# Patient Record
Sex: FEMALE | Race: WHITE | NOT HISPANIC OR LATINO | Employment: OTHER | URBAN - METROPOLITAN AREA
[De-identification: names, ages, dates, MRNs, and addresses within clinical notes are randomized per-mention and may not be internally consistent; named-entity substitution may affect disease eponyms.]

---

## 2017-01-02 ENCOUNTER — OFFICE VISIT (OUTPATIENT)
Dept: FAMILY MEDICINE | Facility: CLINIC | Age: 52
End: 2017-01-02

## 2017-01-02 VITALS
DIASTOLIC BLOOD PRESSURE: 74 MMHG | SYSTOLIC BLOOD PRESSURE: 110 MMHG | TEMPERATURE: 97.7 F | RESPIRATION RATE: 18 BRPM | BODY MASS INDEX: 37.67 KG/M2 | HEART RATE: 68 BPM | WEIGHT: 206 LBS | OXYGEN SATURATION: 96 %

## 2017-01-02 DIAGNOSIS — G89.29 CHRONIC MIDLINE LOW BACK PAIN WITHOUT SCIATICA: Primary | ICD-10-CM

## 2017-01-02 DIAGNOSIS — R60.0 BILATERAL EDEMA OF LOWER EXTREMITY: ICD-10-CM

## 2017-01-02 DIAGNOSIS — M54.50 CHRONIC MIDLINE LOW BACK PAIN WITHOUT SCIATICA: Primary | ICD-10-CM

## 2017-01-02 NOTE — PROGRESS NOTES
Preceptor Attestation:   Patient seen and discussed with the resident. Assessment and plan reviewed with resident and agreed upon.   Supervising Physician:  Rashad Nowak MD  [facility]

## 2017-01-02 NOTE — PROGRESS NOTES
HPI:       Flor Valdez is a 51 year old who presents for the following  Patient presents with:  Liver: Follow Up    She has no special concerns today. She states that every month that she sees her PCP Dr. Sierra.  Last time she was given patch for her chronic back pain which helped. She would like to refill this medication today. She would also like prescription for back supporter/stabilizer today.     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      Problem, Medication and Allergy Lists were reviewed and are current.  Patient is an established patient of this clinic.         Review of Systems:   Review of Systems   Constitutional: Negative for fever, chills, appetite change and fatigue.   HENT: Negative.    Eyes: Negative.    Respiratory: Negative for cough, chest tightness and shortness of breath.    Cardiovascular: Negative for chest pain and palpitations.   Gastrointestinal: Negative for nausea, vomiting, abdominal pain, diarrhea, constipation and blood in stool.   Genitourinary: Negative.    Musculoskeletal: Positive for back pain (chronic).   Skin: Negative.    Neurological: Negative.    Hematological: Negative.    Psychiatric/Behavioral: Negative.              Physical Exam:     Patient Vitals for the past 24 hrs:   BP Temp Temp src Pulse Resp SpO2 Weight   01/02/17 1325 110/74 mmHg 97.7  F (36.5  C) Oral 68 18 96 % 206 lb (93.441 kg)     Body mass index is 37.67 kg/(m^2).  Vitals were reviewed and were normal     Physical Exam   Constitutional: She is oriented to person, place, and time. She appears well-developed and well-nourished. No distress.   HENT:   Head: Normocephalic and atraumatic.   Right Ear: External ear normal.   Left Ear: External ear normal.   Eyes: Conjunctivae are normal.   Neck: Normal range of motion.  Lungs: Clear to auscultation bilaterally  CVS:  RRR, +S1S2, No murmur noted, no JVD   Musculoskeletal:   Trace lower extremity swelling   Normal range of motion.        Lumbar back: She exhibits tenderness. She exhibits normal range of motion and no bony tenderness.   Neurological: She is alert and oriented to person, place, and time. No cranial nerve deficit. Gait normal.   Skin: Skin is warm. No rash noted. She is not diaphoretic.   Psychiatric: She has a normal mood and affect. Her behavior is normal. Thought content normal.       Assessment and Plan   Flor is a 51 year old female who has history of CKD stage 3 and is 6 years status post liver transplantation for chronic hepatitis C (negative for hepatitis C virus since transplant) who presents for routine follow up      Diagnoses and all orders for this visit:    Chronic back pain, unspecified back location, unspecified back pain laterality  Stable, no red flags or evidence of radiculopathy. No signs of cauda equina syndrome. Continue with topical lidocaine to pain relief. Prescription for back stabilizer was given for additional support. Offered PT for mobilization, in addition to core strengthening and stretching with patient declined at this time due to cold weather.   -     order for DME; Equipment being ordered: Back Stabilizer    Trace bilateral edema of lower extremity  No concerns for cardiac etiologies, given her normal EF on last echo which was performed in 2014.  LFT's within normal limits, no concerns for hepatic etiologies  History of CKD stage 3, but renal function sable  Symptoms likely secondary to venous insufficiency, patient provided with compression stockings for support. Encouraged healthy lifestyle modifications with diet and exercise.   -     order for DME; Equipment being ordered: Compression stockings below knee bilateral                  There are no discontinued medications.  Options for treatment and follow-up care were reviewed with the patient. Flor Valdez  engaged in the  decision making process and verbalized understanding of the options discussed and agreed with the final plan.    Aby Ley MD

## 2017-01-03 ENCOUNTER — TELEPHONE (OUTPATIENT)
Dept: FAMILY MEDICINE | Facility: CLINIC | Age: 52
End: 2017-01-03

## 2017-01-03 DIAGNOSIS — R60.0 BILATERAL EDEMA OF LOWER EXTREMITY: Primary | ICD-10-CM

## 2017-01-03 NOTE — TELEPHONE ENCOUNTER
Message routed to Dr. Ley, last to see patient, to place DME order as requested. Please have PCS fax to [facility].    Sally Ann RN

## 2017-01-03 NOTE — TELEPHONE ENCOUNTER
[facility] phone call message - order or referral request for patient:     Order or referral being requested:  requesting compression stockings from [employer]- 2 pairs of black stabilizer socks (beige okay if black not available). Size XL.    Additional Comments: none    OK to leave a message on voice mail? Yes    Primary language: French      needed? Yes    Call taken on January 3, 2017 at 3:32 PM by Nadia Hernandez

## 2017-01-03 NOTE — TELEPHONE ENCOUNTER
DME order placed and put in PCS folder to be faxed to [facility] lizzy Ley MD   [facility]

## 2017-01-04 ENCOUNTER — TELEPHONE (OUTPATIENT)
Dept: FAMILY MEDICINE | Facility: CLINIC | Age: 52
End: 2017-01-04

## 2017-01-06 ENCOUNTER — TELEPHONE (OUTPATIENT)
Dept: FAMILY MEDICINE | Facility: CLINIC | Age: 52
End: 2017-01-06

## 2017-01-06 NOTE — TELEPHONE ENCOUNTER
[facility] phone call message - order or referral request for patient:     Order or referral being requested: Skilled Nurse Visit      Additional Comments: 1 time a week for 9 weeks for as needed visit for med management and assessment.    OK to leave a message on voice mail? Yes    Primary language: Estonian      needed? Yes    Call taken on January 6, 2017 at 11:06 AM by Monik Wilkins

## 2017-01-06 NOTE — TELEPHONE ENCOUNTER
Returned call to home care nurse to give verbal orders per protocol as requested. Nurse verbalized understanding.    Sally Ann RN

## 2017-01-08 ENCOUNTER — MEDICAL CORRESPONDENCE (OUTPATIENT)
Dept: HEALTH INFORMATION MANAGEMENT | Facility: CLINIC | Age: 52
End: 2017-01-08

## 2017-01-10 ENCOUNTER — DOCUMENTATION ONLY (OUTPATIENT)
Dept: FAMILY MEDICINE | Facility: CLINIC | Age: 52
End: 2017-01-10

## 2017-01-10 NOTE — PROGRESS NOTES
"When opening a documentation only encounter, be sure to enter in \"Chief Complaint\" Forms and in \" Comments\" Title of form, description if needed.    Flor is a 51 year old  female  Form received via: Fax  Form now resides in: Provider Ready    Corrine Huitron CMA      Form has been completed by provider.     Form sent out via: Fax   Patient informed: No  Output date: January 26, 2017    Corrine Huitron CMA      **Please close the encounter**              "

## 2017-01-10 NOTE — TELEPHONE ENCOUNTER
"When opening a documentation only encounter, be sure to enter in \"Chief Complaint\" Forms and in \" Comments\" Title of form, description if needed.    Flor is a 51 year old  female  Form received via: Fax  Form now resides in: Provider Edward Huitron CMA                  "

## 2017-01-23 ENCOUNTER — OFFICE VISIT (OUTPATIENT)
Dept: TRANSPLANT | Facility: CLINIC | Age: 52
End: 2017-01-23
Attending: TRANSPLANT SURGERY
Payer: MEDICARE

## 2017-01-23 VITALS
HEART RATE: 68 BPM | DIASTOLIC BLOOD PRESSURE: 72 MMHG | RESPIRATION RATE: 16 BRPM | TEMPERATURE: 98.3 F | SYSTOLIC BLOOD PRESSURE: 113 MMHG | OXYGEN SATURATION: 100 %

## 2017-01-23 DIAGNOSIS — Z94.4 LIVER REPLACED BY TRANSPLANT (H): Primary | ICD-10-CM

## 2017-01-23 DIAGNOSIS — F51.01 PRIMARY INSOMNIA: Primary | ICD-10-CM

## 2017-01-23 DIAGNOSIS — Z94.4 LIVER REPLACED BY TRANSPLANT (H): ICD-10-CM

## 2017-01-23 LAB
ALBUMIN SERPL-MCNC: 3.8 G/DL (ref 3.4–5)
ALP SERPL-CCNC: 104 U/L (ref 40–150)
ALT SERPL W P-5'-P-CCNC: 52 U/L (ref 0–50)
ANION GAP SERPL CALCULATED.3IONS-SCNC: 9 MMOL/L (ref 3–14)
AST SERPL W P-5'-P-CCNC: 32 U/L (ref 0–45)
BILIRUB DIRECT SERPL-MCNC: 0.2 MG/DL (ref 0–0.2)
BILIRUB SERPL-MCNC: 0.9 MG/DL (ref 0.2–1.3)
BUN SERPL-MCNC: 36 MG/DL (ref 7–30)
CALCIUM SERPL-MCNC: 9.2 MG/DL (ref 8.5–10.1)
CHLORIDE SERPL-SCNC: 106 MMOL/L (ref 94–109)
CHOLEST SERPL-MCNC: 243 MG/DL
CO2 SERPL-SCNC: 23 MMOL/L (ref 20–32)
CREAT SERPL-MCNC: 1.25 MG/DL (ref 0.52–1.04)
CREAT UR-MCNC: 84 MG/DL
CYCLOSPORINE BLD LC/MS/MS-MCNC: 181 UG/L (ref 50–400)
ERYTHROCYTE [DISTWIDTH] IN BLOOD BY AUTOMATED COUNT: 12.8 % (ref 10–15)
GFR SERPL CREATININE-BSD FRML MDRD: 45 ML/MIN/1.7M2
GLUCOSE SERPL-MCNC: 90 MG/DL (ref 70–99)
HCT VFR BLD AUTO: 37.5 % (ref 35–47)
HDLC SERPL-MCNC: 41 MG/DL
HGB BLD-MCNC: 12.4 G/DL (ref 11.7–15.7)
LDLC SERPL CALC-MCNC: 162 MG/DL
MCH RBC QN AUTO: 29.4 PG (ref 26.5–33)
MCHC RBC AUTO-ENTMCNC: 33.1 G/DL (ref 31.5–36.5)
MCV RBC AUTO: 89 FL (ref 78–100)
NONHDLC SERPL-MCNC: 201 MG/DL
PLATELET # BLD AUTO: 131 10E9/L (ref 150–450)
POTASSIUM SERPL-SCNC: 4.8 MMOL/L (ref 3.4–5.3)
PROT SERPL-MCNC: 7.8 G/DL (ref 6.8–8.8)
PROT UR-MCNC: 0.14 G/L
PROT/CREAT 24H UR: 0.17 G/G CR (ref 0–0.2)
RBC # BLD AUTO: 4.22 10E12/L (ref 3.8–5.2)
SODIUM SERPL-SCNC: 138 MMOL/L (ref 133–144)
TME LAST DOSE: NORMAL H
TRIGL SERPL-MCNC: 198 MG/DL
WBC # BLD AUTO: 5.6 10E9/L (ref 4–11)

## 2017-01-23 PROCEDURE — 85027 COMPLETE CBC AUTOMATED: CPT | Performed by: INTERNAL MEDICINE

## 2017-01-23 PROCEDURE — T1013 SIGN LANG/ORAL INTERPRETER: HCPCS | Mod: U3

## 2017-01-23 PROCEDURE — 80076 HEPATIC FUNCTION PANEL: CPT | Performed by: INTERNAL MEDICINE

## 2017-01-23 PROCEDURE — 84156 ASSAY OF PROTEIN URINE: CPT | Performed by: INTERNAL MEDICINE

## 2017-01-23 PROCEDURE — 80048 BASIC METABOLIC PNL TOTAL CA: CPT | Performed by: INTERNAL MEDICINE

## 2017-01-23 PROCEDURE — 36415 COLL VENOUS BLD VENIPUNCTURE: CPT | Performed by: INTERNAL MEDICINE

## 2017-01-23 PROCEDURE — 99211 OFF/OP EST MAY X REQ PHY/QHP: CPT | Mod: ZF

## 2017-01-23 PROCEDURE — 80158 DRUG ASSAY CYCLOSPORINE: CPT | Performed by: INTERNAL MEDICINE

## 2017-01-23 PROCEDURE — 80061 LIPID PANEL: CPT | Performed by: INTERNAL MEDICINE

## 2017-01-23 RX ORDER — LIDOCAINE 50 MG/G
PATCH TOPICAL
Qty: 30 PATCH | Refills: 0 | Status: SHIPPED | OUTPATIENT
Start: 2017-01-23 | End: 2017-02-03

## 2017-01-23 RX ORDER — LANOLIN ALCOHOL/MO/W.PET/CERES
3 CREAM (GRAM) TOPICAL
Qty: 100 TABLET | Refills: 3 | Status: SHIPPED | OUTPATIENT
Start: 2017-01-23 | End: 2017-10-26

## 2017-01-23 RX ORDER — MYCOPHENOLATE MOFETIL 250 MG/1
500 CAPSULE ORAL EVERY 12 HOURS
Qty: 120 CAPSULE | Refills: 11 | Status: SHIPPED | OUTPATIENT
Start: 2017-01-23 | End: 2017-09-12

## 2017-01-23 NOTE — Clinical Note
1/23/2017       RE: Flor Navarro  [address] [address]     Dear Colleague,    Thank you for referring your patient, Flor Navarro, to the [facility] TR[facility]PL[facility] at [facility]. Please see a copy of my visit note below.    Doing well has some back pain. No fevers, has been following with Dr. Anne.    /72 mmHg  Pulse 68  Temp(Src) 98.3  F (36.8  C) (Oral)  Resp 16  SpO2 100%  Abdomen soft    Recommend   lidoderm patches and follow with     Again, thank you for allowing me to participate in the care of your patient.      Sincerely,    Ramses Fortune MD

## 2017-01-23 NOTE — PROGRESS NOTES
Doing well has some back pain. No fevers, has been following with Dr. Anne.    /72 mmHg  Pulse 68  Temp(Src) 98.3  F (36.8  C) (Oral)  Resp 16  SpO2 100%  Abdomen soft    Recommend   lidoderm patches and follow with

## 2017-01-23 NOTE — NURSING NOTE
"Chief Complaint   Patient presents with     Liver Transplant     6 year follow up       Initial /72 mmHg  Pulse 68  Temp(Src) 98.3  F (36.8  C) (Oral)  Resp 16  SpO2 100% Estimated body mass index is 37.67 kg/(m^2) as calculated from the following:    Height as of 12/6/16: 1.575 m (5' 2.01\").    Weight as of 1/2/17: 93.441 kg (206 lb).  BP completed using cuff size: regular    "

## 2017-01-26 ENCOUNTER — DOCUMENTATION ONLY (OUTPATIENT)
Dept: FAMILY MEDICINE | Facility: CLINIC | Age: 52
End: 2017-01-26

## 2017-02-03 ENCOUNTER — TELEPHONE (OUTPATIENT)
Dept: PHARMACY | Facility: CLINIC | Age: 52
End: 2017-02-03

## 2017-02-03 ENCOUNTER — OFFICE VISIT (OUTPATIENT)
Dept: FAMILY MEDICINE | Facility: CLINIC | Age: 52
End: 2017-02-03

## 2017-02-03 VITALS
BODY MASS INDEX: 38.11 KG/M2 | DIASTOLIC BLOOD PRESSURE: 70 MMHG | SYSTOLIC BLOOD PRESSURE: 104 MMHG | OXYGEN SATURATION: 96 % | RESPIRATION RATE: 14 BRPM | HEART RATE: 81 BPM | TEMPERATURE: 98 F | WEIGHT: 208.4 LBS

## 2017-02-03 DIAGNOSIS — M54.6 CHRONIC LEFT-SIDED THORACIC BACK PAIN: ICD-10-CM

## 2017-02-03 DIAGNOSIS — Z53.9 ERRONEOUS ENCOUNTER--DISREGARD: Primary | ICD-10-CM

## 2017-02-03 DIAGNOSIS — R07.0 THROAT PAIN: Primary | ICD-10-CM

## 2017-02-03 DIAGNOSIS — G89.29 CHRONIC LEFT-SIDED THORACIC BACK PAIN: ICD-10-CM

## 2017-02-03 LAB — S PYO AG THROAT QL IA.RAPID: NEGATIVE

## 2017-02-03 RX ORDER — LIDOCAINE 50 MG/G
OINTMENT TOPICAL 2 TIMES DAILY
Qty: 70 G | Refills: 1 | Status: SHIPPED | OUTPATIENT
Start: 2017-02-03 | End: 2017-09-12

## 2017-02-03 RX ORDER — LIDOCAINE 50 MG/G
PATCH TOPICAL
Qty: 30 PATCH | Refills: 0 | Status: SHIPPED | OUTPATIENT
Start: 2017-02-03 | End: 2017-02-03

## 2017-02-03 ASSESSMENT — ENCOUNTER SYMPTOMS
HEADACHES: 0
COLOR CHANGE: 0
VOICE CHANGE: 0
FATIGUE: 0
SHORTNESS OF BREATH: 0
CHEST TIGHTNESS: 0
CHILLS: 0
SINUS PRESSURE: 0
COUGH: 0
MYALGIAS: 0
EYE DISCHARGE: 0
ABDOMINAL PAIN: 0
WEAKNESS: 0
LIGHT-HEADEDNESS: 0
ADENOPATHY: 0
SORE THROAT: 1
NECK STIFFNESS: 0
WHEEZING: 0
PSYCHIATRIC NEGATIVE: 1
VOMITING: 0
BACK PAIN: 1
APNEA: 0
FEVER: 1
ACTIVITY CHANGE: 0
RHINORRHEA: 1
NAUSEA: 0
DIARRHEA: 0
PALPITATIONS: 0

## 2017-02-03 NOTE — PROGRESS NOTES
Preceptor Attestation:   Patient seen and discussed with the resident. Assessment and plan reviewed with resident and agreed upon.   Supervising Physician:  Rogelio Quintero MD  [facility]

## 2017-02-03 NOTE — TELEPHONE ENCOUNTER
[facility] Prior Authorization Team Request    Medication: Lidocaine 5% oint  Dosing: apply topically to affected area twice daily  NDC (required for Medicaid members):     Insurance   BIN: [id number]  PCN:   Grp: [id number]  ID: [id number]    CoverMyMeds Key (if applicable):     Additional documentation:     Filling Pharmacy: [facility]  Phone Number: [phone]  Contact: [employer] [employer] CADENA ([id number]) please send all responses to this pool.  Pharmacy NPI (required for Medicaid members):
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
## 2017-02-05 LAB
BACTERIA SPEC CULT: NORMAL
MICRO REPORT STATUS: NORMAL
SPECIMEN SOURCE: NORMAL

## 2017-02-06 ENCOUNTER — DOCUMENTATION ONLY (OUTPATIENT)
Dept: FAMILY MEDICINE | Facility: CLINIC | Age: 52
End: 2017-02-06

## 2017-02-10 ENCOUNTER — TELEPHONE (OUTPATIENT)
Dept: FAMILY MEDICINE | Facility: CLINIC | Age: 52
End: 2017-02-10

## 2017-02-10 NOTE — TELEPHONE ENCOUNTER
Attempted to reach patient via  line, unable to reach. Left VM advising patient should be seen in urgent care for persisting symptoms due to no open appointments in clinic. Left callback number for questions.    Sally Ann RN

## 2017-02-10 NOTE — TELEPHONE ENCOUNTER
[facility] phone call message-patient reporting a symptom:     Symptom: Chills, sweats, cough, congestion    Same Day Visit Offered: Yes, but no same day appts available    Additional comments: Patient is calling because she is still having chills, congestions, cough, white sputum etc. The antibiotics given are not working. She thinks she will be going to urgent care.     OK to leave message on voice mail? Yes    Primary language: Greenlandic      needed? Yes    Call taken on February 10, 2017 at 11:22 AM by Hellen Lobo

## 2017-02-23 ENCOUNTER — DOCUMENTATION ONLY (OUTPATIENT)
Dept: FAMILY MEDICINE | Facility: CLINIC | Age: 52
End: 2017-02-23

## 2017-02-23 NOTE — PROGRESS NOTES
"When opening a documentation only encounter, be sure to enter in \"Chief Complaint\" Forms and in \" Comments\" Title of form, description if needed.    Form received via: Fax  Form now resides in: Provider Ready    Form sent out via: Fax  Patient informed: No  Output date: February 23, 2017      Form received by recipient via fax confirmation  Please close the encounter.    "

## 2017-02-27 ENCOUNTER — OFFICE VISIT (OUTPATIENT)
Dept: GASTROENTEROLOGY | Facility: CLINIC | Age: 52
End: 2017-02-27
Attending: INTERNAL MEDICINE
Payer: MEDICARE

## 2017-02-27 VITALS
BODY MASS INDEX: 37.43 KG/M2 | TEMPERATURE: 97.7 F | HEIGHT: 62 IN | SYSTOLIC BLOOD PRESSURE: 116 MMHG | HEART RATE: 69 BPM | DIASTOLIC BLOOD PRESSURE: 77 MMHG | WEIGHT: 203.4 LBS | OXYGEN SATURATION: 94 %

## 2017-02-27 DIAGNOSIS — Z94.4 LIVER REPLACED BY TRANSPLANT (H): Primary | ICD-10-CM

## 2017-02-27 DIAGNOSIS — K21.9 GASTROESOPHAGEAL REFLUX DISEASE, ESOPHAGITIS PRESENCE NOT SPECIFIED: ICD-10-CM

## 2017-02-27 DIAGNOSIS — Z13.220 LIPID SCREENING: ICD-10-CM

## 2017-02-27 DIAGNOSIS — M81.0 OSTEOPOROSIS: ICD-10-CM

## 2017-02-27 DIAGNOSIS — Z94.4 LIVER REPLACED BY TRANSPLANT (H): ICD-10-CM

## 2017-02-27 LAB
ALBUMIN SERPL-MCNC: 3.8 G/DL (ref 3.4–5)
ALBUMIN UR-MCNC: NEGATIVE MG/DL
ALP SERPL-CCNC: 107 U/L (ref 40–150)
ALT SERPL W P-5'-P-CCNC: 30 U/L (ref 0–50)
ANION GAP SERPL CALCULATED.3IONS-SCNC: 8 MMOL/L (ref 3–14)
APPEARANCE UR: ABNORMAL
AST SERPL W P-5'-P-CCNC: 21 U/L (ref 0–45)
BACTERIA #/AREA URNS HPF: ABNORMAL /HPF
BILIRUB DIRECT SERPL-MCNC: 0.2 MG/DL (ref 0–0.2)
BILIRUB SERPL-MCNC: 1 MG/DL (ref 0.2–1.3)
BILIRUB UR QL STRIP: NEGATIVE
BUN SERPL-MCNC: 42 MG/DL (ref 7–30)
CALCIUM SERPL-MCNC: 9.2 MG/DL (ref 8.5–10.1)
CHLORIDE SERPL-SCNC: 106 MMOL/L (ref 94–109)
CHOLEST SERPL-MCNC: 230 MG/DL
CO2 SERPL-SCNC: 26 MMOL/L (ref 20–32)
COLOR UR AUTO: YELLOW
CREAT SERPL-MCNC: 1.21 MG/DL (ref 0.52–1.04)
CREAT UR-MCNC: 80 MG/DL
CYCLOSPORINE BLD LC/MS/MS-MCNC: 118 UG/L (ref 50–400)
ERYTHROCYTE [DISTWIDTH] IN BLOOD BY AUTOMATED COUNT: 13 % (ref 10–15)
GFR SERPL CREATININE-BSD FRML MDRD: 47 ML/MIN/1.7M2
GLUCOSE SERPL-MCNC: 86 MG/DL (ref 70–99)
GLUCOSE UR STRIP-MCNC: NEGATIVE MG/DL
HCT VFR BLD AUTO: 37.5 % (ref 35–47)
HDLC SERPL-MCNC: 37 MG/DL
HGB BLD-MCNC: 12.3 G/DL (ref 11.7–15.7)
HGB UR QL STRIP: NEGATIVE
HYALINE CASTS #/AREA URNS LPF: 3 /LPF (ref 0–2)
KETONES UR STRIP-MCNC: NEGATIVE MG/DL
LDLC SERPL CALC-MCNC: 141 MG/DL
LEUKOCYTE ESTERASE UR QL STRIP: ABNORMAL
MCH RBC QN AUTO: 29.1 PG (ref 26.5–33)
MCHC RBC AUTO-ENTMCNC: 32.8 G/DL (ref 31.5–36.5)
MCV RBC AUTO: 89 FL (ref 78–100)
MUCOUS THREADS #/AREA URNS LPF: PRESENT /LPF
NITRATE UR QL: POSITIVE
NONHDLC SERPL-MCNC: 193 MG/DL
PH UR STRIP: 5 PH (ref 5–7)
PLATELET # BLD AUTO: 156 10E9/L (ref 150–450)
POTASSIUM SERPL-SCNC: 4.8 MMOL/L (ref 3.4–5.3)
PROT SERPL-MCNC: 7.8 G/DL (ref 6.8–8.8)
PROT UR-MCNC: 0.14 G/L
PROT/CREAT 24H UR: 0.18 G/G CR (ref 0–0.2)
RBC # BLD AUTO: 4.22 10E12/L (ref 3.8–5.2)
RBC #/AREA URNS AUTO: 1 /HPF (ref 0–2)
SODIUM SERPL-SCNC: 140 MMOL/L (ref 133–144)
SP GR UR STRIP: 1.01 (ref 1–1.03)
SQUAMOUS #/AREA URNS AUTO: 1 /HPF (ref 0–1)
TME LAST DOSE: NORMAL H
TRIGL SERPL-MCNC: 260 MG/DL
URN SPEC COLLECT METH UR: ABNORMAL
UROBILINOGEN UR STRIP-MCNC: 0 MG/DL (ref 0–2)
WBC # BLD AUTO: 4.6 10E9/L (ref 4–11)
WBC #/AREA URNS AUTO: 22 /HPF (ref 0–2)

## 2017-02-27 PROCEDURE — 84156 ASSAY OF PROTEIN URINE: CPT | Performed by: INTERNAL MEDICINE

## 2017-02-27 PROCEDURE — 80076 HEPATIC FUNCTION PANEL: CPT | Performed by: INTERNAL MEDICINE

## 2017-02-27 PROCEDURE — 81001 URINALYSIS AUTO W/SCOPE: CPT | Performed by: INTERNAL MEDICINE

## 2017-02-27 PROCEDURE — 85027 COMPLETE CBC AUTOMATED: CPT | Performed by: INTERNAL MEDICINE

## 2017-02-27 PROCEDURE — 87186 SC STD MICRODIL/AGAR DIL: CPT | Performed by: INTERNAL MEDICINE

## 2017-02-27 PROCEDURE — 80061 LIPID PANEL: CPT | Performed by: INTERNAL MEDICINE

## 2017-02-27 PROCEDURE — 80158 DRUG ASSAY CYCLOSPORINE: CPT | Performed by: INTERNAL MEDICINE

## 2017-02-27 PROCEDURE — 80048 BASIC METABOLIC PNL TOTAL CA: CPT | Performed by: INTERNAL MEDICINE

## 2017-02-27 PROCEDURE — 87088 URINE BACTERIA CULTURE: CPT | Performed by: INTERNAL MEDICINE

## 2017-02-27 PROCEDURE — 99212 OFFICE O/P EST SF 10 MIN: CPT | Mod: ZF

## 2017-02-27 PROCEDURE — 87086 URINE CULTURE/COLONY COUNT: CPT | Performed by: INTERNAL MEDICINE

## 2017-02-27 PROCEDURE — T1013 SIGN LANG/ORAL INTERPRETER: HCPCS | Mod: U3

## 2017-02-27 PROCEDURE — 36415 COLL VENOUS BLD VENIPUNCTURE: CPT | Performed by: INTERNAL MEDICINE

## 2017-02-27 RX ORDER — ALENDRONATE SODIUM 70 MG/1
70 TABLET ORAL
Qty: 12 TABLET | Refills: 3 | Status: SHIPPED
Start: 2017-02-27 | End: 2018-03-12

## 2017-02-27 ASSESSMENT — PAIN SCALES - GENERAL: PAINLEVEL: NO PAIN (0)

## 2017-02-27 NOTE — MR AVS SNAPSHOT
After Visit Summary   2/27/2017    Flor Navarro    MRN: [id number]           Patient Information     Date Of Birth          1965        Visit Information        Provider Department      2/27/2017 1:00 PM Mackenzie Sloan [facility]; Laron Anne MD [facility] Hepatology        Today's Diagnoses     Gastroesophageal reflux disease, esophagitis presence not specified        Osteoporosis           Follow-ups after your visit        Follow-up notes from your care team     Return in about 6 months (around 8/27/2017).      Your next 10 appointments already scheduled     Mar 03, 2017  8:20 AM CST   PHYSICAL with Aby Ley MD   [facility] ([facility])    [address],  [address]  [facility] [address]   [phone]            May 31, 2017 12:00 PM CDT   Lab with  LAB   [facility] ([facility])    [address]  1st Floor  [facility] [address] [phone]            May 31, 2017  1:00 PM CDT   (Arrive by 12:45 PM)   Return Liver Transplant with Laron Anne MD   [facility] Hepatology ([facility])    [address] [place] [address]  3rd Floor  [facility] [address] [phone]              Who to contact     If you have questions or need follow up information about today's clinic visit or your schedule please contact [facility] HEPATOLOGY directly at [phone].  Normal or non-critical lab and imaging results will be communicated to you by MyChart, letter or phone within 4 business days after the clinic has received the results. If you do not hear from us within 7 days, please contact the clinic through MyChart or phone. If you have a critical or abnormal lab result, we will notify you by phone as soon as possible.  Submit refill requests through [employer] or call your pharmacy and they will forward the refill request to us. Please allow 3 business days for your refill to be completed.          Additional  "Information About Your Visit        MyChart Information     [employer] lets you send messages to your doctor, view your test results, renew your prescriptions, schedule appointments and more. To sign up, go to [email or web address] . Click on \"Log in\" on the left side of the screen, which will take you to the Welcome page. Then click on \"Sign up Now\" on the right side of the page.     You will be asked to enter the access code listed below, as well as some personal information. Please follow the directions to create your username and password.     Your access code is: [email or web address]  Expires: 2017  1:34 PM     Your access code will  in 90 days. If you need help or a new code, please call your [facility] clinic or [phone].        Care EveryWhere ID     This is your Care EveryWhere ID. This could be used by other organizations to access your [facility] medical records  [id number]        Your Vitals Were     Pulse Temperature Height Pulse Oximetry BMI (Body Mass Index)       69 97.7  F (36.5  C) (Oral) 1.575 m (5' 2.01\") 94% 37.19 kg/m2        Blood Pressure from Last 3 Encounters:   17 116/77   17 104/70   17 113/72    Weight from Last 3 Encounters:   17 92.3 kg (203 lb 6.4 oz)   17 94.5 kg (208 lb 6.4 oz)   17 93.4 kg (206 lb)              Today, you had the following     No orders found for display         Today's Medication Changes          These changes are accurate as of: 17  1:34 PM.  If you have any questions, ask your nurse or doctor.               These medicines have changed or have updated prescriptions.        Dose/Directions    senna-docusate 8.6-50 MG per tablet   Commonly known as:  SENOKOT-S;PERICOLACE   This may have changed:    - how much to take  - additional instructions   Used for:  Constipation, chronic        Dose:  2 tablet   Take 2 tablets by mouth 2 times daily For constipation   Quantity:  120 tablet   Refills:  6            Where to get " your medicines      These medications were sent to [place], [place] - [address]  [address], [facility] [address]    Hours:  TRANSPLANT PHONE NUMBER [phone] Phone:  [phone]     alendronate 70 MG tablet    omeprazole 20 MG CR capsule                Primary Care Provider Office Phone # Fax #    Karely MD Rigo [phone]       [facility] [address] [facility] [address]        Thank you!     Thank you for choosing [facility] HEPATOLOGY  for your care. Our goal is always to provide you with excellent care. Hearing back from our patients is one way we can continue to improve our services. Please take a few minutes to complete the written survey that you may receive in the mail after your visit with us. Thank you!             Your Updated Medication List - Protect others around you: Learn how to safely use, store and throw away your medicines at www.disposemymeds.org.          This list is accurate as of: 2/27/17  1:34 PM.  Always use your most recent med list.                   Brand Name Dispense Instructions for use    acetaminophen 325 MG tablet    TYLENOL    100 tablet    Take 1-2 tablets (325-650 mg) by mouth every 6 hours as needed Max 6 tablets per 24 hours       albuterol 108 (90 BASE) MCG/ACT Inhaler    PROAIR HFA/PROVENTIL HFA/VENTOLIN HFA    1 Inhaler    Inhale 2 puffs into the lungs 4 times daily       alendronate 70 MG tablet    FOSAMAX    12 tablet    Take 1 tablet (70 mg) by mouth every 7 days Take at least 60 min before breakfast with over 8 oz water and stay upright for at least 30 min. after dose.       benzocaine-menthol 6-10 MG lozenge    CHLORASEPTIC    36 lozenge    Place 1 lozenge inside cheek every 2 hours as needed for moderate pain       bisacodyl 5 MG EC tablet    DULCOLAX    30 tablet    Take 1 tablet (5 mg) by mouth daily as needed for constipation       calcium-vitamin D 600-400 MG-UNIT  per tablet    calcium 600 + D    60 tablet    Take 1 tablet by mouth 2 times daily       carboxymethylcellul-glycerin 0.5-0.9 % Soln ophthalmic solution    OPTIVE/REFRESH OPTIVE    1 each    Place 1 drop into both eyes 4 times daily       cholecalciferol 1000 UNITS capsule    vitamin  -D    180 capsule    Take 2 capsules (2,000 Units) by mouth daily       * cycloSPORINE modified capsule     240 capsule    Take 4 capsules (100 mg) by mouth every 12 hours       * cycloSPORINE modified 25 MG capsule    GENERIC EQUIVALENT    720 capsule    Take 4 capsules (100 mg) by mouth every 12 hours       lidocaine 5 % ointment    XYLOCAINE    70 g    Apply topically 2 times daily Apply to affected area for pain two times daily       melatonin 3 MG tablet     100 tablet    Take 1 tablet (3 mg) by mouth nightly as needed for sleep       multivitamin, therapeutic with minerals Tabs tablet     100 tablet    Take 1 tablet by mouth daily       mycophenolate 250 MG capsule    CELLCEPT - GENERIC EQUIVALENT    120 capsule    Take 2 capsules (500 mg) by mouth every 12 hours       omeprazole 20 MG CR capsule    priLOSEC    180 capsule    Take 1 capsule (20 mg) by mouth 2 times daily       * order for DME     1 Units    Equipment being ordered: Nebulizer with adult mask and tubing       * order for DME     1 Units    Equipment being ordered: cane with padded handle       * order for DME     1 Units    Equipment being ordered: cane       * order for DME     2 Units    Equipment being ordered: compression stockings bilateral Please measure and fit patient for stockings  Strength 15-30mmHg Disp 2 pairs 1 refill over the time of 1 year       * order for DME     1 Units    2-wheeled walker (dx: gait instability and Frequent falls).       * order for DME     1 Units    Equipment being ordered: 4 Wheeled Walker with Seat and Brakes       * order for DME     2 each    Equipment being ordered: Compression stockings below knee bilateral       * order  for DME     1 each    Equipment being ordered: Back Stabilizer       * order for DME     2 each    Equipment being ordered: 2 pairs of black stabilizer socks (beige okay if black not available). Size XL.       Psyllium 500 MG Caps     180 capsule    Take 3 capsules by mouth 2 times daily       senna-docusate 8.6-50 MG per tablet    SENOKOT-S;PERICOLACE    120 tablet    Take 2 tablets by mouth 2 times daily For constipation       simethicone 80 MG chewable tablet    MYLICON    120 tablet    Take 1 tablet (80 mg) by mouth every 6 hours as needed for flatulence or cramping       sodium chloride 0.65 % nasal spray    OCEAN    30 mL    Spray 1 spray into both nostrils daily as needed for congestion       sodium phosphate 7-19 GM/118ML rectal enema     3 Bottle    Place 1 Bottle (1 enema) rectally daily as needed for constipation       STATIN NOT PRESCRIBED (INTENTIONAL)     0 each    Not prescribed       * Notice:  This list has 11 medication(s) that are the same as other medications prescribed for you. Read the directions carefully, and ask your doctor or other care provider to review them with you.

## 2017-02-27 NOTE — LETTER
2/27/2017      RE: Flor Navarro  [address] [address]       HISTORY OF PRESENT ILLNESS:  I had the pleasure of seeing Flor Navarro for followup in the Liver Transplantation Clinic at the [facility] on 02/27/2017.  Ms. Navarro returns for followup now 6-1/2 years status post liver transplantation for cirrhosis caused by chronic hepatitis C.      She is doing well at this visit.  She denies any abdominal pain, itching or skin rash or fatigue.  She denies any increased abdominal girth or lower extremity edema.  She denies any fevers or chills, cough or shortness of breath.  She denies any nausea or vomiting, diarrhea or constipation.  Her appetite is good.  She has been losing weight intentionally.       Current Outpatient Prescriptions   Medication     omeprazole (PRILOSEC) 20 MG CR capsule     alendronate (FOSAMAX) 70 MG tablet     sodium chloride (OCEAN) 0.65 % nasal spray     benzocaine-menthol (CHLORASEPTIC) 6-10 MG lozenge     lidocaine (XYLOCAINE) 5 % ointment     mycophenolate (CELLCEPT - GENERIC EQUIVALENT) 250 MG capsule     melatonin 3 MG tablet     order for DME     order for DME     order for DME     cycloSPORINE modified (GENERIC EQUIVALENT) 25 MG capsule     order for DME     order for DME     acetaminophen (TYLENOL) 325 MG tablet     cholecalciferol (VITAMIN  -D) 1000 UNITS capsule     albuterol (PROAIR HFA, PROVENTIL HFA, VENTOLIN HFA) 108 (90 BASE) MCG/ACT inhaler     order for DME     order for DME     bisacodyl (DULCOLAX) 5 MG EC tablet     calcium-vitamin D (CALCIUM 600 + D) 600-400 MG-UNIT per tablet     carboxymethylcellul-glycerin (OPTIVE/REFRESH OPTIVE) 0.5-0.9 % SOLN ophthalmic solution     multivitamin, therapeutic with minerals (MULTI-VITAMIN) TABS     Psyllium 500 MG CAPS     sodium phosphate (FLEET ENEMA) 7-19 GM/118ML rectal enema     GENGRAF 25 MG PO CAPSULE     simethicone (MYLICON) 80 MG chewable tablet     senna-docusate  (SENOKOT-S;PERICOLACE) 8.6-50 MG per tablet     STATIN NOT PRESCRIBED, INTENTIONAL,     order for DME     order for DME     [DISCONTINUED] alendronate (FOSAMAX) 70 MG tablet     [DISCONTINUED] solifenacin (VESICARE) 5 MG tablet     No current facility-administered medications for this visit.      B/P: 116/77, T: 97.7, P: 69, R: Data Unavailable    HEENT exam shows no scleral icterus and no temporal muscle wasting.  Her chest is clear.  Her abdominal exam shows no increase in girth.  No masses or tenderness to palpation are present.  Her liver is 10 cm in span without left lobe enlargement.  No spleen tip is palpable.  Extremity exam shows no edema.  Skin exam shows no suspicious lesions.  Neurologic exam is nonfocal.       Recent Results (from the past 168 hour(s))   Urine Culture Aerobic Bacterial    Collection Time: 02/27/17 11:39 AM   Result Value Ref Range    Specimen Description Midstream Urine     Special Requests Specimen received in preservative     Culture Micro Pending     Micro Report Status Pending    CBC with platelets    Collection Time: 02/27/17 11:48 AM   Result Value Ref Range    WBC 4.6 4.0 - 11.0 10e9/L    RBC Count 4.22 3.8 - 5.2 10e12/L    Hemoglobin 12.3 11.7 - 15.7 g/dL    Hematocrit 37.5 35.0 - 47.0 %    MCV 89 78 - 100 fl    MCH 29.1 26.5 - 33.0 pg    MCHC 32.8 31.5 - 36.5 g/dL    RDW 13.0 10.0 - 15.0 %    Platelet Count 156 150 - 450 10e9/L   Basic metabolic panel    Collection Time: 02/27/17 11:48 AM   Result Value Ref Range    Sodium 140 133 - 144 mmol/L    Potassium 4.8 3.4 - 5.3 mmol/L    Chloride 106 94 - 109 mmol/L    Carbon Dioxide 26 20 - 32 mmol/L    Anion Gap 8 3 - 14 mmol/L    Glucose 86 70 - 99 mg/dL    Urea Nitrogen 42 (H) 7 - 30 mg/dL    Creatinine 1.21 (H) 0.52 - 1.04 mg/dL    GFR Estimate 47 (L) >60 mL/min/1.7m2    GFR Estimate If Black 56 (L) >60 mL/min/1.7m2    Calcium 9.2 8.5 - 10.1 mg/dL   Hepatic panel    Collection Time: 02/27/17 11:48 AM   Result Value Ref Range     Bilirubin Direct 0.2 0.0 - 0.2 mg/dL    Bilirubin Total 1.0 0.2 - 1.3 mg/dL    Albumin 3.8 3.4 - 5.0 g/dL    Protein Total 7.8 6.8 - 8.8 g/dL    Alkaline Phosphatase 107 40 - 150 U/L    ALT 30 0 - 50 U/L    AST 21 0 - 45 U/L   Lipid Profile    Collection Time: 02/27/17 11:48 AM   Result Value Ref Range    Cholesterol 230 (H) <200 mg/dL    Triglycerides 260 (H) <150 mg/dL    HDL Cholesterol 37 (L) >49 mg/dL    LDL Cholesterol Calculated 141 (H) <100 mg/dL    Non HDL Cholesterol 193 (H) <130 mg/dL   Cyclosporine    Collection Time: 02/27/17 11:49 AM   Result Value Ref Range    Cyclosporine Last Dose 02/26/17 2000     Cyclosporine Level 118 50 - 400 ug/L   UA with Microscopic reflex to Culture    Collection Time: 02/27/17  1:14 PM   Result Value Ref Range    Color Urine Yellow     Appearance Urine Slightly Cloudy     Glucose Urine Negative NEG mg/dL    Bilirubin Urine Negative NEG    Ketones Urine Negative NEG mg/dL    Specific Gravity Urine 1.013 1.003 - 1.035    Blood Urine Negative NEG    pH Urine 5.0 5.0 - 7.0 pH    Protein Albumin Urine Negative NEG mg/dL    Urobilinogen mg/dL 0.0 0.0 - 2.0 mg/dL    Nitrite Urine Positive (A) NEG    Leukocyte Esterase Urine Trace (A) NEG    Source Midstream Urine     WBC Urine 22 (H) 0 - 2 /HPF    RBC Urine 1 0 - 2 /HPF    Bacteria Urine Many (A) NEG /HPF    Squamous Epithelial /HPF Urine 1 0 - 1 /HPF    Mucous Urine Present (A) NEG /LPF    Hyaline Casts 3 (A) 0 - 2 /LPF   Protein  random urine    Collection Time: 02/27/17  1:14 PM   Result Value Ref Range    Protein Random Urine 0.14 g/L    Protein Total Urine g/gr Creatinine 0.18 0 - 0.2 g/g Cr   Creatinine urine calculation only    Collection Time: 02/27/17  1:14 PM   Result Value Ref Range    Creatinine Urine 80 mg/dL      My impression is that Ms. Navarro is 6-1/2 years status post liver transplantation for cirrhosis caused by chronic hepatitis C.  She is a sustained responder in terms of her hepatitis C being negative.      I  have congratulated her on her weight loss to date and have encouraged her to lose additional weight given her high triglycerides.  She seems quite committed.  She does appear to be a little bit dehydrated today and I am not worried about her kidney function given that her urinalysis and urine protein excretion are normal.  My plan will be to see the patient back in the clinic again in 6 months.  She is up-to-date with regard to cancer screening and vaccines.      Thank you very much for allowing me to participate in the care of this patient.  If you have any questions regarding my recommendations, please do not hesitate to contact me.       Laron Anne MD      Professor of Medicine  [employer]      Executive Medical Director, Solid Organ Transplant Program  [facility]

## 2017-02-27 NOTE — PROGRESS NOTES
HISTORY OF PRESENT ILLNESS:  I had the pleasure of seeing Flor Navarro for followup in the Liver Transplantation Clinic at the [facility] on 02/27/2017.  Ms. Navarro returns for followup now 6-1/2 years status post liver transplantation for cirrhosis caused by chronic hepatitis C.      She is doing well at this visit.  She denies any abdominal pain, itching or skin rash or fatigue.  She denies any increased abdominal girth or lower extremity edema.  She denies any fevers or chills, cough or shortness of breath.  She denies any nausea or vomiting, diarrhea or constipation.  Her appetite is good.  She has been losing weight intentionally.       Current Outpatient Prescriptions   Medication     omeprazole (PRILOSEC) 20 MG CR capsule     alendronate (FOSAMAX) 70 MG tablet     sodium chloride (OCEAN) 0.65 % nasal spray     benzocaine-menthol (CHLORASEPTIC) 6-10 MG lozenge     lidocaine (XYLOCAINE) 5 % ointment     mycophenolate (CELLCEPT - GENERIC EQUIVALENT) 250 MG capsule     melatonin 3 MG tablet     order for DME     order for DME     order for DME     cycloSPORINE modified (GENERIC EQUIVALENT) 25 MG capsule     order for DME     order for DME     acetaminophen (TYLENOL) 325 MG tablet     cholecalciferol (VITAMIN  -D) 1000 UNITS capsule     albuterol (PROAIR HFA, PROVENTIL HFA, VENTOLIN HFA) 108 (90 BASE) MCG/ACT inhaler     order for DME     order for DME     bisacodyl (DULCOLAX) 5 MG EC tablet     calcium-vitamin D (CALCIUM 600 + D) 600-400 MG-UNIT per tablet     carboxymethylcellul-glycerin (OPTIVE/REFRESH OPTIVE) 0.5-0.9 % SOLN ophthalmic solution     multivitamin, therapeutic with minerals (MULTI-VITAMIN) TABS     Psyllium 500 MG CAPS     sodium phosphate (FLEET ENEMA) 7-19 GM/118ML rectal enema     GENGRAF 25 MG PO CAPSULE     simethicone (MYLICON) 80 MG chewable tablet     senna-docusate (SENOKOT-S;PERICOLACE) 8.6-50 MG per tablet     STATIN NOT PRESCRIBED, INTENTIONAL,     order for  DME     order for DME     [DISCONTINUED] alendronate (FOSAMAX) 70 MG tablet     [DISCONTINUED] solifenacin (VESICARE) 5 MG tablet     No current facility-administered medications for this visit.      B/P: 116/77, T: 97.7, P: 69, R: Data Unavailable    HEENT exam shows no scleral icterus and no temporal muscle wasting.  Her chest is clear.  Her abdominal exam shows no increase in girth.  No masses or tenderness to palpation are present.  Her liver is 10 cm in span without left lobe enlargement.  No spleen tip is palpable.  Extremity exam shows no edema.  Skin exam shows no suspicious lesions.  Neurologic exam is nonfocal.       Recent Results (from the past 168 hour(s))   Urine Culture Aerobic Bacterial    Collection Time: 02/27/17 11:39 AM   Result Value Ref Range    Specimen Description Midstream Urine     Special Requests Specimen received in preservative     Culture Micro Pending     Micro Report Status Pending    CBC with platelets    Collection Time: 02/27/17 11:48 AM   Result Value Ref Range    WBC 4.6 4.0 - 11.0 10e9/L    RBC Count 4.22 3.8 - 5.2 10e12/L    Hemoglobin 12.3 11.7 - 15.7 g/dL    Hematocrit 37.5 35.0 - 47.0 %    MCV 89 78 - 100 fl    MCH 29.1 26.5 - 33.0 pg    MCHC 32.8 31.5 - 36.5 g/dL    RDW 13.0 10.0 - 15.0 %    Platelet Count 156 150 - 450 10e9/L   Basic metabolic panel    Collection Time: 02/27/17 11:48 AM   Result Value Ref Range    Sodium 140 133 - 144 mmol/L    Potassium 4.8 3.4 - 5.3 mmol/L    Chloride 106 94 - 109 mmol/L    Carbon Dioxide 26 20 - 32 mmol/L    Anion Gap 8 3 - 14 mmol/L    Glucose 86 70 - 99 mg/dL    Urea Nitrogen 42 (H) 7 - 30 mg/dL    Creatinine 1.21 (H) 0.52 - 1.04 mg/dL    GFR Estimate 47 (L) >60 mL/min/1.7m2    GFR Estimate If Black 56 (L) >60 mL/min/1.7m2    Calcium 9.2 8.5 - 10.1 mg/dL   Hepatic panel    Collection Time: 02/27/17 11:48 AM   Result Value Ref Range    Bilirubin Direct 0.2 0.0 - 0.2 mg/dL    Bilirubin Total 1.0 0.2 - 1.3 mg/dL    Albumin 3.8 3.4 - 5.0  g/dL    Protein Total 7.8 6.8 - 8.8 g/dL    Alkaline Phosphatase 107 40 - 150 U/L    ALT 30 0 - 50 U/L    AST 21 0 - 45 U/L   Lipid Profile    Collection Time: 02/27/17 11:48 AM   Result Value Ref Range    Cholesterol 230 (H) <200 mg/dL    Triglycerides 260 (H) <150 mg/dL    HDL Cholesterol 37 (L) >49 mg/dL    LDL Cholesterol Calculated 141 (H) <100 mg/dL    Non HDL Cholesterol 193 (H) <130 mg/dL   Cyclosporine    Collection Time: 02/27/17 11:49 AM   Result Value Ref Range    Cyclosporine Last Dose 02/26/17 2000     Cyclosporine Level 118 50 - 400 ug/L   UA with Microscopic reflex to Culture    Collection Time: 02/27/17  1:14 PM   Result Value Ref Range    Color Urine Yellow     Appearance Urine Slightly Cloudy     Glucose Urine Negative NEG mg/dL    Bilirubin Urine Negative NEG    Ketones Urine Negative NEG mg/dL    Specific Gravity Urine 1.013 1.003 - 1.035    Blood Urine Negative NEG    pH Urine 5.0 5.0 - 7.0 pH    Protein Albumin Urine Negative NEG mg/dL    Urobilinogen mg/dL 0.0 0.0 - 2.0 mg/dL    Nitrite Urine Positive (A) NEG    Leukocyte Esterase Urine Trace (A) NEG    Source Midstream Urine     WBC Urine 22 (H) 0 - 2 /HPF    RBC Urine 1 0 - 2 /HPF    Bacteria Urine Many (A) NEG /HPF    Squamous Epithelial /HPF Urine 1 0 - 1 /HPF    Mucous Urine Present (A) NEG /LPF    Hyaline Casts 3 (A) 0 - 2 /LPF   Protein  random urine    Collection Time: 02/27/17  1:14 PM   Result Value Ref Range    Protein Random Urine 0.14 g/L    Protein Total Urine g/gr Creatinine 0.18 0 - 0.2 g/g Cr   Creatinine urine calculation only    Collection Time: 02/27/17  1:14 PM   Result Value Ref Range    Creatinine Urine 80 mg/dL      My impression is that Ms. Navarro is 6-1/2 years status post liver transplantation for cirrhosis caused by chronic hepatitis C.  She is a sustained responder in terms of her hepatitis C being negative.      I have congratulated her on her weight loss to date and have encouraged her to lose additional weight  given her high triglycerides.  She seems quite committed.  She does appear to be a little bit dehydrated today and I am not worried about her kidney function given that her urinalysis and urine protein excretion are normal.  My plan will be to see the patient back in the clinic again in 6 months.  She is up-to-date with regard to cancer screening and vaccines.      Thank you very much for allowing me to participate in the care of this patient.  If you have any questions regarding my recommendations, please do not hesitate to contact me.       Laron Anne MD      Professor of Medicine  [employer]      Executive Medical Director, Solid Organ Transplant Program  [facility]

## 2017-02-27 NOTE — NURSING NOTE
Chief Complaint   Patient presents with     RECHECK     Post Liver TXP   Pt roomed, vitals, meds, and allergies reviewed with pt. Pt ready for provider.  Teddy George, CMA

## 2017-02-28 LAB
BACTERIA SPEC CULT: ABNORMAL
Lab: ABNORMAL
MICRO REPORT STATUS: ABNORMAL
MICROORGANISM SPEC CULT: ABNORMAL
SPECIMEN SOURCE: ABNORMAL

## 2017-03-02 RX ORDER — SULFAMETHOXAZOLE/TRIMETHOPRIM 800-160 MG
1 TABLET ORAL 2 TIMES DAILY
Qty: 20 TABLET | Refills: 0 | Status: CANCELLED | OUTPATIENT
Start: 2017-03-02 | End: 2017-03-12

## 2017-03-03 ENCOUNTER — OFFICE VISIT (OUTPATIENT)
Dept: FAMILY MEDICINE | Facility: CLINIC | Age: 52
End: 2017-03-03

## 2017-03-03 VITALS
BODY MASS INDEX: 38.89 KG/M2 | SYSTOLIC BLOOD PRESSURE: 124 MMHG | WEIGHT: 206 LBS | HEART RATE: 66 BPM | DIASTOLIC BLOOD PRESSURE: 86 MMHG | RESPIRATION RATE: 20 BRPM | HEIGHT: 61 IN | OXYGEN SATURATION: 99 % | TEMPERATURE: 97.6 F

## 2017-03-03 DIAGNOSIS — Z00.00 ROUTINE GENERAL MEDICAL EXAMINATION AT A HEALTH CARE FACILITY: Primary | ICD-10-CM

## 2017-03-03 DIAGNOSIS — N39.0 URINARY TRACT INFECTION: Primary | ICD-10-CM

## 2017-03-03 DIAGNOSIS — Z94.4 LIVER REPLACED BY TRANSPLANT (H): ICD-10-CM

## 2017-03-03 DIAGNOSIS — Z13.1 SCREENING FOR DIABETES MELLITUS: ICD-10-CM

## 2017-03-03 DIAGNOSIS — E78.2 MIXED HYPERLIPIDEMIA: ICD-10-CM

## 2017-03-03 LAB
HBA1C MFR BLD: 5.4 % (ref 4.1–5.7)
HIV 1+2 AB+HIV1 P24 AG SERPL QL IA: NORMAL

## 2017-03-03 RX ORDER — SULFAMETHOXAZOLE/TRIMETHOPRIM 800-160 MG
1 TABLET ORAL 2 TIMES DAILY
Qty: 20 TABLET | Refills: 0 | Status: SHIPPED | OUTPATIENT
Start: 2017-03-03 | End: 2017-03-03

## 2017-03-03 NOTE — MR AVS SNAPSHOT
After Visit Summary   3/3/2017    Flor Navarro    MRN: [id number]           Patient Information     Date Of Birth          1965        Visit Information        Provider Department      3/3/2017 8:20 AM Aby Ley MD [facility]        Today's Diagnoses     Visit for screening mammogram        Routine general medical examination at a health care facility        Screening for colon cancer        Screening for lipid disorders          Care Instructions      Preventive Health Recommendations  Female Ages 50 - 64  Your cholesterol is high, please work on diet and exercise to help decrease your levels  Follow up with Mammogram for breast cancer next year  Colon cancer screening due in 2019  Cervical cancer screening in 2019 as well    Yearly exam: See your health care provider every year in order to  o Review health changes.   o Discuss preventive care.    o Review your medicines if your doctor has prescribed any.      Get a Pap test every three years (unless you have an abnormal result and your provider advises testing more often).    If you get Pap tests with HPV test, you only need to test every 5 years, unless you have an abnormal result.     You do not need a Pap test if your uterus was removed (hysterectomy) and you have not had cancer.    You should be tested each year for STDs (sexually transmitted diseases) if you're at risk.     Have a mammogram every 1 to 2 years.    Have a colonoscopy at age 50, or have a yearly FIT test (stool test). These exams screen for colon cancer.      Have a cholesterol test every 5 years, or more often if advised.    Have a diabetes test (fasting glucose) every three years. If you are at risk for diabetes, you should have this test more often.     If you are at risk for osteoporosis (brittle bone disease), think about having a bone density scan (DEXA).    Shots: Get a flu shot each year. Get a tetanus shot every 10  years.    Nutrition:     Eat at least 5 servings of fruits and vegetables each day.    Eat whole-grain bread, whole-wheat pasta and brown rice instead of white grains and rice.    Talk to your provider about Calcium and Vitamin D.     Lifestyle    Exercise at least 150 minutes a week (30 minutes a day, 5 days a week). This will help you control your weight and prevent disease.    Limit alcohol to one drink per day.    No smoking.     Wear sunscreen to prevent skin cancer.     See your dentist every six months for an exam and cleaning.    See your eye doctor every 1 to 2 years.          Follow-ups after your visit        Your next 10 appointments already scheduled     May 31, 2017 12:00 PM CDT   Lab with  LAB   [facility] ([facility])    [address] [place] [id number] [phone]            May 31, 2017  1:00 PM CDT   (Arrive by 12:45 PM)   Return Liver Transplant with Laron Anne MD   [facility] ([facility])    [address] [place] [id number] [phone]              Who to contact     Please call your clinic at [phone] to:    Ask questions about your health    Make or cancel appointments    Discuss your medicines    Learn about your test results    Speak to your doctor   If you have compliments or concerns about an experience at your clinic, or if you wish to file a complaint, please contact [facility] Patient Relations at [phone] or email us at [email or web address]         Additional Information About Your Visit        Kashmihart Information     Footway is an electronic gateway that provides easy, online access to your medical records. With Footway, you can request a clinic appointment, read your test results, renew a prescription or communicate with your care team.     To sign up for Footway visit the website at [email or web address]   You  "will be asked to enter the access code listed below, as well as some personal information. Please follow the directions to create your username and password.     Your access code is: [email or web address]  Expires: 2017  1:34 PM     Your access code will  in 90 days. If you need help or a new code, please contact your [facility] or call [phone] for assistance.        Care EveryWhere ID     This is your Care EveryWhere ID. This could be used by other organizations to access your [facility] medical records  [id number]        Your Vitals Were     Pulse Temperature Respirations Height Pulse Oximetry BMI (Body Mass Index)    66 97.6  F (36.4  C) (Oral) 20 5' 1\" (154.9 cm) 99% 38.92 kg/m2       Blood Pressure from Last 3 Encounters:   17 124/86   17 116/77   17 104/70    Weight from Last 3 Encounters:   17 206 lb (93.4 kg)   17 203 lb 6.4 oz (92.3 kg)   17 208 lb 6.4 oz (94.5 kg)              Today, you had the following     No orders found for display         Today's Medication Changes          These changes are accurate as of: 3/3/17 10:10 AM.  If you have any questions, ask your nurse or doctor.               These medicines have changed or have updated prescriptions.        Dose/Directions    senna-docusate 8.6-50 MG per tablet   Commonly known as:  SENOKOT-S;PERICOLACE   This may have changed:    - how much to take  - additional instructions   Used for:  Constipation, chronic        Dose:  2 tablet   Take 2 tablets by mouth 2 times daily For constipation   Quantity:  120 tablet   Refills:  6                Primary Care Provider Office Phone # Fax #    Karely Sierra [phone]       [facility] [address] [place] [address]        Thank you!     Thank you for choosing [facility]  for your care. Our goal is always to provide you with excellent care. Hearing back from our patients is one way we can " continue to improve our services. Please take a few minutes to complete the written survey that you may receive in the mail after your visit with us. Thank you!             Your Updated Medication List - Protect others around you: Learn how to safely use, store and throw away your medicines at www.disposemymeds.org.          This list is accurate as of: 3/3/17 10:10 AM.  Always use your most recent med list.                   Brand Name Dispense Instructions for use    acetaminophen 325 MG tablet    TYLENOL    100 tablet    Take 1-2 tablets (325-650 mg) by mouth every 6 hours as needed Max 6 tablets per 24 hours       albuterol 108 (90 BASE) MCG/ACT Inhaler    PROAIR HFA/PROVENTIL HFA/VENTOLIN HFA    1 Inhaler    Inhale 2 puffs into the lungs 4 times daily       alendronate 70 MG tablet    FOSAMAX    12 tablet    Take 1 tablet (70 mg) by mouth every 7 days Take at least 60 min before breakfast with over 8 oz water and stay upright for at least 30 min. after dose.       benzocaine-menthol 6-10 MG lozenge    CHLORASEPTIC    36 lozenge    Place 1 lozenge inside cheek every 2 hours as needed for moderate pain       bisacodyl 5 MG EC tablet    DULCOLAX    30 tablet    Take 1 tablet (5 mg) by mouth daily as needed for constipation       calcium-vitamin D 600-400 MG-UNIT per tablet    calcium 600 + D    60 tablet    Take 1 tablet by mouth 2 times daily       carboxymethylcellul-glycerin 0.5-0.9 % Soln ophthalmic solution    OPTIVE/REFRESH OPTIVE    1 each    Place 1 drop into both eyes 4 times daily       cholecalciferol 1000 UNITS capsule    vitamin  -D    180 capsule    Take 2 capsules (2,000 Units) by mouth daily       * cycloSPORINE modified capsule     240 capsule    Take 4 capsules (100 mg) by mouth every 12 hours       * cycloSPORINE modified 25 MG capsule    GENERIC EQUIVALENT    720 capsule    Take 4 capsules (100 mg) by mouth every 12 hours       lidocaine 5 % ointment    XYLOCAINE    70 g    Apply topically 2  times daily Apply to affected area for pain two times daily       melatonin 3 MG tablet     100 tablet    Take 1 tablet (3 mg) by mouth nightly as needed for sleep       multivitamin, therapeutic with minerals Tabs tablet     100 tablet    Take 1 tablet by mouth daily       mycophenolate 250 MG capsule    CELLCEPT - GENERIC EQUIVALENT    120 capsule    Take 2 capsules (500 mg) by mouth every 12 hours       omeprazole 20 MG CR capsule    priLOSEC    180 capsule    Take 1 capsule (20 mg) by mouth 2 times daily       * order for DME     1 Units    Equipment being ordered: Nebulizer with adult mask and tubing       * order for DME     1 Units    Equipment being ordered: cane with padded handle       * order for DME     1 Units    Equipment being ordered: cane       * order for DME     2 Units    Equipment being ordered: compression stockings bilateral Please measure and fit patient for stockings  Strength 15-30mmHg Disp 2 pairs 1 refill over the time of 1 year       * order for DME     1 Units    2-wheeled walker (dx: gait instability and Frequent falls).       * order for DME     1 Units    Equipment being ordered: 4 Wheeled Walker with Seat and Brakes       * order for DME     2 each    Equipment being ordered: Compression stockings below knee bilateral       * order for DME     1 each    Equipment being ordered: Back Stabilizer       * order for DME     2 each    Equipment being ordered: 2 pairs of black stabilizer socks (beige okay if black not available). Size XL.       Psyllium 500 MG Caps     180 capsule    Take 3 capsules by mouth 2 times daily       senna-docusate 8.6-50 MG per tablet    SENOKOT-S;PERICOLACE    120 tablet    Take 2 tablets by mouth 2 times daily For constipation       simethicone 80 MG chewable tablet    MYLICON    120 tablet    Take 1 tablet (80 mg) by mouth every 6 hours as needed for flatulence or cramping       sodium chloride 0.65 % nasal spray    OCEAN    30 mL    Spray 1 spray into both  nostrils daily as needed for congestion       sodium phosphate 7-19 GM/118ML rectal enema     3 Bottle    Place 1 Bottle (1 enema) rectally daily as needed for constipation       STATIN NOT PRESCRIBED (INTENTIONAL)     0 each    Not prescribed       * Notice:  This list has 11 medication(s) that are the same as other medications prescribed for you. Read the directions carefully, and ask your doctor or other care provider to review them with you.

## 2017-03-03 NOTE — LETTER
March 20, 2017      Flor Navarro  [address] [address]        Dear Flor,    Thank you for getting your care at [facility]. Please see below for your normal test results.    Resulted Orders   Hemoglobin A1c ([facility])   Result Value Ref Range    Hemoglobin A1C 5.4 4.1 - 5.7 %   HIV Antigen Antibody Combo   Result Value Ref Range    HIV Antigen Antibody Combo  NR     Nonreactive   HIV-1 p24 Ag & HIV-1/HIV-2 Ab Not Detected         If you have any questions, please call the clinic to make an appointment with me for a clinic visit.    Sincerely,    Aby Ley MD

## 2017-03-03 NOTE — PATIENT INSTRUCTIONS
Preventive Health Recommendations  Female Ages 50 - 64  Your cholesterol is high, please work on diet and exercise to help decrease your levels  Follow up with Mammogram for breast cancer next year  Colon cancer screening due in 2019  Cervical cancer screening in 2019 as well    Yearly exam: See your health care provider every year in order to  o Review health changes.   o Discuss preventive care.    o Review your medicines if your doctor has prescribed any.      Get a Pap test every three years (unless you have an abnormal result and your provider advises testing more often).    If you get Pap tests with HPV test, you only need to test every 5 years, unless you have an abnormal result.     You do not need a Pap test if your uterus was removed (hysterectomy) and you have not had cancer.    You should be tested each year for STDs (sexually transmitted diseases) if you're at risk.     Have a mammogram every 1 to 2 years.    Have a colonoscopy at age 50, or have a yearly FIT test (stool test). These exams screen for colon cancer.      Have a cholesterol test every 5 years, or more often if advised.    Have a diabetes test (fasting glucose) every three years. If you are at risk for diabetes, you should have this test more often.     If you are at risk for osteoporosis (brittle bone disease), think about having a bone density scan (DEXA).    Shots: Get a flu shot each year. Get a tetanus shot every 10 years.    Nutrition:     Eat at least 5 servings of fruits and vegetables each day.    Eat whole-grain bread, whole-wheat pasta and brown rice instead of white grains and rice.    Talk to your provider about Calcium and Vitamin D.     Lifestyle    Exercise at least 150 minutes a week (30 minutes a day, 5 days a week). This will help you control your weight and prevent disease.    Limit alcohol to one drink per day.    No smoking.     Wear sunscreen to prevent skin cancer.     See your dentist every six months for an exam  and cleaning.    See your eye doctor every 1 to 2 years.

## 2017-03-03 NOTE — PROGRESS NOTES
Female Physical Note          HPI         Concerns today: No special concerns today.  A English  was used for  this visit.     Patient Active Problem List   Diagnosis     PVD (POSTERIOR VITREOUS DETACHMENT) OS     Hyperlipidemia LDL goal <160     CKD (chronic kidney disease) stage 3, GFR 30-59 ml/min     Hypertension, renal     Liver replaced by transplant     High risk medication use     Anemia in CKD (chronic kidney disease)     Overweight     Stroke, hemorrhagic (H)     Osteoporosis     Cystitis cystica     Immunosuppressed status (H)     Ganglion cyst     Advanced directives, counseling/discussion     Vitamin D deficiency     Shoulder joint pain     Pseudophakia - Left Eye     Urinary tract infection due to extended-spectrum beta lactamase (ESBL)-producing Klebsiella     Abdominal pain     Constipation, chronic     Secondary hyperparathyroidism (H)     Right elbow pain       Past Medical History   Diagnosis Date     Anemia in CKD (chronic kidney disease)      Cataract      CKD (chronic kidney disease) stage 3, GFR 30-59 ml/min      Hepatitis C      cleared virus spontaneously 2013     High risk medication use      Hypertension, renal      Immunosuppressed status (H)      Liver replaced by transplant (H) 2010     Osteoporosis      Recurrent pregnancy loss without current pregnancy      Stroke, hemorrhagic (H) 2008     Syncope      Unspecified viral hepatitis C without hepatic coma        Previous Medical Care   [facility]      Family History   Problem Relation Age of Onset     Hepatitis Other      Hep C, still in [place]     CEREBROVASCULAR DISEASE Other      CANCER No family hx of      DIABETES No family hx of      Hypertension No family hx of      Thyroid Disease No family hx of      Glaucoma No family hx of      Macular Degeneration No family hx of               Review of Systems:     Review of Systems:  CONSTITUTIONAL: NEGATIVE for fever, chills, change in weight  INTEGUMENTARY/SKIN: NEGATIVE for  worrisome rashes, moles or lesions  EYES: NEGATIVE for vision changes or irritation  ENT/MOUTH: NEGATIVE for ear, mouth and throat problems  RESP: NEGATIVE for significant cough or SOB  BREAST: NEGATIVE for masses, tenderness or discharge  CV: NEGATIVE for chest pain, palpitations or peripheral edema  GI: NEGATIVE for nausea, abdominal pain, heartburn, or change in bowel habits  : NEGATIVE for frequency, dysuria, or hematuria  MUSCULOSKELETAL: NEGATIVE for significant arthralgias or myalgia  NEURO: NEGATIVE for weakness, dizziness or paresthesias  ENDOCRINE: NEGATIVE for temperature intolerance, skin/hair changes  HEME/ALLERGY: NEGATIVE for bleeding problems  PSYCHIATRIC: NEGATIVE for changes in mood or affect         Social History     Social History     Social History     Marital status:      Spouse name: N/A     Number of children: N/A     Years of education: N/A     Occupational History     Not on file.     Social History Main Topics     Smoking status: Never Smoker     Smokeless tobacco: Never Used     Alcohol use No     Drug use: No     Sexual activity: No     Other Topics Concern      Service No     Blood Transfusions Yes     Caffeine Concern No     Occupational Exposure No     Hobby Hazards No     Sleep Concern No     Stress Concern Yes     needs help at home for cares     Weight Concern Yes     wants to lose weight not gain weight     Special Diet No     Back Care Yes     uses a patch to relieve pain     Seat Belt Yes     Social History Narrative    One son Carleen        GynHx:         LMP age 39    Pap 3/11, NIL, no abnormal    Mammo , birads 2 benign        Lives with spouse due to necessity but she reports they do not have a good relationship        How much exercise per week? Stairs at home    How much calcium per day? supplement       How much caffeine per day? 1 cup tea    How much vitamin D per day? supplement    Do you/your family wear seatbelts?  Yes    Do you/your family  "use safety helmets? n/a    Do you/your family use sunscreen? No    Do you/your family keep firearms in the home? No    Do you/your family have a smoke detector(s)? Yes        Do you feel safe in your home? Yes    Has anyone ever touched you in an unwanted manner? No     Explain         2015 [employer]                     Sexual Health     Sexual concerns: No   STI History: Neg  Pregnancy History:   LMP No LMP recorded. Patient is postmenopausal.  Last Pap Smear Date:   Lab Results   Component Value Date    PAP NIL 2014    PAP NIL 2013    PAP NIL 2011     Abnormal Pap History: None    Recommended Screening     Cholesterol Level (>44 yo or at risk):  Date done 2017  Result(s) Elevated total cholesterol, Pap/HPV cotest every 5 years for women 30-65   Date done   Result(s) NIL and HIV screening:  Recommended and patient accepted testing.  Colon CA Screening (>50-75 ):  Date done   Result(s) sigmoid polyp resected and Breast CA Screening (>41 yo or 10 y before 1st degree relative diagnosis): Date done   Result(s) Negative            Physical Exam:     Vitals: /86 (BP Location: Right arm, Patient Position: Chair, Cuff Size: Adult Large)  Pulse 66  Temp 97.6  F (36.4  C) (Oral)  Resp 20  Ht 5' 1\" (154.9 cm)  Wt 206 lb (93.4 kg)  SpO2 99%  BMI 38.92 kg/m2  BMI= Body mass index is 38.92 kg/(m^2).   GENERAL: healthy, alert and no distress  EYES: Eyes grossly normal to inspection, extraocular movements - intact, and PERRL  HENT: ear canals- normal; Nose- normal; Mouth- no ulcers, no lesions  NECK: no tenderness, no adenopathy, no asymmetry, no masses, no stiffness; thyroid- normal to palpation  RESP: lungs clear to auscultation - no rales, no rhonchi, no wheezes  BREAST: patient declined   CV: regular rates and rhythm, normal S1 S2, and no murmur, no click or rub -  ABDOMEN: soft, no tenderness, no  hepatosplenomegaly, no masses, normal bowel sounds  MS: " extremities- no gross deformities noted, no edema  SKIN: no suspicious lesions, no rashes  NEURO: strength and tone- normal, sensory exam- grossly normal, mentation- intact, speech- normal, reflexes- symmetric  BACK: no CVA tenderness  PSYCH: Alert and oriented times 3; speech- normal rate and volume; able to articulate logical thoughts, no tangential thoughts, no hallucinations or delusions, affect- normal  LYMPHATICS: ant. cervical- normal, post. cervical- normal, axillary- normal, supraclavicular- normal      Assessment and Plan      Flor was seen today for physical.    Diagnoses and all orders for this visit:    Routine general medical examination at a health care facility  -     HIV Antigen Antibody Combo    Screening for diabetes mellitus  -     Hemoglobin A1c ([facility])    Hyperlipidemia: lipid panel done on 2/27/2017 with elevated total cholesterol, LDL, and triglyceride level. Encouraged healthy lifestyle modifications with diet and exercise.     CKD stage 3: Cr at baseline. No electrolyte abnormalities or acid base disturbances. Will continue to monitor.     1. Health Care Maintenance: Normal Physical Exam   Mammogram done in 2016 results negative and Colonoscopy done in 2009. PAP and HPV cotesting up to date, done in 2014 results NIL, HPV negative. Routine follow up in one year.      Options for treatment and follow-up care were reviewed with the patient . Flor Navarro and/or guardian engaged in the decision making process and verbalized understanding of the options discussed and agreed with the final plan.    Aby Ley MD

## 2017-03-03 NOTE — TELEPHONE ENCOUNTER
Pt had UA. Pt has UTI. Per Dr Anne patient to have Bactrim DS 1 tablet BID for 10 days.   With the assistance of female Hungarian  left detailed voice message on home and cell phones.

## 2017-03-03 NOTE — PROGRESS NOTES
Preceptor Attestation:   Patient seen and discussed with the resident.   Assessment and plan reviewed with resident and agreed upon.   Supervising Physician:  Hugh Hartley MD  [facility]

## 2017-03-08 ENCOUNTER — TELEPHONE (OUTPATIENT)
Dept: FAMILY MEDICINE | Facility: CLINIC | Age: 52
End: 2017-03-08

## 2017-03-08 NOTE — TELEPHONE ENCOUNTER
[facility] phone call message - order or referral request for patient:     Order or referral being requested: Skilled nursing 1x/week for 9 weeks and 4 PRN    Additional Comments: none    OK to leave a message on voice mail? Yes    Primary language: German      needed? Yes    Call taken on March 8, 2017 at 4:19 PM by Nadia Hernandez

## 2017-03-09 ENCOUNTER — MEDICAL CORRESPONDENCE (OUTPATIENT)
Dept: HEALTH INFORMATION MANAGEMENT | Facility: CLINIC | Age: 52
End: 2017-03-09

## 2017-03-20 ENCOUNTER — DOCUMENTATION ONLY (OUTPATIENT)
Dept: FAMILY MEDICINE | Facility: CLINIC | Age: 52
End: 2017-03-20

## 2017-03-20 NOTE — PROGRESS NOTES
"When opening a documentation only encounter, be sure to enter in \"Chief Complaint\" Forms and in \" Comments\" Title of form, description if needed.    Form received via: Fax  Form now resides in: Provider Ready    Form sent out via: Fax  Patient informed: No  Output date: 3/16/17    Form received by recipient via fax confirmation  Please close the encounter.    "

## 2017-03-22 DIAGNOSIS — N25.81 SECONDARY HYPERPARATHYROIDISM (H): ICD-10-CM

## 2017-03-22 DIAGNOSIS — R00.2 PALPITATIONS: ICD-10-CM

## 2017-03-22 DIAGNOSIS — N18.30 CKD (CHRONIC KIDNEY DISEASE) STAGE 3, GFR 30-59 ML/MIN (H): ICD-10-CM

## 2017-03-22 DIAGNOSIS — M81.0 OSTEOPOROSIS: ICD-10-CM

## 2017-03-22 DIAGNOSIS — Z94.4 LIVER REPLACED BY TRANSPLANT (H): ICD-10-CM

## 2017-03-22 DIAGNOSIS — E55.9 VITAMIN D DEFICIENCY: ICD-10-CM

## 2017-03-24 ENCOUNTER — DOCUMENTATION ONLY (OUTPATIENT)
Dept: FAMILY MEDICINE | Facility: CLINIC | Age: 52
End: 2017-03-24

## 2017-03-24 NOTE — PROGRESS NOTES
"When opening a documentation only encounter, be sure to enter in \"Chief Complaint\" Forms and in \" Comments\" Title of form, description if needed.    Form received via: Fax  Form now resides in: Provider Ready    Form sent out via: Fax  Patient informed: No  Output date: March 24, 2017      Form received by recipient via fax confirmation  Please close the encounter.    "

## 2017-03-26 RX ORDER — CHOLECALCIFEROL (VITAMIN D3) 25 MCG
TABLET ORAL
Qty: 100 TABLET | Refills: 3 | OUTPATIENT
Start: 2017-03-26

## 2017-03-27 DIAGNOSIS — I61.9 STROKE, HEMORRHAGIC (H): ICD-10-CM

## 2017-03-27 NOTE — TELEPHONE ENCOUNTER
Request for medication refill:    Date of last visit at clinic: 03/03/2017    Please complete refill if appropriate and CLOSE ENCOUNTER.    Closing the encounter signifies the refill is complete.    If refill has been denied, please complete the smart phrase .smirefuse and route it to the [facility] RN TRIAGE pool to inform the patient and the pharmacy.    Jennifer Cali, CMA

## 2017-03-30 NOTE — TELEPHONE ENCOUNTER
Called patient via language line. LVM informing patient we will not refill the aspirin and the patient should never take aspirin. Advised to call with questions.     Emma Herrera RN

## 2017-03-30 NOTE — TELEPHONE ENCOUNTER
Medication Refill Denied  Reason: Patient needs to never take ASA!    Provider: I have not called the patient about the Rx denial, please call.  PCS: Please notify the pharmacy    RN: Please contact the patient to explain reasoning provided below which has been documented in chart multiple times    RN may not order temporary refill.    Karely Sierra MD             STOP aspirin. She has a hx of hemorrhagic stroke, and her 10 yr risk of MI is only 3% and wouldn't rec treating w/ ASA unless >7.5%.   THANKS, pls call home rn.

## 2017-03-31 ENCOUNTER — OFFICE VISIT (OUTPATIENT)
Dept: FAMILY MEDICINE | Facility: CLINIC | Age: 52
End: 2017-03-31

## 2017-03-31 VITALS
OXYGEN SATURATION: 95 % | SYSTOLIC BLOOD PRESSURE: 111 MMHG | HEART RATE: 67 BPM | WEIGHT: 209.2 LBS | BODY MASS INDEX: 39.53 KG/M2 | TEMPERATURE: 97.5 F | DIASTOLIC BLOOD PRESSURE: 73 MMHG | RESPIRATION RATE: 16 BRPM

## 2017-03-31 DIAGNOSIS — M54.6 CHRONIC LEFT-SIDED THORACIC BACK PAIN: ICD-10-CM

## 2017-03-31 DIAGNOSIS — S46.812A STRAIN OF TRAPEZIUS MUSCLE, LEFT, INITIAL ENCOUNTER: Primary | ICD-10-CM

## 2017-03-31 DIAGNOSIS — G89.29 CHRONIC LEFT-SIDED THORACIC BACK PAIN: ICD-10-CM

## 2017-03-31 RX ORDER — LIDOCAINE 50 MG/G
PATCH TOPICAL
Qty: 30 PATCH | Refills: 0 | Status: SHIPPED | OUTPATIENT
Start: 2017-03-31 | End: 2017-04-09

## 2017-03-31 ASSESSMENT — ENCOUNTER SYMPTOMS
CHILLS: 0
FEVER: 0
VOMITING: 0
MYALGIAS: 1
BACK PAIN: 1
DIZZINESS: 0
NAUSEA: 0
HEADACHES: 1
DYSURIA: 0
SHORTNESS OF BREATH: 0
ABDOMINAL PAIN: 0

## 2017-03-31 NOTE — MR AVS SNAPSHOT
After Visit Summary   3/31/2017    Flor Navarro    MRN: [id number]           Patient Information     Date Of Birth          1965        Visit Information        Provider Department      3/31/2017 8:40 AM Yvette Byrne MD [facility]        Today's Diagnoses     Strain of trapezius muscle, left, initial encounter    -  1    Chronic left-sided thoracic back pain          Care Instructions    Here is the plan from today's visit    1. Chronic left-sided thoracic back pain  - lidocaine (LIDODERM) 5 % Patch; Apply up to 3 patches to painful area at once for up to 12 h within a 24 h period.  Remove after 12 hours.  Dispense: 30 patch; Refill: 0    2. Strain of trapezius muscle, left, initial encounter  - Menthol, Topical Analgesic, 2 % GEL; Externally apply topically 3 times daily as needed  Dispense: 113 g; Refill: 0    Please call or return to clinic if your symptoms don't go away.    Follow up plan  Please make a clinic appointment for follow up with me (YVETTE BYRNE) or your primary physician Link, Karely in as needed or in 2 weeks if you are still having shoulder pain  Thank you for coming to [facility] today.  Lab Testing:  **If you had lab testing today and your results are reassuring or normal they will be mailed to you or sent through [employer] within 7 days.   **If the lab tests need quick action we will call you with the results.  The phone number we will call with results is # [phone] (home) . If this is not the best number please call our clinic and change the number.  Medication Refills:  If you need any refills please call your pharmacy and they will contact us.   If you need to  your refill at a new pharmacy, please contact the new pharmacy directly. The new pharmacy will help you get your medications transferred faster.   Scheduling:  If you have any concerns about today's visit or wish to schedule another appointment please call our  office during normal business hours [phone] (8-5:00 M-F)  If a referral was made to a [facility] and you don't get a call from central scheduling please call [phone].  If a Mammogram was ordered for you at [facility] call [phone] to schedule or change your appointment.  If you had an XRay/CT/Ultrasound/MRI ordered the number is [phone] to schedule or change your radiology appointment.   Medical Concerns:  If you have urgent medical concerns please call [phone] at any time of the day.          Follow-ups after your visit        Your next 10 appointments already scheduled     May 31, 2017 12:00 PM CDT   Lab with  LAB   [facility]    [address] [place] [id number] [phone]            May 31, 2017  1:00 PM CDT   (Arrive by 12:45 PM)   Return Liver Transplant with Laron Anne MD   [facility] ([facility]    [address] [place] [id number] [phone]              Who to contact     Please call your clinic at [phone] to:    Ask questions about your health    Make or cancel appointments    Discuss your medicines    Learn about your test results    Speak to your doctor   If you have compliments or concerns about an experience at your clinic, or if you wish to file a complaint, please contact [facility] Patient Relations at [phone] or email us at [email or web address]         Additional Information About Your Visit        "[employer] Information     "[employer] is an electronic gateway that provides easy, online access to your medical records. With "[employer] you can request a clinic appointment, read your test results, renew a prescription or communicate with your care team.     To sign up for "[employer] visit the website at [email or web address]   You will be asked to enter the access code  listed below, as well as some personal information. Please follow the directions to create your username and password.     Your access code is: [email or web address]  Expires: 2017  2:34 PM     Your access code will  in 90 days. If you need help or a new code, please contact your [facility] or call [phone] for assistance.        Care EveryWhere ID     This is your Care EveryWhere ID. This could be used by other organizations to access your [place] medical records  [id number]        Your Vitals Were     Pulse Temperature Respirations Pulse Oximetry BMI (Body Mass Index)       67 97.5  F (36.4  C) (Oral) 16 95% 39.53 kg/m2        Blood Pressure from Last 3 Encounters:   17 111/73   17 124/86   17 116/77    Weight from Last 3 Encounters:   17 209 lb 3.2 oz (94.9 kg)   17 206 lb (93.4 kg)   17 203 lb 6.4 oz (92.3 kg)              Today, you had the following     No orders found for display         Today's Medication Changes          These changes are accurate as of: 3/31/17  9:38 AM.  If you have any questions, ask your nurse or doctor.               Start taking these medicines.        Dose/Directions    Menthol (Topical Analgesic) 2 % Gel   Used for:  Strain of trapezius muscle, left, initial encounter   Started by:  Yvette Thakkar MD        Externally apply topically 3 times daily as needed   Quantity:  113 g   Refills:  0         These medicines have changed or have updated prescriptions.        Dose/Directions    * lidocaine 5 % ointment   Commonly known as:  XYLOCAINE   This may have changed:  Another medication with the same name was added. Make sure you understand how and when to take each.   Used for:  Chronic left-sided thoracic back pain   Changed by:  Holland Thakkar, DO        Apply topically 2 times daily Apply to affected area for pain two times daily   Quantity:  70 g   Refills:  1       * lidocaine 5 % Patch   Commonly  known as:  LIDODERM   This may have changed:  You were already taking a medication with the same name, and this prescription was added. Make sure you understand how and when to take each.   Used for:  Chronic left-sided thoracic back pain   Changed by:  Yvette Thakkar MD        Apply up to 3 patches to painful area at once for up to 12 h within a 24 h period.  Remove after 12 hours.   Quantity:  30 patch   Refills:  0       senna-docusate 8.6-50 MG per tablet   Commonly known as:  SENOKOT-S;PERICOLACE   This may have changed:    - how much to take  - additional instructions   Used for:  Constipation, chronic        Dose:  2 tablet   Take 2 tablets by mouth 2 times daily For constipation   Quantity:  120 tablet   Refills:  6       * Notice:  This list has 2 medication(s) that are the same as other medications prescribed for you. Read the directions carefully, and ask your doctor or other care provider to review them with you.         Where to get your medicines      These medications were sent to [address] [address]    Hours:  TRANSPLANT PHONE NUMBER [phone] Phone:  [phone]     lidocaine 5 % Patch    Menthol (Topical Analgesic) 2 % Gel                Primary Care Provider Office Phone # Fax #    Karely Sierra [phone]       [facility] [address] [place] [address]        Thank you!     Thank you for choosing [facility]  for your care. Our goal is always to provide you with excellent care. Hearing back from our patients is one way we can continue to improve our services. Please take a few minutes to complete the written survey that you may receive in the mail after your visit with us. Thank you!             Your Updated Medication List - Protect others around you: Learn how to safely use, store and throw away your medicines at  www.disposemymeds.org.          This list is accurate as of: 3/31/17  9:38 AM.  Always use your most recent med list.                   Brand Name Dispense Instructions for use    acetaminophen 325 MG tablet    TYLENOL    100 tablet    Take 1-2 tablets (325-650 mg) by mouth every 6 hours as needed Max 6 tablets per 24 hours       albuterol 108 (90 BASE) MCG/ACT Inhaler    PROAIR HFA/PROVENTIL HFA/VENTOLIN HFA    1 Inhaler    Inhale 2 puffs into the lungs 4 times daily       alendronate 70 MG tablet    FOSAMAX    12 tablet    Take 1 tablet (70 mg) by mouth every 7 days Take at least 60 min before breakfast with over 8 oz water and stay upright for at least 30 min. after dose.       benzocaine-menthol 6-10 MG lozenge    CHLORASEPTIC    36 lozenge    Place 1 lozenge inside cheek every 2 hours as needed for moderate pain       bisacodyl 5 MG EC tablet    DULCOLAX    30 tablet    Take 1 tablet (5 mg) by mouth daily as needed for constipation       calcium-vitamin D 600-400 MG-UNIT per tablet    calcium 600 + D    60 tablet    Take 1 tablet by mouth 2 times daily       carboxymethylcellul-glycerin 0.5-0.9 % Soln ophthalmic solution    OPTIVE/REFRESH OPTIVE    1 each    Place 1 drop into both eyes 4 times daily       cholecalciferol 1000 UNITS capsule    vitamin  -D    180 capsule    Take 2 capsules (2,000 Units) by mouth daily       * cycloSPORINE modified capsule     240 capsule    Take 4 capsules (100 mg) by mouth every 12 hours       * cycloSPORINE modified 25 MG capsule    GENERIC EQUIVALENT    720 capsule    Take 4 capsules (100 mg) by mouth every 12 hours       * lidocaine 5 % ointment    XYLOCAINE    70 g    Apply topically 2 times daily Apply to affected area for pain two times daily       * lidocaine 5 % Patch    LIDODERM    30 patch    Apply up to 3 patches to painful area at once for up to 12 h within a 24 h period.  Remove after 12 hours.       melatonin 3 MG tablet     100 tablet    Take 1 tablet (3 mg) by  mouth nightly as needed for sleep       Menthol (Topical Analgesic) 2 % Gel     113 g    Externally apply topically 3 times daily as needed       multivitamin, therapeutic with minerals Tabs tablet     100 tablet    Take 1 tablet by mouth daily       mycophenolate 250 MG capsule    CELLCEPT - GENERIC EQUIVALENT    120 capsule    Take 2 capsules (500 mg) by mouth every 12 hours       omeprazole 20 MG CR capsule    priLOSEC    180 capsule    Take 1 capsule (20 mg) by mouth 2 times daily       * order for DME     1 Units    Equipment being ordered: Nebulizer with adult mask and tubing       * order for DME     1 Units    Equipment being ordered: cane with padded handle       * order for DME     1 Units    Equipment being ordered: cane       * order for DME     2 Units    Equipment being ordered: compression stockings bilateral Please measure and fit patient for stockings  Strength 15-30mmHg Disp 2 pairs 1 refill over the time of 1 year       * order for DME     1 Units    2-wheeled walker (dx: gait instability and Frequent falls).       * order for DME     1 Units    Equipment being ordered: 4 Wheeled Walker with Seat and Brakes       * order for DME     2 each    Equipment being ordered: Compression stockings below knee bilateral       * order for DME     1 each    Equipment being ordered: Back Stabilizer       * order for DME     2 each    Equipment being ordered: 2 pairs of black stabilizer socks (beige okay if black not available). Size XL.       Psyllium 500 MG Caps     180 capsule    Take 3 capsules by mouth 2 times daily       senna-docusate 8.6-50 MG per tablet    SENOKOT-S;PERICOLACE    120 tablet    Take 2 tablets by mouth 2 times daily For constipation       simethicone 80 MG chewable tablet    MYLICON    120 tablet    Take 1 tablet (80 mg) by mouth every 6 hours as needed for flatulence or cramping       sodium chloride 0.65 % nasal spray    OCEAN    30 mL    Spray 1 spray into both nostrils daily as needed  for congestion       sodium phosphate 7-19 GM/118ML rectal enema     3 Bottle    Place 1 Bottle (1 enema) rectally daily as needed for constipation       STATIN NOT PRESCRIBED (INTENTIONAL)     0 each    Not prescribed       * Notice:  This list has 13 medication(s) that are the same as other medications prescribed for you. Read the directions carefully, and ask your doctor or other care provider to review them with you.

## 2017-03-31 NOTE — PROGRESS NOTES
HPI:       Flor Navarro is a 51 year old who presents for the following  Patient presents with:  Shoulder Pain: left  Flank Pain        Concern: Back pain, shoulder pain    Patient is a 52 yo F with a past medical history of liver transplant secondary to Hep C, hemorrhagic stroke with residual right sided weakness, CKD stage 3, Htn, and multiple other medical problems who presents with back pain and new onset shoulder pain.    Patient states back pain is bothering her the most.  Pain has been going on for 10 years.  She has to sit up for one hour before doing anything because of the pain.  She used to use a patch for the pain but now insurance will not cover it.  Patient is requesting another patch.    Patient is also complaining of left shoulder pain.  Pain started 3 days prior.  No injury.  Patient was just laying on sofa and when she got up she had left posterior shoulder pain.  Patient continues to have full ROM of left arm.  Patient feels a burning sensation in back of shoulder.  Patient has taken tylenol for pain.  No ibuprofen.  Patient has used an ice pad, but it didn't help.  (Patient unable to take aspirin or ibuprofen for stoke)        An Kyrgyz  was used for  this visit.      Problem, Medication and Allergy Lists were reviewed and are current.  Patient is an established patient of this clinic.         Review of Systems:   Review of Systems   Constitutional: Negative for chills and fever.   Respiratory: Negative for shortness of breath.    Cardiovascular: Negative for chest pain.   Gastrointestinal: Negative for abdominal pain, nausea and vomiting.   Genitourinary: Negative for dysuria.   Musculoskeletal: Positive for back pain and myalgias.   Neurological: Positive for headaches (intermittent for many years). Negative for dizziness.             Physical Exam:   Patient Vitals for the past 24 hrs:   BP Temp Temp src Pulse Resp SpO2 Weight   03/31/17 0859 111/73 97.5  F (36.4  C) Oral 67  16 95 % 209 lb 3.2 oz (94.9 kg)     Body mass index is 39.53 kg/(m^2).  Vitals were reviewed and were normal     Physical Exam   Constitutional: She is oriented to person, place, and time. She appears well-developed and well-nourished.   HENT:   Head: Normocephalic.   Neck: Normal range of motion. Neck supple.   Cardiovascular: Normal rate.    Pulmonary/Chest: Effort normal.   Musculoskeletal: Normal range of motion. She exhibits tenderness (mild tenderness of paraspinous muscles in thoracic and lumbar area). She exhibits no edema.   ROM of left shoulder normal.  No weakness.  MS 5/5.  Mild tenderness to palpation of muscle (trapezius) right below scapular spine (lateral side)   Neurological: She is alert and oriented to person, place, and time.   Skin: No rash noted. No erythema. No pallor.   Psychiatric: She has a normal mood and affect. Her behavior is normal. Judgment and thought content normal.         Results:     No labs ordered.  Assessment and Plan     Flor was seen today for shoulder pain and back pain.  Patient presented for chronic back pain and new left posterior shoulder pain.  I offered to send the patient to PT for her back pain, but she declined and requested a refill for the lidoderm patches that she used in the past.  I refilled the prescription per her request.      Regarding her shoulder pain, the pain is new in onset (only 3 days), and patient has full ROM and no weakness.  I think she most likely strained her trapezius.  She had no history of a fall or injury, so I do not think she needs imaging at this time.  I recommended tylenol for pain and a topical menthol gel to help with symptoms.  If pain continues for more than 2 weeks, she should RTC for further evaluation.      Diagnoses and all orders for this visit:    Strain of trapezius muscle, left, initial encounter  -     Discontinue: Menthol, Topical Analgesic, 2 % GEL; Externally apply topically 3 times daily as needed  -     Menthol,  Topical Analgesic, 4 % GEL; Externally apply topically 3 times daily as needed    Chronic left-sided thoracic back pain  -     lidocaine (LIDODERM) 5 % Patch; Apply up to 3 patches to painful area at once for up to 12 h within a 24 h period.  Remove after 12 hours.  -     Menthol, Topical Analgesic, 4 % GEL; Externally apply topically 3 times daily as needed    There are no discontinued medications.  Options for treatment and follow-up care were reviewed with the patient. Flor Navarro  engaged in the decision making process and verbalized understanding of the options discussed and agreed with the final plan.    Yvette Thakkar M.D.  PGY-2, Family Medicine

## 2017-03-31 NOTE — PATIENT INSTRUCTIONS
Here is the plan from today's visit    1. Chronic left-sided thoracic back pain  - lidocaine (LIDODERM) 5 % Patch; Apply up to 3 patches to painful area at once for up to 12 h within a 24 h period.  Remove after 12 hours.  Dispense: 30 patch; Refill: 0    2. Strain of trapezius muscle, left, initial encounter  - Menthol, Topical Analgesic, 2 % GEL; Externally apply topically 3 times daily as needed  Dispense: 113 g; Refill: 0    Please call or return to clinic if your symptoms don't go away.    Follow up plan  Please make a clinic appointment for follow up with me (GHISLAINE BYRNE) or your primary physician Link, Karely in as needed or in 2 weeks if you are still having shoulder pain  Thank you for coming to [facility] today.  Lab Testing:  **If you had lab testing today and your results are reassuring or normal they will be mailed to you or sent through [employer] within 7 days.   **If the lab tests need quick action we will call you with the results.  The phone number we will call with results is # [phone] (home) . If this is not the best number please call our clinic and change the number.  Medication Refills:  If you need any refills please call your pharmacy and they will contact us.   If you need to  your refill at a new pharmacy, please contact the new pharmacy directly. The new pharmacy will help you get your medications transferred faster.   Scheduling:  If you have any concerns about today's visit or wish to schedule another appointment please call our office during normal business hours [phone] (8-5:00 M-F)  If a referral was made to a [facility] and you don't get a call from central scheduling please call [phone].  If a Mammogram was ordered for you at [facility] call [phone] to schedule or change your appointment.  If you had an XRay/CT/Ultrasound/MRI ordered the number is [phone] to schedule or change your radiology appointment.    Medical Concerns:  If you have urgent medical concerns please call [phone] at any time of the day.

## 2017-03-31 NOTE — PROGRESS NOTES
Preceptor Attestation:   Patient seen and discussed with the resident.   Assessment and plan reviewed with resident and agreed upon.   Supervising Physician:  Hugh Hartley MD  [facility]

## 2017-04-03 ENCOUNTER — TELEPHONE (OUTPATIENT)
Dept: FAMILY MEDICINE | Facility: CLINIC | Age: 52
End: 2017-04-03

## 2017-04-03 DIAGNOSIS — G63 NEUROPATHY DUE TO MEDICAL CONDITION (H): Primary | ICD-10-CM

## 2017-04-06 NOTE — TELEPHONE ENCOUNTER
Prior Authorization: Denied    Denied Reason: diagnosis used for treatment is not covered under patients plan.  Diagnosis must be neuropathy.      Alternatives: none given       To Appeal  - must appeal within 60 calendar days after the date or request (4/30/2016)  Expedited Appeals   Phone [phone]   Fax: [phone]    Standard Appeals   [facility] Insurance Appeals Dept.   [address] [address],    [place] [address]      Phone: [phone]    The PA team has the denial letter with full appeal rights if needed.    Pharmacy notified.  Routing to MD Jennifer Cali April 6, 2017 at 2:42 PM

## 2017-04-09 PROBLEM — G63 NEUROPATHY DUE TO MEDICAL CONDITION (H): Status: ACTIVE | Noted: 2017-04-09

## 2017-04-09 RX ORDER — LIDOCAINE 50 MG/G
PATCH TOPICAL
Qty: 30 PATCH | Refills: 0 | Status: SHIPPED | OUTPATIENT
Start: 2017-04-09 | End: 2017-09-12

## 2017-04-10 ENCOUNTER — TELEPHONE (OUTPATIENT)
Dept: FAMILY MEDICINE | Facility: CLINIC | Age: 52
End: 2017-04-10

## 2017-04-10 NOTE — TELEPHONE ENCOUNTER
[facility] phone call message- patient requesting to speak with PCP or provider:    PCP: Karely Sierra    Additional Comments: Katelin from the [facility] is calling because this patient keeps getting prescriptions for lidocaine (LIDODERM) 5 % Patch but her insurance will not cover it ever. She asked that in the future we prescribe something different as the insurance will never cover it and the patient refuses to pay out of pocket. She has multiple insurance denials for this medication and this patient.     Is a call back needed? No    Patient informed that it may take up to 2 business days to hear back from PCP:No    OK to leave a message on voice mail? Yes    Primary language: Yi      needed? Yes    Call taken on April 10, 2017 at 11:30 AM by Hellen Lobo

## 2017-04-10 NOTE — TELEPHONE ENCOUNTER
[facility] Prior Authorization Team Request    Medication: LIDOCAINE 5% PATCH  Dosing: APPLY UP TO 3 PATCHES DAILY  NDC (required for Medicaid members): [address]-84    Insurance   BIN: [id number]  PCN: [id number]  Grp: [id number]  ID: [id number]    CoverMyMeds Key (if applicable):     Additional documentation: PA REQUIRED-MD CALL [phone]  DRUG REQUIRES PRIOR AUTHORIZATION  DIAGNOSIS REQUIRED, MD CONTACT FOR PA    Filling Pharmacy: [facility]  Phone Number: [phone]  Contact: P PHARM [place] (P [id number]) please send all responses to this pool.  Pharmacy NPI (required for Medicaid members): [id number]

## 2017-04-12 ENCOUNTER — OFFICE VISIT (OUTPATIENT)
Dept: FAMILY MEDICINE | Facility: CLINIC | Age: 52
End: 2017-04-12

## 2017-04-12 VITALS
DIASTOLIC BLOOD PRESSURE: 77 MMHG | OXYGEN SATURATION: 97 % | WEIGHT: 206.6 LBS | TEMPERATURE: 97.6 F | SYSTOLIC BLOOD PRESSURE: 117 MMHG | HEART RATE: 67 BPM | RESPIRATION RATE: 16 BRPM | BODY MASS INDEX: 39.01 KG/M2 | HEIGHT: 61 IN

## 2017-04-12 DIAGNOSIS — R05.9 COUGH: Primary | ICD-10-CM

## 2017-04-12 RX ORDER — PSYLLIUM HUSK 0.4 G
CAPSULE ORAL
COMMUNITY
Start: 2017-03-30 | End: 2017-07-20

## 2017-04-12 NOTE — PROGRESS NOTES
Preceptor Attestation:   Patient seen and discussed with the resident. Assessment and plan reviewed with resident and agreed upon.   Supervising Physician:  Nura Spicer MD  [facility]

## 2017-04-12 NOTE — PATIENT INSTRUCTIONS
Here is the plan from today's visit    1. Cough  I think your cough is from your asthma.  Please return to clinic if you are wiling to start a second medication for your asthma.  I will give you the cough drops as requested.  - Menthol 10 MG LOZG; Take 1 lozenge by mouth every 2 hours as needed  Dispense: 120 lozenge; Refill: 0    Please call or return to clinic if your symptoms don't go away.    Follow up plan  Please make a clinic appointment for follow up with me (GHISLAINE BYRNE) in 1-2 weeks   weeks if you do not feel better.    Thank you for coming to [facility] today.  Lab Testing:  **If you had lab testing today and your results are reassuring or normal they will be mailed to you or sent through [employer] within 7 days.   **If the lab tests need quick action we will call you with the results.  The phone number we will call with results is # [phone] (home) . If this is not the best number please call our clinic and change the number.  Medication Refills:  If you need any refills please call your pharmacy and they will contact us.   If you need to  your refill at a new pharmacy, please contact the new pharmacy directly. The new pharmacy will help you get your medications transferred faster.   Scheduling:  If you have any concerns about today's visit or wish to schedule another appointment please call our office during normal business hours [phone] (8-5:00 M-F)  If a referral was made to a [facility] and you don't get a call from central scheduling please call [phone].  If a Mammogram was ordered for you at [facility] call [phone] to schedule or change your appointment.  If you had an XRay/CT/Ultrasound/MRI ordered the number is [phone] to schedule or change your radiology appointment.   Medical Concerns:  If you have urgent medical concerns please call [phone] at any time of the day.

## 2017-04-12 NOTE — MR AVS SNAPSHOT
After Visit Summary   4/12/2017    Flor Navarro    MRN: [id number]           Patient Information     Date Of Birth          1965        Visit Information        Provider Department      4/12/2017 2:40 PM Yvette Byrne MD [facility]        Today's Diagnoses     Cough    -  1      Care Instructions    Here is the plan from today's visit    1. Cough  I think your cough is from your asthma.  Please return to clinic if you are wiling to start a second medication for your asthma.  I will give you the cough drops as requested.  - Menthol 10 MG LOZG; Take 1 lozenge by mouth every 2 hours as needed  Dispense: 120 lozenge; Refill: 0    Please call or return to clinic if your symptoms don't go away.    Follow up plan  Please make a clinic appointment for follow up with me (YVETTE BYRNE) in 1-2 weeks   weeks if you do not feel better.    Thank you for coming to [facility] today.  Lab Testing:  **If you had lab testing today and your results are reassuring or normal they will be mailed to you or sent through [employer] within 7 days.   **If the lab tests need quick action we will call you with the results.  The phone number we will call with results is # [phone] (home) . If this is not the best number please call our clinic and change the number.  Medication Refills:  If you need any refills please call your pharmacy and they will contact us.   If you need to  your refill at a new pharmacy, please contact the new pharmacy directly. The new pharmacy will help you get your medications transferred faster.   Scheduling:  If you have any concerns about today's visit or wish to schedule another appointment please call our office during normal business hours [phone] (8-5:00 M-F)  If a referral was made to a [facility] and you don't get a call from central scheduling please call [phone].  If a Mammogram was ordered for you at [facility] call [phone] to schedule or change your appointment.  If you had an XRay/CT/Ultrasound/MRI ordered the number is [phone] to schedule or change your radiology appointment.   Medical Concerns:  If you have urgent medical concerns please call [phone] at any time of the day.            Follow-ups after your visit        Your next 10 appointments already scheduled     May 31, 2017 12:00 PM CDT   Lab with  LAB   [facility]    [address]  1st Floor  [facility] [address] [phone]            May 31, 2017  1:00 PM CDT   (Arrive by 12:45 PM)   Return Liver Transplant with Laron Anne MD   [facility] ([facility])    [address] [place] [address]  3rd Floor  [facility] [address] [phone]              Who to contact     Please call your clinic at [phone] to:    Ask questions about your health    Make or cancel appointments    Discuss your medicines    Learn about your test results    Speak to your doctor   If you have compliments or concerns about an experience at your clinic, or if you wish to file a complaint, please contact [facility] Patient Relations at [phone] or email us at [email or web address]         Additional Information About Your Visit        [employer] Information     [employer] is an electronic gateway that provides easy, online access to your medical records. With [employer], you can request a clinic appointment, read your test results, renew a prescription or communicate with your care team.     To sign up for [employer] visit the website at [email or web address]   You will be asked to enter the access code listed below, as well as some personal information. Please follow the directions to create your username and password.     Your access code is: [email or web address]  Expires: 2017  2:34 PM     Your access code will  in 90 days. If you need  "help or a new code, please contact your [facility] or call [phone] for assistance.        Care EveryWhere ID     This is your Care EveryWhere ID. This could be used by other organizations to access your [facility] medical records  [id number]        Your Vitals Were     Pulse Temperature Respirations Height Pulse Oximetry Breastfeeding?    67 97.6  F (36.4  C) (Oral) 16 5' 1\" (154.9 cm) 97% No    BMI (Body Mass Index)                   39.04 kg/m2            Blood Pressure from Last 3 Encounters:   04/12/17 117/77   03/31/17 111/73   03/03/17 124/86    Weight from Last 3 Encounters:   04/12/17 206 lb 9.6 oz (93.7 kg)   03/31/17 209 lb 3.2 oz (94.9 kg)   03/03/17 206 lb (93.4 kg)              Today, you had the following     No orders found for display         Today's Medication Changes          These changes are accurate as of: 4/12/17  3:22 PM.  If you have any questions, ask your nurse or doctor.               Start taking these medicines.        Dose/Directions    Menthol 10 MG Lozg   Used for:  Cough        Dose:  1 lozenge   Take 1 lozenge by mouth every 2 hours as needed   Quantity:  120 lozenge   Refills:  0         These medicines have changed or have updated prescriptions.        Dose/Directions    senna-docusate 8.6-50 MG per tablet   Commonly known as:  SENOKOT-S;PERICOLACE   This may have changed:    - how much to take  - additional instructions   Used for:  Constipation, chronic        Dose:  2 tablet   Take 2 tablets by mouth 2 times daily For constipation   Quantity:  120 tablet   Refills:  6            Where to get your medicines      These medications were sent to [facility] [place], [place] - [address]  [address] [address]    Hours:  TRANSPLANT PHONE NUMBER [phone] Phone:  [phone]     Menthol 10 MG Lozg                Primary Care Provider Office Phone # Fax #    Karely Sierra MD " [phone]       [facility] [address] [place] [address]        Thank you!     Thank you for choosing [facility]  for your care. Our goal is always to provide you with excellent care. Hearing back from our patients is one way we can continue to improve our services. Please take a few minutes to complete the written survey that you may receive in the mail after your visit with us. Thank you!             Your Updated Medication List - Protect others around you: Learn how to safely use, store and throw away your medicines at www.disposemymeds.org.          This list is accurate as of: 4/12/17  3:22 PM.  Always use your most recent med list.                   Brand Name Dispense Instructions for use    acetaminophen 325 MG tablet    TYLENOL    100 tablet    Take 1-2 tablets (325-650 mg) by mouth every 6 hours as needed Max 6 tablets per 24 hours       albuterol 108 (90 BASE) MCG/ACT Inhaler    PROAIR HFA/PROVENTIL HFA/VENTOLIN HFA    1 Inhaler    Inhale 2 puffs into the lungs 4 times daily       alendronate 70 MG tablet    FOSAMAX    12 tablet    Take 1 tablet (70 mg) by mouth every 7 days Take at least 60 min before breakfast with over 8 oz water and stay upright for at least 30 min. after dose.       benzocaine-menthol 6-10 MG lozenge    CHLORASEPTIC    36 lozenge    Place 1 lozenge inside cheek every 2 hours as needed for moderate pain       bisacodyl 5 MG EC tablet    DULCOLAX    30 tablet    Take 1 tablet (5 mg) by mouth daily as needed for constipation       calcium-vitamin D 600-400 MG-UNIT per tablet    calcium 600 + D    60 tablet    Take 1 tablet by mouth 2 times daily       carboxymethylcellul-glycerin 0.5-0.9 % Soln ophthalmic solution    OPTIVE/REFRESH OPTIVE    1 each    Place 1 drop into both eyes 4 times daily       cholecalciferol 1000 UNITS capsule    vitamin  -D    180 capsule    Take 2 capsules (2,000 Units) by mouth daily       * cycloSPORINE  modified capsule     240 capsule    Take 4 capsules (100 mg) by mouth every 12 hours       * cycloSPORINE modified 25 MG capsule    GENERIC EQUIVALENT    720 capsule    Take 4 capsules (100 mg) by mouth every 12 hours       * lidocaine 5 % ointment    XYLOCAINE    70 g    Apply topically 2 times daily Apply to affected area for pain two times daily       * lidocaine 5 % Patch    LIDODERM    30 patch    Apply up to 3 patches to painful area at once for up to 12 h within a 24 h period.  Remove after 12 hours.       melatonin 3 MG tablet     100 tablet    Take 1 tablet (3 mg) by mouth nightly as needed for sleep       Menthol (Topical Analgesic) 4 % Gel     118 mL    Externally apply topically 3 times daily as needed       Menthol 10 MG Lozg     120 lozenge    Take 1 lozenge by mouth every 2 hours as needed       multivitamin, therapeutic with minerals Tabs tablet     100 tablet    Take 1 tablet by mouth daily       mycophenolate 250 MG capsule    CELLCEPT - GENERIC EQUIVALENT    120 capsule    Take 2 capsules (500 mg) by mouth every 12 hours       omeprazole 20 MG CR capsule    priLOSEC    180 capsule    Take 1 capsule (20 mg) by mouth 2 times daily       * order for DME     1 Units    Equipment being ordered: Nebulizer with adult mask and tubing       * order for DME     1 Units    Equipment being ordered: cane with padded handle       * order for DME     1 Units    Equipment being ordered: cane       * order for DME     2 Units    Equipment being ordered: compression stockings bilateral Please measure and fit patient for stockings  Strength 15-30mmHg Disp 2 pairs 1 refill over the time of 1 year       * order for DME     1 Units    2-wheeled walker (dx: gait instability and Frequent falls).       * order for DME     1 Units    Equipment being ordered: 4 Wheeled Walker with Seat and Brakes       * order for DME     2 each    Equipment being ordered: Compression stockings below knee bilateral       * order for DME      1 each    Equipment being ordered: Back Stabilizer       * order for DME     2 each    Equipment being ordered: 2 pairs of black stabilizer socks (beige okay if black not available). Size XL.       * Psyllium 500 MG Caps     180 capsule    Take 3 capsules by mouth 2 times daily       * REGULOID 0.52 G capsule   Generic drug:  psyllium          senna-docusate 8.6-50 MG per tablet    SENOKOT-S;PERICOLACE    120 tablet    Take 2 tablets by mouth 2 times daily For constipation       simethicone 80 MG chewable tablet    MYLICON    120 tablet    Take 1 tablet (80 mg) by mouth every 6 hours as needed for flatulence or cramping       sodium chloride 0.65 % nasal spray    OCEAN    30 mL    Spray 1 spray into both nostrils daily as needed for congestion       sodium phosphate 7-19 GM/118ML rectal enema     3 Bottle    Place 1 Bottle (1 enema) rectally daily as needed for constipation       STATIN NOT PRESCRIBED (INTENTIONAL)     0 each    Not prescribed       * Notice:  This list has 15 medication(s) that are the same as other medications prescribed for you. Read the directions carefully, and ask your doctor or other care provider to review them with you.

## 2017-04-12 NOTE — TELEPHONE ENCOUNTER
Last PA stated that they didn't cover because dx wasn't neuropathy. BUT PT DOES HAVE a neuropathy dx, so PA was resent.     Please let pharmacist know. IF lidoderm not approved, will prescribe cream or something else.

## 2017-04-12 NOTE — PROGRESS NOTES
"      HPI:       Flor Navarro is a 51 year old who presents for the following  Patient presents with: Cough    Acute Illness   Concerns:  Cough, requesting cough drops  When did it start?  1 month  Is it getting better, worse or staying the same? unchanged    Fever?:  No     Chills/Sweats?: No     Headache (location?)No     Sinus Pressure?:No     Conjunctivitis?: No     Ear Pain?: No     Runny nose?: No     Congestion?: No     Sore Throat?: No      Cough?:  YES-productive of clear sputum    Wheeze?: No but feels choking sensation when coughing    Decreased Appetite?: No     Nausea?:No     Vomiting?: No     Diarrhea?:  No     Dysuria/Frequency?.:No     Fatigue/Achiness?:No     Sick/Strep Exposure?: No      Therapies Tried and outcome: Nothing.  Patient is using inhaler 2-3 times weekly but it does not help with cough.    An Tajik  was used for  this visit.      Problem, Medication and Allergy Lists were reviewed and are current.  Patient is an established patient of this clinic.         Review of Systems:   Review of Systems   Constitutional: Negative for appetite change, chills and fever.   HENT: Negative for congestion and sore throat.    Respiratory: Positive for cough. Negative for chest tightness, shortness of breath and stridor.    Gastrointestinal: Negative for abdominal pain, nausea and vomiting.   Neurological: Negative for dizziness and headaches.   Psychiatric/Behavioral: Negative for dysphoric mood.             Physical Exam:   Patient Vitals for the past 24 hrs:   BP Temp Temp src Pulse Resp SpO2 Height Weight   04/12/17 1453 117/77 97.6  F (36.4  C) Oral 67 16 97 % 5' 1\" (154.9 cm) 206 lb 9.6 oz (93.7 kg)     Body mass index is 39.04 kg/(m^2).  Vitals were reviewed and were normal     Physical Exam   Constitutional: She is oriented to person, place, and time. She appears well-developed and well-nourished.   HENT:   Head: Normocephalic.   Neck: Normal range of motion. Neck supple. "   Cardiovascular: Normal rate.    Pulmonary/Chest: Effort normal. She has wheezes.   Musculoskeletal: Normal range of motion.   Neurological: She is alert and oriented to person, place, and time.   Skin: No rash noted. No erythema. No pallor.   Psychiatric: She has a normal mood and affect. Her behavior is normal. Judgment and thought content normal.         Results:     No labs ordered  Assessment and Plan     Flor was seen today for cough.  Patient's only complaint was a cough..  She had no other systemic symptoms.  Her exam was normal with the exception of moderate wheezing heard diffusely.  On review of her medical history, she is noted to have asthma.  I discussed with patient that I think her cough is most likely related to her asthma based on exam; however, she states the cough does not change with the use of her albuterol inhaler (she uses 2-3 times weekly).  I suggested she may need a controller medication for her asthma, but she does not want to start that at this time.  She felt strongly about getting a prescription for cough drops, so I prescribed them to her but expressed my concerns about her asthma and skepticism that the cough drops will help her cough.  Patient understood and agreed to RTC if her cough persists.        Diagnoses and all orders for this visit:    Cough  -     Menthol 10 MG LOZG; Take 1 lozenge by mouth every 2 hours as needed      There are no discontinued medications.  Options for treatment and follow-up care were reviewed with the patient. Flor Navarro  engaged in the decision making process and verbalized understanding of the options discussed and agreed with the final plan.    Yvette Thakkar M.D.  PGY-2, Family Medicine

## 2017-04-12 NOTE — TELEPHONE ENCOUNTER
Closing TE please refer to TE 04/03/2017 for more information.  PA has been resubmitted to insurance with new Dx via Jennifer Houston CMA

## 2017-04-14 ENCOUNTER — DOCUMENTATION ONLY (OUTPATIENT)
Dept: FAMILY MEDICINE | Facility: CLINIC | Age: 52
End: 2017-04-14

## 2017-04-14 NOTE — PROGRESS NOTES
"When opening a documentation only encounter, be sure to enter in \"Chief Complaint\" Forms and in \" Comments\" Title of form, description if needed.    Flor is a 51 year old  female  Form received via: Fax  Form now resides in: Provider Ready    Nicole Winkler MA      Form has been completed by provider.     Form sent out via: Fax to [facility] at Fax Number: [phone]  Patient informed: No, Reason:via fax  Output date: May 12, 2017    Nicole Winkler MA      **Please close the encounter**      "

## 2017-04-16 ASSESSMENT — ENCOUNTER SYMPTOMS
ABDOMINAL PAIN: 0
APPETITE CHANGE: 0
CHEST TIGHTNESS: 0
SORE THROAT: 0
VOMITING: 0
COUGH: 1
STRIDOR: 0
FEVER: 0
SHORTNESS OF BREATH: 0
DIZZINESS: 0
DYSPHORIC MOOD: 0
HEADACHES: 0
CHILLS: 0
NAUSEA: 0

## 2017-04-20 NOTE — TELEPHONE ENCOUNTER
Prior Authorization: Approved    Approved as of: 01/15/2017 through 04/15/2018    Pharmacy notified.  Routing to MD Jennifer Cali April 20, 2017 at 4:59 PM

## 2017-04-21 DIAGNOSIS — Z94.4 LIVER REPLACED BY TRANSPLANT (H): ICD-10-CM

## 2017-04-21 LAB
ALBUMIN SERPL-MCNC: 3.7 G/DL (ref 3.4–5)
ALP SERPL-CCNC: 87 U/L (ref 40–150)
ALT SERPL W P-5'-P-CCNC: 48 U/L (ref 0–50)
ANION GAP SERPL CALCULATED.3IONS-SCNC: 7 MMOL/L (ref 3–14)
AST SERPL W P-5'-P-CCNC: 46 U/L (ref 0–45)
BILIRUB DIRECT SERPL-MCNC: 0.2 MG/DL (ref 0–0.2)
BILIRUB SERPL-MCNC: 1 MG/DL (ref 0.2–1.3)
BUN SERPL-MCNC: 28 MG/DL (ref 7–30)
CALCIUM SERPL-MCNC: 8.8 MG/DL (ref 8.5–10.1)
CHLORIDE SERPL-SCNC: 107 MMOL/L (ref 94–109)
CO2 SERPL-SCNC: 26 MMOL/L (ref 20–32)
CREAT SERPL-MCNC: 1.1 MG/DL (ref 0.52–1.04)
CYCLOSPORINE BLD LC/MS/MS-MCNC: 140 UG/L (ref 50–400)
ERYTHROCYTE [DISTWIDTH] IN BLOOD BY AUTOMATED COUNT: 12.9 % (ref 10–15)
GFR SERPL CREATININE-BSD FRML MDRD: 52 ML/MIN/1.7M2
GLUCOSE SERPL-MCNC: 90 MG/DL (ref 70–99)
HCT VFR BLD AUTO: 37.7 % (ref 35–47)
HGB BLD-MCNC: 12.4 G/DL (ref 11.7–15.7)
MCH RBC QN AUTO: 30.1 PG (ref 26.5–33)
MCHC RBC AUTO-ENTMCNC: 32.9 G/DL (ref 31.5–36.5)
MCV RBC AUTO: 92 FL (ref 78–100)
PLATELET # BLD AUTO: 143 10E9/L (ref 150–450)
POTASSIUM SERPL-SCNC: 4.8 MMOL/L (ref 3.4–5.3)
PROT SERPL-MCNC: 7.6 G/DL (ref 6.8–8.8)
RBC # BLD AUTO: 4.12 10E12/L (ref 3.8–5.2)
SODIUM SERPL-SCNC: 139 MMOL/L (ref 133–144)
TME LAST DOSE: NORMAL H
WBC # BLD AUTO: 4.3 10E9/L (ref 4–11)

## 2017-04-21 PROCEDURE — 80158 DRUG ASSAY CYCLOSPORINE: CPT | Performed by: INTERNAL MEDICINE

## 2017-04-22 NOTE — TELEPHONE ENCOUNTER
I got a note from the [facility] pharmacy asking me to stop sending this prescription because it wasn't covered. Can you please call and let them know that the PA was approved? Thanks

## 2017-04-24 ENCOUNTER — DOCUMENTATION ONLY (OUTPATIENT)
Dept: FAMILY MEDICINE | Facility: CLINIC | Age: 52
End: 2017-04-24

## 2017-04-24 NOTE — PROGRESS NOTES
"When opening a documentation only encounter, be sure to enter in \"Chief Complaint\" Forms and in \" Comments\" Title of form, description if needed.    Flor is a 51 year old  female  Form received via: Fax  Form now resides in: Provider Ready    Nicole Winkler MA                  "

## 2017-04-24 NOTE — PROGRESS NOTES
"When opening a documentation only encounter, be sure to enter in \"Chief Complaint\" Forms and in \" Comments\" Title of form, description if needed.    Flor is a 51 year old  female  Form received via: Fax  Form now resides in: Provider Ready    Nicole Winkler MA      Form has been completed by provider.     Form sent out via: Fax to [employer] at Fax Number: [phone]  Patient informed: No, Reason:via fax  Output date: May 15, 2017    Nicole Winkler MA      **Please close the encounter**      "

## 2017-04-26 ENCOUNTER — DOCUMENTATION ONLY (OUTPATIENT)
Dept: FAMILY MEDICINE | Facility: CLINIC | Age: 52
End: 2017-04-26

## 2017-04-26 DIAGNOSIS — Z94.4 LIVER REPLACED BY TRANSPLANT (H): ICD-10-CM

## 2017-04-26 NOTE — PROGRESS NOTES
"When opening a documentation only encounter, be sure to enter in \"Chief Complaint\" Forms and in \" Comments\" Title of form, description if needed.    Form received via: Fax  Form now resides in: Provider Ready    Form sent out via: Fax  Patient informed: No  Output date: 4/21/17    Form received by recipient via fax confirmation  Please close the encounter.    "

## 2017-04-27 RX ORDER — CYCLOSPORINE 25 MG/1
CAPSULE, LIQUID FILLED ORAL
Qty: 240 CAPSULE | Refills: 3 | Status: SHIPPED | OUTPATIENT
Start: 2017-04-27 | End: 2017-08-28

## 2017-04-27 RX ORDER — MYCOPHENOLATE MOFETIL 250 MG/1
CAPSULE ORAL
Qty: 120 CAPSULE | Refills: 4 | Status: SHIPPED | OUTPATIENT
Start: 2017-04-27 | End: 2017-10-26

## 2017-05-01 DIAGNOSIS — E55.9 VITAMIN D DEFICIENCY: ICD-10-CM

## 2017-05-01 DIAGNOSIS — M81.0 OSTEOPOROSIS: ICD-10-CM

## 2017-05-01 DIAGNOSIS — N18.30 CKD (CHRONIC KIDNEY DISEASE) STAGE 3, GFR 30-59 ML/MIN (H): ICD-10-CM

## 2017-05-01 DIAGNOSIS — K59.09 CONSTIPATION, CHRONIC: ICD-10-CM

## 2017-05-01 DIAGNOSIS — R00.2 PALPITATIONS: ICD-10-CM

## 2017-05-01 DIAGNOSIS — Z94.4 LIVER REPLACED BY TRANSPLANT (H): ICD-10-CM

## 2017-05-01 DIAGNOSIS — N25.81 SECONDARY HYPERPARATHYROIDISM (H): ICD-10-CM

## 2017-05-01 RX ORDER — AMOXICILLIN 250 MG
2 CAPSULE ORAL 2 TIMES DAILY
Qty: 120 TABLET | Refills: 6 | Status: SHIPPED | OUTPATIENT
Start: 2017-05-01 | End: 2018-01-30

## 2017-05-01 NOTE — TELEPHONE ENCOUNTER
Date of last visit at clinic: 4-12-17    Please complete refill and CLOSE ENCOUNTER.  Closing the encounter signifies the refill is complete.

## 2017-05-02 DIAGNOSIS — Z94.4 LIVER REPLACED BY TRANSPLANT (H): ICD-10-CM

## 2017-05-02 NOTE — TELEPHONE ENCOUNTER
Request for medication refill:    Date of last visit at clinic: 4/12/17    Please complete refill if appropriate and CLOSE ENCOUNTER.    Closing the encounter signifies the refill is complete.    If refill has been denied, please complete the smart phrase .smirefuse and route it to the [facility] RN TRIAGE pool to inform the patient and the pharmacy.    Dayanna Batista

## 2017-05-03 ENCOUNTER — TELEPHONE (OUTPATIENT)
Dept: FAMILY MEDICINE | Facility: CLINIC | Age: 52
End: 2017-05-03

## 2017-05-03 NOTE — TELEPHONE ENCOUNTER
Returned call to home care nurse to give verbal orders per protocol as requested. Nurse verbalized understanding.    Emma Herrera RN

## 2017-05-03 NOTE — TELEPHONE ENCOUNTER
[facility] phone call message - order or referral request for patient:     Order or referral being requested: Skilled Nursing Visit      Additional Comments: Once a week for nine weeks and 3 PRN's for medication management and teaching.    OK to leave a message on voice mail? Yes    Primary language: Mohawk      needed? Yes    Call taken on May 3, 2017 at 11:31 AM by Monik Wilkins

## 2017-05-04 NOTE — TELEPHONE ENCOUNTER
Medication Refill Denied  Reason: no asa due to history of hemorrhagic stroke and low risk for heart disease  Multiple documentations of this in chart.   Multiple denials - please inform pt she should NOT TAKE aspirine  Provider: I have not called the patient about the Rx denial, please call.  PCS: Please notify the pharmacy  RN: Please contact the patient to explain reasoning     RN may not order temporary refill so that the patient will not run out of medication prior to the scheduled visit.    Karely Sierra MD

## 2017-05-05 DIAGNOSIS — M81.0 OSTEOPOROSIS: ICD-10-CM

## 2017-05-08 ENCOUNTER — MEDICAL CORRESPONDENCE (OUTPATIENT)
Dept: HEALTH INFORMATION MANAGEMENT | Facility: CLINIC | Age: 52
End: 2017-05-08

## 2017-05-19 ENCOUNTER — DOCUMENTATION ONLY (OUTPATIENT)
Dept: FAMILY MEDICINE | Facility: CLINIC | Age: 52
End: 2017-05-19

## 2017-05-19 NOTE — PROGRESS NOTES
"When opening a documentation only encounter, be sure to enter in \"Chief Complaint\" Forms and in \" Comments\" Title of form, description if needed.    Flor is a 51 year old  female  Form received via: Fax  Form now resides in: Provider Ready    Nicole Winkler MA      Form has been completed by provider.     Form sent out via: Fax to [employer] at Fax Number: [phone]  Patient informed: No, Reason:via fax  Output date: May 25, 2017    Nicole Winkler MA      **Please close the encounter**      "

## 2017-05-25 ENCOUNTER — MEDICAL CORRESPONDENCE (OUTPATIENT)
Dept: HEALTH INFORMATION MANAGEMENT | Facility: CLINIC | Age: 52
End: 2017-05-25

## 2017-05-25 ENCOUNTER — DOCUMENTATION ONLY (OUTPATIENT)
Dept: FAMILY MEDICINE | Facility: CLINIC | Age: 52
End: 2017-05-25

## 2017-05-25 NOTE — PROGRESS NOTES
"When opening a documentation only encounter, be sure to enter in \"Chief Complaint\" Forms and in \" Comments\" Title of form, description if needed.    Flor is a 51 year old  female  Form received via: Fax  Form now resides in: Provider Ready    Nicole Winkler MA      Form has been completed by provider.     Form sent out via: Fax to [facility] at Fax Number: [phone]  Patient informed: No, Reason:via fax  Output date: May 25, 2017    Nicole Winkler MA      **Please close the encounter**      "

## 2017-05-30 ENCOUNTER — DOCUMENTATION ONLY (OUTPATIENT)
Dept: FAMILY MEDICINE | Facility: CLINIC | Age: 52
End: 2017-05-30

## 2017-05-30 NOTE — PROGRESS NOTES
"When opening a documentation only encounter, be sure to enter in \"Chief Complaint\" Forms and in \" Comments\" Title of form, description if needed.    Flor is a 51 year old  female  Form received via: Fax  Form now resides in: Provider Ready    Nicole Winkler MA      Form has been completed by provider.     Form sent out via: Fax to [facility] at Fax Number: [phone]  Patient informed: No, Reason:via fax  Output date: June 5, 2017    Nicole Winkler MA      **Please close the encounter**        "

## 2017-06-05 ENCOUNTER — MEDICAL CORRESPONDENCE (OUTPATIENT)
Dept: HEALTH INFORMATION MANAGEMENT | Facility: CLINIC | Age: 52
End: 2017-06-05

## 2017-06-06 ENCOUNTER — OFFICE VISIT (OUTPATIENT)
Dept: OPHTHALMOLOGY | Facility: CLINIC | Age: 52
End: 2017-06-06
Attending: OPHTHALMOLOGY
Payer: MEDICARE

## 2017-06-06 DIAGNOSIS — Z96.1 PSEUDOPHAKIA OF BOTH EYES: Primary | ICD-10-CM

## 2017-06-06 DIAGNOSIS — H26.493 POSTERIOR CAPSULAR OPACIFICATION VISUALLY SIGNIFICANT OF BOTH EYES: ICD-10-CM

## 2017-06-06 DIAGNOSIS — H25.043 POSTERIOR SUBCAPSULAR POLAR SENILE CATARACT, BILATERAL: ICD-10-CM

## 2017-06-06 DIAGNOSIS — H04.123 DRY EYE SYNDROME OF BILATERAL LACRIMAL GLANDS: ICD-10-CM

## 2017-06-06 PROCEDURE — 66821 AFTER CATARACT LASER SURGERY: CPT | Mod: RT | Performed by: OPHTHALMOLOGY

## 2017-06-06 PROCEDURE — 92015 DETERMINE REFRACTIVE STATE: CPT | Mod: GY,ZF

## 2017-06-06 PROCEDURE — T1013 SIGN LANG/ORAL INTERPRETER: HCPCS | Mod: U3,ZF

## 2017-06-06 PROCEDURE — 99212 OFFICE O/P EST SF 10 MIN: CPT | Mod: ZF

## 2017-06-06 RX ORDER — PREDNISOLONE ACETATE 10 MG/ML
SUSPENSION/ DROPS OPHTHALMIC
Qty: 1 BOTTLE | Refills: 0 | Status: SHIPPED | OUTPATIENT
Start: 2017-06-06 | End: 2019-08-08

## 2017-06-06 ASSESSMENT — VISUAL ACUITY
OS_SC: 20/40
OD_SC: 20/40
METHOD: SNELLEN - LINEAR

## 2017-06-06 ASSESSMENT — REFRACTION_MANIFEST
OD_CYLINDER: +0.75
OD_AXIS: 015
OS_CYLINDER: +0.75
OS_AXIS: 172
OD_ADD: +2.25
OS_SPHERE: PLANO
OD_SPHERE: -0.25
OS_ADD: +2.25

## 2017-06-06 ASSESSMENT — CONF VISUAL FIELD
OS_NORMAL: 1
OD_NORMAL: 1

## 2017-06-06 ASSESSMENT — CUP TO DISC RATIO
OS_RATIO: 0.3
OD_RATIO: 0.3

## 2017-06-06 ASSESSMENT — TONOMETRY
OS_IOP_MMHG: 13
OD_IOP_MMHG: 14
IOP_METHOD: TONOPEN

## 2017-06-06 ASSESSMENT — SLIT LAMP EXAM - LIDS
COMMENTS: NORMAL
COMMENTS: NORMAL

## 2017-06-06 ASSESSMENT — EXTERNAL EXAM - LEFT EYE: OS_EXAM: NORMAL

## 2017-06-06 ASSESSMENT — EXTERNAL EXAM - RIGHT EYE: OD_EXAM: NORMAL

## 2017-06-06 NOTE — NURSING NOTE
Chief Complaints and History of Present Illnesses   Patient presents with     Follow Up For     Pseudophakia, OU     HPI    Affected eye(s):  Both   Symptoms:        Duration:  1 year   Frequency:  Constant       Do you have eye pain now?:  No      Comments:  Pt. States that there has been no change in VA BE.  Has had occasional headaches and eugenie sensation BE.  Mervat Rich COT 9:26 AM June 6, 2017

## 2017-06-06 NOTE — PROGRESS NOTES
Flor Valdez is a 51 year old female 2 year s/p cataract extraction with IOL in the right eye. Doing well. Occasional scratchy sensation. Prior to Ramadan, she was taking PFAT once - twice a day, now she forgets after Ramadan. No changes in vision.     A&P  Pseudophakia, OU  -distance vision 20/40 OU with refraction  -moderate PCO inferiorly in both eyes extending centrally  -will proceed with Yag Cap right eye today  -mild dry eye bilaterally- PF ATsQID  - RTC in 7-10 days, for Yag cap left eye.     Rodriguez Mooney MD  Ophthalmology resident, PGY-3          ~~~~~~~~~~~~~~~~~~~~~~~~~~~~~~~~~~~~~~~~~~~~~~~~~~~~~~~~~~~~~~~~    Complete documentation of historical and exam elements from today's encounter can be found in the full encounter summary report (not reduplicated in this progress note). I personally obtained the chief complaint(s) and history of present illness.  I confirmed and edited as necessary the review of systems, past medical/surgical history, family history, social history, and examination findings as documented by others.  I examined the patient myself, and I personally reviewed the relevant tests, images, and reports as documented above. I formulated and edited as necessary the assessment and plan and discussed the findings and management plan with the patient and family.     I was personally present for the entirety of the in-office procedure.     Nik Langley MD, MA  Director, Cornea & Anterior Segment  [facility] Department of Ophthalmology & Visual Neuroscience

## 2017-06-06 NOTE — MR AVS SNAPSHOT
After Visit Summary   6/6/2017    Flor Navarro    MRN: [id number]           Patient Information     Date Of Birth          1965        Visit Information        Provider Department      6/6/2017 9:00 AM Rashida Pandya; Nik Langley MD [facility]        Today's Diagnoses     Pseudophakia of both eyes    -  1    Dry eye syndrome of bilateral lacrimal glands        Posterior subcapsular polar senile cataract, bilateral        Posterior capsular opacification visually significant of both eyes           Follow-ups after your visit        Your next 10 appointments already scheduled     Jun 13, 2017  9:15 AM CDT   Post-Op with Nik Langley MD   Eye Clinic ([facility])    [address]  [address]  9th [address] Clin 9a  [facility] [id number] [phone]            Jun 16, 2017  9:15 AM CDT   Lab with  LAB    Health Lab ([facility])    [address]  1st Floor  [facility] [address] [phone]            Jun 16, 2017 10:15 AM CDT   (Arrive by 10:00 AM)   Return Liver Transplant with Laron Anne MD   [facility]atology ([facility])    [address]  3rd Floor  [facility] [address] [phone]              Who to contact     Please call your clinic at [phone] to:    Ask questions about your health    Make or cancel appointments    Discuss your medicines    Learn about your test results    Speak to your doctor   If you have compliments or concerns about an experience at your clinic, or if you wish to file a complaint, please contact [facility] Patient Relations at [phone] or email us at [email or web address]         Additional Information About Your Visit        MyChart Information     Sqrrl is an electronic gateway that provides easy, online access to your medical records. With Sqrrl, you can request a clinic appointment, read your test  results, renew a prescription or communicate with your care team.     To sign up for MyChart visit the website at [email or web address]   You will be asked to enter the access code listed below, as well as some personal information. Please follow the directions to create your username and password.     Your access code is: [email or web address]  Expires: 9/3/2017  6:31 AM     Your access code will  in 90 days. If you need help or a new code, please contact your [facility] or call [phone] for assistance.        Care EveryWhere ID     This is your Care EveryWhere ID. This could be used by other organizations to access your [place] medical records  [id number]         Blood Pressure from Last 3 Encounters:   17 117/77   17 111/73   17 124/86    Weight from Last 3 Encounters:   17 93.7 kg (206 lb 9.6 oz)   17 94.9 kg (209 lb 3.2 oz)   17 93.4 kg (206 lb)              We Performed the Following     YAG Capsulotomy OD (right eye)          Today's Medication Changes          These changes are accurate as of: 17 10:18 AM.  If you have any questions, ask your nurse or doctor.               Start taking these medicines.        Dose/Directions    prednisoLONE acetate 1 % ophthalmic susp   Commonly known as:  PRED FORTE   Used for:  Posterior capsular opacification visually significant of both eyes   Started by:  Nik Langley MD        Use in operative eye four times a day  For 4 days   Quantity:  1 Bottle   Refills:  0            Where to get your medicines      These medications were sent to [facility] [place], [place] - [address] [facility] [address]  [address] [place] [address] [address] [place] [address]    Hours:  TRANSPLANT PHONE NUMBER [phone] Phone:  [phone]     prednisoLONE acetate 1 % ophthalmic susp                Primary Care Provider Office Phone # Fax #    Karely Sierra -[phone]        [facility] [address] [facility] [address]        Thank you!     Thank you for choosing EYE CLINIC  for your care. Our goal is always to provide you with excellent care. Hearing back from our patients is one way we can continue to improve our services. Please take a few minutes to complete the written survey that you may receive in the mail after your visit with us. Thank you!             Your Updated Medication List - Protect others around you: Learn how to safely use, store and throw away your medicines at www.disposemymeds.org.          This list is accurate as of: 6/6/17 10:18 AM.  Always use your most recent med list.                   Brand Name Dispense Instructions for use    acetaminophen 325 MG tablet    TYLENOL    100 tablet    Take 1-2 tablets (325-650 mg) by mouth every 6 hours as needed Max 6 tablets per 24 hours       albuterol 108 (90 BASE) MCG/ACT Inhaler    PROAIR HFA/PROVENTIL HFA/VENTOLIN HFA    1 Inhaler    Inhale 2 puffs into the lungs 4 times daily       alendronate 70 MG tablet    FOSAMAX    12 tablet    Take 1 tablet (70 mg) by mouth every 7 days Take at least 60 min before breakfast with over 8 oz water and stay upright for at least 30 min. after dose.       benzocaine-menthol 6-10 MG lozenge    CHLORASEPTIC    36 lozenge    Place 1 lozenge inside cheek every 2 hours as needed for moderate pain       bisacodyl 5 MG EC tablet    DULCOLAX    30 tablet    Take 1 tablet (5 mg) by mouth daily as needed for constipation       calcium-vitamin D 600-400 MG-UNIT per tablet    calcium 600 + D    60 tablet    Take 1 tablet by mouth 2 times daily       carboxymethylcellul-glycerin 0.5-0.9 % Soln ophthalmic solution    OPTIVE/REFRESH OPTIVE    1 each    Place 1 drop into both eyes 4 times daily       cholecalciferol 1000 UNITS capsule    vitamin  -D    180 capsule    Take 2 capsules (2,000 Units) by mouth daily       * cycloSPORINE modified capsule     240 capsule    Take 4 capsules (100  mg) by mouth every 12 hours       * cycloSPORINE modified 25 MG capsule    GENERIC EQUIVALENT    240 capsule    TAKE 4 CAPSULES BY MOUTH EVERY 12 HOURS       * lidocaine 5 % ointment    XYLOCAINE    70 g    Apply topically 2 times daily Apply to affected area for pain two times daily       * lidocaine 5 % Patch    LIDODERM    30 patch    Apply up to 3 patches to painful area at once for up to 12 h within a 24 h period.  Remove after 12 hours.       melatonin 3 MG tablet     100 tablet    Take 1 tablet (3 mg) by mouth nightly as needed for sleep       Menthol (Topical Analgesic) 4 % Gel     118 mL    Externally apply topically 3 times daily as needed       Menthol 10 MG Lozg     120 lozenge    Take 1 lozenge by mouth every 2 hours as needed       multivitamin, therapeutic with minerals Tabs tablet     100 tablet    Take 1 tablet by mouth daily       * mycophenolate 250 MG capsule    CELLCEPT - GENERIC EQUIVALENT    120 capsule    Take 2 capsules (500 mg) by mouth every 12 hours       * mycophenolate 250 MG capsule    CELLCEPT - GENERIC EQUIVALENT    120 capsule    TAKE TWO CAPSULES BY MOUTH EVERY 12 HOURS       omeprazole 20 MG CR capsule    priLOSEC    180 capsule    Take 1 capsule (20 mg) by mouth 2 times daily       * order for DME     1 Units    Equipment being ordered: Nebulizer with adult mask and tubing       * order for DME     1 Units    Equipment being ordered: cane with padded handle       * order for DME     1 Units    Equipment being ordered: cane       * order for DME     2 Units    Equipment being ordered: compression stockings bilateral Please measure and fit patient for stockings  Strength 15-30mmHg Disp 2 pairs 1 refill over the time of 1 year       * order for DME     1 Units    2-wheeled walker (dx: gait instability and Frequent falls).       * order for DME     1 Units    Equipment being ordered: 4 Wheeled Walker with Seat and Brakes       * order for DME     2 each    Equipment being ordered:  Compression stockings below knee bilateral       * order for DME     1 each    Equipment being ordered: Back Stabilizer       * order for DME     2 each    Equipment being ordered: 2 pairs of black stabilizer socks (beige okay if black not available). Size XL.       prednisoLONE acetate 1 % ophthalmic susp    PRED FORTE    1 Bottle    Use in operative eye four times a day  For 4 days       * Psyllium 500 MG Caps     180 capsule    Take 3 capsules by mouth 2 times daily       * REGULOID 0.52 G capsule   Generic drug:  psyllium          senna-docusate 8.6-50 MG per tablet    SENOKOT-S;PERICOLACE    120 tablet    Take 2 tablets by mouth 2 times daily For constipation       simethicone 80 MG chewable tablet    MYLICON    120 tablet    Take 1 tablet (80 mg) by mouth every 6 hours as needed for flatulence or cramping       sodium chloride 0.65 % nasal spray    OCEAN    30 mL    Spray 1 spray into both nostrils daily as needed for congestion       sodium phosphate 7-19 GM/118ML rectal enema     3 Bottle    Place 1 Bottle (1 enema) rectally daily as needed for constipation       STATIN NOT PRESCRIBED (INTENTIONAL)     0 each    Not prescribed       * Notice:  This list has 17 medication(s) that are the same as other medications prescribed for you. Read the directions carefully, and ask your doctor or other care provider to review them with you.

## 2017-06-13 ENCOUNTER — OFFICE VISIT (OUTPATIENT)
Dept: OPHTHALMOLOGY | Facility: CLINIC | Age: 52
End: 2017-06-13
Attending: OPHTHALMOLOGY
Payer: MEDICARE

## 2017-06-13 DIAGNOSIS — H04.123 DRY EYES, BILATERAL: Primary | ICD-10-CM

## 2017-06-13 DIAGNOSIS — H26.493 POSTERIOR CAPSULAR OPACIFICATION, BILATERAL: ICD-10-CM

## 2017-06-13 PROCEDURE — 66821 AFTER CATARACT LASER SURGERY: CPT | Mod: LT | Performed by: OPHTHALMOLOGY

## 2017-06-13 PROCEDURE — T1013 SIGN LANG/ORAL INTERPRETER: HCPCS | Mod: U3,ZF

## 2017-06-13 PROCEDURE — 99213 OFFICE O/P EST LOW 20 MIN: CPT | Mod: 25

## 2017-06-13 ASSESSMENT — VISUAL ACUITY
METHOD: SNELLEN - LINEAR
OD_SC: 20/40
OS_PH_SC: 20/40-
OS_SC: 20/50

## 2017-06-13 ASSESSMENT — SLIT LAMP EXAM - LIDS
COMMENTS: NORMAL
COMMENTS: NORMAL

## 2017-06-13 ASSESSMENT — EXTERNAL EXAM - LEFT EYE: OS_EXAM: NORMAL

## 2017-06-13 ASSESSMENT — EXTERNAL EXAM - RIGHT EYE: OD_EXAM: NORMAL

## 2017-06-13 ASSESSMENT — TONOMETRY
OS_IOP_MMHG: 20
IOP_METHOD: ICARE
OD_IOP_MMHG: 17

## 2017-06-13 ASSESSMENT — CUP TO DISC RATIO
OD_RATIO: 0.3
OS_RATIO: 0.3

## 2017-06-13 NOTE — PROGRESS NOTES
Flor Valdez is a 51 year old female 2 year s/p cataract extraction with IOL in the right eye. Feeling that right eye vision was better after yag procedure but is slightly blurry after dilation right eye.     Still occasional dry eyes.Using artifical tears BID-TID.       A&P  Pseudophakia, OU  -distance vision 20/40 OU with refraction  -s/p yag cap OD   -she states that her vision is much better but still 20/40 objectively.   -inferior PEE both eyes, posterior exam looks healthy    -will proceed with Yag Cap left eye eye today  -mild dry eye bilaterally- PF ATs QID  - RTC in 7-10 days, x    Rodriguez Mooney MD  Ophthalmology resident, PGY-3          ~~~~~~~~~~~~~~~~~~~~~~~~~~~~~~~~~~~~~~~~~~~~~~~~~~~~~~~~~~~~~~~~    Complete documentation of historical and exam elements from today's encounter can be found in the full encounter summary report (not reduplicated in this progress note). I personally obtained the chief complaint(s) and history of present illness.  I confirmed and edited as necessary the review of systems, past medical/surgical history, family history, social history, and examination findings as documented by others.  I examined the patient myself, and I personally reviewed the relevant tests, images, and reports as documented above. I formulated and edited as necessary the assessment and plan and discussed the findings and management plan with the patient and family.     I was personally present for the entirety of the in-office procedure.     Nik Langley MD, MA  Director, Cornea & Anterior Segment  [facility] Department of Ophthalmology & Visual Neuroscience

## 2017-06-13 NOTE — MR AVS SNAPSHOT
After Visit Summary   6/13/2017    Flor Navarro    MRN: [id number]           Patient Information     Date Of Birth          1965        Visit Information        Provider Department      6/13/2017 9:15 AM Victoria Cline; Nik Langley MD [facility]        Today's Diagnoses     Dry eyes, bilateral    -  1    Posterior capsular opacification, bilateral           Follow-ups after your visit        Follow-up notes from your care team     Return for MRx .      Your next 10 appointments already scheduled     Jun 16, 2017  9:15 AM CDT   Lab with  LAB   [facility] ([facility])    [address]  1st Floor  [facility] [id number] [phone]            Jun 16, 2017 10:15 AM CDT   (Arrive by 10:00 AM)   Return Liver Transplant with Laron Anne MD   [facility] Hepatology ([facility])    [address]  3rd Floor  [facility] [id number] [phone]            Jun 20, 2017  2:15 PM CDT   Post-Op with Nik Langley MD   Eye Clinic ([facility])    [address]  [address]  [facility] [id number] [phone]              Who to contact     Please call your clinic at [phone] to:    Ask questions about your health    Make or cancel appointments    Discuss your medicines    Learn about your test results    Speak to your doctor   If you have compliments or concerns about an experience at your clinic, or if you wish to file a complaint, please contact [facility] Patient Relations at [phone] or email us at [email or web address]         Additional Information About Your Visit        MyChart Information     TimeBridge is an electronic gateway that provides easy, online access to your medical records. With TimeBridge, you can request a clinic appointment, read your test results, renew a prescription or communicate with your care team.     To sign up  for MyChart visit the website at [email or web address]   You will be asked to enter the access code listed below, as well as some personal information. Please follow the directions to create your username and password.     Your access code is: [email or web address]  Expires: 9/3/2017  6:31 AM     Your access code will  in 90 days. If you need help or a new code, please contact your [facility] or call [phone] for assistance.        Care EveryWhere ID     This is your Care EveryWhere ID. This could be used by other organizations to access your [place] medical records  [id number]         Blood Pressure from Last 3 Encounters:   17 117/77   17 111/73   17 124/86    Weight from Last 3 Encounters:   17 93.7 kg (206 lb 9.6 oz)   17 94.9 kg (209 lb 3.2 oz)   17 93.4 kg (206 lb)              We Performed the Following     YAG Capsulotomy OS (left eye)          Today's Medication Changes          These changes are accurate as of: 17 11:05 AM.  If you have any questions, ask your nurse or doctor.               Start taking these medicines.        Dose/Directions    carboxymethylcellulose 1 % ophthalmic solution   Commonly known as:  CELLUVISC/REFRESH LIQUIGEL   Used for:  Dry eyes, bilateral   Started by:  Nik Langley MD        Dose:  1 drop   Place 1 drop into both eyes 4 times daily   Quantity:  15 each   Refills:  11            Where to get your medicines      These medications were sent to [facility] [place], [place] - [address] [facility] [address]  [address] [facility] [address] [facility] [address] [place] [address]    Hours:  TRANSPLANT PHONE NUMBER [phone] Phone:  [phone]     carboxymethylcellulose 1 % ophthalmic solution                Primary Care Provider Office Phone # Fax #    Karely Sierra [phone]       [facility] [address] [facility] [address]        Thank you!     Thank you  for choosing EYE CLINIC  for your care. Our goal is always to provide you with excellent care. Hearing back from our patients is one way we can continue to improve our services. Please take a few minutes to complete the written survey that you may receive in the mail after your visit with us. Thank you!             Your Updated Medication List - Protect others around you: Learn how to safely use, store and throw away your medicines at www.disposemymeds.org.          This list is accurate as of: 6/13/17 11:05 AM.  Always use your most recent med list.                   Brand Name Dispense Instructions for use    acetaminophen 325 MG tablet    TYLENOL    100 tablet    Take 1-2 tablets (325-650 mg) by mouth every 6 hours as needed Max 6 tablets per 24 hours       albuterol 108 (90 BASE) MCG/ACT Inhaler    PROAIR HFA/PROVENTIL HFA/VENTOLIN HFA    1 Inhaler    Inhale 2 puffs into the lungs 4 times daily       alendronate 70 MG tablet    FOSAMAX    12 tablet    Take 1 tablet (70 mg) by mouth every 7 days Take at least 60 min before breakfast with over 8 oz water and stay upright for at least 30 min. after dose.       benzocaine-menthol 6-10 MG lozenge    CHLORASEPTIC    36 lozenge    Place 1 lozenge inside cheek every 2 hours as needed for moderate pain       bisacodyl 5 MG EC tablet    DULCOLAX    30 tablet    Take 1 tablet (5 mg) by mouth daily as needed for constipation       calcium-vitamin D 600-400 MG-UNIT per tablet    calcium 600 + D    60 tablet    Take 1 tablet by mouth 2 times daily       carboxymethylcellul-glycerin 0.5-0.9 % Soln ophthalmic solution    OPTIVE/REFRESH OPTIVE    1 each    Place 1 drop into both eyes 4 times daily       carboxymethylcellulose 1 % ophthalmic solution    CELLUVISC/REFRESH LIQUIGEL    15 each    Place 1 drop into both eyes 4 times daily       cholecalciferol 1000 UNITS capsule    vitamin  -D    180 capsule    Take 2 capsules (2,000 Units) by mouth daily       * cycloSPORINE  modified capsule     240 capsule    Take 4 capsules (100 mg) by mouth every 12 hours       * cycloSPORINE modified 25 MG capsule    GENERIC EQUIVALENT    240 capsule    TAKE 4 CAPSULES BY MOUTH EVERY 12 HOURS       * lidocaine 5 % ointment    XYLOCAINE    70 g    Apply topically 2 times daily Apply to affected area for pain two times daily       * lidocaine 5 % Patch    LIDODERM    30 patch    Apply up to 3 patches to painful area at once for up to 12 h within a 24 h period.  Remove after 12 hours.       melatonin 3 MG tablet     100 tablet    Take 1 tablet (3 mg) by mouth nightly as needed for sleep       Menthol (Topical Analgesic) 4 % Gel     118 mL    Externally apply topically 3 times daily as needed       Menthol 10 MG Lozg     120 lozenge    Take 1 lozenge by mouth every 2 hours as needed       multivitamin, therapeutic with minerals Tabs tablet     100 tablet    Take 1 tablet by mouth daily       * mycophenolate 250 MG capsule    CELLCEPT - GENERIC EQUIVALENT    120 capsule    Take 2 capsules (500 mg) by mouth every 12 hours       * mycophenolate 250 MG capsule    CELLCEPT - GENERIC EQUIVALENT    120 capsule    TAKE TWO CAPSULES BY MOUTH EVERY 12 HOURS       omeprazole 20 MG CR capsule    priLOSEC    180 capsule    Take 1 capsule (20 mg) by mouth 2 times daily       * order for DME     1 Units    Equipment being ordered: Nebulizer with adult mask and tubing       * order for DME     1 Units    Equipment being ordered: cane with padded handle       * order for DME     1 Units    Equipment being ordered: cane       * order for DME     2 Units    Equipment being ordered: compression stockings bilateral Please measure and fit patient for stockings  Strength 15-30mmHg Disp 2 pairs 1 refill over the time of 1 year       * order for DME     1 Units    2-wheeled walker (dx: gait instability and Frequent falls).       * order for DME     1 Units    Equipment being ordered: 4 Wheeled Walker with Seat and Brakes        * order for DME     2 each    Equipment being ordered: Compression stockings below knee bilateral       * order for DME     1 each    Equipment being ordered: Back Stabilizer       * order for DME     2 each    Equipment being ordered: 2 pairs of black stabilizer socks (beige okay if black not available). Size XL.       prednisoLONE acetate 1 % ophthalmic susp    PRED FORTE    1 Bottle    Use in operative eye four times a day  For 4 days       * Psyllium 500 MG Caps     180 capsule    Take 3 capsules by mouth 2 times daily       * REGULOID 0.52 G capsule   Generic drug:  psyllium          senna-docusate 8.6-50 MG per tablet    SENOKOT-S;PERICOLACE    120 tablet    Take 2 tablets by mouth 2 times daily For constipation       simethicone 80 MG chewable tablet    MYLICON    120 tablet    Take 1 tablet (80 mg) by mouth every 6 hours as needed for flatulence or cramping       sodium chloride 0.65 % nasal spray    OCEAN    30 mL    Spray 1 spray into both nostrils daily as needed for congestion       sodium phosphate 7-19 GM/118ML rectal enema     3 Bottle    Place 1 Bottle (1 enema) rectally daily as needed for constipation       STATIN NOT PRESCRIBED (INTENTIONAL)     0 each    Not prescribed       * Notice:  This list has 17 medication(s) that are the same as other medications prescribed for you. Read the directions carefully, and ask your doctor or other care provider to review them with you.

## 2017-06-13 NOTE — NURSING NOTE
Chief Complaints and History of Present Illnesses   Patient presents with     Follow Up For     Post YAG RE     HPI    Affected eye(s):  Right   Symptoms:     No blurred vision   No decreased vision         Do you have eye pain now?:  No      Comments:  Pt states laser did help vision. No pain or discomfort.  Radha Scott COA 9:45 AM June 13, 2017

## 2017-06-16 ENCOUNTER — OFFICE VISIT (OUTPATIENT)
Dept: GASTROENTEROLOGY | Facility: CLINIC | Age: 52
End: 2017-06-16
Attending: INTERNAL MEDICINE
Payer: MEDICARE

## 2017-06-16 VITALS
DIASTOLIC BLOOD PRESSURE: 82 MMHG | BODY MASS INDEX: 39.34 KG/M2 | HEART RATE: 65 BPM | SYSTOLIC BLOOD PRESSURE: 129 MMHG | WEIGHT: 208.2 LBS | TEMPERATURE: 97.6 F

## 2017-06-16 DIAGNOSIS — Z94.4 LIVER REPLACED BY TRANSPLANT (H): Primary | ICD-10-CM

## 2017-06-16 DIAGNOSIS — M72.2 PLANTAR FASCIITIS: ICD-10-CM

## 2017-06-16 DIAGNOSIS — Z94.4 LIVER REPLACED BY TRANSPLANT (H): ICD-10-CM

## 2017-06-16 LAB
ALBUMIN SERPL-MCNC: 3.6 G/DL (ref 3.4–5)
ALP SERPL-CCNC: 86 U/L (ref 40–150)
ALT SERPL W P-5'-P-CCNC: 30 U/L (ref 0–50)
ANION GAP SERPL CALCULATED.3IONS-SCNC: 7 MMOL/L (ref 3–14)
AST SERPL W P-5'-P-CCNC: 25 U/L (ref 0–45)
BILIRUB DIRECT SERPL-MCNC: 0.2 MG/DL (ref 0–0.2)
BILIRUB SERPL-MCNC: 0.6 MG/DL (ref 0.2–1.3)
BUN SERPL-MCNC: 34 MG/DL (ref 7–30)
CALCIUM SERPL-MCNC: 8.6 MG/DL (ref 8.5–10.1)
CHLORIDE SERPL-SCNC: 107 MMOL/L (ref 94–109)
CO2 SERPL-SCNC: 26 MMOL/L (ref 20–32)
CREAT SERPL-MCNC: 1.36 MG/DL (ref 0.52–1.04)
CYCLOSPORINE BLD LC/MS/MS-MCNC: 149 UG/L (ref 50–400)
ERYTHROCYTE [DISTWIDTH] IN BLOOD BY AUTOMATED COUNT: 12.7 % (ref 10–15)
GFR SERPL CREATININE-BSD FRML MDRD: 41 ML/MIN/1.7M2
GLUCOSE SERPL-MCNC: 115 MG/DL (ref 70–99)
HCT VFR BLD AUTO: 37.9 % (ref 35–47)
HGB BLD-MCNC: 12.6 G/DL (ref 11.7–15.7)
MCH RBC QN AUTO: 30.4 PG (ref 26.5–33)
MCHC RBC AUTO-ENTMCNC: 33.2 G/DL (ref 31.5–36.5)
MCV RBC AUTO: 92 FL (ref 78–100)
PLATELET # BLD AUTO: 134 10E9/L (ref 150–450)
POTASSIUM SERPL-SCNC: 4.5 MMOL/L (ref 3.4–5.3)
PROT SERPL-MCNC: 7 G/DL (ref 6.8–8.8)
RBC # BLD AUTO: 4.14 10E12/L (ref 3.8–5.2)
SODIUM SERPL-SCNC: 140 MMOL/L (ref 133–144)
TME LAST DOSE: NORMAL H
WBC # BLD AUTO: 4.7 10E9/L (ref 4–11)

## 2017-06-16 PROCEDURE — 99212 OFFICE O/P EST SF 10 MIN: CPT | Mod: ZF

## 2017-06-16 PROCEDURE — 80048 BASIC METABOLIC PNL TOTAL CA: CPT | Performed by: INTERNAL MEDICINE

## 2017-06-16 PROCEDURE — 80158 DRUG ASSAY CYCLOSPORINE: CPT | Performed by: INTERNAL MEDICINE

## 2017-06-16 PROCEDURE — 85027 COMPLETE CBC AUTOMATED: CPT | Performed by: INTERNAL MEDICINE

## 2017-06-16 PROCEDURE — 36415 COLL VENOUS BLD VENIPUNCTURE: CPT | Performed by: INTERNAL MEDICINE

## 2017-06-16 PROCEDURE — 80076 HEPATIC FUNCTION PANEL: CPT | Performed by: INTERNAL MEDICINE

## 2017-06-16 RX ORDER — LIDOCAINE 50 MG/G
PATCH TOPICAL
Qty: 30 PATCH | Refills: 1 | Status: SHIPPED | OUTPATIENT
Start: 2017-06-16 | End: 2017-08-16

## 2017-06-16 ASSESSMENT — PAIN SCALES - GENERAL: PAINLEVEL: SEVERE PAIN (6)

## 2017-06-16 NOTE — LETTER
6/16/2017       RE: Flor Navarro  [address] [facility] [address]     Dear Colleague,    Thank you for referring your patient, Flor Navarro, to the [facility]OLOG[facility] at [facility]. Please see a copy of my visit note below.    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  She returns for follow up 7 years status post liver transplant for hepatitis C related cirrhosis.  She is negative for the hepatitis C virus and has been since transplant.  he is currently maintained on cyclosporine and mycophenylate.      She is doing well at this visit.  She denies any abdominal pain, itching or skin rash or fatigue.  She denies any increased abdominal girth or lower extremity edema.  She denies any fevers or chills, cough or shortness of breath.  She denies any nausea or vomiting, diarrhea or constipation.  Her appetite is good. She had been successfully losing weight at her last visit with us, but tells me this has been more difficult since then.  She is trying to adhere to a healthy diet but this has become difficult during the month of Ramadan.      Her only complain is left foot pain which has been present since at least Jan after slipping on the ice.  She is seeing her primary re this issue.  She will also be visiting [place] for 5 months leaving November - would like to see us before her trip.    Medications  Current Outpatient Prescriptions   Medication     lidocaine (LIDODERM) 5 % Patch     carboxymethylcellulose (CELLUVISC/REFRESH LIQUIGEL) 1 % ophthalmic solution     prednisoLONE acetate (PRED FORTE) 1 % ophthalmic susp     calcium-vitamin D (CALCIUM 600 + D) 600-400 MG-UNIT per tablet     cholecalciferol (VITAMIN   -D) 1000 UNITS capsule     senna-docusate (SENOKOT-S;PERICOLACE) 8.6-50 MG per tablet     cycloSPORINE modified (GENERIC EQUIVALENT) 25 MG capsule     mycophenolate (CELLCEPT - GENERIC EQUIVALENT) 250 MG capsule     REGULOID 0.52 G capsule     Menthol 10 MG LOZG     lidocaine (LIDODERM) 5 % Patch     Menthol, Topical Analgesic, 4 % GEL     omeprazole (PRILOSEC) 20 MG CR capsule     alendronate (FOSAMAX) 70 MG tablet     sodium chloride (OCEAN) 0.65 % nasal spray     benzocaine-menthol (CHLORASEPTIC) 6-10 MG lozenge     lidocaine (XYLOCAINE) 5 % ointment     mycophenolate (CELLCEPT - GENERIC EQUIVALENT) 250 MG capsule     melatonin 3 MG tablet     order for DME     order for DME     order for DME     order for DME     order for DME     acetaminophen (TYLENOL) 325 MG tablet     albuterol (PROAIR HFA, PROVENTIL HFA, VENTOLIN HFA) 108 (90 BASE) MCG/ACT inhaler     order for DME     order for DME     bisacodyl (DULCOLAX) 5 MG EC tablet     carboxymethylcellul-glycerin (OPTIVE/REFRESH OPTIVE) 0.5-0.9 % SOLN ophthalmic solution     multivitamin, therapeutic with minerals (MULTI-VITAMIN) TABS     Psyllium 500 MG CAPS     sodium phosphate (FLEET ENEMA) 7-19 GM/118ML rectal enema     GENGRAF 25 MG PO CAPSULE     simethicone (MYLICON) 80 MG chewable tablet     STATIN NOT PRESCRIBED, INTENTIONAL,     order for DME     order for DME     [DISCONTINUED] solifenacin (VESICARE) 5 MG tablet     Current Facility-Administered Medications   Medication     apraclonidine (IOPIDINE) 1 % ophthalmic solution 1 drop       Review of systems  A 10-point review of systems was negative.    Examination  /82  Pulse 65  Temp 97.6  F (36.4  C) (Oral)  Wt 208 lb 3.2 oz (94.4 kg)  BMI 39.34 kg/m2  [unfilled]    Gen- well, NAD, A+Ox3, normal color  CVS- RRR  RS- CTA  Abd- soft, nt, nd, +obese, scar  Extr- warm and well perfused, minimal edema of the left ankle - mild pain with palpation  Neuro- grossly intact  Skin- no rash  Psych- normal  mood  Lym- no LAD    Laboratory  Lab Results   Component Value Date     06/16/2017    POTASSIUM 4.5 06/16/2017    CHLORIDE 107 06/16/2017    CO2 26 06/16/2017    BUN 34 06/16/2017    CR 1.36 06/16/2017       Lab Results   Component Value Date    BILITOTAL 0.6 06/16/2017    ALT 30 06/16/2017    AST 25 06/16/2017    ALKPHOS 86 06/16/2017       Lab Results   Component Value Date    WBC 4.7 06/16/2017    HGB 12.6 06/16/2017    MCV 92 06/16/2017     06/16/2017       Lab Results   Component Value Date    INR 1.03 04/04/2016       Radiology  No new imaging    Left foot XRAY:  IMPRESSION: Subtle erosion of the medial aspect of the first  metatarsal head. Otherwise no osseous abnormalities.    Left ankle:  The ankle mortise appears well aligned. There is no evidence  of acute osseous abnormalities. Prominent posterior process of the  Talus.    Assessment  My impression is that Ms. Navarro is doing well 7 years status post liver transplantation for cirrhosis caused by chronic hepatitis C.  She is a sustained responder in terms of her hepatitis C being negative. Her liver function tests are excellent.      Her kidney function is mildly elevated at today's visit.  I suspect this may be related to fasting/dehydration during the month of Ramadan.  I am reassured by her previously normal urine protein excretion.  We will monitor this for now and encourage increase in her fluid intake.      I have also encouraged her to continue to try for weight loss to prevent development of NAFLD.          Will refer to podiatry to discuss orthotics for left foot pain per patient request.    No change in meds. She is up-to-date with regard to cancer screening and vaccines.     RTC 3 months.      Thank you very much for allowing me to participate in the care of this patient.  If you have any questions regarding my recommendations, please do not hesitate to contact me.      Discussed with Dr. Anne who is in agreement with the plan of  care.       Janeth Valdes MD  Hepatology    The patient was seen and examined.  The above assessment and plan was developed jointly with the fellow.      Laron Anne MD      Professor of Medicine  [employer]      Executive Medical Director, Solid Organ Transplant Program  [facility]

## 2017-06-16 NOTE — PROGRESS NOTES
[facility]    Hepatology Follow-up    CC: 6 month follow-up    Dx: Cirrhosis 2/2 hepatitis C, now s/p liver transplant 7 years ago    HPI: I had the pleasure of seeing Flor Navarro for follow-up in the liver transplantation clinic at the [facility].  She returns for follow up 7 years status post liver transplant for hepatitis C related cirrhosis.  She is negative for the hepatitis C virus and has been since transplant.  he is currently maintained on cyclosporine and mycophenylate.      She is doing well at this visit.  She denies any abdominal pain, itching or skin rash or fatigue.  She denies any increased abdominal girth or lower extremity edema.  She denies any fevers or chills, cough or shortness of breath.  She denies any nausea or vomiting, diarrhea or constipation.  Her appetite is good. She had been successfully losing weight at her last visit with us, but tells me this has been more difficult since then.  She is trying to adhere to a healthy diet but this has become difficult during the month of Ramadan.      Her only complain is left foot pain which has been present since at least Jan after slipping on the ice.  She is seeing her primary re this issue.  She will also be visiting [place] for 5 months leaving November - would like to see us before her trip.    Medications  Current Outpatient Prescriptions   Medication     lidocaine (LIDODERM) 5 % Patch     carboxymethylcellulose (CELLUVISC/REFRESH LIQUIGEL) 1 % ophthalmic solution     prednisoLONE acetate (PRED FORTE) 1 % ophthalmic susp     calcium-vitamin D (CALCIUM 600 + D) 600-400 MG-UNIT per tablet     cholecalciferol (VITAMIN  -D) 1000 UNITS capsule     senna-docusate (SENOKOT-S;PERICOLACE) 8.6-50 MG per tablet     cycloSPORINE modified (GENERIC EQUIVALENT) 25 MG capsule     mycophenolate (CELLCEPT - GENERIC EQUIVALENT) 250 MG capsule     REGULOID 0.52 G capsule     Menthol 10 MG LOZG     lidocaine  (LIDODERM) 5 % Patch     Menthol, Topical Analgesic, 4 % GEL     omeprazole (PRILOSEC) 20 MG CR capsule     alendronate (FOSAMAX) 70 MG tablet     sodium chloride (OCEAN) 0.65 % nasal spray     benzocaine-menthol (CHLORASEPTIC) 6-10 MG lozenge     lidocaine (XYLOCAINE) 5 % ointment     mycophenolate (CELLCEPT - GENERIC EQUIVALENT) 250 MG capsule     melatonin 3 MG tablet     order for DME     order for DME     order for DME     order for DME     order for DME     acetaminophen (TYLENOL) 325 MG tablet     albuterol (PROAIR HFA, PROVENTIL HFA, VENTOLIN HFA) 108 (90 BASE) MCG/ACT inhaler     order for DME     order for DME     bisacodyl (DULCOLAX) 5 MG EC tablet     carboxymethylcellul-glycerin (OPTIVE/REFRESH OPTIVE) 0.5-0.9 % SOLN ophthalmic solution     multivitamin, therapeutic with minerals (MULTI-VITAMIN) TABS     Psyllium 500 MG CAPS     sodium phosphate (FLEET ENEMA) 7-19 GM/118ML rectal enema     GENGRAF 25 MG PO CAPSULE     simethicone (MYLICON) 80 MG chewable tablet     STATIN NOT PRESCRIBED, INTENTIONAL,     order for DME     order for DME     [DISCONTINUED] solifenacin (VESICARE) 5 MG tablet     Current Facility-Administered Medications   Medication     apraclonidine (IOPIDINE) 1 % ophthalmic solution 1 drop       Review of systems  A 10-point review of systems was negative.    Examination  /82  Pulse 65  Temp 97.6  F (36.4  C) (Oral)  Wt 208 lb 3.2 oz (94.4 kg)  BMI 39.34 kg/m2  [unfilled]    Gen- well, NAD, A+Ox3, normal color  CVS- RRR  RS- CTA  Abd- soft, nt, nd, +obese, scar  Extr- warm and well perfused, minimal edema of the left ankle - mild pain with palpation  Neuro- grossly intact  Skin- no rash  Psych- normal mood  Lym- no LAD    Laboratory  Lab Results   Component Value Date     06/16/2017    POTASSIUM 4.5 06/16/2017    CHLORIDE 107 06/16/2017    CO2 26 06/16/2017    BUN 34 06/16/2017    CR 1.36 06/16/2017       Lab Results   Component Value Date    BILITOTAL 0.6 06/16/2017     ALT 30 06/16/2017    AST 25 06/16/2017    ALKPHOS 86 06/16/2017       Lab Results   Component Value Date    WBC 4.7 06/16/2017    HGB 12.6 06/16/2017    MCV 92 06/16/2017     06/16/2017       Lab Results   Component Value Date    INR 1.03 04/04/2016       Radiology  No new imaging    Left foot XRAY:  IMPRESSION: Subtle erosion of the medial aspect of the first  metatarsal head. Otherwise no osseous abnormalities.    Left ankle:  The ankle mortise appears well aligned. There is no evidence  of acute osseous abnormalities. Prominent posterior process of the  Talus.    Assessment  My impression is that Ms. Navarro is doing well 7 years status post liver transplantation for cirrhosis caused by chronic hepatitis C.  She is a sustained responder in terms of her hepatitis C being negative. Her liver function tests are excellent.      Her kidney function is mildly elevated at today's visit.  I suspect this may be related to fasting/dehydration during the month of Ramadan.  I am reassured by her previously normal urine protein excretion.  We will monitor this for now and encourage increase in her fluid intake.      I have also encouraged her to continue to try for weight loss to prevent development of NAFLD.          Will refer to podiatry to discuss orthotics for left foot pain per patient request.    No change in meds. She is up-to-date with regard to cancer screening and vaccines.     RTC 3 months.      Thank you very much for allowing me to participate in the care of this patient.  If you have any questions regarding my recommendations, please do not hesitate to contact me.      Discussed with Dr. Anne who is in agreement with the plan of care.       Janeth Valdes MD  Hepatology    The patient was seen and examined.  The above assessment and plan was developed jointly with the fellow.      Laron Anne MD      Professor of Medicine  [employer]      Executive Medical  Director, Solid Organ Transplant Program  [facility]

## 2017-06-16 NOTE — NURSING NOTE
"Chief Complaint   Patient presents with     RECHECK     Follow up liver transplant       Initial /82  Pulse 65  Temp 97.6  F (36.4  C) (Oral)  Wt 94.4 kg (208 lb 3.2 oz)  BMI 39.34 kg/m2 Estimated body mass index is 39.34 kg/(m^2) as calculated from the following:    Height as of 4/12/17: 1.549 m (5' 1\").    Weight as of this encounter: 94.4 kg (208 lb 3.2 oz).  Medication Reconciliation: complete  "

## 2017-06-16 NOTE — MR AVS SNAPSHOT
After Visit Summary   6/16/2017    Flor Navarro    MRN: [id number]           Patient Information     Date Of Birth          1965        Visit Information        Provider Department      6/16/2017 10:00 AM Mackenzie Sloan [facility]; Laron Anne MD [facility] Hepatology        Today's Diagnoses     Plantar fasciitis    -  1       Follow-ups after your visit        Follow-up notes from your care team     Return in about 3 months (around 9/16/2017).      Your next 10 appointments already scheduled     Jun 20, 2017  2:15 PM CDT   Post-Op with Nik Langley MD   [facility] Clinic ([facility])    [address]  [address]  [place] [address]   [phone]            Jun 26, 2017  2:00 PM CDT   (Arrive by 1:45 PM)   New Patient Visit with Enrrique Olivares DPM   [facility] ([facility])    [address]  5th [address]  [place] [address] [phone]            Sep 13, 2017 11:00 AM CDT   Lab with  LAB   [facility] ([facility])    [address]  1st [address]  [place] [address] [phone]            Sep 13, 2017 12:00 PM CDT   (Arrive by 11:45 AM)   Return Liver Transplant with Laron Anne MD   [facility]at[facility] ([facility])    [address]  3rd [address]  [place] [id number] [phone]              Who to contact     If you have questions or need follow up information about today's clinic visit or your schedule please contact [facility] HEPATOLOGY directly at [phone].  Normal or non-critical lab and imaging results will be communicated to you by MyChart, letter or phone within 4 business days after the clinic has received the results. If you do not hear from us within 7 days, please contact the clinic through MyChart or phone. If you have a critical or abnormal lab result, we will notify you by phone as soon as  "possible.  Submit refill requests through [employer] or call your pharmacy and they will forward the refill request to us. Please allow 3 business days for your refill to be completed.          Additional Information About Your Visit        [employer] Information     [employer] lets you send messages to your doctor, view your test results, renew your prescriptions, schedule appointments and more. To sign up, go to [email or web address] . Click on \"Log in\" on the left side of the screen, which will take you to the Welcome page. Then click on \"Sign up Now\" on the right side of the page.     You will be asked to enter the access code listed below, as well as some personal information. Please follow the directions to create your username and password.     Your access code is: [email or web address]  Expires: 9/3/2017  6:31 AM     Your access code will  in 90 days. If you need help or a new code, please call your [place] clinic or [phone].        Care EveryWhere ID     This is your Care EveryWhere ID. This could be used by other organizations to access your [place] medical records  [id number]        Your Vitals Were     Pulse Temperature BMI (Body Mass Index)             65 97.6  F (36.4  C) (Oral) 39.34 kg/m2          Blood Pressure from Last 3 Encounters:   17 129/82   17 117/77   17 111/73    Weight from Last 3 Encounters:   17 94.4 kg (208 lb 3.2 oz)   17 93.7 kg (206 lb 9.6 oz)   17 94.9 kg (209 lb 3.2 oz)              We Performed the Following     OFFICE CONSULTATION,LEVEL IV          Today's Medication Changes          These changes are accurate as of: 17 10:48 AM.  If you have any questions, ask your nurse or doctor.               These medicines have changed or have updated prescriptions.        Dose/Directions    * lidocaine 5 % ointment   Commonly known as:  XYLOCAINE   This may have changed:  Another medication with the same name was added. Make sure you understand how " and when to take each.   Used for:  Chronic left-sided thoracic back pain   Changed by:  Holland Thakkar DO        Apply topically 2 times daily Apply to affected area for pain two times daily   Quantity:  70 g   Refills:  1       * lidocaine 5 % Patch   Commonly known as:  LIDODERM   This may have changed:  Another medication with the same name was added. Make sure you understand how and when to take each.   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  Remove after 12 hours.   Quantity:  30 patch   Refills:  0       * lidocaine 5 % Patch   Commonly known as:  LIDODERM   This may have changed:  You were already taking a medication with the same name, and this prescription was added. Make sure you understand how and when to take each.   Used for:  Plantar fasciitis   Changed by:  Laron Anne MD        Apply up to 3 patches to painful area at once for up to 12 h within a 24 h period.  Remove after 12 hours.   Quantity:  30 patch   Refills:  1       * Notice:  This list has 3 medication(s) that are the same as other medications prescribed for you. Read the directions carefully, and ask your doctor or other care provider to review them with you.         Where to get your medicines      These medications were sent to [address] [address]    Hours:  TRANSPLANT PHONE NUMBER [phone] Phone:  [phone]     lidocaine 5 % Patch                Primary Care Provider Office Phone # Fax #    Karely Sierra [phone]       [facility] [address] [facility] [address]        Thank you!     Thank you for choosing [facility] HEPATOLOGY  for your care. Our goal is always to provide you with excellent care. Hearing back from our patients is one way we can continue to  improve our services. Please take a few minutes to complete the written survey that you may receive in the mail after your visit with us. Thank you!             Your Updated Medication List - Protect others around you: Learn how to safely use, store and throw away your medicines at www.disposemymeds.org.          This list is accurate as of: 6/16/17 10:48 AM.  Always use your most recent med list.                   Brand Name Dispense Instructions for use    acetaminophen 325 MG tablet    TYLENOL    100 tablet    Take 1-2 tablets (325-650 mg) by mouth every 6 hours as needed Max 6 tablets per 24 hours       albuterol 108 (90 BASE) MCG/ACT Inhaler    PROAIR HFA/PROVENTIL HFA/VENTOLIN HFA    1 Inhaler    Inhale 2 puffs into the lungs 4 times daily       alendronate 70 MG tablet    FOSAMAX    12 tablet    Take 1 tablet (70 mg) by mouth every 7 days Take at least 60 min before breakfast with over 8 oz water and stay upright for at least 30 min. after dose.       benzocaine-menthol 6-10 MG lozenge    CHLORASEPTIC    36 lozenge    Place 1 lozenge inside cheek every 2 hours as needed for moderate pain       bisacodyl 5 MG EC tablet    DULCOLAX    30 tablet    Take 1 tablet (5 mg) by mouth daily as needed for constipation       calcium-vitamin D 600-400 MG-UNIT per tablet    calcium 600 + D    60 tablet    Take 1 tablet by mouth 2 times daily       carboxymethylcellul-glycerin 0.5-0.9 % Soln ophthalmic solution    OPTIVE/REFRESH OPTIVE    1 each    Place 1 drop into both eyes 4 times daily       carboxymethylcellulose 1 % ophthalmic solution    CELLUVISC/REFRESH LIQUIGEL    15 each    Place 1 drop into both eyes 4 times daily       cholecalciferol 1000 UNITS capsule    vitamin  -D    180 capsule    Take 2 capsules (2,000 Units) by mouth daily       * cycloSPORINE modified capsule     240 capsule    Take 4 capsules (100 mg) by mouth every 12 hours       * cycloSPORINE modified 25 MG capsule    GENERIC EQUIVALENT    240  capsule    TAKE 4 CAPSULES BY MOUTH EVERY 12 HOURS       * lidocaine 5 % ointment    XYLOCAINE    70 g    Apply topically 2 times daily Apply to affected area for pain two times daily       * lidocaine 5 % Patch    LIDODERM    30 patch    Apply up to 3 patches to painful area at once for up to 12 h within a 24 h period.  Remove after 12 hours.       * lidocaine 5 % Patch    LIDODERM    30 patch    Apply up to 3 patches to painful area at once for up to 12 h within a 24 h period.  Remove after 12 hours.       melatonin 3 MG tablet     100 tablet    Take 1 tablet (3 mg) by mouth nightly as needed for sleep       Menthol (Topical Analgesic) 4 % Gel     118 mL    Externally apply topically 3 times daily as needed       Menthol 10 MG Lozg     120 lozenge    Take 1 lozenge by mouth every 2 hours as needed       multivitamin, therapeutic with minerals Tabs tablet     100 tablet    Take 1 tablet by mouth daily       * mycophenolate 250 MG capsule    CELLCEPT - GENERIC EQUIVALENT    120 capsule    Take 2 capsules (500 mg) by mouth every 12 hours       * mycophenolate 250 MG capsule    CELLCEPT - GENERIC EQUIVALENT    120 capsule    TAKE TWO CAPSULES BY MOUTH EVERY 12 HOURS       omeprazole 20 MG CR capsule    priLOSEC    180 capsule    Take 1 capsule (20 mg) by mouth 2 times daily       * order for DME     1 Units    Equipment being ordered: Nebulizer with adult mask and tubing       * order for DME     1 Units    Equipment being ordered: cane with padded handle       * order for DME     1 Units    Equipment being ordered: cane       * order for DME     2 Units    Equipment being ordered: compression stockings bilateral Please measure and fit patient for stockings  Strength 15-30mmHg Disp 2 pairs 1 refill over the time of 1 year       * order for DME     1 Units    2-wheeled walker (dx: gait instability and Frequent falls).       * order for DME     1 Units    Equipment being ordered: 4 Wheeled Walker with Seat and Brakes        * order for DME     2 each    Equipment being ordered: Compression stockings below knee bilateral       * order for DME     1 each    Equipment being ordered: Back Stabilizer       * order for DME     2 each    Equipment being ordered: 2 pairs of black stabilizer socks (beige okay if black not available). Size XL.       prednisoLONE acetate 1 % ophthalmic susp    PRED FORTE    1 Bottle    Use in operative eye four times a day  For 4 days       * Psyllium 500 MG Caps     180 capsule    Take 3 capsules by mouth 2 times daily       * REGULOID 0.52 G capsule   Generic drug:  psyllium          senna-docusate 8.6-50 MG per tablet    SENOKOT-S;PERICOLACE    120 tablet    Take 2 tablets by mouth 2 times daily For constipation       simethicone 80 MG chewable tablet    MYLICON    120 tablet    Take 1 tablet (80 mg) by mouth every 6 hours as needed for flatulence or cramping       sodium chloride 0.65 % nasal spray    OCEAN    30 mL    Spray 1 spray into both nostrils daily as needed for congestion       sodium phosphate 7-19 GM/118ML rectal enema     3 Bottle    Place 1 Bottle (1 enema) rectally daily as needed for constipation       STATIN NOT PRESCRIBED (INTENTIONAL)     0 each    Not prescribed       * Notice:  This list has 18 medication(s) that are the same as other medications prescribed for you. Read the directions carefully, and ask your doctor or other care provider to review them with you.

## 2017-06-20 ENCOUNTER — OFFICE VISIT (OUTPATIENT)
Dept: OPHTHALMOLOGY | Facility: CLINIC | Age: 52
End: 2017-06-20
Attending: OPHTHALMOLOGY
Payer: MEDICARE

## 2017-06-20 DIAGNOSIS — Z96.1 PSEUDOPHAKIA OF BOTH EYES: ICD-10-CM

## 2017-06-20 DIAGNOSIS — H04.123 DRY EYES, BILATERAL: Primary | ICD-10-CM

## 2017-06-20 DIAGNOSIS — H26.493 POSTERIOR CAPSULAR OPACIFICATION, BILATERAL: ICD-10-CM

## 2017-06-20 PROCEDURE — 92015 DETERMINE REFRACTIVE STATE: CPT | Mod: GY,ZF

## 2017-06-20 PROCEDURE — 99212 OFFICE O/P EST SF 10 MIN: CPT | Mod: ZF

## 2017-06-20 PROCEDURE — T1013 SIGN LANG/ORAL INTERPRETER: HCPCS | Mod: U3,ZF

## 2017-06-20 ASSESSMENT — EXTERNAL EXAM - LEFT EYE: OS_EXAM: NORMAL

## 2017-06-20 ASSESSMENT — VISUAL ACUITY
OS_SC: 20/40
METHOD: SNELLEN - LINEAR
OD_SC: 20/40

## 2017-06-20 ASSESSMENT — TONOMETRY
OD_IOP_MMHG: 17
OS_IOP_MMHG: 14
IOP_METHOD: TONOPEN

## 2017-06-20 ASSESSMENT — CONF VISUAL FIELD
METHOD: COUNTING FINGERS
OS_NORMAL: 1
OD_NORMAL: 1

## 2017-06-20 ASSESSMENT — SLIT LAMP EXAM - LIDS
COMMENTS: NORMAL
COMMENTS: NORMAL

## 2017-06-20 ASSESSMENT — REFRACTION_MANIFEST
OS_SPHERE: -0.25
OS_AXIS: 170
OD_SPHERE: -1.00
OD_AXIS: 035
OS_ADD: +2.25
OD_CYLINDER: +0.50
OD_ADD: +2.25
OS_CYLINDER: +0.50

## 2017-06-20 ASSESSMENT — CUP TO DISC RATIO: OS_RATIO: 0.3

## 2017-06-20 ASSESSMENT — EXTERNAL EXAM - RIGHT EYE: OD_EXAM: NORMAL

## 2017-06-20 NOTE — MR AVS SNAPSHOT
After Visit Summary   6/20/2017    Flor Navarro    MRN: [id number]           Patient Information     Date Of Birth          1965        Visit Information        Provider Department      6/20/2017 2:15 PM Rashida Pandya; [facility]        Today's Diagnoses     Dry eyes, bilateral    -  1    Posterior capsular opacification, bilateral        Pseudophakia of both eyes           Follow-ups after your visit        Follow-up notes from your care team     Return in about 1 year (around 6/20/2018) for general followup.      Your next 10 appointments already scheduled     Jun 26, 2017  1:45 PM CDT   New Patient Visit with Enrrique Olivares DPM   [facility] ([facility])    [address]  5th [address]  [facility] [id number] [phone]            Sep 13, 2017 11:00 AM CDT   Lab with CHRISTIANO [facility]   [facility] ([facility])    [address]  1st [address]  [facility] [id number] [phone]            Sep 13, 2017 12:00 PM CDT   (Arrive by 11:45 AM)   Return Liver Transplant with Laron Anne MD   [facility] ([facility])    [address]  3rd Floor  [facility] [id number] [phone]              Who to contact     Please call your clinic at [phone] to:    Ask questions about your health    Make or cancel appointments    Discuss your medicines    Learn about your test results    Speak to your doctor   If you have compliments or concerns about an experience at your clinic, or if you wish to file a complaint, please contact [facility] Patient Relations at [phone] or email us at [email or web address]         Additional Information About Your Visit        MyChart Information     [employer] [facility] is an electronic gateway that provides easy, online access to your medical records. With [employer] [facility], you can request a clinic  appointment, read your test results, renew a prescription or communicate with your care team.     To sign up for [employer] visit the website at [email or web address]   You will be asked to enter the access code listed below, as well as some personal information. Please follow the directions to create your username and password.     Your access code is: [email or web address]  Expires: 9/3/2017  6:31 AM     Your access code will  in 90 days. If you need help or a new code, please contact your [facility] or call [phone] for assistance.        Care EveryWhere ID     This is your Care EveryWhere ID. This could be used by other organizations to access your [facility] medical records  [id number]         Blood Pressure from Last 3 Encounters:   17 129/82   17 117/77   17 111/73    Weight from Last 3 Encounters:   17 94.4 kg (208 lb 3.2 oz)   17 93.7 kg (206 lb 9.6 oz)   17 94.9 kg (209 lb 3.2 oz)              Today, you had the following     No orders found for display       Primary Care Provider Office Phone # Fax #    Karely Sierra [phone]       [facility]  [address] [address]        Thank you!     Thank you for choosing [facility]  for your care. Our goal is always to provide you with excellent care. Hearing back from our patients is one way we can continue to improve our services. Please take a few minutes to complete the written survey that you may receive in the mail after your visit with us. Thank you!             Your Updated Medication List - Protect others around you: Learn how to safely use, store and throw away your medicines at www.disposemymeds.org.          This list is accurate as of: 17  3:15 PM.  Always use your most recent med list.                   Brand Name Dispense Instructions for use    acetaminophen 325 MG tablet    TYLENOL    100 tablet    Take 1-2 tablets (325-650 mg) by mouth  every 6 hours as needed Max 6 tablets per 24 hours       albuterol 108 (90 BASE) MCG/ACT Inhaler    PROAIR HFA/PROVENTIL HFA/VENTOLIN HFA    1 Inhaler    Inhale 2 puffs into the lungs 4 times daily       alendronate 70 MG tablet    FOSAMAX    12 tablet    Take 1 tablet (70 mg) by mouth every 7 days Take at least 60 min before breakfast with over 8 oz water and stay upright for at least 30 min. after dose.       benzocaine-menthol 6-10 MG lozenge    CHLORASEPTIC    36 lozenge    Place 1 lozenge inside cheek every 2 hours as needed for moderate pain       bisacodyl 5 MG EC tablet    DULCOLAX    30 tablet    Take 1 tablet (5 mg) by mouth daily as needed for constipation       calcium-vitamin D 600-400 MG-UNIT per tablet    calcium 600 + D    60 tablet    Take 1 tablet by mouth 2 times daily       carboxymethylcellul-glycerin 0.5-0.9 % Soln ophthalmic solution    OPTIVE/REFRESH OPTIVE    1 each    Place 1 drop into both eyes 4 times daily       carboxymethylcellulose 1 % ophthalmic solution    CELLUVISC/REFRESH LIQUIGEL    15 each    Place 1 drop into both eyes 4 times daily       cholecalciferol 1000 UNITS capsule    vitamin  -D    180 capsule    Take 2 capsules (2,000 Units) by mouth daily       * cycloSPORINE modified capsule     240 capsule    Take 4 capsules (100 mg) by mouth every 12 hours       * cycloSPORINE modified 25 MG capsule    GENERIC EQUIVALENT    240 capsule    TAKE 4 CAPSULES BY MOUTH EVERY 12 HOURS       * lidocaine 5 % ointment    XYLOCAINE    70 g    Apply topically 2 times daily Apply to affected area for pain two times daily       * lidocaine 5 % Patch    LIDODERM    30 patch    Apply up to 3 patches to painful area at once for up to 12 h within a 24 h period.  Remove after 12 hours.       * lidocaine 5 % Patch    LIDODERM    30 patch    Apply up to 3 patches to painful area at once for up to 12 h within a 24 h period.  Remove after 12 hours.       melatonin 3 MG tablet     100 tablet    Take 1  tablet (3 mg) by mouth nightly as needed for sleep       Menthol (Topical Analgesic) 4 % Gel     118 mL    Externally apply topically 3 times daily as needed       Menthol 10 MG Lozg     120 lozenge    Take 1 lozenge by mouth every 2 hours as needed       multivitamin, therapeutic with minerals Tabs tablet     100 tablet    Take 1 tablet by mouth daily       * mycophenolate 250 MG capsule    CELLCEPT - GENERIC EQUIVALENT    120 capsule    Take 2 capsules (500 mg) by mouth every 12 hours       * mycophenolate 250 MG capsule    CELLCEPT - GENERIC EQUIVALENT    120 capsule    TAKE TWO CAPSULES BY MOUTH EVERY 12 HOURS       omeprazole 20 MG CR capsule    priLOSEC    180 capsule    Take 1 capsule (20 mg) by mouth 2 times daily       * order for DME     1 Units    Equipment being ordered: Nebulizer with adult mask and tubing       * order for DME     1 Units    Equipment being ordered: cane with padded handle       * order for DME     1 Units    Equipment being ordered: cane       * order for DME     2 Units    Equipment being ordered: compression stockings bilateral Please measure and fit patient for stockings  Strength 15-30mmHg Disp 2 pairs 1 refill over the time of 1 year       * order for DME     1 Units    2-wheeled walker (dx: gait instability and Frequent falls).       * order for DME     1 Units    Equipment being ordered: 4 Wheeled Walker with Seat and Brakes       * order for DME     2 each    Equipment being ordered: Compression stockings below knee bilateral       * order for DME     1 each    Equipment being ordered: Back Stabilizer       * order for DME     2 each    Equipment being ordered: 2 pairs of black stabilizer socks (beige okay if black not available). Size XL.       prednisoLONE acetate 1 % ophthalmic susp    PRED FORTE    1 Bottle    Use in operative eye four times a day  For 4 days       * Psyllium 500 MG Caps     180 capsule    Take 3 capsules by mouth 2 times daily       * REGULOID 0.52 G  capsule   Generic drug:  psyllium          senna-docusate 8.6-50 MG per tablet    SENOKOT-S;PERICOLACE    120 tablet    Take 2 tablets by mouth 2 times daily For constipation       simethicone 80 MG chewable tablet    MYLICON    120 tablet    Take 1 tablet (80 mg) by mouth every 6 hours as needed for flatulence or cramping       sodium chloride 0.65 % nasal spray    OCEAN    30 mL    Spray 1 spray into both nostrils daily as needed for congestion       sodium phosphate 7-19 GM/118ML rectal enema     3 Bottle    Place 1 Bottle (1 enema) rectally daily as needed for constipation       STATIN NOT PRESCRIBED (INTENTIONAL)     0 each    Not prescribed       * Notice:  This list has 18 medication(s) that are the same as other medications prescribed for you. Read the directions carefully, and ask your doctor or other care provider to review them with you.

## 2017-06-20 NOTE — PROGRESS NOTES
Flor Valdez is a 51 year old female 2 year s/p cataract extraction with IOL in the right eye.     Now s/p yag cap both eyes   Vision improved    Still occasional dry eyes.Using artifical tears BID-TID.     A&P  Pseudophakia, both eyes    -distance vision 20/40 OU with refraction but patient notes improvement in acuity / function    manifest refraction dispensed          ~~~~~~~~~~~~~~~~~~~~~~~~~~~~~~~~~~~~~~~~~~~~~~~~~~~~~~~~~~~~~~~~    Complete documentation of historical and exam elements from today's encounter can be found in the full encounter summary report (not reduplicated in this progress note). I personally obtained the chief complaint(s) and history of present illness.  I confirmed and edited as necessary the review of systems, past medical/surgical history, family history, social history, and examination findings as documented by others.  I examined the patient myself, and I personally reviewed the relevant tests, images, and reports as documented above. I formulated and edited as necessary the assessment and plan and discussed the findings and management plan with the patient and family.     Nik Langley MD, MA  Director, Cornea & Anterior Segment  [facility] Department of Ophthalmology & Visual Neuroscience

## 2017-06-20 NOTE — NURSING NOTE
Chief Complaints and History of Present Illnesses   Patient presents with     Post Op (Ophthalmology) Left Eye     7 day post op Yage LE.     HPI    Affected eye(s):  Left   Symptoms:     (Comment: Vision is good RE and improved LE.)   Floaters (Comment: Floaters was in RE now is gone. Has had Floaters LE last 7 days, getting les.)   No flashes   No redness   Tearing (Comment: sometimes.)   Itching (Comment: maybe a little, not much.)         Do you have eye pain now?:  No      Comments:  7 day post op Yage LE. Wants new Rx today. Lids stick sometimes BE.    Varghese Armas [facility]  2:30 PM06/20/2017

## 2017-06-23 ENCOUNTER — APPOINTMENT (OUTPATIENT)
Dept: OPTOMETRY | Facility: CLINIC | Age: 52
End: 2017-06-23
Payer: MEDICARE

## 2017-06-23 PROCEDURE — 92340 FIT SPECTACLES MONOFOCAL: CPT | Performed by: OPTOMETRIST

## 2017-06-26 ENCOUNTER — OFFICE VISIT (OUTPATIENT)
Dept: ORTHOPEDICS | Facility: CLINIC | Age: 52
End: 2017-06-26

## 2017-06-26 VITALS — WEIGHT: 208 LBS | BODY MASS INDEX: 39.27 KG/M2 | HEIGHT: 61 IN

## 2017-06-26 DIAGNOSIS — M76.72 PERONEAL TENDONITIS, LEFT: Primary | ICD-10-CM

## 2017-06-26 NOTE — PROGRESS NOTES
Subjective:   Flor Navarro is a 51 year old female who presents today for left ankle pain. She relates that she has seen Dr. Drew in the past. I reviewed his note. He has previously diagnosed her with peroneal tendonitis. She was rx'ed a CAM and PT. She relates that the pain did decrease somewhat in the CAM, however it was too heavy for her to wear. She did start PT and they were coming to her house, howver she relates that this did not help much with the pain. She has a sister who had similar pain and had a pair of custom shoes made for her,and she would like a pair of these today. She has had a pair of orthotics, but she does not wear these much because she doesn't think they help much.     Background:      Duration of symptoms: 1 years  Mechanism of Injury: Chronic  Intensity: 5/10 at rest, 10/10 with activity   Aggravating factors: walking  Relieving Factors: rest  Prior Evaluation: Prior Physician Evalutation: Dr. Drew and her PCP.     PAST MEDICAL, SOCIAL, SURGICAL AND FAMILY HISTORY: She  has a past medical history of Anemia in CKD (chronic kidney disease); Cataract; CKD (chronic kidney disease) stage 3, GFR 30-59 ml/min; Hepatitis C; High risk medication use; Hypertension, renal; Immunosuppressed status (H); Liver replaced by transplant (H) (); Osteoporosis; Recurrent pregnancy loss without current pregnancy; Stroke, hemorrhagic (H) (); Syncope; and Unspecified viral hepatitis C without hepatic coma.  She  has a past surgical history that includes Insert shunt portal transjugular intraheptic ();  section; Incisional Hernia Repair (2004); Upper GI Endoscopy with Band Ligation of Esoph/Gastric Varic. .; Transplant liver recipient  donor (10/26/10); Laparoscopic salpingo-oophorectomy; Neck surgery (); and cataract iol, rt/lt (Right, 2/18/15).  Her family history includes CEREBROVASCULAR DISEASE in an other family member; Hepatitis in an other family member. There  "is no history of CANCER, DIABETES, Hypertension, Thyroid Disease, Glaucoma, or Macular Degeneration.  She reports that she has never smoked. She has never used smokeless tobacco. She reports that she does not drink alcohol or use illicit drugs.    ALLERGIES: She is allergic to aspirin; clarithromycin; contrast dye; and penicillins.    CURRENT MEDICATIONS: She has a current medication list which includes the following prescription(s): lidocaine, carboxymethylcellulose, prednisolone acetate, calcium-vitamin d, cholecalciferol, senna-docusate, cyclosporine modified, mycophenolate, reguloid, menthol, lidocaine, menthol (topical analgesic), omeprazole, alendronate, sodium chloride, benzocaine-menthol, lidocaine, mycophenolate, melatonin, order for dme, order for dme, order for dme, order for dme, order for dme, acetaminophen, albuterol, order for dme, order for dme, bisacodyl, carboxymethylcellul-glycerin, multivitamin, therapeutic with minerals, psyllium, sodium phosphate, cyclosporine modified, simethicone, STATIN NOT PRESCRIBED, INTENTIONAL,, order for dme, and order for dme, and the following Facility-Administered Medications: apraclonidine.     REVIEW OF SYSTEMS: 9 point review of systems is negative except as noted above.     Exam:   Ht 5' 1\" (1.549 m)  Wt 208 lb (94.3 kg)  BMI 39.3 kg/m2           CONSTITUTIONAL: healthy, alert and no distress  HEAD: Normocephalic. No masses, lesions, tenderness or abnormalities  SKIN: no suspicious lesions or rashes  GAIT: normal  NEUROLOGIC: Non-focal  PSYCHIATRIC: affect normal/bright and mentation appears normal.    MUSCULOSKELETAL: There is pain noted with palpation of the courses of the PT and peroneal tendons. Pain also noted along the long extensor tendons on the anterior ankle. MMT 2/5 secondary to guarding. No pain along the courses of the Achilles tendon or with calcaneal squeeze. Pain noted at the origin of the plantar fascia and into the arch plantarly. No pain with " active or passive ROM of the lesser digits.        Assessment/Plan:   PT and peroneal tendonitis - chronic    - Pt seen and evaluated.  - She has tried multiple interventions. The CAM worked, however it was too big. I recommend an ankle brace today and she agrees. If this will help stabilize her foot for a few weeks and reduce her pain, I think other interventions, like PT and orthotics, will be more beneficial.   - Wear brace daily while ambulating.   - See again in 6-8 weeks to assess.

## 2017-06-26 NOTE — MR AVS SNAPSHOT
After Visit Summary   6/26/2017    Flor Navarro    MRN: [id number]           Patient Information     Date Of Birth          1965        Visit Information        Provider Department      6/26/2017 1:45 PM Janeen Johnson Aaron Daniel, DPM [facility]         Follow-ups after your visit        Your next 10 appointments already scheduled     Aug 28, 2017  1:00 PM CDT   (Arrive by 12:45 PM)   Return Visit with Enrrique Olivares DPM   [facility] ([facility])    [address] [place] [address]  5th Floor  [facility] [id number] [phone]            Sep 13, 2017 11:00 AM CDT   Lab with  LAB   [facility] ([facility])    [address] [place] [address]  1st Floor  [facility] [id number] [phone]            Sep 13, 2017 12:00 PM CDT   (Arrive by 11:45 AM)   Return Liver Transplant with Laron Anne MD   [facility] ([facility])    [address] [place] [address]  3rd Floor  [facility] [id number] [phone]              Who to contact     Please call your clinic at [phone] to:    Ask questions about your health    Make or cancel appointments    Discuss your medicines    Learn about your test results    Speak to your doctor   If you have compliments or concerns about an experience at your clinic, or if you wish to file a complaint, please contact [facility] Patient Relations at [phone] or email us at [email or web address]         Additional Information About Your Visit        MyChart Information     Hi-Midia is an electronic gateway that provides easy, online access to your medical records. With Hi-Midia, you can request a clinic appointment, read your test results, renew a prescription or communicate with your care team.     To sign up for Hi-Midia visit the website at [email or web address]   You will be asked to enter the access code listed  "below, as well as some personal information. Please follow the directions to create your username and password.     Your access code is: [email or web address]  Expires: 9/3/2017  6:31 AM     Your access code will  in 90 days. If you need help or a new code, please contact your [facility] or call [phone] for assistance.        Care EveryWhere ID     This is your Care EveryWhere ID. This could be used by other organizations to access your [facility] medical records  [id number]        Your Vitals Were     Height BMI (Body Mass Index)                5' 1\" (1.549 m) 39.3 kg/m2           Blood Pressure from Last 3 Encounters:   17 129/82   17 117/77   17 111/73    Weight from Last 3 Encounters:   17 208 lb (94.3 kg)   17 208 lb 3.2 oz (94.4 kg)   17 206 lb 9.6 oz (93.7 kg)              Today, you had the following     No orders found for display       Primary Care Provider Office Phone # Fax #    Karely Sierra [phone]       [facility]  [place] [address]        Equal Access to Services     MAURO ALBA AH: Hadii florecita ku hadasho Soudayali, waaxda luqadaha, qaybta kaalmada adeegyada, mart piña. So [facility] [phone].    ATENCIÓN: Si habla español, tiene a garcia disposición servicios gratuitos de asistencia lingüística. Llame al [phone].    We comply with applicable federal civil rights laws and [place] laws. We do not discriminate on the basis of race, color, national origin, age, disability sex, sexual orientation or gender identity.            Thank you!     Thank you for choosing [facility]  for your care. Our goal is always to provide you with excellent care. Hearing back from our patients is one way we can continue to improve our services. Please take a few minutes to complete the written survey that you may receive in the mail after your visit with us. Thank " you!             Your Updated Medication List - Protect others around you: Learn how to safely use, store and throw away your medicines at www.disposemymeds.org.          This list is accurate as of: 6/26/17  2:11 PM.  Always use your most recent med list.                   Brand Name Dispense Instructions for use Diagnosis    acetaminophen 325 MG tablet    TYLENOL    100 tablet    Take 1-2 tablets (325-650 mg) by mouth every 6 hours as needed Max 6 tablets per 24 hours    Sebaceous cyst       albuterol 108 (90 BASE) MCG/ACT Inhaler    PROAIR HFA/PROVENTIL HFA/VENTOLIN HFA    1 Inhaler    Inhale 2 puffs into the lungs 4 times daily    Acute bronchitis, unspecified organism       alendronate 70 MG tablet    FOSAMAX    12 tablet    Take 1 tablet (70 mg) by mouth every 7 days Take at least 60 min before breakfast with over 8 oz water and stay upright for at least 30 min. after dose.    Osteoporosis       benzocaine-menthol 6-10 MG lozenge    CHLORASEPTIC    36 lozenge    Place 1 lozenge inside cheek every 2 hours as needed for moderate pain    Throat pain       bisacodyl 5 MG EC tablet    DULCOLAX    30 tablet    Take 1 tablet (5 mg) by mouth daily as needed for constipation    Constipation, chronic       calcium-vitamin D 600-400 MG-UNIT per tablet    calcium 600 + D    60 tablet    Take 1 tablet by mouth 2 times daily    Osteoporosis       carboxymethylcellul-glycerin 0.5-0.9 % Soln ophthalmic solution    OPTIVE/REFRESH OPTIVE    1 each    Place 1 drop into both eyes 4 times daily    Dry eye syndrome of bilateral lacrimal glands       carboxymethylcellulose 1 % ophthalmic solution    CELLUVISC/REFRESH LIQUIGEL    15 each    Place 1 drop into both eyes 4 times daily    Dry eyes, bilateral       cholecalciferol 1000 UNITS capsule    vitamin  -D    180 capsule    Take 2 capsules (2,000 Units) by mouth daily    Osteoporosis, Liver replaced by transplant (H), Vitamin D deficiency, Secondary hyperparathyroidism (H), CKD  (chronic kidney disease) stage 3, GFR 30-59 ml/min, Palpitations       * cycloSPORINE modified capsule     240 capsule    Take 4 capsules (100 mg) by mouth every 12 hours    Liver replaced by transplant (H)       * cycloSPORINE modified 25 MG capsule    GENERIC EQUIVALENT    240 capsule    TAKE 4 CAPSULES BY MOUTH EVERY 12 HOURS    Liver replaced by transplant (H)       * lidocaine 5 % ointment    XYLOCAINE    70 g    Apply topically 2 times daily Apply to affected area for pain two times daily    Chronic left-sided thoracic back pain       * lidocaine 5 % Patch    LIDODERM    30 patch    Apply up to 3 patches to painful area at once for up to 12 h within a 24 h period.  Remove after 12 hours.    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  Remove after 12 hours.    Plantar fasciitis       melatonin 3 MG tablet     100 tablet    Take 1 tablet (3 mg) by mouth nightly as needed for sleep    Primary insomnia       Menthol (Topical Analgesic) 4 % Gel     118 mL    Externally apply topically 3 times daily as needed    Chronic left-sided thoracic back pain, Strain of trapezius muscle, left, initial encounter       Menthol 10 MG Lozg     120 lozenge    Take 1 lozenge by mouth every 2 hours as needed    Cough       multivitamin, therapeutic with minerals Tabs tablet     100 tablet    Take 1 tablet by mouth daily    Liver replaced by transplant (H)       * mycophenolate 250 MG capsule    CELLCEPT - GENERIC EQUIVALENT    120 capsule    Take 2 capsules (500 mg) by mouth every 12 hours    Liver replaced by transplant (H)       * mycophenolate 250 MG capsule    CELLCEPT - GENERIC EQUIVALENT    120 capsule    TAKE TWO CAPSULES BY MOUTH EVERY 12 HOURS    Liver replaced by transplant (H)       omeprazole 20 MG CR capsule    priLOSEC    180 capsule    Take 1 capsule (20 mg) by mouth 2 times daily     Gastroesophageal reflux disease, esophagitis presence not specified       * order for DME     1 Units    Equipment being ordered: Nebulizer with adult mask and tubing    Acute bronchitis, unspecified organism       * order for DME     1 Units    Equipment being ordered: cane with padded handle    Gait instability       * order for DME     1 Units    Equipment being ordered: cane    Osteoporosis, Falls frequently       * order for DME     2 Units    Equipment being ordered: compression stockings bilateral Please measure and fit patient for stockings  Strength 15-30mmHg Disp 2 pairs 1 refill over the time of 1 year    Localized edema       * order for DME     1 Units    2-wheeled walker (dx: gait instability and Frequent falls).    Gait instability, Falls frequently       * order for DME     1 Units    Equipment being ordered: 4 Wheeled Walker with Seat and Brakes    Falls frequently, Gait instability       * order for DME     2 each    Equipment being ordered: Compression stockings below knee bilateral    Bilateral edema of lower extremity       * order for DME     1 each    Equipment being ordered: Back Stabilizer    Chronic midline low back pain without sciatica       * order for DME     2 each    Equipment being ordered: 2 pairs of black stabilizer socks (beige okay if black not available). Size XL.    Bilateral edema of lower extremity       prednisoLONE acetate 1 % ophthalmic susp    PRED FORTE    1 Bottle    Use in operative eye four times a day  For 4 days    Posterior capsular opacification visually significant of both eyes       * Psyllium 500 MG Caps     180 capsule    Take 3 capsules by mouth 2 times daily    Constipation, chronic       * REGULOID 0.52 G capsule   Generic drug:  psyllium           senna-docusate 8.6-50 MG per tablet    SENOKOT-S;PERICOLACE    120 tablet    Take 2 tablets by mouth 2 times daily For constipation    Constipation, chronic       simethicone 80 MG chewable tablet    MYLICON     120 tablet    Take 1 tablet (80 mg) by mouth every 6 hours as needed for flatulence or cramping    Flatulence, eructation, and gas pain       sodium chloride 0.65 % nasal spray    OCEAN    30 mL    Spray 1 spray into both nostrils daily as needed for congestion    Throat pain       sodium phosphate 7-19 GM/118ML rectal enema     3 Bottle    Place 1 Bottle (1 enema) rectally daily as needed for constipation    Ileus (H)       STATIN NOT PRESCRIBED (INTENTIONAL)     0 each    Not prescribed    High risk medication use       * Notice:  This list has 18 medication(s) that are the same as other medications prescribed for you. Read the directions carefully, and ask your doctor or other care provider to review them with you.

## 2017-06-26 NOTE — LETTER
2017      RE: Flor Navarro  [address] [facility] [address]        Subjective:   Flor Navarro is a 51 year old female who presents today for left ankle pain. She relates that she has seen Dr. Drew in the past. I reviewed his note. He has previously diagnosed her with peroneal tendonitis. She was rx'ed a CAM and PT. She relates that the pain did decrease somewhat in the CAM, however it was too heavy for her to wear. She did start PT and they were coming to her house, howver she relates that this did not help much with the pain. She has a sister who had similar pain and had a pair of custom shoes made for her,and she would like a pair of these today. She has had a pair of orthotics, but she does not wear these much because she doesn't think they help much.     Background:      Duration of symptoms: 1 years  Mechanism of Injury: Chronic  Intensity: 5/10 at rest, 10/10 with activity   Aggravating factors: walking  Relieving Factors: rest  Prior Evaluation: Prior Physician Evalutation: Dr. Drew and her PCP.     PAST MEDICAL, SOCIAL, SURGICAL AND FAMILY HISTORY: She  has a past medical history of Anemia in CKD (chronic kidney disease); Cataract; CKD (chronic kidney disease) stage 3, GFR 30-59 ml/min; Hepatitis C; High risk medication use; Hypertension, renal; Immunosuppressed status (H); Liver replaced by transplant (H) (); Osteoporosis; Recurrent pregnancy loss without current pregnancy; Stroke, hemorrhagic (H) (); Syncope; and Unspecified viral hepatitis C without hepatic coma.  She  has a past surgical history that includes Insert shunt portal transjugular intraheptic ();  section; Incisional Hernia Repair (2004); Upper GI Endoscopy with Band Ligation of Esoph/Gastric Varic. .; Transplant liver recipient  donor (10/26/10); Laparoscopic salpingo-oophorectomy; Neck surgery (); and cataract iol, rt/lt (Right, 2/18/15).  Her family history includes  "CEREBROVASCULAR DISEASE in an other family member; Hepatitis in an other family member. There is no history of CANCER, DIABETES, Hypertension, Thyroid Disease, Glaucoma, or Macular Degeneration.  She reports that she has never smoked. She has never used smokeless tobacco. She reports that she does not drink alcohol or use illicit drugs.    ALLERGIES: She is allergic to aspirin; clarithromycin; contrast dye; and penicillins.    CURRENT MEDICATIONS: She has a current medication list which includes the following prescription(s): lidocaine, carboxymethylcellulose, prednisolone acetate, calcium-vitamin d, cholecalciferol, senna-docusate, cyclosporine modified, mycophenolate, reguloid, menthol, lidocaine, menthol (topical analgesic), omeprazole, alendronate, sodium chloride, benzocaine-menthol, lidocaine, mycophenolate, melatonin, order for dme, order for dme, order for dme, order for dme, order for dme, acetaminophen, albuterol, order for dme, order for dme, bisacodyl, carboxymethylcellul-glycerin, multivitamin, therapeutic with minerals, psyllium, sodium phosphate, cyclosporine modified, simethicone, STATIN NOT PRESCRIBED, INTENTIONAL,, order for dme, and order for dme, and the following Facility-Administered Medications: apraclonidine.     REVIEW OF SYSTEMS: 9 point review of systems is negative except as noted above.     Exam:   Ht 5' 1\" (1.549 m)  Wt 208 lb (94.3 kg)  BMI 39.3 kg/m2           CONSTITUTIONAL: healthy, alert and no distress  HEAD: Normocephalic. No masses, lesions, tenderness or abnormalities  SKIN: no suspicious lesions or rashes  GAIT: normal  NEUROLOGIC: Non-focal  PSYCHIATRIC: affect normal/bright and mentation appears normal.    MUSCULOSKELETAL: There is pain noted with palpation of the courses of the PT and peroneal tendons. Pain also noted along the long extensor tendons on the anterior ankle. MMT 2/5 secondary to guarding. No pain along the courses of the Achilles tendon or with calcaneal " squeeze. Pain noted at the origin of the plantar fascia and into the arch plantarly. No pain with active or passive ROM of the lesser digits.        Assessment/Plan:   PT and peroneal tendonitis - chronic    - Pt seen and evaluated.  - She has tried multiple interventions. The CAM worked, however it was too big. I recommend an ankle brace today and she agrees. If this will help stabilize her foot for a few weeks and reduce her pain, I think other interventions, like PT and orthotics, will be more beneficial.   - Wear brace daily while ambulating.   - See again in 6-8 weeks to assess.     Enrrique Olivares DPM

## 2017-06-28 ENCOUNTER — DOCUMENTATION ONLY (OUTPATIENT)
Dept: FAMILY MEDICINE | Facility: CLINIC | Age: 52
End: 2017-06-28

## 2017-06-28 NOTE — PROGRESS NOTES
"When opening a documentation only encounter, be sure to enter in \"Chief Complaint\" Forms and in \" Comments\" Title of form, description if needed.    Flor is a 51 year old  female  Form received via: Fax  Form now resides in: Provider Ready    Nicole Winkler MA      Form has been completed by provider.     Form sent out via: Fax to [employer] at Fax Number: [phone]  Patient informed: No, Reason:VIA FAX  Output date: August 1, 2017    Nicole Winkler MA      **Please close the encounter**      "

## 2017-07-05 ENCOUNTER — TELEPHONE (OUTPATIENT)
Dept: FAMILY MEDICINE | Facility: CLINIC | Age: 52
End: 2017-07-05

## 2017-07-05 NOTE — TELEPHONE ENCOUNTER
[facility] phone call message - order or referral request for patient:     Order or referral being requested: Skilled nursing 1x/week for 9 weeks and 4 PRN    Additional Comments: none    OK to leave a message on voice mail? Yes    Primary language: Syriac      needed? Yes    Call taken on July 5, 2017 at 2:49 PM by Nadia Hernandez

## 2017-07-07 ENCOUNTER — MEDICAL CORRESPONDENCE (OUTPATIENT)
Dept: HEALTH INFORMATION MANAGEMENT | Facility: CLINIC | Age: 52
End: 2017-07-07

## 2017-07-11 DIAGNOSIS — Z94.4 LIVER REPLACED BY TRANSPLANT (H): ICD-10-CM

## 2017-07-11 LAB
ALBUMIN SERPL-MCNC: 3.6 G/DL (ref 3.4–5)
ALP SERPL-CCNC: 89 U/L (ref 40–150)
ALT SERPL W P-5'-P-CCNC: 28 U/L (ref 0–50)
ANION GAP SERPL CALCULATED.3IONS-SCNC: 7 MMOL/L (ref 3–14)
AST SERPL W P-5'-P-CCNC: 22 U/L (ref 0–45)
BILIRUB DIRECT SERPL-MCNC: 0.2 MG/DL (ref 0–0.2)
BILIRUB SERPL-MCNC: 1 MG/DL (ref 0.2–1.3)
BUN SERPL-MCNC: 33 MG/DL (ref 7–30)
CALCIUM SERPL-MCNC: 8.3 MG/DL (ref 8.5–10.1)
CHLORIDE SERPL-SCNC: 106 MMOL/L (ref 94–109)
CO2 SERPL-SCNC: 25 MMOL/L (ref 20–32)
CREAT SERPL-MCNC: 1.28 MG/DL (ref 0.52–1.04)
CYCLOSPORINE BLD LC/MS/MS-MCNC: 113 UG/L (ref 50–400)
ERYTHROCYTE [DISTWIDTH] IN BLOOD BY AUTOMATED COUNT: 12.5 % (ref 10–15)
GFR SERPL CREATININE-BSD FRML MDRD: 44 ML/MIN/1.7M2
GLUCOSE SERPL-MCNC: 89 MG/DL (ref 70–99)
HCT VFR BLD AUTO: 37.8 % (ref 35–47)
HGB BLD-MCNC: 12.4 G/DL (ref 11.7–15.7)
MCH RBC QN AUTO: 30.3 PG (ref 26.5–33)
MCHC RBC AUTO-ENTMCNC: 32.8 G/DL (ref 31.5–36.5)
MCV RBC AUTO: 92 FL (ref 78–100)
PLATELET # BLD AUTO: 141 10E9/L (ref 150–450)
POTASSIUM SERPL-SCNC: 4.9 MMOL/L (ref 3.4–5.3)
PROT SERPL-MCNC: 7.4 G/DL (ref 6.8–8.8)
RBC # BLD AUTO: 4.09 10E12/L (ref 3.8–5.2)
SODIUM SERPL-SCNC: 138 MMOL/L (ref 133–144)
TME LAST DOSE: 2300 H
WBC # BLD AUTO: 4.1 10E9/L (ref 4–11)

## 2017-07-11 PROCEDURE — 80158 DRUG ASSAY CYCLOSPORINE: CPT | Performed by: INTERNAL MEDICINE

## 2017-07-14 ENCOUNTER — DOCUMENTATION ONLY (OUTPATIENT)
Dept: FAMILY MEDICINE | Facility: CLINIC | Age: 52
End: 2017-07-14

## 2017-07-14 NOTE — PROGRESS NOTES
"When opening a documentation only encounter, be sure to enter in \"Chief Complaint\" Forms and in \" Comments\" Title of form, description if needed.    Flor is a 51 year old  female  Form received via: Fax  Form now resides in: Provider Ready    Nicole Winkler MA      Form has been completed by provider.     Form sent out via: Fax to [employer] at Fax Number: [phone]  Patient informed: No, Reason:VIA FAX  Output date: August 1, 2017    Nicole Winkler MA      **Please close the encounter**      "

## 2017-07-20 DIAGNOSIS — K59.00 CONSTIPATION, UNSPECIFIED CONSTIPATION TYPE: Primary | ICD-10-CM

## 2017-07-20 DIAGNOSIS — L72.3 SEBACEOUS CYST: ICD-10-CM

## 2017-07-20 DIAGNOSIS — H26.493 POSTERIOR CAPSULAR OPACIFICATION VISUALLY SIGNIFICANT OF BOTH EYES: ICD-10-CM

## 2017-07-20 RX ORDER — ACETAMINOPHEN 325 MG/1
325-650 TABLET ORAL EVERY 6 HOURS PRN
Qty: 100 TABLET | Refills: 3 | Status: SHIPPED | OUTPATIENT
Start: 2017-07-20 | End: 2017-12-28

## 2017-07-20 RX ORDER — PSYLLIUM HUSK 0.4 G
1 CAPSULE ORAL DAILY
Qty: 540 CAPSULE | Refills: 0 | Status: SHIPPED | OUTPATIENT
Start: 2017-07-20 | End: 2017-07-25

## 2017-07-20 NOTE — TELEPHONE ENCOUNTER
Request for medication refill:    Date of last visit at clinic: 4/12/17    Please complete refill if appropriate and CLOSE ENCOUNTER.    Closing the encounter signifies the refill is complete.    If refill has been denied, please complete the smart phrase .smirefuse and route it to the [facility] RN TRIAGE pool to inform the patient and the pharmacy.    Sally Ann RN

## 2017-07-20 NOTE — TELEPHONE ENCOUNTER
Drug name: certavite/antioxidants  Last fill date:6/21/17  Qty:130    Drug name: reguloid  Last fill date:6/22/17  Qty:200    Drug name: acetaminophen  Last fill date:4/26/17  Qty:100    Drug name: aspirin 81mg  Last fill date:n/a  Qty:120

## 2017-07-21 RX ORDER — PREDNISOLONE ACETATE 10 MG/ML
SUSPENSION/ DROPS OPHTHALMIC
Qty: 1 BOTTLE | Refills: 0 | OUTPATIENT
Start: 2017-07-21

## 2017-07-24 ENCOUNTER — TELEPHONE (OUTPATIENT)
Dept: FAMILY MEDICINE | Facility: CLINIC | Age: 52
End: 2017-07-24

## 2017-07-24 DIAGNOSIS — Z94.4 LIVER REPLACED BY TRANSPLANT (H): ICD-10-CM

## 2017-07-24 DIAGNOSIS — K59.00 CONSTIPATION, UNSPECIFIED CONSTIPATION TYPE: ICD-10-CM

## 2017-07-24 NOTE — TELEPHONE ENCOUNTER
[facility] phone call message- medication clarification/question:    Full Medication Name:  REGULOID 0.52 G capsule    Question:  Per pharmacy, above medication has change since last time patient was on it. Patient was previously on 3 capsule twice daily which is now changed to 1 capsule once a day. Please the pharmacy for clarification.    Pharmacy confirmed as      [facility] - [place], [place] - [facility] [address]  OK to leave a message on voice mail? Yes    Primary language: Lithuanian      needed? Yes    Call taken on July 24, 2017 at 10:21 AM by Sandrita Roblero

## 2017-07-24 NOTE — TELEPHONE ENCOUNTER
Patient is looking for refillson aspirin  81mg and certavite vitamins I dont see on pt medication list   is pt still taking?    Thank you    Emma Martinez   [facility]  [phone]

## 2017-07-24 NOTE — TELEPHONE ENCOUNTER
Per chart review, multivitamin still on active med list. Aspirin was discontinued on 16    Message routed to PCP to review and advise if patient should still be taking aspirin. Please refill if appropriate. Please also refill multivitamin as script has .    Sally Ann RN

## 2017-07-25 RX ORDER — PSYLLIUM HUSK 0.4 G
3 CAPSULE ORAL 2 TIMES DAILY
Qty: 540 CAPSULE | Refills: 3 | Status: SHIPPED | OUTPATIENT
Start: 2017-07-25 | End: 2017-10-11

## 2017-07-25 RX ORDER — MULTIPLE VITAMINS W/ MINERALS TAB 9MG-400MCG
1 TAB ORAL DAILY
Qty: 100 TABLET | Refills: 11 | Status: SHIPPED | OUTPATIENT
Start: 2017-07-25 | End: 2018-05-18

## 2017-07-25 NOTE — TELEPHONE ENCOUNTER
NO ASA as has been documented in telephone notes, pharmacy encounters and clinic encounters at least 4 times this year!

## 2017-07-27 ENCOUNTER — APPOINTMENT (OUTPATIENT)
Dept: GENERAL RADIOLOGY | Facility: CLINIC | Age: 52
End: 2017-07-27
Payer: MEDICARE

## 2017-07-27 ENCOUNTER — HOSPITAL ENCOUNTER (EMERGENCY)
Facility: CLINIC | Age: 52
Discharge: HOME | End: 2017-07-27
Attending: EMERGENCY MEDICINE | Admitting: EMERGENCY MEDICINE
Payer: MEDICARE

## 2017-07-27 ENCOUNTER — APPOINTMENT (OUTPATIENT)
Dept: CT IMAGING | Facility: CLINIC | Age: 52
End: 2017-07-27
Attending: EMERGENCY MEDICINE
Payer: MEDICARE

## 2017-07-27 ENCOUNTER — APPOINTMENT (OUTPATIENT)
Dept: MRI IMAGING | Facility: CLINIC | Age: 52
End: 2017-07-27
Attending: EMERGENCY MEDICINE
Payer: MEDICARE

## 2017-07-27 VITALS
RESPIRATION RATE: 16 BRPM | OXYGEN SATURATION: 96 % | SYSTOLIC BLOOD PRESSURE: 147 MMHG | TEMPERATURE: 98.3 F | WEIGHT: 210.3 LBS | HEIGHT: 61 IN | DIASTOLIC BLOOD PRESSURE: 99 MMHG | HEART RATE: 78 BPM | BODY MASS INDEX: 39.7 KG/M2

## 2017-07-27 DIAGNOSIS — N30.01 ACUTE CYSTITIS WITH HEMATURIA: ICD-10-CM

## 2017-07-27 LAB
ALBUMIN SERPL-MCNC: 3.6 G/DL (ref 3.4–5)
ALBUMIN UR-MCNC: NEGATIVE MG/DL
ALP SERPL-CCNC: 76 U/L (ref 40–150)
ALT SERPL W P-5'-P-CCNC: 29 U/L (ref 0–50)
AMMONIA PLAS-SCNC: 21 UMOL/L (ref 10–50)
AMMONIA PLAS-SCNC: NORMAL UMOL/L (ref 10–50)
ANION GAP SERPL CALCULATED.3IONS-SCNC: 8 MMOL/L (ref 3–14)
APPEARANCE UR: CLEAR
APTT PPP: 23 SEC (ref 22–37)
AST SERPL W P-5'-P-CCNC: 31 U/L (ref 0–45)
BACTERIA #/AREA URNS HPF: ABNORMAL /HPF
BASOPHILS # BLD AUTO: 0 10E9/L (ref 0–0.2)
BASOPHILS NFR BLD AUTO: 0.5 %
BILIRUB SERPL-MCNC: 0.9 MG/DL (ref 0.2–1.3)
BILIRUB UR QL STRIP: NEGATIVE
BUN SERPL-MCNC: 16 MG/DL (ref 7–30)
CALCIUM SERPL-MCNC: 9.9 MG/DL (ref 8.5–10.1)
CHLORIDE SERPL-SCNC: 110 MMOL/L (ref 94–109)
CO2 SERPL-SCNC: 24 MMOL/L (ref 20–32)
COLOR UR AUTO: ABNORMAL
CREAT BLD-MCNC: 1.1 MG/DL (ref 0.52–1.04)
CREAT SERPL-MCNC: 1.03 MG/DL (ref 0.52–1.04)
DIFFERENTIAL METHOD BLD: ABNORMAL
EOSINOPHIL # BLD AUTO: 0.2 10E9/L (ref 0–0.7)
EOSINOPHIL NFR BLD AUTO: 5.5 %
ERYTHROCYTE [DISTWIDTH] IN BLOOD BY AUTOMATED COUNT: 13 % (ref 10–15)
GFR SERPL CREATININE-BSD FRML MDRD: 52 ML/MIN/1.7M2
GFR SERPL CREATININE-BSD FRML MDRD: 56 ML/MIN/1.7M2
GLUCOSE BLDC GLUCOMTR-MCNC: 88 MG/DL (ref 70–99)
GLUCOSE SERPL-MCNC: 91 MG/DL (ref 70–99)
GLUCOSE UR STRIP-MCNC: NEGATIVE MG/DL
HCT VFR BLD AUTO: 39.9 % (ref 35–47)
HGB BLD-MCNC: 13.2 G/DL (ref 11.7–15.7)
HGB UR QL STRIP: NEGATIVE
IMM GRANULOCYTES # BLD: 0 10E9/L (ref 0–0.4)
IMM GRANULOCYTES NFR BLD: 0.3 %
INR BLD: 0.9 (ref 0.86–1.14)
INR PPP: 0.98 (ref 0.86–1.14)
KETONES UR STRIP-MCNC: NEGATIVE MG/DL
LEUKOCYTE ESTERASE UR QL STRIP: ABNORMAL
LYMPHOCYTES # BLD AUTO: 1.6 10E9/L (ref 0.8–5.3)
LYMPHOCYTES NFR BLD AUTO: 39 %
MCH RBC QN AUTO: 29.9 PG (ref 26.5–33)
MCHC RBC AUTO-ENTMCNC: 33.1 G/DL (ref 31.5–36.5)
MCV RBC AUTO: 90 FL (ref 78–100)
MONOCYTES # BLD AUTO: 0.3 10E9/L (ref 0–1.3)
MONOCYTES NFR BLD AUTO: 8 %
NEUTROPHILS # BLD AUTO: 1.9 10E9/L (ref 1.6–8.3)
NEUTROPHILS NFR BLD AUTO: 46.7 %
NITRATE UR QL: NEGATIVE
NRBC # BLD AUTO: 0 10*3/UL
NRBC BLD AUTO-RTO: 0 /100
PH UR STRIP: 5.5 PH (ref 5–7)
PLATELET # BLD AUTO: 105 10E9/L (ref 150–450)
POTASSIUM SERPL-SCNC: 5.2 MMOL/L (ref 3.4–5.3)
PROT SERPL-MCNC: 7.6 G/DL (ref 6.8–8.8)
RBC # BLD AUTO: 4.42 10E12/L (ref 3.8–5.2)
RBC #/AREA URNS AUTO: 3 /HPF (ref 0–2)
SODIUM SERPL-SCNC: 142 MMOL/L (ref 133–144)
SP GR UR STRIP: 1 (ref 1–1.03)
SQUAMOUS #/AREA URNS AUTO: <1 /HPF (ref 0–1)
TROPONIN I SERPL-MCNC: NORMAL UG/L (ref 0–0.04)
URN SPEC COLLECT METH UR: ABNORMAL
UROBILINOGEN UR STRIP-MCNC: NORMAL MG/DL (ref 0–2)
WBC # BLD AUTO: 4 10E9/L (ref 4–11)
WBC #/AREA URNS AUTO: 52 /HPF (ref 0–2)

## 2017-07-27 PROCEDURE — 85610 PROTHROMBIN TIME: CPT | Mod: QW

## 2017-07-27 PROCEDURE — 00000146 ZZHCL STATISTIC GLUCOSE BY METER IP

## 2017-07-27 PROCEDURE — 85025 COMPLETE CBC W/AUTO DIFF WBC: CPT | Performed by: EMERGENCY MEDICINE

## 2017-07-27 PROCEDURE — 70450 CT HEAD/BRAIN W/O DYE: CPT

## 2017-07-27 PROCEDURE — 99285 EMERGENCY DEPT VISIT HI MDM: CPT | Mod: 25 | Performed by: EMERGENCY MEDICINE

## 2017-07-27 PROCEDURE — 82140 ASSAY OF AMMONIA: CPT | Performed by: EMERGENCY MEDICINE

## 2017-07-27 PROCEDURE — 87186 SC STD MICRODIL/AGAR DIL: CPT | Performed by: EMERGENCY MEDICINE

## 2017-07-27 PROCEDURE — 70551 MRI BRAIN STEM W/O DYE: CPT

## 2017-07-27 PROCEDURE — 82565 ASSAY OF CREATININE: CPT

## 2017-07-27 PROCEDURE — A9270 NON-COVERED ITEM OR SERVICE: HCPCS | Mod: GY | Performed by: EMERGENCY MEDICINE

## 2017-07-27 PROCEDURE — 25000132 ZZH RX MED GY IP 250 OP 250 PS 637: Mod: GY | Performed by: EMERGENCY MEDICINE

## 2017-07-27 PROCEDURE — 96360 HYDRATION IV INFUSION INIT: CPT | Performed by: EMERGENCY MEDICINE

## 2017-07-27 PROCEDURE — 84484 ASSAY OF TROPONIN QUANT: CPT | Performed by: EMERGENCY MEDICINE

## 2017-07-27 PROCEDURE — 85730 THROMBOPLASTIN TIME PARTIAL: CPT | Performed by: EMERGENCY MEDICINE

## 2017-07-27 PROCEDURE — 87088 URINE BACTERIA CULTURE: CPT | Performed by: EMERGENCY MEDICINE

## 2017-07-27 PROCEDURE — 25000128 H RX IP 250 OP 636: Performed by: EMERGENCY MEDICINE

## 2017-07-27 PROCEDURE — 71020 XR CHEST 2 VW: CPT

## 2017-07-27 PROCEDURE — 85610 PROTHROMBIN TIME: CPT | Mod: 91 | Performed by: EMERGENCY MEDICINE

## 2017-07-27 PROCEDURE — 93005 ELECTROCARDIOGRAM TRACING: CPT | Performed by: EMERGENCY MEDICINE

## 2017-07-27 PROCEDURE — 87086 URINE CULTURE/COLONY COUNT: CPT | Performed by: EMERGENCY MEDICINE

## 2017-07-27 PROCEDURE — 81001 URINALYSIS AUTO W/SCOPE: CPT | Performed by: EMERGENCY MEDICINE

## 2017-07-27 PROCEDURE — 93010 ELECTROCARDIOGRAM REPORT: CPT | Mod: Z6 | Performed by: EMERGENCY MEDICINE

## 2017-07-27 PROCEDURE — 80053 COMPREHEN METABOLIC PANEL: CPT | Performed by: EMERGENCY MEDICINE

## 2017-07-27 RX ORDER — NITROFURANTOIN 25; 75 MG/1; MG/1
100 CAPSULE ORAL ONCE
Status: COMPLETED | OUTPATIENT
Start: 2017-07-27 | End: 2017-07-27

## 2017-07-27 RX ORDER — NITROFURANTOIN 25; 75 MG/1; MG/1
100 CAPSULE ORAL 2 TIMES DAILY
Qty: 14 CAPSULE | Refills: 0 | Status: SHIPPED | OUTPATIENT
Start: 2017-07-27 | End: 2017-09-12

## 2017-07-27 RX ORDER — LORAZEPAM 0.5 MG/1
1 TABLET ORAL ONCE
Status: COMPLETED | OUTPATIENT
Start: 2017-07-27 | End: 2017-07-27

## 2017-07-27 RX ADMIN — SODIUM CHLORIDE 500 ML: 0.9 INJECTION, SOLUTION INTRAVENOUS at 17:37

## 2017-07-27 RX ADMIN — LORAZEPAM 1 MG: 0.5 TABLET ORAL at 18:39

## 2017-07-27 RX ADMIN — NITROFURANTOIN (MONOHYDRATE/MACROCRYSTALS) 100 MG: 75; 25 CAPSULE ORAL at 21:43

## 2017-07-27 ASSESSMENT — VISUAL ACUITY
OU: BASELINE

## 2017-07-27 ASSESSMENT — ENCOUNTER SYMPTOMS
SPEECH DIFFICULTY: 1
COLOR CHANGE: 0
HEADACHES: 0
FEVER: 0
DIFFICULTY URINATING: 0
ARTHRALGIAS: 0
EYE REDNESS: 0
SHORTNESS OF BREATH: 0
NECK STIFFNESS: 0
ABDOMINAL PAIN: 0
CONFUSION: 0

## 2017-07-27 NOTE — ED AVS SNAPSHOT
[facility], Emergency Department    [address] [address]    Phone:  [phone]                                       Flor Navarro   MRN: [id number]    Department:  [facility], Emergency Department   Date of Visit:  7/27/2017           Patient Information     Date Of Birth          1965        Your diagnoses for this visit were:     Acute cystitis with hematuria        You were seen by Mal Mena MD.        Discharge Instructions       Take complete course of Macrobid.    Follow-up with your doctor as planned.    Discharge References/Attachments     BLADDER INFECTION, FEMALE (ADULT) (Urdu)      Future Appointments        Provider Department Dept Phone Center    8/1/2017 10:40 AM [facility] [phone] [facility]    8/28/2017 1:00 PM Enrrique Olivares DPM [facility] Sports Medicine [phone] [facility]    9/1/2017 10:00 AM Lab [facility] Lab [phone] [facility]    9/1/2017 11:00 AM Laron Anne MD [facility] Hepatology [phone] [facility]      24 Hour Appointment Hotline       To make an appointment at any [facility], call [phone] ([phone]). If you don't have a family doctor or clinic, we will help you find one. [facility] are conveniently located to serve the needs of you and your family.             Review of your medicines      START taking        Dose / Directions Last dose taken    nitroFURantoin (macrocrystal-monohydrate) 100 MG capsule   Commonly known as:  MACROBID   Dose:  100 mg   Quantity:  14 capsule        Take 1 capsule (100 mg) by mouth 2 times daily   Refills:  0          Our records show that you are taking the medicines listed below. If these are incorrect, please call your family doctor or clinic.        Dose / Directions Last dose taken    acetaminophen 325 MG tablet   Commonly known as:  TYLENOL   Dose:  325-650 mg   Quantity:  100 tablet        Take 1-2 tablets (325-650 mg) by mouth every 6 hours as  needed Max 6 tablets per 24 hours   Refills:  3        albuterol 108 (90 BASE) MCG/ACT Inhaler   Commonly known as:  PROAIR HFA/PROVENTIL HFA/VENTOLIN HFA   Dose:  2 puff   Quantity:  1 Inhaler        Inhale 2 puffs into the lungs 4 times daily   Refills:  1        alendronate 70 MG tablet   Commonly known as:  FOSAMAX   Dose:  70 mg   Quantity:  12 tablet        Take 1 tablet (70 mg) by mouth every 7 days Take at least 60 min before breakfast with over 8 oz water and stay upright for at least 30 min. after dose.   Refills:  3        benzocaine-menthol 6-10 MG lozenge   Commonly known as:  CHLORASEPTIC   Dose:  1 lozenge   Quantity:  36 lozenge        Place 1 lozenge inside cheek every 2 hours as needed for moderate pain   Refills:  0        bisacodyl 5 MG EC tablet   Commonly known as:  DULCOLAX   Dose:  5 mg   Quantity:  30 tablet        Take 1 tablet (5 mg) by mouth daily as needed for constipation   Refills:  11        calcium-vitamin D 600-400 MG-UNIT per tablet   Commonly known as:  calcium 600 + D   Dose:  1 tablet   Quantity:  60 tablet        Take 1 tablet by mouth 2 times daily   Refills:  11        carboxymethylcellul-glycerin 0.5-0.9 % Soln ophthalmic solution   Commonly known as:  OPTIVE/REFRESH OPTIVE   Dose:  1 drop   Quantity:  1 each        Place 1 drop into both eyes 4 times daily   Refills:  11        carboxymethylcellulose 1 % ophthalmic solution   Commonly known as:  CELLUVISC/REFRESH LIQUIGEL   Dose:  1 drop   Quantity:  15 each        Place 1 drop into both eyes 4 times daily   Refills:  11        cholecalciferol 1000 UNITS capsule   Commonly known as:  vitamin  -D   Dose:  2 capsule   Quantity:  180 capsule        Take 2 capsules (2,000 Units) by mouth daily   Refills:  3        * cycloSPORINE modified capsule   Dose:  100 mg   Quantity:  240 capsule        Take 4 capsules (100 mg) by mouth every 12 hours   Refills:  0        * cycloSPORINE modified 25 MG capsule   Commonly known as:  GENERIC  EQUIVALENT   Quantity:  240 capsule        TAKE 4 CAPSULES BY MOUTH EVERY 12 HOURS   Refills:  3        * lidocaine 5 % ointment   Commonly known as:  XYLOCAINE   Quantity:  70 g        Apply topically 2 times daily Apply to affected area for pain two times daily   Refills:  1        * lidocaine 5 % Patch   Commonly known as:  LIDODERM   Quantity:  30 patch        Apply up to 3 patches to painful area at once for up to 12 h within a 24 h period.  Remove after 12 hours.   Refills:  0        * lidocaine 5 % Patch   Commonly known as:  LIDODERM   Quantity:  30 patch        Apply up to 3 patches to painful area at once for up to 12 h within a 24 h period.  Remove after 12 hours.   Refills:  1        melatonin 3 MG tablet   Dose:  3 mg   Quantity:  100 tablet        Take 1 tablet (3 mg) by mouth nightly as needed for sleep   Refills:  3        Menthol (Topical Analgesic) 4 % Gel   Quantity:  118 mL        Externally apply topically 3 times daily as needed   Refills:  0        Menthol 10 MG Lozg   Dose:  1 lozenge   Quantity:  120 lozenge        Take 1 lozenge by mouth every 2 hours as needed   Refills:  0        multivitamin, therapeutic with minerals Tabs tablet   Dose:  1 tablet   Quantity:  100 tablet        Take 1 tablet by mouth daily   Refills:  11        * mycophenolate 250 MG capsule   Commonly known as:  CELLCEPT - GENERIC EQUIVALENT   Dose:  500 mg   Quantity:  120 capsule        Take 2 capsules (500 mg) by mouth every 12 hours   Refills:  11        * mycophenolate 250 MG capsule   Commonly known as:  CELLCEPT - GENERIC EQUIVALENT   Quantity:  120 capsule        TAKE TWO CAPSULES BY MOUTH EVERY 12 HOURS   Refills:  4        omeprazole 20 MG CR capsule   Commonly known as:  priLOSEC   Dose:  20 mg   Quantity:  180 capsule        Take 1 capsule (20 mg) by mouth 2 times daily   Refills:  3        * order for DME   Quantity:  1 Units        Equipment being ordered: Nebulizer with adult mask and tubing   Refills:  0         * order for DME   Quantity:  1 Units        Equipment being ordered: cane with padded handle   Refills:  0        * order for DME   Quantity:  1 Units        Equipment being ordered: cane   Refills:  0        * order for DME   Quantity:  2 Units        Equipment being ordered: compression stockings bilateral Please measure and fit patient for stockings  Strength 15-30mmHg Disp 2 pairs 1 refill over the time of 1 year   Refills:  1        * order for DME   Quantity:  1 Units        2-wheeled walker (dx: gait instability and Frequent falls).   Refills:  0        * order for DME   Quantity:  1 Units        Equipment being ordered: 4 Wheeled Walker with Seat and Brakes   Refills:  0        * order for DME   Quantity:  2 each        Equipment being ordered: Compression stockings below knee bilateral   Refills:  0        * order for DME   Quantity:  1 each        Equipment being ordered: Back Stabilizer   Refills:  0        * order for DME   Quantity:  2 each        Equipment being ordered: 2 pairs of black stabilizer socks (beige okay if black not available). Size XL.   Refills:  0        prednisoLONE acetate 1 % ophthalmic susp   Commonly known as:  PRED FORTE   Quantity:  1 Bottle        Use in operative eye four times a day  For 4 days   Refills:  0        * Psyllium 500 MG Caps   Dose:  3 capsule   Quantity:  180 capsule        Take 3 capsules by mouth 2 times daily   Refills:  11        * REGULOID 0.52 G capsule   Dose:  3 capsule   Quantity:  540 capsule   Generic drug:  psyllium        Take 3 capsules (1.56 g) by mouth 2 times daily   Refills:  3        senna-docusate 8.6-50 MG per tablet   Commonly known as:  SENOKOT-S;PERICOLACE   Dose:  2 tablet   Quantity:  120 tablet        Take 2 tablets by mouth 2 times daily For constipation   Refills:  6        simethicone 80 MG chewable tablet   Commonly known as:  MYLICON   Dose:  80 mg   Quantity:  120 tablet        Take 1 tablet (80 mg) by mouth every 6 hours  as needed for flatulence or cramping   Refills:  0        sodium chloride 0.65 % nasal spray   Commonly known as:  OCEAN   Dose:  1 spray   Quantity:  30 mL        Spray 1 spray into both nostrils daily as needed for congestion   Refills:  1        sodium phosphate 7-19 GM/118ML rectal enema   Dose:  1 enema   Quantity:  3 Bottle        Place 1 Bottle (1 enema) rectally daily as needed for constipation   Refills:  11        STATIN NOT PRESCRIBED (INTENTIONAL)   Quantity:  0 each        Not prescribed   Refills:  0        * Notice:  This list has 18 medication(s) that are the same as other medications prescribed for you. Read the directions carefully, and ask your doctor or other care provider to review them with you.            Prescriptions were sent or printed at these locations (1 Prescription)                   Other Prescriptions                Printed at Department/Unit printer (1 of 1)         nitroFURantoin, macrocrystal-monohydrate, (MACROBID) 100 MG capsule                Procedures and tests performed during your visit     Procedure/Test Number of Times Performed    Ammonia (on ice) 2    CBC with platelets differential 1    CT Head w/o Contrast 1    Chest XR,  PA & LAT 1    Comprehensive metabolic panel 1    Creatinine POCT 1    EKG 12-lead, tracing only 1    Glucose by meter 1    Glucose monitor nursing POCT 1    INR 1    INR point of care 1    ISTAT INR nursing POCT 1    ISTAT creatinine nursing POCT 1    MR Brain for Stroke Limited 1    Notify CT that Stroke patient is in ED 1    Partial thromboplastin time 1    Pulse oximetry nursing 1    Troponin I 1    UA with Microscopic reflex to Culture 1    Urine Culture Aerobic Bacterial 1      Orders Needing Specimen Collection     None      Pending Results     Date and Time Order Name Status Description    7/27/2017 2028 Urine Culture Aerobic Bacterial In process             Pending Culture Results     Date and Time Order Name Status Description    7/27/2017  " Urine Culture Aerobic Bacterial In process             Pending Results Instructions     If you had any lab results that were not finalized at the time of your Discharge, you can call the ED Lab Result RN at [phone]. You will be contacted by this team for any positive Lab results or changes in treatment. The nurses are available 7 days a week from 10A to 6:30P.  You can leave a message 24 hours per day and they will return your call.        Thank you for choosing [facility]       Thank you for choosing [facility] for your care. Our goal is always to provide you with excellent care. Hearing back from our patients is one way we can continue to improve our services. Please take a few minutes to complete the written survey that you may receive in the mail after you visit with us. Thank you!        [facility] Information     [employer] lets you send messages to your doctor, view your test results, renew your prescriptions, schedule appointments and more. To sign up, go to [email or web address] . Click on \"Log in\" on the left side of the screen, which will take you to the Welcome page. Then click on \"Sign up Now\" on the right side of the page.     You will be asked to enter the access code listed below, as well as some personal information. Please follow the directions to create your username and password.     Your access code is: [email or web address]  Expires: 9/3/2017  6:31 AM     Your access code will  in 90 days. If you need help or a new code, please call your [facility] clinic or [phone].        Care EveryWhere ID     This is your Care EveryWhere ID. This could be used by other organizations to access your [facility] medical records  [id number]        Equal Access to Services     MAURO ALBA : roxy Lam, mart carver. So [facility] [phone].    ATENCIÓN: Si habla español, tiene a garcia disposición servicios gratuitos de " asistencia lingüística. Abdirahman al [phone].    We comply with applicable federal civil rights laws and [place] laws. We do not discriminate on the basis of race, color, national origin, age, disability sex, sexual orientation or gender identity.            After Visit Summary       This is your record. Keep this with you and show to your community pharmacist(s) and doctor(s) at your next visit.

## 2017-07-27 NOTE — PHARMACY-CONSULT NOTE
Pharmacy Stroke Code Response  Pharmacist responded as part of the Stroke Code Team activation to patient care area ED.  The Stroke Team determined that the patient was not a candidate for IV alteplase therapy and the pharmacy team was dismissed at 17:24.     Tarun Richard, ChatoD, BCPS

## 2017-07-27 NOTE — CONSULTS
[facility], [place]      Neurology Stroke Consult    Patient Name: Flor Navarro  : 1965 MRN#: [id number]    STROKE DATA    Stroke Code:  Time called:  2017 1709  Time patient seen:  2017 1712  Onset of symptoms:  2017 1300  Last known normal (pt's baseline):  2017 1300  Head CT read by Hal Muhammad at:  2017 1720  Stroke code deescalated: 17 1725      TPA treatment:  Not given due to outside the time window, minor / isolated / quickly resolving stroke symptoms.     National Institutes of Health Stroke Scale (at presentation)  NIHSS done at:  time patient seen      Score    Level of consciousness:  (0)   Alert, keenly responsive     LOC questions:  (1)   Answers one question correctly    LOC commands:  (0)   Performs both tasks correctly    Best gaze:  (0)   Normal    Visual:  (0)   No visual loss    Facial palsy:  (0)   Normal symmetrical movements    Motor arm (left):  (0)   No drift    Motor arm (right):  (0)   No drift    Motor leg (left):  (0)   No drift    Motor leg (right):  (1)   Drift    Limb ataxia:  (0)   Absent    Sensory:  (0)   Normal- no sensory loss    Best language:  (0)   Normal- no aphasia    Dysarthria:  (0)   Normal    Extinction and inattention:  (0)   No abnormality        NIHSS Total Score:  2        Dysphagia Screen  Time of screenin2017 1830  Screening results: Passed screening, no dysarthria - Regular Diet with thin liquids     ASSESSMENT & RECOMMENDATIONS     The patient was seen for slurred speech and right facial droop. The differential diagnosis includes ischemic stroke, TIA, seizure and recrudescence of old stroke symptoms secondary to a systemic process.     Impression: The patient presented several hours after onset of what is described as either dysarthric or aphasic speech, which has since resolved. 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 It is recommended that she be admitted to the hospital for close monitoring and evaluation, however she is adamant that she not be admitted. She has been counseled that she is at risk of further illness if she is not admitted, but she says she needs to go home, but is willing to be evaluated in the ED. She does not appear encephalopathic and is able to repeat and explain the counseling that was offered to her, so we recommend:    -that she undergo MRI/MRA brain to evaluate for stroke, and assess previously noted R MCA aneurysm  -CXR  -outpatient follow up with Neurology  -outpatient EEG    **In the case that MRI reveals stroke, again, we will offer and recommend admission, at which point she could be monitored and undergo the rest of evaluation for stroke. We will follow for MRI results, and/or please contact overnight resident on call if new stroke is noted.     The patient will be managed by the ED team and  followed by the Stroke Consult service.    LAKISHA Navarro is a 51 year old female with history of left frontal lobe IPH in 2008 and incidentally noted R MCA unruptured aneurysm with no reported residual symptoms (was R hemiparetic and aphasic, but recovered with five months of therapies) and ESLD secondary to cirrhosis and hep C s/p liver transplant in 2010, HTN, and CKD III, who presents for four hours of acute onset slurred speech. She says she was feeling normal, awoke, showered, prayed, and called her brother in [place] when she noted at 1300 that she was able to make sounds but unable to make the words she intended to say come out clearly. She states her language is clear now, but her son says it is near normal, but not normal. She denies weakness or numbness and says she recalls the event entirely. She says she has been feeling in her normal state of health and that her most recent transplant monitoring labs were normal. She does note some sore throat, but denies cough, fever,  chills, nausea, abdominal pain, or dysuria.    She adamantly states that she did not suffer a stroke. She also says that she is completely healthy and that it is not possible that she is sick in another way from stroke that might be causing her old stroke symptoms to come back. She says that she wants to leave the hospital and go home; she does not want to sleep in the hospital overnight.    Pertinent Past Medical/Surgical HistoryPast Medical History:   Diagnosis Date     Anemia in CKD (chronic kidney disease)      Cataract      CKD (chronic kidney disease) stage 3, GFR 30-59 ml/min      Hepatitis C     cleared virus spontaneously      High risk medication use      Hypertension, renal      Immunosuppressed status (H)      Liver replaced by transplant (H)      Osteoporosis      Recurrent pregnancy loss without current pregnancy      Stroke, hemorrhagic (H)      Syncope      Unspecified viral hepatitis C without hepatic coma      Past Surgical History:   Procedure Laterality Date     CATARACT IOL, RT/LT Right 2/18/15      SECTION       Incisional Hernia Repair  2004     INSERT SHUNT PORTAL TRANSJUGULAR INTRAHEPTIC      shunt placement for liver failure     LAPAROSCOPIC SALPINGO-OOPHORECTOMY      left     NECK SURGERY  2010    fracture, in halo x 7months     TRANSPLANT LIVER RECIPIENT  DONOR  10/26/10     Upper GI Endoscopy with Band Ligation of Esoph/Gastric Varic. .       Medications: I have reviewed this patient's current medications.    Allergies: All allergies reviewed and addressed.    Family History:   Family History   Problem Relation Age of Onset     Hepatitis Other      Hep C, still in [place]     CEREBROVASCULAR DISEASE Other      CANCER No family hx of      DIABETES No family hx of      Hypertension No family hx of      Thyroid Disease No family hx of      Glaucoma No family hx of      Macular Degeneration No family hx of      Social History: I have reviewed this patient's  social history.    Tobacco use: Never    ROS:  The 10 point Review of Systems is negative other than noted in the HPI or here.   +sore throat    PHYSICAL EXAMINATION  Vital Signs:  B/P: 145/88,  T: 98.3,  P: 106,  R: 20    General:  sitting upright in the bed in NAD    HEENT:  normocephalic/atraumatic, no oral lesions, no epistaxis   Cardio:  RRR  Pulmonary:  no respiratory distress  Abdomen:  soft, non-tender, non-distended, obese, large well healed surgical scar noted  Extremities:  no edema, peripheral pulses palpable  Skin:  intact, warm/dry     Neurologic  Mental Status:  awake, alert, attentive, follows commands briskly, language fluent per  and son, can name and repeat, oriented to self, month, place, but states she is 59  Cranial Nerves:  visual fields intact, PERRL, EOMI with normal smooth pursuit, facial sensation intact and symmetric, facial movements symmetric, hearing not formally tested but intact to conversation, palate elevation symmetric and uvula midline, no dysarthria, shoulder shrug strong bilaterally, tongue protrusion midline  Motor:  no abnormal movements, normal tone throughout, normal muscle bulk, no pronator drift, normal and symmetric rapid finger tapping, able to move all limbs spontaneously, strength 5/5 throughout upper and lower extremities except for RLE drift (pt says is baseline)  Reflexes:  2+ and symmetric throughout, no clonus, toes downgoing  Sensory:  intact/symmetric to light touch and pin prick throughout upper and lower extremities  Coordination:  FNF and HS intact without dysmetria  Station/Gait:  unable to test    Labs  Labs and Imaging reviewed and used in developing the plan; pertinent results included.     Lab Results   Component Value Date    GLC 91 07/27/2017       The patient was discussed with the Fellow, Dr. Muhammad.  The staff is Dr. Varela.    Shelby Amaro MD  Neurology PGY-2  Pager: [phone]

## 2017-07-27 NOTE — ED AVS SNAPSHOT
[facility], [place], Emergency Department    [address] [place] [address]    Phone:  [phone]                                       Flor Navarro   MRN: [id number]    Department:  [facility], Emergency Department   Date of Visit:  7/27/2017           After Visit Summary Signature Page     I have received my discharge instructions, and my questions have been answered. I have discussed any challenges I see with this plan with the nurse or doctor.    ..........................................................................................................................................  Patient/Patient Representative Signature      ..........................................................................................................................................  Patient Representative Print Name and Relationship to Patient    ..................................................               ................................................  Date                                            Time    ..........................................................................................................................................  Reviewed by Signature/Title    ...................................................              ..............................................  Date                                                            Time

## 2017-07-27 NOTE — PROGRESS NOTES
Arrival to Stroke Code: 1714    Stroke Team escalate/de-escalate interventions: Stroke code de-escalated by Shelby Amaro at 1724    ED/Bedside Nurse providing handoff: Elisabet Burrows    Time left for CT: 1717    Time Returned to ED/Unit: 1728    ED/Bedside Nurse given handoff to & Time: Elisabet Burrows at 1728

## 2017-07-27 NOTE — ED NOTES
Ok for patient to transport to XR/MRI without RN and to be absent for q1 hour neuro exam per Dr. Mena. Will resume neuro exam when patient returns to ED.

## 2017-07-27 NOTE — ED NOTES
Stroke code cancelled per neuro MD, Dr. Amaro. Neuro checks to be completed every hour. Every 15 minute neuro checks not necessary at this time per Dr. Amaro.

## 2017-07-27 NOTE — ED PROVIDER NOTES
"  History     Chief Complaint   Patient presents with     Dizziness     Pt reports since 1 or 2pm today has had slurred speach, dizziness and shakes. Pt reports similar symptoms in the past w/ elevated ammonia.     HPI  Flor Navarro is a 51 year old female who presents to the emergency department with a 3-4 hours history of slurred speech and incoordination.  The patient speaks Wolof and provides history through a family member who speaks English as well as some English herself.  The patient's family member states that the patient began slurring her speech approximately 3-4 hours ago.  She has also had some incoordination and difficulty walking.  The patient reportedly has a previous history of stroke with some right-sided deficits.  Timing of the previous stroke and resultant deficits is not clear from talking to the patient or reviewing her medical record.  The patient denies any chest pain or dyspnea.  She denies abdominal pain.  She denies nausea and vomiting.  She denies any dysuria, urgency, or frequency.  She has had a liver transplant approximately 7 years ago.  She denies any fever.      I have reviewed the Medications, Allergies, Past Medical and Surgical History, and Social History in the Epic system.    Review of Systems   Constitutional: Negative for fever.   HENT: Negative for congestion.    Eyes: Negative for redness.   Respiratory: Negative for shortness of breath.    Cardiovascular: Negative for chest pain.   Gastrointestinal: Negative for abdominal pain.   Genitourinary: Negative for difficulty urinating.   Musculoskeletal: Negative for arthralgias and neck stiffness.   Skin: Negative for color change.   Neurological: Positive for speech difficulty. Negative for headaches.   Psychiatric/Behavioral: Negative for confusion.   All other systems reviewed and are negative.      Physical Exam   BP: (!) 148/96  Pulse: 79  Temp: 98.3  F (36.8  C)  Resp: 16  Height: 154.9 cm (5' 1\")  Weight: 95.4 kg (210 " lb 4.8 oz)  SpO2: 94 %  Physical Exam   Constitutional: She is oriented to person, place, and time. She appears well-developed and well-nourished. No distress.   HENT:   Head: Normocephalic and atraumatic.   Mouth/Throat: Oropharynx is clear and moist. No oropharyngeal exudate.   Eyes: Pupils are equal, round, and reactive to light. No scleral icterus.   Neck: Normal range of motion.   Cardiovascular: Normal rate, normal heart sounds and intact distal pulses.    Pulmonary/Chest: Effort normal and breath sounds normal. No respiratory distress.   Abdominal: Soft. Bowel sounds are normal. There is no tenderness.   Musculoskeletal: Normal range of motion. She exhibits no edema or tenderness.   Neurological: She is alert and oriented to person, place, and time.   Left facial droop  Right pronator drift  Rapid alternating movements delayed and uncoordinated with the right upper extremity   Skin: Skin is warm. No rash noted. She is not diaphoretic.   Nursing note and vitals reviewed.      ED Course     ED Course     Procedures        Stroke code called immediately after triage.  Patient has contrast allergy of uncertain details- noncontrast head CT performed.         EKG Interpretation:      Interpreted by ZOYA ESPINOZA MD  Time reviewed: 1738  Symptoms at time of EKG: slurred speech   Rhythm: normal sinus   Rate: 77  Axis: normal  Ectopy: none  Conduction: normal  ST Segments/ T Waves: No ST-T wave changes  Q Waves: none  Comparison to prior: Unchanged    Clinical Impression: normal EKG    Results for orders placed or performed during the hospital encounter of 07/27/17   CT Head w/o Contrast    Narrative    CT HEAD W/O CONTRAST 7/27/2017 5:35 PM    History: Slurred speech, incoordination    Comparison: CT 2/4/2013.    Technique: Using multidetector thin collimation helical acquisition  technique, axial, coronal and sagittal CT images from the skull base  to the vertex were obtained without intravenous contrast.      Findings:    There is no evidence of intracranial hemorrhage, mass effect, midline  shift, or abnormal extraaxial fluid collection. The ventricles and  sulci are within normal limits for age. Gray-white differentiation is  intact throughout both cerebral hemispheres. Slight increase in size  of the calcified extra-axial mass over the right frontal lobe  measuring 1 x 0.7 x 1 cm.    The bony calvaria and the bones of the skull base appear normal.  The  visualized mastoid air cells and paranasal sinuses are clear.       Impression    Impression:   1. No acute intracranial pathology.  2. Slight increase in size of a meningioma over the right frontal lobe  measuring up to 1 cm.    I have personally reviewed the examination and initial interpretation  and I agree with the findings.    JASWINDER KIM MD   Chest XR,  PA & LAT    Narrative    History: Assess for infection.    COMPARISON: Two-view chest 7/7/2014.    FINDINGS: No focal infiltrates, pneumothorax or pleural effusion.  Streaky increased markings at the lung bases have progressed slightly  over the interim. No new airspace opacity, pneumothorax or pleural  effusion. Lungs are hyperinflated with flattening of the  hemidiaphragms and increased AP dimension to the thorax. Cardiac  silhouette and pulmonary vasculature are unchanged and within normal  limits. No acute bony abnormalities about the thorax. Bones are  osteopenic for age.      Impression    IMPRESSION: No acute cardiopulmonary abnormality.    JESSICA ERNST MD   MR Brain for Stroke Limited    Narrative    MRI brain without contrast 7/27/2017 7:44 PM    Provided History: Slurred speech    Comparison:  CT 7/27/2017 , MRI 3/3/2008    Technique: Axial diffusion weighted, susceptibility weighted T2 FLAIR  images were obtained without intravenous contrast. The patient was  unable to tolerate the full stroke protocol.    Findings: Susceptibility artifact partially obscures evaluation of the  right  cerebellar hemisphere, right occipital lobe and posterior right  temporal lobe on diffusion-weighted and susceptibility weighted  images. 1.0 cm meningioma overlying the right frontal lobe, not  significantly changed.     There is no mass effect, midline shift or abnormal extraaxial fluid  collection. The ventricles and sulci are normal for age.  Diffusion-weighted images demonstrate no restricted diffusion. Linear  susceptibility artifact in the left centrum semiovale corresponding to  site of previous intraparenchymal hemorrhage. Minimal scattered  nonspecific T2 hyperintensities in the white matter, probably related  to chronic small vessel ischemic disease.    Polypoid mucosal thickening in left maxillary sinus, probably a mucous  retention cyst.      Impression    Impression:   1. No acute intracranial pathology. No acute infarct.  2. Sequelae of old left cerebral intraparenchymal hemorrhage.   3. Unchanged small right frontal meningioma.    I have personally reviewed the examination and initial interpretation  and I agree with the findings.    JASWINDER KIM MD   CBC with platelets differential   Result Value Ref Range    WBC 4.0 4.0 - 11.0 10e9/L    RBC Count 4.42 3.8 - 5.2 10e12/L    Hemoglobin 13.2 11.7 - 15.7 g/dL    Hematocrit 39.9 35.0 - 47.0 %    MCV 90 78 - 100 fl    MCH 29.9 26.5 - 33.0 pg    MCHC 33.1 31.5 - 36.5 g/dL    RDW 13.0 10.0 - 15.0 %    Platelet Count 105 (L) 150 - 450 10e9/L    Diff Method Automated Method     % Neutrophils 46.7 %    % Lymphocytes 39.0 %    % Monocytes 8.0 %    % Eosinophils 5.5 %    % Basophils 0.5 %    % Immature Granulocytes 0.3 %    Nucleated RBCs 0 0 /100    Absolute Neutrophil 1.9 1.6 - 8.3 10e9/L    Absolute Lymphocytes 1.6 0.8 - 5.3 10e9/L    Absolute Monocytes 0.3 0.0 - 1.3 10e9/L    Absolute Eosinophils 0.2 0.0 - 0.7 10e9/L    Absolute Basophils 0.0 0.0 - 0.2 10e9/L    Abs Immature Granulocytes 0.0 0 - 0.4 10e9/L    Absolute Nucleated RBC 0.0    Partial  thromboplastin time   Result Value Ref Range    PTT 23 22 - 37 sec   Comprehensive metabolic panel   Result Value Ref Range    Sodium 142 133 - 144 mmol/L    Potassium 5.2 3.4 - 5.3 mmol/L    Chloride 110 (H) 94 - 109 mmol/L    Carbon Dioxide 24 20 - 32 mmol/L    Anion Gap 8 3 - 14 mmol/L    Glucose 91 70 - 99 mg/dL    Urea Nitrogen 16 7 - 30 mg/dL    Creatinine 1.03 0.52 - 1.04 mg/dL    GFR Estimate 56 (L) >60 mL/min/1.7m2    GFR Estimate If Black 68 >60 mL/min/1.7m2    Calcium 9.9 8.5 - 10.1 mg/dL    Bilirubin Total 0.9 0.2 - 1.3 mg/dL    Albumin 3.6 3.4 - 5.0 g/dL    Protein Total 7.6 6.8 - 8.8 g/dL    Alkaline Phosphatase 76 40 - 150 U/L    ALT 29 0 - 50 U/L    AST 31 0 - 45 U/L   INR   Result Value Ref Range    INR 0.98 0.86 - 1.14   Troponin I   Result Value Ref Range    Troponin I ES  0.000 - 0.045 ug/L     <0.015  The 99th percentile for upper reference range is 0.045 ug/L.  Troponin values in   the range of 0.045 - 0.120 ug/L may be associated with risks of adverse   clinical events.     Glucose by meter   Result Value Ref Range    Glucose 88 70 - 99 mg/dL   Ammonia (on ice)   Result Value Ref Range    Ammonia  10 - 50 umol/L     Unsatisfactory specimen - hemolyzed  NOTIFIED CLAIRE PULIDO [id number] AT 1757 ON ER BY EK     UA with Microscopic reflex to Culture   Result Value Ref Range    Color Urine Light Yellow     Appearance Urine Clear     Glucose Urine Negative NEG mg/dL    Bilirubin Urine Negative NEG    Ketones Urine Negative NEG mg/dL    Specific Gravity Urine 1.003 1.003 - 1.035    Blood Urine Negative NEG    pH Urine 5.5 5.0 - 7.0 pH    Protein Albumin Urine Negative NEG mg/dL    Urobilinogen mg/dL Normal 0.0 - 2.0 mg/dL    Nitrite Urine Negative NEG    Leukocyte Esterase Urine Large (A) NEG    Source Midstream Urine     WBC Urine 52 (H) 0 - 2 /HPF    RBC Urine 3 (H) 0 - 2 /HPF    Bacteria Urine Many (A) NEG /HPF    Squamous Epithelial /HPF Urine <1 0 - 1 /HPF   Creatinine POCT   Result Value Ref  Range    Creatinine 1.1 (H) 0.52 - 1.04 mg/dL    GFR Estimate 52 (L) >60 mL/min/1.7m2    GFR Estimate If Black 63 >60 mL/min/1.7m2   INR point of care   Result Value Ref Range    INR Point of Care 0.9 0.86 - 1.14   Ammonia (on ice)   Result Value Ref Range    Ammonia 21 10 - 50 umol/L     *Note: Due to a large number of results and/or encounters for the requested time period, some results have not been displayed. A complete set of results can be found in Results Review.            Critical Care time:    Medications   nitroFURantoin (macrocrystal-monohydrate) (MACROBID) capsule 100 mg (not administered)   0.9% sodium chloride BOLUS (0 mLs Intravenous Stopped 7/27/17 1829)   LORazepam (ATIVAN) tablet 1 mg (1 mg Oral Given 7/27/17 1839)             Assessments & Plan (with Medical Decision Making)   51 year old female to the emergency department with 3-4 hours of slurred speech.  The patient has a history of hemorrhagic stroke with residual right-sided weakness.  A stroke code was activated but subsequently be escalated as patient's head CT was negative and her neurologic exam was most consistent with her previous deficits.  The patient ultimately underwent a brain MRI which did not reveal any acute stroke.  Her labs are normal.  Her urinalysis appears infected.  Her last urine culture was positive for E. coli that was for quinolone resistant.  The patient was given a dose of Macrobid in the emergency department.  She will be discharged home on a 7 day course to complete treatment.  She has follow-up planned with her primary care provider next week.    I have reviewed the nursing notes.    I have reviewed the findings, diagnosis, plan and need for follow up with the patient.    New Prescriptions    NITROFURANTOIN, MACROCRYSTAL-MONOHYDRATE, (MACROBID) 100 MG CAPSULE    Take 1 capsule (100 mg) by mouth 2 times daily       Final diagnoses:   Acute cystitis with hematuria       7/27/2017   [facility], [place], EMERGENCY  DEPARTMENT     Mal Mena MD  07/27/17 1271

## 2017-07-27 NOTE — ED NOTES
Pt by w/c from triage to  2. MD to bedside to eval pt. Stroke code called. In person Albanian  requested per pt family request. RN at bedside for IV Start

## 2017-07-28 LAB — INTERPRETATION ECG - MUSE: NORMAL

## 2017-08-08 ENCOUNTER — TELEPHONE (OUTPATIENT)
Dept: FAMILY MEDICINE | Facility: CLINIC | Age: 52
End: 2017-08-08

## 2017-08-08 ENCOUNTER — OFFICE VISIT (OUTPATIENT)
Dept: FAMILY MEDICINE | Facility: CLINIC | Age: 52
End: 2017-08-08

## 2017-08-08 VITALS
DIASTOLIC BLOOD PRESSURE: 90 MMHG | TEMPERATURE: 98.7 F | HEART RATE: 70 BPM | RESPIRATION RATE: 18 BRPM | SYSTOLIC BLOOD PRESSURE: 126 MMHG

## 2017-08-08 DIAGNOSIS — M76.72 PERONEAL TENDONITIS, LEFT: ICD-10-CM

## 2017-08-08 DIAGNOSIS — Z94.4 LIVER REPLACED BY TRANSPLANT (H): Primary | ICD-10-CM

## 2017-08-08 DIAGNOSIS — E87.5 HYPERKALEMIA: ICD-10-CM

## 2017-08-08 DIAGNOSIS — R63.1 INCREASED THIRST: ICD-10-CM

## 2017-08-08 DIAGNOSIS — R35.0 INCREASED FREQUENCY OF URINATION: ICD-10-CM

## 2017-08-08 LAB
% GRANULOCYTES: 62.9 %G (ref 40–75)
BILIRUBIN UR: NEGATIVE
BLOOD UR: NEGATIVE
BUN SERPL-MCNC: 23.8 MG/DL (ref 7–19)
CALCIUM SERPL-MCNC: 9.7 MG/DL (ref 8.5–10.1)
CHLORIDE SERPLBLD-SCNC: 105.7 MMOL/L (ref 98–110)
CO2 SERPL-SCNC: 28.5 MMOL/L (ref 20–32)
CREAT SERPL-MCNC: 1 MG/DL (ref 0.5–1)
GFR SERPL CREATININE-BSD FRML MDRD: 61.9 ML/MIN/1.7 M2
GLUCOSE CASUAL: 87 MG/DL (ref 51–200)
GLUCOSE SERPL-MCNC: 100.5 MG'DL (ref 70–99)
GLUCOSE URINE: NEGATIVE
GRANULOCYTES #: 2.7 K/UL (ref 1.6–8.3)
HCT VFR BLD AUTO: 40.5 % (ref 35–47)
HEMOGLOBIN: 13 G/DL (ref 11.7–15.7)
KETONES UR QL: NEGATIVE
LEUKOCYTE ESTERASE UR: NEGATIVE
LYMPHOCYTES # BLD AUTO: 1.3 K/UL (ref 0.8–5.3)
LYMPHOCYTES NFR BLD AUTO: 29.7 %L (ref 20–48)
MCH RBC QN AUTO: 31.1 PG (ref 26.5–35)
MCHC RBC AUTO-ENTMCNC: 32.1 G/DL (ref 32–36)
MCV RBC AUTO: 97 FL (ref 78–100)
MID #: 0.3 K/UL (ref 0–2.2)
MID %: 7.4 %M (ref 0–20)
NITRITE UR QL STRIP: NEGATIVE
PH UR STRIP: 6 [PH] (ref 5–7)
PLATELET # BLD AUTO: 114 K/UL (ref 150–450)
POTASSIUM SERPL-SCNC: 5.1 MMOL/DL (ref 3.3–4.5)
PROTEIN UR: NEGATIVE
RBC # BLD AUTO: 4.18 M/UL (ref 3.8–5.2)
SODIUM SERPL-SCNC: 140.7 MMOL/L (ref 132.6–141.4)
SP GR UR STRIP: 1.01
UROBILINOGEN UR STRIP-ACNC: NORMAL
WBC # BLD AUTO: 4.3 K/UL (ref 4–11)

## 2017-08-08 ASSESSMENT — ENCOUNTER SYMPTOMS
DYSURIA: 0
CHILLS: 0
FEVER: 0
COLOR CHANGE: 0
HEADACHES: 0
VOMITING: 0
CONSTIPATION: 0
NAUSEA: 0
WHEEZING: 0
FREQUENCY: 1
FATIGUE: 0
DIARRHEA: 0
SINUS PRESSURE: 0
SORE THROAT: 0
ARTHRALGIAS: 1
MYALGIAS: 0
COUGH: 0
SHORTNESS OF BREATH: 0
JOINT SWELLING: 1
POLYDIPSIA: 1
BLOOD IN STOOL: 0
ABDOMINAL PAIN: 0
APPETITE CHANGE: 0
HEMATURIA: 0
ABDOMINAL DISTENTION: 0
WEAKNESS: 0

## 2017-08-08 NOTE — Clinical Note
Can you help her get an Ortho visit scheduled for her ankle pain? She wants a second opinion. I discussed with her that she may have the same diagnosis and care plan as the podiatrist. Thanks, Lila

## 2017-08-08 NOTE — Clinical Note
Can you please call the patient's Son, Carleen, [phone] and let him know that the patient wanted us to call him with any abnormal labs. Her potassium continues to be high. She will need to follow a low potassium diet. Please see references on AVS. I will also need her to have her K rechecked in 2 days. If she started to have ANY chest pain or SOB, she will need to go to the ED immediately. ThanksLila.

## 2017-08-08 NOTE — NURSING NOTE
used this visit:  Name: Abbi Tran   Language: Uzbek  Agency: [employer]  Phone:[phone]  Shelley Osei

## 2017-08-08 NOTE — TELEPHONE ENCOUNTER
Per Dr. Downs:  Can you please call the patient's Son, Carleen, [phone] and let him know that the patient wanted us to call him with any abnormal labs. Her potassium continues to be high. She will need to follow a low potassium diet. Please see references on AVS. I will also need her to have her K rechecked in 2 days. If she started to have ANY chest pain or SOB, she will need to go to the ED immediately.     Called and relayed information to son. Verbalized understanding and will call to schedule lab visit in two days. Order already placed.    Sally Ann RN

## 2017-08-08 NOTE — PROGRESS NOTES
HPI:       Flor Navarro is a 51 year old who presents for the following  Patient presents with:  LAB REQUEST: patient would like labs done due to her being thirsty and dry mouth 2 days in a row   RECHECK: f/u on liver. patient had a transplant possible lab work done per patient    Would like a second opinion on the left ankle. Has been seeing Podiatry, but is not happy with plan as her ankle is not getting better. Sharp, knife like pain when sitting, pain improves with walking. Left ankle consistently swells. Has been doing home PT, which she states does not help. Has been keeping ankle up, has not been using ice or heat as she has done this in the past and it hasn't worked. She reports that the podiatrist had recommended orthotic shoes, but insurance is not able to cover this. She is on MA and has limited income.     Has been thirsty with dry mouth for 2 days. Drinking 7-10 cups of water a day. Has incontinence, urgency, frequency. Last antibiotic will be tomorrow. Denies any dysuria. Vaginal iching 4 days ago, resolved. No fever, chills, nausea, vomiting, abdominal pain.     Went to ED on Saturday because she couldn't talk and was evaluated for stroke. She had a negative workup and was discharged to home, but it was recommended that she stay overnight for observation as her symptoms were concerning and improved.      An [place]  was used for  this visit.      Problem, Medication and Allergy Lists were   reviewed and are current.     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        Mixed hyperlipidemia 03/03/2017     Priority: Medium     Secondary hyperparathyroidism (H) 07/25/2016     Priority: Medium      Abdominal pain 04/05/2016     Priority: Medium     Constipation, chronic 04/05/2016     Priority: Medium     Urinary tract infection due to extended-spectrum beta lactamase (ESBL)-producing Klebsiella 12/23/2015     Priority: Medium     Pseudophakia - Left Eye 02/05/2015     Priority: Medium     Shoulder joint pain 11/06/2014     Priority: Medium     Vitamin D deficiency 09/17/2014     Priority: Medium     Problem list name updated by automated process. Provider to review       Advanced directives, counseling/discussion 10/30/2013     Priority: Medium     POLST on file October 30, 2013  Full code       Ganglion cyst 10/02/2013     Priority: Medium     Immunosuppressed status (H)      Priority: Medium     Cystitis cystica 07/03/2013     Priority: Medium     Stroke, hemorrhagic (H)      Priority: Medium     STOP aspirin. She has a hx of hemorrhagic stroke, and her 10 yr risk of MI is only 3% and wouldn't rec treating w/ ASA unless >7.5%.        Osteoporosis      Priority: Medium     Problem list name updated by automated process. Provider to review and confirm  [employer] Update utility       Overweight 04/09/2013     Priority: Medium     Problem list name updated by automated process. Provider to review       CKD (chronic kidney disease) stage 3, GFR 30-59 ml/min      Priority: Medium     Cr. 1.3-1.6. Sees Dr. Wells. Next visit Aug 2015.       Hypertension, renal      Priority: Medium     Liver replaced by transplant      Priority: Medium     2011, due to Hep C, follows w/ Dr. Anne q6mos  MM should be taken on an empty stomach, avoid antacids.   Cyclosporine goal levels ~100ng/dL per transplant surgeon Dr. Fortune Dec 2015       High risk medication use      Priority: Medium     Anemia in CKD (chronic kidney disease)      Priority: Medium     Hyperlipidemia LDL goal <160 04/28/2011     Priority: Medium     PVD (POSTERIOR VITREOUS DETACHMENT) OS 02/28/2011     Priority: Medium   ,     Current Outpatient Prescriptions    Medication Sig Dispense Refill     nitroFURantoin, macrocrystal-monohydrate, (MACROBID) 100 MG capsule Take 1 capsule (100 mg) by mouth 2 times daily 14 capsule 0     multivitamin, therapeutic with minerals (MULTI-VITAMIN) TABS tablet Take 1 tablet by mouth daily 100 tablet 11     REGULOID 0.52 G capsule Take 3 capsules (1.56 g) by mouth 2 times daily 540 capsule 3     acetaminophen (TYLENOL) 325 MG tablet Take 1-2 tablets (325-650 mg) by mouth every 6 hours as needed Max 6 tablets per 24 hours 100 tablet 3     lidocaine (LIDODERM) 5 % Patch Apply up to 3 patches to painful area at once for up to 12 h within a 24 h period.  Remove after 12 hours. 30 patch 1     carboxymethylcellulose (CELLUVISC/REFRESH LIQUIGEL) 1 % ophthalmic solution Place 1 drop into both eyes 4 times daily 15 each 11     prednisoLONE acetate (PRED FORTE) 1 % ophthalmic susp Use in operative eye four times a day  For 4 days 1 Bottle 0     calcium-vitamin D (CALCIUM 600 + D) 600-400 MG-UNIT per tablet Take 1 tablet by mouth 2 times daily 60 tablet 11     cholecalciferol (VITAMIN  -D) 1000 UNITS capsule Take 2 capsules (2,000 Units) by mouth daily 180 capsule 3     senna-docusate (SENOKOT-S;PERICOLACE) 8.6-50 MG per tablet Take 2 tablets by mouth 2 times daily For constipation 120 tablet 6     cycloSPORINE modified (GENERIC EQUIVALENT) 25 MG capsule TAKE 4 CAPSULES BY MOUTH EVERY 12 HOURS 240 capsule 3     mycophenolate (CELLCEPT - GENERIC EQUIVALENT) 250 MG capsule TAKE TWO CAPSULES BY MOUTH EVERY 12 HOURS 120 capsule 4     Menthol 10 MG LOZG Take 1 lozenge by mouth every 2 hours as needed 120 lozenge 0     lidocaine (LIDODERM) 5 % Patch Apply up to 3 patches to painful area at once for up to 12 h within a 24 h period.  Remove after 12 hours. 30 patch 0     Menthol, Topical Analgesic, 4 % GEL Externally apply topically 3 times daily as needed 118 mL 0     omeprazole (PRILOSEC) 20 MG CR capsule Take 1 capsule (20 mg) by mouth 2 times daily 180  capsule 3     alendronate (FOSAMAX) 70 MG tablet Take 1 tablet (70 mg) by mouth every 7 days Take at least 60 min before breakfast with over 8 oz water and stay upright for at least 30 min. after dose. 12 tablet 3     sodium chloride (OCEAN) 0.65 % nasal spray Spray 1 spray into both nostrils daily as needed for congestion 30 mL 1     benzocaine-menthol (CHLORASEPTIC) 6-10 MG lozenge Place 1 lozenge inside cheek every 2 hours as needed for moderate pain 36 lozenge 0     lidocaine (XYLOCAINE) 5 % ointment Apply topically 2 times daily Apply to affected area for pain two times daily 70 g 1     mycophenolate (CELLCEPT - GENERIC EQUIVALENT) 250 MG capsule Take 2 capsules (500 mg) by mouth every 12 hours 120 capsule 11     melatonin 3 MG tablet Take 1 tablet (3 mg) by mouth nightly as needed for sleep 100 tablet 3     order for DME Equipment being ordered: 2 pairs of black stabilizer socks (beige okay if black not available). Size XL. 2 each 0     order for DME Equipment being ordered: Compression stockings below knee bilateral 2 each 0     order for DME Equipment being ordered: Back Stabilizer 1 each 0     order for DME Equipment being ordered: 4 Wheeled Walker with Seat and Brakes 1 Units 0     order for DME 2-wheeled walker (dx: gait instability and Frequent falls). 1 Units 0     albuterol (PROAIR HFA, PROVENTIL HFA, VENTOLIN HFA) 108 (90 BASE) MCG/ACT inhaler Inhale 2 puffs into the lungs 4 times daily 1 Inhaler 1     order for DME Equipment being ordered: compression stockings bilateral  Please measure and fit patient for stockings   Strength 15-30mmHg  Disp 2 pairs  1 refill over the time of 1 year 2 Units 1     order for DME Equipment being ordered: cane 1 Units 0     bisacodyl (DULCOLAX) 5 MG EC tablet Take 1 tablet (5 mg) by mouth daily as needed for constipation 30 tablet 11     carboxymethylcellul-glycerin (OPTIVE/REFRESH OPTIVE) 0.5-0.9 % SOLN ophthalmic solution Place 1 drop into both eyes 4 times daily 1  each 11     Psyllium 500 MG CAPS Take 3 capsules by mouth 2 times daily 180 capsule 11     sodium phosphate (FLEET ENEMA) 7-19 GM/118ML rectal enema Place 1 Bottle (1 enema) rectally daily as needed for constipation 3 Bottle 11     GENGRAF 25 MG PO CAPSULE Take 4 capsules (100 mg) by mouth every 12 hours 240 capsule 0     simethicone (MYLICON) 80 MG chewable tablet Take 1 tablet (80 mg) by mouth every 6 hours as needed for flatulence or cramping 120 tablet 0     STATIN NOT PRESCRIBED, INTENTIONAL, Not prescribed 0 each 0     order for DME Equipment being ordered: Nebulizer with adult mask and tubing 1 Units 0     order for DME Equipment being ordered: cane with padded handle 1 Units 0     [DISCONTINUED] solifenacin (VESICARE) 5 MG tablet Take 1 tablet (5 mg) by mouth daily 30 tablet 12   ,     Allergies   Allergen Reactions     Aspirin      3/31/16 Per pt, tolerates 81 mg daily dose without ADR.     325 mg dose caused itchiness and hives.     Clarithromycin      Allergic reaction         Contrast Dye      Penicillins      Patient is an established patient of this clinic.         Review of Systems:   Review of Systems   Constitutional: Negative for appetite change, chills, fatigue and fever.   HENT: Negative for congestion, sinus pressure and sore throat.    Eyes: Negative for visual disturbance.   Respiratory: Negative for cough, shortness of breath and wheezing.    Cardiovascular: Negative for chest pain and leg swelling.   Gastrointestinal: Negative for abdominal distention, abdominal pain, blood in stool, constipation, diarrhea, nausea and vomiting.   Endocrine: Positive for polydipsia and polyuria.   Genitourinary: Positive for frequency and urgency. Negative for dysuria and hematuria.   Musculoskeletal: Positive for arthralgias (left ankle), gait problem (due to left ankle pain) and joint swelling (left ankle). Negative for myalgias.   Skin: Negative for color change and rash.   Neurological: Negative for  weakness and headaches.             Physical Exam:     Patient Vitals for the past 24 hrs:   BP Temp Pulse Resp   08/08/17 1500 126/90 98.7  F (37.1  C) 70 18     There is no height or weight on file to calculate BMI.  Vitals were reviewed and were normal.     Physical Exam   Constitutional: She is oriented to person, place, and time. She appears well-developed and well-nourished. No distress.   HENT:   Head: Normocephalic and atraumatic.   Mouth/Throat: Oropharynx is clear and moist. No oropharyngeal exudate.   Eyes: Conjunctivae are normal. Pupils are equal, round, and reactive to light. Right eye exhibits no discharge. Left eye exhibits no discharge. No scleral icterus.   Neck: Normal range of motion. Neck supple. No thyromegaly present.   Cardiovascular: Normal rate, regular rhythm and normal heart sounds.    No murmur heard.  Pulmonary/Chest: Effort normal and breath sounds normal. No respiratory distress.   Abdominal: Soft. Bowel sounds are normal. She exhibits no distension. There is no tenderness.   Musculoskeletal: She exhibits edema (trace).        Left ankle: She exhibits decreased range of motion and swelling. She exhibits no ecchymosis, no deformity, no laceration and normal pulse. Tenderness (all along the lateral and medial joint line. ). AITFL and CF ligament tenderness found. Achilles tendon normal.   Patient is not wearing any orthotic inserts, wearing slip on shoes. Has an antalgic gait. Has compression stockings on today.    Lymphadenopathy:     She has no cervical adenopathy.   Neurological: She is alert and oriented to person, place, and time.   Skin: No rash noted. She is not diaphoretic.         Results:     Results for orders placed or performed in visit on 08/08/17   Basic Metabolic Panel ([facility])   Result Value Ref Range    Urea Nitrogen 23.8 (H) 7.0 - 19.0 mg/dL    Calcium 9.7 8.5 - 10.1 mg/dL    Chloride 105.7 98.0 - 110.0 mmol/L    Carbon Dioxide 28.5 20.0 - 32.0 mmol/L    Creatinine  1.0 0.5 - 1.0 mg/dL    Glucose 100.5 (H) 70.0 - 99.0 mg'dL    Potassium 5.1 (H) 3.3 - 4.5 mmol/dL    Sodium 140.7 132.6 - 141.4 mmol/L    GFR Estimate 61.9 >60.0 mL/min/1.7 m2    GFR Estimate If Black 74.9 >60.0 mL/min/1.7 m2   CBC with Diff Plt ([facility])   Result Value Ref Range    WBC 4.3 4.0 - 11.0 K/uL    Lymphocytes # 1.3 0.8 - 5.3 K/uL    % Lymphocytes 29.7 20.0 - 48.0 %L    Mid # 0.3 0.0 - 2.2 K/uL    Mid % 7.4 0.0 - 20.0 %M    GRANULOCYTES # 2.7 1.6 - 8.3 K/uL    % Granulocytes 62.9 40.0 - 75.0 %G    RBC 4.18 3.80 - 5.20 M/uL    Hemoglobin 13.0 11.7 - 15.7 g/dL    Hematocrit 40.5 35.0 - 47.0 %    MCV 97.0 78.0 - 100.0 fL    MCH 31.1 26.5 - 35.0 pg    MCHC 32.1 32.0 - 36.0 g/dL    Platelets 114.0 (L) 150.0 - 450.0 K/uL   Urinalysis (UA) ([facility])   Result Value Ref Range    Specific Gravity Urine 1.015 1.005 - 1.030    pH Urine 6.0 4.5 - 8.0    Leukocyte Esterase UR Negative NEGATIVE    Nitrite Urine Negative NEGATIVE    Protein UR Negative NEGATIVE    Glucose Urine Negative NEGATIVE    Ketones Urine Negative NEGATIVE    Urobilinogen mg/dL 0.2 E.U./dL 0.2 E.U./dL    Bilirubin UR Negative NEGATIVE    Blood UR Negative NEGATIVE   Glucose Casual ([facility])   Result Value Ref Range    Glucose Casual 87.0 51.0 - 200.0 mg/dL     *Note: Due to a large number of results and/or encounters for the requested time period, some results have not been displayed. A complete set of results can be found in Results Review.       Assessment and Plan     Flor is a 52 yo woman with a h/o HLD, Vit D def, Osteoporosis, HTN, secondary hyperparathyroidism, and left peroneal tendonitis who presents to the clinic to request ortho referral for second opinion on left foot pain and increased thirst and urination. Patient does not have a h/o DM, but some of her family members have diabetes. Labs did not show any evidence of diabetes or UTI. Her left foot pain is consistent with peroneal tendonitis. Patient has not been able to get  orthotic shoes as it is not covered by insurance. Does not believe that the inserts helped. She is wearing slip on shoes today which makes her symptoms worse.    1. Liver replaced by transplant  - Will need to follow up with Dr. Anne.     2. Peroneal tendonitis, left  - ORTHOPEDICS ADULT REFERRAL - INTERNAL  - As discussed, informed patient that she may receive the same information as Podiatrist     3. Increased frequency of urination  4. Increased thirst  - Basic Metabolic Panel ([facility])  - CBC with Diff Plt ([facility])  - Urinalysis (UA) ([facility])  - Urine Culture Aerobic Bacterial  - Glucose Casual ([facility])    5. Hyperkalemia   - called patient that she should adhere to a low potassium diet and recheck her K in 1 week.   - BMP ordered.     There are no discontinued medications.  Options for treatment and follow-up care were reviewed with the patient. Flor Navarro  engaged in the decision making process and verbalized understanding of the options discussed and agreed with the final plan.    Karen Downs MD  [facility]  [phone]

## 2017-08-08 NOTE — LETTER
August 10, 2017      Flor Navarro  [address] [id number]        Dear Flor,    Thank you for getting your care at [facility]. Please see below for your test results.    Resulted Orders   Basic Metabolic Panel (hospitals)   Result Value Ref Range    Urea Nitrogen 23.8 (H) 7.0 - 19.0 mg/dL    Calcium 9.7 8.5 - 10.1 mg/dL    Chloride 105.7 98.0 - 110.0 mmol/L    Carbon Dioxide 28.5 20.0 - 32.0 mmol/L    Creatinine 1.0 0.5 - 1.0 mg/dL    Glucose 100.5 (H) 70.0 - 99.0 mg'dL    Potassium 5.1 (H) 3.3 - 4.5 mmol/dL    Sodium 140.7 132.6 - 141.4 mmol/L    GFR Estimate 61.9 >60.0 mL/min/1.7 m2    GFR Estimate If Black 74.9 >60.0 mL/min/1.7 m2   CBC with Diff Plt (hospitals)   Result Value Ref Range    WBC 4.3 4.0 - 11.0 K/uL    Lymphocytes # 1.3 0.8 - 5.3 K/uL    % Lymphocytes 29.7 20.0 - 48.0 %L    Mid # 0.3 0.0 - 2.2 K/uL    Mid % 7.4 0.0 - 20.0 %M    GRANULOCYTES # 2.7 1.6 - 8.3 K/uL    % Granulocytes 62.9 40.0 - 75.0 %G    RBC 4.18 3.80 - 5.20 M/uL    Hemoglobin 13.0 11.7 - 15.7 g/dL    Hematocrit 40.5 35.0 - 47.0 %    MCV 97.0 78.0 - 100.0 fL    MCH 31.1 26.5 - 35.0 pg    MCHC 32.1 32.0 - 36.0 g/dL    Platelets 114.0 (L) 150.0 - 450.0 K/uL   Urinalysis (UA) (hospitals)   Result Value Ref Range    Specific Gravity Urine 1.015 1.005 - 1.030    pH Urine 6.0 4.5 - 8.0    Leukocyte Esterase UR Negative NEGATIVE    Nitrite Urine Negative NEGATIVE    Protein UR Negative NEGATIVE    Glucose Urine Negative NEGATIVE    Ketones Urine Negative NEGATIVE    Urobilinogen mg/dL 0.2 E.U./dL 0.2 E.U./dL    Bilirubin UR Negative NEGATIVE    Blood UR Negative NEGATIVE   Urine Culture Aerobic Bacterial   Result Value Ref Range    Specimen Description Unspecified Urine     Special Requests Specimen received in preservative     Culture Micro       10,000 to 50,000 colonies/mL mixed urogenital fredi    Micro Report Status FINAL 08/09/2017    Glucose Casual ([facility])   Result Value Ref Range    Glucose Casual  87.0 51.0 - 200.0 mg/dL     Your labs show that your potassium is elevated. We will need to recheck this to see if there is any change as you stick to a low potassium diet.     If you have any concerns about these results please call and leave a message for me or send a [employer] message to the clinic.    Sincerely,    Karen Downs MD

## 2017-08-08 NOTE — PATIENT INSTRUCTIONS
Here is the plan from today's visit    1. Liver replaced by transplant  - you will need to follow up with Dr. Anne.     2. Peroneal tendonitis, left  - ORTHOPEDICS ADULT REFERRAL - INTERNAL  - I will see if we can get you in this week.   - As we discussed, they may tell you the same information as your Podiatrist     3. Increased frequency of urination  - Basic Metabolic Panel ([facility])  - CBC with Diff Plt ([facility])  - Urinalysis (UA) ([facility])  - Urine Culture Aerobic Bacterial  - Glucose Casual ([facility])    4. Increased thirst  - Basic Metabolic Panel ([facility])  - CBC with Diff Plt ([facility])  - Urinalysis (UA) ([facility])  - Urine Culture Aerobic Bacterial  - Glucose Casual ([facility])    I will call your son, Carleen, if your labs are abnormal at [phone]. Otherwise I will send you a letter with your lab results.     Please call or return to clinic if your symptoms don't go away.    Follow up plan  Please make a clinic appointment for follow up with me (BUCK LAMBERT) in 1  week for follow up.    Thank you for coming to [facility] today.  Lab Testing:  **If you had lab testing today and your results are reassuring or normal they will be mailed to you or sent through [facility] within 7 days.   **If the lab tests need quick action we will call you with the results.  The phone number we will call with results is # [phone] (home) . If this is not the best number please call our clinic and change the number.  Medication Refills:  If you need any refills please call your pharmacy and they will contact us.   If you need to  your refill at a new pharmacy, please contact the new pharmacy directly. The new pharmacy will help you get your medications transferred faster.   Scheduling:  If you have any concerns about today's visit or wish to schedule another appointment please call our office during normal business hours [phone] (8-5:00 M-F)    Medical Concerns:  If you have urgent  medical concerns please call [phone] at any time of the day.  If you have a medical emergency please call 911.    Discharge Instructions: Eating a Low-Potassium Diet  Your health care provider has prescribed a low-potassium diet for you. This kind of diet is advised for people who have certain kidney problems. Potassium is needed for muscle function. But too much potassium is a health risk. Potassium is found in many foods. Read below to find out how to change your diet.  Foods to limit  Some foods are high in potassium. Limit your daily intake of the foods in the list below.    Fruits: apricots (canned and fresh), bananas, cantaloupe, honeydew melon, kiwi, nectarines, pomegranates, oranges, orange juice, pears, dried fruits (apricots, dates, figs, prunes), and prune juice    Vegetables: asparagus, avocado, artichoke, bamboo shoots, beets, brussels sprouts, cabbage, celery, chard, okra, potatoes (white and sweet), pumpkin, rutabaga, spinach (cooked), squash, tomato, tomato sauce, tomato juice, and vegetable juice cocktail    Legumes: black-eyed peas, chickpeas, lentils, lima beans, navy beans, red kidney beans, soybeans, and split peas    Nuts and seeds: almonds, Brazil nuts, cashews, peanuts, peanut butter, pecans, pumpkin seeds, sunflower seeds, and walnuts    Breads and cereals: bran and whole-grain products    Dairy foods: milk, cheese, ice cream, yogurt    Animal protein: all forms of animal protein    Other: chocolate, cocoa, coconut milk, and molasses  Tips    Ask your health care provider how much potassium you are allowed each day. This will help you figure out serving sizes for your needs.    Check labels for potassium. It may be listed as potassium chloride.    Do not use salt substitutes. These often have potassium in them.    Cook frozen fruits and vegetables in water. Rinse and drain them well before eating.    Drain liquid from all canned fruits and vegetables. Rinse them before eating.    Reduce  the potassium in potatoes. Peel them, slice thinly, and soak in water for at least 4 hours.    Reduce the potassium in green leafy vegetables. Soak them in water for at least 4 hours.    Eat white rice and refined white flour products. These include white bread, pasta, and grits.  Follow-up  Make a follow-up appointment as advised by our staff.  When to call your health care provider  Call your health care provider right away if you have any of the following:    Fatigue    Shortness of breath    Chest pain    Slow, irregular heartbeat    Fainting    Dizziness    Lightheadedness    Confusion   Date Last Reviewed: 6/21/2015 2000-2017 [employer]. [address] [place] [address], [place] [address]. All rights reserved. This information is not intended as a substitute for professional medical care. Always follow your healthcare professional's instructions.        Hyperkalemia  Hyperkalemia is too much potassium in the blood. This most often occurs in people who take certain medicines or people with kidney disease.  This condition often has no symptoms until levels of potassium become high. If symptoms do occur, they include muscle weakness and changes in the heartbeat. A blood test is done to diagnose the problem. An ECG (electrocardiogram) may also be done to test the heartbeat.  If hyperkalemia is caused by a medicine, the healthcare provider may lower the dose or switch to a different medicine. A low-potassium diet may also be prescribed.   Home care    Be sure your healthcare provider knows about all of the medicines you take. Follow your healthcare provider's advice about making changes to your medicines.    If a low-potassium diet has been prescribed, follow this closely. If you need help, ask to be referred to a dietitian for advice on how to follow this diet.  Follow-up care  Follow up with your healthcare provider. You may need a repeat blood test within the next 7 days. Schedule this as advised.  When  to seek medical advice  Call your healthcare provider if any of the following occur:    Weakness, dizziness, or lightheadedness    Nausea, vomiting, or diarrhea    Urinating only in small amounts or not urinating    Symptoms don't go away or get worse  Call 911  Call 911 or emergency services right away if any of the following occur:    Fast or irregular heartbeat    Fainting    Severe shortness of breath    Chest, arm, shoulder, neck or upper back pain    Trouble controlling your muscles  Date Last Reviewed: 6/26/2015 2000-2017 [employer]. [address] [place], [place] [id number]. All rights reserved. This information is not intended as a substitute for professional medical care. Always follow your healthcare professional's instructions.

## 2017-08-08 NOTE — MR AVS SNAPSHOT
After Visit Summary   8/8/2017    Flor Navarro    MRN: [id number]           Patient Information     Date Of Birth          1965        Visit Information        Provider Department      8/8/2017 11:00 AM Buck Lambert MD [facility]        Today's Diagnoses     Liver replaced by transplant    -  1    Peroneal tendonitis, left        Increased frequency of urination        Increased thirst          Care Instructions    Here is the plan from today's visit    1. Liver replaced by transplant  - you will need to follow up with Dr. Anne.     2. Peroneal tendonitis, left  - ORTHOPEDICS ADULT REFERRAL - INTERNAL  - I will see if we can get you in this week.   - As we discussed, they may tell you the same information as your Podiatrist     3. Increased frequency of urination  - Basic Metabolic Panel ([facility])  - CBC with Diff Plt ([facility])  - Urinalysis (UA) ([facility])  - Urine Culture Aerobic Bacterial  - Glucose Casual ([facility])    4. Increased thirst  - Basic Metabolic Panel ([facility])  - CBC with Diff Plt ([facility])  - Urinalysis (UA) ([facility])  - Urine Culture Aerobic Bacterial  - Glucose Casual ([facility])    I will call your son, Carleen, if your labs are abnormal at [phone]. Otherwise I will send you a letter with your lab results.     Please call or return to clinic if your symptoms don't go away.    Follow up plan  Please make a clinic appointment for follow up with me (BUCK LAMBERT) in 1  week for follow up.    Thank you for coming to [facility] today.  Lab Testing:  **If you had lab testing today and your results are reassuring or normal they will be mailed to you or sent through [employer] within 7 days.   **If the lab tests need quick action we will call you with the results.  The phone number we will call with results is # [phone] (home) . If this is not the best number please call our clinic and change the number.  Medication  Refills:  If you need any refills please call your pharmacy and they will contact us.   If you need to  your refill at a new pharmacy, please contact the new pharmacy directly. The new pharmacy will help you get your medications transferred faster.   Scheduling:  If you have any concerns about today's visit or wish to schedule another appointment please call our office during normal business hours [phone] (8-5:00 M-F)    Medical Concerns:  If you have urgent medical concerns please call [phone] at any time of the day.  If you have a medical emergency please call [phone].            Follow-ups after your visit        Additional Services     ORTHOPEDICS ADULT REFERRAL - INTERNAL       Your provider has referred you to: [facility]: Orthopaedic Clinic - [place] [phone]   [email or web address]      Please be aware that coverage of these services is subject to the terms and limitations of your health insurance plan.  Call member services at your health plan with any benefit or coverage questions.      Please bring the following to your appointment:    >>   Any x-rays, CTs or MRIs which have been performed.  Contact the facility where they were done to arrange for  prior to your scheduled appointment.    >>   List of current medications   >>   This referral request   >>   Any documents/labs given to you for this referral      Patient would like a second opinion on left ankle pain. Seeing podiatry. Unable to get orthotics covered by insurance. Would like to explore other options if possible.                  Your next 10 appointments already scheduled     Aug 28, 2017  1:00 PM CDT   (Arrive by 12:45 PM)   Return Visit with Enrrique Olivares DPM   [facility] ([facility])    [address] [place] [id number]   [phone]            Sep 01, 2017 10:00 AM CDT   Lab with  LAB   [facility] [place]    [address]  1st Floor  [place] [facility] [address] [phone]            Sep 01, 2017 11:00 AM CDT   (Arrive by 10:45 AM)   Return Liver Transplant with Laron Anne MD   [facility] ([facility])    [address]  3rd Floor  [place] [id number] [phone]              Who to contact     Please call your clinic at [phone] to:    Ask questions about your health    Make or cancel appointments    Discuss your medicines    Learn about your test results    Speak to your doctor   If you have compliments or concerns about an experience at your clinic, or if you wish to file a complaint, please contact [facility] Patient Relations at [phone] or email us at [email or web address]         Additional Information About Your Visit        [email or web address] Information     jigl is an electronic gateway that provides easy, online access to your medical records. With jigl, you can request a clinic appointment, read your test results, renew a prescription or communicate with your care team.     To sign up for jigl visit the website at [email or web address]   You will be asked to enter the access code listed below, as well as some personal information. Please follow the directions to create your username and password.     Your access code is: [email or web address]  Expires: 9/3/2017  6:31 AM     Your access code will  in 90 days. If you need help or a new code, please contact your [facility] or call [phone] for assistance.        Care EveryWhere ID     This is your Care EveryWhere ID. This could be used by other organizations to access your [facility] medical records  [id number]         Blood Pressure from Last 3 Encounters:   17 (!) 147/99   17 129/82   17 117/77    Weight from Last 3 Encounters:   17 210 lb 4.8 oz (95.4 kg)   17  208 lb (94.3 kg)   06/16/17 208 lb 3.2 oz (94.4 kg)              We Performed the Following     Basic Metabolic Panel ([facility])     CBC with Diff Plt ([facility])     Glucose Casual ([facility])     ORTHOPEDICS ADULT REFERRAL - INTERNAL     Urinalysis (UA) ([facility])     Urine Culture Aerobic Bacterial        Primary Care Provider Office Phone # Fax #    Karely Sierra [phone]       [facility] [address] [address]        Equal Access to Services     MAURO ALBA : Hadii aad ku hadasho Soomaali, waaxda luqadaha, qaybta kaalmada adeegyada, waxay idiin hayaan adeeg rico oglesby . So [facility] [phone].    ATENCIÓN: Si habla español, tiene a garcia disposición servicios gratuitos de asistencia lingüística. [facility] [phone].    We comply with applicable federal civil rights laws and [place] laws. We do not discriminate on the basis of race, color, national origin, age, disability sex, sexual orientation or gender identity.            Thank you!     Thank you for choosing [facility]  for your care. Our goal is always to provide you with excellent care. Hearing back from our patients is one way we can continue to improve our services. Please take a few minutes to complete the written survey that you may receive in the mail after your visit with us. Thank you!             Your Updated Medication List - Protect others around you: Learn how to safely use, store and throw away your medicines at www.disposemymeds.org.          This list is accurate as of: 8/8/17 11:59 AM.  Always use your most recent med list.                   Brand Name Dispense Instructions for use Diagnosis    acetaminophen 325 MG tablet    TYLENOL    100 tablet    Take 1-2 tablets (325-650 mg) by mouth every 6 hours as needed Max 6 tablets per 24 hours    Sebaceous cyst       albuterol 108 (90 BASE) MCG/ACT Inhaler    PROAIR HFA/PROVENTIL HFA/VENTOLIN HFA    1 Inhaler    Inhale 2 puffs into  the lungs 4 times daily    Acute bronchitis, unspecified organism       alendronate 70 MG tablet    FOSAMAX    12 tablet    Take 1 tablet (70 mg) by mouth every 7 days Take at least 60 min before breakfast with over 8 oz water and stay upright for at least 30 min. after dose.    Osteoporosis       benzocaine-menthol 6-10 MG lozenge    CHLORASEPTIC    36 lozenge    Place 1 lozenge inside cheek every 2 hours as needed for moderate pain    Throat pain       bisacodyl 5 MG EC tablet    DULCOLAX    30 tablet    Take 1 tablet (5 mg) by mouth daily as needed for constipation    Constipation, chronic       calcium-vitamin D 600-400 MG-UNIT per tablet    calcium 600 + D    60 tablet    Take 1 tablet by mouth 2 times daily    Osteoporosis       carboxymethylcellul-glycerin 0.5-0.9 % Soln ophthalmic solution    OPTIVE/REFRESH OPTIVE    1 each    Place 1 drop into both eyes 4 times daily    Dry eye syndrome of bilateral lacrimal glands       carboxymethylcellulose 1 % ophthalmic solution    CELLUVISC/REFRESH LIQUIGEL    15 each    Place 1 drop into both eyes 4 times daily    Dry eyes, bilateral       cholecalciferol 1000 UNITS capsule    vitamin  -D    180 capsule    Take 2 capsules (2,000 Units) by mouth daily    Osteoporosis, Liver replaced by transplant (H), Vitamin D deficiency, Secondary hyperparathyroidism (H), CKD (chronic kidney disease) stage 3, GFR 30-59 ml/min, Palpitations       * cycloSPORINE modified capsule     240 capsule    Take 4 capsules (100 mg) by mouth every 12 hours    Liver replaced by transplant (H)       * cycloSPORINE modified 25 MG capsule    GENERIC EQUIVALENT    240 capsule    TAKE 4 CAPSULES BY MOUTH EVERY 12 HOURS    Liver replaced by transplant (H)       * lidocaine 5 % ointment    XYLOCAINE    70 g    Apply topically 2 times daily Apply to affected area for pain two times daily    Chronic left-sided thoracic back pain       * lidocaine 5 % Patch    LIDODERM    30 patch    Apply up to 3 patches  to painful area at once for up to 12 h within a 24 h period.  Remove after 12 hours.    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  Remove after 12 hours.    Plantar fasciitis       melatonin 3 MG tablet     100 tablet    Take 1 tablet (3 mg) by mouth nightly as needed for sleep    Primary insomnia       Menthol (Topical Analgesic) 4 % Gel     118 mL    Externally apply topically 3 times daily as needed    Chronic left-sided thoracic back pain, Strain of trapezius muscle, left, initial encounter       Menthol 10 MG Lozg     120 lozenge    Take 1 lozenge by mouth every 2 hours as needed    Cough       multivitamin, therapeutic with minerals Tabs tablet     100 tablet    Take 1 tablet by mouth daily    Liver replaced by transplant (H)       * mycophenolate 250 MG capsule    CELLCEPT - GENERIC EQUIVALENT    120 capsule    Take 2 capsules (500 mg) by mouth every 12 hours    Liver replaced by transplant (H)       * mycophenolate 250 MG capsule    CELLCEPT - GENERIC EQUIVALENT    120 capsule    TAKE TWO CAPSULES BY MOUTH EVERY 12 HOURS    Liver replaced by transplant (H)       nitroFURantoin (macrocrystal-monohydrate) 100 MG capsule    MACROBID    14 capsule    Take 1 capsule (100 mg) by mouth 2 times daily        omeprazole 20 MG CR capsule    priLOSEC    180 capsule    Take 1 capsule (20 mg) by mouth 2 times daily    Gastroesophageal reflux disease, esophagitis presence not specified       * order for DME     1 Units    Equipment being ordered: Nebulizer with adult mask and tubing    Acute bronchitis, unspecified organism       * order for DME     1 Units    Equipment being ordered: cane with padded handle    Gait instability       * order for DME     1 Units    Equipment being ordered: cane    Osteoporosis, Falls frequently       * order for DME     2 Units    Equipment being ordered:  compression stockings bilateral Please measure and fit patient for stockings  Strength 15-30mmHg Disp 2 pairs 1 refill over the time of 1 year    Localized edema       * order for DME     1 Units    2-wheeled walker (dx: gait instability and Frequent falls).    Gait instability, Falls frequently       * order for DME     1 Units    Equipment being ordered: 4 Wheeled Walker with Seat and Brakes    Falls frequently, Gait instability       * order for DME     2 each    Equipment being ordered: Compression stockings below knee bilateral    Bilateral edema of lower extremity       * order for DME     1 each    Equipment being ordered: Back Stabilizer    Chronic midline low back pain without sciatica       * order for DME     2 each    Equipment being ordered: 2 pairs of black stabilizer socks (beige okay if black not available). Size XL.    Bilateral edema of lower extremity       prednisoLONE acetate 1 % ophthalmic susp    PRED FORTE    1 Bottle    Use in operative eye four times a day  For 4 days    Posterior capsular opacification visually significant of both eyes       * Psyllium 500 MG Caps     180 capsule    Take 3 capsules by mouth 2 times daily    Constipation, chronic       * REGULOID 0.52 G capsule   Generic drug:  psyllium     540 capsule    Take 3 capsules (1.56 g) by mouth 2 times daily    Constipation, unspecified constipation type       senna-docusate 8.6-50 MG per tablet    SENOKOT-S;PERICOLACE    120 tablet    Take 2 tablets by mouth 2 times daily For constipation    Constipation, chronic       simethicone 80 MG chewable tablet    MYLICON    120 tablet    Take 1 tablet (80 mg) by mouth every 6 hours as needed for flatulence or cramping    Flatulence, eructation, and gas pain       sodium chloride 0.65 % nasal spray    OCEAN    30 mL    Spray 1 spray into both nostrils daily as needed for congestion    Throat pain       sodium phosphate 7-19 GM/118ML rectal enema     3 Bottle    Place 1 Bottle (1 enema)  rectally daily as needed for constipation    Ileus (H)       STATIN NOT PRESCRIBED (INTENTIONAL)     0 each    Not prescribed    High risk medication use       * Notice:  This list has 18 medication(s) that are the same as other medications prescribed for you. Read the directions carefully, and ask your doctor or other care provider to review them with you.

## 2017-08-09 LAB
BACTERIA SPEC CULT: NORMAL
Lab: NORMAL
MICRO REPORT STATUS: NORMAL
SPECIMEN SOURCE: NORMAL

## 2017-08-14 NOTE — PROGRESS NOTES
Preceptor Attestation:   Patient seen and discussed with the resident. Assessment and plan reviewed with resident and agreed upon.   Supervising Physician:  Mele Hernandez MD  [facility]

## 2017-08-16 ENCOUNTER — OFFICE VISIT (OUTPATIENT)
Dept: FAMILY MEDICINE | Facility: CLINIC | Age: 52
End: 2017-08-16

## 2017-08-16 VITALS
BODY MASS INDEX: 39.49 KG/M2 | DIASTOLIC BLOOD PRESSURE: 77 MMHG | OXYGEN SATURATION: 96 % | RESPIRATION RATE: 18 BRPM | WEIGHT: 209 LBS | SYSTOLIC BLOOD PRESSURE: 112 MMHG | TEMPERATURE: 97.6 F | HEART RATE: 68 BPM

## 2017-08-16 DIAGNOSIS — M25.562 CHRONIC PAIN OF BOTH KNEES: ICD-10-CM

## 2017-08-16 DIAGNOSIS — M72.2 PLANTAR FASCIITIS: ICD-10-CM

## 2017-08-16 DIAGNOSIS — R10.11 RUQ ABDOMINAL PAIN: ICD-10-CM

## 2017-08-16 DIAGNOSIS — G89.29 CHRONIC LOW BACK PAIN WITHOUT SCIATICA, UNSPECIFIED BACK PAIN LATERALITY: ICD-10-CM

## 2017-08-16 DIAGNOSIS — M79.89 LEG SWELLING: ICD-10-CM

## 2017-08-16 DIAGNOSIS — M54.50 CHRONIC LOW BACK PAIN WITHOUT SCIATICA, UNSPECIFIED BACK PAIN LATERALITY: ICD-10-CM

## 2017-08-16 DIAGNOSIS — E87.5 HYPERKALEMIA: Primary | ICD-10-CM

## 2017-08-16 DIAGNOSIS — G89.29 CHRONIC PAIN OF BOTH KNEES: ICD-10-CM

## 2017-08-16 DIAGNOSIS — M25.561 CHRONIC PAIN OF BOTH KNEES: ICD-10-CM

## 2017-08-16 LAB
ANION GAP SERPL CALC-SCNC: 9.4 MMOL/L (ref 6–17)
CHLORIDE SERPLBLD-SCNC: 104.5 MMOL/L (ref 98–110)
CO2 SERPL-SCNC: 26.1 MMOL/L (ref 20–32)
MAGNESIUM SERPL-MCNC: 2.1 MG/DL (ref 1.6–2.3)
POTASSIUM SERPL-SCNC: 4.7 MMOL/DL (ref 3.3–4.5)
SODIUM SERPL-SCNC: 140 MMOL/L (ref 132.6–141.4)

## 2017-08-16 RX ORDER — LIDOCAINE 50 MG/G
PATCH TOPICAL
Qty: 30 PATCH | Refills: 1 | Status: SHIPPED | OUTPATIENT
Start: 2017-08-16 | End: 2017-09-12

## 2017-08-16 ASSESSMENT — ENCOUNTER SYMPTOMS
NAUSEA: 0
SHORTNESS OF BREATH: 0
CHILLS: 0
DIARRHEA: 0
COUGH: 0
HEMATURIA: 0
FATIGUE: 0
APPETITE CHANGE: 0
BACK PAIN: 1
FEVER: 0
HEADACHES: 0
BLOOD IN STOOL: 0
ARTHRALGIAS: 1
DYSURIA: 0
SINUS PRESSURE: 0
NERVOUS/ANXIOUS: 1
COLOR CHANGE: 0
ABDOMINAL PAIN: 1
WEAKNESS: 0
WHEEZING: 0
JOINT SWELLING: 0
SORE THROAT: 0
ABDOMINAL DISTENTION: 0
MYALGIAS: 0
VOMITING: 0
CONSTIPATION: 0

## 2017-08-16 NOTE — MR AVS SNAPSHOT
After Visit Summary   8/16/2017    Flor Navarro    MRN: [id number]           Patient Information     Date Of Birth          1965        Visit Information        Provider Department      8/16/2017 8:40 AM Buck Downs MD [facility]        Today's Diagnoses     Hyperkalemia    -  1    RUQ abdominal pain        Plantar fasciitis        Leg swelling        Chronic low back pain without sciatica, unspecified back pain laterality        Chronic pain of both knees          Care Instructions    Here is the plan from today's visit    1. Hyperkalemia  - Electrolyte Panel ([facility] [place])  - Magnesium    2. RUQ abdominal pain  - If this gets worse, then come back to the clinic and we will need to get more blood work and possibly another ultrasound.     3. Plantar fasciitis  - lidocaine (LIDODERM) 5 % Patch; Apply up to 3 patches to painful area at once for up to 12 h within a 24 h period.  Remove after 12 hours.  Dispense: 30 patch; Refill: 1    4. Leg swelling  - Elastic Bandages & Supports (MEDICAL COMPRESSION SOCKS) MISC; 1 Units daily  Dispense: 2 each; Refill: 0    5. Chronic low back pain without sciatica, unspecified back pain laterality  - LUMBAR ORTHOSIS, FLEXIBLE, PROVIDES LUMBAR SUPPORT, POSTERIOR EXTENDS FROM L-1    6. Chronic pain of both knees  - Elastic Bandages & Supports (KNEE BRACE/FLEX STAYS LARGE) MISC; 1 Units daily  Dispense: 2 each; Refill: 0      Please call or return to clinic if your symptoms don't go away.    Follow up plan  Please make a clinic appointment for follow up with me (BUCK DOWNS) in 3-4  weeks for follow up.    Thank you for coming to [facility] today.  Lab Testing:  **If you had lab testing today and your results are reassuring or normal they will be mailed to you or sent through [employer] within 7 days.   **If the lab tests need quick action we will call you with the results.  The phone number we will call with results is #  [phone] (home) . If this is not the best number please call our clinic and change the number.  Medication Refills:  If you need any refills please call your pharmacy and they will contact us.   If you need to  your refill at a new pharmacy, please contact the new pharmacy directly. The new pharmacy will help you get your medications transferred faster.   Scheduling:  If you have any concerns about today's visit or wish to schedule another appointment please call our office during normal business hours [phone] (8-5:00 M-F)  If a referral was made to a [facility] and you don't get a call from central scheduling please call [phone].  If a Mammogram was ordered for you at [facility] call [phone] to schedule or change your appointment.  If you had an XRay/CT/Ultrasound/MRI ordered the number is [phone] to schedule or change your radiology appointment.   Medical Concerns:  If you have urgent medical concerns please call [phone] at any time of the day.  If you have a medical emergency please call 911.    Discharge Instructions: Eating a Low-Potassium Diet  Your health care provider has prescribed a low-potassium diet for you. This kind of diet is advised for people who have certain kidney problems. Potassium is needed for muscle function. But too much potassium is a health risk. Potassium is found in many foods. Read below to find out how to change your diet.  Foods to limit  Some foods are high in potassium. Limit your daily intake of the foods in the list below.    Fruits: apricots (canned and fresh), bananas, cantaloupe, honeydew melon, kiwi, nectarines, pomegranates, oranges, orange juice, pears, dried fruits (apricots, dates, figs, prunes), and prune juice    Vegetables: asparagus, avocado, artichoke, bamboo shoots, beets, brussels sprouts, cabbage, celery, chard, okra, potatoes (white and sweet), pumpkin, rutabaga, spinach (cooked), squash, tomato,  tomato sauce, tomato juice, and vegetable juice cocktail    Legumes: black-eyed peas, chickpeas, lentils, lima beans, navy beans, red kidney beans, soybeans, and split peas    Nuts and seeds: almonds, Brazil nuts, cashews, peanuts, peanut butter, pecans, pumpkin seeds, sunflower seeds, and walnuts    Breads and cereals: bran and whole-grain products    Dairy foods: milk, cheese, ice cream, yogurt    Animal protein: all forms of animal protein    Other: chocolate, cocoa, coconut milk, and molasses  Tips    Ask your health care provider how much potassium you are allowed each day. This will help you figure out serving sizes for your needs.    Check labels for potassium. It may be listed as potassium chloride.    Do not use salt substitutes. These often have potassium in them.    Cook frozen fruits and vegetables in water. Rinse and drain them well before eating.    Drain liquid from all canned fruits and vegetables. Rinse them before eating.    Reduce the potassium in potatoes. Peel them, slice thinly, and soak in water for at least 4 hours.    Reduce the potassium in green leafy vegetables. Soak them in water for at least 4 hours.    Eat white rice and refined white flour products. These include white bread, pasta, and grits.  Follow-up  Make a follow-up appointment as advised by our staff.  When to call your health care provider  Call your health care provider right away if you have any of the following:    Fatigue    Shortness of breath    Chest pain    Slow, irregular heartbeat    Fainting    Dizziness    Lightheadedness    Confusion   Date Last Reviewed: 6/21/2015 2000-2017 [employer]. [address] [place] [address]. All rights reserved. This information is not intended as a substitute for professional medical care. Always follow your healthcare professional's instructions.                Follow-ups after your visit        Your next 10 appointments already scheduled     Aug 28, 2017   1:00 PM CDT   (Arrive by 12:45 PM)   Return Visit with Enrrique Olivares DPM   [facility] ([facility])    [address]  5th Floor  [facility] [id number] [phone]            Sep 01, 2017 10:00 AM CDT   Lab with [facility]   [facility] ([facility])    [address]  1st Floor  [facility] [id number] [phone]            Sep 01, 2017 11:00 AM CDT   (Arrive by 10:45 AM)   Return Liver Transplant with Laron Anne MD   [facility] ([facility])    [address]  3rd Floor  [facility] [id number] [phone]              Who to contact     Please call your clinic at [phone] to:    Ask questions about your health    Make or cancel appointments    Discuss your medicines    Learn about your test results    Speak to your doctor   If you have compliments or concerns about an experience at your clinic, or if you wish to file a complaint, please contact [facility] Patient Relations at [phone] or email us at [email or web address]         Additional Information About Your Visit        [employer] Information     [employer] is an electronic gateway that provides easy, online access to your medical records. With [employer], you can request a clinic appointment, read your test results, renew a prescription or communicate with your care team.     To sign up for [employer] visit the website at [email or web address]   You will be asked to enter the access code listed below, as well as some personal information. Please follow the directions to create your username and password.     Your access code is: [email or web address]  Expires: 9/3/2017  6:31 AM     Your access code will  in 90 days. If you need help or a new code, please contact your [facility] or call [phone] for assistance.        Care EveryWhere ID     This is  your Care EveryWhere ID. This could be used by other organizations to access your [place] medical records  [id number]        Your Vitals Were     Pulse Temperature Respirations Pulse Oximetry Breastfeeding? BMI (Body Mass Index)    68 97.6  F (36.4  C) (Oral) 18 96% No 39.49 kg/m2       Blood Pressure from Last 3 Encounters:   08/16/17 112/77   08/08/17 126/90   07/27/17 (!) 147/99    Weight from Last 3 Encounters:   08/16/17 209 lb (94.8 kg)   07/27/17 210 lb 4.8 oz (95.4 kg)   06/26/17 208 lb (94.3 kg)              We Performed the Following     Electrolyte Panel ([facility])     LUMBAR ORTHOSIS, FLEXIBLE, PROVIDES LUMBAR SUPPORT, POSTERIOR EXTENDS FROM L-1     Magnesium          Today's Medication Changes          These changes are accurate as of: 8/16/17  9:34 AM.  If you have any questions, ask your nurse or doctor.               Start taking these medicines.        Dose/Directions    * Medical Compression Socks Misc   Used for:  Leg swelling   Started by:  Karen Downs MD        Dose:  1 Units   1 Units daily   Quantity:  2 each   Refills:  0       * Knee Brace/Flex Stays Large Misc   Used for:  Chronic pain of both knees   Started by:  Karen Downs MD        Dose:  1 Units   1 Units daily   Quantity:  2 each   Refills:  0       * Notice:  This list has 2 medication(s) that are the same as other medications prescribed for you. Read the directions carefully, and ask your doctor or other care provider to review them with you.         Where to get your medicines      These medications were sent to [facility] [place], [place] - [address] [place] [address] [address] [address]    Hours:  TRANSPLANT PHONE NUMBER [phone] Phone:  [phone]     lidocaine 5 % Patch         Some of these will need a paper prescription and others can be bought over the counter.  Ask your nurse if you have questions.     Bring a paper prescription for each  of these medications     Knee Brace/Flex Stays Large [facility]    Medical Compression Socks Misc                Primary Care Provider Office Phone # Fax #    Karely Sierra [phone]       [address] [address]        Equal Access to Services     MAURO ALBA : Hadii florecita chiang feliciao Soudayali, waaxda luqadaha, qaybta kaalmada aderoseanne, mart valeriein hayaajudit verapetrona gómez reny piña. So [facility] [phone].    ATENCIÓN: Si habla español, tiene a garcia disposición servicios gratuitos de asistencia lingüística. [facility] [phone].    We comply with applicable federal civil rights laws and [place] laws. We do not discriminate on the basis of race, color, national origin, age, disability sex, sexual orientation or gender identity.            Thank you!     Thank you for choosing [facility]  for your care. Our goal is always to provide you with excellent care. Hearing back from our patients is one way we can continue to improve our services. Please take a few minutes to complete the written survey that you may receive in the mail after your visit with us. Thank you!             Your Updated Medication List - Protect others around you: Learn how to safely use, store and throw away your medicines at www.disposemymeds.org.          This list is accurate as of: 8/16/17  9:34 AM.  Always use your most recent med list.                   Brand Name Dispense Instructions for use Diagnosis    acetaminophen 325 MG tablet    TYLENOL    100 tablet    Take 1-2 tablets (325-650 mg) by mouth every 6 hours as needed Max 6 tablets per 24 hours    Sebaceous cyst       albuterol 108 (90 BASE) MCG/ACT Inhaler    PROAIR HFA/PROVENTIL HFA/VENTOLIN HFA    1 Inhaler    Inhale 2 puffs into the lungs 4 times daily    Acute bronchitis, unspecified organism       alendronate 70 MG tablet    FOSAMAX    12 tablet    Take 1 tablet (70 mg) by mouth every 7 days Take at least 60 min before breakfast with over  8 oz water and stay upright for at least 30 min. after dose.    Osteoporosis       benzocaine-menthol 6-10 MG lozenge    CHLORASEPTIC    36 lozenge    Place 1 lozenge inside cheek every 2 hours as needed for moderate pain    Throat pain       bisacodyl 5 MG EC tablet    DULCOLAX    30 tablet    Take 1 tablet (5 mg) by mouth daily as needed for constipation    Constipation, chronic       calcium-vitamin D 600-400 MG-UNIT per tablet    calcium 600 + D    60 tablet    Take 1 tablet by mouth 2 times daily    Osteoporosis       carboxymethylcellul-glycerin 0.5-0.9 % Soln ophthalmic solution    OPTIVE/REFRESH OPTIVE    1 each    Place 1 drop into both eyes 4 times daily    Dry eye syndrome of bilateral lacrimal glands       carboxymethylcellulose 1 % ophthalmic solution    CELLUVISC/REFRESH LIQUIGEL    15 each    Place 1 drop into both eyes 4 times daily    Dry eyes, bilateral       cholecalciferol 1000 UNITS capsule    vitamin  -D    180 capsule    Take 2 capsules (2,000 Units) by mouth daily    Osteoporosis, Liver replaced by transplant (H), Vitamin D deficiency, Secondary hyperparathyroidism (H), CKD (chronic kidney disease) stage 3, GFR 30-59 ml/min, Palpitations       * cycloSPORINE modified capsule     240 capsule    Take 4 capsules (100 mg) by mouth every 12 hours    Liver replaced by transplant (H)       * cycloSPORINE modified 25 MG capsule    GENERIC EQUIVALENT    240 capsule    TAKE 4 CAPSULES BY MOUTH EVERY 12 HOURS    Liver replaced by transplant (H)       * lidocaine 5 % ointment    XYLOCAINE    70 g    Apply topically 2 times daily Apply to affected area for pain two times daily    Chronic left-sided thoracic back pain       * lidocaine 5 % Patch    LIDODERM    30 patch    Apply up to 3 patches to painful area at once for up to 12 h within a 24 h period.  Remove after 12 hours.    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  Remove after 12 hours.    Plantar fasciitis       * Medical Compression Socks Misc     2 each    1 Units daily    Leg swelling       * Knee Brace/Flex Stays Large Misc     2 each    1 Units daily    Chronic pain of both knees       melatonin 3 MG tablet     100 tablet    Take 1 tablet (3 mg) by mouth nightly as needed for sleep    Primary insomnia       Menthol (Topical Analgesic) 4 % Gel     118 mL    Externally apply topically 3 times daily as needed    Chronic left-sided thoracic back pain, Strain of trapezius muscle, left, initial encounter       Menthol 10 MG Lozg     120 lozenge    Take 1 lozenge by mouth every 2 hours as needed    Cough       multivitamin, therapeutic with minerals Tabs tablet     100 tablet    Take 1 tablet by mouth daily    Liver replaced by transplant (H)       * mycophenolate 250 MG capsule    GENERIC EQUIVALENT    120 capsule    Take 2 capsules (500 mg) by mouth every 12 hours    Liver replaced by transplant (H)       * mycophenolate 250 MG capsule    GENERIC EQUIVALENT    120 capsule    TAKE TWO CAPSULES BY MOUTH EVERY 12 HOURS    Liver replaced by transplant (H)       nitroFURantoin (macrocrystal-monohydrate) 100 MG capsule    MACROBID    14 capsule    Take 1 capsule (100 mg) by mouth 2 times daily        omeprazole 20 MG CR capsule    priLOSEC    180 capsule    Take 1 capsule (20 mg) by mouth 2 times daily    Gastroesophageal reflux disease, esophagitis presence not specified       * order for DME     1 Units    Equipment being ordered: Nebulizer with adult mask and tubing    Acute bronchitis, unspecified organism       * order for DME     1 Units    Equipment being ordered: cane with padded handle    Gait instability       * order for DME     1 Units    Equipment being ordered: cane    Osteoporosis, Falls frequently       * order for DME     2 Units    Equipment being ordered: compression stockings bilateral Please measure and fit  patient for stockings  Strength 15-30mmHg Disp 2 pairs 1 refill over the time of 1 year    Localized edema       * order for DME     1 Units    2-wheeled walker (dx: gait instability and Frequent falls).    Gait instability, Falls frequently       * order for DME     1 Units    Equipment being ordered: 4 Wheeled Walker with Seat and Brakes    Falls frequently, Gait instability       * order for DME     2 each    Equipment being ordered: Compression stockings below knee bilateral    Bilateral edema of lower extremity       * order for DME     1 each    Equipment being ordered: Back Stabilizer    Chronic midline low back pain without sciatica       * order for DME     2 each    Equipment being ordered: 2 pairs of black stabilizer socks (beige okay if black not available). Size XL.    Bilateral edema of lower extremity       prednisoLONE acetate 1 % ophthalmic susp    PRED FORTE    1 Bottle    Use in operative eye four times a day  For 4 days    Posterior capsular opacification visually significant of both eyes       * Psyllium 500 MG Caps     180 capsule    Take 3 capsules by mouth 2 times daily    Constipation, chronic       * REGULOID 0.52 G capsule   Generic drug:  psyllium     540 capsule    Take 3 capsules (1.56 g) by mouth 2 times daily    Constipation, unspecified constipation type       senna-docusate 8.6-50 MG per tablet    SENOKOT-S;PERICOLACE    120 tablet    Take 2 tablets by mouth 2 times daily For constipation    Constipation, chronic       simethicone 80 MG chewable tablet    MYLICON    120 tablet    Take 1 tablet (80 mg) by mouth every 6 hours as needed for flatulence or cramping    Flatulence, eructation, and gas pain       sodium chloride 0.65 % nasal spray    OCEAN    30 mL    Spray 1 spray into both nostrils daily as needed for congestion    Throat pain       sodium phosphate 7-19 GM/118ML rectal enema     3 Bottle    Place 1 Bottle (1 enema) rectally daily as needed for constipation    Ileus (H)        STATIN NOT PRESCRIBED (INTENTIONAL)     0 each    Not prescribed    High risk medication use       * Notice:  This list has 20 medication(s) that are the same as other medications prescribed for you. Read the directions carefully, and ask your doctor or other care provider to review them with you.

## 2017-08-16 NOTE — PATIENT INSTRUCTIONS
Here is the plan from today's visit    1. Hyperkalemia  - Electrolyte Panel ([facility])  - Magnesium    2. RUQ abdominal pain  - If this gets worse, then come back to the clinic and we will need to get more blood work and possibly another ultrasound.     3. Plantar fasciitis  - lidocaine (LIDODERM) 5 % Patch; Apply up to 3 patches to painful area at once for up to 12 h within a 24 h period.  Remove after 12 hours.  Dispense: 30 patch; Refill: 1    4. Leg swelling  - Elastic Bandages & Supports (MEDICAL COMPRESSION SOCKS) MISC; 1 Units daily  Dispense: 2 each; Refill: 0    5. Chronic low back pain without sciatica, unspecified back pain laterality  - LUMBAR ORTHOSIS, FLEXIBLE, PROVIDES LUMBAR SUPPORT, POSTERIOR EXTENDS FROM L-1    6. Chronic pain of both knees  - Elastic Bandages & Supports (KNEE BRACE/FLEX STAYS LARGE) MISC; 1 Units daily  Dispense: 2 each; Refill: 0      Please call or return to clinic if your symptoms don't go away.    Follow up plan  Please make a clinic appointment for follow up with me (BUCK LAMBERT) in 3-4  weeks for follow up.    Thank you for coming to [facility] today.  Lab Testing:  **If you had lab testing today and your results are reassuring or normal they will be mailed to you or sent through [employer] within 7 days.   **If the lab tests need quick action we will call you with the results.  The phone number we will call with results is # [phone] (home) . If this is not the best number please call our clinic and change the number.  Medication Refills:  If you need any refills please call your pharmacy and they will contact us.   If you need to  your refill at a new pharmacy, please contact the new pharmacy directly. The new pharmacy will help you get your medications transferred faster.   Scheduling:  If you have any concerns about today's visit or wish to schedule another appointment please call our office during normal business hours [phone] (8-5:00  M-F)  If a referral was made to a [facility] and you don't get a call from central scheduling please call [phone].  If a Mammogram was ordered for you at [facility] call [phone] to schedule or change your appointment.  If you had an XRay/CT/Ultrasound/MRI ordered the number is [phone] to schedule or change your radiology appointment.   Medical Concerns:  If you have urgent medical concerns please call [phone] at any time of the day.  If you have a medical emergency please call [phone].    Discharge Instructions: Eating a Low-Potassium Diet  Your health care provider has prescribed a low-potassium diet for you. This kind of diet is advised for people who have certain kidney problems. Potassium is needed for muscle function. But too much potassium is a health risk. Potassium is found in many foods. Read below to find out how to change your diet.  Foods to limit  Some foods are high in potassium. Limit your daily intake of the foods in the list below.    Fruits: apricots (canned and fresh), bananas, cantaloupe, honeydew melon, kiwi, nectarines, pomegranates, oranges, orange juice, pears, dried fruits (apricots, dates, figs, prunes), and prune juice    Vegetables: asparagus, avocado, artichoke, bamboo shoots, beets, brussels sprouts, cabbage, celery, chard, okra, potatoes (white and sweet), pumpkin, rutabaga, spinach (cooked), squash, tomato, tomato sauce, tomato juice, and vegetable juice cocktail    Legumes: black-eyed peas, chickpeas, lentils, lima beans, navy beans, red kidney beans, soybeans, and split peas    Nuts and seeds: almonds, Brazil nuts, cashews, peanuts, peanut butter, pecans, pumpkin seeds, sunflower seeds, and walnuts    Breads and cereals: bran and whole-grain products    Dairy foods: milk, cheese, ice cream, yogurt    Animal protein: all forms of animal protein    Other: chocolate, cocoa, coconut milk, and molasses  Tips    Ask your health care provider  how much potassium you are allowed each day. This will help you figure out serving sizes for your needs.    Check labels for potassium. It may be listed as potassium chloride.    Do not use salt substitutes. These often have potassium in them.    Cook frozen fruits and vegetables in water. Rinse and drain them well before eating.    Drain liquid from all canned fruits and vegetables. Rinse them before eating.    Reduce the potassium in potatoes. Peel them, slice thinly, and soak in water for at least 4 hours.    Reduce the potassium in green leafy vegetables. Soak them in water for at least 4 hours.    Eat white rice and refined white flour products. These include white bread, pasta, and grits.  Follow-up  Make a follow-up appointment as advised by our staff.  When to call your health care provider  Call your health care provider right away if you have any of the following:    Fatigue    Shortness of breath    Chest pain    Slow, irregular heartbeat    Fainting    Dizziness    Lightheadedness    Confusion   Date Last Reviewed: 6/21/2015 2000-2017 [employer]. [address] [address], [place] [address]. All rights reserved. This information is not intended as a substitute for professional medical care. Always follow your healthcare professional's instructions.

## 2017-08-16 NOTE — PROGRESS NOTES
HPI:       Flor Navarro is a 51 year old who presents for the following  Patient presents with:  Liver: F/u    States that she started to have intermittent sharp RUQ pain today. Does not radiate, denies any nausea, vomiting, diarrhea or changes in bowel or bladder habits. Her left ankle/LE pain continues to be chronic. Used the compression stockings last week which helped, but today having left LE pain. Is very tearful at today's visit as she is having general lower back, bilateral knee and left ankle pain.  does not support her financially and she lives with her son. This causes a lot of stress.     She denies any recent changes to her diet.     Back Pain Follow Up       Description:   Location of pain:  Lumbar,  center  Character of pain: sharp and dull ache  Pain radiation: Does not radiate  Since last visit, pain is:  worsened  New numbness or weakness in legs, not attributed to pain:  no    Intensity: moderate    History:   Pain interferes with job: Yes Details: stops from doing ADLs  Therapies tried without relief: acetaminophen (Tylenol) and rest  Therapies tried with relief: rest        Accompanying Signs & Symptoms:  Risk of Fracture:  None  Risk of Cauda Equina:  None  Risk of Infection:  None  Risk of Cancer:  None         Adherence and Exercise  Medication side effects: no  How often is a medication missed? About 1-3 times per week  Exercise:No exercise None      An [place]  was used for  this visit.       Problem, Medication and Allergy Lists were   reviewed and are current.     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        Mixed hyperlipidemia 03/03/2017     Priority: Medium     Secondary  hyperparathyroidism (H) 07/25/2016     Priority: Medium     Abdominal pain 04/05/2016     Priority: Medium     Constipation, chronic 04/05/2016     Priority: Medium     Urinary tract infection due to extended-spectrum beta lactamase (ESBL)-producing Klebsiella 12/23/2015     Priority: Medium     Pseudophakia - Left Eye 02/05/2015     Priority: Medium     Shoulder joint pain 11/06/2014     Priority: Medium     Vitamin D deficiency 09/17/2014     Priority: Medium     Problem list name updated by automated process. Provider to review       Advanced directives, counseling/discussion 10/30/2013     Priority: Medium     POLST on file October 30, 2013  Full code       Ganglion cyst 10/02/2013     Priority: Medium     Immunosuppressed status (H)      Priority: Medium     Cystitis cystica 07/03/2013     Priority: Medium     Stroke, hemorrhagic (H)      Priority: Medium     STOP aspirin. She has a hx of hemorrhagic stroke, and her 10 yr risk of MI is only 3% and wouldn't rec treating w/ ASA unless >7.5%.        Osteoporosis      Priority: Medium     Problem list name updated by automated process. Provider to review and confirm  [employer] Update utility       Overweight 04/09/2013     Priority: Medium     Problem list name updated by automated process. Provider to review       CKD (chronic kidney disease) stage 3, GFR 30-59 ml/min      Priority: Medium     Cr. 1.3-1.6. Sees Dr. Wells. Next visit Aug 2015.       Hypertension, renal      Priority: Medium     Liver replaced by transplant      Priority: Medium     2011, due to Hep C, follows w/ Dr. Anne q6mos  MM should be taken on an empty stomach, avoid antacids.   Cyclosporine goal levels ~100ng/dL per transplant surgeon Dr. Fortune Dec 2015       High risk medication use      Priority: Medium     Anemia in CKD (chronic kidney disease)      Priority: Medium     Hyperlipidemia LDL goal <160 04/28/2011     Priority: Medium     PVD (POSTERIOR VITREOUS DETACHMENT) OS 02/28/2011      Priority: Medium   ,     Current Outpatient Prescriptions   Medication Sig Dispense Refill     nitroFURantoin, macrocrystal-monohydrate, (MACROBID) 100 MG capsule Take 1 capsule (100 mg) by mouth 2 times daily 14 capsule 0     multivitamin, therapeutic with minerals (MULTI-VITAMIN) TABS tablet Take 1 tablet by mouth daily 100 tablet 11     REGULOID 0.52 G capsule Take 3 capsules (1.56 g) by mouth 2 times daily 540 capsule 3     acetaminophen (TYLENOL) 325 MG tablet Take 1-2 tablets (325-650 mg) by mouth every 6 hours as needed Max 6 tablets per 24 hours 100 tablet 3     lidocaine (LIDODERM) 5 % Patch Apply up to 3 patches to painful area at once for up to 12 h within a 24 h period.  Remove after 12 hours. 30 patch 1     carboxymethylcellulose (CELLUVISC/REFRESH LIQUIGEL) 1 % ophthalmic solution Place 1 drop into both eyes 4 times daily 15 each 11     prednisoLONE acetate (PRED FORTE) 1 % ophthalmic susp Use in operative eye four times a day  For 4 days 1 Bottle 0     calcium-vitamin D (CALCIUM 600 + D) 600-400 MG-UNIT per tablet Take 1 tablet by mouth 2 times daily 60 tablet 11     cholecalciferol (VITAMIN  -D) 1000 UNITS capsule Take 2 capsules (2,000 Units) by mouth daily 180 capsule 3     senna-docusate (SENOKOT-S;PERICOLACE) 8.6-50 MG per tablet Take 2 tablets by mouth 2 times daily For constipation 120 tablet 6     cycloSPORINE modified (GENERIC EQUIVALENT) 25 MG capsule TAKE 4 CAPSULES BY MOUTH EVERY 12 HOURS 240 capsule 3     mycophenolate (CELLCEPT - GENERIC EQUIVALENT) 250 MG capsule TAKE TWO CAPSULES BY MOUTH EVERY 12 HOURS 120 capsule 4     Menthol 10 MG LOZG Take 1 lozenge by mouth every 2 hours as needed 120 lozenge 0     lidocaine (LIDODERM) 5 % Patch Apply up to 3 patches to painful area at once for up to 12 h within a 24 h period.  Remove after 12 hours. 30 patch 0     Menthol, Topical Analgesic, 4 % GEL Externally apply topically 3 times daily as needed 118 mL 0     omeprazole (PRILOSEC) 20 MG CR  capsule Take 1 capsule (20 mg) by mouth 2 times daily 180 capsule 3     alendronate (FOSAMAX) 70 MG tablet Take 1 tablet (70 mg) by mouth every 7 days Take at least 60 min before breakfast with over 8 oz water and stay upright for at least 30 min. after dose. 12 tablet 3     sodium chloride (OCEAN) 0.65 % nasal spray Spray 1 spray into both nostrils daily as needed for congestion 30 mL 1     benzocaine-menthol (CHLORASEPTIC) 6-10 MG lozenge Place 1 lozenge inside cheek every 2 hours as needed for moderate pain 36 lozenge 0     lidocaine (XYLOCAINE) 5 % ointment Apply topically 2 times daily Apply to affected area for pain two times daily 70 g 1     mycophenolate (CELLCEPT - GENERIC EQUIVALENT) 250 MG capsule Take 2 capsules (500 mg) by mouth every 12 hours 120 capsule 11     melatonin 3 MG tablet Take 1 tablet (3 mg) by mouth nightly as needed for sleep 100 tablet 3     order for DME Equipment being ordered: 2 pairs of black stabilizer socks (beige okay if black not available). Size XL. 2 each 0     order for DME Equipment being ordered: Compression stockings below knee bilateral 2 each 0     order for DME Equipment being ordered: Back Stabilizer 1 each 0     order for DME Equipment being ordered: 4 Wheeled Walker with Seat and Brakes 1 Units 0     order for DME 2-wheeled walker (dx: gait instability and Frequent falls). 1 Units 0     albuterol (PROAIR HFA, PROVENTIL HFA, VENTOLIN HFA) 108 (90 BASE) MCG/ACT inhaler Inhale 2 puffs into the lungs 4 times daily 1 Inhaler 1     order for DME Equipment being ordered: compression stockings bilateral  Please measure and fit patient for stockings   Strength 15-30mmHg  Disp 2 pairs  1 refill over the time of 1 year 2 Units 1     order for DME Equipment being ordered: cane 1 Units 0     bisacodyl (DULCOLAX) 5 MG EC tablet Take 1 tablet (5 mg) by mouth daily as needed for constipation 30 tablet 11     carboxymethylcellul-glycerin (OPTIVE/REFRESH OPTIVE) 0.5-0.9 % SOLN  ophthalmic solution Place 1 drop into both eyes 4 times daily 1 each 11     Psyllium 500 MG CAPS Take 3 capsules by mouth 2 times daily 180 capsule 11     sodium phosphate (FLEET ENEMA) 7-19 GM/118ML rectal enema Place 1 Bottle (1 enema) rectally daily as needed for constipation 3 Bottle 11     GENGRAF 25 MG PO CAPSULE Take 4 capsules (100 mg) by mouth every 12 hours 240 capsule 0     simethicone (MYLICON) 80 MG chewable tablet Take 1 tablet (80 mg) by mouth every 6 hours as needed for flatulence or cramping 120 tablet 0     STATIN NOT PRESCRIBED, INTENTIONAL, Not prescribed 0 each 0     order for DME Equipment being ordered: Nebulizer with adult mask and tubing 1 Units 0     order for DME Equipment being ordered: cane with padded handle 1 Units 0     [DISCONTINUED] solifenacin (VESICARE) 5 MG tablet Take 1 tablet (5 mg) by mouth daily 30 tablet 12   ,     Allergies   Allergen Reactions     Aspirin      3/31/16 Per pt, tolerates 81 mg daily dose without ADR.     325 mg dose caused itchiness and hives.     Clarithromycin      Allergic reaction         Contrast Dye      Penicillins      Patient is an established patient of this clinic.         Review of Systems:   Review of Systems   Constitutional: Negative for appetite change, chills, fatigue and fever.   HENT: Negative for congestion, sinus pressure and sore throat.    Eyes: Negative for visual disturbance.   Respiratory: Negative for cough, shortness of breath and wheezing.    Cardiovascular: Negative for chest pain and leg swelling.   Gastrointestinal: Positive for abdominal pain (RUQ). Negative for abdominal distention, blood in stool, constipation, diarrhea, nausea and vomiting.   Genitourinary: Negative for dysuria and hematuria.   Musculoskeletal: Positive for arthralgias (bilateral knees and left ankle), back pain and gait problem. Negative for joint swelling and myalgias.   Skin: Negative for color change and rash.   Neurological: Negative for weakness and  headaches.   Psychiatric/Behavioral: The patient is nervous/anxious.              Physical Exam:     Patient Vitals for the past 24 hrs:   BP Temp Temp src Pulse Resp SpO2 Weight   08/16/17 0857 112/77 97.6  F (36.4  C) Oral 68 18 96 % 209 lb (94.8 kg)     Body mass index is 39.49 kg/(m^2).  Vitals were reviewed and were normal     Physical Exam   Constitutional: She is oriented to person, place, and time. She appears well-developed and well-nourished. No distress.   HENT:   Head: Normocephalic and atraumatic.   Mouth/Throat: Oropharynx is clear and moist. No oropharyngeal exudate.   Eyes: Conjunctivae are normal. Pupils are equal, round, and reactive to light. Right eye exhibits no discharge. Left eye exhibits no discharge. No scleral icterus.   Neck: Normal range of motion. Neck supple.   Cardiovascular: Normal rate, regular rhythm and normal heart sounds.    No murmur heard.  Pulmonary/Chest: Effort normal and breath sounds normal. No respiratory distress.   Abdominal: Soft. Bowel sounds are normal. She exhibits no distension. There is tenderness (point tenderness under the mid left anterior rib cage. ).   Negative Araya's sign.    Musculoskeletal: She exhibits edema (trace).        Right knee: She exhibits normal range of motion, no swelling and no effusion. Tenderness (posterior knee) found. Medial joint line and lateral joint line tenderness noted.        Left knee: She exhibits normal range of motion and no swelling. Tenderness (posterior knee) found. Medial joint line and lateral joint line tenderness noted.        Left ankle: She exhibits decreased range of motion and swelling. She exhibits no ecchymosis, no deformity, no laceration and normal pulse. Tenderness (all along the lateral and medial joint line. ). AITFL and CF ligament tenderness found. Achilles tendon normal.        Lumbar back: She exhibits decreased range of motion and tenderness. She exhibits no edema.   Patient is not wearing any orthotic  inserts, wearing slip on shoes. Has an antalgic gait. Has minimal compression stockings on today. No SI tenderness on exam today.    Neurological: She is alert and oriented to person, place, and time.   Skin: No rash noted. She is not diaphoretic.       Results:     Results for orders placed or performed in visit on 08/16/17   Electrolyte Panel ([facility])   Result Value Ref Range    Chloride 104.5 98.0 - 110.0 mmol/L    Carbon Dioxide 26.1 20.0 - 32.0 mmol/L    Potassium 4.7 (H) 3.3 - 4.5 mmol/dL    Sodium 140.0 132.6 - 141.4 mmol/L    Anion Gap 9.4 6.0 - 17.0     *Note: Due to a large number of results and/or encounters for the requested time period, some results have not been displayed. A complete set of results can be found in Results Review.       Assessment and Plan     Flor is a 52 yo woman with a h/o HLD, Vit D def, Osteoporosis, HTN, secondary hyperparathyroidism, and left peroneal tendonitis for follow up. She has multiple joint complaints today and is not able to afford orthotic shoes. She has used various braces in the past which she found to be helpful for her symptoms. Also, patient continues to have chronic b/l LE swelling. Discussed that I will prescribe compression stocking and braces for her today, however, these may not be fully covered under insurance. She will speak to her home RN and see what is available to her in her financial situation. She is scheduled to see Dr. Anne 9/1 with lab visit. Discussed that we could get Hepatic panel today as she is having some RUQ pain. Patient would like to wait as pain started today and if it gets worse, will come in sooner to see me to have further work up done. She is afebrile with stable vitals today. It is reasonable to wait until 9/1 for labs and f/u visit with Dr. Anne if her RUQ pain improves.     1. Hyperkalemia, chronic  - Electrolyte Panel ([facility])  - Magnesium  - advised to follow low K diet. Will recheck labs in 3-4 weeks.     2. RUQ abdominal  pain  - If this gets worse, then come back to the clinic and we will need to get more blood work and possibly another ultrasound.     3. Plantar fasciitis  - lidocaine (LIDODERM) 5 % Patch; Apply up to 3 patches to painful area at once for up to 12 h within a 24 h period.  Remove after 12 hours.  Dispense: 30 patch; Refill: 1    4. Leg swelling  - Elastic Bandages & Supports (MEDICAL COMPRESSION SOCKS) MISC; 1 Units daily  Dispense: 2 each; Refill: 0    5. Chronic low back pain without sciatica, unspecified back pain laterality  - LUMBAR ORTHOSIS, FLEXIBLE, PROVIDES LUMBAR SUPPORT, POSTERIOR EXTENDS FROM L-1    6. Chronic pain of both knees  - Elastic Bandages & Supports (KNEE BRACE/FLEX STAYS LARGE) MISC; 1 Units daily  Dispense: 2 each; Refill: 0    There are no discontinued medications.  Options for treatment and follow-up care were reviewed with the patient. Flor Navarro  engaged in the decision making process and verbalized understanding of the options discussed and agreed with the final plan.    Karen Downs MD  [facility]  [phone]

## 2017-08-16 NOTE — LETTER
August 17, 2017      Flor Navarro  [address] [id number]        Dear Flor,    Thank you for getting your care at [facility]. Please see below for your test results.    Resulted Orders   Electrolyte Panel ([facility])   Result Value Ref Range    Chloride 104.5 98.0 - 110.0 mmol/L    Carbon Dioxide 26.1 20.0 - 32.0 mmol/L    Potassium 4.7 (H) 3.3 - 4.5 mmol/dL    Sodium 140.0 132.6 - 141.4 mmol/L    Anion Gap 9.4 6.0 - 17.0   Magnesium   Result Value Ref Range    Magnesium 2.1 1.6 - 2.3 mg/dL     Your potassium is still elevated, but improved. We will just recheck it again at your next visit.     If you have any concerns about these results please call and leave a message for me or send a [employer] message to the clinic.    Sincerely,    Karen Downs MD

## 2017-08-16 NOTE — PROGRESS NOTES
Preceptor Attestation:   Patient seen and discussed with the resident. Assessment and plan reviewed with resident and agreed upon.   Supervising Physician:  [facility]

## 2017-08-28 ENCOUNTER — OFFICE VISIT (OUTPATIENT)
Dept: ORTHOPEDICS | Facility: CLINIC | Age: 52
End: 2017-08-28

## 2017-08-28 DIAGNOSIS — R14.1 FLATULENCE, ERUCTATION, AND GAS PAIN: ICD-10-CM

## 2017-08-28 DIAGNOSIS — Z94.4 LIVER REPLACED BY TRANSPLANT (H): ICD-10-CM

## 2017-08-28 DIAGNOSIS — M72.2 PLANTAR FASCIAL FIBROMATOSIS: ICD-10-CM

## 2017-08-28 DIAGNOSIS — M79.672 PAIN IN LEFT FOOT: ICD-10-CM

## 2017-08-28 DIAGNOSIS — R14.3 FLATULENCE, ERUCTATION, AND GAS PAIN: ICD-10-CM

## 2017-08-28 DIAGNOSIS — R14.2 FLATULENCE, ERUCTATION, AND GAS PAIN: ICD-10-CM

## 2017-08-28 DIAGNOSIS — M76.72 PERONEAL TENDONITIS OF LEFT LOWER EXTREMITY: Primary | ICD-10-CM

## 2017-08-28 DIAGNOSIS — M76.822 POSTERIOR TIBIAL TENDINITIS OF LEFT LEG: ICD-10-CM

## 2017-08-28 RX ORDER — CYCLOSPORINE 25 MG/1
100 CAPSULE, LIQUID FILLED ORAL EVERY 12 HOURS
Qty: 240 CAPSULE | Refills: 11 | Status: SHIPPED | OUTPATIENT
Start: 2017-08-28 | End: 2018-05-21

## 2017-08-28 NOTE — PROGRESS NOTES
Subjective:   Flor Navarro is a 51 year old female who presents today for left ankle pain. She presents today with a . She relates that she has continued to have pain in the left leg. She is having pain that is waking her up from sleep tonight. She relates that she did wear the ankle brace some, however after a few days, she stopped wearing it, for reasons unknown.     Background:      Duration of symptoms: 1 years  Mechanism of Injury: Chronic  Intensity: 5/10 at rest, 10/10 with activity   Aggravating factors: walking  Relieving Factors: rest  Prior Evaluation: Prior Physician Evalutation: Dr. Drew and her PCP.     PAST MEDICAL, SOCIAL, SURGICAL AND FAMILY HISTORY: She  has a past medical history of Anemia in CKD (chronic kidney disease); Cataract; CKD (chronic kidney disease) stage 3, GFR 30-59 ml/min; Hepatitis C; High risk medication use; Hypertension, renal; Immunosuppressed status (H); Liver replaced by transplant (H) (); Osteoporosis; Recurrent pregnancy loss without current pregnancy; Stroke, hemorrhagic (H) (); Syncope; and Unspecified viral hepatitis C without hepatic coma.  She  has a past surgical history that includes Insert shunt portal transjugular intraheptic ();  section; Incisional Hernia Repair (2004); Upper GI Endoscopy with Band Ligation of Esoph/Gastric Varic. .; Transplant liver recipient  donor (10/26/10); Laparoscopic salpingo-oophorectomy; Neck surgery (); and cataract iol, rt/lt (Right, 2/18/15).  Her family history includes CEREBROVASCULAR DISEASE in an other family member; Hepatitis in an other family member. There is no history of CANCER, DIABETES, Hypertension, Thyroid Disease, Glaucoma, or Macular Degeneration.  She reports that she has never smoked. She has never used smokeless tobacco. She reports that she does not drink alcohol or use illicit drugs.    ALLERGIES: She is allergic to aspirin; clarithromycin; contrast dye; and  penicillins.    CURRENT MEDICATIONS: She has a current medication list which includes the following prescription(s): lidocaine, medical compression socks, knee brace/flex stays large, nitrofurantoin (macrocrystal-monohydrate), multivitamin, therapeutic with minerals, reguloid, acetaminophen, carboxymethylcellulose, prednisolone acetate, calcium-vitamin d, cholecalciferol, senna-docusate, cyclosporine modified, mycophenolate, menthol, lidocaine, menthol (topical analgesic), omeprazole, alendronate, sodium chloride, benzocaine-menthol, lidocaine, mycophenolate, melatonin, order for dme, order for dme, order for dme, order for dme, order for dme, albuterol, order for dme, order for dme, bisacodyl, carboxymethylcellul-glycerin, psyllium, sodium phosphate, cyclosporine modified, simethicone, STATIN NOT PRESCRIBED, INTENTIONAL,, order for dme, and order for dme, and the following Facility-Administered Medications: apraclonidine.     REVIEW OF SYSTEMS: 9 point review of systems is negative except as noted above.     Exam:   There were no vitals taken for this visit.           CONSTITUTIONAL: healthy, alert and no distress  HEAD: Normocephalic. No masses, lesions, tenderness or abnormalities  SKIN: no suspicious lesions or rashes  GAIT: normal  NEUROLOGIC: Non-focal  PSYCHIATRIC: affect normal/bright and mentation appears normal.    MUSCULOSKELETAL: There is pain noted with palpation of the courses of the PT and peroneal tendons. MMT still  2/5 secondary to guarding. No pain along the courses of the Achilles tendon or with calcaneal squeeze. Pain noted at the origin of the plantar fascia and into the arch plantarly. No pain with active or passive ROM of the lesser digits.        Assessment/Plan:   PT and peroneal tendonitis - chronic    - Pt seen and evaluated.  - She was not wearing the brace today and was in flat shoes. I related that we can try a CAM and PT, however she needs to have a more supportive shoe. She will try  the PT & CAM (which she has at home).  - Wear CAM daily while ambulating.   - See again in 6-8 weeks to assess.

## 2017-08-28 NOTE — MR AVS SNAPSHOT
After Visit Summary   8/28/2017    Flor Navarro    MRN: [id number]           Patient Information     Date Of Birth          1965        Visit Information        Provider Department      8/28/2017 12:45 PM Rashida Pandya; Enrrique Olivares DPM [facility] Sports Medicine        Today's Diagnoses     Peroneal tendonitis of left lower extremity    -  1    Posterior tibial tendinitis of left leg        Plantar fascial fibromatosis        Pain in left foot           Follow-ups after your visit        Additional Services     PHYSICAL THERAPY REFERRAL (Internal)       Physical Therapy Referral                  Your next 10 appointments already scheduled     Aug 31, 2017  9:45 AM CDT   RETURN CORNEA with Nik Langley MD   Eye Clinic ([facility])    [address]  [address] [facility] [address]  [address]  [facility] [id number] [phone]            Sep 01, 2017  8:50 AM CDT   (Arrive by 8:35 AM)   [place] Extremity with Cat Okeefe PT   [facility] Physical Therapy [place] ([facility])    [address] [place] [address] [address]th [facility] [id number] [phone]            Sep 01, 2017 10:00 AM CDT   Lab with  LAB    Health Lab ([facility])    [address] [place] [address]  1st [facility] [id number] [phone]            Sep 01, 2017 11:00 AM CDT   (Arrive by 10:45 AM)   Return Liver Transplant with Laron Anne MD   [facility] Hepatology ([facility])    [address] [place] [address]  3rd [facility] [id number] [phone]            Sep 08, 2017 10:20 AM CDT   [place] Extremity with Lizette Frankel, PT   [facility] Physical Therapy [place] ([facility])    [address] [place] [address] 5th [facility] [id number] [phone]            Sep 11, 2017  1:00 PM CDT   Return Visit with MD [facility] ([facility])    [address],  [address]  [facility] [address]   [phone]            Sep 15, 2017 10:10 AM CDT   [place] Extremity with ARA Bill [facility] Physical Therapy [place] ([facility])    [address] [place] [id number] [phone]            Oct 09, 2017  1:00 PM CDT   (Arrive by 12:45 PM)   Return Visit with REE Summers [facility] Sports Medicine ([facility])    [address] [place] [id number] [phone]              Who to contact     Please call your clinic at [phone] to:    Ask questions about your health    Make or cancel appointments    Discuss your medicines    Learn about your test results    Speak to your doctor   If you have compliments or concerns about an experience at your clinic, or if you wish to file a complaint, please contact [facility] Patient Relations at [phone] or email us at [email or web address]         Additional Information About Your Visit        Pivotstream Information     Pivotstream is an electronic gateway that provides easy, online access to your medical records. With Pivotstream, you can request a clinic appointment, read your test results, renew a prescription or communicate with your care team.     To sign up for Pivotstream visit the website at [email or web address]   You will be asked to enter the access code listed below, as well as some personal information. Please follow the directions to create your username and password.     Your access code is: [email or web address]  Expires: 9/3/2017  6:31 AM     Your access code will  in 90 days. If you need help or a new code, please contact your [facility] or call [phone] for assistance.        Care EveryWhere ID     This is your Care EveryWhere ID. This could be used by other organizations to access your [facility]  records  [id number]         Blood Pressure from Last 3 Encounters:   08/16/17 112/77   08/08/17 126/90   07/27/17 (!) 147/99    Weight from Last 3 Encounters:   08/16/17 209 lb (94.8 kg)   07/27/17 210 lb 4.8 oz (95.4 kg)   06/26/17 208 lb (94.3 kg)              We Performed the Following     PHYSICAL THERAPY REFERRAL (Internal)          Today's Medication Changes          These changes are accurate as of: 8/28/17  3:38 PM.  If you have any questions, ask your nurse or doctor.               These medicines have changed or have updated prescriptions.        Dose/Directions    * cycloSPORINE modified capsule   This may have changed:  Another medication with the same name was changed. Make sure you understand how and when to take each.   Used for:  Liver replaced by transplant (H)   Changed by:  Karely Sierra MD        Dose:  100 mg   Take 4 capsules (100 mg) by mouth every 12 hours   Quantity:  240 capsule   Refills:  0       * cycloSPORINE modified 25 MG capsule   Commonly known as:  GENERIC EQUIVALENT   This may have changed:  See the new instructions.   Used for:  Liver replaced by transplant (H)   Changed by:  Shaneka Esquivel LPN        Dose:  100 mg   Take 4 capsules (100 mg) by mouth every 12 hours   Quantity:  240 capsule   Refills:  11       * Notice:  This list has 2 medication(s) that are the same as other medications prescribed for you. Read the directions carefully, and ask your doctor or other care provider to review them with you.         Where to get your medicines      These medications were sent to [address] [place] [address] [address] [place] [id number]    Hours:  TRANSPLANT PHONE NUMBER [phone] Phone:  [phone]     cycloSPORINE modified 25 MG capsule                Primary Care Provider Office Phone # Fax #    Karely Sierra [phone]       [address] [address] [address]  Access to Services     [facility]: Hadii florecita chiang luz [place], wadanielda luqadaha, qaybta kakeya mart jin. So [facility] [phone].    ATENCIÓN: Si habla sha, tiene a garcia disposición servicios gratuitos de asistencia lingüística. Llame al [phone].    We comply with applicable federal civil rights laws and [place] laws. We do not discriminate on the basis of race, color, national origin, age, disability sex, sexual orientation or gender identity.            Thank you!     Thank you for choosing [facility]  for your care. Our goal is always to provide you with excellent care. Hearing back from our patients is one way we can continue to improve our services. Please take a few minutes to complete the written survey that you may receive in the mail after your visit with us. Thank you!             Your Updated Medication List - Protect others around you: Learn how to safely use, store and throw away your medicines at www.disposemymeds.org.          This list is accurate as of: 8/28/17  3:38 PM.  Always use your most recent med list.                   Brand Name Dispense Instructions for use Diagnosis    acetaminophen 325 MG tablet    TYLENOL    100 tablet    Take 1-2 tablets (325-650 mg) by mouth every 6 hours as needed Max 6 tablets per 24 hours    Sebaceous cyst       albuterol 108 (90 BASE) MCG/ACT Inhaler    PROAIR HFA/PROVENTIL HFA/VENTOLIN HFA    1 Inhaler    Inhale 2 puffs into the lungs 4 times daily    Acute bronchitis, unspecified organism       alendronate 70 MG tablet    FOSAMAX    12 tablet    Take 1 tablet (70 mg) by mouth every 7 days Take at least 60 min before breakfast with over 8 oz water and stay upright for at least 30 min. after dose.    Osteoporosis       benzocaine-menthol 6-10 MG lozenge    CHLORASEPTIC    36 lozenge    Place 1 lozenge inside cheek every 2 hours as needed for moderate pain    Throat pain       bisacodyl 5 MG EC  tablet    DULCOLAX    30 tablet    Take 1 tablet (5 mg) by mouth daily as needed for constipation    Constipation, chronic       calcium-vitamin D 600-400 MG-UNIT per tablet    calcium 600 + D    60 tablet    Take 1 tablet by mouth 2 times daily    Osteoporosis       carboxymethylcellul-glycerin 0.5-0.9 % Soln ophthalmic solution    OPTIVE/REFRESH OPTIVE    1 each    Place 1 drop into both eyes 4 times daily    Dry eye syndrome of bilateral lacrimal glands       carboxymethylcellulose 1 % ophthalmic solution    CELLUVISC/REFRESH LIQUIGEL    15 each    Place 1 drop into both eyes 4 times daily    Dry eyes, bilateral       cholecalciferol 1000 UNITS capsule    vitamin  -D    180 capsule    Take 2 capsules (2,000 Units) by mouth daily    Osteoporosis, Liver replaced by transplant (H), Vitamin D deficiency, Secondary hyperparathyroidism (H), CKD (chronic kidney disease) stage 3, GFR 30-59 ml/min, Palpitations       * cycloSPORINE modified capsule     240 capsule    Take 4 capsules (100 mg) by mouth every 12 hours    Liver replaced by transplant (H)       * cycloSPORINE modified 25 MG capsule    GENERIC EQUIVALENT    240 capsule    Take 4 capsules (100 mg) by mouth every 12 hours    Liver replaced by transplant (H)       * lidocaine 5 % ointment    XYLOCAINE    70 g    Apply topically 2 times daily Apply to affected area for pain two times daily    Chronic left-sided thoracic back pain       * lidocaine 5 % Patch    LIDODERM    30 patch    Apply up to 3 patches to painful area at once for up to 12 h within a 24 h period.  Remove after 12 hours.    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  Remove after 12 hours.    Plantar fasciitis       * Medical Compression Socks Misc     2 each    1 Units daily    Leg swelling       * Knee Brace/Flex Stays Large Misc     2 each    1 Units daily     Chronic pain of both knees       melatonin 3 MG tablet     100 tablet    Take 1 tablet (3 mg) by mouth nightly as needed for sleep    Primary insomnia       Menthol (Topical Analgesic) 4 % Gel     118 mL    Externally apply topically 3 times daily as needed    Chronic left-sided thoracic back pain, Strain of trapezius muscle, left, initial encounter       Menthol 10 MG Lozg     120 lozenge    Take 1 lozenge by mouth every 2 hours as needed    Cough       multivitamin, therapeutic with minerals Tabs tablet     100 tablet    Take 1 tablet by mouth daily    Liver replaced by transplant (H)       * mycophenolate 250 MG capsule    GENERIC EQUIVALENT    120 capsule    Take 2 capsules (500 mg) by mouth every 12 hours    Liver replaced by transplant (H)       * mycophenolate 250 MG capsule    GENERIC EQUIVALENT    120 capsule    TAKE TWO CAPSULES BY MOUTH EVERY 12 HOURS    Liver replaced by transplant (H)       nitroFURantoin (macrocrystal-monohydrate) 100 MG capsule    MACROBID    14 capsule    Take 1 capsule (100 mg) by mouth 2 times daily        omeprazole 20 MG CR capsule    priLOSEC    180 capsule    Take 1 capsule (20 mg) by mouth 2 times daily    Gastroesophageal reflux disease, esophagitis presence not specified       * order for DME     1 Units    Equipment being ordered: Nebulizer with adult mask and tubing    Acute bronchitis, unspecified organism       * order for DME     1 Units    Equipment being ordered: cane with padded handle    Gait instability       * order for DME     1 Units    Equipment being ordered: cane    Osteoporosis, Falls frequently       * order for DME     2 Units    Equipment being ordered: compression stockings bilateral Please measure and fit patient for stockings  Strength 15-30mmHg Disp 2 pairs 1 refill over the time of 1 year    Localized edema       * order for DME     1 Units    2-wheeled walker (dx: gait instability and Frequent falls).    Gait instability, Falls frequently       *  order for DME     1 Units    Equipment being ordered: 4 Wheeled Walker with Seat and Brakes    Falls frequently, Gait instability       * order for DME     2 each    Equipment being ordered: Compression stockings below knee bilateral    Bilateral edema of lower extremity       * order for DME     1 each    Equipment being ordered: Back Stabilizer    Chronic midline low back pain without sciatica       * order for DME     2 each    Equipment being ordered: 2 pairs of black stabilizer socks (beige okay if black not available). Size XL.    Bilateral edema of lower extremity       prednisoLONE acetate 1 % ophthalmic susp    PRED FORTE    1 Bottle    Use in operative eye four times a day  For 4 days    Posterior capsular opacification visually significant of both eyes       * Psyllium 500 MG Caps     180 capsule    Take 3 capsules by mouth 2 times daily    Constipation, chronic       * REGULOID 0.52 G capsule   Generic drug:  psyllium     540 capsule    Take 3 capsules (1.56 g) by mouth 2 times daily    Constipation, unspecified constipation type       senna-docusate 8.6-50 MG per tablet    SENOKOT-S;PERICOLACE    120 tablet    Take 2 tablets by mouth 2 times daily For constipation    Constipation, chronic       simethicone 80 MG chewable tablet    MYLICON    120 tablet    Take 1 tablet (80 mg) by mouth every 6 hours as needed for flatulence or cramping    Flatulence, eructation, and gas pain       sodium chloride 0.65 % nasal spray    OCEAN    30 mL    Spray 1 spray into both nostrils daily as needed for congestion    Throat pain       sodium phosphate 7-19 GM/118ML rectal enema     3 Bottle    Place 1 Bottle (1 enema) rectally daily as needed for constipation    Ileus (H)       STATIN NOT PRESCRIBED (INTENTIONAL)     0 each    Not prescribed    High risk medication use       * Notice:  This list has 20 medication(s) that are the same as other medications prescribed for you. Read the directions carefully, and ask your  doctor or other care provider to review them with you.

## 2017-08-28 NOTE — LETTER
2017      RE: Flor Navarro  [address] [facility] [id number]        Subjective:   Flor Navarro is a 51 year old female who presents today for left ankle pain. She presents today with a . She relates that she has continued to have pain in the left leg. She is having pain that is waking her up from sleep tonight. She relates that she did wear the ankle brace some, however after a few days, she stopped wearing it, for reasons unknown.     Background:      Duration of symptoms: 1 years  Mechanism of Injury: Chronic  Intensity: 5/10 at rest, 10/10 with activity   Aggravating factors: walking  Relieving Factors: rest  Prior Evaluation: Prior Physician Evalutation: Dr. Drew and her PCP.     PAST MEDICAL, SOCIAL, SURGICAL AND FAMILY HISTORY: She  has a past medical history of Anemia in CKD (chronic kidney disease); Cataract; CKD (chronic kidney disease) stage 3, GFR 30-59 ml/min; Hepatitis C; High risk medication use; Hypertension, renal; Immunosuppressed status (H); Liver replaced by transplant (H) (); Osteoporosis; Recurrent pregnancy loss without current pregnancy; Stroke, hemorrhagic (H) (); Syncope; and Unspecified viral hepatitis C without hepatic coma.  She  has a past surgical history that includes Insert shunt portal transjugular intraheptic ();  section; Incisional Hernia Repair (2004); Upper GI Endoscopy with Band Ligation of Esoph/Gastric Varic. .; Transplant liver recipient  donor (10/26/10); Laparoscopic salpingo-oophorectomy; Neck surgery (); and cataract iol, rt/lt (Right, 2/18/15).  Her family history includes CEREBROVASCULAR DISEASE in an other family member; Hepatitis in an other family member. There is no history of CANCER, DIABETES, Hypertension, Thyroid Disease, Glaucoma, or Macular Degeneration.  She reports that she has never smoked. She has never used smokeless tobacco. She reports that she does not drink alcohol or use  illicit drugs.    ALLERGIES: She is allergic to aspirin; clarithromycin; contrast dye; and penicillins.    CURRENT MEDICATIONS: She has a current medication list which includes the following prescription(s): lidocaine, medical compression socks, knee brace/flex stays large, nitrofurantoin (macrocrystal-monohydrate), multivitamin, therapeutic with minerals, reguloid, acetaminophen, carboxymethylcellulose, prednisolone acetate, calcium-vitamin d, cholecalciferol, senna-docusate, cyclosporine modified, mycophenolate, menthol, lidocaine, menthol (topical analgesic), omeprazole, alendronate, sodium chloride, benzocaine-menthol, lidocaine, mycophenolate, melatonin, order for dme, order for dme, order for dme, order for dme, order for dme, albuterol, order for dme, order for dme, bisacodyl, carboxymethylcellul-glycerin, psyllium, sodium phosphate, cyclosporine modified, simethicone, STATIN NOT PRESCRIBED, INTENTIONAL,, order for dme, and order for dme, and the following Facility-Administered Medications: apraclonidine.     REVIEW OF SYSTEMS: 9 point review of systems is negative except as noted above.     Exam:   There were no vitals taken for this visit.           CONSTITUTIONAL: healthy, alert and no distress  HEAD: Normocephalic. No masses, lesions, tenderness or abnormalities  SKIN: no suspicious lesions or rashes  GAIT: normal  NEUROLOGIC: Non-focal  PSYCHIATRIC: affect normal/bright and mentation appears normal.    MUSCULOSKELETAL: There is pain noted with palpation of the courses of the PT and peroneal tendons. MMT still  2/5 secondary to guarding. No pain along the courses of the Achilles tendon or with calcaneal squeeze. Pain noted at the origin of the plantar fascia and into the arch plantarly. No pain with active or passive ROM of the lesser digits.        Assessment/Plan:   PT and peroneal tendonitis - chronic    - Pt seen and evaluated.  - She was not wearing the brace today and was in flat shoes. I related  that we can try a CAM and PT, however she needs to have a more supportive shoe. She will try the PT & CAM (which she has at home).  - Wear CAM daily while ambulating.   - See again in 6-8 weeks to assess.       Enrrique Olivares, REE

## 2017-08-28 NOTE — TELEPHONE ENCOUNTER
Request for medication refill:    Date of last visit at clinic: 8/16/17    Please complete refill if appropriate and CLOSE ENCOUNTER.    Closing the encounter signifies the refill is complete.    If refill has been denied, please complete the smart phrase .smirefuse and route it to the [facility] RN TRIAGE pool to inform the patient and the pharmacy.    Dayanna Batista

## 2017-08-29 ENCOUNTER — TELEPHONE (OUTPATIENT)
Dept: FAMILY MEDICINE | Facility: CLINIC | Age: 52
End: 2017-08-29

## 2017-08-29 NOTE — TELEPHONE ENCOUNTER
[facility] Family Medicine phone call message - order or referral request for patient:     Order or referral being requested: Re cert was completed and needs ongoing nursing care for 1 x a week for 9 weeks and 3 PRN for med set up and assessment.      Additional Comments: Please call the home care nurse    OK to leave a message on voice mail? Yes    Primary language: Chinese      needed? Yes    Call taken on August 29, 2017 at 4:32 PM by Carrol Saldivar

## 2017-08-31 ENCOUNTER — OFFICE VISIT (OUTPATIENT)
Dept: OPHTHALMOLOGY | Facility: CLINIC | Age: 52
End: 2017-08-31
Attending: OPHTHALMOLOGY
Payer: MEDICARE

## 2017-08-31 DIAGNOSIS — H43.813 POSTERIOR VITREOUS DETACHMENT OF BOTH EYES: ICD-10-CM

## 2017-08-31 DIAGNOSIS — H26.493 POSTERIOR CAPSULAR OPACIFICATION, BILATERAL: ICD-10-CM

## 2017-08-31 DIAGNOSIS — H04.123 DRY EYES, BILATERAL: Primary | ICD-10-CM

## 2017-08-31 DIAGNOSIS — Z96.1 PSEUDOPHAKIA OF BOTH EYES: ICD-10-CM

## 2017-08-31 PROCEDURE — 99213 OFFICE O/P EST LOW 20 MIN: CPT | Mod: ZF

## 2017-08-31 PROCEDURE — T1013 SIGN LANG/ORAL INTERPRETER: HCPCS | Mod: U3,ZF

## 2017-08-31 ASSESSMENT — TONOMETRY
OD_IOP_MMHG: 14
IOP_METHOD: TONOPEN
OS_IOP_MMHG: 12

## 2017-08-31 ASSESSMENT — EXTERNAL EXAM - LEFT EYE: OS_EXAM: NORMAL

## 2017-08-31 ASSESSMENT — VISUAL ACUITY
OD_SC: 20/40
OS_SC: 20/50
METHOD: SNELLEN - LINEAR

## 2017-08-31 ASSESSMENT — EXTERNAL EXAM - RIGHT EYE: OD_EXAM: NORMAL

## 2017-08-31 ASSESSMENT — SLIT LAMP EXAM - LIDS
COMMENTS: NORMAL
COMMENTS: NORMAL

## 2017-08-31 ASSESSMENT — CONF VISUAL FIELD
OS_NORMAL: 1
METHOD: COUNTING FINGERS
OD_NORMAL: 1

## 2017-08-31 ASSESSMENT — CUP TO DISC RATIO
OD_RATIO: 0.2
OS_RATIO: 0.2

## 2017-08-31 NOTE — MR AVS SNAPSHOT
After Visit Summary   8/31/2017    Flor Navarro    MRN: [id number]           Patient Information     Date Of Birth          1965        Visit Information        Provider Department      8/31/2017 9:45 AM Mackenzie Sloan; [facility]        Today's Diagnoses     Dry eyes, bilateral - Both Eyes    -  1    Posterior capsular opacification, bilateral - Both Eyes        Posterior vitreous detachment of both eyes - Both Eyes        Pseudophakia of both eyes - Both Eyes           Follow-ups after your visit        Follow-up notes from your care team     Return in about 6 months (around 2/28/2018) for Follow Up.      Your next 10 appointments already scheduled     Sep 08, 2017 10:20 AM CDT   (Arrive by 10:05 AM)   [place] Extremity with Lizette Frankel PT   [facility] Physical Therapy [place] ([facility])    [address] [place] [address] [id number] [phone]            Sep 11, 2017 11:30 AM CDT   (Arrive by 11:15 AM)   Liver Return Post Op with Ramses Fortune MD   [facility] Solid Organ Transplant ([facility])    [address] [place] [address] [id number] [phone]            Sep 11, 2017  1:00 PM CDT   Return Visit with Karen Downs MD   [facility] ([facility])    [address],  [address]  [facility] [address]   [phone]            Sep 15, 2017 10:10 AM CDT   [place] Extremity with Cat Okeefe PT   [facility] Physical Therapy [place] ([facility])    [address] [place] [address] [id number] [phone]            Oct 09, 2017  1:00 PM CDT   (Arrive by 12:45 PM)   Return Visit with Enrrique Olivares DPM   [facility] Sports Medicine ([facility])    [address] [place] [address] [id number] [phone]              Who to contact     Please call your  clinic at [phone] to:    Ask questions about your health    Make or cancel appointments    Discuss your medicines    Learn about your test results    Speak to your doctor   If you have compliments or concerns about an experience at your clinic, or if you wish to file a complaint, please contact [facility] Patient Relations at [phone] or email us at [email or web address]         Additional Information About Your Visit        [employer] - [employer] Information     [employer] is an electronic gateway that provides easy, online access to your medical records. With [employer], you can request a clinic appointment, read your test results, renew a prescription or communicate with your care team.     To sign up for [employer] visit the website at [email or web address]   You will be asked to enter the access code listed below, as well as some personal information. Please follow the directions to create your username and password.     Your access code is: [email or web address]  Expires: 12/3/2017  6:30 AM     Your access code will  in 90 days. If you need help or a new code, please contact your [facility] or call [phone] for assistance.        Care EveryWhere ID     This is your Care EveryWhere ID. This could be used by other organizations to access your [place] medical records  [id number]         Blood Pressure from Last 3 Encounters:   17 112/77   17 126/90   17 (!) 147/99    Weight from Last 3 Encounters:   17 209 lb (94.8 kg)   17 210 lb 4.8 oz (95.4 kg)   17 208 lb (94.3 kg)              Today, you had the following     No orders found for display       Primary Care Provider Office Phone # Fax #    Karely Sierra [phone]        [address] [address]        Equal Access to Services     MAURO ALBA : roxy Lam, mart carver  lizbeth oglesby ah. So [facility] [phone].    ATENCIÓN: Si patriala sha, tiene a garcia disposición servicios gratuitos de asistencia lingüística. Abdirahman busby [phone].    We comply with applicable federal civil rights laws and [place] laws. We do not discriminate on the basis of race, color, national origin, age, disability sex, sexual orientation or gender identity.            Thank you!     Thank you for choosing [facility]  for your care. Our goal is always to provide you with excellent care. Hearing back from our patients is one way we can continue to improve our services. Please take a few minutes to complete the written survey that you may receive in the mail after your visit with us. Thank you!             Your Updated Medication List - Protect others around you: Learn how to safely use, store and throw away your medicines at www.disposemymeds.org.          This list is accurate as of: 8/31/17 11:59 PM.  Always use your most recent med list.                   Brand Name Dispense Instructions for use Diagnosis    acetaminophen 325 MG tablet    TYLENOL    100 tablet    Take 1-2 tablets (325-650 mg) by mouth every 6 hours as needed Max 6 tablets per 24 hours    Sebaceous cyst       albuterol 108 (90 BASE) MCG/ACT Inhaler    PROAIR HFA/PROVENTIL HFA/VENTOLIN HFA    1 Inhaler    Inhale 2 puffs into the lungs 4 times daily    Acute bronchitis, unspecified organism       alendronate 70 MG tablet    FOSAMAX    12 tablet    Take 1 tablet (70 mg) by mouth every 7 days Take at least 60 min before breakfast with over 8 oz water and stay upright for at least 30 min. after dose.    Osteoporosis       benzocaine-menthol 6-10 MG lozenge    CHLORASEPTIC    36 lozenge    Place 1 lozenge inside cheek every 2 hours as needed for moderate pain    Throat pain       bisacodyl 5 MG EC tablet    DULCOLAX    30 tablet    Take 1 tablet (5 mg) by mouth daily as needed for constipation    Constipation, chronic        calcium-vitamin D 600-400 MG-UNIT per tablet    calcium 600 + D    60 tablet    Take 1 tablet by mouth 2 times daily    Osteoporosis       carboxymethylcellul-glycerin 0.5-0.9 % Soln ophthalmic solution    OPTIVE/REFRESH OPTIVE    1 each    Place 1 drop into both eyes 4 times daily    Dry eye syndrome of bilateral lacrimal glands       carboxymethylcellulose 1 % ophthalmic solution    CELLUVISC/REFRESH LIQUIGEL    15 each    Place 1 drop into both eyes 4 times daily    Dry eyes, bilateral       cholecalciferol 1000 UNITS capsule    vitamin  -D    180 capsule    Take 2 capsules (2,000 Units) by mouth daily    Osteoporosis, Liver replaced by transplant (H), Vitamin D deficiency, Secondary hyperparathyroidism (H), CKD (chronic kidney disease) stage 3, GFR 30-59 ml/min, Palpitations       * cycloSPORINE modified capsule     240 capsule    Take 4 capsules (100 mg) by mouth every 12 hours    Liver replaced by transplant (H)       * cycloSPORINE modified 25 MG capsule    GENERIC EQUIVALENT    240 capsule    Take 4 capsules (100 mg) by mouth every 12 hours    Liver replaced by transplant (H)       * lidocaine 5 % ointment    XYLOCAINE    70 g    Apply topically 2 times daily Apply to affected area for pain two times daily    Chronic left-sided thoracic back pain       * lidocaine 5 % Patch    LIDODERM    30 patch    Apply up to 3 patches to painful area at once for up to 12 h within a 24 h period.  Remove after 12 hours.    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  Remove after 12 hours.    Plantar fasciitis       * Medical Compression Socks Misc     2 each    1 Units daily    Leg swelling       * Knee Brace/Flex Stays Large Misc     2 each    1 Units daily    Chronic pain of both knees       melatonin 3 MG tablet     100 tablet    Take 1 tablet (3 mg) by mouth nightly as needed for sleep     Primary insomnia       Menthol (Topical Analgesic) 4 % Gel     118 mL    Externally apply topically 3 times daily as needed    Chronic left-sided thoracic back pain, Strain of trapezius muscle, left, initial encounter       Menthol 10 MG Lozg     120 lozenge    Take 1 lozenge by mouth every 2 hours as needed    Cough       multivitamin, therapeutic with minerals Tabs tablet     100 tablet    Take 1 tablet by mouth daily    Liver replaced by transplant (H)       * mycophenolate 250 MG capsule    GENERIC EQUIVALENT    120 capsule    Take 2 capsules (500 mg) by mouth every 12 hours    Liver replaced by transplant (H)       * mycophenolate 250 MG capsule    GENERIC EQUIVALENT    120 capsule    TAKE TWO CAPSULES BY MOUTH EVERY 12 HOURS    Liver replaced by transplant (H)       nitroFURantoin (macrocrystal-monohydrate) 100 MG capsule    MACROBID    14 capsule    Take 1 capsule (100 mg) by mouth 2 times daily        omeprazole 20 MG CR capsule    priLOSEC    180 capsule    Take 1 capsule (20 mg) by mouth 2 times daily    Gastroesophageal reflux disease, esophagitis presence not specified       * order for DME     1 Units    Equipment being ordered: Nebulizer with adult mask and tubing    Acute bronchitis, unspecified organism       * order for DME     1 Units    Equipment being ordered: cane with padded handle    Gait instability       * order for DME     1 Units    Equipment being ordered: cane    Osteoporosis, Falls frequently       * order for DME     2 Units    Equipment being ordered: compression stockings bilateral Please measure and fit patient for stockings  Strength 15-30mmHg Disp 2 pairs 1 refill over the time of 1 year    Localized edema       * order for DME     1 Units    2-wheeled walker (dx: gait instability and Frequent falls).    Gait instability, Falls frequently       * order for DME     1 Units    Equipment being ordered: 4 Wheeled Walker with Seat and Brakes    Falls frequently, Gait instability        * order for DME     2 each    Equipment being ordered: Compression stockings below knee bilateral    Bilateral edema of lower extremity       * order for DME     1 each    Equipment being ordered: Back Stabilizer    Chronic midline low back pain without sciatica       * order for DME     2 each    Equipment being ordered: 2 pairs of black stabilizer socks (beige okay if black not available). Size XL.    Bilateral edema of lower extremity       prednisoLONE acetate 1 % ophthalmic susp    PRED FORTE    1 Bottle    Use in operative eye four times a day  For 4 days    Posterior capsular opacification visually significant of both eyes       * Psyllium 500 MG Caps     180 capsule    Take 3 capsules by mouth 2 times daily    Constipation, chronic       * REGULOID 0.52 G capsule   Generic drug:  psyllium     540 capsule    Take 3 capsules (1.56 g) by mouth 2 times daily    Constipation, unspecified constipation type       senna-docusate 8.6-50 MG per tablet    SENOKOT-S;PERICOLACE    120 tablet    Take 2 tablets by mouth 2 times daily For constipation    Constipation, chronic       simethicone 80 MG chewable tablet    MYLICON    120 tablet    Take 1 tablet (80 mg) by mouth every 6 hours as needed for flatulence or cramping    Flatulence, eructation, and gas pain       sodium chloride 0.65 % nasal spray    OCEAN    30 mL    Spray 1 spray into both nostrils daily as needed for congestion    Throat pain       sodium phosphate 7-19 GM/118ML rectal enema     3 Bottle    Place 1 Bottle (1 enema) rectally daily as needed for constipation    Ileus (H)       STATIN NOT PRESCRIBED (INTENTIONAL)     0 each    Not prescribed    High risk medication use       * Notice:  This list has 20 medication(s) that are the same as other medications prescribed for you. Read the directions carefully, and ask your doctor or other care provider to review them with you.

## 2017-08-31 NOTE — NURSING NOTE
Chief Complaints and History of Present Illnesses   Patient presents with     Follow Up For     Floater in the RE. Pt also lost glasses and was wanting a copy of her last MRx from June 2016     HPI    Affected eye(s):  Right   Symptoms:     No blurred vision   No decreased vision   Floaters (Comment: Started when cataract was removed but bugs her.)         Do you have eye pain now?:  No      Comments:  Radha VELA 10:05 AM August 31, 2017

## 2017-08-31 NOTE — PROGRESS NOTES
Flor Valdez is a 51 year old female 2 year s/p cataract extraction with IOL in the right eye.      s/p yag cap both eyes     Interval:  Patient here for persistent floater OD since yag capsulotomy, not worsening. Refuses flashes    Meds:   PFAT BID-TID    A&P  Pseudophakia, both eyes  s/p yag cap both eyes  IOL well positioned, doing well    PVD OU  Retina attached, no holes/tears  S/s of retinal detachment discussed and to f/up asap    Dry eye OS>OD  Increase AT to 6-8x/day    MRx reprinted today    RTC in 1 year earlier as needed     GEORGETTE Sahni  Cornea fellow          ~~~~~~~~~~~~~~~~~~~~~~~~~~~~~~~~~~~~~~~~~~~~~~~~~~~~~~~~~~~~~~~~    Complete documentation of historical and exam elements from today's encounter can be found in the full encounter summary report (not reduplicated in this progress note). I personally obtained the chief complaint(s) and history of present illness.  I confirmed and edited as necessary the review of systems, past medical/surgical history, family history, social history, and examination findings as documented by others.  I examined the patient myself, and I personally reviewed the relevant tests, images, and reports as documented above. I formulated and edited as necessary the assessment and plan and discussed the findings and management plan with the patient and family.     Nik Langley MD, MA  Director, Cornea & Anterior Segment  [facility] Department of Ophthalmology & Visual Neuroscience

## 2017-09-01 RX ORDER — SIMETHICONE 80 MG
80 TABLET,CHEWABLE ORAL EVERY 6 HOURS PRN
Qty: 120 TABLET | Refills: 0 | Status: SHIPPED | OUTPATIENT
Start: 2017-09-01 | End: 2017-10-26

## 2017-09-02 ENCOUNTER — HEALTH MAINTENANCE LETTER (OUTPATIENT)
Age: 52
End: 2017-09-02

## 2017-09-05 ENCOUNTER — MEDICAL CORRESPONDENCE (OUTPATIENT)
Dept: HEALTH INFORMATION MANAGEMENT | Facility: CLINIC | Age: 52
End: 2017-09-05

## 2017-09-05 ENCOUNTER — APPOINTMENT (OUTPATIENT)
Dept: OPTOMETRY | Facility: CLINIC | Age: 52
End: 2017-09-05
Payer: MEDICARE

## 2017-09-05 PROCEDURE — 92340 FIT SPECTACLES MONOFOCAL: CPT | Performed by: OPTOMETRIST

## 2017-09-07 ENCOUNTER — DOCUMENTATION ONLY (OUTPATIENT)
Dept: FAMILY MEDICINE | Facility: CLINIC | Age: 52
End: 2017-09-07

## 2017-09-07 NOTE — PROGRESS NOTES
"When opening a documentation only encounter, be sure to enter in \"Chief Complaint\" Forms and in \" Comments\" Title of form, description if needed.    Flor is a 51 year old  female  Form received via: Fax  Form now resides in: PCS Pending    Nicole Winkler MA      Form has been completed by provider.     Form sent out via: Fax to [email or web address] at Fax Number: [phone]  Patient informed: No, Reason:VIA FAX  Output date: September 7, 2017    Nicloe Winkler MA      **Please close the encounter**                "

## 2017-09-11 ENCOUNTER — DOCUMENTATION ONLY (OUTPATIENT)
Dept: FAMILY MEDICINE | Facility: CLINIC | Age: 52
End: 2017-09-11

## 2017-09-11 ENCOUNTER — TELEPHONE (OUTPATIENT)
Dept: FAMILY MEDICINE | Facility: CLINIC | Age: 52
End: 2017-09-11

## 2017-09-11 ENCOUNTER — OFFICE VISIT (OUTPATIENT)
Dept: TRANSPLANT | Facility: CLINIC | Age: 52
End: 2017-09-11
Attending: TRANSPLANT SURGERY
Payer: MEDICARE

## 2017-09-11 VITALS
SYSTOLIC BLOOD PRESSURE: 129 MMHG | BODY MASS INDEX: 39.46 KG/M2 | WEIGHT: 209 LBS | OXYGEN SATURATION: 98 % | HEIGHT: 61 IN | DIASTOLIC BLOOD PRESSURE: 76 MMHG | HEART RATE: 72 BPM | TEMPERATURE: 97.4 F | RESPIRATION RATE: 16 BRPM

## 2017-09-11 DIAGNOSIS — Z94.4 LIVER REPLACED BY TRANSPLANT (H): Primary | ICD-10-CM

## 2017-09-11 PROCEDURE — T1013 SIGN LANG/ORAL INTERPRETER: HCPCS | Mod: U3,ZF

## 2017-09-11 PROCEDURE — 99211 OFF/OP EST MAY X REQ PHY/QHP: CPT

## 2017-09-11 RX ORDER — LIDOCAINE 50 MG/G
PATCH TOPICAL
Qty: 30 PATCH | Refills: 0 | Status: SHIPPED | OUTPATIENT
Start: 2017-09-11 | End: 2017-10-26

## 2017-09-11 ASSESSMENT — PAIN SCALES - GENERAL: PAINLEVEL: MODERATE PAIN (4)

## 2017-09-11 NOTE — LETTER
"9/11/2017       RE: Flor Navarro  [address] [facility] [id number]     Dear Colleague,    Thank you for referring your patient, Flor Navarro, to the [facility] SOLID ORGAN TRANSPLANT at [facility]. Please see a copy of my visit note below.    HPI      ROS      Physical Exam    C/o   Pain on the transplant incision at the left side    No fevers    /76  Pulse 72  Temp 97.4  F (36.3  C) (Oral)  Resp 16  Ht 1.549 m (5' 1\")  Wt 94.8 kg (209 lb)  SpO2 98%  BMI 39.49 kg/m2    Focussed exam:  Left side of the incision is healed well  No hernia seen    Recommend  lidoderm patch  Follow with Dr. Waterman for hepatology    Again, thank you for allowing me to participate in the care of your patient.      Sincerely,    Ramses Fortune MD      "

## 2017-09-11 NOTE — PROGRESS NOTES
"When opening a documentation only encounter, be sure to enter in \"Chief Complaint\" Forms and in \" Comments\" Title of form, description if needed.    Flor is a 51 year old  female  Form received via: Fax  Form now resides in: PCS Pending    Nicole Winkler MA      Form has been completed by provider.     Form sent out via: Fax to [employer] at Fax Number: [phone]  Patient informed: No, Reason:VIA FAX  Output date: September 11, 2017    Nicole Winkler MA      **Please close the encounter**                              "

## 2017-09-11 NOTE — MR AVS SNAPSHOT
"              After Visit Summary   9/11/2017    Flor Navarro    MRN: [id number]           Patient Information     Date Of Birth          1965        Visit Information        Provider Department      9/11/2017 11:15 AM Rashida Pandya; Ramses Fortune MD [facility]        Today's Diagnoses     Liver replaced by transplant (H)    -  1       Follow-ups after your visit        Your next 10 appointments already scheduled     Sep 12, 2017  8:40 AM CDT   Return Visit with Karen Downs MD   [facility] ([facility])    [address],  [address]  [facility] [address]   [phone]            Oct 09, 2017  1:00 PM CDT   (Arrive by 12:45 PM)   Return Visit with Enrrique Olivares DPM   [facility] ([facility])    [address]  5th [address]  [facility] [address] [phone]              Who to contact     If you have questions or need follow up information about today's clinic visit or your schedule please contact [facility] directly at [phone].  Normal or non-critical lab and imaging results will be communicated to you by MyChart, letter or phone within 4 business days after the clinic has received the results. If you do not hear from us within 7 days, please contact the clinic through [facility] or phone. If you have a critical or abnormal lab result, we will notify you by phone as soon as possible.  Submit refill requests through [employer] or call your pharmacy and they will forward the refill request to us. Please allow 3 business days for your refill to be completed.          Additional Information About Your Visit        MyChart Information     [employer] lets you send messages to your doctor, view your test results, renew your prescriptions, schedule appointments and more. To sign up, go to [email or web address] . Click on \"Log in\" on the left side of the screen, which " "will take you to the Welcome page. Then click on \"Sign up Now\" on the right side of the page.     You will be asked to enter the access code listed below, as well as some personal information. Please follow the directions to create your username and password.     Your access code is: [email or web address]  Expires: 12/3/2017  6:30 AM     Your access code will  in 90 days. If you need help or a new code, please call your [facility] or [phone].        Care EveryWhere ID     This is your Care EveryWhere ID. This could be used by other organizations to access your [place] medical records  [id number]        Your Vitals Were     Pulse Temperature Respirations Height Pulse Oximetry BMI (Body Mass Index)    72 97.4  F (36.3  C) (Oral) 16 1.549 m (5' 1\") 98% 39.49 kg/m2       Blood Pressure from Last 3 Encounters:   17 129/76   17 112/77   17 126/90    Weight from Last 3 Encounters:   17 94.8 kg (209 lb)   17 94.8 kg (209 lb)   17 95.4 kg (210 lb 4.8 oz)              Today, you had the following     No orders found for display         Today's Medication Changes          These changes are accurate as of: 17 11:28 AM.  If you have any questions, ask your nurse or doctor.               These medicines have changed or have updated prescriptions.        Dose/Directions    * lidocaine 5 % ointment   Commonly known as:  XYLOCAINE   This may have changed:  Another medication with the same name was added. Make sure you understand how and when to take each.   Used for:  Chronic left-sided thoracic back pain   Changed by:  Holland Thakkar, DO        Apply topically 2 times daily Apply to affected area for pain two times daily   Quantity:  70 g   Refills:  1       * lidocaine 5 % Patch   Commonly known as:  LIDODERM   This may have changed:  Another medication with the same name was added. Make sure you understand how and when to take each.   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  Remove after 12 hours.   Quantity:  30 patch   Refills:  0       * lidocaine 5 % Patch   Commonly known as:  LIDODERM   This may have changed:  Another medication with the same name was added. Make sure you understand how and when to take each.   Used for:  Plantar fasciitis   Changed by:  Karen Downs MD        Apply up to 3 patches to painful area at once for up to 12 h within a 24 h period.  Remove after 12 hours.   Quantity:  30 patch   Refills:  1       * lidocaine 5 % Patch   Commonly known as:  LIDODERM   This may have changed:  You were already taking a medication with the same name, and this prescription was added. Make sure you understand how and when to take each.   Used for:  Liver replaced by transplant (H)   Changed by:  Ramses Fortune MD        Apply up to 3 patches to painful area at once for up to 12 h within a 24 h period.  Remove after 12 hours.   Quantity:  30 patch   Refills:  0       * Notice:  This list has 4 medication(s) that are the same as other medications prescribed for you. Read the directions carefully, and ask your doctor or other care provider to review them with you.         Where to get your medicines      These medications were sent to [facility] [id number] [place] [id number]  [address] [place] [address]    Hours:  TRANSPLANT PHONE NUMBER [phone] Phone:  [phone]     lidocaine 5 % Patch                Primary Care Provider Office Phone # Fax #    Karely Sierra [phone]       [address] [address]        Equal Access to Services     [facility] AH: Hadluis Adair, roxy briceno, mart carver. So [facility] [phone].    ATENCIÓN: Si abbey dalton, vinny marques garcia  disposición servicios gratuitos de asistencia lingüística. Abdirahman busby [phone].    We comply with applicable federal civil rights laws and [place] laws. We do not discriminate on the basis of race, color, national origin, age, disability sex, sexual orientation or gender identity.            Thank you!     Thank you for choosing [facility]  for your care. Our goal is always to provide you with excellent care. Hearing back from our patients is one way we can continue to improve our services. Please take a few minutes to complete the written survey that you may receive in the mail after your visit with us. Thank you!             Your Updated Medication List - Protect others around you: Learn how to safely use, store and throw away your medicines at www.disposemymeds.org.          This list is accurate as of: 9/11/17 11:28 AM.  Always use your most recent med list.                   Brand Name Dispense Instructions for use Diagnosis    acetaminophen 325 MG tablet    TYLENOL    100 tablet    Take 1-2 tablets (325-650 mg) by mouth every 6 hours as needed Max 6 tablets per 24 hours    Sebaceous cyst       albuterol 108 (90 BASE) MCG/ACT Inhaler    PROAIR HFA/PROVENTIL HFA/VENTOLIN HFA    1 Inhaler    Inhale 2 puffs into the lungs 4 times daily    Acute bronchitis, unspecified organism       alendronate 70 MG tablet    FOSAMAX    12 tablet    Take 1 tablet (70 mg) by mouth every 7 days Take at least 60 min before breakfast with over 8 oz water and stay upright for at least 30 min. after dose.    Osteoporosis       benzocaine-menthol 6-10 MG lozenge    CHLORASEPTIC    36 lozenge    Place 1 lozenge inside cheek every 2 hours as needed for moderate pain    Throat pain       bisacodyl 5 MG EC tablet    DULCOLAX    30 tablet    Take 1 tablet (5 mg) by mouth daily as needed for constipation    Constipation, chronic       calcium-vitamin D 600-400 MG-UNIT per tablet    calcium 600 + D    60 tablet    Take  1 tablet by mouth 2 times daily    Osteoporosis       carboxymethylcellul-glycerin 0.5-0.9 % Soln ophthalmic solution    OPTIVE/REFRESH OPTIVE    1 each    Place 1 drop into both eyes 4 times daily    Dry eye syndrome of bilateral lacrimal glands       carboxymethylcellulose 1 % ophthalmic solution    CELLUVISC/REFRESH LIQUIGEL    15 each    Place 1 drop into both eyes 4 times daily    Dry eyes, bilateral       cholecalciferol 1000 UNITS capsule    vitamin  -D    180 capsule    Take 2 capsules (2,000 Units) by mouth daily    Osteoporosis, Liver replaced by transplant (H), Vitamin D deficiency, Secondary hyperparathyroidism (H), CKD (chronic kidney disease) stage 3, GFR 30-59 ml/min, Palpitations       * cycloSPORINE modified capsule     240 capsule    Take 4 capsules (100 mg) by mouth every 12 hours    Liver replaced by transplant (H)       * cycloSPORINE modified 25 MG capsule    GENERIC EQUIVALENT    240 capsule    Take 4 capsules (100 mg) by mouth every 12 hours    Liver replaced by transplant (H)       * lidocaine 5 % ointment    XYLOCAINE    70 g    Apply topically 2 times daily Apply to affected area for pain two times daily    Chronic left-sided thoracic back pain       * lidocaine 5 % Patch    LIDODERM    30 patch    Apply up to 3 patches to painful area at once for up to 12 h within a 24 h period.  Remove after 12 hours.    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  Remove after 12 hours.    Plantar fasciitis       * lidocaine 5 % Patch    LIDODERM    30 patch    Apply up to 3 patches to painful area at once for up to 12 h within a 24 h period.  Remove after 12 hours.    Liver replaced by transplant (H)       * Medical Compression Socks Misc     2 each    1 Units daily    Leg swelling       * Knee Brace/Flex Stays Large Misc     2 each    1 Units daily    Chronic pain of  both knees       melatonin 3 MG tablet     100 tablet    Take 1 tablet (3 mg) by mouth nightly as needed for sleep    Primary insomnia       Menthol (Topical Analgesic) 4 % Gel     118 mL    Externally apply topically 3 times daily as needed    Chronic left-sided thoracic back pain, Strain of trapezius muscle, left, initial encounter       Menthol 10 MG Lozg     120 lozenge    Take 1 lozenge by mouth every 2 hours as needed    Cough       multivitamin, therapeutic with minerals Tabs tablet     100 tablet    Take 1 tablet by mouth daily    Liver replaced by transplant (H)       * mycophenolate 250 MG capsule    GENERIC EQUIVALENT    120 capsule    Take 2 capsules (500 mg) by mouth every 12 hours    Liver replaced by transplant (H)       * mycophenolate 250 MG capsule    GENERIC EQUIVALENT    120 capsule    TAKE TWO CAPSULES BY MOUTH EVERY 12 HOURS    Liver replaced by transplant (H)       nitroFURantoin (macrocrystal-monohydrate) 100 MG capsule    MACROBID    14 capsule    Take 1 capsule (100 mg) by mouth 2 times daily        omeprazole 20 MG CR capsule    priLOSEC    180 capsule    Take 1 capsule (20 mg) by mouth 2 times daily    Gastroesophageal reflux disease, esophagitis presence not specified       * order for DME     1 Units    Equipment being ordered: Nebulizer with adult mask and tubing    Acute bronchitis, unspecified organism       * order for DME     1 Units    Equipment being ordered: cane with padded handle    Gait instability       * order for DME     1 Units    Equipment being ordered: cane    Osteoporosis, Falls frequently       * order for DME     2 Units    Equipment being ordered: compression stockings bilateral Please measure and fit patient for stockings  Strength 15-30mmHg Disp 2 pairs 1 refill over the time of 1 year    Localized edema       * order for DME     1 Units    2-wheeled walker (dx: gait instability and Frequent falls).    Gait instability, Falls frequently       * order for DME      1 Units    Equipment being ordered: 4 Wheeled Walker with Seat and Brakes    Falls frequently, Gait instability       * order for DME     2 each    Equipment being ordered: Compression stockings below knee bilateral    Bilateral edema of lower extremity       * order for DME     1 each    Equipment being ordered: Back Stabilizer    Chronic midline low back pain without sciatica       * order for DME     2 each    Equipment being ordered: 2 pairs of black stabilizer socks (beige okay if black not available). Size XL.    Bilateral edema of lower extremity       prednisoLONE acetate 1 % ophthalmic susp    PRED FORTE    1 Bottle    Use in operative eye four times a day  For 4 days    Posterior capsular opacification visually significant of both eyes       * Psyllium 500 MG Caps     180 capsule    Take 3 capsules by mouth 2 times daily    Constipation, chronic       * REGULOID 0.52 G capsule   Generic drug:  psyllium     540 capsule    Take 3 capsules (1.56 g) by mouth 2 times daily    Constipation, unspecified constipation type       senna-docusate 8.6-50 MG per tablet    SENOKOT-S;PERICOLACE    120 tablet    Take 2 tablets by mouth 2 times daily For constipation    Constipation, chronic       simethicone 80 MG chewable tablet    MYLICON    120 tablet    Take 1 tablet (80 mg) by mouth every 6 hours as needed for flatulence or cramping    Flatulence, eructation, and gas pain       sodium chloride 0.65 % nasal spray    OCEAN    30 mL    Spray 1 spray into both nostrils daily as needed for congestion    Throat pain       sodium phosphate 7-19 GM/118ML rectal enema     3 Bottle    Place 1 Bottle (1 enema) rectally daily as needed for constipation    Ileus (H)       STATIN NOT PRESCRIBED (INTENTIONAL)     0 each    Not prescribed    High risk medication use       * Notice:  This list has 21 medication(s) that are the same as other medications prescribed for you. Read the directions carefully, and ask your doctor or other  care provider to review them with you.

## 2017-09-11 NOTE — NURSING NOTE
"Chief Complaint   Patient presents with     Liver Transplant     POD 6 years     Consult For     abdomen pain       Initial /76  Pulse 72  Temp 97.4  F (36.3  C) (Oral)  Resp 16  Ht 1.549 m (5' 1\")  Wt 94.8 kg (209 lb)  SpO2 98%  BMI 39.49 kg/m2 Estimated body mass index is 39.49 kg/(m^2) as calculated from the following:    Height as of this encounter: 1.549 m (5' 1\").    Weight as of this encounter: 94.8 kg (209 lb).      "

## 2017-09-11 NOTE — PROGRESS NOTES
"HPI      ROS      Physical Exam    C/o   Pain on the transplant incision at the left side    No fevers    /76  Pulse 72  Temp 97.4  F (36.3  C) (Oral)  Resp 16  Ht 1.549 m (5' 1\")  Wt 94.8 kg (209 lb)  SpO2 98%  BMI 39.49 kg/m2    Focussed exam:  Left side of the incision is healed well  No hernia seen    Recommend  lidoderm patch  Follow with Dr. Waterman for hepatology  "

## 2017-09-11 NOTE — TELEPHONE ENCOUNTER
[facility] phone call message- patient requesting to speak with PCP or provider:    PCP: Karely Sierra    Additional Comments: Deborah, a nurse care coordinator, is calling to let us know that the patient is going to [place] for 3 months and will need to work out a plan for her medications. She would also like the provider she is seeing at her appointment to offer and talk about a Pap. Routing to PCP and provider seeing her on 9/12/17.    Is a call back needed? If needed    Patient informed that it may take up to 2 business days to hear back from PCP:No    OK to leave a message on voice mail? Yes    Primary language: Czech      needed? Yes    Call taken on September 11, 2017 at 1:07 PM by Hellen Lobo

## 2017-09-12 ENCOUNTER — OFFICE VISIT (OUTPATIENT)
Dept: FAMILY MEDICINE | Facility: CLINIC | Age: 52
End: 2017-09-12

## 2017-09-12 VITALS
HEIGHT: 61 IN | HEART RATE: 72 BPM | TEMPERATURE: 97.7 F | WEIGHT: 209.2 LBS | DIASTOLIC BLOOD PRESSURE: 82 MMHG | SYSTOLIC BLOOD PRESSURE: 123 MMHG | BODY MASS INDEX: 39.5 KG/M2 | OXYGEN SATURATION: 94 %

## 2017-09-12 DIAGNOSIS — Z94.4 LIVER REPLACED BY TRANSPLANT (H): ICD-10-CM

## 2017-09-12 DIAGNOSIS — I12.9 HYPERTENSION, RENAL, STAGE 1-4 OR UNSPECIFIED CHRONIC KIDNEY DISEASE: Primary | ICD-10-CM

## 2017-09-12 DIAGNOSIS — L85.3 DRY SKIN: ICD-10-CM

## 2017-09-12 NOTE — MR AVS SNAPSHOT
After Visit Summary   9/12/2017    Flor Navarro    MRN: [id number]           Patient Information     Date Of Birth          1965        Visit Information        Provider Department      9/12/2017 8:40 AM Buck Downs MD [facility]        Today's Diagnoses     Hypertension, renal, stage 1-4 or unspecified chronic kidney disease    -  1    Liver replaced by transplant (H)        Dry skin          Care Instructions    Here is the plan from today's visit    1. Liver replaced by transplant (H)  - no changes to your medications.     2. Hypertension, renal, stage 1-4 or unspecified chronic kidney disease  - no changes to your medications    3. Dry skin  - Colloidal Oatmeal 1 % CREA; Externally apply topically as needed  Dispense: 226 g; Refill: 1  - you can use the lotion several times a day as needed for dry skin.     I want to see you in 2 weeks for your pap smear.  Then in 1 month for your refills.     Please call or return to clinic if your symptoms don't go away.    Follow up plan  Please make a clinic appointment for follow up with me (BUCK DOWNS) in 2  weeks for Pap Smear.    Thank you for coming to [facility] today.  Lab Testing:  **If you had lab testing today and your results are reassuring or normal they will be mailed to you or sent through [employer] within 7 days.   **If the lab tests need quick action we will call you with the results.  The phone number we will call with results is # [phone] (home) . If this is not the best number please call our clinic and change the number.  Medication Refills:  If you need any refills please call your pharmacy and they will contact us.   If you need to  your refill at a new pharmacy, please contact the new pharmacy directly. The new pharmacy will help you get your medications transferred faster.   Scheduling:  If you have any concerns about today's visit or wish to schedule another appointment  please call our office during normal business hours [phone] (8-5:00 M-F)    Medical Concerns:  If you have urgent medical concerns please call [phone] at any time of the day.  If you have a medical emergency please call 911.            Follow-ups after your visit        Your next 10 appointments already scheduled     Oct 03, 2017  9:40 AM CDT   Return Visit with [facility] ([facility])    [address] [place] [address],  [address]  [facility] [address]   [phone]            Oct 09, 2017  1:00 PM CDT   (Arrive by 12:45 PM)   Return Visit with Enrrique Olivares DPM   [facility] ([facility])    [address]  5th Floor  [facility] [id number] [phone]            Oct 16, 2017  1:20 PM CDT   Return Visit with MD [facility] ([facility])    [address],  [address]  [facility] [address]   [phone]            Nov 02, 2017  8:30 AM CDT   Lab with  LAB    [facility] ([facility])    [address]  1st Floor  [facility] [id number] [phone]            Nov 02, 2017  9:20 AM CDT   (Arrive by 9:05 AM)   Return Liver Transplant with Aaron Harkins MD   [facility] Hepatology ([facility])    [address] [place] [address]  3rd Floor  [facility] [id number] [phone]              Who to contact     Please call your clinic at [phone] to:    Ask questions about your health    Make or cancel appointments    Discuss your medicines    Learn about your test results    Speak to your doctor   If you have compliments or concerns about an experience at your clinic, or if you wish to file a complaint, please contact [facility] Patient Relations at [phone] or email us at [email or web address]         Additional Information About Your Visit        Jose  "Information     [employer] is an electronic gateway that provides easy, online access to your medical records. With [employer], you can request a clinic appointment, read your test results, renew a prescription or communicate with your care team.     To sign up for [employer] visit the website at [email or web address]   You will be asked to enter the access code listed below, as well as some personal information. Please follow the directions to create your username and password.     Your access code is: [email or web address]  Expires: 12/3/2017  6:30 AM     Your access code will  in 90 days. If you need help or a new code, please contact your [facility] or call [phone] for assistance.        Care EveryWhere ID     This is your Care EveryWhere ID. This could be used by other organizations to access your [place] medical records  [id number]        Your Vitals Were     Pulse Temperature Height Pulse Oximetry BMI (Body Mass Index)       72 97.7  F (36.5  C) (Oral) 5' 1\" (154.9 cm) 94% 39.53 kg/m2        Blood Pressure from Last 3 Encounters:   17 123/82   17 129/76   17 112/77    Weight from Last 3 Encounters:   17 209 lb 3.2 oz (94.9 kg)   17 209 lb (94.8 kg)   17 209 lb (94.8 kg)              Today, you had the following     No orders found for display         Today's Medication Changes          These changes are accurate as of: 17 10:11 AM.  If you have any questions, ask your nurse or doctor.               Start taking these medicines.        Dose/Directions    Colloidal Oatmeal 1 % Crea   Used for:  Dry skin   Started by:  Karen Downs MD        Externally apply topically as needed   Quantity:  226 g   Refills:  1         These medicines have changed or have updated prescriptions.        Dose/Directions    lidocaine 5 % Patch   Commonly known as:  LIDODERM   This may have changed:  Another medication with the same name was removed. " Continue taking this medication, and follow the directions you see here.   Used for:  Liver replaced by transplant (H)   Changed by:  Ramses Fortune MD        Apply up to 3 patches to painful area at once for up to 12 h within a 24 h period.  Remove after 12 hours.   Quantity:  30 patch   Refills:  0       mycophenolate 250 MG capsule   Commonly known as:  GENERIC EQUIVALENT   This may have changed:  Another medication with the same name was removed. Continue taking this medication, and follow the directions you see here.   Used for:  Liver replaced by transplant (H)   Changed by:  Laron Anne MD        TAKE TWO CAPSULES BY MOUTH EVERY 12 HOURS   Quantity:  120 capsule   Refills:  4         Stop taking these medicines if you haven't already. Please contact your care team if you have questions.     benzocaine-menthol 6-10 MG lozenge   Commonly known as:  CHLORASEPTIC   Stopped by:  Karen Downs MD           Menthol (Topical Analgesic) 4 % Gel   Stopped by:  Karen Downs MD           Menthol 10 MG Lozg   Stopped by:  Karen Downs MD           nitroFURantoin (macrocrystal-monohydrate) 100 MG capsule   Commonly known as:  MACROBID   Stopped by:  Karen Downs MD                Where to get your medicines      These medications were sent to [address] [address]    Hours:  TRANSPLANT PHONE NUMBER [phone] Phone:  [phone]     Colloidal Oatmeal 1 % Crea                Primary Care Provider Office Phone # Fax #    Karely Sierra [phone]       [address] [address]        Equal Access to Services     [facility] : Hadluis Adair, roxy briceno, qamart wilks. So [facility] [phone].    ATENCIÓN: Si habla español, tiene a garcia disposición servicios  nany de asistencia lingüística. Abdirahman busby [phone].    We comply with applicable federal civil rights laws and [place] laws. We do not discriminate on the basis of race, color, national origin, age, disability sex, sexual orientation or gender identity.            Thank you!     Thank you for choosing [facility]  for your care. Our goal is always to provide you with excellent care. Hearing back from our patients is one way we can continue to improve our services. Please take a few minutes to complete the written survey that you may receive in the mail after your visit with us. Thank you!             Your Updated Medication List - Protect others around you: Learn how to safely use, store and throw away your medicines at www.disposemymeds.org.          This list is accurate as of: 9/12/17 10:11 AM.  Always use your most recent med list.                   Brand Name Dispense Instructions for use Diagnosis    acetaminophen 325 MG tablet    TYLENOL    100 tablet    Take 1-2 tablets (325-650 mg) by mouth every 6 hours as needed Max 6 tablets per 24 hours    Sebaceous cyst       albuterol 108 (90 BASE) MCG/ACT Inhaler    PROAIR HFA/PROVENTIL HFA/VENTOLIN HFA    1 Inhaler    Inhale 2 puffs into the lungs 4 times daily    Acute bronchitis, unspecified organism       alendronate 70 MG tablet    FOSAMAX    12 tablet    Take 1 tablet (70 mg) by mouth every 7 days Take at least 60 min before breakfast with over 8 oz water and stay upright for at least 30 min. after dose.    Osteoporosis       bisacodyl 5 MG EC tablet    DULCOLAX    30 tablet    Take 1 tablet (5 mg) by mouth daily as needed for constipation    Constipation, chronic       calcium-vitamin D 600-400 MG-UNIT per tablet    calcium 600 + D    60 tablet    Take 1 tablet by mouth 2 times daily    Osteoporosis       carboxymethylcellul-glycerin 0.5-0.9 % Soln ophthalmic solution    OPTIVE/REFRESH OPTIVE    1 each    Place 1 drop into both  eyes 4 times daily    Dry eye syndrome of bilateral lacrimal glands       carboxymethylcellulose 1 % ophthalmic solution    CELLUVISC/REFRESH LIQUIGEL    15 each    Place 1 drop into both eyes 4 times daily    Dry eyes, bilateral       cholecalciferol 1000 UNITS capsule    vitamin  -D    180 capsule    Take 2 capsules (2,000 Units) by mouth daily    Osteoporosis, Liver replaced by transplant (H), Vitamin D deficiency, Secondary hyperparathyroidism (H), CKD (chronic kidney disease) stage 3, GFR 30-59 ml/min, Palpitations       Colloidal Oatmeal 1 % Crea     226 g    Externally apply topically as needed    Dry skin       * cycloSPORINE modified capsule     240 capsule    Take 4 capsules (100 mg) by mouth every 12 hours    Liver replaced by transplant (H)       * cycloSPORINE modified 25 MG capsule    GENERIC EQUIVALENT    240 capsule    Take 4 capsules (100 mg) by mouth every 12 hours    Liver replaced by transplant (H)       lidocaine 5 % Patch    LIDODERM    30 patch    Apply up to 3 patches to painful area at once for up to 12 h within a 24 h period.  Remove after 12 hours.    Liver replaced by transplant (H)       * Medical Compression Socks Misc     2 each    1 Units daily    Leg swelling       * Knee Brace/Flex Stays Large Misc     2 each    1 Units daily    Chronic pain of both knees       melatonin 3 MG tablet     100 tablet    Take 1 tablet (3 mg) by mouth nightly as needed for sleep    Primary insomnia       multivitamin, therapeutic with minerals Tabs tablet     100 tablet    Take 1 tablet by mouth daily    Liver replaced by transplant (H)       mycophenolate 250 MG capsule    GENERIC EQUIVALENT    120 capsule    TAKE TWO CAPSULES BY MOUTH EVERY 12 HOURS    Liver replaced by transplant (H)       omeprazole 20 MG CR capsule    priLOSEC    180 capsule    Take 1 capsule (20 mg) by mouth 2 times daily    Gastroesophageal reflux disease, esophagitis presence not specified       * order for DME     1 Units     Equipment being ordered: Nebulizer with adult mask and tubing    Acute bronchitis, unspecified organism       * order for DME     1 Units    Equipment being ordered: cane with padded handle    Gait instability       * order for DME     1 Units    Equipment being ordered: cane    Osteoporosis, Falls frequently       * order for DME     2 Units    Equipment being ordered: compression stockings bilateral Please measure and fit patient for stockings  Strength 15-30mmHg Disp 2 pairs 1 refill over the time of 1 year    Localized edema       * order for DME     1 Units    2-wheeled walker (dx: gait instability and Frequent falls).    Gait instability, Falls frequently       * order for DME     1 Units    Equipment being ordered: 4 Wheeled Walker with Seat and Brakes    Falls frequently, Gait instability       * order for DME     2 each    Equipment being ordered: Compression stockings below knee bilateral    Bilateral edema of lower extremity       * order for DME     1 each    Equipment being ordered: Back Stabilizer    Chronic midline low back pain without sciatica       * order for DME     2 each    Equipment being ordered: 2 pairs of black stabilizer socks (beige okay if black not available). Size XL.    Bilateral edema of lower extremity       prednisoLONE acetate 1 % ophthalmic susp    PRED FORTE    1 Bottle    Use in operative eye four times a day  For 4 days    Posterior capsular opacification visually significant of both eyes       * Psyllium 500 MG Caps     180 capsule    Take 3 capsules by mouth 2 times daily    Constipation, chronic       * REGULOID 0.52 G capsule   Generic drug:  psyllium     540 capsule    Take 3 capsules (1.56 g) by mouth 2 times daily    Constipation, unspecified constipation type       senna-docusate 8.6-50 MG per tablet    SENOKOT-S;PERICOLACE    120 tablet    Take 2 tablets by mouth 2 times daily For constipation    Constipation, chronic       simethicone 80 MG chewable tablet    MYLICON     120 tablet    Take 1 tablet (80 mg) by mouth every 6 hours as needed for flatulence or cramping    Flatulence, eructation, and gas pain       sodium chloride 0.65 % nasal spray    OCEAN    30 mL    Spray 1 spray into both nostrils daily as needed for congestion    Throat pain       sodium phosphate 7-19 GM/118ML rectal enema     3 Bottle    Place 1 Bottle (1 enema) rectally daily as needed for constipation    Ileus (H)       STATIN NOT PRESCRIBED (INTENTIONAL)     0 each    Not prescribed    High risk medication use       * Notice:  This list has 15 medication(s) that are the same as other medications prescribed for you. Read the directions carefully, and ask your doctor or other care provider to review them with you.

## 2017-09-12 NOTE — PROGRESS NOTES
HPI:       Flor Navarro is a 51 year old who presents for the following  Patient presents with:  Results: follow-up     Flor comes to the clinic today for follow up. She has been following a low potassium diet. She denies any headache, fever, chills, SOB, chest pain, abdominal pain, changes in bowel or bladder habits, or skin changes. She has chronic bilateral knee pain. She is looking forward to going to [place] to stay with family for several months. She will be leaving 10/27/2017 and will need at least 3-4 months worth of all her medications to bring with her. She currently is picking up monthly refills and would like to hold off until 10/20/2017 to order the refills so that they are covered before she leaves the [facility].     Patient would like to schedule her Pap for next office visit.     An Malay  was used for  this visit.      Problem, Medication and Allergy Lists were   reviewed and are current.     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        Mixed hyperlipidemia 03/03/2017     Priority: Medium     Secondary hyperparathyroidism (H) 07/25/2016     Priority: Medium     Abdominal pain 04/05/2016     Priority: Medium     Constipation, chronic 04/05/2016     Priority: Medium     Urinary tract infection due to extended-spectrum beta lactamase (ESBL)-producing Klebsiella 12/23/2015     Priority: Medium     Pseudophakia - Left Eye 02/05/2015     Priority: Medium     Shoulder joint pain 11/06/2014     Priority: Medium     Vitamin D deficiency 09/17/2014     Priority: Medium     Problem list name updated by automated process. Provider to review       Advanced directives, counseling/discussion 10/30/2013      Priority: Medium     POLST on file October 30, 2013  Full code       Ganglion cyst 10/02/2013     Priority: Medium     Immunosuppressed status (H)      Priority: Medium     Cystitis cystica 07/03/2013     Priority: Medium     Stroke, hemorrhagic (H)      Priority: Medium     STOP aspirin. She has a hx of hemorrhagic stroke, and her 10 yr risk of MI is only 3% and wouldn't rec treating w/ ASA unless >7.5%.        Osteoporosis      Priority: Medium     Problem list name updated by automated process. Provider to review and confirm  [employer] Update utility       Overweight 04/09/2013     Priority: Medium     Problem list name updated by automated process. Provider to review       CKD (chronic kidney disease) stage 3, GFR 30-59 ml/min      Priority: Medium     Cr. 1.3-1.6. Sees Dr. Wells. Next visit Aug 2015.       Hypertension, renal      Priority: Medium     Liver replaced by transplant      Priority: Medium     2011, due to Hep C, follows w/ Dr. Anne q6mos  MM should be taken on an empty stomach, avoid antacids.   Cyclosporine goal levels ~100ng/dL per transplant surgeon Dr. Fortune Dec 2015       High risk medication use      Priority: Medium     Anemia in CKD (chronic kidney disease)      Priority: Medium     Hyperlipidemia LDL goal <160 04/28/2011     Priority: Medium     PVD (POSTERIOR VITREOUS DETACHMENT) OS 02/28/2011     Priority: Medium   ,     Current Outpatient Prescriptions   Medication Sig Dispense Refill     lidocaine (LIDODERM) 5 % Patch Apply up to 3 patches to painful area at once for up to 12 h within a 24 h period.  Remove after 12 hours. 30 patch 0     simethicone (MYLICON) 80 MG chewable tablet Take 1 tablet (80 mg) by mouth every 6 hours as needed for flatulence or cramping 120 tablet 0     cycloSPORINE modified (GENERIC EQUIVALENT) 25 MG capsule Take 4 capsules (100 mg) by mouth every 12 hours 240 capsule 11     lidocaine (LIDODERM) 5 % Patch Apply up to 3 patches to painful area at once for up to  12 h within a 24 h period.  Remove after 12 hours. 30 patch 1     Elastic Bandages & Supports (MEDICAL COMPRESSION SOCKS) MISC 1 Units daily 2 each 0     Elastic Bandages & Supports (KNEE BRACE/FLEX STAYS LARGE) MISC 1 Units daily 2 each 0     nitroFURantoin, macrocrystal-monohydrate, (MACROBID) 100 MG capsule Take 1 capsule (100 mg) by mouth 2 times daily 14 capsule 0     multivitamin, therapeutic with minerals (MULTI-VITAMIN) TABS tablet Take 1 tablet by mouth daily 100 tablet 11     REGULOID 0.52 G capsule Take 3 capsules (1.56 g) by mouth 2 times daily 540 capsule 3     acetaminophen (TYLENOL) 325 MG tablet Take 1-2 tablets (325-650 mg) by mouth every 6 hours as needed Max 6 tablets per 24 hours 100 tablet 3     carboxymethylcellulose (CELLUVISC/REFRESH LIQUIGEL) 1 % ophthalmic solution Place 1 drop into both eyes 4 times daily 15 each 11     prednisoLONE acetate (PRED FORTE) 1 % ophthalmic susp Use in operative eye four times a day  For 4 days 1 Bottle 0     calcium-vitamin D (CALCIUM 600 + D) 600-400 MG-UNIT per tablet Take 1 tablet by mouth 2 times daily 60 tablet 11     cholecalciferol (VITAMIN  -D) 1000 UNITS capsule Take 2 capsules (2,000 Units) by mouth daily 180 capsule 3     senna-docusate (SENOKOT-S;PERICOLACE) 8.6-50 MG per tablet Take 2 tablets by mouth 2 times daily For constipation 120 tablet 6     mycophenolate (CELLCEPT - GENERIC EQUIVALENT) 250 MG capsule TAKE TWO CAPSULES BY MOUTH EVERY 12 HOURS 120 capsule 4     Menthol 10 MG LOZG Take 1 lozenge by mouth every 2 hours as needed 120 lozenge 0     lidocaine (LIDODERM) 5 % Patch Apply up to 3 patches to painful area at once for up to 12 h within a 24 h period.  Remove after 12 hours. 30 patch 0     Menthol, Topical Analgesic, 4 % GEL Externally apply topically 3 times daily as needed 118 mL 0     omeprazole (PRILOSEC) 20 MG CR capsule Take 1 capsule (20 mg) by mouth 2 times daily 180 capsule 3     alendronate (FOSAMAX) 70 MG tablet Take 1 tablet  (70 mg) by mouth every 7 days Take at least 60 min before breakfast with over 8 oz water and stay upright for at least 30 min. after dose. 12 tablet 3     sodium chloride (OCEAN) 0.65 % nasal spray Spray 1 spray into both nostrils daily as needed for congestion 30 mL 1     benzocaine-menthol (CHLORASEPTIC) 6-10 MG lozenge Place 1 lozenge inside cheek every 2 hours as needed for moderate pain 36 lozenge 0     lidocaine (XYLOCAINE) 5 % ointment Apply topically 2 times daily Apply to affected area for pain two times daily 70 g 1     mycophenolate (CELLCEPT - GENERIC EQUIVALENT) 250 MG capsule Take 2 capsules (500 mg) by mouth every 12 hours 120 capsule 11     melatonin 3 MG tablet Take 1 tablet (3 mg) by mouth nightly as needed for sleep 100 tablet 3     order for DME Equipment being ordered: 2 pairs of black stabilizer socks (beige okay if black not available). Size XL. 2 each 0     order for DME Equipment being ordered: Compression stockings below knee bilateral 2 each 0     order for DME Equipment being ordered: Back Stabilizer 1 each 0     order for DME Equipment being ordered: 4 Wheeled Walker with Seat and Brakes 1 Units 0     order for DME 2-wheeled walker (dx: gait instability and Frequent falls). 1 Units 0     albuterol (PROAIR HFA, PROVENTIL HFA, VENTOLIN HFA) 108 (90 BASE) MCG/ACT inhaler Inhale 2 puffs into the lungs 4 times daily 1 Inhaler 1     order for DME Equipment being ordered: compression stockings bilateral  Please measure and fit patient for stockings   Strength 15-30mmHg  Disp 2 pairs  1 refill over the time of 1 year 2 Units 1     order for DME Equipment being ordered: cane 1 Units 0     bisacodyl (DULCOLAX) 5 MG EC tablet Take 1 tablet (5 mg) by mouth daily as needed for constipation 30 tablet 11     carboxymethylcellul-glycerin (OPTIVE/REFRESH OPTIVE) 0.5-0.9 % SOLN ophthalmic solution Place 1 drop into both eyes 4 times daily 1 each 11     Psyllium 500 MG CAPS Take 3 capsules by mouth 2 times  "daily 180 capsule 11     sodium phosphate (FLEET ENEMA) 7-19 GM/118ML rectal enema Place 1 Bottle (1 enema) rectally daily as needed for constipation 3 Bottle 11     GENGRAF 25 MG PO CAPSULE Take 4 capsules (100 mg) by mouth every 12 hours 240 capsule 0     STATIN NOT PRESCRIBED, INTENTIONAL, Not prescribed 0 each 0     order for DME Equipment being ordered: Nebulizer with adult mask and tubing 1 Units 0     order for DME Equipment being ordered: cane with padded handle 1 Units 0     [DISCONTINUED] solifenacin (VESICARE) 5 MG tablet Take 1 tablet (5 mg) by mouth daily 30 tablet 12   ,     Allergies   Allergen Reactions     Aspirin      3/31/16 Per pt, tolerates 81 mg daily dose without ADR.     325 mg dose caused itchiness and hives.     Clarithromycin      Allergic reaction         Contrast Dye      Penicillins      Patient is an established patient of this clinic.         Review of Systems:   Review of Systems   Constitutional: Negative for appetite change, chills, fatigue and fever.   HENT: Negative for congestion, sinus pressure and sore throat.    Eyes: Negative for visual disturbance.   Respiratory: Negative for cough, shortness of breath and wheezing.    Cardiovascular: Negative for chest pain and leg swelling.   Gastrointestinal: Negative for abdominal distention, abdominal pain, blood in stool, constipation, diarrhea, nausea and vomiting.   Genitourinary: Negative for dysuria and hematuria.   Musculoskeletal: Positive for arthralgias (b/l knee pain, chronic). Negative for myalgias.   Skin: Negative for color change and rash.   Neurological: Negative for weakness and headaches.             Physical Exam:   Patient Vitals for the past 24 hrs:   BP Temp Temp src Pulse SpO2 Height Weight   09/12/17 0920 123/82 97.7  F (36.5  C) Oral 72 94 % 5' 1\" (154.9 cm) 209 lb 3.2 oz (94.9 kg)     Body mass index is 39.53 kg/(m^2).  Vitals were reviewed and were normal     Physical Exam   Constitutional: She is oriented to " person, place, and time. She appears well-developed and well-nourished. No distress.   HENT:   Head: Normocephalic and atraumatic.   Mouth/Throat: Oropharynx is clear and moist. No oropharyngeal exudate.   Eyes: Conjunctivae are normal. Right eye exhibits no discharge. Left eye exhibits no discharge. No scleral icterus.   Neck: Normal range of motion. Neck supple.   Cardiovascular: Normal rate, regular rhythm and normal heart sounds.    No murmur heard.  Pulmonary/Chest: Effort normal and breath sounds normal. No respiratory distress.   Musculoskeletal: She exhibits edema (trace).        Right knee: She exhibits normal range of motion, no swelling and no effusion. Tenderness (posterior knee) found. Medial joint line and lateral joint line tenderness noted.        Left knee: She exhibits normal range of motion and no swelling. Tenderness (posterior knee) found. Medial joint line and lateral joint line tenderness noted.        Lumbar back: She exhibits decreased range of motion and tenderness. She exhibits no edema.   Patient is not wearing any orthotic inserts again today, wearing slip on shoes. Has an antalgic gait, improved from last visit.     Neurological: She is alert and oriented to person, place, and time.   Skin: No rash noted. She is not diaphoretic.         Results:     Results for orders placed or performed in visit on 08/16/17   Electrolyte Panel ([facility])   Result Value Ref Range    Chloride 104.5 98.0 - 110.0 mmol/L    Carbon Dioxide 26.1 20.0 - 32.0 mmol/L    Potassium 4.7 (H) 3.3 - 4.5 mmol/dL    Sodium 140.0 132.6 - 141.4 mmol/L    Anion Gap 9.4 6.0 - 17.0   Magnesium   Result Value Ref Range    Magnesium 2.1 1.6 - 2.3 mg/dL     *Note: Due to a large number of results and/or encounters for the requested time period, some results have not been displayed. A complete set of results can be found in Results Review.       Assessment and Plan     Flor is a 52 yo woman with a h/o HLD, Vit D def,  Osteoporosis, HTN, secondary hyperparathyroidism, left peroneal tendonitis and recent hyperkalemia who presents to the clinic for follow up. Her potassium was downtrending and was slightly elevated at last check, she has been following a low potassium diet and is currently asymptomatic. She will be traveling out of the country for several months and will need to have appropriate refills to bring with her. She will plan to complete Pap in 2 weeks and make an appointment for to review medications and order 90 day supply of refills. Will recheck BMP at next office visit.     1. Liver replaced by transplant (H)  - no changes medications, advised patient to inform GI team of upcoming travel.     2. Hypertension, renal, stage 1-4 or unspecified chronic kidney disease  - no changes to medications today, BP at goal.     3. Dry skin  - Colloidal Oatmeal 1 % CREA; Externally apply topically as needed  Dispense: 226 g; Refill: 1  - Can use the lotion several times a day as needed for dry skin.     There are no discontinued medications.  Options for treatment and follow-up care were reviewed with the patient. Flor Navarro  engaged in the decision making process and verbalized understanding of the options discussed and agreed with the final plan.    Karen Downs MD  [facility]  [phone]

## 2017-09-12 NOTE — PATIENT INSTRUCTIONS
Here is the plan from today's visit    1. Liver replaced by transplant (H)  - no changes to your medications.     2. Hypertension, renal, stage 1-4 or unspecified chronic kidney disease  - no changes to your medications    3. Dry skin  - Colloidal Oatmeal 1 % CREA; Externally apply topically as needed  Dispense: 226 g; Refill: 1  - you can use the lotion several times a day as needed for dry skin.     I want to see you in 2 weeks for your pap smear.  Then in 1 month for your refills.     Please call or return to clinic if your symptoms don't go away.    Follow up plan  Please make a clinic appointment for follow up with me (BUCK LAMBERT) in 2  weeks for Pap Smear.    Thank you for coming to [facility] today.  Lab Testing:  **If you had lab testing today and your results are reassuring or normal they will be mailed to you or sent through [employer] within 7 days.   **If the lab tests need quick action we will call you with the results.  The phone number we will call with results is # [phone] (home) . If this is not the best number please call our clinic and change the number.  Medication Refills:  If you need any refills please call your pharmacy and they will contact us.   If you need to  your refill at a new pharmacy, please contact the new pharmacy directly. The new pharmacy will help you get your medications transferred faster.   Scheduling:  If you have any concerns about today's visit or wish to schedule another appointment please call our office during normal business hours [phone] (8-5:00 M-F)    Medical Concerns:  If you have urgent medical concerns please call [phone] at any time of the day.  If you have a medical emergency please call [phone].

## 2017-09-12 NOTE — Clinical Note
I am not sure if you are aware that the patient will be leaving for [place] in October. I will see her before she leaves to get her refills situated. Are there are labs you need prior to her departure? Thanks, Lila Downs

## 2017-09-14 NOTE — TELEPHONE ENCOUNTER
Home  Care nurse called to let us know that she will be setting up the patient's medications for when she goes traveling.

## 2017-09-16 ASSESSMENT — ENCOUNTER SYMPTOMS
CONSTIPATION: 0
MYALGIAS: 0
HEADACHES: 0
APPETITE CHANGE: 0
WEAKNESS: 0
SHORTNESS OF BREATH: 0
ABDOMINAL DISTENTION: 0
COLOR CHANGE: 0
FEVER: 0
ABDOMINAL PAIN: 0
FATIGUE: 0
SORE THROAT: 0
DIARRHEA: 0
CHILLS: 0
HEMATURIA: 0
BLOOD IN STOOL: 0
WHEEZING: 0
NAUSEA: 0
SINUS PRESSURE: 0
DYSURIA: 0
VOMITING: 0
COUGH: 0
ARTHRALGIAS: 1

## 2017-09-17 NOTE — PROGRESS NOTES
Preceptor Attestation:   Patient seen and discussed with the resident. Assessment and plan reviewed with resident and agreed upon.   Supervising Physician:  Mele Hernandez MD  [facility]

## 2017-10-03 ENCOUNTER — OFFICE VISIT (OUTPATIENT)
Dept: FAMILY MEDICINE | Facility: CLINIC | Age: 52
End: 2017-10-03

## 2017-10-03 VITALS
OXYGEN SATURATION: 96 % | BODY MASS INDEX: 39.6 KG/M2 | SYSTOLIC BLOOD PRESSURE: 107 MMHG | WEIGHT: 209.6 LBS | RESPIRATION RATE: 18 BRPM | HEART RATE: 69 BPM | DIASTOLIC BLOOD PRESSURE: 73 MMHG | TEMPERATURE: 98 F

## 2017-10-03 DIAGNOSIS — Z00.00 HEALTHCARE MAINTENANCE: ICD-10-CM

## 2017-10-03 DIAGNOSIS — Z00.00 PREVENTATIVE HEALTH CARE: Primary | ICD-10-CM

## 2017-10-03 NOTE — PATIENT INSTRUCTIONS
Here is the plan from today's visit    1. Healthcare maintenance  We will wait for the pap smear results and let you know what the next step is based on the results.   - ADMIN VACCINE, INITIAL  - Flu vaccine, quad, preserve-free, 0.5 ml  - Pap imaged thin layer screen with HPV - recommended age 30 - 65 years (select HPV order below)  - HPV High Risk Types DNA Cervical    2. Preventative health care  - Pap imaged thin layer screen with HPV - recommended age 30 - 65 years (select HPV order below)  - HPV High Risk Types DNA Cervical    Please call or return to clinic if your symptoms don't go away.    Follow up plan pending Pap smear results    Thank you for coming to [facility] today.  Lab Testing:  **If you had lab testing today and your results are reassuring or normal they will be mailed to you or sent through [facility] within 7 days.   **If the lab tests need quick action we will call you with the results.  The phone number we will call with results is # [phone] (home) . If this is not the best number please call our clinic and change the number.  Medication Refills:  If you need any refills please call your pharmacy and they will contact us.   If you need to  your refill at a new pharmacy, please contact the new pharmacy directly. The new pharmacy will help you get your medications transferred faster.   Scheduling:  If you have any concerns about today's visit or wish to schedule another appointment please call our office during normal business hours [phone] (8-5:00 M-F)  If a referral was made to a [facility] and you don't get a call from central scheduling please call [phone].  If a Mammogram was ordered for you at [facility] call [phone] to schedule or change your appointment.  If you had an XRay/CT/Ultrasound/MRI ordered the number is [phone] to schedule or change your radiology appointment.   Medical Concerns:  If you have urgent medical  concerns please call [phone] at any time of the day.

## 2017-10-03 NOTE — MR AVS SNAPSHOT
After Visit Summary   10/3/2017    Flor Navarro    MRN: [id number]           Patient Information     Date Of Birth          1965        Visit Information        Provider Department      10/3/2017 9:40 AM Yvette Domingo MD [facility]        Today's Diagnoses     Healthcare maintenance    -  1    Preventative health care          Care Instructions    Here is the plan from today's visit    1. Healthcare maintenance  We will wait for the pap smear results and let you know what the next step is based on the results.   - ADMIN VACCINE, INITIAL  - Flu vaccine, quad, preserve-free, 0.5 ml  - Pap imaged thin layer screen with HPV - recommended age 30 - 65 years (select HPV order below)  - HPV High Risk Types DNA Cervical    2. Preventative health care  - Pap imaged thin layer screen with HPV - recommended age 30 - 65 years (select HPV order below)  - HPV High Risk Types DNA Cervical    Please call or return to clinic if your symptoms don't go away.    Follow up plan pending Pap smear results    Thank you for coming to [facility] today.  Lab Testing:  **If you had lab testing today and your results are reassuring or normal they will be mailed to you or sent through [facility] within 7 days.   **If the lab tests need quick action we will call you with the results.  The phone number we will call with results is # [phone] (home) . If this is not the best number please call our clinic and change the number.  Medication Refills:  If you need any refills please call your pharmacy and they will contact us.   If you need to  your refill at a new pharmacy, please contact the new pharmacy directly. The new pharmacy will help you get your medications transferred faster.   Scheduling:  If you have any concerns about today's visit or wish to schedule another appointment please call our office during normal business hours [phone] (8-5:00 M-F)  If a referral was made  to a [facility] and you don't get a call from central scheduling please call [phone].  If a Mammogram was ordered for you at [facility] call [phone] to schedule or change your appointment.  If you had an XRay/CT/Ultrasound/MRI ordered the number is [phone] to schedule or change your radiology appointment.   Medical Concerns:  If you have urgent medical concerns please call [phone] at any time of the day.            Follow-ups after your visit        Your next 10 appointments already scheduled     Oct 09, 2017  1:00 PM CDT   (Arrive by 12:45 PM)   Return Visit with Enrrique Olivares DPM   [facility] ([facility])    [address] [place] [address]  5th Floor  [facility] [address] [phone]            Oct 16, 2017  1:20 PM CDT   Return Visit with Karen Downs MD   [facility] ([facility])    [facility] [address] [place] [address],  [address]  [facility] [address]   [phone]            Nov 02, 2017  8:30 AM CDT   Lab with  LAB    [facility] ([facility])    [address] [place] [address]  1st Floor  [facility] [address] [phone]            Nov 02, 2017  9:20 AM CDT   (Arrive by 9:05 AM)   Return Liver Transplant with Aaron Harkins MD   [facility] Hepatology ([facility])    [address] [place] [address]  3rd [facility] [address] [phone]              Who to contact     Please call your clinic at [phone] to:    Ask questions about your health    Make or cancel appointments    Discuss your medicines    Learn about your test results    Speak to your doctor   If you have compliments or concerns about an experience at your clinic, or if you wish to file a complaint, please contact [facility] Patient Relations at [phone] or email us at [email or web address]         Additional Information About Your Visit         [email or web address] Information     [email or web address] is an electronic gateway that provides easy, online access to your medical records. With [email or web address], you can request a clinic appointment, read your test results, renew a prescription or communicate with your care team.     To sign up for [email or web address] visit the website at [email or web address]   You will be asked to enter the access code listed below, as well as some personal information. Please follow the directions to create your username and password.     Your access code is: [email or web address]  Expires: 12/3/2017  6:30 AM     Your access code will  in 90 days. If you need help or a new code, please contact your [facility] or call [phone] for assistance.        Care EveryWhere ID     This is your Care EveryWhere ID. This could be used by other organizations to access your [place] medical records  [id number]        Your Vitals Were     Pulse Temperature Respirations Pulse Oximetry BMI (Body Mass Index)       69 98  F (36.7  C) (Oral) 18 96% 39.6 kg/m2        Blood Pressure from Last 3 Encounters:   10/03/17 107/73   17 123/82   17 129/76    Weight from Last 3 Encounters:   10/03/17 209 lb 9.6 oz (95.1 kg)   17 209 lb 3.2 oz (94.9 kg)   17 209 lb (94.8 kg)              We Performed the Following     ADMIN VACCINE, INITIAL     Flu vaccine, quad, preserve-free, 0.5 ml     HPV High Risk Types DNA Cervical     Pap imaged thin layer screen with HPV - recommended age 30 - 65 years (select HPV order below)        Primary Care Provider Office Phone # Fax #    Karely Sierra [phone]        [address] [address]        Equal Access to Services     MAURO ALBA : Hadluis Adair, wadanielda luqadaha, qachristianta kaalmada adeegyada, mart piña. So [facility] [phone].    ATENCIÓN: Si habla español, tiene a garcia disposición servicios gratuitos de asistencia  lingüísticaEliazar Gary al [phone].    We comply with applicable federal civil rights laws and [place] laws. We do not discriminate on the basis of race, color, national origin, age, disability, sex, sexual orientation, or gender identity.            Thank you!     Thank you for choosing [facility]  for your care. Our goal is always to provide you with excellent care. Hearing back from our patients is one way we can continue to improve our services. Please take a few minutes to complete the written survey that you may receive in the mail after your visit with us. Thank you!             Your Updated Medication List - Protect others around you: Learn how to safely use, store and throw away your medicines at www.disposemymeds.org.          This list is accurate as of: 10/3/17 10:40 AM.  Always use your most recent med list.                   Brand Name Dispense Instructions for use Diagnosis    acetaminophen 325 MG tablet    TYLENOL    100 tablet    Take 1-2 tablets (325-650 mg) by mouth every 6 hours as needed Max 6 tablets per 24 hours    Sebaceous cyst       albuterol 108 (90 BASE) MCG/ACT Inhaler    PROAIR HFA/PROVENTIL HFA/VENTOLIN HFA    1 Inhaler    Inhale 2 puffs into the lungs 4 times daily    Acute bronchitis, unspecified organism       alendronate 70 MG tablet    FOSAMAX    12 tablet    Take 1 tablet (70 mg) by mouth every 7 days Take at least 60 min before breakfast with over 8 oz water and stay upright for at least 30 min. after dose.    Osteoporosis       bisacodyl 5 MG EC tablet    DULCOLAX    30 tablet    Take 1 tablet (5 mg) by mouth daily as needed for constipation    Constipation, chronic       calcium-vitamin D 600-400 MG-UNIT per tablet    calcium 600 + D    60 tablet    Take 1 tablet by mouth 2 times daily    Osteoporosis       carboxymethylcellul-glycerin 0.5-0.9 % Soln ophthalmic solution    OPTIVE/REFRESH OPTIVE    1 each    Place 1 drop into both eyes 4 times daily     Dry eye syndrome of bilateral lacrimal glands       carboxymethylcellulose 1 % ophthalmic solution    CELLUVISC/REFRESH LIQUIGEL    15 each    Place 1 drop into both eyes 4 times daily    Dry eyes, bilateral       cholecalciferol 1000 UNITS capsule    vitamin  -D    180 capsule    Take 2 capsules (2,000 Units) by mouth daily    Osteoporosis, Liver replaced by transplant (H), Vitamin D deficiency, Secondary hyperparathyroidism (H), CKD (chronic kidney disease) stage 3, GFR 30-59 ml/min, Palpitations       Colloidal Oatmeal 1 % Crea     226 g    Externally apply topically as needed    Dry skin       * cycloSPORINE modified capsule     240 capsule    Take 4 capsules (100 mg) by mouth every 12 hours    Liver replaced by transplant (H)       * cycloSPORINE modified 25 MG capsule    GENERIC EQUIVALENT    240 capsule    Take 4 capsules (100 mg) by mouth every 12 hours    Liver replaced by transplant (H)       lidocaine 5 % Patch    LIDODERM    30 patch    Apply up to 3 patches to painful area at once for up to 12 h within a 24 h period.  Remove after 12 hours.    Liver replaced by transplant (H)       * Medical Compression Socks Misc     2 each    1 Units daily    Leg swelling       * Knee Brace/Flex Stays Large Misc     2 each    1 Units daily    Chronic pain of both knees       melatonin 3 MG tablet     100 tablet    Take 1 tablet (3 mg) by mouth nightly as needed for sleep    Primary insomnia       multivitamin, therapeutic with minerals Tabs tablet     100 tablet    Take 1 tablet by mouth daily    Liver replaced by transplant (H)       mycophenolate 250 MG capsule    GENERIC EQUIVALENT    120 capsule    TAKE TWO CAPSULES BY MOUTH EVERY 12 HOURS    Liver replaced by transplant (H)       omeprazole 20 MG CR capsule    priLOSEC    180 capsule    Take 1 capsule (20 mg) by mouth 2 times daily    Gastroesophageal reflux disease, esophagitis presence not specified       * order for DME     1 Units    Equipment being ordered:  Nebulizer with adult mask and tubing    Acute bronchitis, unspecified organism       * order for DME     1 Units    Equipment being ordered: cane with padded handle    Gait instability       * order for DME     1 Units    Equipment being ordered: cane    Osteoporosis, Falls frequently       * order for DME     2 Units    Equipment being ordered: compression stockings bilateral Please measure and fit patient for stockings  Strength 15-30mmHg Disp 2 pairs 1 refill over the time of 1 year    Localized edema       * order for DME     1 Units    2-wheeled walker (dx: gait instability and Frequent falls).    Gait instability, Falls frequently       * order for DME     1 Units    Equipment being ordered: 4 Wheeled Walker with Seat and Brakes    Falls frequently, Gait instability       * order for DME     2 each    Equipment being ordered: Compression stockings below knee bilateral    Bilateral edema of lower extremity       * order for DME     1 each    Equipment being ordered: Back Stabilizer    Chronic midline low back pain without sciatica       * order for DME     2 each    Equipment being ordered: 2 pairs of black stabilizer socks (beige okay if black not available). Size XL.    Bilateral edema of lower extremity       prednisoLONE acetate 1 % ophthalmic susp    PRED FORTE    1 Bottle    Use in operative eye four times a day  For 4 days    Posterior capsular opacification visually significant of both eyes       * Psyllium 500 MG Caps     180 capsule    Take 3 capsules by mouth 2 times daily    Constipation, chronic       * REGULOID 0.52 G capsule   Generic drug:  psyllium     540 capsule    Take 3 capsules (1.56 g) by mouth 2 times daily    Constipation, unspecified constipation type       senna-docusate 8.6-50 MG per tablet    SENOKOT-S;PERICOLACE    120 tablet    Take 2 tablets by mouth 2 times daily For constipation    Constipation, chronic       simethicone 80 MG chewable tablet    MYLICON    120 tablet    Take 1  tablet (80 mg) by mouth every 6 hours as needed for flatulence or cramping    Flatulence, eructation, and gas pain       sodium chloride 0.65 % nasal spray    OCEAN    30 mL    Spray 1 spray into both nostrils daily as needed for congestion    Throat pain       sodium phosphate 7-19 GM/118ML rectal enema     3 Bottle    Place 1 Bottle (1 enema) rectally daily as needed for constipation    Ileus (H)       STATIN NOT PRESCRIBED (INTENTIONAL)     0 each    Not prescribed    High risk medication use       * Notice:  This list has 15 medication(s) that are the same as other medications prescribed for you. Read the directions carefully, and ask your doctor or other care provider to review them with you.

## 2017-10-03 NOTE — PROGRESS NOTES
Preceptor Attestation:   Patient seen and discussed with the resident. Assessment and plan reviewed with resident and agreed upon.   Supervising Physician:  Yvette Domingo MD  [facility]

## 2017-10-03 NOTE — NURSING NOTE
Flor Navarro      1.  Has the patient received the information for the influenza vaccine? YES    2.  Does the patient have any of the following contraindications?     Allergy to eggs? No     Allergic reaction to previous influenza vaccines? No     Any other problems to previous influenza vaccines? No     Paralyzed by Guillain-[place] syndrome? No     Currently pregnant? NO     Current moderate or severe illness? No    Vaccination given by Shelley Osei MA  Recorded by Shelley Osei

## 2017-10-03 NOTE — PROGRESS NOTES
"      HPI:       Flor Navarro is a 51 year old who presents for the following  Patient presents with:  Gyn Exam: pap smear check up    Patient presents for pap smear as part of routine health maintenance.   Denies vaginal bleeding, vaginal pain or discharge.   No dysuria  No history of abnormal pap smears  No longer having menstrual periods, reports \"has been long time since I've had periods,\" but does not remember exactly how long.    An [place] interpetor was used over the phone     Problem, Medication and Allergy Lists were reviewed and are current.  Patient is an established patient of this clinic.         Review of Systems:   Review of Systems   Constitutional: Negative for chills and fever.   Gastrointestinal: Negative for abdominal pain, constipation, diarrhea, nausea and vomiting.   Genitourinary: Negative for difficulty urinating, dysuria, frequency, hematuria, pelvic pain, vaginal bleeding, vaginal discharge and vaginal pain.             Physical Exam:   No data found.    There is no height or weight on file to calculate BMI.  Vitals were reviewed and were normal     Physical Exam   Constitutional: No distress.   Genitourinary: Vagina normal. There is no lesion on the right labia. There is no lesion on the left labia.       Skin: She is not diaphoretic.   Psychiatric: She has a normal mood and affect. Her behavior is normal. Judgment and thought content normal.       Results:     In process  Assessment and Plan     1. Preventative health care  On speculum exam, there was a visible mass protruding from what is presumed to be the external cervical os however unable to be certain due to presence of mass. Highly vascular and most likely a cervical polyp. Pap smear collected, will await both cytology and HPV testing results. If abnormal, will likely need colposcopy with possible biopsy.   - Pap imaged thin layer screen with HPV - recommended age 30 - 65 years (select HPV order below)  - HPV High Risk Types DNA " Cervical    2. Healthcare maintenance  Flu vaccine offered and accepted by patient.   - ADMIN VACCINE, INITIAL  - Flu vaccine, quad, preserve-free, 0.5 ml  - Pap imaged thin layer screen with HPV - recommended age 30 - 65 years (select HPV order below)  - HPV High Risk Types DNA Cervical    There are no discontinued medications.  Options for treatment and follow-up care were reviewed with the patient. Flor Navarro  engaged in the decision making process and verbalized understanding of the options discussed and agreed with the final plan.    Betsy Harrell MD  Family Medicine PGY2    Yvette Domingo MD  Attending Physician

## 2017-10-04 ASSESSMENT — ENCOUNTER SYMPTOMS
FREQUENCY: 0
ABDOMINAL PAIN: 0
CONSTIPATION: 0
HEMATURIA: 0
NAUSEA: 0
CHILLS: 0
DIARRHEA: 0
DYSURIA: 0
DIFFICULTY URINATING: 0
FEVER: 0
VOMITING: 0

## 2017-10-05 LAB
COPATH REPORT: NORMAL
PAP: NORMAL

## 2017-10-09 ENCOUNTER — OFFICE VISIT (OUTPATIENT)
Dept: ORTHOPEDICS | Facility: CLINIC | Age: 52
End: 2017-10-09

## 2017-10-09 DIAGNOSIS — M76.822 POSTERIOR TIBIAL TENDONITIS, LEFT: Primary | ICD-10-CM

## 2017-10-09 DIAGNOSIS — M76.72 PERONEAL TENDONITIS OF LEFT LOWER EXTREMITY: ICD-10-CM

## 2017-10-09 DIAGNOSIS — M79.605 PAIN IN LEFT LEG: ICD-10-CM

## 2017-10-09 DIAGNOSIS — M76.812 ANTERIOR TIBIALIS TENDINITIS OF LEFT LOWER EXTREMITY: ICD-10-CM

## 2017-10-09 NOTE — LETTER
10/9/2017    RE: Flor Navarro  [address] [facility] [address]       Chief Complaints and History of Present Illnesses   Patient presents with     RECHECK     left ankle pain f/u        Allergies   Allergen Reactions     Aspirin      3/31/16 Per pt, tolerates 81 mg daily dose without ADR.     325 mg dose caused itchiness and hives.     Clarithromycin      Allergic reaction         Contrast Dye      Penicillins          Subjective: Flor is a 52 year old female who presents to the clinic today for a follow up of left PT and peroneal tendinitis. She relates she has not started physical therapy, because she feels like she has too many appointments were to. She presents today with an  and her case coordinator. She relates that she has been using the cam boot. She relates that it helped some. She was she's walking strangely on the left foot now. She now has pain in the front of the ankle. She'll be going to [place] next month, and will be there for quite a few months.    Objective    Pain noted with palpation of the PT, peroneal, and anterior tibial tendons. MMT is not reliable 2/2 guarding. No pain along the courses of the Achilles tendon or with calcaneal squeeze. Pain noted at the origin of the plantar fascia and into the arch plantarly. No pain with active or passive ROM of the lesser digits.     Assessment: PT, peroneal, and AT tendonitis on the left foot.     Plan:   - Pt seen and evaluated  - I reiterated to her again to start PT. I again wrote this order for her. She relates that she understands this plan.   - Pt to return to clinic after return from [place].      Enrrique Olivares DPM

## 2017-10-09 NOTE — MR AVS SNAPSHOT
After Visit Summary   10/9/2017    Flor Navarro    MRN: [id number]           Patient Information     Date Of Birth          1965        Visit Information        Provider Department      10/9/2017 12:45 PM Enrrique Olivares DPM; [place] [employer] [facility]        Today's Diagnoses     Posterior tibial tendonitis, left    -  1    Peroneal tendonitis of left lower extremity        Anterior tibialis tendinitis of left lower extremity        Pain in left leg           Follow-ups after your visit        Additional Services     PHYSICAL THERAPY REFERRAL (External-Prints)       Physical Therapy Referral                  Your next 10 appointments already scheduled     Oct 16, 2017  1:20 PM CDT   Return Visit with Karen Downs MD   [facility]    [address] [place],  [address]  [facility] [address]   [phone]            Oct 17, 2017  3:00 PM CDT   (Arrive by 2:45 PM)   [place] Extremity with Raya Peña PT   [facility]    [address]  [facility] [address]   [phone]            Oct 24, 2017 11:10 AM CDT   NEWTON Extremity with Raya Peña PT   [facility]    [address] [facility] [address]   [phone]            Oct 31, 2017 11:10 AM CDT   [place] Extremity with Raya Peña PT   [facility] ([facility])    [address] [facility] [address]   [phone]            Nov 02, 2017  8:30 AM CDT   Lab with  LAB   [facility] ([facility])    [address] [address] [phone]            Nov 02, 2017  9:20 AM CDT   (Arrive by 9:05 AM)   Return Liver Transplant with Aaron Harkins MD   [facility]at[facility] ([facility]  [place])    [address] [address] [phone]              Who to contact     Please call your clinic at [phone] to:    Ask questions about your health    Make or cancel appointments    Discuss your medicines    Learn about your test results    Speak to your doctor   If you have compliments or concerns about an experience at your clinic, or if you wish to file a complaint, please contact [facility] Patient Relations at [phone] or email us at [email or web address]         Additional Information About Your Visit        [employer] Information     [employer] is an electronic gateway that provides easy, online access to your medical records. With [employer], you can request a clinic appointment, read your test results, renew a prescription or communicate with your care team.     To sign up for [employer] visit the website at [email or web address]   You will be asked to enter the access code listed below, as well as some personal information. Please follow the directions to create your username and password.     Your access code is: [email or web address]  Expires: 12/3/2017  6:30 AM     Your access code will  in 90 days. If you need help or a new code, please contact your [facility] or call [phone] for assistance.        Care EveryWhere ID     This is your Care EveryWhere ID. This could be used by other organizations to access your [place] medical records  [id number]         Blood Pressure from Last 3 Encounters:   10/03/17 107/73   17 123/82   17 129/76    Weight from Last 3 Encounters:   10/03/17 209 lb 9.6 oz (95.1 kg)   17 209 lb 3.2 oz (94.9 kg)   17 209 lb (94.8 kg)              We Performed the Following     PHYSICAL THERAPY REFERRAL (External-Prints)        Primary Care Provider Office Phone # Fax #    Karely Sierra [phone]       [address]  [facility] [address]        Equal Access to Services     RENETTA ALBA : Hadii florecita chiang luz Paezali, wadanielda luqadaha, qaybta kaalmakrista jin, mart piña. So [facility] [phone].    ATENCIÓN: Si habla español, tiene a garcia disposición servicios gratuitos de asistencia lingüística. Abdirahman al [phone].    We comply with applicable federal civil rights laws and [place] laws. We do not discriminate on the basis of race, color, national origin, age, disability, sex, sexual orientation, or gender identity.            Thank you!     Thank you for choosing [facility]  for your care. Our goal is always to provide you with excellent care. Hearing back from our patients is one way we can continue to improve our services. Please take a few minutes to complete the written survey that you may receive in the mail after your visit with us. Thank you!             Your Updated Medication List - Protect others around you: Learn how to safely use, store and throw away your medicines at www.disposemymeds.org.          This list is accurate as of: 10/9/17  3:56 PM.  Always use your most recent med list.                   Brand Name Dispense Instructions for use Diagnosis    acetaminophen 325 MG tablet    TYLENOL    100 tablet    Take 1-2 tablets (325-650 mg) by mouth every 6 hours as needed Max 6 tablets per 24 hours    Sebaceous cyst       albuterol 108 (90 BASE) MCG/ACT Inhaler    PROAIR HFA/PROVENTIL HFA/VENTOLIN HFA    1 Inhaler    Inhale 2 puffs into the lungs 4 times daily    Acute bronchitis, unspecified organism       alendronate 70 MG tablet    FOSAMAX    12 tablet    Take 1 tablet (70 mg) by mouth every 7 days Take at least 60 min before breakfast with over 8 oz water and stay upright for at least 30 min. after dose.    Osteoporosis       bisacodyl 5 MG EC tablet    DULCOLAX    30 tablet    Take 1 tablet (5 mg) by mouth daily as needed for constipation    Constipation,  chronic       calcium-vitamin D 600-400 MG-UNIT per tablet    calcium 600 + D    60 tablet    Take 1 tablet by mouth 2 times daily    Osteoporosis       carboxymethylcellul-glycerin 0.5-0.9 % Soln ophthalmic solution    OPTIVE/REFRESH OPTIVE    1 each    Place 1 drop into both eyes 4 times daily    Dry eye syndrome of bilateral lacrimal glands       carboxymethylcellulose 1 % ophthalmic solution    CELLUVISC/REFRESH LIQUIGEL    15 each    Place 1 drop into both eyes 4 times daily    Dry eyes, bilateral       cholecalciferol 1000 UNITS capsule    vitamin  -D    180 capsule    Take 2 capsules (2,000 Units) by mouth daily    Osteoporosis, Liver replaced by transplant (H), Vitamin D deficiency, Secondary hyperparathyroidism (H), CKD (chronic kidney disease) stage 3, GFR 30-59 ml/min, Palpitations       Colloidal Oatmeal 1 % Crea     226 g    Externally apply topically as needed    Dry skin       * cycloSPORINE modified 25 MG capsule     240 capsule    Take 4 capsules (100 mg) by mouth every 12 hours    Liver replaced by transplant (H)       * cycloSPORINE modified 25 MG capsule    GENERIC EQUIVALENT    240 capsule    Take 4 capsules (100 mg) by mouth every 12 hours    Liver replaced by transplant (H)       lidocaine 5 % Patch    LIDODERM    30 patch    Apply up to 3 patches to painful area at once for up to 12 h within a 24 h period.  Remove after 12 hours.    Liver replaced by transplant (H)       * Medical Compression Socks Misc     2 each    1 Units daily    Leg swelling       * Knee Brace/Flex Stays Large Misc     2 each    1 Units daily    Chronic pain of both knees       melatonin 3 MG tablet     100 tablet    Take 1 tablet (3 mg) by mouth nightly as needed for sleep    Primary insomnia       multivitamin, therapeutic with minerals Tabs tablet     100 tablet    Take 1 tablet by mouth daily    Liver replaced by transplant (H)       mycophenolate 250 MG capsule    GENERIC EQUIVALENT    120 capsule    TAKE TWO  CAPSULES BY MOUTH EVERY 12 HOURS    Liver replaced by transplant (H)       omeprazole 20 MG CR capsule    priLOSEC    180 capsule    Take 1 capsule (20 mg) by mouth 2 times daily    Gastroesophageal reflux disease, esophagitis presence not specified       * order for DME     1 Units    Equipment being ordered: Nebulizer with adult mask and tubing    Acute bronchitis, unspecified organism       * order for DME     1 Units    Equipment being ordered: cane with padded handle    Gait instability       * order for DME     1 Units    Equipment being ordered: cane    Osteoporosis, Falls frequently       * order for DME     2 Units    Equipment being ordered: compression stockings bilateral Please measure and fit patient for stockings  Strength 15-30mmHg Disp 2 pairs 1 refill over the time of 1 year    Localized edema       * order for DME     1 Units    2-wheeled walker (dx: gait instability and Frequent falls).    Gait instability, Falls frequently       * order for DME     1 Units    Equipment being ordered: 4 Wheeled Walker with Seat and Brakes    Falls frequently, Gait instability       * order for DME     2 each    Equipment being ordered: Compression stockings below knee bilateral    Bilateral edema of lower extremity       * order for DME     1 each    Equipment being ordered: Back Stabilizer    Chronic midline low back pain without sciatica       * order for DME     2 each    Equipment being ordered: 2 pairs of black stabilizer socks (beige okay if black not available). Size XL.    Bilateral edema of lower extremity       prednisoLONE acetate 1 % ophthalmic susp    PRED FORTE    1 Bottle    Use in operative eye four times a day  For 4 days    Posterior capsular opacification visually significant of both eyes       * Psyllium 500 MG Caps     180 capsule    Take 3 capsules by mouth 2 times daily    Constipation, chronic       * REGULOID 0.52 G capsule   Generic drug:  psyllium     540 capsule    Take 3 capsules (1.56  g) by mouth 2 times daily    Constipation, unspecified constipation type       senna-docusate 8.6-50 MG per tablet    SENOKOT-S;PERICOLACE    120 tablet    Take 2 tablets by mouth 2 times daily For constipation    Constipation, chronic       simethicone 80 MG chewable tablet    MYLICON    120 tablet    Take 1 tablet (80 mg) by mouth every 6 hours as needed for flatulence or cramping    Flatulence, eructation, and gas pain       sodium chloride 0.65 % nasal spray    OCEAN    30 mL    Spray 1 spray into both nostrils daily as needed for congestion    Throat pain       sodium phosphate 7-19 GM/118ML rectal enema     3 Bottle    Place 1 Bottle (1 enema) rectally daily as needed for constipation    Ileus (H)       STATIN NOT PRESCRIBED (INTENTIONAL)     0 each    Not prescribed    High risk medication use       * Notice:  This list has 15 medication(s) that are the same as other medications prescribed for you. Read the directions carefully, and ask your doctor or other care provider to review them with you.

## 2017-10-09 NOTE — PROGRESS NOTES
Chief Complaints and History of Present Illnesses   Patient presents with     RECHECK     left ankle pain f/u            Allergies   Allergen Reactions     Aspirin      3/31/16 Per pt, tolerates 81 mg daily dose without ADR.     325 mg dose caused itchiness and hives.     Clarithromycin      Allergic reaction         Contrast Dye      Penicillins          Subjective: Flor is a 52 year old female who presents to the clinic today for a follow up of left PT and peroneal tendinitis. She relates she has not started physical therapy, because she feels like she has too many appointments were to. She presents today with an  and her case coordinator. She relates that she has been using the cam boot. She relates that it helped some. She was she's walking strangely on the left foot now. She now has pain in the front of the ankle. She'll be going to [place] next month, and will be there for quite a few months.    Objective    Pain noted with palpation of the PT, peroneal, and anterior tibial tendons. MMT is not reliable 2/2 guarding. No pain along the courses of the Achilles tendon or with calcaneal squeeze. Pain noted at the origin of the plantar fascia and into the arch plantarly. No pain with active or passive ROM of the lesser digits.     Assessment: PT, peroneal, and AT tendonitis on the left foot.     Plan:   - Pt seen and evaluated  - I reiterated to her again to start PT. I again wrote this order for her. She relates that she understands this plan.   - Pt to return to clinic after return from [place].

## 2017-10-10 LAB
FINAL DIAGNOSIS: NORMAL
HPV HR 12 DNA CVX QL NAA+PROBE: NEGATIVE
HPV16 DNA SPEC QL NAA+PROBE: NEGATIVE
HPV18 DNA SPEC QL NAA+PROBE: NEGATIVE
SPECIMEN DESCRIPTION: NORMAL

## 2017-10-11 ENCOUNTER — TELEPHONE (OUTPATIENT)
Dept: FAMILY MEDICINE | Facility: CLINIC | Age: 52
End: 2017-10-11

## 2017-10-11 DIAGNOSIS — K59.00 CONSTIPATION, UNSPECIFIED CONSTIPATION TYPE: ICD-10-CM

## 2017-10-11 RX ORDER — PSYLLIUM HUSK 0.4 G
3 CAPSULE ORAL 2 TIMES DAILY
Qty: 540 CAPSULE | Refills: 3 | Status: SHIPPED | OUTPATIENT
Start: 2017-10-11 | End: 2017-11-02

## 2017-10-11 NOTE — TELEPHONE ENCOUNTER
[facility] phone call message- patient requesting a refill:    Full Medication Name: Reguloid    Dose: Take 3 capsules (1.56 g) by mouth 2 times daily    Pharmacy confirmed as   [facility] [place], [place] - [address] [facility] [phone]  [address] [facility] [address] [place] [address]  Phone: [phone] Fax: [phone] Alternate Fax: [phone], [phone]  : Yes    Additional Comments: Take 3 capsules (1.56 g) by mouth 2 times daily    OK to leave a message on voice mail? Yes    Primary language: Belarusian      needed? Yes    Call taken on October 11, 2017 at 10:15 AM by Carrol Saldivar

## 2017-10-11 NOTE — TELEPHONE ENCOUNTER
Request for medication refill:    Date of last visit at clinic: 10-3-17    Please complete refill if appropriate and CLOSE ENCOUNTER.    Closing the encounter signifies the refill is complete.    If refill has been denied, please complete the smart phrase .smirefuse and route it to the [facility] RN TRIAGE pool to inform the patient and the pharmacy.    Nicole Winkler MA

## 2017-10-16 ENCOUNTER — OFFICE VISIT (OUTPATIENT)
Dept: FAMILY MEDICINE | Facility: CLINIC | Age: 52
End: 2017-10-16

## 2017-10-16 VITALS
SYSTOLIC BLOOD PRESSURE: 115 MMHG | RESPIRATION RATE: 16 BRPM | HEART RATE: 106 BPM | HEIGHT: 61 IN | OXYGEN SATURATION: 98 % | DIASTOLIC BLOOD PRESSURE: 73 MMHG | TEMPERATURE: 102.9 F

## 2017-10-16 DIAGNOSIS — R06.02 SOB (SHORTNESS OF BREATH): ICD-10-CM

## 2017-10-16 DIAGNOSIS — Z51.81 ENCOUNTER FOR THERAPEUTIC DRUG MONITORING: Primary | ICD-10-CM

## 2017-10-16 DIAGNOSIS — R30.0 DYSURIA: ICD-10-CM

## 2017-10-16 DIAGNOSIS — R35.0 URINARY FREQUENCY: ICD-10-CM

## 2017-10-16 DIAGNOSIS — N17.9 ACUTE KIDNEY INJURY (H): ICD-10-CM

## 2017-10-16 DIAGNOSIS — R50.9 FEVER AND CHILLS: ICD-10-CM

## 2017-10-16 DIAGNOSIS — J18.9 COMMUNITY ACQUIRED PNEUMONIA OF RIGHT UPPER LOBE OF LUNG: ICD-10-CM

## 2017-10-16 LAB
% GRANULOCYTES: 82 %G (ref 40–75)
BILIRUBIN UR: ABNORMAL
BLOOD UR: ABNORMAL
BUN SERPL-MCNC: 20.6 MG/DL (ref 7–19)
CALCIUM SERPL-MCNC: 9.8 MG/DL (ref 8.5–10.1)
CHLORIDE SERPLBLD-SCNC: 98 MMOL/L (ref 98–110)
CO2 SERPL-SCNC: 24.6 MMOL/L (ref 20–32)
CREAT SERPL-MCNC: 1.4 MG/DL (ref 0.5–1)
GFR SERPL CREATININE-BSD FRML MDRD: 41.8 ML/MIN/1.7 M2
GLUCOSE SERPL-MCNC: 119.1 MG'DL (ref 70–99)
GLUCOSE URINE: NEGATIVE
GRANULOCYTES #: 6.9 K/UL (ref 1.6–8.3)
HCT VFR BLD AUTO: 43.6 % (ref 35–47)
HEMOGLOBIN: 13 G/DL (ref 11.7–15.7)
KETONES UR QL: NEGATIVE
LEUKOCYTE ESTERASE UR: ABNORMAL
LYMPHOCYTES # BLD AUTO: 1.2 K/UL (ref 0.8–5.3)
LYMPHOCYTES NFR BLD AUTO: 14.3 %L (ref 20–48)
MCH RBC QN AUTO: 28.8 PG (ref 26.5–35)
MCHC RBC AUTO-ENTMCNC: 29.8 G/DL (ref 32–36)
MCV RBC AUTO: 96.4 FL (ref 78–100)
MID #: 0.3 K/UL (ref 0–2.2)
MID %: 3.7 %M (ref 0–20)
NITRITE UR QL STRIP: NEGATIVE
PH UR STRIP: 6 [PH] (ref 5–7)
PLATELET # BLD AUTO: 116 K/UL (ref 150–450)
POTASSIUM SERPL-SCNC: 4.9 MMOL/DL (ref 3.3–4.5)
PROTEIN UR: ABNORMAL
RBC # BLD AUTO: 4.52 M/UL (ref 3.8–5.2)
SODIUM SERPL-SCNC: 131.9 MMOL/L (ref 132.6–141.4)
SP GR UR STRIP: 1.02
UROBILINOGEN UR STRIP-ACNC: ABNORMAL
WBC # BLD AUTO: 8.4 K/UL (ref 4–11)

## 2017-10-16 RX ORDER — ACETAMINOPHEN 325 MG/1
325-650 TABLET ORAL EVERY 4 HOURS PRN
Qty: 100 TABLET | Refills: 0 | Status: SHIPPED | OUTPATIENT
Start: 2017-10-16 | End: 2017-10-26

## 2017-10-16 RX ORDER — LEVOFLOXACIN 750 MG/1
750 TABLET, FILM COATED ORAL DAILY
Qty: 7 TABLET | Refills: 0 | Status: SHIPPED | OUTPATIENT
Start: 2017-10-16 | End: 2017-10-23

## 2017-10-16 NOTE — PATIENT INSTRUCTIONS
Here is the plan from today's visit    1. Encounter for therapeutic drug monitoring  - Basic Metabolic Panel ([place])  - CBC with Diff Plt ([place])    2. SOB (shortness of breath)  - XR CHEST 2 VW; Future  - levofloxacin (LEVAQUIN) 750 MG tablet; Take 1 tablet (750 mg) by mouth daily for 7 days  Dispense: 7 tablet; Refill: 0    3. Urinary frequency  - Urinalysis (UA) ([place])  - Urine Culture Aerobic Bacterial    4. Dysuria  - levofloxacin (LEVAQUIN) 750 MG tablet; Take 1 tablet (750 mg) by mouth daily for 7 days  Dispense: 7 tablet; Refill: 0    5. Fever 41 degrees C or over  - acetaminophen (TYLENOL) 325 MG tablet; Take 1-2 tablets (325-650 mg) by mouth every 4 hours as needed for mild pain  Dispense: 100 tablet; Refill: 0    - I want you to come back in 2 days to be seen in the clinic. If your symptoms get worse despite starting the antibiotics, come sooner. At that time we will need to repeat urine and blood work.     - you will need to make sure you are drinking at least 6-8 glasses of water a day to keep well hydrated.   Please call or return to clinic if your symptoms don't go away.    Follow up plan  follow up in 2 days.     Thank you for coming to [facility] today.  Lab Testing:  **If you had lab testing today and your results are reassuring or normal they will be mailed to you or sent through [facility] within 7 days.   **If the lab tests need quick action we will call you with the results.  The phone number we will call with results is # [phone] (home) . If this is not the best number please call our clinic and change the number.  Medication Refills:  If you need any refills please call your pharmacy and they will contact us.   If you need to  your refill at a new pharmacy, please contact the new pharmacy directly. The new pharmacy will help you get your medications transferred faster.   Scheduling:  If you have any concerns about today's visit or wish to schedule another appointment  please call our office during normal business hours [phone] (8-5:00 M-F)  If a referral was made to a [facility] and you don't get a call from central scheduling please call [phone].  If a Mammogram was ordered for you at [facility] call [phone] to schedule or change your appointment.  If you had an XRay/CT/Ultrasound/MRI ordered the number is [phone] to schedule or change your radiology appointment.   Medical Concerns:  If you have urgent medical concerns please call [phone] at any time of the day.

## 2017-10-16 NOTE — MR AVS SNAPSHOT
After Visit Summary   10/16/2017    Flor Navarro    MRN: [id number]           Patient Information     Date Of Birth          1965        Visit Information        Provider Department      10/16/2017 1:20 PM Karen Downs MD [facility]        Today's Diagnoses     Encounter for therapeutic drug monitoring    -  1    SOB (shortness of breath)        Urinary frequency        Dysuria        Fever 41 degrees C or over          Care Instructions    Here is the plan from today's visit    1. Encounter for therapeutic drug monitoring  - Basic Metabolic Panel ([facility])  - CBC with Diff Plt ([facility])    2. SOB (shortness of breath)  - XR CHEST 2 VW; Future  - levofloxacin (LEVAQUIN) 750 MG tablet; Take 1 tablet (750 mg) by mouth daily for 7 days  Dispense: 7 tablet; Refill: 0    3. Urinary frequency  - Urinalysis (UA) ([facility])  - Urine Culture Aerobic Bacterial    4. Dysuria  - levofloxacin (LEVAQUIN) 750 MG tablet; Take 1 tablet (750 mg) by mouth daily for 7 days  Dispense: 7 tablet; Refill: 0    5. Fever 41 degrees C or over  - acetaminophen (TYLENOL) 325 MG tablet; Take 1-2 tablets (325-650 mg) by mouth every 4 hours as needed for mild pain  Dispense: 100 tablet; Refill: 0    - I want you to come back in 2 days to be seen in the clinic. If your symptoms get worse despite starting the antibiotics, come sooner. At that time we will need to repeat urine and blood work.     - you will need to make sure you are drinking at least 6-8 glasses of water a day to keep well hydrated.   Please call or return to clinic if your symptoms don't go away.    Follow up plan  follow up in 2 days.     Thank you for coming to [facility] today.  Lab Testing:  **If you had lab testing today and your results are reassuring or normal they will be mailed to you or sent through [employer] within 7 days.   **If the lab tests need quick action we will call you with the results.  The phone number we  will call with results is # [phone] (home) . If this is not the best number please call our clinic and change the number.  Medication Refills:  If you need any refills please call your pharmacy and they will contact us.   If you need to  your refill at a new pharmacy, please contact the new pharmacy directly. The new pharmacy will help you get your medications transferred faster.   Scheduling:  If you have any concerns about today's visit or wish to schedule another appointment please call our office during normal business hours [phone] (8-5:00 M-F)  If a referral was made to a [facility] and you don't get a call from central scheduling please call [phone].  If a Mammogram was ordered for you at [facility] call [phone] to schedule or change your appointment.  If you had an XRay/CT/Ultrasound/MRI ordered the number is [phone] to schedule or change your radiology appointment.   Medical Concerns:  If you have urgent medical concerns please call [phone] at any time of the day.            Follow-ups after your visit        Your next 10 appointments already scheduled     Oct 17, 2017  3:00 PM CDT   (Arrive by 2:45 PM)   [place] Extremity with Raya Peña PT   [facility] ([facility])    [address] [facility] [address]   [phone]            Oct 24, 2017 11:10 AM CDT   [place] Extremity with Raya Peña PT   [facility] ([facility])    [address] [facility] [address]   [phone]            Oct 31, 2017 11:10 AM CDT   [place] Extremity with Raya Peña PT   [facility] ([facility])    [address] [facility] [address]   [phone]            Nov 02, 2017  8:30 AM CDT   Lab with  LAB    [facility] ([facility])    [address] [place] [address]  "Se  1st Floor  [facility] [address] [phone]            2017  9:20 AM CDT   (Arrive by 9:05 AM)   Return Liver Transplant with Aaron Harkins MD   [facility] ([facility])    [address]  3rd Floor  [facility] [id number] [phone]              Who to contact     Please call your clinic at [phone] to:    Ask questions about your health    Make or cancel appointments    Discuss your medicines    Learn about your test results    Speak to your doctor   If you have compliments or concerns about an experience at your clinic, or if you wish to file a complaint, please contact [facility] Patient Relations at [phone] or email us at [email or web address]         Additional Information About Your Visit        [email or web address] Information     [email or web address] is an electronic gateway that provides easy, online access to your medical records. With [email or web address], you can request a clinic appointment, read your test results, renew a prescription or communicate with your care team.     To sign up for [email or web address] visit the website at [email or web address]   You will be asked to enter the access code listed below, as well as some personal information. Please follow the directions to create your username and password.     Your access code is: [email or web address]  Expires: 12/3/2017  6:30 AM     Your access code will  in 90 days. If you need help or a new code, please contact your [facility] or call [phone] for assistance.        Care EveryWhere ID     This is your Care EveryWhere ID. This could be used by other organizations to access your [facility] medical records  [id number]        Your Vitals Were     Pulse Temperature Respirations Height Pulse Oximetry Breastfeeding?    106 102.9  F (39.4  C) (Oral) 16 5' 1\" (154.9 cm) 98% No       Blood Pressure from Last 3 Encounters:   10/16/17 115/73   10/03/17 107/73 "   09/12/17 123/82    Weight from Last 3 Encounters:   10/03/17 209 lb 9.6 oz (95.1 kg)   09/12/17 209 lb 3.2 oz (94.9 kg)   09/11/17 209 lb (94.8 kg)              We Performed the Following     Basic Metabolic Panel ([facility])     CBC with Diff Plt ([facility])     Urinalysis (UA) ([facility])     Urine Culture Aerobic Bacterial          Today's Medication Changes          These changes are accurate as of: 10/16/17  3:45 PM.  If you have any questions, ask your nurse or doctor.               Start taking these medicines.        Dose/Directions    levofloxacin 750 MG tablet   Commonly known as:  LEVAQUIN   Used for:  Dysuria, SOB (shortness of breath)   Started by:  Karen Downs MD        Dose:  750 mg   Take 1 tablet (750 mg) by mouth daily for 7 days   Quantity:  7 tablet   Refills:  0         These medicines have changed or have updated prescriptions.        Dose/Directions    * acetaminophen 325 MG tablet   Commonly known as:  TYLENOL   This may have changed:  Another medication with the same name was added. Make sure you understand how and when to take each.   Used for:  Sebaceous cyst   Changed by:  Karely Sierra MD        Dose:  325-650 mg   Take 1-2 tablets (325-650 mg) by mouth every 6 hours as needed Max 6 tablets per 24 hours   Quantity:  100 tablet   Refills:  3       * acetaminophen 325 MG tablet   Commonly known as:  TYLENOL   This may have changed:  You were already taking a medication with the same name, and this prescription was added. Make sure you understand how and when to take each.   Used for:  Fever 41 degrees C or over   Changed by:  Karen Downs MD        Dose:  325-650 mg   Take 1-2 tablets (325-650 mg) by mouth every 4 hours as needed for mild pain   Quantity:  100 tablet   Refills:  0       * Notice:  This list has 2 medication(s) that are the same as other medications prescribed for you. Read the directions carefully, and ask your doctor or other care provider to review  them with you.         Where to get your medicines      These medications were sent to [facility] [place], [place] - [address] [facility] [address]     Phone:  [phone]     acetaminophen 325 MG tablet    levofloxacin 750 MG tablet                Primary Care Provider Office Phone # Fax #    Karely Sierra [phone]       [address]  [facility] [address]        Equal Access to Services     MAURO ALBA : Hadii aad ku hadasho Soomaali, waaxda luqadaha, qaybta kaalmada adeegyada, waxay idiin hayaan adeeg kieranyaniraerica oglesby . So [facility] [phone].    ATENCIÓN: Si habla espkelsy, tiene a garcia disposición servicios gratuitos de asistencia lingüística. Elidaame al [phone].    We comply with applicable federal civil rights laws and [place] laws. We do not discriminate on the basis of race, color, national origin, age, disability, sex, sexual orientation, or gender identity.            Thank you!     Thank you for choosing [facility]  for your care. Our goal is always to provide you with excellent care. Hearing back from our patients is one way we can continue to improve our services. Please take a few minutes to complete the written survey that you may receive in the mail after your visit with us. Thank you!             Your Updated Medication List - Protect others around you: Learn how to safely use, store and throw away your medicines at www.disposemymeds.org.          This list is accurate as of: 10/16/17  3:45 PM.  Always use your most recent med list.                   Brand Name Dispense Instructions for use Diagnosis    * acetaminophen 325 MG tablet    TYLENOL    100 tablet    Take 1-2 tablets (325-650 mg) by mouth every 6 hours as needed Max 6 tablets per 24 hours    Sebaceous cyst       * acetaminophen 325 MG tablet    TYLENOL    100 tablet    Take 1-2 tablets (325-650 mg) by mouth every 4 hours as needed for mild pain    Fever 41  degrees C or over       albuterol 108 (90 BASE) MCG/ACT Inhaler    PROAIR HFA/PROVENTIL HFA/VENTOLIN HFA    1 Inhaler    Inhale 2 puffs into the lungs 4 times daily    Acute bronchitis, unspecified organism       alendronate 70 MG tablet    FOSAMAX    12 tablet    Take 1 tablet (70 mg) by mouth every 7 days Take at least 60 min before breakfast with over 8 oz water and stay upright for at least 30 min. after dose.    Osteoporosis       bisacodyl 5 MG EC tablet    DULCOLAX    30 tablet    Take 1 tablet (5 mg) by mouth daily as needed for constipation    Constipation, chronic       calcium-vitamin D 600-400 MG-UNIT per tablet    calcium 600 + D    60 tablet    Take 1 tablet by mouth 2 times daily    Osteoporosis       carboxymethylcellul-glycerin 0.5-0.9 % Soln ophthalmic solution    OPTIVE/REFRESH OPTIVE    1 each    Place 1 drop into both eyes 4 times daily    Dry eye syndrome of bilateral lacrimal glands       carboxymethylcellulose 1 % ophthalmic solution    CELLUVISC/REFRESH LIQUIGEL    15 each    Place 1 drop into both eyes 4 times daily    Dry eyes, bilateral       cholecalciferol 1000 UNITS capsule    vitamin  -D    180 capsule    Take 2 capsules (2,000 Units) by mouth daily    Osteoporosis, Liver replaced by transplant (H), Vitamin D deficiency, Secondary hyperparathyroidism (H), CKD (chronic kidney disease) stage 3, GFR 30-59 ml/min, Palpitations       Colloidal Oatmeal 1 % Crea     226 g    Externally apply topically as needed    Dry skin       * cycloSPORINE modified 25 MG capsule     240 capsule    Take 4 capsules (100 mg) by mouth every 12 hours    Liver replaced by transplant (H)       * cycloSPORINE modified 25 MG capsule    GENERIC EQUIVALENT    240 capsule    Take 4 capsules (100 mg) by mouth every 12 hours    Liver replaced by transplant (H)       levofloxacin 750 MG tablet    LEVAQUIN    7 tablet    Take 1 tablet (750 mg) by mouth daily for 7 days    Dysuria, SOB (shortness of breath)        lidocaine 5 % Patch    LIDODERM    30 patch    Apply up to 3 patches to painful area at once for up to 12 h within a 24 h period.  Remove after 12 hours.    Liver replaced by transplant (H)       * Medical Compression Socks Misc     2 each    1 Units daily    Leg swelling       * Knee Brace/Flex Stays Large Misc     2 each    1 Units daily    Chronic pain of both knees       melatonin 3 MG tablet     100 tablet    Take 1 tablet (3 mg) by mouth nightly as needed for sleep    Primary insomnia       multivitamin, therapeutic with minerals Tabs tablet     100 tablet    Take 1 tablet by mouth daily    Liver replaced by transplant (H)       mycophenolate 250 MG capsule    GENERIC EQUIVALENT    120 capsule    TAKE TWO CAPSULES BY MOUTH EVERY 12 HOURS    Liver replaced by transplant (H)       omeprazole 20 MG CR capsule    priLOSEC    180 capsule    Take 1 capsule (20 mg) by mouth 2 times daily    Gastroesophageal reflux disease, esophagitis presence not specified       * order for DME     1 Units    Equipment being ordered: Nebulizer with adult mask and tubing    Acute bronchitis, unspecified organism       * order for DME     1 Units    Equipment being ordered: cane with padded handle    Gait instability       * order for DME     1 Units    Equipment being ordered: cane    Osteoporosis, Falls frequently       * order for DME     2 Units    Equipment being ordered: compression stockings bilateral Please measure and fit patient for stockings  Strength 15-30mmHg Disp 2 pairs 1 refill over the time of 1 year    Localized edema       * order for DME     1 Units    2-wheeled walker (dx: gait instability and Frequent falls).    Gait instability, Falls frequently       * order for DME     1 Units    Equipment being ordered: 4 Wheeled Walker with Seat and Brakes    Falls frequently, Gait instability       * order for DME     2 each    Equipment being ordered: Compression stockings below knee bilateral    Bilateral edema of lower  extremity       * order for DME     1 each    Equipment being ordered: Back Stabilizer    Chronic midline low back pain without sciatica       * order for DME     2 each    Equipment being ordered: 2 pairs of black stabilizer socks (beige okay if black not available). Size XL.    Bilateral edema of lower extremity       prednisoLONE acetate 1 % ophthalmic susp    PRED FORTE    1 Bottle    Use in operative eye four times a day  For 4 days    Posterior capsular opacification visually significant of both eyes       * Psyllium 500 MG Caps     180 capsule    Take 3 capsules by mouth 2 times daily    Constipation, chronic       * REGULOID 0.52 G capsule   Generic drug:  psyllium     540 capsule    Take 3 capsules (1.56 g) by mouth 2 times daily    Constipation, unspecified constipation type       senna-docusate 8.6-50 MG per tablet    SENOKOT-S;PERICOLACE    120 tablet    Take 2 tablets by mouth 2 times daily For constipation    Constipation, chronic       simethicone 80 MG chewable tablet    MYLICON    120 tablet    Take 1 tablet (80 mg) by mouth every 6 hours as needed for flatulence or cramping    Flatulence, eructation, and gas pain       sodium chloride 0.65 % nasal spray    OCEAN    30 mL    Spray 1 spray into both nostrils daily as needed for congestion    Throat pain       sodium phosphate 7-19 GM/118ML rectal enema     3 Bottle    Place 1 Bottle (1 enema) rectally daily as needed for constipation    Ileus (H)       STATIN NOT PRESCRIBED (INTENTIONAL)     0 each    Not prescribed    High risk medication use       * Notice:  This list has 17 medication(s) that are the same as other medications prescribed for you. Read the directions carefully, and ask your doctor or other care provider to review them with you.

## 2017-10-16 NOTE — PROGRESS NOTES
Preceptor Attestation:   Patient seen and discussed with the resident. Assessment and plan reviewed with resident and agreed upon.   Supervising Physician:  Rashad Nowak MD  [facility]

## 2017-10-16 NOTE — PROGRESS NOTES
HPI:       Flor Navarro is a 52 year old who presents for the following  Patient presents with:  Refill Request: Wouldl like 3 months of medication   Fever: X1week    Flor has been having fever, chills, headaches, some SOB with fatigue and weakness for the last several days. She is feeling some dizziness and light-headness if she sits up or stands up too fast. She is here today with a friend for support. Patient denies any nausea, vomiting, diarrhea, sore throat, trouble swallowing, hematuria, or blood in the stool. Reports mild dysuria. No rash or recent travel. Denies any recent sick contacts. She reports that she is urinating frequently and is trying to drink more fluids. She is overall feeling very ill today and is unable to sit up during the visit.     She will be traveling to [place] next month.     A Romanian  was used for  this visit.      Problem, Medication and Allergy Lists were   reviewed and are current.     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        Mixed hyperlipidemia 03/03/2017     Priority: Medium     Secondary hyperparathyroidism (H) 07/25/2016     Priority: Medium     Abdominal pain 04/05/2016     Priority: Medium     Constipation, chronic 04/05/2016     Priority: Medium     Urinary tract infection due to extended-spectrum beta lactamase (ESBL)-producing Klebsiella 12/23/2015     Priority: Medium     Pseudophakia - Left Eye 02/05/2015     Priority: Medium     Shoulder joint pain 11/06/2014     Priority: Medium     Vitamin D deficiency 09/17/2014     Priority: Medium     Problem list name updated by automated process. Provider to review       Advanced directives, counseling/discussion  10/30/2013     Priority: Medium     POLST on file October 30, 2013  Full code       Ganglion cyst 10/02/2013     Priority: Medium     Immunosuppressed status (H)      Priority: Medium     Cystitis cystica 07/03/2013     Priority: Medium     Stroke, hemorrhagic (H)      Priority: Medium     STOP aspirin. She has a hx of hemorrhagic stroke, and her 10 yr risk of MI is only 3% and wouldn't rec treating w/ ASA unless >7.5%.        Osteoporosis      Priority: Medium     Problem list name updated by automated process. Provider to review and confirm  [employer] Update utility       Overweight 04/09/2013     Priority: Medium     Problem list name updated by automated process. Provider to review       CKD (chronic kidney disease) stage 3, GFR 30-59 ml/min      Priority: Medium     Cr. 1.3-1.6. Sees Dr. Wells. Next visit Aug 2015.       Hypertension, renal      Priority: Medium     Liver replaced by transplant      Priority: Medium     2011, due to Hep C, follows w/ Dr. Anne q6mos  MM should be taken on an empty stomach, avoid antacids.   Cyclosporine goal levels ~100ng/dL per transplant surgeon Dr. Fortune Dec 2015       High risk medication use      Priority: Medium     Anemia in CKD (chronic kidney disease)      Priority: Medium     Hyperlipidemia LDL goal <160 04/28/2011     Priority: Medium     PVD (POSTERIOR VITREOUS DETACHMENT) OS 02/28/2011     Priority: Medium   ,     Current Outpatient Prescriptions   Medication Sig Dispense Refill     levofloxacin (LEVAQUIN) 750 MG tablet Take 1 tablet (750 mg) by mouth daily for 7 days 7 tablet 0     acetaminophen (TYLENOL) 325 MG tablet Take 1-2 tablets (325-650 mg) by mouth every 4 hours as needed for mild pain 100 tablet 0     REGULOID 0.52 G capsule Take 3 capsules (1.56 g) by mouth 2 times daily 540 capsule 3     Colloidal Oatmeal 1 % CREA Externally apply topically as needed 226 g 1     lidocaine (LIDODERM) 5 % Patch Apply up to 3 patches to painful area at once for up to 12 h  within a 24 h period.  Remove after 12 hours. 30 patch 0     simethicone (MYLICON) 80 MG chewable tablet Take 1 tablet (80 mg) by mouth every 6 hours as needed for flatulence or cramping 120 tablet 0     cycloSPORINE modified (GENERIC EQUIVALENT) 25 MG capsule Take 4 capsules (100 mg) by mouth every 12 hours 240 capsule 11     Elastic Bandages & Supports (MEDICAL COMPRESSION SOCKS) MISC 1 Units daily 2 each 0     Elastic Bandages & Supports (KNEE BRACE/FLEX STAYS LARGE) MISC 1 Units daily 2 each 0     multivitamin, therapeutic with minerals (MULTI-VITAMIN) TABS tablet Take 1 tablet by mouth daily 100 tablet 11     acetaminophen (TYLENOL) 325 MG tablet Take 1-2 tablets (325-650 mg) by mouth every 6 hours as needed Max 6 tablets per 24 hours 100 tablet 3     carboxymethylcellulose (CELLUVISC/REFRESH LIQUIGEL) 1 % ophthalmic solution Place 1 drop into both eyes 4 times daily 15 each 11     prednisoLONE acetate (PRED FORTE) 1 % ophthalmic susp Use in operative eye four times a day  For 4 days 1 Bottle 0     calcium-vitamin D (CALCIUM 600 + D) 600-400 MG-UNIT per tablet Take 1 tablet by mouth 2 times daily 60 tablet 11     cholecalciferol (VITAMIN  -D) 1000 UNITS capsule Take 2 capsules (2,000 Units) by mouth daily 180 capsule 3     senna-docusate (SENOKOT-S;PERICOLACE) 8.6-50 MG per tablet Take 2 tablets by mouth 2 times daily For constipation 120 tablet 6     mycophenolate (CELLCEPT - GENERIC EQUIVALENT) 250 MG capsule TAKE TWO CAPSULES BY MOUTH EVERY 12 HOURS 120 capsule 4     omeprazole (PRILOSEC) 20 MG CR capsule Take 1 capsule (20 mg) by mouth 2 times daily 180 capsule 3     alendronate (FOSAMAX) 70 MG tablet Take 1 tablet (70 mg) by mouth every 7 days Take at least 60 min before breakfast with over 8 oz water and stay upright for at least 30 min. after dose. 12 tablet 3     sodium chloride (OCEAN) 0.65 % nasal spray Spray 1 spray into both nostrils daily as needed for congestion 30 mL 1     melatonin 3 MG tablet  Take 1 tablet (3 mg) by mouth nightly as needed for sleep 100 tablet 3     order for DME Equipment being ordered: 2 pairs of black stabilizer socks (beige okay if black not available). Size XL. 2 each 0     order for DME Equipment being ordered: Compression stockings below knee bilateral 2 each 0     order for DME Equipment being ordered: Back Stabilizer 1 each 0     order for DME Equipment being ordered: 4 Wheeled Walker with Seat and Brakes 1 Units 0     order for DME 2-wheeled walker (dx: gait instability and Frequent falls). 1 Units 0     albuterol (PROAIR HFA, PROVENTIL HFA, VENTOLIN HFA) 108 (90 BASE) MCG/ACT inhaler Inhale 2 puffs into the lungs 4 times daily 1 Inhaler 1     order for DME Equipment being ordered: compression stockings bilateral  Please measure and fit patient for stockings   Strength 15-30mmHg  Disp 2 pairs  1 refill over the time of 1 year 2 Units 1     order for DME Equipment being ordered: cane 1 Units 0     bisacodyl (DULCOLAX) 5 MG EC tablet Take 1 tablet (5 mg) by mouth daily as needed for constipation 30 tablet 11     carboxymethylcellul-glycerin (OPTIVE/REFRESH OPTIVE) 0.5-0.9 % SOLN ophthalmic solution Place 1 drop into both eyes 4 times daily 1 each 11     Psyllium 500 MG CAPS Take 3 capsules by mouth 2 times daily 180 capsule 11     sodium phosphate (FLEET ENEMA) 7-19 GM/118ML rectal enema Place 1 Bottle (1 enema) rectally daily as needed for constipation 3 Bottle 11     GENGRAF 25 MG PO CAPSULE Take 4 capsules (100 mg) by mouth every 12 hours 240 capsule 0     STATIN NOT PRESCRIBED, INTENTIONAL, Not prescribed 0 each 0     order for DME Equipment being ordered: Nebulizer with adult mask and tubing 1 Units 0     order for DME Equipment being ordered: cane with padded handle 1 Units 0     [DISCONTINUED] solifenacin (VESICARE) 5 MG tablet Take 1 tablet (5 mg) by mouth daily 30 tablet 12   ,     Allergies   Allergen Reactions     Aspirin      3/31/16 Per pt, tolerates 81 mg daily dose  "without ADR.     325 mg dose caused itchiness and hives.     Clarithromycin      Allergic reaction         Contrast Dye      Penicillins      Patient is an established patient of this clinic.         Review of Systems:   Review of Systems   Constitutional: Positive for activity change (decreased), appetite change (decreased), chills, fatigue and fever.   HENT: Positive for ear pain (right ear, feels hot) and rhinorrhea. Negative for congestion, sinus pressure, sore throat, trouble swallowing and voice change.    Eyes: Negative for pain.   Respiratory: Positive for shortness of breath. Negative for cough and wheezing.    Cardiovascular: Negative for chest pain and leg swelling.   Gastrointestinal: Negative for abdominal pain, blood in stool, constipation, diarrhea, nausea and vomiting.   Endocrine: Positive for polyuria.   Genitourinary: Positive for dysuria (mild) and frequency. Negative for flank pain and hematuria.   Musculoskeletal: Positive for myalgias. Negative for arthralgias.   Skin: Negative.    Neurological: Positive for dizziness, weakness, light-headedness and headaches. Negative for speech difficulty.             Physical Exam:     Patient Vitals for the past 24 hrs:   BP Temp Temp src Pulse Resp SpO2 Height Weight   10/16/17 1334 115/73 102.9  F (39.4  C) Oral 106 16 98 % 5' 1\" (154.9 cm) -     There is no height or weight on file to calculate BMI.  Vital signs normal except elevated temperature and tachycardia.      Physical Exam   Constitutional: She is oriented to person, place, and time.   Older than stated age, is lying on exam table with chills, requiring several sheets to stay warm, ill-appearing and fatigued.    HENT:   Head: Normocephalic and atraumatic.   Mouth/Throat: Mucous membranes are dry and not cyanotic. No oropharyngeal exudate, posterior oropharyngeal edema, posterior oropharyngeal erythema or tonsillar abscesses.   Right ear canal is erythematous, non-tender, with no purulent " discharge seen. Left ear canal normal, bilateral TMs normal.    Eyes: Conjunctivae are normal. Pupils are equal, round, and reactive to light. No scleral icterus.   Neck: Normal range of motion. Neck supple. No thyromegaly present.   Cardiovascular: Normal rate, regular rhythm and normal heart sounds.    No murmur heard.  Pulmonary/Chest: Effort normal and breath sounds normal. No respiratory distress. She has no wheezes.   Abdominal: Soft. Bowel sounds are normal. She exhibits no distension and no mass. There is tenderness (minimal suprapubic tenderness). There is no rebound and no guarding.   Musculoskeletal: She exhibits edema (trace).   Lymphadenopathy:     She has no cervical adenopathy.   Neurological: She is alert and oriented to person, place, and time. She exhibits normal muscle tone.   Skin: Skin is warm. She is diaphoretic (mildly).       Results:     Results for orders placed or performed in visit on 10/16/17   Basic Metabolic Panel ([facility])   Result Value Ref Range    Urea Nitrogen 20.6 (H) 7.0 - 19.0 mg/dL    Calcium 9.8 8.5 - 10.1 mg/dL    Chloride 98.0 98.0 - 110.0 mmol/L    Carbon Dioxide 24.6 20.0 - 32.0 mmol/L    Creatinine 1.4 (H) 0.5 - 1.0 mg/dL    Glucose 119.1 (H) 70.0 - 99.0 mg'dL    Potassium 4.9 (H) 3.3 - 4.5 mmol/dL    Sodium 131.9 (L) 132.6 - 141.4 mmol/L    GFR Estimate 41.8 (L) >60.0 mL/min/1.7 m2    GFR Estimate If Black 50.6 (L) >60.0 mL/min/1.7 m2   CBC with Diff Plt ([facility])   Result Value Ref Range    WBC 8.4 4.0 - 11.0 K/uL    Lymphocytes # 1.2 0.8 - 5.3 K/uL    % Lymphocytes 14.3 (L) 20.0 - 48.0 %L    Mid # 0.3 0.0 - 2.2 K/uL    Mid % 3.7 0.0 - 20.0 %M    GRANULOCYTES # 6.9 1.6 - 8.3 K/uL    % Granulocytes 82.0 (H) 40.0 - 75.0 %G    RBC 4.52 3.80 - 5.20 M/uL    Hemoglobin 13.0 11.7 - 15.7 g/dL    Hematocrit 43.6 35.0 - 47.0 %    MCV 96.4 78.0 - 100.0 fL    MCH 28.8 26.5 - 35.0 pg    MCHC 29.8 (L) 32.0 - 36.0 g/dL    Platelets 116.0 (L) 150.0 - 450.0 K/uL   Urinalysis (UA)  ([facility])   Result Value Ref Range    Specific Gravity Urine 1.020 1.005 - 1.030    pH Urine 6.0 4.5 - 8.0    Leukocyte Esterase UR Trace (A) NEGATIVE    Nitrite Urine Negative NEGATIVE    Protein UR 2+ (A) NEGATIVE    Glucose Urine Negative NEGATIVE    Ketones Urine Negative NEGATIVE    Urobilinogen mg/dL 1.0 E.U./dL 0.2 E.U./dL    Bilirubin UR 1+ (A) NEGATIVE    Blood UR Trace-intact (A) NEGATIVE   Urine Culture Aerobic Bacterial   Result Value Ref Range    Specimen Description Midstream Urine     Special Requests Specimen received in preservative     Culture Micro PENDING      *Note: Due to a large number of results and/or encounters for the requested time period, some results have not been displayed. A complete set of results can be found in Results Review.     Exam: XR CHEST 2 VW, 10/16/2017 2:30 PM     Indication: Shortness of breath     Comparison: Chest radiograph 7/27/2017     Findings:   PA and lateral views of the chest. The trachea is midline. The cardiac  silhouette is within normal limits. No focal airspace opacity. No  pleural effusion or pneumothorax. Mild pulmonary vascular congestion.  The visualized upper abdomen, osseous structures and soft tissues are  unremarkable.         Impression:  1. Hazy opacities in the right upper lobe, concerning for infection.  2. Mild pulmonary vascular congestion.    Assessment and Plan     Flor is a 52 yo woman with a h/o HLD, Vit D def, Osteoporosis, HTN, secondary hyperparathyroidism, left peroneal tendonitis, s/p liver transplant secondary Hepatitis C (negative since transplant about 7 years ago) who presents to the clinic with fever, chills and frequency. Completed UA/UC, but unremarkable for infection on UA, UC pending. Patient has leukocytosis since her WBC baseline is 4.0 with a significant left shift indicating bacterial infection. Completed CXR even though no lung findings on exam and was found to have possible pneumonia in the right upper lobe. Since  she has objective fever and lab findings, is ill appearing and has penicillin allergy, decided to treat with Levofloxacin for CAP and possible UTI coverage. Discussed with patient that it is imperative that she have very close follow up in 2 days to see how she is doing on the antibiotics. With her transplant history, will have low threshold for admission if she is not improving on follow up visit. Will also need to do repeat CBC and BMP (possible CMP).     Will refill her chronic medications at follow up visit(s) as this is not an acute issue today and she will not be traveling until November to [place].     Encounter for therapeutic drug monitoring  - Basic Metabolic Panel ([facility]) - mild hyponatremia, elevated Creatine (baseline 1.0, today 1.4)   - CBC with Diff Plt ([facility]) - elevated WBC (baseline is 4, today is 8.4 with a left shift).     SOB (shortness of breath)  Fever and chills   Community Acquired pneumonia of right upper lobe of lung  - XR CHEST 2 VW; see above.   - levofloxacin (LEVAQUIN) 750 MG tablet; Take 1 tablet (750 mg) by mouth daily for 7 days  Dispense: 7 tablet; Refill: 0  - acetaminophen (TYLENOL) 325 MG tablet; Take 1-2 tablets (325-650 mg) by mouth every 4 hours as needed for mild pain  Dispense: 100 tablet; Refill: 0     Urinary frequency  Dysuria  - Urinalysis (UA) ([facility]) - unlikely infection   - Urine Culture Aerobic Bacterial - pending   - levofloxacin (LEVAQUIN) 750 MG tablet; Take 1 tablet (750 mg) by mouth daily for 7 days  Dispense: 7 tablet; Refill: 0    Acute Kidney Injury   - Creatine today 1.4, baseline 1.0   - discussed that she will need to increase her fluid intake.   - will recheck BMP at follow up visit.     There are no discontinued medications.  Options for treatment and follow-up care were reviewed with the patient. Flor Navarro  engaged in the decision making process and verbalized understanding of the options discussed and agreed with the final  plan.    Karen Downs MD  [facility]  [phone]

## 2017-10-17 ENCOUNTER — DOCUMENTATION ONLY (OUTPATIENT)
Dept: FAMILY MEDICINE | Facility: CLINIC | Age: 52
End: 2017-10-17

## 2017-10-17 ENCOUNTER — THERAPY VISIT (OUTPATIENT)
Dept: PHYSICAL THERAPY | Facility: CLINIC | Age: 52
End: 2017-10-17
Payer: MEDICARE

## 2017-10-17 DIAGNOSIS — M25.572 PAIN IN JOINT, ANKLE AND FOOT, LEFT: Primary | ICD-10-CM

## 2017-10-17 LAB
BACTERIA SPEC CULT: ABNORMAL
Lab: ABNORMAL
SPECIMEN SOURCE: ABNORMAL

## 2017-10-17 PROCEDURE — 97161 PT EVAL LOW COMPLEX 20 MIN: CPT | Mod: GP | Performed by: PHYSICAL THERAPIST

## 2017-10-17 PROCEDURE — G8978 MOBILITY CURRENT STATUS: HCPCS | Mod: GP | Performed by: PHYSICAL THERAPIST

## 2017-10-17 PROCEDURE — G8979 MOBILITY GOAL STATUS: HCPCS | Mod: GP | Performed by: PHYSICAL THERAPIST

## 2017-10-17 PROCEDURE — 97110 THERAPEUTIC EXERCISES: CPT | Mod: GP | Performed by: PHYSICAL THERAPIST

## 2017-10-17 ASSESSMENT — ENCOUNTER SYMPTOMS
VOMITING: 0
SHORTNESS OF BREATH: 1
HEMATURIA: 0
APPETITE CHANGE: 1
RHINORRHEA: 1
WHEEZING: 0
DIARRHEA: 0
VOICE CHANGE: 0
FATIGUE: 1
SPEECH DIFFICULTY: 0
NAUSEA: 0
SINUS PRESSURE: 0
CHILLS: 1
BLOOD IN STOOL: 0
DIZZINESS: 1
FREQUENCY: 1
TROUBLE SWALLOWING: 0
ACTIVITY CHANGE: 1
EYE PAIN: 0
COUGH: 0
FLANK PAIN: 0
FEVER: 1
MYALGIAS: 1
DYSURIA: 1
HEADACHES: 1
WEAKNESS: 1
LIGHT-HEADEDNESS: 1
SORE THROAT: 0
CONSTIPATION: 0
ABDOMINAL PAIN: 0
ARTHRALGIAS: 0

## 2017-10-17 NOTE — LETTER
DEPARTMENT OF HEALTH AND HUMAN SERVICES  CENTERS FOR MEDICARE & MEDICAID SERVICES    PLAN/UPDATED PLAN OF PROGRESS FOR OUTPATIENT REHABILITATION    PATIENTS NAME:  Flor Navarro   : 1965    PROVIDER NUMBER:    [id number]  [id number]  PROVIDER NAME: [facility]  MEDICAL RECORD NUMBER: [id number]   START OF CARE DATE:  SOC Date: 10/17/17   TYPE:  PT  PRIMARY/TREATMENT DIAGNOSIS: (Pertinent Medical Diagnosis)  Pain in joint, ankle and foot, left  VISITS FROM START OF CARE:1  Rxs Used: 1   [facility] Initial Evaluation  Subjective:  Patient is a 52 year old female presenting with rehab left ankle/foot hpi. The history is provided by the patient.   Flor Navarro is a 52 year old female with a left ankle condition.  Condition occurred with:  A fall/slip.  Condition occurred: in the community (pt slipped on ice last winter and rolled her ankle. States she was in a brace for a long time. After she got out of the brace, she began to notice pain in a new spot).  This is a chronic condition  Pain began about a year ago. PT referral on 10/9/17  Patient reports pain:  Medial, anterior and lower leg.  Radiates to:  Foot.  Pain is described as aching and sharp and is intermittent and reported as 10/10 (at worst).  Associated symptoms:  Loss of strength and loss of motion/stiffness. Worse during: no pattern.  Symptoms are exacerbated by walking, activity, ascending stairs, weight bearing, descending stairs, standing and sitting and relieved by rest, ice and analgesics.  Since onset symptoms are gradually worsening.        General health as reported by patient is poor.  Pertinent medical history includes:  Osteoporosis, overweight, high blood pressure and kidney disease.  Medical allergies: yes (aspirin, penicillin).  Other surgeries include:  Other (s/p liver transplant).  Current medications:  Sleep medication and pain medication.  Current occupation is  none.      Barriers include:  None as reported by patient.  Red flags:  None as reported by patient.  Objective:  Objective portion of eval limited as pt not feeling well. Will continue to assess at next session.  Standing Alignment:    Ankle/Foot:  Pes planus R and pes planus L  Gait:    Gait Type:  Antalgic   Assistive Devices:  None  Ankle/Foot Evaluation  ROM:  AROM is normal.  PROM:    Pain: in all planes  Strength:    Dorsiflexion:  Left: 4+/5     Pain:   Right: 5/5   Pain:  Inversion:Left: 4/5  Pain:     Right: 5/5  Pain:  Eversion:Left: 4/5  Pain:  Right: 5/5  Pain:  LIGAMENT TESTING: normal      PATIENTS NAME:  Flor Navarro   : 1965    PALPATION:   Left ankle tenderness present at:  gastroc/soleus; anterior tibialis; posterior tibialis and peroneals  Left ankle tenderness not present at:   plantar fascia; medial calcaneal; anterior talofibular ligament; posterior talofibular ligament; calcaneofibular ligament; medial malleolus or lateral malleolus  Right ankle tenderness present at:   posterior tibialis  EDEMA: normal   MOBILITY TESTING:   Midtarsal Left: hypomobile    Midtarsal Right: normal  General   ROS  Assessment/Plan:    Patient is a 52 year old female with left side ankle complaints.    Patient has the following significant findings with corresponding treatment plan.                Diagnosis 1:  L ankle/LE pain  Pain -  manual therapy, self management, education and home program  Decreased ROM/flexibility - manual therapy, therapeutic exercise and home program  Decreased strength - therapeutic exercise, therapeutic activities and home program  Therapy Evaluation Codes:   1) History comprised of:   Personal factors that impact the plan of care:      None.    Comorbidity factors that impact the plan of care are:      None.     Medications impacting care: None.  2) Examination of Body Systems comprised of:   Body structures and functions that impact the plan of care:      Ankle, Hip and  "Knee.   Activity limitations that impact the plan of care are:      Sitting, Squatting/kneeling, Stairs, Standing, Walking and Sleeping.  3) Clinical presentation characteristics are:   Stable/Uncomplicated.  4) Decision-Making    Low complexity using standardized patient assessment instrument and/or measureable assessment of functional outcome.  Cumulative Therapy Evaluation is: Low complexity.  Previous and current functional limitations:  (See Goal Flow Sheet for this information)    Short term and Long term goals: (See Goal Flow Sheet for this information)   Communication ability:  Patient has an  for communication clarity.  Treatment Explanation - The following has been discussed with the patient:   RX ordered/plan of care  Anticipated outcomes  Possible risks and side effects  This patient would benefit from PT intervention to resume normal activities.   Rehab potential is good.  Frequency:  1 X week, once daily  Duration:  for 8 weeks  Discharge Plan:  Achieve all LTG.    PATIENTS NAME:  Flor Navarro   : 1965      Independent in home treatment program.  Reach maximal therapeutic benefit.      Caregiver Signature/Credentials _____________________________ Date ________       Treating Provider: Raya Peña, PT  DPT  I have reviewed and certified the need for these services and plan of treatment while under my care.    PHYSICIAN'S SIGNATURE:   _________________________________________  Date___________   Enrrique Olivares MD    Certification period:  Beginning of Cert date period: 10/17/17 to  End of Cert period date: 18     Functional Level Progress Report: Please see attached \"Goal Flow sheet for Functional level.\"    ____X____ Continue Services or       ________ DC Services                Service dates: From  SOC Date: 10/17/17 date to present                         "

## 2017-10-17 NOTE — PROGRESS NOTES
"When opening a documentation only encounter, be sure to enter in \"Chief Complaint\" Forms and in \" Comments\" Title of form, description if needed.    Flor is a 52 year old  female  Form received via: Fax  Form now resides in: Provider Ready    Dayanna Batista      Form has been completed by provider.     Form sent out via: Fax to Handi  at Fax Number: [phone]  Patient informed: No  Output date: October 17, 2017    Dayanna Batista      **Please close the encounter**            "

## 2017-10-17 NOTE — PROGRESS NOTES
[facility] Initial Evaluation    Subjective:    Patient is a 52 year old female presenting with rehab left ankle/foot hpi. The history is provided by the patient.   Flor Navarro is a 52 year old female with a left ankle condition.  Condition occurred with:  A fall/slip.  Condition occurred: in the community (pt slipped on ice last winter and rolled her ankle. States she was in a brace for a long time. After she got out of the brace, she began to notice pain in a new spot).  This is a chronic condition  Pain began about a year ago. PT referral on 10/9/17  .    Patient reports pain:  Medial, anterior and lower leg.  Radiates to:  Foot.  Pain is described as aching and sharp and is intermittent and reported as 10/10 (at worst).  Associated symptoms:  Loss of strength and loss of motion/stiffness. Worse during: no pattern.  Symptoms are exacerbated by walking, activity, ascending stairs, weight bearing, descending stairs, standing and sitting and relieved by rest, ice and analgesics.  Since onset symptoms are gradually worsening.        General health as reported by patient is poor.  Pertinent medical history includes:  Osteoporosis, overweight, high blood pressure and kidney disease.  Medical allergies: yes (aspirin, penicillin).  Other surgeries include:  Other (s/p liver transplant).  Current medications:  Sleep medication and pain medication.  Current occupation is none.        Barriers include:  None as reported by patient.    Red flags:  None as reported by patient.                        Objective:    Objective portion of eval limited as pt not feeling well. Will continue to assess at next session.    Standing Alignment:                Ankle/Foot:  Pes planus R and pes planus L    Gait:    Gait Type:  Antalgic   Assistive Devices:  None            Ankle/Foot Evaluation  ROM:  AROM is normal.    PROM:                Pain: in all planes    Strength:    Dorsiflexion:  Left: 4+/5     Pain:    Right: 5/5   Pain:    Inversion:Left: 4/5  Pain:     Right: 5/5  Pain:  Eversion:Left: 4/5  Pain:  Right: 5/5  Pain:                  LIGAMENT TESTING: normal                PALPATION:   Left ankle tenderness present at:  gastroc/soleus; anterior tibialis; posterior tibialis and peroneals  Left ankle tenderness not present at:   plantar fascia; medial calcaneal; anterior talofibular ligament; posterior talofibular ligament; calcaneofibular ligament; medial malleolus or lateral malleolus  Right ankle tenderness present at:   posterior tibialis  EDEMA: normal          MOBILITY TESTING:           Midtarsal Left: hypomobile    Midtarsal Right: normal                                                      General     ROS    Assessment/Plan:      Patient is a 52 year old female with left side ankle complaints.    Patient has the following significant findings with corresponding treatment plan.                Diagnosis 1:  L ankle/LE pain  Pain -  manual therapy, self management, education and home program  Decreased ROM/flexibility - manual therapy, therapeutic exercise and home program  Decreased strength - therapeutic exercise, therapeutic activities and home program    Therapy Evaluation Codes:   1) History comprised of:   Personal factors that impact the plan of care:      None.    Comorbidity factors that impact the plan of care are:      None.     Medications impacting care: None.  2) Examination of Body Systems comprised of:   Body structures and functions that impact the plan of care:      Ankle, Hip and Knee.   Activity limitations that impact the plan of care are:      Sitting, Squatting/kneeling, Stairs, Standing, Walking and Sleeping.  3) Clinical presentation characteristics are:   Stable/Uncomplicated.  4) Decision-Making    Low complexity using standardized patient assessment instrument and/or measureable assessment of functional outcome.  Cumulative Therapy Evaluation is: Low complexity.    Previous and  current functional limitations:  (See Goal Flow Sheet for this information)    Short term and Long term goals: (See Goal Flow Sheet for this information)     Communication ability:  Patient has an  for communication clarity.  Treatment Explanation - The following has been discussed with the patient:   RX ordered/plan of care  Anticipated outcomes  Possible risks and side effects  This patient would benefit from PT intervention to resume normal activities.   Rehab potential is good.    Frequency:  1 X week, once daily  Duration:  for 8 weeks  Discharge Plan:  Achieve all LTG.  Independent in home treatment program.  Reach maximal therapeutic benefit.    Please refer to the daily flowsheet for treatment today, total treatment time and time spent performing 1:1 timed codes.

## 2017-10-17 NOTE — MR AVS SNAPSHOT
After Visit Summary   10/17/2017    Flor Navarro    MRN: [id number]           Patient Information     Date Of Birth          1965        Visit Information        Provider Department      10/17/2017 3:00 PM Raya Peña, PT [facility] [place] [facility]        Today's Diagnoses     Pain in joint, ankle and foot, left    -  1       Follow-ups after your visit        Your next 10 appointments already scheduled     Oct 18, 2017  1:20 PM CDT   Return Visit with Sylvester Bustamante MD   [facility] ([facility])    [address],  [address]  [facility] [address]   [phone]            Oct 24, 2017 11:10 AM CDT   NEWT[place] Extremity with Raya Peña PT   [facility] ([facility] located [facility])    [address] [facility] [address]   [phone]            Oct 31, 2017 11:10 AM CDT   [place] Extremity with Raya Peña PT   [facility] ([facility] [place] [facility])    [address] [facility] [address]   [phone]            Nov 02, 2017  8:30 AM CDT   Lab with  LAB   [facility] ([facility])    [address]  [address] [facility] [id number] [phone]            Nov 02, 2017  9:20 AM CDT   (Arrive by 9:05 AM)   Return Liver Transplant with Aaron Harkins MD   [facility] ([facility])    [address]  3rd Floor  [facility] [id number] [phone]              Who to contact     If you have questions or need follow up information about today's clinic visit or your schedule please contact [facility] directly at [phone].  Normal or non-critical lab and imaging results will be communicated to you by MyChart, letter or phone within 4 business days after the clinic has received the results. If you do not hear from us within  "7 days, please contact the clinic through Spindle or phone. If you have a critical or abnormal lab result, we will notify you by phone as soon as possible.  Submit refill requests through Spindle or call your pharmacy and they will forward the refill request to us. Please allow 3 business days for your refill to be completed.          Additional Information About Your Visit        Spindle Information     Spindle lets you send messages to your doctor, view your test results, renew your prescriptions, schedule appointments and more. To sign up, go to [email or web address] . Click on \"Log in\" on the left side of the screen, which will take you to the Welcome page. Then click on \"Sign up Now\" on the right side of the page.     You will be asked to enter the access code listed below, as well as some personal information. Please follow the directions to create your username and password.     Your access code is: [email or web address]  Expires: 12/3/2017  6:30 AM     Your access code will  in 90 days. If you need help or a new code, please call your [facility] clinic or [phone].        Care EveryWhere ID     This is your Care EveryWhere ID. This could be used by other organizations to access your [facility] medical records  [id number]         Blood Pressure from Last 3 Encounters:   10/16/17 115/73   10/03/17 107/73   17 123/82    Weight from Last 3 Encounters:   10/03/17 95.1 kg (209 lb 9.6 oz)   17 94.9 kg (209 lb 3.2 oz)   17 94.8 kg (209 lb)              We Performed the Following     HC PT EVAL, LOW COMPLEXITY     NEWTON CERT REPORT     THERAPEUTIC EXERCISES        Primary Care Provider Office Phone # Fax #    Karely Sierra [phone]        [address] [address]        Equal Access to Services     MAURO ALBA : Rayne Adair, roxy briceno, mart carver. So [facility] [phone].    ATENCIÓN: Si " abbey dalton, tiene a garcia disposición servicios gratuitos de asistencia lingüística. Abdirahman busby [phone].    We comply with applicable federal civil rights laws and [place] laws. We do not discriminate on the basis of race, color, national origin, age, disability, sex, sexual orientation, or gender identity.            Thank you!     Thank you for choosing [facility]  for your care. Our goal is always to provide you with excellent care. Hearing back from our patients is one way we can continue to improve our services. Please take a few minutes to complete the written survey that you may receive in the mail after your visit with us. Thank you!             Your Updated Medication List - Protect others around you: Learn how to safely use, store and throw away your medicines at www.disposemymeds.org.          This list is accurate as of: 10/17/17  6:18 PM.  Always use your most recent med list.                   Brand Name Dispense Instructions for use Diagnosis    * acetaminophen 325 MG tablet    TYLENOL    100 tablet    Take 1-2 tablets (325-650 mg) by mouth every 6 hours as needed Max 6 tablets per 24 hours    Sebaceous cyst       * acetaminophen 325 MG tablet    TYLENOL    100 tablet    Take 1-2 tablets (325-650 mg) by mouth every 4 hours as needed for mild pain    Fever and chills       albuterol 108 (90 BASE) MCG/ACT Inhaler    PROAIR HFA/PROVENTIL HFA/VENTOLIN HFA    1 Inhaler    Inhale 2 puffs into the lungs 4 times daily    Acute bronchitis, unspecified organism       alendronate 70 MG tablet    FOSAMAX    12 tablet    Take 1 tablet (70 mg) by mouth every 7 days Take at least 60 min before breakfast with over 8 oz water and stay upright for at least 30 min. after dose.    Osteoporosis       bisacodyl 5 MG EC tablet    DULCOLAX    30 tablet    Take 1 tablet (5 mg) by mouth daily as needed for constipation    Constipation, chronic       calcium-vitamin D 600-400  MG-UNIT per tablet    calcium 600 + D    60 tablet    Take 1 tablet by mouth 2 times daily    Osteoporosis       carboxymethylcellul-glycerin 0.5-0.9 % Soln ophthalmic solution    OPTIVE/REFRESH OPTIVE    1 each    Place 1 drop into both eyes 4 times daily    Dry eye syndrome of bilateral lacrimal glands       carboxymethylcellulose 1 % ophthalmic solution    CELLUVISC/REFRESH LIQUIGEL    15 each    Place 1 drop into both eyes 4 times daily    Dry eyes, bilateral       cholecalciferol 1000 UNITS capsule    vitamin  -D    180 capsule    Take 2 capsules (2,000 Units) by mouth daily    Osteoporosis, Liver replaced by transplant (H), Vitamin D deficiency, Secondary hyperparathyroidism (H), CKD (chronic kidney disease) stage 3, GFR 30-59 ml/min, Palpitations       Colloidal Oatmeal 1 % Crea     226 g    Externally apply topically as needed    Dry skin       * cycloSPORINE modified 25 MG capsule     240 capsule    Take 4 capsules (100 mg) by mouth every 12 hours    Liver replaced by transplant (H)       * cycloSPORINE modified 25 MG capsule    GENERIC EQUIVALENT    240 capsule    Take 4 capsules (100 mg) by mouth every 12 hours    Liver replaced by transplant (H)       levofloxacin 750 MG tablet    LEVAQUIN    7 tablet    Take 1 tablet (750 mg) by mouth daily for 7 days    Dysuria, SOB (shortness of breath)       lidocaine 5 % Patch    LIDODERM    30 patch    Apply up to 3 patches to painful area at once for up to 12 h within a 24 h period.  Remove after 12 hours.    Liver replaced by transplant (H)       * Medical Compression Socks Misc     2 each    1 Units daily    Leg swelling       * Knee Brace/Flex Stays Large Misc     2 each    1 Units daily    Chronic pain of both knees       melatonin 3 MG tablet     100 tablet    Take 1 tablet (3 mg) by mouth nightly as needed for sleep    Primary insomnia       multivitamin, therapeutic with minerals Tabs tablet     100 tablet    Take 1 tablet by mouth daily    Liver replaced  by transplant (H)       mycophenolate 250 MG capsule    GENERIC EQUIVALENT    120 capsule    TAKE TWO CAPSULES BY MOUTH EVERY 12 HOURS    Liver replaced by transplant (H)       omeprazole 20 MG CR capsule    priLOSEC    180 capsule    Take 1 capsule (20 mg) by mouth 2 times daily    Gastroesophageal reflux disease, esophagitis presence not specified       * order for DME     1 Units    Equipment being ordered: Nebulizer with adult mask and tubing    Acute bronchitis, unspecified organism       * order for DME     1 Units    Equipment being ordered: cane with padded handle    Gait instability       * order for DME     1 Units    Equipment being ordered: cane    Osteoporosis, Falls frequently       * order for DME     2 Units    Equipment being ordered: compression stockings bilateral Please measure and fit patient for stockings  Strength 15-30mmHg Disp 2 pairs 1 refill over the time of 1 year    Localized edema       * order for DME     1 Units    2-wheeled walker (dx: gait instability and Frequent falls).    Gait instability, Falls frequently       * order for DME     1 Units    Equipment being ordered: 4 Wheeled Walker with Seat and Brakes    Falls frequently, Gait instability       * order for DME     2 each    Equipment being ordered: Compression stockings below knee bilateral    Bilateral edema of lower extremity       * order for DME     1 each    Equipment being ordered: Back Stabilizer    Chronic midline low back pain without sciatica       * order for DME     2 each    Equipment being ordered: 2 pairs of black stabilizer socks (beige okay if black not available). Size XL.    Bilateral edema of lower extremity       prednisoLONE acetate 1 % ophthalmic susp    PRED FORTE    1 Bottle    Use in operative eye four times a day  For 4 days    Posterior capsular opacification visually significant of both eyes       * Psyllium 500 MG Caps     180 capsule    Take 3 capsules by mouth 2 times daily    Constipation,  chronic       * REGULOID 0.52 G capsule   Generic drug:  psyllium     540 capsule    Take 3 capsules (1.56 g) by mouth 2 times daily    Constipation, unspecified constipation type       senna-docusate 8.6-50 MG per tablet    SENOKOT-S;PERICOLACE    120 tablet    Take 2 tablets by mouth 2 times daily For constipation    Constipation, chronic       simethicone 80 MG chewable tablet    MYLICON    120 tablet    Take 1 tablet (80 mg) by mouth every 6 hours as needed for flatulence or cramping    Flatulence, eructation, and gas pain       sodium chloride 0.65 % nasal spray    OCEAN    30 mL    Spray 1 spray into both nostrils daily as needed for congestion    Throat pain       sodium phosphate 7-19 GM/118ML rectal enema     3 Bottle    Place 1 Bottle (1 enema) rectally daily as needed for constipation    Ileus (H)       STATIN NOT PRESCRIBED (INTENTIONAL)     0 each    Not prescribed    High risk medication use       * Notice:  This list has 17 medication(s) that are the same as other medications prescribed for you. Read the directions carefully, and ask your doctor or other care provider to review them with you.

## 2017-10-18 ENCOUNTER — OFFICE VISIT (OUTPATIENT)
Dept: FAMILY MEDICINE | Facility: CLINIC | Age: 52
End: 2017-10-18

## 2017-10-18 VITALS
WEIGHT: 204.8 LBS | OXYGEN SATURATION: 94 % | HEART RATE: 88 BPM | BODY MASS INDEX: 38.7 KG/M2 | TEMPERATURE: 97.8 F | RESPIRATION RATE: 16 BRPM

## 2017-10-18 DIAGNOSIS — N39.0 COMPLICATED UTI (URINARY TRACT INFECTION): Primary | ICD-10-CM

## 2017-10-18 RX ORDER — CEPHALEXIN 500 MG/1
500 CAPSULE ORAL 3 TIMES DAILY
Qty: 21 CAPSULE | Refills: 0 | Status: SHIPPED | OUTPATIENT
Start: 2017-10-18 | End: 2018-05-18

## 2017-10-18 ASSESSMENT — ENCOUNTER SYMPTOMS
DYSURIA: 0
SHORTNESS OF BREATH: 0
VOMITING: 0
ABDOMINAL PAIN: 0
MYALGIAS: 0
FREQUENCY: 1
DIARRHEA: 0
SORE THROAT: 0
COUGH: 0
NAUSEA: 0
HEADACHES: 0
LIGHT-HEADEDNESS: 0
CONSTIPATION: 0
ARTHRALGIAS: 0
RHINORRHEA: 0
FEVER: 1
CHILLS: 1

## 2017-10-18 NOTE — PATIENT INSTRUCTIONS
Here is the plan from today's visit    1. Complicated UTI (urinary tract infection)  Gaby castillo 2 days ago with GBS UTI.  She is improving clinically but boarderline low BP with some mild dizziness.  Given her immunosuppressed state this is a complicated UTI.  Will change from levofloxacin to keflex for better coverage for group B strep.  She has penicillin allergy(rash).  If any allergy symtpooms, stop new antibiotic and resume levofloxacin and call clinic.  If worsening fever, chills, weakness, urinary symptoms, go to urgent care or ED or come to clinic.  Otherwise, follow up on Monday.  Patient says she is unable to follow up on Friday (2 days)    - cephALEXin (KEFLEX) 500 MG capsule; Take 1 capsule (500 mg) by mouth 3 times daily  Dispense: 21 capsule; Refill: 0      Please call or return to clinic if your symptoms don't go away.    Follow up plan  Please make a clinic appointment for follow up with your primary physician Link, Karely in 2-5 days  Thank you for coming to [facility] today.  Lab Testing:  **If you had lab testing today and your results are reassuring or normal they will be mailed to you or sent through [facility] within 7 days.   **If the lab tests need quick action we will call you with the results.  The phone number we will call with results is # [phone] (home) . If this is not the best number please call our clinic and change the number.  Medication Refills:  If you need any refills please call your pharmacy and they will contact us.   If you need to  your refill at a new pharmacy, please contact the new pharmacy directly. The new pharmacy will help you get your medications transferred faster.   Scheduling:  If you have any concerns about today's visit or wish to schedule another appointment please call our office during normal business hours [phone] (8-5:00 M-F)  If a referral was made to a [facility] and you don't get a call from central scheduling  please call [phone].  If a Mammogram was ordered for you at [facility] call [phone] to schedule or change your appointment.  If you had an XRay/CT/Ultrasound/MRI ordered the number is [phone] to schedule or change your radiology appointment.   Medical Concerns:  If you have urgent medical concerns please call [phone] at any time of the day.

## 2017-10-18 NOTE — MR AVS SNAPSHOT
After Visit Summary   10/18/2017    Flor Navarro    MRN: [id number]           Patient Information     Date Of Birth          1965        Visit Information        Provider Department      10/18/2017 1:20 PM Sylvester Bustamante MD [facility]        Today's Diagnoses     Complicated UTI (urinary tract infection)    -  1      Care Instructions    Here is the plan from today's visit    1. Complicated UTI (urinary tract infection)  Gaby castillo 2 days ago with GBS UTI.  She is improving clinically but boarderline low BP with some mild dizziness.  Given her immunosuppressed state this is a complicated UTI.  Will change from levofloxacin to keflex for better coverage for group B strep.  She has penicillin allergy(rash).  If any allergy symtpooms, stop new antibiotic and resume levofloxacin and call clinic.  If worsening fever, chills, weakness, urinary symptoms, go to urgent care or ED or come to clinic.  Otherwise, follow up on Monday.  Patient says she is unable to follow up on Friday (2 days)    - cephALEXin (KEFLEX) 500 MG capsule; Take 1 capsule (500 mg) by mouth 3 times daily  Dispense: 21 capsule; Refill: 0      Please call or return to clinic if your symptoms don't go away.    Follow up plan  Please make a clinic appointment for follow up with your primary physician Link, Karely in 2-5 days  Thank you for coming to [facility] today.  Lab Testing:  **If you had lab testing today and your results are reassuring or normal they will be mailed to you or sent through [employer] within 7 days.   **If the lab tests need quick action we will call you with the results.  The phone number we will call with results is # [phone] (home) . If this is not the best number please call our clinic and change the number.  Medication Refills:  If you need any refills please call your pharmacy and they will contact us.   If you need to  your refill at a new pharmacy, please  contact the new pharmacy directly. The new pharmacy will help you get your medications transferred faster.   Scheduling:  If you have any concerns about today's visit or wish to schedule another appointment please call our office during normal business hours [phone] (8-5:00 M-F)  If a referral was made to a [facility] and you don't get a call from central scheduling please call [phone].  If a Mammogram was ordered for you at [facility] call [phone] to schedule or change your appointment.  If you had an XRay/CT/Ultrasound/MRI ordered the number is [phone] to schedule or change your radiology appointment.   Medical Concerns:  If you have urgent medical concerns please call [phone] at any time of the day.            Follow-ups after your visit        Follow-up notes from your care team     Return in about 5 days (around 10/23/2017).      Your next 10 appointments already scheduled     Oct 24, 2017 11:10 AM CDT   [place] Extremity with Raya Peña PT   [facility] ([facility])    [address] [facility] [address]   [phone]            Oct 31, 2017 11:10 AM CDT   [place] Extremity with Raya Peña PT   [facility]    [address] [facility] [address]   [phone]            Nov 02, 2017  8:30 AM CDT   Lab with  LAB    [facility] ([facility])    [address] [place] [address] [phone]            Nov 02, 2017  9:20 AM CDT   (Arrive by 9:05 AM)   Return Liver Transplant with Aaron Harkins MD   [facility] ([facility])    [address] [place] [address] [phone]              Who to contact     Please call your clinic at [phone] to:    Ask questions about your health    Make or cancel  appointments    Discuss your medicines    Learn about your test results    Speak to your doctor   If you have compliments or concerns about an experience at your clinic, or if you wish to file a complaint, please contact [facility] Patient Relations at [phone] or email us at [email or web address]         Additional Information About Your Visit        [employer] Information     [employer] is an electronic gateway that provides easy, online access to your medical records. With Plisten, you can request a clinic appointment, read your test results, renew a prescription or communicate with your care team.     To sign up for [employer] visit the website at [email or web address]   You will be asked to enter the access code listed below, as well as some personal information. Please follow the directions to create your username and password.     Your access code is: [email or web address]  Expires: 12/3/2017  6:30 AM     Your access code will  in 90 days. If you need help or a new code, please contact your [facility] or call [phone] for assistance.        Care EveryWhere ID     This is your Care EveryWhere ID. This could be used by other organizations to access your [facility] medical records  [id number]        Your Vitals Were     Pulse Temperature Respirations Pulse Oximetry BMI (Body Mass Index)       88 97.8  F (36.6  C) (Oral) 16 94% 38.7 kg/m2        Blood Pressure from Last 3 Encounters:   10/16/17 115/73   10/03/17 107/73   17 123/82    Weight from Last 3 Encounters:   10/18/17 204 lb 12.8 oz (92.9 kg)   10/03/17 209 lb 9.6 oz (95.1 kg)   17 209 lb 3.2 oz (94.9 kg)              Today, you had the following     No orders found for display       Primary Care Provider Office Phone # Fax #    Karely Sierra [phone]        [address] [address]        Equal Access to Services     MAURO WOLF: Rayne bernabe  mariia Adair, wadanielda luqadaha, qaybta kaalivett jin, mart valeriein hayaan jodypetrona gómez laantoniojudit ayana. So [facility] [phone].    ATENCIÓN: Si patriala sha, tiene a garcia disposición servicios gratuitos de asistencia lingüística. Abdirahman al [phone].    We comply with applicable federal civil rights laws and [place] laws. We do not discriminate on the basis of race, color, national origin, age, disability, sex, sexual orientation, or gender identity.            Thank you!     Thank you for choosing [facility]  for your care. Our goal is always to provide you with excellent care. Hearing back from our patients is one way we can continue to improve our services. Please take a few minutes to complete the written survey that you may receive in the mail after your visit with us. Thank you!             Your Updated Medication List - Protect others around you: Learn how to safely use, store and throw away your medicines at www.disposemymeds.org.          This list is accurate as of: 10/18/17  2:36 PM.  Always use your most recent med list.                   Brand Name Dispense Instructions for use Diagnosis    * acetaminophen 325 MG tablet    TYLENOL    100 tablet    Take 1-2 tablets (325-650 mg) by mouth every 6 hours as needed Max 6 tablets per 24 hours    Sebaceous cyst       * acetaminophen 325 MG tablet    TYLENOL    100 tablet    Take 1-2 tablets (325-650 mg) by mouth every 4 hours as needed for mild pain    Fever and chills       albuterol 108 (90 BASE) MCG/ACT Inhaler    PROAIR HFA/PROVENTIL HFA/VENTOLIN HFA    1 Inhaler    Inhale 2 puffs into the lungs 4 times daily    Acute bronchitis, unspecified organism       alendronate 70 MG tablet    FOSAMAX    12 tablet    Take 1 tablet (70 mg) by mouth every 7 days Take at least 60 min before breakfast with over 8 oz water and stay upright for at least 30 min. after dose.    Osteoporosis       bisacodyl 5 MG EC tablet    DULCOLAX    30 tablet     Take 1 tablet (5 mg) by mouth daily as needed for constipation    Constipation, chronic       calcium-vitamin D 600-400 MG-UNIT per tablet    calcium 600 + D    60 tablet    Take 1 tablet by mouth 2 times daily    Osteoporosis       carboxymethylcellul-glycerin 0.5-0.9 % Soln ophthalmic solution    OPTIVE/REFRESH OPTIVE    1 each    Place 1 drop into both eyes 4 times daily    Dry eye syndrome of bilateral lacrimal glands       carboxymethylcellulose 1 % ophthalmic solution    CELLUVISC/REFRESH LIQUIGEL    15 each    Place 1 drop into both eyes 4 times daily    Dry eyes, bilateral       cholecalciferol 1000 UNITS capsule    vitamin  -D    180 capsule    Take 2 capsules (2,000 Units) by mouth daily    Osteoporosis, Liver replaced by transplant (H), Vitamin D deficiency, Secondary hyperparathyroidism (H), CKD (chronic kidney disease) stage 3, GFR 30-59 ml/min, Palpitations       Colloidal Oatmeal 1 % Crea     226 g    Externally apply topically as needed    Dry skin       * cycloSPORINE modified 25 MG capsule     240 capsule    Take 4 capsules (100 mg) by mouth every 12 hours    Liver replaced by transplant (H)       * cycloSPORINE modified 25 MG capsule    GENERIC EQUIVALENT    240 capsule    Take 4 capsules (100 mg) by mouth every 12 hours    Liver replaced by transplant (H)       levofloxacin 750 MG tablet    LEVAQUIN    7 tablet    Take 1 tablet (750 mg) by mouth daily for 7 days    Dysuria, SOB (shortness of breath)       lidocaine 5 % Patch    LIDODERM    30 patch    Apply up to 3 patches to painful area at once for up to 12 h within a 24 h period.  Remove after 12 hours.    Liver replaced by transplant (H)       * Medical Compression Socks Misc     2 each    1 Units daily    Leg swelling       * Knee Brace/Flex Stays Large Misc     2 each    1 Units daily    Chronic pain of both knees       melatonin 3 MG tablet     100 tablet    Take 1 tablet (3 mg) by mouth nightly as needed for sleep    Primary insomnia        multivitamin, therapeutic with minerals Tabs tablet     100 tablet    Take 1 tablet by mouth daily    Liver replaced by transplant (H)       mycophenolate 250 MG capsule    GENERIC EQUIVALENT    120 capsule    TAKE TWO CAPSULES BY MOUTH EVERY 12 HOURS    Liver replaced by transplant (H)       omeprazole 20 MG CR capsule    priLOSEC    180 capsule    Take 1 capsule (20 mg) by mouth 2 times daily    Gastroesophageal reflux disease, esophagitis presence not specified       * order for DME     1 Units    Equipment being ordered: Nebulizer with adult mask and tubing    Acute bronchitis, unspecified organism       * order for DME     1 Units    Equipment being ordered: cane with padded handle    Gait instability       * order for DME     1 Units    Equipment being ordered: cane    Osteoporosis, Falls frequently       * order for DME     2 Units    Equipment being ordered: compression stockings bilateral Please measure and fit patient for stockings  Strength 15-30mmHg Disp 2 pairs 1 refill over the time of 1 year    Localized edema       * order for DME     1 Units    2-wheeled walker (dx: gait instability and Frequent falls).    Gait instability, Falls frequently       * order for DME     1 Units    Equipment being ordered: 4 Wheeled Walker with Seat and Brakes    Falls frequently, Gait instability       * order for DME     2 each    Equipment being ordered: Compression stockings below knee bilateral    Bilateral edema of lower extremity       * order for DME     1 each    Equipment being ordered: Back Stabilizer    Chronic midline low back pain without sciatica       * order for DME     2 each    Equipment being ordered: 2 pairs of black stabilizer socks (beige okay if black not available). Size XL.    Bilateral edema of lower extremity       prednisoLONE acetate 1 % ophthalmic susp    PRED FORTE    1 Bottle    Use in operative eye four times a day  For 4 days    Posterior capsular opacification visually significant  of both eyes       * Psyllium 500 MG Caps     180 capsule    Take 3 capsules by mouth 2 times daily    Constipation, chronic       * REGULOID 0.52 G capsule   Generic drug:  psyllium     540 capsule    Take 3 capsules (1.56 g) by mouth 2 times daily    Constipation, unspecified constipation type       senna-docusate 8.6-50 MG per tablet    SENOKOT-S;PERICOLACE    120 tablet    Take 2 tablets by mouth 2 times daily For constipation    Constipation, chronic       simethicone 80 MG chewable tablet    MYLICON    120 tablet    Take 1 tablet (80 mg) by mouth every 6 hours as needed for flatulence or cramping    Flatulence, eructation, and gas pain       sodium chloride 0.65 % nasal spray    OCEAN    30 mL    Spray 1 spray into both nostrils daily as needed for congestion    Throat pain       sodium phosphate 7-19 GM/118ML rectal enema     3 Bottle    Place 1 Bottle (1 enema) rectally daily as needed for constipation    Ileus (H)       STATIN NOT PRESCRIBED (INTENTIONAL)     0 each    Not prescribed    High risk medication use       * Notice:  This list has 17 medication(s) that are the same as other medications prescribed for you. Read the directions carefully, and ask your doctor or other care provider to review them with you.

## 2017-10-18 NOTE — PROGRESS NOTES
Preceptor Attestation:   Patient seen and discussed with the resident. Assessment and plan reviewed with resident and agreed upon.   Supervising Physician:  Nura Spicer MD  [facility]

## 2017-10-18 NOTE — PROGRESS NOTES
HPI:       Flor Navarro is a 52 year old who presents for the following  Patient presents with:  Medication Refill: Traveling for 3 monthes needs med refill.   RECHECK: last DR visit       Patient is following up for urinary tract infection.  She was seen 2 days ago and at that time was having high fevers, shaking chills, urinary frequency, and some shortness of breath.  Since staring  The levofloxacin 750mg daily she is feeling much better.  She had some mild subjective fevers and mild chills the first night then she has been feeling better.  Urinary symptoms have resolved.  No shortness of breath.  No cough.  She intermittently has dizziness and feels like she might pass out, only occurs when she is having subjective fevers.      Adherence and Exercise  Medication side effects: no  How often is a medication missed? Never    An  was used for  this visit.      Problem, Medication and Allergy Lists were reviewed and are current.  Patient is an established patient of this clinic.         Review of Systems:   Review of Systems   Constitutional: Positive for chills (improving) and fever (improving, none last 24 hours).   HENT: Negative for rhinorrhea and sore throat.    Respiratory: Negative for cough and shortness of breath.    Cardiovascular: Negative for chest pain.   Gastrointestinal: Negative for abdominal pain, constipation, diarrhea, nausea and vomiting.   Genitourinary: Positive for frequency (resolved x 24 hours). Negative for dysuria.   Musculoskeletal: Negative for arthralgias and myalgias.   Neurological: Negative for light-headedness and headaches.             Physical Exam:   Patient Vitals for the past 24 hrs:   Temp Temp src Pulse Resp SpO2 Weight   10/18/17 1324 97.8  F (36.6  C) Oral 88 16 94 % 204 lb 12.8 oz (92.9 kg)     Body mass index is 38.7 kg/(m^2).  Vital signs normal except low SBP of 87     Physical Exam   Constitutional: She is oriented to person, place, and time. She  appears well-developed and well-nourished. No distress.   HENT:   Head: Normocephalic and atraumatic.   Neck: Neck supple.   Cardiovascular: Normal rate, regular rhythm and normal heart sounds.    No murmur heard.  Pulmonary/Chest: Effort normal and breath sounds normal. No respiratory distress. She has no wheezes. She has no rales.   Abdominal: Soft. She exhibits no distension. There is no tenderness. There is no rebound and no guarding.   Musculoskeletal: She exhibits no edema.   Mild diffuse musculoskeletal tenderness including over CVA R>L, not overt CVA tenderness   Neurological: She is alert and oriented to person, place, and time.   Skin: Skin is warm and dry.   Psychiatric: She has a normal mood and affect.         Results:     Results from last visit:  Office Visit on 10/16/2017   Component Date Value Ref Range Status     Urea Nitrogen 10/16/2017 20.6* 7.0 - 19.0 mg/dL Final     Calcium 10/16/2017 9.8  8.5 - 10.1 mg/dL Final     Chloride 10/16/2017 98.0  98.0 - 110.0 mmol/L Final     Carbon Dioxide 10/16/2017 24.6  20.0 - 32.0 mmol/L Final     Creatinine 10/16/2017 1.4* 0.5 - 1.0 mg/dL Final     Glucose 10/16/2017 119.1* 70.0 - 99.0 mg'dL Final     Potassium 10/16/2017 4.9* 3.3 - 4.5 mmol/dL Final     Sodium 10/16/2017 131.9* 132.6 - 141.4 mmol/L Final     GFR Estimate 10/16/2017 41.8* >60.0 mL/min/1.7 m2 Final     GFR Estimate If Black 10/16/2017 50.6* >60.0 mL/min/1.7 m2 Final     WBC 10/16/2017 8.4  4.0 - 11.0 K/uL Final     Lymphocytes # 10/16/2017 1.2  0.8 - 5.3 K/uL Final     % Lymphocytes 10/16/2017 14.3* 20.0 - 48.0 %L Final     Mid # 10/16/2017 0.3  0.0 - 2.2 K/uL Final     Mid % 10/16/2017 3.7  0.0 - 20.0 %M Final     GRANULOCYTES # 10/16/2017 6.9  1.6 - 8.3 K/uL Final     % Granulocytes 10/16/2017 82.0* 40.0 - 75.0 %G Final     RBC 10/16/2017 4.52  3.80 - 5.20 M/uL Final     Hemoglobin 10/16/2017 13.0  11.7 - 15.7 g/dL Final     Hematocrit 10/16/2017 43.6  35.0 - 47.0 % Final     MCV 10/16/2017  96.4  78.0 - 100.0 fL Final     MCH 10/16/2017 28.8  26.5 - 35.0 pg Final     MCHC 10/16/2017 29.8* 32.0 - 36.0 g/dL Final     Platelets 10/16/2017 116.0* 150.0 - 450.0 K/uL Final     Specific Gravity Urine 10/16/2017 1.020  1.005 - 1.030 Final     pH Urine 10/16/2017 6.0  4.5 - 8.0 Final     Leukocyte Esterase UR 10/16/2017 Trace* NEGATIVE Final     Nitrite Urine 10/16/2017 Negative  NEGATIVE Final     Protein UR 10/16/2017 2+* NEGATIVE Final     Glucose Urine 10/16/2017 Negative  NEGATIVE Final     Ketones Urine 10/16/2017 Negative  NEGATIVE Final     Urobilinogen mg/dL 10/16/2017 1.0 E.U./dL  0.2 E.U./dL Final     Bilirubin UR 10/16/2017 1+* NEGATIVE Final     Blood UR 10/16/2017 Trace-intact* NEGATIVE Final     Specimen Description 10/16/2017 Midstream Urine   Final     Special Requests 10/16/2017 Specimen received in preservative   Final     Culture Micro 10/16/2017 *  Final                    Value:>100,000 colonies/mL  Beta hemolytic Streptococcus group B  Beta Hemolytic Streptococcus groups A and B are susceptible to ampicillin, penicillin,   vancomycin, and the cephalosporins.  Susceptibility testing is not routinely done on these   organisms isolated from urine.         Assessment and Plan     1. Complicated UTI (urinary tract infection)  Gaby castillo 2 days ago with GBS UTI.  She is improving clinically but boarderline low BP with some mild dizziness.  Given her immunosuppressed state this is a complicated UTI.  Will change from levofloxacin to keflex for better coverage for group B strep.  She has penicillin allergy(rash).  If any allergy symtpoms, stop new antibiotic and resume levofloxacin and call clinic.  If worsening fever, chills, weakness, urinary symptoms, go to urgent care or ED or come to clinic.  Otherwise, follow up on Monday.  Patient says she is unable to follow up on Friday (2 days) which would be preferred.  - cephALEXin (KEFLEX) 500 MG capsule; Take 1 capsule (500 mg) by mouth 3 times  daily  Dispense: 21 capsule; Refill: 0      There are no discontinued medications.  Options for treatment and follow-up care were reviewed with the patient. Flor Navarro  engaged in the decision making process and verbalized understanding of the options discussed and agreed with the final plan.    Sylvester Bustamante MD

## 2017-10-26 DIAGNOSIS — R14.2 FLATULENCE, ERUCTATION, AND GAS PAIN: ICD-10-CM

## 2017-10-26 DIAGNOSIS — R14.3 FLATULENCE, ERUCTATION, AND GAS PAIN: ICD-10-CM

## 2017-10-26 DIAGNOSIS — Z94.4 LIVER REPLACED BY TRANSPLANT (H): ICD-10-CM

## 2017-10-26 DIAGNOSIS — R50.9 FEVER AND CHILLS: ICD-10-CM

## 2017-10-26 DIAGNOSIS — K21.9 GASTROESOPHAGEAL REFLUX DISEASE, ESOPHAGITIS PRESENCE NOT SPECIFIED: ICD-10-CM

## 2017-10-26 DIAGNOSIS — F51.01 PRIMARY INSOMNIA: ICD-10-CM

## 2017-10-26 DIAGNOSIS — R14.1 FLATULENCE, ERUCTATION, AND GAS PAIN: ICD-10-CM

## 2017-10-26 RX ORDER — MYCOPHENOLATE MOFETIL 250 MG/1
500 CAPSULE ORAL EVERY 12 HOURS
Qty: 120 CAPSULE | Refills: 11 | Status: SHIPPED | OUTPATIENT
Start: 2017-10-26 | End: 2018-05-18

## 2017-10-26 RX ORDER — MYCOPHENOLATE MOFETIL 250 MG/1
CAPSULE ORAL
Qty: 120 CAPSULE | Refills: 4 | Status: CANCELLED | OUTPATIENT
Start: 2017-10-26

## 2017-10-26 NOTE — TELEPHONE ENCOUNTER
Drug Name: mycophenolate 250mg  Last Fill Date: 9/21/17  Quantity: 120    Emma Martinez   [facility]  [phone]

## 2017-10-27 ENCOUNTER — OFFICE VISIT (OUTPATIENT)
Dept: FAMILY MEDICINE | Facility: CLINIC | Age: 52
End: 2017-10-27

## 2017-10-27 VITALS
SYSTOLIC BLOOD PRESSURE: 135 MMHG | WEIGHT: 211.4 LBS | TEMPERATURE: 97.8 F | HEART RATE: 65 BPM | OXYGEN SATURATION: 98 % | BODY MASS INDEX: 39.94 KG/M2 | RESPIRATION RATE: 16 BRPM | DIASTOLIC BLOOD PRESSURE: 85 MMHG

## 2017-10-27 DIAGNOSIS — M25.562 CHRONIC PAIN OF LEFT KNEE: ICD-10-CM

## 2017-10-27 DIAGNOSIS — R11.0 NAUSEA: ICD-10-CM

## 2017-10-27 DIAGNOSIS — I12.9 HYPERTENSION, RENAL: ICD-10-CM

## 2017-10-27 DIAGNOSIS — N39.0 COMPLICATED UTI (URINARY TRACT INFECTION): ICD-10-CM

## 2017-10-27 DIAGNOSIS — G89.29 CHRONIC PAIN OF RIGHT KNEE: Primary | ICD-10-CM

## 2017-10-27 DIAGNOSIS — G89.29 CHRONIC PAIN OF LEFT KNEE: ICD-10-CM

## 2017-10-27 DIAGNOSIS — M25.561 CHRONIC PAIN OF RIGHT KNEE: Primary | ICD-10-CM

## 2017-10-27 RX ORDER — ACETAMINOPHEN 325 MG/1
325-650 TABLET ORAL EVERY 4 HOURS PRN
Qty: 100 TABLET | Refills: 0 | Status: SHIPPED | OUTPATIENT
Start: 2017-10-27 | End: 2017-11-02

## 2017-10-27 RX ORDER — FLAVORING AGENT
OIL (ML) MISCELLANEOUS
Qty: 1 ML | Refills: 0 | Status: SHIPPED | OUTPATIENT
Start: 2017-10-27 | End: 2018-08-15

## 2017-10-27 RX ORDER — LIDOCAINE 50 MG/G
PATCH TOPICAL
Qty: 30 PATCH | Refills: 0 | Status: SHIPPED | OUTPATIENT
Start: 2017-10-27 | End: 2017-12-28

## 2017-10-27 RX ORDER — CEPHALEXIN 500 MG/1
500 CAPSULE ORAL 3 TIMES DAILY
Qty: 21 CAPSULE | OUTPATIENT
Start: 2017-10-27

## 2017-10-27 RX ORDER — SIMETHICONE 80 MG
80 TABLET,CHEWABLE ORAL EVERY 6 HOURS PRN
Qty: 120 TABLET | Refills: 0 | Status: SHIPPED | OUTPATIENT
Start: 2017-10-27 | End: 2017-12-28

## 2017-10-27 RX ORDER — LANOLIN ALCOHOL/MO/W.PET/CERES
3 CREAM (GRAM) TOPICAL
Qty: 100 TABLET | Refills: 3 | Status: SHIPPED | OUTPATIENT
Start: 2017-10-27 | End: 2018-05-18

## 2017-10-27 NOTE — PROGRESS NOTES
HPI:       Flor Navarro is a 52 year old who presents for the following  Patient presents with:  Refill Request: Would like Medication refill christopher [place]. BP meds,   Medication Request: Antibiotics, Iron,. Pt is requesting incase she gets sick over seas. BP machine and knee support     Flor will be leaving on 11/9/2017 to leave for [place] for about 6-8 months. She is here to discuss needed refills and supplies prior to leaving. She would like to have a script for Keflex, in case she gets another UTI overseas.     She has brought her hinged knee braces with her (x2) as she would like to exchange them for slip on neoprene knee braces.     She would also like to have a letter from MD to state she can have all her medications with her, that when she returns to the [place] that her brother would have to come visit her as this will likely be her last trip to [place] due to her health. She would like to have at least 2 copies of each letter as well.     A Vietnamese  was used for  this visit.        Problem, Medication and Allergy Lists were   reviewed and are current.     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        Mixed hyperlipidemia 03/03/2017     Priority: Medium     Secondary hyperparathyroidism (H) 07/25/2016     Priority: Medium     Abdominal pain 04/05/2016     Priority: Medium     Constipation, chronic 04/05/2016     Priority: Medium     Urinary tract infection due to extended-spectrum beta lactamase (ESBL)-producing Klebsiella 12/23/2015     Priority: Medium     Pseudophakia - Left Eye 02/05/2015     Priority: Medium      Shoulder joint pain 11/06/2014     Priority: Medium     Vitamin D deficiency 09/17/2014     Priority: Medium     Problem list name updated by automated process. Provider to review       Advanced directives, counseling/discussion 10/30/2013     Priority: Medium     POLST on file October 30, 2013  Full code       Ganglion cyst 10/02/2013     Priority: Medium     Immunosuppressed status (H)      Priority: Medium     Cystitis cystica 07/03/2013     Priority: Medium     Stroke, hemorrhagic (H)      Priority: Medium     STOP aspirin. She has a hx of hemorrhagic stroke, and her 10 yr risk of MI is only 3% and wouldn't rec treating w/ ASA unless >7.5%.        Osteoporosis      Priority: Medium     Problem list name updated by automated process. Provider to review and confirm  [employer] Update utility       Overweight 04/09/2013     Priority: Medium     Problem list name updated by automated process. Provider to review       CKD (chronic kidney disease) stage 3, GFR 30-59 ml/min      Priority: Medium     Cr. 1.3-1.6. Sees Dr. Wells. Next visit Aug 2015.       Hypertension, renal      Priority: Medium     Liver replaced by transplant      Priority: Medium     2011, due to Hep C, follows w/ Dr. Anne q6mos  MM should be taken on an empty stomach, avoid antacids.   Cyclosporine goal levels ~100ng/dL per transplant surgeon Dr. Fortune Dec 2015       High risk medication use      Priority: Medium     Anemia in CKD (chronic kidney disease)      Priority: Medium     Hyperlipidemia LDL goal <160 04/28/2011     Priority: Medium     PVD (POSTERIOR VITREOUS DETACHMENT) OS 02/28/2011     Priority: Medium   ,     Current Outpatient Prescriptions   Medication Sig Dispense Refill     melatonin 3 MG tablet Take 1 tablet (3 mg) by mouth nightly as needed for sleep 100 tablet 3     simethicone (MYLICON) 80 MG chewable tablet Take 1 tablet (80 mg) by mouth every 6 hours as needed for flatulence or cramping 120 tablet 0     omeprazole (PRILOSEC)  20 MG CR capsule Take 1 capsule (20 mg) by mouth 2 times daily 180 capsule 3     lidocaine (LIDODERM) 5 % Patch Apply up to 3 patches to painful area at once for up to 12 h within a 24 h period.  Remove after 12 hours. 30 patch 0     acetaminophen (TYLENOL) 325 MG tablet Take 1-2 tablets (325-650 mg) by mouth every 4 hours as needed for mild pain 100 tablet 0     mycophenolate (GENERIC EQUIVALENT) 250 MG capsule Take 2 capsules (500 mg) by mouth every 12 hours 120 capsule 11     cephALEXin (KEFLEX) 500 MG capsule Take 1 capsule (500 mg) by mouth 3 times daily 21 capsule 0     REGULOID 0.52 G capsule Take 3 capsules (1.56 g) by mouth 2 times daily 540 capsule 3     Colloidal Oatmeal 1 % CREA Externally apply topically as needed 226 g 1     cycloSPORINE modified (GENERIC EQUIVALENT) 25 MG capsule Take 4 capsules (100 mg) by mouth every 12 hours 240 capsule 11     Elastic Bandages & Supports (MEDICAL COMPRESSION SOCKS) MISC 1 Units daily 2 each 0     Elastic Bandages & Supports (KNEE BRACE/FLEX STAYS LARGE) MISC 1 Units daily 2 each 0     multivitamin, therapeutic with minerals (MULTI-VITAMIN) TABS tablet Take 1 tablet by mouth daily 100 tablet 11     acetaminophen (TYLENOL) 325 MG tablet Take 1-2 tablets (325-650 mg) by mouth every 6 hours as needed Max 6 tablets per 24 hours 100 tablet 3     carboxymethylcellulose (CELLUVISC/REFRESH LIQUIGEL) 1 % ophthalmic solution Place 1 drop into both eyes 4 times daily 15 each 11     prednisoLONE acetate (PRED FORTE) 1 % ophthalmic susp Use in operative eye four times a day  For 4 days 1 Bottle 0     calcium-vitamin D (CALCIUM 600 + D) 600-400 MG-UNIT per tablet Take 1 tablet by mouth 2 times daily 60 tablet 11     cholecalciferol (VITAMIN  -D) 1000 UNITS capsule Take 2 capsules (2,000 Units) by mouth daily 180 capsule 3     senna-docusate (SENOKOT-S;PERICOLACE) 8.6-50 MG per tablet Take 2 tablets by mouth 2 times daily For constipation 120 tablet 6     alendronate (FOSAMAX) 70  MG tablet Take 1 tablet (70 mg) by mouth every 7 days Take at least 60 min before breakfast with over 8 oz water and stay upright for at least 30 min. after dose. 12 tablet 3     sodium chloride (OCEAN) 0.65 % nasal spray Spray 1 spray into both nostrils daily as needed for congestion 30 mL 1     order for DME Equipment being ordered: 2 pairs of black stabilizer socks (beige okay if black not available). Size XL. 2 each 0     order for DME Equipment being ordered: Compression stockings below knee bilateral 2 each 0     order for DME Equipment being ordered: Back Stabilizer 1 each 0     order for DME Equipment being ordered: 4 Wheeled Walker with Seat and Brakes 1 Units 0     order for DME 2-wheeled walker (dx: gait instability and Frequent falls). 1 Units 0     albuterol (PROAIR HFA, PROVENTIL HFA, VENTOLIN HFA) 108 (90 BASE) MCG/ACT inhaler Inhale 2 puffs into the lungs 4 times daily 1 Inhaler 1     order for DME Equipment being ordered: compression stockings bilateral  Please measure and fit patient for stockings   Strength 15-30mmHg  Disp 2 pairs  1 refill over the time of 1 year 2 Units 1     order for DME Equipment being ordered: cane 1 Units 0     bisacodyl (DULCOLAX) 5 MG EC tablet Take 1 tablet (5 mg) by mouth daily as needed for constipation 30 tablet 11     carboxymethylcellul-glycerin (OPTIVE/REFRESH OPTIVE) 0.5-0.9 % SOLN ophthalmic solution Place 1 drop into both eyes 4 times daily 1 each 11     Psyllium 500 MG CAPS Take 3 capsules by mouth 2 times daily 180 capsule 11     sodium phosphate (FLEET ENEMA) 7-19 GM/118ML rectal enema Place 1 Bottle (1 enema) rectally daily as needed for constipation 3 Bottle 11     GENGRAF 25 MG PO CAPSULE Take 4 capsules (100 mg) by mouth every 12 hours 240 capsule 0     STATIN NOT PRESCRIBED, INTENTIONAL, Not prescribed 0 each 0     order for DME Equipment being ordered: Nebulizer with adult mask and tubing 1 Units 0     order for DME Equipment being ordered: cane with  padded handle 1 Units 0     [DISCONTINUED] solifenacin (VESICARE) 5 MG tablet Take 1 tablet (5 mg) by mouth daily 30 tablet 12   ,     Allergies   Allergen Reactions     Aspirin      3/31/16 Per pt, tolerates 81 mg daily dose without ADR.     325 mg dose caused itchiness and hives.     Clarithromycin      Allergic reaction         Contrast Dye      Penicillins      Patient is an established patient of this clinic.         Review of Systems:   Review of Systems   Constitutional: Negative for appetite change, chills, fatigue and fever.   HENT: Negative for congestion, sinus pressure and sore throat.    Eyes: Negative for visual disturbance.   Respiratory: Negative for cough, shortness of breath and wheezing.    Cardiovascular: Negative for chest pain and leg swelling.   Gastrointestinal: Negative for abdominal distention, abdominal pain, blood in stool, constipation, diarrhea, nausea and vomiting.   Genitourinary: Negative for dysuria and hematuria.   Musculoskeletal: Positive for arthralgias (bilateral knee pain). Negative for myalgias.   Skin: Negative for color change and rash.   Neurological: Negative for weakness and headaches.             Physical Exam:   Patient Vitals for the past 24 hrs:   BP Temp Temp src Pulse Resp SpO2 Weight   10/27/17 1543 135/85 97.8  F (36.6  C) Oral 65 16 98 % 211 lb 6.4 oz (95.9 kg)     Body mass index is 39.94 kg/(m^2).  Vitals were reviewed and were normal     Physical Exam   Constitutional: She is oriented to person, place, and time. She appears well-developed and well-nourished. No distress.   Eyes: Conjunctivae are normal. No scleral icterus.   Neck: Normal range of motion. Neck supple.   Cardiovascular: Exam reveals no friction rub.    Pulmonary/Chest: Effort normal and breath sounds normal. No respiratory distress.   Lymphadenopathy:     She has no cervical adenopathy.   Neurological: She is alert and oriented to person, place, and time.   Skin: She is not diaphoretic.        Results:     No investigations this encounter.     Assessment and Plan     Flor is a 50 yo woman with a h/o HLD, Vit D def, Osteoporosis, HTN, secondary hyperparathyroidism, left peroneal tendonitis, s/p liver transplant secondary Hepatitis C (negative since transplant about 7 years ago) who presents to the clinic to prepare for upcoming trip to [place].     1. Chronic pain of right knee  2. Chronic pain of left knee  - order for DME; Right pull on neoprene knee brace.  Dispense: 1 Units; Refill: 0  - order for DME; Left pull on neoprene knee brace.  Dispense: 1 Units; Refill: 0    3. Nausea  - Flavoring Agent (PEPPERMINT FLAVOR) OIL; Use as smelling agent as needed.  Dispense: 1 mL; Refill: 0    - Counseled patient to take OTC Iron supplements (Ferrous Sulfate 325mg daily)    There are no discontinued medications.  Options for treatment and follow-up care were reviewed with the patient. Flor Navarro  engaged in the decision making process and verbalized understanding of the options discussed and agreed with the final plan.    Karen Downs MD  [facility]  [phone]

## 2017-10-27 NOTE — MR AVS SNAPSHOT
After Visit Summary   10/27/2017    Flor Navarro    MRN: [id number]           Patient Information     Date Of Birth          1965        Visit Information        Provider Department      10/27/2017 3:20 PM Karen Downs MD [facility]        Today's Diagnoses     Chronic pain of right knee    -  1    Chronic pain of left knee        Nausea          Care Instructions    Here is the plan from today's visit    1. Chronic pain of right knee  - order for DME; Right pull on neoprene knee brace.  Dispense: 1 Units; Refill: 0    2. Chronic pain of left knee  - order for DME; Left pull on neoprene knee brace.  Dispense: 1 Units; Refill: 0    3. Nausea  - Flavoring Agent (PEPPERMINT FLAVOR) OIL; Use as smelling agent as needed.  Dispense: 1 mL; Refill: 0    - Iron supplements (Ferrous Sulfate 325mg daily)  - call on Tuesday to confirm that we have all the medications, letters and equipment that you need.   - I will need to speak with Dr. Anne to make sure you have your liver medications.     Please call or return to clinic if your symptoms don't go away.    Follow up plan  Call on Tuesday.     Thank you for coming to [facility] today.  Lab Testing:  **If you had lab testing today and your results are reassuring or normal they will be mailed to you or sent through [employer] within 7 days.   **If the lab tests need quick action we will call you with the results.  The phone number we will call with results is # [phone] (home) . If this is not the best number please call our clinic and change the number.  Medication Refills:  If you need any refills please call your pharmacy and they will contact us.   If you need to  your refill at a new pharmacy, please contact the new pharmacy directly. The new pharmacy will help you get your medications transferred faster.   Scheduling:  If you have any concerns about today's visit or wish to schedule another appointment please  call our office during normal business hours [phone] (8-5:00 M-F)  If a referral was made to a [facility] and you don't get a call from central scheduling please call [phone].  If a Mammogram was ordered for you at [facility] call [phone] to schedule or change your appointment.  If you had an XRay/CT/Ultrasound/MRI ordered the number is [phone] to schedule or change your radiology appointment.   Medical Concerns:  If you have urgent medical concerns please call [phone] at any time of the day.            Follow-ups after your visit        Your next 10 appointments already scheduled     Oct 31, 2017 11:10 AM CDT   [facility] Extremity with Raya Peña PT   [facility] [employer] [facility] ([facility])    [address] [place] [id number]   [phone]            Nov 02, 2017  8:30 AM CDT   Lab with  LAB   [facility] ([facility])    [address] [place] [address] [phone]            Nov 02, 2017  9:20 AM CDT   (Arrive by 9:05 AM)   Return Liver Transplant with Aaron Harkins MD   [facility] ([facility])    [address] [place] [id number] [phone]              Who to contact     Please call your clinic at [phone] to:    Ask questions about your health    Make or cancel appointments    Discuss your medicines    Learn about your test results    Speak to your doctor   If you have compliments or concerns about an experience at your clinic, or if you wish to file a complaint, please contact [facility] Patient Relations at [phone] or email us at [email or web address]         Additional Information About Your Visit        StartBullhart Information     Ma[employer]Soup is an electronic gateway that provides easy, online access to your medical records. With MaulS[employer], you can  request a clinic appointment, read your test results, renew a prescription or communicate with your care team.     To sign up for Adknowledge visit the website at [email or web address]   You will be asked to enter the access code listed below, as well as some personal information. Please follow the directions to create your username and password.     Your access code is: [email or web address]  Expires: 12/3/2017  6:30 AM     Your access code will  in 90 days. If you need help or a new code, please contact your [facility] or call [phone] for assistance.        Care EveryWhere ID     This is your Care EveryWhere ID. This could be used by other organizations to access your [facility] medical records  [id number]        Your Vitals Were     Pulse Temperature Respirations Pulse Oximetry BMI (Body Mass Index)       65 97.8  F (36.6  C) (Oral) 16 98% 39.94 kg/m2        Blood Pressure from Last 3 Encounters:   10/27/17 135/85   10/16/17 115/73   10/03/17 107/73    Weight from Last 3 Encounters:   10/27/17 211 lb 6.4 oz (95.9 kg)   10/18/17 204 lb 12.8 oz (92.9 kg)   10/03/17 209 lb 9.6 oz (95.1 kg)              Today, you had the following     No orders found for display         Today's Medication Changes          These changes are accurate as of: 10/27/17  4:25 PM.  If you have any questions, ask your nurse or doctor.               Start taking these medicines.        Dose/Directions    Peppermint Flavor Oil   Used for:  Nausea   Started by:  Karen Downs MD        Use as smelling agent as needed.   Quantity:  1 mL   Refills:  0         These medicines have changed or have updated prescriptions.        Dose/Directions    * order for DME   This may have changed:  Another medication with the same name was added. Make sure you understand how and when to take each.   Used for:  Acute bronchitis, unspecified organism   Changed by:  Karely Sierra MD        Equipment being ordered:  Nebulizer with adult mask and tubing   Quantity:  1 Units   Refills:  0       * order for DME   This may have changed:  Another medication with the same name was added. Make sure you understand how and when to take each.   Used for:  Gait instability   Changed by:  Karely Sierra MD        Equipment being ordered: cane with padded handle   Quantity:  1 Units   Refills:  0       * order for DME   This may have changed:  Another medication with the same name was added. Make sure you understand how and when to take each.   Used for:  Osteoporosis, Falls frequently   Changed by:  Karely Sierra MD        Equipment being ordered: cane   Quantity:  1 Units   Refills:  0       * order for DME   This may have changed:  Another medication with the same name was added. Make sure you understand how and when to take each.   Used for:  Localized edema   Changed by:  Karely Sierra MD        Equipment being ordered: compression stockings bilateral Please measure and fit patient for stockings  Strength 15-30mmHg Disp 2 pairs 1 refill over the time of 1 year   Quantity:  2 Units   Refills:  1       * order for DME   This may have changed:  Another medication with the same name was added. Make sure you understand how and when to take each.   Used for:  Gait instability, Falls frequently   Changed by:  Karely Sierra MD        2-wheeled walker (dx: gait instability and Frequent falls).   Quantity:  1 Units   Refills:  0       * order for DME   This may have changed:  Another medication with the same name was added. Make sure you understand how and when to take each.   Used for:  Falls frequently, Gait instability   Changed by:  Karely Sierra MD        Equipment being ordered: 4 Wheeled Walker with Seat and Brakes   Quantity:  1 Units   Refills:  0       * order for DME   This may have changed:  Another medication with the same name was added. Make sure you understand how and when to take each.   Used for:  Bilateral edema of lower  extremity   Changed by:  Aby Ley MD        Equipment being ordered: Compression stockings below knee bilateral   Quantity:  2 each   Refills:  0       * order for DME   This may have changed:  Another medication with the same name was added. Make sure you understand how and when to take each.   Used for:  Chronic midline low back pain without sciatica   Changed by:  Aby Ley MD        Equipment being ordered: Back Stabilizer   Quantity:  1 each   Refills:  0       * order for DME   This may have changed:  Another medication with the same name was added. Make sure you understand how and when to take each.   Used for:  Bilateral edema of lower extremity   Changed by:  Karely Sierra MD        Equipment being ordered: 2 pairs of black stabilizer socks (beige okay if black not available). Size XL.   Quantity:  2 each   Refills:  0       * order for DME   This may have changed:  You were already taking a medication with the same name, and this prescription was added. Make sure you understand how and when to take each.   Used for:  Chronic pain of right knee   Changed by:  Karen Downs MD        Right pull on neoprene knee brace.   Quantity:  1 Units   Refills:  0       * order for DME   This may have changed:  You were already taking a medication with the same name, and this prescription was added. Make sure you understand how and when to take each.   Used for:  Chronic pain of left knee   Changed by:  Karen Downs MD        Left pull on neoprene knee brace.   Quantity:  1 Units   Refills:  0       * Notice:  This list has 11 medication(s) that are the same as other medications prescribed for you. Read the directions carefully, and ask your doctor or other care provider to review them with you.         Where to get your medicines      These medications were sent to [facility] - [place], [place] - [address]   [address] , [facility]  [address]    Hours:  Mon-Fri 8:30am-5:00pm Toll Free [phone] Phone:  [phone]     Peppermint Flavor Oil         Some of these will need a paper prescription and others can be bought over the counter.  Ask your nurse if you have questions.     Bring a paper prescription for each of these medications     order for DME    order for DME                Primary Care Provider Office Phone # Fax #    Karelyscott Sierra [phone]       [address] [address]        Equal Access to Services     MAUOR ALBA : Hadii florecita ku hadasho Soomaali, waaxda luqadaha, qaybta kaalmada adeegyada, waxaide ramirez hayabbie oglesby . So [facility] [phone].    ATENCIÓN: Si habla español, tiene a garcia disposición servicios gratuitos de asistencia lingüística. [facility] [phone].    We comply with applicable federal civil rights laws and [place] laws. We do not discriminate on the basis of race, color, national origin, age, disability, sex, sexual orientation, or gender identity.            Thank you!     Thank you for choosing \[facility]\"" [facility]  for your care. Our goal is always to provide you with excellent care. Hearing back from our patients is one way we can continue to improve our services. Please take a few minutes to complete the written survey that you may receive in the mail after your visit with us. Thank you!             Your Updated Medication List - Protect others around you: Learn how to safely use, store and throw away your medicines at www.disposemymeds.org.          This list is accurate as of: 10/27/17  4:25 PM.  Always use your most recent med list.                   Brand Name Dispense Instructions for use Diagnosis    * acetaminophen 325 MG tablet    TYLENOL    100 tablet    Take 1-2 tablets (325-650 mg) by mouth every 6 hours as needed Max 6 tablets per 24 hours    Sebaceous cyst       * acetaminophen 325 MG tablet    TYLENOL    100 tablet    Take 1-2 tablets  (325-650 mg) by mouth every 4 hours as needed for mild pain    Fever and chills       albuterol 108 (90 BASE) MCG/ACT Inhaler    PROAIR HFA/PROVENTIL HFA/VENTOLIN HFA    1 Inhaler    Inhale 2 puffs into the lungs 4 times daily    Acute bronchitis, unspecified organism       alendronate 70 MG tablet    FOSAMAX    12 tablet    Take 1 tablet (70 mg) by mouth every 7 days Take at least 60 min before breakfast with over 8 oz water and stay upright for at least 30 min. after dose.    Osteoporosis       bisacodyl 5 MG EC tablet    DULCOLAX    30 tablet    Take 1 tablet (5 mg) by mouth daily as needed for constipation    Constipation, chronic       calcium-vitamin D 600-400 MG-UNIT per tablet    calcium 600 + D    60 tablet    Take 1 tablet by mouth 2 times daily    Osteoporosis       carboxymethylcellul-glycerin 0.5-0.9 % Soln ophthalmic solution    OPTIVE/REFRESH OPTIVE    1 each    Place 1 drop into both eyes 4 times daily    Dry eye syndrome of bilateral lacrimal glands       carboxymethylcellulose 1 % ophthalmic solution    CELLUVISC/REFRESH LIQUIGEL    15 each    Place 1 drop into both eyes 4 times daily    Dry eyes, bilateral       cephALEXin 500 MG capsule    KEFLEX    21 capsule    Take 1 capsule (500 mg) by mouth 3 times daily    Complicated UTI (urinary tract infection)       cholecalciferol 1000 UNITS capsule    vitamin  -D    180 capsule    Take 2 capsules (2,000 Units) by mouth daily    Osteoporosis, Liver replaced by transplant (H), Vitamin D deficiency, Secondary hyperparathyroidism (H), CKD (chronic kidney disease) stage 3, GFR 30-59 ml/min, Palpitations       Colloidal Oatmeal 1 % Crea     226 g    Externally apply topically as needed    Dry skin       * cycloSPORINE modified 25 MG capsule     240 capsule    Take 4 capsules (100 mg) by mouth every 12 hours    Liver replaced by transplant (H)       * cycloSPORINE modified 25 MG capsule    GENERIC EQUIVALENT    240 capsule    Take 4 capsules (100 mg) by  mouth every 12 hours    Liver replaced by transplant (H)       lidocaine 5 % Patch    LIDODERM    30 patch    Apply up to 3 patches to painful area at once for up to 12 h within a 24 h period.  Remove after 12 hours.    Liver replaced by transplant (H)       * Medical Compression Socks Misc     2 each    1 Units daily    Leg swelling       * Knee Brace/Flex Stays Large Misc     2 each    1 Units daily    Chronic pain of both knees       melatonin 3 MG tablet     100 tablet    Take 1 tablet (3 mg) by mouth nightly as needed for sleep    Primary insomnia       multivitamin, therapeutic with minerals Tabs tablet     100 tablet    Take 1 tablet by mouth daily    Liver replaced by transplant (H)       mycophenolate 250 MG capsule    GENERIC EQUIVALENT    120 capsule    Take 2 capsules (500 mg) by mouth every 12 hours    Liver replaced by transplant (H)       omeprazole 20 MG CR capsule    priLOSEC    180 capsule    Take 1 capsule (20 mg) by mouth 2 times daily    Gastroesophageal reflux disease, esophagitis presence not specified       * order for DME     1 Units    Equipment being ordered: Nebulizer with adult mask and tubing    Acute bronchitis, unspecified organism       * order for DME     1 Units    Equipment being ordered: cane with padded handle    Gait instability       * order for DME     1 Units    Equipment being ordered: cane    Osteoporosis, Falls frequently       * order for DME     2 Units    Equipment being ordered: compression stockings bilateral Please measure and fit patient for stockings  Strength 15-30mmHg Disp 2 pairs 1 refill over the time of 1 year    Localized edema       * order for DME     1 Units    2-wheeled walker (dx: gait instability and Frequent falls).    Gait instability, Falls frequently       * order for DME     1 Units    Equipment being ordered: 4 Wheeled Walker with Seat and Brakes    Falls frequently, Gait instability       * order for DME     2 each    Equipment being ordered:  Compression stockings below knee bilateral    Bilateral edema of lower extremity       * order for DME     1 each    Equipment being ordered: Back Stabilizer    Chronic midline low back pain without sciatica       * order for DME     2 each    Equipment being ordered: 2 pairs of black stabilizer socks (beige okay if black not available). Size XL.    Bilateral edema of lower extremity       * order for DME     1 Units    Right pull on neoprene knee brace.    Chronic pain of right knee       * order for DME     1 Units    Left pull on neoprene knee brace.    Chronic pain of left knee       Peppermint Flavor Oil     1 mL    Use as smelling agent as needed.    Nausea       prednisoLONE acetate 1 % ophthalmic susp    PRED FORTE    1 Bottle    Use in operative eye four times a day  For 4 days    Posterior capsular opacification visually significant of both eyes       * Psyllium 500 MG Caps     180 capsule    Take 3 capsules by mouth 2 times daily    Constipation, chronic       * REGULOID 0.52 G capsule   Generic drug:  psyllium     540 capsule    Take 3 capsules (1.56 g) by mouth 2 times daily    Constipation, unspecified constipation type       senna-docusate 8.6-50 MG per tablet    SENOKOT-S;PERICOLACE    120 tablet    Take 2 tablets by mouth 2 times daily For constipation    Constipation, chronic       simethicone 80 MG chewable tablet    MYLICON    120 tablet    Take 1 tablet (80 mg) by mouth every 6 hours as needed for flatulence or cramping    Flatulence, eructation, and gas pain       sodium chloride 0.65 % nasal spray    OCEAN    30 mL    Spray 1 spray into both nostrils daily as needed for congestion    Throat pain       sodium phosphate 7-19 GM/118ML rectal enema     3 Bottle    Place 1 Bottle (1 enema) rectally daily as needed for constipation    Ileus (H)       STATIN NOT PRESCRIBED (INTENTIONAL)     0 each    Not prescribed    High risk medication use       * Notice:  This list has 19 medication(s) that are  the same as other medications prescribed for you. Read the directions carefully, and ask your doctor or other care provider to review them with you.

## 2017-10-27 NOTE — PROGRESS NOTES
Preceptor Attestation:   Patient seen and discussed with the resident. Assessment and plan reviewed with resident and agreed upon.   Supervising Physician:  Michael Parra MD  [facility]

## 2017-10-27 NOTE — Clinical Note
Would your team be able to coordinate Flor's GI/transplant meds for her to bring to her trip to [place]? She is leaving on 11/9, so would need to have this coordinated before then. Let me know what you need from me. Thanks, Lila Downs

## 2017-10-27 NOTE — PATIENT INSTRUCTIONS
Here is the plan from today's visit    1. Chronic pain of right knee  - order for DME; Right pull on neoprene knee brace.  Dispense: 1 Units; Refill: 0    2. Chronic pain of left knee  - order for DME; Left pull on neoprene knee brace.  Dispense: 1 Units; Refill: 0    3. Nausea  - Flavoring Agent (PEPPERMINT FLAVOR) OIL; Use as smelling agent as needed.  Dispense: 1 mL; Refill: 0    - Iron supplements (Ferrous Sulfate 325mg daily)  - call on Tuesday to confirm that we have all the medications, letters and equipment that you need.   - I will need to speak with Dr. Anne to make sure you have your liver medications.     Please call or return to clinic if your symptoms don't go away.    Follow up plan  Call on Tuesday.     Thank you for coming to [facility] today.  Lab Testing:  **If you had lab testing today and your results are reassuring or normal they will be mailed to you or sent through [employer] within 7 days.   **If the lab tests need quick action we will call you with the results.  The phone number we will call with results is # [phone] (home) . If this is not the best number please call our clinic and change the number.  Medication Refills:  If you need any refills please call your pharmacy and they will contact us.   If you need to  your refill at a new pharmacy, please contact the new pharmacy directly. The new pharmacy will help you get your medications transferred faster.   Scheduling:  If you have any concerns about today's visit or wish to schedule another appointment please call our office during normal business hours [phone] (8-5:00 M-F)  If a referral was made to a [facility] and you don't get a call from central scheduling please call [phone].  If a Mammogram was ordered for you at [facility] call [phone] to schedule or change your appointment.  If you had an XRay/CT/Ultrasound/MRI ordered the number is [phone] to schedule or change  your radiology appointment.   Medical Concerns:  If you have urgent medical concerns please call [phone] at any time of the day.

## 2017-10-27 NOTE — Clinical Note
Varsha, did you every find out how much we can prescribe for Flor at a time or get the items exchanged from [employer]? I sent a message to Dr. Anne about the GI/transplant meds. Maybe PharmD could help with getting her meds situated before she leaves on 11/9. I would just need to know which ones I would need to send and by when. Thank you so much for your help! - Lila

## 2017-10-27 NOTE — TELEPHONE ENCOUNTER
Medication Refill Denied  Reason: Patient needs: provider visit  Provider: I have not called the patient about the Rx denial, please call.  PCS: Please notify the pharmacy  RN: Please contact the patient to explain reasoning provided above and to schedule the patient for a provider visit with any provider..    RN may order temporary refill so that the patient will not run out of medication prior to the scheduled visit.    Karely Sierra MD

## 2017-10-27 NOTE — NURSING NOTE
used this visit:  Name: Rogelio Cordero  Language:  Polish  Agency: [facility]  Phone:[phone]  Shelley Osei

## 2017-10-30 ENCOUNTER — DOCUMENTATION ONLY (OUTPATIENT)
Dept: FAMILY MEDICINE | Facility: CLINIC | Age: 52
End: 2017-10-30

## 2017-10-30 DIAGNOSIS — J20.9 ACUTE BRONCHITIS, UNSPECIFIED ORGANISM: ICD-10-CM

## 2017-10-30 DIAGNOSIS — M81.0 OSTEOPOROSIS: ICD-10-CM

## 2017-10-30 DIAGNOSIS — N39.0 COMPLICATED UTI (URINARY TRACT INFECTION): ICD-10-CM

## 2017-10-30 NOTE — PROGRESS NOTES
"When opening a documentation only encounter, be sure to enter in \"Chief Complaint\" Forms and in \" Comments\" Title of form, description if needed.    Form received via: Fax  Form now resides in: Provider Ready    Form sent out via: Fax  Patient informed: No  Output date: 10/26/17    Form received by recipient via fax confirmation  Please close the encounter.    "

## 2017-10-30 NOTE — TELEPHONE ENCOUNTER
Request for medication refill:    Date of last visit at clinic: 10-27-17    Please complete refill if appropriate and CLOSE ENCOUNTER.    Closing the encounter signifies the refill is complete.    If refill has been denied, please complete the smart phrase .smirefuse and route it to the [facility] RN TRIAGE pool to inform the patient and the pharmacy.    Shelley Osei, CMA

## 2017-10-30 NOTE — TELEPHONE ENCOUNTER
Request for medication refill:    Date of last visit at clinic: 10/27/17    Please complete refill if appropriate and CLOSE ENCOUNTER.    Closing the encounter signifies the refill is complete.    If refill has been denied, please complete the smart phrase .smirefuse and route it to the [facility] RN TRIAGE pool to inform the patient and the pharmacy.    Dayanna Batista

## 2017-10-31 ENCOUNTER — TELEPHONE (OUTPATIENT)
Dept: FAMILY MEDICINE | Facility: CLINIC | Age: 52
End: 2017-10-31

## 2017-10-31 ENCOUNTER — TELEPHONE (OUTPATIENT)
Dept: TRANSPLANT | Facility: CLINIC | Age: 52
End: 2017-10-31

## 2017-10-31 ENCOUNTER — THERAPY VISIT (OUTPATIENT)
Dept: PHYSICAL THERAPY | Facility: CLINIC | Age: 52
End: 2017-10-31
Payer: MEDICARE

## 2017-10-31 DIAGNOSIS — M25.572 PAIN IN JOINT, ANKLE AND FOOT, LEFT: ICD-10-CM

## 2017-10-31 PROBLEM — J45.20 ASTHMA, MILD INTERMITTENT: Status: ACTIVE | Noted: 2017-09-12

## 2017-10-31 PROBLEM — G47.30 SLEEP APNEA, UNSPECIFIED: Status: ACTIVE | Noted: 2017-09-12

## 2017-10-31 PROBLEM — E66.812 OBESITY, CLASS II, BMI 35-39.9: Status: ACTIVE | Noted: 2017-09-12

## 2017-10-31 PROBLEM — F33.9 DEPRESSION, RECURRENT (H): Status: ACTIVE | Noted: 2017-09-12

## 2017-10-31 PROCEDURE — 97110 THERAPEUTIC EXERCISES: CPT | Mod: GP | Performed by: PHYSICAL THERAPIST

## 2017-10-31 ASSESSMENT — ENCOUNTER SYMPTOMS
ARTHRALGIAS: 1
WEAKNESS: 0
NAUSEA: 0
FEVER: 0
COUGH: 0
HEADACHES: 0
VOMITING: 0
DIARRHEA: 0
SHORTNESS OF BREATH: 0
SINUS PRESSURE: 0
DYSURIA: 0
BLOOD IN STOOL: 0
FATIGUE: 0
HEMATURIA: 0
MYALGIAS: 0
ABDOMINAL DISTENTION: 0
SORE THROAT: 0
ABDOMINAL PAIN: 0
WHEEZING: 0
APPETITE CHANGE: 0
CHILLS: 0
COLOR CHANGE: 0
CONSTIPATION: 0

## 2017-10-31 NOTE — TELEPHONE ENCOUNTER
[facility] phone call message- patient requesting to speak with PCP or provider:    PCP: Karely Sierra    Additional [facility] will be leaving for [place] on 11/10/17. Deborah wants to make sure that patient has her medications. Message forwarded to triage per protocol.    Is a call back needed? No    Patient informed that it may take up to 2 business days to hear back from PCP:No    OK to leave a message on voice mail? Yes    Primary language: Bengali      needed? Yes    Call taken on October 31, 2017 at 2:45 PM by Shahla Shepherd

## 2017-10-31 NOTE — TELEPHONE ENCOUNTER
Per chart review, patient was seen by Dr Downs on 10/27 and upcoming trip was discussed. Dr. Downs refilled several medications at this time    Sally Miguel RN

## 2017-11-02 ENCOUNTER — TELEPHONE (OUTPATIENT)
Dept: FAMILY MEDICINE | Facility: CLINIC | Age: 52
End: 2017-11-02

## 2017-11-02 ENCOUNTER — TELEPHONE (OUTPATIENT)
Dept: TRANSPLANT | Facility: CLINIC | Age: 52
End: 2017-11-02

## 2017-11-02 ENCOUNTER — OFFICE VISIT (OUTPATIENT)
Dept: GASTROENTEROLOGY | Facility: CLINIC | Age: 52
End: 2017-11-02
Attending: INTERNAL MEDICINE
Payer: MEDICARE

## 2017-11-02 VITALS
TEMPERATURE: 97.8 F | HEIGHT: 61 IN | DIASTOLIC BLOOD PRESSURE: 81 MMHG | HEART RATE: 64 BPM | BODY MASS INDEX: 39.12 KG/M2 | WEIGHT: 207.2 LBS | OXYGEN SATURATION: 100 % | SYSTOLIC BLOOD PRESSURE: 120 MMHG

## 2017-11-02 DIAGNOSIS — E87.5 HYPERKALEMIA: Primary | ICD-10-CM

## 2017-11-02 DIAGNOSIS — K59.09 CONSTIPATION, CHRONIC: Primary | ICD-10-CM

## 2017-11-02 DIAGNOSIS — Z94.4 LIVER TRANSPLANTED (H): Primary | ICD-10-CM

## 2017-11-02 DIAGNOSIS — Z94.4 LIVER REPLACED BY TRANSPLANT (H): ICD-10-CM

## 2017-11-02 LAB
ALBUMIN SERPL-MCNC: 3.7 G/DL (ref 3.4–5)
ALBUMIN UR-MCNC: NEGATIVE MG/DL
ALP SERPL-CCNC: 77 U/L (ref 40–150)
ALT SERPL W P-5'-P-CCNC: 26 U/L (ref 0–50)
ANION GAP SERPL CALCULATED.3IONS-SCNC: 6 MMOL/L (ref 3–14)
APPEARANCE UR: CLEAR
AST SERPL W P-5'-P-CCNC: 18 U/L (ref 0–45)
BILIRUB DIRECT SERPL-MCNC: 0.2 MG/DL (ref 0–0.2)
BILIRUB SERPL-MCNC: 1.1 MG/DL (ref 0.2–1.3)
BILIRUB UR QL STRIP: NEGATIVE
BUN SERPL-MCNC: 25 MG/DL (ref 7–30)
CALCIUM SERPL-MCNC: 9.1 MG/DL (ref 8.5–10.1)
CHLORIDE SERPL-SCNC: 110 MMOL/L (ref 94–109)
CO2 SERPL-SCNC: 26 MMOL/L (ref 20–32)
COLOR UR AUTO: YELLOW
CREAT SERPL-MCNC: 1.08 MG/DL (ref 0.52–1.04)
CYCLOSPORINE BLD LC/MS/MS-MCNC: 108 UG/L (ref 50–400)
ERYTHROCYTE [DISTWIDTH] IN BLOOD BY AUTOMATED COUNT: 12.7 % (ref 10–15)
GFR SERPL CREATININE-BSD FRML MDRD: 53 ML/MIN/1.7M2
GLUCOSE SERPL-MCNC: 92 MG/DL (ref 70–99)
GLUCOSE UR STRIP-MCNC: NEGATIVE MG/DL
HCT VFR BLD AUTO: 38.8 % (ref 35–47)
HGB BLD-MCNC: 12.3 G/DL (ref 11.7–15.7)
HGB UR QL STRIP: NEGATIVE
KETONES UR STRIP-MCNC: NEGATIVE MG/DL
LEUKOCYTE ESTERASE UR QL STRIP: NEGATIVE
MCH RBC QN AUTO: 30 PG (ref 26.5–33)
MCHC RBC AUTO-ENTMCNC: 31.7 G/DL (ref 31.5–36.5)
MCV RBC AUTO: 95 FL (ref 78–100)
MUCOUS THREADS #/AREA URNS LPF: PRESENT /LPF
NITRATE UR QL: NEGATIVE
PH UR STRIP: 5 PH (ref 5–7)
PLATELET # BLD AUTO: 108 10E9/L (ref 150–450)
POTASSIUM SERPL-SCNC: 5.9 MMOL/L (ref 3.4–5.3)
PROT SERPL-MCNC: 7.5 G/DL (ref 6.8–8.8)
RBC # BLD AUTO: 4.1 10E12/L (ref 3.8–5.2)
RBC #/AREA URNS AUTO: 1 /HPF (ref 0–2)
SODIUM SERPL-SCNC: 141 MMOL/L (ref 133–144)
SOURCE: ABNORMAL
SP GR UR STRIP: 1.02 (ref 1–1.03)
SQUAMOUS #/AREA URNS AUTO: 4 /HPF (ref 0–1)
TME LAST DOSE: NORMAL H
UROBILINOGEN UR STRIP-MCNC: 0 MG/DL (ref 0–2)
WBC # BLD AUTO: 3.6 10E9/L (ref 4–11)
WBC #/AREA URNS AUTO: 1 /HPF (ref 0–2)

## 2017-11-02 PROCEDURE — 80048 BASIC METABOLIC PNL TOTAL CA: CPT | Performed by: INTERNAL MEDICINE

## 2017-11-02 PROCEDURE — 99212 OFFICE O/P EST SF 10 MIN: CPT | Mod: ZF

## 2017-11-02 PROCEDURE — 81001 URINALYSIS AUTO W/SCOPE: CPT | Performed by: INTERNAL MEDICINE

## 2017-11-02 PROCEDURE — T1013 SIGN LANG/ORAL INTERPRETER: HCPCS | Mod: U3

## 2017-11-02 PROCEDURE — 85027 COMPLETE CBC AUTOMATED: CPT | Performed by: INTERNAL MEDICINE

## 2017-11-02 PROCEDURE — 36415 COLL VENOUS BLD VENIPUNCTURE: CPT | Performed by: INTERNAL MEDICINE

## 2017-11-02 PROCEDURE — 80076 HEPATIC FUNCTION PANEL: CPT | Performed by: INTERNAL MEDICINE

## 2017-11-02 PROCEDURE — 80158 DRUG ASSAY CYCLOSPORINE: CPT | Performed by: INTERNAL MEDICINE

## 2017-11-02 RX ORDER — CEPHALEXIN 500 MG/1
500 CAPSULE ORAL 3 TIMES DAILY
Qty: 21 CAPSULE | Refills: 0 | OUTPATIENT
Start: 2017-11-02

## 2017-11-02 RX ORDER — ALBUTEROL SULFATE 90 UG/1
2 AEROSOL, METERED RESPIRATORY (INHALATION) 4 TIMES DAILY
Qty: 1 INHALER | Refills: 1 | Status: SHIPPED | OUTPATIENT
Start: 2017-11-02 | End: 2018-01-31

## 2017-11-02 RX ORDER — FERROUS SULFATE 325(65) MG
1 TABLET ORAL
COMMUNITY
End: 2019-01-03

## 2017-11-02 RX ORDER — ALBUTEROL SULFATE 90 UG/1
2 AEROSOL, METERED RESPIRATORY (INHALATION) 4 TIMES DAILY
Qty: 1 INHALER | Refills: 1 | Status: SHIPPED | OUTPATIENT
Start: 2017-11-02 | End: 2018-05-22

## 2017-11-02 ASSESSMENT — PAIN SCALES - GENERAL: PAINLEVEL: NO PAIN (0)

## 2017-11-02 NOTE — TELEPHONE ENCOUNTER
Rec'd message pt is leaving for [place] on 11/9 for 6-8 months. Spoke with pt's son Courtney who confirmed he will be picking up pt's medications every month and [place]. Provided pt with phone numbers for pharmacy and transplant office if he has any questions.

## 2017-11-02 NOTE — TELEPHONE ENCOUNTER
Request for medication refill:    Date of last visit at clinic: 10/27/2017    Request for levaquin 750mg tab.    Please complete refill if appropriate and CLOSE ENCOUNTER.    Closing the encounter signifies the refill is complete.    If refill has been denied, please complete the smart phrase .smirefuse and route it to the [facility] RN TRIAGE pool to inform the patient and the pharmacy.    Chet JAMISON, Chet, CMA

## 2017-11-02 NOTE — MR AVS SNAPSHOT
After Visit Summary   11/2/2017    Flor Navarro    MRN: [id number]           Patient Information     Date Of Birth          1965        Visit Information        Provider Department      11/2/2017 9:05 AM Stephanie Johnson; Aaron Harkins MD [facility] Hepatology        Today's Diagnoses     Hyperkalemia    -  1       Follow-ups after your visit        Follow-up notes from your care team     Return in about 5 months (around 4/2/2018).      Your next 10 appointments already scheduled     Nov 13, 2017 10:00 AM CST   LAB with  LAB   [facility] ([facility])    [address] [address] [phone]           Please do not eat 10-12 hours before your appointment if you are coming in fasting for labs on lipids, cholesterol, or glucose (sugar). This does not apply to pregnant women. Water, hot tea and black coffee (with nothing added) are okay. Do not drink other fluids, diet soda or chew gum.              Future tests that were ordered for you today     Open Future Orders        Priority Expected Expires Ordered    Basic metabolic panel Routine 11/6/2017 12/2/2017 11/2/2017    Routine UA with micro reflex to culture Routine  12/2/2017 11/2/2017            Who to contact     If you have questions or need follow up information about today's clinic visit or your schedule please contact [facility] HEPATOLOGY directly at [phone].  Normal or non-critical lab and imaging results will be communicated to you by MyChart, letter or phone within 4 business days after the clinic has received the results. If you do not hear from us within 7 days, please contact the clinic through MyChart or phone. If you have a critical or abnormal lab result, we will notify you by phone as soon as possible.  Submit refill requests through eDealya or call your pharmacy and they will forward the refill request to us. Please allow 3 business days for your refill to be completed.  "         Additional Information About Your Visit        MyChart Information     Veoh lets you send messages to your doctor, view your test results, renew your prescriptions, schedule appointments and more. To sign up, go to [email or web address] . Click on \"Log in\" on the left side of the screen, which will take you to the Welcome page. Then click on \"Sign up Now\" on the right side of the page.     You will be asked to enter the access code listed below, as well as some personal information. Please follow the directions to create your username and password.     Your access code is: [email or web address]  Expires: 12/3/2017  6:30 AM     Your access code will  in 90 days. If you need help or a new code, please call your [facility] clinic or [phone].        Care EveryWhere ID     This is your Care EveryWhere ID. This could be used by other organizations to access your [facility] medical records  [id number]        Your Vitals Were     Pulse Temperature Height Pulse Oximetry BMI (Body Mass Index)       64 97.8  F (36.6  C) (Oral) 1.549 m (5' 1\") 100% 39.15 kg/m2        Blood Pressure from Last 3 Encounters:   17 120/81   10/27/17 135/85   10/16/17 115/73    Weight from Last 3 Encounters:   17 94 kg (207 lb 3.2 oz)   10/27/17 95.9 kg (211 lb 6.4 oz)   10/18/17 92.9 kg (204 lb 12.8 oz)               Primary Care Provider Office Phone # Fax #    Karely Sierra [phone]        [address] [address]        Equal Access to Services     [facility]: Hadii florecita dAair, waaxda luqadaha, qaybta kaalmada mart jin. So [facility] [phone].    ATENCIÓN: Si habla español, tiene a garcia disposición servicios gratuitos de asistencia lingüística. Llame al [phone].    We comply with applicable federal civil rights laws and [place] laws. We do not discriminate on the basis of race, color, national origin, age, disability, sex, " sexual orientation, or gender identity.            Thank you!     Thank you for choosing [facility] HEPATOLOGY  for your care. Our goal is always to provide you with excellent care. Hearing back from our patients is one way we can continue to improve our services. Please take a few minutes to complete the written survey that you may receive in the mail after your visit with us. Thank you!             Your Updated Medication List - Protect others around you: Learn how to safely use, store and throw away your medicines at www.disposemymeds.org.          This list is accurate as of: 11/2/17  9:56 AM.  Always use your most recent med list.                   Brand Name Dispense Instructions for use Diagnosis    acetaminophen 325 MG tablet    TYLENOL    100 tablet    Take 1-2 tablets (325-650 mg) by mouth every 6 hours as needed Max 6 tablets per 24 hours    Sebaceous cyst       albuterol 108 (90 BASE) MCG/ACT Inhaler    PROAIR HFA/PROVENTIL HFA/VENTOLIN HFA    1 Inhaler    Inhale 2 puffs into the lungs 4 times daily    Acute bronchitis, unspecified organism       alendronate 70 MG tablet    FOSAMAX    12 tablet    Take 1 tablet (70 mg) by mouth every 7 days Take at least 60 min before breakfast with over 8 oz water and stay upright for at least 30 min. after dose.    Osteoporosis       bisacodyl 5 MG EC tablet    DULCOLAX    30 tablet    Take 1 tablet (5 mg) by mouth daily as needed for constipation    Constipation, chronic       calcium-vitamin D 600-400 MG-UNIT per tablet    calcium 600 + D    60 tablet    Take 1 tablet by mouth 2 times daily    Osteoporosis       carboxymethylcellul-glycerin 0.5-0.9 % Soln ophthalmic solution    OPTIVE/REFRESH OPTIVE    1 each    Place 1 drop into both eyes 4 times daily    Dry eye syndrome of bilateral lacrimal glands       carboxymethylcellulose 1 % ophthalmic solution    CELLUVISC/REFRESH LIQUIGEL    15 each    Place 1 drop into both eyes 4 times daily    Dry eyes, bilateral        cephALEXin 500 MG capsule    KEFLEX    21 capsule    Take 1 capsule (500 mg) by mouth 3 times daily    Complicated UTI (urinary tract infection)       cholecalciferol 1000 UNITS capsule    vitamin  -D    180 capsule    Take 2 capsules (2,000 Units) by mouth daily    Osteoporosis, Liver replaced by transplant (H), Vitamin D deficiency, Secondary hyperparathyroidism (H), CKD (chronic kidney disease) stage 3, GFR 30-59 ml/min, Palpitations       Colloidal Oatmeal 1 % Crea     226 g    Externally apply topically as needed    Dry skin       * cycloSPORINE modified 25 MG capsule     240 capsule    Take 4 capsules (100 mg) by mouth every 12 hours    Liver replaced by transplant (H)       * cycloSPORINE modified 25 MG capsule    GENERIC EQUIVALENT    240 capsule    Take 4 capsules (100 mg) by mouth every 12 hours    Liver replaced by transplant (H)       ferrous sulfate 325 (65 FE) MG tablet    IRON     Take 1 tablet by mouth daily (with breakfast)        lidocaine 5 % Patch    LIDODERM    30 patch    Apply up to 3 patches to painful area at once for up to 12 h within a 24 h period.  Remove after 12 hours.    Liver replaced by transplant (H)       * Medical Compression Socks Misc     2 each    1 Units daily    Leg swelling       * Knee Brace/Flex Stays Large Misc     2 each    1 Units daily    Chronic pain of both knees       melatonin 3 MG tablet     100 tablet    Take 1 tablet (3 mg) by mouth nightly as needed for sleep    Primary insomnia       multivitamin, therapeutic with minerals Tabs tablet     100 tablet    Take 1 tablet by mouth daily    Liver replaced by transplant (H)       mycophenolate 250 MG capsule    GENERIC EQUIVALENT    120 capsule    Take 2 capsules (500 mg) by mouth every 12 hours    Liver replaced by transplant (H)       omeprazole 20 MG CR capsule    priLOSEC    180 capsule    Take 1 capsule (20 mg) by mouth 2 times daily    Gastroesophageal reflux disease, esophagitis presence not specified       *  order for DME     1 Units    Equipment being ordered: Nebulizer with adult mask and tubing    Acute bronchitis, unspecified organism       * order for DME     1 Units    Equipment being ordered: cane with padded handle    Gait instability       * order for DME     1 Units    Equipment being ordered: cane    Osteoporosis, Falls frequently       * order for DME     2 Units    Equipment being ordered: compression stockings bilateral Please measure and fit patient for stockings  Strength 15-30mmHg Disp 2 pairs 1 refill over the time of 1 year    Localized edema       * order for DME     1 Units    2-wheeled walker (dx: gait instability and Frequent falls).    Gait instability, Falls frequently       * order for DME     1 Units    Equipment being ordered: 4 Wheeled Walker with Seat and Brakes    Falls frequently, Gait instability       * order for DME     2 each    Equipment being ordered: Compression stockings below knee bilateral    Bilateral edema of lower extremity       * order for DME     1 each    Equipment being ordered: Back Stabilizer    Chronic midline low back pain without sciatica       * order for DME     2 each    Equipment being ordered: 2 pairs of black stabilizer socks (beige okay if black not available). Size XL.    Bilateral edema of lower extremity       * order for DME     1 Units    Right pull on neoprene knee brace.    Chronic pain of right knee       * order for DME     1 Units    Left pull on neoprene knee brace.    Chronic pain of left knee       * order for DME     1 Units    Blood Pressure machine    Hypertension, renal       Peppermint Flavor Oil     1 mL    Use as smelling agent as needed.    Nausea       prednisoLONE acetate 1 % ophthalmic susp    PRED FORTE    1 Bottle    Use in operative eye four times a day  For 4 days    Posterior capsular opacification visually significant of both eyes       * Psyllium 500 MG Caps     180 capsule    Take 3 capsules by mouth 2 times daily     Constipation, chronic       * REGULOID 0.52 G capsule   Generic drug:  psyllium     540 capsule    Take 3 capsules (1.56 g) by mouth 2 times daily    Constipation, unspecified constipation type       senna-docusate 8.6-50 MG per tablet    SENOKOT-S;PERICOLACE    120 tablet    Take 2 tablets by mouth 2 times daily For constipation    Constipation, chronic       simethicone 80 MG chewable tablet    MYLICON    120 tablet    Take 1 tablet (80 mg) by mouth every 6 hours as needed for flatulence or cramping    Flatulence, eructation, and gas pain       sodium chloride 0.65 % nasal spray    OCEAN    30 mL    Spray 1 spray into both nostrils daily as needed for congestion    Throat pain       sodium phosphate 7-19 GM/118ML rectal enema     3 Bottle    Place 1 Bottle (1 enema) rectally daily as needed for constipation    Ileus (H)       STATIN NOT PRESCRIBED (INTENTIONAL)     0 each    Not prescribed    High risk medication use       * Notice:  This list has 18 medication(s) that are the same as other medications prescribed for you. Read the directions carefully, and ask your doctor or other care provider to review them with you.

## 2017-11-02 NOTE — TELEPHONE ENCOUNTER
Message routed to PCP to send new prescription with available dose if appropriate.    Sally Miguel RN

## 2017-11-02 NOTE — LETTER
11/2/2017      RE: Flor Navarro  [address] [address]       [facility]    Hepatology New Patient Visit    Referring provider:  Karely Sierra      52 year old female    Chief complaint: follow-up liver transplant      HPI:   HISTORY OF PRESENT ILLNESS:  I had the pleasure of seeing Ms. Flor Navarro in the [facility] on 11/02/2017.  Ms. Navarro returns in follow-up now over 7 years status post liver transplant for hepatitis C related cirrhosis.  It is worth noting that she did obtain SVR with DAA treatment in early post-transplant period.  She has been maintained on both cyclosporine and mycophenolate.  No noted a rejection during the course of her transplantation.  She is overall doing okay at today's visit.  She still struggles with some left-sided ankle pain and overall fatigue.  She is going back to [place] in approximately 5 days to address some family issues that have been ongoing for some time and states that she will be gone for approximately 4-6 months.  She denies any increased abdominal girth or lower extremity edema.  She has no abdominal pain, itching, skin rash or fatigue.  She has no nausea, vomiting, diarrhea or constipation.  Her appetite has overall been good, although her weight loss has had a harleen.  She was previously quite successful at losing weight and now has essentially been stable around 210 pounds.  She otherwise states that she is feeling well and has no specific complaints.      Patient denies jaundice, abdominal distension, lower extremity edema, lethargy or confusion.    No history of melena, hematemesis or hematochezia.    Patient denies fevers, sweats, chills or weight loss.  She did have fevers chills and dysuria 2 weeks ago and was found to have UTI-->treated with course of levofloxacin  Medical hx Surgical hx   Past Medical History:   Diagnosis Date     Anemia in CKD (chronic kidney  disease)      Cataract      CKD (chronic kidney disease) stage 3, GFR 30-59 ml/min      Hepatitis C      High risk medication use      Hypertension, renal      Immunosuppressed status (H)      Liver replaced by transplant (H)      Osteoporosis      Recurrent pregnancy loss without current pregnancy      Stroke, hemorrhagic (H)      Syncope      Unspecified viral hepatitis C without hepatic coma       Past Surgical History:   Procedure Laterality Date     CATARACT IOL, RT/LT Right 2/18/15      SECTION       Incisional Hernia Repair  2004     INSERT SHUNT PORTAL TRANSJUGULAR INTRAHEPTIC      shunt placement for liver failure     LAPAROSCOPIC SALPINGO-OOPHORECTOMY      left     NECK SURGERY  2010    fracture, in halo x 7months     TRANSPLANT LIVER RECIPIENT  DONOR  10/26/10     Upper GI Endoscopy with Band Ligation of Esoph/Gastric Varic. .            Medications  Prior to Admission medications    Medication Sig Start Date End Date Taking? Authorizing Provider   ferrous sulfate (IRON) 325 (65 FE) MG tablet Take 1 tablet by mouth daily (with breakfast)   Yes Reported, Patient   melatonin 3 MG tablet Take 1 tablet (3 mg) by mouth nightly as needed for sleep 10/27/17  Yes Karely Sierra MD   simethicone (MYLICON) 80 MG chewable tablet Take 1 tablet (80 mg) by mouth every 6 hours as needed for flatulence or cramping 10/27/17  Yes Karely Sierra MD   omeprazole (PRILOSEC) 20 MG CR capsule Take 1 capsule (20 mg) by mouth 2 times daily 10/27/17  Yes Karely Sierra MD   lidocaine (LIDODERM) 5 % Patch Apply up to 3 patches to painful area at once for up to 12 h within a 24 h period.  Remove after 12 hours. 10/27/17  Yes Karely Sierra MD   order for DME Right pull on neoprene knee brace. 10/27/17  Yes Michael Parra MD   order for DME Left pull on neoprene knee brace. 10/27/17  Yes Michael Parra MD   Flavoring Agent (PEPPERMINT FLAVOR) OIL Use as smelling agent as needed. 10/27/17   Yes Michael Parra MD   order for DME Blood Pressure machine 10/27/17  Yes Michael Parra MD   mycophenolate (GENERIC EQUIVALENT) 250 MG capsule Take 2 capsules (500 mg) by mouth every 12 hours 10/26/17  Yes Laron Anne MD   REGULOID 0.52 G capsule Take 3 capsules (1.56 g) by mouth 2 times daily 10/11/17  Yes Karely Sierra MD   Colloidal Oatmeal 1 % CREA Externally apply topically as needed 9/12/17  Yes Mele Hernandez MD   cycloSPORINE modified (GENERIC EQUIVALENT) 25 MG capsule Take 4 capsules (100 mg) by mouth every 12 hours 8/28/17  Yes Laron Anne MD   Elastic Bandages & Supports (MEDICAL COMPRESSION SOCKS) MISC 1 Units daily 8/16/17  Yes Jeremy Sepulveda MD   Elastic Bandages & Supports (KNEE BRACE/FLEX STAYS LARGE) MISC 1 Units daily 8/16/17  Yes Jeremy Sepulveda MD   multivitamin, therapeutic with minerals (MULTI-VITAMIN) TABS tablet Take 1 tablet by mouth daily 7/25/17  Yes Karely Sierra MD   acetaminophen (TYLENOL) 325 MG tablet Take 1-2 tablets (325-650 mg) by mouth every 6 hours as needed Max 6 tablets per 24 hours 7/20/17  Yes Amada Oconnor MD   carboxymethylcellulose (CELLUVISC/REFRESH LIQUIGEL) 1 % ophthalmic solution Place 1 drop into both eyes 4 times daily 6/13/17  Yes Nik Langley MD   prednisoLONE acetate (PRED FORTE) 1 % ophthalmic susp Use in operative eye four times a day  For 4 days 6/6/17  Yes Nik Langley MD   calcium-vitamin D (CALCIUM 600 + D) 600-400 MG-UNIT per tablet Take 1 tablet by mouth 2 times daily 5/5/17  Yes Karely Sierra MD   cholecalciferol (VITAMIN  -D) 1000 UNITS capsule Take 2 capsules (2,000 Units) by mouth daily 5/3/17  Yes Karely Sierra MD   senna-docusate (SENOKOT-S;PERICOLACE) 8.6-50 MG per tablet Take 2 tablets by mouth 2 times daily For constipation 5/1/17  Yes Laron Anne MD   alendronate (FOSAMAX) 70 MG tablet Take 1 tablet (70 mg) by mouth every 7 days Take at least 60 min before breakfast with over 8 oz water and stay  upright for at least 30 min. after dose. 2/27/17  Yes Laron Anne MD   sodium chloride (OCEAN) 0.65 % nasal spray Spray 1 spray into both nostrils daily as needed for congestion 2/3/17  Yes Rogelio Quintero MD   order for DME Equipment being ordered: 2 pairs of black stabilizer socks (beige okay if black not available). Size XL. 1/3/17  Yes Aby Ley MD   order for DME Equipment being ordered: Compression stockings below knee bilateral 1/2/17  Yes Rashad Nowak MD   order for DME Equipment being ordered: Back Stabilizer 1/2/17  Yes Rashad Nowak MD   order for DME Equipment being ordered: 4 Wheeled Walker with Seat and Brakes 12/8/16  Yes Patrick Mujica MD   order for DME 2-wheeled walker (dx: gait instability and Frequent falls). 11/28/16  Yes Rogelio Quintero MD   albuterol (PROAIR HFA, PROVENTIL HFA, VENTOLIN HFA) 108 (90 BASE) MCG/ACT inhaler Inhale 2 puffs into the lungs 4 times daily 9/14/16  Yes Gabrielle Mattson MD   order for DME Equipment being ordered: compression stockings bilateral  Please measure and fit patient for stockings   Strength 15-30mmHg  Disp 2 pairs  1 refill over the time of 1 year 7/27/16  Yes Karely Sierra MD   order for DME Equipment being ordered: cane 7/26/16  Yes Karely Sierra MD   bisacodyl (DULCOLAX) 5 MG EC tablet Take 1 tablet (5 mg) by mouth daily as needed for constipation 6/29/16  Yes Karely Sierra MD   carboxymethylcellul-glycerin (OPTIVE/REFRESH OPTIVE) 0.5-0.9 % SOLN ophthalmic solution Place 1 drop into both eyes 4 times daily 6/29/16  Yes Karely Sierra MD   Psyllium 500 MG CAPS Take 3 capsules by mouth 2 times daily 6/29/16  Yes Kareyl Sierra MD   sodium phosphate (FLEET ENEMA) 7-19 GM/118ML rectal enema Place 1 Bottle (1 enema) rectally daily as needed for constipation 6/29/16  Yes Karely Sierra MD   GENGRAF 25 MG PO CAPSULE Take 4 capsules (100 mg) by mouth every 12 hours 5/24/16  Yes Karely Sierra MD   STATIN NOT PRESCRIBED, INTENTIONAL, Not  "prescribed 1/19/16  Yes Karely Sierra MD   order for DME Equipment being ordered: Nebulizer with adult mask and tubing 1/19/16  Yes Karely Sierra MD   order for DME Equipment being ordered: cane with padded handle 1/19/16  Yes Karely Sierra MD   cephALEXin (KEFLEX) 500 MG capsule Take 1 capsule (500 mg) by mouth 3 times daily  Patient not taking: Reported on 11/2/2017 10/18/17   Nura Spicer MD       Allergies  Allergies   Allergen Reactions     Aspirin      3/31/16 Per pt, tolerates 81 mg daily dose without ADR.     325 mg dose caused itchiness and hives.     Clarithromycin      Allergic reaction         Contrast Dye      Penicillins        Family hx Social hx   Family History   Problem Relation Age of Onset     Hepatitis Other      Hep C, still in [place]     CEREBROVASCULAR DISEASE Other      CANCER No family hx of      DIABETES No family hx of      Hypertension No family hx of      Thyroid Disease No family hx of      Glaucoma No family hx of      Macular Degeneration No family hx of       Social History   Substance Use Topics     Smoking status: Never Smoker     Smokeless tobacco: Never Used     Alcohol use No          Review of systems  A 10-point review of systems was negative.    Examination  /81  Pulse 64  Temp 97.8  F (36.6  C) (Oral)  Ht 1.549 m (5' 1\")  Wt 94 kg (207 lb 3.2 oz)  SpO2 100%  BMI 39.15 kg/m2  Body mass index is 39.15 kg/(m^2).    Gen- well, NAD, A+Ox3, normal color  Eye- EOMI  ENT- MMM, normal oropharynx  Lym- no palpable lymphadenopathy  CVS- S1, S2 normal, no added sounds, RRR  RS- CTA  Abd- soft, NT, ND, chevron scar, no palpable organomegaly  Extr- pulses good, no MARCIAL  MS- hands normal- no clubbing  Neuro- A+Ox3, no asterixis  Skin- no rash or jaundice  Psych- normal mood    Laboratory  Lab Results   Component Value Date     11/02/2017    POTASSIUM 5.9 11/02/2017    CHLORIDE 110 11/02/2017    CO2 26 11/02/2017    BUN 25 11/02/2017    CR 1.08 11/02/2017       Lab " Results   Component Value Date    BILITOTAL 1.1 11/02/2017    ALT 26 11/02/2017    AST 18 11/02/2017    ALKPHOS 77 11/02/2017       Lab Results   Component Value Date    ALBUMIN 3.7 11/02/2017    PROTTOTAL 7.5 11/02/2017        Lab Results   Component Value Date    WBC 3.6 11/02/2017    HGB 12.3 11/02/2017    MCV 95 11/02/2017     11/02/2017       Lab Results   Component Value Date    INR 0.9 07/27/2017    INR 0.98 07/27/2017         Radiology n/a    Assessment/Plan:  ASSESSMENT AND PLAN:  My overall impression is that Ms. Navarro is a 52-year-old female 7 years status post liver transplantation for hepatitis C related cirrhosis.  She is overall doing well, and her graft function appears excellent.  On review of her labs, she does have a stable leukopenia and thrombocytopenia.  She is also had some borderline hyperkalemia for sometime now, almost a year, but her potassium is higher than her previous values at 5.9 today.  She has been eating a number of high potassium foods.  We asked her to abstain from high potassium containing foods.  We also encouraged her to hydrate aggressively.  We will recheck her labs on Monday to ensure hyperkalemia has improved, as [place].  We will otherwise not plan to make any adjustments to her medications, as she has excellent graft function and no discernible side effects.  Her renal function is stable.  We will plan on seeing her back in approximately 4-6 months depending on her timeline for return unless something were to happen in between.        This patient was seen and discussed with Dr. Laron Anne.       The patient was seen and examined.  The above assessment and plan was developed jointly with the fellow.      Laron Anne MD      Professor of Medicine  [employer]      Executive Medical Director, Solid Organ Transplant Program  [facility]      Dictated by Aaron Harkins MD, Hepatology  Fellow

## 2017-11-02 NOTE — PROGRESS NOTES
[facility]    Hepatology New Patient Visit    Referring provider:  Karely Sierra      52 year old female    Chief complaint: follow-up liver transplant      HPI:   HISTORY OF PRESENT ILLNESS:  I had the pleasure of seeing Ms. Flor Navarro in the [facility] on 11/02/2017.  Ms. Navarro returns in follow-up now over 7 years status post liver transplant for hepatitis C related cirrhosis.  It is worth noting that she did obtain SVR with DAA treatment in early post-transplant period.  She has been maintained on both cyclosporine and mycophenolate.  No noted a rejection during the course of her transplantation.  She is overall doing okay at today's visit.  She still struggles with some left-sided ankle pain and overall fatigue.  She is going back to [place] in approximately 5 days to address some family issues that have been ongoing for some time and states that she will be gone for approximately 4-6 months.  She denies any increased abdominal girth or lower extremity edema.  She has no abdominal pain, itching, skin rash or fatigue.  She has no nausea, vomiting, diarrhea or constipation.  Her appetite has overall been good, although her weight loss has had a harleen.  She was previously quite successful at losing weight and now has essentially been stable around 210 pounds.  She otherwise states that she is feeling well and has no specific complaints.      Patient denies jaundice, abdominal distension, lower extremity edema, lethargy or confusion.    No history of melena, hematemesis or hematochezia.    Patient denies fevers, sweats, chills or weight loss.  She did have fevers chills and dysuria 2 weeks ago and was found to have UTI-->treated with course of levofloxacin  Medical hx Surgical hx   Past Medical History:   Diagnosis Date     Anemia in CKD (chronic kidney disease)      Cataract      CKD (chronic kidney disease) stage 3, GFR 30-59 ml/min       Hepatitis C      High risk medication use      Hypertension, renal      Immunosuppressed status (H)      Liver replaced by transplant (H)      Osteoporosis      Recurrent pregnancy loss without current pregnancy      Stroke, hemorrhagic (H)      Syncope      Unspecified viral hepatitis C without hepatic coma       Past Surgical History:   Procedure Laterality Date     CATARACT IOL, RT/LT Right 2/18/15      SECTION       Incisional Hernia Repair  2004     INSERT SHUNT PORTAL TRANSJUGULAR INTRAHEPTIC  2005    shunt placement for liver failure     LAPAROSCOPIC SALPINGO-OOPHORECTOMY      left     NECK SURGERY  2010    fracture, in halo x 7months     TRANSPLANT LIVER RECIPIENT  DONOR  10/26/10     Upper GI Endoscopy with Band Ligation of Esoph/Gastric Varic. .            Medications  Prior to Admission medications    Medication Sig Start Date End Date Taking? Authorizing Provider   ferrous sulfate (IRON) 325 (65 FE) MG tablet Take 1 tablet by mouth daily (with breakfast)   Yes Reported, Patient   melatonin 3 MG tablet Take 1 tablet (3 mg) by mouth nightly as needed for sleep 10/27/17  Yes Karely Sierra MD   simethicone (MYLICON) 80 MG chewable tablet Take 1 tablet (80 mg) by mouth every 6 hours as needed for flatulence or cramping 10/27/17  Yes Karely Sierra MD   omeprazole (PRILOSEC) 20 MG CR capsule Take 1 capsule (20 mg) by mouth 2 times daily 10/27/17  Yes Karely Sierra MD   lidocaine (LIDODERM) 5 % Patch Apply up to 3 patches to painful area at once for up to 12 h within a 24 h period.  Remove after 12 hours. 10/27/17  Yes Karely Sierra MD   order for DME Right pull on neoprene knee brace. 10/27/17  Yes Michael Parra MD   order for DME Left pull on neoprene knee brace. 10/27/17  Yes Michael Parra MD   Flavoring Agent (PEPPERMINT FLAVOR) OIL Use as smelling agent as needed. 10/27/17  Yes Michael Parra MD   order for DME Blood Pressure machine 10/27/17  Yes  Michael Parra MD   mycophenolate (GENERIC EQUIVALENT) 250 MG capsule Take 2 capsules (500 mg) by mouth every 12 hours 10/26/17  Yes Laron Anne MD   REGULOID 0.52 G capsule Take 3 capsules (1.56 g) by mouth 2 times daily 10/11/17  Yes Karely Sierra MD   Colloidal Oatmeal 1 % CREA Externally apply topically as needed 9/12/17  Yes Mele Hernandez MD   cycloSPORINE modified (GENERIC EQUIVALENT) 25 MG capsule Take 4 capsules (100 mg) by mouth every 12 hours 8/28/17  Yes Laron Anne MD   Elastic Bandages & Supports (MEDICAL COMPRESSION SOCKS) MISC 1 Units daily 8/16/17  Yes Jeremy Sepulveda MD   Elastic Bandages & Supports (KNEE BRACE/FLEX STAYS LARGE) MISC 1 Units daily 8/16/17  Yes Jeremy Sepulveda MD   multivitamin, therapeutic with minerals (MULTI-VITAMIN) TABS tablet Take 1 tablet by mouth daily 7/25/17  Yes Karely Sierra MD   acetaminophen (TYLENOL) 325 MG tablet Take 1-2 tablets (325-650 mg) by mouth every 6 hours as needed Max 6 tablets per 24 hours 7/20/17  Yes Amada Oconnor MD   carboxymethylcellulose (CELLUVISC/REFRESH LIQUIGEL) 1 % ophthalmic solution Place 1 drop into both eyes 4 times daily 6/13/17  Yes Nik Langley MD   prednisoLONE acetate (PRED FORTE) 1 % ophthalmic susp Use in operative eye four times a day  For 4 days 6/6/17  Yes Nik Langley MD   calcium-vitamin D (CALCIUM 600 + D) 600-400 MG-UNIT per tablet Take 1 tablet by mouth 2 times daily 5/5/17  Yes Karely Sierra MD   cholecalciferol (VITAMIN  -D) 1000 UNITS capsule Take 2 capsules (2,000 Units) by mouth daily 5/3/17  Yes Karely Sierra MD   senna-docusate (SENOKOT-S;PERICOLACE) 8.6-50 MG per tablet Take 2 tablets by mouth 2 times daily For constipation 5/1/17  Yes Laron Anne MD   alendronate (FOSAMAX) 70 MG tablet Take 1 tablet (70 mg) by mouth every 7 days Take at least 60 min before breakfast with over 8 oz water and stay upright for at least 30 min. after dose. 2/27/17  Yes Laron Anne MD   sodium chloride  (OCEAN) 0.65 % nasal spray Spray 1 spray into both nostrils daily as needed for congestion 2/3/17  Yes Rogelio Quintero MD   order for DME Equipment being ordered: 2 pairs of black stabilizer socks (beige okay if black not available). Size XL. 1/3/17  Yes Aby Ley MD   order for DME Equipment being ordered: Compression stockings below knee bilateral 1/2/17  Yes Rashad Nowak MD   order for DME Equipment being ordered: Back Stabilizer 1/2/17  Yes Rashad Nowak MD   order for DME Equipment being ordered: 4 Wheeled Walker with Seat and Brakes 12/8/16  Yes Patrick Mujica MD   order for DME 2-wheeled walker (dx: gait instability and Frequent falls). 11/28/16  Yes Rogelio Quintero MD   albuterol (PROAIR HFA, PROVENTIL HFA, VENTOLIN HFA) 108 (90 BASE) MCG/ACT inhaler Inhale 2 puffs into the lungs 4 times daily 9/14/16  Yes Gabrielle Mattson MD   order for DME Equipment being ordered: compression stockings bilateral  Please measure and fit patient for stockings   Strength 15-30mmHg  Disp 2 pairs  1 refill over the time of 1 year 7/27/16  Yes Karely Sierra MD   order for DME Equipment being ordered: cane 7/26/16  Yes Karely Sierra MD   bisacodyl (DULCOLAX) 5 MG EC tablet Take 1 tablet (5 mg) by mouth daily as needed for constipation 6/29/16  Yes Karely Sierra MD   carboxymethylcellul-glycerin (OPTIVE/REFRESH OPTIVE) 0.5-0.9 % SOLN ophthalmic solution Place 1 drop into both eyes 4 times daily 6/29/16  Yes Karely Sierra MD   Psyllium 500 MG CAPS Take 3 capsules by mouth 2 times daily 6/29/16  Yes Karely Sierra MD   sodium phosphate (FLEET ENEMA) 7-19 GM/118ML rectal enema Place 1 Bottle (1 enema) rectally daily as needed for constipation 6/29/16  Yes Karely Sierra MD   GENGRAF 25 MG PO CAPSULE Take 4 capsules (100 mg) by mouth every 12 hours 5/24/16  Yes Karely Sierra MD   STATIN NOT PRESCRIBED, INTENTIONAL, Not prescribed 1/19/16  Yes LinkKarely MD   order for DME Equipment being ordered: Nebulizer with  "adult mask and tubing 1/19/16  Yes Karely Sierra MD   order for DME Equipment being ordered: cane with padded handle 1/19/16  Yes Karely Sierra MD   cephALEXin (KEFLEX) 500 MG capsule Take 1 capsule (500 mg) by mouth 3 times daily  Patient not taking: Reported on 11/2/2017 10/18/17   Nura Spicer MD       Allergies  Allergies   Allergen Reactions     Aspirin      3/31/16 Per pt, tolerates 81 mg daily dose without ADR.     325 mg dose caused itchiness and hives.     Clarithromycin      Allergic reaction         Contrast Dye      Penicillins        Family hx Social hx   Family History   Problem Relation Age of Onset     Hepatitis Other      Hep C, still in [place]     CEREBROVASCULAR DISEASE Other      CANCER No family hx of      DIABETES No family hx of      Hypertension No family hx of      Thyroid Disease No family hx of      Glaucoma No family hx of      Macular Degeneration No family hx of       Social History   Substance Use Topics     Smoking status: Never Smoker     Smokeless tobacco: Never Used     Alcohol use No          Review of systems  A 10-point review of systems was negative.    Examination  /81  Pulse 64  Temp 97.8  F (36.6  C) (Oral)  Ht 1.549 m (5' 1\")  Wt 94 kg (207 lb 3.2 oz)  SpO2 100%  BMI 39.15 kg/m2  Body mass index is 39.15 kg/(m^2).    Gen- well, NAD, A+Ox3, normal color  Eye- EOMI  ENT- MMM, normal oropharynx  Lym- no palpable lymphadenopathy  CVS- S1, S2 normal, no added sounds, RRR  RS- CTA  Abd- soft, NT, ND, chevron scar, no palpable organomegaly  Extr- pulses good, no MARCIAL  MS- hands normal- no clubbing  Neuro- A+Ox3, no asterixis  Skin- no rash or jaundice  Psych- normal mood    Laboratory  Lab Results   Component Value Date     11/02/2017    POTASSIUM 5.9 11/02/2017    CHLORIDE 110 11/02/2017    CO2 26 11/02/2017    BUN 25 11/02/2017    CR 1.08 11/02/2017       Lab Results   Component Value Date    BILITOTAL 1.1 11/02/2017    ALT 26 11/02/2017    AST 18 11/02/2017 "    ALKPHOS 77 11/02/2017       Lab Results   Component Value Date    ALBUMIN 3.7 11/02/2017    PROTTOTAL 7.5 11/02/2017        Lab Results   Component Value Date    WBC 3.6 11/02/2017    HGB 12.3 11/02/2017    MCV 95 11/02/2017     11/02/2017       Lab Results   Component Value Date    INR 0.9 07/27/2017    INR 0.98 07/27/2017         Radiology n/a    Assessment/Plan:  ASSESSMENT AND PLAN:  My overall impression is that Ms. Navarro is a 52-year-old female 7 years status post liver transplantation for hepatitis C related cirrhosis.  She is overall doing well, and her graft function appears excellent.  On review of her labs, she does have a stable leukopenia and thrombocytopenia.  She is also had some borderline hyperkalemia for sometime now, almost a year, but her potassium is higher than her previous values at 5.9 today.  She has been eating a number of high potassium foods.  We asked her to abstain from high potassium containing foods.  We also encouraged her to hydrate aggressively.  We will recheck her labs on Monday to ensure hyperkalemia has improved, as she is leaving the country on the 7th.  We will otherwise not plan to make any adjustments to her medications, as she has excellent graft function and no discernible side effects.  Her renal function is stable.  We will plan on seeing her back in approximately 4-6 months depending on her timeline for return unless something were to happen in between.        This patient was seen and discussed with Dr. Laron Anne.       The patient was seen and examined.  The above assessment and plan was developed jointly with the fellow.      Laron Anne MD      Professor of Medicine  [employer]      Executive Medical Director, Solid Organ Transplant Program  [facility]      Dictated by Aaron Harkins MD, Hepatology Fellow

## 2017-11-02 NOTE — TELEPHONE ENCOUNTER
[facility] phone call message- medication clarification/question:    Full Medication Name: REGULOID 0.52 G capsule       Question: Pharmacy is calling because the .52 capsule is no longer available. They only thing available is the 400mg version. Patient wondering what PCP would like to do.      Pharmacy confirmed as      [facility] - [place], [place] - [facility] [address]  : Yes    OK to leave a message on voice mail? No, someone will answer    Primary language: Thai      needed? Yes    Call taken on November 2, 2017 at 11:00 AM by Hellen Lobo

## 2017-11-03 NOTE — TELEPHONE ENCOUNTER
Medication Refill Denied  Reason: Patient needs: provider visit and lab tests,  Provider: I have not called the patient about the Rx denial, please call.  PCS: Please notify the pharmacy  RN: Please contact the patient to explain reasoning provided above and to schedule the patient for a provider visit with any provider..    RN may order temporary refill so that the patient will not run out of medication prior to the scheduled visit.    Karely Sierra MD

## 2017-11-06 DIAGNOSIS — E87.5 HYPERKALEMIA: ICD-10-CM

## 2017-11-06 LAB
ANION GAP SERPL CALCULATED.3IONS-SCNC: 6 MMOL/L (ref 3–14)
BUN SERPL-MCNC: 24 MG/DL (ref 7–30)
CALCIUM SERPL-MCNC: 9.1 MG/DL (ref 8.5–10.1)
CHLORIDE SERPL-SCNC: 106 MMOL/L (ref 94–109)
CO2 SERPL-SCNC: 28 MMOL/L (ref 20–32)
CREAT SERPL-MCNC: 1.17 MG/DL (ref 0.52–1.04)
GFR SERPL CREATININE-BSD FRML MDRD: 48 ML/MIN/1.7M2
GLUCOSE SERPL-MCNC: 94 MG/DL (ref 70–99)
POTASSIUM SERPL-SCNC: 5 MMOL/L (ref 3.4–5.3)
SODIUM SERPL-SCNC: 139 MMOL/L (ref 133–144)

## 2017-11-07 ENCOUNTER — OFFICE VISIT (OUTPATIENT)
Dept: FAMILY MEDICINE | Facility: CLINIC | Age: 52
End: 2017-11-07

## 2017-11-07 VITALS
OXYGEN SATURATION: 95 % | DIASTOLIC BLOOD PRESSURE: 69 MMHG | HEART RATE: 82 BPM | BODY MASS INDEX: 39.49 KG/M2 | WEIGHT: 209 LBS | SYSTOLIC BLOOD PRESSURE: 101 MMHG | TEMPERATURE: 97.5 F | RESPIRATION RATE: 16 BRPM

## 2017-11-07 DIAGNOSIS — Z71.89 COMPLEX CARE COORDINATION: Primary | ICD-10-CM

## 2017-11-07 NOTE — PROGRESS NOTES
HPI:       Flor Navarro is a 52 year old who presents for the following  Patient presents with:  Refill Request: Pt requesting medication refill    Here to discuss needs prior to her leaving for [place]. Needs letter to be able to take medications on plane and a letter recommending that after this visit that her family should visit her in the [place] due to her medical issues.     She saw Dr. Anne recently, completed lab work and get her medications. She states she is doing well and is looking forward to traveling to [place], but is concerned that things may not go smoothly.     A Khmer  was used for  this visit.      Problem, Medication and Allergy Lists were   reviewed and are current.     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        Mixed hyperlipidemia 03/03/2017     Priority: Medium     Secondary hyperparathyroidism (H) 07/25/2016     Priority: Medium     Abdominal pain 04/05/2016     Priority: Medium     Constipation, chronic 04/05/2016     Priority: Medium     Urinary tract infection due to extended-spectrum beta lactamase (ESBL)-producing Klebsiella 12/23/2015     Priority: Medium     Pseudophakia - Left Eye 02/05/2015     Priority: Medium     Shoulder joint pain 11/06/2014     Priority: Medium      Vitamin D deficiency 09/17/2014     Priority: Medium     Problem list name updated by automated process. Provider to review       Advanced directives, counseling/discussion 10/30/2013     Priority: Medium     POLST on file October 30, 2013  Full code       Ganglion cyst 10/02/2013     Priority: Medium     Immunosuppressed status (H)      Priority: Medium     Cystitis cystica 07/03/2013     Priority: Medium     Stroke, hemorrhagic (H)      Priority: Medium     STOP aspirin. She has a hx of hemorrhagic stroke, and her 10 yr risk of MI is only 3% and wouldn't rec treating w/ ASA unless >7.5%.        Osteoporosis      Priority: Medium     Problem list name updated by automated process. Provider to review and confirm  [employer] Update utility       Overweight 04/09/2013     Priority: Medium     Problem list name updated by automated process. Provider to review       CKD (chronic kidney disease) stage 3, GFR 30-59 ml/min      Priority: Medium     Cr. 1.3-1.6. Sees Dr. Wells. Next visit Aug 2015.       Hypertension, renal      Priority: Medium     Liver replaced by transplant      Priority: Medium     2011, due to Hep C, follows w/ Dr. Anne q6mos  MM should be taken on an empty stomach, avoid antacids.   Cyclosporine goal levels ~100ng/dL per transplant surgeon Dr. Fortune Dec 2015       High risk medication use      Priority: Medium     Anemia in CKD (chronic kidney disease)      Priority: Medium     Hyperlipidemia LDL goal <160 04/28/2011     Priority: Medium     PVD (POSTERIOR VITREOUS DETACHMENT) OS 02/28/2011     Priority: Medium   ,     Current Outpatient Prescriptions   Medication Sig Dispense Refill     albuterol (PROAIR HFA/PROVENTIL HFA/VENTOLIN HFA) 108 (90 BASE) MCG/ACT Inhaler Inhale 2 puffs into the lungs 4 times daily 1 Inhaler 1     ferrous sulfate (IRON) 325 (65 FE) MG tablet Take 1 tablet by mouth daily (with breakfast)       Psyllium 400 MG CAPS Take 1,200 mg by mouth 2 times daily 180 capsule 11      melatonin 3 MG tablet Take 1 tablet (3 mg) by mouth nightly as needed for sleep 100 tablet 3     simethicone (MYLICON) 80 MG chewable tablet Take 1 tablet (80 mg) by mouth every 6 hours as needed for flatulence or cramping 120 tablet 0     lidocaine (LIDODERM) 5 % Patch Apply up to 3 patches to painful area at once for up to 12 h within a 24 h period.  Remove after 12 hours. 30 patch 0     Flavoring Agent (PEPPERMINT FLAVOR) OIL Use as smelling agent as needed. 1 mL 0     mycophenolate (GENERIC EQUIVALENT) 250 MG capsule Take 2 capsules (500 mg) by mouth every 12 hours 120 capsule 11     Colloidal Oatmeal 1 % CREA Externally apply topically as needed 226 g 1     cycloSPORINE modified (GENERIC EQUIVALENT) 25 MG capsule Take 4 capsules (100 mg) by mouth every 12 hours 240 capsule 11     multivitamin, therapeutic with minerals (MULTI-VITAMIN) TABS tablet Take 1 tablet by mouth daily 100 tablet 11     acetaminophen (TYLENOL) 325 MG tablet Take 1-2 tablets (325-650 mg) by mouth every 6 hours as needed Max 6 tablets per 24 hours 100 tablet 3     prednisoLONE acetate (PRED FORTE) 1 % ophthalmic susp Use in operative eye four times a day  For 4 days 1 Bottle 0     calcium-vitamin D (CALCIUM 600 + D) 600-400 MG-UNIT per tablet Take 1 tablet by mouth 2 times daily 60 tablet 11     cholecalciferol (VITAMIN  -D) 1000 UNITS capsule Take 2 capsules (2,000 Units) by mouth daily 180 capsule 3     senna-docusate (SENOKOT-S;PERICOLACE) 8.6-50 MG per tablet Take 2 tablets by mouth 2 times daily For constipation 120 tablet 6     alendronate (FOSAMAX) 70 MG tablet Take 1 tablet (70 mg) by mouth every 7 days Take at least 60 min before breakfast with over 8 oz water and stay upright for at least 30 min. after dose. 12 tablet 3     sodium chloride (OCEAN) 0.65 % nasal spray Spray 1 spray into both nostrils daily as needed for congestion 30 mL 1     bisacodyl (DULCOLAX) 5 MG EC tablet Take 1 tablet (5 mg) by mouth daily as needed for  constipation 30 tablet 11     carboxymethylcellul-glycerin (OPTIVE/REFRESH OPTIVE) 0.5-0.9 % SOLN ophthalmic solution Place 1 drop into both eyes 4 times daily 1 each 11     Psyllium 500 MG CAPS Take 3 capsules by mouth 2 times daily 180 capsule 11     sodium phosphate (FLEET ENEMA) 7-19 GM/118ML rectal enema Place 1 Bottle (1 enema) rectally daily as needed for constipation 3 Bottle 11     GENGRAF 25 MG PO CAPSULE Take 4 capsules (100 mg) by mouth every 12 hours 240 capsule 0     albuterol (PROAIR HFA/PROVENTIL HFA/VENTOLIN HFA) 108 (90 BASE) MCG/ACT Inhaler Inhale 2 puffs into the lungs 4 times daily 1 Inhaler 1     omeprazole (PRILOSEC) 20 MG CR capsule Take 1 capsule (20 mg) by mouth 2 times daily 180 capsule 3     order for DME Right pull on neoprene knee brace. 1 Units 0     order for DME Left pull on neoprene knee brace. 1 Units 0     order for DME Blood Pressure machine 1 Units 0     cephALEXin (KEFLEX) 500 MG capsule Take 1 capsule (500 mg) by mouth 3 times daily (Patient not taking: Reported on 11/2/2017) 21 capsule 0     Elastic Bandages & Supports (MEDICAL COMPRESSION SOCKS) MISC 1 Units daily 2 each 0     Elastic Bandages & Supports (KNEE BRACE/FLEX STAYS LARGE) MISC 1 Units daily 2 each 0     carboxymethylcellulose (CELLUVISC/REFRESH LIQUIGEL) 1 % ophthalmic solution Place 1 drop into both eyes 4 times daily 15 each 11     order for DME Equipment being ordered: 2 pairs of black stabilizer socks (beige okay if black not available). Size XL. 2 each 0     order for DME Equipment being ordered: Compression stockings below knee bilateral 2 each 0     order for DME Equipment being ordered: Back Stabilizer 1 each 0     order for DME Equipment being ordered: 4 Wheeled Walker with Seat and Brakes 1 Units 0     order for DME 2-wheeled walker (dx: gait instability and Frequent falls). 1 Units 0     order for DME Equipment being ordered: compression stockings bilateral  Please measure and fit patient for stockings    Strength 15-30mmHg  Disp 2 pairs  1 refill over the time of 1 year 2 Units 1     order for DME Equipment being ordered: cane 1 Units 0     STATIN NOT PRESCRIBED, INTENTIONAL, Not prescribed 0 each 0     order for DME Equipment being ordered: Nebulizer with adult mask and tubing 1 Units 0     order for DME Equipment being ordered: cane with padded handle 1 Units 0     [DISCONTINUED] solifenacin (VESICARE) 5 MG tablet Take 1 tablet (5 mg) by mouth daily 30 tablet 12   ,     Allergies   Allergen Reactions     Aspirin      3/31/16 Per pt, tolerates 81 mg daily dose without ADR.     325 mg dose caused itchiness and hives.     Clarithromycin      Allergic reaction         Contrast Dye      Penicillins      Patient is an established patient of this clinic.         Review of Systems:   Review of Systems   Constitutional: Negative for appetite change, chills, fatigue and fever.   HENT: Negative for congestion, sinus pressure and sore throat.    Eyes: Negative for visual disturbance.   Respiratory: Negative for cough, shortness of breath and wheezing.    Cardiovascular: Negative for chest pain and leg swelling.   Gastrointestinal: Negative for abdominal distention, abdominal pain, blood in stool, constipation, diarrhea, nausea and vomiting.   Genitourinary: Negative for dysuria and hematuria.   Musculoskeletal: Positive for arthralgias (bilateral knee pain). Negative for myalgias.   Skin: Negative for color change and rash.   Neurological: Negative for weakness and headaches.             Physical Exam:   Patient Vitals for the past 24 hrs:   BP Temp Temp src Pulse Resp SpO2 Weight   11/07/17 1336 101/69 97.5  F (36.4  C) Oral 82 16 95 % 209 lb (94.8 kg)     Body mass index is 39.49 kg/(m^2).  Vitals were reviewed and were normal     Physical Exam   Constitutional: She is oriented to person, place, and time. She appears well-developed and well-nourished. No distress.   Eyes: Conjunctivae are normal. No scleral icterus.    Neck: Normal range of motion. Neck supple.   Cardiovascular: Exam reveals no friction rub.    Pulmonary/Chest: Effort normal and breath sounds normal. No respiratory distress.   Lymphadenopathy:     She has no cervical adenopathy.   Neurological: She is alert and oriented to person, place, and time.   Skin: She is not diaphoretic.       Results:     No investigations this encounter.     Assessment and Plan     Flor is a 50 yo woman with a h/o HLD, Vit D def, Osteoporosis, HTN, secondary hyperparathyroidism, left peroneal tendonitis, s/p liver transplant secondary Hepatitis C (negative since transplant about 7 years ago) who presents to the clinic to prepare for upcoming trip to [place].     1. Complex care coordination  - provided letters to patient   - discussed with SW regarding knee braces, they will be sent directly to patient's home.     There are no discontinued medications.  Options for treatment and follow-up care were reviewed with the patient. Flor Navarro  engaged in the decision making process and verbalized understanding of the options discussed and agreed with the final plan.    Karen Downs MD  [facility]  [phone]

## 2017-11-07 NOTE — PROGRESS NOTES
Preceptor Attestation:   Patient seen and discussed with the resident. Assessment and plan reviewed with resident and agreed upon.   Supervising Physician:  Yvette Domingo MD  [facility]

## 2017-11-07 NOTE — MR AVS SNAPSHOT
After Visit Summary   2017    Flor Navarro    MRN: [id number]           Patient Information     Date Of Birth          1965        Visit Information        Provider Department      2017 1:40 PM Karen Downs MD [facility]        Today's Diagnoses     Complex care coordination    -  1       Follow-ups after your visit        Follow-up notes from your care team     Return in about 6 months (around 2018).      Who to contact     Please call your clinic at [phone] to:    Ask questions about your health    Make or cancel appointments    Discuss your medicines    Learn about your test results    Speak to your doctor   If you have compliments or concerns about an experience at your clinic, or if you wish to file a complaint, please contact [facility] Patient Relations at [phone] or email us at [email or web address]         Additional Information About Your Visit        MyChart Information     [employer] is an electronic gateway that provides easy, online access to your medical records. With [employer], you can request a clinic appointment, read your test results, renew a prescription or communicate with your care team.     To sign up for [employer] visit the website at [email or web address]   You will be asked to enter the access code listed below, as well as some personal information. Please follow the directions to create your username and password.     Your access code is: [email or web address]  Expires: 12/3/2017  5:30 AM     Your access code will  in 90 days. If you need help or a new code, please contact your [facility] or call [phone] for assistance.        Care EveryWhere ID     This is your Care EveryWhere ID. This could be used by other organizations to access your [place] medical records  [id number]        Your Vitals Were     Pulse Temperature Respirations Pulse  Oximetry BMI (Body Mass Index)       82 97.5  F (36.4  C) (Oral) 16 95% 39.49 kg/m2        Blood Pressure from Last 3 Encounters:   11/07/17 101/69   11/02/17 120/81   10/27/17 135/85    Weight from Last 3 Encounters:   11/07/17 209 lb (94.8 kg)   11/02/17 207 lb 3.2 oz (94 kg)   10/27/17 211 lb 6.4 oz (95.9 kg)              Today, you had the following     No orders found for display       Primary Care Provider Office Phone # Fax #    Karely Sierra [phone]       [address] [address]        Equal Access to Services     MAURO ALBA : Rayne Adair, roxy briceno, fadi jin, mart piña. So [facility] [phone].    ATENCIÓN: Si habla español, tiene a garcia disposición servicios gratuitos de asistencia lingüística. Llame al [phone].    We comply with applicable federal civil rights laws and [place] laws. We do not discriminate on the basis of race, color, national origin, age, disability, sex, sexual orientation, or gender identity.            Thank you!     Thank you for choosing [facility]  for your care. Our goal is always to provide you with excellent care. Hearing back from our patients is one way we can continue to improve our services. Please take a few minutes to complete the written survey that you may receive in the mail after your visit with us. Thank you!             Your Updated Medication List - Protect others around you: Learn how to safely use, store and throw away your medicines at www.disposemymeds.org.          This list is accurate as of: 11/7/17 11:59 PM.  Always use your most recent med list.                   Brand Name Dispense Instructions for use Diagnosis    acetaminophen 325 MG tablet    TYLENOL    100 tablet    Take 1-2 tablets (325-650 mg) by mouth every 6 hours as needed Max 6 tablets per 24 hours    Sebaceous cyst       * albuterol 108 (90 BASE) MCG/ACT Inhaler     PROAIR HFA/PROVENTIL HFA/VENTOLIN HFA    1 Inhaler    Inhale 2 puffs into the lungs 4 times daily    Acute bronchitis, unspecified organism       * albuterol 108 (90 BASE) MCG/ACT Inhaler    PROAIR HFA/PROVENTIL HFA/VENTOLIN HFA    1 Inhaler    Inhale 2 puffs into the lungs 4 times daily    Acute bronchitis, unspecified organism       alendronate 70 MG tablet    FOSAMAX    12 tablet    Take 1 tablet (70 mg) by mouth every 7 days Take at least 60 min before breakfast with over 8 oz water and stay upright for at least 30 min. after dose.    Osteoporosis       bisacodyl 5 MG EC tablet    DULCOLAX    30 tablet    Take 1 tablet (5 mg) by mouth daily as needed for constipation    Constipation, chronic       calcium-vitamin D 600-400 MG-UNIT per tablet    calcium 600 + D    60 tablet    Take 1 tablet by mouth 2 times daily    Osteoporosis       carboxymethylcellul-glycerin 0.5-0.9 % Soln ophthalmic solution    OPTIVE/REFRESH OPTIVE    1 each    Place 1 drop into both eyes 4 times daily    Dry eye syndrome of bilateral lacrimal glands       carboxymethylcellulose 1 % ophthalmic solution    CELLUVISC/REFRESH LIQUIGEL    15 each    Place 1 drop into both eyes 4 times daily    Dry eyes, bilateral       cephALEXin 500 MG capsule    KEFLEX    21 capsule    Take 1 capsule (500 mg) by mouth 3 times daily    Complicated UTI (urinary tract infection)       cholecalciferol 1000 UNITS capsule    vitamin  -D    180 capsule    Take 2 capsules (2,000 Units) by mouth daily    Osteoporosis, Liver replaced by transplant (H), Vitamin D deficiency, Secondary hyperparathyroidism (H), CKD (chronic kidney disease) stage 3, GFR 30-59 ml/min, Palpitations       Colloidal Oatmeal 1 % Crea     226 g    Externally apply topically as needed    Dry skin       * cycloSPORINE modified 25 MG capsule     240 capsule    Take 4 capsules (100 mg) by mouth every 12 hours    Liver replaced by transplant (H)       * cycloSPORINE modified 25 MG capsule    GENERIC  EQUIVALENT    240 capsule    Take 4 capsules (100 mg) by mouth every 12 hours    Liver replaced by transplant (H)       ferrous sulfate 325 (65 FE) MG tablet    IRON     Take 1 tablet by mouth daily (with breakfast)        lidocaine 5 % Patch    LIDODERM    30 patch    Apply up to 3 patches to painful area at once for up to 12 h within a 24 h period.  Remove after 12 hours.    Liver replaced by transplant (H)       * Medical Compression Socks Misc     2 each    1 Units daily    Leg swelling       * Knee Brace/Flex Stays Large Misc     2 each    1 Units daily    Chronic pain of both knees       melatonin 3 MG tablet     100 tablet    Take 1 tablet (3 mg) by mouth nightly as needed for sleep    Primary insomnia       multivitamin, therapeutic with minerals Tabs tablet     100 tablet    Take 1 tablet by mouth daily    Liver replaced by transplant (H)       mycophenolate 250 MG capsule    GENERIC EQUIVALENT    120 capsule    Take 2 capsules (500 mg) by mouth every 12 hours    Liver replaced by transplant (H)       omeprazole 20 MG CR capsule    priLOSEC    180 capsule    Take 1 capsule (20 mg) by mouth 2 times daily    Gastroesophageal reflux disease, esophagitis presence not specified       * order for DME     1 Units    Equipment being ordered: Nebulizer with adult mask and tubing    Acute bronchitis, unspecified organism       * order for DME     1 Units    Equipment being ordered: cane with padded handle    Gait instability       * order for DME     1 Units    Equipment being ordered: cane    Osteoporosis, Falls frequently       * order for DME     2 Units    Equipment being ordered: compression stockings bilateral Please measure and fit patient for stockings  Strength 15-30mmHg Disp 2 pairs 1 refill over the time of 1 year    Localized edema       * order for DME     1 Units    2-wheeled walker (dx: gait instability and Frequent falls).    Gait instability, Falls frequently       * order for DME     1 Units     Equipment being ordered: 4 Wheeled Walker with Seat and Brakes    Falls frequently, Gait instability       * order for DME     2 each    Equipment being ordered: Compression stockings below knee bilateral    Bilateral edema of lower extremity       * order for DME     1 each    Equipment being ordered: Back Stabilizer    Chronic midline low back pain without sciatica       * order for DME     2 each    Equipment being ordered: 2 pairs of black stabilizer socks (beige okay if black not available). Size XL.    Bilateral edema of lower extremity       * order for DME     1 Units    Right pull on neoprene knee brace.    Chronic pain of right knee       * order for DME     1 Units    Left pull on neoprene knee brace.    Chronic pain of left knee       * order for DME     1 Units    Blood Pressure machine    Hypertension, renal       Peppermint Flavor Oil     1 mL    Use as smelling agent as needed.    Nausea       prednisoLONE acetate 1 % ophthalmic susp    PRED FORTE    1 Bottle    Use in operative eye four times a day  For 4 days    Posterior capsular opacification visually significant of both eyes       * Psyllium 500 MG Caps     180 capsule    Take 3 capsules by mouth 2 times daily    Constipation, chronic       * Psyllium 400 MG Caps     180 capsule    Take 1,200 mg by mouth 2 times daily    Constipation, chronic       senna-docusate 8.6-50 MG per tablet    SENOKOT-S;PERICOLACE    120 tablet    Take 2 tablets by mouth 2 times daily For constipation    Constipation, chronic       simethicone 80 MG chewable tablet    MYLICON    120 tablet    Take 1 tablet (80 mg) by mouth every 6 hours as needed for flatulence or cramping    Flatulence, eructation, and gas pain       sodium chloride 0.65 % nasal spray    OCEAN    30 mL    Spray 1 spray into both nostrils daily as needed for congestion    Throat pain       sodium phosphate 7-19 GM/118ML rectal enema     3 Bottle    Place 1 Bottle (1 enema) rectally daily as needed for  constipation    Ileus (H)       STATIN NOT PRESCRIBED (INTENTIONAL)     0 each    Not prescribed    High risk medication use       * Notice:  This list has 20 medication(s) that are the same as other medications prescribed for you. Read the directions carefully, and ask your doctor or other care provider to review them with you.

## 2017-11-08 ASSESSMENT — ENCOUNTER SYMPTOMS
DYSURIA: 0
SHORTNESS OF BREATH: 0
VOMITING: 0
COUGH: 0
MYALGIAS: 0
ARTHRALGIAS: 1
HEADACHES: 0
CHILLS: 0
SORE THROAT: 0
BLOOD IN STOOL: 0
ABDOMINAL DISTENTION: 0
WHEEZING: 0
APPETITE CHANGE: 0
SINUS PRESSURE: 0
CONSTIPATION: 0
ABDOMINAL PAIN: 0
FEVER: 0
WEAKNESS: 0
COLOR CHANGE: 0
DIARRHEA: 0
FATIGUE: 0
HEMATURIA: 0
NAUSEA: 0

## 2017-11-10 ENCOUNTER — DOCUMENTATION ONLY (OUTPATIENT)
Dept: FAMILY MEDICINE | Facility: CLINIC | Age: 52
End: 2017-11-10

## 2017-11-10 NOTE — PROGRESS NOTES
"When opening a documentation only encounter, be sure to enter in \"Chief Complaint\" Forms and in \" Comments\" Title of form, description if needed.    Flor is a 52 year old  female  Form received via: Fax  Form now resides in: Provider Edward Batista                  "

## 2017-11-13 ASSESSMENT — ENCOUNTER SYMPTOMS
BLOOD IN STOOL: 0
SINUS PRESSURE: 0
WHEEZING: 0
HEADACHES: 0
FEVER: 0
ABDOMINAL PAIN: 0
COUGH: 0
HEMATURIA: 0
ARTHRALGIAS: 1
APPETITE CHANGE: 0
FATIGUE: 0
CHILLS: 0
VOMITING: 0
CONSTIPATION: 0
COLOR CHANGE: 0
WEAKNESS: 0
DYSURIA: 0
MYALGIAS: 0
SORE THROAT: 0
NAUSEA: 0
DIARRHEA: 0
SHORTNESS OF BREATH: 0
ABDOMINAL DISTENTION: 0

## 2017-11-13 NOTE — PROGRESS NOTES
HPI:       Flor Navarro is a 52 year old who presents for the following  Patient presents with:  Refill Request: Pt requesting medication refill    Here to discuss needs prior to her leaving for [place]. Needs letter to be able to take medications on plane and a letter recommending that after this visit that her family should visit her in the [place] due to her medical issues.     She saw Dr. Anne recently, completed lab work and get her medications. She states she is doing well and is looking forward to traveling to [place], but is concerned that things may not go smoothly.     A Estonian  was used for  this visit.      Problem, Medication and Allergy Lists were   reviewed and are current.     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        Mixed hyperlipidemia 03/03/2017     Priority: Medium     Secondary hyperparathyroidism (H) 07/25/2016     Priority: Medium     Abdominal pain 04/05/2016     Priority: Medium     Constipation, chronic 04/05/2016     Priority: Medium     Urinary tract infection due to extended-spectrum beta lactamase (ESBL)-producing Klebsiella 12/23/2015     Priority: Medium     Pseudophakia - Left Eye 02/05/2015     Priority: Medium     Shoulder joint pain 11/06/2014     Priority: Medium      Vitamin D deficiency 09/17/2014     Priority: Medium     Problem list name updated by automated process. Provider to review       Advanced directives, counseling/discussion 10/30/2013     Priority: Medium     POLST on file October 30, 2013  Full code       Ganglion cyst 10/02/2013     Priority: Medium     Immunosuppressed status (H)      Priority: Medium     Cystitis cystica 07/03/2013     Priority: Medium     Stroke, hemorrhagic (H)      Priority: Medium     STOP aspirin. She has a hx of hemorrhagic stroke, and her 10 yr risk of MI is only 3% and wouldn't rec treating w/ ASA unless >7.5%.        Osteoporosis      Priority: Medium     Problem list name updated by automated process. Provider to review and confirm  [employer] Update utility       Overweight 04/09/2013     Priority: Medium     Problem list name updated by automated process. Provider to review       CKD (chronic kidney disease) stage 3, GFR 30-59 ml/min      Priority: Medium     Cr. 1.3-1.6. Sees Dr. Wells. Next visit Aug 2015.       Hypertension, renal      Priority: Medium     Liver replaced by transplant      Priority: Medium     2011, due to Hep C, follows w/ Dr. Anne q6mos  MM should be taken on an empty stomach, avoid antacids.   Cyclosporine goal levels ~100ng/dL per transplant surgeon Dr. Fortune Dec 2015       High risk medication use      Priority: Medium     Anemia in CKD (chronic kidney disease)      Priority: Medium     Hyperlipidemia LDL goal <160 04/28/2011     Priority: Medium     PVD (POSTERIOR VITREOUS DETACHMENT) OS 02/28/2011     Priority: Medium   ,     Current Outpatient Prescriptions   Medication Sig Dispense Refill     albuterol (PROAIR HFA/PROVENTIL HFA/VENTOLIN HFA) 108 (90 BASE) MCG/ACT Inhaler Inhale 2 puffs into the lungs 4 times daily 1 Inhaler 1     ferrous sulfate (IRON) 325 (65 FE) MG tablet Take 1 tablet by mouth daily (with breakfast)       Psyllium 400 MG CAPS Take 1,200 mg by mouth 2 times daily 180 capsule 11      melatonin 3 MG tablet Take 1 tablet (3 mg) by mouth nightly as needed for sleep 100 tablet 3     simethicone (MYLICON) 80 MG chewable tablet Take 1 tablet (80 mg) by mouth every 6 hours as needed for flatulence or cramping 120 tablet 0     lidocaine (LIDODERM) 5 % Patch Apply up to 3 patches to painful area at once for up to 12 h within a 24 h period.  Remove after 12 hours. 30 patch 0     Flavoring Agent (PEPPERMINT FLAVOR) OIL Use as smelling agent as needed. 1 mL 0     mycophenolate (GENERIC EQUIVALENT) 250 MG capsule Take 2 capsules (500 mg) by mouth every 12 hours 120 capsule 11     Colloidal Oatmeal 1 % CREA Externally apply topically as needed 226 g 1     cycloSPORINE modified (GENERIC EQUIVALENT) 25 MG capsule Take 4 capsules (100 mg) by mouth every 12 hours 240 capsule 11     multivitamin, therapeutic with minerals (MULTI-VITAMIN) TABS tablet Take 1 tablet by mouth daily 100 tablet 11     acetaminophen (TYLENOL) 325 MG tablet Take 1-2 tablets (325-650 mg) by mouth every 6 hours as needed Max 6 tablets per 24 hours 100 tablet 3     prednisoLONE acetate (PRED FORTE) 1 % ophthalmic susp Use in operative eye four times a day  For 4 days 1 Bottle 0     calcium-vitamin D (CALCIUM 600 + D) 600-400 MG-UNIT per tablet Take 1 tablet by mouth 2 times daily 60 tablet 11     cholecalciferol (VITAMIN  -D) 1000 UNITS capsule Take 2 capsules (2,000 Units) by mouth daily 180 capsule 3     senna-docusate (SENOKOT-S;PERICOLACE) 8.6-50 MG per tablet Take 2 tablets by mouth 2 times daily For constipation 120 tablet 6     alendronate (FOSAMAX) 70 MG tablet Take 1 tablet (70 mg) by mouth every 7 days Take at least 60 min before breakfast with over 8 oz water and stay upright for at least 30 min. after dose. 12 tablet 3     sodium chloride (OCEAN) 0.65 % nasal spray Spray 1 spray into both nostrils daily as needed for congestion 30 mL 1     bisacodyl (DULCOLAX) 5 MG EC tablet Take 1 tablet (5 mg) by mouth daily as needed for  constipation 30 tablet 11     carboxymethylcellul-glycerin (OPTIVE/REFRESH OPTIVE) 0.5-0.9 % SOLN ophthalmic solution Place 1 drop into both eyes 4 times daily 1 each 11     Psyllium 500 MG CAPS Take 3 capsules by mouth 2 times daily 180 capsule 11     sodium phosphate (FLEET ENEMA) 7-19 GM/118ML rectal enema Place 1 Bottle (1 enema) rectally daily as needed for constipation 3 Bottle 11     GENGRAF 25 MG PO CAPSULE Take 4 capsules (100 mg) by mouth every 12 hours 240 capsule 0     albuterol (PROAIR HFA/PROVENTIL HFA/VENTOLIN HFA) 108 (90 BASE) MCG/ACT Inhaler Inhale 2 puffs into the lungs 4 times daily 1 Inhaler 1     omeprazole (PRILOSEC) 20 MG CR capsule Take 1 capsule (20 mg) by mouth 2 times daily 180 capsule 3     order for DME Right pull on neoprene knee brace. 1 Units 0     order for DME Left pull on neoprene knee brace. 1 Units 0     order for DME Blood Pressure machine 1 Units 0     cephALEXin (KEFLEX) 500 MG capsule Take 1 capsule (500 mg) by mouth 3 times daily (Patient not taking: Reported on 11/2/2017) 21 capsule 0     Elastic Bandages & Supports (MEDICAL COMPRESSION SOCKS) MISC 1 Units daily 2 each 0     Elastic Bandages & Supports (KNEE BRACE/FLEX STAYS LARGE) MISC 1 Units daily 2 each 0     carboxymethylcellulose (CELLUVISC/REFRESH LIQUIGEL) 1 % ophthalmic solution Place 1 drop into both eyes 4 times daily 15 each 11     order for DME Equipment being ordered: 2 pairs of black stabilizer socks (beige okay if black not available). Size XL. 2 each 0     order for DME Equipment being ordered: Compression stockings below knee bilateral 2 each 0     order for DME Equipment being ordered: Back Stabilizer 1 each 0     order for DME Equipment being ordered: 4 Wheeled Walker with Seat and Brakes 1 Units 0     order for DME 2-wheeled walker (dx: gait instability and Frequent falls). 1 Units 0     order for DME Equipment being ordered: compression stockings bilateral  Please measure and fit patient for stockings    Strength 15-30mmHg  Disp 2 pairs  1 refill over the time of 1 year 2 Units 1     order for DME Equipment being ordered: cane 1 Units 0     STATIN NOT PRESCRIBED, INTENTIONAL, Not prescribed 0 each 0     order for DME Equipment being ordered: Nebulizer with adult mask and tubing 1 Units 0     order for DME Equipment being ordered: cane with padded handle 1 Units 0     [DISCONTINUED] solifenacin (VESICARE) 5 MG tablet Take 1 tablet (5 mg) by mouth daily 30 tablet 12   ,     Allergies   Allergen Reactions     Aspirin      3/31/16 Per pt, tolerates 81 mg daily dose without ADR.     325 mg dose caused itchiness and hives.     Clarithromycin      Allergic reaction         Contrast Dye      Penicillins      Patient is an established patient of this clinic.         Review of Systems:   Review of Systems   Constitutional: Negative for appetite change, chills, fatigue and fever.   HENT: Negative for congestion, sinus pressure and sore throat.    Eyes: Negative for visual disturbance.   Respiratory: Negative for cough, shortness of breath and wheezing.    Cardiovascular: Negative for chest pain and leg swelling.   Gastrointestinal: Negative for abdominal distention, abdominal pain, blood in stool, constipation, diarrhea, nausea and vomiting.   Genitourinary: Negative for dysuria and hematuria.   Musculoskeletal: Positive for arthralgias (bilateral knee pain). Negative for myalgias.   Skin: Negative for color change and rash.   Neurological: Negative for weakness and headaches.             Physical Exam:   /69  Pulse 82  Temp 97.5  F (36.4  C) (Oral)  Resp 16  Wt 209 lb (94.8 kg)  SpO2 95%  BMI 39.49 kg/m2    Body mass index is 39.49 kg/(m^2).  Vitals were reviewed and were normal     Physical Exam   Constitutional: She is oriented to person, place, and time. She appears well-developed and well-nourished. No distress.   Eyes: Conjunctivae are normal. No scleral icterus.   Neck: Normal range of motion. Neck supple.    Cardiovascular: Exam reveals no friction rub.    Pulmonary/Chest: Effort normal and breath sounds normal. No respiratory distress.   Lymphadenopathy:     She has no cervical adenopathy.   Neurological: She is alert and oriented to person, place, and time.   Skin: She is not diaphoretic.       Results:     No investigations this encounter.     Assessment and Plan     Flor is a 52 yo woman with a h/o HLD, Vit D def, Osteoporosis, HTN, secondary hyperparathyroidism, left peroneal tendonitis, s/p liver transplant secondary Hepatitis C (negative since transplant about 7 years ago) who presents to the clinic to prepare for upcoming trip to [place].     1. Complex care coordination  - provided letters to patient   - discussed with SW regarding knee braces, they will be sent directly to patient's home.     There are no discontinued medications.  Options for treatment and follow-up care were reviewed with the patient. Flor Navarro  engaged in the decision making process and verbalized understanding of the options discussed and agreed with the final plan.    Karen Downs MD  [facility]  [phone]

## 2017-12-26 ENCOUNTER — TELEPHONE (OUTPATIENT)
Dept: TRANSPLANT | Facility: CLINIC | Age: 52
End: 2017-12-26

## 2017-12-26 NOTE — TELEPHONE ENCOUNTER
Patient Call: Medication Refill  Instruct the patient to first contact their pharmacy. If they have called their pharmacy and require further assistance, route to LPN.  Pharmacy Name:   [facility] - [place], [place] - [facility] [address] [phone] (Phone)  [phone] (Fax)   Name of Medication: all of pt transplant medications   When will the patient be out of this medication? Son unsure

## 2017-12-27 NOTE — TELEPHONE ENCOUNTER
Spoke with pt's son Courtney. He has not requested refills from the pharmacy yet, thought he was supposed to call txp office for them. Explained refills should be requested from them and they will contact us if a script is needed. Courtney verbalized understanding, has no questions.

## 2017-12-28 DIAGNOSIS — E55.9 VITAMIN D DEFICIENCY: ICD-10-CM

## 2017-12-28 DIAGNOSIS — N18.30 CKD (CHRONIC KIDNEY DISEASE) STAGE 3, GFR 30-59 ML/MIN (H): ICD-10-CM

## 2017-12-28 DIAGNOSIS — L72.3 SEBACEOUS CYST: ICD-10-CM

## 2017-12-28 DIAGNOSIS — R14.3 FLATULENCE, ERUCTATION, AND GAS PAIN: ICD-10-CM

## 2017-12-28 DIAGNOSIS — R14.1 FLATULENCE, ERUCTATION, AND GAS PAIN: ICD-10-CM

## 2017-12-28 DIAGNOSIS — Z94.4 LIVER REPLACED BY TRANSPLANT (H): ICD-10-CM

## 2017-12-28 DIAGNOSIS — R07.0 THROAT PAIN: ICD-10-CM

## 2017-12-28 DIAGNOSIS — R14.2 FLATULENCE, ERUCTATION, AND GAS PAIN: ICD-10-CM

## 2017-12-28 DIAGNOSIS — N25.81 SECONDARY HYPERPARATHYROIDISM (H): ICD-10-CM

## 2017-12-28 DIAGNOSIS — R00.2 PALPITATIONS: ICD-10-CM

## 2017-12-28 RX ORDER — LIDOCAINE 50 MG/G
PATCH TOPICAL
Qty: 30 PATCH | Refills: 0 | Status: SHIPPED | OUTPATIENT
Start: 2017-12-28 | End: 2018-01-31

## 2017-12-28 RX ORDER — SIMETHICONE 80 MG
80 TABLET,CHEWABLE ORAL EVERY 6 HOURS PRN
Qty: 120 TABLET | Refills: 0 | Status: SHIPPED | OUTPATIENT
Start: 2017-12-28 | End: 2018-01-31

## 2017-12-28 RX ORDER — ACETAMINOPHEN 325 MG/1
325-650 TABLET ORAL EVERY 6 HOURS PRN
Qty: 100 TABLET | Refills: 3 | Status: SHIPPED | OUTPATIENT
Start: 2017-12-28 | End: 2018-09-24

## 2017-12-28 NOTE — TELEPHONE ENCOUNTER
Date of last visit at clinic: 11/7/17    Please complete refill and CLOSE ENCOUNTER.  Closing the encounter signifies the refill is complete.    Emma Herrera RN

## 2017-12-29 ENCOUNTER — TELEPHONE (OUTPATIENT)
Dept: FAMILY MEDICINE | Facility: CLINIC | Age: 52
End: 2017-12-29

## 2017-12-29 DIAGNOSIS — M25.511 PAIN IN JOINT OF RIGHT SHOULDER: Primary | ICD-10-CM

## 2017-12-29 DIAGNOSIS — M76.72 PERONEAL TENDONITIS, LEFT: ICD-10-CM

## 2017-12-29 NOTE — TELEPHONE ENCOUNTER
Request for medication refill:    Date of last visit at clinic: 11/07/2017    Request for biofreeze colorless 4% gel    Please complete refill if appropriate and CLOSE ENCOUNTER.    Closing the encounter signifies the refill is complete.    If refill has been denied, please complete the smart phrase .smirefuse and route it to the [facility] RN TRIAGE pool to inform the patient and the pharmacy.    Vipul Rosenberg, CMA

## 2018-01-30 DIAGNOSIS — K59.09 CONSTIPATION, CHRONIC: ICD-10-CM

## 2018-01-30 DIAGNOSIS — L85.3 XEROSIS CUTIS: Primary | ICD-10-CM

## 2018-01-30 RX ORDER — DOCUSATE SODIUM 50MG AND SENNOSIDES 8.6MG 8.6; 5 MG/1; MG/1
TABLET, FILM COATED ORAL
Qty: 100 TABLET | Refills: 0 | Status: SHIPPED | OUTPATIENT
Start: 2018-01-30 | End: 2018-08-15

## 2018-01-31 DIAGNOSIS — R00.2 PALPITATIONS: ICD-10-CM

## 2018-01-31 DIAGNOSIS — R14.1 FLATULENCE, ERUCTATION, AND GAS PAIN: ICD-10-CM

## 2018-01-31 DIAGNOSIS — Z94.4 LIVER REPLACED BY TRANSPLANT (H): ICD-10-CM

## 2018-01-31 DIAGNOSIS — N25.81 SECONDARY HYPERPARATHYROIDISM (H): ICD-10-CM

## 2018-01-31 DIAGNOSIS — R14.3 FLATULENCE, ERUCTATION, AND GAS PAIN: ICD-10-CM

## 2018-01-31 DIAGNOSIS — N18.30 CKD (CHRONIC KIDNEY DISEASE) STAGE 3, GFR 30-59 ML/MIN (H): ICD-10-CM

## 2018-01-31 DIAGNOSIS — R14.2 FLATULENCE, ERUCTATION, AND GAS PAIN: ICD-10-CM

## 2018-01-31 DIAGNOSIS — E55.9 VITAMIN D DEFICIENCY: ICD-10-CM

## 2018-01-31 DIAGNOSIS — J20.9 ACUTE BRONCHITIS, UNSPECIFIED ORGANISM: ICD-10-CM

## 2018-01-31 RX ORDER — ALBUTEROL SULFATE 90 UG/1
2 AEROSOL, METERED RESPIRATORY (INHALATION) 4 TIMES DAILY
Qty: 1 INHALER | Refills: 1 | Status: SHIPPED | OUTPATIENT
Start: 2018-01-31 | End: 2018-05-18

## 2018-01-31 RX ORDER — SIMETHICONE 80 MG
80 TABLET,CHEWABLE ORAL EVERY 6 HOURS PRN
Qty: 120 TABLET | Refills: 0 | Status: SHIPPED | OUTPATIENT
Start: 2018-01-31 | End: 2019-05-28

## 2018-01-31 RX ORDER — LIDOCAINE 50 MG/G
PATCH TOPICAL
Qty: 30 PATCH | Refills: 0 | Status: SHIPPED | OUTPATIENT
Start: 2018-01-31 | End: 2018-03-12

## 2018-02-01 RX ORDER — DIMETHICONE/COLLOIDAL OATMEAL 1.25 %
LOTION (ML) TOPICAL
Qty: 227 G | Refills: 0 | Status: SHIPPED | OUTPATIENT
Start: 2018-02-01 | End: 2018-09-24

## 2018-02-01 NOTE — TELEPHONE ENCOUNTER
Routing refill request to provider for review/approval because:  Drug not on the [facility] refill protocol   Drug not active on patient's medication list

## 2018-02-09 ENCOUNTER — DOCUMENTATION ONLY (OUTPATIENT)
Dept: FAMILY MEDICINE | Facility: CLINIC | Age: 53
End: 2018-02-09

## 2018-02-09 NOTE — PROGRESS NOTES
Form has been completed by provider.     Form sent out via: Fax to [employer] at Fax Number: [phone]  Patient informed: No  Output date: January 26, 2018    Dayanna Batista      **Please close the encounter**

## 2018-02-09 NOTE — PROGRESS NOTES
Form has been completed by provider.     Form sent out via: Fax to [employer] at Fax Number: [phone]  Patient informed: No  Output date: February 9, 2018    Dayanna Batista      **Please close the encounter**

## 2018-02-15 NOTE — TELEPHONE ENCOUNTER
prednisoLONE acetate (PRED FORTE) 1 % ophthalmic susp    Last Written Prescription Date:  6/6/17  Last Fill Quantity: 1 biottle,   # refills: 0  Last Office Visit with [facility], [facility] or [facility] prescribing provider: 6/20/17  Future Office visit:   None    Progress Notes   Korin, Nik Stewart MD (Physician)     Ophthalmology      Flor Valdez is a 51 year old female 2 year s/p cataract extraction with IOL in the right eye.      Now s/p yag cap both eyes   Vision improved     Still occasional dry eyes.Using artifical tears BID-TID.      A&P  Pseudophakia, both eyes     -distance vision 20/40 OU with refraction but patient notes improvement in acuity / function     manifest refraction dispensed               Routing refill request to provider for review/approval because:    prednisoLONE acetate (PRED FORTE) 1 % ophthalmic susp. Entry from pharmacy fax.  Order has  X 4 days. Not menitoned in last note. rf or refuse?     meloxicam take as needed once daily with food, can take for your ankle    prolia #2 today, repeat in 6 months, order new bone density next visit    Vit D daily

## 2018-02-27 ENCOUNTER — DOCUMENTATION ONLY (OUTPATIENT)
Dept: FAMILY MEDICINE | Facility: CLINIC | Age: 53
End: 2018-02-27

## 2018-02-27 NOTE — PROGRESS NOTES
"When opening a documentation only encounter, be sure to enter in \"Chief Complaint\" Forms and in \" Comments\" Title of form, description if needed.    Flor is a 52 year old  female  Form received via: Fax  Form now resides in: Provider Ready    Vipul Rosenberg CMA                Form has been completed by provider.     Form sent out via: Fax to [phone]  Patient informed: No, Reason:Confirmed by fax confirmation  Output date: February 27, 2018    Vipul Rosenberg CMA      **Please close the encounter**      "

## 2018-03-12 DIAGNOSIS — Z94.4 LIVER REPLACED BY TRANSPLANT (H): ICD-10-CM

## 2018-03-12 DIAGNOSIS — M81.0 OSTEOPOROSIS: ICD-10-CM

## 2018-03-12 RX ORDER — LIDOCAINE 50 MG/G
PATCH TOPICAL
Qty: 30 PATCH | Refills: 0 | Status: SHIPPED | OUTPATIENT
Start: 2018-03-12 | End: 2018-05-21

## 2018-03-12 RX ORDER — ALENDRONATE SODIUM 70 MG/1
70 TABLET ORAL
Qty: 12 TABLET | Refills: 3 | Status: SHIPPED | OUTPATIENT
Start: 2018-03-12 | End: 2018-03-14

## 2018-03-14 DIAGNOSIS — M81.0 OSTEOPOROSIS: ICD-10-CM

## 2018-03-14 RX ORDER — ALENDRONATE SODIUM 70 MG/1
70 TABLET ORAL
Qty: 12 TABLET | Refills: 3 | Status: SHIPPED | OUTPATIENT
Start: 2018-03-14 | End: 2019-05-28

## 2018-05-17 DIAGNOSIS — Z94.4 LIVER REPLACED BY TRANSPLANT (H): Primary | ICD-10-CM

## 2018-05-18 ENCOUNTER — OFFICE VISIT (OUTPATIENT)
Dept: GASTROENTEROLOGY | Facility: CLINIC | Age: 53
End: 2018-05-18
Attending: INTERNAL MEDICINE
Payer: MEDICARE

## 2018-05-18 VITALS
HEART RATE: 64 BPM | DIASTOLIC BLOOD PRESSURE: 75 MMHG | OXYGEN SATURATION: 97 % | HEIGHT: 61 IN | WEIGHT: 220 LBS | BODY MASS INDEX: 41.54 KG/M2 | SYSTOLIC BLOOD PRESSURE: 126 MMHG | TEMPERATURE: 97.8 F

## 2018-05-18 DIAGNOSIS — Z94.4 LIVER REPLACED BY TRANSPLANT (H): ICD-10-CM

## 2018-05-18 DIAGNOSIS — N18.30 CKD (CHRONIC KIDNEY DISEASE) STAGE 3, GFR 30-59 ML/MIN (H): ICD-10-CM

## 2018-05-18 DIAGNOSIS — F51.01 PRIMARY INSOMNIA: ICD-10-CM

## 2018-05-18 DIAGNOSIS — E55.9 VITAMIN D DEFICIENCY: ICD-10-CM

## 2018-05-18 DIAGNOSIS — R00.2 PALPITATIONS: ICD-10-CM

## 2018-05-18 DIAGNOSIS — M81.0 OSTEOPOROSIS WITHOUT CURRENT PATHOLOGICAL FRACTURE, UNSPECIFIED OSTEOPOROSIS TYPE: ICD-10-CM

## 2018-05-18 DIAGNOSIS — N25.81 SECONDARY HYPERPARATHYROIDISM (H): ICD-10-CM

## 2018-05-18 DIAGNOSIS — K21.9 GASTROESOPHAGEAL REFLUX DISEASE, ESOPHAGITIS PRESENCE NOT SPECIFIED: ICD-10-CM

## 2018-05-18 LAB
ALBUMIN SERPL-MCNC: 3.5 G/DL (ref 3.4–5)
ALP SERPL-CCNC: 88 U/L (ref 40–150)
ALT SERPL W P-5'-P-CCNC: 29 U/L (ref 0–50)
ANION GAP SERPL CALCULATED.3IONS-SCNC: 7 MMOL/L (ref 3–14)
AST SERPL W P-5'-P-CCNC: 22 U/L (ref 0–45)
BILIRUB DIRECT SERPL-MCNC: 0.2 MG/DL (ref 0–0.2)
BILIRUB SERPL-MCNC: 0.8 MG/DL (ref 0.2–1.3)
BUN SERPL-MCNC: 28 MG/DL (ref 7–30)
CALCIUM SERPL-MCNC: 9.1 MG/DL (ref 8.5–10.1)
CHLORIDE SERPL-SCNC: 109 MMOL/L (ref 94–109)
CO2 SERPL-SCNC: 24 MMOL/L (ref 20–32)
CREAT SERPL-MCNC: 1.41 MG/DL (ref 0.52–1.04)
CYCLOSPORINE BLD LC/MS/MS-MCNC: 164 UG/L (ref 50–400)
ERYTHROCYTE [DISTWIDTH] IN BLOOD BY AUTOMATED COUNT: 12.6 % (ref 10–15)
GFR SERPL CREATININE-BSD FRML MDRD: 39 ML/MIN/1.7M2
GLUCOSE SERPL-MCNC: 102 MG/DL (ref 70–99)
HCT VFR BLD AUTO: 38.6 % (ref 35–47)
HGB BLD-MCNC: 12.4 G/DL (ref 11.7–15.7)
MCH RBC QN AUTO: 30.2 PG (ref 26.5–33)
MCHC RBC AUTO-ENTMCNC: 32.1 G/DL (ref 31.5–36.5)
MCV RBC AUTO: 94 FL (ref 78–100)
PLATELET # BLD AUTO: 111 10E9/L (ref 150–450)
POTASSIUM SERPL-SCNC: 5.3 MMOL/L (ref 3.4–5.3)
PROT SERPL-MCNC: 7.3 G/DL (ref 6.8–8.8)
RBC # BLD AUTO: 4.11 10E12/L (ref 3.8–5.2)
SODIUM SERPL-SCNC: 140 MMOL/L (ref 133–144)
TME LAST DOSE: 2200 H
WBC # BLD AUTO: 3.6 10E9/L (ref 4–11)

## 2018-05-18 PROCEDURE — 80158 DRUG ASSAY CYCLOSPORINE: CPT | Performed by: INTERNAL MEDICINE

## 2018-05-18 PROCEDURE — 80076 HEPATIC FUNCTION PANEL: CPT | Performed by: INTERNAL MEDICINE

## 2018-05-18 PROCEDURE — T1013 SIGN LANG/ORAL INTERPRETER: HCPCS | Mod: U3,ZF

## 2018-05-18 PROCEDURE — 80048 BASIC METABOLIC PNL TOTAL CA: CPT | Performed by: INTERNAL MEDICINE

## 2018-05-18 PROCEDURE — 36415 COLL VENOUS BLD VENIPUNCTURE: CPT | Performed by: INTERNAL MEDICINE

## 2018-05-18 PROCEDURE — 85027 COMPLETE CBC AUTOMATED: CPT | Performed by: INTERNAL MEDICINE

## 2018-05-18 PROCEDURE — G0463 HOSPITAL OUTPT CLINIC VISIT: HCPCS | Mod: ZF

## 2018-05-18 RX ORDER — MYCOPHENOLATE MOFETIL 250 MG/1
500 CAPSULE ORAL EVERY 12 HOURS
Qty: 120 CAPSULE | Refills: 11 | Status: SHIPPED | OUTPATIENT
Start: 2018-05-18 | End: 2019-05-31

## 2018-05-18 RX ORDER — CYCLOSPORINE 25 MG/1
100 CAPSULE ORAL EVERY 12 HOURS
Qty: 240 CAPSULE | Refills: 0 | Status: SHIPPED | OUTPATIENT
Start: 2018-05-18 | End: 2019-01-03

## 2018-05-18 RX ORDER — MULTIPLE VITAMINS W/ MINERALS TAB 9MG-400MCG
1 TAB ORAL DAILY
Qty: 100 TABLET | Refills: 11 | Status: SHIPPED | OUTPATIENT
Start: 2018-05-18 | End: 2018-09-24

## 2018-05-18 RX ORDER — LANOLIN ALCOHOL/MO/W.PET/CERES
3 CREAM (GRAM) TOPICAL
Qty: 100 TABLET | Refills: 3 | Status: SHIPPED | OUTPATIENT
Start: 2018-05-18 | End: 2019-05-30

## 2018-05-18 ASSESSMENT — PAIN SCALES - GENERAL: PAINLEVEL: NO PAIN (0)

## 2018-05-18 NOTE — MR AVS SNAPSHOT
After Visit Summary   5/18/2018    Flor Navarro    MRN: [id number]           Patient Information     Date Of Birth          1965        Visit Information        Provider Department      5/18/2018 8:30 AM Gloria Fink; Laron Anne MD [facility] Hepatology        Today's Diagnoses     Liver replaced by transplant (H)        Gastroesophageal reflux disease, esophagitis presence not specified        Primary insomnia        Liver replaced by transplant        Vitamin D deficiency        Secondary hyperparathyroidism (H)        CKD (chronic kidney disease) stage 3, GFR 30-59 ml/min        Palpitations        Osteoporosis           Follow-ups after your visit        Follow-up notes from your care team     Return in about 6 months (around 11/18/2018).      Your next 10 appointments already scheduled     May 22, 2018  8:45 AM CDT   Liver Return Post Op with Ramses Fortune MD   [facility] Solid Organ Transplant ([facility])    [address] [place] [id number]   [phone]            Jun 20, 2018 10:00 AM CDT   LAB with  LAB   [facility] ([facility])    [address] [place] [id number] [phone]           Please do not eat 10-12 hours before your appointment if you are coming in fasting for labs on lipids, cholesterol, or glucose (sugar). This does not apply to pregnant women. Water, hot tea and black coffee (with nothing added) are okay. Do not drink other fluids, diet soda or chew gum.            Jul 23, 2018  2:00 PM CDT   Lab with  LAB   [facility] ([facility])    [address] [place] [id number]   [phone]            Jul 23, 2018  2:45 PM CDT   (Arrive by 2:30 PM)   Return Liver Transplant with Laron Anne MD   [facility] ([facility])    [address] [place] [address]  [id number] [phone]              Future tests that were ordered for you today     Open Standing Orders        Priority Remaining Interval Expires Ordered    CBC with platelets Routine 5/6 Every 2 Months 2019    Basic metabolic panel Routine 5/6 Every 2 Months 2019    Hepatic panel Routine 5/6 Every 2 Months 2019    Cyclosporine Routine 5/6 Every 2 Months 2019            Who to contact     If you have questions or need follow up information about today's clinic visit or your schedule please contact [facility]PAT[facility] directly at [phone].  Normal or non-critical lab and imaging results will be communicated to you by Streemiohart, letter or phone within 4 business days after the clinic has received the results. If you do not hear from us within 7 days, please contact the clinic through Streemiohart or phone. If you have a critical or abnormal lab result, we will notify you by phone as soon as possible.  Submit refill requests through Tribold or call your pharmacy and they will forward the refill request to us. Please allow 3 business days for your refill to be completed.          Additional Information About Your Visit        Streemiohart Information     Tribold lets you send messages to your doctor, view your test results, renew your prescriptions, schedule appointments and more. To sign up, go to [email or web address] . Click on \"Log in\" on the left side of the screen, which will take you to the Welcome page. Then click on \"Sign up Now\" on the right side of the page.     You will be asked to enter the access code listed below, as well as some personal information. Please follow the directions to create your username and password.     Your access code is: [email or web address]  Expires: 2018  6:30 AM     Your access code will  in 90 days. If you need help or a new code, please call your [place] clinic or [phone].        Care EveryWhere ID     This is " "your Care EveryWhere ID. This could be used by other organizations to access your [facility] medical records  [id number]        Your Vitals Were     Pulse Temperature Height Pulse Oximetry BMI (Body Mass Index)       64 97.8  F (36.6  C) (Oral) 1.549 m (5' 1\") 97% 41.57 kg/m2        Blood Pressure from Last 3 Encounters:   05/18/18 126/75   11/07/17 101/69   11/02/17 120/81    Weight from Last 3 Encounters:   05/18/18 99.8 kg (220 lb)   11/07/17 94.8 kg (209 lb)   11/02/17 94 kg (207 lb 3.2 oz)              Today, you had the following     No orders found for display         Where to get your medicines      These medications were sent to [facility] [place], [place] - [address] [facility] [address] [place] [address] [address] [place] [id number]    Hours:  TRANSPLANT PHONE NUMBER [phone] Phone:  [phone]     calcium-vitamin D 600-400 MG-UNIT per tablet    cholecalciferol 1000 units capsule    melatonin 3 MG tablet    multivitamin, therapeutic with minerals Tabs tablet    omeprazole 20 MG CR capsule         Call your pharmacy to confirm that your medication is ready for pickup. It may take up to 24 hours for them to receive the prescription. If the prescription is not ready within 3 business days, please contact your clinic or your provider.     We will let you know when these medications are ready. If you don't hear back within 3 business days, please contact us.     cycloSPORINE modified 25 MG capsule    mycophenolate 250 MG capsule          Primary Care Provider Office Phone # Fax #    Karely Sierra [phone]       [address] [address]        Equal Access to Services     [facility]: Hadii florecita Adair, wadanielda luqadaha, qaybta kaalmada annabel, mart piña. So [facility] [phone].    ATENCIÓN: Si habla español, tiene a garcia disposición servicios gratuitos de asistencia lingüística. Llame al " [phone].    We comply with applicable federal civil rights laws and [place] laws. We do not discriminate on the basis of race, color, national origin, age, disability, sex, sexual orientation, or gender identity.            Thank you!     Thank you for choosing [facility]PAT[facility]  for your care. Our goal is always to provide you with excellent care. Hearing back from our patients is one way we can continue to improve our services. Please take a few minutes to complete the written survey that you may receive in the mail after your visit with us. Thank you!             Your Updated Medication List - Protect others around you: Learn how to safely use, store and throw away your medicines at www.disposemymeds.org.          This list is accurate as of 5/18/18  9:34 AM.  Always use your most recent med list.                   Brand Name Dispense Instructions for use Diagnosis    acetaminophen 325 MG tablet    TYLENOL    100 tablet    Take 1-2 tablets (325-650 mg) by mouth every 6 hours as needed Max 6 tablets per 24 hours    Sebaceous cyst       albuterol 108 (90 Base) MCG/ACT Inhaler    PROAIR HFA/PROVENTIL HFA/VENTOLIN HFA    1 Inhaler    Inhale 2 puffs into the lungs 4 times daily    Acute bronchitis, unspecified organism       alendronate 70 MG tablet    FOSAMAX    12 tablet    Take 1 tablet (70 mg) by mouth every 7 days at least 60 min before breakfast with over 8 oz water. Stay upright for at least 30 min.    Osteoporosis       ASPIRIN PO      Take 81 mg by mouth        AVEENO DAILY MOISTURIZING 1.3 % Lotn lotion   Generic drug:  dimethicone     227 g    APPLY TO AFFECTED AREA(S) AS NEEDED    Xerosis cutis       bisacodyl 5 MG EC tablet    DULCOLAX    30 tablet    Take 1 tablet (5 mg) by mouth daily as needed for constipation    Constipation, chronic       calcium-vitamin D 600-400 MG-UNIT per tablet    calcium 600 + D    60 tablet    Take 1 tablet by mouth 2 times daily    Osteoporosis        carboxymethylcellul-glycerin 0.5-0.9 % Soln ophthalmic solution    OPTIVE/REFRESH OPTIVE    1 each    Place 1 drop into both eyes 4 times daily    Dry eye syndrome of bilateral lacrimal glands       carboxymethylcellulose 1 % ophthalmic solution    CELLUVISC/REFRESH LIQUIGEL    15 each    Place 1 drop into both eyes 4 times daily    Dry eyes, bilateral       cholecalciferol 1000 units capsule    vitamin  -D    180 capsule    Take 2 capsules (2,000 Units) by mouth daily    Liver replaced by transplant (H), Vitamin D deficiency, Secondary hyperparathyroidism (H), CKD (chronic kidney disease) stage 3, GFR 30-59 ml/min, Palpitations       Colloidal Oatmeal 1 % Crea     226 g    Externally apply topically as needed    Dry skin       * cycloSPORINE modified 25 MG capsule    GENERIC EQUIVALENT    240 capsule    Take 4 capsules (100 mg) by mouth every 12 hours    Liver replaced by transplant (H)       * cycloSPORINE modified 25 MG capsule     240 capsule    Take 4 capsules (100 mg) by mouth every 12 hours    Liver replaced by transplant (H)       ferrous sulfate 325 (65 Fe) MG tablet    IRON     Take 1 tablet by mouth daily (with breakfast)        lidocaine 5 % Patch    LIDODERM    30 patch    Apply up to 3 patches to painful area at once for up to 12 h within a 24 h period.  Remove after 12 hours.    Liver replaced by transplant (H)       * Medical Compression Socks Misc     2 each    1 Units daily    Leg swelling       * Knee Brace/Flex Stays Large Misc     2 each    1 Units daily    Chronic pain of both knees       melatonin 3 MG tablet     100 tablet    Take 1 tablet (3 mg) by mouth nightly as needed for sleep    Primary insomnia       Menthol (Topical Analgesic) 4 % Gel    BIOFREEZE COLORLESS    150 mL    Apply to affected areas BID    Pain in joint of right shoulder, Peroneal tendonitis, left       multivitamin, therapeutic with minerals Tabs tablet     100 tablet    Take 1 tablet by mouth daily    Liver replaced by  transplant (H)       mycophenolate 250 MG capsule    GENERIC EQUIVALENT    120 capsule    Take 2 capsules (500 mg) by mouth every 12 hours    Liver replaced by transplant (H)       omeprazole 20 MG CR capsule    priLOSEC    180 capsule    Take 1 capsule (20 mg) by mouth 2 times daily    Gastroesophageal reflux disease, esophagitis presence not specified       * order for DME     1 Units    Equipment being ordered: Nebulizer with adult mask and tubing    Acute bronchitis, unspecified organism       * order for DME     1 Units    Equipment being ordered: cane with padded handle    Gait instability       * order for DME     2 Units    Equipment being ordered: compression stockings bilateral Please measure and fit patient for stockings  Strength 15-30mmHg Disp 2 pairs 1 refill over the time of 1 year    Localized edema       * order for DME     1 Units    Equipment being ordered: 4 Wheeled Walker with Seat and Brakes    Falls frequently, Gait instability       * order for DME     2 each    Equipment being ordered: Compression stockings below knee bilateral    Bilateral edema of lower extremity       * order for DME     1 each    Equipment being ordered: Back Stabilizer    Chronic midline low back pain without sciatica       * order for DME     2 each    Equipment being ordered: 2 pairs of black stabilizer socks (beige okay if black not available). Size XL.    Bilateral edema of lower extremity       * order for DME     1 Units    Right pull on neoprene knee brace.    Chronic pain of right knee       * order for DME     1 Units    Left pull on neoprene knee brace.    Chronic pain of left knee       Peppermint Flavor Oil     1 mL    Use as smelling agent as needed.    Nausea       prednisoLONE acetate 1 % ophthalmic susp    PRED FORTE    1 Bottle    Use in operative eye four times a day  For 4 days    Posterior capsular opacification visually significant of both eyes       Psyllium 400 MG Caps     180 capsule    Take  1,200 mg by mouth 2 times daily    Constipation, chronic       SENEXON-S 8.6-50 MG per tablet   Generic drug:  senna-docusate     100 tablet    TAKE TWO TABLETS BY MOUTH TWICE A DAY FOR CONSTIPATION    Constipation, chronic       simethicone 80 MG chewable tablet    MYLICON    120 tablet    Take 1 tablet (80 mg) by mouth every 6 hours as needed for flatulence or cramping    Flatulence, eructation, and gas pain       sodium chloride 0.65 % nasal spray    OCEAN    30 mL    Spray 1 spray into both nostrils daily as needed for congestion    Throat pain       sodium phosphate 7-19 GM/118ML rectal enema     3 Bottle    Place 1 Bottle (1 enema) rectally daily as needed for constipation    Ileus (H)       STATIN NOT PRESCRIBED (INTENTIONAL)     0 each    Not prescribed    High risk medication use       * Notice:  This list has 13 medication(s) that are the same as other medications prescribed for you. Read the directions carefully, and ask your doctor or other care provider to review them with you.

## 2018-05-18 NOTE — PROGRESS NOTES
I had the pleasure of seeing Flor Navarro for followup in the Liver Transplantation Clinic at the [facility] on 05/18/2018.  Ms. Navarro returns for followup 10-1/2 years status post liver transplantation for cirrhosis caused by chronic hepatitis C.  She is a sustained virologic responder to hepatitis C treatment.      She just returned after spending 6 months in [place].  It sounds as if she had a good experience there without any significant complications.      She denies any abdominal pain, itching or skin rash or fatigue.  She denies any increased abdominal girth or lower extremity edema.  She denies any fevers or chills, cough or shortness of breath.  She denies any nausea, vomiting, diarrhea or constipation.  Her appetite has been good, and she does need to lose some weight.      Her only complaint is one that preceded her trip to [place] and that is that she has pain in the bottom of her feet.  She has been diagnosed with plantar fascitis.     Current Outpatient Prescriptions   Medication     acetaminophen (TYLENOL) 325 MG tablet     albuterol (PROAIR HFA/PROVENTIL HFA/VENTOLIN HFA) 108 (90 BASE) MCG/ACT Inhaler     ASPIRIN PO     AVEENO DAILY MOISTURIZING 1.3 % LOTN lotion     bisacodyl (DULCOLAX) 5 MG EC tablet     calcium-vitamin D (CALCIUM 600 + D) 600-400 MG-UNIT per tablet     carboxymethylcellul-glycerin (OPTIVE/REFRESH OPTIVE) 0.5-0.9 % SOLN ophthalmic solution     carboxymethylcellulose (CELLUVISC/REFRESH LIQUIGEL) 1 % ophthalmic solution     cholecalciferol (VITAMIN  -D) 1000 units capsule     Colloidal Oatmeal 1 % CREA     cycloSPORINE modified (GENERIC EQUIVALENT) 25 MG capsule     Elastic Bandages & Supports (KNEE BRACE/FLEX STAYS LARGE) MISC     Elastic Bandages & Supports (MEDICAL COMPRESSION SOCKS) MISC     ferrous sulfate (IRON) 325 (65 FE) MG tablet     Flavoring Agent (PEPPERMINT FLAVOR) OIL     GENGRAF (BRAND) 25 MG CAPSULE     lidocaine (LIDODERM) 5 % Patch      melatonin 3 MG tablet     Menthol, Topical Analgesic, (BIOFREEZE COLORLESS) 4 % GEL     multivitamin, therapeutic with minerals (MULTI-VITAMIN) TABS tablet     mycophenolate (GENERIC EQUIVALENT) 250 MG capsule     omeprazole (PRILOSEC) 20 MG CR capsule     order for DME     order for DME     order for DME     order for DME     order for DME     order for DME     order for DME     order for DME     order for DME     prednisoLONE acetate (PRED FORTE) 1 % ophthalmic susp     Psyllium 400 MG CAPS     SENEXON-S 8.6-50 MG per tablet     simethicone (MYLICON) 80 MG chewable tablet     sodium phosphate (FLEET ENEMA) 7-19 GM/118ML rectal enema     STATIN NOT PRESCRIBED, INTENTIONAL,     alendronate (FOSAMAX) 70 MG tablet     sodium chloride (OCEAN) 0.65 % nasal spray     [DISCONTINUED] albuterol (PROAIR HFA/PROVENTIL HFA/VENTOLIN HFA) 108 (90 BASE) MCG/ACT Inhaler     [DISCONTINUED] GENGRAF 25 MG PO CAPSULE     [DISCONTINUED] mycophenolate (GENERIC EQUIVALENT) 250 MG capsule     [DISCONTINUED] solifenacin (VESICARE) 5 MG tablet     Current Facility-Administered Medications   Medication     apraclonidine (IOPIDINE) 1 % ophthalmic solution 1 drop     B/P: 126/75, T: 97.8, P: 64, R: Data Unavailable    HEENT exam shows no scleral icterus and no temporal muscle wasting.  Her chest is clear.  Her abdominal exam shows no increase in girth.  No masses or tenderness to palpation are present.  Her liver is 10 cm in span without left lobe enlargement.  No spleen tip is palpable, and extremity exam shows no edema.  Skin exam shows no suspicious lesions.  Neurologic exam is nonfocal.     Recent Results (from the past 168 hour(s))   CBC with platelets    Collection Time: 05/18/18  6:46 AM   Result Value Ref Range    WBC 3.6 (L) 4.0 - 11.0 10e9/L    RBC Count 4.11 3.8 - 5.2 10e12/L    Hemoglobin 12.4 11.7 - 15.7 g/dL    Hematocrit 38.6 35.0 - 47.0 %    MCV 94 78 - 100 fl    MCH 30.2 26.5 - 33.0 pg    MCHC 32.1 31.5 - 36.5 g/dL    RDW 12.6  10.0 - 15.0 %    Platelet Count 111 (L) 150 - 450 10e9/L   Basic metabolic panel    Collection Time: 05/18/18  6:46 AM   Result Value Ref Range    Sodium 140 133 - 144 mmol/L    Potassium 5.3 3.4 - 5.3 mmol/L    Chloride 109 94 - 109 mmol/L    Carbon Dioxide 24 20 - 32 mmol/L    Anion Gap 7 3 - 14 mmol/L    Glucose 102 (H) 70 - 99 mg/dL    Urea Nitrogen 28 7 - 30 mg/dL    Creatinine 1.41 (H) 0.52 - 1.04 mg/dL    GFR Estimate 39 (L) >60 mL/min/1.7m2    GFR Estimate If Black 47 (L) >60 mL/min/1.7m2    Calcium 9.1 8.5 - 10.1 mg/dL   Hepatic panel    Collection Time: 05/18/18  6:46 AM   Result Value Ref Range    Bilirubin Direct 0.2 0.0 - 0.2 mg/dL    Bilirubin Total 0.8 0.2 - 1.3 mg/dL    Albumin 3.5 3.4 - 5.0 g/dL    Protein Total 7.3 6.8 - 8.8 g/dL    Alkaline Phosphatase 88 40 - 150 U/L    ALT 29 0 - 50 U/L    AST 22 0 - 45 U/L      My impression is that Ms. Navarro is 7-1/2 years status post liver transplantation.  Currently, all complications are well addressed, and she is up-to-date with regard to vaccines and cancer screening.        I will have her see the podiatrist, Dr. Drew, about identifying some better shoes for her, as well as perhaps some inserts.  It would be nice to see her lose some weight, as that would help this problem as well.  I, otherwise, will not be making any change to her medical regimen.  We will follow up on her cyclosporine level later today.  I suspect that is the reason why her creatinine is up a bit.      Thank you very much for allowing me to participate in the care of this patient.  If you have any questions regarding my recommendations, please do not hesitate to contact me.       Laron Anne MD      Professor of Medicine  [employer]      Executive Medical Director, Solid Organ Transplant Program  [facility]

## 2018-05-18 NOTE — LETTER
5/18/2018    RE: Flor Navarro  [address] [facility] [address]     I had the pleasure of seeing Flor Navarro for followup in the Liver Transplantation Clinic at the [facility] on 05/18/2018.  Ms. Navarro returns for followup 10-1/2 years status post liver transplantation for cirrhosis caused by chronic hepatitis C.  She is a sustained virologic responder to hepatitis C treatment.      She just returned after spending 6 months in [place].  It sounds as if she had a good experience there without any significant complications.      She denies any abdominal pain, itching or skin rash or fatigue.  She denies any increased abdominal girth or lower extremity edema.  She denies any fevers or chills, cough or shortness of breath.  She denies any nausea, vomiting, diarrhea or constipation.  Her appetite has been good, and she does need to lose some weight.      Her only complaint is one that preceded her trip to [place] and that is that she has pain in the bottom of her feet.  She has been diagnosed with plantar fascitis.     Current Outpatient Prescriptions   Medication     acetaminophen (TYLENOL) 325 MG tablet     albuterol (PROAIR HFA/PROVENTIL HFA/VENTOLIN HFA) 108 (90 BASE) MCG/ACT Inhaler     ASPIRIN PO     AVEENO DAILY MOISTURIZING 1.3 % LOTN lotion     bisacodyl (DULCOLAX) 5 MG EC tablet     calcium-vitamin D (CALCIUM 600 + D) 600-400 MG-UNIT per tablet     carboxymethylcellul-glycerin (OPTIVE/REFRESH OPTIVE) 0.5-0.9 % SOLN ophthalmic solution     carboxymethylcellulose (CELLUVISC/REFRESH LIQUIGEL) 1 % ophthalmic solution     cholecalciferol (VITAMIN  -D) 1000 units capsule     Colloidal Oatmeal 1 % CREA     cycloSPORINE modified (GENERIC EQUIVALENT) 25 MG capsule     Elastic Bandages & Supports (KNEE BRACE/FLEX STAYS LARGE) MISC     Elastic Bandages & Supports (MEDICAL COMPRESSION SOCKS) MISC     ferrous sulfate (IRON) 325 (65 FE) MG tablet     Flavoring Agent (PEPPERMINT  FLAVOR) OIL     GENGRAF (BRAND) 25 MG CAPSULE     lidocaine (LIDODERM) 5 % Patch     melatonin 3 MG tablet     Menthol, Topical Analgesic, (BIOFREEZE COLORLESS) 4 % GEL     multivitamin, therapeutic with minerals (MULTI-VITAMIN) TABS tablet     mycophenolate (GENERIC EQUIVALENT) 250 MG capsule     omeprazole (PRILOSEC) 20 MG CR capsule     order for DME     order for DME     order for DME     order for DME     order for DME     order for DME     order for DME     order for DME     order for DME     prednisoLONE acetate (PRED FORTE) 1 % ophthalmic susp     Psyllium 400 MG CAPS     SENEXON-S 8.6-50 MG per tablet     simethicone (MYLICON) 80 MG chewable tablet     sodium phosphate (FLEET ENEMA) 7-19 GM/118ML rectal enema     STATIN NOT PRESCRIBED, INTENTIONAL,     alendronate (FOSAMAX) 70 MG tablet     sodium chloride (OCEAN) 0.65 % nasal spray     [DISCONTINUED] albuterol (PROAIR HFA/PROVENTIL HFA/VENTOLIN HFA) 108 (90 BASE) MCG/ACT Inhaler     [DISCONTINUED] GENGRAF 25 MG PO CAPSULE     [DISCONTINUED] mycophenolate (GENERIC EQUIVALENT) 250 MG capsule     [DISCONTINUED] solifenacin (VESICARE) 5 MG tablet     Current Facility-Administered Medications   Medication     apraclonidine (IOPIDINE) 1 % ophthalmic solution 1 drop     B/P: 126/75, T: 97.8, P: 64, R: Data Unavailable    HEENT exam shows no scleral icterus and no temporal muscle wasting.  Her chest is clear.  Her abdominal exam shows no increase in girth.  No masses or tenderness to palpation are present.  Her liver is 10 cm in span without left lobe enlargement.  No spleen tip is palpable, and extremity exam shows no edema.  Skin exam shows no suspicious lesions.  Neurologic exam is nonfocal.     Recent Results (from the past 168 hour(s))   CBC with platelets    Collection Time: 05/18/18  6:46 AM   Result Value Ref Range    WBC 3.6 (L) 4.0 - 11.0 10e9/L    RBC Count 4.11 3.8 - 5.2 10e12/L    Hemoglobin 12.4 11.7 - 15.7 g/dL    Hematocrit 38.6 35.0 - 47.0 %     MCV 94 78 - 100 fl    MCH 30.2 26.5 - 33.0 pg    MCHC 32.1 31.5 - 36.5 g/dL    RDW 12.6 10.0 - 15.0 %    Platelet Count 111 (L) 150 - 450 10e9/L   Basic metabolic panel    Collection Time: 05/18/18  6:46 AM   Result Value Ref Range    Sodium 140 133 - 144 mmol/L    Potassium 5.3 3.4 - 5.3 mmol/L    Chloride 109 94 - 109 mmol/L    Carbon Dioxide 24 20 - 32 mmol/L    Anion Gap 7 3 - 14 mmol/L    Glucose 102 (H) 70 - 99 mg/dL    Urea Nitrogen 28 7 - 30 mg/dL    Creatinine 1.41 (H) 0.52 - 1.04 mg/dL    GFR Estimate 39 (L) >60 mL/min/1.7m2    GFR Estimate If Black 47 (L) >60 mL/min/1.7m2    Calcium 9.1 8.5 - 10.1 mg/dL   Hepatic panel    Collection Time: 05/18/18  6:46 AM   Result Value Ref Range    Bilirubin Direct 0.2 0.0 - 0.2 mg/dL    Bilirubin Total 0.8 0.2 - 1.3 mg/dL    Albumin 3.5 3.4 - 5.0 g/dL    Protein Total 7.3 6.8 - 8.8 g/dL    Alkaline Phosphatase 88 40 - 150 U/L    ALT 29 0 - 50 U/L    AST 22 0 - 45 U/L      My impression is that Ms. Navarro is 7-1/2 years status post liver transplantation.  Currently, all complications are well addressed, and she is up-to-date with regard to vaccines and cancer screening.        I will have her see the podiatrist, Dr. Drew, about identifying some better shoes for her, as well as perhaps some inserts.  It would be nice to see her lose some weight, as that would help this problem as well.  I, otherwise, will not be making any change to her medical regimen.  We will follow up on her cyclosporine level later today.  I suspect that is the reason why her creatinine is up a bit.      Thank you very much for allowing me to participate in the care of this patient.  If you have any questions regarding my recommendations, please do not hesitate to contact me.       Laron Anne MD      Professor of Medicine  [employer]      Executive Medical Director, Solid Organ Transplant Program  [facility]

## 2018-05-21 DIAGNOSIS — Z94.4 LIVER REPLACED BY TRANSPLANT (H): ICD-10-CM

## 2018-05-21 RX ORDER — CYCLOSPORINE 25 MG/1
CAPSULE, LIQUID FILLED ORAL
Qty: 210 CAPSULE | Refills: 11 | Status: SHIPPED | OUTPATIENT
Start: 2018-05-21 | End: 2018-07-23 | Stop reason: DRUGHIGH

## 2018-05-21 RX ORDER — LIDOCAINE 50 MG/G
PATCH TOPICAL
Qty: 30 PATCH | Refills: 0 | Status: SHIPPED | OUTPATIENT
Start: 2018-05-21 | End: 2018-05-22

## 2018-05-21 NOTE — TELEPHONE ENCOUNTER
Pt had less than 12 hour level, but with creatinine increasing. Will slightly decrease dose. Pt currently taking 100 mg every 12 hours. Pt to decrease to 100 mg AM and 75 mg PM.

## 2018-05-21 NOTE — TELEPHONE ENCOUNTER
Called and left message with son Courtney with coordinators instructions below. Advised him to call us with any other questions.

## 2018-05-21 NOTE — TELEPHONE ENCOUNTER
Request for medication refill: lidocaine (LIDODERM) 5 % Patch    Date of last visit at clinic: 11/07/2017    Please complete refill if appropriate and CLOSE ENCOUNTER.    Closing the encounter signifies the refill is complete.    If refill has been denied, please complete the smart phrase .smirefuse and route it to the [facility] RN TRIAGE pool to inform the patient and the pharmacy.    Eliza Karimi, CMA

## 2018-05-22 ENCOUNTER — OFFICE VISIT (OUTPATIENT)
Dept: TRANSPLANT | Facility: CLINIC | Age: 53
End: 2018-05-22
Attending: TRANSPLANT SURGERY
Payer: MEDICARE

## 2018-05-22 ENCOUNTER — TELEPHONE (OUTPATIENT)
Dept: TRANSPLANT | Facility: CLINIC | Age: 53
End: 2018-05-22

## 2018-05-22 DIAGNOSIS — Z94.4 LIVER REPLACED BY TRANSPLANT (H): ICD-10-CM

## 2018-05-22 DIAGNOSIS — J20.9 ACUTE BRONCHITIS, UNSPECIFIED ORGANISM: ICD-10-CM

## 2018-05-22 DIAGNOSIS — Z94.4 LIVER REPLACED BY TRANSPLANT (H): Primary | ICD-10-CM

## 2018-05-22 RX ORDER — LIDOCAINE 50 MG/G
PATCH TOPICAL
Qty: 30 PATCH | Refills: 0 | Status: SHIPPED | OUTPATIENT
Start: 2018-05-22 | End: 2018-05-30

## 2018-05-22 RX ORDER — ALBUTEROL SULFATE 90 UG/1
2 AEROSOL, METERED RESPIRATORY (INHALATION) 4 TIMES DAILY
Qty: 1 INHALER | Refills: 1 | Status: SHIPPED | OUTPATIENT
Start: 2018-05-22 | End: 2018-08-20

## 2018-05-22 NOTE — TELEPHONE ENCOUNTER
[facility] Prior Authorization Team Request    Medication: lidocaine 5% patches   Dosing: APPLY UP TO 3 PATCHES TO MOST PAINFUL AREA DAILY. (ON FOR 12 HOURS AND OFF FOR 12 HOURS)  Qty: 30  Day Supply: 10  NDC (required for Medicaid members):   Insurance silverscript part D   BIN: [id number]  PCN: [id number]  Grp: [id number]  ID: [id number]    CoverMyMeds Key (if applicable):     Additional documentation:   [facility]QU[facility]  DIAGNOSIS REQUIRED, MD CONTACT FOR PA.  DRUG REQUIRES PRIOR AUTHORIZATION        Filling Pharmacy: [facility]   Phone Number: [phone]  Contact:  Jason   Pharmacy NPI (required for Medicaid members):

## 2018-05-22 NOTE — MR AVS SNAPSHOT
After Visit Summary   5/22/2018    Flor Navarro    MRN: [id number]           Patient Information     Date Of Birth          1965        Visit Information        Provider Department      5/22/2018 8:45 AM Audrey Dunn NP; [facility]        Today's Diagnoses     Liver replaced by transplant (H)    -  1    Acute bronchitis, unspecified organism           Follow-ups after your visit        Additional Services     PODIATRY/FOOT & ANKLE SURGERY REFERRAL       Your provider has referred you to: KAMRYN: [facility]: [address] [place] [address] Patient Scheduling Line: [phone] option 1 (scheduling and directions)    Please be aware that coverage of these services is subject to the terms and limitations of your health insurance plan.  Call member services at your health plan with any benefit or coverage questions.      Please bring the following to your appointment:  >>   Any x-rays, CTs or MRIs which have been performed.  Contact the facility where they were done to arrange for  prior to your scheduled appointment.    >>   List of current medications   >>   This referral request   >>   Any documents/labs given to you for this referral                  Your next 10 appointments already scheduled     Jun 18, 2018 10:10 AM CDT   (Arrive by 9:55 AM)   Liver Return Post Op with Ramses Fortune MD   [facility] Solid Organ Transplant ([facility])    [address] [place] [address]  [address]  [facility] [id number] [phone]            Jun 20, 2018 10:00 AM CDT   LAB with  LAB   [facility] ([facility])    [address] [place] [address]  1st Floor  [facility] [id number] [phone]           Please do not eat 10-12 hours before your appointment if you are coming in fasting for labs on lipids, cholesterol, or glucose (sugar). This does not apply to pregnant women. Water, hot tea and black coffee  "(with nothing added) are okay. Do not drink other fluids, diet soda or chew gum.            Jul 23, 2018 10:40 AM CDT   (Arrive by 10:25 AM)   RETURN FOOT/ANKLE with Rigo Drew DPM   [facility] ([facility])    [address]  4th Floor  [facility] [id number] [phone]            Jul 23, 2018  2:00 PM CDT   Lab with  LAB   [facility] ([facility])    [address]  1st Floor  [facility] [id number] [phone]            Jul 23, 2018  2:45 PM CDT   (Arrive by 2:30 PM)   Return Liver Transplant with Laron Anne MD   [facility] ([facility])    [address]  [address]  [facility] [id number] [phone]              Who to contact     If you have questions or need follow up information about today's clinic visit or your schedule please contact [facility] SOLID ORGAN TRANSPLANT directly at [phone].  Normal or non-critical lab and imaging results will be communicated to you by GoPagohart, letter or phone within 4 business days after the clinic has received the results. If you do not hear from us within 7 days, please contact the clinic through WOWIOt or phone. If you have a critical or abnormal lab result, we will notify you by phone as soon as possible.  Submit refill requests through eSilicon or call your pharmacy and they will forward the refill request to us. Please allow 3 business days for your refill to be completed.          Additional Information About Your Visit        eSilicon Information     eSilicon lets you send messages to your doctor, view your test results, renew your prescriptions, schedule appointments and more. To sign up, go to [email or web address] . Click on \"Log in\" on the left side of the screen, which will take you to the Welcome page. Then click on \"Sign up Now\" on the right side of the page.     You will be asked to enter the access code " listed below, as well as some personal information. Please follow the directions to create your username and password.     Your access code is: [email or web address]  Expires: 2018  6:30 AM     Your access code will  in 90 days. If you need help or a new code, please call your [place] clinic or [phone].        Care EveryWhere ID     This is your Care EveryWhere ID. This could be used by other organizations to access your [place] medical records  [id number]         Blood Pressure from Last 3 Encounters:   18 126/75   17 101/69   17 120/81    Weight from Last 3 Encounters:   18 99.8 kg (220 lb)   17 94.8 kg (209 lb)   17 94 kg (207 lb 3.2 oz)              We Performed the Following     PODIATRY/FOOT & ANKLE SURGERY REFERRAL          Where to get your medicines      Some of these will need a paper prescription and others can be bought over the counter.  Ask your nurse if you have questions.     Bring a paper prescription for each of these medications     albuterol 108 (90 Base) MCG/ACT Inhaler          Primary Care Provider Office Phone # Fax #    Karely Sierra [phone]        [place] [phone]        Equal Access to Services     MAURO ALBA AH: Hadii florecita duarteo Sogissell, waaxda luqadaha, qaybta kaalmada adeegjermaineda, mart piña. So [facility] [phone].    ATENCIÓN: Si habla español, tiene a garcia disposición servicios gratuitos de asistencia lingüística. Llame al [phone].    We comply with applicable federal civil rights laws and [place] laws. We do not discriminate on the basis of race, color, national origin, age, disability, sex, sexual orientation, or gender identity.            Thank you!     Thank you for choosing [facility] SOLID ORGAN TRANSPLANT  for your care. Our goal is always to provide you with excellent care. Hearing back from our patients is one way we can continue to improve our  services. Please take a few minutes to complete the written survey that you may receive in the mail after your visit with us. Thank you!             Your Updated Medication List - Protect others around you: Learn how to safely use, store and throw away your medicines at www.disposemymeds.org.          This list is accurate as of 5/22/18 10:57 AM.  Always use your most recent med list.                   Brand Name Dispense Instructions for use Diagnosis    acetaminophen 325 MG tablet    TYLENOL    100 tablet    Take 1-2 tablets (325-650 mg) by mouth every 6 hours as needed Max 6 tablets per 24 hours    Sebaceous cyst       albuterol 108 (90 Base) MCG/ACT Inhaler    PROAIR HFA/PROVENTIL HFA/VENTOLIN HFA    1 Inhaler    Inhale 2 puffs into the lungs 4 times daily    Acute bronchitis, unspecified organism       alendronate 70 MG tablet    FOSAMAX    12 tablet    Take 1 tablet (70 mg) by mouth every 7 days at least 60 min before breakfast with over 8 oz water. Stay upright for at least 30 min.    Osteoporosis       ASPIRIN PO      Take 81 mg by mouth        AVEENO DAILY MOISTURIZING 1.3 % Lotn lotion   Generic drug:  dimethicone     227 g    APPLY TO AFFECTED AREA(S) AS NEEDED    Xerosis cutis       bisacodyl 5 MG EC tablet    DULCOLAX    30 tablet    Take 1 tablet (5 mg) by mouth daily as needed for constipation    Constipation, chronic       calcium-vitamin D 600-400 MG-UNIT per tablet    calcium 600 + D    60 tablet    Take 1 tablet by mouth 2 times daily    Osteoporosis without current pathological fracture, unspecified osteoporosis type       carboxymethylcellul-glycerin 0.5-0.9 % Soln ophthalmic solution    OPTIVE/REFRESH OPTIVE    1 each    Place 1 drop into both eyes 4 times daily    Dry eye syndrome of bilateral lacrimal glands       carboxymethylcellulose 1 % ophthalmic solution    CELLUVISC/REFRESH LIQUIGEL    15 each    Place 1 drop into both eyes 4 times daily    Dry eyes, bilateral       cholecalciferol  1000 units capsule    vitamin  -D    180 capsule    Take 2 capsules (2,000 Units) by mouth daily    Liver replaced by transplant (H), Vitamin D deficiency, Secondary hyperparathyroidism (H), CKD (chronic kidney disease) stage 3, GFR 30-59 ml/min, Palpitations       Colloidal Oatmeal 1 % Crea     226 g    Externally apply topically as needed    Dry skin       * cycloSPORINE modified 25 MG capsule     240 capsule    Take 4 capsules (100 mg) by mouth every 12 hours    Liver replaced by transplant (H)       * cycloSPORINE modified 25 MG capsule    GENERIC EQUIVALENT    210 capsule    Take 100 mg (4 caps) in the AM, then 75 mg (3 caps) in the evening. 12 hours in between doses    Liver replaced by transplant (H)       ferrous sulfate 325 (65 Fe) MG tablet    IRON     Take 1 tablet by mouth daily (with breakfast)        lidocaine 5 % Patch    LIDODERM    30 patch    Apply up to 3 patches to painful area at once for up to 12 h within a 24 h period.  Remove after 12 hours.    Liver replaced by transplant (H)       * Medical Compression Socks Misc     2 each    1 Units daily    Leg swelling       * Knee Brace/Flex Stays Large Misc     2 each    1 Units daily    Chronic pain of both knees       melatonin 3 MG tablet     100 tablet    Take 1 tablet (3 mg) by mouth nightly as needed for sleep    Primary insomnia       Menthol (Topical Analgesic) 4 % Gel    BIOFREEZE COLORLESS    150 mL    Apply to affected areas BID    Pain in joint of right shoulder, Peroneal tendonitis, left       multivitamin, therapeutic with minerals Tabs tablet     100 tablet    Take 1 tablet by mouth daily    Liver replaced by transplant (H)       mycophenolate 250 MG capsule    GENERIC EQUIVALENT    120 capsule    Take 2 capsules (500 mg) by mouth every 12 hours    Liver replaced by transplant (H)       omeprazole 20 MG CR capsule    priLOSEC    180 capsule    Take 1 capsule (20 mg) by mouth 2 times daily    Gastroesophageal reflux disease, esophagitis  presence not specified       * order for DME     1 Units    Equipment being ordered: Nebulizer with adult mask and tubing    Acute bronchitis, unspecified organism       * order for DME     1 Units    Equipment being ordered: cane with padded handle    Gait instability       * order for DME     2 Units    Equipment being ordered: compression stockings bilateral Please measure and fit patient for stockings  Strength 15-30mmHg Disp 2 pairs 1 refill over the time of 1 year    Localized edema       * order for DME     1 Units    Equipment being ordered: 4 Wheeled Walker with Seat and Brakes    Falls frequently, Gait instability       * order for DME     2 each    Equipment being ordered: Compression stockings below knee bilateral    Bilateral edema of lower extremity       * order for DME     1 each    Equipment being ordered: Back Stabilizer    Chronic midline low back pain without sciatica       * order for DME     2 each    Equipment being ordered: 2 pairs of black stabilizer socks (beige okay if black not available). Size XL.    Bilateral edema of lower extremity       * order for DME     1 Units    Right pull on neoprene knee brace.    Chronic pain of right knee       * order for DME     1 Units    Left pull on neoprene knee brace.    Chronic pain of left knee       Peppermint Flavor Oil     1 mL    Use as smelling agent as needed.    Nausea       prednisoLONE acetate 1 % ophthalmic susp    PRED FORTE    1 Bottle    Use in operative eye four times a day  For 4 days    Posterior capsular opacification visually significant of both eyes       Psyllium 400 MG Caps     180 capsule    Take 1,200 mg by mouth 2 times daily    Constipation, chronic       SENEXON-S 8.6-50 MG per tablet   Generic drug:  senna-docusate     100 tablet    TAKE TWO TABLETS BY MOUTH TWICE A DAY FOR CONSTIPATION    Constipation, chronic       simethicone 80 MG chewable tablet    MYLICON    120 tablet    Take 1 tablet (80 mg) by mouth every 6 hours  as needed for flatulence or cramping    Flatulence, eructation, and gas pain       sodium chloride 0.65 % nasal spray    OCEAN    30 mL    Spray 1 spray into both nostrils daily as needed for congestion    Throat pain       sodium phosphate 7-19 GM/118ML rectal enema     3 Bottle    Place 1 Bottle (1 enema) rectally daily as needed for constipation    Ileus (H)       STATIN NOT PRESCRIBED (INTENTIONAL)     0 each    Not prescribed    High risk medication use       * Notice:  This list has 13 medication(s) that are the same as other medications prescribed for you. Read the directions carefully, and ask your doctor or other care provider to review them with you.

## 2018-05-22 NOTE — PROGRESS NOTES
Transplant Surgery Progress Note    Transplants:  10/26/2010 (Liver); Postoperative day:  2765  S: Seen Ms Navarro in clinic  Followup [facility] 8 years back, cirrhosis 2/2 Hepatitis C doing well.  No complains at present. Flor denies any abdominal pain, itching or skin rash or fatigue.  She denies any increased abdominal girth or lower extremity edema.  She denies any fevers or chills, cough or shortness of breath.  She denies any nausea, vomiting, diarrhea or constipation  Labs from  looks great    Transplant History:    Transplant Type:  liver transplant  Donor Type:   donor  Transplant Date:  10/26/2010 (Liver)     Transplant Coordinator: Mary Randle     Transplant Office Phone Number: [phone]     Immunsupresent Medications     Immunosuppressive Agents Disp Start End    cycloSPORINE modified (GENERIC EQUIVALENT) 25 MG capsule 210 capsule 2018     Sig: Take 100 mg (4 caps) in the AM, then 75 mg (3 caps) in the evening. 12 hours in between doses    Class: E-Prescribe    Prior authorization:  Closed    GENGRAF (BRAND) 25 MG CAPSULE 240 capsule 2018     Sig - Route: Take 4 capsules (100 mg) by mouth every 12 hours - Oral    Class: E-Prescribe    Prior authorization:  Closed    mycophenolate (GENERIC EQUIVALENT) 250 MG capsule 120 capsule 2018     Sig - Route: Take 2 capsules (500 mg) by mouth every 12 hours - Oral    Class: E-Prescribe    Prior authorization:  Closed          Possible Immunosuppression-related side effects:   []             headache  []             vivid dreams  []             irritability  []             cognitive difficuties  []             fine tremor  []             nausea  []             diarrhea  []             neuropathy      []             edema  []             renal calcineurin toxicity  []             hyperkalemia  []             post-transplant diabetes  []             decreased appetite  []             increased appetite  []              other:  [x]             none    Prescription Medications as of 5/22/2018             albuterol (PROAIR HFA/PROVENTIL HFA/VENTOLIN HFA) 108 (90 Base) MCG/ACT Inhaler Inhale 2 puffs into the lungs 4 times daily    acetaminophen (TYLENOL) 325 MG tablet Take 1-2 tablets (325-650 mg) by mouth every 6 hours as needed Max 6 tablets per 24 hours    alendronate (FOSAMAX) 70 MG tablet Take 1 tablet (70 mg) by mouth every 7 days at least 60 min before breakfast with over 8 oz water. Stay upright for at least 30 min.    ASPIRIN PO Take 81 mg by mouth    AVEENO DAILY MOISTURIZING 1.3 % LOTN lotion APPLY TO AFFECTED AREA(S) AS NEEDED    bisacodyl (DULCOLAX) 5 MG EC tablet Take 1 tablet (5 mg) by mouth daily as needed for constipation    calcium-vitamin D (CALCIUM 600 + D) 600-400 MG-UNIT per tablet Take 1 tablet by mouth 2 times daily    carboxymethylcellul-glycerin (OPTIVE/REFRESH OPTIVE) 0.5-0.9 % SOLN ophthalmic solution Place 1 drop into both eyes 4 times daily    carboxymethylcellulose (CELLUVISC/REFRESH LIQUIGEL) 1 % ophthalmic solution Place 1 drop into both eyes 4 times daily    cholecalciferol (VITAMIN  -D) 1000 units capsule Take 2 capsules (2,000 Units) by mouth daily    Colloidal Oatmeal 1 % CREA Externally apply topically as needed    cycloSPORINE modified (GENERIC EQUIVALENT) 25 MG capsule Take 100 mg (4 caps) in the AM, then 75 mg (3 caps) in the evening. 12 hours in between doses    Elastic Bandages & Supports (KNEE BRACE/FLEX STAYS LARGE) MISC 1 Units daily    Elastic Bandages & Supports (MEDICAL COMPRESSION SOCKS) MISC 1 Units daily    ferrous sulfate (IRON) 325 (65 FE) MG tablet Take 1 tablet by mouth daily (with breakfast)    Flavoring Agent (PEPPERMINT FLAVOR) OIL Use as smelling agent as needed.    GENGRAF (BRAND) 25 MG CAPSULE Take 4 capsules (100 mg) by mouth every 12 hours    lidocaine (LIDODERM) 5 % Patch Apply up to 3 patches to painful area at once for up to 12 h within a 24 h period.  Remove after 12  hours.    melatonin 3 MG tablet Take 1 tablet (3 mg) by mouth nightly as needed for sleep    Menthol, Topical Analgesic, (BIOFREEZE COLORLESS) 4 % GEL Apply to affected areas BID    multivitamin, therapeutic with minerals (MULTI-VITAMIN) TABS tablet Take 1 tablet by mouth daily    mycophenolate (GENERIC EQUIVALENT) 250 MG capsule Take 2 capsules (500 mg) by mouth every 12 hours    omeprazole (PRILOSEC) 20 MG CR capsule Take 1 capsule (20 mg) by mouth 2 times daily    order for DME Equipment being ordered: Nebulizer with adult mask and tubing    order for DME Equipment being ordered: cane with padded handle    order for DME Equipment being ordered: compression stockings bilateral  Please measure and fit patient for stockings   Strength 15-30mmHg  Disp 2 pairs  1 refill over the time of 1 year    order for DME Equipment being ordered: 4 Wheeled Walker with Seat and Brakes    order for DME Equipment being ordered: Compression stockings below knee bilateral    order for DME Equipment being ordered: Back Stabilizer    order for DME Equipment being ordered: 2 pairs of black stabilizer socks (beige okay if black not available). Size XL.    order for DME Right pull on neoprene knee brace.    order for DME Left pull on neoprene knee brace.    prednisoLONE acetate (PRED FORTE) 1 % ophthalmic susp Use in operative eye four times a day  For 4 days    Psyllium 400 MG CAPS Take 1,200 mg by mouth 2 times daily    SENEXON-S 8.6-50 MG per tablet TAKE TWO TABLETS BY MOUTH TWICE A DAY FOR CONSTIPATION    simethicone (MYLICON) 80 MG chewable tablet Take 1 tablet (80 mg) by mouth every 6 hours as needed for flatulence or cramping    sodium chloride (OCEAN) 0.65 % nasal spray Spray 1 spray into both nostrils daily as needed for congestion    sodium phosphate (FLEET ENEMA) 7-19 GM/118ML rectal enema Place 1 Bottle (1 enema) rectally daily as needed for constipation    STATIN NOT PRESCRIBED, INTENTIONAL, Not prescribed      Facility  Administered Medications as of 5/22/2018             apraclonidine (IOPIDINE) 1 % ophthalmic solution 1 drop Place 1 drop into the right eye once          O:      General Appearance: in no apparent distress.   Skin: Normal, no rashes or jaundice  Heart: regular rate and rhythm, normal S1 and S2  Lungs: easy respirations, no audible wheezing.  Abdomen: protuberant, no evidence of hernia, ascites etc  Extremities: Tremor absent.   Edema: absent.     Transplant Immunosuppression Labs Latest Ref Rng & Units 5/18/2018 11/6/2017 11/2/2017 10/16/2017 8/8/2017   Tacro Level 5.0 - 15.0 ug/L - - - - -   Tacro Level - - - - - -   Cyclo Level 50 - 400 ug/L 164 - 108 - -   Cyclo Level - 2200 - 2040 11/01/2017 - -   Creat 0.52 - 1.04 mg/dL 1.41(H) 1.17(H) 1.08(H) 1.4(H) 1.0   BUN 7 - 30 mg/dL 28 24 25 20.6(H) 23.8(H)   WBC 4.0 - 11.0 10e9/L 3.6(L) - 3.6(L) - -   Neutrophil % - - - - -   ANEU 1.6 - 8.3 10e9/L - - - - -       Chemistries:   Recent Labs   Lab Test  05/18/18   0646   BUN  28   CR  1.41*   GFRESTIMATED  39*   GLC  102*     Lab Results   Component Value Date    A1C 5.4 03/03/2017     Recent Labs   Lab Test  05/18/18   0646   ALBUMIN  3.5   BILITOTAL  0.8   ALKPHOS  88   AST  22   ALT  29     Urine Studies:  Recent Labs   Lab Test  11/02/17   0815   COLOR  Yellow   APPEARANCE  Clear   URINEGLC  Negative   URINEBILI  Negative   URINEKETONE  Negative   SG  1.016   UBLD  Negative   URINEPH  5.0   PROTEIN  Negative   NITRITE  Negative   LEUKEST  Negative   RBCU  1   WBCU  1     Recent Labs   Lab Test  02/27/17   1314  01/23/17   1137   UTPG  0.18  0.17     Hematology:   Recent Labs   Lab Test  05/18/18   0646  11/02/17   0814  10/16/17   1315   07/27/17   1700   HGB  12.4  12.3  13.0   < >  13.2   PLT  111*  108*   --    --   105*   WBC  3.6*  3.6*   --    --   4.0    < > = values in this interval not displayed.     Coags:   Recent Labs   Lab Test  07/27/17   1736  07/27/17   1700   INR  0.9  0.98     HLA antibodies:   No  results found for: [id number], [id number], [id number], [id number]    Assessment: Flor Navarro is doing well s/p liver transplant:  Issues we addressed during her visit include:    Plan:    1. Graft function: good  2. Immunosuppression Management:not changed  3.Needs new  appointment   3. Podiatry consult for left ankle pain  4. Albuterol reordered for asthma management    Followup: 1 week for Dr Devika Vasquez  Fellow, Transplant surgery  Pager: [phone]    Total Time: 20 min,   Counselling Time: 15 min.      Ramses Fortune MD, JUAN CARLOS  Professor of Surgery  [employer]  Surgical Director of Liver Transplant Program  Executive Medical Director of Pediatric Transplantation

## 2018-05-22 NOTE — LETTER
2018       RE: Flor Navarro  [address] [address]     Dear Colleague,    Thank you for referring your patient, Flor Navarro, to the [facility]ID [facility]GAN TRANSPLANT at [facility]. Please see a copy of my visit note below.      Transplant Surgery Progress Note    Transplants:  10/26/2010 (Liver); Postoperative day:  2765  S: Seen Ms Navarro in clinic  Followup [facility] 8 years back, cirrhosis 2/2 Hepatitis C doing well.  No complains at present. Flor denies any abdominal pain, itching or skin rash or fatigue.  She denies any increased abdominal girth or lower extremity edema.  She denies any fevers or chills, cough or shortness of breath.  She denies any nausea, vomiting, diarrhea or constipation  Labs from  looks great    Transplant History:    Transplant Type:  liver transplant  Donor Type:   donor  Transplant Date:  10/26/2010 (Liver)     Transplant Coordinator: Mary Randle     Transplant Office Phone Number: [phone]     Immunsupresent Medications     Immunosuppressive Agents Disp Start End    cycloSPORINE modified (GENERIC EQUIVALENT) 25 MG capsule 210 capsule 2018     Sig: Take 100 mg (4 caps) in the AM, then 75 mg (3 caps) in the evening. 12 hours in between doses    Class: E-Prescribe    Prior authorization:  Closed    GENGRAF (BRAND) 25 MG CAPSULE 240 capsule 2018     Sig - Route: Take 4 capsules (100 mg) by mouth every 12 hours - Oral    Class: E-Prescribe    Prior authorization:  Closed    mycophenolate (GENERIC EQUIVALENT) 250 MG capsule 120 capsule 2018     Sig - Route: Take 2 capsules (500 mg) by mouth every 12 hours - Oral    Class: E-Prescribe    Prior authorization:  Closed          Possible Immunosuppression-related side effects:   []             headache  []             vivid dreams  []             irritability  []             cognitive difficuties  []             fine tremor  []              nausea  []             diarrhea  []             neuropathy      []             edema  []             renal calcineurin toxicity  []             hyperkalemia  []             post-transplant diabetes  []             decreased appetite  []             increased appetite  []             other:  [x]             none    Prescription Medications as of 5/22/2018             albuterol (PROAIR HFA/PROVENTIL HFA/VENTOLIN HFA) 108 (90 Base) MCG/ACT Inhaler Inhale 2 puffs into the lungs 4 times daily    acetaminophen (TYLENOL) 325 MG tablet Take 1-2 tablets (325-650 mg) by mouth every 6 hours as needed Max 6 tablets per 24 hours    alendronate (FOSAMAX) 70 MG tablet Take 1 tablet (70 mg) by mouth every 7 days at least 60 min before breakfast with over 8 oz water. Stay upright for at least 30 min.    ASPIRIN PO Take 81 mg by mouth    AVEENO DAILY MOISTURIZING 1.3 % LOTN lotion APPLY TO AFFECTED AREA(S) AS NEEDED    bisacodyl (DULCOLAX) 5 MG EC tablet Take 1 tablet (5 mg) by mouth daily as needed for constipation    calcium-vitamin D (CALCIUM 600 + D) 600-400 MG-UNIT per tablet Take 1 tablet by mouth 2 times daily    carboxymethylcellul-glycerin (OPTIVE/REFRESH OPTIVE) 0.5-0.9 % SOLN ophthalmic solution Place 1 drop into both eyes 4 times daily    carboxymethylcellulose (CELLUVISC/REFRESH LIQUIGEL) 1 % ophthalmic solution Place 1 drop into both eyes 4 times daily    cholecalciferol (VITAMIN  -D) 1000 units capsule Take 2 capsules (2,000 Units) by mouth daily    Colloidal Oatmeal 1 % CREA Externally apply topically as needed    cycloSPORINE modified (GENERIC EQUIVALENT) 25 MG capsule Take 100 mg (4 caps) in the AM, then 75 mg (3 caps) in the evening. 12 hours in between doses    Elastic Bandages & Supports (KNEE BRACE/FLEX STAYS LARGE) MISC 1 Units daily    Elastic Bandages & Supports (MEDICAL COMPRESSION SOCKS) MISC 1 Units daily    ferrous sulfate (IRON) 325 (65 FE) MG tablet Take 1 tablet by mouth daily (with breakfast)     Flavoring Agent (PEPPERMINT FLAVOR) OIL Use as smelling agent as needed.    GENGRAF (BRAND) 25 MG CAPSULE Take 4 capsules (100 mg) by mouth every 12 hours    lidocaine (LIDODERM) 5 % Patch Apply up to 3 patches to painful area at once for up to 12 h within a 24 h period.  Remove after 12 hours.    melatonin 3 MG tablet Take 1 tablet (3 mg) by mouth nightly as needed for sleep    Menthol, Topical Analgesic, (BIOFREEZE COLORLESS) 4 % GEL Apply to affected areas BID    multivitamin, therapeutic with minerals (MULTI-VITAMIN) TABS tablet Take 1 tablet by mouth daily    mycophenolate (GENERIC EQUIVALENT) 250 MG capsule Take 2 capsules (500 mg) by mouth every 12 hours    omeprazole (PRILOSEC) 20 MG CR capsule Take 1 capsule (20 mg) by mouth 2 times daily    order for DME Equipment being ordered: Nebulizer with adult mask and tubing    order for DME Equipment being ordered: cane with padded handle    order for DME Equipment being ordered: compression stockings bilateral  Please measure and fit patient for stockings   Strength 15-30mmHg  Disp 2 pairs  1 refill over the time of 1 year    order for DME Equipment being ordered: 4 Wheeled Walker with Seat and Brakes    order for DME Equipment being ordered: Compression stockings below knee bilateral    order for DME Equipment being ordered: Back Stabilizer    order for DME Equipment being ordered: 2 pairs of black stabilizer socks (beige okay if black not available). Size XL.    order for DME Right pull on neoprene knee brace.    order for DME Left pull on neoprene knee brace.    prednisoLONE acetate (PRED FORTE) 1 % ophthalmic susp Use in operative eye four times a day  For 4 days    Psyllium 400 MG CAPS Take 1,200 mg by mouth 2 times daily    SENEXON-S 8.6-50 MG per tablet TAKE TWO TABLETS BY MOUTH TWICE A DAY FOR CONSTIPATION    simethicone (MYLICON) 80 MG chewable tablet Take 1 tablet (80 mg) by mouth every 6 hours as needed for flatulence or cramping    sodium chloride  (OCEAN) 0.65 % nasal spray Spray 1 spray into both nostrils daily as needed for congestion    sodium phosphate (FLEET ENEMA) 7-19 GM/118ML rectal enema Place 1 Bottle (1 enema) rectally daily as needed for constipation    STATIN NOT PRESCRIBED, INTENTIONAL, Not prescribed      Facility Administered Medications as of 5/22/2018             apraclonidine (IOPIDINE) 1 % ophthalmic solution 1 drop Place 1 drop into the right eye once          O:      General Appearance: in no apparent distress.   Skin: Normal, no rashes or jaundice  Heart: regular rate and rhythm, normal S1 and S2  Lungs: easy respirations, no audible wheezing.  Abdomen: protuberant, no evidence of hernia, ascites etc  Extremities: Tremor absent.   Edema: absent.     Transplant Immunosuppression Labs Latest Ref Rng & Units 5/18/2018 11/6/2017 11/2/2017 10/16/2017 8/8/2017   Tacro Level 5.0 - 15.0 ug/L - - - - -   Tacro Level - - - - - -   Cyclo Level 50 - 400 ug/L 164 - 108 - -   Cyclo Level - 2200 - 2040 11/01/2017 - -   Creat 0.52 - 1.04 mg/dL 1.41(H) 1.17(H) 1.08(H) 1.4(H) 1.0   BUN 7 - 30 mg/dL 28 24 25 20.6(H) 23.8(H)   WBC 4.0 - 11.0 10e9/L 3.6(L) - 3.6(L) - -   Neutrophil % - - - - -   ANEU 1.6 - 8.3 10e9/L - - - - -       Chemistries:   Recent Labs   Lab Test  05/18/18   0646   BUN  28   CR  1.41*   GFRESTIMATED  39*   GLC  102*     Lab Results   Component Value Date    A1C 5.4 03/03/2017     Recent Labs   Lab Test  05/18/18   0646   ALBUMIN  3.5   BILITOTAL  0.8   ALKPHOS  88   AST  22   ALT  29     Urine Studies:  Recent Labs   Lab Test  11/02/17   0815   COLOR  Yellow   APPEARANCE  Clear   URINEGLC  Negative   URINEBILI  Negative   URINEKETONE  Negative   SG  1.016   UBLD  Negative   URINEPH  5.0   PROTEIN  Negative   NITRITE  Negative   LEUKEST  Negative   RBCU  1   WBCU  1     Recent Labs   Lab Test  02/27/17   1314  01/23/17   1137   UTPG  0.18  0.17     Hematology:   Recent Labs   Lab Test  05/18/18   0646  11/02/17   0814  10/16/17   1753    07/27/17   1700   HGB  12.4  12.3  13.0   < >  13.2   PLT  111*  108*   --    --   105*   WBC  3.6*  3.6*   --    --   4.0    < > = values in this interval not displayed.     Coags:   Recent Labs   Lab Test  07/27/17   1736  07/27/17   1700   INR  0.9  0.98     HLA antibodies:   No results found for: [id number], [id number], [id number], [id number]    Assessment: Flor Navarro is doing well s/p liver transplant:  Issues we addressed during her visit include:    Plan:    1. Graft function: good  2. Immunosuppression Management:not changed  3.Needs new  appointment   3. Podiatry consult for left ankle pain  4. Albuterol reordered for asthma management    Followup: 1 week for Dr Devika Vasquez  Fellow, Transplant surgery  Pager: [phone]    Total Time: 20 min,   Counselling Time: 15 min.      Ramses Fortune MD, JUAN CARLOS  Professor of Surgery  [employer]  Surgical Director of Liver Transplant Program  Executive Medical Director of Pediatric Transplantation    Again, thank you for allowing me to participate in the care of your patient.      Sincerely,    Audrey Dunn NP

## 2018-05-23 NOTE — TELEPHONE ENCOUNTER
Tried to do P/A on Cover My Meds. Got message stating P/A was already denied, to call [employer]. I called [employer] [phone] and was told that a P/A request was initiated yesterday and that it was denied for the diagnosis of Liver replaced by transplant . They are faxing me the denial determination and I will do an appeal.

## 2018-05-24 NOTE — TELEPHONE ENCOUNTER
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 Marked as Fast Appeal.

## 2018-05-24 NOTE — TELEPHONE ENCOUNTER
Received a fax from insurance with a question be answered. Completed form and faxed back to [facility] fax# [phone].

## 2018-05-25 NOTE — TELEPHONE ENCOUNTER
MEDICATION APPEAL DENIED    Medication: lidocaine 5% patches - P/A DENIED/APPEAL DENIED    Denial Date: 5/25/2018    Denial Rational: Diagnosis is not covered          Second Level Appeal Information: Second level appeals will be managed by the clinic staff and provider. Please contact the [employer] Prior Authorization Team if additional information about the denial is needed.

## 2018-05-30 DIAGNOSIS — Z94.4 LIVER REPLACED BY TRANSPLANT (H): ICD-10-CM

## 2018-05-30 RX ORDER — LIDOCAINE 50 MG/G
PATCH TOPICAL
Qty: 30 PATCH | Refills: 0 | Status: SHIPPED | OUTPATIENT
Start: 2018-05-30 | End: 2018-07-16

## 2018-05-30 NOTE — TELEPHONE ENCOUNTER
Pls inform pt of denial of PA for lidocaine patches so she is aware. I had already sent refill, which I will cancel.

## 2018-05-30 NOTE — TELEPHONE ENCOUNTER
Prior Authorization:     Denied Reason: see attached letter    Alternatives: None available    Pharmacy notified.  Routing to MD    Please advise if you would like to move forward with the appeal process or plan to  prescribe an alternative medication. If Appeal is desired a letter of medical necessity with denial rationale is needed to start the appeal process.   Route to PA Pool when completed.    VIKAS Bentley 8:48 AM May 30, 2018

## 2018-06-18 ENCOUNTER — OFFICE VISIT (OUTPATIENT)
Dept: TRANSPLANT | Facility: CLINIC | Age: 53
End: 2018-06-18
Attending: TRANSPLANT SURGERY
Payer: MEDICARE

## 2018-06-18 VITALS
DIASTOLIC BLOOD PRESSURE: 72 MMHG | WEIGHT: 220 LBS | OXYGEN SATURATION: 95 % | HEART RATE: 68 BPM | RESPIRATION RATE: 18 BRPM | BODY MASS INDEX: 41.54 KG/M2 | SYSTOLIC BLOOD PRESSURE: 113 MMHG | HEIGHT: 61 IN

## 2018-06-18 DIAGNOSIS — M25.562 CHRONIC PAIN OF BOTH KNEES: ICD-10-CM

## 2018-06-18 DIAGNOSIS — M79.89 LEG SWELLING: ICD-10-CM

## 2018-06-18 DIAGNOSIS — Z94.4 LIVER TRANSPLANTED (H): Primary | ICD-10-CM

## 2018-06-18 DIAGNOSIS — M25.561 CHRONIC PAIN OF BOTH KNEES: ICD-10-CM

## 2018-06-18 DIAGNOSIS — G89.29 CHRONIC PAIN OF BOTH KNEES: ICD-10-CM

## 2018-06-18 PROCEDURE — G0463 HOSPITAL OUTPT CLINIC VISIT: HCPCS | Mod: ZF

## 2018-06-18 ASSESSMENT — PAIN SCALES - GENERAL: PAINLEVEL: NO PAIN (0)

## 2018-06-18 NOTE — MR AVS SNAPSHOT
After Visit Summary   6/18/2018    Flor Navarro    MRN: [id number]           Patient Information     Date Of Birth          1965        Visit Information        Provider Department      6/18/2018 9:55 AM Ramses Fortune MD; [facility]        Today's Diagnoses     Liver transplanted (H)    -  1    Chronic pain of both knees        Leg swelling           Follow-ups after your visit        Your next 10 appointments already scheduled     Jun 20, 2018 10:00 AM CDT   LAB with  LAB   [facility] ([facility])    [address] [place] [address]  1st [facility] [id number] [phone]           Please do not eat 10-12 hours before your appointment if you are coming in fasting for labs on lipids, cholesterol, or glucose (sugar). This does not apply to pregnant women. Water, hot tea and black coffee (with nothing added) are okay. Do not drink other fluids, diet soda or chew gum.            Jul 23, 2018 10:40 AM CDT   (Arrive by 10:25 AM)   RETURN FOOT/ANKLE with Rigo Drew DPM   [facility] ([facility])    [address] [place] [address]  4th Floor  [facility] [id number] [phone]            Jul 23, 2018  2:00 PM CDT   Lab with  LAB   [facility] ([facility])    [address] [place] [address]  1st Floor  [facility] [id number] [phone]            Jul 23, 2018  2:45 PM CDT   (Arrive by 2:30 PM)   Return Liver Transplant with Laron Anne MD   [facility] ([facility])    [address] [place] [address]  [address]  [facility] [id number] [phone]              Who to contact     If you have questions or need follow up information about today's clinic visit or your schedule please contact [facility] SOL[facility] ORGAN TRANSPLANT directly at [phone].  Normal or non-critical lab and imaging results will be communicated to you by MyChart, letter or  "phone within 4 business days after the clinic has received the results. If you do not hear from us within 7 days, please contact the clinic through "[employer] or phone. If you have a critical or abnormal lab result, we will notify you by phone as soon as possible.  Submit refill requests through "[employer] or call your pharmacy and they will forward the refill request to us. Please allow 3 business days for your refill to be completed.          Additional Information About Your Visit        "[employer] Information     "[employer] lets you send messages to your doctor, view your test results, renew your prescriptions, schedule appointments and more. To sign up, go to [email or web address] . Click on \"Log in\" on the left side of the screen, which will take you to the Welcome page. Then click on \"Sign up Now\" on the right side of the page.     You will be asked to enter the access code listed below, as well as some personal information. Please follow the directions to create your username and password.     Your access code is: [email or web address]  Expires: 2018  6:30 AM     Your access code will  in 90 days. If you need help or a new code, please call your [place] clinic or [phone].        Care EveryWhere ID     This is your Care EveryWhere ID. This could be used by other organizations to access your [place] medical records  [id number]        Your Vitals Were     Pulse Respirations Height Pulse Oximetry BMI (Body Mass Index)       68 18 1.537 m (5' 0.5\") 95% 42.26 kg/m2        Blood Pressure from Last 3 Encounters:   18 113/72   18 126/75   17 101/69    Weight from Last 3 Encounters:   18 99.8 kg (220 lb)   18 99.8 kg (220 lb)   17 94.8 kg (209 lb)              Today, you had the following     No orders found for display         Where to get your medicines      These medications were sent to [place], [place] - [address] [place] [address] [phone] " [facility] [address] [address]    Hours:  TRANSPLANT PHONE NUMBER [phone] Phone:  [phone]     Knee Brace/Flex Stays Large [facility]    Medical Compression Socks [facility]          Primary Care Provider Office Phone # Fax #    Karely Sierra [phone]       [address] [facility] [address] [facility] [address]        Equal Access to Services     MAURO ALBA : Hadii aad ku hadasho Soomaali, waaxda luqadaha, qaybta kaalmada adeegyada, waxay idiin hayaan adeeg kharash la'aan ah. So [facility] [phone].    ATENCIÓN: Si habla español, tiene a garcia disposición servicios gratuitos de asistencia lingüística. Abdirahman al [phone].    We comply with applicable federal civil rights laws and [place] laws. We do not discriminate on the basis of race, color, national origin, age, disability, sex, sexual orientation, or gender identity.            Thank you!     Thank you for choosing [facility] TRANSPL[facility]  for your care. Our goal is always to provide you with excellent care. Hearing back from our patients is one way we can continue to improve our services. Please take a few minutes to complete the written survey that you may receive in the mail after your visit with us. Thank you!             Your Updated Medication List - Protect others around you: Learn how to safely use, store and throw away your medicines at www.disposemymeds.org.          This list is accurate as of 6/18/18 11:17 AM.  Always use your most recent med list.                   Brand Name Dispense Instructions for use Diagnosis    acetaminophen 325 MG tablet    TYLENOL    100 tablet    Take 1-2 tablets (325-650 mg) by mouth every 6 hours as needed Max 6 tablets per 24 hours    Sebaceous cyst       albuterol 108 (90 Base) MCG/ACT Inhaler    PROAIR HFA/PROVENTIL HFA/VENTOLIN HFA    1 Inhaler    Inhale 2 puffs into the lungs 4 times daily    Acute bronchitis, unspecified organism       alendronate 70 MG tablet    FOSAMAX    12 tablet    Take  1 tablet (70 mg) by mouth every 7 days at least 60 min before breakfast with over 8 oz water. Stay upright for at least 30 min.    Osteoporosis       ASPIRIN PO      Take 81 mg by mouth        AVEENO DAILY MOISTURIZING 1.3 % Lotn lotion   Generic drug:  dimethicone     227 g    APPLY TO AFFECTED AREA(S) AS NEEDED    Xerosis cutis       bisacodyl 5 MG EC tablet    DULCOLAX    30 tablet    Take 1 tablet (5 mg) by mouth daily as needed for constipation    Constipation, chronic       calcium-vitamin D 600-400 MG-UNIT per tablet    calcium 600 + D    60 tablet    Take 1 tablet by mouth 2 times daily    Osteoporosis without current pathological fracture, unspecified osteoporosis type       carboxymethylcellul-glycerin 0.5-0.9 % Soln ophthalmic solution    OPTIVE/REFRESH OPTIVE    1 each    Place 1 drop into both eyes 4 times daily    Dry eye syndrome of bilateral lacrimal glands       carboxymethylcellulose 1 % ophthalmic solution    CELLUVISC/REFRESH LIQUIGEL    15 each    Place 1 drop into both eyes 4 times daily    Dry eyes, bilateral       cholecalciferol 1000 units capsule    vitamin  -D    180 capsule    Take 2 capsules (2,000 Units) by mouth daily    Liver replaced by transplant (H), Vitamin D deficiency, Secondary hyperparathyroidism (H), CKD (chronic kidney disease) stage 3, GFR 30-59 ml/min, Palpitations       Colloidal Oatmeal 1 % Crea     226 g    Externally apply topically as needed    Dry skin       * cycloSPORINE modified 25 MG capsule     240 capsule    Take 4 capsules (100 mg) by mouth every 12 hours    Liver replaced by transplant (H)       * cycloSPORINE modified 25 MG capsule    GENERIC EQUIVALENT    210 capsule    Take 100 mg (4 caps) in the AM, then 75 mg (3 caps) in the evening. 12 hours in between doses    Liver replaced by transplant (H)       ferrous sulfate 325 (65 Fe) MG tablet    IRON     Take 1 tablet by mouth daily (with breakfast)        * Knee Brace/Flex Stays Large Misc     2 each    1  Units daily    Chronic pain of both knees       * Medical Compression Socks Misc     2 each    1 Units daily    Leg swelling       lidocaine 5 % Patch    LIDODERM    30 patch    Apply up to 3 patches to painful area at once for up to 12 h within a 24 h period.  Remove after 12 hours.    Liver replaced by transplant (H)       melatonin 3 MG tablet     100 tablet    Take 1 tablet (3 mg) by mouth nightly as needed for sleep    Primary insomnia       Menthol (Topical Analgesic) 4 % Gel    BIOFREEZE COLORLESS    150 mL    Apply to affected areas BID    Pain in joint of right shoulder, Peroneal tendonitis, left       multivitamin, therapeutic with minerals Tabs tablet     100 tablet    Take 1 tablet by mouth daily    Liver replaced by transplant (H)       mycophenolate 250 MG capsule    GENERIC EQUIVALENT    120 capsule    Take 2 capsules (500 mg) by mouth every 12 hours    Liver replaced by transplant (H)       omeprazole 20 MG CR capsule    priLOSEC    180 capsule    Take 1 capsule (20 mg) by mouth 2 times daily    Gastroesophageal reflux disease, esophagitis presence not specified       * order for DME     1 Units    Equipment being ordered: Nebulizer with adult mask and tubing    Acute bronchitis, unspecified organism       * order for DME     1 Units    Equipment being ordered: cane with padded handle    Gait instability       * order for DME     2 Units    Equipment being ordered: compression stockings bilateral Please measure and fit patient for stockings  Strength 15-30mmHg Disp 2 pairs 1 refill over the time of 1 year    Localized edema       * order for DME     1 Units    Equipment being ordered: 4 Wheeled Walker with Seat and Brakes    Falls frequently, Gait instability       * order for DME     2 each    Equipment being ordered: Compression stockings below knee bilateral    Bilateral edema of lower extremity       * order for DME     1 each    Equipment being ordered: Back Stabilizer    Chronic midline low  back pain without sciatica       * order for DME     2 each    Equipment being ordered: 2 pairs of black stabilizer socks (beige okay if black not available). Size XL.    Bilateral edema of lower extremity       * order for DME     1 Units    Right pull on neoprene knee brace.    Chronic pain of right knee       * order for DME     1 Units    Left pull on neoprene knee brace.    Chronic pain of left knee       Peppermint Flavor Oil     1 mL    Use as smelling agent as needed.    Nausea       prednisoLONE acetate 1 % ophthalmic susp    PRED FORTE    1 Bottle    Use in operative eye four times a day  For 4 days    Posterior capsular opacification visually significant of both eyes       Psyllium 400 MG Caps     180 capsule    Take 1,200 mg by mouth 2 times daily    Constipation, chronic       SENEXON-S 8.6-50 MG per tablet   Generic drug:  senna-docusate     100 tablet    TAKE TWO TABLETS BY MOUTH TWICE A DAY FOR CONSTIPATION    Constipation, chronic       simethicone 80 MG chewable tablet    MYLICON    120 tablet    Take 1 tablet (80 mg) by mouth every 6 hours as needed for flatulence or cramping    Flatulence, eructation, and gas pain       sodium chloride 0.65 % nasal spray    OCEAN    30 mL    Spray 1 spray into both nostrils daily as needed for congestion    Throat pain       sodium phosphate 7-19 GM/118ML rectal enema     3 Bottle    Place 1 Bottle (1 enema) rectally daily as needed for constipation    Ileus (H)       STATIN NOT PRESCRIBED (INTENTIONAL)     0 each    Not prescribed    High risk medication use       * Notice:  This list has 13 medication(s) that are the same as other medications prescribed for you. Read the directions carefully, and ask your doctor or other care provider to review them with you.

## 2018-06-18 NOTE — PROGRESS NOTES
HPI      ROS      Physical Exam    Transplant Surgery -OUTPATIENT IMMUNOSUPPRESSION PROGRESS NOTE    Date of Visit: 06/18/2018    Transplants:  10/26/2010 (Liver); Postoperative day:  2792  ASSESMENT AND PLAN:  1.Graft Function: Liver allograft: no rejection or technical problems.    2.Immunosuppression Management: cyclosporine  3.Hypertension: ok   4.Renal Function:good  5.Lab frequency: a monthly  6.Other:  Knee pain, recommend brace and see orthopaedics  Weight gain: see dietician  Claritin D for allergies    Date: June 18, 2018    Transplant:  [x]                             Liver [x]                              Kidney []                             Pancreas []                              Other:             Chief Complaint:  Wants to loose weight    History of Present Illness:  Post liver transplant    Patient Active Problem List   Diagnosis     PVD (POSTERIOR VITREOUS DETACHMENT) OS     Hyperlipidemia LDL goal <160     CKD (chronic kidney disease) stage 3, GFR 30-59 ml/min     Hypertension, renal     Liver replaced by transplant     High risk medication use     Anemia in CKD (chronic kidney disease)     Overweight     Stroke, hemorrhagic (H)     Osteoporosis     Cystitis cystica     Immunosuppressed status (H)     Ganglion cyst     Advanced directives, counseling/discussion     Vitamin D deficiency     Shoulder joint pain     Pseudophakia - Left Eye     Urinary tract infection due to extended-spectrum beta lactamase (ESBL)-producing Klebsiella     Abdominal pain     Constipation, chronic     Secondary hyperparathyroidism (H)     Mixed hyperlipidemia     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)     Peroneal tendonitis, left     Pain in joint, ankle and foot, left     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)     Obesity, Class II, BMI 35-39.9     Sleep apnea, unspecified     Depression, recurrent (H)     Asthma, mild intermittent     SOCIAL /FAMILY HISTORY: [x]                  No recent  change    Past Medical History:   Diagnosis Date     Anemia in CKD (chronic kidney disease)      Cataract      CKD (chronic kidney disease) stage 3, GFR 30-59 ml/min      Hepatitis C     cleared virus spontaneously      High risk medication use      Hypertension, renal      Immunosuppressed status (H)      Liver replaced by transplant (H)      Osteoporosis      Recurrent pregnancy loss without current pregnancy      Stroke, hemorrhagic (H)      Syncope      Unspecified viral hepatitis C without hepatic coma      Past Surgical History:   Procedure Laterality Date     CATARACT IOL, RT/LT Right 2/18/15      SECTION       Incisional Hernia Repair  2004     INSERT SHUNT PORTAL TRANSJUGULAR INTRAHEPTIC      shunt placement for liver failure     LAPAROSCOPIC SALPINGO-OOPHORECTOMY      left     NECK SURGERY      fracture, in halo x 7months     TRANSPLANT LIVER RECIPIENT  DONOR  10/26/10     Upper GI Endoscopy with Band Ligation of Esoph/Gastric Varic. .       Social History     Social History     Marital status: Single     Spouse name: N/A     Number of children: N/A     Years of education: N/A     Occupational History     Not on file.     Social History Main Topics     Smoking status: Never Smoker     Smokeless tobacco: Never Used     Alcohol use No     Drug use: No     Sexual activity: No     Other Topics Concern      Service No     Blood Transfusions Yes     Caffeine Concern No     Occupational Exposure No     Hobby Hazards No     Sleep Concern No     Stress Concern Yes     needs help at home for cares     Weight Concern Yes     wants to lose weight not gain weight     Special Diet No     Back Care Yes     uses a patch to relieve pain     Seat Belt Yes     Social History Narrative    One son Carleen        GynHx:         LMP age 39    Pap 3/11, NIL, no abnormal    Mammo , birads 2 benign        Lives with spouse due to necessity but she reports they do not have a  good relationship        How much exercise per week? Stairs at home    How much calcium per day? supplement       How much caffeine per day? 1 cup tea    How much vitamin D per day? supplement    Do you/your family wear seatbelts?  Yes    Do you/your family use safety helmets? n/a    Do you/your family use sunscreen? No    Do you/your family keep firearms in the home? No    Do you/your family have a smoke detector(s)? Yes        Do you feel safe in your home? Yes    Has anyone ever touched you in an unwanted manner? No     Explain         March 30, 2015 Tati Beard LPN                 Prescription Medications as of 6/18/2018             acetaminophen (TYLENOL) 325 MG tablet Take 1-2 tablets (325-650 mg) by mouth every 6 hours as needed Max 6 tablets per 24 hours    albuterol (PROAIR HFA/PROVENTIL HFA/VENTOLIN HFA) 108 (90 Base) MCG/ACT Inhaler Inhale 2 puffs into the lungs 4 times daily    alendronate (FOSAMAX) 70 MG tablet Take 1 tablet (70 mg) by mouth every 7 days at least 60 min before breakfast with over 8 oz water. Stay upright for at least 30 min.    ASPIRIN PO Take 81 mg by mouth    AVEENO DAILY MOISTURIZING 1.3 % LOTN lotion APPLY TO AFFECTED AREA(S) AS NEEDED    bisacodyl (DULCOLAX) 5 MG EC tablet Take 1 tablet (5 mg) by mouth daily as needed for constipation    calcium-vitamin D (CALCIUM 600 + D) 600-400 MG-UNIT per tablet Take 1 tablet by mouth 2 times daily    carboxymethylcellul-glycerin (OPTIVE/REFRESH OPTIVE) 0.5-0.9 % SOLN ophthalmic solution Place 1 drop into both eyes 4 times daily    carboxymethylcellulose (CELLUVISC/REFRESH LIQUIGEL) 1 % ophthalmic solution Place 1 drop into both eyes 4 times daily    cholecalciferol (VITAMIN  -D) 1000 units capsule Take 2 capsules (2,000 Units) by mouth daily    Colloidal Oatmeal 1 % CREA Externally apply topically as needed    cycloSPORINE modified (GENERIC EQUIVALENT) 25 MG capsule Take 100 mg (4 caps) in the AM, then 75 mg (3 caps) in the evening. 12 hours  in between doses    Elastic Bandages & Supports (KNEE BRACE/FLEX STAYS LARGE) MISC 1 Units daily    Elastic Bandages & Supports (MEDICAL COMPRESSION SOCKS) MISC 1 Units daily    ferrous sulfate (IRON) 325 (65 FE) MG tablet Take 1 tablet by mouth daily (with breakfast)    Flavoring Agent (PEPPERMINT FLAVOR) OIL Use as smelling agent as needed.    GENGRAF (BRAND) 25 MG CAPSULE Take 4 capsules (100 mg) by mouth every 12 hours    lidocaine (LIDODERM) 5 % Patch Apply up to 3 patches to painful area at once for up to 12 h within a 24 h period.  Remove after 12 hours.    melatonin 3 MG tablet Take 1 tablet (3 mg) by mouth nightly as needed for sleep    Menthol, Topical Analgesic, (BIOFREEZE COLORLESS) 4 % GEL Apply to affected areas BID    multivitamin, therapeutic with minerals (MULTI-VITAMIN) TABS tablet Take 1 tablet by mouth daily    mycophenolate (GENERIC EQUIVALENT) 250 MG capsule Take 2 capsules (500 mg) by mouth every 12 hours    omeprazole (PRILOSEC) 20 MG CR capsule Take 1 capsule (20 mg) by mouth 2 times daily    order for DME Right pull on neoprene knee brace.    order for DME Left pull on neoprene knee brace.    order for DME Equipment being ordered: 2 pairs of black stabilizer socks (beige okay if black not available). Size XL.    order for DME Equipment being ordered: Compression stockings below knee bilateral    order for DME Equipment being ordered: Back Stabilizer    order for DME Equipment being ordered: 4 Wheeled Walker with Seat and Brakes    order for DME Equipment being ordered: compression stockings bilateral  Please measure and fit patient for stockings   Strength 15-30mmHg  Disp 2 pairs  1 refill over the time of 1 year    order for DME Equipment being ordered: Nebulizer with adult mask and tubing    order for DME Equipment being ordered: cane with padded handle    prednisoLONE acetate (PRED FORTE) 1 % ophthalmic susp Use in operative eye four times a day  For 4 days    Psyllium 400 MG CAPS Take  "1,200 mg by mouth 2 times daily    SENEXON-S 8.6-50 MG per tablet TAKE TWO TABLETS BY MOUTH TWICE A DAY FOR CONSTIPATION    simethicone (MYLICON) 80 MG chewable tablet Take 1 tablet (80 mg) by mouth every 6 hours as needed for flatulence or cramping    sodium chloride (OCEAN) 0.65 % nasal spray Spray 1 spray into both nostrils daily as needed for congestion    sodium phosphate (FLEET ENEMA) 7-19 GM/118ML rectal enema Place 1 Bottle (1 enema) rectally daily as needed for constipation    STATIN NOT PRESCRIBED, INTENTIONAL, Not prescribed      Facility Administered Medications as of 6/18/2018             apraclonidine (IOPIDINE) 1 % ophthalmic solution 1 drop Place 1 drop into the right eye once        Aspirin; Clarithromycin; Contrast dye; and Penicillins   REVIEW OF SYSTEMS (check box if normal)  [x]               GENERAL  [x]                 PULMONARY [x]                GENITOURINARY  [x]                CNS                 [x]                 CARDIAC  [x]                 ENDOCRINE  [x]                EARS,NOSE,THROAT [x]                 GASTROINTESTINAL [x]                 NEUROLOGIC    [x]                MUSCLOSKELTAL  [x]                  HEMATOLOGY      PHYSICAL EXAM (check box if normal)/72 (Patient Position: Sitting)  Pulse 68  Resp 18  Ht 1.537 m (5' 0.5\")  Wt 99.8 kg (220 lb)  SpO2 95%  BMI 42.26 kg/m2        [x]            GENERAL:    [x]       EYES:  ICTERIC   []        YES  []                    NO  [x]           EXTREMITIES: Clubbing []       Y     [x]           N    [x]           EARS, NOSE, THROAT: Membranes Moist    YES   [x]                   NO []                  [x]           LUNGS:  CLEAR    YES       [x]                  NO    []                                [x]           SKIN: Jaundice           YES       []                  NO    [x]                   Rash: YES       []                  NO    [x]                                     [x]             HEART: Regular Rate          YES  "      [x]                  NO    []                   Incision Clean:  YES       [x]                  NO    []                                [x]                    ABDOMEN: Organomegaly YES       []                  NO    [x]                       [x]                    NEUROLOGICAL:  Nonfocal  YES       [x]                  NO    []                       [x]                    Hernia YES       []                  NO    [x]                   PSYCHIATRIC:  Appropriate  YES       [x]                  NO    []                       OTHER:                                                                                                   PAIN SCALE:: 3

## 2018-06-18 NOTE — LETTER
6/18/2018      RE: Flor Navarro  [address] [facility] [address]       HPI      ROS      Physical Exam    Transplant Surgery -OUTPATIENT IMMUNOSUPPRESSION PROGRESS NOTE    Date of Visit: 06/18/2018    Transplants:  10/26/2010 (Liver); Postoperative day:  2792  ASSESMENT AND PLAN:  1.Graft Function: Liver allograft: no rejection or technical problems.    2.Immunosuppression Management: cyclosporine  3.Hypertension: ok   4.Renal Function:good  5.Lab frequency: a monthly  6.Other:  Knee pain, recommend brace and see orthopaedics  Weight gain: see dietician  Claritin D for allergies    Date: June 18, 2018    Transplant:  [x]                             Liver [x]                              Kidney []                             Pancreas []                              Other:             Chief Complaint:  Wants to loose weight    History of Present Illness:  Post liver transplant    Patient Active Problem List   Diagnosis     PVD (POSTERIOR VITREOUS DETACHMENT) OS     Hyperlipidemia LDL goal <160     CKD (chronic kidney disease) stage 3, GFR 30-59 ml/min     Hypertension, renal     Liver replaced by transplant     High risk medication use     Anemia in CKD (chronic kidney disease)     Overweight     Stroke, hemorrhagic (H)     Osteoporosis     Cystitis cystica     Immunosuppressed status (H)     Ganglion cyst     Advanced directives, counseling/discussion     Vitamin D deficiency     Shoulder joint pain     Pseudophakia - Left Eye     Urinary tract infection due to extended-spectrum beta lactamase (ESBL)-producing Klebsiella     Abdominal pain     Constipation, chronic     Secondary hyperparathyroidism (H)     Mixed hyperlipidemia     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)     Peroneal tendonitis, left     Pain in joint, ankle and foot, left     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)     Obesity, Class II, BMI 35-39.9     Sleep apnea, unspecified     Depression, recurrent  (H)     Asthma, mild intermittent     SOCIAL /FAMILY HISTORY: [x]                  No recent change    Past Medical History:   Diagnosis Date     Anemia in CKD (chronic kidney disease)      Cataract      CKD (chronic kidney disease) stage 3, GFR 30-59 ml/min      Hepatitis C     cleared virus spontaneously      High risk medication use      Hypertension, renal      Immunosuppressed status (H)      Liver replaced by transplant (H)      Osteoporosis      Recurrent pregnancy loss without current pregnancy      Stroke, hemorrhagic (H)      Syncope      Unspecified viral hepatitis C without hepatic coma      Past Surgical History:   Procedure Laterality Date     CATARACT IOL, RT/LT Right 2/18/15      SECTION       Incisional Hernia Repair  2004     INSERT SHUNT PORTAL TRANSJUGULAR INTRAHEPTIC      shunt placement for liver failure     LAPAROSCOPIC SALPINGO-OOPHORECTOMY      left     NECK SURGERY  2010    fracture, in halo x 7months     TRANSPLANT LIVER RECIPIENT  DONOR  10/26/10     Upper GI Endoscopy with Band Ligation of Esoph/Gastric Varic. .       Social History     Social History     Marital status: Single     Spouse name: N/A     Number of children: N/A     Years of education: N/A     Occupational History     Not on file.     Social History Main Topics     Smoking status: Never Smoker     Smokeless tobacco: Never Used     Alcohol use No     Drug use: No     Sexual activity: No     Other Topics Concern      Service No     Blood Transfusions Yes     Caffeine Concern No     Occupational Exposure No     Hobby Hazards No     Sleep Concern No     Stress Concern Yes     needs help at home for cares     Weight Concern Yes     wants to lose weight not gain weight     Special Diet No     Back Care Yes     uses a patch to relieve pain     Seat Belt Yes     Social History Narrative    One son Carleen        GynHx:         LMP age 39    Pap 3/11, NIL, no abnormal    Mammo ,  frederic 2 benign        Lives with spouse due to necessity but she reports they do not have a good relationship        How much exercise per week? Stairs at home    How much calcium per day? supplement       How much caffeine per day? 1 cup tea    How much vitamin D per day? supplement    Do you/your family wear seatbelts?  Yes    Do you/your family use safety helmets? n/a    Do you/your family use sunscreen? No    Do you/your family keep firearms in the home? No    Do you/your family have a smoke detector(s)? Yes        Do you feel safe in your home? Yes    Has anyone ever touched you in an unwanted manner? No     Explain         March 30, 2015 Tati Beard LPN                 Prescription Medications as of 6/18/2018             acetaminophen (TYLENOL) 325 MG tablet Take 1-2 tablets (325-650 mg) by mouth every 6 hours as needed Max 6 tablets per 24 hours    albuterol (PROAIR HFA/PROVENTIL HFA/VENTOLIN HFA) 108 (90 Base) MCG/ACT Inhaler Inhale 2 puffs into the lungs 4 times daily    alendronate (FOSAMAX) 70 MG tablet Take 1 tablet (70 mg) by mouth every 7 days at least 60 min before breakfast with over 8 oz water. Stay upright for at least 30 min.    ASPIRIN PO Take 81 mg by mouth    AVEENO DAILY MOISTURIZING 1.3 % LOTN lotion APPLY TO AFFECTED AREA(S) AS NEEDED    bisacodyl (DULCOLAX) 5 MG EC tablet Take 1 tablet (5 mg) by mouth daily as needed for constipation    calcium-vitamin D (CALCIUM 600 + D) 600-400 MG-UNIT per tablet Take 1 tablet by mouth 2 times daily    carboxymethylcellul-glycerin (OPTIVE/REFRESH OPTIVE) 0.5-0.9 % SOLN ophthalmic solution Place 1 drop into both eyes 4 times daily    carboxymethylcellulose (CELLUVISC/REFRESH LIQUIGEL) 1 % ophthalmic solution Place 1 drop into both eyes 4 times daily    cholecalciferol (VITAMIN  -D) 1000 units capsule Take 2 capsules (2,000 Units) by mouth daily    Colloidal Oatmeal 1 % CREA Externally apply topically as needed    cycloSPORINE modified (GENERIC  EQUIVALENT) 25 MG capsule Take 100 mg (4 caps) in the AM, then 75 mg (3 caps) in the evening. 12 hours in between doses    Elastic Bandages & Supports (KNEE BRACE/FLEX STAYS LARGE) MISC 1 Units daily    Elastic Bandages & Supports (MEDICAL COMPRESSION SOCKS) MISC 1 Units daily    ferrous sulfate (IRON) 325 (65 FE) MG tablet Take 1 tablet by mouth daily (with breakfast)    Flavoring Agent (PEPPERMINT FLAVOR) OIL Use as smelling agent as needed.    GENGRAF (BRAND) 25 MG CAPSULE Take 4 capsules (100 mg) by mouth every 12 hours    lidocaine (LIDODERM) 5 % Patch Apply up to 3 patches to painful area at once for up to 12 h within a 24 h period.  Remove after 12 hours.    melatonin 3 MG tablet Take 1 tablet (3 mg) by mouth nightly as needed for sleep    Menthol, Topical Analgesic, (BIOFREEZE COLORLESS) 4 % GEL Apply to affected areas BID    multivitamin, therapeutic with minerals (MULTI-VITAMIN) TABS tablet Take 1 tablet by mouth daily    mycophenolate (GENERIC EQUIVALENT) 250 MG capsule Take 2 capsules (500 mg) by mouth every 12 hours    omeprazole (PRILOSEC) 20 MG CR capsule Take 1 capsule (20 mg) by mouth 2 times daily    order for DME Right pull on neoprene knee brace.    order for DME Left pull on neoprene knee brace.    order for DME Equipment being ordered: 2 pairs of black stabilizer socks (beige okay if black not available). Size XL.    order for DME Equipment being ordered: Compression stockings below knee bilateral    order for DME Equipment being ordered: Back Stabilizer    order for DME Equipment being ordered: 4 Wheeled Walker with Seat and Brakes    order for DME Equipment being ordered: compression stockings bilateral  Please measure and fit patient for stockings   Strength 15-30mmHg  Disp 2 pairs  1 refill over the time of 1 year    order for DME Equipment being ordered: Nebulizer with adult mask and tubing    order for DME Equipment being ordered: cane with padded handle    prednisoLONE acetate (PRED  "FORTE) 1 % ophthalmic susp Use in operative eye four times a day  For 4 days    Psyllium 400 MG CAPS Take 1,200 mg by mouth 2 times daily    SENEXON-S 8.6-50 MG per tablet TAKE TWO TABLETS BY MOUTH TWICE A DAY FOR CONSTIPATION    simethicone (MYLICON) 80 MG chewable tablet Take 1 tablet (80 mg) by mouth every 6 hours as needed for flatulence or cramping    sodium chloride (OCEAN) 0.65 % nasal spray Spray 1 spray into both nostrils daily as needed for congestion    sodium phosphate (FLEET ENEMA) 7-19 GM/118ML rectal enema Place 1 Bottle (1 enema) rectally daily as needed for constipation    STATIN NOT PRESCRIBED, INTENTIONAL, Not prescribed      Facility Administered Medications as of 6/18/2018             apraclonidine (IOPIDINE) 1 % ophthalmic solution 1 drop Place 1 drop into the right eye once        Aspirin; Clarithromycin; Contrast dye; and Penicillins   REVIEW OF SYSTEMS (check box if normal)  [x]               GENERAL  [x]                 PULMONARY [x]                GENITOURINARY  [x]                CNS                 [x]                 CARDIAC  [x]                 ENDOCRINE  [x]                EARS,NOSE,THROAT [x]                 GASTROINTESTINAL [x]                 NEUROLOGIC    [x]                MUSCLOSKELTAL  [x]                  HEMATOLOGY      PHYSICAL EXAM (check box if normal)/72 (Patient Position: Sitting)  Pulse 68  Resp 18  Ht 1.537 m (5' 0.5\")  Wt 99.8 kg (220 lb)  SpO2 95%  BMI 42.26 kg/m2        [x]            GENERAL:    [x]       EYES:  ICTERIC   []        YES  []                    NO  [x]           EXTREMITIES: Clubbing []       Y     [x]           N    [x]           EARS, NOSE, THROAT: Membranes Moist    YES   [x]                   NO []                  [x]           LUNGS:  CLEAR    YES       [x]                  NO    []                                [x]           SKIN: Jaundice           YES       []                  NO    [x]                   Rash: YES       []        "           NO    [x]                                     [x]             HEART: Regular Rate          YES       [x]                  NO    []                   Incision Clean:  YES       [x]                  NO    []                                [x]                    ABDOMEN: Organomegaly YES       []                  NO    [x]                       [x]                    NEUROLOGICAL:  Nonfocal  YES       [x]                  NO    []                       [x]                    Hernia YES       []                  NO    [x]                   PSYCHIATRIC:  Appropriate  YES       [x]                  NO    []                       OTHER:                                                                                                   PAIN SCALE:: 3    Ramses Fortune MD

## 2018-07-09 ENCOUNTER — OFFICE VISIT (OUTPATIENT)
Dept: FAMILY MEDICINE | Facility: CLINIC | Age: 53
End: 2018-07-09
Payer: MEDICARE

## 2018-07-09 VITALS
DIASTOLIC BLOOD PRESSURE: 80 MMHG | OXYGEN SATURATION: 95 % | HEART RATE: 72 BPM | SYSTOLIC BLOOD PRESSURE: 127 MMHG | BODY MASS INDEX: 42.87 KG/M2 | WEIGHT: 223.2 LBS | TEMPERATURE: 98.2 F

## 2018-07-09 DIAGNOSIS — M25.532 PAIN IN BOTH WRISTS: ICD-10-CM

## 2018-07-09 DIAGNOSIS — T74.91XS DOMESTIC VIOLENCE OF ADULT, SEQUELA: ICD-10-CM

## 2018-07-09 DIAGNOSIS — M25.521 RIGHT ELBOW PAIN: ICD-10-CM

## 2018-07-09 DIAGNOSIS — R05.9 COUGH: Primary | ICD-10-CM

## 2018-07-09 DIAGNOSIS — Z79.899 HIGH RISK MEDICATION USE: ICD-10-CM
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
## 2018-07-09 DIAGNOSIS — M25.531 PAIN IN BOTH WRISTS: ICD-10-CM

## 2018-07-09 RX ORDER — ACETAMINOPHEN 500 MG
500 TABLET ORAL 3 TIMES DAILY PRN
Qty: 120 TABLET | Refills: 1 | Status: SHIPPED | OUTPATIENT
Start: 2018-07-09 | End: 2018-09-24
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
## 2018-07-09 NOTE — NURSING NOTE
Pt received Procare comfort form Medium size R and L wrist splints. Pt tolerated well. Right Ref: [id number] Left Ref: [id number].  RODERICK Sebastian

## 2018-07-11 ENCOUNTER — TELEPHONE (OUTPATIENT)
Dept: FAMILY MEDICINE | Facility: CLINIC | Age: 53
End: 2018-07-11

## 2018-07-11 ENCOUNTER — MEDICAL CORRESPONDENCE (OUTPATIENT)
Dept: HEALTH INFORMATION MANAGEMENT | Facility: CLINIC | Age: 53
End: 2018-07-11

## 2018-07-11 NOTE — TELEPHONE ENCOUNTER
[facility] phone call message - order or referral request for patient:     Order or referral being requested: SN, PT      Additional Comments: PT - Eval and Treat, Skilled Nursing 2x per week for 2 weeks, 1x per week for 7 weeks, 3 prn    OK to leave a message on voice mail? Yes    Primary language: Greek      needed? Yes    Call taken on July 11, 2018 at 2:04 PM by Hellen Lobo

## 2018-07-12 ENCOUNTER — RADIANT APPOINTMENT (OUTPATIENT)
Dept: GENERAL RADIOLOGY | Facility: CLINIC | Age: 53
End: 2018-07-12
Attending: FAMILY MEDICINE
Payer: MEDICARE
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
## 2018-07-12 DIAGNOSIS — Z94.4 LIVER REPLACED BY TRANSPLANT (H): ICD-10-CM

## 2018-07-12 LAB
ALBUMIN SERPL-MCNC: 3.6 G/DL (ref 3.4–5)
ALP SERPL-CCNC: 96 U/L (ref 40–150)
ALT SERPL W P-5'-P-CCNC: 69 U/L (ref 0–50)
ANION GAP SERPL CALCULATED.3IONS-SCNC: 5 MMOL/L (ref 3–14)
AST SERPL W P-5'-P-CCNC: 49 U/L (ref 0–45)
BILIRUB DIRECT SERPL-MCNC: 0.2 MG/DL (ref 0–0.2)
BILIRUB SERPL-MCNC: 0.8 MG/DL (ref 0.2–1.3)
BUN SERPL-MCNC: 27 MG/DL (ref 7–30)
CALCIUM SERPL-MCNC: 8.8 MG/DL (ref 8.5–10.1)
CHLORIDE SERPL-SCNC: 110 MMOL/L (ref 94–109)
CO2 SERPL-SCNC: 27 MMOL/L (ref 20–32)
CREAT SERPL-MCNC: 1.25 MG/DL (ref 0.52–1.04)
CYCLOSPORINE BLD LC/MS/MS-MCNC: 116 UG/L (ref 50–400)
ERYTHROCYTE [DISTWIDTH] IN BLOOD BY AUTOMATED COUNT: 12.6 % (ref 10–15)
GFR SERPL CREATININE-BSD FRML MDRD: 45 ML/MIN/1.7M2
GLUCOSE SERPL-MCNC: 100 MG/DL (ref 70–99)
HCT VFR BLD AUTO: 40.9 % (ref 35–47)
HGB BLD-MCNC: 12.9 G/DL (ref 11.7–15.7)
MCH RBC QN AUTO: 30.1 PG (ref 26.5–33)
MCHC RBC AUTO-ENTMCNC: 31.5 G/DL (ref 31.5–36.5)
MCV RBC AUTO: 96 FL (ref 78–100)
PLATELET # BLD AUTO: 126 10E9/L (ref 150–450)
POTASSIUM SERPL-SCNC: 4.8 MMOL/L (ref 3.4–5.3)
PROT SERPL-MCNC: 7.6 G/DL (ref 6.8–8.8)
RADIOLOGIST FLAGS: NORMAL
RBC # BLD AUTO: 4.28 10E12/L (ref 3.8–5.2)
SODIUM SERPL-SCNC: 142 MMOL/L (ref 133–144)
TME LAST DOSE: NORMAL H
WBC # BLD AUTO: 3.4 10E9/L (ref 4–11)

## 2018-07-12 PROCEDURE — 80158 DRUG ASSAY CYCLOSPORINE: CPT | Performed by: INTERNAL MEDICINE

## 2018-07-13 DIAGNOSIS — Z94.4 LIVER REPLACED BY TRANSPLANT (H): Primary | ICD-10-CM

## 2018-07-13 RX ORDER — AMOXICILLIN 250 MG
2 CAPSULE ORAL 2 TIMES DAILY
Qty: 100 TABLET | Refills: 3 | Status: SHIPPED | OUTPATIENT
Start: 2018-07-13 | End: 2018-09-24

## 2018-07-13 NOTE — PROGRESS NOTES
Called and left message with Son for Flor to repeat liver labs in 2 weeks. Order is placed and asked for a callback.

## 2018-07-16 ENCOUNTER — OFFICE VISIT (OUTPATIENT)
Dept: FAMILY MEDICINE | Facility: CLINIC | Age: 53
End: 2018-07-16
Payer: MEDICARE

## 2018-07-16 ENCOUNTER — DOCUMENTATION ONLY (OUTPATIENT)
Dept: CARE COORDINATION | Facility: CLINIC | Age: 53
End: 2018-07-16

## 2018-07-16 VITALS
OXYGEN SATURATION: 95 % | BODY MASS INDEX: 42.64 KG/M2 | DIASTOLIC BLOOD PRESSURE: 82 MMHG | HEART RATE: 74 BPM | TEMPERATURE: 97.9 F | SYSTOLIC BLOOD PRESSURE: 122 MMHG | WEIGHT: 222 LBS

## 2018-07-16 DIAGNOSIS — S52.124A: Primary | ICD-10-CM
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
## 2018-07-16 RX ORDER — LIDOCAINE 50 MG/G
PATCH TOPICAL
Qty: 30 PATCH | Refills: 0 | Status: SHIPPED | OUTPATIENT
Start: 2018-07-16 | End: 2019-01-17

## 2018-07-16 ASSESSMENT — ENCOUNTER SYMPTOMS
BACK PAIN: 0
ARTHRALGIAS: 1
NECK PAIN: 0

## 2018-07-16 NOTE — PROGRESS NOTES
TAMIA, went to see patient today for scheduled SN Visit with . Once at patients home she stated that she has an appointment with her dr today.  Visit for today cancelled. Patient is scheduled for another visit with SN on Thursday.    Thank you,    DANETTE JUDD RN on 7/16/2018 at 1:27 PM

## 2018-07-16 NOTE — PROGRESS NOTES
HPI       Flor aNvarro is a 52 year old  who presents for follow-up of X-rays post-fall    Chief Complaint   Patient presents with     RECHECK     X-ray F/U       An Persian  was used for  this visit.    +++++++    Fall - follow-up: bilateral wrist and right elbow pain      Update since last visit: improving, no more wrist pain. Cannot use splints on the right side as she uses the cane in that hand.   Current Treatment for joint pain:    Acetaminophen?:Yes Details: helps some. Only taking twice a day out of concern for her transplanted liver.     Exercise/PT? no   Effectiveness of treatment: partial.    Description:   Location(s): right elbow, fossa and lateral  No brace/support.  Character: Stabbing  Morning Stiffness?:all day     Intensity: moderate    Joint swelling  YES much improved    Joint redness  No     Worsened by    Overuse?: Yes Details: uses the right arm for support - cane. It hurts when she shakes hands with people.     Alleviating factors:   Improved by: rest/inactivity. Hot and cold don't help.  Any medication monitoring needed? No     Therapies Tried and outcome: tylenol, Details: mild improvement. Ice and heat do not work, has not tried lidocaine gel.     Adherence and Exercise  Medication side effects: no  How often is a medication missed? About 1-3 times per week  Exercise: walking     Problem, Medication and Allergy Lists were reviewed and updated if needed..    Patient is an established patient of this clinic..         Review of Systems:   Review of Systems   Musculoskeletal: Positive for arthralgias and gait problem (uses cane). Negative for back pain and neck pain.   All other systems reviewed and are negative.           Physical Exam:     Vitals:    07/16/18 1309   BP: 122/82   Pulse: 74   Temp: 97.9  F (36.6  C)   TempSrc: Oral   SpO2: 95%   Weight: 222 lb (100.7 kg)     Body mass index is 42.64 kg/(m^2).  Vitals were reviewed and were normal     Physical Exam    Constitutional: She appears well-developed and well-nourished. No distress.   HENT:   Head: Atraumatic.   Eyes: Conjunctivae and EOM are normal.   Neck: Normal range of motion.   Cardiovascular: Normal rate.    Pulmonary/Chest: Effort normal. No respiratory distress.   Musculoskeletal:        Right elbow: She exhibits swelling. She exhibits normal range of motion and no deformity. Tenderness (antecubital fossa) found. Radial head tenderness noted. No medial epicondyle and no olecranon process tenderness noted.        Right wrist: Normal.        Left wrist: Normal.        Arms:       Left hand: She exhibits normal capillary refill. Normal sensation noted. Decreased strength (baseline) noted.   Neurological: She is alert.   Skin: Skin is warm and dry. She is not diaphoretic.   Psychiatric: She has a normal mood and affect. Her behavior is normal.       Left Hand/Wrist Exam   Left hand/wrist exam is normal.    Right Hand/Wrist Exam   Right hand/wrist exam is normal.    Right Elbow Exam     Tenderness   The patient is experiencing tenderness in the no medial epicondyle, radial head, no olecranon process.            Results:   Results from last visit:  [facility] Appointment on 07/12/2018   Component Date Value Ref Range Status     Radiologist flags 07/12/2018 Findings concerning for nondisplaced fracture of the  radial head.   Final       Assessment and Plan     1. Nondisp fx of head of right radius, init for clos fx  - Continue tylenol 500 tid   - [facility]  - lidocaine (LIDODERM) 5 % Patch; Apply up to 3 patches to painful area at once for up to 12 h within a 24 h period.  Remove after 12 hours.  Dispense: 30 patch; Refill: 0    2. Fall, subsequent encounter  - bilateral wrist pain improved. Continue using braces as needed.      Medications Discontinued During This Encounter   Medication Reason     lidocaine (LIDODERM) 5 % Patch Reorder     Options for treatment and follow-up care  were reviewed with the patient. Flor Navarro  engaged in the decision making process and verbalized understanding of the options discussed and agreed with the final plan.    Claudia Frazier MD

## 2018-07-16 NOTE — PROGRESS NOTES
Preceptor Attestation:   Patient seen, evaluated and discussed with the resident.   I have verified the content of the note, which accurately reflects my assessment of the patient and the plan of care.   Supervising Physician:  Hugh Hartley MD

## 2018-07-16 NOTE — PATIENT INSTRUCTIONS
Take tylenol 500 mg 3 TIMES A DAY  Here is the plan from today's visit    1. Nondisp fx of head of right radius, init for clos fx  TAKE TYLENOL 500 MG 3 times a day  - [facility] REFERR[facility] - [place]  - lidocaine (LIDODERM) 5 % Patch; Apply up to 3 patches to painful area at once for up to 12 h within a 24 h period.  Remove after 12 hours.  Dispense: 30 patch; Refill: 0      Please call or return to clinic if your symptoms don't go away.    Follow up plan  Please make a clinic appointment for follow up with your primary physician Karely Sierra MD.    Thank you for coming to [facility] today.  Lab Testing:  **If you had lab testing today and your results are reassuring or normal they will be mailed to you or sent through [employer] within 7 days.   **If the lab tests need quick action we will call you with the results.  The phone number we will call with results is # [phone] (home) . If this is not the best number please call our clinic and change the number.  Medication Refills:  If you need any refills please call your pharmacy and they will contact us.   If you need to  your refill at a new pharmacy, please contact the new pharmacy directly. The new pharmacy will help you get your medications transferred faster.   Scheduling:  If you have any concerns about today's visit or wish to schedule another appointment please call our office during normal business hours [phone] (8-5:00 M-F)  If a referral was made to a [facility] and you don't get a call from central scheduling please call [phone].  If a Mammogram was ordered for you at [facility] call [phone] to schedule or change your appointment.  If you had an XRay/CT/Ultrasound/MRI ordered the number is [phone] to schedule or change your radiology appointment.   Medical Concerns:  If you have urgent medical concerns please call [phone] at any time of the day.    Claudia Frazier MD

## 2018-07-18 DIAGNOSIS — H04.123 DRY EYES, BILATERAL: ICD-10-CM

## 2018-07-19 ENCOUNTER — OFFICE VISIT (OUTPATIENT)
Dept: OPHTHALMOLOGY | Facility: CLINIC | Age: 53
End: 2018-07-19
Attending: OPHTHALMOLOGY
Payer: MEDICARE

## 2018-07-19 DIAGNOSIS — H04.123 DRY EYES, BILATERAL: Primary | ICD-10-CM

## 2018-07-19 DIAGNOSIS — Z96.1 PSEUDOPHAKIA OF BOTH EYES: ICD-10-CM

## 2018-07-19 PROCEDURE — T1013 SIGN LANG/ORAL INTERPRETER: HCPCS | Mod: U3,ZF

## 2018-07-19 PROCEDURE — 92134 CPTRZ OPH DX IMG PST SGM RTA: CPT | Mod: ZF | Performed by: OPHTHALMOLOGY

## 2018-07-19 PROCEDURE — G0463 HOSPITAL OUTPT CLINIC VISIT: HCPCS | Mod: ZF

## 2018-07-19 PROCEDURE — 92015 DETERMINE REFRACTIVE STATE: CPT | Mod: GY,ZF

## 2018-07-19 RX ORDER — CARBOXYMETHYLCELLULOSE SODIUM 10 MG/ML
1 GEL OPHTHALMIC 4 TIMES DAILY
Qty: 90 EACH | Refills: 4 | Status: SHIPPED | OUTPATIENT
Start: 2018-07-19 | End: 2019-05-28

## 2018-07-19 ASSESSMENT — CONF VISUAL FIELD
OS_NORMAL: 1
OD_NORMAL: 1

## 2018-07-19 ASSESSMENT — REFRACTION_MANIFEST
OS_CYLINDER: +0.75
OD_AXIS: 044
OS_AXIS: 169
OS_SPHERE: -2.00
OD_ADD: +2.50
OD_SPHERE: -0.25
OD_CYLINDER: +0.50
OS_ADD: +2.50

## 2018-07-19 ASSESSMENT — VISUAL ACUITY
OD_SC: 20/30
METHOD: SNELLEN - LINEAR
OS_PH_SC: 20/60-2
OS_SC: 20/70
OS_SC+: +1
OD_SC+: -2

## 2018-07-19 ASSESSMENT — TONOMETRY
OS_IOP_MMHG: 11
OD_IOP_MMHG: 12
IOP_METHOD: ICARE

## 2018-07-19 ASSESSMENT — EXTERNAL EXAM - LEFT EYE: OS_EXAM: NORMAL

## 2018-07-19 ASSESSMENT — EXTERNAL EXAM - RIGHT EYE: OD_EXAM: NORMAL

## 2018-07-19 ASSESSMENT — CUP TO DISC RATIO
OS_RATIO: 0.2
OD_RATIO: 0.2

## 2018-07-19 NOTE — PROGRESS NOTES
HPI:  Flor Valdez is a 51 year old female here for annual eye exam. Reports that that both her eye have been itching and tearing for four months. Uses AT twice a day. Denies new floaters or flashes of light.     Meds:   PFAT BID-three times a day     PMH:  Liver transplant 10/2010 (cyclosporine)   CKD Stage 3  HTN      A&P  1 Pseudophakia, both eyes  s/p yag cap both eyes  IOL well positioned, doing well    2 PVD OU  Retina attached, no holes/tears  S/s of retinal detachment discussed and to f/up asap    3 Dry eye OS>OD  Significant dryness of ocular surface, explains decrease in acuity   OCT macula today without edema, normal contour, single subfoveal drusen   Continue AT to four times a day     Start warm compress     4. Myopia with astigmatism with presbyopia each eye   - No improvement with new refraction, did not dispense today     RTC in 1 year earlier as needed     Julien Peguero MD  Ophthalmology Resident, PGY-3  [facility]        Complete documentation of historical and exam elements from today's encounter can be found in the full encounter summary report (not reduplicated in this progress note). I personally obtained the chief complaint(s) and history of present illness.  I confirmed and edited as necessary the review of systems, past medical/surgical history, family history, social history, and examination findings as documented by others.  I examined the patient myself, and I personally reviewed the relevant tests, images, and reports as documented above. I formulated and edited as necessary the assessment and plan and discussed the findings and management plan with the patient and family.     I personally interpreted the diagnostic / imaging study and have edited the interpretation as needed.    Nik Langley MD, MA  Director, Cornea & Anterior Segment  [facility] Department of Ophthalmology & Visual Neuroscience

## 2018-07-19 NOTE — MR AVS SNAPSHOT
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      Your next 10 appointments already scheduled     Sep 10, 2018  9:40 AM CDT   (Arrive by 9:25 AM)   RETURN FOOT/ANKLE with Rigo Drew DPM   [facility] ([facility])    [address]  [address] [facility] [place] [address] [phone]              Who to contact     Please call your clinic at [phone] to:    Ask questions about your health    Make or cancel appointments    Discuss your medicines    Learn about your test results    Speak to your doctor            Additional Information About Your Visit        Care EveryWhere ID     This is your Care EveryWhere ID. This could be used by other organizations to access your [facility] medical records  [id number]         Blood Pressure from Last 3 Encounters:   07/23/18 123/76   07/16/18 122/82   07/09/18 127/80    Weight from Last 3 Encounters:   07/23/18 102 kg (224 lb 14.4 oz)   07/16/18 100.7 kg (222 lb)   07/09/18 101.2 kg (223 lb 3.2 oz)              We Performed the Following     OCT Retina Spectralis OU (both eyes)          Today's Medication Changes          These changes are accurate as of 7/19/18 11:59 PM.  If you have any questions, ask your nurse or doctor.               These medicines have changed or have updated prescriptions.        Dose/Directions    * carboxymethylcellulose 1 % ophthalmic solution   Commonly known as:  CELLUVISC/REFRESH LIQUIGEL   This may have changed:  Another medication  with the same name was added. Make sure you understand how and when to take each.   Used for:  Dry eyes, bilateral   Changed by:  Nik Langley MD        Dose:  1 drop   Place 1 drop into both eyes 4 times daily   Quantity:  15 each   Refills:  11       * Carboxymethylcellulose Sod PF 1 % ophthalmic gel   Commonly known as:  CELLUVISC/REFRESH LIQUIGEL   This may have changed:  You were already taking a medication with the same name, and this prescription was added. Make sure you understand how and when to take each.   Used for:  Dry eyes, bilateral   Changed by:  Nik Langley MD        Dose:  1 drop   Place 1 drop into both eyes 4 times daily Dispose of dropperette within 24 hours after opening or if the tip is contaminated.   Quantity:  90 each   Refills:  4       * Notice:  This list has 2 medication(s) that are the same as other medications prescribed for you. Read the directions carefully, and ask your doctor or other care provider to review them with you.         Where to get your medicines      These medications were sent to [address] [place] [address] [address] [id number]    Hours:  TRANSPLANT PHONE NUMBER [phone] Phone:  [phone]     Carboxymethylcellulose Sod PF 1 % ophthalmic gel                Primary Care Provider Office Phone # Fax #    Karely Sierra [phone]       [address] [address]        Equal Access to Services     MAURO ALBA AH: Hadii florecita chiang hadasho Soudayali, waaxda luqadaha, qaybta kaalmada annabel, mart piña. So [facility] [phone].    ATENCIÓN: Si habla español, tiene a garcia disposición servicios gratuitos de asistencia lingüística. Llame al [phone].    We comply with applicable federal civil rights laws and [place] laws. We do not discriminate on the basis of race, color, national origin, age, disability, sex,  sexual orientation, or gender identity.            Thank you!     Thank you for choosing [facility]  for your care. Our goal is always to provide you with excellent care. Hearing back from our patients is one way we can continue to improve our services. Please take a few minutes to complete the written survey that you may receive in the mail after your visit with us. Thank you!             Your Updated Medication List - Protect others around you: Learn how to safely use, store and throw away your medicines at www.disposemymeds.org.          This list is accurate as of 7/19/18 11:59 PM.  Always use your most recent med list.                   Brand Name Dispense Instructions for use Diagnosis    * acetaminophen 325 MG tablet    TYLENOL    100 tablet    Take 1-2 tablets (325-650 mg) by mouth every 6 hours as needed Max 6 tablets per 24 hours    Sebaceous cyst       * acetaminophen 500 MG tablet    TYLENOL    120 tablet    Take 1 tablet (500 mg) by mouth 3 times daily as needed for mild pain    Fall, initial encounter       albuterol 108 (90 Base) MCG/ACT Inhaler    PROAIR HFA/PROVENTIL HFA/VENTOLIN HFA    1 Inhaler    Inhale 2 puffs into the lungs 4 times daily    Acute bronchitis, unspecified organism       alendronate 70 MG tablet    FOSAMAX    12 tablet    Take 1 tablet (70 mg) by mouth every 7 days at least 60 min before breakfast with over 8 oz water. Stay upright for at least 30 min.    Osteoporosis       ASPIRIN PO      Take 81 mg by mouth        AVEENO DAILY MOISTURIZING 1.3 % Lotn lotion   Generic drug:  dimethicone     227 g    APPLY TO AFFECTED AREA(S) AS NEEDED    Xerosis cutis       bisacodyl 5 MG EC tablet    DULCOLAX    30 tablet    Take 1 tablet (5 mg) by mouth daily as needed for constipation    Constipation, chronic       calcium-vitamin D 600-400 MG-UNIT per tablet    calcium 600 + D    60 tablet    Take 1 tablet by mouth 2 times daily    Osteoporosis without current pathological fracture,  unspecified osteoporosis type       carboxymethylcellul-glycerin 0.5-0.9 % Soln ophthalmic solution    OPTIVE/REFRESH OPTIVE    1 each    Place 1 drop into both eyes 4 times daily    Dry eye syndrome of bilateral lacrimal glands       * carboxymethylcellulose 1 % ophthalmic solution    CELLUVISC/REFRESH LIQUIGEL    15 each    Place 1 drop into both eyes 4 times daily    Dry eyes, bilateral       * Carboxymethylcellulose Sod PF 1 % ophthalmic gel    CELLUVISC/REFRESH LIQUIGEL    90 each    Place 1 drop into both eyes 4 times daily Dispose of dropperette within 24 hours after opening or if the tip is contaminated.    Dry eyes, bilateral       cholecalciferol 1000 units capsule    vitamin  -D    180 capsule    Take 2 capsules (2,000 Units) by mouth daily    Liver replaced by transplant (H), Vitamin D deficiency, Secondary hyperparathyroidism (H), CKD (chronic kidney disease) stage 3, GFR 30-59 ml/min, Palpitations       Colloidal Oatmeal 1 % Crea     226 g    Externally apply topically as needed    Dry skin       * cycloSPORINE modified 25 MG capsule     240 capsule    Take 4 capsules (100 mg) by mouth every 12 hours    Liver replaced by transplant (H)       * cycloSPORINE modified 25 MG capsule    GENERIC EQUIVALENT    210 capsule    Take 100 mg (4 caps) in the AM, then 75 mg (3 caps) in the evening. 12 hours in between doses    Liver replaced by transplant (H)       ferrous sulfate 325 (65 Fe) MG tablet    IRON     Take 1 tablet by mouth daily (with breakfast)        * Knee Brace/Flex Stays Large Misc     2 each    1 Units daily    Chronic pain of both knees       * Medical Compression Socks Misc     2 each    1 Units daily    Leg swelling       lidocaine 5 % Patch    LIDODERM    30 patch    Apply up to 3 patches to painful area at once for up to 12 h within a 24 h period.  Remove after 12 hours.    Nondisp fx of head of right radius, init for clos fx, Fall, subsequent encounter       loratadine-pseudoePHEDrine   MG per 24 hr tablet    CLARITIN-D 24-hour    14 tablet    Take 1 tablet by mouth daily    Liver transplanted (H)       melatonin 3 MG tablet     100 tablet    Take 1 tablet (3 mg) by mouth nightly as needed for sleep    Primary insomnia       Menthol (Topical Analgesic) 4 % Gel    BIOFREEZE COLORLESS    150 mL    Apply to affected areas BID    Pain in joint of right shoulder, Peroneal tendonitis, left       multivitamin, therapeutic with minerals Tabs tablet     100 tablet    Take 1 tablet by mouth daily    Liver replaced by transplant (H)       mycophenolate 250 MG capsule    GENERIC EQUIVALENT    120 capsule    Take 2 capsules (500 mg) by mouth every 12 hours    Liver replaced by transplant (H)       omeprazole 20 MG CR capsule    priLOSEC    180 capsule    Take 1 capsule (20 mg) by mouth 2 times daily    Gastroesophageal reflux disease, esophagitis presence not specified       * order for DME     1 Units    Equipment being ordered: Nebulizer with adult mask and tubing    Acute bronchitis, unspecified organism       * order for DME     1 Units    Equipment being ordered: cane with padded handle    Gait instability       * order for DME     2 Units    Equipment being ordered: compression stockings bilateral Please measure and fit patient for stockings  Strength 15-30mmHg Disp 2 pairs 1 refill over the time of 1 year    Localized edema       * order for DME     1 Units    Equipment being ordered: 4 Wheeled Walker with Seat and Brakes    Falls frequently, Gait instability       * order for DME     2 each    Equipment being ordered: Compression stockings below knee bilateral    Bilateral edema of lower extremity       * order for DME     1 each    Equipment being ordered: Back Stabilizer    Chronic midline low back pain without sciatica       * order for DME     2 each    Equipment being ordered: 2 pairs of black stabilizer socks (beige okay if black not available). Size XL.    Bilateral edema of lower  extremity       * order for DME     1 Units    Right pull on neoprene knee brace.    Chronic pain of right knee       * order for DME     1 Units    Left pull on neoprene knee brace.    Chronic pain of left knee       Peppermint Flavor Oil     1 mL    Use as smelling agent as needed.    Nausea       prednisoLONE acetate 1 % ophthalmic susp    PRED FORTE    1 Bottle    Use in operative eye four times a day  For 4 days    Posterior capsular opacification visually significant of both eyes       Psyllium 400 MG Caps     180 capsule    Take 1,200 mg by mouth 2 times daily    Constipation, chronic       * SENEXON-S 8.6-50 MG per tablet   Generic drug:  senna-docusate     100 tablet    TAKE TWO TABLETS BY MOUTH TWICE A DAY FOR CONSTIPATION    Constipation, chronic       * senna-docusate 8.6-50 MG per tablet    SENOKOT-S;PERICOLACE    100 tablet    Take 2 tablets by mouth 2 times daily    Liver replaced by transplant (H)       simethicone 80 MG chewable tablet    MYLICON    120 tablet    Take 1 tablet (80 mg) by mouth every 6 hours as needed for flatulence or cramping    Flatulence, eructation, and gas pain       sodium chloride 0.65 % nasal spray    OCEAN    30 mL    Spray 1 spray into both nostrils daily as needed for congestion    Throat pain       sodium phosphate 7-19 GM/118ML rectal enema     3 Bottle    Place 1 Bottle (1 enema) rectally daily as needed for constipation    Ileus (H)       STATIN NOT PRESCRIBED (INTENTIONAL)     0 each    Not prescribed    High risk medication use       * Notice:  This list has 19 medication(s) that are the same as other medications prescribed for you. Read the directions carefully, and ask your doctor or other care provider to review them with you.

## 2018-07-19 NOTE — NURSING NOTE
Chief Complaints and History of Present Illnesses   Patient presents with     Follow Up For     Dry eyes, bilateral      HPI    Affected eye(s):  Both   Symptoms:        Duration:  1 year   Frequency:  Constant       Do you have eye pain now?:  No      Comments:  Pt. States that she has had a lot of itching and tearing BE.  RE seems to be worse than LE.  VA is blurry from all of the tearing.   Mervat GAN 10:16 AM July 19, 2018

## 2018-07-22 ENCOUNTER — TELEPHONE (OUTPATIENT)
Dept: FAMILY MEDICINE | Facility: CLINIC | Age: 53
End: 2018-07-22

## 2018-07-22 NOTE — TELEPHONE ENCOUNTER
Called by  home care to be informed in delay of start of care for home PT. They will begin tomorrow.     Rigo Meadows MD on 7/22/2018 at 3:14 PM

## 2018-07-23 ENCOUNTER — OFFICE VISIT (OUTPATIENT)
Dept: GASTROENTEROLOGY | Facility: CLINIC | Age: 53
End: 2018-07-23
Attending: INTERNAL MEDICINE
Payer: MEDICARE

## 2018-07-23 ENCOUNTER — OFFICE VISIT (OUTPATIENT)
Dept: ORTHOPEDICS | Facility: CLINIC | Age: 53
End: 2018-07-23
Attending: TRANSPLANT SURGERY
Payer: MEDICARE

## 2018-07-23 VITALS
SYSTOLIC BLOOD PRESSURE: 123 MMHG | BODY MASS INDEX: 42.46 KG/M2 | TEMPERATURE: 97.8 F | WEIGHT: 224.9 LBS | OXYGEN SATURATION: 94 % | DIASTOLIC BLOOD PRESSURE: 76 MMHG | HEIGHT: 61 IN | HEART RATE: 70 BPM | RESPIRATION RATE: 18 BRPM

## 2018-07-23 DIAGNOSIS — Z94.4 LIVER REPLACED BY TRANSPLANT (H): Primary | ICD-10-CM

## 2018-07-23 DIAGNOSIS — Z94.4 LIVER REPLACED BY TRANSPLANT (H): ICD-10-CM

## 2018-07-23 DIAGNOSIS — M76.822 POSTERIOR TIBIAL TENDINITIS OF LEFT LOWER EXTREMITY: Primary | ICD-10-CM

## 2018-07-23 DIAGNOSIS — M25.572 PAIN IN JOINT INVOLVING ANKLE AND FOOT, LEFT: ICD-10-CM

## 2018-07-23 LAB
ALBUMIN SERPL-MCNC: 3.6 G/DL (ref 3.4–5)
ALP SERPL-CCNC: 100 U/L (ref 40–150)
ALT SERPL W P-5'-P-CCNC: 51 U/L (ref 0–50)
AST SERPL W P-5'-P-CCNC: 36 U/L (ref 0–45)
BILIRUB DIRECT SERPL-MCNC: 0.2 MG/DL (ref 0–0.2)
BILIRUB SERPL-MCNC: 0.8 MG/DL (ref 0.2–1.3)
PROT SERPL-MCNC: 7.8 G/DL (ref 6.8–8.8)

## 2018-07-23 PROCEDURE — G0463 HOSPITAL OUTPT CLINIC VISIT: HCPCS | Mod: ZF

## 2018-07-23 PROCEDURE — 36415 COLL VENOUS BLD VENIPUNCTURE: CPT | Performed by: INTERNAL MEDICINE

## 2018-07-23 PROCEDURE — 80076 HEPATIC FUNCTION PANEL: CPT | Performed by: INTERNAL MEDICINE

## 2018-07-23 ASSESSMENT — PAIN SCALES - GENERAL: PAINLEVEL: NO PAIN (0)

## 2018-07-23 NOTE — MR AVS SNAPSHOT
After Visit Summary   7/23/2018    Flor Navarro    MRN: [id number]           Patient Information     Date Of Birth          1965        Visit Information        Provider Department      7/23/2018 10:25 AM Rigo Drew DPM; [facility]        Today's Diagnoses     Posterior tibial tendinitis of left lower extremity    -  1    Pain in joint involving ankle and foot, left           Follow-ups after your visit        Additional Services     ORTHOTICS REFERRAL       *This referral order prints off in the [facility]  (Orthotics & Prosthetics) Central Scheduling Office**    The [facility] Central Scheduling Staff will contact the patient to schedule appointments.     Central Scheduling Contact Information: [phone] ([place])    Edgar brace left, custom foot orthotics.    Please be aware that coverage of these services is subject to the terms and limitations of your health insurance plan.  Call member services at your health plan with any benefit or coverage questions.      Please bring the following to your appointment:    >>   Any x-rays, CTs or MRIs which have been performed.  Contact the facility where they were done to arrange for  prior to your scheduled appointment.    >>   List of current medications   >>   This referral request   >>   Any documents/labs given to you for this referral                  Your next 10 appointments already scheduled     Jul 23, 2018  2:00 PM CDT   Lab with [facility] LAB   [facility])    [address] [place] [address]  1st Floor  [facility] [address] [phone]            Jul 23, 2018  2:30 PM CDT   Return Liver Transplant with Laron Anne MD   [facility] ([facility])    [address] [place] [address]  [address]  [facility] [id number] [phone]            Sep 10, 2018  9:40 AM CDT   (Arrive by 9:25 AM)   RETURN FOOT/ANKLE with Rigo  [facility] ([facility])    [address] [facility]  4th [facility] [address] [phone]              Who to contact     Please call your clinic at [phone] to:    Ask questions about your health    Make or cancel appointments    Discuss your medicines    Learn about your test results    Speak to your doctor            Additional Information About Your Visit        Care EveryWhere ID     This is your Care EveryWhere ID. This could be used by other organizations to access your [place] medical records  [id number]         Blood Pressure from Last 3 Encounters:   07/16/18 122/82   07/09/18 127/80   06/18/18 113/72    Weight from Last 3 Encounters:   07/16/18 100.7 kg (222 lb)   07/09/18 101.2 kg (223 lb 3.2 oz)   06/18/18 99.8 kg (220 lb)              We Performed the Following     ORTHOTICS REFERRAL          Today's Medication Changes          These changes are accurate as of 7/23/18 12:27 PM.  If you have any questions, ask your nurse or doctor.               Start taking these medicines.        Dose/Directions    lidocaine 2 % topical gel   Commonly known as:  XYLOCAINE   Used for:  Posterior tibial tendinitis of left lower extremity, Pain in joint involving ankle and foot, left   Started by:  Rigo Drew DPM        Apply topically daily To left ankle as needed for pain.   Quantity:  20 mL   Refills:  3            Where to get your medicines      Some of these will need a paper prescription and others can be bought over the counter.  Ask your nurse if you have questions.     You don't need a prescription for these medications     lidocaine 2 % topical gel                Primary Care Provider Office Phone # Fax #    Karely Sierra [phone]       [address] [address]        Equal Access to Services     MAURO ALBA AH: roxy Lam qaybta kaalmada adeegyada, waxay idiin  lizbeth oglesby ah. Lorrie [facility] [phone].    ATENCIÓN: Si patriala sha, tiene a garcia disposición servicios gratuitos de asistencia lingüística. Abdirahman busby [phone].    We comply with applicable federal civil rights laws and [place] laws. We do not discriminate on the basis of race, color, national origin, age, disability, sex, sexual orientation, or gender identity.            Thank you!     Thank you for choosing [facility]  for your care. Our goal is always to provide you with excellent care. Hearing back from our patients is one way we can continue to improve our services. Please take a few minutes to complete the written survey that you may receive in the mail after your visit with us. Thank you!             Your Updated Medication List - Protect others around you: Learn how to safely use, store and throw away your medicines at www.disposemymeds.org.          This list is accurate as of 7/23/18 12:27 PM.  Always use your most recent med list.                   Brand Name Dispense Instructions for use Diagnosis    * acetaminophen 325 MG tablet    TYLENOL    100 tablet    Take 1-2 tablets (325-650 mg) by mouth every 6 hours as needed Max 6 tablets per 24 hours    Sebaceous cyst       * acetaminophen 500 MG tablet    TYLENOL    120 tablet    Take 1 tablet (500 mg) by mouth 3 times daily as needed for mild pain    Fall, initial encounter       albuterol 108 (90 Base) MCG/ACT Inhaler    PROAIR HFA/PROVENTIL HFA/VENTOLIN HFA    1 Inhaler    Inhale 2 puffs into the lungs 4 times daily    Acute bronchitis, unspecified organism       alendronate 70 MG tablet    FOSAMAX    12 tablet    Take 1 tablet (70 mg) by mouth every 7 days at least 60 min before breakfast with over 8 oz water. Stay upright for at least 30 min.    Osteoporosis       ASPIRIN PO      Take 81 mg by mouth        AVEENO DAILY MOISTURIZING 1.3 % Lotn lotion   Generic drug:  dimethicone     227 g    APPLY TO AFFECTED AREA(S) AS  NEEDED    Xerosis cutis       bisacodyl 5 MG EC tablet    DULCOLAX    30 tablet    Take 1 tablet (5 mg) by mouth daily as needed for constipation    Constipation, chronic       calcium-vitamin D 600-400 MG-UNIT per tablet    calcium 600 + D    60 tablet    Take 1 tablet by mouth 2 times daily    Osteoporosis without current pathological fracture, unspecified osteoporosis type       carboxymethylcellul-glycerin 0.5-0.9 % Soln ophthalmic solution    OPTIVE/REFRESH OPTIVE    1 each    Place 1 drop into both eyes 4 times daily    Dry eye syndrome of bilateral lacrimal glands       * carboxymethylcellulose 1 % ophthalmic solution    CELLUVISC/REFRESH LIQUIGEL    15 each    Place 1 drop into both eyes 4 times daily    Dry eyes, bilateral       * Carboxymethylcellulose Sod PF 1 % ophthalmic gel    CELLUVISC/REFRESH LIQUIGEL    90 each    Place 1 drop into both eyes 4 times daily Dispose of dropperette within 24 hours after opening or if the tip is contaminated.    Dry eyes, bilateral       cholecalciferol 1000 units capsule    vitamin  -D    180 capsule    Take 2 capsules (2,000 Units) by mouth daily    Liver replaced by transplant (H), Vitamin D deficiency, Secondary hyperparathyroidism (H), CKD (chronic kidney disease) stage 3, GFR 30-59 ml/min, Palpitations       Colloidal Oatmeal 1 % Crea     226 g    Externally apply topically as needed    Dry skin       * cycloSPORINE modified 25 MG capsule     240 capsule    Take 4 capsules (100 mg) by mouth every 12 hours    Liver replaced by transplant (H)       * cycloSPORINE modified 25 MG capsule    GENERIC EQUIVALENT    210 capsule    Take 100 mg (4 caps) in the AM, then 75 mg (3 caps) in the evening. 12 hours in between doses    Liver replaced by transplant (H)       ferrous sulfate 325 (65 Fe) MG tablet    IRON     Take 1 tablet by mouth daily (with breakfast)        * Knee Brace/Flex Stays Large Misc     2 each    1 Units daily    Chronic pain of both knees       * Medical  Compression Socks Misc     2 each    1 Units daily    Leg swelling       lidocaine 2 % topical gel    XYLOCAINE    20 mL    Apply topically daily To left ankle as needed for pain.    Posterior tibial tendinitis of left lower extremity, Pain in joint involving ankle and foot, left       lidocaine 5 % Patch    LIDODERM    30 patch    Apply up to 3 patches to painful area at once for up to 12 h within a 24 h period.  Remove after 12 hours.    Nondisp fx of head of right radius, init for clos fx, Fall, subsequent encounter       loratadine-pseudoePHEDrine  MG per 24 hr tablet    CLARITIN-D 24-hour    14 tablet    Take 1 tablet by mouth daily    Liver transplanted (H)       melatonin 3 MG tablet     100 tablet    Take 1 tablet (3 mg) by mouth nightly as needed for sleep    Primary insomnia       Menthol (Topical Analgesic) 4 % Gel    BIOFREEZE COLORLESS    150 mL    Apply to affected areas BID    Pain in joint of right shoulder, Peroneal tendonitis, left       multivitamin, therapeutic with minerals Tabs tablet     100 tablet    Take 1 tablet by mouth daily    Liver replaced by transplant (H)       mycophenolate 250 MG capsule    GENERIC EQUIVALENT    120 capsule    Take 2 capsules (500 mg) by mouth every 12 hours    Liver replaced by transplant (H)       omeprazole 20 MG CR capsule    priLOSEC    180 capsule    Take 1 capsule (20 mg) by mouth 2 times daily    Gastroesophageal reflux disease, esophagitis presence not specified       * order for DME     1 Units    Equipment being ordered: Nebulizer with adult mask and tubing    Acute bronchitis, unspecified organism       * order for DME     1 Units    Equipment being ordered: cane with padded handle    Gait instability       * order for DME     2 Units    Equipment being ordered: compression stockings bilateral Please measure and fit patient for stockings  Strength 15-30mmHg Disp 2 pairs 1 refill over the time of 1 year    Localized edema       * order for DME      1 Units    Equipment being ordered: 4 Wheeled Walker with Seat and Brakes    Falls frequently, Gait instability       * order for DME     2 each    Equipment being ordered: Compression stockings below knee bilateral    Bilateral edema of lower extremity       * order for DME     1 each    Equipment being ordered: Back Stabilizer    Chronic midline low back pain without sciatica       * order for DME     2 each    Equipment being ordered: 2 pairs of black stabilizer socks (beige okay if black not available). Size XL.    Bilateral edema of lower extremity       * order for DME     1 Units    Right pull on neoprene knee brace.    Chronic pain of right knee       * order for DME     1 Units    Left pull on neoprene knee brace.    Chronic pain of left knee       Peppermint Flavor Oil     1 mL    Use as smelling agent as needed.    Nausea       prednisoLONE acetate 1 % ophthalmic susp    PRED FORTE    1 Bottle    Use in operative eye four times a day  For 4 days    Posterior capsular opacification visually significant of both eyes       Psyllium 400 MG Caps     180 capsule    Take 1,200 mg by mouth 2 times daily    Constipation, chronic       * SENEXON-S 8.6-50 MG per tablet   Generic drug:  senna-docusate     100 tablet    TAKE TWO TABLETS BY MOUTH TWICE A DAY FOR CONSTIPATION    Constipation, chronic       * senna-docusate 8.6-50 MG per tablet    SENOKOT-S;PERICOLACE    100 tablet    Take 2 tablets by mouth 2 times daily    Liver replaced by transplant (H)       simethicone 80 MG chewable tablet    MYLICON    120 tablet    Take 1 tablet (80 mg) by mouth every 6 hours as needed for flatulence or cramping    Flatulence, eructation, and gas pain       sodium chloride 0.65 % nasal spray    OCEAN    30 mL    Spray 1 spray into both nostrils daily as needed for congestion    Throat pain       sodium phosphate 7-19 GM/118ML rectal enema     3 Bottle    Place 1 Bottle (1 enema) rectally daily as needed for constipation    Ileus  (H)       STATIN NOT PRESCRIBED (INTENTIONAL)     0 each    Not prescribed    High risk medication use       * Notice:  This list has 19 medication(s) that are the same as other medications prescribed for you. Read the directions carefully, and ask your doctor or other care provider to review them with you.

## 2018-07-23 NOTE — NURSING NOTE
Reason For Visit:   Chief Complaint   Patient presents with     RECHECK     Follow up. Pain, left foot and leg.      : Yes.     Pain Assessment  Patient Currently in Pain: Yes  Primary Pain Location: Foot  Pain Orientation: Left  Other Pain Locations: Left leg         Current Outpatient Prescriptions   Medication Sig Dispense Refill     acetaminophen (TYLENOL) 325 MG tablet Take 1-2 tablets (325-650 mg) by mouth every 6 hours as needed Max 6 tablets per 24 hours 100 tablet 3     acetaminophen (TYLENOL) 500 MG tablet Take 1 tablet (500 mg) by mouth 3 times daily as needed for mild pain 120 tablet 1     albuterol (PROAIR HFA/PROVENTIL HFA/VENTOLIN HFA) 108 (90 Base) MCG/ACT Inhaler Inhale 2 puffs into the lungs 4 times daily 1 Inhaler 1     alendronate (FOSAMAX) 70 MG tablet Take 1 tablet (70 mg) by mouth every 7 days at least 60 min before breakfast with over 8 oz water. Stay upright for at least 30 min. 12 tablet 3     ASPIRIN PO Take 81 mg by mouth       AVEENO DAILY MOISTURIZING 1.3 % LOTN lotion APPLY TO AFFECTED AREA(S) AS NEEDED 227 g 0     bisacodyl (DULCOLAX) 5 MG EC tablet Take 1 tablet (5 mg) by mouth daily as needed for constipation 30 tablet 11     calcium-vitamin D (CALCIUM 600 + D) 600-400 MG-UNIT per tablet Take 1 tablet by mouth 2 times daily 60 tablet 11     carboxymethylcellul-glycerin (OPTIVE/REFRESH OPTIVE) 0.5-0.9 % SOLN ophthalmic solution Place 1 drop into both eyes 4 times daily 1 each 11     carboxymethylcellulose (CELLUVISC/REFRESH LIQUIGEL) 1 % ophthalmic solution Place 1 drop into both eyes 4 times daily 15 each 11     Carboxymethylcellulose Sod PF (CELLUVISC/REFRESH LIQUIGEL) 1 % ophthalmic gel Place 1 drop into both eyes 4 times daily Dispose of dropperette within 24 hours after opening or if the tip is contaminated. 90 each 4     cholecalciferol (VITAMIN  -D) 1000 units capsule Take 2 capsules (2,000 Units) by mouth daily 180 capsule 3     Colloidal Oatmeal 1 % CREA  Externally apply topically as needed 226 g 1     cycloSPORINE modified (GENERIC EQUIVALENT) 25 MG capsule Take 100 mg (4 caps) in the AM, then 75 mg (3 caps) in the evening. 12 hours in between doses 210 capsule 11     Elastic Bandages & Supports (KNEE BRACE/FLEX STAYS LARGE) MISC 1 Units daily 2 each 0     Elastic Bandages & Supports (MEDICAL COMPRESSION SOCKS) MISC 1 Units daily 2 each 0     ferrous sulfate (IRON) 325 (65 FE) MG tablet Take 1 tablet by mouth daily (with breakfast)       Flavoring Agent (PEPPERMINT FLAVOR) OIL Use as smelling agent as needed. 1 mL 0     GENGRAF (BRAND) 25 MG CAPSULE Take 4 capsules (100 mg) by mouth every 12 hours 240 capsule 0     lidocaine (LIDODERM) 5 % Patch Apply up to 3 patches to painful area at once for up to 12 h within a 24 h period.  Remove after 12 hours. 30 patch 0     loratadine-pseudoePHEDrine (CLARITIN-D 24-HOUR)  MG per 24 hr tablet Take 1 tablet by mouth daily 14 tablet 1     melatonin 3 MG tablet Take 1 tablet (3 mg) by mouth nightly as needed for sleep 100 tablet 3     Menthol, Topical Analgesic, (BIOFREEZE COLORLESS) 4 % GEL Apply to affected areas  mL 3     multivitamin, therapeutic with minerals (MULTI-VITAMIN) TABS tablet Take 1 tablet by mouth daily 100 tablet 11     mycophenolate (GENERIC EQUIVALENT) 250 MG capsule Take 2 capsules (500 mg) by mouth every 12 hours 120 capsule 11     omeprazole (PRILOSEC) 20 MG CR capsule Take 1 capsule (20 mg) by mouth 2 times daily 180 capsule 3     order for DME Right pull on neoprene knee brace. 1 Units 0     order for DME Left pull on neoprene knee brace. 1 Units 0     order for DME Equipment being ordered: 2 pairs of black stabilizer socks (beige okay if black not available). Size XL. 2 each 0     order for DME Equipment being ordered: Compression stockings below knee bilateral 2 each 0     order for DME Equipment being ordered: Back Stabilizer 1 each 0     order for DME Equipment being ordered: 4 Wheeled  Walker with Seat and Brakes 1 Units 0     order for DME Equipment being ordered: compression stockings bilateral  Please measure and fit patient for stockings   Strength 15-30mmHg  Disp 2 pairs  1 refill over the time of 1 year 2 Units 1     order for DME Equipment being ordered: Nebulizer with adult mask and tubing 1 Units 0     order for DME Equipment being ordered: cane with padded handle 1 Units 0     prednisoLONE acetate (PRED FORTE) 1 % ophthalmic susp Use in operative eye four times a day  For 4 days 1 Bottle 0     Psyllium 400 MG CAPS Take 1,200 mg by mouth 2 times daily 180 capsule 11     SENEXON-S 8.6-50 MG per tablet TAKE TWO TABLETS BY MOUTH TWICE A DAY FOR CONSTIPATION 100 tablet 0     senna-docusate (SENOKOT-S;PERICOLACE) 8.6-50 MG per tablet Take 2 tablets by mouth 2 times daily 100 tablet 3     simethicone (MYLICON) 80 MG chewable tablet Take 1 tablet (80 mg) by mouth every 6 hours as needed for flatulence or cramping 120 tablet 0     sodium chloride (OCEAN) 0.65 % nasal spray Spray 1 spray into both nostrils daily as needed for congestion 30 mL 1     sodium phosphate (FLEET ENEMA) 7-19 GM/118ML rectal enema Place 1 Bottle (1 enema) rectally daily as needed for constipation 3 Bottle 11     STATIN NOT PRESCRIBED, INTENTIONAL, Not prescribed 0 each 0     [DISCONTINUED] solifenacin (VESICARE) 5 MG tablet Take 1 tablet (5 mg) by mouth daily 30 tablet 12          Allergies   Allergen Reactions     Aspirin      3/31/16 Per pt, tolerates 81 mg daily dose without ADR.     325 mg dose caused itchiness and hives.     Clarithromycin      Allergic reaction         Contrast Dye      Penicillins

## 2018-07-23 NOTE — NURSING NOTE
"Chief Complaint   Patient presents with     RECHECK     S/P Liver TX 10/26/2010       /76 (BP Location: Left arm, Patient Position: Sitting, Cuff Size: Adult Large)  Pulse 70  Temp 97.8  F (36.6  C) (Oral)  Resp 18  Ht 1.537 m (5' 0.5\")  Wt 102 kg (224 lb 14.4 oz)  SpO2 94%  BMI 43.2 kg/m2    Nadia Berman CMA  7/23/2018 2:38 PM      "

## 2018-07-23 NOTE — PROGRESS NOTES
HISTORY OF PRESENT ILLNESS:  I had the pleasure of seeing Flor Navarro for followup in the Liver Transplant Clinic at the [facility] on 07/23/2018.  Ms. Navarro returns for followup, now almost 8 years status post liver transplantation for cirrhosis caused by chronic    hepatitis C.      She is doing well at this visit.  She denies any abdominal pain, itching or skin rash, and has only mild fatigue.  She denies any increased abdominal girth or lower extremity edema.  She denies any fevers or chills, cough or shortness of breath.  She denies any nausea or vomiting, diarrhea or constipation.  Her appetite has been good and unfortunately she has not lost any weight.  She does try to lose weight by intermittent but very irregular fasting.       She is complaining of pain on the bottoms of her feet.  She has been previously diagnosed with plantar fasciitis and does have inserts, but she is in the process of moving and they are currently packed away.     Current Outpatient Prescriptions   Medication     acetaminophen (TYLENOL) 325 MG tablet     acetaminophen (TYLENOL) 500 MG tablet     albuterol (PROAIR HFA/PROVENTIL HFA/VENTOLIN HFA) 108 (90 Base) MCG/ACT Inhaler     alendronate (FOSAMAX) 70 MG tablet     ASPIRIN PO     AVEENO DAILY MOISTURIZING 1.3 % LOTN lotion     bisacodyl (DULCOLAX) 5 MG EC tablet     calcium-vitamin D (CALCIUM 600 + D) 600-400 MG-UNIT per tablet     carboxymethylcellul-glycerin (OPTIVE/REFRESH OPTIVE) 0.5-0.9 % SOLN ophthalmic solution     carboxymethylcellulose (CELLUVISC/REFRESH LIQUIGEL) 1 % ophthalmic solution     Carboxymethylcellulose Sod PF (CELLUVISC/REFRESH LIQUIGEL) 1 % ophthalmic gel     cholecalciferol (VITAMIN  -D) 1000 units capsule     Colloidal Oatmeal 1 % CREA     Elastic Bandages & Supports (KNEE BRACE/FLEX STAYS LARGE) MISC     Elastic Bandages & Supports (MEDICAL COMPRESSION SOCKS) MISC     ferrous sulfate (IRON) 325 (65 FE) MG tablet     Flavoring Agent  (PEPPERMINT FLAVOR) OIL     GENGRAF (BRAND) 25 MG CAPSULE     lidocaine (LIDODERM) 5 % Patch     lidocaine (XYLOCAINE) 2 % topical gel     loratadine-pseudoePHEDrine (CLARITIN-D 24-HOUR)  MG per 24 hr tablet     melatonin 3 MG tablet     Menthol, Topical Analgesic, (BIOFREEZE COLORLESS) 4 % GEL     multivitamin, therapeutic with minerals (MULTI-VITAMIN) TABS tablet     mycophenolate (GENERIC EQUIVALENT) 250 MG capsule     omeprazole (PRILOSEC) 20 MG CR capsule     order for DME     order for DME     order for DME     order for DME     order for DME     order for DME     order for DME     order for DME     order for DME     prednisoLONE acetate (PRED FORTE) 1 % ophthalmic susp     Psyllium 400 MG CAPS     SENEXON-S 8.6-50 MG per tablet     senna-docusate (SENOKOT-S;PERICOLACE) 8.6-50 MG per tablet     simethicone (MYLICON) 80 MG chewable tablet     sodium chloride (OCEAN) 0.65 % nasal spray     sodium phosphate (FLEET ENEMA) 7-19 GM/118ML rectal enema     STATIN NOT PRESCRIBED, INTENTIONAL,     [DISCONTINUED] cycloSPORINE modified (GENERIC EQUIVALENT) 25 MG capsule     [DISCONTINUED] solifenacin (VESICARE) 5 MG tablet     Current Facility-Administered Medications   Medication     apraclonidine (IOPIDINE) 1 % ophthalmic solution 1 drop     B/P: 123/76, T: 97.8, P: 70, R: 18    PHYSICAL EXAMINATION:    GENERAL:  In general, she appears quite well.    HEENT:  HEENT exam shows no scleral icterus or temporal muscle wasting.     CHEST:  Her chest is clear.    ABDOMEN:  Her abdominal exam shows no increase in girth.  No masses or tenderness to palpation is present.  Her liver is 10 cm in span without left lobe enlargement.  No spleen tip is palpable.    EXTREMITIES:  Extremity exam shows no edema.    SKIN:  Skin exam shows no stigmata of chronic liver disease.    NEUROLOGIC:  Neurologic exam is nonfocal.     LABORATORY:  Her most recent laboratory tests show her white count is 3.4, hemoglobin is 12.9, and platelets  are 126,000.  Her MCV is 96.  Sodium is 142, potassium is 4.8, BUN is 25, and creatinine is 1.25.  AST is 36, ALT is 51, and alkaline phosphatase is 100.  Albumin is 3.6 with a total protein of 7.8.  Total bilirubin is 0.8.       IMPRESSION AND PLAN:  My impression is that Ms. Navarro is now almost 8 years status post liver transplantation for chronic hepatitis C.  She is a sustained virologic responder to hepatitis C treatment.  Her liver tests which were a bit abnormal 2 weeks ago have almost normalized at this point in time.       I have encouraged her to use her shoe inserts.  She is otherwise up-to-date with regard to vaccines and other cancer screening.  I will see her back in the clinic again in 6 months.       Thank you very much for allowing me to participate in the care of this patient.  If you have any questions regarding my recommendations, please do not hesitate to contact me.       Laron Anne MD      Professor of Medicine  [employer]      Executive Medical Director, Solid Organ Transplant Program  [facility]

## 2018-07-23 NOTE — PROGRESS NOTES
Past Medical History:   Diagnosis Date     Anemia in CKD (chronic kidney disease)      Cataract      CKD (chronic kidney disease) stage 3, GFR 30-59 ml/min      Hepatitis C     cleared virus spontaneously      High risk medication use      Hypertension, renal      Immunosuppressed status (H)      Liver replaced by transplant (H)      Osteoporosis      Recurrent pregnancy loss without current pregnancy      Stroke, hemorrhagic (H)      Syncope      Unspecified viral hepatitis C without hepatic coma      Patient Active Problem List   Diagnosis     PVD (POSTERIOR VITREOUS DETACHMENT) OS     Hyperlipidemia LDL goal <160     CKD (chronic kidney disease) stage 3, GFR 30-59 ml/min     Hypertension, renal     Liver replaced by transplant     High risk medication use     Anemia in CKD (chronic kidney disease)     Stroke, hemorrhagic (H)     Osteoporosis     Cystitis cystica     Immunosuppressed status (H)     Ganglion cyst     Vitamin D deficiency     Shoulder joint pain     Pseudophakia - Left Eye     Abdominal pain     Constipation, chronic     Secondary hyperparathyroidism (H)     Mixed hyperlipidemia     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)     Peroneal tendonitis, left     Pain in joint, ankle and foot, left     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 .       Social  History     Social History     Marital status: Single     Spouse name: N/A     Number of children: N/A     Years of education: N/A     Occupational History     Not on file.     Social History Main Topics     Smoking status: Never Smoker     Smokeless tobacco: Never Used     Alcohol use No     Drug use: No     Sexual activity: No     Other Topics Concern      Service No     Blood Transfusions Yes     Caffeine Concern No     Occupational Exposure No     Hobby Hazards No     Sleep Concern No     Stress Concern Yes     needs help at home for cares     Weight Concern Yes     wants to lose weight not gain weight     Special Diet No     Back Care Yes     uses a patch to relieve pain     Seat Belt Yes     Social History Narrative    One son Carleen        GynHx:         LMP age 39    Pap 3/11, NIL, no abnormal    Mammo , birads 2 benign        Lives with spouse due to necessity but she reports they do not have a good relationship        How much exercise per week? Stairs at home    How much calcium per day? supplement       How much caffeine per day? 1 cup tea    How much vitamin D per day? supplement    Do you/your family wear seatbelts?  Yes    Do you/your family use safety helmets? n/a    Do you/your family use sunscreen? No    Do you/your family keep firearms in the home? No    Do you/your family have a smoke detector(s)? Yes        Do you feel safe in your home? Yes    Has anyone ever touched you in an unwanted manner? No     Explain         2015 Tati Beard LPN                 Family History   Problem Relation Age of Onset     Hepatitis Other      Hep C, still in [place]     Cerebrovascular Disease Other      Cancer No family hx of      Diabetes No family hx of      Hypertension No family hx of      Thyroid Disease No family hx of      Glaucoma No family hx of      Macular Degeneration No family hx of      SUBJECTIVE FINDINGS:  This 52-year-old female presents with  for left foot  ankle pain.  She relates it hurts.  She relates since I have seen her last, she got an AFO, but it is too heavy and big.  She got it last year sometime so she does not wear it.  She relates that her ankle will give way and invert and feel unstable at times.  She rates it hurts if she sits and gets up, then it really hurts the most.      OBJECTIVE FINDINGS:  DP and PT are 2/4 left.  She has decreased muscle strength left.  She has pain on palpation of the tibialis posterior tendon course and the anterior ankle on the left.  She also relates she gets some lateral ankle pain at times.  No erythema, no drainage, no odor, no calor, no gross tendon voids.      ASSESSMENT AND PLAN:  Tibialis posterior tendinopathy, ankle pain, left.  Diagnosis and treatment options discussed with her.  Prescription for Edgar brace given and use discussed with her.  She was given the [facility]. Prescription for lidocaine gel given and use discussed with her.  She will return to clinic to see me in about 1-2 months.

## 2018-07-23 NOTE — LETTER
2018       RE: Flor Navarro  [address] [id number]     Dear Colleague,    Thank you for referring your patient, Flor Navarro, to the [facility]. Please see a copy of my visit note below.    Past Medical History:   Diagnosis Date     Anemia in CKD (chronic kidney disease)      Cataract      CKD (chronic kidney disease) stage 3, GFR 30-59 ml/min      Hepatitis C     cleared virus spontaneously      High risk medication use      Hypertension, renal      Immunosuppressed status (H)      Liver replaced by transplant (H)      Osteoporosis      Recurrent pregnancy loss without current pregnancy      Stroke, hemorrhagic (H)      Syncope      Unspecified viral hepatitis C without hepatic coma      Patient Active Problem List   Diagnosis     PVD (POSTERIOR VITREOUS DETACHMENT) OS     Hyperlipidemia LDL goal <160     CKD (chronic kidney disease) stage 3, GFR 30-59 ml/min     Hypertension, renal     Liver replaced by transplant     High risk medication use     Anemia in CKD (chronic kidney disease)     Stroke, hemorrhagic (H)     Osteoporosis     Cystitis cystica     Immunosuppressed status (H)     Ganglion cyst     Vitamin D deficiency     Shoulder joint pain     Pseudophakia - Left Eye     Abdominal pain     Constipation, chronic     Secondary hyperparathyroidism (H)     Mixed hyperlipidemia     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)     Peroneal tendonitis, left     Pain in joint, ankle and foot, left     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)     Obesity, Class II, BMI 35-39.9     Sleep apnea, unspecified     Depression, recurrent (H)     Asthma, mild intermittent     Fall, initial encounter     Past Surgical History:   Procedure Laterality Date     CATARACT IOL, RT/LT Right 2/18/15      SECTION       Incisional Hernia Repair  2004     INSERT SHUNT PORTAL TRANSJUGULAR  INTRAHEPTIC      shunt placement for liver failure     LAPAROSCOPIC SALPINGO-OOPHORECTOMY      left     NECK SURGERY  2010    fracture, in halo x 7months     TRANSPLANT LIVER RECIPIENT  DONOR  10/26/10     Upper GI Endoscopy with Band Ligation of Esoph/Gastric Varic. .       Social History     Social History     Marital status: Single     Spouse name: N/A     Number of children: N/A     Years of education: N/A     Occupational History     Not on file.     Social History Main Topics     Smoking status: Never Smoker     Smokeless tobacco: Never Used     Alcohol use No     Drug use: No     Sexual activity: No     Other Topics Concern      Service No     Blood Transfusions Yes     Caffeine Concern No     Occupational Exposure No     Hobby Hazards No     Sleep Concern No     Stress Concern Yes     needs help at home for cares     Weight Concern Yes     wants to lose weight not gain weight     Special Diet No     Back Care Yes     uses a patch to relieve pain     Seat Belt Yes     Social History Narrative    One son Carleen        GynHx:         LMP age 39    Pap 3/11, NIL, no abnormal    Mammo , birads 2 benign        Lives with spouse due to necessity but she reports they do not have a good relationship        How much exercise per week? Stairs at home    How much calcium per day? supplement       How much caffeine per day? 1 cup tea    How much vitamin D per day? supplement    Do you/your family wear seatbelts?  Yes    Do you/your family use safety helmets? n/a    Do you/your family use sunscreen? No    Do you/your family keep firearms in the home? No    Do you/your family have a smoke detector(s)? Yes        Do you feel safe in your home? Yes    Has anyone ever touched you in an unwanted manner? No     Explain         2015 Tati Beard LPN                 Family History   Problem Relation Age of Onset     Hepatitis Other      Hep C, still in [place]     Cerebrovascular Disease  Other      Cancer No family hx of      Diabetes No family hx of      Hypertension No family hx of      Thyroid Disease No family hx of      Glaucoma No family hx of      Macular Degeneration No family hx of      SUBJECTIVE FINDINGS:  This 52-year-old female presents with  for left foot ankle pain.  She relates it hurts.  She relates since I have seen her last, she got an AFO, but it is too heavy and big.  She got it last year sometime so she does not wear it.  She relates that her ankle will give way and invert and feel unstable at times.  She rates it hurts if she sits and gets up, then it really hurts the most.      OBJECTIVE FINDINGS:  DP and PT are 2/4 left.  She has decreased muscle strength left.  She has pain on palpation of the tibialis posterior tendon course and the anterior ankle on the left.  She also relates she gets some lateral ankle pain at times.  No erythema, no drainage, no odor, no calor, no gross tendon voids.      ASSESSMENT AND PLAN:  Tibialis posterior tendinopathy, ankle pain, left.  Diagnosis and treatment options discussed with her.  Prescription for Edgar brace given and use discussed with her.  She was given the phone number to [facility]. Prescription for lidocaine gel given and use discussed with her.  She will return to clinic to see me in about 1-2 months.         Again, thank you for allowing me to participate in the care of your patient.      Sincerely,    Rigo Drew DPM

## 2018-07-23 NOTE — LETTER
7/23/2018      RE: Flor Navarro  [address] [id number]       HISTORY OF PRESENT ILLNESS:  I had the pleasure of seeing Flor Navarro for followup in the Liver Transplant Clinic at the [facility] on 07/23/2018.  Ms. Navarro returns for followup, now almost 8 years status post liver transplantation for cirrhosis caused by chronic    hepatitis C.      She is doing well at this visit.  She denies any abdominal pain, itching or skin rash, and has only mild fatigue.  She denies any increased abdominal girth or lower extremity edema.  She denies any fevers or chills, cough or shortness of breath.  She denies any nausea or vomiting, diarrhea or constipation.  Her appetite has been good and unfortunately she has not lost any weight.  She does try to lose weight by intermittent but very irregular fasting.       She is complaining of pain on the bottoms of her feet.  She has been previously diagnosed with plantar fasciitis and does have inserts, but she is in the process of moving and they are currently packed away.     Current Outpatient Prescriptions   Medication     acetaminophen (TYLENOL) 325 MG tablet     acetaminophen (TYLENOL) 500 MG tablet     albuterol (PROAIR HFA/PROVENTIL HFA/VENTOLIN HFA) 108 (90 Base) MCG/ACT Inhaler     alendronate (FOSAMAX) 70 MG tablet     ASPIRIN PO     AVEENO DAILY MOISTURIZING 1.3 % LOTN lotion     bisacodyl (DULCOLAX) 5 MG EC tablet     calcium-vitamin D (CALCIUM 600 + D) 600-400 MG-UNIT per tablet     carboxymethylcellul-glycerin (OPTIVE/REFRESH OPTIVE) 0.5-0.9 % SOLN ophthalmic solution     carboxymethylcellulose (CELLUVISC/REFRESH LIQUIGEL) 1 % ophthalmic solution     Carboxymethylcellulose Sod PF (CELLUVISC/REFRESH LIQUIGEL) 1 % ophthalmic gel     cholecalciferol (VITAMIN  -D) 1000 units capsule     Colloidal Oatmeal 1 % CREA     Elastic Bandages & Supports (KNEE BRACE/FLEX STAYS LARGE) MISC     Elastic Bandages & Supports (MEDICAL  COMPRESSION SOCKS) MISC     ferrous sulfate (IRON) 325 (65 FE) MG tablet     Flavoring Agent (PEPPERMINT FLAVOR) OIL     GENGRAF (BRAND) 25 MG CAPSULE     lidocaine (LIDODERM) 5 % Patch     lidocaine (XYLOCAINE) 2 % topical gel     loratadine-pseudoePHEDrine (CLARITIN-D 24-HOUR)  MG per 24 hr tablet     melatonin 3 MG tablet     Menthol, Topical Analgesic, (BIOFREEZE COLORLESS) 4 % GEL     multivitamin, therapeutic with minerals (MULTI-VITAMIN) TABS tablet     mycophenolate (GENERIC EQUIVALENT) 250 MG capsule     omeprazole (PRILOSEC) 20 MG CR capsule     order for DME     order for DME     order for DME     order for DME     order for DME     order for DME     order for DME     order for DME     order for DME     prednisoLONE acetate (PRED FORTE) 1 % ophthalmic susp     Psyllium 400 MG CAPS     SENEXON-S 8.6-50 MG per tablet     senna-docusate (SENOKOT-S;PERICOLACE) 8.6-50 MG per tablet     simethicone (MYLICON) 80 MG chewable tablet     sodium chloride (OCEAN) 0.65 % nasal spray     sodium phosphate (FLEET ENEMA) 7-19 GM/118ML rectal enema     STATIN NOT PRESCRIBED, INTENTIONAL,     [DISCONTINUED] cycloSPORINE modified (GENERIC EQUIVALENT) 25 MG capsule     [DISCONTINUED] solifenacin (VESICARE) 5 MG tablet     Current Facility-Administered Medications   Medication     apraclonidine (IOPIDINE) 1 % ophthalmic solution 1 drop     B/P: 123/76, T: 97.8, P: 70, R: 18    PHYSICAL EXAMINATION:    GENERAL:  In general, she appears quite well.    HEENT:  HEENT exam shows no scleral icterus or temporal muscle wasting.     CHEST:  Her chest is clear.    ABDOMEN:  Her abdominal exam shows no increase in girth.  No masses or tenderness to palpation is present.  Her liver is 10 cm in span without left lobe enlargement.  No spleen tip is palpable.    EXTREMITIES:  Extremity exam shows no edema.    SKIN:  Skin exam shows no stigmata of chronic liver disease.    NEUROLOGIC:  Neurologic exam is nonfocal.     LABORATORY:  Her  most recent laboratory tests show her white count is 3.4, hemoglobin is 12.9, and platelets are 126,000.  Her MCV is 96.  Sodium is 142, potassium is 4.8, BUN is 25, and creatinine is 1.25.  AST is 36, ALT is 51, and alkaline phosphatase is 100.  Albumin is 3.6 with a total protein of 7.8.  Total bilirubin is 0.8.       IMPRESSION AND PLAN:  My impression is that Ms. Navarro is now almost 8 years status post liver transplantation for chronic hepatitis C.  She is a sustained virologic responder to hepatitis C treatment.  Her liver tests which were a bit abnormal 2 weeks ago have almost normalized at this point in time.       I have encouraged her to use her shoe inserts.  She is otherwise up-to-date with regard to vaccines and other cancer screening.  I will see her back in the clinic again in 6 months.       Thank you very much for allowing me to participate in the care of this patient.  If you have any questions regarding my recommendations, please do not hesitate to contact me.       Laron Anne MD      Professor of Medicine  [employer]      Executive Medical Director, Solid Organ Transplant Program  [facility]

## 2018-07-23 NOTE — MR AVS SNAPSHOT
After Visit Summary   7/23/2018    Flor Navarro    MRN: [id number]           Patient Information     Date Of Birth          1965        Visit Information        Provider Department      7/23/2018 2:30 PM Laron Anne MD; LANGUAGE [facility] Hepatology        Care Instructions    Preventive Care:    Breast Cancer Screening: During our visit today, we discussed that it is recommended you receive breast cancer screening. Please call or make an appointment with your primary care provider to discuss this with them. You may also call the [facility] scheduling line ([phone]) to set up a mammography appointment at the Breast Center within the [facility].                Follow-ups after your visit        Your next 10 appointments already scheduled     Sep 10, 2018  9:40 AM CDT   (Arrive by 9:25 AM)   [facility]LE [facility] ([facility])    [address] [place] [id number] [phone]              Who to contact     If you have questions or need follow up information about today's clinic visit or your schedule please contact [facility] HEPATOLOGY directly at [phone].  Normal or non-critical lab and imaging results will be communicated to you by MyChart, letter or phone within 4 business days after the clinic has received the results. If you do not hear from us within 7 days, please contact the clinic through MyChart or phone. If you have a critical or abnormal lab result, we will notify you by phone as soon as possible.  Submit refill requests through [employer] or call your pharmacy and they will forward the refill request to us. Please allow 3 business days for your refill to be completed.          Additional Information About Your Visit        Care EveryWhere ID     This is your Care EveryWhere ID. This could be used by other organizations to access your [employer]  "records  [id number]        Your Vitals Were     Pulse Temperature Respirations Height Pulse Oximetry BMI (Body Mass Index)    70 97.8  F (36.6  C) (Oral) 18 1.537 m (5' 0.5\") 94% 43.2 kg/m2       Blood Pressure from Last 3 Encounters:   07/23/18 123/76   07/16/18 122/82   07/09/18 127/80    Weight from Last 3 Encounters:   07/23/18 102 kg (224 lb 14.4 oz)   07/16/18 100.7 kg (222 lb)   07/09/18 101.2 kg (223 lb 3.2 oz)              Today, you had the following     No orders found for display         Today's Medication Changes          These changes are accurate as of 7/23/18  3:22 PM.  If you have any questions, ask your nurse or doctor.               Start taking these medicines.        Dose/Directions    lidocaine 2 % topical gel   Commonly known as:  XYLOCAINE   Used for:  Posterior tibial tendinitis of left lower extremity, Pain in joint involving ankle and foot, left   Started by:  Rigo Derw DPM        Apply topically daily To left ankle as needed for pain.   Quantity:  20 mL   Refills:  3         These medicines have changed or have updated prescriptions.        Dose/Directions    cycloSPORINE modified 25 MG capsule   This may have changed:  Another medication with the same name was removed. Continue taking this medication, and follow the directions you see here.   Used for:  Liver replaced by transplant (H)   Changed by:  Laron Anne MD        Dose:  100 mg   Take 4 capsules (100 mg) by mouth every 12 hours   Quantity:  240 capsule   Refills:  0            Where to get your medicines      Some of these will need a paper prescription and others can be bought over the counter.  Ask your nurse if you have questions.     You don't need a prescription for these medications     lidocaine 2 % topical gel                Primary Care Provider Office Phone # Fax #    Karely Sierra [phone]       [address] [address]        Equal Access to Services     MAURO ALBA AH: " Hadii aad ku hadbiankao Soudayali, waaxda luqadaha, qaybta kaalivett jin, mart piña. So [facility] [phone].    ATENCIÓN: Si abbey dalton, tiene a garcia disposición servicios gratuitos de asistencia lingüística. Llame al [phone].    We comply with applicable federal civil rights laws and [place] laws. We do not discriminate on the basis of race, color, national origin, age, disability, sex, sexual orientation, or gender identity.            Thank you!     Thank you for choosing [facility] HEPATOLOGY  for your care. Our goal is always to provide you with excellent care. Hearing back from our patients is one way we can continue to improve our services. Please take a few minutes to complete the written survey that you may receive in the mail after your visit with us. Thank you!             Your Updated Medication List - Protect others around you: Learn how to safely use, store and throw away your medicines at www.disposemymeds.org.          This list is accurate as of 7/23/18  3:22 PM.  Always use your most recent med list.                   Brand Name Dispense Instructions for use Diagnosis    * acetaminophen 325 MG tablet    TYLENOL    100 tablet    Take 1-2 tablets (325-650 mg) by mouth every 6 hours as needed Max 6 tablets per 24 hours    Sebaceous cyst       * acetaminophen 500 MG tablet    TYLENOL    120 tablet    Take 1 tablet (500 mg) by mouth 3 times daily as needed for mild pain    Fall, initial encounter       albuterol 108 (90 Base) MCG/ACT Inhaler    PROAIR HFA/PROVENTIL HFA/VENTOLIN HFA    1 Inhaler    Inhale 2 puffs into the lungs 4 times daily    Acute bronchitis, unspecified organism       alendronate 70 MG tablet    FOSAMAX    12 tablet    Take 1 tablet (70 mg) by mouth every 7 days at least 60 min before breakfast with over 8 oz water. Stay upright for at least 30 min.    Osteoporosis       ASPIRIN PO      Take 81 mg by mouth        AVEENO DAILY MOISTURIZING 1.3 %  Lotn lotion   Generic drug:  dimethicone     227 g    APPLY TO AFFECTED AREA(S) AS NEEDED    Xerosis cutis       bisacodyl 5 MG EC tablet    DULCOLAX    30 tablet    Take 1 tablet (5 mg) by mouth daily as needed for constipation    Constipation, chronic       calcium-vitamin D 600-400 MG-UNIT per tablet    calcium 600 + D    60 tablet    Take 1 tablet by mouth 2 times daily    Osteoporosis without current pathological fracture, unspecified osteoporosis type       carboxymethylcellul-glycerin 0.5-0.9 % Soln ophthalmic solution    OPTIVE/REFRESH OPTIVE    1 each    Place 1 drop into both eyes 4 times daily    Dry eye syndrome of bilateral lacrimal glands       * carboxymethylcellulose 1 % ophthalmic solution    CELLUVISC/REFRESH LIQUIGEL    15 each    Place 1 drop into both eyes 4 times daily    Dry eyes, bilateral       * Carboxymethylcellulose Sod PF 1 % ophthalmic gel    CELLUVISC/REFRESH LIQUIGEL    90 each    Place 1 drop into both eyes 4 times daily Dispose of dropperette within 24 hours after opening or if the tip is contaminated.    Dry eyes, bilateral       cholecalciferol 1000 units capsule    vitamin  -D    180 capsule    Take 2 capsules (2,000 Units) by mouth daily    Liver replaced by transplant (H), Vitamin D deficiency, Secondary hyperparathyroidism (H), CKD (chronic kidney disease) stage 3, GFR 30-59 ml/min, Palpitations       Colloidal Oatmeal 1 % Crea     226 g    Externally apply topically as needed    Dry skin       cycloSPORINE modified 25 MG capsule     240 capsule    Take 4 capsules (100 mg) by mouth every 12 hours    Liver replaced by transplant (H)       ferrous sulfate 325 (65 Fe) MG tablet    IRON     Take 1 tablet by mouth daily (with breakfast)        * Knee Brace/Flex Stays Large Misc     2 each    1 Units daily    Chronic pain of both knees       * Medical Compression Socks Misc     2 each    1 Units daily    Leg swelling       lidocaine 2 % topical gel    XYLOCAINE    20 mL    Apply  topically daily To left ankle as needed for pain.    Posterior tibial tendinitis of left lower extremity, Pain in joint involving ankle and foot, left       lidocaine 5 % Patch    LIDODERM    30 patch    Apply up to 3 patches to painful area at once for up to 12 h within a 24 h period.  Remove after 12 hours.    Nondisp fx of head of right radius, init for clos fx, Fall, subsequent encounter       loratadine-pseudoePHEDrine  MG per 24 hr tablet    CLARITIN-D 24-hour    14 tablet    Take 1 tablet by mouth daily    Liver transplanted (H)       melatonin 3 MG tablet     100 tablet    Take 1 tablet (3 mg) by mouth nightly as needed for sleep    Primary insomnia       Menthol (Topical Analgesic) 4 % Gel    BIOFREEZE COLORLESS    150 mL    Apply to affected areas BID    Pain in joint of right shoulder, Peroneal tendonitis, left       multivitamin, therapeutic with minerals Tabs tablet     100 tablet    Take 1 tablet by mouth daily    Liver replaced by transplant (H)       mycophenolate 250 MG capsule    GENERIC EQUIVALENT    120 capsule    Take 2 capsules (500 mg) by mouth every 12 hours    Liver replaced by transplant (H)       omeprazole 20 MG CR capsule    priLOSEC    180 capsule    Take 1 capsule (20 mg) by mouth 2 times daily    Gastroesophageal reflux disease, esophagitis presence not specified       * order for DME     1 Units    Equipment being ordered: Nebulizer with adult mask and tubing    Acute bronchitis, unspecified organism       * order for DME     1 Units    Equipment being ordered: cane with padded handle    Gait instability       * order for DME     2 Units    Equipment being ordered: compression stockings bilateral Please measure and fit patient for stockings  Strength 15-30mmHg Disp 2 pairs 1 refill over the time of 1 year    Localized edema       * order for DME     1 Units    Equipment being ordered: 4 Wheeled Walker with Seat and Brakes    Falls frequently, Gait instability       * order for  DME     2 each    Equipment being ordered: Compression stockings below knee bilateral    Bilateral edema of lower extremity       * order for DME     1 each    Equipment being ordered: Back Stabilizer    Chronic midline low back pain without sciatica       * order for DME     2 each    Equipment being ordered: 2 pairs of black stabilizer socks (beige okay if black not available). Size XL.    Bilateral edema of lower extremity       * order for DME     1 Units    Right pull on neoprene knee brace.    Chronic pain of right knee       * order for DME     1 Units    Left pull on neoprene knee brace.    Chronic pain of left knee       Peppermint Flavor Oil     1 mL    Use as smelling agent as needed.    Nausea       prednisoLONE acetate 1 % ophthalmic susp    PRED FORTE    1 Bottle    Use in operative eye four times a day  For 4 days    Posterior capsular opacification visually significant of both eyes       Psyllium 400 MG Caps     180 capsule    Take 1,200 mg by mouth 2 times daily    Constipation, chronic       * SENEXON-S 8.6-50 MG per tablet   Generic drug:  senna-docusate     100 tablet    TAKE TWO TABLETS BY MOUTH TWICE A DAY FOR CONSTIPATION    Constipation, chronic       * senna-docusate 8.6-50 MG per tablet    SENOKOT-S;PERICOLACE    100 tablet    Take 2 tablets by mouth 2 times daily    Liver replaced by transplant (H)       simethicone 80 MG chewable tablet    MYLICON    120 tablet    Take 1 tablet (80 mg) by mouth every 6 hours as needed for flatulence or cramping    Flatulence, eructation, and gas pain       sodium chloride 0.65 % nasal spray    OCEAN    30 mL    Spray 1 spray into both nostrils daily as needed for congestion    Throat pain       sodium phosphate 7-19 GM/118ML rectal enema     3 Bottle    Place 1 Bottle (1 enema) rectally daily as needed for constipation    Ileus (H)       STATIN NOT PRESCRIBED (INTENTIONAL)     0 each    Not prescribed    High risk medication use       * Notice:  This list  has 17 medication(s) that are the same as other medications prescribed for you. Read the directions carefully, and ask your doctor or other care provider to review them with you.

## 2018-07-23 NOTE — LETTER
7/23/2018       RE: Flor Navarro  [address] [facility] [address] [facility] [address]     Dear Colleague,    Thank you for referring your patient, Flor Navarro, to the [facility] HEPATOLOGY at [facility]. Please see a copy of my visit note below.    HISTORY OF PRESENT ILLNESS:  I had the pleasure of seeing Flor Navarro for followup in the Liver Transplant Clinic at the [facility] on 07/23/2018.  Ms. Navarro returns for followup, now almost 8 years status post liver transplantation for cirrhosis caused by chronic    hepatitis C.      She is doing well at this visit.  She denies any abdominal pain, itching or skin rash, and has only mild fatigue.  She denies any increased abdominal girth or lower extremity edema.  She denies any fevers or chills, cough or shortness of breath.  She denies any nausea or vomiting, diarrhea or constipation.  Her appetite has been good and unfortunately she has not lost any weight.  She does try to lose weight by intermittent but very irregular fasting.       She is complaining of pain on the bottoms of her feet.  She has been previously diagnosed with plantar fasciitis and does have inserts, but she is in the process of moving and they are currently packed away.     Current Outpatient Prescriptions   Medication     acetaminophen (TYLENOL) 325 MG tablet     acetaminophen (TYLENOL) 500 MG tablet     albuterol (PROAIR HFA/PROVENTIL HFA/VENTOLIN HFA) 108 (90 Base) MCG/ACT Inhaler     alendronate (FOSAMAX) 70 MG tablet     ASPIRIN PO     AVEENO DAILY MOISTURIZING 1.3 % LOTN lotion     bisacodyl (DULCOLAX) 5 MG EC tablet     calcium-vitamin D (CALCIUM 600 + D) 600-400 MG-UNIT per tablet     carboxymethylcellul-glycerin (OPTIVE/REFRESH OPTIVE) 0.5-0.9 % SOLN ophthalmic solution     carboxymethylcellulose (CELLUVISC/REFRESH LIQUIGEL) 1 % ophthalmic solution     Carboxymethylcellulose Sod PF (CELLUVISC/REFRESH LIQUIGEL) 1 % ophthalmic  gel     cholecalciferol (VITAMIN  -D) 1000 units capsule     Colloidal Oatmeal 1 % CREA     Elastic Bandages & Supports (KNEE BRACE/FLEX STAYS LARGE) MISC     Elastic Bandages & Supports (MEDICAL COMPRESSION SOCKS) MISC     ferrous sulfate (IRON) 325 (65 FE) MG tablet     Flavoring Agent (PEPPERMINT FLAVOR) OIL     GENGRAF (BRAND) 25 MG CAPSULE     lidocaine (LIDODERM) 5 % Patch     lidocaine (XYLOCAINE) 2 % topical gel     loratadine-pseudoePHEDrine (CLARITIN-D 24-HOUR)  MG per 24 hr tablet     melatonin 3 MG tablet     Menthol, Topical Analgesic, (BIOFREEZE COLORLESS) 4 % GEL     multivitamin, therapeutic with minerals (MULTI-VITAMIN) TABS tablet     mycophenolate (GENERIC EQUIVALENT) 250 MG capsule     omeprazole (PRILOSEC) 20 MG CR capsule     order for DME     order for DME     order for DME     order for DME     order for DME     order for DME     order for DME     order for DME     order for DME     prednisoLONE acetate (PRED FORTE) 1 % ophthalmic susp     Psyllium 400 MG CAPS     SENEXON-S 8.6-50 MG per tablet     senna-docusate (SENOKOT-S;PERICOLACE) 8.6-50 MG per tablet     simethicone (MYLICON) 80 MG chewable tablet     sodium chloride (OCEAN) 0.65 % nasal spray     sodium phosphate (FLEET ENEMA) 7-19 GM/118ML rectal enema     STATIN NOT PRESCRIBED, INTENTIONAL,     [DISCONTINUED] cycloSPORINE modified (GENERIC EQUIVALENT) 25 MG capsule     [DISCONTINUED] solifenacin (VESICARE) 5 MG tablet     Current Facility-Administered Medications   Medication     apraclonidine (IOPIDINE) 1 % ophthalmic solution 1 drop     B/P: 123/76, T: 97.8, P: 70, R: 18    PHYSICAL EXAMINATION:    GENERAL:  In general, she appears quite well.    HEENT:  HEENT exam shows no scleral icterus or temporal muscle wasting.     CHEST:  Her chest is clear.    ABDOMEN:  Her abdominal exam shows no increase in girth.  No masses or tenderness to palpation is present.  Her liver is 10 cm in span without left lobe enlargement.  No spleen  tip is palpable.    EXTREMITIES:  Extremity exam shows no edema.    SKIN:  Skin exam shows no stigmata of chronic liver disease.    NEUROLOGIC:  Neurologic exam is nonfocal.     LABORATORY:  Her most recent laboratory tests show her white count is 3.4, hemoglobin is 12.9, and platelets are 126,000.  Her MCV is 96.  Sodium is 142, potassium is 4.8, BUN is 25, and creatinine is 1.25.  AST is 36, ALT is 51, and alkaline phosphatase is 100.  Albumin is 3.6 with a total protein of 7.8.  Total bilirubin is 0.8.       IMPRESSION AND PLAN:  My impression is that Ms. Navarro is now almost 8 years status post liver transplantation for chronic hepatitis C.  She is a sustained virologic responder to hepatitis C treatment.  Her liver tests which were a bit abnormal 2 weeks ago have almost normalized at this point in time.       I have encouraged her to use her shoe inserts.  She is otherwise up-to-date with regard to vaccines and other cancer screening.  I will see her back in the clinic again in 6 months.       Thank you very much for allowing me to participate in the care of this patient.  If you have any questions regarding my recommendations, please do not hesitate to contact me.       Laron Anne MD      Professor of Medicine  [employer]      Executive Medical Director, Solid Organ Transplant Program  [facility]    Again, thank you for allowing me to participate in the care of your patient.      Sincerely,    Laron Anne MD

## 2018-07-23 NOTE — PATIENT INSTRUCTIONS
Preventive Care:    Breast Cancer Screening: During our visit today, we discussed that it is recommended you receive breast cancer screening. Please call or make an appointment with your primary care provider to discuss this with them. You may also call the [facility] scheduling line ([phone]) to set up a mammography appointment at the Breast Center within the [facility].

## 2018-07-25 ENCOUNTER — DOCUMENTATION ONLY (OUTPATIENT)
Dept: FAMILY MEDICINE | Facility: CLINIC | Age: 53
End: 2018-07-25

## 2018-07-25 NOTE — PROGRESS NOTES
"When opening a documentation only encounter, be sure to enter in \"Chief Complaint\" Forms and in \" Comments\" Title of form, description if needed.    Flor is a 52 year old  female  Form received via: Fax  Form now resides in: Provider Ready    Dayanna Batista      Form has been completed by provider.     Form sent out via: Mailed to [address] [place] [address]  Patient informed: Voicemail full   Output date: August 23, 2018    Dayanna Batista      **Please close the encounter**              "

## 2018-07-26 ENCOUNTER — TELEPHONE (OUTPATIENT)
Dept: OPHTHALMOLOGY | Facility: CLINIC | Age: 53
End: 2018-07-26

## 2018-07-30 ENCOUNTER — TELEPHONE (OUTPATIENT)
Dept: FAMILY MEDICINE | Facility: CLINIC | Age: 53
End: 2018-07-30

## 2018-07-30 NOTE — TELEPHONE ENCOUNTER
Returned call to home care nurse to give verbal orders per protocol as requested for home PT. Nurse verbalized understanding.    Fabiola Ford RN

## 2018-07-30 NOTE — TELEPHONE ENCOUNTER
[facility] phone call message - order or referral request for patient:     Order or referral being requested: Verbal Orders      Additional Comments: 1 time/week for 2 weeks.  Getting a new ankle braise.  [employer] training exercises.    OK to leave a message on voice mail? Yes    Primary language: Divehi      needed? Yes    Call taken on July 30, 2018 at 11:41 AM by Jess Fallon

## 2018-08-03 ENCOUNTER — OFFICE VISIT (OUTPATIENT)
Dept: INTERPRETER SERVICES | Facility: CLINIC | Age: 53
End: 2018-08-03

## 2018-08-03 ENCOUNTER — APPOINTMENT (OUTPATIENT)
Dept: OPTOMETRY | Facility: CLINIC | Age: 53
End: 2018-08-03
Payer: MEDICARE

## 2018-08-03 PROCEDURE — 92340 FIT SPECTACLES MONOFOCAL: CPT | Performed by: STUDENT IN AN ORGANIZED HEALTH CARE EDUCATION/TRAINING PROGRAM

## 2018-08-10 ENCOUNTER — DOCUMENTATION ONLY (OUTPATIENT)
Dept: FAMILY MEDICINE | Facility: CLINIC | Age: 53
End: 2018-08-10

## 2018-08-10 NOTE — PROGRESS NOTES
"When opening a documentation only encounter, be sure to enter in \"Chief Complaint\" Forms and in \" Comments\" Title of form, description if needed.    Flor is a 52 year old  female  Form received via: Fax  Form now resides in: Provider Ready    Dayanna Batista            .Form has been completed by provider.     Form sent out via: Fax to   at Fax Number: [phone]  Patient informed: No  Output date: August 20, 2018    Dayanna Batista      **Please close the encounter**          "

## 2018-08-14 ENCOUNTER — DOCUMENTATION ONLY (OUTPATIENT)
Dept: FAMILY MEDICINE | Facility: CLINIC | Age: 53
End: 2018-08-14

## 2018-08-14 NOTE — PROGRESS NOTES
"When opening a documentation only encounter, be sure to enter in \"Chief Complaint\" Forms and in \" Comments\" Title of form, description if needed.    Flor is a 52 year old  female  Form received via: Fax  Form now resides in: Provider Ready    Dayanna Batista                Form has been completed by provider.     Form sent out via: Fax to [facility] at Fax Number: [phone]  Patient informed: Yes  Output date: August 17, 2018    Dayanna Batista      **Please close the encounter**      "

## 2018-08-15 ENCOUNTER — DOCUMENTATION ONLY (OUTPATIENT)
Dept: FAMILY MEDICINE | Facility: CLINIC | Age: 53
End: 2018-08-15

## 2018-08-15 ENCOUNTER — OFFICE VISIT (OUTPATIENT)
Dept: FAMILY MEDICINE | Facility: CLINIC | Age: 53
End: 2018-08-15
Payer: MEDICARE

## 2018-08-15 VITALS
DIASTOLIC BLOOD PRESSURE: 75 MMHG | SYSTOLIC BLOOD PRESSURE: 117 MMHG | HEART RATE: 69 BPM | WEIGHT: 223 LBS | RESPIRATION RATE: 14 BRPM | OXYGEN SATURATION: 95 % | BODY MASS INDEX: 42.83 KG/M2

## 2018-08-15 DIAGNOSIS — F41.9 ANXIETY: Primary | ICD-10-CM

## 2018-08-15 DIAGNOSIS — Z02.71 ENCOUNTER FOR DISABILITY ASSESSMENT: ICD-10-CM

## 2018-08-15 NOTE — MR AVS SNAPSHOT
After Visit Summary   8/15/2018    Flor Navarro    MRN: [id number]           Patient Information     Date Of Birth          1965        Visit Information        Provider Department      8/15/2018 3:40 PM Karely Sierra MD [facility]        Today's Diagnoses     Anxiety    -  1      Care Instructions    Mood  1. Continue seeing Dr. Barclay for talk therapy.  2. Follow-up with me in 1 month.          Follow-ups after your visit        Your next 10 appointments already scheduled     Sep 10, 2018  9:40 AM CDT   (Arrive by 9:25 AM)   RETURN FOOT/ANKLE with Rigo Drew DPM   [facility] ([facility])    [address] [facility]  [address] [facility] [address] [phone]              Who to contact     Please call your clinic at [phone] to:    Ask questions about your health    Make or cancel appointments    Discuss your medicines    Learn about your test results    Speak to your doctor            Additional Information About Your Visit        Care EveryWhere ID     This is your Care EveryWhere ID. This could be used by other organizations to access your [place] medical records  [id number]        Your Vitals Were     Pulse Respirations Pulse Oximetry BMI (Body Mass Index)          69 14 95% 42.83 kg/m2         Blood Pressure from Last 3 Encounters:   08/15/18 117/75   07/23/18 123/76   07/16/18 122/82    Weight from Last 3 Encounters:   08/15/18 223 lb (101.2 kg)   07/23/18 224 lb 14.4 oz (102 kg)   07/16/18 222 lb (100.7 kg)              Today, you had the following     No orders found for display         Today's Medication Changes          These changes are accurate as of 8/15/18  4:30 PM.  If you have any questions, ask your nurse or doctor.               These medicines have changed or have updated prescriptions.        Dose/Directions    * order for DME   This may have changed:  Another medication with the same name was  removed. Continue taking this medication, and follow the directions you see here.   Used for:  Acute bronchitis, unspecified organism   Changed by:  Karely Sierra MD        Equipment being ordered: Nebulizer with adult mask and tubing   Quantity:  1 Units   Refills:  0       * order for DME   This may have changed:  Another medication with the same name was removed. Continue taking this medication, and follow the directions you see here.   Used for:  Gait instability   Changed by:  Karely Sierra MD        Equipment being ordered: cane with padded handle   Quantity:  1 Units   Refills:  0       * order for DME   This may have changed:  Another medication with the same name was removed. Continue taking this medication, and follow the directions you see here.   Used for:  Localized edema   Changed by:  Karely Sierra MD        Equipment being ordered: compression stockings bilateral Please measure and fit patient for stockings  Strength 15-30mmHg Disp 2 pairs 1 refill over the time of 1 year   Quantity:  2 Units   Refills:  1       * order for DME   This may have changed:  Another medication with the same name was removed. Continue taking this medication, and follow the directions you see here.   Used for:  Falls frequently, Gait instability   Changed by:  Karely Sierra MD        Equipment being ordered: 4 Wheeled Walker with Seat and Brakes   Quantity:  1 Units   Refills:  0       * order for DME   This may have changed:  Another medication with the same name was removed. Continue taking this medication, and follow the directions you see here.   Used for:  Bilateral edema of lower extremity   Changed by:  Karely Sierra MD        Equipment being ordered: Compression stockings below knee bilateral   Quantity:  2 each   Refills:  0       * order for DME   This may have changed:  Another medication with the same name was removed. Continue taking this medication, and follow the directions you see here.   Used for:  Chronic  midline low back pain without sciatica   Changed by:  Karely Sierra MD        Equipment being ordered: Back Stabilizer   Quantity:  1 each   Refills:  0       * order for DME   This may have changed:  Another medication with the same name was removed. Continue taking this medication, and follow the directions you see here.   Used for:  Bilateral edema of lower extremity   Changed by:  Karely Sierra MD        Equipment being ordered: 2 pairs of black stabilizer socks (beige okay if black not available). Size XL.   Quantity:  2 each   Refills:  0       senna-docusate 8.6-50 MG per tablet   Commonly known as:  SENOKOT-S;PERICOLACE   This may have changed:  Another medication with the same name was removed. Continue taking this medication, and follow the directions you see here.   Used for:  Liver replaced by transplant (H)   Changed by:  Karely Sierra MD        Dose:  2 tablet   Take 2 tablets by mouth 2 times daily   Quantity:  100 tablet   Refills:  3       * Notice:  This list has 7 medication(s) that are the same as other medications prescribed for you. Read the directions carefully, and ask your doctor or other care provider to review them with you.      Stop taking these medicines if you haven't already. Please contact your care team if you have questions.     carboxymethylcellul-glycerin 0.5-0.9 % Soln ophthalmic solution   Commonly known as:  OPTIVE/REFRESH OPTIVE   Stopped by:  Karely Sierra MD           Colloidal Oatmeal 1 % Crea   Stopped by:  Karely Sierra MD           loratadine-pseudoePHEDrine  MG per 24 hr tablet   Commonly known as:  CLARITIN-D 24-hour   Stopped by:  Karely Sierra MD           Peppermint Flavor Oil   Stopped by:  Karely Sierra MD           sodium phosphate 7-19 GM/118ML rectal enema   Stopped by:  Karely Sierra MD                    Primary Care Provider Office Phone # Fax #    Karely Sierra [phone]       [address] [address]        Equal  Access to Services     [facility]: Hadii florecita chiang luz Adair, wadanielda luqadaha, qaybta kaalivett jodymaurykrista mart ashley alcarazyaniraerica piña. So [facility] [phone].    ATENCIÓN: Si patriala sha, tiene a garcia disposición servicios gratuitos de asistencia lingüística. Llame al [phone].    We comply with applicable federal civil rights laws and [place] laws. We do not discriminate on the basis of race, color, national origin, age, disability, sex, sexual orientation, or gender identity.            Thank you!     Thank you for choosing [facility]  for your care. Our goal is always to provide you with excellent care. Hearing back from our patients is one way we can continue to improve our services. Please take a few minutes to complete the written survey that you may receive in the mail after your visit with us. Thank you!             Your Updated Medication List - Protect others around you: Learn how to safely use, store and throw away your medicines at www.disposemymeds.org.          This list is accurate as of 8/15/18  4:30 PM.  Always use your most recent med list.                   Brand Name Dispense Instructions for use Diagnosis    * acetaminophen 325 MG tablet    TYLENOL    100 tablet    Take 1-2 tablets (325-650 mg) by mouth every 6 hours as needed Max 6 tablets per 24 hours    Sebaceous cyst       * acetaminophen 500 MG tablet    TYLENOL    120 tablet    Take 1 tablet (500 mg) by mouth 3 times daily as needed for mild pain    Fall, initial encounter       albuterol 108 (90 Base) MCG/ACT inhaler    PROAIR HFA/PROVENTIL HFA/VENTOLIN HFA    1 Inhaler    Inhale 2 puffs into the lungs 4 times daily    Acute bronchitis, unspecified organism       alendronate 70 MG tablet    FOSAMAX    12 tablet    Take 1 tablet (70 mg) by mouth every 7 days at least 60 min before breakfast with over 8 oz water. Stay upright for at least 30 min.    Osteoporosis       ASPIRIN PO      Take 81 mg  by mouth        AVEENO DAILY MOISTURIZING 1.3 % Lotn lotion   Generic drug:  dimethicone     227 g    APPLY TO AFFECTED AREA(S) AS NEEDED    Xerosis cutis       bisacodyl 5 MG EC tablet    DULCOLAX    30 tablet    Take 1 tablet (5 mg) by mouth daily as needed for constipation    Constipation, chronic       calcium-vitamin D 600-400 MG-UNIT per tablet    calcium 600 + D    60 tablet    Take 1 tablet by mouth 2 times daily    Osteoporosis without current pathological fracture, unspecified osteoporosis type       * carboxymethylcellulose 1 % ophthalmic solution    CELLUVISC/REFRESH LIQUIGEL    15 each    Place 1 drop into both eyes 4 times daily    Dry eyes, bilateral       * Carboxymethylcellulose Sod PF 1 % ophthalmic gel    CELLUVISC/REFRESH LIQUIGEL    90 each    Place 1 drop into both eyes 4 times daily Dispose of dropperette within 24 hours after opening or if the tip is contaminated.    Dry eyes, bilateral       cholecalciferol 1000 units capsule    vitamin  -D    180 capsule    Take 2 capsules (2,000 Units) by mouth daily    Liver replaced by transplant (H), Vitamin D deficiency, Secondary hyperparathyroidism (H), CKD (chronic kidney disease) stage 3, GFR 30-59 ml/min, Palpitations       cycloSPORINE modified 25 MG capsule     240 capsule    Take 4 capsules (100 mg) by mouth every 12 hours    Liver replaced by transplant (H)       ferrous sulfate 325 (65 Fe) MG tablet    IRON     Take 1 tablet by mouth daily (with breakfast)        * Knee Brace/Flex Stays Large Misc     2 each    1 Units daily    Chronic pain of both knees       * Medical Compression Socks Misc     2 each    1 Units daily    Leg swelling       lidocaine 2 % topical gel    XYLOCAINE    20 mL    Apply topically daily To left ankle as needed for pain.    Posterior tibial tendinitis of left lower extremity, Pain in joint involving ankle and foot, left       lidocaine 5 % Patch    LIDODERM    30 patch    Apply up to 3 patches to painful area at once  for up to 12 h within a 24 h period.  Remove after 12 hours.    Nondisp fx of head of right radius, init for clos fx, Fall, subsequent encounter       melatonin 3 MG tablet     100 tablet    Take 1 tablet (3 mg) by mouth nightly as needed for sleep    Primary insomnia       Menthol (Topical Analgesic) 4 % Gel    BIOFREEZE COLORLESS    150 mL    Apply to affected areas BID    Pain in joint of right shoulder, Peroneal tendonitis, left       multivitamin, therapeutic with minerals Tabs tablet     100 tablet    Take 1 tablet by mouth daily    Liver replaced by transplant (H)       mycophenolate 250 MG capsule    GENERIC EQUIVALENT    120 capsule    Take 2 capsules (500 mg) by mouth every 12 hours    Liver replaced by transplant (H)       omeprazole 20 MG CR capsule    priLOSEC    180 capsule    Take 1 capsule (20 mg) by mouth 2 times daily    Gastroesophageal reflux disease, esophagitis presence not specified       * order for DME     1 Units    Equipment being ordered: Nebulizer with adult mask and tubing    Acute bronchitis, unspecified organism       * order for DME     1 Units    Equipment being ordered: cane with padded handle    Gait instability       * order for DME     2 Units    Equipment being ordered: compression stockings bilateral Please measure and fit patient for stockings  Strength 15-30mmHg Disp 2 pairs 1 refill over the time of 1 year    Localized edema       * order for DME     1 Units    Equipment being ordered: 4 Wheeled Walker with Seat and Brakes    Falls frequently, Gait instability       * order for DME     2 each    Equipment being ordered: Compression stockings below knee bilateral    Bilateral edema of lower extremity       * order for DME     1 each    Equipment being ordered: Back Stabilizer    Chronic midline low back pain without sciatica       * order for DME     2 each    Equipment being ordered: 2 pairs of black stabilizer socks (beige okay if black not available). Size XL.     Bilateral edema of lower extremity       prednisoLONE acetate 1 % ophthalmic susp    PRED FORTE    1 Bottle    Use in operative eye four times a day  For 4 days    Posterior capsular opacification visually significant of both eyes       Psyllium 400 MG Caps     180 capsule    Take 1,200 mg by mouth 2 times daily    Constipation, chronic       senna-docusate 8.6-50 MG per tablet    SENOKOT-S;PERICOLACE    100 tablet    Take 2 tablets by mouth 2 times daily    Liver replaced by transplant (H)       simethicone 80 MG chewable tablet    MYLICON    120 tablet    Take 1 tablet (80 mg) by mouth every 6 hours as needed for flatulence or cramping    Flatulence, eructation, and gas pain       sodium chloride 0.65 % nasal spray    OCEAN    30 mL    Spray 1 spray into both nostrils daily as needed for congestion    Throat pain       STATIN NOT PRESCRIBED (INTENTIONAL)     0 each    Not prescribed    High risk medication use       * Notice:  This list has 13 medication(s) that are the same as other medications prescribed for you. Read the directions carefully, and ask your doctor or other care provider to review them with you.

## 2018-08-15 NOTE — PROGRESS NOTES
"When opening a documentation only encounter, be sure to enter in \"Chief Complaint\" Forms and in \" Comments\" Title of form, description if needed.    Flor is a 52 year old  female  Form received via: Fax  Form now resides in: Provider Ready    Dayanna Batista                Form has been completed by provider.     Form sent out via: Fax to   at Fax Number: [phone]  Patient informed: No  Output date: August 20, 2018    Dayanna Batista      **Please close the encounter**      "

## 2018-08-15 NOTE — PROGRESS NOTES
HPI       Flor Navarro is a 52 year old  who presents for   Chief Complaint   Patient presents with     Forms     Forms  Discussed forms from housing agency looking for variance disability exemption for having another person living with her, which would be her son, Courtney, who would be a live-in with her for her chronic physical and mental health care.    Fatigue  Reports that she had a burning sensation in her abd while she was sleeping at night, worried it's related to her liver. Felt fatigued during the day yesterday.     Mood  Reports she's been having crying spells during her prayers for no apparent reason the last few days. Pt expresses a desire to go back to live in [place] because she feels isolated in [place]. Pt will lay in bed all night and finally sleep around 5 am. She thinks a lot about the past 40 years of illness and hardships. She has a lot of unresolved feelings toward her ex- and the way he's treated her over the years. She has a lot of flashbacks regarding past experiences with her ex. These thoughts interfere with her concentration during prayers. Does not have social anxiety. She's been seeing Dr. Ning [facility]ass[facility] ([place], [place]) for therapy, was referred to her by Dr. Anne. Thinks therapy is somewhat helpful. She blames her ex for everything that she's going through emotionally. Pt had questions about medications that would help improve her quality of sleep.    Pt had questions regarding getting a handicap sign for her car.      A Korean  was used for  this visit.    +++++++      Problem, Medication and Allergy Lists were reviewed and updated if needed..    Patient is an established patient of this clinic..         Review of Systems:   Review of Systems     Positive for fatigue, abd pain.    See HPI for additional sx.    This document serves as a record of the services and decisions personally performed and made by Karely Sierra MD. It was created on his/her  behalf by Charli Valdez, a trained medical scribe. The creation of this document is based the provider's statements to the medical scribe.  Scribe Charli Valdez 3:37 PM, August 15, 2018       Physical Exam:     Vitals:    08/15/18 1525   BP: 117/75   Pulse: 69   Resp: 14   SpO2: 95%   Weight: 223 lb (101.2 kg)     Body mass index is 42.83 kg/(m^2).  Vitals were reviewed and were normal     Physical Exam    BMI= Body mass index is 42.83 kg/(m^2).   GENERAL: healthy, alert and no distress  ABDOMEN: positive bowel sounds, pressure supraumbilically and reports that's where it feels tight.  PSYCH: Alert and oriented times 3; speech- coherent , normal rate and volume; affect- tearful    Results:   Results from last visit:  Orders Only on 07/23/2018   Component Date Value Ref Range Status     Bilirubin Direct 07/23/2018 0.2  0.0 - 0.2 mg/dL Final     Bilirubin Total 07/23/2018 0.8  0.2 - 1.3 mg/dL Final     Albumin 07/23/2018 3.6  3.4 - 5.0 g/dL Final     Protein Total 07/23/2018 7.8  6.8 - 8.8 g/dL Final     Alkaline Phosphatase 07/23/2018 100  40 - 150 U/L Final     ALT 07/23/2018 51* 0 - 50 U/L Final     AST 07/23/2018 36  0 - 45 U/L Final       Assessment and Plan   Flor was seen today for forms.    Diagnoses and all orders for this visit:    Anxiety - r/o YUSUF suspect PTSD , less likely OCD. DIff historian. Certainly perseverates and ruminates.   - Pt Unsure about start of medications. Pt was started on Celexa and Zoloft up to 50mg in the past unsure if effective. Since talk therapy helped so signigicantly in the past, we will start with that.   - Continue seeing Dr. Barclay for talk therapy. Needs , reviewed w/ her current  and he will help  - Follow up with me in one month.      Disability accommodation paperwork done, see scanned. Original provided back to pt to allow her son to live with her in public housing . Certainly will help her stay stable and well - significant anxiety, with flashbacks  and panic that limits function.     Medications Discontinued During This Encounter   Medication Reason     order for DME      order for DME      SENEXON-S 8.6-50 MG per tablet      sodium phosphate (FLEET ENEMA) 7-19 GM/118ML rectal enema      carboxymethylcellul-glycerin (OPTIVE/REFRESH OPTIVE) 0.5-0.9 % SOLN ophthalmic solution      Colloidal Oatmeal 1 % CREA      Flavoring Agent (PEPPERMINT FLAVOR) OIL      loratadine-pseudoePHEDrine (CLARITIN-D 24-HOUR)  MG per 24 hr tablet        AVS    Mood  1. Continue seeing Dr. Barclay for talk therapy.  2. Follow-up with me in 1 month.    Options for treatment and follow-up care were reviewed with the patient. Flor Navarro  engaged in the decision making process and verbalized understanding of the options discussed and agreed with the final plan.    The information in this document, created by the medical scribe for me, accurately reflects the services I personally performed and the decisions made by me. I have reviewed and approved this document for accuracy prior to leaving the patient care area.    Karely Sierra MD  3:32 PM, 08/15/18

## 2018-08-15 NOTE — NURSING NOTE
Due to patient being non-English speaking/uses sign language, an  was used for this visit. Only for face-to-face interpretation by an external agency, date and length of interpretation can be found on the scanned worksheet.     name: Luisana  Agency: [employer]  Language: Yoruba   Telephone number: [phone]  Type of interpretation: Face-to-face, spoken   VIKAS Hoyt

## 2018-08-15 NOTE — PROGRESS NOTES
Clinical Pharmacy Note     Flor is a 52 year old female who asked to speak with pharmacy about her medications and refills.  She discussed frustration of medications being delivered late when she forgot to call the pharmacy for refills.  She inquired if there was an option to have medications be sent to her house automatically every month.  Flor has many pharmacies on file yet she typically only utilizes [facility].     I was able to contact her primary pharmacy ([facility]) by phone to ask about automatic refills.  The staff member indicated that the pharmacy typically does not do automatic refills for mail order.  After explaining the patient situation and confusion, the pharmacy staff was going to try to override this process.  The staff member stated she was going to talk with the manager to see if this request could be done.     Follow-up: the [facility] will contact Flor when a solution has been determined.     This was discussed with Flor and Dr. Sierra.     Leona Maddox, PharmD

## 2018-08-16 PROBLEM — F41.9 ANXIETY: Status: ACTIVE | Noted: 2018-08-16

## 2018-08-20 ENCOUNTER — TELEPHONE (OUTPATIENT)
Dept: FAMILY MEDICINE | Facility: CLINIC | Age: 53
End: 2018-08-20

## 2018-08-20 DIAGNOSIS — J20.9 ACUTE BRONCHITIS, UNSPECIFIED ORGANISM: ICD-10-CM

## 2018-08-20 RX ORDER — ALBUTEROL SULFATE 90 UG/1
2 AEROSOL, METERED RESPIRATORY (INHALATION) 4 TIMES DAILY
Qty: 1 INHALER | Refills: 1 | Status: SHIPPED | OUTPATIENT
Start: 2018-08-20 | End: 2018-09-24

## 2018-08-20 NOTE — TELEPHONE ENCOUNTER
Prior Authorization Approval    Authorization Effective Date: 5/22/2018  Authorization Expiration Date: 8/20/2019  Medication: Lidocaine 5% Patches - APPROVED  Approved Dose/Quantity: UD  Reference #: [id number]   Insurance Company: [employer] Part D - Phone [phone] Fax [phone]  Expected CoPay: $0.00     CoPay Card Available:      Foundation Assistance Needed:    Which Pharmacy is filling the prescription (Not needed for infusion/clinic administered): [facility] - [place] [address]  Pharmacy Notified: Yes  Patient Notified: Yes

## 2018-08-20 NOTE — TELEPHONE ENCOUNTER

## 2018-08-20 NOTE — TELEPHONE ENCOUNTER
[facility] Prior Authorization Team   Phone: [phone]  Fax: [phone]    PA Initiation    Medication: Lidocaine 5% Patches - PENDING  Insurance Company: [employer] Part D - Phone [phone] Fax [phone]  [facility] the [facility] - [place], [place] - [address]  Filling Pharmacy Phone: [phone]  Filling Pharmacy Fax: [phone]  Start Date: 8/20/2018

## 2018-08-27 DIAGNOSIS — K21.9 GASTROESOPHAGEAL REFLUX DISEASE, ESOPHAGITIS PRESENCE NOT SPECIFIED: ICD-10-CM

## 2018-08-27 RX ORDER — OMEPRAZOLE 40 MG/1
40 CAPSULE, DELAYED RELEASE ORAL DAILY
Qty: 90 CAPSULE | Refills: 3 | Status: SHIPPED | OUTPATIENT
Start: 2018-08-27 | End: 2019-02-06 | Stop reason: DRUGHIGH

## 2018-08-28 ENCOUNTER — DOCUMENTATION ONLY (OUTPATIENT)
Dept: FAMILY MEDICINE | Facility: CLINIC | Age: 53
End: 2018-08-28

## 2018-08-28 NOTE — PROGRESS NOTES
Form has been completed by provider.     Form sent out via: Fax to Sujatha at Fax Number: [phone]  Patient informed: No   Output date: August 28, 2018    Dayanna Batista      **Please close the encounter**

## 2018-09-06 ENCOUNTER — DOCUMENTATION ONLY (OUTPATIENT)
Dept: FAMILY MEDICINE | Facility: CLINIC | Age: 53
End: 2018-09-06

## 2018-09-06 NOTE — PROGRESS NOTES
"When opening a documentation only encounter, be sure to enter in \"Chief Complaint\" Forms and in \" Comments\" Title of form, description if needed.    Flor is a 52 year old  female  Form received via: Fax  Form now resides in: Provider Ready    Dayanna Batista            Form has been completed by provider.     Form sent out via: Fax to   at Fax Number: [phone]  Patient informed: No  Output date: September 25, 2018    Dayanna Batista      **Please close the encounter**        "

## 2018-09-12 ENCOUNTER — OFFICE VISIT (OUTPATIENT)
Dept: FAMILY MEDICINE | Facility: CLINIC | Age: 53
End: 2018-09-12
Payer: MEDICARE

## 2018-09-12 VITALS
DIASTOLIC BLOOD PRESSURE: 72 MMHG | HEART RATE: 71 BPM | SYSTOLIC BLOOD PRESSURE: 117 MMHG | OXYGEN SATURATION: 93 % | WEIGHT: 226.2 LBS | BODY MASS INDEX: 43.45 KG/M2 | TEMPERATURE: 98.1 F

## 2018-09-12 DIAGNOSIS — I61.9 STROKE, HEMORRHAGIC (H): ICD-10-CM

## 2018-09-12 DIAGNOSIS — Z12.31 ENCOUNTER FOR SCREENING MAMMOGRAM FOR BREAST CANCER: Primary | ICD-10-CM

## 2018-09-12 DIAGNOSIS — R06.02 SOB (SHORTNESS OF BREATH): ICD-10-CM

## 2018-09-12 ASSESSMENT — PATIENT HEALTH QUESTIONNAIRE - PHQ9: 5. POOR APPETITE OR OVEREATING: SEVERAL DAYS

## 2018-09-12 ASSESSMENT — ANXIETY QUESTIONNAIRES
6. BECOMING EASILY ANNOYED OR IRRITABLE: SEVERAL DAYS
IF YOU CHECKED OFF ANY PROBLEMS ON THIS QUESTIONNAIRE, HOW DIFFICULT HAVE THESE PROBLEMS MADE IT FOR YOU TO DO YOUR WORK, TAKE CARE OF THINGS AT HOME, OR GET ALONG WITH OTHER PEOPLE: VERY DIFFICULT
1. FEELING NERVOUS, ANXIOUS, OR ON EDGE: MORE THAN HALF THE DAYS
7. FEELING AFRAID AS IF SOMETHING AWFUL MIGHT HAPPEN: MORE THAN HALF THE DAYS
2. NOT BEING ABLE TO STOP OR CONTROL WORRYING: MORE THAN HALF THE DAYS
GAD7 TOTAL SCORE: 11
5. BEING SO RESTLESS THAT IT IS HARD TO SIT STILL: SEVERAL DAYS
3. WORRYING TOO MUCH ABOUT DIFFERENT THINGS: MORE THAN HALF THE DAYS

## 2018-09-12 NOTE — MR AVS SNAPSHOT
After Visit Summary   9/12/2018    Flor Navarro    MRN: [id number]           Patient Information     Date Of Birth          1965        Visit Information        Provider Department      9/12/2018 3:20 PM Karely Sierra MD [facility]        Today's Diagnoses     Encounter for screening mammogram for breast cancer    -  1    SOB (shortness of breath)          Care Instructions    Preventative  1. Maintain a low cholesterol diet like brown rice and whole wheat.   2. I will put in the referral for a mammogram.     Shortness of Breath  1. I will order a spirometry test at [facility]. Schedule in the future.  2. Use your inhaler at home as needed.  3. Follow-up with me after your breathing test.              Follow-ups after your visit        Your next 10 appointments already scheduled     Sep 17, 2018 11:00 AM CDT   Ech Dobutamine Stress Test with [id number]   [facility], [place],  Echocardiography ([facility], [facility])    [address] [facility] [address] [id number] [phone]           1.  Please bring or wear a comfortable two-piece outfit and walking shoes. 2.  Stop eating 3 hours before the test. You may drink water or juice. 3.  Stop all caffeine 12 hours before the test. This includes coffee, tea, soda pop, chocolate and certain medicines (such as Anacin and Excederin). Also avoid decaf coffee and tea, as these contain small amounts of caffeine. 4.  No alcohol, smoking or use of other tobacco products for 12 hours before the test. 5.  Refer to your provider instructions to see if you need to stop any medications (such as beta-blockers or nitrates) for this test. 6.  For patients with diabetes: -   If you take insulin, call your diabetes care team. Ask if you should take a   dose the morning of your test. -   If you take diabetes medicine by mouth, don't take it on the morning of your test. Bring it with you to take after the test.  (If you have  "questions, call your diabetes care team) 7.  When you arrive, please tell us if: -   You have diabetes. -   You have taken Viagra, Cialis or Levitra in the past 48 hours. 8.  For any questions that cannot be answered, please contact the ordering physician 9.  Please do not wear perfumes or scented lotions on the day of your exam.            Sep 18, 2018 11:30 AM CDT   MA SCREENING DIGITAL BILATERAL with [id number]   [facility] ([facility])    [address]  [facility] [address] [phone]           Do not use any powder, lotion or deodorant under your arms or on your breast. If you do, we will ask you to remove it before your exam.  Wear comfortable, two-piece clothing.  If you have any allergies, tell your care team.  Bring any previous mammograms from other facilities or have them mailed to the breast center. Three-dimensional (3D) mammograms are available at [place] locations in [facility], [facility], [facility], and [place]. [facility] locations include [place] and [facility] in [place]. Benefits of 3D mammograms include: - Improved rate of cancer detection - Decreases your chance of having to go back for more tests, which means fewer: - \"False-positive\" results (This means that there is an abnormal area but it isn't cancer.) - Invasive testing procedures, such as a biopsy or surgery - Can provide clearer images of the breast if you have dense breast tissue. 3D mammography is an optional exam that anyone can have with a 2D mammogram. It doesn't replace or take the place of a 2D mammogram. 2D mammograms remain an effective screening test for all women.  Not all insurance companies cover the cost of a 3D mammogram. Check with your insurance.              Future tests that were ordered for you today     Open Future Orders        Priority Expected Expires Ordered    MA Screening Digital " Bilateral Routine  9/12/2019 9/12/2018    Echo Dobutamine Stress Test Routine  9/12/2019 9/12/2018    Spirometry Pre/Post (Bronchodilation Response) Routine  10/27/2018 9/12/2018            Who to contact     Please call your clinic at [phone] to:    Ask questions about your health    Make or cancel appointments    Discuss your medicines    Learn about your test results    Speak to your doctor            Additional Information About Your Visit        Care EveryWhere ID     This is your Care EveryWhere ID. This could be used by other organizations to access your [facility] medical records  [id number]        Your Vitals Were     Pulse Temperature Pulse Oximetry Breastfeeding? BMI (Body Mass Index)       71 98.1  F (36.7  C) (Oral) 93% No 43.45 kg/m2        Blood Pressure from Last 3 Encounters:   09/12/18 117/72   08/15/18 117/75   07/23/18 123/76    Weight from Last 3 Encounters:   09/12/18 226 lb 3.2 oz (102.6 kg)   08/15/18 223 lb (101.2 kg)   07/23/18 224 lb 14.4 oz (102 kg)              We Performed the Following     INHALATION/NEBULIZER TREATMENT, INITIAL        Primary Care Provider Office Phone # Fax #    Karely Sierra [phone]       [address] [address]        Equal Access to Services     MAURO ALBA AH: Rayne duarteo Sogissell, waaxda luqadaha, qaybta kaalmada adeegyada, mart piña. So [facility] [phone].    ATENCIÓN: Si habla español, tiene a garcia disposición servicios gratuitos de asistencia lingüística. Llame al [phone].    We comply with applicable federal civil rights laws and [place] laws. We do not discriminate on the basis of race, color, national origin, age, disability, sex, sexual orientation, or gender identity.            Thank you!     Thank you for choosing [facility]  for your care. Our goal is always to provide you with excellent care. Hearing back from our patients is one way we can  continue to improve our services. Please take a few minutes to complete the written survey that you may receive in the mail after your visit with us. Thank you!             Your Updated Medication List - Protect others around you: Learn how to safely use, store and throw away your medicines at www.disposemymeds.org.          This list is accurate as of 9/12/18  4:11 PM.  Always use your most recent med list.                   Brand Name Dispense Instructions for use Diagnosis    * acetaminophen 325 MG tablet    TYLENOL    100 tablet    Take 1-2 tablets (325-650 mg) by mouth every 6 hours as needed Max 6 tablets per 24 hours    Sebaceous cyst       * acetaminophen 500 MG tablet    TYLENOL    120 tablet    Take 1 tablet (500 mg) by mouth 3 times daily as needed for mild pain    Fall, initial encounter       albuterol 108 (90 Base) MCG/ACT inhaler    PROAIR HFA/PROVENTIL HFA/VENTOLIN HFA    1 Inhaler    Inhale 2 puffs into the lungs 4 times daily    Acute bronchitis, unspecified organism       alendronate 70 MG tablet    FOSAMAX    12 tablet    Take 1 tablet (70 mg) by mouth every 7 days at least 60 min before breakfast with over 8 oz water. Stay upright for at least 30 min.    Osteoporosis       ASPIRIN PO      Take 81 mg by mouth        AVEENO DAILY MOISTURIZING 1.3 % Lotn lotion   Generic drug:  dimethicone     227 g    APPLY TO AFFECTED AREA(S) AS NEEDED    Xerosis cutis       bisacodyl 5 MG EC tablet    DULCOLAX    30 tablet    Take 1 tablet (5 mg) by mouth daily as needed for constipation    Constipation, chronic       calcium carbonate 600 mg-vitamin D 400 units 600-400 MG-UNIT per tablet    calcium 600 + D    60 tablet    Take 1 tablet by mouth 2 times daily    Osteoporosis without current pathological fracture, unspecified osteoporosis type       * carboxymethylcellulose 1 % ophthalmic solution    CELLUVISC/REFRESH LIQUIGEL    15 each    Place 1 drop into both eyes 4 times daily    Dry eyes, bilateral       *  Carboxymethylcellulose Sod PF 1 % ophthalmic gel    CELLUVISC/REFRESH LIQUIGEL    90 each    Place 1 drop into both eyes 4 times daily Dispose of dropperette within 24 hours after opening or if the tip is contaminated.    Dry eyes, bilateral       cholecalciferol 1000 units capsule    vitamin  -D    180 capsule    Take 2 capsules (2,000 Units) by mouth daily    Liver replaced by transplant (H), Vitamin D deficiency, Secondary hyperparathyroidism (H), CKD (chronic kidney disease) stage 3, GFR 30-59 ml/min, Palpitations       cycloSPORINE modified 25 MG capsule     240 capsule    Take 4 capsules (100 mg) by mouth every 12 hours    Liver replaced by transplant (H)       ferrous sulfate 325 (65 Fe) MG tablet    IRON     Take 1 tablet by mouth daily (with breakfast)        * Knee Brace/Flex Stays Large Misc     2 each    1 Units daily    Chronic pain of both knees       * Medical Compression Socks Misc     2 each    1 Units daily    Leg swelling       lidocaine 2 % topical gel    XYLOCAINE    20 mL    Apply topically daily To left ankle as needed for pain.    Posterior tibial tendinitis of left lower extremity, Pain in joint involving ankle and foot, left       lidocaine 5 % Patch    LIDODERM    30 patch    Apply up to 3 patches to painful area at once for up to 12 h within a 24 h period.  Remove after 12 hours.    Nondisp fx of head of right radius, init for clos fx, Fall, subsequent encounter       melatonin 3 MG tablet     100 tablet    Take 1 tablet (3 mg) by mouth nightly as needed for sleep    Primary insomnia       Menthol (Topical Analgesic) 4 % Gel    BIOFREEZE COLORLESS    150 mL    Apply to affected areas BID    Pain in joint of right shoulder, Peroneal tendonitis, left       multivitamin, therapeutic with minerals Tabs tablet     100 tablet    Take 1 tablet by mouth daily    Liver replaced by transplant (H)       mycophenolate 250 MG capsule    GENERIC EQUIVALENT    120 capsule    Take 2 capsules (500 mg) by  mouth every 12 hours    Liver replaced by transplant (H)       omeprazole 40 MG capsule    priLOSEC    90 capsule    Take 1 capsule (40 mg) by mouth daily    Gastroesophageal reflux disease, esophagitis presence not specified       * order for DME     1 Units    Equipment being ordered: Nebulizer with adult mask and tubing    Acute bronchitis, unspecified organism       * order for DME     1 Units    Equipment being ordered: cane with padded handle    Gait instability       * order for DME     2 Units    Equipment being ordered: compression stockings bilateral Please measure and fit patient for stockings  Strength 15-30mmHg Disp 2 pairs 1 refill over the time of 1 year    Localized edema       * order for DME     1 Units    Equipment being ordered: 4 Wheeled Walker with Seat and Brakes    Falls frequently, Gait instability       * order for DME     2 each    Equipment being ordered: Compression stockings below knee bilateral    Bilateral edema of lower extremity       * order for DME     1 each    Equipment being ordered: Back Stabilizer    Chronic midline low back pain without sciatica       * order for DME     2 each    Equipment being ordered: 2 pairs of black stabilizer socks (beige okay if black not available). Size XL.    Bilateral edema of lower extremity       prednisoLONE acetate 1 % ophthalmic susp    PRED FORTE    1 Bottle    Use in operative eye four times a day  For 4 days    Posterior capsular opacification visually significant of both eyes       Psyllium 400 MG Caps     180 capsule    Take 1,200 mg by mouth 2 times daily    Constipation, chronic       senna-docusate 8.6-50 MG per tablet    SENOKOT-S;PERICOLACE    100 tablet    Take 2 tablets by mouth 2 times daily    Liver replaced by transplant (H)       simethicone 80 MG chewable tablet    MYLICON    120 tablet    Take 1 tablet (80 mg) by mouth every 6 hours as needed for flatulence or cramping    Flatulence, eructation, and gas pain       sodium  chloride 0.65 % nasal spray    OCEAN    30 mL    Spray 1 spray into both nostrils daily as needed for congestion    Throat pain       STATIN NOT PRESCRIBED (INTENTIONAL)     0 each    Not prescribed    High risk medication use       * Notice:  This list has 13 medication(s) that are the same as other medications prescribed for you. Read the directions carefully, and ask your doctor or other care provider to review them with you.

## 2018-09-12 NOTE — NURSING NOTE
The following nebulizer treatment was given:     MEDICATION: Albuterol Sulfate 2.5 mg  : [employer]  LOT #: [id number]  EXPIRATION DATE:  10/2019  NDC # 8348-5083-77     Nebulizer Start Time:  3:50 pm  Nebulizer Stop Time:  4 pm  See Vital Signs Flowsheet    RODERICK Sebastian

## 2018-09-12 NOTE — NURSING NOTE
Due to patient being non-English speaking/uses sign language, an  was used for this visit. Only for face-to-face interpretation by an external agency, date and length of interpretation can be found on the scanned worksheet.     name: Luisana Loco   Agency: [employer]  Language: Lithuanian   Telephone number: [phone]  Type of interpretation: Face-to-face, spoken     RODERICK Sebastian

## 2018-09-12 NOTE — PATIENT INSTRUCTIONS
Preventative  1. Maintain a low cholesterol diet like brown rice and whole wheat.   2. I will put in the referral for a mammogram.     Shortness of Breath  1. I will order a spirometry test at [facility]. Schedule in the future.  2. Use your inhaler at home as needed.  3. Follow-up with me after your breathing test.

## 2018-09-12 NOTE — PROGRESS NOTES
HPI       Flor Navarro is a 52 year old  who presents for   Chief Complaint   Patient presents with     RECHECK     Pt stated she is here to f/u about previous visit     Forms     Medical opinion      Forms  Pt brings in housing forms from [facility] requesting medical opinion. Has moved to a new home, trouble affording, but shares w/ another family so that helps. Has [employer] worker through [employer]. Rent is $850, and she does not have enough money to cover the rent. Her son is living with her temporarily, he is employed, but he is planning on buying his own place. Lives with her sister in-law and her son, who split the cost of rent. Would like an increase in food stamps as well which is reason for county forms -dietary recs.     SOB  Would like handicapped disability parking, because she is often SOB even after walking one block. Thinks her sx are related to her heart. Does not attribute her sx to her asthma. Used an incentive spirometer before, but has never had formal spirometry testing. Reports fast heartbeat when she saw Dr. Anne. Pt has 4 instances of a tachycardia in clinic over the last three years. She does get more symptomatic with activity. Nighttime sx difficult to assess due to anxiety/panic.    An Tajik  was used for  this visit.    +++++++    Problem, Medication and Allergy Lists were reviewed and updated if needed..    Patient is an established patient of this clinic..         Review of Systems:   Review of Systems    Positive for SOB, edema in the left leg, air hunger.  No CP  No palpitations   No abd pain   +wt gain ongoing  See HPI for additional sx.    This document serves as a record of the services and decisions personally performed and made by Karely Sierra MD. It was created on his/her behalf by Charli Valdez, a trained medical scribe. The creation of this document is based the provider's statements to the medical scribe.  Nelson Valdez 3:09 PM, September 12, 2018        Physical Exam:     Vitals:    09/12/18 1459   BP: 117/72   Pulse: 71   Temp: 98.1  F (36.7  C)   TempSrc: Oral   SpO2: 93%   Weight: 226 lb 3.2 oz (102.6 kg)     Body mass index is 43.45 kg/(m^2).  Vitals were reviewed and were normal, except SpO2. Had her do walking oximetry in clinic and maintained 97-98 the entire time     Physical Exam    BMI= Body mass index is 43.45 kg/(m^2).     GENERAL: obese, alert and no distress  RESP: returns from a short walk with an oximeter with moderate respiratory distress, an elevelated respiratory rate, and sweating. Supracostral retractions. The chest is clear with a prolonged expiratroy phase.   CV: regular rates but pulse >90 and rhythm, normal S1 S2, no S3 or S4 and no murmur, no click or rub  Post nebulizer: shorter expiratory phase, increased breath sounds at bases  MS: extremities- Non-pitting edema left ankle.  PSYCH: Alert and oriented times 3; speech- coherent , normal rate and volume; affect- restricted      Results:   No testing ordered today    Assessment and Plan   Flor was seen today for recheck and forms.    Diagnoses and all orders for this visit:    Encounter for screening mammogram for breast cancer  -     Screening Mammogram Digital Bilateral; Future  Staff working with her to schedule since medical rides and  needed    SOB (shortness of breath)  Gradually worsening symptom now with more objective findings. Unclear etiology at this point.  -     Spirometry Pre/Post (Bronchodilation Response); Future education on what this test entails. Has inhaler at home - can use as needed for shortness of breath until we have dx.   -     Echo Dobutamine Stress Test; Future  Discussed it could be wt related hypoventilation -she agrees this could be. Her BMI cont to rise, but now she is limited in her activity level due to shortness of breath. May need more aggressive outpt nutrition tx - she states she eats well, fruits/veggies, but offers me candy mid visit.    [facility] forms and handicap parking letter were completed, faxed, and returned to pt given limitations of her shortness of breath - wrote on there that w/u is ongoing     Hx of stroke   1. Maintain a low cholesterol diet like brown rice and whole wheat.     Shortness of Breath  1. I will order a spirometry test at [facility]. Schedule in the future.  2. Use your inhaler at home as needed.  3. Follow-up with me after your breathing test.    Options for treatment and follow-up care were reviewed with the patient. Flor Navarro  engaged in the decision making process and verbalized understanding of the options discussed and agreed with the final plan.    The information in this document, created by the medical scribe for me, accurately reflects the services I personally performed and the decisions made by me. I have reviewed and approved this document for accuracy prior to leaving the patient care area.    Karely Sierra MD  3:08 PM, 09/12/18

## 2018-09-13 ASSESSMENT — ANXIETY QUESTIONNAIRES: GAD7 TOTAL SCORE: 11

## 2018-09-17 ENCOUNTER — HOSPITAL ENCOUNTER (OUTPATIENT)
Dept: CARDIOLOGY | Facility: CLINIC | Age: 53
Discharge: HOME | End: 2018-09-17
Attending: FAMILY MEDICINE | Admitting: FAMILY MEDICINE
Payer: MEDICARE

## 2018-09-17 DIAGNOSIS — R06.02 SOB (SHORTNESS OF BREATH): ICD-10-CM

## 2018-09-17 PROCEDURE — 25000128 H RX IP 250 OP 636: Performed by: INTERNAL MEDICINE

## 2018-09-17 PROCEDURE — 25000125 ZZHC RX 250: Performed by: INTERNAL MEDICINE

## 2018-09-17 PROCEDURE — 93018 CV STRESS TEST I&R ONLY: CPT | Performed by: INTERNAL MEDICINE

## 2018-09-17 PROCEDURE — 93350 STRESS TTE ONLY: CPT | Mod: 26 | Performed by: INTERNAL MEDICINE

## 2018-09-17 PROCEDURE — 93321 DOPPLER ECHO F-UP/LMTD STD: CPT | Mod: 26 | Performed by: INTERNAL MEDICINE

## 2018-09-17 PROCEDURE — 93016 CV STRESS TEST SUPVJ ONLY: CPT | Performed by: INTERNAL MEDICINE

## 2018-09-17 PROCEDURE — 93325 DOPPLER ECHO COLOR FLOW MAPG: CPT | Mod: 26 | Performed by: INTERNAL MEDICINE

## 2018-09-17 PROCEDURE — 25500064 ZZH RX 255 OP 636: Performed by: INTERNAL MEDICINE

## 2018-09-17 PROCEDURE — 93321 DOPPLER ECHO F-UP/LMTD STD: CPT | Mod: TC

## 2018-09-17 RX ORDER — DOBUTAMINE HYDROCHLORIDE 200 MG/100ML
10-50 INJECTION INTRAVENOUS CONTINUOUS
Status: ACTIVE | OUTPATIENT
Start: 2018-09-17 | End: 2018-09-17

## 2018-09-17 RX ORDER — METOPROLOL TARTRATE 1 MG/ML
1-30 INJECTION, SOLUTION INTRAVENOUS
Status: DISPENSED | OUTPATIENT
Start: 2018-09-17 | End: 2018-09-17

## 2018-09-17 RX ORDER — SODIUM CHLORIDE 9 MG/ML
INJECTION, SOLUTION INTRAVENOUS CONTINUOUS
Status: ACTIVE | OUTPATIENT
Start: 2018-09-17 | End: 2018-09-17

## 2018-09-17 RX ADMIN — ATROPINE SULFATE 0.6 MG: 0.4 INJECTION, SOLUTION INTRAMUSCULAR; INTRAVENOUS; SUBCUTANEOUS at 11:55

## 2018-09-17 RX ADMIN — HUMAN ALBUMIN MICROSPHERES AND PERFLUTREN 11 ML: 10; .22 INJECTION, SOLUTION INTRAVENOUS at 12:04

## 2018-09-17 RX ADMIN — METOPROLOL TARTRATE 3 MG: 1 INJECTION, SOLUTION INTRAVENOUS at 12:01

## 2018-09-17 RX ADMIN — DOBUTAMINE IN DEXTROSE 30 MCG/KG/MIN: 200 INJECTION, SOLUTION INTRAVENOUS at 11:45

## 2018-09-17 NOTE — PROGRESS NOTES
Pt here for dobutamine stress test. Test, meds and side effects reviewed with patient via interperter. Achieved target HR at 50 mcg/kg/min Dobutamine and a total of 0.6mg IV atropine. Gave a total of 3mg IV Metoprolol to bring HR back to baseline. Post monitoring complete and VSS. Pt escorted out to the gold waiting room.

## 2018-09-17 NOTE — LETTER
2018      Flor Navarro  [address]   [place]. \Hospital [facility] and Outlook.\"" [id number]        Dear Flor,    Thank you for getting your heart test. It looks normal and that is good.    Resulted Orders   ECHO STRESS DOBUTAMINE WITH OPTISON    Narrative    [id number]  [id number]  [id number]  [id number]^ANGELINA^MARIAH^JULIETA           [facility]  Echocardiography Laboratory  [address] [place] [id number]     Name: FLOR NAVARRO  MRN: [id number]  : 1965  Study Date: 2018 11:24 AM  Age: 52 yrs  Gender: Female  Patient Location: [facility]  Reason For Study: SOB (shortness of breath)  Ordering Physician: MARIAH ALFARO  Referring Physician: MARIAH ALFARO  Performed By: Ayala Cardoza RDCS     BSA: 2.0 m2  Height: 60 in  Weight: 226 lb  HR: 55  BP: 126/80 mmHg     Interpretation Summary  Conclusion: low risk dobutamine stress echocardiogram test at a diagnostic  level of peak stress (88% MPHR).     Patient experienced no anginal symptoms.  Test terminated due target HR achieved.  Normal HR and BP response to dobutamine.  Baseline LVEF 60-65%.  At low dose dobutamine, LVEF augments to 70% with appropriate decrease in LV  cavity size.  At peak dose dobutamine, LVEF further augments >75%. No regional wall motion  abnormalities at baseline or with dobutamine infusion.  EKG at baseline and with dobutamine demonstrates no ischemic ST-T changes.  Screening 2D echocardiogram with Doppler interrogation demonstrates normal  ascending aorta. No significant valvular disease.  _____________________________________________________________________________  __     Stress  The drug infusion was stopped due to target heart rate achieved.  The patient did not exhibit any symptoms during drug infusion.  The maximum dose of dobutamine was 50mcg/kg/min.  The maximum dose of atropine was 0.6mg.  The maximum dose of metoprolol was 3mg.  Optison (NDC #0314-7321-51) given intravenously.  Patient was  given 11 ml mixture of 3 ml Optison and 6 ml saline.  7 ml wasted.  IV start location L Upper arm .  Optison Expiration 12/04/2019 .  Optison Lot # [id number] .     Stress Results                                       Maximum Predicted HR:   167 bpm             Target HR: 142 bpm        % Maximum Predicted HR: 88 %                           Stage DurationHeart Rate  BP   Dose                               (mm:ss)   (bpm)                      BASELINE            55    126/80                        PEAK    9:30      147   111/6250.00                             Stress Duration:   9:30 mm:ss                       Maximum Stress HR: 147 bpm     Procedure  Dobutamine Echo Complete. Contrast Optison.  ____________________________________________________________________________     Report approved by: Roger Rock 09/17/2018 03:57 PM                Sincerely,    Karely Sierra MD

## 2018-09-18 ENCOUNTER — RADIANT APPOINTMENT (OUTPATIENT)
Dept: MAMMOGRAPHY | Facility: CLINIC | Age: 53
End: 2018-09-18
Attending: FAMILY MEDICINE
Payer: MEDICARE

## 2018-09-18 DIAGNOSIS — Z12.31 VISIT FOR SCREENING MAMMOGRAM: ICD-10-CM

## 2018-09-21 ENCOUNTER — OFFICE VISIT (OUTPATIENT)
Dept: FAMILY MEDICINE | Facility: CLINIC | Age: 53
End: 2018-09-21
Payer: MEDICARE

## 2018-09-21 VITALS
SYSTOLIC BLOOD PRESSURE: 139 MMHG | TEMPERATURE: 97.3 F | DIASTOLIC BLOOD PRESSURE: 82 MMHG | OXYGEN SATURATION: 97 % | BODY MASS INDEX: 43.03 KG/M2 | WEIGHT: 224 LBS | HEART RATE: 63 BPM | RESPIRATION RATE: 16 BRPM

## 2018-09-21 DIAGNOSIS — J20.9 ACUTE BRONCHITIS, UNSPECIFIED ORGANISM: ICD-10-CM

## 2018-09-21 DIAGNOSIS — R03.0 PREHYPERTENSION: ICD-10-CM
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
## 2018-09-21 DIAGNOSIS — R06.02 SOB (SHORTNESS OF BREATH): Primary | ICD-10-CM

## 2018-09-21 DIAGNOSIS — Z94.4 LIVER REPLACED BY TRANSPLANT (H): ICD-10-CM

## 2018-09-21 DIAGNOSIS — L85.3 XEROSIS CUTIS: ICD-10-CM

## 2018-09-21 DIAGNOSIS — R60.0 BILATERAL EDEMA OF LOWER EXTREMITY: ICD-10-CM

## 2018-09-21 DIAGNOSIS — E66.01 MORBID OBESITY (H): ICD-10-CM

## 2018-09-21 NOTE — PATIENT INSTRUCTIONS
Edema  1. I will try to fax in the order for the compression stockings.    Shortness of Breath  1. Continue exercising at home and at the gym  2. Schedule spirometry test at [facility].  3. Consider nutrition if we're not having steady weight loss.

## 2018-09-21 NOTE — NURSING NOTE
Due to patient being non-English speaking/uses sign language, an  was used for this visit. Only for face-to-face interpretation by an external agency, date and length of interpretation can be found on the scanned worksheet.     name: Luisana STOVER  Agency: [employer]  Language: Indonesian   Telephone number: [phone]  Type of interpretation: Face-to-face, spoken     Shelley Osei CMA

## 2018-09-21 NOTE — MR AVS SNAPSHOT
After Visit Summary   9/21/2018    Flor Navarro    MRN: [id number]           Patient Information     Date Of Birth          1965        Visit Information        Provider Department      9/21/2018 3:20 PM Karely Sierra MD [facility]        Today's Diagnoses     SOB (shortness of breath)    -  1    Prehypertension        Morbid obesity (H)        Bilateral edema of lower extremity          Care Instructions    Edema  1. I will try to fax in the order for the compression stockings.    Shortness of Breath  1. Continue exercising at home and at the gym  2. Schedule spirometry test at [facility].  3. Consider nutrition if we're not having steady weight loss.          Follow-ups after your visit        Your next 10 appointments already scheduled     Oct 12, 2018 11:30 AM CDT   (Arrive by 11:15 AM)   Return Liver Transplant with Laron Anne MD   [facility] ([facility] [place])    [address]  [address]  [facility] [id number] [phone]            Nov 12, 2018  2:20 PM CST   (Arrive by 2:05 PM)   [facility]    [address]  [address]  [facility] [address] [phone]              Who to contact     Please call your clinic at [phone] to:    Ask questions about your health    Make or cancel appointments    Discuss your medicines    Learn about your test results    Speak to your doctor            Additional Information About Your Visit        Care EveryWhere ID     This is your Care EveryWhere ID. This could be used by other organizations to access your [facility] medical records  [id number]        Your Vitals Were     Pulse Temperature Respirations Pulse Oximetry BMI (Body Mass Index)       63 97.3  F (36.3  C) (Oral) 16 97% 43.03 kg/m2        Blood Pressure from Last 3 Encounters:   09/21/18 139/82   09/12/18 117/72   08/15/18  117/75    Weight from Last 3 Encounters:   09/21/18 224 lb (101.6 kg)   09/12/18 226 lb 3.2 oz (102.6 kg)   08/15/18 223 lb (101.2 kg)              Today, you had the following     No orders found for display         Today's Medication Changes          These changes are accurate as of 9/21/18  4:07 PM.  If you have any questions, ask your nurse or doctor.               These medicines have changed or have updated prescriptions.        Dose/Directions    order for DME   This may have changed:  additional instructions   Used for:  Bilateral edema of lower extremity   Changed by:  Karely Sierra MD        Equipment being ordered: Compression stockings below knee bilateral 15-20mmHg Prefer black color   Quantity:  2 each   Refills:  0            Where to get your medicines      Some of these will need a paper prescription and others can be bought over the counter.  Ask your nurse if you have questions.     Bring a paper prescription for each of these medications     order for DME                Primary Care Provider Office Phone # Fax #    Karely Sierra [phone]       [address] [address]        Equal Access to Services     [facility]: Hadii florecita chiang hadasho Soomaali, waaxda luqadaha, qaybta kaalmada adeegyada, mart oglesby . So [facility] [phone].    ATENCIÓN: Si habla español, tiene a garcia disposición servicios gratuitos de asistencia lingüística. Llame al [phone].    We comply with applicable federal civil rights laws and [place] laws. We do not discriminate on the basis of race, color, national origin, age, disability, sex, sexual orientation, or gender identity.            Thank you!     Thank you for choosing [facility]  for your care. Our goal is always to provide you with excellent care. Hearing back from our patients is one way we can continue to improve our services. Please take a few minutes to complete the written  survey that you may receive in the mail after your visit with us. Thank you!             Your Updated Medication List - Protect others around you: Learn how to safely use, store and throw away your medicines at www.disposemymeds.org.          This list is accurate as of 9/21/18  4:07 PM.  Always use your most recent med list.                   Brand Name Dispense Instructions for use Diagnosis    * acetaminophen 325 MG tablet    TYLENOL    100 tablet    Take 1-2 tablets (325-650 mg) by mouth every 6 hours as needed Max 6 tablets per 24 hours    Sebaceous cyst       * acetaminophen 500 MG tablet    TYLENOL    120 tablet    Take 1 tablet (500 mg) by mouth 3 times daily as needed for mild pain    Fall, initial encounter       albuterol 108 (90 Base) MCG/ACT inhaler    PROAIR HFA/PROVENTIL HFA/VENTOLIN HFA    1 Inhaler    Inhale 2 puffs into the lungs 4 times daily    Acute bronchitis, unspecified organism       alendronate 70 MG tablet    FOSAMAX    12 tablet    Take 1 tablet (70 mg) by mouth every 7 days at least 60 min before breakfast with over 8 oz water. Stay upright for at least 30 min.    Osteoporosis       ASPIRIN PO      Take 81 mg by mouth        AVEENO DAILY MOISTURIZING 1.3 % Lotn lotion   Generic drug:  dimethicone     227 g    APPLY TO AFFECTED AREA(S) AS NEEDED    Xerosis cutis       bisacodyl 5 MG EC tablet    DULCOLAX    30 tablet    Take 1 tablet (5 mg) by mouth daily as needed for constipation    Constipation, chronic       calcium carbonate 600 mg-vitamin D 400 units 600-400 MG-UNIT per tablet    calcium 600 + D    60 tablet    Take 1 tablet by mouth 2 times daily    Osteoporosis without current pathological fracture, unspecified osteoporosis type       * carboxymethylcellulose 1 % ophthalmic solution    CELLUVISC/REFRESH LIQUIGEL    15 each    Place 1 drop into both eyes 4 times daily    Dry eyes, bilateral       * Carboxymethylcellulose Sod PF 1 % ophthalmic gel    CELLUVISC/REFRESH LIQUIGEL    90  each    Place 1 drop into both eyes 4 times daily Dispose of dropperette within 24 hours after opening or if the tip is contaminated.    Dry eyes, bilateral       cholecalciferol 1000 units capsule    vitamin  -D    180 capsule    Take 2 capsules (2,000 Units) by mouth daily    Liver replaced by transplant (H), Vitamin D deficiency, Secondary hyperparathyroidism (H), CKD (chronic kidney disease) stage 3, GFR 30-59 ml/min, Palpitations       cycloSPORINE modified 25 MG capsule     240 capsule    Take 4 capsules (100 mg) by mouth every 12 hours    Liver replaced by transplant (H)       ferrous sulfate 325 (65 Fe) MG tablet    IRON     Take 1 tablet by mouth daily (with breakfast)        * Knee Brace/Flex Stays Large Misc     2 each    1 Units daily    Chronic pain of both knees       * Medical Compression Socks Misc     2 each    1 Units daily    Leg swelling       lidocaine 2 % topical gel    XYLOCAINE    20 mL    Apply topically daily To left ankle as needed for pain.    Posterior tibial tendinitis of left lower extremity, Pain in joint involving ankle and foot, left       lidocaine 5 % Patch    LIDODERM    30 patch    Apply up to 3 patches to painful area at once for up to 12 h within a 24 h period.  Remove after 12 hours.    Nondisp fx of head of right radius, init for clos fx, Fall, subsequent encounter       melatonin 3 MG tablet     100 tablet    Take 1 tablet (3 mg) by mouth nightly as needed for sleep    Primary insomnia       Menthol (Topical Analgesic) 4 % Gel    BIOFREEZE COLORLESS    150 mL    Apply to affected areas BID    Pain in joint of right shoulder, Peroneal tendonitis, left       multivitamin, therapeutic with minerals Tabs tablet     100 tablet    Take 1 tablet by mouth daily    Liver replaced by transplant (H)       mycophenolate 250 MG capsule    GENERIC EQUIVALENT    120 capsule    Take 2 capsules (500 mg) by mouth every 12 hours    Liver replaced by transplant (H)       omeprazole 40 MG  capsule    priLOSEC    90 capsule    Take 1 capsule (40 mg) by mouth daily    Gastroesophageal reflux disease, esophagitis presence not specified       * order for DME     1 Units    Equipment being ordered: Nebulizer with adult mask and tubing    Acute bronchitis, unspecified organism       * order for DME     1 Units    Equipment being ordered: cane with padded handle    Gait instability       * order for DME     2 Units    Equipment being ordered: compression stockings bilateral Please measure and fit patient for stockings  Strength 15-30mmHg Disp 2 pairs 1 refill over the time of 1 year    Localized edema       * order for DME     1 Units    Equipment being ordered: 4 Wheeled Walker with Seat and Brakes    Falls frequently, Gait instability       * order for DME     1 each    Equipment being ordered: Back Stabilizer    Chronic midline low back pain without sciatica       * order for DME     2 each    Equipment being ordered: 2 pairs of black stabilizer socks (beige okay if black not available). Size XL.    Bilateral edema of lower extremity       order for DME     2 each    Equipment being ordered: Compression stockings below knee bilateral 15-20mmHg Prefer black color    Bilateral edema of lower extremity       prednisoLONE acetate 1 % ophthalmic susp    PRED FORTE    1 Bottle    Use in operative eye four times a day  For 4 days    Posterior capsular opacification visually significant of both eyes       Psyllium 400 MG Caps     180 capsule    Take 1,200 mg by mouth 2 times daily    Constipation, chronic       senna-docusate 8.6-50 MG per tablet    SENOKOT-S;PERICOLACE    100 tablet    Take 2 tablets by mouth 2 times daily    Liver replaced by transplant (H)       simethicone 80 MG chewable tablet    MYLICON    120 tablet    Take 1 tablet (80 mg) by mouth every 6 hours as needed for flatulence or cramping    Flatulence, eructation, and gas pain       sodium chloride 0.65 % nasal spray    OCEAN    30 mL    Spray  1 spray into both nostrils daily as needed for congestion    Throat pain       STATIN NOT PRESCRIBED (INTENTIONAL)     0 each    Not prescribed    High risk medication use       * Notice:  This list has 12 medication(s) that are the same as other medications prescribed for you. Read the directions carefully, and ask your doctor or other care provider to review them with you.

## 2018-09-21 NOTE — PROGRESS NOTES
HPI       Flor Navarro is a 52 year old  who presents for   Chief Complaint   Patient presents with     RECHECK     mammogram results from 9/18/18     Refill Request     would like refill of melatonin. Asking for a dosage increase unable to sleep still per pt.      SOB  Reports after liver transplant, they inserted a tube between her ribs with a bag for draining the excess fluids in her lungs. Things this may be related to her SOB. She continues to report incidents related to her liver transplant many years ago are concerning to her as causing illness today. She does worry about her health and considers herself an ill person.   shortness of breath limits her activity (she is exercising, which is new!). She does wheeze w/ exertion and breathe heavily but does not have CP/tightness.     Sleep  States that it hurts to lay on her ribs. Cannot tolerate anyone touching her skin on one side.     Edema  Pt reports that she has had her current compression stockings for a year, and she needs an rx for a new pair. Worn out. She does endorse excess wt that may be contributing, but she feels its only in her abd area and that she doesn't really have any extra weight elsewhere.    Forms  Brings in a request for medical opinion from the [facility]. States Wyatt from the [facility] is helping her with the forms to provide coverage for cost of living, food, utilities, etc.    Medications  I previously called [facility] to ask about automatic refills, they said they don't usually do them for mail order, but because pt has been having trouble they will do mail order and automatic refills. Pt worries she is not getting her medications refilled. She did get 12 meds refilled last month, but not by name and so we cannot reorder them by phone today despite pharmacist involvement. We are trying to get her meds all transferred to a single pharmacy. She reports being out of all meds again - and does not have them with her. She has multiple  transplant medications.     Social Hx  Pt has been exercising, she would like to make her waist smaller.  New address: [address] [place] [address] [address] [place] [address]  (This may be an issue for the mail delivery system for her meds)    An Chinese  was used for  this visit.    +++++++      Problem, Medication and Allergy Lists were reviewed and updated if needed..    Patient is an established patient of this clinic..         Review of Systems:   Review of Systems    LE edema   No CP   No palpitations   +ha    This document serves as a record of the services and decisions personally performed and made by Karely Sierra MD. It was created on his/her behalf by Charli Valdez, a trained medical scribe. The creation of this document is based the provider's statements to the medical scribe.  Nelson Valdez 3:48 PM, September 21, 2018       Physical Exam:     Vitals:    09/21/18 1535   BP: 139/82   Pulse: 63   Resp: 16   Temp: 97.3  F (36.3  C)   TempSrc: Oral   SpO2: 97%   Weight: 224 lb (101.6 kg)     Body mass index is 43.03 kg/(m^2).  Vitals were reviewed and were normal     Wt Readings from Last 10 Encounters:   09/21/18 224 lb (101.6 kg)   09/12/18 226 lb 3.2 oz (102.6 kg)   08/15/18 223 lb (101.2 kg)   07/23/18 224 lb 14.4 oz (102 kg)   07/16/18 222 lb (100.7 kg)   07/09/18 223 lb 3.2 oz (101.2 kg)   06/18/18 220 lb (99.8 kg)   05/18/18 220 lb (99.8 kg)   11/07/17 209 lb (94.8 kg)   11/02/17 207 lb 3.2 oz (94 kg)   Body mass index is 43.03 kg/(m^2).    Physical Exam    GENERAL: obese, but in good spirits today, interactive  MSK: LE edema - stockings stretched out   CV RRR no mrg  Pulm CTA B diminished at bases  PSYCH: Alert and oriented times 3; speech- coherent , normal rate and volume; affect- normal    Results:   No testing ordered today    Assessment and Plan   Flor was seen today for recheck and refill request.    Diagnoses and all orders for this visit:    SOB (shortness of  breath)  - Pt agreed to spirometry testing. Will schedule in the future.  So we have yet to rule out pulmonary disease, we have spirometry testing in progress, we may also need a CT scan. Obesity related hypo ventilation is on ddx    Prehypertension  - She's pre-hypertensive, but does not have CAD, does not have diastolic dysfunction on the ECHO.     Morbid obesity (H)  - Down two pounds, has been exercising. Declines nutrition referral or dietary help. FEels she is only having abd obesity. Has been resistant to tx in the past. May benefit from cooking class but language is barrier    Bilateral edema of lower extremity  -     order for DME; Equipment being ordered: Compression stockings below knee bilateral  15-20mmHg. Prefer black color  - She asks for care coordinator to fax order and have the compression socks mailed to her. Her size is unchanged, and wants to go to the same DME company in [facility]. [facility] [employer] phone #: [phone] Fax#: [phone]     - Reviewed mammogram, and echo results with pt.    Medications Discontinued During This Encounter   Medication Reason     order for DME Reorder       AVS    Edema  1. I will try to fax in the order for the compression stockings.    Shortness of Breath  1. Continue exercising at home and at the gym  2. Schedule spirometry test at [facility].  3. Consider nutrition if we're not having steady weight loss.    Options for treatment and follow-up care were reviewed with the patient. Flor Navarro  engaged in the decision making process and verbalized understanding of the options discussed and agreed with the final plan.    The information in this document, created by the medical scribe for me, accurately reflects the services I personally performed and the decisions made by me. I have reviewed and approved this document for accuracy prior to leaving the patient care area.    Karely Sierra MD  3:47 PM, 09/21/18

## 2018-09-22 ASSESSMENT — PATIENT HEALTH QUESTIONNAIRE - PHQ9: SUM OF ALL RESPONSES TO PHQ QUESTIONS 1-9: 0

## 2018-09-24 RX ORDER — AMOXICILLIN 250 MG
2 CAPSULE ORAL 2 TIMES DAILY
Qty: 100 TABLET | Refills: 3 | Status: SHIPPED | OUTPATIENT
Start: 2018-09-24 | End: 2019-02-11

## 2018-09-24 RX ORDER — ALBUTEROL SULFATE 90 UG/1
2 AEROSOL, METERED RESPIRATORY (INHALATION) 4 TIMES DAILY
Qty: 1 INHALER | Refills: 1 | Status: SHIPPED | OUTPATIENT
Start: 2018-09-24 | End: 2018-11-30

## 2018-09-24 RX ORDER — ACETAMINOPHEN 500 MG
500 TABLET ORAL 3 TIMES DAILY PRN
Qty: 120 TABLET | Refills: 3 | Status: SHIPPED | OUTPATIENT
Start: 2018-09-24 | End: 2019-01-29

## 2018-09-24 RX ORDER — MULTIPLE VITAMINS W/ MINERALS TAB 9MG-400MCG
1 TAB ORAL DAILY
Qty: 100 TABLET | Refills: 11 | Status: SHIPPED | OUTPATIENT
Start: 2018-09-24 | End: 2019-08-08

## 2018-09-28 ENCOUNTER — ALLIED HEALTH/NURSE VISIT (OUTPATIENT)
Dept: FAMILY MEDICINE | Facility: CLINIC | Age: 53
End: 2018-09-28
Payer: MEDICARE

## 2018-09-28 ENCOUNTER — TELEPHONE (OUTPATIENT)
Dept: FAMILY MEDICINE | Facility: CLINIC | Age: 53
End: 2018-09-28

## 2018-09-28 DIAGNOSIS — R06.02 SOB (SHORTNESS OF BREATH): Primary | ICD-10-CM

## 2018-09-28 NOTE — TELEPHONE ENCOUNTER
[facility] phone call message- general phone call:    Reason for call:  Patient here for a Spirometry and is requesting a DME order for a Nebulizer. Patient is requesting this to be ordered.  Please advise.     Action Desired:     Return call needed: Yes    OK to leave a message on voice mail? Yes    Advised patient response may take up to 2 business days: Yes    Primary language: Romansh      needed? Yes    Call taken on September 28, 2018 at 3:58 PM by Micki Lazo to [facility]

## 2018-09-28 NOTE — NURSING NOTE
Spirometry Note:    Patient presented to clinic for spirometry testing. Patient educated on testing process and all questions were answered.     Procedure: Pre-test was performed followed by a 2.5mg albuterol nebulizer. Patient waited 10 minutes after completion of nebulizer, then post test was completed.     Effort: fair- well   Patient Tolerated: good    Disposition of Results: put in pcp box    Patient instructed that ordering provider will contact them with results.     Micki Ann CMA

## 2018-09-28 NOTE — MR AVS SNAPSHOT
After Visit Summary   9/28/2018    Flor Navarro    MRN: [id number]           Patient Information     Date Of Birth          1965        Visit Information        Provider Department      9/28/2018 3:20 PM [facility]        Today's Diagnoses     SOB (shortness of breath)    -  1       Follow-ups after your visit        Your next 10 appointments already scheduled     Oct 12, 2018 11:30 AM CDT   (Arrive by 11:15 AM)   Return Liver Transplant with Laron Anne MD   [facility] ([facility])    [address]  [address]  [facility] [id number] [phone]            Oct 17, 2018  3:20 PM CDT   Return Visit with [facility] ([facility])    [address],  [address]  [facility] [address]   [phone]            Nov 12, 2018  2:20 PM CST   (Arrive by 2:05 PM)   [facility]    [address]  4th [address]  [facility] [id number] [phone]              Who to contact     Please call your clinic at [phone] to:    Ask questions about your health    Make or cancel appointments    Discuss your medicines    Learn about your test results    Speak to your doctor            Additional Information About Your Visit        Care EveryWhere ID     This is your Care EveryWhere ID. This could be used by other organizations to access your [place] medical records  [id number]         Blood Pressure from Last 3 Encounters:   09/21/18 139/82   09/12/18 117/72   08/15/18 117/75    Weight from Last 3 Encounters:   09/21/18 224 lb (101.6 kg)   09/12/18 226 lb 3.2 oz (102.6 kg)   08/15/18 223 lb (101.2 kg)              Today, you had the following     No orders found for display       Primary Care Provider Office Phone # Fax #    Karely Sierra [phone]        [address] [facility] [address]        Equal Access to Services     MAURO ALBA : Hadii aad ku hadbiankamarco Adair, wadanielkrista briceno, qachristiankerry angmamart bazziyaniraerica piña. So [facility] [phone].    ATENCIÓN: Si habla español, tiene a garcia disposición servicios gratuitos de asistencia lingüística. Llame al [phone].    We comply with applicable federal civil rights laws and [place] laws. We do not discriminate on the basis of race, color, national origin, age, disability, sex, sexual orientation, or gender identity.            Thank you!     Thank you for choosing [facility]  for your care. Our goal is always to provide you with excellent care. Hearing back from our patients is one way we can continue to improve our services. Please take a few minutes to complete the written survey that you may receive in the mail after your visit with us. Thank you!             Your Updated Medication List - Protect others around you: Learn how to safely use, store and throw away your medicines at www.disposemymeds.org.          This list is accurate as of 9/28/18 11:59 PM.  Always use your most recent med list.                   Brand Name Dispense Instructions for use Diagnosis    acetaminophen 500 MG tablet    TYLENOL    120 tablet    Take 1 tablet (500 mg) by mouth 3 times daily as needed for mild pain    Fall, initial encounter       albuterol 108 (90 Base) MCG/ACT inhaler    PROAIR HFA/PROVENTIL HFA/VENTOLIN HFA    1 Inhaler    Inhale 2 puffs into the lungs 4 times daily    Acute bronchitis, unspecified organism       alendronate 70 MG tablet    FOSAMAX    12 tablet    Take 1 tablet (70 mg) by mouth every 7 days at least 60 min before breakfast with over 8 oz water. Stay upright for at least 30 min.    Osteoporosis       ASPIRIN PO      Take 81 mg by mouth        bisacodyl 5 MG EC tablet    DULCOLAX    30 tablet    Take 1 tablet (5 mg) by mouth daily as needed for  constipation    Constipation, chronic       calcium carbonate 600 mg-vitamin D 400 units 600-400 MG-UNIT per tablet    calcium 600 + D    60 tablet    Take 1 tablet by mouth 2 times daily    Osteoporosis without current pathological fracture, unspecified osteoporosis type       * carboxymethylcellulose 1 % ophthalmic solution    CELLUVISC/REFRESH LIQUIGEL    15 each    Place 1 drop into both eyes 4 times daily    Dry eyes, bilateral       * Carboxymethylcellulose Sod PF 1 % ophthalmic gel    CELLUVISC/REFRESH LIQUIGEL    90 each    Place 1 drop into both eyes 4 times daily Dispose of dropperette within 24 hours after opening or if the tip is contaminated.    Dry eyes, bilateral       cholecalciferol 1000 units capsule    vitamin  -D    180 capsule    Take 2 capsules (2,000 Units) by mouth daily    Liver replaced by transplant (H), Vitamin D deficiency, Secondary hyperparathyroidism (H), CKD (chronic kidney disease) stage 3, GFR 30-59 ml/min, Palpitations       cycloSPORINE modified 25 MG capsule     240 capsule    Take 4 capsules (100 mg) by mouth every 12 hours    Liver replaced by transplant (H)       dimethicone 1.3 % Lotn lotion    AVEENO DAILY MOISTURIZING    227 g    Apply to affected area(s) as needed    Xerosis cutis       ferrous sulfate 325 (65 Fe) MG tablet    IRON     Take 1 tablet by mouth daily (with breakfast)        * Knee Brace/Flex Stays Large Misc     2 each    1 Units daily    Chronic pain of both knees       * Medical Compression Socks Misc     2 each    1 Units daily    Leg swelling       lidocaine 2 % topical gel    XYLOCAINE    20 mL    Apply topically daily To left ankle as needed for pain.    Posterior tibial tendinitis of left lower extremity, Pain in joint involving ankle and foot, left       lidocaine 5 % Patch    LIDODERM    30 patch    Apply up to 3 patches to painful area at once for up to 12 h within a 24 h period.  Remove after 12 hours.    Nondisp fx of head of right radius, init  for clos fx, Fall, subsequent encounter       melatonin 3 MG tablet     100 tablet    Take 1 tablet (3 mg) by mouth nightly as needed for sleep    Primary insomnia       Menthol (Topical Analgesic) 4 % Gel    BIOFREEZE COLORLESS    150 mL    Apply to affected areas BID    Pain in joint of right shoulder, Peroneal tendonitis, left       multivitamin, therapeutic with minerals Tabs tablet     100 tablet    Take 1 tablet by mouth daily    Liver replaced by transplant (H)       mycophenolate 250 MG capsule    GENERIC EQUIVALENT    120 capsule    Take 2 capsules (500 mg) by mouth every 12 hours    Liver replaced by transplant (H)       omeprazole 40 MG capsule    priLOSEC    90 capsule    Take 1 capsule (40 mg) by mouth daily    Gastroesophageal reflux disease, esophagitis presence not specified       * order for DME     1 Units    Equipment being ordered: Nebulizer with adult mask and tubing    Acute bronchitis, unspecified organism       * order for DME     1 Units    Equipment being ordered: cane with padded handle    Gait instability       * order for DME     2 Units    Equipment being ordered: compression stockings bilateral Please measure and fit patient for stockings  Strength 15-30mmHg Disp 2 pairs 1 refill over the time of 1 year    Localized edema       * order for DME     1 Units    Equipment being ordered: 4 Wheeled Walker with Seat and Brakes    Falls frequently, Gait instability       * order for DME     1 each    Equipment being ordered: Back Stabilizer    Chronic midline low back pain without sciatica       * order for DME     2 each    Equipment being ordered: 2 pairs of black stabilizer socks (beige okay if black not available). Size XL.    Bilateral edema of lower extremity       order for DME     2 each    Equipment being ordered: Compression stockings below knee bilateral 15-20mmHg Prefer black color    Bilateral edema of lower extremity       prednisoLONE acetate 1 % ophthalmic susp    PRED FORTE     1 Bottle    Use in operative eye four times a day  For 4 days    Posterior capsular opacification visually significant of both eyes       Psyllium 400 MG Caps     180 capsule    Take 1,200 mg by mouth 2 times daily    Constipation, chronic       senna-docusate 8.6-50 MG per tablet    SENOKOT-S;PERICOLACE    100 tablet    Take 2 tablets by mouth 2 times daily    Liver replaced by transplant (H)       simethicone 80 MG chewable tablet    MYLICON    120 tablet    Take 1 tablet (80 mg) by mouth every 6 hours as needed for flatulence or cramping    Flatulence, eructation, and gas pain       sodium chloride 0.65 % nasal spray    OCEAN    30 mL    Spray 1 spray into both nostrils daily as needed for congestion    Throat pain       STATIN NOT PRESCRIBED (INTENTIONAL)     0 each    Not prescribed    High risk medication use       * Notice:  This list has 10 medication(s) that are the same as other medications prescribed for you. Read the directions carefully, and ask your doctor or other care provider to review them with you.

## 2018-10-04 ENCOUNTER — DOCUMENTATION ONLY (OUTPATIENT)
Dept: FAMILY MEDICINE | Facility: CLINIC | Age: 53
End: 2018-10-04

## 2018-10-04 NOTE — PROGRESS NOTES
"When opening a documentation only encounter, be sure to enter in \"Chief Complaint\" Forms and in \" Comments\" Title of form, description if needed.    Flor is a 53 year old  female  Form received via: Fax  Form now resides in: Provider Ready    Dayanna Batista                Form has been completed by provider.     Form sent out via: Fax to Handi  at Fax Number: [phone]  Patient informed: No  Output date: October 10, 2018    Dayanna Batista      **Please close the encounter**      "

## 2018-10-18 ENCOUNTER — OFFICE VISIT (OUTPATIENT)
Dept: FAMILY MEDICINE | Facility: CLINIC | Age: 53
End: 2018-10-18
Payer: MEDICARE

## 2018-10-18 VITALS
TEMPERATURE: 97.8 F | HEART RATE: 68 BPM | DIASTOLIC BLOOD PRESSURE: 87 MMHG | SYSTOLIC BLOOD PRESSURE: 128 MMHG | RESPIRATION RATE: 16 BRPM | OXYGEN SATURATION: 99 % | WEIGHT: 225.4 LBS | BODY MASS INDEX: 43.3 KG/M2

## 2018-10-18 DIAGNOSIS — Z00.00 HEALTH CARE MAINTENANCE: Primary | ICD-10-CM

## 2018-10-18 DIAGNOSIS — Z02.89 ENCOUNTER FOR COMPLETION OF FORM WITH PATIENT: ICD-10-CM

## 2018-10-18 NOTE — PROGRESS NOTES
HPI       Flor Navarro is a 53 year old  who presents for   Chief Complaint   Patient presents with     Forms     Patient presents today to have forms completed for rent assistance. Thought she needed a physician signature but after review she doesn't. No other concerns today.     An Tamazight  was used for  this visit.    +++++++    Problem, Medication and Allergy Lists were reviewed and updated if needed..    Patient is an established patient of this clinic..         Review of Systems:   Review of Systems         Physical Exam:     Vitals:    10/18/18 0927   BP: 128/87   Pulse: 68   Resp: 16   Temp: 97.8  F (36.6  C)   TempSrc: Oral   SpO2: 99%   Weight: 225 lb 6.4 oz (102.2 kg)     Body mass index is 43.3 kg/(m^2).  Vitals were reviewed and were normal     Physical Exam   Constitutional: She is oriented to person, place, and time. She appears well-developed.   HENT:   Head: Normocephalic and atraumatic.   Eyes: Conjunctivae are normal.   Neck: Normal range of motion. Neck supple.   Pulmonary/Chest: Effort normal.   Neurological: She is alert and oriented to person, place, and time.   Skin: Skin is warm and dry.   Psychiatric: She has a normal mood and affect. Her behavior is normal. Judgment and thought content normal.         Results:   No testing ordered today    Assessment and Plan      Flor was seen today for forms and flank pain.    Diagnoses and all orders for this visit:    Health care maintenance  -     ADMIN VACCINE, INITIAL  -     FLU VAC QUADRIVALENT SPLIT VIRUS IM 0.25 mL dosage    Encounter for completion of form with patient  Form completed. Advised patient to follow-up with her  for next steps.     I spent 15 min face to face with the patient and >50% was spent counselling the patient about the above medical conditions and how to follow-up with rent assistance.          There are no discontinued medications.    Options for treatment and follow-up care were reviewed with  the patient. Flor Navarro  engaged in the decision making process and verbalized understanding of the options discussed and agreed with the final plan.    Ayala De Leon DO

## 2018-10-18 NOTE — NURSING NOTE
Due to patient being non-English speaking/uses sign language, an  was used for this visit. Only for face-to-face interpretation by an external agency, date and length of interpretation can be found on the scanned worksheet.     name: Luisana Loco  Agency: [employer]  Language: Bengali   Telephone number: [phone]  Type of interpretation: Face-to-face, spoken     Jonna Baxter CMA 9:25 AM October 18, 2018

## 2018-10-18 NOTE — MR AVS SNAPSHOT
After Visit Summary   10/18/2018    Flor Navarro    MRN: [id number]           Patient Information     Date Of Birth          1965        Visit Information        Provider Department      10/18/2018 9:20 AM Ayala De Leon DO [facility]        Today's [facility] maintenance    -  1    Encounter for completion of form with patient           Follow-ups after your visit        Your next 10 appointments already scheduled     Nov 12, 2018  2:20 PM CST   (Arrive by 2:05 PM)   RETURN FOOT/ANKLE with Rigo Drew DPM   [facility] ([facility])    [address] [place] [address]  [address]th [facility] [address] [phone]              Who to contact     Please call your clinic at [phone] to:    Ask questions about your health    Make or cancel appointments    Discuss your medicines    Learn about your test results    Speak to your doctor            Additional Information About Your Visit        Care EveryWhere ID     This is your Care EveryWhere ID. This could be used by other organizations to access your [place] medical records  [id number]        Your Vitals Were     Pulse Temperature Respirations Pulse Oximetry BMI (Body Mass Index)       68 97.8  F (36.6  C) (Oral) 16 99% 43.3 kg/m2        Blood Pressure from Last 3 Encounters:   10/18/18 128/87   09/21/18 139/82   09/12/18 117/72    Weight from Last 3 Encounters:   10/18/18 225 lb 6.4 oz (102.2 kg)   09/21/18 224 lb (101.6 kg)   09/12/18 226 lb 3.2 oz (102.6 kg)              We Performed the Following     ADMIN VACCINE, INITIAL     FLU VAC QUADRIVALENT SPLIT VIRUS IM 0.25 mL dosage        Primary Care Provider Office Phone # Fax #    Karely Sierra [phone]       [address] [address]        Equal Access to Services     MAURO ALBA : roxy Lam, mart carver  kieranyaniraerica salcidoaajudit ah. So [facility] [phone].    ATENCIÓN: Si abbey dalton, tiene a garcia disposición servicios gratuitos de asistencia lingüística. Abdirahman busby [phone].    We comply with applicable federal civil rights laws and [place] laws. We do not discriminate on the basis of race, color, national origin, age, disability, sex, sexual orientation, or gender identity.            Thank you!     Thank you for choosing [facility]  for your care. Our goal is always to provide you with excellent care. Hearing back from our patients is one way we can continue to improve our services. Please take a few minutes to complete the written survey that you may receive in the mail after your visit with us. Thank you!             Your Updated Medication List - Protect others around you: Learn how to safely use, store and throw away your medicines at www.disposemymeds.org.          This list is accurate as of 10/18/18 11:59 PM.  Always use your most recent med list.                   Brand Name Dispense Instructions for use Diagnosis    acetaminophen 500 MG tablet    TYLENOL    120 tablet    Take 1 tablet (500 mg) by mouth 3 times daily as needed for mild pain    Fall, initial encounter       albuterol 108 (90 Base) MCG/ACT inhaler    PROAIR HFA/PROVENTIL HFA/VENTOLIN HFA    1 Inhaler    Inhale 2 puffs into the lungs 4 times daily    Acute bronchitis, unspecified organism       alendronate 70 MG tablet    FOSAMAX    12 tablet    Take 1 tablet (70 mg) by mouth every 7 days at least 60 min before breakfast with over 8 oz water. Stay upright for at least 30 min.    Osteoporosis       ASPIRIN PO      Take 81 mg by mouth        bisacodyl 5 MG EC tablet    DULCOLAX    30 tablet    Take 1 tablet (5 mg) by mouth daily as needed for constipation    Constipation, chronic       calcium carbonate 600 mg-vitamin D 400 units 600-400 MG-UNIT per tablet    calcium 600 + D    60 tablet    Take 1 tablet by mouth 2 times daily     Osteoporosis without current pathological fracture, unspecified osteoporosis type       * carboxymethylcellulose 1 % ophthalmic solution    CELLUVISC/REFRESH LIQUIGEL    15 each    Place 1 drop into both eyes 4 times daily    Dry eyes, bilateral       * Carboxymethylcellulose Sod PF 1 % ophthalmic gel    CELLUVISC/REFRESH LIQUIGEL    90 each    Place 1 drop into both eyes 4 times daily Dispose of dropperette within 24 hours after opening or if the tip is contaminated.    Dry eyes, bilateral       cholecalciferol 1000 units capsule    vitamin  -D    180 capsule    Take 2 capsules (2,000 Units) by mouth daily    Liver replaced by transplant (H), Vitamin D deficiency, Secondary hyperparathyroidism (H), CKD (chronic kidney disease) stage 3, GFR 30-59 ml/min (H), Palpitations       cycloSPORINE modified 25 MG capsule     240 capsule    Take 4 capsules (100 mg) by mouth every 12 hours    Liver replaced by transplant (H)       dimethicone 1.3 % Lotn lotion    AVEENO DAILY MOISTURIZING    227 g    Apply to affected area(s) as needed    Xerosis cutis       ferrous sulfate 325 (65 Fe) MG tablet    IRON     Take 1 tablet by mouth daily (with breakfast)        * Knee Brace/Flex Stays Large Misc     2 each    1 Units daily    Chronic pain of both knees       * Medical Compression Socks Misc     2 each    1 Units daily    Leg swelling       lidocaine 2 % topical gel    XYLOCAINE    20 mL    Apply topically daily To left ankle as needed for pain.    Posterior tibial tendinitis of left lower extremity, Pain in joint involving ankle and foot, left       lidocaine 5 % Patch    LIDODERM    30 patch    Apply up to 3 patches to painful area at once for up to 12 h within a 24 h period.  Remove after 12 hours.    Nondisp fx of head of right radius, init for clos fx, Fall, subsequent encounter       melatonin 3 MG tablet     100 tablet    Take 1 tablet (3 mg) by mouth nightly as needed for sleep    Primary insomnia       Menthol (Topical  Analgesic) 4 % Gel    BIOFREEZE COLORLESS    150 mL    Apply to affected areas BID    Pain in joint of right shoulder, Peroneal tendonitis, left       multivitamin, therapeutic with minerals Tabs tablet     100 tablet    Take 1 tablet by mouth daily    Liver replaced by transplant (H)       mycophenolate 250 MG capsule    GENERIC EQUIVALENT    120 capsule    Take 2 capsules (500 mg) by mouth every 12 hours    Liver replaced by transplant (H)       omeprazole 40 MG capsule    priLOSEC    90 capsule    Take 1 capsule (40 mg) by mouth daily    Gastroesophageal reflux disease, esophagitis presence not specified       * order for DME     1 Units    Equipment being ordered: Nebulizer with adult mask and tubing    Acute bronchitis, unspecified organism       * order for DME     1 Units    Equipment being ordered: cane with padded handle    Gait instability       * order for DME     2 Units    Equipment being ordered: compression stockings bilateral Please measure and fit patient for stockings  Strength 15-30mmHg Disp 2 pairs 1 refill over the time of 1 year    Localized edema       * order for DME     1 Units    Equipment being ordered: 4 Wheeled Walker with Seat and Brakes    Falls frequently, Gait instability       * order for DME     1 each    Equipment being ordered: Back Stabilizer    Chronic midline low back pain without sciatica       * order for DME     2 each    Equipment being ordered: 2 pairs of black stabilizer socks (beige okay if black not available). Size XL.    Bilateral edema of lower extremity       order for DME     2 each    Equipment being ordered: Compression stockings below knee bilateral 15-20mmHg Prefer black color    Bilateral edema of lower extremity       prednisoLONE acetate 1 % ophthalmic susp    PRED FORTE    1 Bottle    Use in operative eye four times a day  For 4 days    Posterior capsular opacification visually significant of both eyes       Psyllium 400 MG Caps     180 capsule    Take  1,200 mg by mouth 2 times daily    Constipation, chronic       senna-docusate 8.6-50 MG per tablet    SENOKOT-S;PERICOLACE    100 tablet    Take 2 tablets by mouth 2 times daily    Liver replaced by transplant (H)       simethicone 80 MG chewable tablet    MYLICON    120 tablet    Take 1 tablet (80 mg) by mouth every 6 hours as needed for flatulence or cramping    Flatulence, eructation, and gas pain       sodium chloride 0.65 % nasal spray    OCEAN    30 mL    Spray 1 spray into both nostrils daily as needed for congestion    Throat pain       STATIN NOT PRESCRIBED (INTENTIONAL)     0 each    Not prescribed    High risk medication use       * Notice:  This list has 10 medication(s) that are the same as other medications prescribed for you. Read the directions carefully, and ask your doctor or other care provider to review them with you.

## 2018-10-20 ASSESSMENT — ASTHMA QUESTIONNAIRES: ACT_TOTALSCORE: 23

## 2018-10-29 ENCOUNTER — TELEPHONE (OUTPATIENT)
Dept: ORTHOPEDICS | Facility: CLINIC | Age: 53
End: 2018-10-29

## 2018-10-29 NOTE — TELEPHONE ENCOUNTER
[facility] Prior Authorization Team   Phone: [phone]  Fax: [phone]    PA Initiation    Medication: Lidocaine 2% gel - PENDING  Insurance Company: [employer] - Phone [phone] Fax [phone]  [facility] - [place], [place] - [address]  Filling Pharmacy Phone: [phone]  Filling Pharmacy Fax: [phone]  Start Date: 10/29/2018

## 2018-11-12 ENCOUNTER — OFFICE VISIT (OUTPATIENT)
Dept: ORTHOPEDICS | Facility: CLINIC | Age: 53
End: 2018-11-12
Payer: MEDICARE

## 2018-11-12 DIAGNOSIS — M76.822 POSTERIOR TIBIAL TENDINITIS OF LEFT LOWER EXTREMITY: ICD-10-CM

## 2018-11-12 DIAGNOSIS — M25.572 PAIN IN JOINT INVOLVING ANKLE AND FOOT, LEFT: ICD-10-CM

## 2018-11-12 NOTE — NURSING NOTE
Reason For Visit:   Chief Complaint   Patient presents with     RECHECK     Follow up. Pain, left foot.        Pain Assessment  Patient Currently in Pain: Yes  0-10 Pain Scale:  (too much pain)  Primary Pain Location: Foot  Pain Orientation: Left          Current Outpatient Prescriptions   Medication Sig Dispense Refill     acetaminophen (TYLENOL) 500 MG tablet Take 1 tablet (500 mg) by mouth 3 times daily as needed for mild pain 120 tablet 3     albuterol (PROAIR HFA/PROVENTIL HFA/VENTOLIN HFA) 108 (90 Base) MCG/ACT inhaler Inhale 2 puffs into the lungs 4 times daily 1 Inhaler 1     alendronate (FOSAMAX) 70 MG tablet Take 1 tablet (70 mg) by mouth every 7 days at least 60 min before breakfast with over 8 oz water. Stay upright for at least 30 min. 12 tablet 3     ASPIRIN PO Take 81 mg by mouth       bisacodyl (DULCOLAX) 5 MG EC tablet Take 1 tablet (5 mg) by mouth daily as needed for constipation 30 tablet 11     calcium-vitamin D (CALCIUM 600 + D) 600-400 MG-UNIT per tablet Take 1 tablet by mouth 2 times daily 60 tablet 11     carboxymethylcellulose (CELLUVISC/REFRESH LIQUIGEL) 1 % ophthalmic solution Place 1 drop into both eyes 4 times daily 15 each 11     Carboxymethylcellulose Sod PF (CELLUVISC/REFRESH LIQUIGEL) 1 % ophthalmic gel Place 1 drop into both eyes 4 times daily Dispose of dropperette within 24 hours after opening or if the tip is contaminated. 90 each 4     cholecalciferol (VITAMIN  -D) 1000 units capsule Take 2 capsules (2,000 Units) by mouth daily 180 capsule 3     dimethicone (AVEENO DAILY MOISTURIZING) 1.3 % LOTN lotion Apply to affected area(s) as needed 227 g 3     Elastic Bandages & Supports (KNEE BRACE/FLEX STAYS LARGE) MISC 1 Units daily 2 each 0     Elastic Bandages & Supports (MEDICAL COMPRESSION SOCKS) MISC 1 Units daily 2 each 0     ferrous sulfate (IRON) 325 (65 FE) MG tablet Take 1 tablet by mouth daily (with breakfast)       GENGRAF (BRAND) 25 MG CAPSULE Take 4 capsules (100 mg) by  mouth every 12 hours 240 capsule 0     lidocaine (LIDODERM) 5 % Patch Apply up to 3 patches to painful area at once for up to 12 h within a 24 h period.  Remove after 12 hours. 30 patch 0     lidocaine (XYLOCAINE) 2 % topical gel Apply topically daily To left ankle as needed for pain. 20 mL 3     melatonin 3 MG tablet Take 1 tablet (3 mg) by mouth nightly as needed for sleep 100 tablet 3     Menthol, Topical Analgesic, (BIOFREEZE COLORLESS) 4 % GEL Apply to affected areas  mL 3     multivitamin, therapeutic with minerals (MULTI-VITAMIN) TABS tablet Take 1 tablet by mouth daily 100 tablet 11     mycophenolate (GENERIC EQUIVALENT) 250 MG capsule Take 2 capsules (500 mg) by mouth every 12 hours 120 capsule 11     omeprazole (PRILOSEC) 40 MG capsule Take 1 capsule (40 mg) by mouth daily 90 capsule 3     order for DME Equipment being ordered: Compression stockings below knee bilateral  15-20mmHg  Prefer black color 2 each 0     order for DME Equipment being ordered: 2 pairs of black stabilizer socks (beige okay if black not available). Size XL. 2 each 0     order for DME Equipment being ordered: Back Stabilizer 1 each 0     order for DME Equipment being ordered: 4 Wheeled Walker with Seat and Brakes 1 Units 0     order for DME Equipment being ordered: compression stockings bilateral  Please measure and fit patient for stockings   Strength 15-30mmHg  Disp 2 pairs  1 refill over the time of 1 year 2 Units 1     order for DME Equipment being ordered: Nebulizer with adult mask and tubing 1 Units 0     order for DME Equipment being ordered: cane with padded handle 1 Units 0     prednisoLONE acetate (PRED FORTE) 1 % ophthalmic susp Use in operative eye four times a day  For 4 days 1 Bottle 0     Psyllium 400 MG CAPS Take 1,200 mg by mouth 2 times daily 180 capsule 11     senna-docusate (SENOKOT-S;PERICOLACE) 8.6-50 MG per tablet Take 2 tablets by mouth 2 times daily 100 tablet 3     simethicone (MYLICON) 80 MG chewable  tablet Take 1 tablet (80 mg) by mouth every 6 hours as needed for flatulence or cramping 120 tablet 0     sodium chloride (OCEAN) 0.65 % nasal spray Spray 1 spray into both nostrils daily as needed for congestion 30 mL 1     STATIN NOT PRESCRIBED, INTENTIONAL, Not prescribed 0 each 0     [DISCONTINUED] solifenacin (VESICARE) 5 MG tablet Take 1 tablet (5 mg) by mouth daily 30 tablet 12          Allergies   Allergen Reactions     Aspirin      3/31/16 Per pt, tolerates 81 mg daily dose without ADR.     325 mg dose caused itchiness and hives.     Clarithromycin      Allergic reaction         Contrast Dye      Penicillins

## 2018-11-12 NOTE — LETTER
2018       RE: Flor Navarro  [address]  [facility] [address]     Dear Colleague,    Thank you for referring your patient, Flor Navarro, to the [facility]. Please see a copy of my visit note below.    Past Medical History:   Diagnosis Date     Anemia in CKD (chronic kidney disease)      Cataract      CKD (chronic kidney disease) stage 3, GFR 30-59 ml/min (H)      Hepatitis C     cleared virus spontaneously      High risk medication use      Hypertension, renal      Immunosuppressed status (H)      Liver replaced by transplant (H)      Osteoporosis      Recurrent pregnancy loss without current pregnancy      Stroke, hemorrhagic (H)      Syncope      Unspecified viral hepatitis C without hepatic coma      Patient Active Problem List   Diagnosis     PVD (POSTERIOR VITREOUS DETACHMENT) OS     Hyperlipidemia LDL goal <160     CKD (chronic kidney disease) stage 3, GFR 30-59 ml/min (H)     Hypertension, renal     Liver replaced by transplant     High risk medication use     Anemia in CKD (chronic kidney disease)     Stroke, hemorrhagic (H)     Osteoporosis     Cystitis cystica     Immunosuppressed status (H)     Ganglion cyst     Vitamin D deficiency     Shoulder joint pain     Pseudophakia - Left Eye     Abdominal pain     Constipation, chronic     Secondary hyperparathyroidism (H)     Mixed hyperlipidemia     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)     Peroneal tendonitis, left     Pain in joint, ankle and foot, left     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)     Obesity, Class II, BMI 35-39.9     Sleep apnea, unspecified     Depression, recurrent (H)     Asthma, mild intermittent     Fall, initial encounter     Anxiety     Past Surgical History:   Procedure Laterality Date     CATARACT IOL, RT/LT Right 2/18/15      SECTION       Incisional Hernia Repair  2004     INSERT SHUNT  PORTAL TRANSJUGULAR INTRAHEPTIC      shunt placement for liver failure     LAPAROSCOPIC SALPINGO-OOPHORECTOMY      left     NECK SURGERY  2010    fracture, in halo x 7months     TRANSPLANT LIVER RECIPIENT  DONOR  10/26/10     Upper GI Endoscopy with Band Ligation of Esoph/Gastric Varic. .       Social History     Social History     Marital status: Single     Spouse name: N/A     Number of children: N/A     Years of education: N/A     Occupational History     Not on file.     Social History Main Topics     Smoking status: Never Smoker     Smokeless tobacco: Never Used     Alcohol use No     Drug use: No     Sexual activity: No     Other Topics Concern      Service No     Blood Transfusions Yes     Caffeine Concern No     Occupational Exposure No     Hobby Hazards No     Sleep Concern No     Stress Concern Yes     needs help at home for cares     Weight Concern Yes     wants to lose weight not gain weight     Special Diet No     Back Care Yes     uses a patch to relieve pain     Seat Belt Yes     Social History Narrative    One son Carleen        GynHx:         LMP age 39    Pap 3/11, NIL, no abnormal    Mammo , birads 2 benign        Lives with spouse due to necessity but she reports they do not have a good relationship        How much exercise per week? Stairs at home    How much calcium per day? supplement       How much caffeine per day? 1 cup tea    How much vitamin D per day? supplement    Do you/your family wear seatbelts?  Yes    Do you/your family use safety helmets? n/a    Do you/your family use sunscreen? No    Do you/your family keep firearms in the home? No    Do you/your family have a smoke detector(s)? Yes        Do you feel safe in your home? Yes    Has anyone ever touched you in an unwanted manner? No     Explain         2015 Tati Beard LPN                 Family History   Problem Relation Age of Onset     Hepatitis Other      Hep C, still in [place]      Cerebrovascular Disease Other      Cancer No family hx of      Diabetes No family hx of      Hypertension No family hx of      Thyroid Disease No family hx of      Glaucoma No family hx of      Macular Degeneration No family hx of      SUBJECTIVE FINDINGS:  This 52-year-old female presents with  for left foot ankle pain.  She relates it hurts.  She relates she did not get jia brace because it was not covered by insurance and Lidocaine helped and is using custom foot orthotics.      OBJECTIVE FINDINGS:  She has pain on palpation of the tibialis posterior tendon course and the anterior ankle on the left.  She  is wearing compression socks.      ASSESSMENT AND PLAN:  Tibialis posterior tendinopathy, ankle pain, left.  Diagnosis and treatment options discussed with her.  Prescription for lidocaine gel given and use discussed with her.  She will return to clinic to see me as needed.     Again, thank you for allowing me to participate in the care of your patient.      Sincerely,    Rigo Drew DPM

## 2018-11-12 NOTE — PROGRESS NOTES
Past Medical History:   Diagnosis Date     Anemia in CKD (chronic kidney disease)      Cataract      CKD (chronic kidney disease) stage 3, GFR 30-59 ml/min (H)      Hepatitis C     cleared virus spontaneously      High risk medication use      Hypertension, renal      Immunosuppressed status (H)      Liver replaced by transplant (H)      Osteoporosis      Recurrent pregnancy loss without current pregnancy      Stroke, hemorrhagic (H)      Syncope      Unspecified viral hepatitis C without hepatic coma      Patient Active Problem List   Diagnosis     PVD (POSTERIOR VITREOUS DETACHMENT) OS     Hyperlipidemia LDL goal <160     CKD (chronic kidney disease) stage 3, GFR 30-59 ml/min (H)     Hypertension, renal     Liver replaced by transplant     High risk medication use     Anemia in CKD (chronic kidney disease)     Stroke, hemorrhagic (H)     Osteoporosis     Cystitis cystica     Immunosuppressed status (H)     Ganglion cyst     Vitamin D deficiency     Shoulder joint pain     Pseudophakia - Left Eye     Abdominal pain     Constipation, chronic     Secondary hyperparathyroidism (H)     Mixed hyperlipidemia     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)     Peroneal tendonitis, left     Pain in joint, ankle and foot, left     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)     Obesity, Class II, BMI 35-39.9     Sleep apnea, unspecified     Depression, recurrent (H)     Asthma, mild intermittent     Fall, initial encounter     Anxiety     Past Surgical History:   Procedure Laterality Date     CATARACT IOL, RT/LT Right 2/18/15      SECTION       Incisional Hernia Repair  2004     INSERT SHUNT PORTAL TRANSJUGULAR INTRAHEPTIC      shunt placement for liver failure     LAPAROSCOPIC SALPINGO-OOPHORECTOMY      left     NECK SURGERY      fracture, in halo x 7months     TRANSPLANT LIVER RECIPIENT  DONOR  10/26/10     Upper GI Endoscopy with Band Ligation of Esoph/Gastric  Varic. .       Social History     Social History     Marital status: Single     Spouse name: N/A     Number of children: N/A     Years of education: N/A     Occupational History     Not on file.     Social History Main Topics     Smoking status: Never Smoker     Smokeless tobacco: Never Used     Alcohol use No     Drug use: No     Sexual activity: No     Other Topics Concern      Service No     Blood Transfusions Yes     Caffeine Concern No     Occupational Exposure No     Hobby Hazards No     Sleep Concern No     Stress Concern Yes     needs help at home for cares     Weight Concern Yes     wants to lose weight not gain weight     Special Diet No     Back Care Yes     uses a patch to relieve pain     Seat Belt Yes     Social History Narrative    One son Carleen        GynHx:         LMP age 39    Pap 3/11, NIL, no abnormal    Mammo , birads 2 benign        Lives with spouse due to necessity but she reports they do not have a good relationship        How much exercise per week? Stairs at home    How much calcium per day? supplement       How much caffeine per day? 1 cup tea    How much vitamin D per day? supplement    Do you/your family wear seatbelts?  Yes    Do you/your family use safety helmets? n/a    Do you/your family use sunscreen? No    Do you/your family keep firearms in the home? No    Do you/your family have a smoke detector(s)? Yes        Do you feel safe in your home? Yes    Has anyone ever touched you in an unwanted manner? No     Explain         2015 Tati Beard LPN                 Family History   Problem Relation Age of Onset     Hepatitis Other      Hep C, still in [place]     Cerebrovascular Disease Other      Cancer No family hx of      Diabetes No family hx of      Hypertension No family hx of      Thyroid Disease No family hx of      Glaucoma No family hx of      Macular Degeneration No family hx of      SUBJECTIVE FINDINGS:  This 52-year-old female presents with   for left foot ankle pain.  She relates it hurts.  She relates she did not get jia brace because it was not covered by insurance and Lidocaine helped and is using custom foot orthotics.      OBJECTIVE FINDINGS:  She has pain on palpation of the tibialis posterior tendon course and the anterior ankle on the left.  She is wearing compression socks.      ASSESSMENT AND PLAN:  Tibialis posterior tendinopathy, ankle pain, left.  Diagnosis and treatment options discussed with her.  Prescription for lidocaine gel given and use discussed with her.  She will return to clinic to see me as needed.

## 2018-11-12 NOTE — MR AVS SNAPSHOT
After Visit Summary   11/12/2018    Flor Navarro    MRN: [id number]           Patient Information     Date Of Birth          1965        Visit Information        Provider Department      11/12/2018 2:05 PM Rigo Drew DPM; [facility]IL[facility]        Today's Diagnoses     Posterior tibial tendinitis of left lower extremity        Pain in joint involving ankle and foot, left           Follow-ups after your visit        Your next 10 appointments already scheduled     Nov 26, 2018 11:00 AM CST   Return Visit with Karely Sierra MD   [facility] ([facility])    [address],  [address]  [facility] [address]   [phone]            Nov 30, 2018  7:30 AM CST   Lab with  LAB   [facility] ([facility])    [address] [place] [address] Se  1st Floor  [facility] [address] [phone]            Nov 30, 2018  8:15 AM CST   (Arrive by 8:00 AM)   Return Liver Transplant with Laron Anne MD   [facility] ([facility])    [address]  [address]  [facility] [address] [phone]              Who to contact     Please call your clinic at [phone] to:    Ask questions about your health    Make or cancel appointments    Discuss your medicines    Learn about your test results    Speak to your doctor            Additional Information About Your Visit        Care EveryWhere ID     This is your Care EveryWhere ID. This could be used by other organizations to access your [facility] medical records  [id number]         Blood Pressure from Last 3 Encounters:   10/18/18 128/87   09/21/18 139/82   09/12/18 117/72    Weight from Last 3 Encounters:   10/18/18 102.2 kg (225 lb 6.4 oz)   09/21/18 101.6 kg (224 lb)   09/12/18 102.6 kg (226 lb 3.2 oz)              Today, you had the following     No orders found for display         Where to get your medicines      These medications were  sent to [facility] - [place], [place] - [address]  [address] , [facility] [address]    Hours:  Mon-Fri 8:30am-5:00pm Toll Free [phone] Phone:  [phone]     lidocaine 2 % topical gel          Primary Care Provider Office Phone # Fax #    Karelyscott Sierra [phone]       [address] [address]        Equal Access to Services     MAURO ALBA : Hadii aad ku hadasho Soomaali, waaxda luqadaha, qaybta kaalmada adeegyada, waxay idiin hayaan adeeg rico oglesby . So [facility] [phone].    ATENCIÓN: Si habla español, tiene a garcia disposición servicios gratuitos de asistencia lingüística. [facility] [phone].    We comply with applicable federal civil rights laws and [place] laws. We do not discriminate on the basis of race, color, national origin, age, disability, sex, sexual orientation, or gender identity.            Thank you!     Thank you for choosing [facility]  for your care. Our goal is always to provide you with excellent care. Hearing back from our patients is one way we can continue to improve our services. Please take a few minutes to complete the written survey that you may receive in the mail after your visit with us. Thank you!             Your Updated Medication List - Protect others around you: Learn how to safely use, store and throw away your medicines at www.disposemymeds.org.          This list is accurate as of 11/12/18  3:30 PM.  Always use your most recent med list.                   Brand Name Dispense Instructions for use Diagnosis    acetaminophen 500 MG tablet    TYLENOL    120 tablet    Take 1 tablet (500 mg) by mouth 3 times daily as needed for mild pain    Fall, initial encounter       albuterol 108 (90 Base) MCG/ACT inhaler    PROAIR HFA/PROVENTIL HFA/VENTOLIN HFA    1 Inhaler    Inhale 2 puffs into the lungs 4 times daily    Acute bronchitis, unspecified organism       alendronate 70 MG  tablet    FOSAMAX    12 tablet    Take 1 tablet (70 mg) by mouth every 7 days at least 60 min before breakfast with over 8 oz water. Stay upright for at least 30 min.    Osteoporosis       ASPIRIN PO      Take 81 mg by mouth        bisacodyl 5 MG EC tablet    DULCOLAX    30 tablet    Take 1 tablet (5 mg) by mouth daily as needed for constipation    Constipation, chronic       calcium carbonate 600 mg-vitamin D 400 units 600-400 MG-UNIT per tablet    calcium 600 + D    60 tablet    Take 1 tablet by mouth 2 times daily    Osteoporosis without current pathological fracture, unspecified osteoporosis type       * carboxymethylcellulose 1 % ophthalmic solution    CELLUVISC/REFRESH LIQUIGEL    15 each    Place 1 drop into both eyes 4 times daily    Dry eyes, bilateral       * Carboxymethylcellulose Sod PF 1 % ophthalmic gel    CELLUVISC/REFRESH LIQUIGEL    90 each    Place 1 drop into both eyes 4 times daily Dispose of dropperette within 24 hours after opening or if the tip is contaminated.    Dry eyes, bilateral       cholecalciferol 1000 units capsule    vitamin  -D    180 capsule    Take 2 capsules (2,000 Units) by mouth daily    Liver replaced by transplant (H), Vitamin D deficiency, Secondary hyperparathyroidism (H), CKD (chronic kidney disease) stage 3, GFR 30-59 ml/min (H), Palpitations       cycloSPORINE modified 25 MG capsule     240 capsule    Take 4 capsules (100 mg) by mouth every 12 hours    Liver replaced by transplant (H)       dimethicone 1.3 % Lotn lotion    AVEENO DAILY MOISTURIZING    227 g    Apply to affected area(s) as needed    Xerosis cutis       ferrous sulfate 325 (65 Fe) MG tablet    IRON     Take 1 tablet by mouth daily (with breakfast)        * Knee Brace/Flex Stays Large Misc     2 each    1 Units daily    Chronic pain of both knees       * Medical Compression Socks Misc     2 each    1 Units daily    Leg swelling       lidocaine 2 % topical gel    XYLOCAINE    20 mL    Apply topically daily  To left ankle as needed for pain.    Posterior tibial tendinitis of left lower extremity, Pain in joint involving ankle and foot, left       lidocaine 5 % Patch    LIDODERM    30 patch    Apply up to 3 patches to painful area at once for up to 12 h within a 24 h period.  Remove after 12 hours.    Nondisp fx of head of right radius, init for clos fx, Fall, subsequent encounter       melatonin 3 MG tablet     100 tablet    Take 1 tablet (3 mg) by mouth nightly as needed for sleep    Primary insomnia       Menthol (Topical Analgesic) 4 % Gel    BIOFREEZE COLORLESS    150 mL    Apply to affected areas BID    Pain in joint of right shoulder, Peroneal tendonitis, left       multivitamin, therapeutic with minerals Tabs tablet     100 tablet    Take 1 tablet by mouth daily    Liver replaced by transplant (H)       mycophenolate 250 MG capsule    GENERIC EQUIVALENT    120 capsule    Take 2 capsules (500 mg) by mouth every 12 hours    Liver replaced by transplant (H)       omeprazole 40 MG capsule    priLOSEC    90 capsule    Take 1 capsule (40 mg) by mouth daily    Gastroesophageal reflux disease, esophagitis presence not specified       * order for DME     1 Units    Equipment being ordered: Nebulizer with adult mask and tubing    Acute bronchitis, unspecified organism       * order for DME     1 Units    Equipment being ordered: cane with padded handle    Gait instability       * order for DME     2 Units    Equipment being ordered: compression stockings bilateral Please measure and fit patient for stockings  Strength 15-30mmHg Disp 2 pairs 1 refill over the time of 1 year    Localized edema       * order for DME     1 Units    Equipment being ordered: 4 Wheeled Walker with Seat and Brakes    Falls frequently, Gait instability       * order for DME     1 each    Equipment being ordered: Back Stabilizer    Chronic midline low back pain without sciatica       * order for DME     2 each    Equipment being ordered: 2 pairs of  black stabilizer socks (beige okay if black not available). Size XL.    Bilateral edema of lower extremity       order for DME     2 each    Equipment being ordered: Compression stockings below knee bilateral 15-20mmHg Prefer black color    Bilateral edema of lower extremity       prednisoLONE acetate 1 % ophthalmic susp    PRED FORTE    1 Bottle    Use in operative eye four times a day  For 4 days    Posterior capsular opacification visually significant of both eyes       Psyllium 400 MG Caps     180 capsule    Take 1,200 mg by mouth 2 times daily    Constipation, chronic       senna-docusate 8.6-50 MG per tablet    SENOKOT-S;PERICOLACE    100 tablet    Take 2 tablets by mouth 2 times daily    Liver replaced by transplant (H)       simethicone 80 MG chewable tablet    MYLICON    120 tablet    Take 1 tablet (80 mg) by mouth every 6 hours as needed for flatulence or cramping    Flatulence, eructation, and gas pain       sodium chloride 0.65 % nasal spray    OCEAN    30 mL    Spray 1 spray into both nostrils daily as needed for congestion    Throat pain       STATIN NOT PRESCRIBED (INTENTIONAL)     0 each    Not prescribed    High risk medication use       * Notice:  This list has 10 medication(s) that are the same as other medications prescribed for you. Read the directions carefully, and ask your doctor or other care provider to review them with you.

## 2018-11-28 DIAGNOSIS — Z94.4 LIVER REPLACED BY TRANSPLANT (H): ICD-10-CM

## 2018-11-28 LAB
ALBUMIN SERPL-MCNC: 3.5 G/DL (ref 3.4–5)
ALP SERPL-CCNC: 107 U/L (ref 40–150)
ALT SERPL W P-5'-P-CCNC: 33 U/L (ref 0–50)
ANION GAP SERPL CALCULATED.3IONS-SCNC: 7 MMOL/L (ref 3–14)
AST SERPL W P-5'-P-CCNC: 25 U/L (ref 0–45)
BILIRUB DIRECT SERPL-MCNC: 0.2 MG/DL (ref 0–0.2)
BILIRUB SERPL-MCNC: 1.1 MG/DL (ref 0.2–1.3)
BUN SERPL-MCNC: 28 MG/DL (ref 7–30)
CALCIUM SERPL-MCNC: 8.7 MG/DL (ref 8.5–10.1)
CHLORIDE SERPL-SCNC: 107 MMOL/L (ref 94–109)
CO2 SERPL-SCNC: 25 MMOL/L (ref 20–32)
CREAT SERPL-MCNC: 1.1 MG/DL (ref 0.52–1.04)
CYCLOSPORINE BLD LC/MS/MS-MCNC: 70 UG/L (ref 50–400)
ERYTHROCYTE [DISTWIDTH] IN BLOOD BY AUTOMATED COUNT: 12.8 % (ref 10–15)
GFR SERPL CREATININE-BSD FRML MDRD: 52 ML/MIN/1.7M2
GLUCOSE SERPL-MCNC: 97 MG/DL (ref 70–99)
HCT VFR BLD AUTO: 41.9 % (ref 35–47)
HGB BLD-MCNC: 13.4 G/DL (ref 11.7–15.7)
MCH RBC QN AUTO: 29.7 PG (ref 26.5–33)
MCHC RBC AUTO-ENTMCNC: 32 G/DL (ref 31.5–36.5)
MCV RBC AUTO: 93 FL (ref 78–100)
PLATELET # BLD AUTO: 139 10E9/L (ref 150–450)
POTASSIUM SERPL-SCNC: 4.9 MMOL/L (ref 3.4–5.3)
PROT SERPL-MCNC: 7.7 G/DL (ref 6.8–8.8)
RBC # BLD AUTO: 4.51 10E12/L (ref 3.8–5.2)
SODIUM SERPL-SCNC: 140 MMOL/L (ref 133–144)
TME LAST DOSE: NORMAL H
WBC # BLD AUTO: 4.6 10E9/L (ref 4–11)

## 2018-11-28 PROCEDURE — 80158 DRUG ASSAY CYCLOSPORINE: CPT | Performed by: INTERNAL MEDICINE

## 2018-11-30 ENCOUNTER — OFFICE VISIT (OUTPATIENT)
Dept: GASTROENTEROLOGY | Facility: CLINIC | Age: 53
End: 2018-11-30
Attending: INTERNAL MEDICINE
Payer: MEDICARE

## 2018-11-30 VITALS
OXYGEN SATURATION: 96 % | SYSTOLIC BLOOD PRESSURE: 122 MMHG | WEIGHT: 229.4 LBS | DIASTOLIC BLOOD PRESSURE: 85 MMHG | BODY MASS INDEX: 44.06 KG/M2 | HEART RATE: 64 BPM | TEMPERATURE: 97.3 F

## 2018-11-30 DIAGNOSIS — Z94.4 LIVER REPLACED BY TRANSPLANT (H): Primary | ICD-10-CM

## 2018-11-30 DIAGNOSIS — J20.9 ACUTE BRONCHITIS, UNSPECIFIED ORGANISM: ICD-10-CM

## 2018-11-30 DIAGNOSIS — Z23 ENCOUNTER FOR IMMUNIZATION: ICD-10-CM

## 2018-11-30 PROCEDURE — G0008 ADMIN INFLUENZA VIRUS VAC: HCPCS | Mod: ZF

## 2018-11-30 PROCEDURE — T1013 SIGN LANG/ORAL INTERPRETER: HCPCS | Mod: U3,ZF

## 2018-11-30 PROCEDURE — 25000128 H RX IP 250 OP 636: Mod: ZF | Performed by: INTERNAL MEDICINE

## 2018-11-30 PROCEDURE — 90686 IIV4 VACC NO PRSV 0.5 ML IM: CPT | Mod: ZF | Performed by: INTERNAL MEDICINE

## 2018-11-30 PROCEDURE — G0463 HOSPITAL OUTPT CLINIC VISIT: HCPCS | Mod: 25,ZF

## 2018-11-30 RX ORDER — ALBUTEROL SULFATE 90 UG/1
2 AEROSOL, METERED RESPIRATORY (INHALATION) 4 TIMES DAILY
Qty: 1 INHALER | Refills: 1 | Status: SHIPPED | OUTPATIENT
Start: 2018-11-30 | End: 2019-02-06

## 2018-11-30 RX ADMIN — INFLUENZA A VIRUS A/MICHIGAN/45/2015 X-275 (H1N1) ANTIGEN (FORMALDEHYDE INACTIVATED), INFLUENZA A VIRUS A/SINGAPORE/INFIMH-16-0019/2016 IVR-186 (H3N2) ANTIGEN (FORMALDEHYDE INACTIVATED), INFLUENZA B VIRUS B/PHUKET/3073/2013 ANTIGEN (FORMALDEHYDE INACTIVATED), AND INFLUENZA B VIRUS B/MARYLAND/15/2016 BX-69A ANTIGEN (FORMALDEHYDE INACTIVATED) 0.5 ML: 15; 15; 15; 15 INJECTION, SUSPENSION INTRAMUSCULAR at 08:58

## 2018-11-30 ASSESSMENT — PAIN SCALES - GENERAL: PAINLEVEL: NO PAIN (0)

## 2018-11-30 NOTE — PROGRESS NOTES
I had the pleasure of seeing Flor Navarro for followup in the Liver Transplantation Clinic at the [facility] on 11/30/2018.  Ms. Navarro returns for followup now more than 8 years status post liver transplantation for cirrhosis caused by chronic hepatitis C.  She is a sustained virologic responder to hepatitis C treatment.      She has been doing well since she was last seen.  She currently denies any abdominal pain, itching or skin rash, but she has mild fatigue.  She reports that she is exercising about half an hour a day in a gym in the senior facility where she is now living.      She denies any fevers or chills or cough.  She still does report some dyspnea on exertion.  She is quite overweight and it is not surprising.  She denies any nausea, vomiting, diarrhea or constipation.  Her appetite has been good, but she again has not lost weight.  She is not having any lower extremity edema.  She has not had any gastrointestinal bleeding.     Current Outpatient Prescriptions   Medication     acetaminophen (TYLENOL) 500 MG tablet     albuterol (PROAIR HFA/PROVENTIL HFA/VENTOLIN HFA) 108 (90 Base) MCG/ACT inhaler     alendronate (FOSAMAX) 70 MG tablet     ASPIRIN PO     bisacodyl (DULCOLAX) 5 MG EC tablet     calcium-vitamin D (CALCIUM 600 + D) 600-400 MG-UNIT per tablet     carboxymethylcellulose (CELLUVISC/REFRESH LIQUIGEL) 1 % ophthalmic solution     Carboxymethylcellulose Sod PF (CELLUVISC/REFRESH LIQUIGEL) 1 % ophthalmic gel     cholecalciferol (VITAMIN  -D) 1000 units capsule     dimethicone (AVEENO DAILY MOISTURIZING) 1.3 % LOTN lotion     Elastic Bandages & Supports (KNEE BRACE/FLEX STAYS LARGE) MISC     Elastic Bandages & Supports (MEDICAL COMPRESSION SOCKS) MISC     ferrous sulfate (IRON) 325 (65 FE) MG tablet     GENGRAF (BRAND) 25 MG CAPSULE     lidocaine (LIDODERM) 5 % Patch     lidocaine (XYLOCAINE) 2 % topical gel     melatonin 3 MG tablet     Menthol, Topical Analgesic,  (BIOFREEZE COLORLESS) 4 % GEL     multivitamin, therapeutic with minerals (MULTI-VITAMIN) TABS tablet     mycophenolate (GENERIC EQUIVALENT) 250 MG capsule     omeprazole (PRILOSEC) 40 MG capsule     order for DME     order for DME     order for DME     order for DME     order for DME     order for DME     order for DME     prednisoLONE acetate (PRED FORTE) 1 % ophthalmic susp     Psyllium 400 MG CAPS     senna-docusate (SENOKOT-S;PERICOLACE) 8.6-50 MG per tablet     simethicone (MYLICON) 80 MG chewable tablet     sodium chloride (OCEAN) 0.65 % nasal spray     STATIN NOT PRESCRIBED, INTENTIONAL,     [DISCONTINUED] solifenacin (VESICARE) 5 MG tablet     Current Facility-Administered Medications   Medication     apraclonidine (IOPIDINE) 1 % ophthalmic solution 1 drop     influenza quadrivalent (PF) vacc (FLUZONE or Flulaval or FLUARIX) injection 0.5 mL     B/P: 122/85, T: 97.3, P: 64, R: Data Unavailable    In general she looks well but overweight.  HEENT exam shows no scleral icterus, or temporal muscle wasting.  Her chest is clear.  Abdominal exam shows no increase in girth.  No masses or time palpation are present.  Her liver is 10 cm in span without left lobe enlargement.  No spleen tip is palpable.  Extremity exam shows no edema.  Skin exam shows no stigmata of chronic liver disease or suspicious lesions.  Neurologic exam is nonfocal.     Recent Results (from the past 168 hour(s))   CBC with platelets    Collection Time: 11/28/18 11:27 AM   Result Value Ref Range    WBC 4.6 4.0 - 11.0 10e9/L    RBC Count 4.51 3.8 - 5.2 10e12/L    Hemoglobin 13.4 11.7 - 15.7 g/dL    Hematocrit 41.9 35.0 - 47.0 %    MCV 93 78 - 100 fl    MCH 29.7 26.5 - 33.0 pg    MCHC 32.0 31.5 - 36.5 g/dL    RDW 12.8 10.0 - 15.0 %    Platelet Count 139 (L) 150 - 450 10e9/L   Basic metabolic panel    Collection Time: 11/28/18 11:27 AM   Result Value Ref Range    Sodium 140 133 - 144 mmol/L    Potassium 4.9 3.4 - 5.3 mmol/L    Chloride 107 94 - 109  mmol/L    Carbon Dioxide 25 20 - 32 mmol/L    Anion Gap 7 3 - 14 mmol/L    Glucose 97 70 - 99 mg/dL    Urea Nitrogen 28 7 - 30 mg/dL    Creatinine 1.10 (H) 0.52 - 1.04 mg/dL    GFR Estimate 52 (L) >60 mL/min/1.7m2    GFR Estimate If Black 63 >60 mL/min/1.7m2    Calcium 8.7 8.5 - 10.1 mg/dL   Hepatic panel    Collection Time: 11/28/18 11:27 AM   Result Value Ref Range    Bilirubin Direct 0.2 0.0 - 0.2 mg/dL    Bilirubin Total 1.1 0.2 - 1.3 mg/dL    Albumin 3.5 3.4 - 5.0 g/dL    Protein Total 7.7 6.8 - 8.8 g/dL    Alkaline Phosphatase 107 40 - 150 U/L    ALT 33 0 - 50 U/L    AST 25 0 - 45 U/L   Cyclosporine    Collection Time: 11/28/18 11:27 AM   Result Value Ref Range    Cyclosporine Last Dose 11pm 11     Cyclosporine Level 70 50 - 400 ug/L      My impression is that Mr. Navarro is doing well now more than 8 years status post liver transplantation for cirrhosis caused by chronic hepatitis C.  She will get a flu shot today.  It is listed in the chart that she had one in October, but she reports that she did not actually have it done at that time.  She is otherwise up-to-date with regard to cancer screening and other vaccinations, and my plan will be to see her back in the clinic again in 6 months.      Thank you very much for allowing me to participate in the care of this patient.  If you have any questions regarding my recommendations, please do not hesitate to contact me.       Laron Anne MD      Professor of Medicine  [employer]      Executive Medical Director, Solid Organ Transplant Program  [facility]

## 2018-11-30 NOTE — TELEPHONE ENCOUNTER

## 2018-11-30 NOTE — LETTER
11/30/2018      RE: Flor Navarro  [address]  [place] [facility] [address]       I had the pleasure of seeing Flor Navarro for followup in the Liver Transplantation Clinic at the [facility] on 11/30/2018.  Ms. Navarro returns for followup now more than 8 years status post liver transplantation for cirrhosis caused by chronic hepatitis C.  She is a sustained virologic responder to hepatitis C treatment.      She has been doing well since she was last seen.  She currently denies any abdominal pain, itching or skin rash, but she has mild fatigue.  She reports that she is exercising about half an hour a day in a gym in the senior facility where she is now living.      She denies any fevers or chills or cough.  She still does report some dyspnea on exertion.  She is quite overweight and it is not surprising.  She denies any nausea, vomiting, diarrhea or constipation.  Her appetite has been good, but she again has not lost weight.  She is not having any lower extremity edema.  She has not had any gastrointestinal bleeding.     Current Outpatient Prescriptions   Medication     acetaminophen (TYLENOL) 500 MG tablet     albuterol (PROAIR HFA/PROVENTIL HFA/VENTOLIN HFA) 108 (90 Base) MCG/ACT inhaler     alendronate (FOSAMAX) 70 MG tablet     ASPIRIN PO     bisacodyl (DULCOLAX) 5 MG EC tablet     calcium-vitamin D (CALCIUM 600 + D) 600-400 MG-UNIT per tablet     carboxymethylcellulose (CELLUVISC/REFRESH LIQUIGEL) 1 % ophthalmic solution     Carboxymethylcellulose Sod PF (CELLUVISC/REFRESH LIQUIGEL) 1 % ophthalmic gel     cholecalciferol (VITAMIN  -D) 1000 units capsule     dimethicone (AVEENO DAILY MOISTURIZING) 1.3 % LOTN lotion     Elastic Bandages & Supports (KNEE BRACE/FLEX STAYS LARGE) MISC     Elastic Bandages & Supports (MEDICAL COMPRESSION SOCKS) MISC     ferrous sulfate (IRON) 325 (65 FE) MG tablet     GENGRAF (BRAND) 25 MG CAPSULE     lidocaine (LIDODERM) 5 % Patch     lidocaine  (XYLOCAINE) 2 % topical gel     melatonin 3 MG tablet     Menthol, Topical Analgesic, (BIOFREEZE COLORLESS) 4 % GEL     multivitamin, therapeutic with minerals (MULTI-VITAMIN) TABS tablet     mycophenolate (GENERIC EQUIVALENT) 250 MG capsule     omeprazole (PRILOSEC) 40 MG capsule     order for DME     order for DME     order for DME     order for DME     order for DME     order for DME     order for DME     prednisoLONE acetate (PRED FORTE) 1 % ophthalmic susp     Psyllium 400 MG CAPS     senna-docusate (SENOKOT-S;PERICOLACE) 8.6-50 MG per tablet     simethicone (MYLICON) 80 MG chewable tablet     sodium chloride (OCEAN) 0.65 % nasal spray     STATIN NOT PRESCRIBED, INTENTIONAL,     [DISCONTINUED] solifenacin (VESICARE) 5 MG tablet     Current Facility-Administered Medications   Medication     apraclonidine (IOPIDINE) 1 % ophthalmic solution 1 drop     influenza quadrivalent (PF) vacc (FLUZONE or Flulaval or FLUARIX) injection 0.5 mL     B/P: 122/85, T: 97.3, P: 64, R: Data Unavailable    In general she looks well but overweight.  HEENT exam shows no scleral icterus, or temporal muscle wasting.  Her chest is clear.  Abdominal exam shows no increase in girth.  No masses or time palpation are present.  Her liver is 10 cm in span without left lobe enlargement.  No spleen tip is palpable.  Extremity exam shows no edema.  Skin exam shows no stigmata of chronic liver disease or suspicious lesions.  Neurologic exam is nonfocal.     Recent Results (from the past 168 hour(s))   CBC with platelets    Collection Time: 11/28/18 11:27 AM   Result Value Ref Range    WBC 4.6 4.0 - 11.0 10e9/L    RBC Count 4.51 3.8 - 5.2 10e12/L    Hemoglobin 13.4 11.7 - 15.7 g/dL    Hematocrit 41.9 35.0 - 47.0 %    MCV 93 78 - 100 fl    MCH 29.7 26.5 - 33.0 pg    MCHC 32.0 31.5 - 36.5 g/dL    RDW 12.8 10.0 - 15.0 %    Platelet Count 139 (L) 150 - 450 10e9/L   Basic metabolic panel    Collection Time: 11/28/18 11:27 AM   Result Value Ref Range     Sodium 140 133 - 144 mmol/L    Potassium 4.9 3.4 - 5.3 mmol/L    Chloride 107 94 - 109 mmol/L    Carbon Dioxide 25 20 - 32 mmol/L    Anion Gap 7 3 - 14 mmol/L    Glucose 97 70 - 99 mg/dL    Urea Nitrogen 28 7 - 30 mg/dL    Creatinine 1.10 (H) 0.52 - 1.04 mg/dL    GFR Estimate 52 (L) >60 mL/min/1.7m2    GFR Estimate If Black 63 >60 mL/min/1.7m2    Calcium 8.7 8.5 - 10.1 mg/dL   Hepatic panel    Collection Time: 11/28/18 11:27 AM   Result Value Ref Range    Bilirubin Direct 0.2 0.0 - 0.2 mg/dL    Bilirubin Total 1.1 0.2 - 1.3 mg/dL    Albumin 3.5 3.4 - 5.0 g/dL    Protein Total 7.7 6.8 - 8.8 g/dL    Alkaline Phosphatase 107 40 - 150 U/L    ALT 33 0 - 50 U/L    AST 25 0 - 45 U/L   Cyclosporine    Collection Time: 11/28/18 11:27 AM   Result Value Ref Range    Cyclosporine Last Dose 11pm 11     Cyclosporine Level 70 50 - 400 ug/L      My impression is that Mr. Navarro is doing well now more than 8 years status post liver transplantation for cirrhosis caused by chronic hepatitis C.  She will get a flu shot today.  It is listed in the chart that she had one in October, but she reports that she did not actually have it done at that time.  She is otherwise up-to-date with regard to cancer screening and other vaccinations, and my plan will be to see her back in the clinic again in 6 months.      Thank you very much for allowing me to participate in the care of this patient.  If you have any questions regarding my recommendations, please do not hesitate to contact me.       Laron Anne MD      Professor of Medicine  [employer]      Executive Medical Director, Solid Organ Transplant Program  [facility]

## 2018-11-30 NOTE — MR AVS SNAPSHOT
After Visit Summary   11/30/2018    Flor Navarro    MRN: [id number]           Patient Information     Date Of Birth          1965        Visit Information        Provider Department      11/30/2018 8:00 AM Stephanie Johnson; Laron Anne MD [facility]        Today's Diagnoses     Liver replaced by transplant (H)    -  1    Encounter for immunization            Follow-ups after your visit        Follow-up notes from your care team     Return in about 6 months (around 5/30/2019).      Your next 10 appointments already scheduled     Dec 17, 2018 10:40 AM CST   Return Visit with Karely Sierra MD   [facility] ([facility])    [address],  [address]  [facility] [address]   [phone]            Mar 04, 2019  2:00 PM CST   (Arrive by 1:45 PM)   Return Liver Transplant with Laron Anne MD   [facility] ([facility])    [address]  [address]  [place] [address] [phone]              Who to contact     If you have questions or need follow up information about today's clinic visit or your schedule please contact [facility] directly at [phone].  Normal or non-critical lab and imaging results will be communicated to you by MyChart, letter or phone within 4 business days after the clinic has received the results. If you do not hear from us within 7 days, please contact the clinic through MyChart or phone. If you have a critical or abnormal lab result, we will notify you by phone as soon as possible.  Submit refill requests through [employer] or call your pharmacy and they will forward the refill request to us. Please allow 3 business days for your refill to be completed.          Additional Information About Your Visit        Care EveryWhere ID     This is your Care EveryWhere ID. This could be used by other organizations to access your [place] medical records  [id number]        Your Vitals Were      Pulse Temperature Pulse Oximetry BMI (Body Mass Index)          64 97.3  F (36.3  C) (Oral) 96% 44.06 kg/m2         Blood Pressure from Last 3 Encounters:   11/30/18 122/85   10/18/18 128/87   09/21/18 139/82    Weight from Last 3 Encounters:   11/30/18 104.1 kg (229 lb 6.4 oz)   10/18/18 102.2 kg (225 lb 6.4 oz)   09/21/18 101.6 kg (224 lb)              Today, you had the following     No orders found for display       Primary Care Provider Office Phone # Fax #    Karely Sierra [phone]       [address] [address]        Equal Access to Services     MAURO ALBA : Rayne Adair, waisidra briceno, qaybta kaalmakrista jin, mart piña. So [facility] [phone].    ATENCIÓN: Si habla español, tiene a garcia disposición servicios gratuitos de asistencia lingüística. [facility] [phone].    We comply with applicable federal civil rights laws and [place] laws. We do not discriminate on the basis of race, color, national origin, age, disability, sex, sexual orientation, or gender identity.            Thank you!     Thank you for choosing [facility]PAT[facility]  for your care. Our goal is always to provide you with excellent care. Hearing back from our patients is one way we can continue to improve our services. Please take a few minutes to complete the written survey that you may receive in the mail after your visit with us. Thank you!             Your Updated Medication List - Protect others around you: Learn how to safely use, store and throw away your medicines at www.disposemymeds.org.          This list is accurate as of 11/30/18  8:53 AM.  Always use your most recent med list.                   Brand Name Dispense Instructions for use Diagnosis    acetaminophen 500 MG tablet    TYLENOL    120 tablet    Take 1 tablet (500 mg) by mouth 3 times daily as needed for mild pain    Fall, initial encounter       albuterol 108 (90 Base) MCG/ACT  inhaler    PROAIR HFA/PROVENTIL HFA/VENTOLIN HFA    1 Inhaler    Inhale 2 puffs into the lungs 4 times daily    Acute bronchitis, unspecified organism       alendronate 70 MG tablet    FOSAMAX    12 tablet    Take 1 tablet (70 mg) by mouth every 7 days at least 60 min before breakfast with over 8 oz water. Stay upright for at least 30 min.    Osteoporosis       ASPIRIN PO      Take 81 mg by mouth        bisacodyl 5 MG EC tablet    DULCOLAX    30 tablet    Take 1 tablet (5 mg) by mouth daily as needed for constipation    Constipation, chronic       calcium carbonate 600 mg-vitamin D 400 units 600-400 MG-UNIT per tablet    calcium 600 + D    60 tablet    Take 1 tablet by mouth 2 times daily    Osteoporosis without current pathological fracture, unspecified osteoporosis type       * carboxymethylcellulose 1 % ophthalmic solution    CELLUVISC/REFRESH LIQUIGEL    15 each    Place 1 drop into both eyes 4 times daily    Dry eyes, bilateral       * Carboxymethylcellulose Sod PF 1 % ophthalmic gel    CELLUVISC/REFRESH LIQUIGEL    90 each    Place 1 drop into both eyes 4 times daily Dispose of dropperette within 24 hours after opening or if the tip is contaminated.    Dry eyes, bilateral       cholecalciferol 1000 units (25 mcg) capsule    VITAMIN D3    180 capsule    Take 2 capsules (2,000 Units) by mouth daily    Liver replaced by transplant (H), Vitamin D deficiency, Secondary hyperparathyroidism (H), CKD (chronic kidney disease) stage 3, GFR 30-59 ml/min (H), Palpitations       cycloSPORINE modified 25 MG capsule     240 capsule    Take 4 capsules (100 mg) by mouth every 12 hours    Liver replaced by transplant (H)       dimethicone 1.3 % Lotn lotion    AVEENO DAILY MOISTURIZING    227 g    Apply to affected area(s) as needed    Xerosis cutis       ferrous sulfate 325 (65 Fe) MG tablet    FEROSUL     Take 1 tablet by mouth daily (with breakfast)        * Knee Brace/Flex Stays Large Misc     2 each    1 Units daily     Chronic pain of both knees       * Medical Compression Socks Misc     2 each    1 Units daily    Leg swelling       lidocaine 2 % external gel    XYLOCAINE    20 mL    Apply topically daily To left ankle as needed for pain.    Posterior tibial tendinitis of left lower extremity, Pain in joint involving ankle and foot, left       lidocaine 5 % patch    LIDODERM    30 patch    Apply up to 3 patches to painful area at once for up to 12 h within a 24 h period.  Remove after 12 hours.    Nondisp fx of head of right radius, init for clos fx, Fall, subsequent encounter       melatonin 3 MG tablet     100 tablet    Take 1 tablet (3 mg) by mouth nightly as needed for sleep    Primary insomnia       Menthol (Topical Analgesic) 4 % Gel    BIOFREEZE COLORLESS    150 mL    Apply to affected areas BID    Pain in joint of right shoulder, Peroneal tendonitis, left       multivitamin w/minerals tablet     100 tablet    Take 1 tablet by mouth daily    Liver replaced by transplant (H)       mycophenolate 250 MG capsule    GENERIC EQUIVALENT    120 capsule    Take 2 capsules (500 mg) by mouth every 12 hours    Liver replaced by transplant (H)       omeprazole 40 MG DR capsule    priLOSEC    90 capsule    Take 1 capsule (40 mg) by mouth daily    Gastroesophageal reflux disease, esophagitis presence not specified       * order for DME     1 Units    Equipment being ordered: Nebulizer with adult mask and tubing    Acute bronchitis, unspecified organism       * order for DME     1 Units    Equipment being ordered: cane with padded handle    Gait instability       * order for DME     2 Units    Equipment being ordered: compression stockings bilateral Please measure and fit patient for stockings  Strength 15-30mmHg Disp 2 pairs 1 refill over the time of 1 year    Localized edema       * order for DME     1 Units    Equipment being ordered: 4 Wheeled Walker with Seat and Brakes    Falls frequently, Gait instability       * order for DME     1  each    Equipment being ordered: Back Stabilizer    Chronic midline low back pain without sciatica       * order for DME     2 each    Equipment being ordered: 2 pairs of black stabilizer socks (beige okay if black not available). Size XL.    Bilateral edema of lower extremity       order for DME     2 each    Equipment being ordered: Compression stockings below knee bilateral 15-20mmHg Prefer black color    Bilateral edema of lower extremity       prednisoLONE acetate 1 % ophthalmic suspension    PRED FORTE    1 Bottle    Use in operative eye four times a day  For 4 days    Posterior capsular opacification visually significant of both eyes       Psyllium 400 MG Caps     180 capsule    Take 1,200 mg by mouth 2 times daily    Constipation, chronic       senna-docusate 8.6-50 MG tablet    SENOKOT-S/PERICOLACE    100 tablet    Take 2 tablets by mouth 2 times daily    Liver replaced by transplant (H)       simethicone 80 MG chewable tablet    MYLICON    120 tablet    Take 1 tablet (80 mg) by mouth every 6 hours as needed for flatulence or cramping    Flatulence, eructation, and gas pain       sodium chloride 0.65 % nasal spray    OCEAN    30 mL    Spray 1 spray into both nostrils daily as needed for congestion    Throat pain       STATIN NOT PRESCRIBED    INTENTIONAL    0 each    Not prescribed    High risk medication use       * Notice:  This list has 10 medication(s) that are the same as other medications prescribed for you. Read the directions carefully, and ask your doctor or other care provider to review them with you.

## 2018-11-30 NOTE — NURSING NOTE
Chief Complaint   Patient presents with     RECHECK     Follow up and trouble breathing     /85 (BP Location: Left arm)  Pulse 64  Temp 97.3  F (36.3  C) (Oral)  Wt 104.1 kg (229 lb 6.4 oz)  SpO2 96%  BMI 44.06 kg/m2   Sary Schultz

## 2018-12-17 ENCOUNTER — PATIENT OUTREACH (OUTPATIENT)
Dept: FAMILY MEDICINE | Facility: CLINIC | Age: 53
End: 2018-12-17

## 2018-12-17 ENCOUNTER — OFFICE VISIT (OUTPATIENT)
Dept: FAMILY MEDICINE | Facility: CLINIC | Age: 53
End: 2018-12-17
Payer: MEDICARE

## 2018-12-17 VITALS
TEMPERATURE: 97.9 F | DIASTOLIC BLOOD PRESSURE: 88 MMHG | OXYGEN SATURATION: 94 % | BODY MASS INDEX: 44.56 KG/M2 | SYSTOLIC BLOOD PRESSURE: 137 MMHG | WEIGHT: 232 LBS | HEART RATE: 72 BPM

## 2018-12-17 DIAGNOSIS — M25.511 PAIN IN JOINT OF RIGHT SHOULDER: ICD-10-CM

## 2018-12-17 DIAGNOSIS — Z23 NEED FOR DIPHTHERIA-TETANUS-PERTUSSIS (TDAP) VACCINE: ICD-10-CM

## 2018-12-17 DIAGNOSIS — M76.72 PERONEAL TENDONITIS, LEFT: ICD-10-CM

## 2018-12-17 DIAGNOSIS — R05.9 COUGH: Primary | ICD-10-CM

## 2018-12-17 DIAGNOSIS — R07.89 OTHER CHEST PAIN: ICD-10-CM

## 2018-12-17 DIAGNOSIS — J45.21 MILD INTERMITTENT ASTHMA WITH ACUTE EXACERBATION: ICD-10-CM

## 2018-12-17 DIAGNOSIS — H60.502 ACUTE OTITIS EXTERNA OF LEFT EAR, UNSPECIFIED TYPE: ICD-10-CM

## 2018-12-17 RX ORDER — ALBUTEROL SULFATE 0.63 MG/3ML
1 SOLUTION RESPIRATORY (INHALATION)
Qty: 180 ML | Refills: 1 | Status: SHIPPED | OUTPATIENT
Start: 2018-12-17 | End: 2019-01-11

## 2018-12-17 RX ORDER — ALBUTEROL SULFATE 0.83 MG/ML
2.5 SOLUTION RESPIRATORY (INHALATION) ONCE
Status: DISCONTINUED | OUTPATIENT
Start: 2018-12-17 | End: 2022-03-18

## 2018-12-17 RX ORDER — CIPROFLOXACIN 0.5 MG/.25ML
0.25 SOLUTION/ DROPS AURICULAR (OTIC) 2 TIMES DAILY
Qty: 3.5 ML | Refills: 0 | Status: SHIPPED | OUTPATIENT
Start: 2018-12-17 | End: 2019-05-28

## 2018-12-17 RX ORDER — PREDNISONE 20 MG/1
40 TABLET ORAL DAILY
Qty: 10 TABLET | Refills: 0 | Status: SHIPPED | OUTPATIENT
Start: 2018-12-17 | End: 2019-05-28

## 2018-12-17 NOTE — NURSING NOTE
Due to patient being non-English speaking/uses sign language, an  was used for this visit. Only for face-to-face interpretation by an external agency, date and length of interpretation can be found on the scanned worksheet.     name: rudy bhat  Agency: [employer]  Language: Latvian   Telephone number: [phone]  Type of interpretation: Face-to-face, spoken

## 2018-12-17 NOTE — PATIENT INSTRUCTIONS
"Medication Management  1. I will have our care coordinators/ social work help with [facility].    Left Shoulder and Knee Pain  1. Continue using Biofreeze.     Ear Pain  1. Put 3-4 drops in the ear and push on the \"pump\" of the ear to move the fluid through. Use these drops twice daily for one week.    Cough  1. Nebulizer treatment today at [facility].  2. I will order a nebulizer today. Use every four hours while you're awake. Use nebulizer for emergencies, or when you feel no improvement with inhaler.  - You can rinse the plastic mask and tube with water and let air dry on a counter.    3. Continue using inhaler as needed.  4. I will prescribe prednisone today. Take two tablets daily for five days.  5. Tetanus shot today at [facility].  6. Follow up with me in one week.    "

## 2018-12-17 NOTE — PROGRESS NOTES
[facility]:  Care Coordination Note     SITUATION   Flor Navarro is a 53 year old female who came for for an appt with her PCP.    BACKGROUND   Flor had questions about some [facility] services she is receiving and wanted help contacting her [facility]     ASSESSMENT   Pt's [facility]  (Sujatha Hyman [phone])  Through [facility] had done some sort of assessment and told the pt she was going to be getting 3/hrs week more of services for someone to help her get out of her house and into the community. She was also told she was going to get a home care nurse to come to her house and help with meds. This assessment was about 3 weeks ago and she has not heard anything since. The pt would like help contacting the  to see what is happening with those services.    PLAN     Follow-up plan: called and left a VM with the [facility]  asking her to be in contact with the pt about those services.     Susannah Corbin  Graduate Social Work Intern

## 2018-12-17 NOTE — PROGRESS NOTES
HPI       Flor Navarro is a 53 year old  who presents for   Chief Complaint   Patient presents with     Generalized Body Aches     Cough  Reports she's had a productive cough for the past 7 days accompanied by fever and flu-like sx. CP in the back and front, only when she's coughing. Mucus is thick and white. Uses inhaler when breathing gets difficult at night, with improvement. Does not have a nebulizer, feels she doesn't need it because the inhaler is providing enough relief. Despite feeling ill she's been able to take meds including transplant meds. Endorses lightheaded, dizziness, diaphoresis, bilaterally ribs pain in the upper back, mid back and mid thoracic, pain in ears (feels like needles), bloating. Denies pain extending down the arm.     Left Shoulder and Knee Pain  Was prescribed lidocaine, feels like it does not relieve the pain. Uses biofreeze with improvement. Requests refills on Biofreeze.     Social Hx  Explains that she's been having issues with transportation, describes four different instances where she called for a ride and they did not come. She contacted insurance regarding the issue, and it was resolved. States that someone from the [facility] was supposed to come with helping her with meds for three hours every week, and she hasn't received these services.    An Occitan  was used for this visit.     +++++++      Problem, Medication and Allergy Lists were reviewed and updated if needed..    Patient is an established patient of this clinic..         Review of Systems:   Review of Systems    Positive for: lightheaded, dizziness, diaphoresis, bilaterally ribs pain in the upper back, mid back and mid thoracic, left knee and left shoulder pain, pain in ears (feels like needles), and bloating.     Negative for: pain extending down the arm.     See HPI for additional sx.    This document serves as a record of the services and decisions personally performed and made by Karely Sierra MD.  It was created on his/her behalf by Charli Valdez, a trained medical scribe. The creation of this document is based the provider's statements to the medical scribe.  Ameliaibscott Valdez 10:11 AM, December 17, 2018       Physical Exam:     Vitals:    12/17/18 1044   BP: 137/88   Pulse: 72   Temp: 97.9  F (36.6  C)   TempSrc: Oral   SpO2: 94%   Weight: 105.2 kg (232 lb)     Body mass index is 44.56 kg/m .  Vitals were reviewed and were normal, except SpO2 of 94.     Physical Exam    BMI= Body mass index is 44.56 kg/m .   GENERAL: Paroxysms of cough where she tries to stabilize anterior chest wall.  EAR: left: mild erythema of the left TM, no purulence, pain with movement of the pina. Right: normal  RESP: lungs clear to auscultation - anterior wheezing.   Post Nebulizer tx: Anterior wheezing resolved, pt feels subjectively better.  CV: regular rates and rhythm, normal S1 S2, no S3 or S4 and no murmur, no click or rub   PSYCH: Alert and oriented times 3, normal rate and volume; affect- anxious    Results:   No testing ordered today    Assessment and Plan   Flor was seen today for generalized body aches.    Diagnoses and all orders for this visit:    Cough  -     albuterol (PROVENTIL) neb solution 2.5 mg; Take 3 mLs (2.5 mg) by nebulization once    Other chest pain  At risk for serious cause of chest pain, but given exam and hx does not appear to be MI, ACS, CHF, dissection but pulmonary cause and improved with albuterol . SW spoke with DME supplier who will fill today and get to her home tomorrow - critical due to pts lack of english speaking abilities. Discussed prednisone. Reassured her this isn't liver failure but pain with cough pulls at scar - biofreeze ok to use and heat.     Pain in joint of right shoulder  Peroneal tendonitis, left  -     Menthol, Topical Analgesic, (BIOFREEZE COLORLESS) 4 % GEL; Apply to affected areas BID    Mild intermittent asthma with acute exacerbation  -     albuterol (ACCUNEB) 0.63  "MG/3ML neb solution; Take 3 mLs (0.63 mg) by nebulization every 4 hours (while awake)  -     predniSONE (DELTASONE) 20 MG tablet; Take 40 mg by mouth daily for 5 days.  -     order for DME; Equipment being ordered: Nebulizer with adult mask and tubing    Need for diphtheria-tetanus-pertussis (Tdap) vaccine  -     ADMIN VACCINE, INITIAL    Acute otitis externa of left ear, unspecified type  -     ciprofloxacin (CETRAXAL) 0.2 % otic solution; Place 0.25 mLs Into the left ear 2 times daily for 7 days  - Nebulizer education today. Provided her with adult mask and tubing. Coordinated with care coordinators to have nebulizer mailed to pts home.   Other orders  -     TDAP VACCINE (BOOSTRIX)    Medications Discontinued During This Encounter   Medication Reason     order for DME      order for DME      order for DME      order for DME      Menthol, Topical Analgesic, (BIOFREEZE COLORLESS) 4 % GEL Reorder        Patient Instructions   Medication Management  1. I will have our care coordinators/ social work help with enrolling you in [facility].    Left Shoulder and Knee Pain  1. Continue using Biofreeze.     Ear Pain  1. Put 3-4 drops in the ear and push on the \"pump\" of the ear to move the fluid through. Use these drops twice daily for one week.    Cough  1. Nebulizer treatment today at [facility].  2. I will order a nebulizer today. Use every four hours while you're awake. Use nebulizer for emergencies, or when you feel no improvement with inhaler.  - You can rinse the plastic mask and tube with water and let air dry on a counter.    3. Continue using inhaler as needed.  4. I will prescribe prednisone today. Take two tablets daily for five days.  5. Tetanus shot today at [facility].  6. Follow up with me in one week.    Options for treatment and follow-up care were reviewed with the patient. Flor Navarro  engaged in the decision making process and verbalized understanding of the options discussed and agreed with the final " plan.    The information in this document, created by the medical scribe for me, accurately reflects the services I personally performed and the decisions made by me. I have reviewed and approved this document for accuracy prior to leaving the patient care area.    Karely Sierra MD  10:11 AM, 12/17/18

## 2018-12-19 ENCOUNTER — TELEPHONE (OUTPATIENT)
Dept: FAMILY MEDICINE | Facility: CLINIC | Age: 53
End: 2018-12-19

## 2018-12-19 NOTE — TELEPHONE ENCOUNTER
RN received call from patient who was told she cannot get her nebulization medications until she gave them information regarding the name of the machine, the date she received it, and wether it is a rental or a permanently owned machine. RN dug into the whereabouts of her nebulizer machine. Varsha contacted [employer] and they had told her that they would order a machine and send it to the patient the next day. RN called and asked the status, and they stated that they actually do not process nebulizer machines, however the pharmacy did not contact our office to tell us this.     RN spoke with many DME locations to try to find the quickest one, and with her medicare there are limited options. [facility] is the most viable option, however they were unable to give me an exact ETA on the arrival, somewhere between Friday and Monday with delivery.    RN attempted contacting patient via language line to inform her of the company that is going to be handling her machine, and reiterate to her that once she gets the machine she will still need to call the pharmacy with the info bolded above due to medicare. There was no answer and voicemail was full. RN will try again tomorrow.  Elisabet Olson RN

## 2018-12-20 NOTE — TELEPHONE ENCOUNTER
RN received another call, today from [facility], they cannot accept medicare either, despite the question being asked yesterday on the phone. She informed me that [facility] does accept medicare, RN faxed order to [facility] and stated that it is needed ASAP.  Elisabet Olson RN

## 2018-12-26 NOTE — TELEPHONE ENCOUNTER
Left message with language line on Carleen's phone to follow up and ensure if she got her nebulizer and medications for it. Flor's phone has a full mailbox.  Elisabet Olson RN

## 2018-12-31 ENCOUNTER — TELEPHONE (OUTPATIENT)
Dept: FAMILY MEDICINE | Facility: CLINIC | Age: 53
End: 2018-12-31

## 2018-12-31 DIAGNOSIS — J20.9 ACUTE BRONCHITIS, UNSPECIFIED ORGANISM: ICD-10-CM

## 2018-12-31 NOTE — TELEPHONE ENCOUNTER
Amada with [facility] returning RN's call. Attempted transfer to RN; not available. Amada advised that she has tried reaching out to the patient regarding the nebulizer, but has not been able to reach her or leave a message; she will continue to try and reach the patient. RN- please return Amada's call at [phone].

## 2018-12-31 NOTE — TELEPHONE ENCOUNTER
RN attempted to return her call, the answering line could not get through to her specifically and stated that they would have her call back.  Elisabet Olson RN

## 2018-12-31 NOTE — TELEPHONE ENCOUNTER
Spoke with Latonia who stated that she was getting information from Flor's  Son Carleen. Flor told Carleen that she was supposed to get oxygen, but did not receive it. RN called Carleen to gain clarification on wether this was for the nebulizer that was ordered several weeks ago or oxygen. He stated that his Mother thought it was oxygen, but he also stated that they did not receive anything yet.     RN then contacted [facility] regarding nebulizer and left message for them to call back. If they callback, please transfer to RN. This cannot wait and she absolutely needs this, it was ordered ASAP back on the 19th. RN will also connect with PCP to ask about O2.    Addendum: RN connected with PCP and the nebulizer is what we are waiting on.  Elisabet Olson RN

## 2018-12-31 NOTE — TELEPHONE ENCOUNTER
[facility] phone call message - order or referral request from patient:     Order or referral being requested: Requested order FOR OXYGEN  Additional Details: Nurse care coordinator with [employer] called.  Pt was to have oxygen delivered and has yet to receive it.  Please call RN Latonia to discuss this.  You can leave a voicemail up to 430pm today on this number listed.  She will be available anytime up to then.  Please call her.  I advised the date seen was 12/17/18 and is to f/up on 01/02/2019 with [facility].        OK to leave a message on voice mail? Yes    Primary language: Turkmen      needed? Yes    Call taken on December 31, 2018 at 11:04 AM by Carrol Saldivar    Order request route to [facility] TRIAGE   Referrals Route to [facility] (Green/[place]/Purple) CARE COORDINATOR

## 2019-01-02 ENCOUNTER — OFFICE VISIT (OUTPATIENT)
Dept: FAMILY MEDICINE | Facility: CLINIC | Age: 54
End: 2019-01-02
Payer: MEDICARE

## 2019-01-02 ENCOUNTER — TELEPHONE (OUTPATIENT)
Dept: FAMILY MEDICINE | Facility: CLINIC | Age: 54
End: 2019-01-02

## 2019-01-02 DIAGNOSIS — J45.21 MILD INTERMITTENT ASTHMA WITH ACUTE EXACERBATION: ICD-10-CM
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
## 2019-01-02 DIAGNOSIS — R42 DIZZINESS: ICD-10-CM

## 2019-01-02 DIAGNOSIS — R29.6 RECURRENT FALLS: ICD-10-CM

## 2019-01-02 NOTE — TELEPHONE ENCOUNTER
[facility] phone call message - order or referral request from patient:     Order or referral being requested: Requested order  Verbal order for starter nursing care  Additional Details: Need verbal order for starter nursing care for medication management and assessment.     Referral only -Specialty Home care  Location     OK to leave a message on voice mail? Yes    Primary language: Yi      needed? Yes    Call taken on January 2, 2019 at 12:22 PM by Kavita Bullard    Order request route to [facility] TRIAGE   Referrals Route to [facility] (Green/[place]/Purple) CARE COORDINATOR

## 2019-01-02 NOTE — PATIENT INSTRUCTIONS
1. Use your nebulizer every 4 hours for cough, chest tightness, and shortness of breath.  2. Go see the dizziness and balance center they will probably recommend OT and PT and I think you need both.  3. Our care coordinator will help you schedule and help with getting the nebulizer delivered to your home.    [facility]  [address]  [address]  [facility] [address]  1/9/2019 @ 1:30p.m with   Bring insurance cards and photo ID    Patient informed in clinic

## 2019-01-02 NOTE — PROGRESS NOTES
HPI       Flor Navarro is a 53 year old  who presents for No chief complaint on file.    Fall  Reports she fell off of an electric chair 15 days. She fell onto her face and went under the chair. States she would like to lose weight.     Confusion  Feels she's been confused ever since her liver issues. She's felt some improvement after the transplant. States she has a used the emergency bracelet once. Used to use a cane, but thinks she lost it at a store so no longer has one.    Cough  Has not received nebulizer yet. Reports that she just received her prednisone and ear drops two days ago, when they were ordered on 12/17/18.     Social Hx  Reports that she's been getting $30 for food stamps, wonders if I could write a letter to support her case.    An Syriac  was used for this visit.    Problem, Medication and Allergy Lists were reviewed and updated if needed..    Patient is an established patient of this clinic..         Review of Systems:   Review of Systems     Negative for: lightheadedness    Positive for: confusion, lack of alertness, forgetful, dizziness, lack of balance,     See HPI for additional sx.    This document serves as a record of the services and decisions personally performed and made by Karely Sierra MD. It was created on his/her behalf by Charli Valdez, a trained medical scribe. The creation of this document is based the provider's statements to the medical scribe.  Nelson Valdez 3:39 PM, January 2, 2019       Physical Exam:    /87 (BP Location: Left arm, Patient Position: Sitting, Cuff Size: Adult Regular)    Pulse 68    Temp 97.6  F (36.4  C) (Oral)    Resp 16    Wt 104.7 kg (230 lb 12.8 oz)    SpO2 100%    BMI 44.33 kg/m    BSA 2.11 m  Vitals were reviewed and were normal     Physical Exam    BMI= There is no height or weight on file to calculate BMI.   GENERAL: healthy, alert and no distress  MS: Left Ankle: painful with eversion most prominent swelling over  the lateral malleolus, some swelling over the medial malleolus and lateral ankle.   SKIN: Palpable lump left forearm.  PSYCH: Alert and oriented times 3; speech- coherent , normal rate and volume; able to articulate logical thoughts, able to abstract reason, affect- normal.      Results:   No testing ordered today    Assessment and Plan   Diagnoses and all orders for this visit:    Fall, initial encounter  -     PHYSICAL THERAPY REFERRAL - EXTERNAL; Future    Recurrent falls  -     PHYSICAL THERAPY REFERRAL - EXTERNAL; Future    Dizziness  -     PHYSICAL THERAPY REFERRAL - EXTERNAL; Future  -  Consider neuro referral for dementia evaluation given fall and confusion. Neuro psych cannot validate because cross language.     There are no discontinued medications.    Asthma - Care Coordinator working with her to get neb at home   1. Use your nebulizer every 4 hours for cough, chest tightness, and shortness of breath.  2. Go see the dizziness and balance center they will probably recommend OT and PT and I think you need both.  3. Our care coordinator will help you schedule and help with getting the nebulizer delivered to your home.    [facility]  [address]  [address]  [facility] [address]  1/9/2019 @ 1:30p.m with   Bring insurance cards and photo ID    Patient informed in clinic.    Options for treatment and follow-up care were reviewed with the patient. Flor Navarro  engaged in the decision making process and verbalized understanding of the options discussed and agreed with the final plan.    The information in this document, created by the medical scribe for me, accurately reflects the services I personally performed and the decisions made by me. I have reviewed and approved this document for accuracy prior to leaving the patient care area.  I spent 25 min face to face with the patient and >50% was spent counselling the patient about the above medical conditions, educating patient on  their medical conditions, behavioral interventions and supports.      Karely Sierra MD  3:39 PM, 01/02/19

## 2019-01-02 NOTE — TELEPHONE ENCOUNTER
RN made another attempt to reach Amada, they are placing a message to have her return my call.  Elisabet Olson RN

## 2019-01-02 NOTE — NURSING NOTE
Due to patient being non-English speaking/uses sign language, an  was used for this visit. Only for face-to-face interpretation by an external agency, date and length of interpretation can be found on the scanned worksheet.     name: Frankie ID#[id number]  Agency: [employer]  Language: Indonesian   Type of interpretation: Telephone, spoken     VIKAS Bentley 3:55 PM January 2, 2019

## 2019-01-03 ENCOUNTER — MEDICAL CORRESPONDENCE (OUTPATIENT)
Dept: HEALTH INFORMATION MANAGEMENT | Facility: CLINIC | Age: 54
End: 2019-01-03

## 2019-01-03 DIAGNOSIS — Z94.4 LIVER REPLACED BY TRANSPLANT (H): ICD-10-CM

## 2019-01-03 DIAGNOSIS — N18.9 ANEMIA IN CHRONIC KIDNEY DISEASE, UNSPECIFIED CKD STAGE: Primary | ICD-10-CM

## 2019-01-03 DIAGNOSIS — M81.0 OSTEOPOROSIS WITHOUT CURRENT PATHOLOGICAL FRACTURE, UNSPECIFIED OSTEOPOROSIS TYPE: ICD-10-CM

## 2019-01-03 DIAGNOSIS — D63.1 ANEMIA IN CHRONIC KIDNEY DISEASE, UNSPECIFIED CKD STAGE: Primary | ICD-10-CM

## 2019-01-03 RX ORDER — CYCLOSPORINE 25 MG/1
100 CAPSULE, LIQUID FILLED ORAL EVERY 12 HOURS
Qty: 720 CAPSULE | Refills: 3 | Status: SHIPPED | OUTPATIENT
Start: 2019-01-03 | End: 2019-04-12

## 2019-01-03 NOTE — TELEPHONE ENCOUNTER
Calcium Carb 600/Vit D 400  Qty 60  Last Fill 10/29/2018    Thank you  Nereida Pace [facility]

## 2019-01-03 NOTE — TELEPHONE ENCOUNTER
"Request for medication refill: Ferrous sulfate and Calcium +D    Providers if patient needs an appointment and you are willing to give a one month supply please refill for one month and  send a letter/MyChart using \".SMILLIMITEDREFILL\" .smillimited and route chart to \"P SMI \" (Giving one month refill in non controlled medications is strongly recommended before denial)    If refill has been denied, meaning absolutely no refills without visit, please complete the smart phrase \".smirxrefuse\" and route it to the \"P SMI MED REFILLS\"  pool to inform the patient and the pharmacy.    Elisabet Olson RN       "

## 2019-01-03 NOTE — TELEPHONE ENCOUNTER
RN made 4th attempt after still not hearing back. This time RN requested to speak with  and they have placed a page for them to contact me. If for some reason I am unable to be reached for a direct transfer, please obtain a direct line to contact them back at. Call center (number given below) has not worked out for me to reach anyone thus far.  Elisabet Olson RN

## 2019-01-03 NOTE — TELEPHONE ENCOUNTER
Calcium Carb 600/Vit D 400  Qty 60  Last Fill 10/29/2018     Ferrous Sulfate 325 (65 FE)MG  Qty N/A  Last Fill N/A    Thank you  Nereida Pace [facility]

## 2019-01-03 NOTE — TELEPHONE ENCOUNTER
RN made third attempt to reach someone (rolled to answering service again), and made it very clear that I absolutely need to speak with anyone in the office today and that this is very urgent and physician is needing this to be taken care of. Gave them our contact information again, as well as contact information for Courtney so that he can be contacted regarding her order.  Elisabet Olson, ASAEL

## 2019-01-04 ENCOUNTER — TELEPHONE (OUTPATIENT)
Dept: FAMILY MEDICINE | Facility: CLINIC | Age: 54
End: 2019-01-04

## 2019-01-04 ENCOUNTER — MEDICAL CORRESPONDENCE (OUTPATIENT)
Dept: HEALTH INFORMATION MANAGEMENT | Facility: CLINIC | Age: 54
End: 2019-01-04

## 2019-01-04 NOTE — TELEPHONE ENCOUNTER
1/4/19 Patient asked me last week when we met to contact her 5 days before her appointment so that she can set up transportation. I attempted to contact the patient using [employer], however patient mailbox was full. I then tried to contact patient's son Courtney ([phone]) I left a detailed message, along with my direct number on his voicemail.    Shahla Shepherd  Care Coordinator

## 2019-01-04 NOTE — TELEPHONE ENCOUNTER
Sera calling from [facility] to speak with RN about nebulizer. RN not available. Please return Sera's call at [phone].

## 2019-01-04 NOTE — TELEPHONE ENCOUNTER
"Per Kavita in another encounter, \"Sary states that she is returning Elisabet's call regarding patient's nebulizer. Please give her a call back.  \"    RN was able to speak with Sary from [facility] who stated that they made several calls to the patient which were all unsuccessful. RN expressed understanding, however requested communication to our clinic when something like this happens, so that we are aware of the situation. She was apologetic and stated that they are going to make an attempt to go to patient's home today despite being unable to reach. If they are not home, it cannot be delivered since they need to sign for it. Sary stated that they can arrange a delivery date other than Friday (the routine date for that area of [facility]) when the order is urgent, so if the delivery today is unsuccessful, once contact with patient can be made, they can deliver another day next week. She will call with an update after the attempt to deliver.    RN placed the alternate order with [facility] on hold, but did not have them shred the order, in case we need to switch.  Elisabet Olson, RN   "

## 2019-01-04 NOTE — TELEPHONE ENCOUNTER
Sera called back again and they can deliver today if they can reach the son or patient, because they need to be home for the delivery. They only deliver Friday's.     He is not answering their calls. RN still requested to speak with a supervisor because of the lack of communication this week.    RN made another attempt to reach [facility]. No answer.  Elisabet Olson, RN

## 2019-01-04 NOTE — TELEPHONE ENCOUNTER
RN recalled that after the first of the year all companies are able to take Medicare for DME. RN contacted [facility] and informed them of the situation and faxed the order to them instead.  RN placed a 5th to call to [facility], and will cancel them upon getting a call back, along with speaking with supervisor due to the lack of communication or follow through on this urgent order.  Elisabet Olson RN

## 2019-01-04 NOTE — TELEPHONE ENCOUNTER
Sary states that she is returning Elisabet's call regarding patient's nebulizer. Please give her a call back.

## 2019-01-04 NOTE — TELEPHONE ENCOUNTER
Was able to connect with Sera from [facility] and if they can deliver today, I will keep them. If not, will go with Raje[facility]. She will be calling me back. They have been calling the home number for Flor with no success.     RN verbalized that all of my attempts to call [facility] this week were to ensure they had Courtney's number, and that it is important for them to communicate with us when there are issues such as this, so that we are informed.    RN also left message for the son that he needs to answer the phone when they call so that it can be delivered.  Elisabet Olson RN

## 2019-01-07 NOTE — TELEPHONE ENCOUNTER
Please send refill of Ferrous Sulfate, thanks!    Thank you!  Lila Rodriguez CPhT  [facility]

## 2019-01-08 ENCOUNTER — DOCUMENTATION ONLY (OUTPATIENT)
Dept: FAMILY MEDICINE | Facility: CLINIC | Age: 54
End: 2019-01-08

## 2019-01-08 RX ORDER — FERROUS SULFATE 325(65) MG
325 TABLET ORAL
Qty: 30 TABLET | Refills: 0 | Status: SHIPPED | OUTPATIENT
Start: 2019-01-08 | End: 2019-02-06

## 2019-01-08 NOTE — PROGRESS NOTES
Form has been completed by provider.     Form sent out via: Fax to [facility]  at Fax Number: [phone]  Patient informed: No  Output date: January 8, 2019    Dayanna Batista      **Please close the encounter**

## 2019-01-09 NOTE — TELEPHONE ENCOUNTER
RN left a voicemail for Sary to follow up from last week and see if they were able to deliver the nebulizer (if patient was home), or if not, if any contact was able to be made with the patient since then to arrange a delivery.  Elisabet Olson RN

## 2019-01-10 ENCOUNTER — TELEPHONE (OUTPATIENT)
Dept: FAMILY MEDICINE | Facility: CLINIC | Age: 54
End: 2019-01-10

## 2019-01-10 ENCOUNTER — DOCUMENTATION ONLY (OUTPATIENT)
Dept: FAMILY MEDICINE | Facility: CLINIC | Age: 54
End: 2019-01-10

## 2019-01-10 DIAGNOSIS — I61.9 STROKE, HEMORRHAGIC (H): Primary | ICD-10-CM

## 2019-01-10 DIAGNOSIS — J45.21 MILD INTERMITTENT ASTHMA WITH ACUTE EXACERBATION: ICD-10-CM

## 2019-01-10 DIAGNOSIS — M81.0 OSTEOPOROSIS WITHOUT CURRENT PATHOLOGICAL FRACTURE, UNSPECIFIED OSTEOPOROSIS TYPE: ICD-10-CM

## 2019-01-10 NOTE — TELEPHONE ENCOUNTER
Routing to PCP. Please update order so that she has a large enough quantity to do 30 days worth.  Elisabet Olson RN

## 2019-01-10 NOTE — PROGRESS NOTES
Patient needs a refill of her ASA, 81 mg and Calcium 600mg + Vitamin D 400 international unit(s) to pharmacy.    Thank you,    You may route your orders/recommendations back through [facility],    Thank you,    Parul Arthur RN, BSN    01/10/19  3:41 PM   [facility]  [phone]   [email or web address]

## 2019-01-10 NOTE — TELEPHONE ENCOUNTER
RN reached Sary from [facility] to check on the status of the nebulizer. She saw that they were able to make contact with the son, but cannot tell the full status of wether it was delivered or not. She is reaching out to the employee who handled it, and she will be calling back.    If I am unable to take return call, please take a message on the status for me. Thanks.  Elisabet Olson RN

## 2019-01-10 NOTE — PROGRESS NOTES
Pt had refill of Ca/D sent on 1/8/19  ASA should NEVER be prescribed unless her MI risk was >7.5. Current risk at 3% -she has been told many times, but continues to take OTC so it gets back on med list (historical) but she has listed as allergy. She has had a hemorrhagic stroke so should not take ASA.

## 2019-01-10 NOTE — TELEPHONE ENCOUNTER
RN received a returned call from Sary and Flor was set up with the nebulizer on Friday 1/4/19 after hours.    Routing to PCP as an FYI. Please close encounter once you see it.  Elisabet Olson RN

## 2019-01-10 NOTE — TELEPHONE ENCOUNTER
[facility] phone call message- general phone call:    Reason for call: patient was set up for nebulizer on 01/04/19 after hours.    Action Desired: call Sary    Return call needed: Yes    OK to leave a message on voice mail? Yes    Advised patient response may take up to 2 business days: Yes    Primary language: Yoruba      needed? Yes    Call taken on January 10, 2019 at 3:55 PM by Carrol Saldivar    [facility] to [facility]

## 2019-01-10 NOTE — TELEPHONE ENCOUNTER
[facility] phone call message- medication clarification/question:    Full Medication Name: albuterol   0.63 solution Dose: Take 3 mLs (0.63 mg) by nebulization every 4 hours (while awake),     Question/Clarification needed: Please call the pharmacy to update the qty as the current qty is not enough for 30 prescription.       Pharmacy confirmed as   [facility] - [place], [place] - [address] [place] [address]  [address] [place] [id number]  Phone: [phone] Fax: [phone]  : Yes    Please leave ONLY preferred pharmacy    OK to leave a message on voice mail? Yes    Advised patient that RN would call back within 3 hours, unless emergent.    Primary language: Chinese      needed? Yes    Call taken on January 10, 2019 at 2:24 PM by Carrol Saldivar    Route to [facility]

## 2019-01-11 ENCOUNTER — TELEPHONE (OUTPATIENT)
Dept: FAMILY MEDICINE | Facility: CLINIC | Age: 54
End: 2019-01-11

## 2019-01-11 RX ORDER — ALBUTEROL SULFATE 0.63 MG/3ML
1 SOLUTION RESPIRATORY (INHALATION)
Qty: 360 ML | Refills: 1 | Status: SHIPPED | OUTPATIENT
Start: 2019-01-11 | End: 2019-04-26

## 2019-01-11 RX ORDER — ALBUTEROL SULFATE 0.63 MG/3ML
1 SOLUTION RESPIRATORY (INHALATION)
Qty: 220 ML | Refills: 1 | Status: SHIPPED | OUTPATIENT
Start: 2019-01-11 | End: 2019-01-11

## 2019-01-11 NOTE — TELEPHONE ENCOUNTER
Sent with 220 - that was listed as 30 days upply in epic but apparently wasn't  Resent again today w/ supply 360ml

## 2019-01-11 NOTE — TELEPHONE ENCOUNTER
Jesus calling from [facility] to inquire if provider could send new prescription for a 30 day supply (360 mL) of albuterol. Patient has Medicare.

## 2019-01-11 NOTE — TELEPHONE ENCOUNTER
"[facility] phone call message- medication clarification/question:    Full Medication Name: albuterol (ACCUNEB) 0.63 MG/3ML neb solution   Dose: Take 3 mLs (0.63 mg) by nebulization every 4 hours (while awake    Question/Clarification needed: \"pharmacy has to bill Medicare, so we need  A 1 month supply of 360ml\".    Pharmacy confirmed as   [facility] - [place], [place] - [address] [place] [id number]  Phone: [phone] Fax: [phone]  : Yes    Please leave ONLY preferred pharmacy    OK to leave a message on voice mail? Yes    Advised patient that RN would call back within 3 hours, unless emergent.    Primary language: Turkmen      needed? Yes    Call taken on January 11, 2019 at 10:06 AM by Shahla Shepherd    Route to [facility] TRIAGE    "

## 2019-01-12 NOTE — TELEPHONE ENCOUNTER
Thank you so much.  This will be discussed with the patient again in depth and meds will be removed.    Thank you,    DANETTE JUDD RN on 1/11/2019 at 8:38 PM

## 2019-01-14 ENCOUNTER — DOCUMENTATION ONLY (OUTPATIENT)
Dept: FAMILY MEDICINE | Facility: CLINIC | Age: 54
End: 2019-01-14

## 2019-01-14 NOTE — PROGRESS NOTES
"When opening a documentation only encounter, be sure to enter in \"Chief Complaint\" Forms and in \" Comments\" Title of form, description if needed.    Flor is a 53 year old  female  Form received via: Fax  Form now resides in: Provider Ready    Dayanna Batista    Form has been completed by provider.     Form sent out via: Fax to  at Fax Number: [phone]  Patient informed: No  Output date: January 21, 2019    Dayanna Batista      **Please close the encounter**                '  "

## 2019-01-17 DIAGNOSIS — S52.124A: ICD-10-CM
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
## 2019-01-17 RX ORDER — LIDOCAINE 50 MG/G
PATCH TOPICAL
Qty: 30 PATCH | Refills: 0 | Status: SHIPPED | OUTPATIENT
Start: 2019-01-17 | End: 2019-02-06

## 2019-01-17 NOTE — TELEPHONE ENCOUNTER

## 2019-01-24 DIAGNOSIS — M25.511 PAIN IN JOINT OF RIGHT SHOULDER: ICD-10-CM

## 2019-01-24 DIAGNOSIS — M76.72 PERONEAL TENDONITIS, LEFT: ICD-10-CM

## 2019-01-24 NOTE — TELEPHONE ENCOUNTER
"Request for medication refill: Biofreeze    Providers if patient needs an appointment and you are willing to give a one month supply please refill for one month and  send a letter/MyChart using \".SMILLIMITEDREFILL\" .smillimited and route chart to \"P SMI \" (Giving one month refill in non controlled medications is strongly recommended before denial)    If refill has been denied, meaning absolutely no refills without visit, please complete the smart phrase \".smirxrefuse\" and route it to the \"P SMI MED REFILLS\"  pool to inform the patient and the pharmacy.    Elisabet Olson RN        "

## 2019-01-28 PROBLEM — F33.1 MODERATE EPISODE OF RECURRENT MAJOR DEPRESSIVE DISORDER (H): Status: ACTIVE | Noted: 2019-01-28

## 2019-01-29 ENCOUNTER — OFFICE VISIT (OUTPATIENT)
Dept: FAMILY MEDICINE | Facility: CLINIC | Age: 54
End: 2019-01-29
Payer: MEDICARE

## 2019-01-29 VITALS
WEIGHT: 232 LBS | SYSTOLIC BLOOD PRESSURE: 153 MMHG | BODY MASS INDEX: 44.56 KG/M2 | DIASTOLIC BLOOD PRESSURE: 93 MMHG | HEART RATE: 73 BPM | OXYGEN SATURATION: 95 % | TEMPERATURE: 97.5 F

## 2019-01-29 DIAGNOSIS — F33.42 RECURRENT MAJOR DEPRESSIVE DISORDER, IN FULL REMISSION (H): ICD-10-CM

## 2019-01-29 DIAGNOSIS — M70.41 PREPATELLAR BURSITIS OF BOTH KNEES: Primary | ICD-10-CM

## 2019-01-29 DIAGNOSIS — M70.42 PREPATELLAR BURSITIS OF BOTH KNEES: Primary | ICD-10-CM

## 2019-01-29 DIAGNOSIS — M76.822 POSTERIOR TIBIAL TENDINITIS OF LEFT LOWER EXTREMITY: ICD-10-CM

## 2019-01-29 DIAGNOSIS — R60.0 LOCALIZED EDEMA: ICD-10-CM

## 2019-01-29 DIAGNOSIS — M25.572 PAIN IN JOINT INVOLVING ANKLE AND FOOT, LEFT: ICD-10-CM

## 2019-01-29 RX ORDER — ACETAMINOPHEN 500 MG
500 TABLET ORAL 3 TIMES DAILY PRN
Qty: 120 TABLET | Refills: 3 | Status: SHIPPED | OUTPATIENT
Start: 2019-01-29 | End: 2019-08-08

## 2019-01-29 ASSESSMENT — PATIENT HEALTH QUESTIONNAIRE - PHQ9: SUM OF ALL RESPONSES TO PHQ QUESTIONS 1-9: 3

## 2019-01-29 NOTE — NURSING NOTE
Due to patient being non-English speaking/uses sign language, an  was used for this visit. Only for face-to-face interpretation by an external agency, date and length of interpretation can be found on the scanned worksheet.     name: nate awad  Agency: [employer]  Language: Amharic   Telephone number: [phone]  Type of interpretation: Face-to-face, spoken

## 2019-01-29 NOTE — PATIENT INSTRUCTIONS
Medications  1. I cannot order automatic refills, it's something you would have to do with the pharmacy. I will get Shahla, our care coordinator, to help you with this.  2. Ordered compression socks today.    Mood  1. We will treat your mood through talk therapy with  for now.    Knee Pain  1. Avoid kneeling during praying, or if you're going to kneel you can use a pad for cushion.  2. Ice the swollen area of both your knees. You can fill a paper cup with water, put it in the freezer until it freezes. Peel the cup off and massage your knee with the ice.  3. Use Biofreeze on the area as needed.    Weight  1. Follow up with me in one month to discuss weight.
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
## 2019-01-31 DIAGNOSIS — K59.09 CONSTIPATION, CHRONIC: ICD-10-CM

## 2019-02-01 ENCOUNTER — TELEPHONE (OUTPATIENT)
Dept: FAMILY MEDICINE | Facility: CLINIC | Age: 54
End: 2019-02-01

## 2019-02-01 NOTE — TELEPHONE ENCOUNTER
"Patient was into see  on 1/29/19. I was asked to contact patient pharmacy \"[facility] [phone] Fax) to set up \"automatic refills\". I contacted the Pharmacy and they \"do not have the option of auto refills\". \"Patient will need to call into Pharmacy to order / refill medications when patient is down to 7-10 pills\". I have attempted to reach the patient several times to inform her of this. My last call was today again using [employer] ([phone]) Belarusian  ID # [id number]. Patient mailbox has been consistently full, we have been unable to leave a message. Will follow up with patient at next clinic visit in one month, per  note.    Shahla Shepherd  Care Coordinator    "
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
## 2019-02-06 DIAGNOSIS — J20.9 ACUTE BRONCHITIS, UNSPECIFIED ORGANISM: ICD-10-CM

## 2019-02-06 DIAGNOSIS — N18.9 ANEMIA IN CHRONIC KIDNEY DISEASE, UNSPECIFIED CKD STAGE: ICD-10-CM

## 2019-02-06 DIAGNOSIS — Z94.4 LIVER TRANSPLANTED (H): Primary | ICD-10-CM

## 2019-02-06 DIAGNOSIS — D63.1 ANEMIA IN CHRONIC KIDNEY DISEASE, UNSPECIFIED CKD STAGE: ICD-10-CM

## 2019-02-06 DIAGNOSIS — S52.124A: ICD-10-CM

## 2019-02-06 RX ORDER — LIDOCAINE 50 MG/G
PATCH TOPICAL
Qty: 30 PATCH | Refills: 0 | Status: SHIPPED | OUTPATIENT
Start: 2019-02-06 | End: 2019-04-26

## 2019-02-06 RX ORDER — ALBUTEROL SULFATE 90 UG/1
2 AEROSOL, METERED RESPIRATORY (INHALATION) 4 TIMES DAILY
Qty: 1 INHALER | Refills: 1 | Status: SHIPPED | OUTPATIENT
Start: 2019-02-06 | End: 2019-08-08

## 2019-02-06 RX ORDER — FERROUS SULFATE 325(65) MG
325 TABLET ORAL
Qty: 30 TABLET | Refills: 0 | Status: SHIPPED | OUTPATIENT
Start: 2019-02-06 | End: 2019-04-04

## 2019-02-06 NOTE — TELEPHONE ENCOUNTER
"Request for medication refill: Albuterol, ferrous sulfate, lidocaine    Providers if patient needs an appointment and you are willing to give a one month supply please refill for one month and  send a letter/MyChart using \".SMILLIMITEDREFILL\" .smillimited and route chart to \"P SMI \" (Giving one month refill in non controlled medications is strongly recommended before denial)    If refill has been denied, meaning absolutely no refills without visit, please complete the smart phrase \".smirxrefuse\" and route it to the \"P SMI MED REFILLS\"  pool to inform the patient and the pharmacy.    Elisabet Olson RN       "

## 2019-02-06 NOTE — TELEPHONE ENCOUNTER
Pt wants omeprazole 20mg BID instead of one 40mg cap    Thank you!  Lila Rodriguez Donn  [facility]

## 2019-02-11 DIAGNOSIS — Z94.4 LIVER REPLACED BY TRANSPLANT (H): ICD-10-CM

## 2019-02-11 RX ORDER — AMOXICILLIN 250 MG
2 CAPSULE ORAL 2 TIMES DAILY
Qty: 100 TABLET | Refills: 3 | Status: SHIPPED | OUTPATIENT
Start: 2019-02-11 | End: 2019-08-08

## 2019-02-26 NOTE — TELEPHONE ENCOUNTER
2/15/19 mailbox full.    Shahla Shepherd  Care Coordinator      2/26/19 called [employer] again,  ID # [id number]. Called patient and were able to leave voicemail with appointment information.    Shahla Shepherd  Care Coordinator

## 2019-02-26 NOTE — TELEPHONE ENCOUNTER
2/10/19 Called [employer]  ID # [id number], we called patient, voicemail full.    Shahla Shepherd  Care Coordinator

## 2019-02-28 ENCOUNTER — DOCUMENTATION ONLY (OUTPATIENT)
Dept: FAMILY MEDICINE | Facility: CLINIC | Age: 54
End: 2019-02-28

## 2019-02-28 NOTE — PROGRESS NOTES
Patient requesting Rx for cane and for knee brace. Is this something that can be done? Patient states she was to get it at last visit, but I do not see anything in chart.    You may route your orders/recommendations back through [facility],    Thank you,    Parul Arthur RN, BSN    02/28/19  1:02 PM   [facility]  [phone]   [email or web address]

## 2019-03-01 ENCOUNTER — OFFICE VISIT (OUTPATIENT)
Dept: FAMILY MEDICINE | Facility: CLINIC | Age: 54
End: 2019-03-01
Payer: MEDICARE

## 2019-03-01 ENCOUNTER — PATIENT OUTREACH (OUTPATIENT)
Dept: FAMILY MEDICINE | Facility: CLINIC | Age: 54
End: 2019-03-01

## 2019-03-01 VITALS
SYSTOLIC BLOOD PRESSURE: 130 MMHG | TEMPERATURE: 97.6 F | OXYGEN SATURATION: 94 % | HEART RATE: 66 BPM | WEIGHT: 233 LBS | BODY MASS INDEX: 44.76 KG/M2 | DIASTOLIC BLOOD PRESSURE: 77 MMHG

## 2019-03-01 DIAGNOSIS — Z63.8 FAMILY DISCORD: ICD-10-CM

## 2019-03-01 DIAGNOSIS — R26.81 GAIT INSTABILITY: ICD-10-CM

## 2019-03-01 DIAGNOSIS — R29.6 FALLS FREQUENTLY: ICD-10-CM

## 2019-03-01 DIAGNOSIS — R60.0 LOCALIZED EDEMA: ICD-10-CM

## 2019-03-01 DIAGNOSIS — E66.01 OBESITY, CLASS III, BMI 40-49.9 (MORBID OBESITY) (H): Primary | ICD-10-CM

## 2019-03-01 PROBLEM — E66.813 OBESITY, CLASS III, BMI 40-49.9 (MORBID OBESITY) (H): Status: ACTIVE | Noted: 2017-09-12

## 2019-03-01 SDOH — SOCIAL STABILITY - SOCIAL INSECURITY: OTHER SPECIFIED PROBLEMS RELATED TO PRIMARY SUPPORT GROUP: Z63.8

## 2019-03-01 NOTE — NURSING NOTE
Due to patient being non-English speaking/uses sign language, an  was used for this visit. Only for face-to-face interpretation by an external agency, date and length of interpretation can be found on the scanned worksheet.     name: Luisana Barry  Agency: [employer]  Language: Albanian   Telephone number: [phone]  Type of interpretation: Face-to-face, spoken

## 2019-03-01 NOTE — PROGRESS NOTES
[facility] now requests orders and shares plan of care/discharge summaries for some patients through [facility].  Please REPLY TO THIS MESSAGE OR ROUTE BACK TO THE AUTHOR in order to give authorization for orders when needed.  This is considered a verbal order, you will still receive a faxed copy of orders for signature.  Thank you for your assistance in improving collaboration for our patients.    ORDER    SN 4 x M x 2, 1 x M x 1, 2 PRN    Please note, bp at visit today was 146/100 sitting and 150/102 standing.  No s/s of chest pain, lightheadedness or dizziness. Patient has headache and has a long standing hx of this. Patient has SOB with minimal exertion. Patient is not currently on BP meds.    You may route your orders/recommendations back through [facility],    Thank you,    Parul Arthur RN, BSN    02/28/19  8:17 PM   [facility]  [phone]   [email or web address]

## 2019-03-01 NOTE — PROGRESS NOTES
[facility]:  Care Coordination Note     SITUATION   Flor Navarro is a 53 year old female who is receiving support for:  Clinic Care Coordination - Initial (DME/Scheduling)  .    BACKGROUND   Was in clinic for an appt with PCP    ASSESSMENT   Pt had compression stockings and a can ordered by her PCP. She wanted the order sent to the same place she has used before. She could not remember the name of the place but based on a chart review this company is [employer]. She also requested that the socks be black. Order faxed with pt's request for black stockings.     PCP also requested that she gets a weight management appointment scheduled. I attempted to help with this, but the phone numbers to schedule this were not working. She said that she is able to schedule it on her own, since it was not working today. I provided the phone number to the pt, so she can schedule the appt her self.    PLAN     Follow-up plan:  I will f/u with the DME company to make sure they received the order next week.        Susannah Corbin  Social Work

## 2019-03-01 NOTE — PATIENT INSTRUCTIONS
Mood  1. Follow up with .  2. Follow up with me in one month.     Weight  1. [facility]erral to [facility] at the [address].    2. Follow up with Dr. Anne as scheduled.
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
## 2019-03-04 ENCOUNTER — ANCILLARY PROCEDURE (OUTPATIENT)
Dept: GENERAL RADIOLOGY | Facility: CLINIC | Age: 54
End: 2019-03-04
Attending: INTERNAL MEDICINE
Payer: MEDICARE

## 2019-03-04 ENCOUNTER — OFFICE VISIT (OUTPATIENT)
Dept: GASTROENTEROLOGY | Facility: CLINIC | Age: 54
End: 2019-03-04
Attending: INTERNAL MEDICINE
Payer: MEDICARE

## 2019-03-04 ENCOUNTER — MEDICAL CORRESPONDENCE (OUTPATIENT)
Dept: HEALTH INFORMATION MANAGEMENT | Facility: CLINIC | Age: 54
End: 2019-03-04

## 2019-03-04 VITALS
SYSTOLIC BLOOD PRESSURE: 148 MMHG | DIASTOLIC BLOOD PRESSURE: 98 MMHG | WEIGHT: 233 LBS | BODY MASS INDEX: 44.76 KG/M2 | TEMPERATURE: 97.9 F | OXYGEN SATURATION: 92 % | HEART RATE: 65 BPM

## 2019-03-04 DIAGNOSIS — E66.812 CLASS 2 SEVERE OBESITY DUE TO EXCESS CALORIES WITH SERIOUS COMORBIDITY IN ADULT, UNSPECIFIED BMI (H): ICD-10-CM

## 2019-03-04 DIAGNOSIS — Z94.4 LIVER REPLACED BY TRANSPLANT (H): ICD-10-CM

## 2019-03-04 DIAGNOSIS — E66.01 CLASS 2 SEVERE OBESITY DUE TO EXCESS CALORIES WITH SERIOUS COMORBIDITY IN ADULT, UNSPECIFIED BMI (H): ICD-10-CM

## 2019-03-04 DIAGNOSIS — Z94.4 LIVER REPLACED BY TRANSPLANT (H): Primary | ICD-10-CM

## 2019-03-04 LAB
ALBUMIN SERPL-MCNC: 3.6 G/DL (ref 3.4–5)
ALBUMIN UR-MCNC: NEGATIVE MG/DL
ALP SERPL-CCNC: 100 U/L (ref 40–150)
ALT SERPL W P-5'-P-CCNC: 25 U/L (ref 0–50)
ANION GAP SERPL CALCULATED.3IONS-SCNC: 7 MMOL/L (ref 3–14)
APPEARANCE UR: CLEAR
AST SERPL W P-5'-P-CCNC: 19 U/L (ref 0–45)
BACTERIA #/AREA URNS HPF: ABNORMAL /HPF
BILIRUB DIRECT SERPL-MCNC: 0.2 MG/DL (ref 0–0.2)
BILIRUB SERPL-MCNC: 0.7 MG/DL (ref 0.2–1.3)
BILIRUB UR QL STRIP: NEGATIVE
BUN SERPL-MCNC: 28 MG/DL (ref 7–30)
CALCIUM SERPL-MCNC: 9 MG/DL (ref 8.5–10.1)
CHLORIDE SERPL-SCNC: 106 MMOL/L (ref 94–109)
CHOLEST SERPL-MCNC: 249 MG/DL
CO2 SERPL-SCNC: 27 MMOL/L (ref 20–32)
COLOR UR AUTO: YELLOW
CREAT SERPL-MCNC: 1.12 MG/DL (ref 0.52–1.04)
ERYTHROCYTE [DISTWIDTH] IN BLOOD BY AUTOMATED COUNT: 12.5 % (ref 10–15)
GFR SERPL CREATININE-BSD FRML MDRD: 55 ML/MIN/{1.73_M2}
GLUCOSE SERPL-MCNC: 93 MG/DL (ref 70–99)
GLUCOSE UR STRIP-MCNC: NEGATIVE MG/DL
HCT VFR BLD AUTO: 41.1 % (ref 35–47)
HDLC SERPL-MCNC: 48 MG/DL
HGB BLD-MCNC: 12.7 G/DL (ref 11.7–15.7)
HGB UR QL STRIP: NEGATIVE
KETONES UR STRIP-MCNC: NEGATIVE MG/DL
LDLC SERPL CALC-MCNC: 172 MG/DL
LEUKOCYTE ESTERASE UR QL STRIP: ABNORMAL
MAGNESIUM SERPL-MCNC: 1.8 MG/DL (ref 1.6–2.3)
MCH RBC QN AUTO: 28.7 PG (ref 26.5–33)
MCHC RBC AUTO-ENTMCNC: 30.9 G/DL (ref 31.5–36.5)
MCV RBC AUTO: 93 FL (ref 78–100)
NITRATE UR QL: POSITIVE
NONHDLC SERPL-MCNC: 201 MG/DL
PH UR STRIP: 5 PH (ref 5–7)
PHOSPHATE SERPL-MCNC: 3.2 MG/DL (ref 2.5–4.5)
PLATELET # BLD AUTO: 152 10E9/L (ref 150–450)
POTASSIUM SERPL-SCNC: 4.8 MMOL/L (ref 3.4–5.3)
PROT SERPL-MCNC: 7.4 G/DL (ref 6.8–8.8)
RBC # BLD AUTO: 4.43 10E12/L (ref 3.8–5.2)
RBC #/AREA URNS AUTO: 2 /HPF (ref 0–2)
SODIUM SERPL-SCNC: 140 MMOL/L (ref 133–144)
SOURCE: ABNORMAL
SP GR UR STRIP: 1.01 (ref 1–1.03)
SQUAMOUS #/AREA URNS AUTO: <1 /HPF (ref 0–1)
TRIGL SERPL-MCNC: 148 MG/DL
UROBILINOGEN UR STRIP-MCNC: 0 MG/DL (ref 0–2)
WBC # BLD AUTO: 4.7 10E9/L (ref 4–11)
WBC #/AREA URNS AUTO: 34 /HPF (ref 0–5)
YEAST #/AREA URNS HPF: ABNORMAL /HPF

## 2019-03-04 PROCEDURE — 85027 COMPLETE CBC AUTOMATED: CPT | Performed by: INTERNAL MEDICINE

## 2019-03-04 PROCEDURE — 80048 BASIC METABOLIC PNL TOTAL CA: CPT | Performed by: INTERNAL MEDICINE

## 2019-03-04 PROCEDURE — 83735 ASSAY OF MAGNESIUM: CPT | Performed by: INTERNAL MEDICINE

## 2019-03-04 PROCEDURE — 84100 ASSAY OF PHOSPHORUS: CPT | Performed by: INTERNAL MEDICINE

## 2019-03-04 PROCEDURE — 80158 DRUG ASSAY CYCLOSPORINE: CPT | Performed by: INTERNAL MEDICINE

## 2019-03-04 PROCEDURE — G0463 HOSPITAL OUTPT CLINIC VISIT: HCPCS | Mod: ZF

## 2019-03-04 PROCEDURE — 80076 HEPATIC FUNCTION PANEL: CPT | Performed by: INTERNAL MEDICINE

## 2019-03-04 PROCEDURE — 36415 COLL VENOUS BLD VENIPUNCTURE: CPT | Performed by: INTERNAL MEDICINE

## 2019-03-04 PROCEDURE — 81001 URINALYSIS AUTO W/SCOPE: CPT | Performed by: INTERNAL MEDICINE

## 2019-03-04 PROCEDURE — 80061 LIPID PANEL: CPT | Performed by: INTERNAL MEDICINE

## 2019-03-04 RX ORDER — AMLODIPINE BESYLATE 5 MG/1
5 TABLET ORAL DAILY
Qty: 90 TABLET | Refills: 3 | Status: SHIPPED | OUTPATIENT
Start: 2019-03-04 | End: 2019-08-08

## 2019-03-04 ASSESSMENT — PAIN SCALES - GENERAL: PAINLEVEL: EXTREME PAIN (8)

## 2019-03-04 NOTE — LETTER
"3/4/2019      RE: Flor Navarro  [address]  [facility] [address]       [facility] Hepatology Note      Encounter Date: Mar 4, 2019    CC:  Chief Complaint   Patient presents with     RECHECK     liver tx     History of Present Illness:  Ms. Flor Navarro is a 53 year old female who presents as a follow-up for liver transplantation. At today's visit the patient states that she feels pain on her lower left legs. She explains pain feels like someone is \"stabbing her with a knife\". She states that she has seen many doctors and has done an x-ray the past year. She is very convinced that she had an X-ray done the past year.     Additionally the patient states that she is having abdomen pain. She would like to make an appointment with weight loss management. The patient states that her blood pressure has been high on Wednesday and Thursday. The patient is currently not on any medication for her blood pressure. The patient states that her breathing is not good and they recently brought her a machine at home, to help her out. She is not having any constipation or diarrhea. The patients states that she will be going to [place] in August.         Past Medical History:   Patient Active Problem List   Diagnosis     PVD (POSTERIOR VITREOUS DETACHMENT) OS     Hyperlipidemia LDL goal <160     CKD (chronic kidney disease) stage 3, GFR 30-59 ml/min (H)     Hypertension, renal     Liver replaced by transplant     High risk medication use     Anemia in CKD (chronic kidney disease)     Stroke, hemorrhagic (H)     Osteoporosis     Cystitis cystica     Immunosuppressed status (H)     Ganglion cyst     Vitamin D deficiency     Shoulder joint pain     Pseudophakia - Left Eye     Abdominal pain     Constipation, chronic     Secondary hyperparathyroidism (H)     Mixed hyperlipidemia     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)     Peroneal tendonitis, left     Pain in joint, ankle and foot, left     " 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)     Obesity, Class III, BMI 40-49.9 (morbid obesity) (H)     Sleep apnea, unspecified     Depression, recurrent (H)     Fall, initial encounter     Anxiety     Moderate episode of recurrent major depressive disorder (H)     Past Medical History:   Diagnosis Date     Anemia in CKD (chronic kidney disease)      Cataract      CKD (chronic kidney disease) stage 3, GFR 30-59 ml/min (H)      Hepatitis C     cleared virus spontaneously      High risk medication use      Hypertension, renal      Immunosuppressed status (H)      Liver replaced by transplant (H)      Osteoporosis      Recurrent pregnancy loss without current pregnancy      Stroke, hemorrhagic (H)      Syncope      Unspecified viral hepatitis C without hepatic coma      Past Surgical History:   Procedure Laterality Date     CATARACT IOL, RT/LT Right 2/18/15      SECTION       Incisional Hernia Repair  2004     INSERT SHUNT PORTAL TRANSJUGULAR INTRAHEPTIC      shunt placement for liver failure     LAPAROSCOPIC SALPINGO-OOPHORECTOMY      left     NECK SURGERY      fracture, in halo x 7months     TRANSPLANT LIVER RECIPIENT  DONOR  10/26/10     Upper GI Endoscopy with Band Ligation of Esoph/Gastric Varic. .       Medications:  Current Outpatient Medications   Medication Sig Dispense Refill     acetaminophen (TYLENOL) 500 MG tablet Take 1 tablet (500 mg) by mouth 3 times daily as needed for mild pain 120 tablet 3     albuterol (ACCUNEB) 0.63 MG/3ML neb solution Take 3 mLs (0.63 mg) by nebulization every 4 hours (while awake) 360 mL 1     albuterol (PROAIR HFA/PROVENTIL HFA/VENTOLIN HFA) 108 (90 Base) MCG/ACT inhaler Inhale 2 puffs into the lungs 4 times daily 1 Inhaler 1     alendronate (FOSAMAX) 70 MG tablet Take 1 tablet (70 mg) by mouth every 7 days at least 60 min before breakfast with over 8 oz water. Stay upright for at least 30 min. 12 tablet 3      ASPIRIN NOT PRESCRIBED (INTENTIONAL) Please choose reason not prescribed, below       bisacodyl (DULCOLAX) 5 MG EC tablet Take 1 tablet (5 mg) by mouth daily as needed for constipation 30 tablet 11     calcium carbonate 600 mg-vitamin D 400 units (CALCIUM 600 + D) 600-400 MG-UNIT per tablet Take 1 tablet by mouth 2 times daily 60 tablet 11     carboxymethylcellulose (CELLUVISC/REFRESH LIQUIGEL) 1 % ophthalmic solution Place 1 drop into both eyes 4 times daily 15 each 11     Carboxymethylcellulose Sod PF (CELLUVISC/REFRESH LIQUIGEL) 1 % ophthalmic gel Place 1 drop into both eyes 4 times daily Dispose of dropperette within 24 hours after opening or if the tip is contaminated. 90 each 4     cholecalciferol (VITAMIN  -D) 1000 units capsule Take 2 capsules (2,000 Units) by mouth daily 180 capsule 3     cycloSPORINE modified (GENERIC EQUIVALENT) 25 MG capsule Take 4 capsules (100 mg) by mouth every 12 hours 720 capsule 3     dimethicone (AVEENO DAILY MOISTURIZING) 1.3 % LOTN lotion Apply to affected area(s) as needed 227 g 3     Elastic Bandages & Supports (KNEE BRACE/FLEX STAYS LARGE) MISC 1 Units daily 2 each 0     Elastic Bandages & Supports (MEDICAL COMPRESSION SOCKS) MISC 1 Units daily 2 each 0     ferrous sulfate (FEROSUL) 325 (65 Fe) MG tablet Take 1 tablet (325 mg) by mouth daily (with breakfast) 30 tablet 0     lidocaine (LIDODERM) 5 % patch Apply up to 3 patches to painful area at once for up to 12 h within a 24 h period.  Remove after 12 hours. 30 patch 0     lidocaine (XYLOCAINE) 2 % external gel Apply topically daily To left ankle as needed for pain. 30 mL 3     melatonin 3 MG tablet Take 1 tablet (3 mg) by mouth nightly as needed for sleep 100 tablet 3     Menthol, Topical Analgesic, (BIOFREEZE COLORLESS) 4 % GEL Apply to affected areas  mL 3     Menthol, Topical Analgesic, (BIOFREEZE) 4 % GEL Externally apply topically 4 times daily as needed (pain) 90 mL 11     multivitamin, therapeutic with  minerals (MULTI-VITAMIN) TABS tablet Take 1 tablet by mouth daily 100 tablet 11     mycophenolate (GENERIC EQUIVALENT) 250 MG capsule Take 2 capsules (500 mg) by mouth every 12 hours 120 capsule 11     omeprazole (PRILOSEC) 20 MG DR capsule Take 1 capsule (20 mg) by mouth 2 times daily 180 capsule 3     order for DME Equipment being ordered:   1) cane  2) compression stockings 15mmHg, 3 pairs  Dx: gait stability, falls, edema 1 each 0     order for DME Equipment being ordered: compression stockings bilateral  Please measure and fit patient for stockings   Strength 15-30mmHg  Disp 2 pairs ( 4 socks)  1 refill over the time of 1 year 4 Units 1     order for DME Equipment being ordered: Nebulizer with adult mask and tubing 1 Units 0     order for DME Equipment being ordered: Nebulizer with adult mask and tubing 1 Units 0     order for DME Equipment being ordered: cane with padded handle 1 Units 0     prednisoLONE acetate (PRED FORTE) 1 % ophthalmic susp Use in operative eye four times a day  For 4 days 1 Bottle 0     Psyllium 400 MG CAPS Take 1,200 mg by mouth 2 times daily 180 capsule 11     senna-docusate (SENOKOT-S/PERICOLACE) 8.6-50 MG tablet Take 2 tablets by mouth 2 times daily 100 tablet 3     simethicone (MYLICON) 80 MG chewable tablet Take 1 tablet (80 mg) by mouth every 6 hours as needed for flatulence or cramping 120 tablet 0     sodium chloride (OCEAN) 0.65 % nasal spray Spray 1 spray into both nostrils daily as needed for congestion 30 mL 1     STATIN NOT PRESCRIBED, INTENTIONAL, Not prescribed 0 each 0       Allergies   Allergen Reactions     Aspirin      3/31/16 Per pt, tolerates 81 mg daily dose without ADR.     325 mg dose caused itchiness and hives.     Clarithromycin      Allergic reaction         Contrast Dye      Penicillins        BP (!) 148/98   Pulse 65   Temp 97.9  F (36.6  C) (Oral)   Wt 105.7 kg (233 lb)   SpO2 92%   BMI 44.76 kg/m       Physical Exam:    In general she looks well.    HEENT exam shows no scleral icterus and no temporal muscle wasting.  Her chest is clear.  Her abdominal exam shows no increase in girth.  No masses or tenderness to palpation are present.  Liver is 10 cm in span without left lobe enlargement.  No spleen tip is palpable, and extremity exam shows no edema.  Skin exam shows no stigmata of chronic liver disease.  Neurologic exam shows no asterixis.     Impression/Plan:  1. Status post liver transplantation for cirrhosis caused by chronic Hep C     Liver and kidney functions are excellent     On CSA, MMF and prednisone     2. Hypertension     Will start amlodpine     Follow up with PCP    3. Left leg pain     Will get X-ray's     4. Obesity    Will schedule with weight loss management.    5. Health Care Maintenance     Up to date on Vaccine     Up to date on cancer screening     No further intervention required. Patient to report changes.     Follow-up in 3 months.         Staff Involved:  Staff/Scribe    Scribe Disclosure:   Melanie DURAN, am serving as a scribe to document services personally performed by Dr. Laron Anne, based on data collection and the provider's statements to me.        Laron Anne MD      Professor of Medicine  [employer]      Executive Medical Director, Solid Organ Transplant Program  [facility]        Laron Anne MD

## 2019-03-04 NOTE — NURSING NOTE
Chief Complaint   Patient presents with     RECHECK     liver tx     BP (!) 148/98   Pulse 65   Temp 97.9  F (36.6  C) (Oral)   Wt 105.7 kg (233 lb)   SpO2 92%   BMI 44.76 kg/m    Adeline Sharma MA

## 2019-03-04 NOTE — LETTER
"3/4/2019       RE: Flor Navarro  [address]  [facility] [address]     Dear Colleague,    Thank you for referring your patient, Flor Navarro, to the [facility] HEPATOLOGY at [facility]. Please see a copy of my visit note below.    [facility] Hepatology Note      Encounter Date: Mar 4, 2019    CC:  Chief Complaint   Patient presents with     RECHECK     liver tx     History of Present Illness:  Ms. Flor Navarro is a 53 year old female who presents as a follow-up for liver transplantation. At today's visit the patient states that she feels pain on her lower left legs. She explains pain feels like someone is \"stabbing her with a knife\". She states that she has seen many doctors and has done an x-ray the past year. She is very convinced that she had an X-ray done the past year.     Additionally the patient states that she is having abdomen pain. She would like to make an appointment with weight loss management. The patient states that her blood pressure has been high on Wednesday and Thursday. The patient is currently not on any medication for her blood pressure. The patient states that her breathing is not good and they recently brought her a machine at home, to help her out. She is not having any constipation or diarrhea. The patients states that she will be going to [place] in August.         Past Medical History:   Patient Active Problem List   Diagnosis     PVD (POSTERIOR VITREOUS DETACHMENT) OS     Hyperlipidemia LDL goal <160     CKD (chronic kidney disease) stage 3, GFR 30-59 ml/min (H)     Hypertension, renal     Liver replaced by transplant     High risk medication use     Anemia in CKD (chronic kidney disease)     Stroke, hemorrhagic (H)     Osteoporosis     Cystitis cystica     Immunosuppressed status (H)     Ganglion cyst     Vitamin D deficiency     Shoulder joint pain     Pseudophakia - Left Eye     Abdominal pain     " Constipation, chronic     Secondary hyperparathyroidism (H)     Mixed hyperlipidemia     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)     Peroneal tendonitis, left     Pain in joint, ankle and foot, left     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)     Obesity, Class III, BMI 40-49.9 (morbid obesity) (H)     Sleep apnea, unspecified     Depression, recurrent (H)     Fall, initial encounter     Anxiety     Moderate episode of recurrent major depressive disorder (H)     Past Medical History:   Diagnosis Date     Anemia in CKD (chronic kidney disease)      Cataract      CKD (chronic kidney disease) stage 3, GFR 30-59 ml/min (H)      Hepatitis C     cleared virus spontaneously      High risk medication use      Hypertension, renal      Immunosuppressed status (H)      Liver replaced by transplant (H)      Osteoporosis      Recurrent pregnancy loss without current pregnancy      Stroke, hemorrhagic (H)      Syncope      Unspecified viral hepatitis C without hepatic coma      Past Surgical History:   Procedure Laterality Date     CATARACT IOL, RT/LT Right 2/18/15      SECTION       Incisional Hernia Repair  2004     INSERT SHUNT PORTAL TRANSJUGULAR INTRAHEPTIC  2005    shunt placement for liver failure     LAPAROSCOPIC SALPINGO-OOPHORECTOMY      left     NECK SURGERY  2010    fracture, in halo x 7months     TRANSPLANT LIVER RECIPIENT  DONOR  10/26/10     Upper GI Endoscopy with Band Ligation of Esoph/Gastric Varic. .       Medications:  Current Outpatient Medications   Medication Sig Dispense Refill     acetaminophen (TYLENOL) 500 MG tablet Take 1 tablet (500 mg) by mouth 3 times daily as needed for mild pain 120 tablet 3     albuterol (ACCUNEB) 0.63 MG/3ML neb solution Take 3 mLs (0.63 mg) by nebulization every 4 hours (while awake) 360 mL 1     albuterol (PROAIR HFA/PROVENTIL HFA/VENTOLIN HFA) 108 (90 Base) MCG/ACT inhaler Inhale 2 puffs into the lungs 4 times daily 1  Inhaler 1     alendronate (FOSAMAX) 70 MG tablet Take 1 tablet (70 mg) by mouth every 7 days at least 60 min before breakfast with over 8 oz water. Stay upright for at least 30 min. 12 tablet 3     ASPIRIN NOT PRESCRIBED (INTENTIONAL) Please choose reason not prescribed, below       bisacodyl (DULCOLAX) 5 MG EC tablet Take 1 tablet (5 mg) by mouth daily as needed for constipation 30 tablet 11     calcium carbonate 600 mg-vitamin D 400 units (CALCIUM 600 + D) 600-400 MG-UNIT per tablet Take 1 tablet by mouth 2 times daily 60 tablet 11     carboxymethylcellulose (CELLUVISC/REFRESH LIQUIGEL) 1 % ophthalmic solution Place 1 drop into both eyes 4 times daily 15 each 11     Carboxymethylcellulose Sod PF (CELLUVISC/REFRESH LIQUIGEL) 1 % ophthalmic gel Place 1 drop into both eyes 4 times daily Dispose of dropperette within 24 hours after opening or if the tip is contaminated. 90 each 4     cholecalciferol (VITAMIN  -D) 1000 units capsule Take 2 capsules (2,000 Units) by mouth daily 180 capsule 3     cycloSPORINE modified (GENERIC EQUIVALENT) 25 MG capsule Take 4 capsules (100 mg) by mouth every 12 hours 720 capsule 3     dimethicone (AVEENO DAILY MOISTURIZING) 1.3 % LOTN lotion Apply to affected area(s) as needed 227 g 3     Elastic Bandages & Supports (KNEE BRACE/FLEX STAYS LARGE) MISC 1 Units daily 2 each 0     Elastic Bandages & Supports (MEDICAL COMPRESSION SOCKS) MISC 1 Units daily 2 each 0     ferrous sulfate (FEROSUL) 325 (65 Fe) MG tablet Take 1 tablet (325 mg) by mouth daily (with breakfast) 30 tablet 0     lidocaine (LIDODERM) 5 % patch Apply up to 3 patches to painful area at once for up to 12 h within a 24 h period.  Remove after 12 hours. 30 patch 0     lidocaine (XYLOCAINE) 2 % external gel Apply topically daily To left ankle as needed for pain. 30 mL 3     melatonin 3 MG tablet Take 1 tablet (3 mg) by mouth nightly as needed for sleep 100 tablet 3     Menthol, Topical Analgesic, (BIOFREEZE COLORLESS) 4 % GEL  Apply to affected areas  mL 3     Menthol, Topical Analgesic, (BIOFREEZE) 4 % GEL Externally apply topically 4 times daily as needed (pain) 90 mL 11     multivitamin, therapeutic with minerals (MULTI-VITAMIN) TABS tablet Take 1 tablet by mouth daily 100 tablet 11     mycophenolate (GENERIC EQUIVALENT) 250 MG capsule Take 2 capsules (500 mg) by mouth every 12 hours 120 capsule 11     omeprazole (PRILOSEC) 20 MG DR capsule Take 1 capsule (20 mg) by mouth 2 times daily 180 capsule 3     order for DME Equipment being ordered:   1) cane  2) compression stockings 15mmHg, 3 pairs  Dx: gait stability, falls, edema 1 each 0     order for DME Equipment being ordered: compression stockings bilateral  Please measure and fit patient for stockings   Strength 15-30mmHg  Disp 2 pairs ( 4 socks)  1 refill over the time of 1 year 4 Units 1     order for DME Equipment being ordered: Nebulizer with adult mask and tubing 1 Units 0     order for DME Equipment being ordered: Nebulizer with adult mask and tubing 1 Units 0     order for DME Equipment being ordered: cane with padded handle 1 Units 0     prednisoLONE acetate (PRED FORTE) 1 % ophthalmic susp Use in operative eye four times a day  For 4 days 1 Bottle 0     Psyllium 400 MG CAPS Take 1,200 mg by mouth 2 times daily 180 capsule 11     senna-docusate (SENOKOT-S/PERICOLACE) 8.6-50 MG tablet Take 2 tablets by mouth 2 times daily 100 tablet 3     simethicone (MYLICON) 80 MG chewable tablet Take 1 tablet (80 mg) by mouth every 6 hours as needed for flatulence or cramping 120 tablet 0     sodium chloride (OCEAN) 0.65 % nasal spray Spray 1 spray into both nostrils daily as needed for congestion 30 mL 1     STATIN NOT PRESCRIBED, INTENTIONAL, Not prescribed 0 each 0       Allergies   Allergen Reactions     Aspirin      3/31/16 Per pt, tolerates 81 mg daily dose without ADR.     325 mg dose caused itchiness and hives.     Clarithromycin      Allergic reaction         Contrast Dye       Penicillins        BP (!) 148/98   Pulse 65   Temp 97.9  F (36.6  C) (Oral)   Wt 105.7 kg (233 lb)   SpO2 92%   BMI 44.76 kg/m       Physical Exam:    In general she looks well.   HEENT exam shows no scleral icterus and no temporal muscle wasting.  Her chest is clear.  Her abdominal exam shows no increase in girth.  No masses or tenderness to palpation are present.  Liver is 10 cm in span without left lobe enlargement.  No spleen tip is palpable, and extremity exam shows no edema.  Skin exam shows no stigmata of chronic liver disease.  Neurologic exam shows no asterixis.     Impression/Plan:  1. Status post liver transplantation for cirrhosis caused by chronic Hep C     Liver and kidney functions are excellent     On CSA, MMF and prednisone     2. Hypertension     Will start amlodpine     Follow up with PCP    3. Left leg pain     Will get X-ray's     4. Obesity    Will schedule with weight loss management.    5. Health Care Maintenance     Up to date on Vaccine     Up to date on cancer screening     No further intervention required. Patient to report changes.     Follow-up in 3 months.   Staff Involved:  Staff/Scribe    Scribe Disclosure:   Melanie DURAN, am serving as a scribe to document services personally performed by Dr. Laron Anne, based on data collection and the provider's statements to me.     Laron Anne MD      Professor of Medicine  [employer]      Executive Medical Director, Solid Organ Transplant Program  [facility]

## 2019-03-04 NOTE — PROGRESS NOTES
"[facility] Hepatology Note      Encounter Date: Mar 4, 2019    CC:  Chief Complaint   Patient presents with     RECHECK     liver tx     History of Present Illness:  Ms. Flor Navarro is a 53 year old female who presents as a follow-up for liver transplantation. At today's visit the patient states that she feels pain on her lower left legs. She explains pain feels like someone is \"stabbing her with a knife\". She states that she has seen many doctors and has done an x-ray the past year. She is very convinced that she had an X-ray done the past year.     Additionally the patient states that she is having abdomen pain. She would like to make an appointment with weight loss management. The patient states that her blood pressure has been high on Wednesday and Thursday. The patient is currently not on any medication for her blood pressure. The patient states that her breathing is not good and they recently brought her a machine at home, to help her out. She is not having any constipation or diarrhea. The patients states that she will be going to [place] in August.         Past Medical History:   Patient Active Problem List   Diagnosis     PVD (POSTERIOR VITREOUS DETACHMENT) OS     Hyperlipidemia LDL goal <160     CKD (chronic kidney disease) stage 3, GFR 30-59 ml/min (H)     Hypertension, renal     Liver replaced by transplant     High risk medication use     Anemia in CKD (chronic kidney disease)     Stroke, hemorrhagic (H)     Osteoporosis     Cystitis cystica     Immunosuppressed status (H)     Ganglion cyst     Vitamin D deficiency     Shoulder joint pain     Pseudophakia - Left Eye     Abdominal pain     Constipation, chronic     Secondary hyperparathyroidism (H)     Mixed hyperlipidemia     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)     Peroneal tendonitis, left     Pain in joint, ankle and foot, left     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)     Obesity, Class III, BMI " 40-49.9 (morbid obesity) (H)     Sleep apnea, unspecified     Depression, recurrent (H)     Fall, initial encounter     Anxiety     Moderate episode of recurrent major depressive disorder (H)     Past Medical History:   Diagnosis Date     Anemia in CKD (chronic kidney disease)      Cataract      CKD (chronic kidney disease) stage 3, GFR 30-59 ml/min (H)      Hepatitis C     cleared virus spontaneously      High risk medication use      Hypertension, renal      Immunosuppressed status (H)      Liver replaced by transplant (H)      Osteoporosis      Recurrent pregnancy loss without current pregnancy      Stroke, hemorrhagic (H)      Syncope      Unspecified viral hepatitis C without hepatic coma      Past Surgical History:   Procedure Laterality Date     CATARACT IOL, RT/LT Right 2/18/15      SECTION       Incisional Hernia Repair  2004     INSERT SHUNT PORTAL TRANSJUGULAR INTRAHEPTIC      shunt placement for liver failure     LAPAROSCOPIC SALPINGO-OOPHORECTOMY      left     NECK SURGERY  2010    fracture, in [place] x 7months     TRANSPLANT LIVER RECIPIENT  DONOR  10/26/10     Upper GI Endoscopy with Band Ligation of Esoph/Gastric Varic. .       Medications:  Current Outpatient Medications   Medication Sig Dispense Refill     acetaminophen (TYLENOL) 500 MG tablet Take 1 tablet (500 mg) by mouth 3 times daily as needed for mild pain 120 tablet 3     albuterol (ACCUNEB) 0.63 MG/3ML neb solution Take 3 mLs (0.63 mg) by nebulization every 4 hours (while awake) 360 mL 1     albuterol (PROAIR HFA/PROVENTIL HFA/VENTOLIN HFA) 108 (90 Base) MCG/ACT inhaler Inhale 2 puffs into the lungs 4 times daily 1 Inhaler 1     alendronate (FOSAMAX) 70 MG tablet Take 1 tablet (70 mg) by mouth every 7 days at least 60 min before breakfast with over 8 oz water. Stay upright for at least 30 min. 12 tablet 3     ASPIRIN NOT PRESCRIBED (INTENTIONAL) Please choose reason not prescribed, below       bisacodyl  (DULCOLAX) 5 MG EC tablet Take 1 tablet (5 mg) by mouth daily as needed for constipation 30 tablet 11     calcium carbonate 600 mg-vitamin D 400 units (CALCIUM 600 + D) 600-400 MG-UNIT per tablet Take 1 tablet by mouth 2 times daily 60 tablet 11     carboxymethylcellulose (CELLUVISC/REFRESH LIQUIGEL) 1 % ophthalmic solution Place 1 drop into both eyes 4 times daily 15 each 11     Carboxymethylcellulose Sod PF (CELLUVISC/REFRESH LIQUIGEL) 1 % ophthalmic gel Place 1 drop into both eyes 4 times daily Dispose of dropperette within 24 hours after opening or if the tip is contaminated. 90 each 4     cholecalciferol (VITAMIN  -D) 1000 units capsule Take 2 capsules (2,000 Units) by mouth daily 180 capsule 3     cycloSPORINE modified (GENERIC EQUIVALENT) 25 MG capsule Take 4 capsules (100 mg) by mouth every 12 hours 720 capsule 3     dimethicone (AVEENO DAILY MOISTURIZING) 1.3 % LOTN lotion Apply to affected area(s) as needed 227 g 3     Elastic Bandages & Supports (KNEE BRACE/FLEX STAYS LARGE) MISC 1 Units daily 2 each 0     Elastic Bandages & Supports (MEDICAL COMPRESSION SOCKS) MISC 1 Units daily 2 each 0     ferrous sulfate (FEROSUL) 325 (65 Fe) MG tablet Take 1 tablet (325 mg) by mouth daily (with breakfast) 30 tablet 0     lidocaine (LIDODERM) 5 % patch Apply up to 3 patches to painful area at once for up to 12 h within a 24 h period.  Remove after 12 hours. 30 patch 0     lidocaine (XYLOCAINE) 2 % external gel Apply topically daily To left ankle as needed for pain. 30 mL 3     melatonin 3 MG tablet Take 1 tablet (3 mg) by mouth nightly as needed for sleep 100 tablet 3     Menthol, Topical Analgesic, (BIOFREEZE COLORLESS) 4 % GEL Apply to affected areas  mL 3     Menthol, Topical Analgesic, (BIOFREEZE) 4 % GEL Externally apply topically 4 times daily as needed (pain) 90 mL 11     multivitamin, therapeutic with minerals (MULTI-VITAMIN) TABS tablet Take 1 tablet by mouth daily 100 tablet 11     mycophenolate  (GENERIC EQUIVALENT) 250 MG capsule Take 2 capsules (500 mg) by mouth every 12 hours 120 capsule 11     omeprazole (PRILOSEC) 20 MG DR capsule Take 1 capsule (20 mg) by mouth 2 times daily 180 capsule 3     order for DME Equipment being ordered:   1) cane  2) compression stockings 15mmHg, 3 pairs  Dx: gait stability, falls, edema 1 each 0     order for DME Equipment being ordered: compression stockings bilateral  Please measure and fit patient for stockings   Strength 15-30mmHg  Disp 2 pairs ( 4 socks)  1 refill over the time of 1 year 4 Units 1     order for DME Equipment being ordered: Nebulizer with adult mask and tubing 1 Units 0     order for DME Equipment being ordered: Nebulizer with adult mask and tubing 1 Units 0     order for DME Equipment being ordered: cane with padded handle 1 Units 0     prednisoLONE acetate (PRED FORTE) 1 % ophthalmic susp Use in operative eye four times a day  For 4 days 1 Bottle 0     Psyllium 400 MG CAPS Take 1,200 mg by mouth 2 times daily 180 capsule 11     senna-docusate (SENOKOT-S/PERICOLACE) 8.6-50 MG tablet Take 2 tablets by mouth 2 times daily 100 tablet 3     simethicone (MYLICON) 80 MG chewable tablet Take 1 tablet (80 mg) by mouth every 6 hours as needed for flatulence or cramping 120 tablet 0     sodium chloride (OCEAN) 0.65 % nasal spray Spray 1 spray into both nostrils daily as needed for congestion 30 mL 1     STATIN NOT PRESCRIBED, INTENTIONAL, Not prescribed 0 each 0       Allergies   Allergen Reactions     Aspirin      3/31/16 Per pt, tolerates 81 mg daily dose without ADR.     325 mg dose caused itchiness and hives.     Clarithromycin      Allergic reaction         Contrast Dye      Penicillins        BP (!) 148/98   Pulse 65   Temp 97.9  F (36.6  C) (Oral)   Wt 105.7 kg (233 lb)   SpO2 92%   BMI 44.76 kg/m      Physical Exam:    In general she looks well.   HEENT exam shows no scleral icterus and no temporal muscle wasting.  Her chest is clear.  Her  abdominal exam shows no increase in girth.  No masses or tenderness to palpation are present.  Liver is 10 cm in span without left lobe enlargement.  No spleen tip is palpable, and extremity exam shows no edema.  Skin exam shows no stigmata of chronic liver disease.  Neurologic exam shows no asterixis.     Impression/Plan:  1. Status post liver transplantation for cirrhosis caused by chronic Hep C     Liver and kidney functions are excellent     On CSA, MMF and prednisone     2. Hypertension     Will start amlodpine     Follow up with PCP    3. Left leg pain     Will get X-ray's     4. Obesity    Will schedule with weight loss management.    5. Health Care Maintenance     Up to date on Vaccine     Up to date on cancer screening     No further intervention required. Patient to report changes.     Follow-up in 3 months.         Staff Involved:  Staff/Scribe    Scribe Disclosure:   I, Melanie Boo, am serving as a scribe to document services personally performed by Dr. Laron Anne, based on data collection and the provider's statements to me.        Laron Anne MD      Professor of Medicine  [employer]      Executive Medical Director, Solid Organ Transplant Program  [facility]

## 2019-03-05 LAB
CYCLOSPORINE BLD LC/MS/MS-MCNC: 141 UG/L (ref 50–400)
TME LAST DOSE: NORMAL H

## 2019-03-21 ENCOUNTER — TELEPHONE (OUTPATIENT)
Dept: FAMILY MEDICINE | Facility: CLINIC | Age: 54
End: 2019-03-21

## 2019-03-21 DIAGNOSIS — Z94.4 LIVER REPLACED BY TRANSPLANT (H): Primary | ICD-10-CM

## 2019-03-21 RX ORDER — ACETAMINOPHEN 160 MG
1 TABLET,DISINTEGRATING ORAL DAILY
Qty: 100 CAPSULE | Refills: 1 | Status: SHIPPED | OUTPATIENT
Start: 2019-03-21 | End: 2019-08-08

## 2019-03-21 NOTE — TELEPHONE ENCOUNTER
[facility] phone call message- medication clarification/question:    Full Medication Name: Baby Asprin   Dose:     Question/Clarification needed: Patient's home care nurse called stating that the patient had an  bottle of baby aspirin. When home care nurse called pharmacy to get a refill pharmacy stated that it was not on patient's medication list and could not refill. Home care nurse is wondering if medication is needed and if so send a prescription to their pharmacy listed below.     Pharmacy confirmed as   [facility] - [place], [place] - [address] [place] [id number]  Phone: [phone] Fax: [phone]  : Yes    Please leave ONLY preferred pharmacy    OK to leave a message on voice mail? Yes    Advised patient that RN would call back within 3 hours, unless emergent.    Primary language: Mohawk      needed? Yes    Call taken on 2019 at 4:47 PM by Jaja Lazo to [facility]

## 2019-03-21 NOTE — TELEPHONE ENCOUNTER
RN called and updated her that patient should not be on ASA. She verbalized understanding.  Elisabet Olson RN

## 2019-03-26 ENCOUNTER — OFFICE VISIT (OUTPATIENT)
Dept: ENDOCRINOLOGY | Facility: CLINIC | Age: 54
End: 2019-03-26
Attending: FAMILY MEDICINE
Payer: MEDICARE

## 2019-03-26 VITALS
OXYGEN SATURATION: 97 % | BODY MASS INDEX: 43.75 KG/M2 | DIASTOLIC BLOOD PRESSURE: 76 MMHG | HEART RATE: 72 BPM | SYSTOLIC BLOOD PRESSURE: 115 MMHG | TEMPERATURE: 97.8 F | HEIGHT: 61 IN | WEIGHT: 231.7 LBS

## 2019-03-26 DIAGNOSIS — Z94.4 STATUS POST LIVER TRANSPLANTATION (H): ICD-10-CM

## 2019-03-26 DIAGNOSIS — E66.01 MORBID OBESITY (H): Primary | ICD-10-CM

## 2019-03-26 DIAGNOSIS — I63.9 CEREBROVASCULAR ACCIDENT (CVA), UNSPECIFIED MECHANISM (H): ICD-10-CM

## 2019-03-26 DIAGNOSIS — Z94.4 LIVER TRANSPLANTED (H): ICD-10-CM

## 2019-03-26 DIAGNOSIS — Z94.4 LIVER REPLACED BY TRANSPLANT (H): ICD-10-CM

## 2019-03-26 DIAGNOSIS — R53.82 CHRONIC FATIGUE: ICD-10-CM

## 2019-03-26 LAB
ALBUMIN SERPL-MCNC: 3.7 G/DL (ref 3.4–5)
ALP SERPL-CCNC: 95 U/L (ref 40–150)
ALT SERPL W P-5'-P-CCNC: 21 U/L (ref 0–50)
AST SERPL W P-5'-P-CCNC: 16 U/L (ref 0–45)
BILIRUB DIRECT SERPL-MCNC: 0.2 MG/DL (ref 0–0.2)
BILIRUB SERPL-MCNC: 0.8 MG/DL (ref 0.2–1.3)
ERYTHROCYTE [DISTWIDTH] IN BLOOD BY AUTOMATED COUNT: 12.5 % (ref 10–15)
HCT VFR BLD AUTO: 42.3 % (ref 35–47)
HGB BLD-MCNC: 13.4 G/DL (ref 11.7–15.7)
MCH RBC QN AUTO: 29.4 PG (ref 26.5–33)
MCHC RBC AUTO-ENTMCNC: 31.7 G/DL (ref 31.5–36.5)
MCV RBC AUTO: 93 FL (ref 78–100)
PLATELET # BLD AUTO: 155 10E9/L (ref 150–450)
PROT SERPL-MCNC: 7.6 G/DL (ref 6.8–8.8)
RBC # BLD AUTO: 4.56 10E12/L (ref 3.8–5.2)
WBC # BLD AUTO: 3.9 10E9/L (ref 4–11)

## 2019-03-26 RX ORDER — NALTREXONE HYDROCHLORIDE 50 MG/1
TABLET, FILM COATED ORAL
Qty: 30 TABLET | Refills: 1 | Status: SHIPPED | OUTPATIENT
Start: 2019-03-26 | End: 2019-04-30

## 2019-03-26 ASSESSMENT — MIFFLIN-ST. JEOR: SCORE: 1588.36

## 2019-03-26 ASSESSMENT — PAIN SCALES - GENERAL: PAINLEVEL: NO PAIN (0)
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
## 2019-03-26 NOTE — NURSING NOTE
"Chief Complaint   Patient presents with     Weight Problem     NMWM       Vitals:    03/26/19 1321   BP: 115/76   BP Location: Left arm   Patient Position: Chair   Cuff Size: Adult Large   Pulse: 72   Temp: 97.8  F (36.6  C)   TempSrc: Oral   SpO2: 97%   Weight: 105.1 kg (231 lb 11.2 oz)   Height: 1.549 m (5' 1\")       Body mass index is 43.78 kg/m .    Tisha Headley CMA    "

## 2019-03-26 NOTE — PATIENT INSTRUCTIONS
Start naltrexone 1/2 tab (of 50 mg tab) daily, take it about 1-2 hours prior to worst food cravings.    1,000 calorie meal plan to lose 1 lbs weekly without exercise (based on REE calc of 1,585 using MSJ equation)  Use meal replacements such as Gladis's meals, Lean Cuisines, Healthy Choice, Smart Ones, Weight Watchers Meals, and Slim Fast and Glucerna shakes and supplement with fresh fruits and vegetables  Please drink a lot of water daily. Most people typically need about 2 liters of water daily and more if they are exercising, have a large weight, or have a fever or illness. Add Crystal Light for flavoring if desired. But no pop with calories in it.  Please keep a food journal of what you eat, calories in what you eat, and mood and bring the journal with you to your next appointment.  Consider using applications for smart phones such as [facility], [employer], Nuzzel, LoseIt, Tap&Track, and RelaxM.  Focus on wet volumetrics, meaning, eat more foods that are high in water and fiber such as fruits and vegetables in order to get that full feeling. These are also good for your overall health as well.  Check out Dr. Samira Pacheco from [facility] - she has cookbooks with low calorie volumetric recipes  You can try Let's Dish to help you prepare meals for you and your family. Often times, the caloric and nutrition data and serving sizes are available for this food. This can be a time saving maneuver. The website can give you more information http://www.DigitalScirocco.Fancloud/  Cob[employer] Delivers has Let's Dish & fresh low calorie salads  Check out Hello Fresh at https://www.Owlient/food-boxes/classic-box/  Try Cooking Light recipes for low calorie meal preparation and planning  Other food plan options you can search for on the internet and check out include: Wili PAYAN, [facility]  Please consider health psychologist to discuss how depression and/or anxiety impact your eating.  Scheduling with a Health  Psychologist  *Our Health Psychologists prefer that the patient reaches out to them directly to schedule an appointment. After choosing one of the providers listed below, you will more than likely reach their voicemail, as they are typically seeing patients during normal business hours. Make sure to leave your name, contact number, and the name of the doctor who referred you. They will try to get back to you within 24-48 business hours.     Dr. Dami Estrada: [phone]  Dr. Seema Marlow: [phone]  Dr. Shagufta Oconnor: [phone]    Plan to go to the [facility] to get an exercise assessment and recommendation. You can either plan to complete the entire program there or take your new skills to the gym of your choice. If you do not hear from an exercise professional from the program within a week, please call our clinic nurse at [phone].

## 2019-03-27 ENCOUNTER — TELEPHONE (OUTPATIENT)
Dept: ENDOCRINOLOGY | Facility: CLINIC | Age: 54
End: 2019-03-27

## 2019-03-27 NOTE — TELEPHONE ENCOUNTER
Reason for call:  Medication   If this is a refill request, has the caller requested the refill from the pharmacy already? No  Will the patient be using a [facility]? No    Name of the pharmacy and phone number for the current request: [facility] [phone]    Name of the medication requested: Naltrexone    Other request: Needs clarification on instructions sent    Phone number to reach patient:  Other phone number:  [phone]    Best Time:  Anytime    Can we leave a detailed message on this number?  Not Applicable

## 2019-03-27 NOTE — TELEPHONE ENCOUNTER
Called and spoke with pharmacist in regards to Naltrexone dosing for daily dose. Advised that patient should take 12 tab (25mg) daily for the first week, then increase to 1 full tab (50mg) daily thereafter.

## 2019-04-04 DIAGNOSIS — N18.9 ANEMIA IN CHRONIC KIDNEY DISEASE, UNSPECIFIED CKD STAGE: ICD-10-CM

## 2019-04-04 DIAGNOSIS — D63.1 ANEMIA IN CHRONIC KIDNEY DISEASE, UNSPECIFIED CKD STAGE: ICD-10-CM

## 2019-04-04 RX ORDER — FERROUS SULFATE 325(65) MG
325 TABLET ORAL
Qty: 30 TABLET | Refills: 0 | OUTPATIENT
Start: 2019-04-04

## 2019-04-04 NOTE — TELEPHONE ENCOUNTER
Medication Refill Denied  Reason: Pt no longer anemic.   Provider: I have not called the patient about the Rx denial, please call.  PCS: Please notify the pharmacy, Please contact the patient to explain reasoning provided above , med not needed she should stop it    Karely Sierra MD

## 2019-04-04 NOTE — TELEPHONE ENCOUNTER

## 2019-04-09 ENCOUNTER — OFFICE VISIT (OUTPATIENT)
Dept: FAMILY MEDICINE | Facility: CLINIC | Age: 54
End: 2019-04-09
Payer: MEDICARE

## 2019-04-09 ENCOUNTER — TELEPHONE (OUTPATIENT)
Dept: FAMILY MEDICINE | Facility: CLINIC | Age: 54
End: 2019-04-09

## 2019-04-09 VITALS
SYSTOLIC BLOOD PRESSURE: 151 MMHG | OXYGEN SATURATION: 95 % | TEMPERATURE: 97.5 F | DIASTOLIC BLOOD PRESSURE: 89 MMHG | HEART RATE: 68 BPM | HEIGHT: 61 IN | BODY MASS INDEX: 42.86 KG/M2 | WEIGHT: 227 LBS | RESPIRATION RATE: 16 BRPM

## 2019-04-09 DIAGNOSIS — M25.511 PAIN IN JOINT OF RIGHT SHOULDER: Primary | ICD-10-CM

## 2019-04-09 DIAGNOSIS — R29.6 FALLS FREQUENTLY: ICD-10-CM

## 2019-04-09 DIAGNOSIS — H10.13 ALLERGIC CONJUNCTIVITIS, BILATERAL: ICD-10-CM

## 2019-04-09 DIAGNOSIS — I63.9 HEMIPLEGIA OF RIGHT DOMINANT SIDE DUE TO INFARCTION OF BRAIN (H): ICD-10-CM

## 2019-04-09 DIAGNOSIS — G81.91 HEMIPLEGIA OF RIGHT DOMINANT SIDE DUE TO INFARCTION OF BRAIN (H): ICD-10-CM
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
## 2019-04-09 LAB
% GRANULOCYTES: 59.9 %G (ref 40–75)
ANION GAP SERPL CALC-SCNC: 9.9 MMOL/L (ref 6–17)
CHLORIDE SERPLBLD-SCNC: 102.8 MMOL/L (ref 98–110)
CO2 SERPL-SCNC: 26.9 MMOL/L (ref 20–32)
GRANULOCYTES #: 3.2 K/UL (ref 1.6–8.3)
HBA1C MFR BLD: 5.3 % (ref 4.1–5.7)
HCT VFR BLD AUTO: 45.6 % (ref 35–47)
HEMOGLOBIN: 13.8 G/DL (ref 11.7–15.7)
LYMPHOCYTES # BLD AUTO: 1.7 K/UL (ref 0.8–5.3)
LYMPHOCYTES NFR BLD AUTO: 31.8 %L (ref 20–48)
MCH RBC QN AUTO: 29.6 PG (ref 26.5–35)
MCHC RBC AUTO-ENTMCNC: 30.3 G/DL (ref 32–36)
MCV RBC AUTO: 97.6 FL (ref 78–100)
MID #: 0.4 K/UL (ref 0–2.2)
MID %: 8.3 %M (ref 0–20)
PLATELET # BLD AUTO: 148 K/UL (ref 150–450)
POTASSIUM SERPL-SCNC: 4.8 MMOL/DL (ref 3.3–4.5)
RBC # BLD AUTO: 4.67 M/UL (ref 3.8–5.2)
SODIUM SERPL-SCNC: 139.6 MMOL/L (ref 132.6–141.4)
VIT B12 SERPL-MCNC: 487 PG/ML (ref 193–986)
WBC # BLD AUTO: 5.3 K/UL (ref 4–11)

## 2019-04-09 RX ORDER — OLOPATADINE HYDROCHLORIDE 1 MG/ML
1 SOLUTION/ DROPS OPHTHALMIC 2 TIMES DAILY
Qty: 5 ML | Refills: 1 | Status: SHIPPED | OUTPATIENT
Start: 2019-04-09 | End: 2019-07-02

## 2019-04-09 ASSESSMENT — MIFFLIN-ST. JEOR: SCORE: 1563.08

## 2019-04-09 NOTE — PROGRESS NOTES
HPI   Flor Navarro is a 53 year old  who presents, with Deborah Ruiz, Care coordinator through [employer] for   Chief Complaint   Patient presents with     Eye Problem     Itching, bilaterial watering     RECHECK     Forms     Medical Diet Form Ruby Modi      Mood  Reports her mood is good.     HTN  Reports her BP has been elevated since her last visit with Dr. Anne.    Fall  Reports falling about ten days ago -actually CC RN brings up and states she got it evaluated by me. There was no visit or telephone note documenting this fall.  . Was opening the door, she tripped and fell on her face. Hit her head during the fall. Recalls another time that she fell in [employer] with her son, she felt like her legs gave out. Does not remember where she lost her cane. Story wanders some - unclear etiology. Does not appear to have had LOC    Edema  Would like her new compression stockings to have the same measurements as the ones she has on today. Have paperwork from DME company already will send measurements.     Right Shoulder Pain  Reports having right shoulder pain. Feels like she cannot lift or carry anything over the shoulder because of the pain and weakness. States that her present sx are the same sx that presented after her stroke. Would like a home PT referral and a patch or cream for pain. Declines interest in PT since we cannot do home PT, since she is not home bound. Reports doing hand crawls on the wall daily but with the opposite hand!    Social Hx  Brings in forms from the Department of Health requesting information regarding special diet. [facility] Care Coordinator: Deborah Ruiz.    An Danish  was used for  this visit.    +++++++    Problem, Medication and Allergy Lists were reviewed and updated if needed.    Patient is an established patient of this clinic.         Review of Systems:   Review of Systems     Positive for: right shoulder pain, edema, and dizziness.    ROS: 10 point ROS neg other than  "the symptoms noted above in the HPI.    This document serves as a record of the services and decisions personally performed and made by Karely Sierra MD. It was created on his/her behalf by Charli Valdez, a trained medical scribe. The creation of this document is based the provider's statements to the medical scribe.  Nelson Valdez 2:30 PM, April 9, 2019       Physical Exam:     Vitals:    04/09/19 1346 04/09/19 1400   BP: 168/78 151/89   Pulse: 68    Resp: 16    Temp: 97.5  F (36.4  C)    SpO2: 95%    Weight: 103 kg (227 lb)    Height: 1.543 m (5' 0.75\")      Body mass index is 43.25 kg/m .  Vitals were reviewed and were normal, except elevated BP.     Physical Exam    BMI= Body mass index is 43.25 kg/m .   GENERAL: healthy, alert and no distress  EYES: Bilateral watering R >L, with conjunctival injection.  RESP: lungs clear to auscultation - no rales, no rhonchi, no wheezes  CV: regular rates and rhythm, normal S1 S2, no S3 or S4 and no murmur, no click or rub -  NEURO: Tenderness on the right parietal occipital region, had to remove her scarf for exam. Some scalp tenderness and swelling, and a mildly resolving hematoma. Shoulder shrug is not symmetric, but status post stroke. Cranial nerves are grossly intact.  PSYCH: Affect: bright    Results:   Results are ordered and pending    Assessment and Plan   Flor was seen today for eye problem, recheck and forms.    Diagnoses and all orders for this visit:    Pain in joint of right shoulder  -     Menthol, Topical Analgesic, (BIOFREEZE) 4 % GEL; Externally apply topically 4 times daily as needed (pain) To bilateral knees and R shoulder    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)  Chronic but now w/ increase shoulder pain likely related to frozen shoulder and mm weakness   See below for plan     Allergic conjunctivitis, bilateral  -     olopatadine (PATANOL) 0.1 % ophthalmic solution; Place 1 drop into both eyes 2 times daily    Falls " frequently  -     Vitamin B12  -     CBC with Diff Plt (LabDAQ)  -     Electrolyte Panel (LABDA[facility])  -     Hemoglobin A1c ([facility])  -     [facility] REFERRAL; Future  Unfortunately this was at the end of the visit and we ended up extending visit to deal with acute issue, and orthostatics were not done. Will get at next vsiit.     - Single elevated BP today. Has follow up with weight loss clinic, has been started on meds, so we will wait to re-evaluate.    - Completed Department of Health forms: indicated special diet of high fruit, high vegetable, and low carb diet. See scanned documents.    There are no discontinued medications.    Patient Instructions   Right Shoulder Pain  1. Continue doing hand crawls on the wall daily. Make a pencil niranjan on the wall to track your status, and then in one week or two weeks niranjan the wall a little higher and practice that height.   2. Refilled BioFreeze today. Apply to the right shoulder four times as needed for pain.  3. Follow up with me in one month.    Edema  1. Measurements for your compression socks will be sent to the DME company.    Allergies  1. Refilled eye drops today. Place one drop into both eyes two times a day.    Balance  1. Labs today at [facility].   2. Neuro Referral at the [facility] on [address].  3. PT referral.    Options for treatment and follow-up care were reviewed with the patient. Flor Navarro  engaged in the decision making process and verbalized understanding of the options discussed and agreed with the final plan.    The information in this document, created by the medical scribe for me, accurately reflects the services I personally performed and the decisions made by me. I have reviewed and approved this document for accuracy prior to leaving the patient care area.    Karely Sierra MD  2:30 PM, 04/09/19  I spent 45 min face to face with the patient and >50% was spent counselling the patient about the above medical conditions, educating patient on their  medical conditions, behavioral interventions and supports.

## 2019-04-09 NOTE — NURSING NOTE
Due to patient being non-English speaking/uses sign language, an  was used for this visit. Only for face-to-face interpretation by an external agency, date and length of interpretation can be found on the scanned worksheet.     name: Luisana Loco  Agency: [employer]  Language: Maori   Telephone number: [phone]  Type of interpretation: Face-to-face, spoken

## 2019-04-09 NOTE — PATIENT INSTRUCTIONS
Right Shoulder Pain  1. Continue doing hand crawls on the wall daily. Make a pencil niranjan on the wall to track your status, and then in one week or two weeks niranjan the wall a little higher and practice that height.   2. Refilled BioFreeze today. Apply to the right shoulder four times as needed for pain.  3. Follow up with me in one month.    Edema  1. Measurements for your compression socks will be sent to the DME company.    Allergies  1. Refilled eye drops today. Place one drop into both eyes two times a day.    Balance  1. Labs today at [facility].   2. Neuro Referral at the [facility] [place] on [address].  3. PT referral.

## 2019-04-09 NOTE — LETTER
April 15, 2019      Flor HAMPTON Melanie  [address]   [place] [address]        Dear Flor,    Thank you for getting your care at [facility]. Please see below for your test results. They are stable.     Resulted Orders   Vitamin B12   Result Value Ref Range    Vitamin B12 487 193 - 986 pg/mL   CBC with Diff Plt (LabDAQ)   Result Value Ref Range    WBC 5.3 4.0 - 11.0 K/uL    Lymphocytes # 1.7 0.8 - 5.3 K/uL    % Lymphocytes 31.8 20.0 - 48.0 %L    Mid # 0.4 0.0 - 2.2 K/uL    Mid % 8.3 0.0 - 20.0 %M    GRANULOCYTES # 3.2 1.6 - 8.3 K/uL    % Granulocytes 59.9 40.0 - 75.0 %G    RBC 4.67 3.80 - 5.20 M/uL    Hemoglobin 13.8 11.7 - 15.7 g/dL    Hematocrit 45.6 35.0 - 47.0 %    MCV 97.6 78.0 - 100.0 fL    MCH 29.6 26.5 - 35.0 pg    MCHC 30.3 (L) 32.0 - 36.0 g/dL    Platelets 148.0 (L) 150.0 - 450.0 K/uL   Electrolyte Panel (LABDAQ)   Result Value Ref Range    Chloride 102.8 98.0 - 110.0 mmol/L    Carbon Dioxide 26.9 20.0 - 32.0 mmol/L    Potassium 4.8 (H) 3.3 - 4.5 mmol/dL    Sodium 139.6 132.6 - 141.4 mmol/L    Anion Gap 9.9 6.0 - 17.0   Hemoglobin A1c (LabDAQ)   Result Value Ref Range    Hemoglobin A1C 5.3 4.1 - 5.7 %     Sincerely,    Karely Sierra MD

## 2019-04-09 NOTE — TELEPHONE ENCOUNTER
"Spoke with Shelley and gave updated information about height and weight.    227#  5' 0.75\"  17 in calf Width     Dayanna Batista CMA    "

## 2019-04-11 ENCOUNTER — DOCUMENTATION ONLY (OUTPATIENT)
Dept: FAMILY MEDICINE | Facility: CLINIC | Age: 54
End: 2019-04-11

## 2019-04-11 NOTE — PROGRESS NOTES
"When opening a documentation only encounter, be sure to enter in \"Chief Complaint\" Forms and in \" Comments\" Title of form, description if needed.    Flor is a 53 year old  female  Form received via: Fax  Form now resides in: Provider Edward Billy, [facility]      Form has been completed by provider.     Form sent out via: Fax to Handi at Fax Number: [phone]  Patient informed: No   Output date: April 16, 2019    Dayanna Batista      **Please close the encounter**                  "

## 2019-04-12 DIAGNOSIS — Z94.4 LIVER REPLACED BY TRANSPLANT (H): ICD-10-CM

## 2019-04-12 RX ORDER — CYCLOSPORINE 25 MG/1
CAPSULE, LIQUID FILLED ORAL
Qty: 240 CAPSULE | Refills: 3 | Status: SHIPPED | OUTPATIENT
Start: 2019-04-12 | End: 2019-08-01

## 2019-04-15 ENCOUNTER — MEDICAL CORRESPONDENCE (OUTPATIENT)
Dept: HEALTH INFORMATION MANAGEMENT | Facility: CLINIC | Age: 54
End: 2019-04-15

## 2019-04-16 ENCOUNTER — TELEPHONE (OUTPATIENT)
Dept: FAMILY MEDICINE | Facility: CLINIC | Age: 54
End: 2019-04-16

## 2019-04-16 NOTE — TELEPHONE ENCOUNTER
RN received a call earlier today from Deborah requesting that we set up a PT eval for patient for frequent falls. Patient has a cane ordered, but this may not be the most appropriate mobility aid. A walker might be more appropriate.    RN attempted reaching [facility] home care nurse and left message to see if this is something we can set up for her at her home, or if it needs to be external and patient needs a referral to leave the home for this assessment.   Elisabet Olson, RN

## 2019-04-18 ENCOUNTER — TELEPHONE (OUTPATIENT)
Dept: FAMILY MEDICINE | Facility: CLINIC | Age: 54
End: 2019-04-18

## 2019-04-18 NOTE — TELEPHONE ENCOUNTER
[facility] phone call message - order or referral request for patient:     Order or referral being requested: Orders      Additional Comments: Requesting verbal orders for Home care PT/OT orders. If RN does not answer, verbal orders can be left on secured VM.     OK to leave a message on voice mail? Yes    Primary language: Yakut      needed? Yes    Call taken on April 18, 2019 at 12:34 PM by Ines Gutierrez

## 2019-04-18 NOTE — TELEPHONE ENCOUNTER
Returned call to home care nurse to give verbal orders per protocol as requested. Nurse verbalized understanding.    RN clarified wether they would be able to assess her for which mobility device will be the most effective and they stated that they can do that.    Elisabet Olson RN

## 2019-04-18 NOTE — TELEPHONE ENCOUNTER
RN left voicemail to inform her that home PT and OT will evaluate her and can give us an assessment on wether she would benefit from a walker or a cane.  Elisabet Olson RN

## 2019-04-25 ENCOUNTER — TELEPHONE (OUTPATIENT)
Dept: FAMILY MEDICINE | Facility: CLINIC | Age: 54
End: 2019-04-25

## 2019-04-25 ENCOUNTER — DOCUMENTATION ONLY (OUTPATIENT)
Dept: FAMILY MEDICINE | Facility: CLINIC | Age: 54
End: 2019-04-25

## 2019-04-25 ENCOUNTER — MEDICAL CORRESPONDENCE (OUTPATIENT)
Dept: HEALTH INFORMATION MANAGEMENT | Facility: CLINIC | Age: 54
End: 2019-04-25

## 2019-04-25 NOTE — TELEPHONE ENCOUNTER
Returned call to home care nurse to give verbal orders per protocol as requested. Nurse verbalized understanding.    Fabiola Ford RN

## 2019-04-25 NOTE — PROGRESS NOTES
"When opening a documentation only encounter, be sure to enter in \"Chief Complaint\" Forms and in \" Comments\" Title of form, description if needed.    Flor is a 53 year old  female  Form received via: Fax  Form now resides in: Provider Ready    Loni Billy CMA                Form has been completed by provider.     Form sent out via: Fax to [phone]  Patient informed: No, Reason:fax confirmed  Output date: April 25, 2019    Loni Billy CMA      **Please close the encounter**      "

## 2019-04-25 NOTE — TELEPHONE ENCOUNTER
[facility] phone call message - order or referral request from patient:     Order or referral being requested: Requested order PT and OT   Additional Details: for initial evaluation    Referral only -Specialty PT and OT Location home care    OK to leave a message on voice mail? Yes    Primary language: Croatian      needed? Yes    Call taken on April 25, 2019 at 1:06 PM by Kavita Bullard    Order request route to P [facility] TRIAGE   Referrals Route to [facility] (Green/[place]/Purple) CARE COORDINATOR

## 2019-04-26 ENCOUNTER — TELEPHONE (OUTPATIENT)
Dept: PHYSICAL THERAPY | Facility: CLINIC | Age: 54
End: 2019-04-26

## 2019-04-26 DIAGNOSIS — S52.124A: ICD-10-CM
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
## 2019-04-26 DIAGNOSIS — J45.21 MILD INTERMITTENT ASTHMA WITH ACUTE EXACERBATION: ICD-10-CM

## 2019-04-26 RX ORDER — ALBUTEROL SULFATE 0.63 MG/3ML
1 SOLUTION RESPIRATORY (INHALATION)
Qty: 360 ML | Refills: 1 | Status: SHIPPED | OUTPATIENT
Start: 2019-04-26 | End: 2023-06-26

## 2019-04-26 RX ORDER — LIDOCAINE 50 MG/G
PATCH TOPICAL
Qty: 30 PATCH | Refills: 0 | Status: SHIPPED | OUTPATIENT
Start: 2019-04-26 | End: 2019-05-13

## 2019-04-26 NOTE — TELEPHONE ENCOUNTER
"Request for medication refill: Lidocaine and Albuterol nebs    Providers if patient needs an appointment and you are willing to give a one month supply please refill for one month and  send a letter/MyChart using \".SMILLIMITEDREFILL\" .smillimited and route chart to \"P SMI \" (Giving one month refill in non controlled medications is strongly recommended before denial)    If refill has been denied, meaning absolutely no refills without visit, please complete the smart phrase \".smirxrefuse\" and route it to the \"P SMI MED REFILLS\"  pool to inform the patient and the pharmacy.    Elisabet Olson RN       "

## 2019-04-29 ENCOUNTER — THERAPY VISIT (OUTPATIENT)
Dept: PHYSICAL THERAPY | Facility: CLINIC | Age: 54
End: 2019-04-29
Attending: FAMILY MEDICINE
Payer: MEDICARE

## 2019-04-29 DIAGNOSIS — R29.6 FALLS FREQUENTLY: ICD-10-CM

## 2019-04-29 NOTE — DISCHARGE INSTRUCTIONS
4/29/19  - Complete exercises twice a day  - I will follow up with Dr. Sierra regarding the walker  - Sit down if you get dizzy. Move slowly, especially when sitting to standing  - Continue physical therapy at home until you get your walker

## 2019-04-30 ENCOUNTER — ALLIED HEALTH/NURSE VISIT (OUTPATIENT)
Dept: SURGERY | Facility: CLINIC | Age: 54
End: 2019-04-30
Payer: MEDICARE

## 2019-04-30 ENCOUNTER — OFFICE VISIT (OUTPATIENT)
Dept: ENDOCRINOLOGY | Facility: CLINIC | Age: 54
End: 2019-04-30
Payer: MEDICARE

## 2019-04-30 ENCOUNTER — TELEPHONE (OUTPATIENT)
Dept: FAMILY MEDICINE | Facility: CLINIC | Age: 54
End: 2019-04-30

## 2019-04-30 VITALS
WEIGHT: 225.4 LBS | SYSTOLIC BLOOD PRESSURE: 116 MMHG | DIASTOLIC BLOOD PRESSURE: 84 MMHG | HEART RATE: 78 BPM | TEMPERATURE: 98.3 F | BODY MASS INDEX: 42.56 KG/M2 | HEIGHT: 61 IN | OXYGEN SATURATION: 94 %

## 2019-04-30 DIAGNOSIS — Z94.4 STATUS POST LIVER TRANSPLANTATION (H): ICD-10-CM

## 2019-04-30 DIAGNOSIS — R53.82 CHRONIC FATIGUE: ICD-10-CM

## 2019-04-30 DIAGNOSIS — E66.01 MORBID OBESITY (H): ICD-10-CM

## 2019-04-30 DIAGNOSIS — I63.9 CEREBROVASCULAR ACCIDENT (CVA), UNSPECIFIED MECHANISM (H): ICD-10-CM

## 2019-04-30 LAB
ALBUMIN SERPL-MCNC: 3.8 G/DL (ref 3.4–5)
ALP SERPL-CCNC: 100 U/L (ref 40–150)
ALT SERPL W P-5'-P-CCNC: 25 U/L (ref 0–50)
AST SERPL W P-5'-P-CCNC: 20 U/L (ref 0–45)
BILIRUB DIRECT SERPL-MCNC: 0.2 MG/DL (ref 0–0.2)
BILIRUB SERPL-MCNC: 0.9 MG/DL (ref 0.2–1.3)
INR PPP: 1.05 (ref 0.86–1.14)

## 2019-04-30 RX ORDER — NALTREXONE HYDROCHLORIDE 50 MG/1
TABLET, FILM COATED ORAL
Qty: 180 TABLET | Refills: 2 | Status: SHIPPED | OUTPATIENT
Start: 2019-04-30 | End: 2019-04-30

## 2019-04-30 ASSESSMENT — MIFFLIN-ST. JEOR: SCORE: 1555.78

## 2019-04-30 NOTE — TELEPHONE ENCOUNTER
Prior Authorization Approval    Authorization Effective Date: 1/30/2019  Authorization Expiration Date: 4/29/2020  Medication: olopatadine (PATANOL) 0.1 % ophthalmic solution- pa approved   Approved Dose/Quantity: UD  Reference #:     Insurance Company: Silver [employer] Part D - Phone [phone] Fax [phone]  Expected CoPay:       CoPay Card Available:      Foundation Assistance Needed:    Which Pharmacy is filling the prescription (Not needed for infusion/clinic administered): [facility] - [place], [place] - [address]  Pharmacy Notified: Yes  Patient Notified: Yes

## 2019-04-30 NOTE — PROGRESS NOTES
Nutrition Brief Note    Pt was referred by Dr Willingham for nutrition therapy.  Pt signed Medicare ABN and declined MNT today.  Patient was provided what to eat handout for 1,000 calories per MD recommendations.    Time spent with patient: less than 5 minutes.    Ania Gil RD, LD

## 2019-04-30 NOTE — NURSING NOTE
"Chief Complaint   Patient presents with     Weight Problem     RMWM       Vitals:    04/30/19 1531   BP: 116/84   BP Location: Left arm   Patient Position: Sitting   Cuff Size: Adult Regular   Pulse: 78   Temp: 98.3  F (36.8  C)   TempSrc: Oral   SpO2: 94%   Weight: 102.2 kg (225 lb 6.4 oz)   Height: 1.543 m (5' 0.75\")       Body mass index is 42.94 kg/m .    Tisha Headley CMA    "

## 2019-04-30 NOTE — PATIENT INSTRUCTIONS
Food goal: to continue current plan    Activity goal: to continue exercising    Weight goal: to lose 0-2 lbs weekly    Medication goal: to continue naltrexone

## 2019-04-30 NOTE — TELEPHONE ENCOUNTER
PA Initiation    Medication: [facility]) 0.[facility]   Insurance Company: Silver [employer] Part D - Phone [phone] Fax [phone]  Pharmacy Filling the Rx: [facility] - [place], [place] - [address]  Filling Pharmacy Phone: [phone]  Filling Pharmacy Fax: [phone]  Start Date: 4/30/2019

## 2019-04-30 NOTE — LETTER
"2019       RE: Flor Navarro  [address]  [place] [address]     Dear Colleague,    Thank you for referring your patient, Flor Navarro, to the [facility] MEDICAL WEIGHT MANAGEMENT at [facility]. Please see a copy of my visit note below.        Return Medical Weight Management Note     Flor Navarro  MRN:  [id number]  :  1965  LAYO:  2019    Dear Karely Sierra,    I had the pleasure of seeing your patient Flor Navarro.  She is a 53 year old female who I am continuing to see for treatment of obesity related to:       3/26/2019   I have the following co-morbidities associated with obesity: Sleep Apnea, Lower Extremity Edema, GERD (Reflux), Asthma, Weight Bearing Joint Pain, Stress Incontinence     S/p liver transplant 6-7 years ago.    INTERVAL HISTORY:  Taking 2 naltrexone pills daily. She is not having any side effects now. At first she had dizzyness and nausea for 3 days, then it stopped. She is exercising and eating lettuce.  Traveling to [place] in 2019. Labs: LFTs fine today (see labs in Epic).    CURRENT WEIGHT:   225 lbs 6.4 oz    Wt Readings from Last 4 Encounters:   19 102.2 kg (225 lb 6.4 oz)   19 103 kg (227 lb)   19 105.1 kg (231 lb 11.2 oz)   19 105.7 kg (233 lb)       Height:  5' .748\"  Body Mass Index:  Body mass index is 42.94 kg/m .  Vitals:  B/P: 116/84, P: 78, R: Data Unavailable     Initial consult weight was 231 on 3/26/19.  Weight change since last seen on 3/27/2019 is down 6 pounds.   Total loss is 6 pounds.    Diet and Activity Changes Since Last Visit Reviewed With Patient 2019   I have made the following changes to my diet since my last visit: brown rice,vegatables   With regards to my diet, I am still struggling with: [facility] diffe[facility]ulty [facility]   I have made the following changes to my activity/exercise since my last visit: increased home excersises   With regards to " my activity/exercise, I am still struggling with: I can go down stairs but cant climb them       MEDICATIONS:   Current Outpatient Medications   Medication     acetaminophen (TYLENOL) 500 MG tablet     albuterol (ACCUNEB) 0.63 MG/3ML neb solution     albuterol (PROAIR HFA/PROVENTIL HFA/VENTOLIN HFA) 108 (90 Base) MCG/ACT inhaler     alendronate (FOSAMAX) 70 MG tablet     amLODIPine (NORVASC) 5 MG tablet     bisacodyl (DULCOLAX) 5 MG EC tablet     calcium carbonate 600 mg-vitamin D 400 units (CALCIUM 600 + D) 600-400 MG-UNIT per tablet     carboxymethylcellulose (CELLUVISC/REFRESH LIQUIGEL) 1 % ophthalmic solution     cholecalciferol (VITAMIN  -D) 1000 units capsule     cycloSPORINE modified (GENERIC EQUIVALENT) 25 MG capsule     dimethicone (AVEENO DAILY MOISTURIZING) 1.3 % LOTN lotion     lidocaine (LIDODERM) 5 % patch     melatonin 3 MG tablet     Menthol, Topical Analgesic, (BIOFREEZE) 4 % GEL     multivitamin, therapeutic with minerals (MULTI-VITAMIN) TABS tablet     mycophenolate (GENERIC EQUIVALENT) 250 MG capsule     naltrexone (DEPADE/REVIA) 50 MG tablet     olopatadine (PATANOL) 0.1 % ophthalmic solution     omeprazole (PRILOSEC) 20 MG DR capsule     prednisoLONE acetate (PRED FORTE) 1 % ophthalmic susp     Psyllium 400 MG CAPS     sodium chloride (OCEAN) 0.65 % nasal spray     ASPIRIN NOT PRESCRIBED (INTENTIONAL)     Carboxymethylcellulose Sod PF (CELLUVISC/REFRESH LIQUIGEL) 1 % ophthalmic gel     Cholecalciferol (VITAMIN D3) 2000 units CAPS     Elastic Bandages & Supports (KNEE BRACE/FLEX STAYS LARGE) MISC     Elastic Bandages & Supports (MEDICAL COMPRESSION SOCKS) MISC     lidocaine (XYLOCAINE) 2 % external gel     Menthol, Topical Analgesic, (BIOFREEZE COLORLESS) 4 % GEL     Menthol, Topical Analgesic, (BIOFREEZE) 4 % GEL     order for DME     order for DME     order for DME     order for DME     order for DME     senna-docusate (SENOKOT-S/PERICOLACE) 8.6-50 MG tablet     simethicone (MYLICON) 80 MG  chewable tablet     STATIN NOT PRESCRIBED, INTENTIONAL,     Current Facility-Administered Medications   Medication     albuterol (PROVENTIL) neb solution 2.5 mg     apraclonidine (IOPIDINE) 1 % ophthalmic solution 1 drop       Weight Loss Medication History Reviewed With Patient 4/30/2019   Are you having any side effects from the weight loss medication that we have prescribed you? No     ASSESSMENT:   Morbid obesity, improving with lifestyle changes supported by naltrexone. She is actually taking 2 tabs daily and wants to increase to 3 tabs daily. She had some nausea at first, for about 3 days, then it went away. She is going to be traveling to [place] in August and does not want to run out of her medication.    PLAN:   Patient Instructions:  Food goal: to continue current plan    Activity goal: to continue exercising    Weight goal: to lose 0-2 lbs weekly    Medication goal: to continue naltrexone    FOLLOW-UP:    1-3 months with Medical Weight Management PA/NP, 6 months with me or one of my colleagues    Time: 25 min spent on evaluation, management, counseling, education, & motivational interviewing with greater than 50% of the total time was spent on counseling and coordinating care. Patient was seen with the help of an .    Sincerely,    KAYLYN BARRIOS MD, MPH  Staff Endocrinologist

## 2019-04-30 NOTE — PROGRESS NOTES
"    Return Medical Weight Management Note     Flor Navarro  MRN:  [id number]  :  1965  LAYO:  2019    Dear Karely Sierra,    I had the pleasure of seeing your patient Flor Navarro.  She is a 53 year old female who I am continuing to see for treatment of obesity related to:       3/26/2019   I have the following co-morbidities associated with obesity: Sleep Apnea, Lower Extremity Edema, GERD (Reflux), Asthma, Weight Bearing Joint Pain, Stress Incontinence     S/p liver transplant 6-7 years ago.    INTERVAL HISTORY:  Taking 2 naltrexone pills daily. She is not having any side effects now. At first she had dizzyness and nausea for 3 days, then it stopped. She is exercising and eating lettuce.  Traveling to [place] in 2019. Labs: LFTs fine today (see labs in Epic).    CURRENT WEIGHT:   225 lbs 6.4 oz    Wt Readings from Last 4 Encounters:   19 102.2 kg (225 lb 6.4 oz)   19 103 kg (227 lb)   19 105.1 kg (231 lb 11.2 oz)   19 105.7 kg (233 lb)       Height:  5' .748\"  Body Mass Index:  Body mass index is 42.94 kg/m .  Vitals:  B/P: 116/84, P: 78, R: Data Unavailable     Initial consult weight was 231 on 3/26/19.  Weight change since last seen on 3/27/2019 is down 6 pounds.   Total loss is 6 pounds.    Diet and Activity Changes Since Last Visit Reviewed With Patient 2019   I have made the following changes to my diet since my last visit: brown rice,vegatmikhail   With regards to my diet, I am still struggling with: have diffeculty staying away from [facility]i   I have made the following changes to my activity/exercise since my last visit: increased home excersises   With regards to my activity/exercise, I am still struggling with: I can go down stairs but cant climb them       MEDICATIONS:   Current Outpatient Medications   Medication     acetaminophen (TYLENOL) 500 MG tablet     albuterol (ACCUNEB) 0.63 MG/3ML neb solution     albuterol (PROAIR HFA/PROVENTIL HFA/VENTOLIN HFA) 108 " (90 Base) MCG/ACT inhaler     alendronate (FOSAMAX) 70 MG tablet     amLODIPine (NORVASC) 5 MG tablet     bisacodyl (DULCOLAX) 5 MG EC tablet     calcium carbonate 600 mg-vitamin D 400 units (CALCIUM 600 + D) 600-400 MG-UNIT per tablet     carboxymethylcellulose (CELLUVISC/REFRESH LIQUIGEL) 1 % ophthalmic solution     cholecalciferol (VITAMIN  -D) 1000 units capsule     cycloSPORINE modified (GENERIC EQUIVALENT) 25 MG capsule     dimethicone (AVEENO DAILY MOISTURIZING) 1.3 % LOTN lotion     lidocaine (LIDODERM) 5 % patch     melatonin 3 MG tablet     Menthol, Topical Analgesic, (BIOFREEZE) 4 % GEL     multivitamin, therapeutic with minerals (MULTI-VITAMIN) TABS tablet     mycophenolate (GENERIC EQUIVALENT) 250 MG capsule     naltrexone (DEPADE/REVIA) 50 MG tablet     olopatadine (PATANOL) 0.1 % ophthalmic solution     omeprazole (PRILOSEC) 20 MG DR capsule     prednisoLONE acetate (PRED FORTE) 1 % ophthalmic susp     Psyllium 400 MG CAPS     sodium chloride (OCEAN) 0.65 % nasal spray     ASPIRIN NOT PRESCRIBED (INTENTIONAL)     Carboxymethylcellulose Sod PF (CELLUVISC/REFRESH LIQUIGEL) 1 % ophthalmic gel     Cholecalciferol (VITAMIN D3) 2000 units CAPS     Elastic Bandages & Supports (KNEE BRACE/FLEX STAYS LARGE) MISC     Elastic Bandages & Supports (MEDICAL COMPRESSION SOCKS) MISC     lidocaine (XYLOCAINE) 2 % external gel     Menthol, Topical Analgesic, (BIOFREEZE COLORLESS) 4 % GEL     Menthol, Topical Analgesic, (BIOFREEZE) 4 % GEL     order for DME     order for DME     order for DME     order for DME     order for DME     senna-docusate (SENOKOT-S/PERICOLACE) 8.6-50 MG tablet     simethicone (MYLICON) 80 MG chewable tablet     STATIN NOT PRESCRIBED, INTENTIONAL,     Current Facility-Administered Medications   Medication     albuterol (PROVENTIL) neb solution 2.5 mg     apraclonidine (IOPIDINE) 1 % ophthalmic solution 1 drop       Weight Loss Medication History Reviewed With Patient 4/30/2019   Are you having  any side effects from the weight loss medication that we have prescribed you? No     ASSESSMENT:   Morbid obesity, improving with lifestyle changes supported by naltrexone. She is actually taking 2 tabs daily and wants to increase to 3 tabs daily. She had some nausea at first, for about 3 days, then it went away. She is going to be traveling to [place] in August and does not want to run out of her medication.    PLAN:   Patient Instructions:  Food goal: to continue current plan    Activity goal: to continue exercising    Weight goal: to lose 0-2 lbs weekly    Medication goal: to continue naltrexone    FOLLOW-UP:    1-3 months with Medical Weight Management PA/NP, 6 months with me or one of my colleagues    Time: 25 min spent on evaluation, management, counseling, education, & motivational interviewing with greater than 50% of the total time was spent on counseling and coordinating care. Patient was seen with the help of an .    Sincerely,    KAYLYN BARRIOS MD, MPH  Staff Endocrinologist

## 2019-04-30 NOTE — PROGRESS NOTES
"   04/29/19 1000   Quick Adds   Type of Visit Initial OP PT Evaluation       Present Yes   Language Other  (Yi)   General Information   Start of Care Date 04/29/19   Referring Physician Karely Sierra MD   Orders Evaluate and Treat as Indicated   Additional Orders assess for SEC vs WW   Order Date 04/09/19   Medical Diagnosis Falls frequently (R29.6)   Onset of illness/injury or Date of Surgery 04/29/19   Surgical/Medical history reviewed Yes   Pertinent history of current problem (include personal factors and/or comorbidities that impact the POC) Patient had a liver transplant ~10 years ago; reports dizziness since then. Patient reports dizziness increases with quick transition changes (sit to stand); reports 6/10 as lightheadedness. Patient reports increase in falls over past year; tripping or picking something up off floor- reports dizziness prior to falls. Patient reports multiple injuries from falls, unable to describe exact injuries except left leg. Patient also reports stroke affecting R side (timeline unknown)   Pertinent Visual History  Patient had B cataract surgery 2 years ago   Prior level of function comment Independent with functional mobility; had SEC and WW but left it in a cab   Current Community Support Home health aid;Other/Comments  (home RN weekly)   Living environment [facility]   Home/Community Accessibility Comments Has elevator access. Senior living [facility]   Assistive Devices Comments Patient had SEC and WW, but left them in a cab (unknown timeline). Patient reports \"someone\" ordered her a rollator, but still hasn't received them in 2 months   Patient/Family Goals Statement \"To not fall\"   Fall Risk Screen   Fall screen completed by PT   Have you fallen 2 or more times in the past year? Yes   Have you fallen and had an injury in the past year? Yes   Timed Up and Go score (seconds) 23.5   Is patient a fall risk? Yes   Fall screen comments Patient reports ~30 " falls in past year; injuries present, however patient unable to elaborate fully on   Pain   Patient currently in pain No   Cognitive Status Examination   Orientation orientation to person, place and time   Level of Consciousness alert   Follows Commands and Answers Questions 100% of the time;able to follow single-step instructions   Personal Safety and Judgment at risk behaviors demonstrated  (fall risk)   Memory intact   Posture   Posture Forward head position;Protracted shoulders   Range of Motion (ROM)   ROM Comment BLE WFL. Limited RUE AROM   Strength   Strength Comments seated position   MMT: Hip, Rehab Eval   Hip Flexion - Left Side (3-/5) fair minus, left   Hip ABduction - Left Side (2+/5) poor plus, left   Hip Flexion - Right Side (3+/5) fair plus, right   Hip ABduction - Right Side (2+/5) poor plus, right   MMT: Knee, Rehab Eval   Knee Flexion - Left Side (4-/5) good minus, left   Knee Extension - Left Side (4-/5) good minus, left   Knee Flexion - Right Side (4-/5) good minus, right   Knee Extension - Right Side (4-/5) good minus, right   MMT: Ankle, Rehab Eval   Ankle Dorsiflexion - Left Side (4-/5) good minus, left   Ankle Plantarflexion - Left Side (2+/5) poor plus, left   Ankle Dorsiflexion - Right Side (4-/5) good minus, right   Ankle Plantarflexion - Right Side (2+/5) poor plus, right   Bed Mobility   Bed Mobility Comments Modified independent; increased time to complete   Transfer Skills   Transfer Comments SPT without device modified independent; patient reports increased dizziness with turns   Gait   Gait Comments Patient ambulates without device modified independent; slow mary, increased ANJANA. Patient reports increased dizziness with turns. Patient does verbalize discomfort when walking, however unable to exactly point to what hurts when questioned. Patient occasional points to L lower leg   Gait Special Tests   Gait Special Tests 25 FOOT TIMED WALK   Gait Special Tests 25 Foot Timed Walk    Seconds 10.56   Steps 19 Steps   Balance Special Tests   Balance Special Tests Modified CTSIB Conditions   Balance Special Tests Modified CTSIB Conditions   Condition 1, seconds 30 Seconds   Condition 2, seconds 25 Seconds   Condition 4, seconds 30 Seconds   Condition 5, seconds 30 Seconds   Modified CTSIB Comments increased sway conditions 4&5. Anterior LOB condtion 2   Sensory Examination   Sensory Perception Comments Patient reports numbness in L foot/ankle. Intact light touch to RLE, diminished light touch to LLE. Unable to assess proprioception due to difficulty with command following/lanuage barrier   Coordination   Coordination Comments Slowed movements with B heel to shin and BLE CHELO   Muscle Tone   Muscle Tone no deficits were identified   Planned Therapy Interventions   Planned Therapy Interventions balance training;gait training;joint mobilization;motor coordination training;neuromuscular re-education;strengthening;ROM;stretching;transfer training;manual therapy   Clinical Impression   Criteria for Skilled Therapeutic Interventions Met yes, treatment indicated   PT Diagnosis Balance impairment, gait dysfunction   Influenced by the following impairments sensation, frequent falls, balance impairments, symptoms of dizziness   Functional limitations due to impairments gait dysfunction- especially community mobility, fall risk, unable to perform lesiure activities   Clinical Presentation Stable/Uncomplicated   Clinical Presentation Rationale PMH, body system/impairments (strength/sensation/balance), personal factors (falls)   Clinical Decision Making (Complexity) Moderate complexity   Therapy Frequency 1 time/week   Predicted Duration of Therapy Intervention (days/wks) 90 days   Risk & Benefits of therapy have been explained Yes   Patient, Family & other staff in agreement with plan of care Yes   Clinical Impression Comments Patient is a 53 year old female who presents to OP PT with reports of dizziness and  imbalance; reports ~30 falls over past year resulting in injuries. Patient used to have cane and walker, but left them in a cab (timeline unknown). Patient currently demonstrates impairments in sensation, strength and balance leading to decreased safety in community mobility which places her at an increased risk for falls (TUG >23 seconds). Patient would benefit from skilled PT services for functional upgrading including gait and balance retraining, education to reduce fall risk in order to return to safe community mobility. Unsure if patient is receiving HH PT at this time; will follow up with referring MD prior to continuation of treatment. Educated patient on need to transition from HH PT prior to OP PT continutation. Performed AD assessment; recommend rollator for safe use for household and community mobility- notified MD   Education Assessment   Preferred Learning Style Listening;Demonstration;Pictures/video   Barriers to Learning No barriers   GOALS   PT Eval Goals 1;2;3;4   Goal 1   Goal Identifier Gait   Goal Description Patient will ambulate 25 feet with device in 7 seconds to improve to safe community ambulator status   Target Date 07/27/19   Goal 2   Goal Identifier Balance   Goal Description Patient will perform TUG, with device, in under 20 seconds to decrease fall risk and improve independence with mobility   Target Date 07/27/19   Goal 3   Goal Identifier Falls   Goal Description Patient will report no falls in 4 weeks for return to safe functional mobility   Target Date 07/27/19   Goal 4   Goal Identifier HEP   Goal Description Patient will be independent in HEP for maintenance of therapy gains at d/c   Target Date 07/27/19   Total Evaluation Time   PT Eval, Moderate Complexity Minutes (90397) 40

## 2019-05-01 ENCOUNTER — DOCUMENTATION ONLY (OUTPATIENT)
Dept: FAMILY MEDICINE | Facility: CLINIC | Age: 54
End: 2019-05-01

## 2019-05-01 ENCOUNTER — TELEPHONE (OUTPATIENT)
Dept: FAMILY MEDICINE | Facility: CLINIC | Age: 54
End: 2019-05-01

## 2019-05-01 RX ORDER — NALTREXONE HYDROCHLORIDE 50 MG/1
TABLET, FILM COATED ORAL
Qty: 540 TABLET | Refills: 2 | Status: SHIPPED | OUTPATIENT
Start: 2019-05-01 | End: 2019-08-08

## 2019-05-01 NOTE — TELEPHONE ENCOUNTER
Returned call to home care PT to give verbal orders per protocol as requested. PT verbalized understanding.    Fabiola Ford RN

## 2019-05-01 NOTE — TELEPHONE ENCOUNTER
[facility] phone call message - order or referral request for patient:     Order or referral being requested: Order      Additional Comments: Physical therapy order for once a week for 3 weeks for gait training and exercise.     OK to leave a message on voice mail? Yes    Primary language: Frisian      needed? Yes    Call taken on May 1, 2019 at 2:56 PM by Ines Gutierrez

## 2019-05-01 NOTE — PROGRESS NOTES
"When opening a documentation only encounter, be sure to enter in \"Chief Complaint\" Forms and in \" Comments\" Title of form, description if needed.    Flor is a 53 year old  female  Form received via: Fax  Form now resides in: Provider Edward Billy, [facility]                  "

## 2019-05-02 ENCOUNTER — TELEPHONE (OUTPATIENT)
Dept: FAMILY MEDICINE | Facility: CLINIC | Age: 54
End: 2019-05-02

## 2019-05-02 NOTE — TELEPHONE ENCOUNTER
[facility] Family [facility] phone call message - order or referral request from patient:     Order or referral being requested: Requested order Skilled Nursing   1 time a week for 8 weeks   3 PRN     Additional Details:     Referral only -Specialty  Location     OK to leave a message on voice mail? Yes    Primary language: Slovenian      needed? Yes    Call taken on May 2, 2019 at 1:22 PM by Jaja Dixon    Order request route to [facility] TRIAGE   Referrals Route to [facility] (Green/Ideal/Purple) CARE COORDINATOR

## 2019-05-02 NOTE — TELEPHONE ENCOUNTER
Returned call to home care nurse to give verbal orders per protocol as requested. Nurse verbalized understanding.    Elisabet Olson RN

## 2019-05-03 ENCOUNTER — MEDICAL CORRESPONDENCE (OUTPATIENT)
Dept: HEALTH INFORMATION MANAGEMENT | Facility: CLINIC | Age: 54
End: 2019-05-03

## 2019-05-07 ENCOUNTER — TELEPHONE (OUTPATIENT)
Dept: FAMILY MEDICINE | Facility: CLINIC | Age: 54
End: 2019-05-07

## 2019-05-07 DIAGNOSIS — R29.6 FALLS FREQUENTLY: Primary | ICD-10-CM

## 2019-05-07 NOTE — TELEPHONE ENCOUNTER
RN talked to home care pt staff in regards to forms, requested to resend equipment request forms, clinic fax number given.    Will route to PCP as Fyi.    Baron Roblero RN

## 2019-05-07 NOTE — TELEPHONE ENCOUNTER
[facility] phone call message- general phone call:    Reason for call: ARA Regan, called to have Dr Sierra change what equipment is requested and give instructions on how to fill out form that was faxed yesterday from [place].  Shereen will get the patient a cane through a  waiver.    Action Desired: Please change requested equipment from a cane to a 4-wheel walker, as recommended by physical therapist.  There are four questions on the form and they should be answered as such: 1) Yes, 2) No, 3) Yes, and 4) Yes.  Also, this is a lifetime need.    Return call needed: Yes    OK to leave a message on voice mail? Yes    Advised patient response may take up to 2 business days: Yes    Primary language: Swedish      needed? Yes    Call taken on May 7, 2019 at 1:08 PM by Jany Lazo to [facility] TRIAGE

## 2019-05-10 NOTE — TELEPHONE ENCOUNTER
"RN calling to check status of form. Author unable to locate form from [facility], RN will refax. RN also requesting to have a prescription for the walker that must state \"4 wheel walker with seat and breaks\" and include diagnosis. Please advise.   "

## 2019-05-10 NOTE — TELEPHONE ENCOUNTER
Returned phone call to  [facility] staff, phone ([phone]),  In regards to forms to [employer] and orders for Wheel chair.     RN notified that forms were placed provider's folder and request for Wheelchair orders will be routed to PCP to address, informed clinic staff will call when forms/orders are complete.    Message routed to PCP as jose.    Baron Roblero RN

## 2019-05-13 ENCOUNTER — MEDICAL CORRESPONDENCE (OUTPATIENT)
Dept: HEALTH INFORMATION MANAGEMENT | Facility: CLINIC | Age: 54
End: 2019-05-13

## 2019-05-13 DIAGNOSIS — S52.124A: ICD-10-CM
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
## 2019-05-13 RX ORDER — LIDOCAINE 50 MG/G
PATCH TOPICAL
Qty: 30 PATCH | Refills: 0 | Status: SHIPPED | OUTPATIENT
Start: 2019-05-13 | End: 2019-05-31

## 2019-05-13 NOTE — TELEPHONE ENCOUNTER
"I checked folder today. There were multiple orders for OT and discontinuing home OT, but there were none for a wheelchair or for a walker.     I see documentation in the chart from 5/7 re: OT orders, but I see none re: need for walker or WC so I do NOT have a corresponding dx.     I found an Epic IB message (no form) from her PT stating:   \"rollator with seat and brakes.\" In the past we have just included diagnosis with ICD-10 code (Repeated falls R29.6), height (5'1\"), weight (102.2 kg), MD written name, signature and NPI number.     I will order this now and please help get forms to [employer]. I placed in Lilo's \"completed\" folder.    "

## 2019-05-13 NOTE — TELEPHONE ENCOUNTER
"Request for medication refill: Lidocaine 5% patches    Providers if patient needs an appointment and you are willing to give a one month supply please refill for one month and  send a letter/MyChart using \".SMILLIMITEDREFILL\" .smillimited and route chart to \"P SMI \" (Giving one month refill in non controlled medications is strongly recommended before denial)    If refill has been denied, meaning absolutely no refills without visit, please complete the smart phrase \".smirxrefuse\" and route it to the \"P SMI MED REFILLS\"  pool to inform the patient and the pharmacy.    Rosita Mcdonnell MA        "

## 2019-05-14 NOTE — PROGRESS NOTES
Form has been completed by provider.     Form sent out via: Fax to [facility] at Fax Number: [phone]  Patient informed: No, Reason: N/A  Output date: May 14, 2019    Elisabet Olson RN      **Please close the encounter**

## 2019-05-14 NOTE — TELEPHONE ENCOUNTER
RN faxed all forms successfully. Documented in the forms encounter as well. Shereen notified of completion.  Elisabet Olson RN

## 2019-05-20 ENCOUNTER — DOCUMENTATION ONLY (OUTPATIENT)
Dept: FAMILY MEDICINE | Facility: CLINIC | Age: 54
End: 2019-05-20

## 2019-05-20 NOTE — PROGRESS NOTES
"When opening a documentation only encounter, be sure to enter in \"Chief Complaint\" Forms and in \" Comments\" Title of form, description if needed.    Flor is a 53 year old  female  Form received via: Fax  Form now resides in: Provider Ready    Loni Billy CMA                Form has been completed by provider.     Form sent out via: Fax to [phone]  Patient informed: No, Reason:fax confirmed  Output date: May 22, 2019    Loni Billy CMA      **Please close the encounter**      "

## 2019-05-20 NOTE — PROGRESS NOTES
"When opening a documentation only encounter, be sure to enter in \"Chief Complaint\" Forms and in \" Comments\" Title of form, description if needed.    Flor is a 53 year old  female  Form received via: Fax  Form now resides in: Provider Ready    Loni Billy CMA                Form has been completed by provider.     Form sent out via: Fax [phone]  Patient informed: No, Reason:fax confirmed  Output date: May 22, 2019    Loni Billy CMA      **Please close the encounter**      "

## 2019-05-20 NOTE — PROGRESS NOTES
"When opening a documentation only encounter, be sure to enter in \"Chief Complaint\" Forms and in \" Comments\" Title of form, description if needed.    Flor is a 53 year old  female  Form received via: Fax  Form now resides in: Provider Ready    Loni Billy CMA                Form has been completed by provider.     Form sent out via: Fax to [phone]  Patient informed: No, Reason:fax confirmed  Output date: May 22, 2019    Loni Billy CMA      **Please close the encounter**      "

## 2019-05-20 NOTE — PROGRESS NOTES
"When opening a documentation only encounter, be sure to enter in \"Chief Complaint\" Forms and in \" Comments\" Title of form, description if needed.    Flor is a 53 year old  female  Form received via: Fax  Form now resides in: Provider Edward Billy, [facility]                  "

## 2019-05-21 ENCOUNTER — MEDICAL CORRESPONDENCE (OUTPATIENT)
Dept: HEALTH INFORMATION MANAGEMENT | Facility: CLINIC | Age: 54
End: 2019-05-21

## 2019-05-28 ENCOUNTER — OFFICE VISIT (OUTPATIENT)
Dept: FAMILY MEDICINE | Facility: CLINIC | Age: 54
End: 2019-05-28
Payer: MEDICARE

## 2019-05-28 VITALS
TEMPERATURE: 97.5 F | DIASTOLIC BLOOD PRESSURE: 93 MMHG | BODY MASS INDEX: 41.72 KG/M2 | OXYGEN SATURATION: 95 % | HEIGHT: 61 IN | HEART RATE: 60 BPM | WEIGHT: 221 LBS | SYSTOLIC BLOOD PRESSURE: 127 MMHG

## 2019-05-28 DIAGNOSIS — I12.9 HYPERTENSION, RENAL: ICD-10-CM

## 2019-05-28 DIAGNOSIS — M81.8 OTHER OSTEOPOROSIS WITHOUT CURRENT PATHOLOGICAL FRACTURE: ICD-10-CM

## 2019-05-28 DIAGNOSIS — Z13.220 LIPID SCREENING: ICD-10-CM

## 2019-05-28 DIAGNOSIS — R53.83 FATIGUE, UNSPECIFIED TYPE: ICD-10-CM

## 2019-05-28 DIAGNOSIS — Z94.4 LIVER REPLACED BY TRANSPLANT (H): ICD-10-CM

## 2019-05-28 DIAGNOSIS — N18.30 CKD (CHRONIC KIDNEY DISEASE) STAGE 3, GFR 30-59 ML/MIN (H): ICD-10-CM

## 2019-05-28 DIAGNOSIS — R60.0 LOCALIZED EDEMA: ICD-10-CM

## 2019-05-28 DIAGNOSIS — R06.02 SOB (SHORTNESS OF BREATH): Primary | ICD-10-CM

## 2019-05-28 DIAGNOSIS — H04.123 DRY EYES, BILATERAL: ICD-10-CM

## 2019-05-28 LAB
HCT VFR BLD AUTO: 44.6 % (ref 35–47)
HEMOGLOBIN: 13.2 G/DL (ref 11.7–15.7)
MCH RBC QN AUTO: 28.9 PG (ref 26.5–35)
MCHC RBC AUTO-ENTMCNC: 29.6 G/DL (ref 32–36)
MCV RBC AUTO: 97.8 FL (ref 78–100)
PLATELET # BLD AUTO: 180 K/UL (ref 150–450)
RBC # BLD AUTO: 4.56 M/UL (ref 3.8–5.2)
WBC # BLD AUTO: 6.6 K/UL (ref 4–11)

## 2019-05-28 RX ORDER — LISINOPRIL 10 MG/1
10 TABLET ORAL DAILY
Qty: 30 TABLET | Refills: 1 | Status: SHIPPED | OUTPATIENT
Start: 2019-05-28 | End: 2019-08-08

## 2019-05-28 RX ORDER — ALENDRONATE SODIUM 70 MG/1
70 TABLET ORAL
Qty: 12 TABLET | Refills: 3 | Status: SHIPPED | OUTPATIENT
Start: 2019-05-28 | End: 2019-08-08

## 2019-05-28 RX ORDER — CARBOXYMETHYLCELLULOSE SODIUM 5 MG/ML
1 SOLUTION/ DROPS OPHTHALMIC 4 TIMES DAILY
Qty: 15 ML | Refills: 3 | Status: SHIPPED | OUTPATIENT
Start: 2019-05-28 | End: 2019-08-08

## 2019-05-28 RX ORDER — CARBOXYMETHYLCELLULOSE SODIUM 10 MG/ML
1 GEL OPHTHALMIC 4 TIMES DAILY PRN
Qty: 1 BOTTLE | Refills: 3 | Status: SHIPPED | OUTPATIENT
Start: 2019-05-28 | End: 2019-08-08

## 2019-05-28 ASSESSMENT — MIFFLIN-ST. JEOR: SCORE: 1537.08

## 2019-05-28 NOTE — PROGRESS NOTES
"       HPI   Flor Navarro is a 53 year old  who presents for   Chief Complaint   Patient presents with     RECHECK     right hand and arm pain     SOB  Reports she experienced SOB accompanied by HA and URI symptoms (cough, congestion) two days ago and used the neb with immediate improvement. So happy with this! Usually stays sick for weeks but resolved quickely with neb.     HTN  Reports her BP is elevated at home as well today. Wonders if she can take a HTN medication as needed rather than daily. Is traveling to [place] on 08/10/19 for three months, wonders if she is able to get into nephrology before she travels so she can have all her medications in order before she leaves.    Medication Reconciliation  Requests refill on all medications. Reports she has not received the compression stockings yet, would like me to contact [facility] for this issue. Using BioFreeze with improvement. Taking Naltrexone, reports she lost 19 lbs since she started this medication via the weight clinic. Is no longer taking chewable tablet for GERD. Worried she needs an ABx for her cough.  Taking naltrexone three times daily for wt loss and very happy with this. Cravings down. CHO down. Incr in fruits and veggies and in fact brings several oranges and bananas to our visit today. Feels more energy since wt loss. Says \"that medicine very good\"    Mood  Fasting during Ramadan has been going well. Fasting has contributed to weight loss, pt is happy about this.      An Turkmen  was used for  this visit.    +++++++    Problem, Medication and Allergy Lists were reviewed and updated if needed.    Patient is an established patient of this clinic.         Review of Systems:   Review of Systems     Positive for: SOB, chest tightness, fatigue, HA's, cough    This document serves as a record of the services and decisions personally performed and made by Karely Sierra MD. It was created on his/her behalf by Charli Valdez, a trained medical " "scribe. The creation of this document is based the provider's statements to the medical scribe.  Nelson Charli Valdez 4:11 PM, May 28, 2019       Physical Exam:     Vitals:    05/28/19 1550 05/28/19 1558   BP: (Abnormal) 150/118 (Abnormal) 127/93   Pulse: 60    Temp: 97.5  F (36.4  C)    SpO2: 95%    Weight: 100.2 kg (221 lb)    Height: 1.545 m (5' 0.83\")      Body mass index is 42 kg/m .  Vitals were reviewed and were normal, except elevated BP.     Wt Readings from Last 10 Encounters:   05/28/19 100.2 kg (221 lb)   04/30/19 102.2 kg (225 lb 6.4 oz)   04/09/19 103 kg (227 lb)   03/26/19 105.1 kg (231 lb 11.2 oz)   03/04/19 105.7 kg (233 lb)   03/01/19 105.7 kg (233 lb)   01/29/19 105.2 kg (232 lb)   01/02/19 104.7 kg (230 lb 12.8 oz)   12/17/18 105.2 kg (232 lb)   11/30/18 104.1 kg (229 lb 6.4 oz)     Physical Exam    BMI= Body mass index is 42 kg/m .   GENERAL: healthy, alert and no distress  EYES: anicteric.  HENT: ear canals- normal; TMs- normal; Mouth- no ulcers, no lesions  NECK: no tenderness, no adenopathy, no asymmetry, no masses, no stiffness; thyroid- normal to palpation  RESP: lungs clear to auscultation - no rales, no rhonchi, no wheezes  CV: regular rates and rhythm, normal S1 S2, no S3 or S4 and no murmur, no click or rub -  ABDOMEN: visibly smaller.  PSYCH: Affect: bright, verbose  LYMPHATICS: ant. cervical- normal, post. cervical- normal    Results:   Results are ordered and pending    Assessment and Plan   Flor was seen today for recheck.    Diagnoses and all orders for this visit:    SOB (shortness of breath)  Fatigue, unspecified type  - Suspect this is r/t recent URI that has improved.  Cont neb as needed     Dry eyes, bilateral  -     carboxymethylcellulose PF (CELLUVISC/REFRESH LIQUIGEL) 1 % ophthalmic gel; Place 1 drop into both eyes 4 times daily as needed for dry eyes Dispose within 24 hours after opening  -     carboxymethylcellulose (REFRESH PLUS) 0.5 % SOLN ophthalmic solution; Place 1 " drop into both eyes 4 times daily    Localized edema  -     order for DME; Equipment being ordered: compression stockings bilateral  Please measure and fit patient for stockings   Strength 15-30mmHg  Disp 2 pairs ( 4 socks)  1 refill over the time of 1 year  Asking CC to help get them mailed to her.     Other osteoporosis without current pathological fracture  -     alendronate (FOSAMAX) 70 MG tablet; Take 1 tablet (70 mg) by mouth every 7 days at least 60 min before breakfast with over 8 oz water. Stay upright for at least 30 min.    CKD (chronic kidney disease) stage 3, GFR 30-59 ml/min (H)  -     NEPHROLOGY ADULT REFERRAL - INTERNAL  -     Calcium ([facility])  -     Phosphorus  -     Basic Metabolic Panel - Results > 1hr  -     Albumin Random Urine Quantitative with Creat Ratio  -     Parathyroid Hormone Intact  -     Vitamin D Deficiency  -     CBC with Plt ([facility])  - CKD testing today. Follow up with nephrology, might be greater than 6 months due to traveling, so we will do PTH and mineral testing today and correct for any vitamin D deficiency in anticipation that it might be awhile to get into nephrology.   ADDENDUM:   Results show elev PTH, borderline Ca, low phos. Ca x phos <55. Indicates tx with 0.25 calcitriol at night Will start this now and f/u 1 mo w/ repeat labs. Nephrology after returns from [place]. Sent letter- will have staff call as well.    Hypertension, renal  -     lisinopril (PRINIVIL/ZESTRIL) 10 MG tablet; Take 1 tablet (10 mg) by mouth daily    Medications Discontinued During This Encounter   Medication Reason     cholecalciferol (VITAMIN  -D) 1000 units capsule      Menthol, Topical Analgesic, (BIOFREEZE) 4 % GEL      simethicone (MYLICON) 80 MG chewable tablet      predniSONE (DELTASONE) 20 MG tablet      ciprofloxacin (CETRAXAL) 0.2 % otic solution      alendronate (FOSAMAX) 70 MG tablet Reorder     Carboxymethylcellulose Sod PF (CELLUVISC/REFRESH LIQUIGEL) 1 % ophthalmic gel Reorder      carboxymethylcellulose (CELLUVISC/REFRESH LIQUIGEL) 1 % ophthalmic solution Reorder     order for DME Reorder     Patient Instructions   High Blood Pressure and Kidney Disease  1. Referral to Dr. Wells or covering nephrologist.  2. Prescribed 10mg lisinopril today. Take one tablet everyday to control blood pressure.  3. Labs today at [facility].     Shortness of Breath  1. Continue using nebulizer when you have shortness of breath and/or chest tightness.    Medications Review  1. Refilled medications today:  - Celluvisc/ Refresh eye drops. Place one drop into each eye as needed.  - Refresh Plush. Place one drop into each eye as needed.   - Compression socks were ordered again.  - Fosamax. Take one table every 7 days, and at least one hour before breakfast. Care coordinator to follow up on compression stockings.    Options for treatment and follow-up care were reviewed with the patient. Flor Navarro  engaged in the decision making process and verbalized understanding of the options discussed and agreed with the final plan.    The information in this document, created by the medical scribe for me, accurately reflects the services I personally performed and the decisions made by me. I have reviewed and approved this document for accuracy prior to leaving the patient care area.    Karely Sierra MD  4:11 PM, 05/28/19

## 2019-05-28 NOTE — PATIENT INSTRUCTIONS
High Blood Pressure and Kidney Disease  1. Referral to Dr. Wells or covering nephrologist.  2. Prescribed 10mg lisinopril today. Take one tablet everyday to control blood pressure.  3. Labs today at [facility].    Shortness of Breath  1. Continue using nebulizer when you have shortness of breath and/or chest tightness.    Medications Review  1. Refilled medications today:  - Celluvisc/ Refresh eye drops. Place one drop into each eye as needed.  - Refresh Plush. Place one drop into each eye as needed.   - Compression socks were ordered again.  - Fosamax. Take one table every 7 days, and at least one hour before breakfast. Care coordinator to follow up on compression stockings.    Nephrology referral    Claudine Gooden sent to Shahla Shepherd             VM left and letter sent to patient to schedule.     Claudine    Previous Messages      ----- Message -----   From: Shahla Shepherd   Sent: 6/27/2019   8:41 AM   To: Referral Specialist Team   Subject: Nephrology referral                               Good Morning,     Can you please take a look at Nephrology referral written 5/28/19 and let me know status of appointment for my provider? Thank you.     Shahla Sparrow

## 2019-05-28 NOTE — LETTER
May 31, 2019      Flor Navarro  [address]   [place] [address]        Dear Flor,    As we discussed in clinic, here are your results.  As I suspected, several of them are abnormal.  This is related to your chronic kidney disease, for which you used to see Dr. Wells.  I do still recommend you see the nephrologist, but it might be after you go to [place].  So I would like to see you in June in order to address whether or not we should start some medications for you.    Resulted Orders   Phosphorus   Result Value Ref Range    Phosphorus 3.8 2.5 - 4.5 mg/dL   Basic Metabolic Panel - Results > 1hr   Result Value Ref Range    Sodium 139 133 - 144 mmol/L    Potassium 4.7 3.4 - 5.3 mmol/L    Chloride 107 94 - 109 mmol/L    Carbon Dioxide 27 20 - 32 mmol/L    Anion Gap 5 3 - 14 mmol/L    Glucose 92 70 - 99 mg/dL    Urea Nitrogen 34 (H) 7 - 30 mg/dL    Creatinine 1.23 (H) 0.52 - 1.04 mg/dL    GFR Estimate 49 (L) >60 mL/min/[1.73_m2]      Comment:      Non  GFR Calc  Starting 12/18/2018, serum creatinine based estimated GFR (eGFR) will be   calculated using the Chronic Kidney Disease Epidemiology Collaboration   (CKD-EPI) equation.      GFR Estimate If Black 57 (L) >60 mL/min/[1.73_m2]      Comment:       GFR Calc  Starting 12/18/2018, serum creatinine based estimated GFR (eGFR) will be   calculated using the Chronic Kidney Disease Epidemiology Collaboration   (CKD-EPI) equation.      Calcium 9.8 8.5 - 10.1 mg/dL   Albumin Random Urine Quantitative with Creat Ratio   Result Value Ref Range    Creatinine Urine 94 mg/dL    Albumin Urine mg/L 75 mg/L    Albumin Urine mg/g Cr 79.26 (H) 0 - 25 mg/g Cr   Parathyroid Hormone Intact   Result Value Ref Range    Parathyroid Hormone Intact 120 (H) 18 - 80 pg/mL   Vitamin D Deficiency   Result Value Ref Range    Vitamin D Deficiency screening 55 20 - 75 ug/L      Comment:      Season, race, dietary intake, and treatment affect the  concentration of   25-hydroxy-Vitamin D. Values may decrease during winter months and increase   during summer months. Values 20-29 ug/L may indicate Vitamin D insufficiency   and values <20 ug/L may indicate Vitamin D deficiency.  Vitamin D determination is routinely performed by an immunoassay specific for   25 hydroxyvitamin D3.  If an individual is on vitamin D2 (ergocalciferol)   supplementation, please specify 25 OH vitamin D2 and D3 level determination by   LCMSMS test VITD23.     CBC with Plt ([facility])   Result Value Ref Range    WBC 6.6 4.0 - 11.0 K/uL    RBC 4.56 3.80 - 5.20 M/uL    Hemoglobin 13.2 11.7 - 15.7 g/dL    Hematocrit 44.6 35.0 - 47.0 %    MCV 97.8 78.0 - 100.0 fL    MCH 28.9 26.5 - 35.0 pg    MCHC 29.6 (L) 32.0 - 36.0 g/dL    Platelets 180.0 150.0 - 450.0 K/uL       Sincerely,    Karely Sierra MD

## 2019-05-28 NOTE — NURSING NOTE
Due to patient being non-English speaking/uses sign language, an  was used for this visit. Only for face-to-face interpretation by an external agency, date and length of interpretation can be found on the scanned worksheet.     name: linda perrin  Agency: [employer]  Language: Tajik   Telephone number: [phone]  Type of interpretation: Face-to-face, spoken

## 2019-05-28 NOTE — LETTER
May 29, 2019      Flor Navarro  [address]   [place] [address]        Dear Flor,    Thank you for getting your care at [facility]. Please see below for your test results. Normal so far!    Resulted Orders   CBC with Plt ([facility])   Result Value Ref Range    WBC 6.6 4.0 - 11.0 K/uL    RBC 4.56 3.80 - 5.20 M/uL    Hemoglobin 13.2 11.7 - 15.7 g/dL    Hematocrit 44.6 35.0 - 47.0 %    MCV 97.8 78.0 - 100.0 fL    MCH 28.9 26.5 - 35.0 pg    MCHC 29.6 (L) 32.0 - 36.0 g/dL    Platelets 180.0 150.0 - 450.0 K/uL           Sincerely,    Karely Sierra MD

## 2019-05-29 LAB
ANION GAP SERPL CALCULATED.3IONS-SCNC: 5 MMOL/L (ref 3–14)
BUN SERPL-MCNC: 34 MG/DL (ref 7–30)
CALCIUM SERPL-MCNC: 9.8 MG/DL (ref 8.5–10.1)
CHLORIDE SERPL-SCNC: 107 MMOL/L (ref 94–109)
CO2 SERPL-SCNC: 27 MMOL/L (ref 20–32)
CREAT SERPL-MCNC: 1.23 MG/DL (ref 0.52–1.04)
CREAT UR-MCNC: 94 MG/DL
DEPRECATED CALCIDIOL+CALCIFEROL SERPL-MC: 55 UG/L (ref 20–75)
GFR SERPL CREATININE-BSD FRML MDRD: 49 ML/MIN/{1.73_M2}
GLUCOSE SERPL-MCNC: 92 MG/DL (ref 70–99)
MICROALBUMIN UR-MCNC: 75 MG/L
MICROALBUMIN/CREAT UR: 79.26 MG/G CR (ref 0–25)
PHOSPHATE SERPL-MCNC: 3.8 MG/DL (ref 2.5–4.5)
POTASSIUM SERPL-SCNC: 4.7 MMOL/L (ref 3.4–5.3)
PTH-INTACT SERPL-MCNC: 120 PG/ML (ref 18–80)
SODIUM SERPL-SCNC: 139 MMOL/L (ref 133–144)

## 2019-05-29 NOTE — NURSING NOTE
RN called in rx on voicemail line for carboxymethylcellulose (refresh plus) eye drops to [facility] on [address].  Elisabet Olson RN

## 2019-05-30 ENCOUNTER — TELEPHONE (OUTPATIENT)
Dept: FAMILY MEDICINE | Facility: CLINIC | Age: 54
End: 2019-05-30
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
## 2019-05-30 DIAGNOSIS — Z94.4 LIVER REPLACED BY TRANSPLANT (H): Primary | ICD-10-CM

## 2019-05-30 DIAGNOSIS — F51.01 PRIMARY INSOMNIA: ICD-10-CM

## 2019-05-30 DIAGNOSIS — S52.124A: ICD-10-CM

## 2019-05-30 RX ORDER — MYCOPHENOLATE MOFETIL 250 MG/1
CAPSULE ORAL
Qty: 120 CAPSULE | Refills: 11 | Status: SHIPPED | OUTPATIENT
Start: 2019-05-30 | End: 2019-08-08

## 2019-05-30 RX ORDER — LANOLIN ALCOHOL/MO/W.PET/CERES
CREAM (GRAM) TOPICAL
Qty: 100 TABLET | Refills: 0 | Status: SHIPPED | OUTPATIENT
Start: 2019-05-30 | End: 2019-08-01

## 2019-05-30 NOTE — TELEPHONE ENCOUNTER
Verify that the refill encounter hasn't been started Yes    [facility] phone call message- patient requesting a refill:    Full Medication Name: carboxymethylcellulose (REFRESH PLUS) 0.5 % SOLN ophthalmic solution    Dose: Place 1 drop into both eyes 4 times daily - Both Eyes     Pharmacy confirmed as   [facility] - [place], [place] - [address]   [address] [facility] [address]  Phone: [phone] Fax: [phone]  : Yes    Medication tab checked to see if medication has been sent  Yes    Additional Comments: Oly was returning Elisabet's call. This prescription was not received by the pharmacy so they need it called in. (Other 3 were received.)     OK to leave a message on voice mail? Yes    Advised patient refill may take up to 2 business days? Yes    Primary language: Maori      needed? Yes    Call taken on May 30, 2019 at 8:16 AM by Jany Lazo to  [facility]

## 2019-05-30 NOTE — TELEPHONE ENCOUNTER

## 2019-05-31 RX ORDER — LIDOCAINE 50 MG/G
PATCH TOPICAL
Qty: 30 PATCH | Refills: 0 | Status: SHIPPED | OUTPATIENT
Start: 2019-05-31 | End: 2019-08-02

## 2019-06-03 ENCOUNTER — TELEPHONE (OUTPATIENT)
Dept: FAMILY MEDICINE | Facility: CLINIC | Age: 54
End: 2019-06-03

## 2019-06-03 NOTE — TELEPHONE ENCOUNTER
"Per PCP, \"Please call Flor to inform of results. Speaks Persian. I sent her a letter with results, too, but the bottom line:   1. results show she needs a medicine called calcitriol to help with her kidney disease.   2.I have sent meds to her pharmacy.   3. we will retest her labs in one month.   4. She will see kidney doctor when she comes back from [place].     Karely Sierra MD \"    RN attempted to reach patient via language line, was unsuccessful and voicemail is full.    RN attempted on Friday as well, however language line was not working for Persian, it hung up on me x 2 and never connected me to an . Will make one more attempt (PCP also sent letter).  Elisabet Olson RN   "

## 2019-06-04 ENCOUNTER — DOCUMENTATION ONLY (OUTPATIENT)
Dept: FAMILY MEDICINE | Facility: CLINIC | Age: 54
End: 2019-06-04

## 2019-06-04 NOTE — PROGRESS NOTES
Form has been completed by provider.     Form sent out via: Fax to [phone]  Patient informed: No, Reason:fax confirmed  Output date: June 4, 2019    Loni Billy CMA      **Please close the encounter**

## 2019-06-05 ENCOUNTER — HOSPITAL ENCOUNTER (OUTPATIENT)
Facility: CLINIC | Age: 54
End: 2019-06-05
Attending: INTERNAL MEDICINE | Admitting: INTERNAL MEDICINE
Payer: MEDICARE

## 2019-06-05 ENCOUNTER — OFFICE VISIT (OUTPATIENT)
Dept: GASTROENTEROLOGY | Facility: CLINIC | Age: 54
End: 2019-06-05
Attending: INTERNAL MEDICINE
Payer: MEDICARE

## 2019-06-05 VITALS
BODY MASS INDEX: 41.5 KG/M2 | HEART RATE: 71 BPM | DIASTOLIC BLOOD PRESSURE: 87 MMHG | OXYGEN SATURATION: 97 % | TEMPERATURE: 97.9 F | WEIGHT: 219.8 LBS | SYSTOLIC BLOOD PRESSURE: 124 MMHG | HEIGHT: 61 IN | RESPIRATION RATE: 22 BRPM

## 2019-06-05 DIAGNOSIS — M81.0 OSTEOPOROSIS WITHOUT CURRENT PATHOLOGICAL FRACTURE, UNSPECIFIED OSTEOPOROSIS TYPE: ICD-10-CM

## 2019-06-05 DIAGNOSIS — M25.512 LEFT SHOULDER PAIN, UNSPECIFIED CHRONICITY: ICD-10-CM

## 2019-06-05 DIAGNOSIS — Z94.4 LIVER REPLACED BY TRANSPLANT (H): Primary | ICD-10-CM

## 2019-06-05 PROCEDURE — T1013 SIGN LANG/ORAL INTERPRETER: HCPCS | Mod: U3,ZF

## 2019-06-05 PROCEDURE — G0463 HOSPITAL OUTPT CLINIC VISIT: HCPCS | Mod: ZF

## 2019-06-05 ASSESSMENT — PAIN SCALES - GENERAL: PAINLEVEL: EXTREME PAIN (8)

## 2019-06-05 ASSESSMENT — MIFFLIN-ST. JEOR: SCORE: 1530.42

## 2019-06-05 NOTE — NURSING NOTE
"Chief Complaint   Patient presents with     RECHECK     S/P Liver TX        Vital signs:  Temp: 97.9  F (36.6  C) Temp src: Oral BP: 124/87 Pulse: 71   Resp: 22 SpO2: 97 %     Height: 154.3 cm (5' 0.75\") Weight: 99.7 kg (219 lb 12.8 oz)  Estimated body mass index is 41.87 kg/m  as calculated from the following:    Height as of this encounter: 1.543 m (5' 0.75\").    Weight as of this encounter: 99.7 kg (219 lb 12.8 oz).          Nadia Berman [facility]  6/5/2019 1:41 PM      "

## 2019-06-05 NOTE — LETTER
6/5/2019       RE: Flor Navarro  [address]  [place] [facility] [place] [address]     Dear Colleague,    Thank you for referring your patient, Flor Navarro, to the [facility] HEPATOLOGY at [facility]. Please see a copy of my visit note below.    I had the pleasure of seeing Flor Navarro for followup in the Liver Transplantation Clinic at the [facility] on 06/05/2019.  Ms. Navarro returns for followup now 8-1/2 years status post liver transplantation for cirrhosis caused by chronic hepatitis C.      She is complaining of some left shoulder pain.  There is a pain when she leans against the wall and she can barely lift her arm to about 30 degrees.  There is no history of recent injury to it.      She otherwise denies any abdominal pain, itching or skin rash or fatigue.  She denies any increased abdominal girth or lower extremity edema.  She denies any fevers or chills, cough or shortness of breath.  She denies any nausea or vomiting, diarrhea or constipation.  Her appetite has been good.  She is in a weight loss program and indeed has lost about 11 pounds since being in the program.  There otherwise have been no other new events since she was last seen.     Current Outpatient Medications   Medication     acetaminophen (TYLENOL) 500 MG tablet     albuterol (ACCUNEB) 0.63 MG/3ML neb solution     albuterol (PROAIR HFA/PROVENTIL HFA/VENTOLIN HFA) 108 (90 Base) MCG/ACT inhaler     alendronate (FOSAMAX) 70 MG tablet     amLODIPine (NORVASC) 5 MG tablet     bisacodyl (DULCOLAX) 5 MG EC tablet     calcium carbonate 600 mg-vitamin D 400 units (CALCIUM 600 + D) 600-400 MG-UNIT per tablet     carboxymethylcellulose (REFRESH PLUS) 0.5 % SOLN ophthalmic solution     carboxymethylcellulose PF (CELLUVISC/REFRESH LIQUIGEL) 1 % ophthalmic gel     cycloSPORINE modified (GENERIC EQUIVALENT) 25 MG capsule     dimethicone (AVEENO DAILY MOISTURIZING) 1.3 % LOTN lotion      "lidocaine (LIDODERM) 5 % patch     lidocaine (XYLOCAINE) 2 % external gel     lisinopril (PRINIVIL/ZESTRIL) 10 MG tablet     melatonin 3 MG tablet     Menthol, Topical Analgesic, (BIOFREEZE COLORLESS) 4 % GEL     Menthol, Topical Analgesic, (BIOFREEZE) 4 % GEL     multivitamin, therapeutic with minerals (MULTI-VITAMIN) TABS tablet     mycophenolate (GENERIC EQUIVALENT) 250 MG capsule     naltrexone (DEPADE/REVIA) 50 MG tablet     olopatadine (PATANOL) 0.1 % ophthalmic solution     omeprazole (PRILOSEC) 20 MG DR capsule     prednisoLONE acetate (PRED FORTE) 1 % ophthalmic susp     Psyllium 400 MG CAPS     senna-docusate (SENOKOT-S/PERICOLACE) 8.6-50 MG tablet     sodium chloride (OCEAN) 0.65 % nasal spray     ASPIRIN NOT PRESCRIBED (INTENTIONAL)     Cholecalciferol (VITAMIN D3) 2000 units CAPS     Elastic Bandages & Supports (KNEE BRACE/FLEX STAYS LARGE) MISC     Elastic Bandages & Supports (MEDICAL COMPRESSION SOCKS) MISC     order for DME     order for DME     order for DME     order for DME     order for DME     order for DME     STATIN NOT PRESCRIBED, INTENTIONAL,     Current Facility-Administered Medications   Medication     albuterol (PROVENTIL) neb solution 2.5 mg     apraclonidine (IOPIDINE) 1 % ophthalmic solution 1 drop     /87   Pulse 71   Temp 97.9  F (36.6  C) (Oral)   Resp 22   Ht 1.543 m (5' 0.75\")   Wt 99.7 kg (219 lb 12.8 oz)   SpO2 97%   BMI 41.87 kg/m       In general she looks well.  HEENT exam shows no scleral icterus or temporal muscle wasting.  Her chest is clear.  Her abdominal exam shows no increase in girth.  No masses or tenderness to palpation are present.  Her liver is 10 cm in span without left lobe enlargement.  No spleen tip is palpable.  Extremity exam shows no edema.  Skin exam shows no stigmata of chronic liver disease.  Neurologic exam is nonfocal.     Her most recent laboratory tests show her white count is 6.6, hemoglobin is 13.2, platelets are 180,000.  Sodium 139, " potassium 4.7, BUN is 34, creatinine 1.23, AST is 20, ALT is 25, alkaline phosphatase is 100, albumin 3.8, total bilirubin is 0.9.  She has a slight increase in her urinary albumin excretion up to 79.      IMPRESSION:  Ms. Navarro is now 8-1/2 years status post liver transplantation for cirrhosis caused by chronic hepatitis C.  She does need a bone density, which I have gone ahead and ordered.  She is also due for colorectal cancer screening, which I also have gone ahead and ordered.  She is concerned about the prep and hopefully she will take it before showing up.  We did strongly inform her that fasting was not going to simply be enough to get her cleaned out.  Otherwise she is planning on going to [place] and will be gone for 4 months.  She therefore will return in 6 months.      Thank you very much for allowing me to participate in the care of this patient.  If you have any questions regarding my recommendations, please do not hesitate to contact me.       Laron Anne MD      Professor of Medicine  [employer]      Executive Medical Director, Solid Organ Transplant Program  [facility]

## 2019-06-05 NOTE — LETTER
6/5/2019      RE: Flor Navarro  [address]  [facility] [address]       I had the pleasure of seeing Flor Navarro for followup in the Liver Transplantation Clinic at the [facility] on 06/05/2019.  Ms. Navarro returns for followup now 8-1/2 years status post liver transplantation for cirrhosis caused by chronic hepatitis C.      She is complaining of some left shoulder pain.  There is a pain when she leans against the wall and she can barely lift her arm to about 30 degrees.  There is no history of recent injury to it.      She otherwise denies any abdominal pain, itching or skin rash or fatigue.  She denies any increased abdominal girth or lower extremity edema.  She denies any fevers or chills, cough or shortness of breath.  She denies any nausea or vomiting, diarrhea or constipation.  Her appetite has been good.  She is in a weight loss program and indeed has lost about 11 pounds since being in the program.  There otherwise have been no other new events since she was last seen.     Current Outpatient Medications   Medication     acetaminophen (TYLENOL) 500 MG tablet     albuterol (ACCUNEB) 0.63 MG/3ML neb solution     albuterol (PROAIR HFA/PROVENTIL HFA/VENTOLIN HFA) 108 (90 Base) MCG/ACT inhaler     alendronate (FOSAMAX) 70 MG tablet     amLODIPine (NORVASC) 5 MG tablet     bisacodyl (DULCOLAX) 5 MG EC tablet     calcium carbonate 600 mg-vitamin D 400 units (CALCIUM 600 + D) 600-400 MG-UNIT per tablet     carboxymethylcellulose (REFRESH PLUS) 0.5 % SOLN ophthalmic solution     carboxymethylcellulose PF (CELLUVISC/REFRESH LIQUIGEL) 1 % ophthalmic gel     cycloSPORINE modified (GENERIC EQUIVALENT) 25 MG capsule     dimethicone (AVEENO DAILY MOISTURIZING) 1.3 % LOTN lotion     lidocaine (LIDODERM) 5 % patch     lidocaine (XYLOCAINE) 2 % external gel     lisinopril (PRINIVIL/ZESTRIL) 10 MG tablet     melatonin 3 MG tablet     Menthol, Topical Analgesic, (BIOFREEZE  "COLORLESS) 4 % GEL     Menthol, Topical Analgesic, (BIOFREEZE) 4 % GEL     multivitamin, therapeutic with minerals (MULTI-VITAMIN) TABS tablet     mycophenolate (GENERIC EQUIVALENT) 250 MG capsule     naltrexone (DEPADE/REVIA) 50 MG tablet     olopatadine (PATANOL) 0.1 % ophthalmic solution     omeprazole (PRILOSEC) 20 MG DR capsule     prednisoLONE acetate (PRED FORTE) 1 % ophthalmic susp     Psyllium 400 MG CAPS     senna-docusate (SENOKOT-S/PERICOLACE) 8.6-50 MG tablet     sodium chloride (OCEAN) 0.65 % nasal spray     ASPIRIN NOT PRESCRIBED (INTENTIONAL)     Cholecalciferol (VITAMIN D3) 2000 units CAPS     Elastic Bandages & Supports (KNEE BRACE/FLEX STAYS LARGE) MISC     Elastic Bandages & Supports (MEDICAL COMPRESSION SOCKS) MISC     order for DME     order for DME     order for DME     order for DME     order for DME     order for DME     STATIN NOT PRESCRIBED, INTENTIONAL,     Current Facility-Administered Medications   Medication     albuterol (PROVENTIL) neb solution 2.5 mg     apraclonidine (IOPIDINE) 1 % ophthalmic solution 1 drop     /87   Pulse 71   Temp 97.9  F (36.6  C) (Oral)   Resp 22   Ht 1.543 m (5' 0.75\")   Wt 99.7 kg (219 lb 12.8 oz)   SpO2 97%   BMI 41.87 kg/m       In general she looks well.  HEENT exam shows no scleral icterus or temporal muscle wasting.  Her chest is clear.  Her abdominal exam shows no increase in girth.  No masses or tenderness to palpation are present.  Her liver is 10 cm in span without left lobe enlargement.  No spleen tip is palpable.  Extremity exam shows no edema.  Skin exam shows no stigmata of chronic liver disease.  Neurologic exam is nonfocal.     Her most recent laboratory tests show her white count is 6.6, hemoglobin is 13.2, platelets are 180,000.  Sodium 139, potassium 4.7, BUN is 34, creatinine 1.23, AST is 20, ALT is 25, alkaline phosphatase is 100, albumin 3.8, total bilirubin is 0.9.  She has a slight increase in her urinary albumin excretion " up to 79.      IMPRESSION:  Ms. Navarro is now 8-1/2 years status post liver transplantation for cirrhosis caused by chronic hepatitis C.  She does need a bone density, which I have gone ahead and ordered.  She is also due for colorectal cancer screening, which I also have gone ahead and ordered.  She is concerned about the prep and hopefully she will take it before showing up.  We did strongly inform her that fasting was not going to simply be enough to get her cleaned out.  Otherwise she is planning on going to [place] and will be gone for 4 months.  She therefore will return in 6 months.      Thank you very much for allowing me to participate in the care of this patient.  If you have any questions regarding my recommendations, please do not hesitate to contact me.       Laron Anne MD      Professor of Medicine  [employer]      Executive Medical Director, Solid Organ Transplant Program  [facility]    Laron Anne MD

## 2019-06-05 NOTE — PROGRESS NOTES
I had the pleasure of seeing Flor Navarro for followup in the Liver Transplantation Clinic at the [facility] on 06/05/2019.  Ms. Navarro returns for followup now 8-1/2 years status post liver transplantation for cirrhosis caused by chronic hepatitis C.      She is complaining of some left shoulder pain.  There is a pain when she leans against the wall and she can barely lift her arm to about 30 degrees.  There is no history of recent injury to it.      She otherwise denies any abdominal pain, itching or skin rash or fatigue.  She denies any increased abdominal girth or lower extremity edema.  She denies any fevers or chills, cough or shortness of breath.  She denies any nausea or vomiting, diarrhea or constipation.  Her appetite has been good.  She is in a weight loss program and indeed has lost about 11 pounds since being in the program.  There otherwise have been no other new events since she was last seen.     Current Outpatient Medications   Medication     acetaminophen (TYLENOL) 500 MG tablet     albuterol (ACCUNEB) 0.63 MG/3ML neb solution     albuterol (PROAIR HFA/PROVENTIL HFA/VENTOLIN HFA) 108 (90 Base) MCG/ACT inhaler     alendronate (FOSAMAX) 70 MG tablet     amLODIPine (NORVASC) 5 MG tablet     bisacodyl (DULCOLAX) 5 MG EC tablet     calcium carbonate 600 mg-vitamin D 400 units (CALCIUM 600 + D) 600-400 MG-UNIT per tablet     carboxymethylcellulose (REFRESH PLUS) 0.5 % SOLN ophthalmic solution     carboxymethylcellulose PF (CELLUVISC/REFRESH LIQUIGEL) 1 % ophthalmic gel     cycloSPORINE modified (GENERIC EQUIVALENT) 25 MG capsule     dimethicone (AVEENO DAILY MOISTURIZING) 1.3 % LOTN lotion     lidocaine (LIDODERM) 5 % patch     lidocaine (XYLOCAINE) 2 % external gel     lisinopril (PRINIVIL/ZESTRIL) 10 MG tablet     melatonin 3 MG tablet     Menthol, Topical Analgesic, (BIOFREEZE COLORLESS) 4 % GEL     Menthol, Topical Analgesic, (BIOFREEZE) 4 % GEL     multivitamin, therapeutic  "with minerals (MULTI-VITAMIN) TABS tablet     mycophenolate (GENERIC EQUIVALENT) 250 MG capsule     naltrexone (DEPADE/REVIA) 50 MG tablet     olopatadine (PATANOL) 0.1 % ophthalmic solution     omeprazole (PRILOSEC) 20 MG DR capsule     prednisoLONE acetate (PRED FORTE) 1 % ophthalmic susp     Psyllium 400 MG CAPS     senna-docusate (SENOKOT-S/PERICOLACE) 8.6-50 MG tablet     sodium chloride (OCEAN) 0.65 % nasal spray     ASPIRIN NOT PRESCRIBED (INTENTIONAL)     Cholecalciferol (VITAMIN D3) 2000 units CAPS     Elastic Bandages & Supports (KNEE BRACE/FLEX STAYS LARGE) MISC     Elastic Bandages & Supports (MEDICAL COMPRESSION SOCKS) MISC     order for DME     order for DME     order for DME     order for DME     order for DME     order for DME     STATIN NOT PRESCRIBED, INTENTIONAL,     Current Facility-Administered Medications   Medication     albuterol (PROVENTIL) neb solution 2.5 mg     apraclonidine (IOPIDINE) 1 % ophthalmic solution 1 drop     /87   Pulse 71   Temp 97.9  F (36.6  C) (Oral)   Resp 22   Ht 1.543 m (5' 0.75\")   Wt 99.7 kg (219 lb 12.8 oz)   SpO2 97%   BMI 41.87 kg/m      In general she looks well.  HEENT exam shows no scleral icterus or temporal muscle wasting.  Her chest is clear.  Her abdominal exam shows no increase in girth.  No masses or tenderness to palpation are present.  Her liver is 10 cm in span without left lobe enlargement.  No spleen tip is palpable.  Extremity exam shows no edema.  Skin exam shows no stigmata of chronic liver disease.  Neurologic exam is nonfocal.     Her most recent laboratory tests show her white count is 6.6, hemoglobin is 13.2, platelets are 180,000.  Sodium 139, potassium 4.7, BUN is 34, creatinine 1.23, AST is 20, ALT is 25, alkaline phosphatase is 100, albumin 3.8, total bilirubin is 0.9.  She has a slight increase in her urinary albumin excretion up to 79.      IMPRESSION:  Ms. Navarro is now 8-1/2 years status post liver transplantation for " cirrhosis caused by chronic hepatitis C.  She does need a bone density, which I have gone ahead and ordered.  She is also due for colorectal cancer screening, which I also have gone ahead and ordered.  She is concerned about the prep and hopefully she will take it before showing up.  We did strongly inform her that fasting was not going to simply be enough to get her cleaned out.  Otherwise she is planning on going to [place] and will be gone for 4 months.  She therefore will return in 6 months.      Thank you very much for allowing me to participate in the care of this patient.  If you have any questions regarding my recommendations, please do not hesitate to contact me.       Laron Anne MD      Professor of Medicine  [employer]      Executive Medical Director, Solid Organ Transplant Program  [facility]

## 2019-06-10 ENCOUNTER — ANCILLARY PROCEDURE (OUTPATIENT)
Dept: BONE DENSITY | Facility: CLINIC | Age: 54
End: 2019-06-10
Attending: INTERNAL MEDICINE
Payer: MEDICARE

## 2019-06-10 ENCOUNTER — ANCILLARY PROCEDURE (OUTPATIENT)
Dept: GENERAL RADIOLOGY | Facility: CLINIC | Age: 54
End: 2019-06-10
Attending: INTERNAL MEDICINE
Payer: MEDICARE

## 2019-06-10 DIAGNOSIS — M25.512 LEFT SHOULDER PAIN, UNSPECIFIED CHRONICITY: ICD-10-CM

## 2019-06-10 DIAGNOSIS — Z94.4 LIVER REPLACED BY TRANSPLANT (H): ICD-10-CM

## 2019-06-10 DIAGNOSIS — M81.0 OSTEOPOROSIS WITHOUT CURRENT PATHOLOGICAL FRACTURE, UNSPECIFIED OSTEOPOROSIS TYPE: ICD-10-CM

## 2019-06-10 DIAGNOSIS — Z13.220 LIPID SCREENING: ICD-10-CM

## 2019-06-10 LAB
ALBUMIN SERPL-MCNC: 3.5 G/DL (ref 3.4–5)
ALP SERPL-CCNC: 82 U/L (ref 40–150)
ALT SERPL W P-5'-P-CCNC: 20 U/L (ref 0–50)
AST SERPL W P-5'-P-CCNC: 14 U/L (ref 0–45)
BILIRUB DIRECT SERPL-MCNC: 0.2 MG/DL (ref 0–0.2)
BILIRUB SERPL-MCNC: 0.9 MG/DL (ref 0.2–1.3)
CYCLOSPORINE BLD LC/MS/MS-MCNC: 100 UG/L (ref 50–400)
ERYTHROCYTE [DISTWIDTH] IN BLOOD BY AUTOMATED COUNT: 12.5 % (ref 10–15)
HCT VFR BLD AUTO: 40.3 % (ref 35–47)
HGB BLD-MCNC: 13.1 G/DL (ref 11.7–15.7)
MCH RBC QN AUTO: 30.3 PG (ref 26.5–33)
MCHC RBC AUTO-ENTMCNC: 32.5 G/DL (ref 31.5–36.5)
MCV RBC AUTO: 93 FL (ref 78–100)
PLATELET # BLD AUTO: 138 10E9/L (ref 150–450)
PROT SERPL-MCNC: 7.2 G/DL (ref 6.8–8.8)
RBC # BLD AUTO: 4.32 10E12/L (ref 3.8–5.2)
TME LAST DOSE: NORMAL H
WBC # BLD AUTO: 4.6 10E9/L (ref 4–11)

## 2019-06-10 PROCEDURE — 80158 DRUG ASSAY CYCLOSPORINE: CPT | Performed by: INTERNAL MEDICINE

## 2019-06-11 ENCOUNTER — DOCUMENTATION ONLY (OUTPATIENT)
Dept: FAMILY MEDICINE | Facility: CLINIC | Age: 54
End: 2019-06-11

## 2019-06-11 NOTE — PROGRESS NOTES
"When opening a documentation only encounter, be sure to enter in \"Chief Complaint\" Forms and in \" Comments\" Title of form, description if needed.    Flor is a 53 year old  female  Form received via: Fax  Form now resides in: Provider Ready    Loni Billy CMA                Form has been completed by provider.     Form sent out via: Fax to [phone]  Patient informed: No, Reason:fax confirmed  Output date: June 12, 2019    Loni Billy CMA      **Please close the encounter**      "

## 2019-06-18 ENCOUNTER — HOSPITAL ENCOUNTER (OUTPATIENT)
Dept: CARDIAC REHAB | Facility: CLINIC | Age: 54
Setting detail: THERAPIES SERIES
End: 2019-06-18
Attending: INTERNAL MEDICINE
Payer: MEDICARE

## 2019-06-18 ENCOUNTER — TELEPHONE (OUTPATIENT)
Dept: FAMILY MEDICINE | Facility: CLINIC | Age: 54
End: 2019-06-18

## 2019-06-18 PROCEDURE — 94618 PULMONARY STRESS TESTING: CPT | Performed by: REHABILITATION PRACTITIONER

## 2019-06-18 PROCEDURE — 40000243 ZZH STATISTIC VISIT NEW WEIGHT MGMT PATIENT: Performed by: REHABILITATION PRACTITIONER

## 2019-06-20 ENCOUNTER — OFFICE VISIT (OUTPATIENT)
Dept: FAMILY MEDICINE | Facility: CLINIC | Age: 54
End: 2019-06-20
Payer: MEDICARE

## 2019-06-20 ENCOUNTER — DOCUMENTATION ONLY (OUTPATIENT)
Dept: FAMILY MEDICINE | Facility: CLINIC | Age: 54
End: 2019-06-20

## 2019-06-20 ENCOUNTER — PATIENT OUTREACH (OUTPATIENT)
Dept: FAMILY MEDICINE | Facility: CLINIC | Age: 54
End: 2019-06-20

## 2019-06-20 VITALS
WEIGHT: 220.4 LBS | DIASTOLIC BLOOD PRESSURE: 74 MMHG | BODY MASS INDEX: 41.99 KG/M2 | OXYGEN SATURATION: 97 % | SYSTOLIC BLOOD PRESSURE: 137 MMHG | TEMPERATURE: 98.1 F | HEART RATE: 66 BPM

## 2019-06-20 DIAGNOSIS — M81.0 OSTEOPOROSIS WITHOUT CURRENT PATHOLOGICAL FRACTURE, UNSPECIFIED OSTEOPOROSIS TYPE: ICD-10-CM

## 2019-06-20 DIAGNOSIS — M25.512 ACUTE PAIN OF LEFT SHOULDER: ICD-10-CM

## 2019-06-20 DIAGNOSIS — N18.30 CKD (CHRONIC KIDNEY DISEASE) STAGE 3, GFR 30-59 ML/MIN (H): Primary | ICD-10-CM

## 2019-06-20 DIAGNOSIS — Z13.9 SCREENING FOR CONDITION: ICD-10-CM

## 2019-06-20 RX ORDER — CALCITRIOL 0.25 UG/1
0.25 CAPSULE, LIQUID FILLED ORAL DAILY
Qty: 90 CAPSULE | Refills: 1 | Status: SHIPPED | OUTPATIENT
Start: 2019-06-20 | End: 2019-08-08

## 2019-06-20 NOTE — PATIENT INSTRUCTIONS
Preventative  1. Follow up for colonoscopy.    Edema  1. Completed forms for compression stockings again today.    Chronic Kidney Disease  1. Follow up with nephrology after [place].  2. Start calcitriol toady. Take one capsule once a day at night.  3. Follow up in one month for labs.

## 2019-06-20 NOTE — NURSING NOTE
Due to patient being non-English speaking/uses sign language, an  was used for this visit. Only for face-to-face interpretation by an external agency, date and length of interpretation can be found on the scanned worksheet.     name: Luisana Chang  Agency: [employer]  Language: Yakut   Telephone number: [phone]  Type of interpretation: Face-to-face, spoken        Compression stockings  Knee high/ regular  Size L  Lot#[id number]  Patient tolerated well    Loni Billy, CMA

## 2019-06-20 NOTE — PROGRESS NOTES
"When opening a documentation only encounter, be sure to enter in \"Chief Complaint\" Forms and in \" Comments\" Title of form, description if needed.    Flor is a 53 year old  female  Form received via: Fax  Form now resides in: Provider Edward Billy, [facility]                  "

## 2019-06-20 NOTE — PROGRESS NOTES
Pt had walkers and compression socks ordered at previous appointments, but has reported not receiving them. Pt reports receiving a cane, but not a walker. Writer will f/u with [facility] to make sure the orders are filled. Pt also mentioned losing the rx that Dr. Anne wrote for the colonoscopy prep. Writer will reach out to Dr. Anne to try to get a new copy of the rx.    JACKIE Thomas

## 2019-06-20 NOTE — PROGRESS NOTES
HPI   Flor Navarro is a 53 year old  who presents for   Chief Complaint   Patient presents with     Pain     left shoulder x1 month     Last visit: Talked about her chronic kidney disease.  She has not seen nephrology since 2014 traveling to [place] soon we did do her labs.  Labs showed elevated PTH, borderline calcium and low PHOS, has not started this medication. Calcium times phosphorus was less than 55 indicating treatment is required with 0.25 calcitriol at night. She did not get my instructions by mail to start this.    Scheduled to have colonoscopy on 07/17, which I agree with. She got prep education from GI.     Preventative  Dr. Anne recommended colonoscopy. Lost the rx for the colonoscopy prep. Has received her walker from [employer].     ACT Score: 23     Dexa Scan: variable showing the most negative and valid T-score is: -2.6, corresponding with osteoporosis, however overall has gained bone since last exam in 2003. On fosamax weekly and reports compliance with this    Edema  Reports that she still has not received compression socks. Spoke with [facility] yesterday to determine what the issue was with insurance. Does not want white compression socks.     Mood  Walked about three miles yesterday with her son, Courtney. Has been fasting twice a week since Ramadan ended, fasting is going well.  Physically feeling much better - not sob, more energy, losing wt.     Left Shoulder Pain  Reports pain of the left shoulder into the neck. Occasionally has trouble sleeping on her back, so she sleeps on her stomach. Struggling to lift her left arm. Had imaging on her shoulder, has not received the results.     XR 06/10/19 showing minimal acromial clavicular degeneration.       An Hebrew  was used for this visit.  +++++++    Problem, Medication and Allergy Lists were reviewed and updated if needed.    Patient is an established patient of this clinic.         Review of Systems:   Review of  Systems    Positive for: improved breathing, left shoulder pain, neck pain.     Negative for: HA's and pain with lateral neck movements.    See HPI for additional sx.    This document serves as a record of the services and decisions personally performed and made by Karely Sierra MD. It was created on his/her behalf by Charli Valdez, a trained medical scribe. The creation of this document is based the provider's statements to the medical scribe.  Scribe Charli Valdez 3:43 PM, June 20, 2019       Physical Exam:     Vitals:    06/20/19 1510   BP: 137/74   Pulse: 66   Temp: 98.1  F (36.7  C)   TempSrc: Oral   SpO2: 97%   Weight: 100 kg (220 lb 6.4 oz)     Body mass index is 41.99 kg/m .  Vitals were reviewed and were normal     Physical Exam    BMI= Body mass index is 41.99 kg/m .   GENERAL: healthy, alert and no distress  MS: Left Shoulder:  No obvious deformities although her left shoulder is hiked. Tenderness of AC joint and acromion. Biceps tendon is mildly tender, deltoid is tender in general. She does limited cross body reach. Uses assist of the right to lift the left. Pain with supraspinatus. Weak supraspinatus, difficult to tell if it's due to pain.  PSYCH: affect- euthymic.    Results:   No testing ordered today    Assessment and Plan   Flor was seen today for pain.    Diagnoses and all orders for this visit:    CKD (chronic kidney disease) stage 3, GFR 30-59 ml/min (H)  -     calcitRIOL (ROCALTROL) 0.25 MCG capsule; Take 1 capsule (0.25 mcg) by mouth daily  - Start calcitriol 0.25 nightly. Follow up labs in one month: PTH, calcium phosphorus, and microalbumin. Continue fossamax weekly. CKD follow up nephrology pt returns from [place].  Care Coordinator to help schedule - she is not scheduled yet but referral is in    Osteoporosis without current pathological fracture, unspecified osteoporosis type  Cont fosamax  Shared results with her     Acute pain of left shoulder  -     [facility], [facility], [facility]  REFERRAL - [facility]; Future  -     camphor-menthol-methyl sal 4-10-30 % CREA; Externally apply topically 3 times daily as needed (shoulder pain)    Screening for condition  -     Compression Stockings, each - Below knee (MICHELLE RANGEL) [employer]    There are no discontinued medications.    Patient Instructions   Preventative  1. Follow up for colonoscopy.    Edema  1. Completed forms for compression stockings again today.    Chronic Kidney Disease  1. Follow up with nephrology after [place].  2. Start calcitriol toady. Take one capsule once a day at night.  3. Follow up in one month for labs.    Options for treatment and follow-up care were reviewed with the patient. Flor Navarro  engaged in the decision making process and verbalized understanding of the options discussed and agreed with the final plan.    The information in this document, created by the medical scribe for me, accurately reflects the services I personally performed and the decisions made by me. I have reviewed and approved this document for accuracy prior to leaving the patient care area.    Karely Sierra MD

## 2019-06-20 NOTE — TELEPHONE ENCOUNTER
Dr. Sierra has the form in her folder for this. It will be sent as soon as she signs the form    Loni Billy CMA

## 2019-06-21 ASSESSMENT — ASTHMA QUESTIONNAIRES: ACT_TOTALSCORE: 23

## 2019-06-21 NOTE — ADDENDUM NOTE
Encounter addended by: Yajaira Ma OT on: 6/21/2019 9:53 AM   Actions taken: Episode edited, Charge Capture section accepted

## 2019-06-25 NOTE — PROGRESS NOTES
Writer f/u-ed on [facility] order. The compression sock order was received and ordered while the  was on the phone.     The walker order was missing the PCP's question sheet and rx. Our documentation had it sent at least two times, but [facility] never received it. Question sheet and rx was re-faxed. Writer will confirm receipt this afternoon

## 2019-06-26 ENCOUNTER — DOCUMENTATION ONLY (OUTPATIENT)
Dept: CARE COORDINATION | Facility: CLINIC | Age: 54
End: 2019-06-26

## 2019-06-26 NOTE — PROGRESS NOTES
[facility] now requests orders and shares plan of care/discharge summaries for some patients through [employer]  Please REPLY TO THIS MESSAGE OR ROUTE BACK TO THE AUTHOR in order to give authorization for orders when needed.  This is considered a verbal order, you will still receive a faxed copy of orders for signature.  Thank you for your assistance in improving collaboration for our patients.    ORDER    Requesting Skilled Nursing Orders for Recertification of home care services    Skilled Nursign 1x9 weeks and 3 PRN for disease management, medication education and med setup    Also pt mentioned that she will be going to [place] August 10th for 2 months, for that time frame nursing services will be stopped and resume and when she comes back.    I perfromed a medication reconciliation, and my electronic system picked up duplicate therapy between Vitamin D3/Calcium+ Vitamin D3/ Calcitrol- is it okay for pt to continue to take all 3?      Thank you.    Vinnie Loving, CARLYNN RN, PHN  [facility]    [phone]

## 2019-06-26 NOTE — PROGRESS NOTES
Called to follow-up with order. They received the fax that was sent. They will look over the paperwork and work towards fulfilling the order

## 2019-07-02 ENCOUNTER — OFFICE VISIT (OUTPATIENT)
Dept: OPHTHALMOLOGY | Facility: CLINIC | Age: 54
End: 2019-07-02
Attending: OPHTHALMOLOGY
Payer: MEDICARE

## 2019-07-02 ENCOUNTER — MEDICAL CORRESPONDENCE (OUTPATIENT)
Dept: HEALTH INFORMATION MANAGEMENT | Facility: CLINIC | Age: 54
End: 2019-07-02

## 2019-07-02 DIAGNOSIS — H04.123 DRY EYES, BILATERAL: ICD-10-CM

## 2019-07-02 DIAGNOSIS — H10.13 ALLERGIC CONJUNCTIVITIS, BILATERAL: ICD-10-CM

## 2019-07-02 DIAGNOSIS — Z96.1 PSEUDOPHAKIA OF BOTH EYES: Primary | ICD-10-CM

## 2019-07-02 PROCEDURE — T1013 SIGN LANG/ORAL INTERPRETER: HCPCS | Mod: U3,ZF

## 2019-07-02 PROCEDURE — 92015 DETERMINE REFRACTIVE STATE: CPT | Mod: GY,ZF

## 2019-07-02 PROCEDURE — G0463 HOSPITAL OUTPT CLINIC VISIT: HCPCS | Mod: ZF

## 2019-07-02 RX ORDER — OLOPATADINE HYDROCHLORIDE 1 MG/ML
1 SOLUTION/ DROPS OPHTHALMIC 2 TIMES DAILY
Qty: 5 ML | Refills: 11 | Status: SHIPPED | OUTPATIENT
Start: 2019-07-02 | End: 2019-08-08

## 2019-07-02 ASSESSMENT — VISUAL ACUITY
OD_SC: 20/40
OS_PH_SC+: -2
OS_SC+: -2
METHOD: SNELLEN - LINEAR
OS_PH_SC: 20/40
OS_SC: 20/50

## 2019-07-02 ASSESSMENT — TONOMETRY
OD_IOP_MMHG: 18/
IOP_METHOD: TONOPEN
OS_IOP_MMHG: 15

## 2019-07-02 ASSESSMENT — REFRACTION_MANIFEST
OD_AXIS: 027
OD_CYLINDER: +1.00
OS_SPHERE: +0.75
OS_CYLINDER: SPHERE
OS_ADD: +2.75
OD_ADD: +2.75
OD_SPHERE: -0.50

## 2019-07-02 ASSESSMENT — EXTERNAL EXAM - RIGHT EYE: OD_EXAM: NORMAL

## 2019-07-02 ASSESSMENT — CONF VISUAL FIELD
OS_NORMAL: 1
OD_NORMAL: 1
METHOD: COUNTING FINGERS

## 2019-07-02 ASSESSMENT — CUP TO DISC RATIO
OD_RATIO: 0.2
OS_RATIO: 0.2

## 2019-07-02 ASSESSMENT — EXTERNAL EXAM - LEFT EYE: OS_EXAM: NORMAL

## 2019-07-02 NOTE — PROGRESS NOTES
HPI:  Flor Valdez is a 51 year old female here for annual eye exam. Reports that that both her eye have been itching and tearing for four months. Uses AT twice a day. Denies new floaters or flashes of light.     Interval: Here for annual visit. Last seen 1 year ago. Doing well. Wants new glasses. No new eye pain or redness. Occasional itching. No new flashes/floaters. Likes pataday drops.     Meds:   pataday BID each eye     PMH:  Liver transplant 10/2010 (cyclosporine)   CKD Stage 3  HTN      A&P  1. Pseudophakia, both eyes  -s/p yag cap both eyes  -IOL well positioned, doing well    2. PVD OU  -Retina attached, no holes/tears on DFE today  -S/s of retinal detachment discussed and to f/up asap    3. Dry eye OS>OD  -Significant dryness of ocular surface, explains decrease in acuity   -Continue AT to four times a day     -Recommend warm compresses BID  -ok to continue pataday BID each eye - pt has 1 more refill from pharamacy    4. Drusen, each eye  -blunted reflex each eye  -mac OCT with fine drusen consistent with biomicroscopy  -no hx of smoking or fam hx of mac degeneration  -recommend leafy greens, vegetables  -monitor    5. Myopia with astigmatism with presbyopia each eye   -pt noticing an improvement with slight MRx change today   -will dispense    F/u 1 year, sooner as needed  gen clinic ok     Elin Persaud MD  PGY-5, Cornea Fellow  Ophthalmology      ~~~~~~~~~~~~~~~~~~~~~~~~~~~~~~~~~~~~~~~~~~~~~~~~~~~~~~~~~~~~~~~~    Complete documentation of historical and exam elements from today's encounter can be found in the full encounter summary report (not reduplicated in this progress note). I personally obtained the chief complaint(s) and history of present illness.  I confirmed and edited as necessary the review of systems, past medical/surgical history, family history, social history, and examination findings as documented by others.  I examined the patient myself, and I personally reviewed the relevant  tests, images, and reports as documented above. I formulated and edited as necessary the assessment and plan and discussed the findings and management plan with the patient and family.     Nik Langley MD, MA  Director, Cornea & Anterior Segment  [facility] Department of Ophthalmology & Visual Neuroscience

## 2019-07-03 NOTE — PROGRESS NOTES
[facility][phone][facility] who states she met with pt today who still has yet to received the compression stockings or walker. Deborah spoke with [facility] medical who doesn't have order. Documentation below states they did receive it    Fabiola Ford RN

## 2019-07-03 NOTE — PROGRESS NOTES
Talked to [facility]. Order was received but it couldn't be fulfilled bc they never received the rx for it, even though the last person I talked to at [facility] said they did receive it. I re-faxed the rx and asked [facility] to call me if there are issues.    Information communicated to [facility] Deborah.    Called [facility] back and they confirmed (. Tamar) that the fax with the rx was received tdday and that their documentation staff need to look at it to confirm everything is in order. Also said that they are waiting on the Service Agreement from the [employer] manager to finish the Walker order. That is the only thing missing

## 2019-07-08 ENCOUNTER — DOCUMENTATION ONLY (OUTPATIENT)
Dept: FAMILY MEDICINE | Facility: CLINIC | Age: 54
End: 2019-07-08

## 2019-07-08 DIAGNOSIS — M79.89 LEG SWELLING: ICD-10-CM

## 2019-07-08 NOTE — PROGRESS NOTES
"When opening a documentation only encounter, be sure to enter in \"Chief Complaint\" Forms and in \" Comments\" Title of form, description if needed.    Flor is a 53 year old  female  Form received via: Fax  Form now resides in: Provider Ready    Loni Billy CMA            Form has been completed by provider.     Form sent out via: Fax to [phone]  Patient informed: No, Reason: fax confirmed  Output date: July 10, 2019    Loni Billy CMA      **Please close the encounter**          "

## 2019-07-08 NOTE — PROGRESS NOTES
"When opening a documentation only encounter, be sure to enter in \"Chief Complaint\" Forms and in \" Comments\" Title of form, description if needed.    Flor is a 53 year old  female  Form received via: Fax  Form now resides in: Provider Ready    Loni Billy CMA                Form has been completed by provider.     Form sent out via: Fax to [phone]  Patient informed: No, Reason:fax confirmed  Output date: July 18, 2019    Loni Billy CMA      **Please close the encounter**      "

## 2019-07-08 NOTE — PROGRESS NOTES
"When opening a documentation only encounter, be sure to enter in \"Chief Complaint\" Forms and in \" Comments\" Title of form, description if needed.    Flor is a 53 year old  female  Form received via: Fax  Form now resides in: Provider Ready    Loni Billy CMA                Form has been completed by provider.     Form sent out via: Fax to [phone]  Patient informed: No, Reason: fax confirmed  Output date: July 10, 2019    Loni Billy CMA      **Please close the encounter**      "

## 2019-07-08 NOTE — PROGRESS NOTES
Called [facility] to f/u with DME orders. 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    The compression stockings rx is missing the pt's length of need and frequency of change, which is needed to compete the order. [facility] also wants the prescriber's NPI number on the rx- which I believe is already on there.     Message will be routed to PCP to update the rx for the stockings.    LVM with pt's RNCC- Deborah with this information

## 2019-07-09 NOTE — PROGRESS NOTES
Called [facility] (providing coverage for PCP) did a new rx for the compression stockings. [facility] received the stockings rx, but need the measurements and compression amount. Even though this is in the previous order it can not be pulled from that without a verbal order. I am unable to provide a verbal order since I am not an RN. I will request clinic RN to place a verbal order with that information tomorrow.    Information provided to RNCC- Deborah.

## 2019-07-10 ENCOUNTER — TELEPHONE (OUTPATIENT)
Dept: GASTROENTEROLOGY | Facility: CLINIC | Age: 54
End: 2019-07-10

## 2019-07-10 NOTE — PROGRESS NOTES
Called [facility] to f/u on walker and compression stockings    Walker: still missing service agreement. They left a VM with  on 7/5 which is the final request. Asked that they add a note to their file that the CADI manager is on vacation and the coverage worker is aware it is needed. [facility] added that information to their system. [facility] said that if too much time goes by they could close the order- they could not provide a timeline for when that would happen. But if they receive the service agreement within 6 months of the original order they could reopen it without needing new documentation from the primary care clinic.    Stockings: They have received the verbal order to add to the most recent rx. The documentation team has not looked over the order yet. They usually take about 1 day to look over things.     I will f/u with [facility] on Friday.    JACKIE Thomas

## 2019-07-10 NOTE — TELEPHONE ENCOUNTER
Patient Name: Flor Navarro   : 1965  MRN: [id number]       : [] N/A   [x] Yes:  Language Belarusian/  ID:     VM with request pt contact Endoscopy Pre-assessment RN to review upcoming procedure information.  Telephone call-back number provided.    Licha Vivas RN  [facility]/ealth Endoscopy    Additional Information regarding appointment:      Patient scheduled for:  [x] EGD  [] Colonoscopy  [] EUS  [] Flex Sig   [] Other:     Indication for procedure. [] Screening   [x] Liver replaced by transplant    Sedation Type: [x] Conscious Sedation   [] MAC   [] None    Procedure Provider:  [place]      Referring Provider. Anne    Arrival time verified: 19    Facility location verified:   [x][facility] Endoscopy Unit - [address], 1st Floor, Rm [phone]    Pt meets medical necessity for outpatient procedure in hospital Endoscopy Unit: N/A for this payor  [] [facility], 5th floor     [][facility] OR - [address], 3rd Floor Surgery check-in      Prep Type:   []Golytely eRx: ;  [] MoviPrep: , [] MiraLax: , [] Other:   [x]NPO /p MN, No solid food /p 2200 the night before    Anticoagulants or blood thinners: [x]None [] ASA 81mg  - may continue           [] Warfarin   [] Warfarin + Lovenox bridge [] Plavix [] Effient [] Eliquis         [] Xarelto  [] Brilinta [] NSAIDS  [] Other    LAST anticoagulant dose: Date/Time:   INR:     Electronic implanted devices: [x] No  [] IPG  []  ICD  []  LVAD  []     H&P / Pre op physical completed: [x] N/A, [] Complete, Date , [] Scheduled, Date , [] No,     Additional Information: None at this time.     _______________________________________________

## 2019-07-11 ENCOUNTER — TELEPHONE (OUTPATIENT)
Dept: FAMILY MEDICINE | Facility: CLINIC | Age: 54
End: 2019-07-11

## 2019-07-11 NOTE — PROGRESS NOTES
Geraldine at [facility] reports that 1 pair of compression stockings is in the mail for Flor. Another pair is back ordered, but should be received at [facility] in 7-10 business days and then sent to the pt with 1-2 day delivery after that.    Walker is still waiting service agreement.    JACKIE Thomas

## 2019-07-11 NOTE — TELEPHONE ENCOUNTER
RN referred them to gastroenterology as they may have a specific prep in mind.  Elisabet Olson RN

## 2019-07-11 NOTE — TELEPHONE ENCOUNTER
[facility] phone call message- patient requesting to speak directly with PCP or provider.    PCP: Karely Sierra    Reason for Call: Vinnie called for the patient as the patient has not received any of the prep items for the colonoscopy on 7/17/19. Need a prescription sent in.     Additional Details: Please call Vinnie back and she will contact patient.    Are you willing to speak with a nurse? Yes    Is a call back needed? Yes    Patient informed that it may take up to 2 business days to hear back from PCP:Yes    OK to leave a message on voice mail? Yes    Primary language: Japanese      needed? Yes    Call taken on July 11, 2019 at 12:15 PM by Jany Song    Only route to PCP unless willing to speak with a nurse.

## 2019-07-16 RX ORDER — LIDOCAINE 40 MG/G
CREAM TOPICAL
Status: CANCELLED | OUTPATIENT
Start: 2019-07-16

## 2019-07-16 RX ORDER — ONDANSETRON 2 MG/ML
4 INJECTION INTRAMUSCULAR; INTRAVENOUS
Status: CANCELLED | OUTPATIENT
Start: 2019-07-16

## 2019-07-17 ENCOUNTER — HOSPITAL ENCOUNTER (OUTPATIENT)
Facility: CLINIC | Age: 54
End: 2019-07-17
Attending: INTERNAL MEDICINE | Admitting: INTERNAL MEDICINE
Payer: MEDICARE

## 2019-07-17 ENCOUNTER — HOSPITAL ENCOUNTER (EMERGENCY)
Facility: CLINIC | Age: 54
Discharge: HOME | End: 2019-07-17
Attending: EMERGENCY MEDICINE | Admitting: EMERGENCY MEDICINE
Payer: MEDICARE

## 2019-07-17 VITALS
TEMPERATURE: 98.4 F | BODY MASS INDEX: 41.04 KG/M2 | SYSTOLIC BLOOD PRESSURE: 118 MMHG | RESPIRATION RATE: 16 BRPM | OXYGEN SATURATION: 97 % | HEART RATE: 82 BPM | WEIGHT: 223 LBS | DIASTOLIC BLOOD PRESSURE: 78 MMHG | HEIGHT: 62 IN

## 2019-07-17 DIAGNOSIS — T30.0 BURN INJURY: ICD-10-CM

## 2019-07-17 PROCEDURE — 99282 EMERGENCY DEPT VISIT SF MDM: CPT | Performed by: EMERGENCY MEDICINE

## 2019-07-17 PROCEDURE — 99283 EMERGENCY DEPT VISIT LOW MDM: CPT | Mod: Z6 | Performed by: EMERGENCY MEDICINE

## 2019-07-17 RX ORDER — TRAMADOL HYDROCHLORIDE 50 MG/1
50 TABLET ORAL EVERY 6 HOURS PRN
Qty: 6 TABLET | Refills: 0 | Status: SHIPPED | OUTPATIENT
Start: 2019-07-17 | End: 2019-08-08

## 2019-07-17 RX ORDER — SILVER SULFADIAZINE 10 MG/G
CREAM TOPICAL
Qty: 50 G | Refills: 0 | Status: SHIPPED | OUTPATIENT
Start: 2019-07-17 | End: 2019-08-08

## 2019-07-17 ASSESSMENT — ENCOUNTER SYMPTOMS
FEVER: 0
WOUND: 1

## 2019-07-17 ASSESSMENT — MIFFLIN-ST. JEOR: SCORE: 1564.77

## 2019-07-17 NOTE — ED AVS SNAPSHOT
[facility], [place], Emergency Department  [address] [place] [address]  Phone:  [phone]                                    Flor Navarro   MRN: [id number]    Department:  [facility], Emergency Department   Date of Visit:  7/17/2019           After Visit Summary Signature Page    I have received my discharge instructions, and my questions have been answered. I have discussed any challenges I see with this plan with the nurse or doctor.    ..........................................................................................................................................  Patient/Patient Representative Signature      ..........................................................................................................................................  Patient Representative Print Name and Relationship to Patient    ..................................................               ................................................  Date                                   Time    ..........................................................................................................................................  Reviewed by Signature/Title    ...................................................              ..............................................  Date                                               Time          22EPIC Rev 08/18

## 2019-07-18 ENCOUNTER — APPOINTMENT (OUTPATIENT)
Dept: OPTOMETRY | Facility: CLINIC | Age: 54
End: 2019-07-18
Payer: MEDICARE

## 2019-07-18 ENCOUNTER — DOCUMENTATION ONLY (OUTPATIENT)
Dept: FAMILY MEDICINE | Facility: CLINIC | Age: 54
End: 2019-07-18

## 2019-07-18 PROCEDURE — 92340 FIT SPECTACLES MONOFOCAL: CPT | Performed by: STUDENT IN AN ORGANIZED HEALTH CARE EDUCATION/TRAINING PROGRAM
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
## 2019-07-18 NOTE — DISCHARGE INSTRUCTIONS
Please make an appointment to follow up with Your Primary Care Provider in 7 days as needed if not healed by then.    If you have any increased redness, pain, fever, yellow drainage or other concerns return to the emergency department.    Use silvadene cream on burn to keep clean and help heal.  Keep covered with a non-stick dressing to avoid rubbing on clothing.  Use ice and cold compresses for pain and tramadol for break through pain.

## 2019-07-25 NOTE — PROGRESS NOTES
Called [facility] to check on status of walker order. They received the service agreement on Monday and is going through documentation dept now.

## 2019-08-01 ENCOUNTER — APPOINTMENT (OUTPATIENT)
Dept: CARDIAC REHAB | Facility: CLINIC | Age: 54
End: 2019-08-01
Attending: INTERNAL MEDICINE
Payer: MEDICARE

## 2019-08-01 DIAGNOSIS — F51.01 PRIMARY INSOMNIA: ICD-10-CM
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
## 2019-08-01 DIAGNOSIS — S52.124A: ICD-10-CM

## 2019-08-01 DIAGNOSIS — D63.1 ANEMIA IN CHRONIC KIDNEY DISEASE, UNSPECIFIED CKD STAGE: ICD-10-CM

## 2019-08-01 DIAGNOSIS — N18.9 ANEMIA IN CHRONIC KIDNEY DISEASE, UNSPECIFIED CKD STAGE: ICD-10-CM

## 2019-08-01 DIAGNOSIS — Z94.4 LIVER REPLACED BY TRANSPLANT (H): ICD-10-CM

## 2019-08-01 RX ORDER — CYCLOSPORINE 25 MG/1
CAPSULE, LIQUID FILLED ORAL
Qty: 240 CAPSULE | Refills: 3 | Status: SHIPPED | OUTPATIENT
Start: 2019-08-01 | End: 2019-08-08

## 2019-08-01 RX ORDER — LANOLIN ALCOHOL/MO/W.PET/CERES
CREAM (GRAM) TOPICAL
Qty: 100 TABLET | Refills: 0 | Status: SHIPPED | OUTPATIENT
Start: 2019-08-01 | End: 2019-08-08

## 2019-08-01 RX ORDER — FERROUS SULFATE 325(65) MG
325 TABLET ORAL
Qty: 30 TABLET | Refills: 0 | Status: SHIPPED | OUTPATIENT
Start: 2019-08-01 | End: 2019-08-08

## 2019-08-01 NOTE — TELEPHONE ENCOUNTER
Message routing to provider as Fyi-or to address the need of the medication (ferrous sulfate 325)  as a part of the plan if appropriate.    Baron Roblero RN

## 2019-08-01 NOTE — TELEPHONE ENCOUNTER
Need new rx for ferrous sulfate 325, didn't see on med list    Thank you!  Lila Rodriguez Donn  [facility]

## 2019-08-02 RX ORDER — LIDOCAINE 50 MG/G
PATCH TOPICAL
Qty: 30 PATCH | Refills: 0 | Status: SHIPPED | OUTPATIENT
Start: 2019-08-02 | End: 2019-08-08

## 2019-08-02 NOTE — PROGRESS NOTES
Called [facility] to check on the status of the walker.  (Jazz) said they do not have the service agreement yet from the [facility] . They last reached out to the [facility]  (Sujatha Cummins at [facility]) on July 23.    Writer called and LVM with Maya Henderson at [facility] who is an  for [employer]. According to her VM she is out of town today, returning on Monday. If the writer does not hear from her on Monday, another call will be placed

## 2019-08-02 NOTE — TELEPHONE ENCOUNTER
"Request for medication refill: Lidocaine    Providers if patient needs an appointment and you are willing to give a one month supply please refill for one month and  send a letter/MyChart using \".SMILLIMITEDREFILL\" .smillimited and route chart to \"P SMI \" (Giving one month refill in non controlled medications is strongly recommended before denial)    If refill has been denied, meaning absolutely no refills without visit, please complete the smart phrase \".smirxrefuse\" and route it to the \"P SMI MED REFILLS\"  pool to inform the patient and the pharmacy.    Elisabet Olson RN      "

## 2019-08-06 NOTE — PROGRESS NOTES
Maya from [facility] called back and confirmed that they do not have the service agreement. This information was relayed to the pt's RNCC Deborah. Deborah will reach out to the [facility]  (Sujatha) and ask for this information to be sent in.

## 2019-08-08 ENCOUNTER — OFFICE VISIT (OUTPATIENT)
Dept: FAMILY MEDICINE | Facility: CLINIC | Age: 54
End: 2019-08-08
Payer: MEDICARE

## 2019-08-08 ENCOUNTER — MEDICAL CORRESPONDENCE (OUTPATIENT)
Dept: HEALTH INFORMATION MANAGEMENT | Facility: CLINIC | Age: 54
End: 2019-08-08

## 2019-08-08 ENCOUNTER — TELEPHONE (OUTPATIENT)
Dept: FAMILY MEDICINE | Facility: CLINIC | Age: 54
End: 2019-08-08

## 2019-08-08 VITALS
OXYGEN SATURATION: 95 % | WEIGHT: 216.6 LBS | DIASTOLIC BLOOD PRESSURE: 78 MMHG | RESPIRATION RATE: 16 BRPM | HEART RATE: 70 BPM | TEMPERATURE: 97.4 F | BODY MASS INDEX: 39.86 KG/M2 | SYSTOLIC BLOOD PRESSURE: 118 MMHG | HEIGHT: 62 IN

## 2019-08-08 DIAGNOSIS — H10.13 ALLERGIC CONJUNCTIVITIS, BILATERAL: ICD-10-CM

## 2019-08-08 DIAGNOSIS — M25.572 PAIN IN JOINT INVOLVING ANKLE AND FOOT, LEFT: ICD-10-CM

## 2019-08-08 DIAGNOSIS — I12.9 HYPERTENSION, RENAL: ICD-10-CM

## 2019-08-08 DIAGNOSIS — Z94.4 LIVER TRANSPLANTED (H): ICD-10-CM

## 2019-08-08 DIAGNOSIS — M81.8 OTHER OSTEOPOROSIS WITHOUT CURRENT PATHOLOGICAL FRACTURE: ICD-10-CM

## 2019-08-08 DIAGNOSIS — Z94.4 STATUS POST LIVER TRANSPLANTATION (H): ICD-10-CM

## 2019-08-08 DIAGNOSIS — S52.124A: ICD-10-CM

## 2019-08-08 DIAGNOSIS — F51.01 PRIMARY INSOMNIA: ICD-10-CM

## 2019-08-08 DIAGNOSIS — Z94.4 LIVER REPLACED BY TRANSPLANT (H): ICD-10-CM

## 2019-08-08 DIAGNOSIS — N18.30 CKD (CHRONIC KIDNEY DISEASE) STAGE 3, GFR 30-59 ML/MIN (H): ICD-10-CM

## 2019-08-08 DIAGNOSIS — M81.0 OSTEOPOROSIS WITHOUT CURRENT PATHOLOGICAL FRACTURE, UNSPECIFIED OSTEOPOROSIS TYPE: ICD-10-CM

## 2019-08-08 DIAGNOSIS — D63.1 ANEMIA IN CHRONIC KIDNEY DISEASE, UNSPECIFIED CKD STAGE: ICD-10-CM
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
## 2019-08-08 DIAGNOSIS — H04.123 DRY EYES, BILATERAL: ICD-10-CM

## 2019-08-08 DIAGNOSIS — E66.01 CLASS 2 SEVERE OBESITY DUE TO EXCESS CALORIES WITH SERIOUS COMORBIDITY IN ADULT, UNSPECIFIED BMI (H): ICD-10-CM

## 2019-08-08 DIAGNOSIS — N18.9 ANEMIA IN CHRONIC KIDNEY DISEASE, UNSPECIFIED CKD STAGE: ICD-10-CM

## 2019-08-08 DIAGNOSIS — R53.82 CHRONIC FATIGUE: ICD-10-CM

## 2019-08-08 DIAGNOSIS — E66.01 MORBID OBESITY (H): ICD-10-CM

## 2019-08-08 DIAGNOSIS — M76.822 POSTERIOR TIBIAL TENDINITIS OF LEFT LOWER EXTREMITY: ICD-10-CM

## 2019-08-08 DIAGNOSIS — I63.9 CEREBROVASCULAR ACCIDENT (CVA), UNSPECIFIED MECHANISM (H): ICD-10-CM

## 2019-08-08 DIAGNOSIS — E66.812 CLASS 2 SEVERE OBESITY DUE TO EXCESS CALORIES WITH SERIOUS COMORBIDITY IN ADULT, UNSPECIFIED BMI (H): ICD-10-CM

## 2019-08-08 DIAGNOSIS — M25.511 PAIN IN JOINT OF RIGHT SHOULDER: ICD-10-CM

## 2019-08-08 DIAGNOSIS — L85.3 XEROSIS CUTIS: ICD-10-CM

## 2019-08-08 DIAGNOSIS — J20.9 ACUTE BRONCHITIS, UNSPECIFIED ORGANISM: ICD-10-CM

## 2019-08-08 DIAGNOSIS — Z71.84 ENCOUNTER FOR COUNSELING FOR TRAVEL: Primary | ICD-10-CM

## 2019-08-08 DIAGNOSIS — K59.09 CONSTIPATION, CHRONIC: ICD-10-CM

## 2019-08-08 DIAGNOSIS — M25.512 ACUTE PAIN OF LEFT SHOULDER: ICD-10-CM

## 2019-08-08 RX ORDER — ALENDRONATE SODIUM 70 MG/1
70 TABLET ORAL
Qty: 12 TABLET | Refills: 3 | Status: SHIPPED | OUTPATIENT
Start: 2019-08-08 | End: 2020-04-17

## 2019-08-08 RX ORDER — CYCLOSPORINE 25 MG/1
CAPSULE, LIQUID FILLED ORAL
Qty: 240 CAPSULE | Refills: 4 | Status: SHIPPED | OUTPATIENT
Start: 2019-08-08 | End: 2020-03-30

## 2019-08-08 RX ORDER — ALBUTEROL SULFATE 90 UG/1
2 AEROSOL, METERED RESPIRATORY (INHALATION) 4 TIMES DAILY
Qty: 1 INHALER | Refills: 1 | Status: SHIPPED | OUTPATIENT
Start: 2019-08-08 | End: 2020-05-26

## 2019-08-08 RX ORDER — CIPROFLOXACIN 250 MG/1
250 TABLET, FILM COATED ORAL 2 TIMES DAILY
Qty: 6 TABLET | Refills: 0 | Status: SHIPPED | OUTPATIENT
Start: 2019-08-08 | End: 2019-12-05

## 2019-08-08 RX ORDER — AMOXICILLIN 250 MG
2 CAPSULE ORAL 2 TIMES DAILY
Qty: 100 TABLET | Refills: 3 | Status: SHIPPED | OUTPATIENT
Start: 2019-08-08 | End: 2020-11-07

## 2019-08-08 RX ORDER — ACETAMINOPHEN 160 MG
1 TABLET,DISINTEGRATING ORAL DAILY
Qty: 100 CAPSULE | Refills: 3 | Status: SHIPPED | OUTPATIENT
Start: 2019-08-08 | End: 2020-04-06

## 2019-08-08 RX ORDER — DEXTROMETHORPHAN POLISTIREX 30 MG/5ML
60 SUSPENSION ORAL 2 TIMES DAILY
Qty: 148 ML | Refills: 0 | Status: SHIPPED | OUTPATIENT
Start: 2019-08-08 | End: 2020-04-17

## 2019-08-08 RX ORDER — CARBOXYMETHYLCELLULOSE SODIUM 5 MG/ML
1 SOLUTION/ DROPS OPHTHALMIC 4 TIMES DAILY
Qty: 15 ML | Refills: 3 | Status: SHIPPED | OUTPATIENT
Start: 2019-08-08 | End: 2021-07-10

## 2019-08-08 RX ORDER — ACETAMINOPHEN 500 MG
500 TABLET ORAL 3 TIMES DAILY PRN
Qty: 120 TABLET | Refills: 3 | Status: SHIPPED | OUTPATIENT
Start: 2019-08-08 | End: 2020-02-26

## 2019-08-08 RX ORDER — MULTIPLE VITAMINS W/ MINERALS TAB 9MG-400MCG
1 TAB ORAL DAILY
Qty: 100 TABLET | Refills: 11 | Status: SHIPPED | OUTPATIENT
Start: 2019-08-08 | End: 2020-10-07

## 2019-08-08 RX ORDER — FERROUS SULFATE 325(65) MG
325 TABLET ORAL
Qty: 30 TABLET | Refills: 0 | Status: CANCELLED | OUTPATIENT
Start: 2019-08-08

## 2019-08-08 RX ORDER — LIDOCAINE 50 MG/G
PATCH TOPICAL
Qty: 90 PATCH | Refills: 2 | Status: SHIPPED | OUTPATIENT
Start: 2019-08-08 | End: 2020-04-03

## 2019-08-08 RX ORDER — LANOLIN ALCOHOL/MO/W.PET/CERES
CREAM (GRAM) TOPICAL
Qty: 100 TABLET | Refills: 3 | Status: SHIPPED | OUTPATIENT
Start: 2019-08-08 | End: 2020-09-14

## 2019-08-08 RX ORDER — AMLODIPINE BESYLATE 5 MG/1
5 TABLET ORAL DAILY
Qty: 90 TABLET | Refills: 3 | Status: SHIPPED | OUTPATIENT
Start: 2019-08-08 | End: 2020-06-05

## 2019-08-08 RX ORDER — MYCOPHENOLATE MOFETIL 250 MG/1
CAPSULE ORAL
Qty: 120 CAPSULE | Refills: 11 | Status: SHIPPED | OUTPATIENT
Start: 2019-08-08 | End: 2020-08-13

## 2019-08-08 RX ORDER — OLOPATADINE HYDROCHLORIDE 1 MG/ML
1 SOLUTION/ DROPS OPHTHALMIC 2 TIMES DAILY
Qty: 15 ML | Refills: 3 | Status: SHIPPED | OUTPATIENT
Start: 2019-08-08 | End: 2020-06-05

## 2019-08-08 RX ORDER — CALCITRIOL 0.25 UG/1
0.25 CAPSULE, LIQUID FILLED ORAL DAILY
Qty: 90 CAPSULE | Refills: 3 | Status: SHIPPED | OUTPATIENT
Start: 2019-08-08 | End: 2021-02-02

## 2019-08-08 RX ORDER — LISINOPRIL 10 MG/1
10 TABLET ORAL DAILY
Qty: 30 TABLET | Refills: 1 | Status: SHIPPED | OUTPATIENT
Start: 2019-08-08 | End: 2020-02-14

## 2019-08-08 RX ORDER — NALTREXONE HYDROCHLORIDE 50 MG/1
TABLET, FILM COATED ORAL
Qty: 540 TABLET | Refills: 2 | Status: SHIPPED | OUTPATIENT
Start: 2019-08-08 | End: 2020-11-18

## 2019-08-08 ASSESSMENT — MIFFLIN-ST. JEOR: SCORE: 1535.74

## 2019-08-08 NOTE — PATIENT INSTRUCTIONS
Patient is traveling to [place] for 3+ months and the attached is her medication list. She has had a liver transplant and needs all of the medications listed below.     [facility]   [phone]      Follow up with me after your return from [place]

## 2019-08-08 NOTE — TELEPHONE ENCOUNTER
[facility] Prior Authorization Team Request    Medication: lidocaine 2% gel  Dosing: apply topically daily to left ankle as needed for pain  NDC (required for Medicaid members): [address]-49    Insurance   BIN: [id number]  PCN: [id number]  Grp: [id number]  ID: [id number]    CoverMyMeds Key (if applicable):     Additional documentation:     Filling Pharmacy: [facility]  Phone Number: [phone]  Contact: [facility] (P [id number]) please send all responses to this pool.  Pharmacy NPI (required for Medicaid members):[id number]

## 2019-08-08 NOTE — PROGRESS NOTES
"       HPI       Flor Navarro is a 53 year old who presents to clinic for pre-travel counseling as she leaves in a few days and was not able to get into a travel clinic. She appears up-to-date on the shots she needs for her trip to [place] and recently traveled there last year. She also wants a refill of her medications since she will be gone for about 3 months for a family member's wedding. Her shoulder pain is better. The patient voices no further concerns or complaints.    An Japanese  was used for  this visit.    +++++++    Problem, Medication and Allergy Lists were reviewed and updated if needed..    Patient is an established patient of this clinic..         Review of Systems:   Review of Systems  Cont to lose weight w/ use of medications and increasing exercise   Very proud of this   Improving fruit consumption          Physical Exam:     Vitals:    08/08/19 0929   BP: 118/78   Pulse: 70   Resp: 16   Temp: 97.4  F (36.3  C)   TempSrc: Oral   SpO2: 95%   Weight: 98.2 kg (216 lb 9.6 oz)   Height: 1.575 m (5' 2\")     Body mass index is 39.62 kg/m .  Vitals were reviewed and were normal     Physical Exam   Constitutional: She is oriented to person, place, and time. She appears well-developed and well-nourished. No distress.   HENT:   Head: Normocephalic and atraumatic.   Neck: Normal range of motion.   Pulmonary/Chest: Effort normal.   Neurological: She is alert and oriented to person, place, and time.   Skin: Skin is warm and dry.   Psychiatric: She has a normal mood and affect.       Results:   No testing ordered today    Assessment and Plan      Flor Navarro is a 53 year old who presents to clinic for pre-travel counseling as she leaves in a few days and was not able to get into a travel clinic. We re-ordered her medications and checked [facility] website and she appears to be up to date with necessary vaccinations. She received all her vaccinations last time she was in [place] (1 year ago) and Typhoid should " be given every two years and she is within this window. The patient voices no further concerns or complaints.    Detailed med rec as below:      Flor was seen today for follow up.    Diagnoses and all orders for this visit:    Encounter for counseling for travel  -     ciprofloxacin (CIPRO) 250 MG tablet; Take 1 tablet (250 mg) by mouth 2 times daily for 3 days FOR TRAVELLER's DIARRHEA  -     phenol-menthol (CEPASTAT) 14.5 MG lozenge; Place 1 lozenge inside cheek every 2 hours as needed for other (cough)  -     dextromethorphan (DELSYM) 30 MG/5ML liquid; Take 10 mLs (60 mg) by mouth 2 times daily As needed for cough    Liver replaced by transplant (H)  -     cycloSPORINE modified (GENERIC EQUIVALENT) 25 MG capsule; TAKE FOUR CAPSULES BY MOUTH EVERY 12 HOURS.  Needs 4 month supply for International Travel  -     mycophenolate (GENERIC EQUIVALENT) 250 MG capsule; TAKE TWO CAPSULES BY MOUTH EVERY 12 HOURS  Needs 4 month supply for International Travel  -     Cholecalciferol (VITAMIN D3) 2000 units CAPS; Take 1 capsule by mouth daily Needs 4 month supply for International Travel  -     multivitamin w/minerals (MULTI-VITAMIN) tablet; Take 1 tablet by mouth daily Needs 4 month supply for International Travel  -     senna-docusate (SENOKOT-S/PERICOLACE) 8.6-50 MG tablet; Take 2 tablets by mouth 2 times daily Needs 4 month supply for International Travel    Nondisp fx of head of right radius, init for clos fx  -     lidocaine (LIDODERM) 5 % patch; Apply up to 3 patches to painful area at once for up to 12 h within a 24 h period. Needs 4 month supply for International Travel    Fall, subsequent encounter  -     lidocaine (LIDODERM) 5 % patch; Apply up to 3 patches to painful area at once for up to 12 h within a 24 h period. Needs 4 month supply for International Travel    Anemia in chronic kidney disease, unspecified CKD stage    Primary insomnia  -     melatonin 3 MG tablet; TAKE ONE TABLET BY MOUTH EVERY NIGHT AS NEEDED  FOR SLEEP.  Needs 4 month supply for International Travel    Allergic conjunctivitis, bilateral  -     olopatadine (PATANOL) 0.1 % ophthalmic solution; Place 1 drop into both eyes 2 times daily Needs 4 month supply for International Travel    CKD (chronic kidney disease) stage 3, GFR 30-59 ml/min (H)  -     calcitRIOL (ROCALTROL) 0.25 MCG capsule; Take 1 capsule (0.25 mcg) by mouth daily Needs 4 month supply for International Travel    Acute pain of left shoulder  -     camphor-menthol-methyl sal 4-10-30 % CREA; Externally apply topically 3 times daily as needed (shoulder pain) Needs 4 month supply for International Travel    Other osteoporosis without current pathological fracture  -     alendronate (FOSAMAX) 70 MG tablet; Take 1 tablet (70 mg) by mouth every 7 days at least 60 min before breakfast with over 8 oz water. Stay upright for at least 30 min. Needs 4 month supply for International Travel    Acute bronchitis, unspecified organism  -     albuterol (PROAIR HFA/PROVENTIL HFA/VENTOLIN HFA) 108 (90 Base) MCG/ACT inhaler; Inhale 2 puffs into the lungs 4 times daily    Class 2 severe obesity due to excess calories with serious comorbidity in adult, unspecified BMI (H)  -     amLODIPine (NORVASC) 5 MG tablet; Take 1 tablet (5 mg) by mouth daily Needs 4 month supply for International Travel    Osteoporosis without current pathological fracture, unspecified osteoporosis type  -     calcium carbonate 600 mg-vitamin D 400 units (CALCIUM 600 + D) 600-400 MG-UNIT per tablet; Take 1 tablet by mouth 2 times daily Needs 4 month supply for International Travel    Dry eyes, bilateral  -     carboxymethylcellulose (REFRESH PLUS) 0.5 % SOLN ophthalmic solution; Place 1 drop into both eyes 4 times daily    Xerosis cutis  -     dimethicone (AVEENO DAILY MOISTURIZING) 1.3 % LOTN lotion; Apply to affected area(s) as needed    Posterior tibial tendinitis of left lower extremity  -     lidocaine (XYLOCAINE) 2 % external gel; Apply  topically daily To left ankle as needed for pain.    Pain in joint involving ankle and foot, left  -     lidocaine (XYLOCAINE) 2 % external gel; Apply topically daily To left ankle as needed for pain.    Hypertension, renal  -     lisinopril (PRINIVIL/ZESTRIL) 10 MG tablet; Take 1 tablet (10 mg) by mouth daily Needs 4 month supply for International Travel    Morbid obesity (H)  -     naltrexone (DEPADE/REVIA) 50 MG tablet; Take 1 tablet 3 times daily.  Needs 4 month supply for International Travel    Cerebrovascular accident (CVA), unspecified mechanism (H)  -     naltrexone (DEPADE/REVIA) 50 MG tablet; Take 1 tablet 3 times daily.  Needs 4 month supply for International Travel    Chronic fatigue  -     naltrexone (DEPADE/REVIA) 50 MG tablet; Take 1 tablet 3 times daily.  Needs 4 month supply for International Travel    Status post liver transplantation (H)  -     naltrexone (DEPADE/REVIA) 50 MG tablet; Take 1 tablet 3 times daily.  Needs 4 month supply for International Travel    Liver transplanted (H)  -     omeprazole (PRILOSEC) 20 MG DR capsule; Take 1 capsule (20 mg) by mouth 2 times daily Needs 4 month supply for International Travel    Constipation, chronic  -     psyllium 400 MG capsule; Take 3 capsules by mouth 2 times daily Needs 4 month supply for International Travel    Pain in joint of right shoulder  -     acetaminophen (TYLENOL) 500 MG tablet; Take 1 tablet (500 mg) by mouth 3 times daily as needed for mild pain  -     Menthol, Topical Analgesic, (BIOFREEZE) 4 % GEL; Externally apply topically 4 times daily as needed (pain) To bilateral knees and R shoulder        Medications Discontinued During This Encounter   Medication Reason     carboxymethylcellulose PF (CELLUVISC/REFRESH LIQUIGEL) 1 % ophthalmic gel      ferrous sulfate (FEROSUL) 325 (65 Fe) MG tablet      order for DME      prednisoLONE acetate (PRED FORTE) 1 % ophthalmic susp      silver sulfADIAZINE (SILVADENE) 1 % external cream       traMADol (ULTRAM) 50 MG tablet      sodium chloride (OCEAN) 0.65 % nasal spray      bisacodyl (DULCOLAX) 5 MG EC tablet      Menthol, Topical Analgesic, (BIOFREEZE COLORLESS) 4 % GEL      cycloSPORINE modified (GENERIC EQUIVALENT) 25 MG capsule Reorder     lidocaine (LIDODERM) 5 % patch Reorder     melatonin 3 MG tablet Reorder     olopatadine (PATANOL) 0.1 % ophthalmic solution Reorder     calcitRIOL (ROCALTROL) 0.25 MCG capsule Reorder     mycophenolate (GENERIC EQUIVALENT) 250 MG capsule Reorder     camphor-menthol-methyl sal 4-10-30 % CREA Reorder     alendronate (FOSAMAX) 70 MG tablet Reorder     acetaminophen (TYLENOL) 500 MG tablet Reorder     albuterol (PROAIR HFA/PROVENTIL HFA/VENTOLIN HFA) 108 (90 Base) MCG/ACT inhaler Reorder     amLODIPine (NORVASC) 5 MG tablet Reorder     calcium carbonate 600 mg-vitamin D 400 units (CALCIUM 600 + D) 600-400 MG-UNIT per tablet Reorder     carboxymethylcellulose (REFRESH PLUS) 0.5 % SOLN ophthalmic solution Reorder     Cholecalciferol (VITAMIN D3) 2000 units CAPS Reorder     dimethicone (AVEENO DAILY MOISTURIZING) 1.3 % LOTN lotion Reorder     lidocaine (XYLOCAINE) 2 % external gel Reorder     lisinopril (PRINIVIL/ZESTRIL) 10 MG tablet Reorder     multivitamin, therapeutic with minerals (MULTI-VITAMIN) TABS tablet Reorder     naltrexone (DEPADE/REVIA) 50 MG tablet Reorder     omeprazole (PRILOSEC) 20 MG DR capsule Reorder     Psyllium 400 MG CAPS Reorder     senna-docusate (SENOKOT-S/PERICOLACE) 8.6-50 MG tablet Reorder     Menthol, Topical Analgesic, (BIOFREEZE) 4 % GEL Reorder     This note was written by Myron Sanchez MS3, [employer], who saw the patient together with Karely Sierra MD.    Options for treatment and follow-up care were reviewed with the patient. Flor Navarro  engaged in the decision making process and verbalized understanding of the options discussed and agreed with the final plan.    Karely Sierra MD  I spent 25 min face to  face with the patient and >50% was spent counselling the patient about the above medical conditions, educating patient on their medical conditions, behavioral interventions and supports. Detailed med rec completed.

## 2019-08-08 NOTE — NURSING NOTE
Due to patient being non-English speaking/uses sign language, an  was used for this visit. Only for face-to-face interpretation by an external agency, date and length of interpretation can be found on the scanned worksheet.     name: Serina Rojas  Agency: [employer]  Language: Greenlandic   Telephone number: [phone]  Type of interpretation: Face-to-face, spoken     Law BRANDYN Christianson

## 2019-08-08 NOTE — PROGRESS NOTES
Writer called to check on the status of the walker. [facility] has all required paperwork. [facility] has to wait for approval from the state for the extra service agreement money, right now that is taking 3 weeks, so full approval likely won't happen until the end of Aug.

## 2019-08-12 NOTE — PROGRESS NOTES
Preceptor Attestation:  I was present with the medical student who participated in the service and in the documentation of this note. I have verified the history and personally performed the physical exam and medical decision making. I have verified the content of the note, which accurately reflects my assessment of the patient and the plan of care.   Supervising Physician:  Karely Sierra MD

## 2019-08-14 NOTE — TELEPHONE ENCOUNTER
Central Prior Authorization Team   [phone]    PA Initiation    Medication: lidocaine 2% gel  Insurance Company: [employer] Part D - Phone [phone] Fax [phone]  Pharmacy Filling the Rx: [facility] [place], [place] - [address] [place] [address] [id number]  Filling Pharmacy Phone: [phone]  Filling Pharmacy Fax: [phone]  Start Date: 8/14/2019

## 2019-08-15 NOTE — TELEPHONE ENCOUNTER
Prior Authorization Approval    Authorization Effective Date: 5/16/2019  Authorization Expiration Date: 11/12/2019  Medication: lidocaine 2% gel-[place] APPROVED   Approved Dose/Quantity:   Reference #:     Insurance Company: [employer] Part D - Phone [phone] Fax [phone]  Expected CoPay: NO CO-PAY     CoPay Card Available:      Foundation Assistance Needed:    Which Pharmacy is filling the prescription (Not needed for infusion/clinic administered): [facility] [address] [id number]  Pharmacy Notified: Yes  Patient Notified: Yes

## 2019-08-26 ENCOUNTER — DOCUMENTATION ONLY (OUTPATIENT)
Dept: FAMILY MEDICINE | Facility: CLINIC | Age: 54
End: 2019-08-26

## 2019-08-26 ENCOUNTER — TRANSFERRED RECORDS (OUTPATIENT)
Dept: HEALTH INFORMATION MANAGEMENT | Facility: CLINIC | Age: 54
End: 2019-08-26

## 2019-08-26 NOTE — PROGRESS NOTES
"When opening a documentation only encounter, be sure to enter in \"Chief Complaint\" Forms and in \" Comments\" Title of form, description if needed.    Flor is a 53 year old  female  Form received via: Fax  Form now resides in: Provider Ready    Loni Billy CMA                Form has been completed by provider.     Form sent out via: Fax to [phone]  Patient informed: No, Reason: fax confirmed  Output date: August 27, 2019    Loni Billy CMA      **Please close the encounter**      "

## 2019-08-30 NOTE — PROGRESS NOTES
LVM with [facility] Liaison team to ask about status of the walker funds from the state. Left social work direct phone number as call back number

## 2019-09-06 NOTE — PROGRESS NOTES
[facility] liaison team lvm that said they have the walker and it is just waiting . Based on chart review pt is currently out of the county and won't be back for a few months. LVM with the liaison team with this information

## 2019-09-10 NOTE — PROGRESS NOTES
Writer had VM from [facility]'s Liaison team. Pt's walker order will have to be cancelled because she is no in the country to pick it up. Since she has been out of the country for 30 days her CADI waiver has closed so the funds they were providing for the walker is not available anymore. Pt would have to private pay for those extra fees that the CADI waiver was going to cover and sign a sheet acknowledging they are private paying- since she is out of the country this is not possible and the walker order will be cancelled. It can be reordered once she returns.

## 2019-09-17 ENCOUNTER — DOCUMENTATION ONLY (OUTPATIENT)
Dept: CARE COORDINATION | Facility: CLINIC | Age: 54
End: 2019-09-17

## 2019-11-18 ENCOUNTER — OFFICE VISIT (OUTPATIENT)
Dept: GASTROENTEROLOGY | Facility: CLINIC | Age: 54
End: 2019-11-18
Attending: INTERNAL MEDICINE
Payer: MEDICARE

## 2019-11-18 ENCOUNTER — TELEPHONE (OUTPATIENT)
Dept: FAMILY MEDICINE | Facility: CLINIC | Age: 54
End: 2019-11-18
Payer: MEDICARE

## 2019-11-18 VITALS
HEIGHT: 62 IN | DIASTOLIC BLOOD PRESSURE: 77 MMHG | HEART RATE: 63 BPM | BODY MASS INDEX: 41.51 KG/M2 | SYSTOLIC BLOOD PRESSURE: 147 MMHG | WEIGHT: 225.6 LBS | OXYGEN SATURATION: 93 % | TEMPERATURE: 97.6 F

## 2019-11-18 DIAGNOSIS — Z23 NEED FOR PROPHYLACTIC VACCINATION AND INOCULATION AGAINST INFLUENZA: Primary | ICD-10-CM

## 2019-11-18 DIAGNOSIS — Z94.4 LIVER REPLACED BY TRANSPLANT (H): ICD-10-CM

## 2019-11-18 DIAGNOSIS — Z13.220 LIPID SCREENING: ICD-10-CM

## 2019-11-18 LAB
ALBUMIN SERPL-MCNC: 3.5 G/DL (ref 3.4–5)
ALP SERPL-CCNC: 98 U/L (ref 40–150)
ALT SERPL W P-5'-P-CCNC: 42 U/L (ref 0–50)
AST SERPL W P-5'-P-CCNC: 27 U/L (ref 0–45)
BILIRUB DIRECT SERPL-MCNC: 0.2 MG/DL (ref 0–0.2)
BILIRUB SERPL-MCNC: 0.9 MG/DL (ref 0.2–1.3)
CYCLOSPORINE BLD LC/MS/MS-MCNC: 69 UG/L (ref 50–400)
ERYTHROCYTE [DISTWIDTH] IN BLOOD BY AUTOMATED COUNT: 12.5 % (ref 10–15)
HCT VFR BLD AUTO: 40.1 % (ref 35–47)
HGB BLD-MCNC: 13 G/DL (ref 11.7–15.7)
MCH RBC QN AUTO: 30.3 PG (ref 26.5–33)
MCHC RBC AUTO-ENTMCNC: 32.4 G/DL (ref 31.5–36.5)
MCV RBC AUTO: 94 FL (ref 78–100)
PLATELET # BLD AUTO: 135 10E9/L (ref 150–450)
PROT SERPL-MCNC: 7.5 G/DL (ref 6.8–8.8)
RBC # BLD AUTO: 4.29 10E12/L (ref 3.8–5.2)
TME LAST DOSE: NORMAL H
WBC # BLD AUTO: 5.9 10E9/L (ref 4–11)

## 2019-11-18 PROCEDURE — 80076 HEPATIC FUNCTION PANEL: CPT | Performed by: INTERNAL MEDICINE

## 2019-11-18 PROCEDURE — 85027 COMPLETE CBC AUTOMATED: CPT | Performed by: INTERNAL MEDICINE

## 2019-11-18 PROCEDURE — G0463 HOSPITAL OUTPT CLINIC VISIT: HCPCS | Mod: 25,ZF

## 2019-11-18 PROCEDURE — 36415 COLL VENOUS BLD VENIPUNCTURE: CPT | Performed by: INTERNAL MEDICINE

## 2019-11-18 PROCEDURE — 25000128 H RX IP 250 OP 636: Mod: ZF | Performed by: INTERNAL MEDICINE

## 2019-11-18 PROCEDURE — 80158 DRUG ASSAY CYCLOSPORINE: CPT | Performed by: INTERNAL MEDICINE

## 2019-11-18 PROCEDURE — G0008 ADMIN INFLUENZA VIRUS VAC: HCPCS | Mod: ZF

## 2019-11-18 PROCEDURE — 90682 RIV4 VACC RECOMBINANT DNA IM: CPT | Mod: ZF | Performed by: INTERNAL MEDICINE

## 2019-11-18 RX ADMIN — INFLUENZA A VIRUS A/BRISBANE/02/2018 (H1N1) RECOMBINANT HEMAGGLUTININ ANTIGEN, INFLUENZA A VIRUS A/KANSAS/14/2017 (H3N2) RECOMBINANT HEMAGGLUTININ ANTIGEN, INFLUENZA B VIRUS B/PHUKET/3073/2013 RECOMBINANT HEMAGGLUTININ ANTIGEN, AND INFLUENZA B VIRUS B/MARYLAND/15/2016 RECOMBINANT HEMAGGLUTININ ANTIGEN 0.5 ML: 45; 45; 45; 45 INJECTION INTRAMUSCULAR at 15:35

## 2019-11-18 ASSESSMENT — MIFFLIN-ST. JEOR: SCORE: 1571.56

## 2019-11-18 ASSESSMENT — PAIN SCALES - GENERAL: PAINLEVEL: NO PAIN (0)

## 2019-11-18 NOTE — TELEPHONE ENCOUNTER
PRIOR AUTHORIZATION DENIED    Medication: lidocaine (LIDODERM) 5 % patch- [place] DENIED     Denial Date: 11/18/2019    Denial Rational:             Appeal Information:

## 2019-11-18 NOTE — LETTER
11/18/2019      RE: Flor Navarro  [address]  [facility] [address]       I had the pleasure of seeing Flor Navarro for followup in the Liver Transplant Clinic at the [facility] on 11/18/2019.  Ms. Navarro returns for followup more than 9 years status post liver transplantation for cirrhosis caused by chronic hepatitis C.      She is doing fairly well at this visit.  She just returned after having been in [place] for 3 months.  The trip was fairly unremarkable.  The only thing of note is that she did gain 25 pounds on the trip.      She currently denies any abdominal pain, itching or skin rash and has only mild fatigue.  She denies any increased abdominal girth or lower extremity edema.      She denies any fevers or chills, cough or shortness of breath.  She denies any nausea or vomiting, diarrhea or constipation.  Her appetite, of course, has been good.     Current Outpatient Medications   Medication     acetaminophen (TYLENOL) 500 MG tablet     albuterol (ACCUNEB) 0.63 MG/3ML neb solution     albuterol (PROAIR HFA/PROVENTIL HFA/VENTOLIN HFA) 108 (90 Base) MCG/ACT inhaler     alendronate (FOSAMAX) 70 MG tablet     amLODIPine (NORVASC) 5 MG tablet     ASPIRIN NOT PRESCRIBED (INTENTIONAL)     calcitRIOL (ROCALTROL) 0.25 MCG capsule     calcium carbonate 600 mg-vitamin D 400 units (CALCIUM 600 + D) 600-400 MG-UNIT per tablet     camphor-menthol-methyl sal 4-10-30 % CREA     carboxymethylcellulose (REFRESH PLUS) 0.5 % SOLN ophthalmic solution     Cholecalciferol (VITAMIN D3) 2000 units CAPS     cycloSPORINE modified (GENERIC EQUIVALENT) 25 MG capsule     dextromethorphan (DELSYM) 30 MG/5ML liquid     dimethicone (AVEENO DAILY MOISTURIZING) 1.3 % LOTN lotion     Elastic Bandages & Supports (KNEE BRACE/FLEX STAYS LARGE) MISC     Elastic Bandages & Supports (MEDICAL COMPRESSION SOCKS) MISC     lidocaine (LIDODERM) 5 % patch     lidocaine (XYLOCAINE) 2 % external gel      "lisinopril (PRINIVIL/ZESTRIL) 10 MG tablet     melatonin 3 MG tablet     Menthol, Topical Analgesic, (BIOFREEZE) 4 % GEL     multivitamin w/minerals (MULTI-VITAMIN) tablet     mycophenolate (GENERIC EQUIVALENT) 250 MG capsule     naltrexone (DEPADE/REVIA) 50 MG tablet     olopatadine (PATANOL) 0.1 % ophthalmic solution     omeprazole (PRILOSEC) 20 MG DR capsule     order for DME     order for DME     order for DME     order for DME     order for DME     phenol-menthol (CEPASTAT) 14.5 MG lozenge     psyllium 400 MG capsule     senna-docusate (SENOKOT-S/PERICOLACE) 8.6-50 MG tablet     STATIN NOT PRESCRIBED, INTENTIONAL,     Current Facility-Administered Medications   Medication     albuterol (PROVENTIL) neb solution 2.5 mg     apraclonidine (IOPIDINE) 1 % ophthalmic solution 1 drop       BP (!) 147/77   Pulse 63   Temp 97.6  F (36.4  C) (Oral)   Ht 1.575 m (5' 2\")   Wt 102.3 kg (225 lb 9.6 oz)   SpO2 93%   BMI 41.26 kg/m       PHYSICAL EXAMINATION:  In general, she looks well.  HEENT exam shows no scleral icterus or temporal muscle wasting.  Her chest is clear.  Her abdominal exam shows no increase in girth.  No masses or tenderness to palpation is present.  Her liver is 10 cm in span without left lobe enlargement.  No spleen tip is palpable, and extremity exam shows no edema.  Skin exam shows no stigmata of chronic liver disease.  Neurologic exam shows no asterixis.     Recent Results (from the past 168 hour(s))   Hepatic panel    Collection Time: 11/18/19  2:24 PM   Result Value Ref Range    Bilirubin Direct 0.2 0.0 - 0.2 mg/dL    Bilirubin Total 0.9 0.2 - 1.3 mg/dL    Albumin 3.5 3.4 - 5.0 g/dL    Protein Total 7.5 6.8 - 8.8 g/dL    Alkaline Phosphatase 98 40 - 150 U/L    ALT 42 0 - 50 U/L    AST 27 0 - 45 U/L   CBC with platelets    Collection Time: 11/18/19  2:24 PM   Result Value Ref Range    WBC 5.9 4.0 - 11.0 10e9/L    RBC Count 4.29 3.8 - 5.2 10e12/L    Hemoglobin 13.0 11.7 - 15.7 g/dL    Hematocrit " 40.1 35.0 - 47.0 %    MCV 94 78 - 100 fl    MCH 30.3 26.5 - 33.0 pg    MCHC 32.4 31.5 - 36.5 g/dL    RDW 12.5 10.0 - 15.0 %    Platelet Count 135 (L) 150 - 450 10e9/L      IMPRESSION:  Ms. Navarro is doing well.  She will get a flu shot today and should be up to date on her vaccinations.  Her liver function is excellent as is her blood count.  I will not be making any change to her medical regimen.  I have recommended that she reconnect with a weight loss program, as she was quite successful up until the time when she went to [place].  She also wants to participate in water aerobics and will bring us the forms we need to fill out in order for her to do so.  Otherwise, my plan will be to see her back in the clinic again in 6 months.      Thank you very much for allowing me to participate in the care of this patient.  If you have any questions regarding my recommendations, please do not hesitate to contact me.       Laron Anne MD      Professor of Medicine  [employer]      Executive Medical Director, Solid Organ Transplant Program  [facility]     Laron Anne MD

## 2019-11-18 NOTE — NURSING NOTE
"Chief Complaint   Patient presents with     RECHECK     liver tx     BP (!) 147/77   Pulse 63   Temp 97.6  F (36.4  C) (Oral)   Ht 1.575 m (5' 2\")   Wt 102.3 kg (225 lb 9.6 oz)   SpO2 93%   BMI 41.26 kg/m    Adeline Sharma, RODERICK    "

## 2019-11-18 NOTE — PROGRESS NOTES
I had the pleasure of seeing Flor Navarro for followup in the Liver Transplant Clinic at the [facility] on 11/18/2019.  Ms. Navarro returns for followup more than 9 years status post liver transplantation for cirrhosis caused by chronic hepatitis C.      She is doing fairly well at this visit.  She just returned after having been in [place] for 3 months.  The trip was fairly unremarkable.  The only thing of note is that she did gain 25 pounds on the trip.      She currently denies any abdominal pain, itching or skin rash and has only mild fatigue.  She denies any increased abdominal girth or lower extremity edema.      She denies any fevers or chills, cough or shortness of breath.  She denies any nausea or vomiting, diarrhea or constipation.  Her appetite, of course, has been good.     Current Outpatient Medications   Medication     acetaminophen (TYLENOL) 500 MG tablet     albuterol (ACCUNEB) 0.63 MG/3ML neb solution     albuterol (PROAIR HFA/PROVENTIL HFA/VENTOLIN HFA) 108 (90 Base) MCG/ACT inhaler     alendronate (FOSAMAX) 70 MG tablet     amLODIPine (NORVASC) 5 MG tablet     ASPIRIN NOT PRESCRIBED (INTENTIONAL)     calcitRIOL (ROCALTROL) 0.25 MCG capsule     calcium carbonate 600 mg-vitamin D 400 units (CALCIUM 600 + D) 600-400 MG-UNIT per tablet     camphor-menthol-methyl sal 4-10-30 % CREA     carboxymethylcellulose (REFRESH PLUS) 0.5 % SOLN ophthalmic solution     Cholecalciferol (VITAMIN D3) 2000 units CAPS     cycloSPORINE modified (GENERIC EQUIVALENT) 25 MG capsule     dextromethorphan (DELSYM) 30 MG/5ML liquid     dimethicone (AVEENO DAILY MOISTURIZING) 1.3 % LOTN lotion     Elastic Bandages & Supports (KNEE BRACE/FLEX STAYS LARGE) MISC     Elastic Bandages & Supports (MEDICAL COMPRESSION SOCKS) MISC     lidocaine (LIDODERM) 5 % patch     lidocaine (XYLOCAINE) 2 % external gel     lisinopril (PRINIVIL/ZESTRIL) 10 MG tablet     melatonin 3 MG tablet     Menthol, Topical Analgesic,  "(BIOFREEZE) 4 % GEL     multivitamin w/minerals (MULTI-VITAMIN) tablet     mycophenolate (GENERIC EQUIVALENT) 250 MG capsule     naltrexone (DEPADE/REVIA) 50 MG tablet     olopatadine (PATANOL) 0.1 % ophthalmic solution     omeprazole (PRILOSEC) 20 MG DR capsule     order for DME     order for DME     order for DME     order for DME     order for DME     phenol-menthol (CEPASTAT) 14.5 MG lozenge     psyllium 400 MG capsule     senna-docusate (SENOKOT-S/PERICOLACE) 8.6-50 MG tablet     STATIN NOT PRESCRIBED, INTENTIONAL,     Current Facility-Administered Medications   Medication     albuterol (PROVENTIL) neb solution 2.5 mg     apraclonidine (IOPIDINE) 1 % ophthalmic solution 1 drop       BP (!) 147/77   Pulse 63   Temp 97.6  F (36.4  C) (Oral)   Ht 1.575 m (5' 2\")   Wt 102.3 kg (225 lb 9.6 oz)   SpO2 93%   BMI 41.26 kg/m      PHYSICAL EXAMINATION:  In general, she looks well.  HEENT exam shows no scleral icterus or temporal muscle wasting.  Her chest is clear.  Her abdominal exam shows no increase in girth.  No masses or tenderness to palpation is present.  Her liver is 10 cm in span without left lobe enlargement.  No spleen tip is palpable, and extremity exam shows no edema.  Skin exam shows no stigmata of chronic liver disease.  Neurologic exam shows no asterixis.     Recent Results (from the past 168 hour(s))   Hepatic panel    Collection Time: 11/18/19  2:24 PM   Result Value Ref Range    Bilirubin Direct 0.2 0.0 - 0.2 mg/dL    Bilirubin Total 0.9 0.2 - 1.3 mg/dL    Albumin 3.5 3.4 - 5.0 g/dL    Protein Total 7.5 6.8 - 8.8 g/dL    Alkaline Phosphatase 98 40 - 150 U/L    ALT 42 0 - 50 U/L    AST 27 0 - 45 U/L   CBC with platelets    Collection Time: 11/18/19  2:24 PM   Result Value Ref Range    WBC 5.9 4.0 - 11.0 10e9/L    RBC Count 4.29 3.8 - 5.2 10e12/L    Hemoglobin 13.0 11.7 - 15.7 g/dL    Hematocrit 40.1 35.0 - 47.0 %    MCV 94 78 - 100 fl    MCH 30.3 26.5 - 33.0 pg    MCHC 32.4 31.5 - 36.5 g/dL    RDW " 12.5 10.0 - 15.0 %    Platelet Count 135 (L) 150 - 450 10e9/L      IMPRESSION:  Ms. Navarro is doing well.  She will get a flu shot today and should be up to date on her vaccinations.  Her liver function is excellent as is her blood count.  I will not be making any change to her medical regimen.  I have recommended that she reconnect with a weight loss program, as she was quite successful up until the time when she went to [place].  She also wants to participate in water aerobics and will bring us the forms we need to fill out in order for her to do so.  Otherwise, my plan will be to see her back in the clinic again in 6 months.      Thank you very much for allowing me to participate in the care of this patient.  If you have any questions regarding my recommendations, please do not hesitate to contact me.       Laron Anne MD      Professor of Medicine  [employer]      Executive Medical Director, Solid Organ Transplant Program  [facility]

## 2019-11-18 NOTE — TELEPHONE ENCOUNTER
PA Initiation    Medication: lidocaine (LIDODERM) 5 % patch- PA PENDING  Insurance Company: [employer] - Phone [phone] Fax [phone]  Pharmacy Filling the Rx: [facility] - [place], [place] - [facility] [address]  Filling Pharmacy Phone: [phone]  Filling Pharmacy Fax: [phone]  Start Date: 11/18/2019

## 2019-12-05 ENCOUNTER — PATIENT OUTREACH (OUTPATIENT)
Dept: FAMILY MEDICINE | Facility: CLINIC | Age: 54
End: 2019-12-05

## 2019-12-05 ENCOUNTER — OFFICE VISIT (OUTPATIENT)
Dept: FAMILY MEDICINE | Facility: CLINIC | Age: 54
End: 2019-12-05
Payer: MEDICARE

## 2019-12-05 VITALS
RESPIRATION RATE: 16 BRPM | BODY MASS INDEX: 44.25 KG/M2 | SYSTOLIC BLOOD PRESSURE: 119 MMHG | HEART RATE: 70 BPM | HEIGHT: 60 IN | DIASTOLIC BLOOD PRESSURE: 71 MMHG | WEIGHT: 225.4 LBS | OXYGEN SATURATION: 94 % | TEMPERATURE: 97.8 F

## 2019-12-05 DIAGNOSIS — G47.00 INSOMNIA, UNSPECIFIED TYPE: ICD-10-CM

## 2019-12-05 DIAGNOSIS — Z79.899 HIGH RISK MEDICATION USE: ICD-10-CM

## 2019-12-05 DIAGNOSIS — Z13.9 SCREENING FOR CONDITION: Primary | ICD-10-CM

## 2019-12-05 DIAGNOSIS — N18.30 CKD (CHRONIC KIDNEY DISEASE) STAGE 3, GFR 30-59 ML/MIN (H): ICD-10-CM

## 2019-12-05 DIAGNOSIS — E66.01 OBESITY, CLASS III, BMI 40-49.9 (MORBID OBESITY) (H): ICD-10-CM

## 2019-12-05 DIAGNOSIS — N30.00 ACUTE CYSTITIS WITHOUT HEMATURIA: ICD-10-CM

## 2019-12-05 DIAGNOSIS — Z94.4 LIVER REPLACED BY TRANSPLANT (H): ICD-10-CM

## 2019-12-05 DIAGNOSIS — M81.0 OSTEOPOROSIS WITHOUT CURRENT PATHOLOGICAL FRACTURE, UNSPECIFIED OSTEOPOROSIS TYPE: ICD-10-CM

## 2019-12-05 DIAGNOSIS — M25.562 CHRONIC PAIN OF LEFT KNEE: ICD-10-CM

## 2019-12-05 DIAGNOSIS — F33.9 DEPRESSION, RECURRENT (H): ICD-10-CM

## 2019-12-05 DIAGNOSIS — G89.29 CHRONIC PAIN OF LEFT KNEE: ICD-10-CM

## 2019-12-05 LAB
ANION GAP SERPL CALCULATED.3IONS-SCNC: 7 MMOL/L (ref 3–14)
BACTERIA: NORMAL
BILIRUBIN UR: NEGATIVE MG/DL
BLOOD UR: ABNORMAL MG/DL
BUN SERPL-MCNC: 23 MG/DL (ref 7–30)
CALCIUM SERPL-MCNC: 8.8 MG/DL (ref 8.5–10.1)
CASTS: NORMAL /LPF
CHLORIDE SERPL-SCNC: 110 MMOL/L (ref 94–109)
CO2 SERPL-SCNC: 24 MMOL/L (ref 20–32)
CREAT SERPL-MCNC: 1.14 MG/DL (ref 0.52–1.04)
CREAT UR-MCNC: 105 MG/DL
CRYSTAL URINE: NORMAL /LPF
EPITHELIAL CELLS UR: NORMAL /LPF (ref 0–2)
GFR SERPL CREATININE-BSD FRML MDRD: 54 ML/MIN/{1.73_M2}
GLUCOSE SERPL-MCNC: 88 MG/DL (ref 70–99)
GLUCOSE URINE: NEGATIVE
KETONES UR QL: NEGATIVE MG/DL
LEUKOCYTE ESTERASE UR: ABNORMAL
MICROALBUMIN UR-MCNC: 78 MG/L
MICROALBUMIN/CREAT UR: 74.29 MG/G CR (ref 0–25)
MUCOUS URINE: NORMAL LPF
NITRITE UR QL STRIP: POSITIVE MG/DL
PH UR STRIP: 5.5 [PH] (ref 4.5–8)
PHOSPHATE SERPL-MCNC: 2.5 MG/DL (ref 2.5–4.5)
POTASSIUM SERPL-SCNC: 4.5 MMOL/L (ref 3.4–5.3)
PROTEIN UR: NEGATIVE MG/DL
PTH-INTACT SERPL-MCNC: 132 PG/ML (ref 18–80)
RBC URINE: NORMAL /HPF
SODIUM SERPL-SCNC: 141 MMOL/L (ref 133–144)
SP GR UR STRIP: 1.02 (ref 1–1.03)
UROBILINOGEN UR STRIP-ACNC: ABNORMAL E.U./DL
WBC URINE: NORMAL /HPF

## 2019-12-05 ASSESSMENT — PATIENT HEALTH QUESTIONNAIRE - PHQ9
5. POOR APPETITE OR OVEREATING: SEVERAL DAYS
SUM OF ALL RESPONSES TO PHQ QUESTIONS 1-9: 14

## 2019-12-05 ASSESSMENT — ANXIETY QUESTIONNAIRES
5. BEING SO RESTLESS THAT IT IS HARD TO SIT STILL: SEVERAL DAYS
GAD7 TOTAL SCORE: 5
3. WORRYING TOO MUCH ABOUT DIFFERENT THINGS: SEVERAL DAYS
1. FEELING NERVOUS, ANXIOUS, OR ON EDGE: SEVERAL DAYS
6. BECOMING EASILY ANNOYED OR IRRITABLE: NOT AT ALL
7. FEELING AFRAID AS IF SOMETHING AWFUL MIGHT HAPPEN: NOT AT ALL
IF YOU CHECKED OFF ANY PROBLEMS ON THIS QUESTIONNAIRE, HOW DIFFICULT HAVE THESE PROBLEMS MADE IT FOR YOU TO DO YOUR WORK, TAKE CARE OF THINGS AT HOME, OR GET ALONG WITH OTHER PEOPLE: SOMEWHAT DIFFICULT
2. NOT BEING ABLE TO STOP OR CONTROL WORRYING: SEVERAL DAYS

## 2019-12-05 ASSESSMENT — PAIN SCALES - GENERAL: PAINLEVEL: NO PAIN (0)

## 2019-12-05 ASSESSMENT — MIFFLIN-ST. JEOR: SCORE: 1542.66

## 2019-12-05 NOTE — Clinical Note
Here are the 3 things that Flor need follow up on. 1. Meet with social work to discuss benefits and PCA services.- Discuss gym membership.2. Updated order for walker today. Eli her there may be an increased cost to you for a seated walker.3. Needs help to reinstate your insurance benefits, call [phone]!!c

## 2019-12-05 NOTE — PROGRESS NOTES
HPI   Flor Navarro is a 54 year old  who presents for   Chief Complaint   Patient presents with     Follow Up     Patient is here for follow up      Preventative  Received flu shot about one month ago at a visit with Dr. Anne.     Weight  States that she plans on following up with the weight clinic. States that she lost 4 to 5 lbs after her follow up with .    GI  Reports constipation is not improving with movement or senna. Is taking senna daily. Constipation was better in [place] and pt attributes this to the fresh food. Requests refill on calcium supplement, which she was taking BID.     Care Coordination  Received a call while she was in [place] about the walker, but has not yet received. Requests a seated walker with four wheels. Karely and Deborah (home nurses) provided a number to call about the walker, [phone]. Would also like to continue home care.    Mood  Reports that her depression is okay, feels depressed 3 out of 7 days a week. Has trouble falling asleep, so endorses poor sleep. Went to the gym with a friend three times and she enjoyed it, specifically the sauna. Wonders if there's a senior gym membership with a sauna.    PHQ-9 SCORE 9/21/2018 1/29/2019 12/5/2019   PHQ-9 Total Score - - -   PHQ-9 Total Score - - -   PHQ-9 Total Score 0 3 14   Today's Score:14    YUSUF-7 SCORE 12/9/2016 9/12/2018 12/5/2019   Total Score 7 11 5   Today's Score: 5      An Kinyarwanda  was used for  this visit.      +++++++    Problem, Medication and Allergy Lists were reviewed and updated if needed.    Patient is an established patient of this clinic.         Review of Systems:   Review of Systems    Positive for: poor sleep, insomnia, depressive sxs, left knee pain, burning sensation in the chest, neck and upper back.    Negative for: edema,    See HPI for additional sxs.    This document serves as a record of the services and decisions personally performed and made by Karely Sierra MD. It was created on  "his/her behalf by Charli Valdez, a trained medical scribe. The creation of this document is based the provider's statements to the medical scribe.  Scribscott Valdez 10:44 AM, December 5, 2019       Physical Exam:     Vitals:    12/05/19 0958   BP: 119/71   Pulse: 70   Resp: 16   Temp: 97.8  F (36.6  C)   TempSrc: Oral   SpO2: 94%   Weight: 102.2 kg (225 lb 6.4 oz)   Height: 1.53 m (5' 0.24\")     Body mass index is 43.68 kg/m .  Vitals were reviewed and were normal.     Physical Exam    BMI= Body mass index is 43.68 kg/m .   GENERAL: healthy, alert and no distress  RESP: lungs clear to auscultation - no rales, no rhonchi, no wheezes  CV: regular rates and rhythm, normal S1 S2, no S3 or S4 and no murmur, no click or rub -  MSK: 1+ edema on the left, no edema on the right.  PSYCH: affect- depressed    Results:   Results are ordered and pending    Assessment and Plan   Flor was seen today for follow up.    Diagnoses and all orders for this visit:    Screening for condition      Liver replaced by transplant (H)  High risk medication use  -     Home Care Nursing Referral ([place])    Depression, recurrent (H)  -     Home Care Nursing Referral ([place])  Need to clarify meds and adherence    Insomnia, unspecified type  Exercise, gym membership if possible.     Obesity, Class III, BMI 40-49.9 (morbid obesity) (H)  -     Walker Order    Osteoporosis without current pathological fracture, unspecified osteoporosis type  -     calcium carbonate 600 mg-vitamin D 400 units (CALCIUM 600 + D) 600-400 MG-UNIT per tablet; Take 1 tablet by mouth 2 times daily    CKD (chronic kidney disease) stage 3, GFR 30-59 ml/min (H)  -     Cancel: Calcium ([place])  -     Phosphorus  -     Urinalysis, Micro If ([facility])  -     Albumin Random Urine Quantitative with Creat Ratio  -     Parathyroid Hormone Intact  -     Vitamin D Deficiency  -     NEPHROLOGY ADULT REFERRAL - INTERNAL  -     Urine Microscopic (UA) ([place])  -     Basic " metabolic panel  - Due for repeat BMP, calcium phosphorous, and parathyroid after stating calcitriol in May. Didn't go to nephrology because she left the country, but will be referring her again.   - Multitude of symptoms that were not able to address today, but she can come back.  - Multiple social issues that need to be addressed from social work.    UTI- ADDENDUM:  UA w/ + nitrites   Will treat - triage to call for results  Has PCN all but has been given keflex before.     Chronic pain of left knee  -     Sports Medicine Clinic[facility] INTERNAL REFERRAL     Medications Discontinued During This Encounter   Medication Reason     ciprofloxacin (CIPRO) 250 MG tablet      calcium carbonate 600 mg-vitamin D 400 units (CALCIUM 600 + D) 600-400 MG-UNIT per tablet Reorder     Patient Instructions   Care Coordination  1. Meet with social work to discuss benefits and PCA services.  - Discuss gym membership.  2. Updated order for walker today.  - There may be an increased cost to you for a seated walker.  3. To reinstate your insurance benefits, call [phone]    Knee Pain  1. Follow up with Sport Medicine at [facility].    Weight  1. I recommend following up with the weight clinic.     Preventative  1. Labs today for kidney's. Results via letter.  2. Re-refer to Dr. Wells.  3. Provided refill on calcium today. Take one tablet twice a day.    [facility] referral  [phone]  Referral auto sent to [facility], they will review and contact patient / family directly to set up.    Options for treatment and follow-up care were reviewed with the patient. Flor Navarro  engaged in the decision making process and verbalized understanding of the options discussed and agreed with the final plan.    The information in this document, created by the medical scribe for me, accurately reflects the services I personally performed and the decisions made by me. I have reviewed and approved this document for accuracy prior to leaving the  patient care area.    Karely Sierra MD  10:44 AM, 12/05/19  End Time: 11:10 AM

## 2019-12-05 NOTE — PATIENT INSTRUCTIONS
Care Coordination  1. Meet with social work to discuss benefits and PCA services.  - Discuss gym membership.  2. Updated order for walker today.  - There may be an increased cost to you for a seated walker.  3. To reinstate your insurance benefits, call [phone]    Knee Pain  1. Follow up with Sport Medicine at [facility].    Weight  1. I recommend following up with the weight clinic.     Preventative  1. Labs today for kidney's. Results via letter.  2. Re-refer to Dr. Wells.  3. Provided refill on calcium today. Take one tablet twice a day.    [facility] referral  [phone]  Referral auto sent to [facility], they will review and contact patient / family directly to set up.

## 2019-12-05 NOTE — PROGRESS NOTES
[place] to the [place] after a trip to [place]. While she was gone her services, CADI/PCA/etc ended and pt would like assistance restarting. Based on Chart review RNCC from pt's insurance company called pt's formed CADI  yesterday and LVM to get her CADI services started again. Once these are restarted pt will have PCA again. Pt was provided with CADI 's phone number and will have her son help her call to get services restarted.     Pt also asked about getting a letter from her doctor to go to a specific pool and sauna place in [place]. Pt did not know the name of the place or if it was a gym or other type of business. Because of lack of information no letter was given or discussed with provider. Pt was encouraged to go to this place and ask if she needs a membership, if she qualifies for a discount based on age, disability status or income level. If she comes back with more information about the place we can continue the conversation later.     Pt also inquired about the walker we had worked on getting her before she left the [facility]. When she left the country the order was cancelled. PCP put in a new order and visit notes for a walker today, so we can restart the order process. SW will work with PCP to get this faxed and completed.    JACKIE Thomas, [facility]   Direct Number: [phone]

## 2019-12-05 NOTE — PROGRESS NOTES
1- Will the use of the above listed piece of equipment enable your pt to safely participate in one or more mobility- related activities of daily living (toileting, feeding, dressing, grooming)that they would otherwise not be able to do? Yes     2- Can the mobility deficit be sufficiently resolved with the use of a can/crutch? No     3- Can the mobility deficit be sufficiently resolved with the use of a walker? Yes   4- is the pt able to safely use the ordered equipment? Yes     5- has the pt had similar equipment in the past? No    6- Estimated length of need (months)? 99 (Put 99 if permanent)

## 2019-12-05 NOTE — PROGRESS NOTES
"       HPI   Flor Navarro is a 54 year old  who presents for   Chief Complaint   Patient presents with     Follow Up     Patient is here for follow up            An Albanian  was used for  this visit.      +++++++    Problem, Medication and Allergy Lists were reviewed and updated if needed.    Patient is an established patient of this clinic.         Review of Systems:   Review of Systems    See [facility] for additional sxs.    This document serves as a record of the services and decisions personally performed and made by Karely Sierra MD. It was created on his/her behalf by Charli Valdez, a trained medical scribe. The creation of this document is based the provider's statements to the medical scribe.  Scribe Charli Valdez 10:44 AM, December 5, 2019       Physical Exam:     Vitals:    12/05/19 0958   BP: 119/71   Pulse: 70   Resp: 16   Temp: 97.8  F (36.6  C)   TempSrc: Oral   SpO2: 94%   Weight: 102.2 kg (225 lb 6.4 oz)   Height: 1.53 m (5' 0.24\")     Body mass index is 43.68 kg/m .  Vitals were reviewed and were normal.     Physical Exam    BMI= Body mass index is 43.68 kg/m .   GENERAL: healthy, alert and no distress  EYES: Eyes grossly normal to inspection, extraocular movements - intact, and PERRL  HENT: ear canals- normal; TMs- normal; Nose- normal; Mouth- no ulcers, no lesions  NECK: no tenderness, no adenopathy, no asymmetry, no masses, no stiffness; thyroid- normal to palpation  RESP: lungs clear to auscultation - no rales, no rhonchi, no wheezes  BREAST: no masses, no tenderness, no nipple discharge, no palpable axillary masses or adenopathy  CV: regular rates and rhythm, normal S1 S2, no S3 or S4 and no murmur, no click or rub -  ABDOMEN: soft, no tenderness, no  hepatosplenomegaly, no masses, normal bowel sounds  MS: extremities- no gross deformities noted, no edema  SKIN: no suspicious lesions, no rashes  NEURO: strength and tone- normal, sensory exam- grossly normal, mentation- intact, speech- " normal, reflexes- symmetric  BACK: no CVA tenderness, no paralumbar tenderness  - female: cervix- normal, adnexae- normal; uterus- normal, no masses, no discharge  RECTAL- female: no masses, no hemorrhoids  PSYCH: Alert and oriented times 3; speech- coherent , normal rate and volume; able to articulate logical thoughts, able to abstract reason, no tangential thoughts, no hallucinations or delusions, affect- normal  LYMPHATICS: ant. cervical- normal, post. cervical- normal, axillary- normal, supraclavicular- normal, inguinal- normal    Results:   {[facility] result choices :[id number]}    Assessment and Plan   There are no diagnoses linked to this encounter.    There are no discontinued medications.    There are no Patient Instructions on file for this visit.    Options for treatment and follow-up care were reviewed with the patient. Flor Navarro  engaged in the decision making process and verbalized understanding of the options discussed and agreed with the final plan.    The information in this document, created by the medical scribe for me, accurately reflects the services I personally performed and the decisions made by me. I have reviewed and approved this document for accuracy prior to leaving the patient care area.    Karely Sierra MD  10:44 AM, 12/05/19  End Time: ***

## 2019-12-06 LAB — DEPRECATED CALCIDIOL+CALCIFEROL SERPL-MC: 52 UG/L (ref 20–75)

## 2019-12-06 ASSESSMENT — ANXIETY QUESTIONNAIRES: GAD7 TOTAL SCORE: 5

## 2019-12-07 RX ORDER — CEPHALEXIN 500 MG/1
500 CAPSULE ORAL 2 TIMES DAILY
Qty: 6 CAPSULE | Refills: 0 | Status: SHIPPED | OUTPATIENT
Start: 2019-12-07 | End: 2020-04-03

## 2019-12-08 ENCOUNTER — TELEPHONE (OUTPATIENT)
Dept: FAMILY MEDICINE | Facility: CLINIC | Age: 54
End: 2019-12-08

## 2019-12-08 DIAGNOSIS — Z94.4 LIVER REPLACED BY TRANSPLANT (H): Primary | ICD-10-CM

## 2019-12-08 NOTE — TELEPHONE ENCOUNTER
ASAEL Larry from [facility] nursing paged cross cover regarding needing updated order for Home Care RN as the order on   over 48 hours. I reordered Home Care order placed on  for RN.     MD [facility] PGY-2

## 2019-12-09 ENCOUNTER — MEDICAL CORRESPONDENCE (OUTPATIENT)
Dept: HEALTH INFORMATION MANAGEMENT | Facility: CLINIC | Age: 54
End: 2019-12-09

## 2019-12-09 ENCOUNTER — TELEPHONE (OUTPATIENT)
Dept: FAMILY MEDICINE | Facility: CLINIC | Age: 54
End: 2019-12-09

## 2019-12-09 NOTE — TELEPHONE ENCOUNTER
Referral to [facility] auto sends to [facility].    Shahla Shepherd  Care Coordinator

## 2019-12-09 NOTE — TELEPHONE ENCOUNTER
"Called patient via language Yoruba  to relay results message, per PCP, \"Please call Flor to inform of results. She likely has a bladder infection. We will treat for 3 days with antibiotics, which I will send [place] [facility] She speaks [place].   Other blood tests are stable and she can have medication adjusted by Dr. Wells (kidney doctor)\" MD Rigo.    Unable to get hold or leave message, listed phone number is disconnected, will send letter.    Baron Roblero RN    "

## 2019-12-09 NOTE — LETTER
Flor Navarro  [address]   [place] [address]        Dear Flor,    We have attempted to reach you by telephone but were unsuccessful.  Your recent laboratory test results come back positive for bladder infection. We will treat for 3 days with antibiotics, which was sent to the pharmacy at the [facility]. Other blood tests are stable and may discuss medication adjustment  with Dr. Wells (kidney doctor).    Kind regards.

## 2019-12-09 NOTE — TELEPHONE ENCOUNTER
"Per PCP, \"Please call Flor to inform of results. She likely has a bladder infection. We will treat for 3 days with antibiotics, which I will send to the pharmacy at the [facility]. She speaks Greenlandic.   Other blood tests are stable and she can have medication adjusted by Dr. Wells (kidney doctor)     Karely Sierra MD \"    RN attempted reaching patient via language line  ID [phone] and the phone was not in service and there is no alternate number, tried twice.    Will attempt again later.  Elisabet Olson RN   "

## 2019-12-10 ENCOUNTER — CARE COORDINATION (OUTPATIENT)
Dept: FAMILY MEDICINE | Facility: CLINIC | Age: 54
End: 2019-12-10

## 2019-12-10 NOTE — PROGRESS NOTES
LVM with pt's former CADI  to inquire how best to restart CADI services for the pt. Left  direct call back number.

## 2019-12-10 NOTE — TELEPHONE ENCOUNTER
RN was able to reach son and there is a consent to communicate, so I was able to relay results to him, he speaks english.  Elisabet Olson RN

## 2019-12-12 ENCOUNTER — DOCUMENTATION ONLY (OUTPATIENT)
Dept: FAMILY MEDICINE | Facility: CLINIC | Age: 54
End: 2019-12-12

## 2019-12-12 NOTE — PROGRESS NOTES
[facility] now requests orders and shares plan of care/discharge summaries for some patients through [employer].  Please REPLY TO THIS MESSAGE OR ROUTE BACK TO THE AUTHOR in order to give authorization for orders when needed.  This is considered a verbal order, you will still receive a faxed copy of orders for signature.  Thank you for your assistance in improving collaboration for our patients.    FYI pt reported a fall with no injury on 12/10/19.    Jacquelin HUI/L  [phone]  [email or web address]

## 2019-12-12 NOTE — PROGRESS NOTES
[facility] now requests orders and shares plan of care/discharge summaries for some patients through [employer].  Please REPLY TO THIS MESSAGE OR ROUTE BACK TO THE AUTHOR in order to give authorization for orders when needed.  This is considered a verbal order, you will still receive a faxed copy of orders for signature.  Thank you for your assistance in improving collaboration for our patients.    ORDER ok for OT to cont 1w3 for UE HEP, cog testing and fall prevention.    Jacquelin HUI/L  [phone]  [email or web address]

## 2019-12-17 ENCOUNTER — ANCILLARY PROCEDURE (OUTPATIENT)
Dept: GENERAL RADIOLOGY | Facility: CLINIC | Age: 54
End: 2019-12-17
Attending: FAMILY MEDICINE
Payer: MEDICARE

## 2019-12-17 ENCOUNTER — OFFICE VISIT (OUTPATIENT)
Dept: FAMILY MEDICINE | Facility: CLINIC | Age: 54
End: 2019-12-17
Payer: MEDICARE

## 2019-12-17 VITALS
DIASTOLIC BLOOD PRESSURE: 74 MMHG | SYSTOLIC BLOOD PRESSURE: 114 MMHG | TEMPERATURE: 98 F | WEIGHT: 226.6 LBS | BODY MASS INDEX: 43.91 KG/M2 | RESPIRATION RATE: 16 BRPM | HEART RATE: 73 BPM | OXYGEN SATURATION: 95 %

## 2019-12-17 DIAGNOSIS — M79.672 LEFT FOOT PAIN: Primary | ICD-10-CM

## 2019-12-17 DIAGNOSIS — G89.29 CHRONIC PAIN OF LEFT KNEE: ICD-10-CM

## 2019-12-17 DIAGNOSIS — M25.562 CHRONIC PAIN OF LEFT KNEE: ICD-10-CM

## 2019-12-17 NOTE — NURSING NOTE
Due to patient being non-English speaking/uses sign language, an  was used for this visit. Only for face-to-face interpretation by an external agency, date and length of interpretation can be found on the scanned worksheet.     name: Gloria Maharaj   Agency: [employer]  Language: Sami   Telephone number: [phone]  Type of interpretation: Face-to-face, spoken     Shelley Osei CMA

## 2019-12-17 NOTE — PROGRESS NOTES
SUBJECTIVE: Flor Navarro is a 54 year old female who presents with left lower extremity pain. Patient has a history of tibialis posterior tendinopathy - and was given a Edgar Brace in the past - she is not able to provide an accurate history of use, but states it did not help. Pain feels the same to her, but shows it radiating up her posterior calf to knee. She has also been trying topical lidocaine gel as this was prescribed to her in the past, and has not been helping.     PAST MEDICAL, SOCIAL, SURGICAL AND FAMILY HISTORY: She  has a past medical history of Anemia in CKD (chronic kidney disease), Cataract, CKD (chronic kidney disease) stage 3, GFR 30-59 ml/min (H), Hepatitis C, High risk medication use, Hypertension, renal, Immunosuppressed status (H), Liver replaced by transplant (H) (), Osteoporosis, Recurrent pregnancy loss without current pregnancy, Stroke, hemorrhagic (H) (), Syncope, and Unspecified viral hepatitis C without hepatic coma.  She  has a past surgical history that includes Insert shunt portal transjugular intraheptic ();  section; Incisional Hernia Repair (2004); Upper GI Endoscopy with Band Ligation of Esoph/Gastric Varic. .; Transplant liver recipient  donor (10/26/10); Laparoscopic salpingo-oophorectomy; Neck surgery (); and cataract iol, rt/lt (Right, 2/18/15).  Her family history includes Cerebrovascular Disease in an other family member; Hepatitis in an other family member.  She reports that she has never smoked. She has never used smokeless tobacco. She reports that she does not drink alcohol or use drugs.      ALLERGIES: She is allergic to aspirin; clarithromycin; contrast dye; and penicillins.    CURRENT MEDICATIONS: She has a current medication list which includes the following prescription(s): acetaminophen, albuterol, albuterol, alendronate, amlodipine, aspirin not prescribed, calcitriol, calcium carbonate 600 mg-vitamin d 400 units,  camphor-menthol-methyl sal, carboxymethylcellulose, vitamin d3, cyclosporine modified, dextromethorphan, dimethicone, knee brace/flex stays large, medical compression socks, lidocaine, lidocaine, lisinopril, melatonin, menthol (topical analgesic), multivitamin w/minerals, mycophenolate, naltrexone, olopatadine, omeprazole, order for dme, order for dme, order for dme, order for dme, order for dme, phenol-menthol, psyllium, senna-docusate, and statin not prescribed, and the following Facility-Administered Medications: albuterol and apraclonidine.     REVIEW OF SYSTEMS: 9 point review of systems is negative except as noted above.    EXAM:  /74   Pulse 73   Temp 98  F (36.7  C) (Oral)   Resp 16   Wt 102.8 kg (226 lb 9.6 oz)   LMP  (LMP Unknown)   SpO2 95%   BMI 43.91 kg/m    CONSTITUTIONIAL: healthy, alert and no distress  HEAD: Normocephalic. No masses, lesions, tenderness or abnormalities  NEUROLOGIC: Normal muscle tone and strength, reflexes normal, sensation grossly normal.  PSYCHIATRIC: affect normal/bright and mentation appears normal.    MUSCULOSKELETAL: Left foot/shin pain    Gait: right spastic hemiplegia, but walking with cane on right side    Negative straight leg raise    Knee:normal appearance, normal on palpation, no swelling, full range of motion  Lower leg:normal appearance, anterior distal tibial pain to palpation    ANKLE  Inspection:Swelling:none   Non-tender:lateral malleolus, medial malleolus, achilles tendon, mid-fibula shaft, proximal fibula, distal tibia, 5th metatarsal base  Range of Motion:dorsiflexion:  full, plantarflexion:  full, inversion:  full, eversion:  full  Strength:dorsiflexion: Left foot  5/5, plantarflexion:  5/5, inversion: 5/5, eversion:5/5  Special tests:negative anterior drawer  Stance: no significant arch flatening with weight bearing    FOOT  foot exam : Inspection Palpation:   Swelling: no swelling  Tender: along posterior tibialis tendon at medial malleolus and  navicular, plantar fascia   Non-tender:proximal 5th metatarsal, cuboid  Range of Motion:flexion of toes:  full, extension of toes  painful    ASSESSMENT/PLAN:  Flro was seen today for pain.    Diagnoses and all orders for this visit:  Left foot pain  -     MR Foot Left w/o Contrast; Future  -     MR Ankle Left w/o Contrast; Future    Discussed with patient that this pain is likely related to her previous diagnosis of tibialis posterior tendinopathy - and that it will likely need treatment with PT and bracing as was recommended in the past. However, given long standing history and no previous MRI done - will obtain an MRI to ensure there is no other source of patient's chronic pain.     Christel Jung DO  PGY3 Family Medicine Resident  Pager: [phone]       Patient seen, evaluated and discussed with the resident. I have verified the content of the note, which accurately reflects my assessment of the patient and the plan of care.  MRI of the foot was consistent with sesamoiditis.  Left ankle MRI with significant degenerative changes of the middle subtalar joint and anterior aspect of the posterior subtalar joint.   Supervising Physician:  Yvette Domingo MD

## 2019-12-17 NOTE — PROGRESS NOTES
Gait: right spastic hemiplegia, but walking with cane on right side    Negative straight leg raise    Knee:normal appearance, normal on palpation, no swelling, full range of motion  Lower leg:normal appearance    ANKLE  Inspection:Swelling:none   Non-tender:lateral malleolus, medial malleolus, achilles tendon, mid-fibula shaft, proximal fibula, distal tibia, 5th metatarsal base  Range of Motion:dorsiflexion:  full, plantarflexion:  full, inversion:  full, eversion:  full  Strength:dorsiflexion: Left foot  5/5, plantarflexion:  5/5, inversion: 5/5, eversion:5/5  Special tests:negative anterior drawer  Stance: no significant arch flatening with weight bearing    FOOT  foot exam : Inspection Palpation:   Swelling: no swelling  Tender: along posterior tibialis tendon at medial malleolus and navicular, plantar fascia   Non-tender:proximal 5th metatarsal, cuboid  Range of Motion:flexion of toes:  full, extension of toes  painful

## 2019-12-17 NOTE — PATIENT INSTRUCTIONS
Patient Education     Plantar Fasciitis  Plantar fasciitis is a painful swelling of the plantar fascia. The plantar fascia is a thick, fibrous layer of tissue that covers the bones on the bottom of your foot. It supports the foot bones in an arched position.  Plantar fasciitis can happen gradually or suddenly. It usually affects one foot at a time. Heel pain can be sharp, like a knife sticking into the bottom of your foot. You may feel pain after exercising, long-distance jogging, stair climbing, long periods of standing, or after standing up.  Risk factors include: non-active lifestyle, arthritis, diabetes, obesity or recent weight gain, flat foot, high arch. Wearing high heels, loose shoes, or shoes with poor arch support for long periods of time adds to the risk. This problem is commonly found in runners and dancers. It also found in people who stand on hard surfaces for long periods of time.  Foot pain from this condition is usually worse in the morning. But it often improves with walking. By the end of the day there may be a dull aching. Treatment requires short-term rest and controlling swelling. It may take up to 9 months before all symptoms go away. Rarely, a steroid injection into the foot, or surgery, may be needed.  Home care    If you are overweight, lose weight to help healing.    Choose supportive shoes with good arch support and shock absorbency. Replace athletic shoes when they become worn out. Don t walk or run barefoot.    Premade or custom-fitted shoe inserts may be helpful. Inserts made of silicone seem to be the most effective. Custom-made inserts can be provided by foot specialist, physical therapist, or orthopedist.    Premade or custom-made night splints keep the heel stretched out while you sleep. They may prevent morning pain.    Limit activities that stress the feet: jogging, prolonged standing or walking, contact sports, etc.    First thing in the morning and before sports, stretch the  bottom of your feet. Gently flex your ankle so the toes move toward your knee.    Icing may help control heel pain. Apply an ice pack to the heel for 10 to 20 minutes as a preventive. Or ice your heel after a severe flare-up of symptoms. You may repeat this every 1 to 2 hours as needed.    Follow-up care  Follow up with your healthcare provider, or as advised.  Call for an appointment if pain worsens or there is no relief after a few weeks of home treatment. Shoe inserts, a night splint, or a special boot may be required.  If X-rays were taken, you will be told of any new findings that may affect your care.  When to seek medical advice  Call your healthcare provider right away if any of these occur:    Foot swelling    Redness or warmth with increasing pain  Date Last Reviewed: 5/1/2018 2000-2018 [email or web address]. [address] [place] [address]. All rights reserved. This information is not intended as a substitute for professional medical care. Always follow your healthcare professional's instructions.    EXERCISES      +++++++++++++++++++++++++++++++++++++++++++++++    +++++++++++++++++++++++++++++++++++++++++++++++++  FROZEN WATER BOTTLE     +++++++++++++++++++++++++++++++++++++++++++++++++    +++++++++++++++++++++++++++++++++++++++++++++++++           Here is the plan from today's visit    1. Perform the exercises above every day, ideally three times a day  2. Tylenol as needed for pain  3. Follow-up in 1 month  4. Walk with cane on the left side  5. Obtain MRI of [facility] foot[facility]le [place] [facility] [address] [place], [place]) - follow-up after MRI    Please call or return to clinic if your symptoms don't go away.    Follow up plan  Please make a clinic appointment for follow up with me (GHISLAINE MEREDITH) in 1  month for recheck.    Thank you for coming to [facility] today.  Lab Testing:  **If you had lab testing today and your results are reassuring  or normal they will be mailed to you or sent through [employer] within 7 days.   **If the lab tests need quick action we will call you with the results.  The phone number we will call with results is # [phone] (home) . If this is not the best number please call our clinic and change the number.  Medication Refills:  If you need any refills please call your pharmacy and they will contact us.   If you need to  your refill at a new pharmacy, please contact the new pharmacy directly. The new pharmacy will help you get your medications transferred faster.   Scheduling:  If you have any concerns about today's visit or wish to schedule another appointment please call our office during normal business hours [phone] (8-5:00 M-F)  If a referral was made to a [facility] and you don't get a call from central scheduling please call [phone].  If a Mammogram was ordered for you at [facility] call [phone] to schedule or change your appointment.  If you had an XRay/CT/Ultrasound/MRI ordered the number is [phone] to schedule or change your radiology appointment.   Medical Concerns:  If you have urgent medical concerns please call [phone] at any time of the day.    Yvette Domingo MD

## 2019-12-19 ENCOUNTER — DOCUMENTATION ONLY (OUTPATIENT)
Dept: FAMILY MEDICINE | Facility: CLINIC | Age: 54
End: 2019-12-19

## 2019-12-19 NOTE — PROGRESS NOTES
"When opening a documentation only encounter, be sure to enter in \"Chief Complaint\" Forms and in \" Comments\" Title of form, description if needed.    Flor is a 54 year old  female  Form received via: Fax  Form now resides in: Provider Ready    Jessika Waterman CMA                  "

## 2019-12-20 ENCOUNTER — DOCUMENTATION ONLY (OUTPATIENT)
Dept: FAMILY MEDICINE | Facility: CLINIC | Age: 54
End: 2019-12-20

## 2019-12-20 NOTE — PROGRESS NOTES
[facility] [[email or web address]] Family Medicine phone call message - order or referral request from patient:     Order or referral being requested: Requested order for PT two times per week for one week and one time a week for one week.  For strength, balance and fall prevention and left shoulder.    Additional Details: Would like a call back today due to staffing issues and wanting to make sure this patient is seen soon.    Referral only -Specialty  Location     OK to leave a message on voice mail? Yes    Primary language: Lao      needed? Yes    Call taken on December 20, 2019 at 2:05 PM by Jany Song    Order request route to [facility] TRIAGE   Referrals Route to [facility] (Green/[place]/Purple) CARE COORDINATOR

## 2019-12-20 NOTE — PROGRESS NOTES
[facility] now requests orders and shares plan of care/discharge summaries for some patients through [employer].  Please REPLY TO THIS MESSAGE in order to give authorization for orders.  This is considered a verbal order, you will still receive a faxed copy of orders for signature.  Thank you for your assistance in improving collaboration for our patients.    ORDERS REQUESTED  PT lu completed 12/19/19.  Continue PT 1w1, 2w1, 1w1 for strengthening, balance training, L shoulder pain, establishing ongoing exercise program, falls risk assessment and prevention plan, monitor and tx pain, monitor for s/sxs of depression, to achieve the following goals.  GOALS TO BE MET BY 1/4/20  1.  Pt will demo understanding of recs made to decrease her risk of falling.  2.  Pt will be indep ambulating 500 ft with SEC for exercise, rating pain 5/10 or less.  3.  Pt will be improve her Tinetti score to at least 24/28 to indicate a move from moderate to low risk of falling.  4.  Pt will be able to remove/replace item weighing 2 lbs or less from bottom shelf of upper cupboard with L hand, rating pain 5/10 or less.  5.  Pt will be indep with home ex program for L shldr, LE and core strength, balance and a progressive walking program, to allow her to continue to make gains in strength, balance, activity tolerance once her PT visits have ended.    Philip Avila PT  [phone]  [email or web address]

## 2019-12-20 NOTE — PROGRESS NOTES
Returned call to home care PT to give verbal orders per protocol as requested. PT verbalized understanding.    Elisabet Olson RN

## 2019-12-23 NOTE — PROGRESS NOTES
Form has been completed by provider.     Form sent out via: Fax to [facility] at Fax Number: [phone]  Patient informed: N/A  Output date: December 23, 2019    Rosita Mcdonnell CMA

## 2019-12-26 ENCOUNTER — DOCUMENTATION ONLY (OUTPATIENT)
Dept: FAMILY MEDICINE | Facility: CLINIC | Age: 54
End: 2019-12-26

## 2019-12-26 ENCOUNTER — ANCILLARY PROCEDURE (OUTPATIENT)
Dept: MRI IMAGING | Facility: CLINIC | Age: 54
End: 2019-12-26
Attending: FAMILY MEDICINE
Payer: MEDICARE

## 2019-12-26 DIAGNOSIS — M79.672 LEFT FOOT PAIN: ICD-10-CM

## 2019-12-26 NOTE — PROGRESS NOTES
"When opening a documentation only encounter, be sure to enter in \"Chief Complaint\" Forms and in \" Comments\" Title of form, description if needed.    Flor is a 54 year old  female  Form received via: Fax  Form now resides in: Provider Ready    Rosita Mcdonnell CMA          Form has been completed by provider.     Form sent out via: Fax to [facility]- Order for PT 2 week 1, 1 week 1 (begin 12-22-19 end 1-4-20) at Fax Number: [phone]  Patient informed: NA   Output date: January 6, 2020    Jessika Waterman CMA      **Please close the encounter**          "

## 2019-12-26 NOTE — PROGRESS NOTES
"When opening a documentation only encounter, be sure to enter in \"Chief Complaint\" Forms and in \" Comments\" Title of form, description if needed.    Flor is a 54 year old  female  Form received via: Fax  Form now resides in: Provider Ready    Rosita Mcdonnell CMA          Form has been completed by provider.     Form sent out via: Fax to [facility]- Home health certification and plan of care (12-9-19 thru 2-6-20) at Fax Number: [phone]  Patient informed: NA   Output date: January 6, 2020    Jessika Waterman CMA      **Please close the encounter**          "

## 2019-12-30 ENCOUNTER — DOCUMENTATION ONLY (OUTPATIENT)
Dept: FAMILY MEDICINE | Facility: CLINIC | Age: 54
End: 2019-12-30

## 2019-12-30 NOTE — PROGRESS NOTES
[facility] now requests orders and shares plan of care/discharge summaries for some patients through [employer].  Please REPLY TO THIS MESSAGE OR ROUTE BACK TO THE AUTHOR in order to give authorization for orders when needed.  This is considered a verbal order, you will still receive a faxed copy of orders for signature.  Thank you for your assistance in improving collaboration for our patients.    FYI pt reports she fell while sitting edge of bed about 3 days ago and slid off while reaching for something. Pt reports it was Friday just before 12 am. Pt reports she got up on her own. No injury reported. Pt plans to sleep on couch from now on d/t she reports many falls from her bed.    Jacquelin Valero OTR/L  [phone]  [email or web address]

## 2019-12-31 ENCOUNTER — OFFICE VISIT (OUTPATIENT)
Dept: FAMILY MEDICINE | Facility: CLINIC | Age: 54
End: 2019-12-31
Payer: MEDICARE

## 2019-12-31 VITALS
WEIGHT: 226.4 LBS | TEMPERATURE: 98.5 F | DIASTOLIC BLOOD PRESSURE: 80 MMHG | SYSTOLIC BLOOD PRESSURE: 117 MMHG | RESPIRATION RATE: 16 BRPM | HEART RATE: 79 BPM | BODY MASS INDEX: 43.87 KG/M2 | OXYGEN SATURATION: 98 %

## 2019-12-31 DIAGNOSIS — M19.079 ANKLE ARTHRITIS: ICD-10-CM

## 2019-12-31 DIAGNOSIS — Z91.81 AT RISK FOR FALLS: Primary | ICD-10-CM

## 2019-12-31 DIAGNOSIS — M25.80 SESAMOIDITIS: ICD-10-CM

## 2019-12-31 RX ORDER — CAPSAICIN 0.025 %
1 CREAM (GRAM) TOPICAL 3 TIMES DAILY PRN
Qty: 120 G | Refills: 1 | Status: SHIPPED | OUTPATIENT
Start: 2019-12-31 | End: 2020-06-03

## 2019-12-31 NOTE — PATIENT INSTRUCTIONS
Here is the plan from today's visit    1. At risk for falls  - Cane DME  - Home Care Nursing Referral ([place])    2. Ankle arthritis  - capsaicin (ZOSTRIX) 0.025 % external cream; Apply 1 g topically 3 times daily as needed (pain)  Dispense: 120 g; Refill: 1  - Home Care Nursing Referral ([place])    Please call or return to clinic if your symptoms don't go away.    Follow up plan  Please make a clinic appointment for follow up with me (YVETTE MEREDITH) in 2-3  months for follow up visit.    Thank you for coming to [facility] today.  Lab Testing:  **If you had lab testing today and your results are reassuring or normal they will be mailed to you or sent through [employer] within 7 days.   **If the lab tests need quick action we will call you with the results.  The phone number we will call with results is # [phone] (home) . If this is not the best number please call our clinic and change the number.  Medication Refills:  If you need any refills please call your pharmacy and they will contact us.   If you need to  your refill at a new pharmacy, please contact the new pharmacy directly. The new pharmacy will help you get your medications transferred faster.   Scheduling:  If you have any concerns about today's visit or wish to schedule another appointment please call our office during normal business hours [phone] (8-5:00 M-F)  If a referral was made to a [facility] and you don't get a call from central scheduling please call [phone].  If a Mammogram was ordered for you at [facility] call [phone] to schedule or change your appointment.  If you had an XRay/CT/Ultrasound/MRI ordered the number is [phone] to schedule or change your radiology appointment.   Medical Concerns:  If you have urgent medical concerns please call [phone] at any time of the day.    Yvette Meredith MD      [facility]  [phone]  Referral auto  sent to [facility], they will review and contact patient/family directly to set up.

## 2019-12-31 NOTE — NURSING NOTE
Due to patient being non-English speaking/uses sign language, an  was used for this visit. Only for face-to-face interpretation by an external agency, date and length of interpretation can be found on the scanned worksheet.     name: lizabeth Fields  Agency: [employer]  Language: Turkish   Telephone number: [phone]  Type of interpretation: Face-to-face, spoken

## 2019-12-31 NOTE — NURSING NOTE
Due to patient being non-English speaking/uses sign language, an  was used for this visit. Only for face-to-face interpretation by an external agency, date and length of interpretation can be found on the scanned worksheet.     name: Sommer  Agency: [employer]  Language: English   Telephone number: [phone]  Type of interpretation: Telemedicine, spoken

## 2019-12-31 NOTE — PROGRESS NOTES
HPI     Flor Navarro is a 54 year old year old female with a history of  has a past medical history of Anemia in CKD (chronic kidney disease), Cataract, CKD (chronic kidney disease) stage 3, GFR 30-59 ml/min (H), Hepatitis C, High risk medication use, Hypertension, renal, Immunosuppressed status (H), Liver replaced by transplant (H) (2010), Osteoporosis, Recurrent pregnancy loss without current pregnancy, Stroke, hemorrhagic (H) (2008), Syncope, and Unspecified viral hepatitis C without hepatic coma. who presents for Knee Pain    Pt is following up from her recent visit for foot pain at which MRI revealed significant degenerative changes of the middle subtalar joint and anterior aspect of the posterior subtalar joint, as well as sesamoiditis. Since then her pain has not changed and she has not developed new symptoms. She wears relatively flat shoes, but has not done physical therapy for her foot, though she is doing home PT for her shoulder currently. She does report some swelling on her ankles though. Able to walk, but does have pain and instability because of this.    A [employer]  was used for  this visit.    +++++++    Problem, Medication and Allergy Lists were reviewed and updated if needed.    Patient is an established patient of this clinic.         Review of Systems:   Review of Systems  A 6 point review of systems was performed and was negative except as noted above.         Physical Exam:   /80   Pulse 79   Temp 98.5  F (36.9  C) (Oral)   Resp 16   Wt 102.7 kg (226 lb 6.4 oz)   LMP  (LMP Unknown)   SpO2 98%   BMI 43.87 kg/m    Body mass index is 43.87 kg/m .    Vitals were reviewed and were normal     Physical Exam  Constitutional:       General: She is not in acute distress.     Appearance: She is well-developed.   HENT:      Head: Normocephalic and atraumatic.   Eyes:      General: No scleral icterus.     Conjunctiva/sclera: Conjunctivae normal.   Cardiovascular:      Rate and  Rhythm: Normal rate.   Pulmonary:      Effort: Pulmonary effort is normal. No respiratory distress.   Skin:     General: Skin is warm and dry.      Findings: No erythema.   Neurological:      Mental Status: She is alert and oriented to person, place, and time.       ANKLE  Inspection:Swelling:lateral    Tender:lateral malleolus  Non-tender:ATFL, CFL, PTFL, deltoid ligament, anterior tib-fib ligament  Range of Motion:dorsiflexion:  full, plantarflexion:  full  Strength:dorsiflexion:  5/5, plantarflexion:  5/5  Special tests:negative anterior drawer, negative talar tilt    FOOT  Swelling: no swelling  Tender: over plantar aspect of base of first metatarsal  Range of Motion:flexion of toes:  full, extension of toes  full        Results:   No testing ordered today    Assessment and Plan        Flor was seen today for knee pain.    Diagnoses and all orders for this visit:    At risk for falls  Ankle arthritis  Given ankle arthritis she is at risk from falls and would benefit from a new cane (as hers is broken) and home health physical therapy for her ankle arthritis. She is already receiving home PT for her shoulder and will place new referral for home care to add on PT for her ankle as well. Will message SW to help get pt set up with new cane.   -     Cane DME  -     Home Care Nursing Referral ([place])  -     capsaicin (ZOSTRIX) 0.025 % external cream; Apply 1 g topically 3 times daily as needed (pain)    Sesamoiditis  - Recommended wearing flat-soled shoes as much as possible     There are no discontinued medications.    Options for treatment and follow-up care were reviewed with the patient. Flor Navarro engaged in the decision making process and verbalized understanding of the options discussed and agreed with the final plan.    Holland Latif MD       Patient seen, evaluated and discussed with the resident. I have verified the content of the note, which accurately reflects my assessment of the patient and the  plan of care.   Supervising Physician:  Yvette Domingo MD

## 2020-01-03 ENCOUNTER — DOCUMENTATION ONLY (OUTPATIENT)
Dept: FAMILY MEDICINE | Facility: CLINIC | Age: 55
End: 2020-01-03

## 2020-01-03 NOTE — PROGRESS NOTES
"When opening a documentation only encounter, be sure to enter in \"Chief Complaint\" Forms and in \" Comments\" Title of form, description if needed.    Flor is a 54 year old  female  Form received via: Fax  Form now resides in: Provider Ready    Jessika Waterman CMA          Form has been completed by provider.     Form sent out via: Fax to [facility] and [facility] health regulation requires 01/03/2020 at Fax Number: [phone]  Patient informed: NA   Output date: January 6, 2020    Jessika Waterman CMA      **Please close the encounter**          "

## 2020-01-06 ENCOUNTER — TELEPHONE (OUTPATIENT)
Dept: FAMILY MEDICINE | Facility: CLINIC | Age: 55
End: 2020-01-06

## 2020-01-06 NOTE — TELEPHONE ENCOUNTER
Returned call to home care PT, unable to reach. Left VM with verbal orders per protocol as requested. Left callback number for questions.    Elisabet Olson RN

## 2020-01-06 NOTE — TELEPHONE ENCOUNTER
[facility] phone call message - order or referral request from patient:     Order or referral being requested: Requested order Requesting for a home physical therapy for 1 time a week for 3 weeks for pain management,gaite safety and strengthening.     Additional Details: None    Referral only -Specialty     OK to leave a message on voice mail? Yes    Primary language: Slovak      needed? Yes    Call taken on January 6, 2020 at 8:04 AM by Pallavi [place]    Order request route to [facility] TRIAGE   Referrals Route to [facility] (Green/[place]/Purple) CARE COORDINATOR

## 2020-01-09 ENCOUNTER — DOCUMENTATION ONLY (OUTPATIENT)
Dept: FAMILY MEDICINE | Facility: CLINIC | Age: 55
End: 2020-01-09

## 2020-01-09 ENCOUNTER — PATIENT OUTREACH (OUTPATIENT)
Dept: FAMILY MEDICINE | Facility: CLINIC | Age: 55
End: 2020-01-09

## 2020-01-09 ENCOUNTER — OFFICE VISIT (OUTPATIENT)
Dept: FAMILY MEDICINE | Facility: CLINIC | Age: 55
End: 2020-01-09
Payer: COMMERCIAL

## 2020-01-09 VITALS
DIASTOLIC BLOOD PRESSURE: 87 MMHG | TEMPERATURE: 97.9 F | RESPIRATION RATE: 16 BRPM | OXYGEN SATURATION: 91 % | SYSTOLIC BLOOD PRESSURE: 128 MMHG | HEART RATE: 69 BPM

## 2020-01-09 DIAGNOSIS — E66.01 OBESITY, CLASS III, BMI 40-49.9 (MORBID OBESITY) (H): Primary | ICD-10-CM

## 2020-01-09 ASSESSMENT — PAIN SCALES - GENERAL: PAINLEVEL: SEVERE PAIN (6)

## 2020-01-09 NOTE — PROGRESS NOTES
HPI   Flor Navarro is a 54 year old  who presents for   Chief Complaint   Patient presents with     Follow Up     Patient is here for follow up and she said she fall down five days ago      Preventative  Has an upcoming dental appointment.    Weight Loss  Has discussed goal of 20lbs weight loss with Latonia. Has cut down on sweets and been eating more vegetables. Needs medical letter for [employer] in order to use the sauna, steam room, and pool-- shows me address on her phone: [address]. Her son told her that he would provide transportation to and from the gym.    Housing  Reports that [employer] told her she would have an increase in benefits to help pay for rent.    Mood  Reports she had a nightmare yesterday morning and felt terrible.      An Yoruba  was used during this visit.    +++++++    Problem, Medication and Allergy Lists were reviewed and updated if needed.    Patient is an established patient of this clinic.         Review of Systems:   Review of Systems    Positive for: dizziness, lightheadedness    See HPI for additional sxs.    This document serves as a record of the services and decisions personally performed and made by Karely Sierra MD. It was created on his/her behalf by Cahrli Valdez, a trained medical scribe. The creation of this document is based the provider's statements to the medical scribe.  Nelson Valdez 9:43 AM, January 9, 2020       Physical Exam:     Vitals:    01/09/20 0920   BP: 128/87   Pulse: 69   Resp: 16   Temp: 97.9  F (36.6  C)   TempSrc: Oral   SpO2: 91%     There is no height or weight on file to calculate BMI.  Vitals were reviewed and were normal     Wt Readings from Last 10 Encounters:   12/31/19 102.7 kg (226 lb 6.4 oz)   12/17/19 102.8 kg (226 lb 9.6 oz)   12/05/19 102.2 kg (225 lb 6.4 oz)   11/18/19 102.3 kg (225 lb 9.6 oz)   08/08/19 98.2 kg (216 lb 9.6 oz)   07/17/19 101.2 kg (223 lb)   06/20/19 100 kg (220 lb 6.4  oz)   06/05/19 99.7 kg (219 lb 12.8 oz)   05/28/19 100.2 kg (221 lb)   04/30/19 102.2 kg (225 lb 6.4 oz)   Gained around 10 lbs.    Physical Exam    BMI= There is no height or weight on file to calculate BMI.   GENERAL: healthy, alert and no distress  RESP: lungs clear to auscultation - no rales, no rhonchi, no wheezes  CV: regular rates and rhythm, normal S1 S2, no S3 or S4 and no murmur, no click or rub -  SKIN:  Linear, 3 cm burn on the right hand: no drainage, no heat, and no erythema.  PSYCH: affect- normal    Results:   No testing ordered today    Assessment and Plan   Flor was seen today for follow up.    Diagnoses and all orders for this visit:    Obesity, Class III, BMI 40-49.9 (morbid obesity) (H)      There are no discontinued medications.    Patient Instructions   Weight Loss  1. Follow up at the weight management clinic.  2. Provided letter for [employer].  - Continue exercising.  - Eat and drink before you go to [employer].   3. Continue eating vegetables and cutting down on sugary foods.    Options for treatment and follow-up care were reviewed with the patient. Flor Navarro  engaged in the decision making process and verbalized understanding of the options discussed and agreed with the final plan.    The information in this document, created by the medical scribe for me, accurately reflects the services I personally performed and the decisions made by me. I have reviewed and approved this document for accuracy prior to leaving the patient care area.    Karely Sierra MD  9:43 AM, 01/09/20  End Time: 9:55 AM

## 2020-01-09 NOTE — PROGRESS NOTES
SW saw pt to follow-up on DME orders. During a sports med appt pt reported her cane was broken. Today she reports that her daughter-in-law got her a new cane, so she is okay for now. We are also working on getting her a walker through [employer], which is in process currently. Pt has been working with Deborah ([place] care coordination through [facility]) to get the CADI waiver started again. Pt verbally okayed KENA[facility] to reach out to Deborah to coordinate about the waiver    JACKIE Thomas, [facility]   Direct Number: [phone]

## 2020-01-09 NOTE — PATIENT INSTRUCTIONS
Weight Loss  1. Follow up at the weight management clinic.  2. Provided letter for [employer].  - Continue exercising.  - Eat and drink before you go to [employer].   3. Continue eating vegetables and cutting down on sugary foods.

## 2020-01-09 NOTE — PROGRESS NOTES
"When opening a documentation only encounter, be sure to enter in \"Chief Complaint\" Forms and in \" Comments\" Title of form, description if needed.    Flor is a 54 year old  female  Form received via: Fax  Form now resides in: Provider Ready    Jessika Waterman CMA          Form has been completed by provider.     Form sent out via: Fax to [facility]/ safety training equiment 01/09/2020 at Fax Number: [phone]  Patient informed: NA   Output date: January 10, 2020    Jessika Waterman CMA      **Please close the encounter**          "

## 2020-01-09 NOTE — LETTER
2020    [employer]  To [address] [place] [address]      Flor Navarro  [address]   [place] [facility] [place] [address]  : 1965     Dear Whom It May Concern:  Regarding Ms. Flor Navarro     I am writing to confirm that Flor is my patient. She is able to exercise using the equipment, and use the pool, sauna or steam room as she prefers.      If you have any questions, please do not hesitate to contact me, or our Team Nurse, at clinic.     I thank you in advance for your help.         Respectfully,         Karely Sierra MD

## 2020-01-14 ASSESSMENT — PATIENT HEALTH QUESTIONNAIRE - PHQ9: SUM OF ALL RESPONSES TO PHQ QUESTIONS 1-9: 10

## 2020-01-21 ENCOUNTER — DOCUMENTATION ONLY (OUTPATIENT)
Dept: FAMILY MEDICINE | Facility: CLINIC | Age: 55
End: 2020-01-21

## 2020-01-21 NOTE — PROGRESS NOTES
"When opening a documentation only encounter, be sure to enter in \"Chief Complaint\" Forms and in \" Comments\" Title of form, description if needed.    Flor is a 54 year old  female  Form received via: Fax  Form now resides in: Provider Ready    Jessika Waterman CMA          Form has been completed by provider.     Form sent out via: Fax to [facility](transcribed from prescribption label on the bottle in home) 1/10/2020 at Fax Number: [phone]  Patient informed: NA   Output date: January 21, 2020    Jessika Waterman CMA      **Please close the encounter**          "

## 2020-01-23 ENCOUNTER — TELEPHONE (OUTPATIENT)
Dept: FAMILY MEDICINE | Facility: CLINIC | Age: 55
End: 2020-01-23

## 2020-01-23 NOTE — TELEPHONE ENCOUNTER
RN spoke with PCP and she does not need antibiotics for dental work. Letter sent over for them.  Elisabet Olson RN

## 2020-01-23 NOTE — TELEPHONE ENCOUNTER
[facility] phone call message- medication clarification/question:    Full Medication Name: Antiobitics    Question/Clarification needed: Patient is at the Dentist and patient had liver transplant in 2011. Checking if patient can have antibiotic prior 1 hour ahead of dental appointment. If yes, requesting to prescribed the medication to be faxed over now [phone] otherwise to sent it to patient's home address. The medication has to be prescribed by Dr. Sierra. Transferredp the call to RN    Pharmacy confirmed as       Please leave ONLY preferred pharmacy    OK to leave a message on voice mail? Yes    Advised patient that RN would call back within 3 hours, unless emergent.    Primary language: Mohawk      needed? Yes    Call taken on January 23, 2020 at 11:37 AM by Pallavi Lazo to [facility]

## 2020-01-23 NOTE — TELEPHONE ENCOUNTER
[facility] phone call message - order or referral request from patient:     Order or referral being requested: Requested order additional PT needed, per [facility]    Additional Details: 1 x week for 2 weeks    OK to leave a message on voice mail? Yes    Primary language: Yakut      needed? Yes    Call taken on January 23, 2020 at 8:25 AM by Shahla Shepherd    Order request route to [facility] TRIAGE   Referrals Route to [facility] (Green/[place]/Purple) CARE COORDINATOR

## 2020-01-23 NOTE — LETTER
January 23, 2020      Flor Navarro  [address]   [place] [facility] [place] [address]        To whom it may concern,    Flor does not need antibiotic prophylaxis prior to her dental work.     Sincerely,        Karely Sierra MD

## 2020-01-27 ENCOUNTER — DOCUMENTATION ONLY (OUTPATIENT)
Dept: FAMILY MEDICINE | Facility: CLINIC | Age: 55
End: 2020-01-27

## 2020-01-27 NOTE — PROGRESS NOTES
"When opening a documentation only encounter, be sure to enter in \"Chief Complaint\" Forms and in \" Comments\" Title of form, description if needed.    Flor is a 54 year old  female  Form received via: Fax  Form now resides in: Provider Ready    Jessika Waterman CMA          Form has been completed by provider.     Form sent out via: Fax to [facility]/Medical consent for dental procedures 1/23/2020 at Fax Number:[phone]  Patient informed: NA   Output date: January 27, 2020    Jessika Waterman CMA      **Please close the encounter**        "

## 2020-01-30 ENCOUNTER — DOCUMENTATION ONLY (OUTPATIENT)
Dept: FAMILY MEDICINE | Facility: CLINIC | Age: 55
End: 2020-01-30

## 2020-01-30 NOTE — PROGRESS NOTES
"When opening a documentation only encounter, be sure to enter in \"Chief Complaint\" Forms and in \" Comments\" Title of form, description if needed.    Flor is a 54 year old  female  Form received via: Fax  Form now resides in: Provider Edward Dangelo CMA                  "

## 2020-01-31 ENCOUNTER — DOCUMENTATION ONLY (OUTPATIENT)
Dept: FAMILY MEDICINE | Facility: CLINIC | Age: 55
End: 2020-01-31

## 2020-01-31 NOTE — PROGRESS NOTES
Form has been completed by provider.     Form sent out via: Fax to [facility] 01/27/20 at Fax Number: [phone]  Patient informed: NA   Output date: January 31, 2020    Jessika Waterman CMA      **Please close the encounter**

## 2020-01-31 NOTE — PROGRESS NOTES
"When opening a documentation only encounter, be sure to enter in \"Chief Complaint\" Forms and in \" Comments\" Title of form, description if needed.    Flor is a 54 year old  female  Form received via: Fax  Form now resides in: Provider Ready    Jessika Waterman CMA        Form has been completed by provider.     Form sent out via: Fax to [facility] 1/30/2020 at Fax Number: [phone]  Patient informed: NA   Output date: January 31, 2020    Jessika Waterman CMA      **Please close the encounter**            "

## 2020-02-06 ENCOUNTER — DOCUMENTATION ONLY (OUTPATIENT)
Dept: FAMILY MEDICINE | Facility: CLINIC | Age: 55
End: 2020-02-06

## 2020-02-06 NOTE — PROGRESS NOTES
"When opening a documentation only encounter, be sure to enter in \"Chief Complaint\" Forms and in \" Comments\" Title of form, description if needed.    Flor is a 54 year old  female  Form received via: Fax  Form now resides in: Provider Edward Mcdonnell CMA              Form has been completed by provider.     Form sent out via: Fax to [employer]- Prescription for corespun mild black x-stat large at Fax Number: [phone]  Patient informed: NA   Output date: February 11, 2020    Jessika Waterman CMA      **Please close the encounter**      "

## 2020-02-07 ENCOUNTER — MEDICAL CORRESPONDENCE (OUTPATIENT)
Dept: HEALTH INFORMATION MANAGEMENT | Facility: CLINIC | Age: 55
End: 2020-02-07

## 2020-02-07 ENCOUNTER — DOCUMENTATION ONLY (OUTPATIENT)
Dept: FAMILY MEDICINE | Facility: CLINIC | Age: 55
End: 2020-02-07

## 2020-02-07 NOTE — PROGRESS NOTES
"When opening a documentation only encounter, be sure to enter in \"Chief Complaint\" Forms and in \" Comments\" Title of form, description if needed.    Flor is a 54 year old  female  Form received via: Fax  Form now resides in: Provider Edward Dangelo CMA            Form has been completed by provider.     Form sent out via: Fax to [facility]: Orders  2/7/20 at Fax Number: [phone]  Patient informed: NA   Output date: February 11, 2020    Jessika Waterman CMA      **Please close the encounter**        "

## 2020-02-12 DIAGNOSIS — I12.9 HYPERTENSION, RENAL: ICD-10-CM

## 2020-02-14 ENCOUNTER — TELEPHONE (OUTPATIENT)
Dept: GASTROENTEROLOGY | Facility: CLINIC | Age: 55
End: 2020-02-14

## 2020-02-14 RX ORDER — LISINOPRIL 10 MG/1
10 TABLET ORAL DAILY
Qty: 30 TABLET | Refills: 1 | Status: SHIPPED | OUTPATIENT
Start: 2020-02-14 | End: 2020-04-17

## 2020-02-17 ENCOUNTER — OFFICE VISIT (OUTPATIENT)
Dept: GASTROENTEROLOGY | Facility: CLINIC | Age: 55
End: 2020-02-17
Attending: INTERNAL MEDICINE
Payer: MEDICARE

## 2020-02-17 ENCOUNTER — TELEPHONE (OUTPATIENT)
Dept: FAMILY MEDICINE | Facility: CLINIC | Age: 55
End: 2020-02-17

## 2020-02-17 VITALS
BODY MASS INDEX: 43.46 KG/M2 | OXYGEN SATURATION: 95 % | WEIGHT: 224.3 LBS | DIASTOLIC BLOOD PRESSURE: 86 MMHG | HEART RATE: 71 BPM | TEMPERATURE: 97.9 F | SYSTOLIC BLOOD PRESSURE: 133 MMHG

## 2020-02-17 DIAGNOSIS — Z13.220 LIPID SCREENING: ICD-10-CM

## 2020-02-17 DIAGNOSIS — Z94.4 LIVER REPLACED BY TRANSPLANT (H): ICD-10-CM

## 2020-02-17 DIAGNOSIS — Z94.4 LIVER REPLACED BY TRANSPLANT (H): Primary | ICD-10-CM

## 2020-02-17 LAB
ALBUMIN SERPL-MCNC: 3.6 G/DL (ref 3.4–5)
ALBUMIN UR-MCNC: 30 MG/DL
ALP SERPL-CCNC: 98 U/L (ref 40–150)
ALT SERPL W P-5'-P-CCNC: 30 U/L (ref 0–50)
ANION GAP SERPL CALCULATED.3IONS-SCNC: 9 MMOL/L (ref 3–14)
APPEARANCE UR: ABNORMAL
AST SERPL W P-5'-P-CCNC: 21 U/L (ref 0–45)
BACTERIA #/AREA URNS HPF: ABNORMAL /HPF
BILIRUB DIRECT SERPL-MCNC: 0.2 MG/DL (ref 0–0.2)
BILIRUB SERPL-MCNC: 0.9 MG/DL (ref 0.2–1.3)
BILIRUB UR QL STRIP: NEGATIVE
BUN SERPL-MCNC: 25 MG/DL (ref 7–30)
CALCIUM SERPL-MCNC: 9.1 MG/DL (ref 8.5–10.1)
CHLORIDE SERPL-SCNC: 107 MMOL/L (ref 94–109)
CHOLEST SERPL-MCNC: 279 MG/DL
CO2 SERPL-SCNC: 23 MMOL/L (ref 20–32)
COLOR UR AUTO: YELLOW
CREAT SERPL-MCNC: 1.29 MG/DL (ref 0.52–1.04)
CREAT UR-MCNC: 171 MG/DL
CYCLOSPORINE BLD LC/MS/MS-MCNC: 109 UG/L (ref 50–400)
ERYTHROCYTE [DISTWIDTH] IN BLOOD BY AUTOMATED COUNT: 12.4 % (ref 10–15)
GFR SERPL CREATININE-BSD FRML MDRD: 46 ML/MIN/{1.73_M2}
GLUCOSE SERPL-MCNC: 90 MG/DL (ref 70–99)
GLUCOSE UR STRIP-MCNC: NEGATIVE MG/DL
HCT VFR BLD AUTO: 40.6 % (ref 35–47)
HDLC SERPL-MCNC: 48 MG/DL
HGB BLD-MCNC: 13.2 G/DL (ref 11.7–15.7)
HGB UR QL STRIP: NEGATIVE
KETONES UR STRIP-MCNC: NEGATIVE MG/DL
LDLC SERPL CALC-MCNC: 191 MG/DL
LEUKOCYTE ESTERASE UR QL STRIP: ABNORMAL
MCH RBC QN AUTO: 30 PG (ref 26.5–33)
MCHC RBC AUTO-ENTMCNC: 32.5 G/DL (ref 31.5–36.5)
MCV RBC AUTO: 92 FL (ref 78–100)
MUCOUS THREADS #/AREA URNS LPF: PRESENT /LPF
NITRATE UR QL: POSITIVE
NONHDLC SERPL-MCNC: 230 MG/DL
PH UR STRIP: 5 PH (ref 5–7)
PLATELET # BLD AUTO: 152 10E9/L (ref 150–450)
POTASSIUM SERPL-SCNC: 4.9 MMOL/L (ref 3.4–5.3)
PROT SERPL-MCNC: 7.6 G/DL (ref 6.8–8.8)
PROT UR-MCNC: 0.35 G/L
PROT/CREAT 24H UR: 0.21 G/G CR (ref 0–0.2)
RBC # BLD AUTO: 4.4 10E12/L (ref 3.8–5.2)
RBC #/AREA URNS AUTO: 6 /HPF (ref 0–2)
SODIUM SERPL-SCNC: 139 MMOL/L (ref 133–144)
SOURCE: ABNORMAL
SP GR UR STRIP: 1.01 (ref 1–1.03)
SQUAMOUS #/AREA URNS AUTO: 5 /HPF (ref 0–1)
TME LAST DOSE: NORMAL H
TRIGL SERPL-MCNC: 195 MG/DL
UROBILINOGEN UR STRIP-MCNC: 0 MG/DL (ref 0–2)
WBC # BLD AUTO: 4.2 10E9/L (ref 4–11)
WBC #/AREA URNS AUTO: 145 /HPF (ref 0–5)
WBC CLUMPS #/AREA URNS HPF: PRESENT /HPF

## 2020-02-17 PROCEDURE — 80061 LIPID PANEL: CPT | Performed by: INTERNAL MEDICINE

## 2020-02-17 PROCEDURE — 84156 ASSAY OF PROTEIN URINE: CPT | Performed by: INTERNAL MEDICINE

## 2020-02-17 PROCEDURE — 85027 COMPLETE CBC AUTOMATED: CPT | Performed by: INTERNAL MEDICINE

## 2020-02-17 PROCEDURE — 81001 URINALYSIS AUTO W/SCOPE: CPT | Performed by: INTERNAL MEDICINE

## 2020-02-17 PROCEDURE — 80158 DRUG ASSAY CYCLOSPORINE: CPT | Performed by: INTERNAL MEDICINE

## 2020-02-17 PROCEDURE — T1013 SIGN LANG/ORAL INTERPRETER: HCPCS | Mod: U3

## 2020-02-17 PROCEDURE — 80076 HEPATIC FUNCTION PANEL: CPT | Performed by: INTERNAL MEDICINE

## 2020-02-17 PROCEDURE — 36415 COLL VENOUS BLD VENIPUNCTURE: CPT | Performed by: INTERNAL MEDICINE

## 2020-02-17 PROCEDURE — G0463 HOSPITAL OUTPT CLINIC VISIT: HCPCS | Mod: ZF

## 2020-02-17 PROCEDURE — 80048 BASIC METABOLIC PNL TOTAL CA: CPT | Performed by: INTERNAL MEDICINE

## 2020-02-17 RX ORDER — LIDOCAINE 4 G/G
1 PATCH TOPICAL EVERY 24 HOURS
Qty: 30 PATCH | Refills: 3 | Status: SHIPPED | OUTPATIENT
Start: 2020-02-17 | End: 2020-04-03

## 2020-02-17 ASSESSMENT — PAIN SCALES - GENERAL: PAINLEVEL: SEVERE PAIN (7)

## 2020-02-17 NOTE — TELEPHONE ENCOUNTER
RN spoke with pharmacy. International travel was from last rx in august. RN stated ok to remove that part    Fabiola Ford RN

## 2020-02-17 NOTE — LETTER
2/17/2020      RE: Flor Navarro  [address]  [place] [address]       SUBJECTIVE:  I had the pleasure of seeing Flor Navarro for followup in the Liver Transplantation Clinic at the [facility] on 02/17/2020.  Ms. Navarro returns for followup now 9-1/2 years status post liver transplantation for cirrhosis caused by chronic hepatitis C.  She is a sustained virologic responder to hepatitis C treatment.      She is doing well at this visit.  She denies any abdominal pain, itching or skin rash.  She has mild fatigue.  She denies any increased abdominal girth.  She has some mild lower extremity edema.  She has not had any gastrointestinal bleeding.      She denies any fevers or chills, cough or shortness of breath.  She occasionally does use an inhaler but it is more when she feels congested.  She denies any nausea or vomiting, diarrhea or constipation.  Her appetite has been good.  She believes that she lost about 25 pounds, but her weight is the same as it was last visit.     Current Outpatient Medications   Medication     acetaminophen (TYLENOL) 500 MG tablet     albuterol (ACCUNEB) 0.63 MG/3ML neb solution     albuterol (PROAIR HFA/PROVENTIL HFA/VENTOLIN HFA) 108 (90 Base) MCG/ACT inhaler     alendronate (FOSAMAX) 70 MG tablet     amLODIPine (NORVASC) 5 MG tablet     calcitRIOL (ROCALTROL) 0.25 MCG capsule     calcium carbonate 600 mg-vitamin D 400 units (CALCIUM 600 + D) 600-400 MG-UNIT per tablet     camphor-menthol-methyl sal 4-10-30 % CREA     capsaicin (ZOSTRIX) 0.025 % external cream     carboxymethylcellulose (REFRESH PLUS) 0.5 % SOLN ophthalmic solution     Cholecalciferol (VITAMIN D3) 2000 units CAPS     cycloSPORINE modified (GENERIC EQUIVALENT) 25 MG capsule     dextromethorphan (DELSYM) 30 MG/5ML liquid     dimethicone (AVEENO DAILY MOISTURIZING) 1.3 % LOTN lotion     Elastic Bandages & Supports (KNEE BRACE/FLEX STAYS LARGE) MISC     Elastic Bandages &  Supports (MEDICAL COMPRESSION SOCKS) MISC     Lidocaine (LIDOCARE) 4 % Patch     lidocaine (LIDODERM) 5 % patch     lidocaine (XYLOCAINE) 2 % external gel     lisinopril (ZESTRIL) 10 MG tablet     melatonin 3 MG tablet     Menthol, Topical Analgesic, (BIOFREEZE) 4 % GEL     multivitamin w/minerals (MULTI-VITAMIN) tablet     mycophenolate (GENERIC EQUIVALENT) 250 MG capsule     naltrexone (DEPADE/REVIA) 50 MG tablet     olopatadine (PATANOL) 0.1 % ophthalmic solution     omeprazole (PRILOSEC) 20 MG DR capsule     order for DME     order for DME     order for DME     order for DME     order for DME     phenol-menthol (CEPASTAT) 14.5 MG lozenge     psyllium 400 MG capsule     senna-docusate (SENOKOT-S/PERICOLACE) 8.6-50 MG tablet     STATIN NOT PRESCRIBED, INTENTIONAL,     Current Facility-Administered Medications   Medication     albuterol (PROVENTIL) neb solution 2.5 mg     apraclonidine (IOPIDINE) 1 % ophthalmic solution 1 drop     /86   Pulse 71   Temp 97.9  F (36.6  C) (Oral)   Wt 101.7 kg (224 lb 4.8 oz)   LMP  (LMP Unknown)   SpO2 95%   BMI 43.46 kg/m       PHYSICAL EXAMINATION:  In general, she looks well.  HEENT exam shows no scleral icterus or temporal muscle wasting.  Chest is clear.  Abdominal exam shows no increase in girth.  No masses or tenderness to palpation are present.  Her liver is 10 cm in span without left lobe enlargement.  No spleen tip is palpable.  Her incision is intact.  Extremity exam shows no edema.  Skin exam shows no suspicious lesions.  Neurologic exam is nonfocal.     Recent Results (from the past 168 hour(s))   Lipid Profile    Collection Time: 02/17/20 12:53 PM   Result Value Ref Range    Cholesterol 279 (H) <200 mg/dL    Triglycerides 195 (H) <150 mg/dL    HDL Cholesterol 48 (L) >49 mg/dL    LDL Cholesterol Calculated 191 (H) <100 mg/dL    Non HDL Cholesterol 230 (H) <130 mg/dL   Hepatic panel    Collection Time: 02/17/20 12:53 PM   Result Value Ref Range    Bilirubin  Direct 0.2 0.0 - 0.2 mg/dL    Bilirubin Total 0.9 0.2 - 1.3 mg/dL    Albumin 3.6 3.4 - 5.0 g/dL    Protein Total 7.6 6.8 - 8.8 g/dL    Alkaline Phosphatase 98 40 - 150 U/L    ALT 30 0 - 50 U/L    AST 21 0 - 45 U/L   CBC with platelets    Collection Time: 02/17/20 12:53 PM   Result Value Ref Range    WBC 4.2 4.0 - 11.0 10e9/L    RBC Count 4.40 3.8 - 5.2 10e12/L    Hemoglobin 13.2 11.7 - 15.7 g/dL    Hematocrit 40.6 35.0 - 47.0 %    MCV 92 78 - 100 fl    MCH 30.0 26.5 - 33.0 pg    MCHC 32.5 31.5 - 36.5 g/dL    RDW 12.4 10.0 - 15.0 %    Platelet Count 152 150 - 450 10e9/L   UA with Microscopic    Collection Time: 02/17/20  1:07 PM   Result Value Ref Range    Color Urine Yellow     Appearance Urine Slightly Cloudy     Glucose Urine Negative NEG^Negative mg/dL    Bilirubin Urine Negative NEG^Negative    Ketones Urine Negative NEG^Negative mg/dL    Specific Gravity Urine 1.014 1.003 - 1.035    Blood Urine Negative NEG^Negative    pH Urine 5.0 5.0 - 7.0 pH    Protein Albumin Urine 30 (A) NEG^Negative mg/dL    Urobilinogen mg/dL 0.0 0.0 - 2.0 mg/dL    Nitrite Urine Positive (A) NEG^Negative    Leukocyte Esterase Urine Moderate (A) NEG^Negative    Source Urine     WBC Urine 145 (H) 0 - 5 /HPF    RBC Urine 6 (H) 0 - 2 /HPF    WBC Clumps Present (A) NEG^Negative /HPF    Bacteria Urine Many (A) NEG^Negative /HPF    Squamous Epithelial /HPF Urine 5 (H) 0 - 1 /HPF    Mucous Urine Present (A) NEG^Negative /LPF   Protein  random urine with Creat Ratio    Collection Time: 02/17/20  1:07 PM   Result Value Ref Range    Protein Random Urine 0.35 g/L    Protein Total Urine g/gr Creatinine 0.21 (H) 0 - 0.2 g/g Cr   Creatinine urine calculation only    Collection Time: 02/17/20  1:07 PM   Result Value Ref Range    Creatinine Urine 171 mg/dL      My impression is that Ms. Navarro is 9-1/2 years status post liver transplantation with excellent liver and kidney function today.  Her lipids are a bit high and she really does need to lose some  weight, as they do appear that they would respond well to weight loss.      It also does appear that she has urinary tract infections.  She is completely asymptomatic.  I will wait for the culture to return.  She is otherwise up-to-date with regard to vaccines.  She does need female cancer screening.  I have recommended she contact Dr. Sierra about that.  I will see her back in the clinic again in 6 months.      Thank you very much for allowing me to participate in the care of this patient.  If you have any questions regarding my recommendations, please do not hesitate to contact me.        Laron Anne MD      Professor of Medicine  [employer]      Executive Medical Director, Solid Organ Transplant Program  [facility]        Laron Anne MD

## 2020-02-17 NOTE — LETTER
2/17/2020       RE: Flor Navarro  [address]  [facility] [address]     Dear Colleague,    Thank you for referring your patient, Flor Navarro, to the [facility] HEPATOLOGY at [facility]. Please see a copy of my visit note below.    SUBJECTIVE:  I had the pleasure of seeing Flor Navarro for followup in the Liver Transplantation Clinic at the [facility] on 02/17/2020.  Ms. Navarro returns for followup now 9-1/2 years status post liver transplantation for cirrhosis caused by chronic hepatitis C.  She is a sustained virologic responder to hepatitis C treatment.      She is doing well at this visit.  She denies any abdominal pain, itching or skin rash.  She has mild fatigue.  She denies any increased abdominal girth.  She has some mild lower extremity edema.  She has not had any gastrointestinal bleeding.      She denies any fevers or chills, cough or shortness of breath.  She occasionally does use an inhaler but it is more when she feels congested.  She denies any nausea or vomiting, diarrhea or constipation.  Her appetite has been good.  She believes that she lost about 25 pounds, but her weight is the same as it was last visit.     Current Outpatient Medications   Medication     acetaminophen (TYLENOL) 500 MG tablet     albuterol (ACCUNEB) 0.63 MG/3ML neb solution     albuterol (PROAIR HFA/PROVENTIL HFA/VENTOLIN HFA) 108 (90 Base) MCG/ACT inhaler     alendronate (FOSAMAX) 70 MG tablet     amLODIPine (NORVASC) 5 MG tablet     calcitRIOL (ROCALTROL) 0.25 MCG capsule     calcium carbonate 600 mg-vitamin D 400 units (CALCIUM 600 + D) 600-400 MG-UNIT per tablet     camphor-menthol-methyl sal 4-10-30 % CREA     capsaicin (ZOSTRIX) 0.025 % external cream     carboxymethylcellulose (REFRESH PLUS) 0.5 % SOLN ophthalmic solution     Cholecalciferol (VITAMIN D3) 2000 units CAPS     cycloSPORINE modified (GENERIC EQUIVALENT) 25 MG capsule      dextromethorphan (DELSYM) 30 MG/5ML liquid     dimethicone (AVEENO DAILY MOISTURIZING) 1.3 % LOTN lotion     Elastic Bandages & Supports (KNEE BRACE/FLEX STAYS LARGE) MISC     Elastic Bandages & Supports (MEDICAL COMPRESSION SOCKS) MISC     Lidocaine (LIDOCARE) 4 % Patch     lidocaine (LIDODERM) 5 % patch     lidocaine (XYLOCAINE) 2 % external gel     lisinopril (ZESTRIL) 10 MG tablet     melatonin 3 MG tablet     Menthol, Topical Analgesic, (BIOFREEZE) 4 % GEL     multivitamin w/minerals (MULTI-VITAMIN) tablet     mycophenolate (GENERIC EQUIVALENT) 250 MG capsule     naltrexone (DEPADE/REVIA) 50 MG tablet     olopatadine (PATANOL) 0.1 % ophthalmic solution     omeprazole (PRILOSEC) 20 MG DR capsule     order for DME     order for DME     order for DME     order for DME     order for DME     phenol-menthol (CEPASTAT) 14.5 MG lozenge     psyllium 400 MG capsule     senna-docusate (SENOKOT-S/PERICOLACE) 8.6-50 MG tablet     STATIN NOT PRESCRIBED, INTENTIONAL,     Current Facility-Administered Medications   Medication     albuterol (PROVENTIL) neb solution 2.5 mg     apraclonidine (IOPIDINE) 1 % ophthalmic solution 1 drop     /86   Pulse 71   Temp 97.9  F (36.6  C) (Oral)   Wt 101.7 kg (224 lb 4.8 oz)   LMP  (LMP Unknown)   SpO2 95%   BMI 43.46 kg/m       PHYSICAL EXAMINATION:  In general, she looks well.  HEENT exam shows no scleral icterus or temporal muscle wasting.  Chest is clear.  Abdominal exam shows no increase in girth.  No masses or tenderness to palpation are present.  Her liver is 10 cm in span without left lobe enlargement.  No spleen tip is palpable.  Her incision is intact.  Extremity exam shows no edema.  Skin exam shows no suspicious lesions.  Neurologic exam is nonfocal.     Recent Results (from the past 168 hour(s))   Lipid Profile    Collection Time: 02/17/20 12:53 PM   Result Value Ref Range    Cholesterol 279 (H) <200 mg/dL    Triglycerides 195 (H) <150 mg/dL    HDL Cholesterol 48 (L)  >49 mg/dL    LDL Cholesterol Calculated 191 (H) <100 mg/dL    Non HDL Cholesterol 230 (H) <130 mg/dL   Hepatic panel    Collection Time: 02/17/20 12:53 PM   Result Value Ref Range    Bilirubin Direct 0.2 0.0 - 0.2 mg/dL    Bilirubin Total 0.9 0.2 - 1.3 mg/dL    Albumin 3.6 3.4 - 5.0 g/dL    Protein Total 7.6 6.8 - 8.8 g/dL    Alkaline Phosphatase 98 40 - 150 U/L    ALT 30 0 - 50 U/L    AST 21 0 - 45 U/L   CBC with platelets    Collection Time: 02/17/20 12:53 PM   Result Value Ref Range    WBC 4.2 4.0 - 11.0 10e9/L    RBC Count 4.40 3.8 - 5.2 10e12/L    Hemoglobin 13.2 11.7 - 15.7 g/dL    Hematocrit 40.6 35.0 - 47.0 %    MCV 92 78 - 100 fl    MCH 30.0 26.5 - 33.0 pg    MCHC 32.5 31.5 - 36.5 g/dL    RDW 12.4 10.0 - 15.0 %    Platelet Count 152 150 - 450 10e9/L   UA with Microscopic    Collection Time: 02/17/20  1:07 PM   Result Value Ref Range    Color Urine Yellow     Appearance Urine Slightly Cloudy     Glucose Urine Negative NEG^Negative mg/dL    Bilirubin Urine Negative NEG^Negative    Ketones Urine Negative NEG^Negative mg/dL    Specific Gravity Urine 1.014 1.003 - 1.035    Blood Urine Negative NEG^Negative    pH Urine 5.0 5.0 - 7.0 pH    Protein Albumin Urine 30 (A) NEG^Negative mg/dL    Urobilinogen mg/dL 0.0 0.0 - 2.0 mg/dL    Nitrite Urine Positive (A) NEG^Negative    Leukocyte Esterase Urine Moderate (A) NEG^Negative    Source Urine     WBC Urine 145 (H) 0 - 5 /HPF    RBC Urine 6 (H) 0 - 2 /HPF    WBC Clumps Present (A) NEG^Negative /HPF    Bacteria Urine Many (A) NEG^Negative /HPF    Squamous Epithelial /HPF Urine 5 (H) 0 - 1 /HPF    Mucous Urine Present (A) NEG^Negative /LPF   Protein  random urine with Creat Ratio    Collection Time: 02/17/20  1:07 PM   Result Value Ref Range    Protein Random Urine 0.35 g/L    Protein Total Urine g/gr Creatinine 0.21 (H) 0 - 0.2 g/g Cr   Creatinine urine calculation only    Collection Time: 02/17/20  1:07 PM   Result Value Ref Range    Creatinine Urine 171 mg/dL      My  impression is that Ms. Navarro is 9-1/2 years status post liver transplantation with excellent liver and kidney function today.  Her lipids are a bit high and she really does need to lose some weight, as they do appear that they would respond well to weight loss.      It also does appear that she has urinary tract infections.  She is completely asymptomatic.  I will wait for the culture to return.  She is otherwise up-to-date with regard to vaccines.  She does need female cancer screening.  I have recommended she contact Dr. Sierra about that.  I will see her back in the clinic again in 6 months.      Thank you very much for allowing me to participate in the care of this patient.  If you have any questions regarding my recommendations, please do not hesitate to contact me.        Laron Anne MD      Professor of Medicine  [employer]      Executive Medical Director, Solid Organ Transplant Program  [facility]

## 2020-02-17 NOTE — NURSING NOTE
Chief Complaint   Patient presents with     RECHECK     liver tx     /86   Pulse 71   Temp 97.9  F (36.6  C) (Oral)   Wt 101.7 kg (224 lb 4.8 oz)   LMP  (LMP Unknown)   SpO2 95%   BMI 43.46 kg/m    Adeline Sharma CMA  2/17/2020 1:44 PM

## 2020-02-17 NOTE — PROGRESS NOTES
SUBJECTIVE:  I had the pleasure of seeing Flor Navarro for followup in the Liver Transplantation Clinic at the [facility] on 02/17/2020.  Ms. Navarro returns for followup now 9-1/2 years status post liver transplantation for cirrhosis caused by chronic hepatitis C.  She is a sustained virologic responder to hepatitis C treatment.      She is doing well at this visit.  She denies any abdominal pain, itching or skin rash.  She has mild fatigue.  She denies any increased abdominal girth.  She has some mild lower extremity edema.  She has not had any gastrointestinal bleeding.      She denies any fevers or chills, cough or shortness of breath.  She occasionally does use an inhaler but it is more when she feels congested.  She denies any nausea or vomiting, diarrhea or constipation.  Her appetite has been good.  She believes that she lost about 25 pounds, but her weight is the same as it was last visit.     Current Outpatient Medications   Medication     acetaminophen (TYLENOL) 500 MG tablet     albuterol (ACCUNEB) 0.63 MG/3ML neb solution     albuterol (PROAIR HFA/PROVENTIL HFA/VENTOLIN HFA) 108 (90 Base) MCG/ACT inhaler     alendronate (FOSAMAX) 70 MG tablet     amLODIPine (NORVASC) 5 MG tablet     calcitRIOL (ROCALTROL) 0.25 MCG capsule     calcium carbonate 600 mg-vitamin D 400 units (CALCIUM 600 + D) 600-400 MG-UNIT per tablet     camphor-menthol-methyl sal 4-10-30 % CREA     capsaicin (ZOSTRIX) 0.025 % external cream     carboxymethylcellulose (REFRESH PLUS) 0.5 % SOLN ophthalmic solution     Cholecalciferol (VITAMIN D3) 2000 units CAPS     cycloSPORINE modified (GENERIC EQUIVALENT) 25 MG capsule     dextromethorphan (DELSYM) 30 MG/5ML liquid     dimethicone (AVEENO DAILY MOISTURIZING) 1.3 % LOTN lotion     Elastic Bandages & Supports (KNEE BRACE/FLEX STAYS LARGE) MISC     Elastic Bandages & Supports (MEDICAL COMPRESSION SOCKS) MISC     Lidocaine (LIDOCARE) 4 % Patch     lidocaine  (LIDODERM) 5 % patch     lidocaine (XYLOCAINE) 2 % external gel     lisinopril (ZESTRIL) 10 MG tablet     melatonin 3 MG tablet     Menthol, Topical Analgesic, (BIOFREEZE) 4 % GEL     multivitamin w/minerals (MULTI-VITAMIN) tablet     mycophenolate (GENERIC EQUIVALENT) 250 MG capsule     naltrexone (DEPADE/REVIA) 50 MG tablet     olopatadine (PATANOL) 0.1 % ophthalmic solution     omeprazole (PRILOSEC) 20 MG DR capsule     order for DME     order for DME     order for DME     order for DME     order for DME     phenol-menthol (CEPASTAT) 14.5 MG lozenge     psyllium 400 MG capsule     senna-docusate (SENOKOT-S/PERICOLACE) 8.6-50 MG tablet     STATIN NOT PRESCRIBED, INTENTIONAL,     Current Facility-Administered Medications   Medication     albuterol (PROVENTIL) neb solution 2.5 mg     apraclonidine (IOPIDINE) 1 % ophthalmic solution 1 drop     /86   Pulse 71   Temp 97.9  F (36.6  C) (Oral)   Wt 101.7 kg (224 lb 4.8 oz)   LMP  (LMP Unknown)   SpO2 95%   BMI 43.46 kg/m      PHYSICAL EXAMINATION:  In general, she looks well.  HEENT exam shows no scleral icterus or temporal muscle wasting.  Chest is clear.  Abdominal exam shows no increase in girth.  No masses or tenderness to palpation are present.  Her liver is 10 cm in span without left lobe enlargement.  No spleen tip is palpable.  Her incision is intact.  Extremity exam shows no edema.  Skin exam shows no suspicious lesions.  Neurologic exam is nonfocal.     Recent Results (from the past 168 hour(s))   Lipid Profile    Collection Time: 02/17/20 12:53 PM   Result Value Ref Range    Cholesterol 279 (H) <200 mg/dL    Triglycerides 195 (H) <150 mg/dL    HDL Cholesterol 48 (L) >49 mg/dL    LDL Cholesterol Calculated 191 (H) <100 mg/dL    Non HDL Cholesterol 230 (H) <130 mg/dL   Hepatic panel    Collection Time: 02/17/20 12:53 PM   Result Value Ref Range    Bilirubin Direct 0.2 0.0 - 0.2 mg/dL    Bilirubin Total 0.9 0.2 - 1.3 mg/dL    Albumin 3.6 3.4 - 5.0  g/dL    Protein Total 7.6 6.8 - 8.8 g/dL    Alkaline Phosphatase 98 40 - 150 U/L    ALT 30 0 - 50 U/L    AST 21 0 - 45 U/L   CBC with platelets    Collection Time: 02/17/20 12:53 PM   Result Value Ref Range    WBC 4.2 4.0 - 11.0 10e9/L    RBC Count 4.40 3.8 - 5.2 10e12/L    Hemoglobin 13.2 11.7 - 15.7 g/dL    Hematocrit 40.6 35.0 - 47.0 %    MCV 92 78 - 100 fl    MCH 30.0 26.5 - 33.0 pg    MCHC 32.5 31.5 - 36.5 g/dL    RDW 12.4 10.0 - 15.0 %    Platelet Count 152 150 - 450 10e9/L   UA with Microscopic    Collection Time: 02/17/20  1:07 PM   Result Value Ref Range    Color Urine Yellow     Appearance Urine Slightly Cloudy     Glucose Urine Negative NEG^Negative mg/dL    Bilirubin Urine Negative NEG^Negative    Ketones Urine Negative NEG^Negative mg/dL    Specific Gravity Urine 1.014 1.003 - 1.035    Blood Urine Negative NEG^Negative    pH Urine 5.0 5.0 - 7.0 pH    Protein Albumin Urine 30 (A) NEG^Negative mg/dL    Urobilinogen mg/dL 0.0 0.0 - 2.0 mg/dL    Nitrite Urine Positive (A) NEG^Negative    Leukocyte Esterase Urine Moderate (A) NEG^Negative    Source Urine     WBC Urine 145 (H) 0 - 5 /HPF    RBC Urine 6 (H) 0 - 2 /HPF    WBC Clumps Present (A) NEG^Negative /HPF    Bacteria Urine Many (A) NEG^Negative /HPF    Squamous Epithelial /HPF Urine 5 (H) 0 - 1 /HPF    Mucous Urine Present (A) NEG^Negative /LPF   Protein  random urine with Creat Ratio    Collection Time: 02/17/20  1:07 PM   Result Value Ref Range    Protein Random Urine 0.35 g/L    Protein Total Urine g/gr Creatinine 0.21 (H) 0 - 0.2 g/g Cr   Creatinine urine calculation only    Collection Time: 02/17/20  1:07 PM   Result Value Ref Range    Creatinine Urine 171 mg/dL      My impression is that Ms. Navarro is 9-1/2 years status post liver transplantation with excellent liver and kidney function today.  Her lipids are a bit high and she really does need to lose some weight, as they do appear that they would respond well to weight loss.      It also does  appear that she has urinary tract infections.  She is completely asymptomatic.  I will wait for the culture to return.  She is otherwise up-to-date with regard to vaccines.  She does need female cancer screening.  I have recommended she contact Dr. Sierra about that.  I will see her back in the clinic again in 6 months.      Thank you very much for allowing me to participate in the care of this patient.  If you have any questions regarding my recommendations, please do not hesitate to contact me.        Laron Anne MD      Professor of Medicine  [employer]      Executive Medical Director, Solid Organ Transplant Program  [facility]

## 2020-02-17 NOTE — TELEPHONE ENCOUNTER
[facility] phone call message- medication clarification/question:    Full Medication Name: lisinopril (ZESTRIL) 10 MG tablet   Dose: Take 1 tablet (10 mg) by mouth daily Needs 4 month supply for International Travel - Oral    Question/Clarification needed: Pharmacist Huma calling to obtain clarification regarding medication quantity. Per pharmacist, the prescription is written for 30 tablets with one refill but the directions state the patient needs a 4 month supply for international travel. Pharmacist would like to clarify if the directions are correct and the patient needs a 4 month supply and if so, to obtain an updated prescription with the correct quantity of 120 tablets.    Pharmacy confirmed as   [facility] - [place], [place] - [address] [place] [address]  [address] [facility] [id number]  Phone: [phone] Fax: [phone]  : Yes    Please leave ONLY preferred pharmacy    OK to leave a message on voice mail? Yes    Advised patient that RN would call back within 3 hours, unless emergent.    Primary language: Nepali      needed? Yes    Call taken on February 17, 2020 at 10:17 AM by Ines Lazo to [facility]

## 2020-02-18 ENCOUNTER — TELEPHONE (OUTPATIENT)
Dept: FAMILY MEDICINE | Facility: CLINIC | Age: 55
End: 2020-02-18

## 2020-02-18 NOTE — TELEPHONE ENCOUNTER
Type of Form: Medical Opinion & Special Diet Request  Patient's PCP: Link  Placed in Green Color Bin    If form is for FMLA, Disabilty, or Medicare please schedule an appointment for patient and route to PCP to decide if appointment is needed.    Appointment Scheduled? No

## 2020-02-20 ENCOUNTER — DOCUMENTATION ONLY (OUTPATIENT)
Dept: FAMILY MEDICINE | Facility: CLINIC | Age: 55
End: 2020-02-20

## 2020-02-20 NOTE — PROGRESS NOTES
"When opening a documentation only encounter, be sure to enter in \"Chief Complaint\" Forms and in \" Comments\" Title of form, description if needed.    Flor is a 54 year old  female  Form received via: Patient Drop Off  Form now resides in: Provider Edward Dangelo CMA        Form has been completed by provider.     Form sent out via: Fax to Request for Medical Opinion 2/18/20 at Fax Number: I called patient to pickup form from     Patient informed: Yes  Output date: February 25, 2020    Jessika Waterman CMA      **Please close the encounter**            "

## 2020-02-26 ENCOUNTER — OFFICE VISIT (OUTPATIENT)
Dept: FAMILY MEDICINE | Facility: CLINIC | Age: 55
End: 2020-02-26
Payer: MEDICARE

## 2020-02-26 VITALS
WEIGHT: 222.2 LBS | RESPIRATION RATE: 16 BRPM | HEIGHT: 60 IN | SYSTOLIC BLOOD PRESSURE: 134 MMHG | OXYGEN SATURATION: 98 % | HEART RATE: 70 BPM | TEMPERATURE: 97.9 F | BODY MASS INDEX: 43.62 KG/M2 | DIASTOLIC BLOOD PRESSURE: 89 MMHG

## 2020-02-26 DIAGNOSIS — M25.511 PAIN IN JOINT OF RIGHT SHOULDER: ICD-10-CM

## 2020-02-26 DIAGNOSIS — E66.01 OBESITY, CLASS III, BMI 40-49.9 (MORBID OBESITY) (H): ICD-10-CM

## 2020-02-26 DIAGNOSIS — M46.1 SACROILIITIS (H): ICD-10-CM

## 2020-02-26 DIAGNOSIS — R68.2 DRY MOUTH: ICD-10-CM

## 2020-02-26 DIAGNOSIS — R07.89 COSTOCHONDRAL CHEST PAIN: Primary | ICD-10-CM

## 2020-02-26 RX ORDER — LIDOCAINE 4 G/G
2 PATCH TOPICAL EVERY 24 HOURS
Qty: 60 PATCH | Refills: 11 | Status: SHIPPED | OUTPATIENT
Start: 2020-02-26 | End: 2020-04-03

## 2020-02-26 RX ORDER — LIDOCAINE 4 G/G
2 PATCH TOPICAL EVERY 24 HOURS
Qty: 60 PATCH | Refills: 11 | Status: SHIPPED | OUTPATIENT
Start: 2020-02-26 | End: 2020-02-26

## 2020-02-26 RX ORDER — ACETAMINOPHEN 500 MG
500 TABLET ORAL 3 TIMES DAILY PRN
Qty: 120 TABLET | Refills: 3 | Status: SHIPPED | OUTPATIENT
Start: 2020-02-26 | End: 2020-10-14

## 2020-02-26 ASSESSMENT — ASTHMA QUESTIONNAIRES
QUESTION_1 LAST FOUR WEEKS HOW MUCH OF THE TIME DID YOUR ASTHMA KEEP YOU FROM GETTING AS MUCH DONE AT WORK, SCHOOL OR AT HOME: NONE OF THE TIME
ACUTE_EXACERBATION_TODAY: NO
QUESTION_3 LAST FOUR WEEKS HOW OFTEN DID YOUR ASTHMA SYMPTOMS (WHEEZING, COUGHING, SHORTNESS OF BREATH, CHEST TIGHTNESS OR PAIN) WAKE YOU UP AT NIGHT OR EARLIER THAN USUAL IN THE MORNING: ONCE OR TWICE
QUESTION_2 LAST FOUR WEEKS HOW OFTEN HAVE YOU HAD SHORTNESS OF BREATH: ONCE OR TWICE A WEEK
ACT_TOTALSCORE: 22
QUESTION_5 LAST FOUR WEEKS HOW WOULD YOU RATE YOUR ASTHMA CONTROL: COMPLETELY CONTROLLED
QUESTION_4 LAST FOUR WEEKS HOW OFTEN HAVE YOU USED YOUR RESCUE INHALER OR NEBULIZER MEDICATION (SUCH AS ALBUTEROL): ONCE A WEEK OR LESS

## 2020-02-26 ASSESSMENT — PAIN SCALES - GENERAL: PAINLEVEL: NO PAIN (0)

## 2020-02-26 ASSESSMENT — MIFFLIN-ST. JEOR: SCORE: 1521.9

## 2020-02-26 NOTE — PATIENT INSTRUCTIONS
Order a sacroiliac belt from [employer] in [place]     Use lidocaine patch on back and chest wall for pain   Use tylenol for pain     Remove any loose rugs from the home     Follow up with Weight management clinic (scheduled on 3/6/20 @ 12:30p.m)    Make an appointment for a physical - preventive

## 2020-02-26 NOTE — PROGRESS NOTES
"     HPI       Flor Navarro is a 54 year old  who presents for        Chief Complaint   Patient presents with     Follow Up       Patient is here for following up with liver problem    Deborah Ruiz RN-BC ph. [phone] contacted me after a home visit - she has a lot of loose rugs in the home.      Flor c/o Drythroat and mouth. She is drinking more water for this. Furnace turned up high - reports all the bananas in the home are turning brown as a result. Has no control over the temp of the environment.      Hypertension:   Not salting food much    No trouble taking her pills   No NSAIDs regularly  - \"Dr. Larry said no\"  Wants refill of patch      CP and shortness of breath sudden onset x1 time 3 days ago. Painful to touch of the left anterior chest. Hurts  To cough or take deep breath.   Forms to be signed from [facility].      She is wanting to update her gynecological exam, Dr. Anne recommended this for her. She is concerned about the lack of \"yearly exam.\"      An French  was used for  this visit.          +++++++     Problem, Medication and Allergy Lists were reviewed and updated if needed..     Patient is an established patient of this clinic..          Review of Systems:   Review of Systems  4 pt ROS neg except as noted   Wt loss - intentional  No palpitations/sweating          Physical Exam:      Vitals       Vitals:     02/26/20 1444   BP: (!) 133/91   Pulse: 70   Resp: 16   Temp: 97.9  F (36.6  C)   TempSrc: Oral   SpO2: 98%   Weight: 100.8 kg (222 lb 3.2 oz)   Height: 1.52 m (4' 11.84\")         Body mass index is 43.62 kg/m .       Wt Readings from Last 10 Encounters:   02/26/20 100.8 kg (222 lb 3.2 oz)   02/17/20 101.7 kg (224 lb 4.8 oz)   12/31/19 102.7 kg (226 lb 6.4 oz)   12/17/19 102.8 kg (226 lb 9.6 oz)   12/05/19 102.2 kg (225 lb 6.4 oz)   11/18/19 102.3 kg (225 lb 9.6 oz)   08/08/19 98.2 kg (216 lb 9.6 oz)   07/17/19 101.2 kg (223 lb)   06/20/19 100 kg (220 lb 6.4 oz)   06/05/19 " 99.7 kg (219 lb 12.8 oz)      Vitals were reviewed and were normal except mildly elevated DBP     Physical Exam  Vitals signs reviewed.   Constitutional:       Appearance: She is obese.   HENT:      Mouth/Throat:      Mouth: Mucous membranes are moist.   Cardiovascular:      Rate and Rhythm: Normal rate and regular rhythm.      Heart sounds: No murmur.   Pulmonary:      Effort: Pulmonary effort is normal. No respiratory distress.      Breath sounds: Normal breath sounds. No wheezing.   Chest:      Chest wall: Tenderness ( left anterior chest. ) present.   Neurological:      Mental Status: She is alert.       SI jts TTP mauro L  MENTAL STATUS EXAM  Appearance: appropriate, typical Druze dress  Attitude: cooperative  Behavior: normal  Eye Contact: normal  Speech: normal  Orientation: oriented to person, place, time and situation  Mood: normal  Affect: Congruent with mood    Results:   Results from last visit:  Orders Only on 02/17/2020   Component Date Value Ref Range Status     Cholesterol 02/17/2020 279* <200 mg/dL Final    Desirable:       <200 mg/dl     Triglycerides 02/17/2020 195* <150 mg/dL Final    Comment: Borderline high:  150-199 mg/dl  High:             200-499 mg/dl  Very high:       >499 mg/dl  Non Fasting       HDL Cholesterol 02/17/2020 48* >49 mg/dL Final     LDL Cholesterol Calculated 02/17/2020 191* <100 mg/dL Final    Comment: Above desirable:  100-129 mg/dl  Borderline High:  130-159 mg/dL  High:             160-189 mg/dL  Very high:       >189 mg/dl       Non HDL Cholesterol 02/17/2020 230* <130 mg/dL Final    Comment: Above Desirable:  130-159 mg/dl  Borderline high:  160-189 mg/dl  High:             190-219 mg/dl  Very high:       >219 mg/dl       Color Urine 02/17/2020 Yellow   Final     Appearance Urine 02/17/2020 Slightly Cloudy   Final     Glucose Urine 02/17/2020 Negative  NEG^Negative mg/dL Final     Bilirubin Urine 02/17/2020 Negative  NEG^Negative Final     Ketones Urine 02/17/2020  Negative  NEG^Negative mg/dL Final     Specific Gravity Urine 02/17/2020 1.014  1.003 - 1.035 Final     Blood Urine 02/17/2020 Negative  NEG^Negative Final     pH Urine 02/17/2020 5.0  5.0 - 7.0 pH Final     Protein Albumin Urine 02/17/2020 30* NEG^Negative mg/dL Final     Urobilinogen mg/dL 02/17/2020 0.0  0.0 - 2.0 mg/dL Final     Nitrite Urine 02/17/2020 Positive* NEG^Negative Final     Leukocyte Esterase Urine 02/17/2020 Moderate* NEG^Negative Final     Source 02/17/2020 Urine   Final     WBC Urine 02/17/2020 145* 0 - 5 /HPF Final     RBC Urine 02/17/2020 6* 0 - 2 /HPF Final     WBC Clumps 02/17/2020 Present* NEG^Negative /HPF Final     Bacteria Urine 02/17/2020 Many* NEG^Negative /HPF Final     Squamous Epithelial /HPF Urine 02/17/2020 5* 0 - 1 /HPF Final     Mucous Urine 02/17/2020 Present* NEG^Negative /LPF Final     Protein Random Urine 02/17/2020 0.35  g/L Final     Protein Total Urine g/gr Creatinine 02/17/2020 0.21* 0 - 0.2 g/g Cr Final     Cyclosporine Last Dose 02/17/2020 LD 800PM 2/16/20   Final     Cyclosporine Level 02/17/2020 109  50 - 400 ug/L Final    Comment: Cyclosporine Reference Range  Kidney Transplant  Pediatric                        ug/L    0-3 months post transplant   175-200    3-6 months post transplant   150-175    6-9 months post transplant   125-150    9-12 months post transplant  100-125    >12 months post transplant     Adult    0-3 months post transplant   175-200    3-6 months post transplant   150-200    6-12 months post transplant  125-150    >12 months post transplant     Heart Transplant  Pediatric    0-12 months post transplant  200-250    >12 months post transplant   100-125    EBV PCR >1000 copies/mL        Adult    0-3 months post transplant   150-250    3-6 months post transplant   125-225    6-12 months post transplant  100-200    >12 months post transplant     Heart-Lung Block               200-250  Lung Transplant    0-12 months post transplant   175-225    >12 months post transplant   125-175  Liver Transplant    0-6 months post transplant   150-200    >6 months post transplant    100-150                             Pancreas Transplant    0-6 months post transplant   200-250    6-12 months post transplant  150-200    >12 months post transplant   100-150  Bone Marrow Transplant         200-400  This test was developed and its performance characteristics determined by the   [facility].   It has not been cleared or approved by the FDA. The laboratory is regulated   under CLIA as qualified to perform high-complexity testing. This test is used   for clinical purposes. It should not be regarded as investigational or for   research.       Bilirubin Direct 02/17/2020 0.2  0.0 - 0.2 mg/dL Final     Bilirubin Total 02/17/2020 0.9  0.2 - 1.3 mg/dL Final     Albumin 02/17/2020 3.6  3.4 - 5.0 g/dL Final     Protein Total 02/17/2020 7.6  6.8 - 8.8 g/dL Final     Alkaline Phosphatase 02/17/2020 98  40 - 150 U/L Final     ALT 02/17/2020 30  0 - 50 U/L Final     AST 02/17/2020 21  0 - 45 U/L Final     WBC 02/17/2020 4.2  4.0 - 11.0 10e9/L Final     RBC Count 02/17/2020 4.40  3.8 - 5.2 10e12/L Final     Hemoglobin 02/17/2020 13.2  11.7 - 15.7 g/dL Final     Hematocrit 02/17/2020 40.6  35.0 - 47.0 % Final     MCV 02/17/2020 92  78 - 100 fl Final     MCH 02/17/2020 30.0  26.5 - 33.0 pg Final     MCHC 02/17/2020 32.5  31.5 - 36.5 g/dL Final     RDW 02/17/2020 12.4  10.0 - 15.0 % Final     Platelet Count 02/17/2020 152  150 - 450 10e9/L Final     Creatinine Urine 02/17/2020 171  mg/dL Final     Sodium 02/17/2020 139  133 - 144 mmol/L Final     Potassium 02/17/2020 4.9  3.4 - 5.3 mmol/L Final     Chloride 02/17/2020 107  94 - 109 mmol/L Final     Carbon Dioxide 02/17/2020 23  20 - 32 mmol/L Final     Anion Gap 02/17/2020 9  3 - 14 mmol/L Final     Glucose 02/17/2020 90  70 - 99 mg/dL Final    Non Fasting     Urea  Nitrogen 02/17/2020 25  7 - 30 mg/dL Final     Creatinine 02/17/2020 1.29* 0.52 - 1.04 mg/dL Final     GFR Estimate 02/17/2020 46* >60 mL/min/[1.73_m2] Final    Comment: Non  GFR Calc  Starting 12/18/2018, serum creatinine based estimated GFR (eGFR) will be   calculated using the Chronic Kidney Disease Epidemiology Collaboration   (CKD-EPI) equation.       GFR Estimate If Black 02/17/2020 54* >60 mL/min/[1.73_m2] Final    Comment:  GFR Calc  Starting 12/18/2018, serum creatinine based estimated GFR (eGFR) will be   calculated using the Chronic Kidney Disease Epidemiology Collaboration   (CKD-EPI) equation.       Calcium 02/17/2020 9.1  8.5 - 10.1 mg/dL Final        Assessment and Plan      Assessment & Plan     Flor was seen today for follow up.  Obesity, Class III, BMI 40-49.9 (morbid obesity) (H)  -     BARIATRIC ADULT REFERRAL - INTERNAL  Gained wt in [place], now slowly losing again.   Needs help scheduling wt mgmt follow up appointment - her 6 month follow up is due. Cont naltrexone in the meantime.     Costochondral CP  -     Lidocaine (LIDOCARE) 4 % Patch; Place 2 patches onto the skin every 24 hours To prevent lidocaine toxicity, patient should be patch free for 12 hrs daily.  PA denied for 5% patch. Attempting this. Can try icy hot next.     Pain in joint of right shoulder  -     acetaminophen (TYLENOL) 500 MG tablet; Take 1 tablet (500 mg) by mouth 3 times daily as needed for mild pain    Sacroiliitis (H)  -     MISCELLANEOUS DME  Wants SI belt. Reasonable.    HCM: GYN exam: She had cotesting in 2017 that was negative. Due for repeat cotesting as a cancer screen in 2022. Education provided.         Options for treatment and follow-up care were reviewed with the patient. Flor Navarro  engaged in the decision making process and verbalized understanding of the options discussed and agreed with the final plan.     Karely Sierra MD  DME (Durable Medical Equipment) Orders and  Documentation      Orders Placed This Encounter   Procedures     MISCELLANEOUS DME      The patient was assessed and it was determined the patient is in need of the following listed DME Supplies/Equipment. Please complete supporting documentation below to demonstrate medical necessity.       DME All Other Item(s) Documentation    List reason for need and supporting documentation for medical necessity below for each DME item.

## 2020-02-27 ASSESSMENT — ASTHMA QUESTIONNAIRES: ACT_TOTALSCORE: 22

## 2020-03-06 ENCOUNTER — ALLIED HEALTH/NURSE VISIT (OUTPATIENT)
Dept: SURGERY | Facility: CLINIC | Age: 55
End: 2020-03-06
Payer: MEDICARE

## 2020-03-06 NOTE — PROGRESS NOTES
Patient with Medicare and declined visit today (and declined to sign ABN form). Japanese  present via monitor. Patient plans to contact Medicare to see if a dietitian visit would be covered and then reschedule appointment. Pt states she cannot pay for dietitian visit if it is not covered by insurance.     Provided pt with the following handouts (of which she states her son can read to her): The Plate Method, Sources of Lean Protein, Mindful Eating and Summary of Volumetrics.       Time spent with patient: 5 minutes.  Meg Sandoval RD, LD         Pedro Arrieta,

## 2020-03-10 DIAGNOSIS — I12.9 HYPERTENSION, RENAL: ICD-10-CM

## 2020-03-10 NOTE — TELEPHONE ENCOUNTER

## 2020-03-25 DIAGNOSIS — Z94.4 LIVER REPLACED BY TRANSPLANT (H): ICD-10-CM

## 2020-03-26 RX ORDER — CYCLOSPORINE 25 MG/1
CAPSULE, LIQUID FILLED ORAL
Qty: 240 CAPSULE | Refills: 4 | OUTPATIENT
Start: 2020-03-26

## 2020-03-26 NOTE — TELEPHONE ENCOUNTER
Pt got this med approved by me when she was leaving urgently for [place], but this is generally prescribed by Dr. Laron Anne of liver transplant. Please send to him.

## 2020-03-30 ENCOUNTER — DOCUMENTATION ONLY (OUTPATIENT)
Dept: FAMILY MEDICINE | Facility: CLINIC | Age: 55
End: 2020-03-30

## 2020-03-30 DIAGNOSIS — Z94.4 LIVER REPLACED BY TRANSPLANT (H): ICD-10-CM

## 2020-03-30 RX ORDER — CYCLOSPORINE 25 MG/1
CAPSULE, LIQUID FILLED ORAL
Qty: 240 CAPSULE | Refills: 4 | Status: SHIPPED | OUTPATIENT
Start: 2020-03-30 | End: 2020-09-16

## 2020-03-31 ENCOUNTER — TELEPHONE (OUTPATIENT)
Dept: FAMILY MEDICINE | Facility: CLINIC | Age: 55
End: 2020-03-31

## 2020-03-31 NOTE — TELEPHONE ENCOUNTER
PRIOR AUTHORIZATION DENIED    Medication: albuterol (PROAIR HFA/PROVENTIL HFA/VENTOLIN HFA) 108 (90 Base) MCG/ACT inhaler - pa denied    Denial Date: 3/31/2020    Denial Rational:           Appeal Information:

## 2020-03-31 NOTE — TELEPHONE ENCOUNTER
PA Initiation    Medication: albuterol (PROAIR HFA/PROVENTIL HFA/VENTOLIN HFA) 108 (90 Base) MCG/ACT inhaler - pa pending  Insurance Company: [facility] - Phone [phone] Fax [phone]  Pharmacy Filling the Rx: [facility] - [place], [place] - [address]  Filling Pharmacy Phone: [phone]  Filling Pharmacy Fax: [phone]  Start Date: 3/31/2020

## 2020-04-03 ENCOUNTER — OFFICE VISIT (OUTPATIENT)
Dept: FAMILY MEDICINE | Facility: CLINIC | Age: 55
End: 2020-04-03
Payer: MEDICARE

## 2020-04-03 VITALS
BODY MASS INDEX: 43.12 KG/M2 | OXYGEN SATURATION: 96 % | HEART RATE: 75 BPM | WEIGHT: 228.4 LBS | HEIGHT: 61 IN | RESPIRATION RATE: 18 BRPM | DIASTOLIC BLOOD PRESSURE: 89 MMHG | SYSTOLIC BLOOD PRESSURE: 129 MMHG | TEMPERATURE: 97.9 F

## 2020-04-03 DIAGNOSIS — D84.9 IMMUNOSUPPRESSED STATUS (H): Primary | ICD-10-CM

## 2020-04-03 DIAGNOSIS — R07.81 RIB PAIN ON LEFT SIDE: ICD-10-CM

## 2020-04-03 DIAGNOSIS — N18.30 CKD (CHRONIC KIDNEY DISEASE) STAGE 3, GFR 30-59 ML/MIN (H): ICD-10-CM

## 2020-04-03 DIAGNOSIS — B37.0 CANDIDIASIS OF MOUTH: ICD-10-CM

## 2020-04-03 DIAGNOSIS — Z94.4 LIVER REPLACED BY TRANSPLANT (H): ICD-10-CM

## 2020-04-03 LAB
BUN SERPL-MCNC: 35.1 MG/DL (ref 7–19)
CALCIUM SERPL-MCNC: 10 MG/DL (ref 8.5–10.1)
CHLORIDE SERPLBLD-SCNC: 102.1 MMOL/L (ref 98–110)
CO2 SERPL-SCNC: 29.1 MMOL/L (ref 20–32)
CREAT SERPL-MCNC: 1.2 MG/DL (ref 0.5–1)
GFR SERPL CREATININE-BSD FRML MDRD: 49.3 ML/MIN/1.7 M2
GLUCOSE SERPL-MCNC: 107.3 MG'DL (ref 70–99)
PHOSPHATE SERPL-MCNC: 3.9 MG/DL (ref 2.5–4.5)
POTASSIUM SERPL-SCNC: 5.1 MMOL/L (ref 3.3–4.5)
PTH-INTACT SERPL-MCNC: 55 PG/ML (ref 18–80)
SODIUM SERPL-SCNC: 136.7 MMOL/L (ref 132.6–141.4)

## 2020-04-03 RX ORDER — NYSTATIN 100000/ML
500000 SUSPENSION, ORAL (FINAL DOSE FORM) ORAL 4 TIMES DAILY
Qty: 280 ML | Refills: 0 | Status: SHIPPED | OUTPATIENT
Start: 2020-04-03 | End: 2020-04-17

## 2020-04-03 ASSESSMENT — MIFFLIN-ST. JEOR: SCORE: 1568.4

## 2020-04-03 NOTE — NURSING NOTE
Due to patient being non-English speaking/uses sign language, an  was used for this visit. Only for face-to-face interpretation by an external agency, date and length of interpretation can be found on the scanned worksheet.     name: Daniel  Agency: [employer]  Language: Hungarian   Telephone number: [phone]  Type of interpretation: Telephone, spoken   Tiffanie Barreto MA on 4/3/2020 at 8:57 AM

## 2020-04-03 NOTE — PROGRESS NOTES
"       HPI       Flor Navarro is a 54 year old  who presents for   Chief Complaint   Patient presents with     Follow Up     Monthly Liver check up, pt states no other concerns       COVID-19   She has been isolating. She is wearing a mask and glvoes today because she is worried about coronavirus. We tired to reach out to her today, but she doesn't hav e courtneye. We now have Courtney's phone # to reach out (her son)   She would like to try video visits if possible.     Pain in rib cage   For 2 days she has had pain over her left rib cage, inferior to axilla, and she is worried it is due to her kidney. Worst at night.   She was prescribed lidocaine patches for the pain in her ribs, but her insurance is not covering them. She would like a substitute. We tried both 5% and 4% strengthes.   She has biofreeze for her knee but has never tried for this.     Itching   under in the base of her mouth and in her throat - reports that years ago she got a medicine from Dr. Doherty for this, was white, and she rinsed and spit it out. No white plaque on tongue, but reports white \"pimples\" that come and go and these are found around the mouth. SHe is immunosuppressed on mycophenolate and   Cyclosporine. She is able to teach back her immunosuppressive regimen accurately.  Deep in her throat feels scratching, like she has to reach back in there to scratch it.     Weight  She was trying to lose, and now due to COVID-19 she is unable to see the weight management clinic. She says insurance is not covering her weight loss doctors visits. She is interested in weight loss surgery.     CKD 3a  She hasn't been able to see nephrology. Was scheduled but went to [place]. Re-referred but now COVID-19     May 2019   w/ GFR 49, Cr ~1.2.Elev PTH w/ corrected Vit D, Ca borderline, Phos low   Started 0.25mg calcitriol bedtime.   Re-referred to nephro (last saw 2014, Dr. Wells) but she went to [place], and then rescheduled on her return, but then cancelled " "due to COVID-19 per pt.     Feb 2020  Repeat labs at her liver transplant visit in Feb showed GFR 46 Cr 1.29, urine protein 0.21. Did not have PTH or phos done.     Adherence and Exercise  Medication side effects: no  How often is a medication missed? Never      An Faroese  was used for  this visit.  Steve  +++++++    Problem, Medication and Allergy Lists were reviewed and updated if needed..    Patient is an established patient of this clinic..         Review of Systems:   Review of Systems  Gaining weight  Some swelling of LLE   Cough- no       Physical Exam:     Vitals:    04/03/20 0852 04/03/20 0854 04/03/20 0855   BP: 135/89 (!) 145/93 129/89   Pulse: 75     Resp: 18     Temp: 97.9  F (36.6  C)     TempSrc: Oral     SpO2: 96%     Weight: 103.6 kg (228 lb 6.4 oz)     Height: 1.549 m (5' 1\")       Body mass index is 43.16 kg/m .   Wt Readings from Last 10 Encounters:   04/03/20 103.6 kg (228 lb 6.4 oz)   02/26/20 100.8 kg (222 lb 3.2 oz)   02/17/20 101.7 kg (224 lb 4.8 oz)   12/31/19 102.7 kg (226 lb 6.4 oz)   12/17/19 102.8 kg (226 lb 9.6 oz)   12/05/19 102.2 kg (225 lb 6.4 oz)   11/18/19 102.3 kg (225 lb 9.6 oz)   08/08/19 98.2 kg (216 lb 9.6 oz)   07/17/19 101.2 kg (223 lb)   06/20/19 100 kg (220 lb 6.4 oz)     Vitals were reviewed and were normal     Physical Exam  Constitutional:       General: She is not in acute distress.     Appearance: She is obese.   HENT:      Mouth/Throat:      Lips: Pink.      Mouth: Mucous membranes are moist.      Tongue: No lesions.      Palate: No lesions.      Pharynx: Oropharynx is clear. Uvula midline. No pharyngeal swelling or oropharyngeal exudate.      Tonsils: No tonsillar exudate.      R buccal mucosa w/ small area of pallor, round  Clearly has itching as multiple times she reaches into her mouth to scratch her tongue and soft palate and attempts to redemonstrate this to me multiple times  She is wearing com[pression stockings       Results:      Results from " this visit  Results for orders placed or performed in visit on 04/03/20   Phosphorus     Status: None   Result Value Ref Range    Phosphorus 3.9 2.5 - 4.5 mg/dL   Basic Metabolic Panel ([facility]) - Results < 1hr     Status: Abnormal   Result Value Ref Range    Calcium 10.0 8.5 - 10.1 mg/dL    Chloride 102.1 98.0 - 110.0 mmol/L    Carbon Dioxide 29.1 20.0 - 32.0 mmol/L    Creatinine 1.2 (H) 0.5 - 1.0 mg/dL    Glucose 107.3 (H) 70.0 - 99.0 mg'dL    Potassium 5.1 (H) 3.3 - 4.5 mmol/L    Sodium 136.7 132.6 - 141.4 mmol/L    GFR Estimate 49.3 (L) >60.0 mL/min/1.7 m2    GFR Estimate If Black 59.6 (L) >60.0 mL/min/1.7 m2    Urea Nitrogen 35.1 (H) 7.0 - 19.0 mg/dL   Parathyroid Hormone Intact     Status: None   Result Value Ref Range    Parathyroid Hormone Intact 55 18 - 80 pg/mL   Vitamin D Deficiency     Status: None   Result Value Ref Range    Vitamin D Deficiency screening 53 20 - 75 ug/L       Assessment and Plan        Flor was seen today for follow up.    Diagnoses and all orders for this visit:    Immunosuppressed status (H)  -     nystatin (MYCOSTATIN) 519561 UNIT/ML suspension; Take 5 mLs (500,000 Units) by mouth 4 times daily for 14 days    CKD (chronic kidney disease) stage 3, GFR 30-59 ml/min (H)  -     Phosphorus  -     Basic Metabolic Panel ([facility]) - Results < 1hr  -     Cancel: Calcium ([facility])  -     Parathyroid Hormone Intact  -     Vitamin D Deficiency  Goals: corrected Ca x phos <55  mary jo phos 3.5-5.5  mary jo ca 8.4-10.2  Mary Jo iPTH   ALL AT GOAL except elevated potassium  Plan recheck    Rib pain on left side  -     Menthol, Topical Analgesic, (BIOFREEZE) 4 % GEL; Apply to left ribs 4x daily as needed pain    Candidiasis of mouth  -     nystatin (MYCOSTATIN) 090512 UNIT/ML suspension; Take 5 mLs (500,000 Units) by mouth 4 times daily for 14 days      Return in about 4 weeks (around 5/1/2020) for video visit.       Medications Discontinued During This Encounter   Medication Reason     cephALEXin  (KEFLEX) 500 MG capsule      lidocaine (LIDODERM) 5 % patch      Lidocaine (LIDOCARE) 4 % Patch      Lidocaine (LIDOCARE) 4 % Patch      order for DME        Options for treatment and follow-up care were reviewed with the patient. Flor Navarro  engaged in the decision making process and verbalized understanding of the options discussed and agreed with the final plan.    Karely Sierra MD

## 2020-04-03 NOTE — PATIENT INSTRUCTIONS
Here is the plan from today's visit    1. CKD (chronic kidney disease) stage 3, GFR 30-59 ml/min (H)  - Phosphorus  - Basic Metabolic Panel ([facility]) - Results < 1hr  - Parathyroid Hormone Intact  - Vitamin D Deficiency  We can adjust your calcitriol dose depending on your results from today. I will mail your results.     2. Rib pain on left side  - Menthol, Topical Analgesic, (BIOFREEZE) 4 % GEL; Apply to left ribs 4x daily as needed pain  Dispense: 118 mL; Refill: 11    3. Candidiasis of mouth  - nystatin (MYCOSTATIN) 250986 UNIT/ML suspension; Take 5 mLs (500,000 Units) by mouth 4 times daily for 14 days  Dispense: 280 mL; Refill: 0    Follow up plan  Phone or video visits in the future. Visits every month. We will contact you with video visit information      Karely Sierra MD

## 2020-04-03 NOTE — LETTER
April 6, 2020      Flor Navarro  [address]   [place] [address]        Dear Flor,    Thank you for getting your care at [facility]. Please see below for your test results.    1) Your Vitamin D is a little on the high side - I recommend you decrease your Vitamin D from 2000 to 1000 daily. I sent a NEW prescription to your regular pharmacy.     2) Your potassium is a little high. It just needs to be rechecked. Will you come for a visit with one of my colleagues, please? I will have our staff call you.     3) Your PTH level is very good! We should keep your calcitriol dose the SAME.    4) Your kidneys are not changing much - this is a good sign. We will watch closely until you can see the kidney doctor again in the future.       Resulted Orders   Phosphorus   Result Value Ref Range    Phosphorus 3.9 2.5 - 4.5 mg/dL   Basic Metabolic Panel ([facility]) - Results < 1hr   Result Value Ref Range    Calcium 10.0 8.5 - 10.1 mg/dL    Chloride 102.1 98.0 - 110.0 mmol/L    Carbon Dioxide 29.1 20.0 - 32.0 mmol/L    Creatinine 1.2 (H) 0.5 - 1.0 mg/dL    Glucose 107.3 (H) 70.0 - 99.0 mg'dL    Potassium 5.1 (H) 3.3 - 4.5 mmol/L    Sodium 136.7 132.6 - 141.4 mmol/L    GFR Estimate 49.3 (L) >60.0 mL/min/1.7 m2    GFR Estimate If Black 59.6 (L) >60.0 mL/min/1.7 m2    Urea Nitrogen 35.1 (H) 7.0 - 19.0 mg/dL   Parathyroid Hormone Intact   Result Value Ref Range    Parathyroid Hormone Intact 55 18 - 80 pg/mL   Vitamin D Deficiency   Result Value Ref Range    Vitamin D Deficiency screening 53 20 - 75 ug/L      Comment:      Season, race, dietary intake, and treatment affect the concentration of   25-hydroxy-Vitamin D. Values may decrease during winter months and increase   during summer months. Values 20-29 ug/L may indicate Vitamin D insufficiency   and values <20 ug/L may indicate Vitamin D deficiency.  Vitamin D determination is routinely performed by an immunoassay specific for   25 hydroxyvitamin D3.  If  an individual is on vitamin D2 (ergocalciferol)   supplementation, please specify 25 OH vitamin D2 and D3 level determination by   LCMSMS test [id number].             Sincerely,    Karely Sierra MD

## 2020-04-03 NOTE — Clinical Note
I was planning a follow up with this pt in 4 weeks by phone, but her potassium is elevated. She needs to come in sooner, this week sometime, for a recheck. In person, with anyone, so she can get lab drawn and acted on the same day. Reason for visit: elevated potassium, CKD3a. Thanks!  c

## 2020-04-06 LAB — DEPRECATED CALCIDIOL+CALCIFEROL SERPL-MC: 53 UG/L (ref 20–75)

## 2020-04-06 RX ORDER — VITAMIN B COMPLEX
25 TABLET ORAL DAILY
Qty: 100 TABLET | Refills: 3 | Status: SHIPPED | OUTPATIENT
Start: 2020-04-06 | End: 2021-02-02

## 2020-04-07 ENCOUNTER — MEDICAL CORRESPONDENCE (OUTPATIENT)
Dept: HEALTH INFORMATION MANAGEMENT | Facility: CLINIC | Age: 55
End: 2020-04-07

## 2020-04-07 NOTE — PROGRESS NOTES
Attempted to reach patient to schedule Potassium F/U. [facility] with call back.     Jaja Dixon, Senior Patient Representative/

## 2020-04-08 ENCOUNTER — OFFICE VISIT (OUTPATIENT)
Dept: FAMILY MEDICINE | Facility: CLINIC | Age: 55
End: 2020-04-08
Payer: MEDICARE

## 2020-04-08 VITALS
DIASTOLIC BLOOD PRESSURE: 75 MMHG | BODY MASS INDEX: 41.62 KG/M2 | OXYGEN SATURATION: 93 % | HEART RATE: 75 BPM | SYSTOLIC BLOOD PRESSURE: 107 MMHG | RESPIRATION RATE: 18 BRPM | WEIGHT: 226.2 LBS | TEMPERATURE: 97.7 F | HEIGHT: 62 IN

## 2020-04-08 DIAGNOSIS — D84.9 IMMUNOSUPPRESSED STATUS (H): ICD-10-CM

## 2020-04-08 DIAGNOSIS — N18.30 CKD (CHRONIC KIDNEY DISEASE) STAGE 3, GFR 30-59 ML/MIN (H): ICD-10-CM

## 2020-04-08 DIAGNOSIS — B37.0 CANDIDIASIS OF MOUTH: ICD-10-CM

## 2020-04-08 DIAGNOSIS — E87.5 HYPERKALEMIA: Primary | ICD-10-CM

## 2020-04-08 LAB
BUN SERPL-MCNC: 33.5 MG/DL (ref 7–19)
CALCIUM SERPL-MCNC: 9.8 MG/DL (ref 8.5–10.1)
CHLORIDE SERPLBLD-SCNC: 102.6 MMOL/L (ref 98–110)
CO2 SERPL-SCNC: 23 MMOL/L (ref 20–32)
CREAT SERPL-MCNC: 1.3 MG/DL (ref 0.5–1)
GFR SERPL CREATININE-BSD FRML MDRD: 45 ML/MIN/1.7 M2
GLUCOSE SERPL-MCNC: 99.5 MG'DL (ref 70–99)
POTASSIUM SERPL-SCNC: 5.2 MMOL/L (ref 3.3–4.5)
SODIUM SERPL-SCNC: 136.6 MMOL/L (ref 132.6–141.4)

## 2020-04-08 RX ORDER — SODIUM POLYSTYRENE SULFONATE 15 G/60ML
15 SUSPENSION ORAL; RECTAL ONCE
Qty: 120 ML | Refills: 0 | Status: SHIPPED | OUTPATIENT
Start: 2020-04-08 | End: 2020-06-05

## 2020-04-08 ASSESSMENT — PATIENT HEALTH QUESTIONNAIRE - PHQ9: SUM OF ALL RESPONSES TO PHQ QUESTIONS 1-9: 5

## 2020-04-08 ASSESSMENT — MIFFLIN-ST. JEOR: SCORE: 1566.35

## 2020-04-08 NOTE — NURSING NOTE
Due to patient being non-English speaking/uses sign language, an  was used for this visit. Only for face-to-face interpretation by an external agency, date and length of interpretation can be found on the scanned worksheet.     name: Thi Cobb  Agency: [employer]  Language: Spanish   Telephone number: [phone]      Type of interpretation: Telephone, spoken     Yolanda Dalton CMA

## 2020-04-08 NOTE — PROGRESS NOTES
"       LAKISHA       Flor Navarro is a 54 year old  who presents for   Chief Complaint   Patient presents with     RECHECK     Potassium level     Formulary Issue     Insurance not covering medication for the itching of the mouth.     Patient was seen by PCP on 4/3, found to have hyperkalemia. Asked to repeat potassium today.     States she has been feeling well. Occasionally has some shortness of breath that she associates with asthma, but nothing new. No chest pain or palpitations.   Admits to constipation.     Nystatin was not covered for itching of the mouth. Pt unsure why.     An Nepali  was used for  this visit.    +++++++    Problem, Medication and Allergy Lists were reviewed and updated if needed..    Patient is an established patient of this clinic..         Review of Systems:   Review of Systems         Physical Exam:     Vitals:    04/08/20 1317   BP: 107/75   Pulse: 75   Resp: 18   Temp: 97.7  F (36.5  C)   TempSrc: Oral   SpO2: 93%   Weight: 102.6 kg (226 lb 3.2 oz)   Height: 1.562 m (5' 1.5\")     Body mass index is 42.05 kg/m .  Vitals were reviewed and were normal     Physical Exam  Constitutional:       General: She is not in acute distress.  HENT:      Head: Normocephalic and atraumatic.   Eyes:      Conjunctiva/sclera: Conjunctivae normal.   Neck:      Musculoskeletal: Neck supple.   Pulmonary:      Effort: Pulmonary effort is normal.   Skin:     General: Skin is warm and dry.   Neurological:      Mental Status: She is alert and oriented to person, place, and time.   Psychiatric:         Judgment: Judgment normal.           Results:      Results from this visit  Results for orders placed or performed in visit on 04/08/20   Basic Metabolic Panel ([facility])     Status: Abnormal   Result Value Ref Range    Calcium 9.8 8.5 - 10.1 mg/dL    Chloride 102.6 98.0 - 110.0 mmol/L    Carbon Dioxide 23.0 20.0 - 32.0 mmol/L    Creatinine 1.3 (H) 0.5 - 1.0 mg/dL    Glucose 99.5 (H) 70.0 - 99.0 mg'dL    " Potassium 5.2 (H) 3.3 - 4.5 mmol/L    Sodium 136.6 132.6 - 141.4 mmol/L    GFR Estimate 45.0 (L) >60.0 mL/min/1.7 m2    GFR Estimate If Black 54.4 (L) >60.0 mL/min/1.7 m2    Urea Nitrogen 33.5 (H) 7.0 - 19.0 mg/dL       Assessment and Plan        Flor was seen today for recheck and formulary issue.    Diagnoses and all orders for this visit:    Hyperkalemia persists but is stable since last check 5 days ago. Will give kayexelate. Discussed how to take in detail. Take 1 dose today, must not take other medications for 3 hours before and 3 hours after this medication. If no bowel movement by tomorrow morning, take a second dose. Pt agrees to this plan.     Hyperkalemia is likely related to worsening CKD. Reviewed medications - may need to considering stopping ACEI (lisinopril) in light of hyperkalemia. Will ask patient to follow-up with PCP in 1 week for repeat BMP and further management.      CKD (chronic kidney disease) stage 3, GFR 30-59 ml/min (H)  -     Basic Metabolic Panel ([facility])    Hyperkalemia  -     sodium polystyrene (KAYEXALATE) 15 GM/60ML suspension; Take 60 mLs (15 g) by mouth once for 1 dose Avoid taking other oral medications 3 hours before or after this medication. Repeat once tomorrow if no bowel movement.  -     Basic Metabolic Panel ([facility]); Future    Immunosuppressed status (H)  Candidiasis of mouth  Confirmed with pharmacy - nystatin is ready to be picked up at [facility]. Will notify patient of this.          There are no discontinued medications.    Options for treatment and follow-up care were reviewed with the patient. Flor MEMO Navarro  engaged in the decision making process and verbalized understanding of the options discussed and agreed with the final plan.    Ayala De Leon DO

## 2020-04-13 ENCOUNTER — DOCUMENTATION ONLY (OUTPATIENT)
Dept: FAMILY MEDICINE | Facility: CLINIC | Age: 55
End: 2020-04-13

## 2020-04-13 NOTE — PROGRESS NOTES
"When opening a documentation only encounter, be sure to enter in \"Chief Complaint\" Forms and in \" Comments\" Title of form, description if needed.    Flor is a 54 year old  female  Form received via: Fax  Form now resides in: Provider Edward Mcdonnell CMA        Form has been completed by provider.     Form sent out via: Fax to [facility] at Fax Number: [phone]  Patient informed: N/A  Output date: April 16, 2020    Brooklyn Dangelo CMA      **Please close the encounter**              "

## 2020-04-16 ENCOUNTER — DOCUMENTATION ONLY (OUTPATIENT)
Dept: FAMILY MEDICINE | Facility: CLINIC | Age: 55
End: 2020-04-16

## 2020-04-16 NOTE — PROGRESS NOTES
"When opening a documentation only encounter, be sure to enter in \"Chief Complaint\" Forms and in \" Comments\" Title of form, description if needed.    Flor is a 54 year old  female  Form received via: Fax  Form now resides in: Provider Edward Dangelo CMA            Form has been completed by provider.     Form sent out via: Fax to [facility] at Fax Number: [phone]  Patient informed: N/A  Output date: April 21, 2020    Brooklyn Dangelo CMA      **Please close the encounter**          "

## 2020-04-16 NOTE — PROGRESS NOTES
HPI       Flor Navarro is a 54 year old  who presents for   Chief Complaint   Patient presents with     Lab Only     Just needs labs today     Here to re-check K  Has not yet taken Kayexalate yet - difficult to obtain.   No fatigue, muscle weakness  No chest pain, SOB - though she says at baseline sometimes short given asthma  No increased LE swelling    A Romansh  was used for  this visit.    +++++++      Problem, Medication and Allergy Lists were reviewed and updated if needed..    Patient is an established patient of this clinic..         Review of Systems:   Review of Systems   Constitutional: Negative for appetite change, diaphoresis, fatigue and fever.   Respiratory: Negative for chest tightness, shortness of breath and wheezing.    Cardiovascular: Negative for chest pain.   Gastrointestinal: Negative for abdominal pain.   Neurological: Negative for weakness and headaches.            Physical Exam:     Vitals:    04/17/20 1446   BP: 127/84   BP Location: Left arm   Patient Position: Sitting   Cuff Size: Adult Regular   Pulse: 77   Resp: 16   Temp: 98.4  F (36.9  C)   TempSrc: Oral   SpO2: 95%   Weight: 102.4 kg (225 lb 11.2 oz)   Height: 1.524 m (5')     Body mass index is 44.08 kg/m .  Vitals were reviewed and were normal     Physical Exam  Constitutional:       General: She is not in acute distress.     Appearance: She is well-developed. She is not diaphoretic.   HENT:      Head: Normocephalic and atraumatic.   Eyes:      Extraocular Movements: Extraocular movements intact.      Conjunctiva/sclera: Conjunctivae normal.   Neck:      Musculoskeletal: Normal range of motion.   Cardiovascular:      Rate and Rhythm: Normal rate and regular rhythm.      Heart sounds: No murmur.   Pulmonary:      Effort: Pulmonary effort is normal. No respiratory distress.      Breath sounds: No wheezing or rales.   Chest:      Chest wall: No tenderness.   Abdominal:      Palpations: Abdomen is soft.       Tenderness: There is no abdominal tenderness.   Musculoskeletal: Normal range of motion.      Right lower leg: No edema.      Left lower leg: No edema.   Neurological:      General: No focal deficit present.      Mental Status: She is alert.   Psychiatric:         Mood and Affect: Mood normal.           Results:      Results from this visit  Results for orders placed or performed in visit on 04/17/20   Basic Metabolic Panel ([facility])     Status: Abnormal   Result Value Ref Range    Calcium 9.5 8.5 - 10.1 mg/dL    Chloride 107.3 98.0 - 110.0 mmol/L    Carbon Dioxide 22.7 20.0 - 32.0 mmol/L    Creatinine 1.2 (H) 0.5 - 1.0 mg/dL    Glucose 97.2 70.0 - 99.0 mg'dL    Potassium 4.7 (H) 3.3 - 4.5 mmol/L    Sodium 136.6 132.6 - 141.4 mmol/L    GFR Estimate 47.5 (L) >60.0 mL/min/1.7 m2    GFR Estimate If Black 57.5 (L) >60.0 mL/min/1.7 m2    Urea Nitrogen 44.3 (H) 7.0 - 19.0 mg/dL       Assessment and Plan        Flor was seen today for lab only.    Diagnoses and all orders for this visit:    CKD (chronic kidney disease) stage 3, GFR 30-59 ml/min (H)  Hypertension, renal  Hyperkalemia  Hyperkalemia 5.1 > 5.2 > today 4.7. Asymptomatic. Previously rx'd kayexalate, has not yet utilized.  Likely 2/2 progressing CKD. Will discontinue lisinopril, switch to hydrochlorothiazide for BP control.  Told patient to NOT take kayexalate that she has finally just obtained.  Follow-up in 2 weeks with labs, BP check  -     Basic Metabolic Panel ([facility])  -     hydrochlorothiazide (HYDRODIURIL) 25 MG tablet; Take 1 tablet (25 mg) by mouth daily             Medications Discontinued During This Encounter   Medication Reason     dextromethorphan (DELSYM) 30 MG/5ML liquid      nystatin (MYCOSTATIN) 977529 UNIT/ML suspension      alendronate (FOSAMAX) 70 MG tablet      lisinopril (ZESTRIL) 10 MG tablet        Options for treatment and follow-up care were reviewed with the patient. Flor Navarro  engaged in the decision making process and  verbalized understanding of the options discussed and agreed with the final plan.    Lisa Mtz MD

## 2020-04-17 ENCOUNTER — OFFICE VISIT (OUTPATIENT)
Dept: FAMILY MEDICINE | Facility: CLINIC | Age: 55
End: 2020-04-17
Payer: MEDICARE

## 2020-04-17 VITALS
TEMPERATURE: 98.4 F | DIASTOLIC BLOOD PRESSURE: 84 MMHG | BODY MASS INDEX: 44.31 KG/M2 | HEIGHT: 60 IN | WEIGHT: 225.7 LBS | HEART RATE: 77 BPM | OXYGEN SATURATION: 95 % | SYSTOLIC BLOOD PRESSURE: 127 MMHG | RESPIRATION RATE: 16 BRPM

## 2020-04-17 DIAGNOSIS — N18.30 CKD (CHRONIC KIDNEY DISEASE) STAGE 3, GFR 30-59 ML/MIN (H): ICD-10-CM

## 2020-04-17 DIAGNOSIS — E87.5 HYPERKALEMIA: Primary | ICD-10-CM

## 2020-04-17 DIAGNOSIS — I12.9 HYPERTENSION, RENAL: ICD-10-CM

## 2020-04-17 LAB
BUN SERPL-MCNC: 44.3 MG/DL (ref 7–19)
CALCIUM SERPL-MCNC: 9.5 MG/DL (ref 8.5–10.1)
CHLORIDE SERPLBLD-SCNC: 107.3 MMOL/L (ref 98–110)
CO2 SERPL-SCNC: 22.7 MMOL/L (ref 20–32)
CREAT SERPL-MCNC: 1.2 MG/DL (ref 0.5–1)
GFR SERPL CREATININE-BSD FRML MDRD: 47.5 ML/MIN/1.7 M2
GLUCOSE SERPL-MCNC: 97.2 MG'DL (ref 70–99)
POTASSIUM SERPL-SCNC: 4.7 MMOL/L (ref 3.3–4.5)
SODIUM SERPL-SCNC: 136.6 MMOL/L (ref 132.6–141.4)

## 2020-04-17 RX ORDER — HYDROCHLOROTHIAZIDE 25 MG/1
25 TABLET ORAL DAILY
Qty: 30 TABLET | Refills: 1 | Status: SHIPPED | OUTPATIENT
Start: 2020-04-17 | End: 2020-05-27

## 2020-04-17 ASSESSMENT — ENCOUNTER SYMPTOMS
DIAPHORESIS: 0
ABDOMINAL PAIN: 0
CHEST TIGHTNESS: 0
FATIGUE: 0
HEADACHES: 0
SHORTNESS OF BREATH: 0
WEAKNESS: 0
WHEEZING: 0
APPETITE CHANGE: 0
FEVER: 0

## 2020-04-17 ASSESSMENT — MIFFLIN-ST. JEOR: SCORE: 1540.27

## 2020-04-17 NOTE — PROGRESS NOTES
Preceptor Attestation:   Patient seen, evaluated and discussed with the resident. I have verified the content of the note, which accurately reflects my assessment of the patient and the plan of care.   Supervising Physician:  Rogelio Quintero MD

## 2020-04-17 NOTE — PATIENT INSTRUCTIONS
STOP lisinopril 10 mg, and instead   START hydrochlorothiazide 25 mg daily.    Follow up in clinic in 2 weeks

## 2020-04-17 NOTE — NURSING NOTE
Due to patient being non-English speaking/uses sign language, an  was used for this visit. Only for face-to-face interpretation by an external agency, date and length of interpretation can be found on the scanned worksheet.     name: Steve  Agency: [employer]  Language: Serbian   Telephone number: [phone]  Type of interpretation: Telephone, spoken     Rosita Mcdonnell CMA on 4/17/2020 at 2:44 PM

## 2020-05-01 DIAGNOSIS — E87.5 HYPERKALEMIA: ICD-10-CM

## 2020-05-01 LAB
BUN SERPL-MCNC: 39.2 MG/DL (ref 7–19)
CALCIUM SERPL-MCNC: 9.5 MG/DL (ref 8.5–10.1)
CHLORIDE SERPLBLD-SCNC: 103.9 MMOL/L (ref 98–110)
CO2 SERPL-SCNC: 24.4 MMOL/L (ref 20–32)
CREAT SERPL-MCNC: 1.4 MG/DL (ref 0.5–1)
GFR SERPL CREATININE-BSD FRML MDRD: 40 ML/MIN/1.7 M2
GLUCOSE SERPL-MCNC: 116.2 MG'DL (ref 70–99)
POTASSIUM SERPL-SCNC: 5.7 MMOL/L (ref 3.3–4.5)
SODIUM SERPL-SCNC: 138.2 MMOL/L (ref 132.6–141.4)

## 2020-05-01 NOTE — Clinical Note
"Urgent appointment Monday need labs for potassium. Jaswinder you are IPP. If can't get her in with Jaswinder (I suggested your 1100 \"held\" appointment) then put in w/ me and i'll be 2nd IPP.   Triage - address if FD can't get to it due to overflowing inbasket over weekend.   c"

## 2020-05-01 NOTE — PROGRESS NOTES
Reviewed plan made by Dr. Cat and nursing. Pt to take kayexalate. Needs follow up Monday for repeat labs, in person visit. Please schedule at Dr. Stanford 1100 as IPP or as second option put me as second IPP and schedule her with me.

## 2020-05-04 ENCOUNTER — OFFICE VISIT (OUTPATIENT)
Dept: FAMILY MEDICINE | Facility: CLINIC | Age: 55
End: 2020-05-04
Payer: MEDICARE

## 2020-05-04 VITALS
SYSTOLIC BLOOD PRESSURE: 132 MMHG | WEIGHT: 228.8 LBS | OXYGEN SATURATION: 95 % | HEART RATE: 71 BPM | BODY MASS INDEX: 43.2 KG/M2 | DIASTOLIC BLOOD PRESSURE: 87 MMHG | HEIGHT: 61 IN | TEMPERATURE: 98.3 F | RESPIRATION RATE: 18 BRPM

## 2020-05-04 DIAGNOSIS — N18.30 CKD (CHRONIC KIDNEY DISEASE) STAGE 3, GFR 30-59 ML/MIN (H): ICD-10-CM

## 2020-05-04 DIAGNOSIS — E87.5 HYPERKALEMIA: Primary | ICD-10-CM

## 2020-05-04 DIAGNOSIS — K13.79 MOUTH SORES: ICD-10-CM

## 2020-05-04 LAB
ALBUMIN SERPL-MCNC: 4.1 MG/DL (ref 3.5–4.7)
ALP SERPL-CCNC: 83.9 U/L (ref 31.7–110.5)
ALT SERPL-CCNC: 18.9 U/L (ref 0–45)
AST SERPL-CCNC: 25.2 U/L (ref 0–45)
BILIRUB SERPL-MCNC: 0.6 MG/DL (ref 0.2–1.3)
BILIRUBIN DIRECT: 0.2 MG/DL (ref 0.1–0.3)
BUN SERPL-MCNC: 26.3 MG/DL (ref 7–19)
CALCIUM SERPL-MCNC: 9.3 MG/DL (ref 8.5–10.1)
CHLORIDE SERPLBLD-SCNC: 101.3 MMOL/L (ref 98–110)
CO2 SERPL-SCNC: 28.1 MMOL/L (ref 20–32)
CREAT SERPL-MCNC: 1.1 MG/DL (ref 0.5–1)
GFR SERPL CREATININE-BSD FRML MDRD: 53.8 ML/MIN/1.7 M2
GLUCOSE SERPL-MCNC: 111.6 MG'DL (ref 70–99)
POTASSIUM SERPL-SCNC: 5.3 MMOL/L (ref 3.3–4.5)
PROT SERPL-MCNC: 7.2 G/DL (ref 6.8–8.8)
SODIUM SERPL-SCNC: 132.9 MMOL/L (ref 132.6–141.4)

## 2020-05-04 RX ORDER — DIPHENHYDRAMINE HYDROCHLORIDE AND LIDOCAINE HYDROCHLORIDE AND ALUMINUM HYDROXIDE AND MAGNESIUM HYDRO
5-10 KIT EVERY 6 HOURS PRN
Qty: 1 BOTTLE | Refills: 3 | Status: SHIPPED | OUTPATIENT
Start: 2020-05-04 | End: 2020-11-18

## 2020-05-04 ASSESSMENT — MIFFLIN-ST. JEOR: SCORE: 1564.33

## 2020-05-04 NOTE — PROGRESS NOTES
HPI  54 year old female here for evaluation of several issues.  A Sealed  was used for  this visit.      1. Hyperkalemia  K 5.7 on 5/1/2020. Asymptomatic. Creat 1.4 (usual 1.1)  No symptoms now  Off ACEI  No other K releasing issues appear relevant  Need to look to other meds for possible K interaction (ie cyclosporin - unclear if taking)    Started on hydrochlorothiazide on 4/17/2020  Unclear if taking  May need to switch to lasix  May need to consider resin binder          2. Itching mouth  Long standing  Given nystatin last visit - not helpful  Will try magic mouthwash      3. COVID  Discuss prevention        ROS  6 point ROS neg other than the symptoms noted above in the HPI.    MEDS  Current Outpatient Medications   Medication     acetaminophen (TYLENOL) 500 MG tablet     albuterol (ACCUNEB) 0.63 MG/3ML neb solution     albuterol (PROAIR HFA/PROVENTIL HFA/VENTOLIN HFA) 108 (90 Base) MCG/ACT inhaler     amLODIPine (NORVASC) 5 MG tablet     calcium carbonate 600 mg-vitamin D 400 units (CALCIUM 600 + D) 600-400 MG-UNIT per tablet     camphor-menthol-methyl sal 4-10-30 % CREA     capsaicin (ZOSTRIX) 0.025 % external cream     carboxymethylcellulose (REFRESH PLUS) 0.5 % SOLN ophthalmic solution     dimethicone (AVEENO DAILY MOISTURIZING) 1.3 % LOTN lotion     lidocaine (XYLOCAINE) 2 % external gel     melatonin 3 MG tablet     Menthol, Topical Analgesic, (BIOFREEZE) 4 % GEL     Menthol, Topical Analgesic, (BIOFREEZE) 4 % GEL     olopatadine (PATANOL) 0.1 % ophthalmic solution     omeprazole (PRILOSEC) 20 MG DR capsule     order for DME     order for DME     psyllium 400 MG capsule     senna-docusate (SENOKOT-S/PERICOLACE) 8.6-50 MG tablet     Vitamin D3 (CHOLECALCIFEROL) 25 mcg (1000 units) tablet     calcitRIOL (ROCALTROL) 0.25 MCG capsule     cycloSPORINE modified (GENERIC EQUIVALENT) 25 MG capsule     Elastic Bandages & Supports (KNEE BRACE/FLEX STAYS LARGE) MISC     Elastic Bandages & Supports  "(MEDICAL COMPRESSION SOCKS) MISC     hydrochlorothiazide (HYDRODIURIL) 25 MG tablet     multivitamin w/minerals (MULTI-VITAMIN) tablet     mycophenolate (GENERIC EQUIVALENT) 250 MG capsule     naltrexone (DEPADE/REVIA) 50 MG tablet     order for DME     order for DME     phenol-menthol (CEPASTAT) 14.5 MG lozenge     STATIN NOT PRESCRIBED, INTENTIONAL,     Current Facility-Administered Medications   Medication     albuterol (PROVENTIL) neb solution 2.5 mg     apraclonidine (IOPIDINE) 1 % ophthalmic solution 1 drop         SOC      FHx  Family History   Problem Relation Age of Onset     Hepatitis Other         Hep C, still in [place]     Cerebrovascular Disease Other      Cancer No family hx of      Diabetes No family hx of      Hypertension No family hx of      Thyroid Disease No family hx of      Glaucoma No family hx of      Macular Degeneration No family hx of        PHYSICAL EXAMINATION:  Vital signs: /87   Pulse 71   Temp 98.3  F (36.8  C) (Oral)   Resp 18   Ht 1.54 m (5' 0.63\")   Wt 103.8 kg (228 lb 12.8 oz)   LMP  (LMP Unknown)   SpO2 95%   BMI 43.76 kg/m      Gen: no distress  HEENT:  mouth - normal  Lungs: clear  Cor: RRR, no S3 S4 murmur or rub  Ext: difficult to assess with stockings        LABS    K= 5.3 today    Component      Latest Ref Rng & Units 4/3/2020 4/8/2020 4/17/2020 5/1/2020   Potassium      3.3 - 4.5 mmol/L 5.1 (H) 5.2 (H) 4.7 (H) 5.7 (H)     Results for orders placed or performed in visit on 05/04/20   Basic Metabolic Panel ([facility])     Status: Abnormal   Result Value Ref Range    Calcium 9.3 8.5 - 10.1 mg/dL    Chloride 101.3 98.0 - 110.0 mmol/L    Carbon Dioxide 28.1 20.0 - 32.0 mmol/L    Creatinine 1.1 (H) 0.5 - 1.0 mg/dL    Glucose 111.6 (H) 70.0 - 99.0 mg'dL    Potassium 5.3 (H) 3.3 - 4.5 mmol/L    Sodium 132.9 132.6 - 141.4 mmol/L    GFR Estimate 53.8 (L) >60.0 mL/min/1.7 m2    GFR Estimate If Black 65.1 >60.0 mL/min/1.7 m2    Urea Nitrogen 26.3 (H) 7.0 - 19.0 mg/dL "       ASSESSMENT/PLAN    1. Hyperkalemia  2. CKD (chronic kidney disease) stage 3, GFR 30-59 ml/min (H)  In general - stable  Potassium and creatinine better today.  Potassium in a non-critical level  She is off ACEI  No crush injuries  Will have PharmD review for other K sparing meds / K elevating meds (ie cyclosporin)      Started on hydrochlorothiazide 4/17/2020  Unclear if she is still taking it. A nurse sets up meds  Discussed with PharmD    -- Consider starting lasix 20mg and stopping hydrochlorothiazide    -- he will investigate K binders for future reference (see below)    I will discuss with Dr Sierra re: lasix and then follow-up visit  Will need to call patient        - Basic Metabolic Panel ([facility])  - Hepatic Panel ([facility])    3. Mouth itching  No improvement with nystatin swish  Dr Doherty gave her another medication years ago  Will try magic mouthwash    - magic mouthwash suspension, diphenhydrAMINE, lidocaine, aluminum-magnesium & simethicone, (FIRST-MOUTHWASH BLM) compounding kit; Swish and swallow 5-10 mLs in mouth every 6 hours as needed for mouth sores  Dispense: 1 Bottle; Refill: 3    SAUD PAYAN    Addendum 5/5/2020  Follow-up visit in 2-3 weeks with Hien Sierra  See message below from PharmD    1) Medications that can increase K+?   Yes, Both Cyclosporin and Mycophenolate can cause this.  Cyclosporin can cause K+ increase even when renal function has not changed.  Mycophenolate has a 22% incidence in K+ increase in liver transplant patients.  Both of these meds have been on board for a long time, but I imagine they could still be on the list of things to cause K+ to increase.     2) Use of K+ binders for chronic elevated K+?    I believe the K+ binder would be appropriate.  Kayexalate (sodium polystyrene sulfate) may be used 15-60g 1-4 times a day.  I would recommend 15 g (oral solution) once a day and target 30 g total. (given as 15 g Q12 hours). Would recommend electrolyte monitoring after  48 hours of treatment and then within the first 2 weeks.     There are other K+ binders on the market too, but I think this is a good place to start.     Thanks for the question,   Bert Benites, Pharm.D.   [phone]  (pager)     Bottom line  Consider Lasix  Consider kayexalate  Can not stop meds that can cause elevated K  PHarper

## 2020-05-04 NOTE — PATIENT INSTRUCTIONS
Hyperkalemia (high potassium levels)    The potassium is better, but still high.  I will talk with your Doctor and the pharmacist to see if it is time to add a new medication to bring the potassium down    We will call you.      PHarper    Potassium level today = 5.3  Last potassium = 5.7 (5/1/2020)  Previous  5.2, 5.1, 4.9

## 2020-05-04 NOTE — NURSING NOTE
Due to patient being non-English speaking/uses sign language, an  was used for this visit. Only for face-to-face interpretation by an external agency, date and length of interpretation can be found on the scanned worksheet.     name: ID: [id number]  Agency: [employer] - telephone  Language: Sami   Telephone number: [phone]  Type of interpretation: Telephone, spoken     Shelley Osei CMA

## 2020-05-26 ENCOUNTER — OFFICE VISIT (OUTPATIENT)
Dept: FAMILY MEDICINE | Facility: CLINIC | Age: 55
End: 2020-05-26
Payer: MEDICARE

## 2020-05-26 VITALS
TEMPERATURE: 98.5 F | HEART RATE: 77 BPM | WEIGHT: 226 LBS | DIASTOLIC BLOOD PRESSURE: 88 MMHG | HEIGHT: 60 IN | SYSTOLIC BLOOD PRESSURE: 129 MMHG | BODY MASS INDEX: 44.37 KG/M2 | OXYGEN SATURATION: 95 %

## 2020-05-26 DIAGNOSIS — J20.9 ACUTE BRONCHITIS, UNSPECIFIED ORGANISM: ICD-10-CM

## 2020-05-26 DIAGNOSIS — Z79.899 POLYPHARMACY: ICD-10-CM

## 2020-05-26 DIAGNOSIS — Z94.4 LIVER TRANSPLANTED (H): ICD-10-CM

## 2020-05-26 DIAGNOSIS — E87.5 HYPERKALEMIA: Primary | ICD-10-CM

## 2020-05-26 LAB
ANION GAP SERPL CALCULATED.3IONS-SCNC: 6 MMOL/L (ref 3–14)
BUN SERPL-MCNC: 30 MG/DL (ref 7–30)
CALCIUM SERPL-MCNC: 9.4 MG/DL (ref 8.5–10.1)
CHLORIDE SERPL-SCNC: 105 MMOL/L (ref 94–109)
CO2 SERPL-SCNC: 26 MMOL/L (ref 20–32)
CREAT SERPL-MCNC: 1.21 MG/DL (ref 0.52–1.04)
GFR SERPL CREATININE-BSD FRML MDRD: 50 ML/MIN/{1.73_M2}
GLUCOSE SERPL-MCNC: 110 MG/DL (ref 70–99)
POTASSIUM SERPL-SCNC: 5.3 MMOL/L (ref 3.4–5.3)
SODIUM SERPL-SCNC: 137 MMOL/L (ref 133–144)

## 2020-05-26 RX ORDER — ALBUTEROL SULFATE 90 UG/1
2 AEROSOL, METERED RESPIRATORY (INHALATION) 4 TIMES DAILY
Qty: 1 INHALER | Refills: 1 | Status: SHIPPED | OUTPATIENT
Start: 2020-05-26 | End: 2020-10-14

## 2020-05-26 ASSESSMENT — MIFFLIN-ST. JEOR: SCORE: 1541.63

## 2020-05-26 NOTE — PATIENT INSTRUCTIONS
If your potassium is still high, we will make two changes:    1. Change the water pill that you are taking  Start furosemide (lasix)   This is different medication than the hydrochlorothiazide      2. Start a medication that lowers potassium  It binds the potassium in the gut   One name is kayexalate.      We will wait to make these changes until the blood results are back.    Dean

## 2020-05-26 NOTE — PROGRESS NOTES
HPI  54 year old female here for evaluation of several issues.  A Indonesian  was used for  this visit.    Chaotic visit  Poor historian    1. HyperK  Unclear if she has been taking hydrochlorothiazide as Rx'd  New K = 5.3  See previous notes and planning (lasix + kayexalate)  Will start Lasix and not start kayexalate    2. Polypharmacy   Needs formal med rec      ROS  6 point ROS neg other than the symptoms noted above in the HPI.    MEDS  Current Outpatient Medications   Medication     acetaminophen (TYLENOL) 500 MG tablet     albuterol (ACCUNEB) 0.63 MG/3ML neb solution     albuterol (PROAIR HFA/PROVENTIL HFA/VENTOLIN HFA) 108 (90 Base) MCG/ACT inhaler     amLODIPine (NORVASC) 5 MG tablet     carboxymethylcellulose (REFRESH PLUS) 0.5 % SOLN ophthalmic solution     melatonin 3 MG tablet     olopatadine (PATANOL) 0.1 % ophthalmic solution     omeprazole (PRILOSEC) 20 MG DR capsule     Vitamin D3 (CHOLECALCIFEROL) 25 mcg (1000 units) tablet     calcitRIOL (ROCALTROL) 0.25 MCG capsule     calcium carbonate 600 mg-vitamin D 400 units (CALCIUM 600 + D) 600-400 MG-UNIT per tablet     camphor-menthol-methyl sal 4-10-30 % CREA     capsaicin (ZOSTRIX) 0.025 % external cream     cycloSPORINE modified (GENERIC EQUIVALENT) 25 MG capsule     dimethicone (AVEENO DAILY MOISTURIZING) 1.3 % LOTN lotion     Elastic Bandages & Supports (KNEE BRACE/FLEX STAYS LARGE) MISC     Elastic Bandages & Supports (MEDICAL COMPRESSION SOCKS) MISC     hydrochlorothiazide (HYDRODIURIL) 25 MG tablet     lidocaine (XYLOCAINE) 2 % external gel     magic mouthwash suspension, diphenhydrAMINE, lidocaine, aluminum-magnesium & simethicone, (FIRST-MOUTHWASH BLM) compounding kit     Menthol, Topical Analgesic, (BIOFREEZE) 4 % GEL     multivitamin w/minerals (MULTI-VITAMIN) tablet     mycophenolate (GENERIC EQUIVALENT) 250 MG capsule     naltrexone (DEPADE/REVIA) 50 MG tablet     order for DME     order for DME     order for DME     order for DME      psyllium 400 MG capsule     senna-docusate (SENOKOT-S/PERICOLACE) 8.6-50 MG tablet     STATIN NOT PRESCRIBED, INTENTIONAL,     Current Facility-Administered Medications   Medication     albuterol (PROVENTIL) neb solution 2.5 mg     apraclonidine (IOPIDINE) 1 % ophthalmic solution 1 drop         SOC      FHx  Family History   Problem Relation Age of Onset     Hepatitis Other         Hep C, still in [place]     Cerebrovascular Disease Other      Cancer No family hx of      Diabetes No family hx of      Hypertension No family hx of      Thyroid Disease No family hx of      Glaucoma No family hx of      Macular Degeneration No family hx of        PHYSICAL EXAMINATION:  Vital signs: /88 (BP Location: Right arm, Patient Position: Sitting, Cuff Size: Adult Large)   Pulse 77   Temp 98.5  F (36.9  C) (Oral)   Ht 1.524 m (5')   Wt 102.5 kg (226 lb)   LMP  (LMP Unknown)   SpO2 95%   Breastfeeding No   BMI 44.14 kg/m      Gen: no distress  Lungs - clear  Ext: L leg larger than R        LABS     Results for orders placed or performed in visit on 05/26/20   Basic metabolic panel     Status: Abnormal   Result Value Ref Range    Sodium 137 133 - 144 mmol/L    Potassium 5.3 3.4 - 5.3 mmol/L    Chloride 105 94 - 109 mmol/L    Carbon Dioxide 26 20 - 32 mmol/L    Anion Gap 6 3 - 14 mmol/L    Glucose 110 (H) 70 - 99 mg/dL    Urea Nitrogen 30 7 - 30 mg/dL    Creatinine 1.21 (H) 0.52 - 1.04 mg/dL    GFR Estimate 50 (L) >60 mL/min/[1.73_m2]    GFR Estimate If Black 58 (L) >60 mL/min/[1.73_m2]    Calcium 9.4 8.5 - 10.1 mg/dL         Component      Latest Ref Rng & Units 12/5/2019 2/17/2020 4/3/2020 4/8/2020   Potassium      3.4 - 5.3 mmol/L 4.5 4.9 5.1 (H) 5.2 (H)     Component      Latest Ref Rng & Units 4/17/2020 5/1/2020 5/4/2020 5/26/2020   Potassium      3.4 - 5.3 mmol/L 4.7 (H) 5.7 (H) 5.3 (H) 5.3         ASSESSMENT/PLAN    1. Hyperkalemia  K at borderline  Start Lasix 20mg daily  Do not start kayexalate yet    RTC 2 weeks  - check Na, K (BMP) on lasix      - Basic metabolic panel  - furosemide (LASIX) 20 MG tablet; Take 1 tablet (20 mg) by mouth daily  Dispense: 30 tablet; Refill: 1    2. Liver transplanted (H)      3. Polypharmacy  Son juanita  Has home nurse to set up meds  Brings in napkin full of meds  States she is not taking hydrochlorothiazide but is on list  Uncertain if taking cyclosporin.    Med rec by PharmJAMES VILLAVICENCIO MD      Called [facility] [phone]  [facility]. Decide if kayexalate to be added  Will also do referral to pharmacist for med rec

## 2020-05-26 NOTE — NURSING NOTE
Due to patient being non-English speaking/uses sign language, an  was used for this visit. Only for face-to-face interpretation by an external agency, date and length of interpretation can be found on the scanned worksheet.     name: Jadon Diego  Agency: [employer]  Language: Lao   Telephone number: [phone]  Type of interpretation: Telephone, spoken     Brooklyn Dangelo CMA

## 2020-05-27 ENCOUNTER — TELEPHONE (OUTPATIENT)
Dept: FAMILY MEDICINE | Facility: CLINIC | Age: 55
End: 2020-05-27

## 2020-05-27 RX ORDER — FUROSEMIDE 20 MG
20 TABLET ORAL DAILY
Qty: 30 TABLET | Refills: 1 | Status: SHIPPED | OUTPATIENT
Start: 2020-05-27 | End: 2020-05-27

## 2020-05-27 RX ORDER — FUROSEMIDE 20 MG
20 TABLET ORAL DAILY
Qty: 30 TABLET | Refills: 1 | Status: SHIPPED | OUTPATIENT
Start: 2020-05-27 | End: 2020-06-30

## 2020-05-27 NOTE — TELEPHONE ENCOUNTER
[facility] RN calling to speak with an RN or PCP to discuss the patients medications. RN stated the patient was asked to take pictures of her mediations and have them sent to the clinic but the HC RN would prefer to speak with someone directly to go over the medications to make sure everything is what its supposed to be. Please advise.     [facility] RN # [phone]

## 2020-06-03 ENCOUNTER — TELEPHONE (OUTPATIENT)
Dept: PHARMACY | Facility: PHYSICIAN GROUP | Age: 55
End: 2020-06-03

## 2020-06-03 DIAGNOSIS — E66.01 CLASS 2 SEVERE OBESITY DUE TO EXCESS CALORIES WITH SERIOUS COMORBIDITY IN ADULT, UNSPECIFIED BMI (H): ICD-10-CM

## 2020-06-03 DIAGNOSIS — E66.812 CLASS 2 SEVERE OBESITY DUE TO EXCESS CALORIES WITH SERIOUS COMORBIDITY IN ADULT, UNSPECIFIED BMI (H): ICD-10-CM

## 2020-06-03 RX ORDER — ALENDRONATE SODIUM 70 MG/1
70 TABLET ORAL
COMMUNITY
End: 2021-01-29

## 2020-06-03 NOTE — TELEPHONE ENCOUNTER
PHARMACY TELEPHONE ENCOUNTER:    Reason: Medication Reconciliation    I called this patient's home care nurse Vinnie to complete a medication reconciliation for this patient. Vinnie reported that she sets the following medications up for the patient weekly.        Tylenol 500 mg as needed every 6 hours for pain    Albuterol sulfate (inhaler and neb) as needed ( hasnt taken it in a long time)    Alendronate 70 mg once weekly    Biofreeze    Calcitriol 0.25 mgonce daily    Husam + vitamin once daily    Cellcept 250 mg 2 caps BID    Cerevite multivitamin    [id number] mg 4 capsules BID    Melatonin 3 mg nightly    Naltrexone 50 mg     Omeprazole 20 mg daily    Refresh optive 0.5-0.9% eyedrops PRN    Reguloid 2 caps BID    Senna S 8.6-50 mg 2 tabs BID PRN    Vitamin d 2000 units daily    Lidocaine 5% topical patch 3 patches BID PRN    Nicol mouth wash    Antoinepatriciadev states that the patient is not on any blood pressure medications. Says she was told to stop the hydrochlorothiazide and wait to get the furosemide. Still has not received the furosemide or kayexalate.    Rusty Arce, Pharm. D.

## 2020-06-05 RX ORDER — LIDOCAINE 50 MG/G
3 PATCH TOPICAL 2 TIMES DAILY PRN
COMMUNITY
Start: 2020-06-05 | End: 2021-07-10

## 2020-06-06 ENCOUNTER — MEDICAL CORRESPONDENCE (OUTPATIENT)
Dept: HEALTH INFORMATION MANAGEMENT | Facility: CLINIC | Age: 55
End: 2020-06-06

## 2020-06-10 ENCOUNTER — TELEPHONE (OUTPATIENT)
Dept: FAMILY MEDICINE | Facility: CLINIC | Age: 55
End: 2020-06-10

## 2020-06-10 ENCOUNTER — DOCUMENTATION ONLY (OUTPATIENT)
Dept: FAMILY MEDICINE | Facility: CLINIC | Age: 55
End: 2020-06-10

## 2020-06-10 ENCOUNTER — TELEPHONE (OUTPATIENT)
Dept: TRANSPLANT | Facility: CLINIC | Age: 55
End: 2020-06-10

## 2020-06-10 NOTE — PROGRESS NOTES
"When opening a documentation only encounter, be sure to enter in \"Chief Complaint\" Forms and in \" Comments\" Title of form, description if needed.    Flor is a 54 year old  female  Form received via: Fax  Form now resides in: Provider Edward Dangelo CMA          Form has been completed by provider.     Form sent out via: Fax to [facility] at Fax Number: [phone]  Patient informed: N/A  Output date: June 18, 2020    Brooklyn Dangelo CMA      **Please close the encounter**            "

## 2020-06-10 NOTE — TELEPHONE ENCOUNTER
Confirmed video visit date and time with patient.  Also, confirmed patient's Phone number for text, and sent video visit Instructions ([email or web address]) to patient via [employer].

## 2020-06-10 NOTE — TELEPHONE ENCOUNTER
PT requests refill of hydrochlorothiazide, didn't see on pt's med list  Thank you!  Lila Rodriguez CPhT  [facility]

## 2020-06-15 ENCOUNTER — VIRTUAL VISIT (OUTPATIENT)
Dept: GASTROENTEROLOGY | Facility: CLINIC | Age: 55
End: 2020-06-15
Attending: INTERNAL MEDICINE
Payer: MEDICARE

## 2020-06-15 DIAGNOSIS — Z53.9 ERRONEOUS ENCOUNTER--DISREGARD: Primary | ICD-10-CM

## 2020-06-15 ASSESSMENT — PAIN SCALES - GENERAL: PAINLEVEL: NO PAIN (0)

## 2020-06-15 NOTE — PROGRESS NOTES
"Flor Navarro is a 54 year old female who is being evaluated via a billable telephone visit.      The patient has been notified of following:     \"This telephone visit will be conducted via a call between you and your physician/provider. We have found that certain health care needs can be provided without the need for a physical exam.  This service lets us provide the care you need with a short phone conversation.  If a prescription is necessary we can send it directly to your pharmacy.  If lab work is needed we can place an order for that and you can then stop by our lab to have the test done at a later time.    Telephone visits are billed at different rates depending on your insurance coverage. During this emergency period, for some insurers they may be billed the same as an in-person visit.  Please reach out to your insurance provider with any questions.    If during the course of the call the physician/provider feels a telephone visit is not appropriate, you will not be charged for this service.\"    Patient has given verbal consent for Telephone visit?  Yes    What phone number would you like to be contacted at? [phone]    How would you like to obtain your AVS? Mail a copy    Phone call duration: 0 minutes    I was unable to reach the patient x2      Pt unable to log into [employer]. Switched to telephone call per Courtney request.  Adeline Sharma CMA  6/15/2020 10:40 AM    This encounter was opened in error. Please disregard.  "

## 2020-06-15 NOTE — LETTER
"    6/15/2020         RE: Flor Navarro  [address]  [place] [address]        Dear Colleague,    Thank you for referring your patient, Flor Navarro, to the [facility]PAT[facility]. Please see a copy of my visit note below.    Flor Navarro is a 54 year old female who is being evaluated via a billable telephone visit.      The patient has been notified of following:     \"This telephone visit will be conducted via a call between you and your physician/provider. We have found that certain health care needs can be provided without the need for a physical exam.  This service lets us provide the care you need with a short phone conversation.  If a prescription is necessary we can send it directly to your pharmacy.  If lab work is needed we can place an order for that and you can then stop by our lab to have the test done at a later time.    Telephone visits are billed at different rates depending on your insurance coverage. During this emergency period, for some insurers they may be billed the same as an in-person visit.  Please reach out to your insurance provider with any questions.    If during the course of the call the physician/provider feels a telephone visit is not appropriate, you will not be charged for this service.\"    Patient has given verbal consent for Telephone visit?  Yes    What phone number would you like to be contacted at? [phone]    How would you like to obtain your AVS? Mail a copy    Phone call duration: 0 minutes    I was unable to reach the patient x2      Pt unable to log into [employer]. Switched to telephone call per [facility] request.  Adeline Sharma CMA  6/15/2020 10:40 AM    This encounter was opened in error. Please disregard.      Again, thank you for allowing me to participate in the care of your patient.        Sincerely,        Laron Anne MD    "

## 2020-06-16 NOTE — TELEPHONE ENCOUNTER
Routing to PCP (or covering provider) to please order if she should be taking this. It looks like she has lasix already. Please advise.  Elisabet Olson RN

## 2020-06-17 NOTE — TELEPHONE ENCOUNTER
hydrochlorothiazide switched to lasix on 5/26. Refill of hydrochlorothiazide inappropriate. Patient needs virtual visit to confirm (and monitor) her lasix.    - please call to schedule with any provider or PharmD  -Gabrielle Mattson MD

## 2020-06-23 ENCOUNTER — DOCUMENTATION ONLY (OUTPATIENT)
Dept: FAMILY MEDICINE | Facility: CLINIC | Age: 55
End: 2020-06-23

## 2020-06-23 NOTE — PROGRESS NOTES
"When opening a documentation only encounter, be sure to enter in \"Chief Complaint\" Forms and in \" Comments\" Title of form, description if needed.    Flor is a 54 year old  female  Form received via: Fax  Form now resides in: Provider Ready    Tiffanie Barreto MA                  "

## 2020-06-24 DIAGNOSIS — G63 NEUROPATHY DUE TO MEDICAL CONDITION (H): ICD-10-CM

## 2020-06-24 DIAGNOSIS — G81.91 HEMIPLEGIA OF RIGHT DOMINANT SIDE DUE TO INFARCTION OF BRAIN (H): Primary | ICD-10-CM

## 2020-06-24 DIAGNOSIS — I63.9 HEMIPLEGIA OF RIGHT DOMINANT SIDE DUE TO INFARCTION OF BRAIN (H): Primary | ICD-10-CM

## 2020-06-24 NOTE — TELEPHONE ENCOUNTER
Pt's Home Care Nurse requesting Aveeno Daily Moisturizing 1.3 Lotion RX    Thank you,  Lorri Herrera  PTSR III   Team

## 2020-06-24 NOTE — PROGRESS NOTES
Form has been completed by provider.     Form sent out via: Fax to [facility] at Fax Number: [phone]  Patient informed: N/A  Output date: June 24, 2020    Brooklyn Dangelo CMA      **Please close the encounter**

## 2020-06-25 DIAGNOSIS — Z94.4 LIVER REPLACED BY TRANSPLANT (H): Primary | ICD-10-CM

## 2020-06-26 NOTE — PROGRESS NOTES
Outpatient Physical Therapy Discharge Note     Patient: Flor Navarro  : 1965    Beginning/End Dates of Reporting Period:  19 to 19    Referring Provider: Dr. Karely Sierra MD    Therapy Diagnosis: Repeated Falls (R29.6)     Client Self Report: see eval    Objective Measurements:  Objective Measure: TUG  Details: 23.5  Objective Measure: mCSTIB  Details: Conditions 1,4,5: 30 seconds. Condition 2: 25 seconds      Goals:  Goal Identifier Gait   Goal Description Patient will ambulate 25 feet with device in 7 seconds to improve to safe community ambulator status   Target Date 19   Date Met      Progress:     Goal Identifier Balance   Goal Description Patient will perform TUG, with device, in under 20 seconds to decrease fall risk and improve independence with mobility   Target Date 19   Date Met      Progress:     Goal Identifier Falls   Goal Description Patient will report no falls in 4 weeks for return to safe functional mobility   Target Date 19   Date Met      Progress:     Goal Identifier HEP   Goal Description Patient will be independent in HEP for maintenance of therapy gains at d/c   Target Date 19   Date Met      Progress:       Progress Toward Goals:   Unable to formally assess goals due to 1 treatment session then discharged patient due to transition to home care PT due to limited community mobility at this time.    Plan:  Other:  Collaborated with Dr. Sierra regarding recommendation for rollator to improve stability for household and community mobility. Discharged patient due to transition to home care PT due to limited community mobility at this time.    Discharge:    Reason for Discharge: patient transitioned to home health PT due to limited functional mobility at this time    Equipment Issued: Recommended rollator for improved stability for household and community ambulation and to decrease risk for falls    Discharge Plan: transition to home health PT for continued  Phase III Cardiopulmonary Rehab:  Pt attended self pay maintenance exercise session. Vitals and exercise logged per pt.      functional mobility upgrading.

## 2020-06-29 ENCOUNTER — TELEPHONE (OUTPATIENT)
Dept: OPHTHALMOLOGY | Facility: CLINIC | Age: 55
End: 2020-06-29

## 2020-06-29 NOTE — TELEPHONE ENCOUNTER
[facility] Call Center    Phone Message    May a detailed message be left on voicemail: yes     Reason for Call: Symptoms or Concerns     If patient has red-flag symptoms, warm transfer to triage line    Current symptom or concern: Itchy Red eyes    Symptoms have been present for:  ? day(s)    Has patient previously been seen for this? Yes    By :  Page    Date: 7/2019    Are there any new or worsening symptoms? Yes      Action Taken: Message routed to:  [facility] Eye    Travel Screening: Not Applicable

## 2020-06-30 ENCOUNTER — TELEPHONE (OUTPATIENT)
Dept: FAMILY MEDICINE | Facility: CLINIC | Age: 55
End: 2020-06-30

## 2020-06-30 ENCOUNTER — VIRTUAL VISIT (OUTPATIENT)
Dept: FAMILY MEDICINE | Facility: CLINIC | Age: 55
End: 2020-06-30
Payer: MEDICARE

## 2020-06-30 ENCOUNTER — APPOINTMENT (OUTPATIENT)
Dept: INTERPRETER SERVICES | Facility: CLINIC | Age: 55
End: 2020-06-30
Payer: MEDICARE

## 2020-06-30 DIAGNOSIS — Z94.4 LIVER TRANSPLANTED (H): Primary | ICD-10-CM

## 2020-06-30 DIAGNOSIS — E66.01 OBESITY, CLASS III, BMI 40-49.9 (MORBID OBESITY) (H): ICD-10-CM

## 2020-06-30 DIAGNOSIS — E66.813 CLASS 3 SEVERE OBESITY WITH BODY MASS INDEX (BMI) OF 40.0 TO 44.9 IN ADULT, UNSPECIFIED OBESITY TYPE, UNSPECIFIED WHETHER SERIOUS COMORBIDITY PRESENT (H): Primary | ICD-10-CM

## 2020-06-30 DIAGNOSIS — E87.5 HYPERKALEMIA: ICD-10-CM

## 2020-06-30 DIAGNOSIS — N18.30 CKD (CHRONIC KIDNEY DISEASE) STAGE 3, GFR 30-59 ML/MIN (H): ICD-10-CM

## 2020-06-30 DIAGNOSIS — E66.01 CLASS 3 SEVERE OBESITY WITH BODY MASS INDEX (BMI) OF 40.0 TO 44.9 IN ADULT, UNSPECIFIED OBESITY TYPE, UNSPECIFIED WHETHER SERIOUS COMORBIDITY PRESENT (H): Primary | ICD-10-CM

## 2020-06-30 RX ORDER — FUROSEMIDE 20 MG
20 TABLET ORAL DAILY
Qty: 30 TABLET | Refills: 1 | Status: SHIPPED | OUTPATIENT
Start: 2020-06-30 | End: 2020-10-14

## 2020-06-30 RX ORDER — DIMETHICONE/COLLOIDAL OATMEAL 1.25 %
LOTION (ML) TOPICAL DAILY
Qty: 532 ML | Refills: 11 | Status: SHIPPED | OUTPATIENT
Start: 2020-06-30 | End: 2020-11-18

## 2020-06-30 NOTE — PROGRESS NOTES
Telephone Preceptor Attestation:   Patient spoken with and discussed with the resident. I have verified the content of the note, which accurately reflects my assessment of the patient and the plan of care.   Supervising Physician:  Yvette Domingo MD

## 2020-06-30 NOTE — TELEPHONE ENCOUNTER
I spoke to the patient through an  and then to the patient's son. I scheduled the patient an appointment for next week.  She notes she has itchy red eyes.  I told her she could try artificial tears and OTC allergy drops if she would like to see if that helps her symptoms.  She will call if symptoms worsen or she has a change of vision

## 2020-06-30 NOTE — TELEPHONE ENCOUNTER
Routing to PCP to please order scale as misc DME order for patient, if appropriate.  Elisabet Olson RN

## 2020-06-30 NOTE — TELEPHONE ENCOUNTER
[facility] Family Medicine phone call message - order or referral request from patient:     Order or referral being requested: Requested order: Scale (patient is trying to lose weight)    Additional Details: TAYLOR Solares , calling to request order for patient. Sujatha requesting that order please be faxed to her at [phone].    OK to leave a message on voice mail? Work mobile number [phone] does not have secure VM, but office phone number does: [phone].    Primary language: Maori      needed? Yes    Call taken on June 30, 2020 at 11:58 AM by Nadia Hernandez    Order request route to P [facility] TRIAGE   Referrals Route to P [facility] (Green/[place]/Purple) CARE COORDINATOR

## 2020-06-30 NOTE — PROGRESS NOTES
DME (Durable Medical Equipment) Orders and Documentation  Orders Placed This Encounter   Procedures     MISCELLANEOUS DME      The patient was assessed and it was determined the patient is in need of the following listed DME Supplies/Equipment. Please complete supporting documentation below to demonstrate medical necessity.      DME All Other Item(s) Documentation    List reason for need and supporting documentation for medical necessity below for each DME item.     1. (Z94.4) Liver transplanted (H)  (primary encounter diagnosis)  Plan: MISCELLANEOUS DME    (E66.01) Obesity, Class III, BMI 40-49.9 (morbid obesity) (H)  Plan: MISCELLANEOUS DME    (N18.3) CKD (chronic kidney disease) stage 3, GFR 30-59 ml/min (H)  Plan: MISCELLANEOUS DME

## 2020-06-30 NOTE — PROGRESS NOTES
"Family Medicine Telephone Visit Note           Telephone Visit Consent   Patient was verbally read the following and verbal consent was obtained.    \"Telephone visits are billed at different rates depending on your insurance coverage. During this emergency period, for some insurers they may be billed the same as an in-person visit.  Please reach out to your insurance provider with any questions.  If during the course of the call the physician/provider feels a telephone visit is not appropriate, you will not be charged for this service.\"    Name person giving consent:  Patient   Date verbal consent given:  6/30/2020  Time verbal consent given:  3:06 PM     Chief Complaint   Patient presents with     Follow Up     F/U Medications       Due to patient being non-English speaking, an  was used for this visit.      name: Jadon  Agency: [employer]  Language: Telugu   Telephone number: [phone]  Type of interpretation: Telephone, spoken         HPI   Patients name: Flor  Appointment start time:  3:10 PM    Flor is a 55 y/o female with a past medical history of kidney failure, s/p liver transplant, hyperkalemia and HTN who is calling to request a potassium blood test and hypertension medication refill. She has an at home nurse who can call to make the appointment but wanted the order placed. She also requests her hypertension medication be refilled as her refills have run out. She was not sure the name of the medication but per chart review it appears she is taking lasix.    Current Outpatient Medications   Medication Sig Dispense Refill     acetaminophen (TYLENOL) 500 MG tablet Take 1 tablet (500 mg) by mouth 3 times daily as needed for mild pain 120 tablet 3     albuterol (ACCUNEB) 0.63 MG/3ML neb solution Take 3 mLs (0.63 mg) by nebulization every 4 hours (while awake) 360 mL 1     albuterol (PROAIR HFA/PROVENTIL HFA/VENTOLIN HFA) 108 (90 Base) MCG/ACT inhaler Inhale 2 puffs into the lungs 4 " times daily 1 Inhaler 1     alendronate (FOSAMAX) 70 MG tablet Take 70 mg by mouth every 7 days       calcitRIOL (ROCALTROL) 0.25 MCG capsule Take 1 capsule (0.25 mcg) by mouth daily Needs 4 month supply for International Travel 90 capsule 3     calcium carbonate 600 mg-vitamin D 400 units (CALCIUM 600 + D) 600-400 MG-UNIT per tablet Take 1 tablet by mouth 2 times daily 60 tablet 11     carboxymethylcellulose (REFRESH PLUS) 0.5 % SOLN ophthalmic solution Place 1 drop into both eyes 4 times daily 15 mL 3     cycloSPORINE modified (GENERIC EQUIVALENT) 25 MG capsule TAKE FOUR CAPSULES BY MOUTH EVERY 12 HOURS.  Needs 4 month supply for International Travel 240 capsule 4     dimethicone (AVEENO DAILY MOISTURIZING) 1.3 % LOTN lotion Externally apply topically daily 532 mL 11     furosemide (LASIX) 20 MG tablet Take 1 tablet (20 mg) by mouth daily 30 tablet 1     lidocaine (LIDODERM) 5 % patch Place 3 patches onto the skin 2 times daily as needed for moderate pain       magic mouthwash suspension, diphenhydrAMINE, lidocaine, aluminum-magnesium & simethicone, (FIRST-MOUTHWASH BLM) compounding kit Swish and swallow 5-10 mLs in mouth every 6 hours as needed for mouth sores 1 Bottle 3     melatonin 3 MG tablet TAKE ONE TABLET BY MOUTH EVERY NIGHT AS NEEDED FOR SLEEP.  Needs 4 month supply for International Travel 100 tablet 3     Menthol, Topical Analgesic, (BIOFREEZE) 4 % GEL Apply to left ribs 4x daily as needed pain 118 mL 11     multivitamin w/minerals (MULTI-VITAMIN) tablet Take 1 tablet by mouth daily Needs 4 month supply for International Travel 100 tablet 11     mycophenolate (GENERIC EQUIVALENT) 250 MG capsule TAKE TWO CAPSULES BY MOUTH EVERY 12 HOURS  Needs 4 month supply for International Travel 120 capsule 11     naltrexone (DEPADE/REVIA) 50 MG tablet Take 1 tablet 3 times daily.  Needs 4 month supply for International Travel 540 tablet 2     omeprazole (PRILOSEC) 20 MG DR capsule Take 1 capsule (20 mg) by mouth 2  "times daily Needs 4 month supply for International Travel 180 capsule 3     order for DME Equipment being ordered: compression stockings bilateral  Please measure and fit patient for stockings   Strength 15-30mmHg  Disp 2 pairs ( 4 socks)  1 refill over the time of 1 year 4 Units 1     order for DME Equipment being ordered: \"rollator with seat and brakes.\"   DX: ICD-10 code (Repeated falls R29.6)  Height (5'1\"), weight (102.2 kg)  ORdering provider Karely Sierra MD NPI # [id number] 1 each 0     order for DME Equipment being ordered:   1) cane  2) compression stockings 15mmHg, 3 pairs  Dx: gait stability, falls, edema 1 each 0     order for DME Equipment being ordered: Nebulizer with adult mask and tubing 1 Units 0     psyllium (METAMUCIL/KONSYL) 0.52 g capsule Take 2 capsules by mouth 2 times daily       senna-docusate (SENOKOT-S/PERICOLACE) 8.6-50 MG tablet Take 2 tablets by mouth 2 times daily Needs 4 month supply for International Travel 100 tablet 3     STATIN NOT PRESCRIBED, INTENTIONAL, Not prescribed 0 each 0     Vitamin D3 (CHOLECALCIFEROL) 25 mcg (1000 units) tablet Take 1 tablet (25 mcg) by mouth daily 100 tablet 3     Allergies   Allergen Reactions     Aspirin      3/31/16 Per pt, tolerates 81 mg daily dose without ADR.     325 mg dose caused itchiness and hives.     Clarithromycin      Allergic reaction         Contrast Dye      Iodine      Penicillins               Review of Systems:     A greater than 4 point review of systems was negative unless otherwise stated in the HPI         Physical Exam:     LMP  (LMP Unknown)   Estimated body mass index is 44.14 kg/m  as calculated from the following:    Height as of 5/26/20: 1.524 m (5').    Weight as of 5/26/20: 102.5 kg (226 lb).          Assessment and Plan     Hypertension; medication Refill: Patient requested refill of her HTN medication (lasix)     Hyperkalemia: Patient is s/p liver transplant and takes anti-rejection medication that cause hyperkalemia. " Her potassium is monitored periodically and she will make an appointment to come in for a BMP. Order has been placed.    After Visit Information:  Will print and mail AVS     No follow-ups on file.    Appointment end time: 3:30 PM  This is a telephone visit that took 20 minutes.      Clinician location:  [facility]     Shira Carter, MS4      Resident Attestation:  I was present with the medical student who participated in the service and in the documentation of this note. I have verified the history and personally performed the physical exam and medical decision making. I have verified the content of the note, which accurately reflects my assessment of the patient and the plan of care.   Supervising Resident:  Rigo Meadows MD.    Rigo Meadows MD

## 2020-07-01 ENCOUNTER — ANCILLARY PROCEDURE (OUTPATIENT)
Dept: MAMMOGRAPHY | Facility: CLINIC | Age: 55
End: 2020-07-01
Attending: FAMILY MEDICINE
Payer: MEDICARE

## 2020-07-01 ENCOUNTER — TELEPHONE (OUTPATIENT)
Dept: FAMILY MEDICINE | Facility: CLINIC | Age: 55
End: 2020-07-01

## 2020-07-01 DIAGNOSIS — Z12.31 VISIT FOR SCREENING MAMMOGRAM: ICD-10-CM

## 2020-07-01 DIAGNOSIS — Z94.4 LIVER REPLACED BY TRANSPLANT (H): ICD-10-CM

## 2020-07-01 DIAGNOSIS — E87.5 HYPERKALEMIA: ICD-10-CM

## 2020-07-01 LAB
ALBUMIN SERPL-MCNC: 3.6 G/DL (ref 3.4–5)
ALP SERPL-CCNC: 97 U/L (ref 40–150)
ALT SERPL W P-5'-P-CCNC: 25 U/L (ref 0–50)
ANION GAP SERPL CALCULATED.3IONS-SCNC: 3 MMOL/L (ref 3–14)
AST SERPL W P-5'-P-CCNC: 16 U/L (ref 0–45)
BILIRUB DIRECT SERPL-MCNC: 0.2 MG/DL (ref 0–0.2)
BILIRUB SERPL-MCNC: 0.6 MG/DL (ref 0.2–1.3)
BUN SERPL-MCNC: 30 MG/DL (ref 7–30)
CALCIUM SERPL-MCNC: 9 MG/DL (ref 8.5–10.1)
CHLORIDE SERPL-SCNC: 111 MMOL/L (ref 94–109)
CHOLEST SERPL-MCNC: 247 MG/DL
CO2 SERPL-SCNC: 27 MMOL/L (ref 20–32)
CREAT SERPL-MCNC: 1.24 MG/DL (ref 0.52–1.04)
ERYTHROCYTE [DISTWIDTH] IN BLOOD BY AUTOMATED COUNT: 12.6 % (ref 10–15)
GFR SERPL CREATININE-BSD FRML MDRD: 49 ML/MIN/{1.73_M2}
GLUCOSE SERPL-MCNC: 126 MG/DL (ref 70–99)
HCT VFR BLD AUTO: 40.6 % (ref 35–47)
HDLC SERPL-MCNC: 45 MG/DL
HGB BLD-MCNC: 12.8 G/DL (ref 11.7–15.7)
LDLC SERPL CALC-MCNC: 150 MG/DL
MCH RBC QN AUTO: 30.3 PG (ref 26.5–33)
MCHC RBC AUTO-ENTMCNC: 31.5 G/DL (ref 31.5–36.5)
MCV RBC AUTO: 96 FL (ref 78–100)
NONHDLC SERPL-MCNC: 202 MG/DL
PLATELET # BLD AUTO: 164 10E9/L (ref 150–450)
POTASSIUM SERPL-SCNC: 4.5 MMOL/L (ref 3.4–5.3)
PROT SERPL-MCNC: 7.6 G/DL (ref 6.8–8.8)
RBC # BLD AUTO: 4.23 10E12/L (ref 3.8–5.2)
SODIUM SERPL-SCNC: 141 MMOL/L (ref 133–144)
TRIGL SERPL-MCNC: 258 MG/DL
WBC # BLD AUTO: 4.8 10E9/L (ref 4–11)

## 2020-07-01 PROCEDURE — 80158 DRUG ASSAY CYCLOSPORINE: CPT | Performed by: INTERNAL MEDICINE

## 2020-07-01 NOTE — TELEPHONE ENCOUNTER
[facility] Family Medicine phone call message- patient requesting to speak directly with PCP or provider.    PCP: Karely Sierra    Reason for Call: Vinnie RN with [facility] calling to request a call back from RN or PCP. Per RN, patient is confused if she is supposed to be taking a blood pressure medication or not. RN would also like to know if patient needs to come in for a follow up to check per potassium since it is now one month that she has been taking a lasix. Please advise.     Additional Details:     Are you willing to speak with a nurse? Yes    Is a call back needed? Yes    Patient informed that it may take up to 2 business days to hear back from PCP:Yes    OK to leave a message on voice mail? Yes    Primary language: Telugu      needed? Yes    Call taken on July 1, 2020 at 4:02 PM by Ines Dia route to PCP unless willing to speak with a nurse.

## 2020-07-01 NOTE — TELEPHONE ENCOUNTER
RN contacted her and she wanted to know if patient should still be on hydrochlorothiazide. RN informed her that she should not be on this anymore. She also was asking about labs. RN looked in the chart and relayed that they were ordered and patient is at the lab right now.  Elisabet Olson RN

## 2020-07-02 DIAGNOSIS — I67.89 OTHER CEREBROVASCULAR DISEASE: ICD-10-CM

## 2020-07-02 DIAGNOSIS — H35.363 MACULAR DRUSEN, BILATERAL: Primary | ICD-10-CM

## 2020-07-02 DIAGNOSIS — I61.9 STROKE, HEMORRHAGIC (H): Primary | ICD-10-CM

## 2020-07-02 LAB
CYCLOSPORINE BLD LC/MS/MS-MCNC: 106 UG/L (ref 50–400)
TME LAST DOSE: NORMAL H

## 2020-07-02 NOTE — TELEPHONE ENCOUNTER
7/2/20 DME order for weight scale faxed to [facility] [phone].    Shahla Shepherd  Care Coordinator

## 2020-07-02 NOTE — PROGRESS NOTES
HPI:  Patient complains of itching, tearing and blurry vision for the last 3 months. She has not been using any of her eye drops.       Pertinent Medical History:    Hemorrhagic stroke 2017    Sleep apnea    Hyperlipidemia    Hypertension    CKD stage 3    Anemia in CKD    S/P liver transplant    Obesity    Neuropathy due to medical condition    Ocular History:    Dry Eye Syndrome both eyes    Allergic Conjunctivitis both eyes    Macular Drusen, both eyes.     Pseudophakia both eyes.     Eye Medications:    Allergic to clarithromycin and penicillins    Assessment and Plan:  1.   Allergic Conjunctivitis both eyes.     Pataday daily both eyes.     2.   Dry Eye Syndrome, both eyes.     Preservative free artificial tears 4 times daily both eyes.     3.   Extra-macullar Drusen, right eye.     OCT done today 07/08/2020    4.   Pseudophakia both eyes.     Monitor.     5.   History of stroke / Possible inferior left quadranopsia    Poor reliability today. Repeat VF in 1 week.     6.   Crowded disc vs disc drusen, left eye > right eye.     Patient cannot stay for imaging.    MRI on 07/27/2017 showed Small Right Frontal Meningioma.      Will also schedule neuro-ophthalmology evaluation.     Return in 1 week for ONH OCT, FAF optic nerve, and repeat VF.    7.   Posterior Vitreous Detachment, both eyes. Retina attached.     Educated on signs and symptoms of a retinal detachment (ie. Hundreds of floaters, flashes of light, or shadow/curtain over the vision) to be seen immediately.       Medical History:  Past Medical History:   Diagnosis Date     Anemia in CKD (chronic kidney disease)      Cataract      CKD (chronic kidney disease) stage 3, GFR 30-59 ml/min (H)      Hepatitis C     cleared virus spontaneously 2013     High risk medication use      Hypertension, renal      Immunosuppressed status (H)      Liver replaced by transplant (H) 2010     Osteoporosis      Recurrent pregnancy loss without current pregnancy      Stroke,  hemorrhagic (H) 2008     Syncope      Unspecified viral hepatitis C without hepatic coma        Medications:  Current Outpatient Medications   Medication Sig Dispense Refill     acetaminophen (TYLENOL) 500 MG tablet Take 1 tablet (500 mg) by mouth 3 times daily as needed for mild pain 120 tablet 3     albuterol (ACCUNEB) 0.63 MG/3ML neb solution Take 3 mLs (0.63 mg) by nebulization every 4 hours (while awake) 360 mL 1     albuterol (PROAIR HFA/PROVENTIL HFA/VENTOLIN HFA) 108 (90 Base) MCG/ACT inhaler Inhale 2 puffs into the lungs 4 times daily 1 Inhaler 1     alendronate (FOSAMAX) 70 MG tablet Take 70 mg by mouth every 7 days       calcitRIOL (ROCALTROL) 0.25 MCG capsule Take 1 capsule (0.25 mcg) by mouth daily Needs 4 month supply for International Travel 90 capsule 3     calcium carbonate 600 mg-vitamin D 400 units (CALCIUM 600 + D) 600-400 MG-UNIT per tablet Take 1 tablet by mouth 2 times daily 60 tablet 11     carboxymethylcellulose (REFRESH PLUS) 0.5 % SOLN ophthalmic solution Place 1 drop into both eyes 4 times daily 15 mL 3     cycloSPORINE modified (GENERIC EQUIVALENT) 25 MG capsule TAKE FOUR CAPSULES BY MOUTH EVERY 12 HOURS.  Needs 4 month supply for International Travel 240 capsule 4     dimethicone (AVEENO DAILY MOISTURIZING) 1.3 % LOTN lotion Externally apply topically daily 532 mL 11     furosemide (LASIX) 20 MG tablet Take 1 tablet (20 mg) by mouth daily 30 tablet 1     lidocaine (LIDODERM) 5 % patch Place 3 patches onto the skin 2 times daily as needed for moderate pain       magic mouthwash suspension, diphenhydrAMINE, lidocaine, aluminum-magnesium & simethicone, (FIRST-MOUTHWASH BLM) compounding kit Swish and swallow 5-10 mLs in mouth every 6 hours as needed for mouth sores 1 Bottle 3     melatonin 3 MG tablet TAKE ONE TABLET BY MOUTH EVERY NIGHT AS NEEDED FOR SLEEP.  Needs 4 month supply for International Travel 100 tablet 3     Menthol, Topical Analgesic, (BIOFREEZE) 4 % GEL Apply to left ribs 4x  "daily as needed pain 118 mL 11     multivitamin w/minerals (MULTI-VITAMIN) tablet Take 1 tablet by mouth daily Needs 4 month supply for International Travel 100 tablet 11     mycophenolate (GENERIC EQUIVALENT) 250 MG capsule TAKE TWO CAPSULES BY MOUTH EVERY 12 HOURS  Needs 4 month supply for International Travel 120 capsule 11     naltrexone (DEPADE/REVIA) 50 MG tablet Take 1 tablet 3 times daily.  Needs 4 month supply for International Travel 540 tablet 2     omeprazole (PRILOSEC) 20 MG DR capsule Take 1 capsule (20 mg) by mouth 2 times daily Needs 4 month supply for International Travel 180 capsule 3     order for DME Equipment being ordered: compression stockings bilateral  Please measure and fit patient for stockings   Strength 15-30mmHg  Disp 2 pairs ( 4 socks)  1 refill over the time of 1 year 4 Units 1     order for DME Equipment being ordered: \"rollator with seat and brakes.\"   DX: ICD-10 code (Repeated falls R29.6)  Height (5'1\"), weight (102.2 kg)  ORdering provider Karely Sierra MD NPI # [id number] 1 each 0     order for DME Equipment being ordered:   1) cane  2) compression stockings 15mmHg, 3 pairs  Dx: gait stability, falls, edema 1 each 0     order for DME Equipment being ordered: Nebulizer with adult mask and tubing 1 Units 0     psyllium (METAMUCIL/KONSYL) 0.52 g capsule Take 2 capsules by mouth 2 times daily       senna-docusate (SENOKOT-S/PERICOLACE) 8.6-50 MG tablet Take 2 tablets by mouth 2 times daily Needs 4 month supply for International Travel 100 tablet 3     STATIN NOT PRESCRIBED, INTENTIONAL, Not prescribed 0 each 0     Vitamin D3 (CHOLECALCIFEROL) 25 mcg (1000 units) tablet Take 1 tablet (25 mcg) by mouth daily 100 tablet 3   Complete documentation of historical and exam elements from today's encounter can be found in the full encounter summary report (not reduplicated in this progress note). I personally obtained the chief complaint(s) and history of present illness.  I confirmed and " edited as necessary the review of systems, past medical/surgical history, family history, social history, and examination findings as documented by others; and I examined the patient myself. I personally reviewed the relevant tests, images, and reports as documented above. I formulated and edited as necessary the assessment and plan and discussed the findings and management plan with the patient and family. - Dakota Mccall, KYLE

## 2020-07-02 NOTE — TELEPHONE ENCOUNTER
DME (Durable Medical Equipment) Orders and Documentation  Orders Placed This Encounter   Procedures     MISCELLANEOUS DME      The patient was assessed and it was determined the patient is in need of the following listed DME Supplies/Equipment. Please complete supporting documentation below to demonstrate medical necessity.      DME All Other Item(s) Documentation    List reason for need and supporting documentation for medical necessity below for each DME item.     1. Electronic scale for weight management for obesity with BMI = 44    Gabrielle Mattson MD   [facility]

## 2020-07-08 ENCOUNTER — OFFICE VISIT (OUTPATIENT)
Dept: OPHTHALMOLOGY | Facility: CLINIC | Age: 55
End: 2020-07-08
Attending: OPTOMETRIST
Payer: MEDICARE

## 2020-07-08 DIAGNOSIS — H04.123 DRY EYES, BILATERAL: ICD-10-CM

## 2020-07-08 DIAGNOSIS — H35.361 DEGENERATIVE RETINAL DRUSEN OF RIGHT EYE: ICD-10-CM

## 2020-07-08 DIAGNOSIS — H53.462 LEFT HOMONYMOUS HEMIANOPSIA: ICD-10-CM

## 2020-07-08 DIAGNOSIS — H10.13 ALLERGIC CONJUNCTIVITIS, BILATERAL: Primary | ICD-10-CM

## 2020-07-08 DIAGNOSIS — H47.332 CROWDED OPTIC DISC, LEFT: ICD-10-CM

## 2020-07-08 PROCEDURE — G0463 HOSPITAL OUTPT CLINIC VISIT: HCPCS | Mod: ZF

## 2020-07-08 PROCEDURE — 92134 CPTRZ OPH DX IMG PST SGM RTA: CPT | Mod: ZF | Performed by: OPTOMETRIST

## 2020-07-08 PROCEDURE — 92083 EXTENDED VISUAL FIELD XM: CPT | Mod: ZF | Performed by: OPTOMETRIST

## 2020-07-08 RX ORDER — OLOPATADINE HYDROCHLORIDE 2 MG/ML
1 SOLUTION/ DROPS OPHTHALMIC DAILY
Qty: 1 BOTTLE | Refills: 11 | Status: SHIPPED | OUTPATIENT
Start: 2020-07-08 | End: 2020-09-16

## 2020-07-08 RX ORDER — CARBOXYMETHYLCELLULOSE SODIUM 5 MG/ML
1 SOLUTION/ DROPS OPHTHALMIC 4 TIMES DAILY
Qty: 1 ML | Refills: 11 | Status: SHIPPED | OUTPATIENT
Start: 2020-07-08 | End: 2020-11-18

## 2020-07-08 ASSESSMENT — REFRACTION_WEARINGRX
OD_AXIS: 028
OD_CYLINDER: +1.00
OD_SPHERE: -0.50
OS_SPHERE: +0.75

## 2020-07-08 ASSESSMENT — VISUAL ACUITY
CORRECTION_TYPE: GLASSES
METHOD: SNELLEN - LINEAR

## 2020-07-08 ASSESSMENT — EXTERNAL EXAM - RIGHT EYE: OD_EXAM: NORMAL

## 2020-07-08 ASSESSMENT — TONOMETRY
OS_IOP_MMHG: 18
OD_IOP_MMHG: 18
IOP_METHOD: TONOPEN

## 2020-07-08 ASSESSMENT — SLIT LAMP EXAM - LIDS
COMMENTS: PAPILLOMA RUL
COMMENTS: NORMAL

## 2020-07-08 ASSESSMENT — EXTERNAL EXAM - LEFT EYE: OS_EXAM: NORMAL

## 2020-07-08 ASSESSMENT — CUP TO DISC RATIO
OD_RATIO: 0.1
OS_RATIO: 0.05

## 2020-07-08 ASSESSMENT — CONF VISUAL FIELD
OS_NORMAL: 1
OD_NORMAL: 1

## 2020-07-08 NOTE — NURSING NOTE
Chief Complaints and History of Present Illnesses   Patient presents with     Eye Problem Both Eyes     Chief Complaint(s) and History of Present Illness(es)     Eye Problem Both Eyes     Laterality: both eyes    Onset: 3 months ago    Quality: blurred vision    Associated symptoms: tearing and discharge.  Negative for eye pain and flashes    Pain scale: 0/10              Comments     Itchy and watering BE x 3 months  Uses drops 3 x day not sure what it is  Sherrill VELA 9:09 AM July 8, 2020

## 2020-07-09 ENCOUNTER — TELEPHONE (OUTPATIENT)
Dept: TRANSPLANT | Facility: CLINIC | Age: 55
End: 2020-07-09

## 2020-07-09 NOTE — TELEPHONE ENCOUNTER
Spoke to son and explained that pt's lipid panel results were elevated and that pt would benefit from weight loss. Son states that pt can't walk very good. Suggested to son to discuss diet modification with PCP or perhaps a referral from PCP to see a dietician. Son understood and will discuss with patient.

## 2020-07-15 DIAGNOSIS — H47.332 CROWDED OPTIC DISC, LEFT: Primary | ICD-10-CM

## 2020-07-15 NOTE — PROGRESS NOTES
HPI:  Patient complains of itching, tearing and blurry vision for the last 3 months. She has not been using any of her eye drops.     Interval update: Patient has not been using her eyedrops. She is here for crowded disc follow up.       Pertinent Medical History:    Hemorrhagic stroke 2017    Sleep apnea    Hyperlipidemia    Hypertension    CKD stage 3    Anemia in CKD    S/P liver transplant    Obesity    Neuropathy due to medical condition    Ocular History:    Dry Eye Syndrome both eyes    Allergic Conjunctivitis both eyes    Macular Drusen, both eyes.     Pseudophakia both eyes.     Eye Medications:    Allergic to clarithromycin and penicillins    Assessment and Plan:  1.   Crowded disc vs Mild disc elevation, left eye > right eye.     MRI on 07/27/2017 showed Small Right Frontal Meningioma.      ONH OCT: OD: no rnfl defect and OS: superior thinning (poor scan)    No drusen on FAF both eyes.     Will also schedule neuro-ophthalmology evaluation.     2.   History of stroke / Possible inferior left quadranopsia    Poor reliability. Unable to repeat VF    3.   Allergic Conjunctivitis both eyes.     Pataday daily both eyes.      4.   Dry Eye Syndrome, both eyes.     Preservative free artificial tears 4 times daily both eyes.     5.   Extra-macular Drusen, right eye.     OCT done today 07/08/2020    6.   Pseudophakia both eyes.      Monitor.     7.   Posterior Vitreous Detachment, both eyes. Retina attached.     Educated on signs and symptoms of a retinal detachment (ie. Hundreds of floaters, flashes of light, or shadow/curtain over the vision) to be seen immediately.       Medical History:  Past Medical History:   Diagnosis Date     Anemia in CKD (chronic kidney disease)      Cataract      CKD (chronic kidney disease) stage 3, GFR 30-59 ml/min (H)      Hepatitis C     cleared virus spontaneously 2013     High risk medication use      Hypertension, renal      Immunosuppressed status (H)      Liver replaced by  transplant (H) 2010     Osteoporosis      Recurrent pregnancy loss without current pregnancy      Stroke, hemorrhagic (H) 2008     Syncope      Unspecified viral hepatitis C without hepatic coma        Medications:  Current Outpatient Medications   Medication Sig Dispense Refill     acetaminophen (TYLENOL) 500 MG tablet Take 1 tablet (500 mg) by mouth 3 times daily as needed for mild pain 120 tablet 3     albuterol (ACCUNEB) 0.63 MG/3ML neb solution Take 3 mLs (0.63 mg) by nebulization every 4 hours (while awake) 360 mL 1     albuterol (PROAIR HFA/PROVENTIL HFA/VENTOLIN HFA) 108 (90 Base) MCG/ACT inhaler Inhale 2 puffs into the lungs 4 times daily 1 Inhaler 1     alendronate (FOSAMAX) 70 MG tablet Take 70 mg by mouth every 7 days       calcitRIOL (ROCALTROL) 0.25 MCG capsule Take 1 capsule (0.25 mcg) by mouth daily Needs 4 month supply for International Travel 90 capsule 3     calcium carbonate 600 mg-vitamin D 400 units (CALCIUM 600 + D) 600-400 MG-UNIT per tablet Take 1 tablet by mouth 2 times daily 60 tablet 11     carboxymethylcellulose (REFRESH PLUS) 0.5 % SOLN ophthalmic solution Place 1 drop into both eyes 4 times daily 15 mL 3     carboxymethylcellulose PF (CARBOXYMETHYLCELLULOSE SODIUM) 0.5 % ophthalmic solution Place 1 drop into both eyes 4 times daily 1 mL 11     cycloSPORINE modified (GENERIC EQUIVALENT) 25 MG capsule TAKE FOUR CAPSULES BY MOUTH EVERY 12 HOURS.  Needs 4 month supply for International Travel 240 capsule 4     dimethicone (AVEENO DAILY MOISTURIZING) 1.3 % LOTN lotion Externally apply topically daily 532 mL 11     furosemide (LASIX) 20 MG tablet Take 1 tablet (20 mg) by mouth daily 30 tablet 1     lidocaine (LIDODERM) 5 % patch Place 3 patches onto the skin 2 times daily as needed for moderate pain       magic mouthwash suspension, diphenhydrAMINE, lidocaine, aluminum-magnesium & simethicone, (FIRST-MOUTHWASH BLM) compounding kit Swish and swallow 5-10 mLs in mouth every 6 hours as needed  "for mouth sores 1 Bottle 3     melatonin 3 MG tablet TAKE ONE TABLET BY MOUTH EVERY NIGHT AS NEEDED FOR SLEEP.  Needs 4 month supply for International Travel 100 tablet 3     Menthol, Topical Analgesic, (BIOFREEZE) 4 % GEL Apply to left ribs 4x daily as needed pain 118 mL 11     multivitamin w/minerals (MULTI-VITAMIN) tablet Take 1 tablet by mouth daily Needs 4 month supply for International Travel 100 tablet 11     mycophenolate (GENERIC EQUIVALENT) 250 MG capsule TAKE TWO CAPSULES BY MOUTH EVERY 12 HOURS  Needs 4 month supply for International Travel 120 capsule 11     naltrexone (DEPADE/REVIA) 50 MG tablet Take 1 tablet 3 times daily.  Needs 4 month supply for International Travel 540 tablet 2     olopatadine (PATADAY) 0.2 % ophthalmic solution Place 1 drop into both eyes daily 1 Bottle 11     omeprazole (PRILOSEC) 20 MG DR capsule Take 1 capsule (20 mg) by mouth 2 times daily Needs 4 month supply for International Travel 180 capsule 3     order for DME Equipment being ordered: compression stockings bilateral  Please measure and fit patient for stockings   Strength 15-30mmHg  Disp 2 pairs ( 4 socks)  1 refill over the time of 1 year 4 Units 1     order for DME Equipment being ordered: \"rollator with seat and brakes.\"   DX: ICD-10 code (Repeated falls R29.6)  Height (5'1\"), weight (102.2 kg)  ORdering provider Karely Sierra MD NPI # [id number] 1 each 0     order for DME Equipment being ordered:   1) cane  2) compression stockings 15mmHg, 3 pairs  Dx: gait stability, falls, edema 1 each 0     order for DME Equipment being ordered: Nebulizer with adult mask and tubing 1 Units 0     psyllium (METAMUCIL/KONSYL) 0.52 g capsule Take 2 capsules by mouth 2 times daily       senna-docusate (SENOKOT-S/PERICOLACE) 8.6-50 MG tablet Take 2 tablets by mouth 2 times daily Needs 4 month supply for International Travel 100 tablet 3     STATIN NOT PRESCRIBED, INTENTIONAL, Not prescribed 0 each 0     Vitamin D3 (CHOLECALCIFEROL) 25 " mcg (1000 units) tablet Take 1 tablet (25 mcg) by mouth daily 100 tablet 3   Complete documentation of historical and exam elements from today's encounter can be found in the full encounter summary report (not reduplicated in this progress note). I personally obtained the chief complaint(s) and history of present illness.  I confirmed and edited as necessary the review of systems, past medical/surgical history, family history, social history, and examination findings as documented by others; and I examined the patient myself. I personally reviewed the relevant tests, images, and reports as documented above. I formulated and edited as necessary the assessment and plan and discussed the findings and management plan with the patient and family. - Dakota Mccall OD

## 2020-07-16 ENCOUNTER — OFFICE VISIT (OUTPATIENT)
Dept: OPHTHALMOLOGY | Facility: CLINIC | Age: 55
End: 2020-07-16
Attending: OPTOMETRIST
Payer: MEDICARE

## 2020-07-16 DIAGNOSIS — H47.333 CROWDED OPTIC DISC, BILATERAL: ICD-10-CM

## 2020-07-16 DIAGNOSIS — Z96.1 PSEUDOPHAKIA OF BOTH EYES: ICD-10-CM

## 2020-07-16 DIAGNOSIS — H04.123 DRY EYES, BILATERAL: ICD-10-CM

## 2020-07-16 DIAGNOSIS — H10.13 ALLERGIC CONJUNCTIVITIS, BILATERAL: Primary | ICD-10-CM

## 2020-07-16 PROBLEM — R26.9 GAIT ABNORMALITY: Status: ACTIVE | Noted: 2019-05-06

## 2020-07-16 PROCEDURE — 92015 DETERMINE REFRACTIVE STATE: CPT | Mod: GY,ZF

## 2020-07-16 PROCEDURE — G0463 HOSPITAL OUTPT CLINIC VISIT: HCPCS | Mod: ZF

## 2020-07-16 PROCEDURE — 92133 CPTRZD OPH DX IMG PST SGM ON: CPT | Mod: ZF | Performed by: OPTOMETRIST

## 2020-07-16 PROCEDURE — 92250 FUNDUS PHOTOGRAPHY W/I&R: CPT | Mod: ZF | Performed by: OPTOMETRIST

## 2020-07-16 RX ORDER — OLOPATADINE HYDROCHLORIDE 2 MG/ML
1 SOLUTION/ DROPS OPHTHALMIC DAILY
Qty: 1 BOTTLE | Refills: 11 | Status: SHIPPED | OUTPATIENT
Start: 2020-07-16 | End: 2020-10-14

## 2020-07-16 RX ORDER — CARBOXYMETHYLCELLULOSE SODIUM 5 MG/ML
1 SOLUTION/ DROPS OPHTHALMIC 4 TIMES DAILY
Qty: 5 ML | Refills: 11 | Status: SHIPPED | OUTPATIENT
Start: 2020-07-16 | End: 2020-09-16

## 2020-07-16 ASSESSMENT — CUP TO DISC RATIO
OS_RATIO: 0.05
OD_RATIO: 0.1

## 2020-07-16 ASSESSMENT — VISUAL ACUITY
OS_PH_SC: 20/60
OS_SC+: +
OD_SC: 20/40
METHOD: NUMBERS - LINEAR
OS_SC: 20/70

## 2020-07-16 ASSESSMENT — SLIT LAMP EXAM - LIDS
COMMENTS: PAPILLOMA RUL
COMMENTS: NORMAL

## 2020-07-16 ASSESSMENT — CONF VISUAL FIELD
OS_NORMAL: 1
METHOD: COUNTING FINGERS
OD_NORMAL: 1

## 2020-07-16 ASSESSMENT — REFRACTION_MANIFEST
OS_CYLINDER: +0.25
OD_SPHERE: -0.50
OS_AXIS: 045
OD_AXIS: 020
OS_ADD: +2.50
OD_CYLINDER: +1.00
OS_SPHERE: -0.25
OD_ADD: +2.50

## 2020-07-16 ASSESSMENT — EXTERNAL EXAM - LEFT EYE: OS_EXAM: NORMAL

## 2020-07-16 ASSESSMENT — TONOMETRY
OS_IOP_MMHG: 13
OD_IOP_MMHG: 13
IOP_METHOD: ICARE

## 2020-07-16 ASSESSMENT — EXTERNAL EXAM - RIGHT EYE: OD_EXAM: NORMAL

## 2020-07-16 NOTE — NURSING NOTE
Chief Complaints and History of Present Illnesses   Patient presents with     Conjunctivitis Follow Up     Chief Complaint(s) and History of Present Illness(es)     Conjunctivitis Follow Up     Laterality: both eyes    Associated symptoms: Negative for eye pain    Pain scale: 0/10    Frequency: constantly    Timing: throughout the day    Course: gradually improving    Response to treatment: mild improvement              Comments     Pt states the pharmacy did not have the eye medication which was prescribed from last visit. Pt understands that it should be ready for  tomorrow. Currently using a very small bottle with a white top three times a day to BE. Not in possession. States they help 'a little bit'.  Barbara Kelly, MALIK COT 9:48 AM 07/16/2020

## 2020-08-04 ENCOUNTER — OFFICE VISIT (OUTPATIENT)
Dept: OPHTHALMOLOGY | Facility: CLINIC | Age: 55
End: 2020-08-04
Attending: OPHTHALMOLOGY
Payer: MEDICARE

## 2020-08-04 DIAGNOSIS — H53.40 VISUAL FIELD DEFECT: ICD-10-CM

## 2020-08-04 DIAGNOSIS — H53.40 VISUAL FIELD DEFECT: Primary | ICD-10-CM

## 2020-08-04 PROCEDURE — 92133 CPTRZD OPH DX IMG PST SGM ON: CPT | Mod: ZF | Performed by: OPHTHALMOLOGY

## 2020-08-04 PROCEDURE — 92015 DETERMINE REFRACTIVE STATE: CPT | Mod: GY,ZF | Performed by: TECHNICIAN/TECHNOLOGIST

## 2020-08-04 PROCEDURE — 92083 EXTENDED VISUAL FIELD XM: CPT | Mod: ZF | Performed by: OPHTHALMOLOGY

## 2020-08-04 PROCEDURE — G0463 HOSPITAL OUTPT CLINIC VISIT: HCPCS | Mod: ZF | Performed by: TECHNICIAN/TECHNOLOGIST

## 2020-08-04 ASSESSMENT — TONOMETRY
OD_IOP_MMHG: 12
OS_IOP_MMHG: 13
IOP_METHOD: ICARE

## 2020-08-04 ASSESSMENT — CONF VISUAL FIELD
OS_NORMAL: 1
OD_NORMAL: 1
METHOD: COUNTING FINGERS

## 2020-08-04 ASSESSMENT — VISUAL ACUITY
OD_CC: 20/40
METHOD: NUMBERS
CORRECTION_TYPE: GLASSES
OS_CC: 20/100

## 2020-08-04 ASSESSMENT — SLIT LAMP EXAM - LIDS
COMMENTS: NORMAL
COMMENTS: NORMAL

## 2020-08-04 ASSESSMENT — REFRACTION_MANIFEST
OD_SPHERE: -1.00
OS_CYLINDER: SPHERE
OS_ADD: +2.50
OD_ADD: +2.50
OS_SPHERE: -0.50
OD_CYLINDER: +1.00
OD_AXIS: 020

## 2020-08-04 ASSESSMENT — CUP TO DISC RATIO
OD_RATIO: 0.1
OS_RATIO: 0.05

## 2020-08-04 ASSESSMENT — EXTERNAL EXAM - RIGHT EYE: OD_EXAM: NORMAL

## 2020-08-04 ASSESSMENT — EXTERNAL EXAM - LEFT EYE: OS_EXAM: NORMAL

## 2020-08-04 NOTE — NURSING NOTE
Chief Complaint(s) and History of Present Illness(es)     Follow Up     In both eyes (Initial visit with Dr. Barreto for neuro-ophthalmology consult for Frontal Meningioma and Crowded disc vs Mild disc elevation, left eye > right eye).  Associated symptoms include itching.  Negative for eye pain and headache.              Comments      over the phone    Patient states vision each eye is stable, no changes each eye.   +itchiness  Denies double vision. No glasses, requesting for MRx.     Nicole [place] 8/4/2020 9:12 AM

## 2020-08-04 NOTE — PROGRESS NOTES
Assessment & Plan     Flor Navarro is a 54 year old female with the following diagnoses:   1. Visual field defect       Flor Navarro is a 54 year old female who presents to neuro-ophthalmology clinic today for consultation from Dr. Mccall for disc edema left eye> right eye in the setting of small R frontal meningioma .  Seen by Dr. Mccall on 7/16/20 for allergic conjunctivitis, referred due to concern for     Vision slowly getting worse over the years, left eye> right eye.   History of L hemorrhagic CVA, L centrum semiovale ~2010. Small frontal meningioma noted MRI Brain 2008, stable in 2017.   S/P liver transplant, per charting on CellCept, Cyclosporine 25mg       POH: Dry eyes. Allergic conjunctivitis  PMH: HCV s/p liver transplant, CVA ~2010, HTN, HLD, no cancer  FH:  Meds: CellCept, Cyclosporine 25mg     MRI Brain 2017:  Impression:   1. No acute intracranial pathology. No acute infarct.  2. Sequelae of old left cerebral intraparenchymal hemorrhage.   3. Unchanged small right frontal meningioma.    Best corrected visual acuity is 20/30, 20/60. Intraocular pressure is normal. Pupils no APD. Color plates 11/11. Confrontational visual fields full. Motility full. Slit lamp exam normal. Dilated fundus exam with tilted nerves each eye, no disc edema.    Visual fields right eye poor reliability, left inferior quadrant defect crossing horizontal and midline. Left eye poor reliability, non specific defects. OCT rNFL right eye normal, left eye thinning superiorly, stable.    It is my impression that she does not have disc edema or vision loss related to R frontal meningoma.  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  However this has remained stable for at least 2 years.  Follow up in 1 year with general clinic.           Attending Physician Attestation:  Complete documentation of historical and exam elements from today's encounter can be found in the full encounter summary report (not reduplicated in this progress note).  I personally obtained the chief complaint(s) and history of present illness.  I confirmed and edited as necessary the review of systems, past medical/surgical history, family history, social history, and examination findings as documented by others; and I examined the patient myself.  I personally reviewed the relevant tests, images, and reports as documented above.  I formulated and edited as necessary the assessment and plan and discussed the findings and management plan with the patient and family. I personally reviewed the ophthalmic test(s) associated with this encounter, agree with the interpretation(s) as documented by the resident/fellow, and have edited the corresponding report(s) as necessary.  - Nik Nugent MD  Ophthalmology PGY-3  [facility]  Pager: [phone]

## 2020-08-04 NOTE — Clinical Note
8/4/2020       RE: Flor Navarro  [address]  [facility] [address]     Dear Colleague,    Thank you for referring your patient, Flor Navarro, to the EYE CLINIC at [facility]. Please see a copy of my visit note below.           Assessment & Plan     Flor Navarro is a 54 year old female with the following diagnoses:   1. Visual field defect       Flor Navarro is a 54 year old female who presents to neuro-ophthalmology clinic today for consultation from Dr. Mccall for disc edema left eye> right eye in the setting of small R frontal meningioma .  Seen by Dr. Mccall on 7/16/20 for allergic conjunctivitis, referred due to concern for     Vision slowly getting worse over the years, left eye> right eye.   History of L hemorrhagic CVA, L centrum semiovale ~2010. Small frontal meningioma noted MRI Brain 2008, stable in 2017.   S/P liver transplant, per charting on CellCept, Cyclosporine 25mg       POH: Dry eyes. Allergic conjunctivitis  PMH: HCV s/p liver transplant, CVA ~2010, HTN, HLD, no cancer  FH:  Meds: CellCept, Cyclosporine 25mg     MRI Brain 2017:  Impression:   1. No acute intracranial pathology. No acute infarct.  2. Sequelae of old left cerebral intraparenchymal hemorrhage.   3. Unchanged small right frontal meningioma.    Best corrected visual acuity is 20/30, 20/60. Intraocular pressure is normal. Pupils no APD. Color plates 11/11. Confrontational visual fields full. Motility full. Slit lamp exam normal. Dilated fundus exam with tilted nerves each eye, no disc edema.    Visual fields right eye poor reliability, left inferior quadrant defect crossing horizontal and midline. Left eye poor reliability, non specific defects. OCT rNFL right eye normal, left eye thinning superiorly, stable.    It is my impression that she does not have disc edema or vision loss related to R frontal meningoma.  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  However this has remained stable for at least 2 years.  Follow up in 1 year with general clinic.          Attending Physician Attestation:  Complete documentation of historical and exam elements from today's encounter can be found in the full encounter summary report (not reduplicated in this progress note).  I personally obtained the chief complaint(s) and history of present illness.  I confirmed and edited as necessary the review of systems, past medical/surgical history, family history, social history, and examination findings as documented by others; and I examined the patient myself.  I personally reviewed the relevant tests, images, and reports as documented above.  I formulated and edited as necessary the assessment and plan and discussed the findings and management plan with the patient and family. I personally reviewed the ophthalmic test(s) associated with this encounter, agree with the interpretation(s) as documented by the resident/fellow, and have edited the corresponding report(s) as necessary.  - Nik Nugent MD  Ophthalmology PGY-3  [facility]  Pager: [phone]          Again, thank you for allowing me to participate in the care of your patient.      Sincerely,    Nik Barreto MD

## 2020-08-04 NOTE — LETTER
2020         RE:  :  MRN: Flor Navarro  1965  [id number]     Dear Dr. Mccall,    Thank you for asking me to see your very pleasant patient, Flor Navarro, in neuro-ophthalmic consultation.  I would like to thank you for sending your records and I have summarized them in the history of present illness. My assessment and plan are below.  For further details, please see my attached clinic note.      Assessment & Plan     Flor Navarro is a 54 year old female with the following diagnoses:   1. Visual field defect       Flor Navarro is a 54 year old female who presents to neuro-ophthalmology clinic today for consultation from Dr. Mccall for disc edema left eye> right eye in the setting of small R frontal meningioma .  Seen by Dr. Mccall on 20 for allergic conjunctivitis, referred due to concern for     Vision slowly getting worse over the years, left eye> right eye.   History of L hemorrhagic CVA, L centrum semiovale ~. Small frontal meningioma noted MRI Brain , stable in 2017.   S/P liver transplant, per charting on CellCept, Cyclosporine 25mg       POH: Dry eyes. Allergic conjunctivitis  PMH: HCV s/p liver transplant, CVA ~, HTN, HLD, no cancer  FH:  Meds: CellCept, Cyclosporine 25mg     MRI Brain 2017:  Impression:   1. No acute intracranial pathology. No acute infarct.  2. Sequelae of old left cerebral intraparenchymal hemorrhage.   3. Unchanged small right frontal meningioma.    Best corrected visual acuity is 20/30, 20/60. Intraocular pressure is normal. Pupils no APD. Color plates . Confrontational visual fields full. Motility full. Slit lamp exam normal. Dilated fundus exam with tilted nerves each eye, no disc edema.    Visual fields right eye poor reliability, left inferior quadrant defect crossing horizontal and midline. Left eye poor reliability, non specific defects. OCT rNFL right eye normal, left eye thinning superiorly, stable.    It is my impression that she does not have  disc edema or vision loss related to R frontal meningoma.  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  However this has remained stable for at least 2 years.  Follow up in 1 year with general clinic.    Again, thank you for allowing me to participate in the care of your patient.      Sincerely,    Nik Barreto MD  Professor  Ophthalmology Residency   Director of Neuro-Ophthalmology  [facility] - [facility] Endowed Chair  Departments of Ophthalmology, Neurology, and Neurosurgery  [facility] [address] [place] [address]  [address]  T - [phone]  F - [phone]  INNA [email or web address]

## 2020-08-05 ENCOUNTER — MEDICAL CORRESPONDENCE (OUTPATIENT)
Dept: HEALTH INFORMATION MANAGEMENT | Facility: CLINIC | Age: 55
End: 2020-08-05

## 2020-08-06 ENCOUNTER — DOCUMENTATION ONLY (OUTPATIENT)
Dept: FAMILY MEDICINE | Facility: CLINIC | Age: 55
End: 2020-08-06

## 2020-08-06 NOTE — PROGRESS NOTES
"When opening a documentation only encounter, be sure to enter in \"Chief Complaint\" Forms and in \" Comments\" Title of form, description if needed.    Flor is a 54 year old  female  Form received via: Fax  Form now resides in: Provider Ready    Sabina Diaz MA                  "

## 2020-08-10 NOTE — PROGRESS NOTES
Form has been completed by provider.     Form sent out via: Fax to [facility] at Fax Number: [phone]  Patient informed: N/A  Output date: August 10, 2020    Brooklyn Dangelo CMA      **Please close the encounter**

## 2020-08-10 NOTE — PROGRESS NOTES
Form has been completed by provider.     Form sent out via: Fax to [facility] at Fax Number: [phone]  Patient informed: N/A  Output date: August 10, 2020    Brooklyn Dangelo CMA      **Please close the encounter**

## 2020-08-12 DIAGNOSIS — Z94.4 LIVER REPLACED BY TRANSPLANT (H): ICD-10-CM

## 2020-08-13 RX ORDER — MYCOPHENOLATE MOFETIL 250 MG/1
CAPSULE ORAL
Qty: 120 CAPSULE | Refills: 11 | Status: SHIPPED | OUTPATIENT
Start: 2020-08-13 | End: 2021-07-28

## 2020-08-17 ENCOUNTER — APPOINTMENT (OUTPATIENT)
Dept: OPTOMETRY | Facility: CLINIC | Age: 55
End: 2020-08-17
Payer: MEDICARE

## 2020-08-17 PROCEDURE — 92340 FIT SPECTACLES MONOFOCAL: CPT | Mod: GY | Performed by: OPTOMETRIST

## 2020-09-09 DIAGNOSIS — Z94.4 LIVER TRANSPLANTED (H): ICD-10-CM

## 2020-09-09 DIAGNOSIS — K59.09 CONSTIPATION, CHRONIC: Primary | ICD-10-CM

## 2020-09-09 DIAGNOSIS — F51.01 PRIMARY INSOMNIA: ICD-10-CM

## 2020-09-11 ENCOUNTER — TELEPHONE (OUTPATIENT)
Dept: FAMILY MEDICINE | Facility: CLINIC | Age: 55
End: 2020-09-11

## 2020-09-11 NOTE — TELEPHONE ENCOUNTER
PA Initiation    Medication: Biofreeze- pa pending  Insurance Company: [employer] - Phone [phone] Fax [phone]  Pharmacy Filling the Rx: [facility] - [place], [place] - [facility] [address]  Filling Pharmacy Phone: [phone]  Filling Pharmacy Fax: [phone]  Start Date: 9/11/2020

## 2020-09-14 RX ORDER — LANOLIN ALCOHOL/MO/W.PET/CERES
CREAM (GRAM) TOPICAL
Qty: 100 TABLET | Refills: 3 | Status: SHIPPED | OUTPATIENT
Start: 2020-09-14 | End: 2020-11-18

## 2020-09-15 ENCOUNTER — TELEPHONE (OUTPATIENT)
Dept: FAMILY MEDICINE | Facility: CLINIC | Age: 55
End: 2020-09-15

## 2020-09-15 DIAGNOSIS — K59.09 CONSTIPATION, CHRONIC: ICD-10-CM

## 2020-09-16 DIAGNOSIS — Z94.4 LIVER REPLACED BY TRANSPLANT (H): ICD-10-CM

## 2020-09-16 RX ORDER — CYCLOSPORINE 25 MG/1
CAPSULE, LIQUID FILLED ORAL
Qty: 240 CAPSULE | Refills: 4 | Status: SHIPPED | OUTPATIENT
Start: 2020-09-16 | End: 2021-02-09

## 2020-09-18 ENCOUNTER — DOCUMENTATION ONLY (OUTPATIENT)
Dept: FAMILY MEDICINE | Facility: CLINIC | Age: 55
End: 2020-09-18

## 2020-09-18 NOTE — PROGRESS NOTES
"When opening a documentation only encounter, be sure to enter in \"Chief Complaint\" Forms and in \" Comments\" Title of form, description if needed.    Flor is a 54 year old  female  Form received via: Fax  Form now resides in: Provider Ready    Sabina Diaz MA     Form has been completed by provider.     Form to be shredded,per Doctor Link  Patient informed: n/a  Output date: September 21, 2020    Sabina Diaz MA      **Please close the encounter**                      "

## 2020-10-01 ENCOUNTER — DOCUMENTATION ONLY (OUTPATIENT)
Dept: CARE COORDINATION | Facility: CLINIC | Age: 55
End: 2020-10-01

## 2020-10-01 ENCOUNTER — APPOINTMENT (OUTPATIENT)
Dept: GENERAL RADIOLOGY | Facility: CLINIC | Age: 55
End: 2020-10-01
Attending: EMERGENCY MEDICINE
Payer: MEDICARE

## 2020-10-01 ENCOUNTER — HOSPITAL ENCOUNTER (EMERGENCY)
Facility: CLINIC | Age: 55
Discharge: HOME | End: 2020-10-01
Attending: EMERGENCY MEDICINE | Admitting: EMERGENCY MEDICINE
Payer: MEDICARE

## 2020-10-01 VITALS — RESPIRATION RATE: 18 BRPM | TEMPERATURE: 98.2 F | HEART RATE: 98 BPM | OXYGEN SATURATION: 99 %

## 2020-10-01 DIAGNOSIS — M10.9 ACUTE GOUTY ARTHRITIS: ICD-10-CM

## 2020-10-01 LAB
ANION GAP SERPL CALCULATED.3IONS-SCNC: 3 MMOL/L (ref 3–14)
BASOPHILS # BLD AUTO: 0 10E9/L (ref 0–0.2)
BASOPHILS NFR BLD AUTO: 0.6 %
BUN SERPL-MCNC: 22 MG/DL (ref 7–30)
CALCIUM SERPL-MCNC: 9.3 MG/DL (ref 8.5–10.1)
CHLORIDE SERPL-SCNC: 110 MMOL/L (ref 94–109)
CO2 SERPL-SCNC: 25 MMOL/L (ref 20–32)
CREAT SERPL-MCNC: 1.09 MG/DL (ref 0.52–1.04)
CRP SERPL-MCNC: 17 MG/L (ref 0–8)
DIFFERENTIAL METHOD BLD: NORMAL
EOSINOPHIL # BLD AUTO: 0.1 10E9/L (ref 0–0.7)
EOSINOPHIL NFR BLD AUTO: 2.1 %
ERYTHROCYTE [DISTWIDTH] IN BLOOD BY AUTOMATED COUNT: 12.7 % (ref 10–15)
GFR SERPL CREATININE-BSD FRML MDRD: 57 ML/MIN/{1.73_M2}
GLUCOSE SERPL-MCNC: 76 MG/DL (ref 70–99)
HCT VFR BLD AUTO: 41.7 % (ref 35–47)
HGB BLD-MCNC: 13.2 G/DL (ref 11.7–15.7)
IMM GRANULOCYTES # BLD: 0 10E9/L (ref 0–0.4)
IMM GRANULOCYTES NFR BLD: 0.4 %
LYMPHOCYTES # BLD AUTO: 1.5 10E9/L (ref 0.8–5.3)
LYMPHOCYTES NFR BLD AUTO: 28.2 %
MCH RBC QN AUTO: 30.1 PG (ref 26.5–33)
MCHC RBC AUTO-ENTMCNC: 31.7 G/DL (ref 31.5–36.5)
MCV RBC AUTO: 95 FL (ref 78–100)
MONOCYTES # BLD AUTO: 0.4 10E9/L (ref 0–1.3)
MONOCYTES NFR BLD AUTO: 8.2 %
NEUTROPHILS # BLD AUTO: 3.2 10E9/L (ref 1.6–8.3)
NEUTROPHILS NFR BLD AUTO: 60.5 %
NRBC # BLD AUTO: 0 10*3/UL
NRBC BLD AUTO-RTO: 0 /100
PLATELET # BLD AUTO: 187 10E9/L (ref 150–450)
POTASSIUM SERPL-SCNC: 4.8 MMOL/L (ref 3.4–5.3)
RBC # BLD AUTO: 4.39 10E12/L (ref 3.8–5.2)
SODIUM SERPL-SCNC: 139 MMOL/L (ref 133–144)
WBC # BLD AUTO: 5.3 10E9/L (ref 4–11)

## 2020-10-01 PROCEDURE — 85025 COMPLETE CBC W/AUTO DIFF WBC: CPT | Performed by: EMERGENCY MEDICINE

## 2020-10-01 PROCEDURE — 80048 BASIC METABOLIC PNL TOTAL CA: CPT | Performed by: EMERGENCY MEDICINE

## 2020-10-01 PROCEDURE — 86140 C-REACTIVE PROTEIN: CPT | Performed by: EMERGENCY MEDICINE

## 2020-10-01 PROCEDURE — 99283 EMERGENCY DEPT VISIT LOW MDM: CPT | Performed by: EMERGENCY MEDICINE

## 2020-10-01 PROCEDURE — 73630 X-RAY EXAM OF FOOT: CPT | Mod: RT

## 2020-10-01 PROCEDURE — 250N000013 HC RX MED GY IP 250 OP 250 PS 637: Mod: GY | Performed by: EMERGENCY MEDICINE

## 2020-10-01 PROCEDURE — 99284 EMERGENCY DEPT VISIT MOD MDM: CPT | Performed by: EMERGENCY MEDICINE

## 2020-10-01 PROCEDURE — 73630 X-RAY EXAM OF FOOT: CPT | Mod: 26 | Performed by: RADIOLOGY

## 2020-10-01 RX ORDER — METHYLPREDNISOLONE 4 MG
TABLET, DOSE PACK ORAL
Qty: 21 TABLET | Refills: 0 | Status: SHIPPED | OUTPATIENT
Start: 2020-10-01 | End: 2020-10-01

## 2020-10-01 RX ORDER — METHYLPREDNISOLONE 4 MG
TABLET, DOSE PACK ORAL
Qty: 21 TABLET | Refills: 0 | Status: SHIPPED | OUTPATIENT
Start: 2020-10-01 | End: 2020-11-18

## 2020-10-01 RX ORDER — ACETAMINOPHEN 500 MG
1000 TABLET ORAL ONCE
Status: COMPLETED | OUTPATIENT
Start: 2020-10-01 | End: 2020-10-01

## 2020-10-01 RX ORDER — CEPHALEXIN 500 MG/1
500 CAPSULE ORAL 3 TIMES DAILY
Qty: 15 CAPSULE | Refills: 0 | Status: SHIPPED | OUTPATIENT
Start: 2020-10-01 | End: 2020-11-18

## 2020-10-01 RX ORDER — CEPHALEXIN 500 MG/1
500 CAPSULE ORAL 3 TIMES DAILY
Qty: 15 CAPSULE | Refills: 0 | Status: SHIPPED | OUTPATIENT
Start: 2020-10-01 | End: 2020-10-01

## 2020-10-01 RX ADMIN — ACETAMINOPHEN 1000 MG: 500 TABLET, FILM COATED ORAL at 11:57

## 2020-10-01 NOTE — ED PROVIDER NOTES
ED Provider Note  [facility]      History     Chief Complaint   Patient presents with     Foot Pain     HPI  Flor Navarro is a 54 year old female with a history of liver transplant, previous hemorrhagic stroke, chronic kidney disease and hypertension who presents to the emergency department with right foot pain.  She reports 10 days ago developing pain at the base of her right great toe.  Initially she had surrounding erythema and swelling which has improved however she continues to have some ongoing swelling and pain at the base of the toe.  She denies any fevers.  Denies any pain with toe or ankle movement.  She has not had a fall or traumatic injury.  She reports otherwise feeling well.  No recent changes in medications.  She denies any known history of gout or dietary changes.    Past Medical History  Past Medical History:   Diagnosis Date     Anemia in CKD (chronic kidney disease)      Cataract      CKD (chronic kidney disease) stage 3, GFR 30-59 ml/min      Hepatitis C     cleared virus spontaneously      High risk medication use      Hypertension, renal      Immunosuppressed status (H)      Liver replaced by transplant (H)      Osteoporosis      Recurrent pregnancy loss without current pregnancy      Stroke, hemorrhagic (H)      Syncope      Unspecified viral hepatitis C without hepatic coma      Past Surgical History:   Procedure Laterality Date     CATARACT IOL, RT/LT Right 2/18/15      SECTION       Incisional Hernia Repair  2004     INSERT SHUNT PORTAL TRANSJUGULAR INTRAHEPTIC      shunt placement for liver failure     LAPAROSCOPIC SALPINGO-OOPHORECTOMY      left     NECK SURGERY  2010    fracture, in halo x 7months     TRANSPLANT LIVER RECIPIENT  DONOR  10/26/10     Upper GI Endoscopy with Band Ligation of Esoph/Gastric Varic. .            acetaminophen (TYLENOL) 500 MG tablet       albuterol (ACCUNEB) 0.63 MG/3ML neb solution        albuterol (PROAIR HFA/PROVENTIL HFA/VENTOLIN HFA) 108 (90 Base) MCG/ACT inhaler       alendronate (FOSAMAX) 70 MG tablet       calcitRIOL (ROCALTROL) 0.25 MCG capsule       calcium carbonate 600 mg-vitamin D 400 units (CALCIUM 600 + D) 600-400 MG-UNIT per tablet       carboxymethylcellulose (REFRESH PLUS) 0.5 % SOLN ophthalmic solution       carboxymethylcellulose PF (CARBOXYMETHYLCELLULOSE SODIUM) 0.5 % ophthalmic solution       cycloSPORINE modified (GENERIC EQUIVALENT) 25 MG capsule       dimethicone (AVEENO DAILY MOISTURIZING) 1.3 % LOTN lotion       furosemide (LASIX) 20 MG tablet       lidocaine (LIDODERM) 5 % patch       magic mouthwash suspension, diphenhydrAMINE, lidocaine, aluminum-magnesium & simethicone, (FIRST-MOUTHWASH BLM) compounding kit       melatonin 3 MG tablet       Menthol, Topical Analgesic, (BIOFREEZE) 4 % GEL       multivitamin w/minerals (MULTI-VITAMIN) tablet       mycophenolate (GENERIC EQUIVALENT) 250 MG capsule       naltrexone (DEPADE/REVIA) 50 MG tablet       olopatadine (PATADAY) 0.2 % ophthalmic solution       omeprazole (PRILOSEC) 20 MG DR capsule       order for DME       order for DME       order for DME       order for DME       psyllium (METAMUCIL/KONSYL) 0.52 g capsule       senna-docusate (SENOKOT-S/PERICOLACE) 8.6-50 MG tablet       STATIN NOT PRESCRIBED, INTENTIONAL,       Vitamin D3 (CHOLECALCIFEROL) 25 mcg (1000 units) tablet      Allergies   Allergen Reactions     Aspirin      3/31/16 Per pt, tolerates 81 mg daily dose without ADR.     325 mg dose caused itchiness and hives.     Clarithromycin      Allergic reaction         Contrast Dye      Iodine      Penicillins      Family History  Family History   Problem Relation Age of Onset     Hepatitis Other         Hep C, still in [place]     Cerebrovascular Disease Other      Cancer No family hx of      Diabetes No family hx of      Hypertension No family hx of      Thyroid Disease No family hx of      Glaucoma No family hx of       Macular Degeneration No family hx of      Social History   Social History     Tobacco Use     Smoking status: Never Smoker     Smokeless tobacco: Never Used   Substance Use Topics     Alcohol use: No     Drug use: No      Past medical history, past surgical history, medications, allergies, family history, and social history were reviewed with the patient. No additional pertinent items.       Review of Systems  A complete review of systems was performed with pertinent positives and negatives noted in the HPI, and all other systems negative.    Physical Exam   Pulse: 98  Temp: 98.2  F (36.8  C)  Resp: 18  SpO2: 99 %  Physical Exam  General: patient is alert and oriented and in no acute distress   Head: atraumatic and normocephalic   EENT: moist mucus membranes, sclera anicteric  Neck: supple   Cardiovascular: regular rate and rhythm, extremities warm and well perfused, trace bilateral lower extremity edema, 2+ PT pulses bilaterally  Pulmonary: No respiratory distress   musculoskeletal: normal range of motion of the right ankle and great toe  Neurological: alert and oriented, moving all extremities symmetrically, sensation to light touch in the right foot is intact  Skin: warm, dry, swelling on the dorsal surface of the right foot with erythema at the base of the right great toe, no overlying warmth or pain out of proportion to palpation    ED Course      Procedures                         Results for orders placed or performed during the hospital encounter of 10/01/20   Foot  XR, G/E 3 views, right     Status: None    Narrative    3 views right foot radiographs 10/1/2020 12:51 PM    History: R foot pain     Comparison: 4/29/2008    Findings:    Nonweightbearing AP, oblique, and lateral  views of the right foot  were obtained.     No acute osseous abnormality.  Chronic fracture deformity of the  distal second through fourth metatarsals.    Lisfranc articulation alignment is congruent on this non-weight  bearing  study.    Mild degenerative changes of first metatarsophalangeal joint. Plantar  calcaneal enthesopathy.    Mild dorsal soft tissue prominence.      Impression    Impression:  1. No acute osseous abnormality.  2. No substantial degenerative change.    AYAAN CONTRERAS MD (Joe)   CBC with platelets differential     Status: None   Result Value Ref Range    WBC 5.3 4.0 - 11.0 10e9/L    RBC Count 4.39 3.8 - 5.2 10e12/L    Hemoglobin 13.2 11.7 - 15.7 g/dL    Hematocrit 41.7 35.0 - 47.0 %    MCV 95 78 - 100 fl    MCH 30.1 26.5 - 33.0 pg    MCHC 31.7 31.5 - 36.5 g/dL    RDW 12.7 10.0 - 15.0 %    Platelet Count 187 150 - 450 10e9/L    Diff Method Automated Method     % Neutrophils 60.5 %    % Lymphocytes 28.2 %    % Monocytes 8.2 %    % Eosinophils 2.1 %    % Basophils 0.6 %    % Immature Granulocytes 0.4 %    Nucleated RBCs 0 0 /100    Absolute Neutrophil 3.2 1.6 - 8.3 10e9/L    Absolute Lymphocytes 1.5 0.8 - 5.3 10e9/L    Absolute Monocytes 0.4 0.0 - 1.3 10e9/L    Absolute Eosinophils 0.1 0.0 - 0.7 10e9/L    Absolute Basophils 0.0 0.0 - 0.2 10e9/L    Abs Immature Granulocytes 0.0 0 - 0.4 10e9/L    Absolute Nucleated RBC 0.0      Medications   acetaminophen (TYLENOL) tablet 1,000 mg (1,000 mg Oral Given 10/1/20 1157)        Assessments & Plan (with Medical Decision Making)   Ms. Navarro is a 54 year old female with a history of liver transplant, previous hemorrhagic stroke, chronic kidney disease and hypertension who presents to the emergency department with right foot pain.  She is hemodynamically stable and afebrile.  Plain films were obtained which showed no evidence of occult fracture or significant degenerative changes.  She does have some erythema and swelling at the base of the great toe and suspect may have a gout flare.  Given her immunosuppressed state potential for localized cellulitis is also a consideration.  She does not seem to have any joint involvement and does not have any evidence of necrotizing  infection.  Her labs showed no leukocytosis and renal function is at baseline.  She has a slight elevation in CRP of 17.  She was given oral Keflex as well as medrol dose pack given history of CKD.  She was instructed to follow-up with PCP in 5 days for re-evaluation.  Given close return precautions for the ED and voiced understanding.      I have reviewed the nursing notes. I have reviewed the findings, diagnosis, plan and need for follow up with the patient.    New Prescriptions    CEPHALEXIN (KEFLEX) 500 MG CAPSULE    Take 1 capsule (500 mg) by mouth 3 times daily for 5 days    METHYLPREDNISOLONE (MEDROL DOSEPAK) 4 MG TABLET THERAPY PACK    Follow Package Directions       Final diagnoses:   Acute gouty arthritis       --  Gladis Najera MD  [facility] EMERGENCY DEPARTMENT  10/1/2020     Gladis Najera MD  10/01/20 1511

## 2020-10-01 NOTE — DISCHARGE INSTRUCTIONS
Please make an appointment to follow up with Your Primary Care Provider in 5 days unless symptoms completely resolve.    Take cephalexin and prednisone as prescribed.  If you have any worsening pain, increased redness or swelling, fevers or other concerns return to the emergency department for re-evaluation.

## 2020-10-01 NOTE — ED AVS SNAPSHOT
[facility] Emergency Department  [address] [address]  Phone: [phone]                                    Flor Navarro   MRN: [id number]    Department: [facility] Emergency Department   Date of Visit: 10/1/2020           After Visit Summary Signature Page    I have received my discharge instructions, and my questions have been answered. I have discussed any challenges I see with this plan with the nurse or doctor.    ..........................................................................................................................................  Patient/Patient Representative Signature      ..........................................................................................................................................  Patient Representative Print Name and Relationship to Patient    ..................................................               ................................................  Date                                   Time    ..........................................................................................................................................  Reviewed by Signature/Title    ...................................................              ..............................................  Date                                               Time          22EPIC Rev 08/18

## 2020-10-02 ENCOUNTER — DOCUMENTATION ONLY (OUTPATIENT)
Dept: FAMILY MEDICINE | Facility: CLINIC | Age: 55
End: 2020-10-02

## 2020-10-02 NOTE — PROGRESS NOTES
"When opening a documentation only encounter, be sure to enter in \"Chief Complaint\" Forms and in \" Comments\" Title of form, description if needed.    Flor is a 54 year old  female  Form received via: Fax  Form now resides in: Provider Ready    Sabina Diaz MA     Form has been completed by provider.     Form sent out via: Fax to [facility]  at Fax Number: [phone]  Patient informed: No, Reason:n/a  Output date: October 5, 2020    Sabina Diaz MA      **Please close the encounter**                        "

## 2020-10-04 ENCOUNTER — MEDICAL CORRESPONDENCE (OUTPATIENT)
Dept: HEALTH INFORMATION MANAGEMENT | Facility: CLINIC | Age: 55
End: 2020-10-04

## 2020-10-06 ENCOUNTER — DOCUMENTATION ONLY (OUTPATIENT)
Dept: FAMILY MEDICINE | Facility: CLINIC | Age: 55
End: 2020-10-06

## 2020-10-06 DIAGNOSIS — E87.5 HYPERKALEMIA: ICD-10-CM

## 2020-10-07 DIAGNOSIS — Z94.4 LIVER REPLACED BY TRANSPLANT (H): ICD-10-CM

## 2020-10-07 RX ORDER — MULTIPLE VITAMINS W/ MINERALS TAB 9MG-400MCG
1 TAB ORAL DAILY
Qty: 100 TABLET | Refills: 11 | Status: SHIPPED | OUTPATIENT
Start: 2020-10-07 | End: 2023-08-04

## 2020-10-14 ENCOUNTER — OFFICE VISIT (OUTPATIENT)
Dept: FAMILY MEDICINE | Facility: CLINIC | Age: 55
End: 2020-10-14
Payer: MEDICARE

## 2020-10-14 VITALS
WEIGHT: 225.8 LBS | SYSTOLIC BLOOD PRESSURE: 142 MMHG | OXYGEN SATURATION: 95 % | RESPIRATION RATE: 16 BRPM | DIASTOLIC BLOOD PRESSURE: 94 MMHG | HEART RATE: 76 BPM | TEMPERATURE: 98.2 F | BODY MASS INDEX: 44.1 KG/M2

## 2020-10-14 DIAGNOSIS — Z94.4 LIVER REPLACED BY TRANSPLANT (H): Primary | ICD-10-CM

## 2020-10-14 DIAGNOSIS — J20.9 ACUTE BRONCHITIS, UNSPECIFIED ORGANISM: ICD-10-CM

## 2020-10-14 DIAGNOSIS — M25.511 PAIN IN JOINT OF RIGHT SHOULDER: ICD-10-CM

## 2020-10-14 DIAGNOSIS — M10.9 ACUTE GOUTY ARTHRITIS: ICD-10-CM

## 2020-10-14 DIAGNOSIS — H10.13 ALLERGIC CONJUNCTIVITIS, BILATERAL: ICD-10-CM

## 2020-10-14 DIAGNOSIS — R07.81 RIB PAIN ON LEFT SIDE: ICD-10-CM

## 2020-10-14 DIAGNOSIS — H04.123 DRY EYES, BILATERAL: ICD-10-CM

## 2020-10-14 LAB
ALBUMIN SERPL-MCNC: 3.3 G/DL (ref 3.4–5)
ALP SERPL-CCNC: 98 U/L (ref 40–150)
ALT SERPL W P-5'-P-CCNC: 22 U/L (ref 0–50)
ANION GAP SERPL CALCULATED.3IONS-SCNC: 6 MMOL/L (ref 3–14)
AST SERPL W P-5'-P-CCNC: 15 U/L (ref 0–45)
BILIRUB SERPL-MCNC: 0.5 MG/DL (ref 0.2–1.3)
BUN SERPL-MCNC: 32 MG/DL (ref 7–30)
CALCIUM SERPL-MCNC: 8.6 MG/DL (ref 8.5–10.1)
CHLORIDE SERPL-SCNC: 115 MMOL/L (ref 94–109)
CO2 SERPL-SCNC: 21 MMOL/L (ref 20–32)
CREAT SERPL-MCNC: 1.28 MG/DL (ref 0.52–1.04)
GFR SERPL CREATININE-BSD FRML MDRD: 47 ML/MIN/{1.73_M2}
GLUCOSE SERPL-MCNC: 119 MG/DL (ref 70–99)
POTASSIUM SERPL-SCNC: 4.1 MMOL/L (ref 3.4–5.3)
PROT SERPL-MCNC: 7.4 G/DL (ref 6.8–8.8)
SODIUM SERPL-SCNC: 142 MMOL/L (ref 133–144)
URATE SERPL-MCNC: 8.9 MG/DL (ref 2.6–6)

## 2020-10-14 PROCEDURE — 84550 ASSAY OF BLOOD/URIC ACID: CPT | Performed by: FAMILY MEDICINE

## 2020-10-14 PROCEDURE — 99213 OFFICE O/P EST LOW 20 MIN: CPT | Mod: GC | Performed by: STUDENT IN AN ORGANIZED HEALTH CARE EDUCATION/TRAINING PROGRAM

## 2020-10-14 PROCEDURE — 36415 COLL VENOUS BLD VENIPUNCTURE: CPT | Performed by: STUDENT IN AN ORGANIZED HEALTH CARE EDUCATION/TRAINING PROGRAM

## 2020-10-14 PROCEDURE — 80053 COMPREHEN METABOLIC PANEL: CPT | Performed by: FAMILY MEDICINE

## 2020-10-14 RX ORDER — ACETAMINOPHEN 500 MG
500 TABLET ORAL 3 TIMES DAILY PRN
Qty: 120 TABLET | Refills: 3 | Status: SHIPPED | OUTPATIENT
Start: 2020-10-14 | End: 2021-07-08

## 2020-10-14 RX ORDER — OLOPATADINE HYDROCHLORIDE 2 MG/ML
1 SOLUTION/ DROPS OPHTHALMIC DAILY
Qty: 1 BOTTLE | Refills: 11 | Status: SHIPPED | OUTPATIENT
Start: 2020-10-14 | End: 2021-07-08

## 2020-10-14 RX ORDER — ALBUTEROL SULFATE 90 UG/1
2 AEROSOL, METERED RESPIRATORY (INHALATION) 4 TIMES DAILY
Qty: 1 INHALER | Refills: 1 | Status: SHIPPED | OUTPATIENT
Start: 2020-10-14 | End: 2020-12-02

## 2020-10-14 RX ORDER — ALBUTEROL SULFATE 90 UG/1
2 AEROSOL, METERED RESPIRATORY (INHALATION) 4 TIMES DAILY
Qty: 1 INHALER | Refills: 1 | Status: SHIPPED | OUTPATIENT
Start: 2020-10-14 | End: 2020-10-14

## 2020-10-14 RX ORDER — FUROSEMIDE 20 MG
TABLET ORAL
Qty: 30 TABLET | Refills: 1 | Status: SHIPPED | OUTPATIENT
Start: 2020-10-14 | End: 2020-12-02

## 2020-10-14 NOTE — PATIENT INSTRUCTIONS
Checking labs to check for gout  Checking labs to check your potassium    Refilled your medications

## 2020-10-14 NOTE — PROGRESS NOTES
Flor is a 55 year old  who presents for   Patient presents with:  RECHECK: follow up ER      Assessment and Plan      Flor was seen today for recheck.    Diagnoses and all orders for this visit:    Acute gouty arthritis  Patient with recent ED visit for R foot pain concerning for gout vs. Infection. Completed course of antibiotics and medrol dose sánchez, doing much better now. Patient would like refill of steroids for occasional use for foot pain; discussed that we should be judicious in our use of steroids given her immunosuppression. Will not start NSAID or colchicine due to CKD and immunosuppression. Will check uric acid today, if elevated, may consider allopurinol but will need to make sure there are no medication interactions. She is willing to use tylenol for pain in the meantime.        -     Uric acid      Liver replaced by transplant (H)  CMP today to check K, LFTs, kidney function.  -     Cancel: Comprehensive Metabolic Panel ([facility])  -     Comprehensive metabolic panel      Rib pain on left side  Refill  -     Menthol, Topical Analgesic, (BIOFREEZE) 4 % GEL; Apply to left ribs 4x daily as needed pain  -     Comprehensive metabolic panel    Acute bronchitis, unspecified organism  Refill  -     Discontinue: albuterol (PROAIR HFA/PROVENTIL HFA/VENTOLIN HFA) 108 (90 Base) MCG/ACT inhaler; Inhale 2 puffs into the lungs 4 times daily  -     Comprehensive metabolic panel  -     albuterol (PROAIR HFA/PROVENTIL HFA/VENTOLIN HFA) 108 (90 Base) MCG/ACT inhaler; Inhale 2 puffs into the lungs 4 times daily    Allergic conjunctivitis, bilateral  Refill  -     olopatadine (PATADAY) 0.2 % ophthalmic solution; Place 1 drop into both eyes daily  -     Comprehensive metabolic panel    Dry eyes, bilateral  Refill  -     olopatadine (PATADAY) 0.2 % ophthalmic solution; Place 1 drop into both eyes daily  -     Comprehensive metabolic panel    Pain in joint of right shoulder  Refill  -     acetaminophen (TYLENOL) 500 MG  tablet; Take 1 tablet (500 mg) by mouth 3 times daily as needed for mild pain         Return if symptoms worsen or fail to improve.    Options for treatment and follow-up care were reviewed with the patient. Flor Navarro  engaged in the decision making process and verbalized understanding of the options discussed and agreed with the final plan.    Lili Jones MD         LAKISHA Ray is a 55 year old with history of hemorrhagic stroke, CKD s/p renal transplant  who presents for   Patient presents with:  RECHECK: follow up ER      Visit [place]  1. Gout flare:  Seen in ED on 10/1 for foot pain. Found to have acute gouty flare vs. Cellulitis. Treated with 5 days of keflex + medrol dose sánchez. Now completed, feeling better, but still has some ongoing pain that comes and goes. Would like medication to take as needed for this.     2. Refills: eye drops, inhaler, biofreeze    3. Labs: history of liver transplant, gets frequent lab checks for potassium, kidney function. Requesting lab check today.    A [place]ish  was used for  this visit.   Patient is an established patient of this clinic..  Patient Active Problem List   Diagnosis     PVD (POSTERIOR VITREOUS DETACHMENT) OS     Hyperlipidemia LDL goal <160     CKD (chronic kidney disease) stage 3, GFR 30-59 ml/min (H)     Hypertension, renal     Liver replaced by transplant     High risk medication use     Anemia in CKD (chronic kidney disease)     Stroke, hemorrhagic (H)     Osteoporosis     Cystitis cystica     Immunosuppressed status (H)     Ganglion cyst     Vitamin D deficiency     Shoulder joint pain     Pseudophakia - Left Eye     Abdominal pain     Constipation, chronic     Secondary hyperparathyroidism (H)     Mixed hyperlipidemia     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)     Peroneal tendonitis, left     Pain in joint, ankle and foot, left     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)     Obesity, Class III, BMI 40-49.9  (morbid obesity) (H)     Sleep apnea, unspecified     Depression, recurrent (H)     Fall     Anxiety     Moderate episode of recurrent major depressive disorder (H)     Gait abnormality     Past Surgical History:   Procedure Laterality Date     CATARACT IOL, RT/LT Right 2/18/15      SECTION       Incisional Hernia Repair  2004     INSERT SHUNT PORTAL TRANSJUGULAR INTRAHEPTIC  2005    shunt placement for liver failure     LAPAROSCOPIC SALPINGO-OOPHORECTOMY      left     NECK SURGERY  2010    fracture, in halo x 7months     TRANSPLANT LIVER RECIPIENT  DONOR  10/26/10     Upper GI Endoscopy with Band Ligation of Esoph/Gastric Varic. .         Social History     Tobacco Use     Smoking status: Never Smoker     Smokeless tobacco: Never Used   Substance Use Topics     Alcohol use: No     Family History   Problem Relation Age of Onset     Hepatitis Other         Hep C, still in [place]     Cerebrovascular Disease Other      Cancer No family hx of      Diabetes No family hx of      Hypertension No family hx of      Thyroid Disease No family hx of      Glaucoma No family hx of      Macular Degeneration No family hx of          Reviewed and updated as needed this visit by clinical staff  Tobacco   Meds            Reviewed and updated as needed this visit by Provider                 Health Maintenance Due   Topic Date Due     ASTHMA ACTION PLAN  1965     DEPRESSION ACTION PLAN  1965     ZOSTER IMMUNIZATION (1 of 2) 10/04/2014     MEDICARE ANNUAL WELLNESS VISIT  2018     COLORECTAL CANCER SCREENING  2019     SPIROMETRY  2019     ASTHMA CONTROL TEST  2020     INFLUENZA VACCINE (1) 2020     PHQ-9  10/08/2020            Review of Systems:   Review of Systems  All ROS was negative except those noted in HPI       Physical Exam:     Vitals:    10/14/20 1610 10/14/20 1611   BP: (!) 142/98 (!) 142/94   Pulse: 76    Resp: 16    Temp: 98.2  F (36.8  C)    TempSrc: Oral     SpO2: 95%    Weight: 102.4 kg (225 lb 12.8 oz)      Body mass index is 44.1 kg/m .  Vital signs normal except mildly elevated blood pressure.   Physical Exam   GEN: Alert, oriented person in NAD  HENT: normocephalic, atraumatic  NECK: full ROM  RESPIRATORY: no respiratory distress, no extra WOB, speaking in full sentences  CVS: regular rate, WWP  MSK: full ROM, no obvious peripheral edema. Normal gait. R great toe non-swollen, non-erythematous, not tender to palpation.   NEURO: no FND.       Results:   No testing ordered today    Lili Jones MD  [facility]

## 2020-10-14 NOTE — TELEPHONE ENCOUNTER
[facility] Call Center    Phone Message    May a detailed message be left on voicemail: yes     Reason for Call: Medication Refill Request    Has the patient contacted the pharmacy for the refill? Yes   Name of medication being requested: Lasix 20mg   Provider who prescribed the medication: Dr. Domingo  Pharmacy: [place]  Date medication is needed: 10/14         Action Taken: Message routed to:  [facility] ([facility] ortho    Travel Screening: Not Applicable

## 2020-10-27 ENCOUNTER — TELEPHONE (OUTPATIENT)
Dept: FAMILY MEDICINE | Facility: CLINIC | Age: 55
End: 2020-10-27

## 2020-10-27 NOTE — TELEPHONE ENCOUNTER
RN contacted  Patient and relay the message from   Lalo via [phone]) patient verbalized understanding.  Patient has follow up  On 11/18/20 .      .Marianna Waterman RN      Please call patient with the following message: her labs for her potassium looked good and her kidney function was close to her baseline. We should recheck them again in 1-2 months. Her labs for gout are slightly elevated, but most of the medicines we would use to treat the gout preventatively would be a problem for her kidneys or would interact with her medications. If she has another flare of gout, we can treat with steroids again as needed.   -Liil Mcmullen

## 2020-10-29 ENCOUNTER — TELEPHONE (OUTPATIENT)
Dept: FAMILY MEDICINE | Facility: CLINIC | Age: 55
End: 2020-10-29

## 2020-10-29 DIAGNOSIS — E66.01 CLASS 3 SEVERE OBESITY WITH BODY MASS INDEX (BMI) OF 40.0 TO 44.9 IN ADULT, UNSPECIFIED OBESITY TYPE, UNSPECIFIED WHETHER SERIOUS COMORBIDITY PRESENT (H): Primary | ICD-10-CM

## 2020-10-29 DIAGNOSIS — E66.813 CLASS 3 SEVERE OBESITY WITH BODY MASS INDEX (BMI) OF 40.0 TO 44.9 IN ADULT, UNSPECIFIED OBESITY TYPE, UNSPECIFIED WHETHER SERIOUS COMORBIDITY PRESENT (H): Primary | ICD-10-CM

## 2020-10-29 NOTE — TELEPHONE ENCOUNTER
[facility] phone call message - order or referral request from patient:     Order or referral being requested: Requested order Order for a scale     Additional Details: Fax to [facility] at [phone]    Referral only -Specialty  Location     OK to leave a message on voice mail? Yes    Primary language: Tajik      needed? Yes    Call taken on October 29, 2020 at 9:46 AM by Jaja Dixon    Order request route to [facility] TRIAGE   Referrals Route to [facility] (Green/[place]/Purple) CARE COORDINATOR

## 2020-11-02 PROBLEM — M25.572 PAIN IN JOINT, ANKLE AND FOOT, LEFT: Status: RESOLVED | Noted: 2017-10-17 | Resolved: 2020-11-02

## 2020-11-02 RX ORDER — SULFAMETHOXAZOLE/TRIMETHOPRIM 800-160 MG
1 TABLET ORAL 2 TIMES DAILY
COMMUNITY
Start: 2020-10-01 | End: 2020-11-18

## 2020-11-02 NOTE — TELEPHONE ENCOUNTER
DME (Durable Medical Equipment) Orders and Documentation  Orders Placed This Encounter   Procedures     Miscellaneous DME Order      The patient was assessed and it was determined the patient is in need of the following listed DME Supplies/Equipment. Please complete supporting documentation below to demonstrate medical necessity.      DME All Other Item(s) Documentation    List reason for need and supporting documentation for medical necessity below for each DME item.     1. Pt with class 3, severe, obesity. Needs home scale to monitor weight especially given high risk medication use including transplant medications.

## 2020-11-03 ENCOUNTER — DOCUMENTATION ONLY (OUTPATIENT)
Dept: FAMILY MEDICINE | Facility: CLINIC | Age: 55
End: 2020-11-03

## 2020-11-03 NOTE — PROGRESS NOTES
"When opening a documentation only encounter, be sure to enter in \"Chief Complaint\" Forms and in \" Comments\" Title of form, description if needed.    Flor is a 55 year old  female  Form received via: Fax  Form now resides in: Provider Ready    Ronit Schneider MA    Form has been completed by provider.     Form sent out via: Fax to [facility] at Fax Number: [phone]  Patient informed: No, Reason:n/a  Output date: November 10, 2020    Sabina Diaz MA      **Please close the encounter**                    "

## 2020-11-04 DIAGNOSIS — Z94.4 LIVER REPLACED BY TRANSPLANT (H): ICD-10-CM

## 2020-11-04 NOTE — TELEPHONE ENCOUNTER
Criss called stating that [facility] has not received this order. Please fax and call Criss when sent, okay to [facility].     Jaja Dixon, Senior Patient Representative/

## 2020-11-06 NOTE — TELEPHONE ENCOUNTER
Patient pharmacy requesting refill. Appears that this has already been sent to PCP on 11/4. Routing high priority to PCP to address.   Breanna Cook RN

## 2020-11-07 RX ORDER — AMOXICILLIN 250 MG
2 CAPSULE ORAL 2 TIMES DAILY
Qty: 100 TABLET | Refills: 3 | Status: SHIPPED | OUTPATIENT
Start: 2020-11-07 | End: 2022-03-30

## 2020-11-11 ENCOUNTER — DOCUMENTATION ONLY (OUTPATIENT)
Dept: FAMILY MEDICINE | Facility: CLINIC | Age: 55
End: 2020-11-11

## 2020-11-11 NOTE — PROGRESS NOTES
"When opening a documentation only encounter, be sure to enter in \"Chief Complaint\" Forms and in \" Comments\" Title of form, description if needed.    Flor is a 55 year old  female  Form received via: Fax  Form now resides in: Provider Ready    Ronit Schneider MA                  "

## 2020-11-12 ENCOUNTER — DOCUMENTATION ONLY (OUTPATIENT)
Dept: FAMILY MEDICINE | Facility: CLINIC | Age: 55
End: 2020-11-12

## 2020-11-12 NOTE — PROGRESS NOTES
"When opening a documentation only encounter, be sure to enter in \"Chief Complaint\" Forms and in \" Comments\" Title of form, description if needed.    Flor is a 55 year old  female  Form received via: Fax  Form now resides in: Provider Ready    Sabina Diaz MA                    "

## 2020-11-12 NOTE — PROGRESS NOTES
Form has been completed by provider.     Form sent out via: Fax to [facility]  at Fax Number: [phone]  Patient informed: No, Reason:  Output date: November 12, 2020    Ronit Schneider MA      **Please close the encounter**

## 2020-11-16 ENCOUNTER — TELEPHONE (OUTPATIENT)
Dept: FAMILY MEDICINE | Facility: CLINIC | Age: 55
End: 2020-11-16

## 2020-11-16 NOTE — TELEPHONE ENCOUNTER
[facility] now requests orders and shares plan of care/discharge summaries for some patients through [email or web address].  Please REPLY TO THIS MESSAGE OR ROUTE BACK TO THE AUTHOR in order to give authorization for orders when needed.  This is considered a verbal order, you will still receive a faxed copy of orders for signature.  Thank you for your assistance in improving collaboration for our patients.    This is to alert you that on 11/11/2020 your patient was exposed to another health corker who tested positive for COVID. At this time you patient is not experiencing nay symptoms. We have directed your patient to check for symptoms twice a day at least 8 hours apart and if any symptoms are noted to call home care and hospice or their PCP office.

## 2020-11-18 ENCOUNTER — OFFICE VISIT (OUTPATIENT)
Dept: FAMILY MEDICINE | Facility: CLINIC | Age: 55
End: 2020-11-18
Payer: MEDICARE

## 2020-11-18 ENCOUNTER — OFFICE VISIT (OUTPATIENT)
Dept: PHARMACY | Facility: CLINIC | Age: 55
End: 2020-11-18
Payer: COMMERCIAL

## 2020-11-18 DIAGNOSIS — J44.9 CHRONIC OBSTRUCTIVE PULMONARY DISEASE, UNSPECIFIED COPD TYPE (H): ICD-10-CM

## 2020-11-18 DIAGNOSIS — L85.3 DRY SKIN: ICD-10-CM

## 2020-11-18 DIAGNOSIS — G63 NEUROPATHY DUE TO MEDICAL CONDITION (H): ICD-10-CM

## 2020-11-18 DIAGNOSIS — R52 PAIN: ICD-10-CM

## 2020-11-18 DIAGNOSIS — Z94.4 LIVER REPLACED BY TRANSPLANT (H): ICD-10-CM

## 2020-11-18 DIAGNOSIS — Z51.81 ENCOUNTER FOR THERAPEUTIC DRUG MONITORING: Primary | ICD-10-CM

## 2020-11-18 DIAGNOSIS — Z79.899 HIGH RISK MEDICATION USE: ICD-10-CM

## 2020-11-18 DIAGNOSIS — M1A.2710 CHRONIC DRUG-INDUCED GOUT INVOLVING TOE OF RIGHT FOOT WITHOUT TOPHUS: ICD-10-CM

## 2020-11-18 DIAGNOSIS — F51.01 PRIMARY INSOMNIA: ICD-10-CM

## 2020-11-18 DIAGNOSIS — M81.0 OSTEOPOROSIS WITHOUT CURRENT PATHOLOGICAL FRACTURE, UNSPECIFIED OSTEOPOROSIS TYPE: Primary | ICD-10-CM

## 2020-11-18 DIAGNOSIS — E66.01 OBESITY, CLASS III, BMI 40-49.9 (MORBID OBESITY) (H): ICD-10-CM

## 2020-11-18 LAB
% GRANULOCYTES: 63.8 %G (ref 40–75)
ALBUMIN SERPL-MCNC: 4.5 MG/DL (ref 3.5–4.7)
ALP SERPL-CCNC: 81.6 U/L (ref 31.7–110.5)
ALT SERPL-CCNC: 14.6 U/L (ref 0–45)
AST SERPL-CCNC: 9.7 U/L (ref 0–45)
BILIRUB SERPL-MCNC: 1 MG/DL (ref 0.2–1.3)
BUN SERPL-MCNC: 27.5 MG/DL (ref 7–19)
CALCIUM SERPL-MCNC: 9.8 MG/DL (ref 8.5–10.1)
CHLORIDE SERPLBLD-SCNC: 105.3 MMOL/L (ref 98–110)
CO2 SERPL-SCNC: 21.9 MMOL/L (ref 20–32)
CREAT SERPL-MCNC: 1.3 MG/DL (ref 0.5–1)
GFR SERPL CREATININE-BSD FRML MDRD: 45.6 ML/MIN/1.7 M2
GLUCOSE SERPL-MCNC: 95.2 MG'DL (ref 70–99)
GRANULOCYTES #: 3.2 K/UL (ref 1.6–8.3)
HCT VFR BLD AUTO: 43.4 % (ref 35–47)
HEMOGLOBIN: 13 G/DL (ref 11.7–15.7)
LYMPHOCYTES # BLD AUTO: 1.5 K/UL (ref 0.8–5.3)
LYMPHOCYTES NFR BLD AUTO: 29.1 %L (ref 20–48)
MCH RBC QN AUTO: 29.2 PG (ref 26.5–35)
MCHC RBC AUTO-ENTMCNC: 30 G/DL (ref 32–36)
MCV RBC AUTO: 97.5 FL (ref 78–100)
MID #: 0.4 K/UL (ref 0–2.2)
MID %: 7.1 %M (ref 0–20)
PLATELET # BLD AUTO: 138 K/UL (ref 150–450)
POTASSIUM SERPL-SCNC: 4.5 MMOL/L (ref 3.3–4.5)
PROT SERPL-MCNC: 7.1 G/DL (ref 6.8–8.8)
RBC # BLD AUTO: 4.45 M/UL (ref 3.8–5.2)
SODIUM SERPL-SCNC: 139.2 MMOL/L (ref 132.6–141.4)
WBC # BLD AUTO: 5 K/UL (ref 4–11)

## 2020-11-18 PROCEDURE — 80053 COMPREHEN METABOLIC PANEL: CPT | Performed by: FAMILY MEDICINE

## 2020-11-18 PROCEDURE — 85025 COMPLETE CBC W/AUTO DIFF WBC: CPT | Performed by: FAMILY MEDICINE

## 2020-11-18 PROCEDURE — 99605 MTMS BY PHARM NP 15 MIN: CPT | Performed by: PHARMACIST

## 2020-11-18 PROCEDURE — 99214 OFFICE O/P EST MOD 30 MIN: CPT | Performed by: FAMILY MEDICINE

## 2020-11-18 PROCEDURE — 36415 COLL VENOUS BLD VENIPUNCTURE: CPT | Performed by: FAMILY MEDICINE

## 2020-11-18 PROCEDURE — 99607 MTMS BY PHARM ADDL 15 MIN: CPT | Performed by: PHARMACIST

## 2020-11-18 RX ORDER — LANOLIN ALCOHOL/MO/W.PET/CERES
CREAM (GRAM) TOPICAL
Qty: 100 TABLET | Refills: 3 | Status: SHIPPED | OUTPATIENT
Start: 2020-11-18 | End: 2023-03-31

## 2020-11-18 RX ORDER — METHYLPREDNISOLONE 4 MG
TABLET, DOSE PACK ORAL
Qty: 21 TABLET | Refills: 0 | Status: SHIPPED | OUTPATIENT
Start: 2020-11-18 | End: 2021-01-06

## 2020-11-18 RX ORDER — DIMETHICONE/COLLOIDAL OATMEAL 1.25 %
LOTION (ML) TOPICAL DAILY
Qty: 532 ML | Refills: 11 | Status: ON HOLD | OUTPATIENT
Start: 2020-11-18 | End: 2023-03-02

## 2020-11-18 RX ORDER — LIDOCAINE 4 G/G
1 PATCH TOPICAL EVERY 24 HOURS
Qty: 30 PATCH | Refills: 3 | Status: SHIPPED | OUTPATIENT
Start: 2020-11-18 | End: 2020-12-18

## 2020-11-18 NOTE — PROGRESS NOTES
"       HPI       Flor Navarro is a 55 year old  who presents for   Chief Complaint   Patient presents with     RECHECK     Liver     MELATONIN: She reports her melatonin has been no longer covered by insurance. She is willing to receive a prescription of melatonin in hopes to attain a better quality of sleep. She uses with good success.      SKIN: She also requests a refill prescription of cream for her dry skin and a patch for her pain. Lidocaine does work well for her. She reports using one patch for 8-12 hours.     Gout: She states that she received two medication in the ED, records reviewed, they worked very well but \"no one will give me any more.\" She has been seen in clinic since for gout but not given colchicine due to CKD or steroids due to concern of immunosuppression.   She endorses severe pain in the R 1st MTP joint and toe.     WEIGHT: She endorses that she has lost a bit weight since previous office visit. She has been drinking prune juice to help in bowel movement.  She denies taking naltrexone that is ordered by the weight management, hasn't taken likely since last trip to [place]. Endorses loosing weight despite not using any medicine. So happy about this! Wants to delay return to  mgmt to summer.     Adherence and Exercise  Medication side effects: yes: Gouty arthritis   How often is a medication missed? Never  Exercise:No exercise None   Due to language barrier, an  was present during the history-taking and subsequent discussion (and for part of the physical exam) with this patient.  An Ukrainian  was used during this office visit.       Adherence and Exercise  Medication side effects: yes:   How often is a medication missed? Never  Exercise:No exercise None     Problem, Medication and Allergy Lists were reviewed and updated if needed..    Patient is an established patient of this clinic..         Review of Systems:   Review of Systems     Positive for swollen toes, slight " weight loss, dry skin, anxiousness and negative for rest of review systems otherwise.     This document serves as a record of the services and decisions personally performed and made by Karely Sierra MD. It was created on his/her behalf by Von Reid, a trained medical scribe. The creation of this document is based the provider's statements to the medical scribe.  Scribe Von Reid  3:25 PM, November 18, 2020          Physical Exam:     Vitals:    11/18/20 1456   BP: (P) 125/86   Pulse: (P) 74   Temp: (P) 97.7  F (36.5  C)   TempSrc: (P) Oral   SpO2: (P) 93%     There is no height or weight on file to calculate BMI.  Vitals were reviewed and were normal  Wt Readings from Last 4 Encounters:   10/14/20 102.4 kg (225 lb 12.8 oz)   05/26/20 102.5 kg (226 lb)   05/04/20 103.8 kg (228 lb 12.8 oz)   04/17/20 102.4 kg (225 lb 11.2 oz)         BMI= There is no height or weight on file to calculate BMI.  GENERAL: healthy, alert and no distress, overweight  MS: extremities- no gross deformities noted, no edema. Right first MTP exquistely tender.  Mild swelling and erythema to the general area  Foot Exam: Inspection:  swelling lateral, forefoot  PSYCH: Alert and oriented times 3; speech- Interruptive.  affect- anxious.   .      Results:   Results are ordered and pending    Assessment and Plan        Flor was seen today for recheck.      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    Obesity, Class III, BMI 40-49.9 (morbid obesity) (H)  Wants to delay return to wt mgmt to  summer.   Stop naltrexone since not using       Primary insomnia  -     melatonin 3 MG tablet; TAKE ONE TABLET BY MOUTH EVERY NIGHT AS NEEDED FOR SLEEP.    Chronic drug-induced gout involving toe of right foot without tophus  -     methylPREDNISolone (MEDROL DOSEPAK) 4 MG tablet therapy pack; Follow Package Directions    Allopurinol has a moderate interaction with cyclosporine which may result in increase of cyclosporine levels. Furosemide and cyclosporine may be increasing risk of gouty arthritis given current symptoms and starting lasix this summer for hyperK    - reviewed google images about what is gout.   Started lasix over the summer and potasium binders due  to persistently elevated potassium. No longer on potassium binders and lasix and this combination with cyclosporine is causing acute flares symptoms.   --Hope to stop lasix first, if not effective, or pt doesn't want to do this, will start feboxostat   - Can't use colchicine due to CKD and allopurinol interferes with transplant medications.    - Steroids now, reviewed medrol dose sánchez, return to clinic 3 weeks to determine next steps as above  - Working with pharmacy regarding high risk complexity of medications.     Dry skin  -     dimethicone (AVEENO DAILY MOISTURIZING) 1.3 % LOTN lotion; Externally apply topically daily     Medications Discontinued During This Encounter   Medication Reason     cephALEXin (KEFLEX) 500 MG capsule      sulfamethoxazole-trimethoprim (BACTRIM DS) 800-160 MG tablet Medication Reconciliation Clean Up     magic mouthwash suspension, diphenhydrAMINE, lidocaine, aluminum-magnesium & simethicone, (FIRST-MOUTHWASH BLM) compounding kit      melatonin 3 MG tablet      naltrexone (DEPADE/REVIA) 50 MG tablet      carboxymethylcellulose PF (CARBOXYMETHYLCELLULOSE SODIUM) 0.5 % ophthalmic solution      dimethicone (AVEENO DAILY MOISTURIZING) 1.3 % LOTN lotion Reorder     methylPREDNISolone (MEDROL DOSEPAK) 4 MG tablet therapy pack  Reorder       Options for treatment and follow-up care were reviewed with the patient. Flor Navarro  engaged in the decision making process and verbalized understanding of the options discussed and agreed with the final plan.    Karely Sierra MD  The information in this document, created by the medical scribe for me, accurately reflects the services I personally performed and the decisions made by me. I have reviewed and approved this document for accuracy prior to leaving the patient care area.  Karely Sierra MD  3:12 PM, 11/18/20  END TIME: 3: 49 PM  Met with pharmacist in person to discuss.

## 2020-11-18 NOTE — PROGRESS NOTES
MTM ENCOUNTER  SUBJECTIVE/OBJECTIVE:                           Flor Navarro is a 55 year old female coming in for a co-visit with Karely Sierra MD. was present during today's visit.     Reason for visit: Medication review.    Allergies/ADRs: Reviewed in chart  Tobacco: She reports that she has never smoked. She has never used smokeless tobacco.  Alcohol: none  Caffeine: NA  Activity: NA  Past Medical History: Reviewed in chart      Medication Adherence/Access: no issues reported  Patient has assistance with medication administration: home care. Home care RN comes once a week  Patient does not know many names of her medications but can describe the majority of her meds.      CKD/ hyperkaliemia   Furosemide:    Taken in the morning.    Taking Calcitrol for Vit D deficiency.    Respiratory :   Feels her respiratory symptoms are well controlled   Albuterol inhaler: used only when she leaves the home.   Albuterol Neb used 1-2 times month     Pain:   Has a concern of ongoing foot pain, including the soles of her feet.  APAP:  Taken daily.  1000 mg once a day.  On most days. When she has pain in her legs or soles of her feet she will take 1000 mg TID.  She she has been told that this can be taken as many as 4 times a day, but understands the concern about taking too much APAP and is careful not to exceed these doses.   Lidocaine patches: No patches for 6 months.  Pain has continued to increase. These were very helpful for back pain.     GI:   Flor is curerntly taking PPI BID for GERD. She feels very strongly that she needs to take this daily or will have significant reflux symptoms.    S/P Liver transplant:   Currently treated with cellcept and cyclosporine.  Last cyclosporine level was drawn in July 2020    Bone Health:  Taking alendronate 70 mg once weeky  Reports being compliant with this medication. No adverse effects reported.    Started on alendronate in 2016.  This was a restart.  Pt was previously on  this drug from 8372-7598.  Last Dexa in 2019.  T-score was -2.6, but had shown improvement from previous scan.     Constipation:  Treated with psyllium purchased by the patient and continues to use Senna/docusate. Well controlled.     Med reconciliation:  Not taking naltrexone TID - not taking    Not taking any antibiotics    Today's Vitals: LMP  (LMP Unknown)    BP Readings from Last 1 Encounters:   11/18/20 (P) 125/86     Pulse Readings from Last 1 Encounters:   11/18/20 (P) 74     Wt Readings from Last 1 Encounters:   10/14/20 225 lb 12.8 oz (102.4 kg)     Ht Readings from Last 1 Encounters:   05/26/20 5' (1.524 m)     Estimated body mass index is 44.1 kg/m  as calculated from the following:    Height as of 5/26/20: 5' (1.524 m).    Weight as of 10/14/20: 225 lb 12.8 oz (102.4 kg).    Temp Readings from Last 1 Encounters:   11/18/20 (P) 97.7  F (36.5  C) ((P) Oral)     Specimen Collected: 10/14/20  4:32 PM   Last Resulted: 10/14/20  8:59 PM        Sodium 133 - 144 mmol/L 142     Potassium 3.4 - 5.3 mmol/L 4.1     Chloride 94 - 109 mmol/L 115High      Carbon Dioxide 20 - 32 mmol/L 21     Anion Gap 3 - 14 mmol/L 6     Glucose 70 - 99 mg/dL 119High      Urea Nitrogen 7 - 30 mg/dL 32High      Creatinine 0.52 - 1.04 mg/dL 1.28High      GFR Estimate >60 mL/min/ 47Low     Comment: Non  GFR Calc   Starting 12/18/2018, serum creatinine based estimated GFR (eGFR) will be   calculated using the Chronic Kidney Disease Epidemiology Collaboration   (CKD-EPI) equation.     GFR Estimate If Black >60 mL/min/ 54Low     Comment:  GFR Calc   Starting 12/18/2018, serum creatinine based estimated GFR (eGFR) will be   calculated using the Chronic Kidney Disease Epidemiology Collaboration   (CKD-EPI) equation.     Calcium 8.5 - 10.1 mg/dL 8.6     Bilirubin Total 0.2 - 1.3 mg/dL 0.5     Albumin 3.4 - 5.0 g/dL 3.3Low      Protein Total 6.8 - 8.8 g/dL 7.4     Alkaline Phosphatase 40 - 150 U/L 98     ALT 0 -  50 U/L 22     AST 0 - 45 U/L 15            ASSESSMENT:                              Medication Adherence: No issues identified    GOUT:   New finding. Recommend to initiate steroid therapy for Gout treatment.  Other treatments may interact with transplant medications or would need renal adjustments.  Future treatments for maintenance: recommend Febuxostat as an alternative treatment.  This does not have a drug interaction with the transplant medications     Causes of the gout may be attributed to either the loop diuretic or cyclosporine.  Both agents have this potential adverse effects, but is most likely the furosemide. Recommend to consider discontinuation of furosemide at a future encounter.      CKD/ hyperkelemia:  Currently controlled.  Loop diuretic will be re-evaluated at future encounter with PCP.     Bone Health:   Appropriately treated with bisphosphonate.  Patient is at an increased risk of poor calcium build up because of continuous steroid exposures.  There is a concern about use of bisphosphonate in the context of uncontrolled GERD.  If drug that remains in stomach contents refluxed into the esophagus there is the risk of esophogeal erosion.  May consider IV therapy in the future.  This is the 4th year of the current cycle of this drug. It would be appropriate to re-evaluate therapy next year.      Pain:   Patient has not had access to Lidocaine patches from her pharmacy/ ins.  Recommend an alternate therapy is to use the 4% lidocaine patches. recommendation made to Dr. Sierra today.     Liver transplant :   Stable.  No current changes recommended today.    There is a concern regarding a drug interaction between Cyclosporine & Cellcept vs omeprazole.  This can potentially decrease the level of Cellcept and cyclosporine.  Recommend future lab monitoring.  The patient is very attached  To the GERD therapy and may resist changes to this, but one potential recommendation may be to change to H2 blocker given  BID.    Constipation:  Controlled     Respiratory :   Stabe and in control. No recommendations today.     PLAN:                            1. Initiate steroid burst for Gout today.  2. Consider discontinuation of loop diuretic in the future.  3. Consider febuxostat for maintenances therapy if needed in the future.     I spent 20 minutes with this patient today. All changes were made via collaborative practice agreement with Karely Sierra MD . Discussed today while in clinic     Will follow up with PCP.    The patient was given a summary of these recommendations. See Provider note/AVS from today.     Bert Benites Pharm.D.

## 2020-11-18 NOTE — LETTER
November 27, 2020      Flor HAMPTON Melanie  [address]   [place] [address]        Dear Flor,    Thank you for getting your care at [facility]. Please see below for your test results.    Resulted Orders   CBC with Diff Plt ([facility])   Result Value Ref Range    WBC 5.0 4.0 - 11.0 K/uL    Lymphocytes # 1.5 0.8 - 5.3 K/uL    % Lymphocytes 29.1 20.0 - 48.0 %L    Mid # 0.4 0.0 - 2.2 K/uL    Mid % 7.1 0.0 - 20.0 %M    GRANULOCYTES # 3.2 1.6 - 8.3 K/uL    % Granulocytes 63.8 40.0 - 75.0 %G    RBC 4.45 3.80 - 5.20 M/uL    Hemoglobin 13.0 11.7 - 15.7 g/dL    Hematocrit 43.4 35.0 - 47.0 %    MCV 97.5 78.0 - 100.0 fL    MCH 29.2 26.5 - 35.0 pg    MCHC 30.0 (L) 32.0 - 36.0 g/dL    Platelets 138.0 (L) 150.0 - 450.0 K/uL   Comprehensive Metabolic Panel ([facility])   Result Value Ref Range    Calcium 9.8 8.5 - 10.1 mg/dL    Chloride 105.3 98.0 - 110.0 mmol/L    Carbon Dioxide 21.9 20.0 - 32.0 mmol/L    Creatinine 1.3 (H) 0.5 - 1.0 mg/dL    Glucose 95.2 70.0 - 99.0 mg'dL    Potassium 4.5 3.3 - 4.5 mmol/L    Sodium 139.2 132.6 - 141.4 mmol/L    Protein Total 7.1 6.8 - 8.8 g/dL    GFR Estimate 45.6 (L) >60.0 mL/min/1.7 m2    GFR Estimate If Black 55.2 (L) >60.0 mL/min/1.7 m2    Albumin 4.5 3.5 - 4.7 mg/dL    Alkaline Phosphatase 81.6 31.7 - 110.5 U/L    ALT 14.6 0.0 - 45.0 U/L    AST 9.7 0.0 - 45.0 U/L    Bilirubin Total 1.0 0.2 - 1.3 mg/dL    Urea Nitrogen 27.5 (H) 7.0 - 19.0 mg/dL       Mostly things look stable. Your platelets changed a little, and we will have to recheck them in the future, but it is not urgent. Hope you are well.     Sincerely,    Karely Sierra MD

## 2020-11-18 NOTE — LETTER
November 19, 2020      Flor HAMPTON Melanie  [address]   [place] [address]        Dear Flor,    Thank you for getting your care at [facility]. Please see below for your test results.    Resulted Orders   CBC with Diff Plt ([facility])   Result Value Ref Range    WBC 5.0 4.0 - 11.0 K/uL    Lymphocytes # 1.5 0.8 - 5.3 K/uL    % Lymphocytes 29.1 20.0 - 48.0 %L    Mid # 0.4 0.0 - 2.2 K/uL    Mid % 7.1 0.0 - 20.0 %M    GRANULOCYTES # 3.2 1.6 - 8.3 K/uL    % Granulocytes 63.8 40.0 - 75.0 %G    RBC 4.45 3.80 - 5.20 M/uL    Hemoglobin 13.0 11.7 - 15.7 g/dL    Hematocrit 43.4 35.0 - 47.0 %    MCV 97.5 78.0 - 100.0 fL    MCH 29.2 26.5 - 35.0 pg    MCHC 30.0 (L) 32.0 - 36.0 g/dL    Platelets 138.0 (L) 150.0 - 450.0 K/uL   Comprehensive Metabolic Panel ([facility])   Result Value Ref Range    Calcium 9.8 8.5 - 10.1 mg/dL    Chloride 105.3 98.0 - 110.0 mmol/L    Carbon Dioxide 21.9 20.0 - 32.0 mmol/L    Creatinine 1.3 (H) 0.5 - 1.0 mg/dL    Glucose 95.2 70.0 - 99.0 mg'dL    Potassium 4.5 3.3 - 4.5 mmol/L    Sodium 139.2 132.6 - 141.4 mmol/L    Protein Total 7.1 6.8 - 8.8 g/dL    GFR Estimate 45.6 (L) >60.0 mL/min/1.7 m2    GFR Estimate If Black 55.2 (L) >60.0 mL/min/1.7 m2    Albumin 4.5 3.5 - 4.7 mg/dL    Alkaline Phosphatase 81.6 31.7 - 110.5 U/L    ALT 14.6 0.0 - 45.0 U/L    AST 9.7 0.0 - 45.0 U/L    Bilirubin Total 1.0 0.2 - 1.3 mg/dL    Urea Nitrogen 27.5 (H) 7.0 - 19.0 mg/dL       Flor, these numbers are stable. We should recheck your platelets again. Your potassium is still high so I think you need the lasix. Come see us in 2-3 weeks for your foot!!    Sincerely,    Karely Sierra MD

## 2020-11-19 VITALS — BODY MASS INDEX: 43.75 KG/M2 | WEIGHT: 224 LBS

## 2020-11-27 PROBLEM — D69.6 THROMBOCYTOPENIA (H): Status: ACTIVE | Noted: 2020-11-27

## 2020-12-02 DIAGNOSIS — E87.5 HYPERKALEMIA: ICD-10-CM

## 2020-12-02 DIAGNOSIS — J20.9 ACUTE BRONCHITIS, UNSPECIFIED ORGANISM: ICD-10-CM

## 2020-12-02 RX ORDER — ALBUTEROL SULFATE 90 UG/1
2 AEROSOL, METERED RESPIRATORY (INHALATION) 4 TIMES DAILY
Qty: 1 INHALER | Refills: 1 | Status: SHIPPED | OUTPATIENT
Start: 2020-12-02 | End: 2021-10-12

## 2020-12-02 RX ORDER — FUROSEMIDE 20 MG
20 TABLET ORAL DAILY
Qty: 30 TABLET | Refills: 1 | Status: SHIPPED | OUTPATIENT
Start: 2020-12-02 | End: 2020-12-18

## 2020-12-03 ENCOUNTER — APPOINTMENT (OUTPATIENT)
Dept: INTERPRETER SERVICES | Facility: CLINIC | Age: 55
End: 2020-12-03
Payer: MEDICARE

## 2020-12-03 ENCOUNTER — MEDICAL CORRESPONDENCE (OUTPATIENT)
Dept: HEALTH INFORMATION MANAGEMENT | Facility: CLINIC | Age: 55
End: 2020-12-03

## 2020-12-04 ENCOUNTER — APPOINTMENT (OUTPATIENT)
Dept: INTERPRETER SERVICES | Facility: CLINIC | Age: 55
End: 2020-12-04
Payer: MEDICARE

## 2020-12-15 ENCOUNTER — DOCUMENTATION ONLY (OUTPATIENT)
Dept: FAMILY MEDICINE | Facility: CLINIC | Age: 55
End: 2020-12-15

## 2020-12-15 NOTE — PROGRESS NOTES
"When opening a documentation only encounter, be sure to enter in \"Chief Complaint\" Forms and in \" Comments\" Title of form, description if needed.    Flor is a 55 year old  female  Form received via: Fax  Form now resides in: Provider Ready    Sabina Diaz MA    Form has been completed by provider.     Form sent out via: Fax to  [facility] at Fax Number: [phone]  Patient informed: n/a  Output date: December 18, 2020    Shelley Osei CMA      **Please close the encounter**              "

## 2020-12-18 ENCOUNTER — OFFICE VISIT (OUTPATIENT)
Dept: FAMILY MEDICINE | Facility: CLINIC | Age: 55
End: 2020-12-18
Payer: MEDICARE

## 2020-12-18 ENCOUNTER — TELEPHONE (OUTPATIENT)
Dept: FAMILY MEDICINE | Facility: CLINIC | Age: 55
End: 2020-12-18

## 2020-12-18 VITALS
BODY MASS INDEX: 43.43 KG/M2 | TEMPERATURE: 98.1 F | RESPIRATION RATE: 22 BRPM | SYSTOLIC BLOOD PRESSURE: 128 MMHG | WEIGHT: 222.4 LBS | DIASTOLIC BLOOD PRESSURE: 82 MMHG

## 2020-12-18 DIAGNOSIS — M79.2 NEUROPATHIC PAIN: ICD-10-CM

## 2020-12-18 DIAGNOSIS — G63 NEUROPATHY DUE TO MEDICAL CONDITION (H): Primary | ICD-10-CM

## 2020-12-18 DIAGNOSIS — R07.81 RIB PAIN ON LEFT SIDE: ICD-10-CM

## 2020-12-18 DIAGNOSIS — F51.01 PRIMARY INSOMNIA: ICD-10-CM

## 2020-12-18 DIAGNOSIS — L85.3 DRY SKIN: ICD-10-CM

## 2020-12-18 DIAGNOSIS — Z23 NEED FOR PROPHYLACTIC VACCINATION AND INOCULATION AGAINST INFLUENZA: ICD-10-CM

## 2020-12-18 DIAGNOSIS — M10.372 ACUTE GOUT DUE TO RENAL IMPAIRMENT INVOLVING TOE OF LEFT FOOT: Primary | ICD-10-CM

## 2020-12-18 PROCEDURE — 99214 OFFICE O/P EST MOD 30 MIN: CPT | Mod: 25 | Performed by: FAMILY MEDICINE

## 2020-12-18 PROCEDURE — 90682 RIV4 VACC RECOMBINANT DNA IM: CPT | Performed by: FAMILY MEDICINE

## 2020-12-18 PROCEDURE — G0008 ADMIN INFLUENZA VIRUS VAC: HCPCS | Performed by: FAMILY MEDICINE

## 2020-12-18 RX ORDER — DIMETHICONE/COLLOIDAL OATMEAL 1.25 %
LOTION (ML) TOPICAL DAILY
Qty: 532 ML | Refills: 11 | Status: CANCELLED | OUTPATIENT
Start: 2020-12-18

## 2020-12-18 RX ORDER — LANOLIN ALCOHOL/MO/W.PET/CERES
CREAM (GRAM) TOPICAL 2 TIMES DAILY
Qty: 454 G | Refills: 11 | Status: SHIPPED | OUTPATIENT
Start: 2020-12-18 | End: 2022-04-22

## 2020-12-18 RX ORDER — LIDOCAINE 4 G/G
1 PATCH TOPICAL EVERY 24 HOURS
Qty: 30 PATCH | Refills: 3 | Status: CANCELLED | OUTPATIENT
Start: 2020-12-18

## 2020-12-18 RX ORDER — LIDOCAINE 50 MG/G
OINTMENT TOPICAL PRN
Qty: 240 G | Refills: 3 | Status: SHIPPED | OUTPATIENT
Start: 2020-12-18 | End: 2020-12-27

## 2020-12-18 RX ORDER — LANOLIN ALCOHOL/MO/W.PET/CERES
CREAM (GRAM) TOPICAL
Qty: 100 TABLET | Refills: 3 | Status: CANCELLED | OUTPATIENT
Start: 2020-12-18

## 2020-12-18 SDOH — ECONOMIC STABILITY: TRANSPORTATION INSECURITY
IN THE PAST 12 MONTHS, HAS LACK OF TRANSPORTATION KEPT YOU FROM MEETINGS, WORK, OR FROM GETTING THINGS NEEDED FOR DAILY LIVING?: NOT ASKED

## 2020-12-18 SDOH — ECONOMIC STABILITY: INCOME INSECURITY: HOW HARD IS IT FOR YOU TO PAY FOR THE VERY BASICS LIKE FOOD, HOUSING, MEDICAL CARE, AND HEATING?: VERY HARD

## 2020-12-18 SDOH — ECONOMIC STABILITY: FOOD INSECURITY: WITHIN THE PAST 12 MONTHS, YOU WORRIED THAT YOUR FOOD WOULD RUN OUT BEFORE YOU GOT MONEY TO BUY MORE.: SOMETIMES TRUE

## 2020-12-18 SDOH — ECONOMIC STABILITY: TRANSPORTATION INSECURITY
IN THE PAST 12 MONTHS, HAS THE LACK OF TRANSPORTATION KEPT YOU FROM MEDICAL APPOINTMENTS OR FROM GETTING MEDICATIONS?: NOT ASKED

## 2020-12-18 SDOH — ECONOMIC STABILITY: FOOD INSECURITY: WITHIN THE PAST 12 MONTHS, THE FOOD YOU BOUGHT JUST DIDN'T LAST AND YOU DIDN'T HAVE MONEY TO GET MORE.: SOMETIMES TRUE

## 2020-12-18 NOTE — TELEPHONE ENCOUNTER
[facility] Prior Authorization Team Request    Medication: Lidocaine 5% Ointment  Dosing: apply as needed for pain  Qty: 50gm  Day Supply: 30  NDC (required for Medicaid members): [address]-91     Insurance   BIN: [id number]  PCN: [id number]  Grp: [id number]  ID: [id number]    CoverMyMeds Key (if applicable):     Additional documentation:       Filling Pharmacy: [facility]  Phone Number: [phone]  Contact:    Pharmacy NPI (required for Medicaid members): [id number]

## 2020-12-18 NOTE — PATIENT INSTRUCTIONS
Courtney:  Your mom's sleep medication ay be cheaper at [employer] than at the pharmacy  Choose a generic melatonin: 3 to 5mg is okay  If you get 10mg tablets, then cut the pills in half   [facility] had a bottle for 3.99 for 120 pills         STOP FUROSEMIDE (Lasix)   This is to help your gout   Come back in 2-3 weeks to recheck your labs

## 2020-12-18 NOTE — NURSING NOTE
Due to patient being non-English speaking/uses sign language, an  was used for this visit. Only for face-to-face interpretation by an external agency, date and length of interpretation can be found on the scanned worksheet.     name: Abbi Tran  Agency: [employer]  Language: Ukrainian   Telephone number: [phone]  Type of interpretation: Telephone, spoken

## 2020-12-18 NOTE — PROGRESS NOTES
LAKISHA       Flor Navarro is a 55 year old  who presents for   Chief Complaint   Patient presents with     RECHECK     F/u gout, patient states treatment improved gout     Imm/Inj     Flu Shot     Follow up: She reports that she has been scratching a lot.  She states abdomen is entirely numb. She state the numbness, burning, itching has been same since the liver transplant. She states she took a shower three times and not as often. She only showers every two to three days because it drys out the skin. She is willing to receive the gel cream form, the melatonin to alleviate her itching and burning sensation.  She states it has been hard for her to get medicines. She states she is able to receive food and does not feel that she is going to run out.  She states sometimes she gets food shelves but the food is already . She states [employer] shops at [employer] if incase in need of any medications.  She reports she is need for melatonin to promote good quality of sleep.   She states it took her two weeks to receive her medication from the pharmacy previously.        Foot pain: She reports shooting pain in the foot which is better. She endorses completing the medication sooner or later and is going to touch base with the pharmacy. She endorses wearing compression socks and states that she is willing to receive new compression socks every six months.      GOUT: she states she is almost done with steroids and is willing to receive a refill to alleviate her current symptoms. She denies taking lasix for high potassium.  She is making sure to take her potassium again in two weeks and is willing to starting a new medication for the gout.  She states sometimes medicine is no good.         An Hungarian  was used for  this visit.       Concern: itching over lower belly area and foot pain.    Description of the problem : endorses severe burning, itching sensation over the entire belly area which does not seem to be  alleviating. Reports foot pain with moderate swelling laterally.          Adherence and Exercise  Medication side effects: not asked  How often is a medication missed? Never  Exercise:No exercise None     Problem, Medication and Allergy Lists were reviewed and updated if needed..    Patient is an established patient of this clinic.  I reviewed  filler.         Review of Systems:   Review of Systems  Positive for severe itching, scratching, and burning sensation over the entire belly. Positive for foot pain with mild to moderate swelling.   Negative for rest of review of systems.     This document serves as a record of the services and decisions personally performed and made by Karely Sierra MD. It was created on his/her behalf by Von Reid, a trained medical scribe. The creation of this document is based the provider's statements to the medical scribe.  Scribe Von Reid  10:35 AM, December 18, 2020         Physical Exam:     Vitals:    12/18/20 1004 12/18/20 1007   BP: (!) 158/107 128/82   BP Location: Right arm Right arm   Patient Position: Sitting Sitting   Cuff Size: Adult Regular Adult Regular   Resp: 22    Temp: 98.1  F (36.7  C)    TempSrc: Oral    Weight: 100.9 kg (222 lb 6.4 oz)      Body mass index is 43.43 kg/m .  Vitals were reviewed and were normal.      BMI= Body mass index is 43.43 kg/m .   GENERAL: healthy, alert and no distress  MUSCULOSKELETAL: Trace swelling over the lateral aspect of the ankle. She has full range of motion but it does not cause any pain. No warmth or tenderness is noted Over the left MTP and meta tarsals.   PSYCH: Alert and oriented times 3; affect- full range.         Results:    Results from this visitNo results found for any visits on 12/18/20.    Assessment and Plan        Flor was seen today for recheck and imm/inj.    Diagnoses and all orders for this visit:    Acute gout due to renal impairment involving toe of left foot  Started lasix over the summer and potasium  binders due  to persistently elevated potassium. No longer on potassium binders and lasix and this combination with cyclosporine is causing acute flares symptoms.   --Hope to stop lasix first, as likely due to CKD and lasix - return to clinic 2 wks for BMP. And recheck. f not effective,  will start feboxostat   - Can't use colchicine due to CKD and allopurinol interferes with transplant medications.    - Finish steroids- Working with pharmacy regarding high risk complexity of medications    Rib pain on left side - chronic assoc'd with numbness. Rx for lidocaine cream as insurance wouldn't cover patch    Primary insomnia    Dry skin  -     mineral oil-white petrolatum (EUCERIN) CREA cream; Apply topically 2 times daily    Need for prophylactic vaccination and inoculation against influenza  -     INFLUENZA QUAD, RECOMBINANT, P-FREE (RIV4) (FLUBLOCK) [09743]    Neuropathic pain  -     lidocaine (XYLOCAINE) 5 % external ointment; Apply topically as needed for moderate pain           Medications Discontinued During This Encounter   Medication Reason     Lidocaine (LIDOCARE) 4 % Patch      Menthol, Topical Analgesic, (BIOFREEZE) 4 % GEL      furosemide (LASIX) 20 MG tablet        Options for treatment and follow-up care were reviewed with the patient. Flor Navarro  engaged in the decision making process and verbalized understanding of the options discussed and agreed with the final plan.    Karely Sierra MD  The information in this document, created by the medical scribe for me, accurately reflects the services I personally performed and the decisions made by me. I have reviewed and approved this document for accuracy prior to leaving the patient care area.  Karely Sierra MD  10:26 AM, 12/18/20  END TIME: 10:50 AM

## 2020-12-21 NOTE — TELEPHONE ENCOUNTER
Central Prior Authorization Team   Phone: [phone]      PA Initiation via Fax    Medication: Lidocaine 5% Ointment  Insurance Company: Medicare Blue - Phone [phone] Fax [phone]  Pharmacy Filling the Rx: [facility] [place], [place] - [address] [place] [address] 0-855  Filling Pharmacy Phone: [phone]  Filling Pharmacy Fax:    Start Date: 12/21/2020

## 2020-12-22 ENCOUNTER — DOCUMENTATION ONLY (OUTPATIENT)
Dept: FAMILY MEDICINE | Facility: CLINIC | Age: 55
End: 2020-12-22

## 2020-12-22 NOTE — TELEPHONE ENCOUNTER
Prior Authorization:     Denied Reason: see chart    Alternatives: None available    Pharmacy notified.  Routing to MD    Please advise if you would like to move forward with the appeal process or plan to  prescribe an alternative medication. If Appeal is desired a letter of medical necessity with denial rationale is needed to start the appeal process.   Route to PA Pool when completed.    Eliza Carrington CMA on 12/22/2020 at 9:24 AM

## 2020-12-22 NOTE — TELEPHONE ENCOUNTER
PRIOR AUTHORIZATION DENIED    Medication: Lidocaine 5% Ointment- DENIED    Denial Date: 12/21/2020    Denial Rational:           Appeal Information:

## 2020-12-27 RX ORDER — LIDOCAINE 50 MG/G
OINTMENT TOPICAL PRN
Qty: 240 G | Refills: 3 | Status: SHIPPED | OUTPATIENT
Start: 2020-12-27 | End: 2023-09-15

## 2020-12-27 NOTE — TELEPHONE ENCOUNTER
Problem associated with prescription was Not neuritis - it was neuropathic pain.     I changed it to neuropathic pain due to a medical condition, as it is a result of her liver transplant surgery. Please retry PA with this indication.

## 2020-12-28 ENCOUNTER — TELEPHONE (OUTPATIENT)
Dept: FAMILY MEDICINE | Facility: CLINIC | Age: 55
End: 2020-12-28

## 2020-12-28 NOTE — TELEPHONE ENCOUNTER
[facility] Prior Authorization Team Request     Medication: Lidocaine 5% Ointment  Dosing: apply as needed for pain  Qty: 50gm  Day Supply: 30  NDC (required for Medicaid members): [address]-11      Insurance   BIN: [id number]  PCN: [id number]  Grp: [id number]  ID: [id number]     CoverMyMeds Key (if applicable):      Additional documentation:         Filling Pharmacy: [facility]  Phone Number: [phone]  Contact:    Pharmacy NPI (required for Medicaid members): [id number]

## 2020-12-28 NOTE — TELEPHONE ENCOUNTER
Central Prior Authorization Team   Phone: [phone]      PA Initiation via fax    Medication: lidocaine (XYLOCAINE) 5 % external ointment  Insurance Company: Medicare Blue - Phone [phone] Fax [phone]  Pharmacy Filling the Rx: [facility] [place], [place] - [address] [place] [address] [id number]  Filling Pharmacy Phone: [phone]  Filling Pharmacy Fax:    Start Date: 12/28/2020

## 2020-12-30 NOTE — TELEPHONE ENCOUNTER
PRIOR AUTHORIZATION DENIED    Medication: lidocaine (XYLOCAINE) 5 % external ointment- DENIED    Denial Date: 12/28/2020    Denial Rational:             Appeal Information:

## 2020-12-30 NOTE — TELEPHONE ENCOUNTER
Prior Authorization:     Denied Reason: see chart    Alternatives: None available    Pharmacy notified.  Routing to MD    Please advise if you would like to move forward with the appeal process or plan to  prescribe an alternative medication. If Appeal is desired a letter of medical necessity with denial rationale is needed to start the appeal process.   Route to PA Pool when completed.    April SARAH Carrington CMA on 12/30/2020 at 10:06 AM

## 2021-01-06 ENCOUNTER — OFFICE VISIT (OUTPATIENT)
Dept: FAMILY MEDICINE | Facility: CLINIC | Age: 56
End: 2021-01-06
Payer: MEDICARE

## 2021-01-06 VITALS
RESPIRATION RATE: 16 BRPM | SYSTOLIC BLOOD PRESSURE: 133 MMHG | OXYGEN SATURATION: 93 % | DIASTOLIC BLOOD PRESSURE: 81 MMHG | HEART RATE: 79 BPM | WEIGHT: 226 LBS | TEMPERATURE: 98.9 F | BODY MASS INDEX: 44.14 KG/M2

## 2021-01-06 DIAGNOSIS — Z79.899 POLYPHARMACY: ICD-10-CM

## 2021-01-06 DIAGNOSIS — M1A.2710 CHRONIC DRUG-INDUCED GOUT INVOLVING TOE OF RIGHT FOOT WITHOUT TOPHUS: ICD-10-CM

## 2021-01-06 DIAGNOSIS — N18.30 STAGE 3 CHRONIC KIDNEY DISEASE, UNSPECIFIED WHETHER STAGE 3A OR 3B CKD (H): ICD-10-CM

## 2021-01-06 DIAGNOSIS — K08.409 HISTORY OF TOOTH EXTRACTION, UNSPECIFIED EDENTULISM CLASS: Primary | ICD-10-CM

## 2021-01-06 DIAGNOSIS — Z86.39 HISTORY OF HYPERKALEMIA: ICD-10-CM

## 2021-01-06 LAB
PTH-INTACT SERPL-MCNC: 110 PG/ML (ref 18–80)
URATE SERPL-MCNC: 8.2 MG/DL (ref 2.6–6)

## 2021-01-06 PROCEDURE — 80048 BASIC METABOLIC PNL TOTAL CA: CPT | Performed by: FAMILY MEDICINE

## 2021-01-06 PROCEDURE — 83970 ASSAY OF PARATHORMONE: CPT | Performed by: FAMILY MEDICINE

## 2021-01-06 PROCEDURE — 99214 OFFICE O/P EST MOD 30 MIN: CPT | Performed by: FAMILY MEDICINE

## 2021-01-06 PROCEDURE — 36415 COLL VENOUS BLD VENIPUNCTURE: CPT | Performed by: FAMILY MEDICINE

## 2021-01-06 PROCEDURE — 84550 ASSAY OF BLOOD/URIC ACID: CPT | Performed by: FAMILY MEDICINE

## 2021-01-06 RX ORDER — CLINDAMYCIN HCL 300 MG
1 CAPSULE ORAL EVERY 6 HOURS
COMMUNITY
Start: 2020-12-31 | End: 2021-03-15

## 2021-01-06 RX ORDER — METHYLPREDNISOLONE 4 MG
TABLET, DOSE PACK ORAL
Qty: 21 TABLET | Refills: 0 | Status: SHIPPED | OUTPATIENT
Start: 2021-01-06 | End: 2021-04-09

## 2021-01-06 NOTE — PROGRESS NOTES
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 If not - start febuxostat. Cannot use other meds, see prior notes  - Uric acid  - methylPREDNISolone (MEDROL DOSEPAK) 4 MG tablet therapy pack; Follow Package Directions for acute gout. Pt to keep at home in case of recurrence.     History of tooth extraction, unspecified edentulism class  Continue, follow up with dental.   Reassured   - clindamycin (CLEOCIN) 300 MG capsule; Take 1 capsule by mouth every 6 hours    History of hyperkalemia  Reason for starting lasix - now with gout due to lasix treatment. Will reassess K+ today after off 10days  - Basic Metabolic Panel ([facility])    Polypharmacy  Discussed in person with Justin Cadena & Delmis plan for gout above as well as switching to mail order pharmacy with optimization of delivery schedule. Struggles to understand refill system. Few supports.   - MED THERAPY MANAGEMENT REFERRAL      - 35 minutes spent on the date of the encounter doing chart review, review of test results, patient visit and documentation     Discussion of management or test interpretation with external provider/healthcare professional -pharmacy as above  Diagnosis or treatment significantly limited by social determinants of health -  financial,lack of social connection which have a direct bearing on their ability to manage their medical conditions.      MEDICATIONS:  Continue current medications without change  Patient Instructions   Steroids (medrol dose sánchez) for future gout recurrence  Labs today - results by mail   Ask pharmacy to help transfer meds to [place] Mail order pharmacy       No follow-ups on file.    Karely Sierra MD  [facility] AMI Navarro is a 55  "year old who presents to clinic today for the following health issues     HPI   Tooth extracted on upper left side - big swelling to left face, had some bleeding. Took antibiotic, now better. Last dose of antibiotic was just before visit, has 1 more day. Had to do multiple stitches and she can feel them in her mouth.     Follow up gout - supposed to stop lasix since gout first started when on this medication. She stopped 10 days ago. She needs follow up BMP since hx of hyperkalemia which was the reason for starting.     Having problems getting refills. Home RN calling and still struggling. States she \"needs every medicine, every month.\" She is set up to get refills from 909 but wants them delivered by mail.         Review of Systems   Foot swelling better      Objective    /81   Pulse 79   Temp 98.9  F (37.2  C) (Oral)   Resp 16   Wt 102.5 kg (226 lb)   LMP  (LMP Unknown)   SpO2 93%   BMI 44.14 kg/m    Body mass index is 44.14 kg/m .  Physical Exam   GENERAL: healthy, alert and no distress  HENT oral mucosa normal, evidence of dental extraction with suture material present L upper no swelling or drainage      Office Visit on 11/18/2020   Component Date Value Ref Range Status     WBC 11/18/2020 5.0  4.0 - 11.0 K/uL Final     Lymphocytes # 11/18/2020 1.5  0.8 - 5.3 K/uL Final     % Lymphocytes 11/18/2020 29.1  20.0 - 48.0 %L Final     Mid # 11/18/2020 0.4  0.0 - 2.2 K/uL Final     Mid % 11/18/2020 7.1  0.0 - 20.0 %M Final     GRANULOCYTES # 11/18/2020 3.2  1.6 - 8.3 K/uL Final     % Granulocytes 11/18/2020 63.8  40.0 - 75.0 %G Final     RBC 11/18/2020 4.45  3.80 - 5.20 M/uL Final     Hemoglobin 11/18/2020 13.0  11.7 - 15.7 g/dL Final     Hematocrit 11/18/2020 43.4  35.0 - 47.0 % Final     MCV 11/18/2020 97.5  78.0 - 100.0 fL Final     MCH 11/18/2020 29.2  26.5 - 35.0 pg Final     MCHC 11/18/2020 30.0* 32.0 - 36.0 g/dL Final     Platelets 11/18/2020 138.0* 150.0 - 450.0 K/uL Final     Calcium 11/18/2020 " 9.8  8.5 - 10.1 mg/dL Final     Chloride 11/18/2020 105.3  98.0 - 110.0 mmol/L Final     Carbon Dioxide 11/18/2020 21.9  20.0 - 32.0 mmol/L Final     Creatinine 11/18/2020 1.3* 0.5 - 1.0 mg/dL Final     Glucose 11/18/2020 95.2  70.0 - 99.0 mg'dL Final     Potassium 11/18/2020 4.5  3.3 - 4.5 mmol/L Final     Sodium 11/18/2020 139.2  132.6 - 141.4 mmol/L Final     Protein Total 11/18/2020 7.1  6.8 - 8.8 g/dL Final     GFR Estimate 11/18/2020 45.6* >60.0 mL/min/1.7 m2 Final     GFR Estimate If Black 11/18/2020 55.2* >60.0 mL/min/1.7 m2 Final     Albumin 11/18/2020 4.5  3.5 - 4.7 mg/dL Final     Alkaline Phosphatase 11/18/2020 81.6  31.7 - 110.5 U/L Final     ALT 11/18/2020 14.6  0.0 - 45.0 U/L Final     AST 11/18/2020 9.7  0.0 - 45.0 U/L Final     Bilirubin Total 11/18/2020 1.0  0.2 - 1.3 mg/dL Final     Urea Nitrogen 11/18/2020 27.5* 7.0 - 19.0 mg/dL Final           January 8, 2021     Labs resulted.   PTH at edge of expected for CKD3 - cont calcitriol at 25mcg daily   plt low stable   K+ up to 4.7 after discontinue of lasix - will need to monitor monthly for now to determine if meds are needed. If we restart lasix, will also need febuxostat for gout. Uric acid remains elevated 8.2  Office Visit on 11/18/2020   Component Date Value Ref Range Status     WBC 11/18/2020 5.0  4.0 - 11.0 K/uL Final     Lymphocytes # 11/18/2020 1.5  0.8 - 5.3 K/uL Final     % Lymphocytes 11/18/2020 29.1  20.0 - 48.0 %L Final     Mid # 11/18/2020 0.4  0.0 - 2.2 K/uL Final     Mid % 11/18/2020 7.1  0.0 - 20.0 %M Final     GRANULOCYTES # 11/18/2020 3.2  1.6 - 8.3 K/uL Final     % Granulocytes 11/18/2020 63.8  40.0 - 75.0 %G Final     RBC 11/18/2020 4.45  3.80 - 5.20 M/uL Final     Hemoglobin 11/18/2020 13.0  11.7 - 15.7 g/dL Final     Hematocrit 11/18/2020 43.4  35.0 - 47.0 % Final     MCV 11/18/2020 97.5  78.0 - 100.0 fL Final     MCH 11/18/2020 29.2  26.5 - 35.0 pg Final     MCHC 11/18/2020 30.0* 32.0 - 36.0 g/dL Final     Platelets  11/18/2020 138.0* 150.0 - 450.0 K/uL Final     Calcium 11/18/2020 9.8  8.5 - 10.1 mg/dL Final     Chloride 11/18/2020 105.3  98.0 - 110.0 mmol/L Final     Carbon Dioxide 11/18/2020 21.9  20.0 - 32.0 mmol/L Final     Creatinine 11/18/2020 1.3* 0.5 - 1.0 mg/dL Final     Glucose 11/18/2020 95.2  70.0 - 99.0 mg'dL Final     Potassium 11/18/2020 4.5  3.3 - 4.5 mmol/L Final     Sodium 11/18/2020 139.2  132.6 - 141.4 mmol/L Final     Protein Total 11/18/2020 7.1  6.8 - 8.8 g/dL Final     GFR Estimate 11/18/2020 45.6* >60.0 mL/min/1.7 m2 Final     GFR Estimate If Black 11/18/2020 55.2* >60.0 mL/min/1.7 m2 Final     Albumin 11/18/2020 4.5  3.5 - 4.7 mg/dL Final     Alkaline Phosphatase 11/18/2020 81.6  31.7 - 110.5 U/L Final     ALT 11/18/2020 14.6  0.0 - 45.0 U/L Final     AST 11/18/2020 9.7  0.0 - 45.0 U/L Final     Bilirubin Total 11/18/2020 1.0  0.2 - 1.3 mg/dL Final     Urea Nitrogen 11/18/2020 27.5* 7.0 - 19.0 mg/dL Final

## 2021-01-06 NOTE — NURSING NOTE
Due to patient being non-English speaking/uses sign language, an  was used for this visit. Only for face-to-face interpretation by an external agency, date and length of interpretation can be found on the scanned worksheet.     name: Cary  Agency: [employer]  Language: Sami  Telephone number: [phone]  Type of interpretation: Telephone, spoken

## 2021-01-06 NOTE — LETTER
January 8, 2021      Flor De La Cruzm  [address]   [place] [facility] [place] [address]  Dear Flor,    Thank you for getting your care at [facility]. Please see below for your test results.    Resulted Orders   Basic Metabolic Panel ([facility])   Result Value Ref Range    Calcium 9.4 8.5 - 10.1 mg/dL    Chloride 102.1 98.0 - 110.0 mmol/L    Carbon Dioxide 28.2 20.0 - 32.0 mmol/L    Creatinine 1.2 (H) 0.5 - 1.0 mg/dL    Glucose 99.9 (H) 70.0 - 99.0 mg'dL    Potassium 4.7 (H) 3.3 - 4.5 mmol/L    Sodium 137.0 132.6 - 141.4 mmol/L    GFR Estimate 50.2 (L) >60.0 mL/min/1.7 m2    GFR Estimate If Black 60.8 >60.0 mL/min/1.7 m2    Urea Nitrogen 24.1 (H) 7.0 - 19.0 mg/dL   Uric acid   Result Value Ref Range    Uric Acid 8.2 (H) 2.6 - 6.0 mg/dL   Parathyroid Hormone Intact   Result Value Ref Range    Parathyroid Hormone Intact 110 (H) 18 - 80 pg/mL     PTH is at edge of expected for kidney disease -we must continue your calcitriol at 25mcg daily.     Potassium is up to 4.7 after stopping lasix - will need to monitor monthly for now to determine if meds are needed. If we restart lasix, will also need another medicine (febuxostat) for gout. Gout level   remains elevated 8.2  If you have any concerns about these results please call and leave a message for me or send a "[employer]" message to the clinic.    Sincerely,    Karely Sierra MD

## 2021-01-06 NOTE — PATIENT INSTRUCTIONS
Steroids (medrol dose sánchez) for future gout recurrence  Labs today - results by mail   Ask pharmacy to help transfer meds to [place] [facility] pharmacy

## 2021-01-07 DIAGNOSIS — M81.0 OSTEOPOROSIS WITHOUT CURRENT PATHOLOGICAL FRACTURE, UNSPECIFIED OSTEOPOROSIS TYPE: ICD-10-CM

## 2021-01-07 LAB
BUN SERPL-MCNC: 24.1 MG/DL (ref 7–19)
CALCIUM SERPL-MCNC: 9.4 MG/DL (ref 8.5–10.1)
CHLORIDE SERPLBLD-SCNC: 102.1 MMOL/L (ref 98–110)
CO2 SERPL-SCNC: 28.2 MMOL/L (ref 20–32)
CREAT SERPL-MCNC: 1.2 MG/DL (ref 0.5–1)
GFR SERPL CREATININE-BSD FRML MDRD: 50.2 ML/MIN/1.7 M2
GLUCOSE SERPL-MCNC: 99.9 MG'DL (ref 70–99)
POTASSIUM SERPL-SCNC: 4.7 MMOL/L (ref 3.3–4.5)
SODIUM SERPL-SCNC: 137 MMOL/L (ref 132.6–141.4)

## 2021-01-07 NOTE — TELEPHONE ENCOUNTER
"Patient's pharmacy requesting refill of Calcium 600 + Vit D. Last office visit 1/6/21 with Dr. Sierra. Routing to PCP to approve if appropriate.   Breanna Cook RN      Request for medication refill:    Providers if patient needs an appointment and you are willing to give a one month supply please refill for one month and  send a letter/MyChart using \".SMILLIMITEDREFILL\" .smillimited and route chart to \"P SMI \" (Giving one month refill in non controlled medications is strongly recommended before denial)    If refill has been denied, meaning absolutely no refills without visit, please complete the smart phrase \".smirxrefuse\" and route it to the \"P SMI MED REFILLS\"  pool to inform the patient and the pharmacy.          "

## 2021-01-15 ENCOUNTER — VIRTUAL VISIT (OUTPATIENT)
Dept: PHARMACY | Facility: CLINIC | Age: 56
End: 2021-01-15
Attending: FAMILY MEDICINE
Payer: COMMERCIAL

## 2021-01-15 DIAGNOSIS — M10.9 GOUT: Primary | ICD-10-CM

## 2021-01-15 DIAGNOSIS — M1A.9XX0 CHRONIC GOUT INVOLVING TOE OF RIGHT FOOT WITHOUT TOPHUS, UNSPECIFIED CAUSE: ICD-10-CM

## 2021-01-15 PROCEDURE — 99605 MTMS BY PHARM NP 15 MIN: CPT | Mod: TEL | Performed by: PHARMACIST

## 2021-01-15 PROCEDURE — 99607 MTMS BY PHARM ADDL 15 MIN: CPT | Mod: TEL | Performed by: PHARMACIST

## 2021-01-15 NOTE — PROGRESS NOTES
Medication Therapy Management (MTM) Encounter    ASSESSMENT:                            Medication Adherence/Access: No issues identified  Medications appear to be appropriately coordinated by both son and home care nurse.  Unable to do a full medication review today as the patient didn't know the names of the medications.      Gout: Uncontrolled  Uric acid remains elevated.  Patient continues to have pain in her right foot toe.  Patient has been off of furosemide, but does not state that this has resolved the gout.  At this time we will consider starting febuxostat for uric acid reduction.  This medication is selected because there are contraindications in the use of allopurinol.  One reservation of using febuxostat comes from the potential to increase risk of cardiovascular disease.  Patient has a past history of hemorrhagic stroke in 2012.  Will discuss with care team before initiating this therapy.    FAST Trial evidence shows the previous concerns of CV risk may have been overstated.      Polypharmacy :   Medication coordination was among the main issues to address. Flor has a home care nurse that works to fill and coordinate her medications.  There doesseem to be a barrier in her receiving some medications and the primary care provider has requested meds to be transferred to mail order pharmacy.  To date Flor has not received her methylprednisolone.  The pharmacy agrees to call the patient to establish this account.  The home delivery pharmacy requires that the patient call to initiate the medication dispensing.  Pharmacy agrees to call the patient today using  services.  Once the account has been established, other medications will be directed to the pharmacy and left on the patient's profile.  Home care nurse will be in charge of refilling the medication.      PLAN:                              Medrol dose pack to be filled by mail order pharmacy.     Recommend to initiation febuxostat for  future gout prevention.      Follow-up: By phone in 1-2 weeks.    SUBJECTIVE/OBJECTIVE:                          Flor Navarro is a 55 year old female called for a follow-up visit. She was referred to me from Karely Sierra MD .  was present during today's visit. Today's visit is a follow-up [facility] visit from June 2020     Reason for visit: Polypharmacy and medication coordination,  GOUT.    Allergies/ADRs: Reviewed in chart  Tobacco: She reports that she has never smoked. She has never used smokeless tobacco.  Alcohol: none  Past Medical History: Reviewed in chart      Medication Adherence/Access: no issues reported  Patient has assistance with medication administration: home care. Home care RN comes once a week  Son administers the medication.   Patient does not know many names of her medications but can describe the majority of her meds.       GOUT:   Lasix was stopped a the last visit.    Did not receive the medrol dose pack.  She states she would have used it if the medication arrived.        Today's Vitals: LMP  (LMP Unknown)   ----------------        I spent 20 minutes with this patient today. Dr. Sierra was provided the recommendations above  via routed note and Dr. Isbell is the authorizing prescriber for this visit through the pharmacist collaborative practice agreement.. A copy of the visit note was provided to the patient's primary care provider.    The patient declined a summary of these recommendations.     Bert Benites, Pharm.D.    Telemedicine Visit Details  Type of service:  Telephone visit  Start Time: 3:10 PM  End Time: 3:33 PM    Originating Location (patient location): Home  Distant Location (provider location):  [facility]

## 2021-01-28 ENCOUNTER — TELEPHONE (OUTPATIENT)
Dept: FAMILY MEDICINE | Facility: CLINIC | Age: 56
End: 2021-01-28

## 2021-01-28 DIAGNOSIS — N18.30 CKD (CHRONIC KIDNEY DISEASE) STAGE 3, GFR 30-59 ML/MIN (H): ICD-10-CM

## 2021-01-28 DIAGNOSIS — N18.30 STAGE 3 CHRONIC KIDNEY DISEASE, UNSPECIFIED WHETHER STAGE 3A OR 3B CKD (H): ICD-10-CM

## 2021-01-28 DIAGNOSIS — M81.8 OTHER OSTEOPOROSIS WITHOUT CURRENT PATHOLOGICAL FRACTURE: Primary | ICD-10-CM

## 2021-01-28 DIAGNOSIS — K59.09 CONSTIPATION, CHRONIC: ICD-10-CM

## 2021-01-29 NOTE — TELEPHONE ENCOUNTER
"Patient's pharmacy requesting refills of fosamax, calcitriol, and viatmin D3. Patient also asking for SM Fiber 400mg taking 3 caps two times daily which is not on current medication list. Last office visit 1/6/21 with Dr. Sierra. Routing to PCP to order if appropriate.   Breanna Cook, RN      Request for medication refill:    Providers if patient needs an appointment and you are willing to give a one month supply please refill for one month and  send a letter/MyChart using \".SMILLIMITEDREFILL\" .smillimited and route chart to \"P SMI \" (Giving one month refill in non controlled medications is strongly recommended before denial)    If refill has been denied, meaning absolutely no refills without visit, please complete the smart phrase \".smirxrefuse\" and route it to the \"P SMI MED REFILLS\"  pool to inform the patient and the pharmacy.          "

## 2021-01-29 NOTE — TELEPHONE ENCOUNTER
Patient would also like refills for SM Fiber 400mg taking 3 caps two times daily. Didin't see on med list'      Thank you  Nereida Pace [facility]

## 2021-02-01 ENCOUNTER — TELEPHONE (OUTPATIENT)
Dept: FAMILY MEDICINE | Facility: CLINIC | Age: 56
End: 2021-02-01

## 2021-02-01 ENCOUNTER — MEDICAL CORRESPONDENCE (OUTPATIENT)
Dept: HEALTH INFORMATION MANAGEMENT | Facility: CLINIC | Age: 56
End: 2021-02-01

## 2021-02-01 DIAGNOSIS — K59.09 CONSTIPATION, CHRONIC: ICD-10-CM

## 2021-02-01 NOTE — TELEPHONE ENCOUNTER
[facility] phone call message - order or referral request from patient:     Order or referral being requested: Requested order Every other week for 9 wks, 3 PRN for assessments and Medication management.     Additional Details: Would like a RN to call to reconcile medication.     Referral only -Specialty  Location     OK to leave a message on voice mail? Yes    Primary language: Kiswahili      needed? Yes    Call taken on February 1, 2021 at 2:31 PM by April Bivens    Order request route to [facility] TRIAGE   Referrals Route to [facility] (Green/[place]/Purple) CARE COORDINATOR

## 2021-02-01 NOTE — TELEPHONE ENCOUNTER
Returned call to home care nurse to give verbal orders per protocol as requested. Nurse verbalized understanding.    Reviewed med list with RN and needs metamucil refilled    Fabiola Ford RN

## 2021-02-02 RX ORDER — CALCITRIOL 0.25 UG/1
0.25 CAPSULE, LIQUID FILLED ORAL DAILY
Qty: 90 CAPSULE | Refills: 3 | Status: SHIPPED | OUTPATIENT
Start: 2021-02-02 | End: 2022-04-18

## 2021-02-02 RX ORDER — ALENDRONATE SODIUM 70 MG/1
70 TABLET ORAL
Qty: 13 TABLET | Refills: 3 | Status: SHIPPED | OUTPATIENT
Start: 2021-02-02 | End: 2021-11-05

## 2021-02-02 RX ORDER — VITAMIN B COMPLEX
25 TABLET ORAL DAILY
Qty: 100 TABLET | Refills: 3 | Status: SHIPPED | OUTPATIENT
Start: 2021-02-02 | End: 2021-06-16

## 2021-02-02 NOTE — TELEPHONE ENCOUNTER
Patient's pharmacy calling to check status of medications. Routing to PCP high priority. Will also send page.   Breanna Cook RN

## 2021-02-04 ENCOUNTER — DOCUMENTATION ONLY (OUTPATIENT)
Dept: FAMILY MEDICINE | Facility: CLINIC | Age: 56
End: 2021-02-04

## 2021-02-04 NOTE — PROGRESS NOTES
"When opening a documentation only encounter, be sure to enter in \"Chief Complaint\" Forms and in \" Comments\" Title of form, description if needed.    Please complete the following standard order  To satisfy insurance requirements and include related diagnosis.     Flor is a 55 year old  female  Form received via: Fax  Form now resides in: Provider Ready    Litzy Gregory CMA     Form has been completed by provider.     Form sent out via: Fax to [employer] at Fax Number: [phone]  Patient informed: N/A  Output date: February 8, 2021    Litzy Gregory CMA      **Please close the encounter**                      "

## 2021-02-04 NOTE — TELEPHONE ENCOUNTER
Marisol calling with  Pharmacy to check status of refill request for fiber. Advised that it appears refill request pending review by provider. Marisol expressed understanding.

## 2021-02-04 NOTE — TELEPHONE ENCOUNTER
Pharmacy calling to check status of patient request for SM Fiber 400mg taking 3 caps two times daily. Not on current medication list so RN unable to fill. Routing to PCP high priority to order if appropriate.   Breanna Cook, RN

## 2021-02-05 ENCOUNTER — MEDICAL CORRESPONDENCE (OUTPATIENT)
Dept: HEALTH INFORMATION MANAGEMENT | Facility: CLINIC | Age: 56
End: 2021-02-05

## 2021-02-05 RX ORDER — PSYLLIUM HUSK 0.4 G
3 CAPSULE ORAL 2 TIMES DAILY
Qty: 180 CAPSULE | Refills: 11 | Status: SHIPPED | OUTPATIENT
Start: 2021-02-05 | End: 2021-04-09

## 2021-02-09 ENCOUNTER — DOCUMENTATION ONLY (OUTPATIENT)
Dept: FAMILY MEDICINE | Facility: CLINIC | Age: 56
End: 2021-02-09

## 2021-02-09 DIAGNOSIS — Z94.4 LIVER REPLACED BY TRANSPLANT (H): ICD-10-CM

## 2021-02-09 RX ORDER — CYCLOSPORINE 25 MG/1
CAPSULE, LIQUID FILLED ORAL
Qty: 240 CAPSULE | Refills: 4 | Status: SHIPPED | OUTPATIENT
Start: 2021-02-09 | End: 2021-06-28

## 2021-02-09 NOTE — PROGRESS NOTES
"When opening a documentation only encounter, be sure to enter in \"Chief Complaint\" Forms and in \" Comments\" Title of form, description if needed.    Flor is a 55 year old  female  Form received via: Fax  Form now resides in: Provider Ready    Ronit Schneider MA     Form has been completed by provider.     Form sent out via: Fax to [facility] at Fax Number: [phone]  Patient informed: No,   Output date: February 11, 2021    Sabina Diaz MA      **Please close the encounter**                      "

## 2021-02-19 ENCOUNTER — OFFICE VISIT (OUTPATIENT)
Dept: FAMILY MEDICINE | Facility: CLINIC | Age: 56
End: 2021-02-19
Payer: MEDICARE

## 2021-02-19 VITALS
HEART RATE: 72 BPM | TEMPERATURE: 99.5 F | OXYGEN SATURATION: 98 % | WEIGHT: 226 LBS | BODY MASS INDEX: 44.14 KG/M2 | DIASTOLIC BLOOD PRESSURE: 92 MMHG | SYSTOLIC BLOOD PRESSURE: 146 MMHG | RESPIRATION RATE: 16 BRPM

## 2021-02-19 DIAGNOSIS — K08.89 ILL-FITTING DENTURES: ICD-10-CM

## 2021-02-19 DIAGNOSIS — Z97.2 ILL-FITTING DENTURES: ICD-10-CM

## 2021-02-19 DIAGNOSIS — M1A.3710 CHRONIC GOUT DUE TO RENAL IMPAIRMENT INVOLVING TOE OF RIGHT FOOT WITHOUT TOPHUS: Primary | ICD-10-CM

## 2021-02-19 DIAGNOSIS — R09.81 NASAL CONGESTION: ICD-10-CM

## 2021-02-19 DIAGNOSIS — K14.8 TONGUE LESION: ICD-10-CM

## 2021-02-19 DIAGNOSIS — E87.5 HYPERKALEMIA: ICD-10-CM

## 2021-02-19 PROBLEM — M1A.3720 CHRONIC GOUT DUE TO RENAL IMPAIRMENT INVOLVING TOE OF LEFT FOOT WITHOUT TOPHUS: Status: ACTIVE | Noted: 2021-02-19

## 2021-02-19 PROCEDURE — 99214 OFFICE O/P EST MOD 30 MIN: CPT | Performed by: FAMILY MEDICINE

## 2021-02-19 RX ORDER — FEBUXOSTAT 40 MG/1
40 TABLET, FILM COATED ORAL DAILY
Qty: 30 TABLET | Refills: 1 | Status: SHIPPED | OUTPATIENT
Start: 2021-02-19 | End: 2021-07-28

## 2021-02-19 RX ORDER — FUROSEMIDE 20 MG
20 TABLET ORAL DAILY
Qty: 90 TABLET | Refills: 3 | Status: SHIPPED | OUTPATIENT
Start: 2021-02-19 | End: 2021-04-09

## 2021-02-19 NOTE — NURSING NOTE
Due to patient being non-English speaking/uses sign language, an  was used for this visit. Only for face-to-face interpretation by an external agency, date and length of interpretation can be found on the scanned worksheet.     name: Amna Coello  Agency: [employer]  Language: Luxembourgish   Telephone number: [phone]  Type of interpretation: Telephone, spoken

## 2021-02-19 NOTE — PROGRESS NOTES
DME (Durable Medical Equipment) Orders and Documentation  Orders Placed This Encounter   Procedures     Miscellaneous DME Order      The patient was assessed and it was determined the patient is in need of the following listed DME Supplies/Equipment. Please complete supporting documentation below to demonstrate medical necessity.      DME All Other Item(s) Documentation    List reason for need and supporting documentation for medical necessity below for each DME item.     1. To improve breathing during sleep from nasal dryness

## 2021-02-19 NOTE — PATIENT INSTRUCTIONS
Dental  1. Follow up with dentist to adjust denture  2. Your tongue sore will heal in time     Gout  1. Prescribed Febuxostat 40mg. Take one tablet daily for two weeks  2. Ordered future labs. Follow up at [facility] in two weeks for a lab only visit and a pharmacy visit.    High Potassium  1. Start Lasix 20mg again. Take one tablet in the morning and one tablet at night.

## 2021-02-19 NOTE — Clinical Note
So started 40mg febuxostat today - follow up with you in 2 wks to possibly increase based on uric acid level (ordered already). Also restarted lasix for hyperK and have ordered level in 2 wks. Asked her to make both lab only appointment and appointment with you.   Rocael acuña

## 2021-02-19 NOTE — PROGRESS NOTES
Assessment & Plan     Chronic gout due to renal impairment involving toe of right foot without tophus  Start new med now since stopping furosemide wasn't successful in dropping uric acid level.  Follow up 2 weeks with pharmacy  Message pharmacy about co-management given prior discussion.  - febuxostat (ULORIC) 40 MG TABS tablet  Dispense: 30 tablet; Refill: 1  - Uric acid  - MED THERAPY MANAGEMENT REFERRAL    Ill-fitting dentures  Return to dentist  - Denture Care Products (EFFERDENT DENTURE CLEANSER) TBEF  Dispense: 30 tablet; Refill: 11    Tongue lesion  Reassured    Hyperkalemia  Restart the Lasix and check potassium in two weeks.    - Basic Metabolic Panel ([facility])  - MED THERAPY MANAGEMENT REFERRAL  - furosemide (LASIX) 20 MG tablet  Dispense: 90 tablet; Refill: 3    Nasal congestion  Ordered humidifier   - Miscellaneous DME Order      Review of the result(s) of each unique test - potassium, uric acid         BMI:   Estimated body mass index is 44.14 kg/m  as calculated from the following:    Height as of 5/26/20: 1.524 m (5').    Weight as of this encounter: 102.5 kg (226 lb).     FUTURE LABS:       - Uric acid, potassium   FUTURE APPOINTMENTS:       - Follow-up visit in two weeks for labs and pharmacy visit    No follow-ups on file.    Karely Sierra MD  [facility] AMI Ray is a 55 year old who presents for the following health issues  accompanied by her :    HPI     Preventative  Would like me to order compression stockings, but would like me to wait until she provides the address to send the order to. Humidifier broke and she's had difficulty breathing ever since due to the heat. Denies epistaxis.     Mouth  Reports tongue pain. Explains that she bit her tongue about one month ago and developed a lump/sore on the side of tongue afterwards, which hasn't healed since. States that her denture slips, but isn't painful. Followed up with dentist two weeks ago and  dentist stated that denture is properly adjusted.    Requests denture  refill.    Gout  Reports electric pain in the right leg. Stopped furosemide and lasix with no improvement in gout. Is unaware of new med, Febuxostat. Stopped eating meats to improve health.       Review of Systems     Positive for: right leg pain, tongue sore     Negative for: epistaxis, difficulty breathing    This document serves as a record of the services and decisions personally performed and made by Karely Sierra MD. It was created on his/her behalf by Charli Valdez, a trained medical scribe. The creation of this document is based the provider's statements to the medical scribe.  Scribscott Valdez 11:30 AM, February 19, 2021      Objective    Blood Pressure (Abnormal) 146/92   Pulse 72   Temperature 99.5  F (37.5  C) (Oral)   Respiration 16   Weight 102.5 kg (226 lb)   Last Menstrual Period  (LMP Unknown)   Oxygen Saturation 98%   Body Mass Index 44.14 kg/m    Body mass index is 44.14 kg/m .   Vitals were reviewed and are normal, except elevated BP.    Physical Exam   GENERAL: healthy, alert and no distress  HENT: Tongue: left lateral aspect near the base with half inch round raised area of thickness similar to fibrous tissue, no drainage, no lesion.  ABDOMEN: soft, nontender, no hepatosplenomegaly, no masses and bowel sounds normal  MS: right foot: first MTP joint with mild redness, no effusion, no heat, exquisite tenderness around the anterior joint line  PSYCH: affect: anxious; frequently interrupting and hard to follow    Ordered future labs.    The information in this document, created by the medical scribe for me, accurately reflects the services I personally performed and the decisions made by me. I have reviewed and approved this document for accuracy prior to leaving the patient care area.    Karely Sierra MD  11:50 AM, 02/19/21

## 2021-02-25 ENCOUNTER — TELEPHONE (OUTPATIENT)
Dept: FAMILY MEDICINE | Facility: CLINIC | Age: 56
End: 2021-02-25

## 2021-02-25 NOTE — TELEPHONE ENCOUNTER
[facility] phone call message- medication clarification/question:    Full Medication Name: febuxostat (ULORIC) 40 MG TABS tablet      Dose: Take 1 tablet (40 mg) by mouth daily - Oral    Question/Clarification needed: Nurse called stating pt will not be taking medication, she cannot afford the $300 out of pocket cost for medication. Please f/ to advise if there is a alternative med that can be prescribed.      Pharmacy confirmed as     [facility] - [place], [place] - [address] [place] [id number]  Phone: [phone] Fax: [phone]    : Yes    Please leave ONLY preferred pharmacy    OK to leave a message on voice mail? Yes    Advised patient that RN would call back within 3 hours, unless emergent.    Primary language: Yi      needed? Yes    Call taken on February 25, 2021 at 8:14 AM by Sydney Jules    Route to [facility] ERON

## 2021-02-26 NOTE — TELEPHONE ENCOUNTER
Pt cannot use other uric acid reducing meds and is now back on furosemide. Will ask pharmD for suggestions. May have to ask rheum for consult?

## 2021-03-02 ENCOUNTER — TELEPHONE (OUTPATIENT)
Dept: FAMILY MEDICINE | Facility: CLINIC | Age: 56
End: 2021-03-02

## 2021-03-02 NOTE — CONFIDENTIAL NOTE
Social Work Phone Call    3/2/2021   12:51 & 1:12 PM    Data/Intervention:  Patient Name:  Flor Navarro  /Age:  1965 (55 year old)    Spoke with: Deborah ([employer])    Reason for Call:  Care Coordination    Assessment/ Plan:  KENAN and Deborah discussed that Flor needs to have her tongue looked at on 3/15/21 when she see Dr. Sierra as she has been frequently biting it. She also would like Dr. Sierra to discuss a potential Colonrectal Cancer screening or alternatives.     Deborah also asked KENAN to follow up regarding when Flor has had her last annual exam. Flor's last annual exam was due 3/3/2018.     AUDIE Zavala  Social Work Care Coordinator

## 2021-03-10 ENCOUNTER — TELEPHONE (OUTPATIENT)
Dept: FAMILY MEDICINE | Facility: CLINIC | Age: 56
End: 2021-03-10

## 2021-03-10 NOTE — TELEPHONE ENCOUNTER
"Reached out to patient son Courtney today to discuss COVID vaccine clinic here at [facility] on Saturday 3/13/21. Courtney translated message and patient replied \"I would like to speak to my doctor before I get this vaccine\". I did explain again that  asked me to contact her for scheduling. Patient requests to speak to  first. Forwarding message to PCP.    Shahla Shepherd  Care Coordinator    "

## 2021-03-12 ENCOUNTER — OFFICE VISIT (OUTPATIENT)
Dept: PHARMACY | Facility: CLINIC | Age: 56
End: 2021-03-12
Attending: FAMILY MEDICINE
Payer: COMMERCIAL

## 2021-03-12 VITALS
HEART RATE: 63 BPM | RESPIRATION RATE: 16 BRPM | DIASTOLIC BLOOD PRESSURE: 90 MMHG | BODY MASS INDEX: 44.33 KG/M2 | TEMPERATURE: 97.9 F | OXYGEN SATURATION: 94 % | SYSTOLIC BLOOD PRESSURE: 137 MMHG | WEIGHT: 227 LBS

## 2021-03-12 DIAGNOSIS — M10.20 DRUG-INDUCED GOUT, UNSPECIFIED CHRONICITY, UNSPECIFIED SITE: Primary | ICD-10-CM

## 2021-03-12 DIAGNOSIS — E87.5 HYPERKALEMIA: ICD-10-CM

## 2021-03-12 LAB
BUN SERPL-MCNC: 34.3 MG/DL (ref 7–19)
CALCIUM SERPL-MCNC: 8.9 MG/DL (ref 8.5–10.1)
CHLORIDE SERPLBLD-SCNC: 98.7 MMOL/L (ref 98–110)
CO2 SERPL-SCNC: 27.4 MMOL/L (ref 20–32)
CREAT SERPL-MCNC: 1.1 MG/DL (ref 0.5–1)
GFR SERPL CREATININE-BSD FRML MDRD: 53.3 ML/MIN/1.7 M2
GLUCOSE SERPL-MCNC: 86.9 MG'DL (ref 70–99)
POTASSIUM SERPL-SCNC: 4.7 MMOL/L (ref 3.3–4.5)
SODIUM SERPL-SCNC: 136.3 MMOL/L (ref 132.6–141.4)

## 2021-03-12 PROCEDURE — 80048 BASIC METABOLIC PNL TOTAL CA: CPT

## 2021-03-12 PROCEDURE — 36415 COLL VENOUS BLD VENIPUNCTURE: CPT

## 2021-03-12 PROCEDURE — 99607 MTMS BY PHARM ADDL 15 MIN: CPT | Performed by: PHARMACIST

## 2021-03-12 PROCEDURE — 99606 MTMS BY PHARM EST 15 MIN: CPT | Performed by: PHARMACIST

## 2021-03-12 NOTE — PROGRESS NOTES
Due to patient being non-English speaking/uses sign language, an  was used for this visit. Only for face-to-face interpretation by an external agency, date and length of interpretation can be found on the scanned worksheet.     name: Cary  Agency: [employer]  Language: Nepali   Telephone number: [phone]  Type of interpretation: Telephone, spoken

## 2021-03-15 ENCOUNTER — OFFICE VISIT (OUTPATIENT)
Dept: FAMILY MEDICINE | Facility: CLINIC | Age: 56
End: 2021-03-15
Payer: MEDICARE

## 2021-03-15 ENCOUNTER — TELEPHONE (OUTPATIENT)
Dept: FAMILY MEDICINE | Facility: CLINIC | Age: 56
End: 2021-03-15

## 2021-03-15 VITALS
SYSTOLIC BLOOD PRESSURE: 131 MMHG | TEMPERATURE: 97.6 F | DIASTOLIC BLOOD PRESSURE: 85 MMHG | BODY MASS INDEX: 44.14 KG/M2 | RESPIRATION RATE: 16 BRPM | HEART RATE: 71 BPM | OXYGEN SATURATION: 93 % | WEIGHT: 226 LBS

## 2021-03-15 DIAGNOSIS — Z12.11 COLON CANCER SCREENING: ICD-10-CM

## 2021-03-15 DIAGNOSIS — M1A.3720 CHRONIC GOUT DUE TO RENAL IMPAIRMENT INVOLVING TOE OF LEFT FOOT WITHOUT TOPHUS: Primary | ICD-10-CM

## 2021-03-15 DIAGNOSIS — E87.5 HYPERKALEMIA: ICD-10-CM

## 2021-03-15 DIAGNOSIS — K14.8 TONGUE MASS: ICD-10-CM

## 2021-03-15 DIAGNOSIS — Z23 HIGH PRIORITY FOR 2019-NCOV VACCINE: ICD-10-CM

## 2021-03-15 DIAGNOSIS — R60.0 LOCALIZED EDEMA: ICD-10-CM

## 2021-03-15 PROCEDURE — 99215 OFFICE O/P EST HI 40 MIN: CPT | Performed by: FAMILY MEDICINE

## 2021-03-15 NOTE — NURSING NOTE
Due to patient being non-English speaking/uses sign language, an  was used for this visit. Only for face-to-face interpretation by an external agency, date and length of interpretation can be found on the scanned worksheet.     name: Magalys  Agency: [employer]  Language: Yakut   Telephone number: [phone]  Type of interpretation: Telephone, spoken

## 2021-03-15 NOTE — PROGRESS NOTES
Medication Therapy Management (MTM) Encounter    ASSESSMENT:                            Medication Adherence/Access: No issues identified and See below for considerations    Gout:   Untreated.  Currently the Febuxostat has not been started.  The patient uses a specialty pharmacy due to her transplant medications and the Febuxostat was not placed into the PA process by the pharmacy.  I have called and explained the need for the medication and the patient's pharmacy states they will re-enter this into the PA process.  There is no current gout pain, but the patient is known to have an elevated uric level and is back to taking the loop diuretic.  If a PA is possible, then recommend to initiate the Febuxostat and consider measuring uric acid level in 3-4 weeks to evaluate the effect of the medication.      Hyperkalemia:  Controlled and stable.      PLAN:                            Re-initiate the PA process for Febuxostat.    Follow-up: with PCP in 2 days.      SUBJECTIVE/OBJECTIVE:                          Flor Navarro is a 55 year old female coming in for medication follow up     Reason for visit: Gout.    Allergies/ADRs: Reviewed in chart  Tobacco: She reports that she has never smoked. She has never used smokeless tobacco.  Alcohol: none  Past Medical History: Reviewed in chart      Medication Adherence/Access: no issues reported    Gout:   Flor was started on Febuxostat for gout and elevated uric acid levels.  She was unable to receive allopurinol due to renal function and drug interaction with cyclosporine.  Pt states she was not able to start on Febuxostat due to no insurance coverage.  She denies any new gout flare and describes how the gout pain she was previously experienced is not present.  Potential aggravating factor may be the loop diuretic.  Continues to take Furosemide 20 mg daily.     Hx of Hyperkalemia:  Patient was found to have elevated potasium in April and May 2020.  At this time furosemide was  started to treat the potasium.  These levels decreased, but in Oct 2020 the patient experience a gout flare.  Steroids were used to treat the gout flare and loop diuretic was discontinued in Nov 2020.  In January 2021 the potasium level increased to 4.7 mmol/l and the loop diuretic was restarted on 2/19/21.      Results for PREET ALANIZ (MRN [id number]) as of 3/28/2021 18:07   Ref. Range 11/18/2020 14:17 1/6/2021 16:56 1/6/2021 17:13 3/12/2021 14:40 3/16/2021 12:00   Creatinine Latest Ref Range: 0.5 - 1.0 mg/dL 1.3 (H) 1.2 (H)  1.1 (H)    GFR Estimate Latest Ref Range: >60.0 mL/min/1.7 m2 45.6 (L) 50.2 (L)  53.3 (L)    GFR Estimate If Black Latest Ref Range: >60.0 mL/min/1.7 m2 55.2 (L) 60.8  64.5        Today's Vitals: BP (!) 137/90   Pulse 63   Temp 97.9  F (36.6  C) (Oral)   Resp 16   Wt 227 lb (103 kg)   LMP  (LMP Unknown)   SpO2 94%   BMI 44.33 kg/m    ----------------      I spent 20 minutes with this patient today. Dr. Karely Sierra MD  was provided the recommendations above  via routed note and Dr. Doherty is the authorizing prescriber for this visit through the pharmacist collaborative practice agreement.. A copy of the visit note was provided to the patient's primary care provider.    The patient declined a summary of these recommendations.     Bert Benites Pharm.D.        Medication Therapy Recommendations  Chronic gout due to renal impairment involving toe of left foot without tophus    Current Medication: febuxostat (ULORIC) 40 MG TABS tablet   Rationale: Cannot afford medication product - Cost - Adherence   Recommendation: Referral to Service  - PA initiated by specialty pharmcy   Status: Patient Agreed - Adherence/Education

## 2021-03-15 NOTE — TELEPHONE ENCOUNTER
Prior Authorization Approval    Authorization Effective Date:  n/a  Authorization Expiration Date: 3/15/2022  Medication: febuxostat- pa pending   Approved Dose/Quantity: UD  Reference #:     Insurance Company: [employer] - Phone [phone] Fax [phone]  Expected CoPay:       CoPay Card Available:      Foundation Assistance Needed:    Which Pharmacy is filling the prescription (Not needed for infusion/clinic administered): [facility] - [place], [place] - [address]  Pharmacy Notified: Yes  Patient Notified: Yes

## 2021-03-15 NOTE — PATIENT INSTRUCTIONS
Preventative  1. I recommend getting your COVID-19 vaccine  - [facility] [place] [facility]'s Care Coordinator will call you to schedule   2. Labs reviewed today.   3. Continue lasix as prescribed.  4. Ordered FIT test for colon cancer screening.    Tongue Pain  1. Provided ENT referral     Edema  1. Ordered compression stockings today

## 2021-03-15 NOTE — PROGRESS NOTES
Assessment & Plan     Chronic gout due to renal impairment involving toe of left foot without tophus  Hyperkalemia  - We lack preventative treatment for gout - no purpose to recheck uric acid   - Pursue PA for febuxostat through [facility]   - Continue furosemide at this level for now since tolerating.     Tongue mass  - Pt believes it started with numbing procedure and she bit her tongue. Has a divot looks like bopsy was taken from it. Would appreciate ENT opinion consider biopsy and wonder if injection for keloid is a possibility.  - OTOLARYNGOLOGY REFERRAL - INTERNAL    Localized edema  - Compression Sleeve/Stocking Order    High priority for 2019-nCoV vaccine  - Care coordinator to call and schedule    Colon cancer screening  - Fecal colorectal cancer screen FITT  - Discussed that this needs to be annual      Follow up PRN.    No follow-ups on file.    The information in this document, created by the medical scribe for me, accurately reflects the services I personally performed and the decisions made by me. I have reviewed and approved this document for accuracy prior to leaving the patient care area.    Karely Sierra MD  11:45 AM, 03/15/21  50 minutes spent on the date of the encounter doing chart review, review of test results, interpretation of tests, patient visit and documentation       [facility] AMI Ray is a 55 year old who presents for the following health issues  accompanied by her : Eve    HPI     Preventative  Has not received COVID vaccine, but pt would like to especially given liver transplant. Has some anxiety around vaccination. Explains that her home nurse told her she can get a FIT test rather than a colonoscopy.     MSK  Swelling down, pain tolerable of the foot from her gout. Discussed case with Dr. Pierson who saw her on Friday. He spoke with specialty pharmacy - but no PA submitted.      Gout  Reports pain is better in toe. Really  likes the steroid medication. Denies erythema or swelling.    Tongue  Pt sticks out tongue to show sore, she bites it everyday and it bleeds. Had dental procedure 6 months ago and bit her tongue while numb, and had been consisently biting down on tongue everyday. Has not had biopsy.    Showing me note from Solitario (home RN) who recently sent message with concerns regarding tongue biting on the left.    Medication Reconciliation  Reports issues with filling meds at pharmacy. Is still using pain patches. Borrowed pain patch from a friend since she wasn't able to fill meds at pharmacy. Needs 2 pairs of black, knee-high compression stockings, gives me [employer] order.    Review of Systems     Positive for: tongue pain, tongue mass,    See HPI for additional sxs.    This document serves as a record of the services and decisions personally performed and made by Karely Sierra MD. It was created on his/her behalf by Charli Valdez, a trained medical scribe. The creation of this document is based the provider's statements to the medical scribe.  Scribe Charli Valdez 11:46 AM, March 15, 2021    Objective    Blood Pressure 131/85   Pulse 71   Temperature 97.6  F (36.4  C) (Oral)   Respiration 16   Weight 102.5 kg (226 lb)   Last Menstrual Period  (LMP Unknown)   Oxygen Saturation 93%   Body Mass Index 44.14 kg/m    Body mass index is 44.14 kg/m .   Vitals were reviewed and were normal.    Physical Exam   GENERAL: healthy, alert and no distress  HENT:  mouth - smooth raised, area on the left lateral aspect of the tongue slightly violaceous in color with a divet when tongue is relaxed it is thickened no other apparent tongue or mouth nodularity   NECK: no adenopathy, no cervical lymph  PSYCH: affect- anxious    Orders Only on 03/12/2021   Component Date Value Ref Range Status     Calcium 03/12/2021 8.9  8.5 - 10.1 mg/dL Final     Chloride 03/12/2021 98.7  98.0 - 110.0 mmol/L Final     Carbon Dioxide 03/12/2021 27.4   20.0 - 32.0 mmol/L Final     Creatinine 03/12/2021 1.1* 0.5 - 1.0 mg/dL Final     Glucose 03/12/2021 86.9  70.0 - 99.0 mg'dL Final     Potassium 03/12/2021 4.7* 3.3 - 4.5 mmol/L Final     Sodium 03/12/2021 136.3  132.6 - 141.4 mmol/L Final     GFR Estimate 03/12/2021 53.3* >60.0 mL/min/1.7 m2 Final     GFR Estimate If Black 03/12/2021 64.5  >60.0 mL/min/1.7 m2 Final     Urea Nitrogen 03/12/2021 34.3* 7.0 - 19.0 mg/dL Final     Labs reviewed by me - mildly elevated potassium and creatinine at baseline.

## 2021-03-15 NOTE — Clinical Note
Please help Flor get scheduled for a vaccine - she qualifies based on transplant. She now agrees! (thanks for trying before!!)  c

## 2021-03-15 NOTE — PROGRESS NOTES
DME (Durable Medical Equipment) Orders and Documentation  Orders Placed This Encounter   Procedures     Compression Sleeve/Stocking Order      The patient was assessed and it was determined the patient is in need of the following listed DME Supplies/Equipment. Please complete supporting documentation below to demonstrate medical necessity.      Compression Sleeve/Stocking(s) Supplies Documentation  The patient needs compression stockings for managing edema and preventing wounds

## 2021-03-15 NOTE — TELEPHONE ENCOUNTER
PA Initiation    Medication: febuxostat- pa pending   Insurance Company: [employer] - Phone [phone] Fax [phone]  Pharmacy Filling the Rx: [facility] - [place], [place] - [facility] [address]  Filling Pharmacy Phone: [phone]  Filling Pharmacy Fax: [phone]  Start Date: 3/15/2021

## 2021-03-16 DIAGNOSIS — Z12.11 COLON CANCER SCREENING: ICD-10-CM

## 2021-03-16 PROCEDURE — 82274 ASSAY TEST FOR BLOOD FECAL: CPT | Performed by: FAMILY MEDICINE

## 2021-03-17 LAB — HEMOCCULT STL QL IA: NEGATIVE

## 2021-03-17 NOTE — TELEPHONE ENCOUNTER
"FUTURE VISIT INFORMATION      FUTURE VISIT INFORMATION:    Date: 5/7/2021    Time: 2PM    Location[place] [facility]  REFERRAL INFORMATION:    Referring provider:  Karely Sierra MD    Referring providers clinic:  [facility]    Reason for visit/diagnosis  Tongue mass, \"opinion consider biopsy and wonder if injection for keloid is a possibility.\"  ref by Karely Sierra MD in [facility]    RECORDS REQUESTED FROM:       Clinic name Comments Records Status Imaging Status   [facility] 3/15/2021 note and referral from Karely Sierra MD EPIC    Imaging 7/27/2017 MR Brain and CT Head Epic PACS                               "

## 2021-03-30 ENCOUNTER — NURSE TRIAGE (OUTPATIENT)
Dept: FAMILY MEDICINE | Facility: CLINIC | Age: 56
End: 2021-03-30

## 2021-03-30 DIAGNOSIS — I12.9 HYPERTENSION, RENAL: Primary | ICD-10-CM

## 2021-03-30 NOTE — TELEPHONE ENCOUNTER
[facility] phone call message - order or referral request from patient:     Order or referral being requested: Requested order     Additional Details: RN Leeann calling to request orders to continue to see the patient for assessment and medication management every two weeks for 9 weeks with 3 PRN. RN stated patients BP today is 156/100 and patient is feeling dizzy and has pain on the left side of her face. RN also requesting an order for a BP machine to use at home. Please advise.    Referral only -Specialty N/A Location N/A    OK to leave a message on voice mail? Yes    Primary language: Kyrgyz      needed? Yes    Call taken on March 30, 2021 at 11:02 AM by Ines Gutierrez    Order request route to [facility] TRIAGE   Referrals Route to [facility] (Green/[place]/Purple) CARE COORDINATOR

## 2021-03-31 NOTE — TELEPHONE ENCOUNTER
"RN spoke to patient via  [phone] to discuss blood pressure and symptoms reported by HC RN yesterday. Patient states she is feeling \"much better today\". States her HC RN told her to go to the emergency room yesterday because she was dizzy and had a high blood pressure but patient refused. Patient states symptoms have improved- states she only has a mild HA today. RN inquired about reported left side of face pain- patient states this was due to her biting her tongue awhile ago and it has improved. RN advised that I agreed with her HC RN if she has elevated BP and any symptoms of HA, dizziness, blurred vision, weakness she should go in to the ED and be evaluated. Patient verbalized understanding but refused to go to ED today due to feeling better- requested appointment with Dr. Sierra specifically. Next available not until 4/9/21 which RN scheduled, patient refused to see another provider sooner. RN again advised that if symptoms return prior to her appointment she should contact clinic or present to ED. She verbalized understanding.     Patient also asked about COVID vaccine- RN offered to assist with scheduling but patient only wanted to come to [facility]. Informed her that we do not have vaccine yet but are hoping to have more dates available soon. RN added her to contact list and will reach out once appointments available at [facility].   Breanna Cook RN     Additional Information    Systolic BP >= 160 OR Diastolic >= 100, and any cardiac or neurologic symptoms (e.g., chest pain, difficulty breathing, unsteady gait, blurred vision)    Negative: Sounds like a life-threatening emergency to the triager    Negative: Pregnant > 20 weeks or postpartum (< 6 weeks after delivery) and new hand or face swelling    Negative: Pregnant > 20 weeks and BP > 140/90    Protocols used: HIGH BLOOD PRESSURE-A-OH    "

## 2021-04-01 NOTE — TELEPHONE ENCOUNTER
BP Cuff ordered.       DME (Durable Medical Equipment) Orders and Documentation  Orders Placed This Encounter   Procedures     Miscellaneous Order for DME - ONLY FOR DME      The patient was assessed and it was determined the patient is in need of the following listed DME Supplies/Equipment. Please complete supporting documentation below to demonstrate medical necessity.      DME All Other Item(s) Documentation    List reason for need and supporting documentation for medical necessity below for each DME item.     1. Uncontrolled hypertension

## 2021-04-05 ENCOUNTER — DOCUMENTATION ONLY (OUTPATIENT)
Dept: FAMILY MEDICINE | Facility: CLINIC | Age: 56
End: 2021-04-05

## 2021-04-05 DIAGNOSIS — Z53.9 DIAGNOSIS NOT YET DEFINED: Primary | ICD-10-CM

## 2021-04-05 NOTE — PROGRESS NOTES
"When opening a documentation only encounter, be sure to enter in \"Chief Complaint\" Forms and in \" Comments\" Title of form, description if needed.    Flor is a 55 year old  female  Form received via: Fax  Form now resides in: Provider Ready    Sabina Diaz MA      Form has been completed by provider.     Form sent out via: Fax to [facility] at Fax Number: [phone]  Patient informed: No, Reason:N/A  Output date: April 7, 2021    Litzy Gregory CMA      **Please close the encounter**                    "

## 2021-04-09 ENCOUNTER — OFFICE VISIT (OUTPATIENT)
Dept: FAMILY MEDICINE | Facility: CLINIC | Age: 56
End: 2021-04-09
Payer: MEDICARE

## 2021-04-09 VITALS
DIASTOLIC BLOOD PRESSURE: 97 MMHG | HEART RATE: 83 BPM | OXYGEN SATURATION: 94 % | BODY MASS INDEX: 44.14 KG/M2 | TEMPERATURE: 97.8 F | SYSTOLIC BLOOD PRESSURE: 141 MMHG | RESPIRATION RATE: 16 BRPM | WEIGHT: 226 LBS

## 2021-04-09 DIAGNOSIS — M1A.3720 CHRONIC GOUT DUE TO RENAL IMPAIRMENT INVOLVING TOE OF LEFT FOOT WITHOUT TOPHUS: ICD-10-CM

## 2021-04-09 DIAGNOSIS — E87.5 HYPERKALEMIA: ICD-10-CM

## 2021-04-09 DIAGNOSIS — R53.83 FATIGUE, UNSPECIFIED TYPE: ICD-10-CM

## 2021-04-09 DIAGNOSIS — G63 NEUROPATHY DUE TO MEDICAL CONDITION (H): Primary | ICD-10-CM

## 2021-04-09 DIAGNOSIS — Z91.199 NON-ADHERENCE TO MEDICAL TREATMENT: ICD-10-CM

## 2021-04-09 DIAGNOSIS — Z94.4 LIVER REPLACED BY TRANSPLANT (H): ICD-10-CM

## 2021-04-09 PROCEDURE — 99214 OFFICE O/P EST MOD 30 MIN: CPT | Performed by: FAMILY MEDICINE

## 2021-04-09 RX ORDER — FUROSEMIDE 20 MG
20 TABLET ORAL 2 TIMES DAILY
Qty: 180 TABLET | Refills: 3 | Status: SHIPPED | OUTPATIENT
Start: 2021-04-09 | End: 2022-05-25

## 2021-04-09 RX ORDER — GABAPENTIN 100 MG/1
CAPSULE ORAL
Qty: 90 CAPSULE | Refills: 1 | Status: SHIPPED | OUTPATIENT
Start: 2021-04-09 | End: 2021-04-30

## 2021-04-09 NOTE — NURSING NOTE
Due to patient being non-English speaking/uses sign language, an  was used for this visit. Only for face-to-face interpretation by an external agency, date and length of interpretation can be found on the scanned worksheet.     name: Eve Wynne  Language: Kyrgyz  Agency: [employer]  Phone number: [phone]  Type of interpretation: Telephone, spoken

## 2021-04-09 NOTE — PATIENT INSTRUCTIONS
Preventative  1. Come to [facility] on Saturday April 10th at 10:15AM for your COVID vaccine.   - Tim and Tim vaccine, one shot     Neuropathy  1. I recommend following up with a neurologist  2. Prescribed Gabapentin. Take one tablet at night for three days, then increase to two tablets for three days and then take three tablets at night until you see me again.   3. Follow up with me in 2-3 weeks.    Gout  1. I will message the prior authorization team at [facility] to see if we can get the febuxostat covered     Tongue  1.Follow up with ENT as scheduled on 05/07/21 at the [facility] on [address]     Medication  1. Increase Lasix from once a day to twice a day to help with blood pressure and potassium

## 2021-04-09 NOTE — PROGRESS NOTES
"  Assessment & Plan     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)  -worse with decreased sensation to testing  - Declines seeing neuro for neuropathy testing - \"too many doctors\"   - Given that the left foot is less sensitive than the right, still may be wise to recommend this to her in future. Trial gabapentin now and follow up 3 weeks   - gabapentin (NEURONTIN) 100 MG capsule  Dispense: 90 capsule; Refill: 1 Educated on taper    Fatigue, unspecified type  - Profound but incredibly short, lasting one day- monitor.    Chronic gout due to renal impairment involving toe of left foot without tophus  - Pursuing PA for febuxostat     Hyperkalemia  - Increase lasix from 20mg to 40mg  - furosemide (LASIX) 20 MG tablet  Dispense: 180 tablet; Refill: 3    Non-adherence to medical treatment  Liver replaced by transplant  - Erroneously (not purposeful) taking wrong dose of mycophenolate, which is why she has multiple bottles. Educated on prescription (prescribed by Dr. Anne for liver transplant)  -Risk of rejection present though lower due to duration of time on meds, will need levels  -refer to pharm for therapy/toxicity monitoring, \"long time liver transplant patient,struggles to take medications correctly, recently using half doses of mycophenolate. please advise as to monitoring as she tapers up.\"  She has not been taking enough of her cell cept, reviewed 2 capsules BID    Patient Instructions   Preventative  1. Come to [facility] on Saturday April 10th at 10:15AM for your COVID vaccine.   - Tim and Tim vaccine, one shot     Neuropathy  1. I recommend following up with a neurologist  2. Prescribed Gabapentin. Take one tablet at night for three days, then increase to two tablets for three days and then take three tablets at night until you see me again.   3. Follow up with me in 2-3 weeks.    Gout  1. I will message the prior authorization team at [facility] to see if we can get the febuxostat covered     Tongue  1.Follow " "up with ENT as scheduled on 05/07/21 at the [facility] on [address]     Medication  1. Increase Lasix from once a day to twice a day to help with blood pressure and potassium       The information in this document, created by the medical scribe for me, accurately reflects the services I personally performed and the decisions made by me. I have reviewed and approved this document for accuracy prior to leaving the patient care area.    Karely Sierra MD  2:54 PM, 04/09/21    [facility] AMI Ray is a 55 year old who presents for the following health issues  accompanied by her :    HPI     Preventative  Wonders about COVID-19 vaccination for her and her son.     Fatigue  Was so tired last night she couldn t get up to get a glass of water. Fatigue has resolved today.     Neuropathy  Points to the bottom of her foot and leg and says  fire,\" it feels so hot as if they're burned. Son applies cream to feet at night to help with burning sensation.    Medication Reconciliation  Is planning to fast for Ramadan beginning 04/13/21 from 3 AM to 7 PM. Brings in two bottles of CellCept with questions. Home nurse sets up medication boxes. Hasn t started new gout medication. Experiencing significant pharmacy delays.     Tongue  Points to a place on tongue where she keeps biting, wonders if she needs Abx. Has appt with ENT on 05/07/21.    Review of Systems     Positive for: burning sensation in the bilateral feet, tongue pain, fatigue,    Negative for:     See HPI for additional sx.     This document serves as a record of the services and decisions personally performed and made by Karely Sierra MD. It was created on his/her behalf by Charli Valdez, a trained medical scribe. The creation of this document is based the provider's statements to the medical scribe.  Ameliaibscott Valdez 2:58 PM, April 9, 2021    Objective    BP (!) 141/97   Pulse 83   Temp 97.8  F (36.6  C) (Oral)   Resp 16   Wt " 102.5 kg (226 lb)   LMP  (LMP Unknown)   SpO2 94%   BMI 44.14 kg/m    Body mass index is 44.14 kg/m .   Vitals were reviewed and were normal, except elevated BP.    Physical Exam   GENERAL: healthy, alert and no distress  MSK: Wearing compression stockings. No swelling redness or pain at the Left MTP. Decreased sensation at all testing sites with monofilament on the left. Some pain to palpation at first MTP on the right, no redness, heat or swelling. Monofilament testing on the right normal sensation. Experiencing sharp pain over the mid foot plantar surface. Pointing to left fourth digit - there s a small nodule. Right thumb- tiny, firm nodule superficially. Left fourth digit, palmar aspect softer, small size mobile. Wears a glove to keep it soft.  PSYCH: affect anxious, speech is rapid, changes topics frequently

## 2021-04-10 ENCOUNTER — IMMUNIZATION (OUTPATIENT)
Dept: FAMILY MEDICINE | Facility: CLINIC | Age: 56
End: 2021-04-10
Payer: MEDICARE

## 2021-04-10 PROCEDURE — 91303 PR COVID VAC JANSSEN AD26 0.5ML: CPT

## 2021-04-10 PROCEDURE — 0031A PR COVID VAC JANSSEN AD26 0.5ML: CPT

## 2021-04-16 ENCOUNTER — TELEPHONE (OUTPATIENT)
Dept: FAMILY MEDICINE | Facility: CLINIC | Age: 56
End: 2021-04-16

## 2021-04-16 NOTE — TELEPHONE ENCOUNTER
RN spoke to patient and son via  [phone] and relayed message. Patient expressed frustration that it had taken so long, RN acknowledged her feeling and explained that mail order pharmacies can take longer to deliver. RN provided the pharmacy number for them to contact in the future with questions about refills. Routing to PCP as ANTONIO Cook RN      RN spoke to  mail order pharmacy [facility] furosemide and gabapentin have been shipped to patient. [facility] estimated delivery date is 4/17/21.

## 2021-04-16 NOTE — TELEPHONE ENCOUNTER
Patient hasn't received medication ordered by PCP. Patient was unable to give me name of medication. Patient wants PCP to check why med hasn't been sent. Please call patient back at . Please provide patient with clinic number for further questions.

## 2021-04-16 NOTE — TELEPHONE ENCOUNTER
Assume this is for gabapentin taper per my  Notes. Please call  pharmacy on [address] and ask if has been mailed. Thanks

## 2021-04-17 NOTE — TELEPHONE ENCOUNTER
"Request for medication refill: lisinopril    Providers if patient needs an appointment and you are willing to give a one month supply please refill for one month and  send a letter/MyChart using \".SMILLIMITEDREFILL\" .smillimited and route chart to \"P SMI \" (Giving one month refill in non controlled medications is strongly recommended before denial)    If refill has been denied, meaning absolutely no refills without visit, please complete the smart phrase \".smirxrefuse\" and route it to the \"P SMI MED REFILLS\"  pool to inform the patient and the pharmacy.    Elisabet Olson RN       "
153

## 2021-04-23 ENCOUNTER — OFFICE VISIT (OUTPATIENT)
Dept: PHARMACY | Facility: CLINIC | Age: 56
End: 2021-04-23
Attending: FAMILY MEDICINE
Payer: COMMERCIAL

## 2021-04-23 VITALS
DIASTOLIC BLOOD PRESSURE: 103 MMHG | RESPIRATION RATE: 16 BRPM | OXYGEN SATURATION: 94 % | SYSTOLIC BLOOD PRESSURE: 154 MMHG | HEART RATE: 68 BPM | TEMPERATURE: 98.8 F

## 2021-04-23 DIAGNOSIS — I12.9 HYPERTENSION, RENAL: ICD-10-CM

## 2021-04-23 DIAGNOSIS — M81.8 OTHER OSTEOPOROSIS WITHOUT CURRENT PATHOLOGICAL FRACTURE: ICD-10-CM

## 2021-04-23 DIAGNOSIS — M10.9 GOUT, UNSPECIFIED CAUSE, UNSPECIFIED CHRONICITY, UNSPECIFIED SITE: ICD-10-CM

## 2021-04-23 DIAGNOSIS — M10.9 GOUT: Primary | ICD-10-CM

## 2021-04-23 PROCEDURE — 99606 MTMS BY PHARM EST 15 MIN: CPT | Performed by: PHARMACIST

## 2021-04-23 PROCEDURE — 99607 MTMS BY PHARM ADDL 15 MIN: CPT | Performed by: PHARMACIST

## 2021-04-23 NOTE — PROGRESS NOTES
Medication Therapy Management (MTM) Encounter    ASSESSMENT:                            Medication Adherence/Access: No issues identified    GOUT:   No new symptoms of gout pain.  Currently completing a steroid dose.    Febuxostat has presumably passed PA process and the patient has been taking this daily with no new problems.     Joint Pain :   Addressed the use of neuropathic pain.  Flor has been taking a low dose of the gabapentin.  Instructed the patient to take this as was directed by Dr. Sierra     Hyperkalemia: :   Non-adherent.  Flor appears to be missing her furosemide dose.  Instructed to resume this therapy today.       Osteoporosis:   Stable     PLAN:                              Begin taking Gabapentin 300 mg at bedtime.    Follow-up: with PCP in one week.      SUBJECTIVE/OBJECTIVE:                          Flor Navarro is a 55 year old female coming in for a follow-up visit. She was referred to me from Karely Sierra MD .  Today's visit is a follow-up MTM visit from 3/12/21     Reason for visit: Gout follow up.    Allergies/ADRs: Reviewed in chart  Tobacco: She reports that she has never smoked. She has never used smokeless tobacco.  Alcohol: none  Activity: Minimal   Past Medical History: Reviewed in chart      Medication Adherence/Access: no issues reported, but the patient is fasting  RAMADAN:  Taking all medition twice a day before and after her fast starts.  No concern regarding her medication.    Gout/ Pain:     Taking one medication for 3 days  By dr. Sierra  Currently on 4 tablets per day    1 daily x3 days  2 tabs daily x 3 days  3 tabs daily x 3 days  4 tablet daily x 3 days      Has been taking this after breakfast       She sleeps when taking the gabapentin.  Has no residual effect the next day.    Having some hard time waking up this morning before the start of her fast, but she is not sure she would like to attribute this to Gabapentin.    Flor has been taking 1 tablet of gabapentin.  Today  she was CORRECTED TO TAKE 3 TABS of GENE    Pain on her left calf.    Continues to feel buring on her feet and legs.  Pain in her joints  When her feet are colder she has more pain.  Looking forward to the summer.      Not currently applying Lidocaine patches.  Not covered by insurance, but the patient prefers this therapy.  She was not interested in the 4% patchs that are OTC.        A new medication?  Patient is not sure she recognizes the name of Febuxostat, but she is aware that one new medication has been coming lately and she takes the medication.       Hyperkalemia:   FUROSEMIDE:  States she is not taking this.  Missing from her medication box.      Osteoporosis:  Fosamax daily. Continues to take this while fasting , but takes the dose before starting her fast in the AM.     Today's Vitals: BP (!) 154/103   Pulse 68   Temp 98.8  F (37.1  C) (Oral)   Resp 16   LMP  (LMP Unknown)   SpO2 94%   ----------------      I spent 20 minutes with this patient today. Dr. Sierra was provided the recommendations above  via routed note and Dr. Celis is the authorizing prescriber for this visit through the pharmacist collaborative practice agreement.. A copy of the visit note was provided to the patient's primary care provider.    The patient declined a summary of these recommendations.     Bert Benites, Pharm.D.          Medication Therapy Recommendations  Shoulder joint pain    Current Medication: gabapentin (NEURONTIN) 100 MG capsule   Rationale: Patient forgets to take - Adherence - Adherence   Recommendation: Provide Adherence Intervention   Status: Patient Agreed - Adherence/Education

## 2021-04-28 DIAGNOSIS — G63 NEUROPATHY DUE TO MEDICAL CONDITION (H): ICD-10-CM

## 2021-04-28 RX ORDER — GABAPENTIN 100 MG/1
CAPSULE ORAL
Qty: 90 CAPSULE | Refills: 1 | Status: CANCELLED | OUTPATIENT
Start: 2021-04-28 | End: 2021-06-03

## 2021-04-30 ENCOUNTER — OFFICE VISIT (OUTPATIENT)
Dept: FAMILY MEDICINE | Facility: CLINIC | Age: 56
End: 2021-04-30
Payer: MEDICARE

## 2021-04-30 VITALS
BODY MASS INDEX: 44.72 KG/M2 | TEMPERATURE: 97.6 F | SYSTOLIC BLOOD PRESSURE: 125 MMHG | RESPIRATION RATE: 16 BRPM | DIASTOLIC BLOOD PRESSURE: 87 MMHG | OXYGEN SATURATION: 92 % | WEIGHT: 229 LBS | HEART RATE: 70 BPM

## 2021-04-30 DIAGNOSIS — I12.9 HYPERTENSION, RENAL: ICD-10-CM

## 2021-04-30 DIAGNOSIS — K59.04 CHRONIC IDIOPATHIC CONSTIPATION: ICD-10-CM

## 2021-04-30 DIAGNOSIS — G63 NEUROPATHY DUE TO MEDICAL CONDITION (H): ICD-10-CM

## 2021-04-30 DIAGNOSIS — G63 NEUROPATHY DUE TO MEDICAL CONDITION (H): Primary | ICD-10-CM

## 2021-04-30 DIAGNOSIS — H60.552 ACUTE REACTIVE OTITIS EXTERNA OF LEFT EAR: ICD-10-CM

## 2021-04-30 PROCEDURE — 99214 OFFICE O/P EST MOD 30 MIN: CPT | Performed by: FAMILY MEDICINE

## 2021-04-30 RX ORDER — GABAPENTIN 300 MG/1
300 CAPSULE ORAL AT BEDTIME
Qty: 90 CAPSULE | Refills: 3 | Status: SHIPPED | OUTPATIENT
Start: 2021-04-30 | End: 2021-08-09

## 2021-04-30 RX ORDER — GABAPENTIN 100 MG/1
CAPSULE ORAL
Qty: 90 CAPSULE | Status: CANCELLED | OUTPATIENT
Start: 2021-04-30 | End: 2021-06-05

## 2021-04-30 RX ORDER — NEOMYCIN SULFATE, POLYMYXIN B SULFATE, HYDROCORTISONE 3.5; 10000; 1 MG/ML; [USP'U]/ML; MG/ML
4 SOLUTION/ DROPS AURICULAR (OTIC) 2 TIMES DAILY
Qty: 10 ML | Refills: 0 | Status: SHIPPED | OUTPATIENT
Start: 2021-04-30 | End: 2021-06-23

## 2021-04-30 RX ORDER — GABAPENTIN 100 MG/1
100-200 CAPSULE ORAL
Qty: 100 CAPSULE | Refills: 1 | Status: SHIPPED | OUTPATIENT
Start: 2021-04-30 | End: 2021-08-09

## 2021-04-30 NOTE — TELEPHONE ENCOUNTER
RN spoke to pharmacy tech as we continue to get refill requests for this medication and our system shows she should still have 1 refill left. Technician ([facility]) states that when prescription was originally entered by their team, the refill was not entered into the system so now the prescription is no longer active. She is requesting new rx be sent by provider- can also update instructions/dosage at this time. Routing to provider to order if appropriate.   Breanna Cook, RN

## 2021-04-30 NOTE — PATIENT INSTRUCTIONS
Tongue Pain  1. Follow up with ENT as scheduled on 05/07/21    Neuropathy  1. Prescribed 3-month supply of gabapentin 300 mg tablets. Take one tablet three times a day.  2. Prescribed gabapentin 100 mg tablets. You can take 1-2 tablets in addition to the 300 mg as needed for pain.    Constipation  1. Prescribed Milk of Magnesia for constipation. Use as prescribed as needed.  2. Continue eating figs    Ear Pain  1. Avoid using Q-tips   2. Prescribed ear drops. Place 4 drops in the ear twice a day   - If insurance doesn't cover the ear drops you can use hydrocortisone cream    High Blood Pressure  1. Ordered blood pressure cuff  - It will be delivered to your home  2. Check your blood pressures twice a week

## 2021-04-30 NOTE — PROGRESS NOTES
Assessment & Plan     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)  - Greatly improved on relatively low doses of gabapentin. Discussed benefits of increasing, will continue current dosing, but offer an additional 100-200 mg daily PRN  - gabapentin (NEURONTIN) 300 MG capsule  Dispense: 90 capsule; Refill: 3  - gabapentin (NEURONTIN) 100 MG capsule  Dispense: 100 capsule; Refill: 1    Chronic idiopathic constipation  - Encouraged increase in dietary fiber and okay to use MOM PRN.  - magnesium hydroxide (MILK OF MAGNESIA) 400 MG/5ML suspension  Dispense: 769 mL; Refill: 3    Acute reactive otitis externa of left ear  - Stop using Q-tips, if insurance doesn't cover drops can use hydrocortisone cream and instructed on use.  - neomycin-polymyxin-HC 1 %  Dispense: 10 mL; Refill: 0    Hypertension, renal  - Recommend BP checks twice a week  - Home Blood Pressure Monitor Order    Follow up with me in one month.     The information in this document, created by the medical scribe for me, accurately reflects the services I personally performed and the decisions made by me. I have reviewed and approved this document for accuracy prior to leaving the patient care area.    Karely Sierra MD  11:15 AM, 04/30/21    [facility] AMI Ray is a 55 year old who presents for the following health issues  accompanied by her :    HPI     ENT  Has f/u with ENT on 05/07/21 regarding tongue lesion.    Ear Pain  Reports needle-like ear pain in left. Uses Q-tips to clean out ears.    GI  Reports band like pressure over the upper abd, no pain, and pressure does not wrap around. Sx improved after BM, pt attributes pressure to possible constipation. Knows that raisins help with constipation, but forgets to eat them. Prefers milk of magnesia cherry flavor, does not like lactulose. Enjoys eating figs    Neuropathy  Has questions about gabapentin. Has been taking three capsules daily for the past two days  without improvement in pain. Before starting gabapentin would feel prickling pain, but those sx have now improved. Has a small amount remaining of the 100mg gabapentin.    Review of Systems     Positive for: needle-like pain in the left ear    Negative for: itchy ears,    See HPI for additional sx.    This document serves as a record of the services and decisions personally performed and made by Karely Sierra MD. It was created on his/her behalf by Charli Valdez, a trained medical scribe. The creation of this document is based the provider's statements to the medical scribe.  Scribscott Valdez 11:14 AM, April 30, 2021    Objective    Blood Pressure 125/87   Pulse 70   Temperature 97.6  F (36.4  C) (Oral)   Respiration 16   Weight 103.9 kg (229 lb)   Last Menstrual Period  (LMP Unknown)   Oxygen Saturation 92%   Body Mass Index 44.72 kg/m    Body mass index is 44.72 kg/m .   Vitals were reviewed and were normal.    Physical Exam   GENERAL: healthy, alert and no distress  EAR: Movement of the pinna causes pain. Linear erythematous excoriation at the 5 o'clock position in the ear canal, tender to touch, no drainage or bogginess  PSYCH: affect bright

## 2021-04-30 NOTE — PROGRESS NOTES
DME (Durable Medical Equipment) Orders and Documentation  Orders Placed This Encounter   Procedures     Home Blood Pressure Monitor Order      The patient was assessed and it was determined the patient is in need of the following listed DME Supplies/Equipment. Please complete supporting documentation below to demonstrate medical necessity.      DME All Other Item(s) Documentation    List reason for need and supporting documentation for medical necessity below for each DME item.     1. Renal hypertension monitoring

## 2021-05-07 ENCOUNTER — ANCILLARY PROCEDURE (OUTPATIENT)
Dept: CT IMAGING | Facility: CLINIC | Age: 56
End: 2021-05-07
Attending: OTOLARYNGOLOGY
Payer: MEDICARE

## 2021-05-07 ENCOUNTER — PRE VISIT (OUTPATIENT)
Dept: OTOLARYNGOLOGY | Facility: CLINIC | Age: 56
End: 2021-05-07

## 2021-05-07 ENCOUNTER — TELEPHONE (OUTPATIENT)
Dept: FAMILY MEDICINE | Facility: CLINIC | Age: 56
End: 2021-05-07

## 2021-05-07 ENCOUNTER — OFFICE VISIT (OUTPATIENT)
Dept: OTOLARYNGOLOGY | Facility: CLINIC | Age: 56
End: 2021-05-07
Attending: FAMILY MEDICINE
Payer: MEDICARE

## 2021-05-07 ENCOUNTER — TELEPHONE (OUTPATIENT)
Dept: OTOLARYNGOLOGY | Facility: CLINIC | Age: 56
End: 2021-05-07

## 2021-05-07 VITALS — WEIGHT: 229.28 LBS | BODY MASS INDEX: 44.78 KG/M2

## 2021-05-07 DIAGNOSIS — K14.8 TONGUE MASS: Primary | ICD-10-CM

## 2021-05-07 DIAGNOSIS — K14.0 TONGUE ULCER: ICD-10-CM

## 2021-05-07 DIAGNOSIS — K14.8 TONGUE MASS: ICD-10-CM

## 2021-05-07 DIAGNOSIS — D84.9 IMMUNOSUPPRESSED STATUS (H): Primary | ICD-10-CM

## 2021-05-07 PROCEDURE — G1004 CDSM NDSC: HCPCS | Mod: GC | Performed by: RADIOLOGY

## 2021-05-07 PROCEDURE — 99203 OFFICE O/P NEW LOW 30 MIN: CPT | Performed by: OTOLARYNGOLOGY

## 2021-05-07 PROCEDURE — 70490 CT SOFT TISSUE NECK W/O DYE: CPT | Mod: MG | Performed by: RADIOLOGY

## 2021-05-07 RX ORDER — DOXYCYCLINE HYCLATE 50 MG/1
50 CAPSULE ORAL 2 TIMES DAILY
Qty: 20 CAPSULE | Refills: 0 | Status: SHIPPED | OUTPATIENT
Start: 2021-05-07 | End: 2021-05-17

## 2021-05-07 RX ORDER — MULTIVITAMIN WITH FOLIC ACID 400 MCG
1 TABLET ORAL EVERY MORNING
Qty: 100 TABLET | Refills: 4 | Status: SHIPPED | OUTPATIENT
Start: 2021-05-07 | End: 2022-06-17

## 2021-05-07 ASSESSMENT — PAIN SCALES - GENERAL: PAINLEVEL: WORST PAIN (10)

## 2021-05-07 NOTE — PATIENT INSTRUCTIONS
1. You were seen in the ENT Clinic today by Dr. Hawley.  If you have any questions or concerns after your appointment, please call   - Option 1: ENT Clinic: [phone]   - Option 2: Lily (Dr. Hawley's Nurse): [phone]    2.   Plan to return to clinic to Dr. Fernando after imaging    3. Doxycycline- 1 tablet twice daily x 10 days    4. Magic mouthwash- up to 4 times daily- swish and spit 5 ml    5. CT neck    Lily Estrada LPN  [facility] - Otolaryngology    The patient presents with a history of ulceration for three months on the left lateral tongue with cervical lymphadenopathy. She will be referred for a CT scan of the neck without contrast due contrast dye allergy and her renal and liver function. She will be treated with magic mouth rinse and reduced dose of Doxycycline. She will be referred to Dr. Usman Fernando for further evaluation and possible biopsy.

## 2021-05-07 NOTE — TELEPHONE ENCOUNTER
RN received message from pharmacy that they no longer carry the multivitamin and requesting an alternate prescription. RN called pharmacy to ask what they do carry, pharm tech reports they now have Daily [place] Multi vitamin. Routing to provider to reorder new medication if appropriate.     Nato Tijerina RN

## 2021-05-07 NOTE — TELEPHONE ENCOUNTER
[facility] phone call message- medication clarification/question:    Full Medication Name:   multivitamin w/minerals (MULTI-VITAMIN) tablet        Dose:Take 1 tablet by mouth daily Needs 4 month supply for International Travel      Question/Clarification needed: Pharmacy no longer has medication. Please send new script for alternative medication      Pharmacy confirmed as   [facility] - [place], [place] - [address]  [address] [address]  Phone: [phone] Fax: [phone]    [facility] - [place], [place] - [address]  [address]  [facility] [address]  Phone: [phone] Fax: [phone] Alternate Fax: [phone], [phone]  : Yes    Please leave ONLY preferred pharmacy    OK to leave a message on voice mail? Yes    Advised patient that RN would call back within 3 hours, unless emergent.    Primary language: Irish      needed? Yes    Call taken on May 7, 2021 at 11:24 AM by Sydney Jules    Route to [facility]

## 2021-05-07 NOTE — NURSING NOTE
Chief Complaint   Patient presents with     Consult     tongue mass      Weight 104 kg (229 lb 4.5 oz), not currently breastfeeding.    Julien Grover LPN

## 2021-05-07 NOTE — PROGRESS NOTES
The patient presents with a history of an ulcer on the lateral left mobile tongue. She reports that she has pain radiating into the left ear. She reports that this began three months ago after a dental procedure. She has a history of immunosuppression and she is status post a liver transplant. She also has a history of chronic kidney disease. The patient denies sinusitis, rhinitis, nasal obstruction or purulent nasal discharge. The patient denies chronic or recurrent tonsillitis, chronic or recurrent pharyngitis. The patient denies otalgia, otorrhea, eustachian tube dysfunction, ear infections, dizziness or tinnitus.     This patient is seen in consultation at the request of Dr. Karely Sierra.     All other systems were reviewed and they are either negative or they are not directly pertinent to this Otolaryngology examination.      Past Medical History:    Past Medical History:   Diagnosis Date     Anemia in CKD (chronic kidney disease)      Cataract      CKD (chronic kidney disease) stage 3, GFR 30-59 ml/min      Hepatitis C     cleared virus spontaneously      High risk medication use      Hypertension, renal      Immunosuppressed status (H)      Liver replaced by transplant (H)      Osteoporosis      Recurrent pregnancy loss without current pregnancy      Stroke, hemorrhagic (H)      Syncope      Unspecified viral hepatitis C without hepatic coma        Past Surgical History:    Past Surgical History:   Procedure Laterality Date     CATARACT IOL, RT/LT Right 2/18/15      SECTION       Incisional Hernia Repair  2004     INSERT SHUNT PORTAL TRANSJUGULAR INTRAHEPTIC      shunt placement for liver failure     LAPAROSCOPIC SALPINGO-OOPHORECTOMY      left     NECK SURGERY  2010    fracture, in halo x 7months     TRANSPLANT LIVER RECIPIENT  DONOR  10/26/10     Upper GI Endoscopy with Band Ligation of Esoph/Gastric Varic. .         Medications:      Current Outpatient Medications:       acetaminophen (TYLENOL) 500 MG tablet, Take 1 tablet (500 mg) by mouth 3 times daily as needed for mild pain, Disp: 120 tablet, Rfl: 3     albuterol (ACCUNEB) 0.63 MG/3ML neb solution, Take 3 mLs (0.63 mg) by nebulization every 4 hours (while awake), Disp: 360 mL, Rfl: 1     albuterol (PROAIR HFA/PROVENTIL HFA/VENTOLIN HFA) 108 (90 Base) MCG/ACT inhaler, Inhale 2 puffs into the lungs 4 times daily, Disp: 1 Inhaler, Rfl: 1     alendronate (FOSAMAX) 70 MG tablet, Take 1 tablet (70 mg) by mouth every 7 days, Disp: 13 tablet, Rfl: 3     calcitRIOL (ROCALTROL) 0.25 MCG capsule, Take 1 capsule (0.25 mcg) by mouth daily, Disp: 90 capsule, Rfl: 3     calcium carbonate 600 mg-vitamin D 400 units (CALCIUM 600 + D) 600-400 MG-UNIT per tablet, Take 1 tablet by mouth 2 times daily, Disp: 60 tablet, Rfl: 11     carboxymethylcellulose (REFRESH PLUS) 0.5 % SOLN ophthalmic solution, Place 1 drop into both eyes 4 times daily, Disp: 15 mL, Rfl: 3     cycloSPORINE modified (GENERIC EQUIVALENT) 25 MG capsule, TAKE 4 CAPSULES BY MOUTH EVERY 12 HOURS, Disp: 240 capsule, Rfl: 4     Denture Care Products (EFFERDENT DENTURE CLEANSER) TBEF, Use nightly to clean oral appliance, Disp: 30 tablet, Rfl: 11     dimethicone (AVEENO DAILY MOISTURIZING) 1.3 % LOTN lotion, Externally apply topically daily, Disp: 532 mL, Rfl: 11     febuxostat (ULORIC) 40 MG TABS tablet, Take 1 tablet (40 mg) by mouth daily, Disp: 30 tablet, Rfl: 1     furosemide (LASIX) 20 MG tablet, Take 1 tablet (20 mg) by mouth 2 times daily, Disp: 180 tablet, Rfl: 3     gabapentin (NEURONTIN) 100 MG capsule, Take 1-2 capsules (100-200 mg) by mouth nightly as needed (pain) This is in addition to 300mg capsule at HS scheduled, Disp: 100 capsule, Rfl: 1     gabapentin (NEURONTIN) 300 MG capsule, Take 1 capsule (300 mg) by mouth At Bedtime This is in addition to 100mg capsules used as needed, Disp: 90 capsule, Rfl: 3     lidocaine (LIDODERM) 5 % patch, Place 3 patches onto the skin 2  "times daily as needed for moderate pain, Disp: , Rfl:      lidocaine (XYLOCAINE) 5 % external ointment, Apply topically as needed for moderate pain, Disp: 240 g, Rfl: 3     magnesium hydroxide (MILK OF MAGNESIA) 400 MG/5ML suspension, Take 15 mLs by mouth daily as needed for constipation or heartburn, Disp: 769 mL, Rfl: 3     melatonin 3 MG tablet, TAKE ONE TABLET BY MOUTH EVERY NIGHT AS NEEDED FOR SLEEP., Disp: 100 tablet, Rfl: 3     mineral oil-white petrolatum (EUCERIN) CREA cream, Apply topically 2 times daily, Disp: 454 g, Rfl: 11     multivitamin w/minerals (MULTI-VITAMIN) tablet, Take 1 tablet by mouth daily Needs 4 month supply for International Travel, Disp: 100 tablet, Rfl: 11     mycophenolate (GENERIC EQUIVALENT) 250 MG capsule, TAKE TWO CAPSULES BY MOUTH EVERY 12 HOURS  Needs 4 month supply for International Travel, Disp: 120 capsule, Rfl: 11     neomycin-polymyxin-HC 1 %, Place 4 drops in ear(s) 2 times daily, Disp: 10 mL, Rfl: 0     olopatadine (PATADAY) 0.2 % ophthalmic solution, Place 1 drop into both eyes daily, Disp: 1 Bottle, Rfl: 11     omeprazole (PRILOSEC) 20 MG DR capsule, Take 1 capsule (20 mg) by mouth 2 times daily Needs 4 month supply for International Travel, Disp: 180 capsule, Rfl: 3     order for DME, Equipment being ordered: compression stockings bilateral Please measure and fit patient for stockings  Strength 15-30mmHg Disp 2 pairs ( 4 socks) 1 refill over the time of 1 year, Disp: 4 Units, Rfl: 1     order for DME, Equipment being ordered: \"rollator with seat and brakes.\"  DX: ICD-10 code (Repeated falls R29.6) Height (5'1\"), weight (102.2 kg) ORdering provider Karely Sierra MD NPI # [id number], Disp: 1 each, Rfl: 0     order for DME, Equipment being ordered:  1) cane 2) compression stockings 15mmHg, 3 pairs Dx: gait stability, falls, edema, Disp: 1 each, Rfl: 0     order for DME, Equipment being ordered: Nebulizer with adult mask and tubing, Disp: 1 Units, Rfl: 0     psyllium " (METAMUCIL/KONSYL) 0.52 g capsule, Take 2 capsules by mouth 2 times daily, Disp: 180 capsule, Rfl: 3     senna-docusate (SENOKOT-S/PERICOLACE) 8.6-50 MG tablet, Take 2 tablets by mouth 2 times daily Needs 4 month supply for International Travel, Disp: 100 tablet, Rfl: 3     STATIN NOT PRESCRIBED, INTENTIONAL,, Not prescribed, Disp: 0 each, Rfl: 0     Vitamin D3 (CHOLECALCIFEROL) 25 mcg (1000 units) tablet, Take 1 tablet (25 mcg) by mouth daily, Disp: 100 tablet, Rfl: 3    Current Facility-Administered Medications:      albuterol (PROVENTIL) neb solution 2.5 mg, 2.5 mg, Nebulization, Once, Karely Sierra MD     apraclonidine (IOPIDINE) 1 % ophthalmic solution 1 drop, 1 drop, Right Eye, Once, Nik Langley MD    Allergies:    Aspirin, Clarithromycin, Contrast dye, Iodine, and Penicillins    Physical Examination:    The patient is a well developed, well nourished female in no apparent distress.  She is normocephalic, atraumatic with pupils equally round and reactive to light.    Oral Cavity Examination:  Ulceration of the left lateral tongue with 1x1 cm lesion with central ulceration.   Nasal Examination: Normal mucosa with no masses or lesions  Ear Examination: Ear canals clear, tympanic membranes and middle ear spaces normal  Neurological Examination: Facial nerve function intact and symmetric  Integumentary Examination: No lesions on the skin of the head and neck  Neck Examination: Left cervical lymphadenopathy  Endocrine Examination: Normal thyroid examination    Assessment and Plan:    The patient presents with a history of ulceration for three months on the left lateral tongue with cervical lymphadenopathy. She will be referred for a CT scan of the neck without contrast due contrast dye allergy and her renal and liver function. She will be treated with magic mouth rinse and reduced dose of Doxycycline. She will be referred to Dr. Usman Fernando for further evaluation and possible biopsy.     CC: Dr. Cali  Link

## 2021-05-07 NOTE — LETTER
2021       RE: Flor Navarro  [address]  [facility] [address]     Dear Colleague,    Thank you for referring your patient, Flor Navarro, to the [facility] E[facility] NOSE [facility] [place] at [facility]. Please see a copy of my visit note below.    The patient presents with a history of an ulcer on the lateral left mobile tongue. She reports that she has pain radiating into the left ear. She reports that this began three months ago after a dental procedure. She has a history of immunosuppression and she is status post a liver transplant. She also has a history of chronic kidney disease. The patient denies sinusitis, rhinitis, nasal obstruction or purulent nasal discharge. The patient denies chronic or recurrent tonsillitis, chronic or recurrent pharyngitis. The patient denies otalgia, otorrhea, eustachian tube dysfunction, ear infections, dizziness or tinnitus.     This patient is seen in consultation at the request of Dr. Karely Sierra.     All other systems were reviewed and they are either negative or they are not directly pertinent to this Otolaryngology examination.      Past Medical History:    Past Medical History:   Diagnosis Date     Anemia in CKD (chronic kidney disease)      Cataract      CKD (chronic kidney disease) stage 3, GFR 30-59 ml/min      Hepatitis C     cleared virus spontaneously      High risk medication use      Hypertension, renal      Immunosuppressed status (H)      Liver replaced by transplant (H)      Osteoporosis      Recurrent pregnancy loss without current pregnancy      Stroke, hemorrhagic (H)      Syncope      Unspecified viral hepatitis C without hepatic coma        Past Surgical History:    Past Surgical History:   Procedure Laterality Date     CATARACT IOL, RT/LT Right 2/18/15      SECTION       Incisional Hernia Repair  2004     INSERT SHUNT PORTAL TRANSJUGULAR INTRAHEPTIC   2005    shunt placement for liver failure     LAPAROSCOPIC SALPINGO-OOPHORECTOMY      left     NECK SURGERY  2010    fracture, in halo x 7months     TRANSPLANT LIVER RECIPIENT  DONOR  10/26/10     Upper GI Endoscopy with Band Ligation of Esoph/Gastric Varic. .         Medications:      Current Outpatient Medications:      acetaminophen (TYLENOL) 500 MG tablet, Take 1 tablet (500 mg) by mouth 3 times daily as needed for mild pain, Disp: 120 tablet, Rfl: 3     albuterol (ACCUNEB) 0.63 MG/3ML neb solution, Take 3 mLs (0.63 mg) by nebulization every 4 hours (while awake), Disp: 360 mL, Rfl: 1     albuterol (PROAIR HFA/PROVENTIL HFA/VENTOLIN HFA) 108 (90 Base) MCG/ACT inhaler, Inhale 2 puffs into the lungs 4 times daily, Disp: 1 Inhaler, Rfl: 1     alendronate (FOSAMAX) 70 MG tablet, Take 1 tablet (70 mg) by mouth every 7 days, Disp: 13 tablet, Rfl: 3     calcitRIOL (ROCALTROL) 0.25 MCG capsule, Take 1 capsule (0.25 mcg) by mouth daily, Disp: 90 capsule, Rfl: 3     calcium carbonate 600 mg-vitamin D 400 units (CALCIUM 600 + D) 600-400 MG-UNIT per tablet, Take 1 tablet by mouth 2 times daily, Disp: 60 tablet, Rfl: 11     carboxymethylcellulose (REFRESH PLUS) 0.5 % SOLN ophthalmic solution, Place 1 drop into both eyes 4 times daily, Disp: 15 mL, Rfl: 3     cycloSPORINE modified (GENERIC EQUIVALENT) 25 MG capsule, TAKE 4 CAPSULES BY MOUTH EVERY 12 HOURS, Disp: 240 capsule, Rfl: 4     Denture Care Products (EFFERDENT DENTURE CLEANSER) TBEF, Use nightly to clean oral appliance, Disp: 30 tablet, Rfl: 11     dimethicone (AVEENO DAILY MOISTURIZING) 1.3 % LOTN lotion, Externally apply topically daily, Disp: 532 mL, Rfl: 11     febuxostat (ULORIC) 40 MG TABS tablet, Take 1 tablet (40 mg) by mouth daily, Disp: 30 tablet, Rfl: 1     furosemide (LASIX) 20 MG tablet, Take 1 tablet (20 mg) by mouth 2 times daily, Disp: 180 tablet, Rfl: 3     gabapentin (NEURONTIN) 100 MG capsule, Take 1-2 capsules (100-200 mg) by mouth nightly  "as needed (pain) This is in addition to 300mg capsule at HS scheduled, Disp: 100 capsule, Rfl: 1     gabapentin (NEURONTIN) 300 MG capsule, Take 1 capsule (300 mg) by mouth At Bedtime This is in addition to 100mg capsules used as needed, Disp: 90 capsule, Rfl: 3     lidocaine (LIDODERM) 5 % patch, Place 3 patches onto the skin 2 times daily as needed for moderate pain, Disp: , Rfl:      lidocaine (XYLOCAINE) 5 % external ointment, Apply topically as needed for moderate pain, Disp: 240 g, Rfl: 3     magnesium hydroxide (MILK OF MAGNESIA) 400 MG/5ML suspension, Take 15 mLs by mouth daily as needed for constipation or heartburn, Disp: 769 mL, Rfl: 3     melatonin 3 MG tablet, TAKE ONE TABLET BY MOUTH EVERY NIGHT AS NEEDED FOR SLEEP., Disp: 100 tablet, Rfl: 3     mineral oil-white petrolatum (EUCERIN) CREA cream, Apply topically 2 times daily, Disp: 454 g, Rfl: 11     multivitamin w/minerals (MULTI-VITAMIN) tablet, Take 1 tablet by mouth daily Needs 4 month supply for International Travel, Disp: 100 tablet, Rfl: 11     mycophenolate (GENERIC EQUIVALENT) 250 MG capsule, TAKE TWO CAPSULES BY MOUTH EVERY 12 HOURS  Needs 4 month supply for International Travel, Disp: 120 capsule, Rfl: 11     neomycin-polymyxin-HC 1 %, Place 4 drops in ear(s) 2 times daily, Disp: 10 mL, Rfl: 0     olopatadine (PATADAY) 0.2 % ophthalmic solution, Place 1 drop into both eyes daily, Disp: 1 Bottle, Rfl: 11     omeprazole (PRILOSEC) 20 MG DR capsule, Take 1 capsule (20 mg) by mouth 2 times daily Needs 4 month supply for International Travel, Disp: 180 capsule, Rfl: 3     order for DME, Equipment being ordered: compression stockings bilateral Please measure and fit patient for stockings  Strength 15-30mmHg Disp 2 pairs ( 4 socks) 1 refill over the time of 1 year, Disp: 4 Units, Rfl: 1     order for DME, Equipment being ordered: \"rollator with seat and brakes.\"  DX: ICD-10 code (Repeated falls R29.6) Height (5'1\"), weight (102.2 kg) ORdering " provider Karely Sierra MD NPI # [id number], Disp: 1 each, Rfl: 0     order for DME, Equipment being ordered:  1) cane 2) compression stockings 15mmHg, 3 pairs Dx: gait stability, falls, edema, Disp: 1 each, Rfl: 0     order for DME, Equipment being ordered: Nebulizer with adult mask and tubing, Disp: 1 Units, Rfl: 0     psyllium (METAMUCIL/KONSYL) 0.52 g capsule, Take 2 capsules by mouth 2 times daily, Disp: 180 capsule, Rfl: 3     senna-docusate (SENOKOT-S/PERICOLACE) 8.6-50 MG tablet, Take 2 tablets by mouth 2 times daily Needs 4 month supply for International Travel, Disp: 100 tablet, Rfl: 3     STATIN NOT PRESCRIBED, INTENTIONAL,, Not prescribed, Disp: 0 each, Rfl: 0     Vitamin D3 (CHOLECALCIFEROL) 25 mcg (1000 units) tablet, Take 1 tablet (25 mcg) by mouth daily, Disp: 100 tablet, Rfl: 3    Current Facility-Administered Medications:      albuterol (PROVENTIL) neb solution 2.5 mg, 2.5 mg, Nebulization, Once, Karely Sierra MD     apraclonidine (IOPIDINE) 1 % ophthalmic solution 1 drop, 1 drop, Right Eye, Once, Nik Langley MD    Allergies:    Aspirin, Clarithromycin, Contrast dye, Iodine, and Penicillins    Physical Examination:    The patient is a well developed, well nourished female in no apparent distress.  She is normocephalic, atraumatic with pupils equally round and reactive to light.    Oral Cavity Examination:  Ulceration of the left lateral tongue with 1x1 cm lesion with central ulceration.   Nasal Examination: Normal mucosa with no masses or lesions  Ear Examination: Ear canals clear, tympanic membranes and middle ear spaces normal  Neurological Examination: Facial nerve function intact and symmetric  Integumentary Examination: No lesions on the skin of the head and neck  Neck Examination: Left cervical lymphadenopathy  Endocrine Examination: Normal thyroid examination    Assessment and Plan:    The patient presents with a history of ulceration for three months on the left lateral tongue with  cervical lymphadenopathy. She will be referred for a CT scan of the neck without contrast due contrast dye allergy and her renal and liver function. She will be treated with magic mouth rinse and reduced dose of Doxycycline. She will be referred to Dr. Usman Fernando for further evaluation and possible biopsy.     CC: Dr. Karely Sierra        Again, thank you for allowing me to participate in the care of your patient.      Sincerely,    Nestor Hawley MD

## 2021-05-07 NOTE — TELEPHONE ENCOUNTER
Writer called patient using interpretive services, writer offered an earlier appointment time on 5/10/21 than 2:40. Patient stated that she had already scheduled her taxi and does not want to change her appointment time.    Ree Garza, EMT

## 2021-05-07 NOTE — TELEPHONE ENCOUNTER
FUTURE VISIT INFORMATION      FUTURE VISIT INFORMATION:    Date: 5/10/21    Time: 2:40 PM    Location: [facility]  REFERRAL INFORMATION:    Referring provider:  Dr. Nestor Hawley    Referring providers clinic:  [facility] - ENT    Reason for visit/diagnosis: Ulceration for 3 months    RECORDS REQUESTED FROM:       Clinic name Comments Records Status Imaging Status   [facility] 5/7/21 - ENT OV with Dr. Chandan Deleon 3/15/21 - [facility] OV with Dr. Sierra [facility] 5/7/21 - CT Neck WO  7/27/17 - MRI Brain  7/27/17 - CT head WO [facility]

## 2021-05-10 ENCOUNTER — OFFICE VISIT (OUTPATIENT)
Dept: OTOLARYNGOLOGY | Facility: CLINIC | Age: 56
End: 2021-05-10
Payer: MEDICARE

## 2021-05-10 ENCOUNTER — PRE VISIT (OUTPATIENT)
Dept: OTOLARYNGOLOGY | Facility: CLINIC | Age: 56
End: 2021-05-10

## 2021-05-10 VITALS — BODY MASS INDEX: 44.78 KG/M2 | HEART RATE: 70 BPM | TEMPERATURE: 97.2 F | WEIGHT: 229.28 LBS | OXYGEN SATURATION: 96 %

## 2021-05-10 DIAGNOSIS — K14.0 TONGUE ULCER: Primary | ICD-10-CM

## 2021-05-10 PROCEDURE — 99215 OFFICE O/P EST HI 40 MIN: CPT | Performed by: STUDENT IN AN ORGANIZED HEALTH CARE EDUCATION/TRAINING PROGRAM

## 2021-05-10 ASSESSMENT — PAIN SCALES - GENERAL: PAINLEVEL: NO PAIN (0)

## 2021-05-10 NOTE — NURSING NOTE
Chief Complaint   Patient presents with     Consult     Referred by Dr. Hawley // history of ulceration        Pulse 70, temperature 97.2  F (36.2  C), temperature source Temporal, weight 104 kg (229 lb 4.5 oz), SpO2 96 %, not currently breastfeeding.    Ree Garza, EMT

## 2021-05-10 NOTE — PROGRESS NOTES
May 10, 2021      Nestor Hawley M.D.   Department of Otolaryngology   [address] [place] [id number]       Dear Dr. Hawley,    I had the pleasure of meeting Ms. Navarro in clinic.      HISTORY OF PRESENT ILLNESS:  As you know, she is a pleasant 55-year-old female, you referred for evaluation of a left tongue lesion.  The patient is approximately 10 years status post a liver transplant.  She has noticed at least a three-month history of a sore on her tongue.  She says that she bit her tongue and she continues to intermittently bite it due to some dental issues.  It is just mildly sore, which is relieved with Tylenol.  She has not noticed any cervical lymphadenopathy.  Dr. Hawley arranged for a CT scan that showed no evidence of any tongue lesion or any evidence of cervical lymphadenopathy.    PAST MEDICAL HISTORY:    1.)  Liver transplant.  2.)  Chronic kidney disease.  3.)  Hepatitis C.  4.)  Hypertension.    PAST SURGICAL HISTORY:    1.)  Liver transplant.  2.)  Salpingo-oophorectomy.  3.)  Cervical spine surgery.  4.)  .    MEDICATIONS:    1.) Albuterol   2.) Alendronate.    3.) Calcitriol.  4.) Calcium carbonate.   5.) Lasix.    6.) Gabapentin.   7.) Mycophenolate.  8.) Omeprazole.    ALLERGIES:    1.) ASPIRIN.    2.) CLARITHROMYCIN.    3.) CONTRAST DYE.    4.) PENICILLIN.    SOCIAL HISTORY:  She is a nonsmoker, nondrinker.    FAMILY HISTORY:  None.    REVIEW OF SYSTEMS:  A 10-point review of system was performed and is negative aside from that in the HPI.    PHYSICAL EXAMINATION:  She is alert, in no acute distress.  She has no palpable cervical lymphadenopathy.  On intraoral examination, she has a 1 cm ulcer in the posterolateral left oral tongue.  This appears consistent with trauma as the underlying tissues appeared to be healing.  There is minimal induration to this area and very mild tenderness.    ASSESSMENT:  Ms. Navarro is a pleasant 55-year-old female who  presented with a left tongue ulcer.  She reports she bit this and has bit it on multiple occasions and thinks this is a trauma related.    On examination, I feel that the exam is also more consistent with a trauma related ulceration and my suspicion for malignancy is quite low.      PLAN:  I did offer her observation versus a biopsy today for definitive answer.  She prefers to observe this and would like to see her back in approximately six to eight weeks.  She knows should the lesion grow or become more symptomatic, that she should call me sooner.      Thank you for allowing me to participate in the care of this patient. If you have any further questions, please do not hesitate to contact me.     Sincerely,      Usman Fernando M.D.      Otolaryngology-Head & Neck Surgery  [facility]          45 minutes spent on the date of the encounter in chart review, patient visit, review of tests, documentation and/or discussion with other providers about the issues documented above.

## 2021-05-10 NOTE — LETTER
5/10/2021       RE: Flor Navarro  [address]  [facility] [address]     Dear Colleague,    Thank you for referring your patient, Flor Navarro, to the [facility] EAR NOSE AND THROAT CLINIC [place] [facility]. Please see a copy of my visit note below.    May 10, 2021      Nestor Hawley M.D.   Department of Otolaryngology   [address]   [place] [address]       Dear Dr. Hawley,    I had the pleasure of meeting Ms. Navarro in clinic.      HISTORY OF PRESENT ILLNESS:  As you know, she is a pleasant 55-year-old female, you referred for evaluation of a left tongue lesion.  The patient is approximately 10 years status post a liver transplant.  She has noticed at least a three-month history of a sore on her tongue.  She says that she bit her tongue and she continues to intermittently bite it due to some dental issues.  It is just mildly sore, which is relieved with Tylenol.  She has not noticed any cervical lymphadenopathy.  Dr. Hawley arranged for a CT scan that showed no evidence of any tongue lesion or any evidence of cervical lymphadenopathy.    PAST MEDICAL HISTORY:    1.)  Liver transplant.  2.)  Chronic kidney disease.  3.)  Hepatitis C.  4.)  Hypertension.    PAST SURGICAL HISTORY:    1.)  Liver transplant.  2.)  Salpingo-oophorectomy.  3.)  Cervical spine surgery.  4.)  .    MEDICATIONS:    1.) Albuterol   2.) Alendronate.    3.) Calcitriol.  4.) Calcium carbonate.   5.) Lasix.    6.) Gabapentin.   7.) Mycophenolate.  8.) Omeprazole.    ALLERGIES:    1.) ASPIRIN.    2.) CLARITHROMYCIN.    3.) CONTRAST DYE.    4.) PENICILLIN.    SOCIAL HISTORY:  She is a nonsmoker, nondrinker.    FAMILY HISTORY:  None.    REVIEW OF SYSTEMS:  A 10-point review of system was performed and is negative aside from that in the HPI.    PHYSICAL EXAMINATION:  She is alert, in no acute distress.  She has no palpable  cervical lymphadenopathy.  On intraoral examination, she has a 1 cm ulcer in the posterolateral left oral tongue.  This appears consistent with trauma as the underlying tissues appeared to be healing.  There is minimal induration to this area and very mild tenderness.    ASSESSMENT:  Ms. Navarro is a pleasant 55-year-old female who presented with a left tongue ulcer.  She reports she bit this and has bit it on multiple occasions and thinks this is a trauma related.    On examination, I feel that the exam is also more consistent with a trauma related ulceration and my suspicion for malignancy is quite low.      PLAN:  I did offer her observation versus a biopsy today for definitive answer.  She prefers to observe this and would like to see her back in approximately six to eight weeks.  She knows should the lesion grow or become more symptomatic, that she should call me sooner.      Thank you for allowing me to participate in the care of this patient. If you have any further questions, please do not hesitate to contact me.     Sincerely,      Usman Fernando M.D.      Otolaryngology-Head & Neck Surgery  [facility]          45 minutes spent on the date of the encounter in chart review, patient visit, review of tests, documentation and/or discussion with other providers about the issues documented above.         Again, thank you for allowing me to participate in the care of your patient.      Sincerely,    Usman Fernando MD

## 2021-05-10 NOTE — PATIENT INSTRUCTIONS
1. Please follow-up in clinic in 6-8 weeks.   2. Please call the ENT clinic with any questions,concerns, new or worsening symptoms.    -Clinic number is [phone]   - [facility] direct line (Dr. Fernando's nurse) [phone]

## 2021-05-28 ENCOUNTER — TELEPHONE (OUTPATIENT)
Dept: FAMILY MEDICINE | Facility: CLINIC | Age: 56
End: 2021-05-28

## 2021-05-28 NOTE — TELEPHONE ENCOUNTER
[facility] phone call message - order or referral request from patient:     Order or referral being requested: Requested order     Additional Details: RN Leeann calling for skilled nursing orders: 1x/week for 1 week, 1x/every other week for 8 weeks, and 3 PRN for assessment and medication management. Voicemail ok.     Referral only -Specialty N/A Location N/A    OK to leave a message on voice mail? Yes    Primary language: Luxembourgish      needed? Yes    Call taken on May 28, 2021 at 2:21 PM by Ines Gutierrez    Order request route to [facility] TRIAGE   Referrals Route to [facility] (Green/Tensas/Purple) CARE COORDINATOR

## 2021-05-28 NOTE — TELEPHONE ENCOUNTER
Returned call to home care nurse, unable to reach. Left VM with verbal orders per protocol as requested. Left callback number for questions.    Fabiola Ford RN

## 2021-06-04 ENCOUNTER — ANCILLARY PROCEDURE (OUTPATIENT)
Dept: GENERAL RADIOLOGY | Facility: CLINIC | Age: 56
End: 2021-06-04
Attending: FAMILY MEDICINE
Payer: MEDICARE

## 2021-06-04 ENCOUNTER — OFFICE VISIT (OUTPATIENT)
Dept: FAMILY MEDICINE | Facility: CLINIC | Age: 56
End: 2021-06-04
Payer: MEDICARE

## 2021-06-04 VITALS
DIASTOLIC BLOOD PRESSURE: 87 MMHG | HEART RATE: 76 BPM | OXYGEN SATURATION: 91 % | TEMPERATURE: 98.1 F | BODY MASS INDEX: 44.33 KG/M2 | WEIGHT: 227 LBS | RESPIRATION RATE: 16 BRPM | SYSTOLIC BLOOD PRESSURE: 134 MMHG

## 2021-06-04 DIAGNOSIS — M79.662 PAIN OF LEFT LOWER LEG: ICD-10-CM

## 2021-06-04 DIAGNOSIS — M79.671 RIGHT FOOT PAIN: ICD-10-CM

## 2021-06-04 DIAGNOSIS — M79.662 PAIN OF LEFT LOWER LEG: Primary | ICD-10-CM

## 2021-06-04 PROCEDURE — 73630 X-RAY EXAM OF FOOT: CPT | Mod: LT | Performed by: RADIOLOGY

## 2021-06-04 PROCEDURE — 73590 X-RAY EXAM OF LOWER LEG: CPT | Mod: LT | Performed by: RADIOLOGY

## 2021-06-04 PROCEDURE — 99214 OFFICE O/P EST MOD 30 MIN: CPT | Performed by: FAMILY MEDICINE

## 2021-06-04 RX ORDER — CAPSAICIN 0.025 %
CREAM (GRAM) TOPICAL
Qty: 120 G | Refills: 3 | Status: SHIPPED | OUTPATIENT
Start: 2021-06-04 | End: 2021-07-08

## 2021-06-04 NOTE — NURSING NOTE
Due to patient being non-English speaking/uses sign language, an  was used for this visit. Only for face-to-face interpretation by an external agency, date and length of interpretation can be found on the scanned worksheet.     name: Dakotah  Agency: [employer]  Language: Hebrew   Telephone number: [phone]  Type of interpretation: Telephone, spoken

## 2021-06-07 DIAGNOSIS — Z53.9 DIAGNOSIS NOT YET DEFINED: Primary | ICD-10-CM

## 2021-06-07 PROCEDURE — G0179 MD RECERTIFICATION HHA PT: HCPCS | Performed by: FAMILY MEDICINE

## 2021-06-10 ENCOUNTER — DOCUMENTATION ONLY (OUTPATIENT)
Dept: FAMILY MEDICINE | Facility: CLINIC | Age: 56
End: 2021-06-10

## 2021-06-11 NOTE — PROGRESS NOTES
I was present with the medical student who participated in the service and in the documentation of this note. I have verified the history and personally performed the physical exam and medical decision making, and have verified the content of the note, which accurately reflects my assessment of the patient and the plan of care. I was present the entire time documented. I reviewed xrays in person with her w/o acute osseous abnormality.   Karely Sierra MD

## 2021-06-11 NOTE — PROGRESS NOTES
Form has been completed by provider.     Form sent out via: Fax to [employer] at Fax Number: [phone]  Patient informed: No  Output date: June 11, 2021    Ronit Schneider MA      **Please close the encounter**

## 2021-06-16 DIAGNOSIS — M81.8 OTHER OSTEOPOROSIS WITHOUT CURRENT PATHOLOGICAL FRACTURE: ICD-10-CM

## 2021-06-16 RX ORDER — VITAMIN B COMPLEX
25 TABLET ORAL DAILY
Qty: 100 TABLET | Refills: 3 | Status: SHIPPED | OUTPATIENT
Start: 2021-06-16 | End: 2022-08-31

## 2021-06-21 ENCOUNTER — OFFICE VISIT (OUTPATIENT)
Dept: OTOLARYNGOLOGY | Facility: CLINIC | Age: 56
End: 2021-06-21
Payer: MEDICARE

## 2021-06-21 VITALS — TEMPERATURE: 96.5 F | OXYGEN SATURATION: 98 % | HEART RATE: 68 BPM | BODY MASS INDEX: 43.75 KG/M2 | WEIGHT: 224 LBS

## 2021-06-21 DIAGNOSIS — R59.0 CERVICAL LYMPHADENOPATHY: Primary | ICD-10-CM

## 2021-06-21 DIAGNOSIS — Z94.4 LIVER REPLACED BY TRANSPLANT (H): ICD-10-CM

## 2021-06-21 LAB
ALBUMIN SERPL-MCNC: 4.1 G/DL (ref 3.4–5)
ALP SERPL-CCNC: 94 U/L (ref 40–150)
ALT SERPL W P-5'-P-CCNC: 33 U/L (ref 0–50)
ANION GAP SERPL CALCULATED.3IONS-SCNC: 6 MMOL/L (ref 3–14)
AST SERPL W P-5'-P-CCNC: 22 U/L (ref 0–45)
BILIRUB DIRECT SERPL-MCNC: 0.1 MG/DL (ref 0–0.2)
BILIRUB SERPL-MCNC: 0.8 MG/DL (ref 0.2–1.3)
BUN SERPL-MCNC: 75 MG/DL (ref 7–30)
CALCIUM SERPL-MCNC: 9.9 MG/DL (ref 8.5–10.1)
CHLORIDE SERPL-SCNC: 104 MMOL/L (ref 94–109)
CO2 SERPL-SCNC: 28 MMOL/L (ref 20–32)
CREAT SERPL-MCNC: 2.31 MG/DL (ref 0.52–1.04)
CYCLOSPORINE BLD LC/MS/MS-MCNC: 151 UG/L (ref 50–400)
ERYTHROCYTE [DISTWIDTH] IN BLOOD BY AUTOMATED COUNT: 12.3 % (ref 10–15)
GFR SERPL CREATININE-BSD FRML MDRD: 23 ML/MIN/{1.73_M2}
GLUCOSE SERPL-MCNC: 91 MG/DL (ref 70–99)
HCT VFR BLD AUTO: 41.8 % (ref 35–47)
HGB BLD-MCNC: 13.5 G/DL (ref 11.7–15.7)
MCH RBC QN AUTO: 30 PG (ref 26.5–33)
MCHC RBC AUTO-ENTMCNC: 32.3 G/DL (ref 31.5–36.5)
MCV RBC AUTO: 93 FL (ref 78–100)
PLATELET # BLD AUTO: 188 10E9/L (ref 150–450)
POTASSIUM SERPL-SCNC: 4.8 MMOL/L (ref 3.4–5.3)
PROT SERPL-MCNC: 8.2 G/DL (ref 6.8–8.8)
RBC # BLD AUTO: 4.5 10E12/L (ref 3.8–5.2)
SODIUM SERPL-SCNC: 138 MMOL/L (ref 133–144)
TME LAST DOSE: 2330 H
WBC # BLD AUTO: 4.4 10E9/L (ref 4–11)

## 2021-06-21 PROCEDURE — 85027 COMPLETE CBC AUTOMATED: CPT | Performed by: PATHOLOGY

## 2021-06-21 PROCEDURE — 80048 BASIC METABOLIC PNL TOTAL CA: CPT | Performed by: PATHOLOGY

## 2021-06-21 PROCEDURE — 36415 COLL VENOUS BLD VENIPUNCTURE: CPT | Performed by: PATHOLOGY

## 2021-06-21 PROCEDURE — 99214 OFFICE O/P EST MOD 30 MIN: CPT | Performed by: STUDENT IN AN ORGANIZED HEALTH CARE EDUCATION/TRAINING PROGRAM

## 2021-06-21 PROCEDURE — 80076 HEPATIC FUNCTION PANEL: CPT | Performed by: PATHOLOGY

## 2021-06-21 PROCEDURE — 80158 DRUG ASSAY CYCLOSPORINE: CPT | Performed by: INTERNAL MEDICINE

## 2021-06-21 ASSESSMENT — PAIN SCALES - GENERAL: PAINLEVEL: NO PAIN (0)

## 2021-06-21 NOTE — LETTER
6/21/2021       RE: Flor Navarro  [address]  [place], [place]. \Hospital [facility] a New Name and Outlook.\"" [id number]     Dear Colleague,    Thank you for referring your patient, Flor Navarro, to the [facility] [place] [facility]. Please see a copy of my visit note below.    HISTORY OF PRESENT ILLNESS:  Ms. Navarro returns today for a followup.  I met her approximately five weeks ago when she presented for a left tongue lesion.  At that time, it appeared consistent with a likely traumatic lesion from biting her tongue.  We elected to observe this.  She says that the pain in the tongue is completely resolved at this time.    PHYSICAL EXAMINATION:  The tongue ulceration is near completely healed.  She has no palpable cervical lymphadenopathy.    ASSESSMENT AND PLAN:  Ms. Navarro is doing well today.  Her tongue lesion is improving.  I believe this is consistent with a traumatic lesion.  She can follow up with me as needed.  I instructed her that if she develops any new lesion on her tongue that she should call me, and I would be happy to see her.    30 minutes spent on the date of the encounter in chart review, patient visit, review of tests, documentation and/or discussion with other providers about the issues documented above.         Again, thank you for allowing me to participate in the care of your patient.      Sincerely,    Usman Fernando MD

## 2021-06-21 NOTE — NURSING NOTE
Chief Complaint   Patient presents with     RECHECK     6-8 week follow up       Pulse 68, temperature 96.5  F (35.8  C), temperature source Temporal, weight 101.6 kg (224 lb), SpO2 98 %, not currently breastfeeding.    Ree Garza, EMT

## 2021-06-21 NOTE — PROGRESS NOTES
HISTORY OF PRESENT ILLNESS:  Ms. Navarro returns today for a followup.  I met her approximately five weeks ago when she presented for a left tongue lesion.  At that time, it appeared consistent with a likely traumatic lesion from biting her tongue.  We elected to observe this.  She says that the pain in the tongue is completely resolved at this time.    PHYSICAL EXAMINATION:  The tongue ulceration is near completely healed.  She has no palpable cervical lymphadenopathy.    ASSESSMENT AND PLAN:  Ms. Navarro is doing well today.  Her tongue lesion is improving.  I believe this is consistent with a traumatic lesion.  She can follow up with me as needed.  I instructed her that if she develops any new lesion on her tongue that she should call me, and I would be happy to see her.    30 minutes spent on the date of the encounter in chart review, patient visit, review of tests, documentation and/or discussion with other providers about the issues documented above.

## 2021-06-21 NOTE — PATIENT INSTRUCTIONS
1. Please follow-up in clinic as needed  2. Please call the ENT clinic with any questions,concerns, new or worsening symptoms.    -Clinic number is [phone]   - Litzy's direct line (Dr. Fernando's nurse) [phone]

## 2021-06-22 ENCOUNTER — TELEPHONE (OUTPATIENT)
Dept: TRANSPLANT | Facility: CLINIC | Age: 56
End: 2021-06-22

## 2021-06-22 DIAGNOSIS — Z13.220 LIPID SCREENING: ICD-10-CM

## 2021-06-22 DIAGNOSIS — Z94.4 LIVER REPLACED BY TRANSPLANT (H): ICD-10-CM

## 2021-06-22 DIAGNOSIS — Z94.4 LIVER REPLACED BY TRANSPLANT (H): Primary | ICD-10-CM

## 2021-06-23 DIAGNOSIS — H60.552 ACUTE REACTIVE OTITIS EXTERNA OF LEFT EAR: ICD-10-CM

## 2021-06-23 RX ORDER — NEOMYCIN SULFATE, POLYMYXIN B SULFATE, HYDROCORTISONE 3.5; 10000; 1 MG/ML; [USP'U]/ML; MG/ML
4 SOLUTION/ DROPS AURICULAR (OTIC) 2 TIMES DAILY
Qty: 10 ML | Refills: 0 | Status: SHIPPED | OUTPATIENT
Start: 2021-06-23 | End: 2021-08-09

## 2021-06-25 ENCOUNTER — TELEPHONE (OUTPATIENT)
Dept: FAMILY MEDICINE | Facility: CLINIC | Age: 56
End: 2021-06-25

## 2021-06-25 DIAGNOSIS — Z94.4 LIVER REPLACED BY TRANSPLANT (H): ICD-10-CM

## 2021-06-25 DIAGNOSIS — Z13.220 LIPID SCREENING: ICD-10-CM

## 2021-06-25 DIAGNOSIS — N39.46 MIXED STRESS AND URGE URINARY INCONTINENCE: Primary | ICD-10-CM

## 2021-06-25 LAB
ALBUMIN SERPL-MCNC: 3.7 G/DL (ref 3.4–5)
ALBUMIN UR-MCNC: NEGATIVE MG/DL
ALP SERPL-CCNC: 88 U/L (ref 40–150)
ALT SERPL W P-5'-P-CCNC: 30 U/L (ref 0–50)
ANION GAP SERPL CALCULATED.3IONS-SCNC: 6 MMOL/L (ref 3–14)
APPEARANCE UR: ABNORMAL
AST SERPL W P-5'-P-CCNC: 24 U/L (ref 0–45)
BACTERIA #/AREA URNS HPF: ABNORMAL /HPF
BILIRUB DIRECT SERPL-MCNC: 0.2 MG/DL (ref 0–0.2)
BILIRUB SERPL-MCNC: 0.8 MG/DL (ref 0.2–1.3)
BILIRUB UR QL STRIP: NEGATIVE
BUN SERPL-MCNC: 58 MG/DL (ref 7–30)
CALCIUM SERPL-MCNC: 9.5 MG/DL (ref 8.5–10.1)
CHLORIDE SERPL-SCNC: 104 MMOL/L (ref 94–109)
CHOLEST SERPL-MCNC: 294 MG/DL
CO2 SERPL-SCNC: 28 MMOL/L (ref 20–32)
COLOR UR AUTO: YELLOW
CREAT SERPL-MCNC: 1.72 MG/DL (ref 0.52–1.04)
CREAT UR-MCNC: 90 MG/DL
CYCLOSPORINE BLD LC/MS/MS-MCNC: 150 UG/L (ref 50–400)
ERYTHROCYTE [DISTWIDTH] IN BLOOD BY AUTOMATED COUNT: 12.2 % (ref 10–15)
GFR SERPL CREATININE-BSD FRML MDRD: 33 ML/MIN/{1.73_M2}
GLUCOSE SERPL-MCNC: 101 MG/DL (ref 70–99)
GLUCOSE UR STRIP-MCNC: NEGATIVE MG/DL
HCT VFR BLD AUTO: 40.7 % (ref 35–47)
HDLC SERPL-MCNC: 43 MG/DL
HGB BLD-MCNC: 13.2 G/DL (ref 11.7–15.7)
HGB UR QL STRIP: NEGATIVE
KETONES UR STRIP-MCNC: NEGATIVE MG/DL
LDLC SERPL CALC-MCNC: 200 MG/DL
LEUKOCYTE ESTERASE UR QL STRIP: ABNORMAL
MCH RBC QN AUTO: 30 PG (ref 26.5–33)
MCHC RBC AUTO-ENTMCNC: 32.4 G/DL (ref 31.5–36.5)
MCV RBC AUTO: 93 FL (ref 78–100)
NITRATE UR QL: NEGATIVE
NONHDLC SERPL-MCNC: 252 MG/DL
PH UR STRIP: 7 PH (ref 5–7)
PLATELET # BLD AUTO: 189 10E9/L (ref 150–450)
POTASSIUM SERPL-SCNC: 4.9 MMOL/L (ref 3.4–5.3)
PROT SERPL-MCNC: 7.7 G/DL (ref 6.8–8.8)
PROT UR-MCNC: 0.19 G/L
PROT/CREAT 24H UR: 0.21 G/G CR (ref 0–0.2)
RBC # BLD AUTO: 4.4 10E12/L (ref 3.8–5.2)
RBC #/AREA URNS AUTO: 1 /HPF (ref 0–2)
SODIUM SERPL-SCNC: 138 MMOL/L (ref 133–144)
SOURCE: ABNORMAL
SP GR UR STRIP: 1.01 (ref 1–1.03)
SQUAMOUS #/AREA URNS AUTO: 6 /HPF (ref 0–1)
TME LAST DOSE: NORMAL H
TRIGL SERPL-MCNC: 258 MG/DL
UROBILINOGEN UR STRIP-MCNC: 0 MG/DL (ref 0–2)
WBC # BLD AUTO: 4.2 10E9/L (ref 4–11)
WBC #/AREA URNS AUTO: 23 /HPF (ref 0–5)

## 2021-06-25 PROCEDURE — 81001 URINALYSIS AUTO W/SCOPE: CPT | Performed by: PATHOLOGY

## 2021-06-25 PROCEDURE — 36415 COLL VENOUS BLD VENIPUNCTURE: CPT | Performed by: PATHOLOGY

## 2021-06-25 PROCEDURE — 80048 BASIC METABOLIC PNL TOTAL CA: CPT | Performed by: PATHOLOGY

## 2021-06-25 PROCEDURE — 85027 COMPLETE CBC AUTOMATED: CPT | Performed by: PATHOLOGY

## 2021-06-25 PROCEDURE — 80076 HEPATIC FUNCTION PANEL: CPT | Performed by: PATHOLOGY

## 2021-06-25 PROCEDURE — 84156 ASSAY OF PROTEIN URINE: CPT | Performed by: PATHOLOGY

## 2021-06-25 PROCEDURE — 80061 LIPID PANEL: CPT | Performed by: PATHOLOGY

## 2021-06-25 PROCEDURE — 80158 DRUG ASSAY CYCLOSPORINE: CPT | Performed by: INTERNAL MEDICINE

## 2021-06-25 NOTE — TELEPHONE ENCOUNTER
Verify that the refill encounter hasn't been started Yes    [facility] phone call message- patient requesting a refill:    Full Medication Name: Pull ups     Dose:      Pharmacy confirmed as   [facility] - [place], [place] - [address]   [address] [facility] [address]  Phone: [phone] Fax: [phone]  : Yes    Medication tab checked to see if medication has been sent  Yes    Additional Comments:      OK to leave a message on voice mail? Yes    Advised patient refill may take up to 2 business days? Yes    Primary language: Irish      needed? Yes    Call taken on June 25, 2021 at 2:42 PM by Jaja Lazo to P [facility] MED REFILL

## 2021-06-25 NOTE — TELEPHONE ENCOUNTER
Patient requesting order for pull ups. Last office visit 6/4/21 with Dr. Sierra. RN will route to PCP to order if appropriate.     Nato Tijerina RN

## 2021-06-26 NOTE — TELEPHONE ENCOUNTER
Order teed up. Cannot order without size (SML or XL)  DME (Durable Medical Equipment) Orders and Documentation  No orders of the defined types were placed in this encounter.     The patient was assessed and it was determined the patient is in need of the following listed DME Supplies/Equipment. Please complete supporting documentation below to demonstrate medical necessity.      Incontinence Supplies Documentation  Incontinence supplies are needed due to maintain hygiene.

## 2021-06-28 DIAGNOSIS — Z94.4 LIVER REPLACED BY TRANSPLANT (H): Primary | ICD-10-CM

## 2021-06-28 DIAGNOSIS — Z94.4 LIVER REPLACED BY TRANSPLANT (H): ICD-10-CM

## 2021-06-28 RX ORDER — CYCLOSPORINE 25 MG/1
75 CAPSULE, LIQUID FILLED ORAL EVERY 12 HOURS
Qty: 540 CAPSULE | Refills: 3 | Status: SHIPPED | OUTPATIENT
Start: 2021-06-28 | End: 2021-08-24

## 2021-06-28 NOTE — TELEPHONE ENCOUNTER
Order could not be placed without sz. I sent XL - please confirm size, You could alternately call the company and see what was filled last time instead of calling pt.

## 2021-06-28 NOTE — TELEPHONE ENCOUNTER
ISSUE:   Cyclosporine level 150 on 6/25 goal 100, dose 100mg daily. Pt's creatinine remains elevated, but improved from repeat.    PLAN:   Please call patient and confirm this was an accurate 12-hour trough. Verify Cyclosporine dose 100 mg BID. Confirm no new medications or illness. Confirm no missed doses. If accurate trough and accurate dose, decrease Cyclosporine dose to 75 mg BID, hydrate and repeat labs in 1 week.    OUTCOME:   Spoke with patient, they confirm accurate trough level and current dose 100 mg BID. Patient confirmed dose change to 75 mg BID and to repeat labs in 1 weeks. Orders sent to preferred pharmacy for dose change and lab for repeat labs. Patient voiced understanding of plan.     Let son know that the creatinine has improved but to still have pt push fluids and recheck tx labs next week.     Karmen Medina LPN

## 2021-06-28 NOTE — TELEPHONE ENCOUNTER
RN spoke to  Specialty pharmacy- they confirmed they can fill for patient. Technician states they can fill once they have the order faxed over and adjust size if needed. RN faxed to [phone] as requested.   Breanna Cook, RN

## 2021-06-28 NOTE — TELEPHONE ENCOUNTER
RN attempted to reach patient- LM via  [phone]. When patient calls back- please obtain what size of pull ups patient needs and where she would like the order sent to (which supplier).   Breanna Cook, RN

## 2021-06-30 DIAGNOSIS — K59.09 CONSTIPATION, CHRONIC: ICD-10-CM

## 2021-07-01 ENCOUNTER — DOCUMENTATION ONLY (OUTPATIENT)
Dept: FAMILY MEDICINE | Facility: CLINIC | Age: 56
End: 2021-07-01

## 2021-07-01 DIAGNOSIS — N39.0 URINARY TRACT INFECTION: Primary | ICD-10-CM

## 2021-07-01 NOTE — PROGRESS NOTES
"When opening a documentation only encounter, be sure to enter in \"Chief Complaint\" Forms and in \" Comments\" Title of form, description if needed.    Flor is a 55 year old  female  Form received via: Fax  Form now resides in: PCS Pending    Litzy Gregory CMA                  "

## 2021-07-01 NOTE — TELEPHONE ENCOUNTER
Left a message for son to have pt give a urine sample today or next week and to call with questions.

## 2021-07-02 ENCOUNTER — OFFICE VISIT (OUTPATIENT)
Dept: GASTROENTEROLOGY | Facility: CLINIC | Age: 56
End: 2021-07-02
Attending: INTERNAL MEDICINE
Payer: MEDICARE

## 2021-07-02 ENCOUNTER — DOCUMENTATION ONLY (OUTPATIENT)
Dept: FAMILY MEDICINE | Facility: CLINIC | Age: 56
End: 2021-07-02

## 2021-07-02 ENCOUNTER — MEDICAL CORRESPONDENCE (OUTPATIENT)
Dept: HEALTH INFORMATION MANAGEMENT | Facility: CLINIC | Age: 56
End: 2021-07-02

## 2021-07-02 VITALS
HEART RATE: 68 BPM | DIASTOLIC BLOOD PRESSURE: 82 MMHG | SYSTOLIC BLOOD PRESSURE: 168 MMHG | WEIGHT: 224 LBS | OXYGEN SATURATION: 95 % | BODY MASS INDEX: 43.75 KG/M2

## 2021-07-02 DIAGNOSIS — N39.0 URINARY TRACT INFECTION: ICD-10-CM

## 2021-07-02 DIAGNOSIS — Z94.4 LIVER REPLACED BY TRANSPLANT (H): ICD-10-CM

## 2021-07-02 DIAGNOSIS — Z94.4 LIVER REPLACED BY TRANSPLANT (H): Primary | ICD-10-CM

## 2021-07-02 LAB
ALBUMIN SERPL-MCNC: 3.6 G/DL (ref 3.4–5)
ALP SERPL-CCNC: 83 U/L (ref 40–150)
ALT SERPL W P-5'-P-CCNC: 30 U/L (ref 0–50)
ANION GAP SERPL CALCULATED.3IONS-SCNC: 6 MMOL/L (ref 3–14)
AST SERPL W P-5'-P-CCNC: 22 U/L (ref 0–45)
BILIRUB DIRECT SERPL-MCNC: 0.2 MG/DL (ref 0–0.2)
BILIRUB SERPL-MCNC: 0.9 MG/DL (ref 0.2–1.3)
BUN SERPL-MCNC: 45 MG/DL (ref 7–30)
CALCIUM SERPL-MCNC: 9.6 MG/DL (ref 8.5–10.1)
CHLORIDE SERPL-SCNC: 107 MMOL/L (ref 94–109)
CO2 SERPL-SCNC: 28 MMOL/L (ref 20–32)
CREAT SERPL-MCNC: 1.64 MG/DL (ref 0.52–1.04)
CYCLOSPORINE BLD LC/MS/MS-MCNC: 116 UG/L (ref 50–400)
ERYTHROCYTE [DISTWIDTH] IN BLOOD BY AUTOMATED COUNT: 12.4 % (ref 10–15)
GFR SERPL CREATININE-BSD FRML MDRD: 34 ML/MIN/{1.73_M2}
GLUCOSE SERPL-MCNC: 100 MG/DL (ref 70–99)
HCT VFR BLD AUTO: 38.3 % (ref 35–47)
HGB BLD-MCNC: 12.8 G/DL (ref 11.7–15.7)
MCH RBC QN AUTO: 29.8 PG (ref 26.5–33)
MCHC RBC AUTO-ENTMCNC: 33.4 G/DL (ref 31.5–36.5)
MCV RBC AUTO: 89 FL (ref 78–100)
PLATELET # BLD AUTO: 171 10E9/L (ref 150–450)
POTASSIUM SERPL-SCNC: 5 MMOL/L (ref 3.4–5.3)
PROT SERPL-MCNC: 7.5 G/DL (ref 6.8–8.8)
RBC # BLD AUTO: 4.29 10E12/L (ref 3.8–5.2)
SODIUM SERPL-SCNC: 141 MMOL/L (ref 133–144)
TME LAST DOSE: NORMAL H
WBC # BLD AUTO: 4.2 10E9/L (ref 4–11)

## 2021-07-02 PROCEDURE — 87086 URINE CULTURE/COLONY COUNT: CPT | Performed by: INTERNAL MEDICINE

## 2021-07-02 PROCEDURE — G0463 HOSPITAL OUTPT CLINIC VISIT: HCPCS

## 2021-07-02 PROCEDURE — 36415 COLL VENOUS BLD VENIPUNCTURE: CPT | Performed by: PATHOLOGY

## 2021-07-02 PROCEDURE — 80048 BASIC METABOLIC PNL TOTAL CA: CPT | Performed by: PATHOLOGY

## 2021-07-02 PROCEDURE — 80158 DRUG ASSAY CYCLOSPORINE: CPT | Performed by: INTERNAL MEDICINE

## 2021-07-02 PROCEDURE — 87088 URINE BACTERIA CULTURE: CPT | Performed by: INTERNAL MEDICINE

## 2021-07-02 PROCEDURE — 80076 HEPATIC FUNCTION PANEL: CPT | Performed by: PATHOLOGY

## 2021-07-02 PROCEDURE — 87186 SC STD MICRODIL/AGAR DIL: CPT | Performed by: INTERNAL MEDICINE

## 2021-07-02 PROCEDURE — 99214 OFFICE O/P EST MOD 30 MIN: CPT | Performed by: INTERNAL MEDICINE

## 2021-07-02 PROCEDURE — 85027 COMPLETE CBC AUTOMATED: CPT | Performed by: PATHOLOGY

## 2021-07-02 NOTE — PROGRESS NOTES
HISTORY OF PRESENT ILLNESS:  I had the pleasure of seeing Flor Navarro for followup in the Liver Transplant Clinic at the [facility] on 07/02/2021.  Ms. Navarro returns for followup now 10 years status post liver transplantation for cirrhosis caused by chronic hepatitis C.    For the most part, she is doing well.  She denies any abdominal pain, itching or skin rash.  She does have a moderate amount of fatigue.  She denies any increased abdominal girth or lower extremity edema.  She has not had any gastrointestinal bleeding.    She denies any fevers or chills, cough or shortness of breath.  She has received the J&J vaccine for COVID-19.  She denies any nausea or vomiting.  She is having some constipation, for which she is taking a liquid prescribed by her primary physician.  Her appetite is good, and her weight is stable and too high.  Her current BMI is 44.    She is complaining of some pain in her left leg, which she has seen her primary physician for.    Current Outpatient Medications   Medication     acetaminophen (TYLENOL) 500 MG tablet     albuterol (ACCUNEB) 0.63 MG/3ML neb solution     albuterol (PROAIR HFA/PROVENTIL HFA/VENTOLIN HFA) 108 (90 Base) MCG/ACT inhaler     alendronate (FOSAMAX) 70 MG tablet     calcitRIOL (ROCALTROL) 0.25 MCG capsule     calcium carbonate 600 mg-vitamin D 400 units (CALCIUM 600 + D) 600-400 MG-UNIT per tablet     carboxymethylcellulose (REFRESH PLUS) 0.5 % SOLN ophthalmic solution     COMPOUNDED NON-CONTROLLED SUBSTANCE (CMPD RX) - PHARMACY TO MIX COMPOUNDED MEDICATION     cycloSPORINE modified (GENERIC EQUIVALENT) 25 MG capsule     Denture Care Products (EFFERDENT DENTURE CLEANSER) TBEF     dimethicone (AVEENO DAILY MOISTURIZING) 1.3 % LOTN lotion     febuxostat (ULORIC) 40 MG TABS tablet     furosemide (LASIX) 20 MG tablet     gabapentin (NEURONTIN) 100 MG capsule     gabapentin (NEURONTIN) 300 MG capsule     lidocaine (LIDODERM) 5 % patch     lidocaine  (XYLOCAINE) 5 % external ointment     magnesium hydroxide (MILK OF MAGNESIA) 400 MG/5ML suspension     mineral oil-white petrolatum (EUCERIN) CREA cream     Multiple Vitamin (DAILY-SUMA MULTIVITAMIN) TABS     multivitamin w/minerals (MULTI-VITAMIN) tablet     mycophenolate (GENERIC EQUIVALENT) 250 MG capsule     neomycin-polymyxin-HC 1 %     olopatadine (PATADAY) 0.2 % ophthalmic solution     omeprazole (PRILOSEC) 20 MG DR capsule     order for DME     order for DME     order for DME     psyllium (METAMUCIL/KONSYL) 0.52 g capsule     senna-docusate (SENOKOT-S/PERICOLACE) 8.6-50 MG tablet     Vitamin D3 (CHOLECALCIFEROL) 25 mcg (1000 units) tablet     capsaicin (ZOSTRIX) 0.025 % external cream     melatonin 3 MG tablet     order for DME     STATIN NOT PRESCRIBED, INTENTIONAL,     Current Facility-Administered Medications   Medication     albuterol (PROVENTIL) neb solution 2.5 mg     apraclonidine (IOPIDINE) 1 % ophthalmic solution 1 drop     BP (!) 168/82 (BP Location: Other (Comment), Patient Position: Sitting, Cuff Size: Adult Regular)   Pulse 68   Wt 101.6 kg (224 lb)   LMP  (LMP Unknown)   SpO2 95%   BMI 43.75 kg/m      PHYSICAL EXAMINATION:  In general, she looks well.  HEENT exam shows no scleral icterus or temporal muscle wasting.  Her chest is clear.  Abdominal exam shows no increase in girth.  No masses or tenderness to palpation are present.  Her liver is 10 cm in span without left lobe enlargement.  No spleen tip is palpable.  Extremity exam shows no edema.  There is no tenderness to palpation over the leg.  Neurologic exam is nonfocal.    Recent Results (from the past 168 hour(s))   Cyclosporine    Collection Time: 06/25/21 11:36 AM   Result Value Ref Range    Cyclosporine Last Dose 6/24/2021 11.00PM     Cyclosporine Level 150 50 - 400 ug/L   Hepatic panel    Collection Time: 06/25/21 11:37 AM   Result Value Ref Range    Bilirubin Direct 0.2 0.0 - 0.2 mg/dL    Bilirubin Total 0.8 0.2 - 1.3 mg/dL     Albumin 3.7 3.4 - 5.0 g/dL    Protein Total 7.7 6.8 - 8.8 g/dL    Alkaline Phosphatase 88 40 - 150 U/L    ALT 30 0 - 50 U/L    AST 24 0 - 45 U/L   CBC with platelets    Collection Time: 06/25/21 11:37 AM   Result Value Ref Range    WBC 4.2 4.0 - 11.0 10e9/L    RBC Count 4.40 3.8 - 5.2 10e12/L    Hemoglobin 13.2 11.7 - 15.7 g/dL    Hematocrit 40.7 35.0 - 47.0 %    MCV 93 78 - 100 fl    MCH 30.0 26.5 - 33.0 pg    MCHC 32.4 31.5 - 36.5 g/dL    RDW 12.2 10.0 - 15.0 %    Platelet Count 189 150 - 450 10e9/L   Basic metabolic panel    Collection Time: 06/25/21 11:37 AM   Result Value Ref Range    Sodium 138 133 - 144 mmol/L    Potassium 4.9 3.4 - 5.3 mmol/L    Chloride 104 94 - 109 mmol/L    Carbon Dioxide 28 20 - 32 mmol/L    Anion Gap 6 3 - 14 mmol/L    Glucose 101 (H) 70 - 99 mg/dL    Urea Nitrogen 58 (H) 7 - 30 mg/dL    Creatinine 1.72 (H) 0.52 - 1.04 mg/dL    GFR Estimate 33 (L) >60 mL/min/[1.73_m2]    GFR Estimate If Black 38 (L) >60 mL/min/[1.73_m2]    Calcium 9.5 8.5 - 10.1 mg/dL   Lipid Profile    Collection Time: 06/25/21 11:37 AM   Result Value Ref Range    Cholesterol 294 (H) <200 mg/dL    Triglycerides 258 (H) <150 mg/dL    HDL Cholesterol 43 (L) >49 mg/dL    LDL Cholesterol Calculated 200 (H) <100 mg/dL    Non HDL Cholesterol 252 (H) <130 mg/dL   Protein  random urine with Creat Ratio    Collection Time: 06/25/21 11:40 AM   Result Value Ref Range    Protein Random Urine 0.19 g/L    Protein Total Urine g/gr Creatinine 0.21 (H) 0 - 0.2 g/g Cr   UA reflex to Microscopic (Demetra Poole; [facility])    Collection Time: 06/25/21 11:40 AM   Result Value Ref Range    Color Urine Yellow     Appearance Urine Slightly Cloudy     Glucose Urine Negative NEG^Negative mg/dL    Bilirubin Urine Negative NEG^Negative    Ketones Urine Negative NEG^Negative mg/dL    Specific Gravity Urine 1.012 1.003 - 1.035    Blood Urine Negative NEG^Negative    pH Urine 7.0 5.0 - 7.0 pH    Protein Albumin Urine Negative NEG^Negative mg/dL     Urobilinogen mg/dL 0.0 0.0 - 2.0 mg/dL    Nitrite Urine Negative NEG^Negative    Leukocyte Esterase Urine Small (A) NEG^Negative    Source Midstream Urine     RBC Urine 1 0 - 2 /HPF    WBC Urine 23 (H) 0 - 5 /HPF    Bacteria Urine Moderate (A) NEG^Negative /HPF    Squamous Epithelial /HPF Urine 6 (H) 0 - 1 /HPF   Creatinine urine calculation only    Collection Time: 06/25/21 11:40 AM   Result Value Ref Range    Creatinine Urine 90 mg/dL   Hepatic panel    Collection Time: 07/02/21 10:39 AM   Result Value Ref Range    Bilirubin Direct 0.2 0.0 - 0.2 mg/dL    Bilirubin Total 0.9 0.2 - 1.3 mg/dL    Albumin 3.6 3.4 - 5.0 g/dL    Protein Total 7.5 6.8 - 8.8 g/dL    Alkaline Phosphatase 83 40 - 150 U/L    ALT 30 0 - 50 U/L    AST 22 0 - 45 U/L   CBC with platelets    Collection Time: 07/02/21 10:39 AM   Result Value Ref Range    WBC 4.2 4.0 - 11.0 10e9/L    RBC Count 4.29 3.8 - 5.2 10e12/L    Hemoglobin 12.8 11.7 - 15.7 g/dL    Hematocrit 38.3 35.0 - 47.0 %    MCV 89 78 - 100 fl    MCH 29.8 26.5 - 33.0 pg    MCHC 33.4 31.5 - 36.5 g/dL    RDW 12.4 10.0 - 15.0 %    Platelet Count 171 150 - 450 10e9/L   Basic metabolic panel    Collection Time: 07/02/21 10:39 AM   Result Value Ref Range    Sodium 141 133 - 144 mmol/L    Potassium 5.0 3.4 - 5.3 mmol/L    Chloride 107 94 - 109 mmol/L    Carbon Dioxide 28 20 - 32 mmol/L    Anion Gap 6 3 - 14 mmol/L    Glucose 100 (H) 70 - 99 mg/dL    Urea Nitrogen 45 (H) 7 - 30 mg/dL    Creatinine 1.64 (H) 0.52 - 1.04 mg/dL    GFR Estimate 34 (L) >60 mL/min/[1.73_m2]    GFR Estimate If Black 40 (L) >60 mL/min/[1.73_m2]    Calcium 9.6 8.5 - 10.1 mg/dL      IMPRESSION:  My impression is that Ms. Navarro is more than 10 years status post liver transplantation for cirrhosis caused by chronic hepatitis C.  I will cut down on her cyclosporine dosing to 75 mg in a.m. and 50 mg in the p.m. because of her elevated creatinine and her cyclosporine level of 150, which she does not need at this point in  time.  Her liver tests are completely normal.  She does have some pyuria, and we are waiting for the results of her urine culture.  She also does have some hyperlipidemia, which is a mixed pattern, almost certainly related to her weight.  I strongly encouraged her to work on losing her weight.    Finally, we did discuss COVID-19.  I did point out that transplantations do not respond as well to the vaccine as the general population, and she should continue to mask and maintain social distancing until we determine how best to determine immunity in transplant patients and what needs to be done about it.    Thank you very much for allowing me to participate in the care of this patient.  If you have any questions regarding my recommendations, please do not hesitate to contact me.         Laron Anne MD      Professor of Medicine  [employer]      Executive Medical Director, Solid Organ Transplant Program  [facility]

## 2021-07-02 NOTE — PROGRESS NOTES
"When opening a documentation only encounter, be sure to enter in \"Chief Complaint\" Forms and in \" Comments\" Title of form, description if needed.    Flor is a 55 year old  female  Form received via: Fax  Form now resides in: Provider Ready    Ronit Schneider MA      Form has been completed by provider.     Form sent out via: Fax to [employer] at Fax Number: [phone]  Patient informed: No, Reason:n/a  Output date: July 5, 2021    Sabina Diaz MA      **Please close the encounter**                    "

## 2021-07-02 NOTE — PROGRESS NOTES
"When opening a documentation only encounter, be sure to enter in \"Chief Complaint\" Forms and in \" Comments\" Title of form, description if needed.    Flor is a 55 year old  female  Form received via: Fax  Form now resides in: Provider Ready    Sabina Diaz MA     Form has been completed by provider.     Form sent out via: Fax to [facility] at Fax Number: [phone]  Patient informed: No, Reason:n.a  Output date: July 5, 2021    Sabina Diaz MA      **Please close the encounter**                      "

## 2021-07-02 NOTE — NURSING NOTE
Chief Complaint   Patient presents with     RECHECK     follow up         BP (!) 168/82 (BP Location: Other (Comment), Patient Position: Sitting, Cuff Size: Adult Regular)   Pulse 68   Wt 101.6 kg (224 lb)   LMP  (LMP Unknown)   SpO2 95%   BMI 43.75 kg/m        Philip George, EMT

## 2021-07-02 NOTE — LETTER
7/2/2021         RE: Flor Navarro  [address]  [facility] [address]        Dear Colleague,    Thank you for referring your patient, Flor Navarro, to the [facility] [place]. Please see a copy of my visit note below.    HISTORY OF PRESENT ILLNESS:  I had the pleasure of seeing Flor Navarro for followup in the Liver Transplant Clinic at the [facility] on 07/02/2021.  Ms. Navarro returns for followup now 10 years status post liver transplantation for cirrhosis caused by chronic hepatitis C.    For the most part, she is doing well.  She denies any abdominal pain, itching or skin rash.  She does have a moderate amount of fatigue.  She denies any increased abdominal girth or lower extremity edema.  She has not had any gastrointestinal bleeding.    She denies any fevers or chills, cough or shortness of breath.  She has received the J&J vaccine for COVID-19.  She denies any nausea or vomiting.  She is having some constipation, for which she is taking a liquid prescribed by her primary physician.  Her appetite is good, and her weight is stable and too high.  Her current BMI is 44.    She is complaining of some pain in her left leg, which she has seen her primary physician for.    Current Outpatient Medications   Medication     acetaminophen (TYLENOL) 500 MG tablet     albuterol (ACCUNEB) 0.63 MG/3ML neb solution     albuterol (PROAIR HFA/PROVENTIL HFA/VENTOLIN HFA) 108 (90 Base) MCG/ACT inhaler     alendronate (FOSAMAX) 70 MG tablet     calcitRIOL (ROCALTROL) 0.25 MCG capsule     calcium carbonate 600 mg-vitamin D 400 units (CALCIUM 600 + D) 600-400 MG-UNIT per tablet     carboxymethylcellulose (REFRESH PLUS) 0.5 % SOLN ophthalmic solution     COMPOUNDED NON-CONTROLLED SUBSTANCE (CMPD RX) - PHARMACY TO MIX COMPOUNDED MEDICATION     cycloSPORINE modified (GENERIC EQUIVALENT) 25 MG capsule     Denture Care Products (EFFERDENT DENTURE CLEANSER) TBEF      dimethicone (AVEENO DAILY MOISTURIZING) 1.3 % LOTN lotion     febuxostat (ULORIC) 40 MG TABS tablet     furosemide (LASIX) 20 MG tablet     gabapentin (NEURONTIN) 100 MG capsule     gabapentin (NEURONTIN) 300 MG capsule     lidocaine (LIDODERM) 5 % patch     lidocaine (XYLOCAINE) 5 % external ointment     magnesium hydroxide (MILK OF MAGNESIA) 400 MG/5ML suspension     mineral oil-white petrolatum (EUCERIN) CREA cream     Multiple Vitamin (DAILY-SUMA MULTIVITAMIN) TABS     multivitamin w/minerals (MULTI-VITAMIN) tablet     mycophenolate (GENERIC EQUIVALENT) 250 MG capsule     neomycin-polymyxin-HC 1 %     olopatadine (PATADAY) 0.2 % ophthalmic solution     omeprazole (PRILOSEC) 20 MG DR capsule     order for DME     order for DME     order for DME     psyllium (METAMUCIL/KONSYL) 0.52 g capsule     senna-docusate (SENOKOT-S/PERICOLACE) 8.6-50 MG tablet     Vitamin D3 (CHOLECALCIFEROL) 25 mcg (1000 units) tablet     capsaicin (ZOSTRIX) 0.025 % external cream     melatonin 3 MG tablet     order for DME     STATIN NOT PRESCRIBED, INTENTIONAL,     Current Facility-Administered Medications   Medication     albuterol (PROVENTIL) neb solution 2.5 mg     apraclonidine (IOPIDINE) 1 % ophthalmic solution 1 drop     BP (!) 168/82 (BP Location: Other (Comment), Patient Position: Sitting, Cuff Size: Adult Regular)   Pulse 68   Wt 101.6 kg (224 lb)   LMP  (LMP Unknown)   SpO2 95%   BMI 43.75 kg/m      PHYSICAL EXAMINATION:  In general, she looks well.  HEENT exam shows no scleral icterus or temporal muscle wasting.  Her chest is clear.  Abdominal exam shows no increase in girth.  No masses or tenderness to palpation are present.  Her liver is 10 cm in span without left lobe enlargement.  No spleen tip is palpable.  Extremity exam shows no edema.  There is no tenderness to palpation over the leg.  Neurologic exam is nonfocal.    Recent Results (from the past 168 hour(s))   Cyclosporine    Collection Time: 06/25/21 11:36 AM    Result Value Ref Range    Cyclosporine Last Dose 6/24/2021 11.00PM     Cyclosporine Level 150 50 - 400 ug/L   Hepatic panel    Collection Time: 06/25/21 11:37 AM   Result Value Ref Range    Bilirubin Direct 0.2 0.0 - 0.2 mg/dL    Bilirubin Total 0.8 0.2 - 1.3 mg/dL    Albumin 3.7 3.4 - 5.0 g/dL    Protein Total 7.7 6.8 - 8.8 g/dL    Alkaline Phosphatase 88 40 - 150 U/L    ALT 30 0 - 50 U/L    AST 24 0 - 45 U/L   CBC with platelets    Collection Time: 06/25/21 11:37 AM   Result Value Ref Range    WBC 4.2 4.0 - 11.0 10e9/L    RBC Count 4.40 3.8 - 5.2 10e12/L    Hemoglobin 13.2 11.7 - 15.7 g/dL    Hematocrit 40.7 35.0 - 47.0 %    MCV 93 78 - 100 fl    MCH 30.0 26.5 - 33.0 pg    MCHC 32.4 31.5 - 36.5 g/dL    RDW 12.2 10.0 - 15.0 %    Platelet Count 189 150 - 450 10e9/L   Basic metabolic panel    Collection Time: 06/25/21 11:37 AM   Result Value Ref Range    Sodium 138 133 - 144 mmol/L    Potassium 4.9 3.4 - 5.3 mmol/L    Chloride 104 94 - 109 mmol/L    Carbon Dioxide 28 20 - 32 mmol/L    Anion Gap 6 3 - 14 mmol/L    Glucose 101 (H) 70 - 99 mg/dL    Urea Nitrogen 58 (H) 7 - 30 mg/dL    Creatinine 1.72 (H) 0.52 - 1.04 mg/dL    GFR Estimate 33 (L) >60 mL/min/[1.73_m2]    GFR Estimate If Black 38 (L) >60 mL/min/[1.73_m2]    Calcium 9.5 8.5 - 10.1 mg/dL   Lipid Profile    Collection Time: 06/25/21 11:37 AM   Result Value Ref Range    Cholesterol 294 (H) <200 mg/dL    Triglycerides 258 (H) <150 mg/dL    HDL Cholesterol 43 (L) >49 mg/dL    LDL Cholesterol Calculated 200 (H) <100 mg/dL    Non HDL Cholesterol 252 (H) <130 mg/dL   Protein  random urine with Creat Ratio    Collection Time: 06/25/21 11:40 AM   Result Value Ref Range    Protein Random Urine 0.19 g/L    Protein Total Urine g/gr Creatinine 0.21 (H) 0 - 0.2 g/g Cr   UA reflex to Microscopic (Demetra Poole; [facility])    Collection Time: 06/25/21 11:40 AM   Result Value Ref Range    Color Urine Yellow     Appearance Urine Slightly Cloudy     Glucose Urine  Negative NEG^Negative mg/dL    Bilirubin Urine Negative NEG^Negative    Ketones Urine Negative NEG^Negative mg/dL    Specific Gravity Urine 1.012 1.003 - 1.035    Blood Urine Negative NEG^Negative    pH Urine 7.0 5.0 - 7.0 pH    Protein Albumin Urine Negative NEG^Negative mg/dL    Urobilinogen mg/dL 0.0 0.0 - 2.0 mg/dL    Nitrite Urine Negative NEG^Negative    Leukocyte Esterase Urine Small (A) NEG^Negative    Source Midstream Urine     RBC Urine 1 0 - 2 /HPF    WBC Urine 23 (H) 0 - 5 /HPF    Bacteria Urine Moderate (A) NEG^Negative /HPF    Squamous Epithelial /HPF Urine 6 (H) 0 - 1 /HPF   Creatinine urine calculation only    Collection Time: 06/25/21 11:40 AM   Result Value Ref Range    Creatinine Urine 90 mg/dL   Hepatic panel    Collection Time: 07/02/21 10:39 AM   Result Value Ref Range    Bilirubin Direct 0.2 0.0 - 0.2 mg/dL    Bilirubin Total 0.9 0.2 - 1.3 mg/dL    Albumin 3.6 3.4 - 5.0 g/dL    Protein Total 7.5 6.8 - 8.8 g/dL    Alkaline Phosphatase 83 40 - 150 U/L    ALT 30 0 - 50 U/L    AST 22 0 - 45 U/L   CBC with platelets    Collection Time: 07/02/21 10:39 AM   Result Value Ref Range    WBC 4.2 4.0 - 11.0 10e9/L    RBC Count 4.29 3.8 - 5.2 10e12/L    Hemoglobin 12.8 11.7 - 15.7 g/dL    Hematocrit 38.3 35.0 - 47.0 %    MCV 89 78 - 100 fl    MCH 29.8 26.5 - 33.0 pg    MCHC 33.4 31.5 - 36.5 g/dL    RDW 12.4 10.0 - 15.0 %    Platelet Count 171 150 - 450 10e9/L   Basic metabolic panel    Collection Time: 07/02/21 10:39 AM   Result Value Ref Range    Sodium 141 133 - 144 mmol/L    Potassium 5.0 3.4 - 5.3 mmol/L    Chloride 107 94 - 109 mmol/L    Carbon Dioxide 28 20 - 32 mmol/L    Anion Gap 6 3 - 14 mmol/L    Glucose 100 (H) 70 - 99 mg/dL    Urea Nitrogen 45 (H) 7 - 30 mg/dL    Creatinine 1.64 (H) 0.52 - 1.04 mg/dL    GFR Estimate 34 (L) >60 mL/min/[1.73_m2]    GFR Estimate If Black 40 (L) >60 mL/min/[1.73_m2]    Calcium 9.6 8.5 - 10.1 mg/dL      IMPRESSION:  My impression is that Ms. Navarro is more than 10  years status post liver transplantation for cirrhosis caused by chronic hepatitis C.  I will cut down on her cyclosporine dosing to 75 mg in a.m. and 50 mg in the p.m. because of her elevated creatinine and her cyclosporine level of 150, which she does not need at this point in time.  Her liver tests are completely normal.  She does have some pyuria, and we are waiting for the results of her urine culture.  She also does have some hyperlipidemia, which is a mixed pattern, almost certainly related to her weight.  I strongly encouraged her to work on losing her weight.    Finally, we did discuss COVID-19.  I did point out that transplantations do not respond as well to the vaccine as the general population, and she should continue to mask and maintain social distancing until we determine how best to determine immunity in transplant patients and what needs to be done about it.    Thank you very much for allowing me to participate in the care of this patient.  If you have any questions regarding my recommendations, please do not hesitate to contact me.         Laron Anne MD      Professor of Medicine  [employer]      Executive Medical Director, Solid Organ Transplant Program  [employer]

## 2021-07-05 LAB
BACTERIA SPEC CULT: ABNORMAL
BACTERIA SPEC CULT: ABNORMAL
Lab: ABNORMAL
SPECIMEN SOURCE: ABNORMAL

## 2021-07-06 ENCOUNTER — DOCUMENTATION ONLY (OUTPATIENT)
Dept: FAMILY MEDICINE | Facility: CLINIC | Age: 56
End: 2021-07-06

## 2021-07-06 NOTE — PROGRESS NOTES
Form has been completed by provider.     Form sent out via: Fax to [facility] ([facility] at Fax Number: [phone]    Faxed medication list as requested from [facility] ([place])    Output date: July 6, 2021    Shahla Shepherd      **Please close the encounter**

## 2021-07-06 NOTE — PROGRESS NOTES
Assessment & Plan     Stress incontinence  Asking CC to get involved - this is 3rd time filling out paper work or electronic orders for diapers   Also needs liners  Longstanding - reviewed notes from Dr. Collins 2013.   - Incontinence Supplies Order  Needs diaper and pad - have been ordered before but pt has not received   Asking SW to address     Encounter for screening mammogram for breast cancer  - Screening Mammogram Digital Bilateral    Screening for malignant neoplasm of cervix  Not yet due   Educated on timing and need for cotesting   Due 2022 for cotesting.     Pain in joint of right shoulder  - acetaminophen (TYLENOL) 500 MG tablet  Dispense: 120 tablet; Refill: 3    Pain of left lower leg  - capsaicin (ZOSTRIX) 0.025 % external cream  Dispense: 237 g; Refill: 3    Allergic conjunctivitis, bilateral  - olopatadine (PATADAY) 0.2 % ophthalmic solution  Dispense: 2.5 mL; Refill: 1    Dry eyes, bilateral  - carboxymethylcellulose PF (REFRESH PLUS) 0.5 % ophthalmic solution  Dispense: 30 each; Refill: 4  Needs to separate from allergy drops by 2 hrs     Depression, major, recurrent, in complete remission (H)  Doing much better after spousal separation.     45 minutes spent on the date of the encounter doing chart review, patient visit and documentation        No follow-ups on file.    The information in this document, created by the medical scribe for me, accurately reflects the services I personally performed and the decisions made by me. I have reviewed and approved this document for accuracy prior to leaving the patient care area.  Karely Sierra MD  11:01 AM, 07/08/21    [facility] AMI Ray is a 55 year old who presents for the following health issues     HPI   She has family on her cell phone by video       Mood   Feels well and getting better living on her own after spousal separation 3 years ago. More support from son and family.   PHQ 1/9/2020 4/8/2020 12/18/2020   PHQ-9  "Total Score 10 5 -   Q9: Thoughts of better off dead/self-harm past 2 weeks Not at all Not at all Not at all   PHQ-A Total Score 9 - -   PHQ-A Mood affect on daily activities Somewhat difficult - -   PHQ-A Suicide Ideation past 2 weeks Several days - -     Wants to get a mammogram- [facility] coordinator reminding her     Wondering about if she needs a pap. No sexual activity in 3years, since she  from her spouse. Had HPV neg test in 2014 and NIL in 2017. Due 2022 for cotesting.     Eyes   FB sensation, no decr in vision, no redness/drainage   Has drops for allergy and have improved in wateriness and itching. Used to have AT's but no longer.     Traveling to [place] - needs tylenol and capsaicin for pain. Works for her. Lidocaine patches not covered.     Incontinence   Comes on with stress/cough/laugh. Reviewed Dr. Collins's notes. Had normal cysto. Needs pads!        Objective    /85   Pulse 67   Temp 98.1  F (36.7  C) (Oral)   Resp 14   Ht 1.54 m (5' 0.63\")   Wt 103 kg (227 lb)   LMP  (LMP Unknown)   SpO2 98%   BMI 43.42 kg/m    Body mass index is 43.42 kg/m .   Vitals were reviewed and were normal.   Physical Exam   GENERAL: healthy, alert and no distress, overweight  EYES: Eyes grossly normal to inspection, PERRL and conjunctivae and sclerae normal. Skin tage upper lid on the left, middle, no irritation. Some conjunctival injection.  RESP: lungs clear to auscultation - no rales, rhonchi or wheezes  CV: regular rate and rhythm, normal S1 S2, no S3 or S4, no murmur, click or rub,wearing compression stockings   PSYCH: mentation appears normal, affect normal/bright            DME (Durable Medical Equipment) Orders and Documentation  Orders Placed This Encounter   Procedures     Incontinence Supplies Order      The patient was assessed and it was determined the patient is in need of the following listed DME Supplies/Equipment. Please complete supporting documentation below to demonstrate medical " necessity.      Incontinence Supplies Documentation  Incontinence supplies are needed due to maintaining hygiene .

## 2021-07-08 ENCOUNTER — OFFICE VISIT (OUTPATIENT)
Dept: FAMILY MEDICINE | Facility: CLINIC | Age: 56
End: 2021-07-08
Payer: MEDICARE

## 2021-07-08 VITALS
WEIGHT: 227 LBS | OXYGEN SATURATION: 98 % | HEART RATE: 67 BPM | RESPIRATION RATE: 14 BRPM | SYSTOLIC BLOOD PRESSURE: 126 MMHG | TEMPERATURE: 98.1 F | HEIGHT: 61 IN | BODY MASS INDEX: 42.86 KG/M2 | DIASTOLIC BLOOD PRESSURE: 85 MMHG

## 2021-07-08 DIAGNOSIS — H04.123 DRY EYES, BILATERAL: ICD-10-CM

## 2021-07-08 DIAGNOSIS — N39.3 STRESS INCONTINENCE: Primary | ICD-10-CM

## 2021-07-08 DIAGNOSIS — M25.511 PAIN IN JOINT OF RIGHT SHOULDER: ICD-10-CM

## 2021-07-08 DIAGNOSIS — M79.662 PAIN OF LEFT LOWER LEG: ICD-10-CM

## 2021-07-08 DIAGNOSIS — Z12.31 ENCOUNTER FOR SCREENING MAMMOGRAM FOR BREAST CANCER: ICD-10-CM

## 2021-07-08 DIAGNOSIS — H10.13 ALLERGIC CONJUNCTIVITIS, BILATERAL: ICD-10-CM

## 2021-07-08 DIAGNOSIS — F33.42 DEPRESSION, MAJOR, RECURRENT, IN COMPLETE REMISSION (H): ICD-10-CM

## 2021-07-08 DIAGNOSIS — Z12.4 SCREENING FOR MALIGNANT NEOPLASM OF CERVIX: ICD-10-CM

## 2021-07-08 PROBLEM — M76.72 PERONEAL TENDONITIS, LEFT: Status: RESOLVED | Noted: 2017-08-08 | Resolved: 2021-07-08

## 2021-07-08 PROCEDURE — 99215 OFFICE O/P EST HI 40 MIN: CPT | Performed by: FAMILY MEDICINE

## 2021-07-08 RX ORDER — ACETAMINOPHEN 500 MG
500 TABLET ORAL 3 TIMES DAILY PRN
Qty: 120 TABLET | Refills: 3 | Status: SHIPPED | OUTPATIENT
Start: 2021-07-08 | End: 2022-03-23

## 2021-07-08 RX ORDER — CARBOXYMETHYLCELLULOSE SODIUM 5 MG/ML
1 SOLUTION/ DROPS OPHTHALMIC 4 TIMES DAILY PRN
Qty: 30 EACH | Refills: 4 | Status: SHIPPED | OUTPATIENT
Start: 2021-07-08 | End: 2022-01-03

## 2021-07-08 RX ORDER — CAPSAICIN 0.025 %
CREAM (GRAM) TOPICAL
Qty: 237 G | Refills: 3 | Status: SHIPPED | OUTPATIENT
Start: 2021-07-08 | End: 2022-04-22

## 2021-07-08 RX ORDER — OLOPATADINE HYDROCHLORIDE 2 MG/ML
1 SOLUTION/ DROPS OPHTHALMIC DAILY
Qty: 2.5 ML | Refills: 1 | Status: SHIPPED | OUTPATIENT
Start: 2021-07-08 | End: 2022-03-23

## 2021-07-08 ASSESSMENT — MIFFLIN-ST. JEOR: SCORE: 1551.17

## 2021-07-08 NOTE — PATIENT INSTRUCTIONS
Ordered your diapers and linerse   Get your mammogram done   Follow up with me after your trip to [place]   Artificial tears for dry eyes - 2 hours  from your patadine or itchy eye drops for allergy   Refill sent of spicy cream for your pain - larger bottle for your trip     Faxed order for disposable underwear faxed to [facility] "[employer] [place] [phone]. [place] to reach out to patient directly.

## 2021-07-08 NOTE — Clinical Note
"Brigid: Please finish  attestation and you have to also click \"sign on close\" and not pend on close. Then route note to Dr. Han (I'll be out of town and can't close the encounter)  Chase: when Brigid done, please close encounter.   "

## 2021-07-08 NOTE — NURSING NOTE
Due to patient being non-English speaking/uses sign language, an  was used for this visit. Only for face-to-face interpretation by an external agency, date and length of interpretation can be found on the scanned worksheet.     name: oscar   Agency: [employer]  Language: Spanish   Telephone number:   Type of interpretation: Telephone, spoken

## 2021-07-12 DIAGNOSIS — N39.0 URINARY TRACT INFECTION: Primary | ICD-10-CM

## 2021-07-12 RX ORDER — NITROFURANTOIN MACROCRYSTAL 100 MG
100 CAPSULE ORAL DAILY
Qty: 14 CAPSULE | Refills: 0 | Status: SHIPPED | OUTPATIENT
Start: 2021-07-12 | End: 2021-08-09

## 2021-07-12 NOTE — TELEPHONE ENCOUNTER
Instructed Courtney the need for pt to start an ABX for a UTI and to recheck a urine after the ABX is completed. Courtney able to repeat instructions. Script sent to [facility].

## 2021-07-13 ENCOUNTER — DOCUMENTATION ONLY (OUTPATIENT)
Dept: FAMILY MEDICINE | Facility: CLINIC | Age: 56
End: 2021-07-13

## 2021-07-15 ENCOUNTER — ANCILLARY PROCEDURE (OUTPATIENT)
Dept: MAMMOGRAPHY | Facility: CLINIC | Age: 56
End: 2021-07-15
Attending: FAMILY MEDICINE
Payer: MEDICARE

## 2021-07-15 DIAGNOSIS — Z12.31 ENCOUNTER FOR SCREENING MAMMOGRAM FOR BREAST CANCER: ICD-10-CM

## 2021-07-15 PROCEDURE — 77067 SCR MAMMO BI INCL CAD: CPT | Mod: GC | Performed by: STUDENT IN AN ORGANIZED HEALTH CARE EDUCATION/TRAINING PROGRAM

## 2021-07-26 ENCOUNTER — MEDICAL CORRESPONDENCE (OUTPATIENT)
Dept: HEALTH INFORMATION MANAGEMENT | Facility: CLINIC | Age: 56
End: 2021-07-26

## 2021-07-28 ENCOUNTER — DOCUMENTATION ONLY (OUTPATIENT)
Dept: FAMILY MEDICINE | Facility: CLINIC | Age: 56
End: 2021-07-28

## 2021-07-28 DIAGNOSIS — Z94.4 LIVER REPLACED BY TRANSPLANT (H): ICD-10-CM

## 2021-07-28 DIAGNOSIS — M1A.3710 CHRONIC GOUT DUE TO RENAL IMPAIRMENT INVOLVING TOE OF RIGHT FOOT WITHOUT TOPHUS: ICD-10-CM

## 2021-07-28 DIAGNOSIS — Z94.4 LIVER TRANSPLANTED (H): ICD-10-CM

## 2021-07-28 RX ORDER — MYCOPHENOLATE MOFETIL 250 MG/1
CAPSULE ORAL
Qty: 120 CAPSULE | Refills: 11 | Status: SHIPPED | OUTPATIENT
Start: 2021-07-28 | End: 2022-08-24

## 2021-07-28 RX ORDER — FEBUXOSTAT 40 MG/1
40 TABLET, FILM COATED ORAL DAILY
Qty: 30 TABLET | Refills: 3 | Status: SHIPPED | OUTPATIENT
Start: 2021-07-28 | End: 2022-01-28

## 2021-07-28 NOTE — TELEPHONE ENCOUNTER

## 2021-07-29 ENCOUNTER — TELEPHONE (OUTPATIENT)
Dept: FAMILY MEDICINE | Facility: CLINIC | Age: 56
End: 2021-07-29

## 2021-07-29 NOTE — TELEPHONE ENCOUNTER
Verify that the refill encounter hasn't been started Yes    [facility] phone call message- patient requesting a refill:    Full Medication Name: alendronate (FOSAMAX) 70 MG tablet    Dose: Route: Take 1 tablet (70 mg) by mouth every 7 days - Oral     Pharmacy confirmed as     [facility] - [place], [place] - [address]   [address] [facility] [address]  Phone: [phone] Fax: [phone]    : Yes    Medication tab checked to see if medication has been sent  Yes    Additional Comments: Patient out of medication     OK to leave a message on voice mail? Yes    Advised patient refill may take up to 2 business days? Yes    Primary language: Setswana      needed? Yes    Call taken on July 29, 2021 at 4:43 PM by April Karol    Route to [facility] MED REFILL

## 2021-07-29 NOTE — TELEPHONE ENCOUNTER
[facility] phone call message - order or referral request from patient:     Order or referral being requested: Requested order Skilled Nursing 1 a wk for 3 wks, Every other wk for 6 wks for medication set up .    Additional Details:     Referral only -Specialty  Location     OK to leave a message on voice mail? Yes    Primary language: Chinese      needed? Yes    Call taken on July 29, 2021 at 4:38 PM by April Bivens    Order request route to [facility] TRIAGE   Referrals Route to [facility] ([facility]/[place]/[facility]) CARE COORDINATOR

## 2021-07-29 NOTE — TELEPHONE ENCOUNTER
Returned call to home care nurse to give verbal orders per protocol as requested. Nurse verbalized understanding.    Nato Tijerina RN

## 2021-07-30 NOTE — TELEPHONE ENCOUNTER
RN spoke to technician at  mail order pharmacy who confirmed patient has refills left on her alendronate. Technician states she will contact patient to process the order today.     RN also reached out to Michelle SANCHEZ RN and left detailed message that patient has refills on file of this medication and they just need to request delivery from pharmacy directly. Provided call back number for questions.  Breanna Cook RN

## 2021-08-02 ENCOUNTER — DOCUMENTATION ONLY (OUTPATIENT)
Dept: FAMILY MEDICINE | Facility: CLINIC | Age: 56
End: 2021-08-02

## 2021-08-02 NOTE — PROGRESS NOTES
"When opening a documentation only encounter, be sure to enter in \"Chief Complaint\" Forms and in \" Comments\" Title of form, description if needed.    Flor is a 55 year old  female  Form received via: Fax  Form now resides in: Provider Ready    Sabina Diaz MA     Form has been completed by provider.     Form sent out via: Fax to [employer] at Fax Number: [phone]  Patient informed: No, Reason:n/a  Output date: August 6, 2021    Sabina Diaz MA      **Please close the encounter**                      "

## 2021-08-02 NOTE — TELEPHONE ENCOUNTER
Returned call to home care nurse, unable to reach. Left VM with verbal orders per protocol as requested. Left callback number for questions.    RN also requested call back from HC RN to discuss symptoms reported.     RN also attempted to reach patient to assess symptoms-LM via  [phone] to contact clinic. Please transfer to any available RN if patient or HC RN calls back.     Will route to PCP to place order for BP monitor as requested.     Breanna Cook, RN         [place]

## 2021-08-05 DIAGNOSIS — H60.552 ACUTE REACTIVE OTITIS EXTERNA OF LEFT EAR: ICD-10-CM

## 2021-08-05 RX ORDER — NEOMYCIN SULFATE, POLYMYXIN B SULFATE, HYDROCORTISONE 3.5; 10000; 1 MG/ML; [USP'U]/ML; MG/ML
4 SOLUTION/ DROPS AURICULAR (OTIC) 2 TIMES DAILY
Qty: 10 ML | Refills: 0 | Status: CANCELLED | OUTPATIENT
Start: 2021-08-05

## 2021-08-09 ENCOUNTER — OFFICE VISIT (OUTPATIENT)
Dept: FAMILY MEDICINE | Facility: CLINIC | Age: 56
End: 2021-08-09
Payer: MEDICARE

## 2021-08-09 ENCOUNTER — ANCILLARY PROCEDURE (OUTPATIENT)
Dept: GENERAL RADIOLOGY | Facility: CLINIC | Age: 56
End: 2021-08-09
Attending: FAMILY MEDICINE
Payer: MEDICARE

## 2021-08-09 VITALS
OXYGEN SATURATION: 93 % | DIASTOLIC BLOOD PRESSURE: 86 MMHG | HEART RATE: 89 BPM | RESPIRATION RATE: 16 BRPM | WEIGHT: 225 LBS | TEMPERATURE: 98.8 F | BODY MASS INDEX: 43.03 KG/M2 | SYSTOLIC BLOOD PRESSURE: 128 MMHG

## 2021-08-09 DIAGNOSIS — N30.00 ACUTE CYSTITIS WITHOUT HEMATURIA: ICD-10-CM

## 2021-08-09 DIAGNOSIS — M54.6 ACUTE LEFT-SIDED THORACIC BACK PAIN: ICD-10-CM

## 2021-08-09 DIAGNOSIS — H60.552 ACUTE REACTIVE OTITIS EXTERNA OF LEFT EAR: ICD-10-CM

## 2021-08-09 DIAGNOSIS — G63 NEUROPATHY DUE TO MEDICAL CONDITION (H): ICD-10-CM

## 2021-08-09 DIAGNOSIS — N18.32 STAGE 3B CHRONIC KIDNEY DISEASE (H): Primary | ICD-10-CM

## 2021-08-09 DIAGNOSIS — K59.04 CHRONIC IDIOPATHIC CONSTIPATION: ICD-10-CM

## 2021-08-09 DIAGNOSIS — G44.209 TENSION HEADACHE: ICD-10-CM

## 2021-08-09 DIAGNOSIS — M19.072 PRIMARY OSTEOARTHRITIS OF LEFT ANKLE: ICD-10-CM

## 2021-08-09 DIAGNOSIS — R30.0 DYSURIA: ICD-10-CM
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
## 2021-08-09 LAB
ALBUMIN UR-MCNC: NEGATIVE MG/DL
APPEARANCE UR: ABNORMAL
BACTERIA #/AREA URNS HPF: ABNORMAL /HPF
BILIRUB UR QL STRIP: NEGATIVE
COLOR UR AUTO: ABNORMAL
GLUCOSE UR STRIP-MCNC: NEGATIVE MG/DL
HGB UR QL STRIP: ABNORMAL
KETONES UR STRIP-MCNC: NEGATIVE MG/DL
LEUKOCYTE ESTERASE UR QL STRIP: ABNORMAL
NITRATE UR QL: POSITIVE
PH UR STRIP: 5.5 [PH] (ref 5–8)
RBC #/AREA URNS AUTO: ABNORMAL /HPF
SP GR UR STRIP: 1.02 (ref 1–1.03)
SQUAMOUS #/AREA URNS AUTO: ABNORMAL /LPF
UROBILINOGEN UR STRIP-ACNC: 0.2 E.U./DL
WBC #/AREA URNS AUTO: ABNORMAL /HPF

## 2021-08-09 PROCEDURE — 81001 URINALYSIS AUTO W/SCOPE: CPT | Performed by: FAMILY MEDICINE

## 2021-08-09 PROCEDURE — 73610 X-RAY EXAM OF ANKLE: CPT | Mod: LT | Performed by: RADIOLOGY

## 2021-08-09 PROCEDURE — 99215 OFFICE O/P EST HI 40 MIN: CPT | Performed by: FAMILY MEDICINE

## 2021-08-09 PROCEDURE — 87186 SC STD MICRODIL/AGAR DIL: CPT | Performed by: FAMILY MEDICINE

## 2021-08-09 PROCEDURE — 87086 URINE CULTURE/COLONY COUNT: CPT | Performed by: FAMILY MEDICINE

## 2021-08-09 RX ORDER — NITROFURANTOIN MACROCRYSTAL 100 MG
100 CAPSULE ORAL 2 TIMES DAILY
Qty: 14 CAPSULE | Refills: 0 | Status: SHIPPED | OUTPATIENT
Start: 2021-08-09 | End: 2021-08-16

## 2021-08-09 RX ORDER — NEOMYCIN SULFATE, POLYMYXIN B SULFATE, HYDROCORTISONE 3.5; 10000; 1 MG/ML; [USP'U]/ML; MG/ML
4 SOLUTION/ DROPS AURICULAR (OTIC) 2 TIMES DAILY
Qty: 10 ML | Refills: 0 | Status: SHIPPED | OUTPATIENT
Start: 2021-08-09 | End: 2023-03-31

## 2021-08-09 RX ORDER — GABAPENTIN 300 MG/1
300 CAPSULE ORAL AT BEDTIME
Qty: 180 CAPSULE | Refills: 3 | Status: SHIPPED | OUTPATIENT
Start: 2021-08-09 | End: 2022-08-31

## 2021-08-09 NOTE — Clinical Note
Pls help pass info along to pharmacy - she is traveling to [place] on Friday so needs meds before than and needs a travel supply of 3 months. Please help! Her son Courtney can pick things up if needed, but he isn't off work until 5pm.  c

## 2021-08-09 NOTE — Clinical Note
Pt wants abdominal binder - put DME order in for it, but she leaves town on Friday and needs it soon. Please help!

## 2021-08-09 NOTE — PROGRESS NOTES
DME (Durable Medical Equipment) Orders and Documentation  Orders Placed This Encounter   Procedures     Miscellaneous DME Order      The patient was assessed and it was determined the patient is in need of the following listed DME Supplies/Equipment. Please complete supporting documentation below to demonstrate medical necessity.      DME All Other Item(s) Documentation    List reason for need and supporting documentation for medical necessity below for each DME item.     1. To manage pain/spasm while traveling due to acute injury

## 2021-08-09 NOTE — TELEPHONE ENCOUNTER
Patient pharmacy requesting refill of neomycin-polymyxin HC 1% otic solution. Patient here for appointment today. Will notify provider she needs refill.   Breanna Cook RN

## 2021-08-09 NOTE — PROGRESS NOTES
Assessment & Plan       Stage 3b chronic kidney disease  - kidney disease appears to have progressed   - suspected spurious results June 21 but has not returned to prior baseline  - kreatinine of 1.2 now 1.6 with GFR 34  - Last saw Dr. Wells 2014 referred last year but didn't attend re referred today   - PTH within goal in January at 110   - Needs updated labs but given acute concern today we are unable to do her blood draw    Tension headache  Suspect due to tooth pain   tylenol okay     Acute left-sided thoracic back pain  - Miscellaneous DME Order  - Tender to touch over left CVA   - recently had UTI for which she was treated with nitrofurantoin to which it was susceptible  - reports ongoing pain with urination   - UA with reflux today given that she will be travelling soon     Fall, initial encounter  Severe ostearthritis left ankle  - Increase gabapentin since she is maxed on post transplant tylenol  Consider pt referral for mobility aids after return from travel  - X-ray lt ankle G/E 3 views*  - xrays per [place] ankle rules for chronic constipation message pharmacy that she likes monthly fills   - Reviewed by me notable soft tissue swelling no obvious fracture but AP view is sub optimal await radiology interpretation    - Has had repeated falls that were never able to fully evaluate because of multiple acute concerns.   - Recommend follow-up with me after her travel to access polypharmacy and risks   - she's used mobility aids in the past and this may need to happen again    Chronic idiopathic constipation  - magnesium hydroxide (MILK OF MAGNESIA) 400 MG/5ML suspension  Dispense: 769 mL; Refill: 3    Dysuria  - nitrite positive treating again with nitrofurantoin  x7 days   - Consider topical estrogen given rapid reoccurrence - needs follow up     Acute reactive otitis externa of left ear  - neomycin-polymyxin-HC 1 %  Dispense: 10 mL; Refill: 0  - UA reflex to Microscopic and Culture  - Given refill for  travel due to risk of reoccurrence     Pt with many complaints and history is hard to narrow.   60 minutes spent on the date of the encounter doing chart review, history and exam, documentation and further activities per the note      No follow-ups on file.    The information in this document, created by the medical scribe for me, accurately reflects the services I personally performed and the decisions made by me. I have reviewed and approved this document for accuracy prior to leaving the patient care area.  Karely Sierra MD  11:03 AM, 08/09/21  [facility] AMI Ray is a 55 year old who presents for the following health issues  accompanied by her :    HPI   MSK  Patient reports intermittent pain from axial area all the way down to her left SI. Patient has been managing the pain with tylenol. She also feels lots of intermittent pain in her left foot up to her knee with a previous episode of bruising around her ankle. She describes the pain as extreme heat in her bones but doesn't feel the same as her gout pain. She states the pain will sometimes have a shocking sensation. Patient reports a recent fall but states she fell on the opposite side after getting dizzy and lightly hit her head. Patient reports headaches but denies any pounding. Patient denies ever having trouble making up her sentences. Patient denies any pain with breathing.     Patient reports pain in the right side of her face. Patient believes this may be due to chipping her tooth after eating something solid. Patient reports pain with brushing teeth and sensitivity to hot and cold.    ENT  Patient reports both her eyes tend to tear which causes pain in her eye with no changes in her vision.     GI  Patient reports a return of pain in her urine. Patient denies any burning. Dr. Larry who is her liver GI follow-up thought she was doing well    This document serves as a record of the services and decisions  personally performed and made by Karely Sierra MD. It was created on his/her behalf by Ines Ley, a trained medical scribe. The creation of this document is based the provider's statements to the medical scribe.  Nelson Ley 11:03 AM, August 9, 2021        Objective    /86   Pulse 89   Temp 98.8  F (37.1  C) (Oral)   Resp 16   Wt 102.1 kg (225 lb)   LMP  (LMP Unknown)   SpO2 93%   BMI 43.03 kg/m    Body mass index is 43.03 kg/m .   Vitals were reviewed and were normal.   Physical Exam   GENERAL: healthy, alert and no distress  EYES: Eyes grossly normal to inspection, PERRL and conjunctivae and sclerae normal  HENT: moist mucus membranes evidence of multiple tooth extractions right upper tooth chip unable to be visualized  NECK: no adenopathy, no asymmetry, masses, or scars and thyroid normal to palpation   NEURO: Symmetric face, normal palatal elevation, midline tongue, no dysarthria   RESP: lungs clear to auscultation - no rales, rhonchi or wheezes  BREAST: normal without masses, tenderness or nipple discharge and no palpable axillary masses or adenopathy  CV: regular rate and rhythm, normal S1 S2, no S3 or S4, no murmur, click or rub, no peripheral edema and peripheral pulses strong  ABDOMEN: soft, nontender, no hepatosplenomegaly, no masses and bowel sounds normal  MS: trace edema, wearing compression stockings, tenderness to palpation, plantar flexion causes pain throughout the ankle, swelling of the lateral ankle, point tenderness over the navicular, nontender over the base of the fifth mediotarsal, nontener medial malleolus, tender posterior aspect of the lateral malleolus with soft tissue, swelling inferior, pain with dorsiflexion   SKIN: no suspicious lesions or rashes  NEURO: Normal strength and tone, mentation intact and speech normal  PSYCH: mentation appears normal, affect normal/bright

## 2021-08-09 NOTE — NURSING NOTE
Due to patient being non-English speaking/uses sign language, an  was used for this visit. Only for face-to-face interpretation by an external agency, date and length of interpretation can be found on the scanned worksheet.     name: Amna Coello  Agency: [employer]  Language: Japanese   Telephone number: [phone]  Type of interpretation: Telephone, spoken

## 2021-08-09 NOTE — PATIENT INSTRUCTIONS
"The pharmacist is calling your pharmacy to arrange medication delivery for travel   You can use up to 4 pills of tylenol per day - taking 2 at a time for pain   Increase your Gabapentin to 2 times per day.  You can use your \"brace\" for pain while on the airplane  I ordered more of the milk of magnesia liquid for your constipation    Your kidneys need to be seen by a specialist -I sent a referral for you   Please see me as soon as you return to evaluate these falls you are having.     I will prescribe you with a medication for your UTI which you should take twice daily. You should start the medication now and continue taking it until the pills are done.    "

## 2021-08-10 ENCOUNTER — DOCUMENTATION ONLY (OUTPATIENT)
Dept: FAMILY MEDICINE | Facility: CLINIC | Age: 56
End: 2021-08-10

## 2021-08-10 NOTE — PROGRESS NOTES
"When opening a documentation only encounter, be sure to enter in \"Chief Complaint\" Forms and in \" Comments\" Title of form, description if needed.    Flor is a 55 year old  female  Form received via: Fax  Form now resides in: Provider Ready    Sabina Diaz MA     Unable to locate form in providers forms folder or  completed forms folder.Per Protocol encounter will be signed and addressed.    Sabina Diaz MA                    "

## 2021-08-11 NOTE — RESULT ENCOUNTER NOTE
8/11/2021  Please call Flor to inform of results. Has another urine infection - must take antibiotics and start before she goes to [place].     Karely Sierra MD  [facility]

## 2021-08-12 ENCOUNTER — TELEPHONE (OUTPATIENT)
Dept: PHARMACY | Facility: CLINIC | Age: 56
End: 2021-08-12

## 2021-08-12 LAB — BACTERIA UR CULT: ABNORMAL

## 2021-08-12 NOTE — TELEPHONE ENCOUNTER
"PHARMACY TELEPHONE ENCOUNTER:    Reason: medication refill follow up      Flor reported travel for 8/13/21 earlier this week. She requested help in obtaining refills for the time of her travel.  She had recently picked up many of her medications from the  mailorder pharmacy and these were \"refill too soon\".  I have called to follow up on the refill request.  The mail order pharmacy has explained to me and to the patient and family member that the medication were just recently filled and can not be filled before her travel.      Patient understands that medications will be refilled and will be sent with her family member abroad.  Patient understands that only 1 month will be filled at a time.  The reason for the 1 month fill is strictly related to Medicare part B.  This is the insurance coverage select pays for the transplant medication.  The pharmacist explains that this is a strict rules that Medicare part B will only pay for a 1 month supply.  Pharmacist recommendation is that the family work with a international delivery agency to send the remaining 2 months of therapy.  I have explained this to the patient and she understands.  I will follow-up with her son Carleen Durant in September to see how their arrangements are coming for the next refill.    I have also inquired to the cost of the medication if the patient were to pay out-of-pocket.  The pharmacist states that the medications would be hundreds of dollars and delivery would be a preferred option for most patients.    Bert Benites, Pharm.D.    "

## 2021-08-24 DIAGNOSIS — Z94.4 LIVER REPLACED BY TRANSPLANT (H): ICD-10-CM

## 2021-08-24 RX ORDER — CYCLOSPORINE 25 MG/1
CAPSULE, LIQUID FILLED ORAL
Qty: 450 CAPSULE | Refills: 3 | Status: SHIPPED | OUTPATIENT
Start: 2021-08-24 | End: 2022-03-14

## 2021-09-07 ENCOUNTER — DOCUMENTATION ONLY (OUTPATIENT)
Dept: FAMILY MEDICINE | Facility: CLINIC | Age: 56
End: 2021-09-07
Payer: MEDICARE

## 2021-09-15 ENCOUNTER — DOCUMENTATION ONLY (OUTPATIENT)
Dept: FAMILY MEDICINE | Facility: CLINIC | Age: 56
End: 2021-09-15

## 2021-09-15 NOTE — PROGRESS NOTES
"When opening a documentation only encounter, be sure to enter in \"Chief Complaint\" Forms and in \" Comments\" Title of form, description if needed.    Flor is a 55 year old  female  Form received via: Fax  Form now resides in: Provider Ready    Sabina Diaz MA       Unable to locate form in providers folder or  forms completed bin.Per protocol encounter will be signed and addressed.    Sabina Diaz MA            "

## 2021-09-20 ENCOUNTER — TELEPHONE (OUTPATIENT)
Dept: FAMILY MEDICINE | Facility: CLINIC | Age: 56
End: 2021-09-20

## 2021-09-20 NOTE — TELEPHONE ENCOUNTER
"[facility] phone call message - order or referral request for patient:     Order or referral being requested: Patient requesting phone call regarding scheduling appts \"for his mother in regards to her kidneys\".  Courtney @ [phone]      Additional Comments: Patient's son requested to speak  Dr. Sirera. Will forward to MD's care coordinator.      Primary language: Croatian      needed? Yes    Call taken on September 20, 2021 at 10:23 AM by Maya Linares    "

## 2021-09-22 NOTE — TELEPHONE ENCOUNTER
9/22/21 Referral routed directly to our Central Scheduling Pool, they attempted to reach the patient/family 3x's and were unsuccessful. I contacted patient son Courtney ([phone]) and gave him clinic information. Courtney will be contacting clinic directly to schedule appointment that works for patient/family.     [address]  3rd Floor   [address]  [facility] [place]  [address]  [phone]    Shahla Shepherd  Care Coordinator

## 2021-10-12 ENCOUNTER — TELEPHONE (OUTPATIENT)
Dept: TRANSPLANT | Facility: CLINIC | Age: 56
End: 2021-10-12

## 2021-10-12 DIAGNOSIS — J20.9 ACUTE BRONCHITIS, UNSPECIFIED ORGANISM: ICD-10-CM

## 2021-10-12 DIAGNOSIS — H60.552 ACUTE REACTIVE OTITIS EXTERNA OF LEFT EAR: ICD-10-CM

## 2021-10-12 RX ORDER — ALBUTEROL SULFATE 90 UG/1
2 AEROSOL, METERED RESPIRATORY (INHALATION) 4 TIMES DAILY
Qty: 18 G | Refills: 1 | Status: SHIPPED | OUTPATIENT
Start: 2021-10-12 | End: 2022-04-22

## 2021-10-12 RX ORDER — NEOMYCIN SULFATE, POLYMYXIN B SULFATE, HYDROCORTISONE 3.5; 10000; 1 MG/ML; [USP'U]/ML; MG/ML
4 SOLUTION/ DROPS AURICULAR (OTIC) 2 TIMES DAILY
Qty: 10 ML | Refills: 0 | OUTPATIENT
Start: 2021-10-12

## 2021-10-12 NOTE — TELEPHONE ENCOUNTER
Spoke to son and let him know that the pharmacy has been trying to reach pt for refills for CSA and MMF. Son will reach out to pharmacy.

## 2021-10-12 NOTE — TELEPHONE ENCOUNTER
----- Message from Jaz Juares RN sent at 10/11/2021  4:20 PM CDT -----  Regarding: FW: Late to fill CSA and MMG  Karmen,     Can you please give Flor a call regarding her refills? Or I should say, Flor's family.    Thank you,   Jaz   ----- Message -----  From: Gladis Rasheed, [facility]  Sent: 10/11/2021   3:39 PM CDT  To: Mary Crawford RN  Subject: Late to fill CSA and MMG                         Hello!    Flor is late to fill her CSA by 30 days and her MMF by 17 days.  She is in [place] right now and a family member is supposed to be reordering her medications and shipping them to her.  We have been unable to reach anyone to get this scheduled.    Gladis

## 2021-11-04 ENCOUNTER — TELEPHONE (OUTPATIENT)
Dept: FAMILY MEDICINE | Facility: CLINIC | Age: 56
End: 2021-11-04

## 2021-11-04 DIAGNOSIS — N39.3 STRESS INCONTINENCE: Primary | ICD-10-CM

## 2021-11-04 DIAGNOSIS — M81.8 OTHER OSTEOPOROSIS WITHOUT CURRENT PATHOLOGICAL FRACTURE: ICD-10-CM

## 2021-11-04 NOTE — TELEPHONE ENCOUNTER
Pt had order for ASAEL COATES updated to L. Pt has stress incontinence dx on file    Fabiola Ford RN

## 2021-11-04 NOTE — TELEPHONE ENCOUNTER
"Request for medication refill:    Providers if patient needs an appointment and you are willing to give a one month supply please refill for one month and  send a letter/MyChart using \".SMILLIMITEDREFILL\" .smillimited and route chart to \"P SMI \" (Giving one month refill in non controlled medications is strongly recommended before denial)    If refill has been denied, meaning absolutely no refills without visit, please complete the smart phrase \".smirxrefuse\" and route it to the \"P SMI MED REFILLS\"  pool to inform the patient and the pharmacy.    Fabiola Ford RN        " heel to shin/intact in BUE/ diminished in BLE/finger to nose

## 2021-11-04 NOTE — TELEPHONE ENCOUNTER
Pt requesting Rx for Depend Underwear Large MISC    Thank you!  Lila Rodriguez [facility]

## 2021-11-05 RX ORDER — ALENDRONATE SODIUM 70 MG/1
70 TABLET ORAL
Qty: 13 TABLET | Refills: 3 | Status: SHIPPED | OUTPATIENT
Start: 2021-11-05 | End: 2023-09-15

## 2021-11-10 ENCOUNTER — TELEPHONE (OUTPATIENT)
Dept: FAMILY MEDICINE | Facility: CLINIC | Age: 56
End: 2021-11-10
Payer: MEDICARE

## 2021-11-12 NOTE — TELEPHONE ENCOUNTER
RN re-faxed the order to [phone] as requested. Received fax confirmation [phone].   Breanna Cook RN

## 2021-11-19 ENCOUNTER — DOCUMENTATION ONLY (OUTPATIENT)
Dept: FAMILY MEDICINE | Facility: CLINIC | Age: 56
End: 2021-11-19
Payer: MEDICARE

## 2021-11-19 NOTE — PROGRESS NOTES
"When opening a documentation only encounter, be sure to enter in \"Chief Complaint\" Forms and in \" Comments\" Title of form, description if needed.    Flor is a 56 year old  female  Form received via: Fax  Form now resides in: Provider Ready    Sabina Diaz MA                  "

## 2021-12-01 ENCOUNTER — OFFICE VISIT (OUTPATIENT)
Dept: FAMILY MEDICINE | Facility: CLINIC | Age: 56
End: 2021-12-01
Payer: MEDICARE

## 2021-12-01 VITALS
OXYGEN SATURATION: 95 % | DIASTOLIC BLOOD PRESSURE: 96 MMHG | RESPIRATION RATE: 16 BRPM | TEMPERATURE: 97.6 F | HEART RATE: 61 BPM | SYSTOLIC BLOOD PRESSURE: 143 MMHG

## 2021-12-01 DIAGNOSIS — N18.31 STAGE 3A CHRONIC KIDNEY DISEASE (H): ICD-10-CM

## 2021-12-01 DIAGNOSIS — Z23 NEED FOR PROPHYLACTIC VACCINATION AND INOCULATION AGAINST INFLUENZA: ICD-10-CM

## 2021-12-01 DIAGNOSIS — Z23 HIGH PRIORITY FOR 2019-NCOV VACCINE: ICD-10-CM

## 2021-12-01 DIAGNOSIS — R60.9 EDEMA, UNSPECIFIED TYPE: ICD-10-CM

## 2021-12-01 DIAGNOSIS — M54.50 CHRONIC LEFT-SIDED LOW BACK PAIN WITHOUT SCIATICA: ICD-10-CM

## 2021-12-01 DIAGNOSIS — G89.29 CHRONIC LEFT-SIDED LOW BACK PAIN WITHOUT SCIATICA: ICD-10-CM

## 2021-12-01 DIAGNOSIS — Z02.89 ENCOUNTER FOR COMPLETION OF FORM WITH PATIENT: ICD-10-CM

## 2021-12-01 DIAGNOSIS — R82.998 DARK URINE: ICD-10-CM

## 2021-12-01 DIAGNOSIS — E66.01 OBESITY, CLASS III, BMI 40-49.9 (MORBID OBESITY) (H): ICD-10-CM

## 2021-12-01 DIAGNOSIS — I12.9 HYPERTENSION, RENAL: Primary | ICD-10-CM

## 2021-12-01 LAB
ALBUMIN UR-MCNC: NEGATIVE MG/DL
ANION GAP SERPL CALCULATED.3IONS-SCNC: 4 MMOL/L (ref 3–14)
APPEARANCE UR: CLEAR
BACTERIA #/AREA URNS HPF: ABNORMAL /HPF
BILIRUB UR QL STRIP: NEGATIVE
BUN SERPL-MCNC: 34 MG/DL (ref 7–30)
CALCIUM SERPL-MCNC: 9.5 MG/DL (ref 8.5–10.1)
CHLORIDE BLD-SCNC: 109 MMOL/L (ref 94–109)
CO2 SERPL-SCNC: 29 MMOL/L (ref 20–32)
COLOR UR AUTO: YELLOW
CREAT SERPL-MCNC: 1.16 MG/DL (ref 0.52–1.04)
CREAT UR-MCNC: 77 MG/DL
GFR SERPL CREATININE-BSD FRML MDRD: 52 ML/MIN/1.73M2
GLUCOSE BLD-MCNC: 90 MG/DL (ref 70–99)
GLUCOSE UR STRIP-MCNC: NEGATIVE MG/DL
HGB UR QL STRIP: ABNORMAL
KETONES UR STRIP-MCNC: NEGATIVE MG/DL
LEUKOCYTE ESTERASE UR QL STRIP: NEGATIVE
MICROALBUMIN UR-MCNC: 60 MG/L
MICROALBUMIN/CREAT UR: 77.92 MG/G CR (ref 0–25)
NITRATE UR QL: NEGATIVE
PH UR STRIP: 5.5 [PH] (ref 5–8)
POTASSIUM BLD-SCNC: 5 MMOL/L (ref 3.4–5.3)
PTH-INTACT SERPL-MCNC: 30 PG/ML (ref 18–80)
RBC #/AREA URNS AUTO: ABNORMAL /HPF
SODIUM SERPL-SCNC: 142 MMOL/L (ref 133–144)
SP GR UR STRIP: 1.02 (ref 1–1.03)
UROBILINOGEN UR STRIP-ACNC: 0.2 E.U./DL
WBC #/AREA URNS AUTO: ABNORMAL /HPF

## 2021-12-01 PROCEDURE — 91300 COVID-19,PF,PFIZER (12+ YRS): CPT | Performed by: FAMILY MEDICINE

## 2021-12-01 PROCEDURE — 0004A COVID-19,PF,PFIZER (12+ YRS): CPT | Performed by: FAMILY MEDICINE

## 2021-12-01 PROCEDURE — 80048 BASIC METABOLIC PNL TOTAL CA: CPT | Performed by: FAMILY MEDICINE

## 2021-12-01 PROCEDURE — 90682 RIV4 VACC RECOMBINANT DNA IM: CPT | Performed by: FAMILY MEDICINE

## 2021-12-01 PROCEDURE — 81001 URINALYSIS AUTO W/SCOPE: CPT | Performed by: FAMILY MEDICINE

## 2021-12-01 PROCEDURE — 83970 ASSAY OF PARATHORMONE: CPT | Performed by: FAMILY MEDICINE

## 2021-12-01 PROCEDURE — 36415 COLL VENOUS BLD VENIPUNCTURE: CPT | Performed by: FAMILY MEDICINE

## 2021-12-01 PROCEDURE — G0008 ADMIN INFLUENZA VIRUS VAC: HCPCS | Performed by: FAMILY MEDICINE

## 2021-12-01 PROCEDURE — 99215 OFFICE O/P EST HI 40 MIN: CPT | Mod: 25 | Performed by: FAMILY MEDICINE

## 2021-12-01 PROCEDURE — 82043 UR ALBUMIN QUANTITATIVE: CPT | Performed by: FAMILY MEDICINE

## 2021-12-01 NOTE — Clinical Note
2items of dme ordered today - compression stockings and abd binder. Please follow up to ensure she receives (2nd time order placed for binder)   Thanks  c

## 2021-12-01 NOTE — LETTER
"December 2, 2021      Flor Navarro  [address]  [place] [address]        Dear Flor and Coutrney -     Thank you for getting your care at [facility]. Please see below for your test results.  Your results are actually encouraging. The biggest thing is to figure out how to bring down your blood pressure without raising your potassium. I want you to keep your kidney doctor appointment so we can figure that out.     Courtney, if it would be easier for you and your mom to get results by \"MyChart\" instead of by letter (and you could also send questions to me that way), we could get that set up. Let me know.     Resulted Orders   Albumin Random Urine Quantitative with Creat Ratio   Result Value Ref Range    Creatinine Urine mg/dL 77 mg/dL    Albumin Urine mg/L 60 mg/L    Albumin Urine mg/g Cr 77.92 (H) 0.00 - 25.00 mg/g Cr   UA reflex to Microscopic   Result Value Ref Range    Color Urine Yellow Colorless, Straw, Light Yellow, Yellow    Appearance Urine Clear Clear    Glucose Urine Negative Negative mg/dL    Bilirubin Urine Negative Negative    Ketones Urine Negative Negative mg/dL    Specific Gravity Urine 1.020 1.005 - 1.030    Blood Urine Trace (A) Negative    pH Urine 5.5 5.0 - 8.0    Protein Albumin Urine Negative Negative mg/dL    Urobilinogen Urine 0.2 0.2, 1.0 E.U./dL    Nitrite Urine Negative Negative    Leukocyte Esterase Urine Negative Negative   Basic metabolic panel   Result Value Ref Range    Sodium 142 133 - 144 mmol/L    Potassium 5.0 3.4 - 5.3 mmol/L    Chloride 109 94 - 109 mmol/L    Carbon Dioxide (CO2) 29 20 - 32 mmol/L    Anion Gap 4 3 - 14 mmol/L    Urea Nitrogen 34 (H) 7 - 30 mg/dL    Creatinine 1.16 (H) 0.52 - 1.04 mg/dL    Calcium 9.5 8.5 - 10.1 mg/dL    Glucose 90 70 - 99 mg/dL    GFR Estimate 52 (L) >60 mL/min/1.73m2      Comment:      As of July 11, 2021, eGFR is calculated by the CKD-EPI creatinine equation, without race adjustment. eGFR can be influenced by muscle mass, " exercise, and diet. The reported eGFR is an estimation only and is only applicable if the renal function is stable.   Parathyroid Hormone Intact   Result Value Ref Range    Parathyroid Hormone Intact 30 18 - 80 pg/mL   Urine Microscopic Exam   Result Value Ref Range    Bacteria Urine None Seen None Seen /HPF    RBC Urine 2-5 (A) 0-2 /HPF /HPF    WBC Urine None Seen 0-5 /HPF /HPF       Sincerely,    Karely Sierra MD

## 2021-12-01 NOTE — PROGRESS NOTES
Assessment & Plan     Hypertension, renal  Uncontrolled, although she has had hyperkalemia on ace in the past and had to be aggressively managed for potassium in the 6's. This was at a time when her kidneys were worse. She has f/u with nephrology soon, I am hesitant to start new at this time. Uncertain adherence to meds in recent weeks.     Stage 3a chronic kidney disease (H)  - ACE/ARB/ARNI NOT PRESCRIBED (INTENTIONAL) due to prior hx of elevated K  - Albumin Random Urine Quantitative with Creat Ratio  - UA reflex to Microscopic  - Basic metabolic panel  - Parathyroid Hormone Intact  - Albumin Random Urine Quantitative with Creat Ratio  - UA reflex to Microscopic  - Basic metabolic panel  - Parathyroid Hormone Intact    Obesity, Class III, BMI 40-49.9 (morbid obesity) (H)  Pt interested in surgical or medical management options. She feels she has maximised her behavioral strategies at home and feels she is eating very minimally. She attributes her limitation in walking to back pain which has been helped by an abdominal binder,which I ordered today  - Comprehensive Weight Management  - Miscellaneous DME Order    Dark urine  This is a new symptom she is reporting since return from [place]. UA as above.     Edema, unspecified type  - Compression Sleeve/Stocking Order    Chronic left-sided low back pain without sciatica  abdominal binder    Need for prophylactic vaccination and inoculation against influenza  - INFLUENZA QUAD, RECOMBINANT, P-FREE (RIV4) (FLUBLOK)    High priority for 2019-nCoV vaccine  - COVID-19,PF,PFIZER (12+ Yrs PURPLE LABEL)    Encounter for completion of form with patient  needs forms from [facility] filled out and transportation verified. Asking SW to assist with this.  Signed diet forms.     40 minutes spent on the date of the encounter doing chart review, history and exam, documentation and further activities per the note      BMI:   Estimated body mass index is 43.03 kg/m  as calculated from the  "following:    Height as of 7/8/21: 1.54 m (5' 0.63\").    Weight as of 8/9/21: 102.1 kg (225 lb).   Weight management plan: Patient referred to endocrine and/or weight management specialty Discussed healthy diet and exercise guidelines    The information in this document, created by the medical scribe for me, accurately reflects the services I personally performed and the decisions made by me. I have reviewed and approved this document for accuracy prior to leaving the patient care area.  Karely Sierra MD  11:04 AM, 12/01/21  [facility] AMI Ray is a 56 year old who presents for the following health issues  accompanied by her .    HPI     Medications  The patient needs refills on her prescriptions. She received her last shipment of prescriptions 25 days ago. She reports her urine appears a darker yellow, which is not normal for her. She describes it as darker brown. She would like compression stockings and an abdominal binder because it helps her walk.    Weight  The patient would like laser surgery to help with her weight. She is not eating bread and she drinks a lot of water, but she cannot seem to lose weight. She would like to look skinny.     Social  She has been home from [place] for 1 month. She enjoyed all the fresh food.    Review of Systems   Positive for: dark urine    Negative for: weight loss  This document serves as a record of the services and decisions personally performed and made by Karely Sierra MD. It was created on his/her behalf by Aury Aiken, a trained medical scribe. The creation of this document is based the provider's statements to the medical scribe.  Scribscott Aiken 11:04 AM, December 1, 2021      Objective    BP (!) 143/96   Pulse 61   Temp 97.6  F (36.4  C) (Oral)   Resp 16   LMP  (LMP Unknown)   SpO2 95%   There is no height or weight on file to calculate BMI.   Vitals were reviewed and were normal, except BP.  Physical Exam "   GENERAL: healthy, alert and no distress   MS: Trace pedal edema on the left and right without compression socks. There is some tenderness on the right foot sensitive to light touch.  PSYCH: mentation appears normal, affect bright, talkative

## 2021-12-01 NOTE — PROGRESS NOTES
DME (Durable Medical Equipment) Orders and Documentation  Orders Placed This Encounter   Procedures     Compression Sleeve/Stocking Order      The patient was assessed and it was determined the patient is in need of the following listed DME Supplies/Equipment. Please complete supporting documentation below to demonstrate medical necessity.      Compression Sleeve/Stocking(s) Supplies Documentation  The patient needs compression stockings for swelling management       Abd binder helps with pain mgmt from back pain related to obesity      DME All Other Item(s) Documentation    List reason for need and supporting documentation for medical necessity below for each DME item.     1. Edema   2. Obesity, back pain

## 2021-12-01 NOTE — NURSING NOTE
Due to patient being non-English speaking/uses sign language, an  was used for this visit. Only for face-to-face interpretation by an external agency, date and length of interpretation can be found on the scanned worksheet.     name: Renetta King  Agency: [employer]  Language: Croatian   Telephone number: [phone]  Type of interpretation: Telephone, spoken

## 2021-12-01 NOTE — PATIENT INSTRUCTIONS
Kidney Disease  1. I have sent a referral for weight loss at [address] [place] [address]. this is the same building the kidney doctor is in. Call [phone] to schedule an appointment. I will send a letter with your results from today.  2. There is a medication that will help your kidneys and blood pressure, called Lisinopril. It has a side effect of a cough, let me know if this happens to you.     Preventative  1. You received you flu shot and covid booster today.  2. Carleen can get his covid booster at a pharmacy near him.  3. I sent an order for compression stockings and an abdominal binder.

## 2021-12-14 DIAGNOSIS — Z94.4 LIVER REPLACED BY TRANSPLANT (H): Primary | ICD-10-CM

## 2021-12-14 DIAGNOSIS — N18.31 STAGE 3A CHRONIC KIDNEY DISEASE (H): Primary | ICD-10-CM

## 2021-12-16 NOTE — TELEPHONE ENCOUNTER
RECORDS RECEIVED FROM: Internal   DATE RECEIVED: 12.17.2021   NOTES STATUS DETAILS   OFFICE NOTE from referring provider Internal 08.09.2021 Karely Sierra MD   OFFICE NOTE from other specialist  N/A    *Only VASCULITIS or LUPUS gather office notes for the following N/A    *PULMONARY   N/A    *ENT N/A    *DERMATOLOGY N/A    *RHEUMATOLOGY N/A    DISCHARGE SUMMARY from hospital N/A    DISCHARGE REPORT from the ER N/A    MEDICATION LIST Internal    IMAGING  (NEED IMAGES AND REPORTS)     KIDNEY CT SCAN N/A    KIDNEY ULTRASOUND N/A    MR ABDOMEN N/A    NUCLEAR MEDICINE RENAL N/A    LABS     CBC Internal 07.02.2021   CMP Internal 07.02.2021   BMP Internal 12.01.2021   UA Internal 06.25.2021   URINE PROTEIN Internal 06.25.2021   RENAL PANEL N/A    BIOPSY     KIDNEY BIOPSY  N/A

## 2021-12-17 ENCOUNTER — OFFICE VISIT (OUTPATIENT)
Dept: NEPHROLOGY | Facility: CLINIC | Age: 56
End: 2021-12-17
Attending: FAMILY MEDICINE
Payer: MEDICARE

## 2021-12-17 ENCOUNTER — LAB (OUTPATIENT)
Dept: LAB | Facility: CLINIC | Age: 56
End: 2021-12-17
Attending: INTERNAL MEDICINE
Payer: MEDICARE

## 2021-12-17 ENCOUNTER — PRE VISIT (OUTPATIENT)
Dept: NEPHROLOGY | Facility: CLINIC | Age: 56
End: 2021-12-17
Payer: MEDICARE

## 2021-12-17 VITALS
HEART RATE: 67 BPM | SYSTOLIC BLOOD PRESSURE: 165 MMHG | OXYGEN SATURATION: 92 % | BODY MASS INDEX: 44.3 KG/M2 | WEIGHT: 231.6 LBS | DIASTOLIC BLOOD PRESSURE: 100 MMHG

## 2021-12-17 DIAGNOSIS — E79.0 HYPERURICEMIA: ICD-10-CM

## 2021-12-17 DIAGNOSIS — R06.09 EXERTIONAL DYSPNEA: ICD-10-CM

## 2021-12-17 DIAGNOSIS — N18.31 STAGE 3A CHRONIC KIDNEY DISEASE (H): ICD-10-CM

## 2021-12-17 DIAGNOSIS — E66.01 OBESITY, CLASS III, BMI 40-49.9 (MORBID OBESITY) (H): ICD-10-CM

## 2021-12-17 DIAGNOSIS — N18.32 STAGE 3B CHRONIC KIDNEY DISEASE (H): ICD-10-CM

## 2021-12-17 DIAGNOSIS — E87.5 HYPERKALEMIA: ICD-10-CM

## 2021-12-17 DIAGNOSIS — I10 HYPERTENSION, ESSENTIAL: ICD-10-CM

## 2021-12-17 DIAGNOSIS — G47.30 SLEEP APNEA, UNSPECIFIED TYPE: Primary | ICD-10-CM

## 2021-12-17 LAB
ALBUMIN SERPL-MCNC: 3.5 G/DL (ref 3.4–5)
ALBUMIN UR-MCNC: NEGATIVE MG/DL
ANION GAP SERPL CALCULATED.3IONS-SCNC: 5 MMOL/L (ref 3–14)
APPEARANCE UR: CLEAR
BILIRUB UR QL STRIP: NEGATIVE
BUN SERPL-MCNC: 25 MG/DL (ref 7–30)
CALCIUM SERPL-MCNC: 9.6 MG/DL (ref 8.5–10.1)
CHLORIDE BLD-SCNC: 109 MMOL/L (ref 94–109)
CO2 SERPL-SCNC: 29 MMOL/L (ref 20–32)
COLOR UR AUTO: YELLOW
CREAT SERPL-MCNC: 1.1 MG/DL (ref 0.52–1.04)
CREAT UR-MCNC: 84 MG/DL
DEPRECATED CALCIDIOL+CALCIFEROL SERPL-MC: 55 UG/L (ref 20–75)
ERYTHROCYTE [DISTWIDTH] IN BLOOD BY AUTOMATED COUNT: 13 % (ref 10–15)
GFR SERPL CREATININE-BSD FRML MDRD: 56 ML/MIN/1.73M2
GLUCOSE BLD-MCNC: 92 MG/DL (ref 70–99)
GLUCOSE UR STRIP-MCNC: NEGATIVE MG/DL
HCT VFR BLD AUTO: 42 % (ref 35–47)
HGB BLD-MCNC: 13.2 G/DL (ref 11.7–15.7)
HGB UR QL STRIP: NEGATIVE
HYALINE CASTS: 1 /LPF
KETONES UR STRIP-MCNC: NEGATIVE MG/DL
LEUKOCYTE ESTERASE UR QL STRIP: NEGATIVE
MCH RBC QN AUTO: 29.9 PG (ref 26.5–33)
MCHC RBC AUTO-ENTMCNC: 31.4 G/DL (ref 31.5–36.5)
MCV RBC AUTO: 95 FL (ref 78–100)
NITRATE UR QL: NEGATIVE
PH UR STRIP: 6 [PH] (ref 5–7)
PHOSPHATE SERPL-MCNC: 3.2 MG/DL (ref 2.5–4.5)
PLATELET # BLD AUTO: 186 10E3/UL (ref 150–450)
POTASSIUM BLD-SCNC: 5.4 MMOL/L (ref 3.4–5.3)
PROT UR-MCNC: 0.31 G/L
PROT/CREAT 24H UR: 0.37 G/G CR (ref 0–0.2)
PTH-INTACT SERPL-MCNC: 33 PG/ML (ref 18–80)
RBC # BLD AUTO: 4.42 10E6/UL (ref 3.8–5.2)
RBC URINE: 4 /HPF
SODIUM SERPL-SCNC: 143 MMOL/L (ref 133–144)
SP GR UR STRIP: 1.01 (ref 1–1.03)
SQUAMOUS EPITHELIAL: <1 /HPF
URATE SERPL-MCNC: 7.5 MG/DL (ref 2.6–6)
UROBILINOGEN UR STRIP-MCNC: NORMAL MG/DL
WBC # BLD AUTO: 4.5 10E3/UL (ref 4–11)
WBC URINE: 1 /HPF

## 2021-12-17 PROCEDURE — 36415 COLL VENOUS BLD VENIPUNCTURE: CPT | Performed by: PATHOLOGY

## 2021-12-17 PROCEDURE — 83970 ASSAY OF PARATHORMONE: CPT | Performed by: INTERNAL MEDICINE

## 2021-12-17 PROCEDURE — 84550 ASSAY OF BLOOD/URIC ACID: CPT | Performed by: PATHOLOGY

## 2021-12-17 PROCEDURE — 99205 OFFICE O/P NEW HI 60 MIN: CPT | Performed by: INTERNAL MEDICINE

## 2021-12-17 PROCEDURE — 84156 ASSAY OF PROTEIN URINE: CPT | Performed by: PATHOLOGY

## 2021-12-17 PROCEDURE — 81001 URINALYSIS AUTO W/SCOPE: CPT | Performed by: PATHOLOGY

## 2021-12-17 PROCEDURE — 80069 RENAL FUNCTION PANEL: CPT | Performed by: PATHOLOGY

## 2021-12-17 PROCEDURE — 82306 VITAMIN D 25 HYDROXY: CPT | Performed by: INTERNAL MEDICINE

## 2021-12-17 PROCEDURE — 85027 COMPLETE CBC AUTOMATED: CPT | Performed by: PATHOLOGY

## 2021-12-17 RX ORDER — AMLODIPINE BESYLATE 10 MG/1
10 TABLET ORAL DAILY
Qty: 90 TABLET | Refills: 3 | Status: SHIPPED | OUTPATIENT
Start: 2021-12-17 | End: 2022-08-24

## 2021-12-17 NOTE — PATIENT INSTRUCTIONS
Starting on amlodipine 10 mg for your BP  Check BP at  Home, call nephrology if systolic BP is < 100 or > 160 systolic   Please schedule renal US and heart ECHO   Continue to work on loosing weight  Referral made to sleep physician

## 2021-12-17 NOTE — PROGRESS NOTES
Nephrology Clinic Note  December 17, 2021    I was asked to see this patient by Dr. Cardoza    CC: CKD    HPI: Flor Navarro is a 56 year old female with PMH significant for HTN, liver transplant, morbid obesity who presents for evaluation and management of CKD.     Pt speaks Upper sorbian and used telephone  today     Pt endorsed having breathing difficulties with exertion. Denied associated chest pain. She was told before that her obesity and increased abdominal girth were causing issues. She tried to loose weight but unable to do so. Other wise, she denied other systemic symptoms including fever/chills, nausea/vomting, diarrhea, abdominal pain, chest pain. She have h/o constipation, last BM was yesterday night. usually take medication to have a BM.    Endorsed compliance with her medications, but she does not know her meds. A nurse will come home and set up pill box for 2 weeks each time.    - History of urinary symptoms: Have urinary continence, wears diapers  - History of Hematuria: no  - Swelling: yes  - Hx of UTIs: yes, ESBL  - Hx of stones: no  - Rashes/Joint pain: no  - Family hx of kidney disease: no  - NSAID use: no       Allergies   Allergen Reactions     Aspirin      3/31/16 Per pt, tolerates 81 mg daily dose without ADR.     325 mg dose caused itchiness and hives.     Clarithromycin      Allergic reaction         Contrast Dye      Iodine      Pcn [Penicillins]        ACE/ARB/ARNI NOT PRESCRIBED (INTENTIONAL), Please choose reason not prescribed from choices below.  acetaminophen (TYLENOL) 500 MG tablet, Take 1 tablet (500 mg) by mouth 3 times daily as needed for mild pain  albuterol (ACCUNEB) 0.63 MG/3ML neb solution, Take 3 mLs (0.63 mg) by nebulization every 4 hours (while awake)  albuterol (PROAIR HFA/PROVENTIL HFA/VENTOLIN HFA) 108 (90 Base) MCG/ACT inhaler, Inhale 2 puffs into the lungs 4 times daily  alendronate (FOSAMAX) 70 MG tablet, Take 1 tablet (70 mg) by mouth every 7 days  calcitRIOL  (ROCALTROL) 0.25 MCG capsule, Take 1 capsule (0.25 mcg) by mouth daily  calcium carbonate 600 mg-vitamin D 400 units (CALCIUM 600 + D) 600-400 MG-UNIT per tablet, Take 1 tablet by mouth 2 times daily  capsaicin (ZOSTRIX) 0.025 % external cream, Apply three times a day as needed to legs. Wash hands after applying.  carboxymethylcellulose PF (REFRESH PLUS) 0.5 % ophthalmic solution, Place 1 drop into both eyes 4 times daily as needed for dry eyes  COMPOUNDED NON-CONTROLLED SUBSTANCE (CMPD RX) - PHARMACY TO MIX COMPOUNDED MEDICATION, Equal parts of 2% Benadryl, 2% vicious lidocaine and TC suspension maalox,  Swish and spit 5 ml every 6 hrs as needed for mouth pain  cycloSPORINE modified (GENERIC EQUIVALENT) 25 MG capsule, Take 3 capsules (75 mg) by mouth every morning AND 2 capsules (50 mg) At Bedtime.  Denture Care Products (EFFERDENT DENTURE CLEANSER) TBEF, Use nightly to clean oral appliance  dimethicone (AVEENO DAILY MOISTURIZING) 1.3 % LOTN lotion, Externally apply topically daily  febuxostat (ULORIC) 40 MG TABS tablet, Take 1 tablet (40 mg) by mouth daily  furosemide (LASIX) 20 MG tablet, Take 1 tablet (20 mg) by mouth 2 times daily  gabapentin (NEURONTIN) 300 MG capsule, Take 1 capsule (300 mg) by mouth At Bedtime  lidocaine (XYLOCAINE) 5 % external ointment, Apply topically as needed for moderate pain  magnesium hydroxide (MILK OF MAGNESIA) 400 MG/5ML suspension, Take 15 mLs by mouth daily as needed for constipation or heartburn  melatonin 3 MG tablet, TAKE ONE TABLET BY MOUTH EVERY NIGHT AS NEEDED FOR SLEEP.  mineral oil-white petrolatum (EUCERIN) CREA cream, Apply topically 2 times daily  Multiple Vitamin (DAILY-SUMA MULTIVITAMIN) TABS, Take 1 tablet by mouth every morning  multivitamin w/minerals (MULTI-VITAMIN) tablet, Take 1 tablet by mouth daily Needs 4 month supply for International Travel  mycophenolate (GENERIC EQUIVALENT) 250 MG capsule, TAKE TWO CAPSULES BY MOUTH EVERY 12 HOURS  neomycin-polymyxin-HC  1 %, Place 4 drops in ear(s) 2 times daily  olopatadine (PATADAY) 0.2 % ophthalmic solution, Place 1 drop into both eyes daily  omeprazole (PRILOSEC) 20 MG DR capsule, Take 1 capsule (20 mg) by mouth 2 times daily el  order for DME, Equipment being ordered: compression stockings bilateral  Please measure and fit patient for stockings   Strength 15-30mmHg  Disp 2 pairs ( 4 socks)  1 refill over the time of 1 year  order for DME, Equipment being ordered:   1) cane  2) compression stockings 15mmHg, 3 pairs  Dx: gait stability, falls, edema  order for DME, Equipment being ordered: Nebulizer with adult mask and tubing  psyllium (METAMUCIL/KONSYL) 0.52 g capsule, Take 2 capsules by mouth 2 times daily  senna-docusate (SENOKOT-S/PERICOLACE) 8.6-50 MG tablet, Take 2 tablets by mouth 2 times daily Needs 4 month supply for International Travel  STATIN NOT PRESCRIBED, INTENTIONAL,, Not prescribed  Vitamin D3 (CHOLECALCIFEROL) 25 mcg (1000 units) tablet, Take 1 tablet (25 mcg) by mouth daily    albuterol (PROVENTIL) neb solution 2.5 mg        Past Medical History:   Diagnosis Date     Anemia in CKD (chronic kidney disease)      Cataract      CKD (chronic kidney disease) stage 3, GFR 30-59 ml/min (H)      Hepatitis C     cleared virus spontaneously      High risk medication use      Hypertension, renal      Immunosuppressed status (H)      Liver replaced by transplant (H) 2010     Osteoporosis      Recurrent pregnancy loss without current pregnancy      Recurrent UTI 2021     Stroke, hemorrhagic (H) 2008     Syncope      Unspecified viral hepatitis C without hepatic coma        Past Surgical History:   Procedure Laterality Date     CATARACT IOL, RT/LT Right 2/18/15      SECTION       Incisional Hernia Repair  2004     INSERT SHUNT PORTAL TRANSJUGULAR INTRAHEPTIC  2005    shunt placement for liver failure     LAPAROSCOPIC SALPINGO-OOPHORECTOMY      left     NECK SURGERY      fracture, in  [place] x 7months     TRANSPLANT LIVER RECIPIENT  DONOR  10/26/10     Upper GI Endoscopy with Band Ligation of Esoph/Gastric Varic. .         Social History     Tobacco Use     Smoking status: Never Smoker     Smokeless tobacco: Never Used   Substance Use Topics     Alcohol use: No     Drug use: No       Family History   Problem Relation Age of Onset     Hepatitis Other         Hep C, still in [place]     Cerebrovascular Disease Other      Cancer No family hx of      Diabetes No family hx of      Hypertension No family hx of      Thyroid Disease No family hx of      Glaucoma No family hx of      Macular Degeneration No family hx of        ROS: A 12 system review of systems was negative other than noted here or above.     Exam:  BP (!) 165/100   Pulse 67   Wt 105.1 kg (231 lb 9.6 oz)   LMP  (LMP Unknown)   SpO2 92%   Breastfeeding No   BMI 44.30 kg/m      GENERAL APPEARANCE: alert and no distress  EYES: PERRL, no scleral icterus  HENT: mouth without ulcers or lesions  RESP: lungs clear to auscultation, no crackles noted  CV: regular rhythm, normal rate, no rub  Extremities: no clubbing, cyanosis, or edema  SKIN: no visible facial rash  NEURO: mentation intact and speech normal  PSYCH: affect normal/bright    Results:    Lab on 2021   Component Date Value Ref Range Status     Color Urine 2021 Yellow  Colorless, Straw, Light Yellow, Yellow Final     Appearance Urine 2021 Clear  Clear Final     Glucose Urine 2021 Negative  Negative mg/dL Final     Bilirubin Urine 2021 Negative  Negative Final     Ketones Urine 2021 Negative  Negative mg/dL Final     Specific Gravity Urine 2021 1.014  1.003 - 1.035 Final     Blood Urine 2021 Negative  Negative Final     pH Urine 2021 6.0  5.0 - 7.0 Final     Protein Albumin Urine 2021 Negative  Negative mg/dL Final     Urobilinogen Urine 2021 Normal  Normal, 2.0 mg/dL Final     Nitrite Urine 2021 Negative   Negative Final     Leukocyte Esterase Urine 12/17/2021 Negative  Negative Final     RBC Urine 12/17/2021 4* <=2 /HPF Final     WBC Urine 12/17/2021 1  <=5 /HPF Final     Squamous Epithelials Urine 12/17/2021 <1  <=1 /HPF Final     Hyaline Casts Urine 12/17/2021 1  <=2 /LPF Final     Total Protein Random Urine g/L 12/17/2021 0.31  g/L Final    The reference range has not been established for total protein in random urine samples.  The result should be integrated into the clinical context for interpretation.     Total Protein Urine g/gr Creatinine 12/17/2021 0.37* 0.00 - 0.20 g/g Cr Final     Creatinine Urine mg/dL 12/17/2021 84  mg/dL Final     WBC Count 12/17/2021 4.5  4.0 - 11.0 10e3/uL Final     RBC Count 12/17/2021 4.42  3.80 - 5.20 10e6/uL Final     Hemoglobin 12/17/2021 13.2  11.7 - 15.7 g/dL Final     Hematocrit 12/17/2021 42.0  35.0 - 47.0 % Final     MCV 12/17/2021 95  78 - 100 fL Final     MCH 12/17/2021 29.9  26.5 - 33.0 pg Final     MCHC 12/17/2021 31.4* 31.5 - 36.5 g/dL Final     RDW 12/17/2021 13.0  10.0 - 15.0 % Final     Platelet Count 12/17/2021 186  150 - 450 10e3/uL Final     Sodium 12/17/2021 143  133 - 144 mmol/L Final     Potassium 12/17/2021 5.4* 3.4 - 5.3 mmol/L Final     Chloride 12/17/2021 109  94 - 109 mmol/L Final     Carbon Dioxide (CO2) 12/17/2021 29  20 - 32 mmol/L Final     Anion Gap 12/17/2021 5  3 - 14 mmol/L Final     Urea Nitrogen 12/17/2021 25  7 - 30 mg/dL Final     Creatinine 12/17/2021 1.10* 0.52 - 1.04 mg/dL Final     Calcium 12/17/2021 9.6  8.5 - 10.1 mg/dL Final     Glucose 12/17/2021 92  70 - 99 mg/dL Final     Albumin 12/17/2021 3.5  3.4 - 5.0 g/dL Final     Phosphorus 12/17/2021 3.2  2.5 - 4.5 mg/dL Final     GFR Estimate 12/17/2021 56* >60 mL/min/1.73m2 Final    As of July 11, 2021, eGFR is calculated by the CKD-EPI creatinine equation, without race adjustment. eGFR can be influenced by muscle mass, exercise, and diet. The reported eGFR is an estimation only and is only  applicable if the renal function is stable.       Assessment/Plan:     CKD stage IIIa  Pt with baseline creatinine of 1.1-1.3 with eGFR in 40's and 50's which put her into CKD stage IIIa. UA with few RBC, but also noted squamous cells which is c/w contaminated sample. Pt also endorsed irritation at her genital region because of her diapers. Very minimal proteinuria on UPCR. No renal imaging noted in her chart. With given improvement/baseline renal function, will continue to monitor her with repeat UA. If her hematuria and or proteinuria is worsening, will evalaute further for possible underlying GN. That said, no systemic symptoms that are suggestive of underlying GN.  Her CKD likely multifactorial including chronic use of CNI (cyclosporine), poorly controlled BP, obesity and or renovascular disease.  Morbid obesity puts her at higher risk of secondary FSGS  - strict BP control  - maintain cyclosporine levels at goal  - will get her renal imaging to assess the anatomy   - encouraged to loose weight    Exertional dyspnea   Morbid obesity and physical deconditioning are likely causes as there is no associated chest tightness or chest pain with those symptoms. But her last ECHO was done in 2018.  - will repeat ECMO   - follow up with primary care    CASTILLO stage II in June 2021, now resolving   Pt with peak creatinine of 2.3 when she had ESBL- Ecoli infection, now treated. No urinary symptoms today. UA with no WBC.   - continue to monitor    Hypertension, not at goal of < 140 systolic :  Pt clinic BP is slightly high, was only on furosemide 20 mg twice a day.   - starting on amlodipine 10 mg daily   - not starting on ACE/ARB in setting of hyperkalemia.    Hyperkalemia   Noted her potassium is at 5.4. was on lasix 20 mg twice a day, but did not take her meds this morning  - instructed her to go home and take her medications which she agreed to.  - continue to monitor    Liver transplant in 2010  Liver failure 2/2 hep C  infection s/p liver transplant with stable transplant function. currently on cyclosporine and MMF.  - continue immunosuppression    Hyperuricemia : noted her uric acid being high at 7.5. no gouty episodes lately   - will continue to monitor for now, if > 8 and or cause gout, will consider medical therapy.  - discussed life style modifications.    BMD :  Husam, phos, vit D and PTH are WNL  continue on calcitriol    Metabolic acidosis :  Bicarb is WNL    Anemia :  Hb is WNL    Other   Morbid obesity, encouraged to loose weight   Sleep apnea : referral made to sleep clinic.    Total time spent on the day of clinic visit was 70 min including 35 min face to face time with pt, chart review,care every where record review, GI note review, imaging and lab review, documentation as above.    Ce Arambula  Dept of Nephrology and Hypertension  [facility]

## 2021-12-17 NOTE — LETTER
12/17/2021     RE: Flor Navarro  [address]  [facility] [address]     Dear Colleague,    Thank you for referring your patient, Flor Navarro, to the [facility] [place] at [facility]. Please see a copy of my visit note below.      Nephrology Clinic Note  December 17, 2021    I was asked to see this patient by Dr. Cardoza    CC: CKD    HPI: Flor Navarro is a 56 year old female with PMH significant for HTN, liver transplant, morbid obesity who presents for evaluation and management of CKD.     Pt speaks English and used telephone  today     Pt endorsed having breathing difficulties with exertion. Denied associated chest pain. She was told before that her obesity and increased abdominal girth were causing issues. She tried to loose weight but unable to do so. Other wise, she denied other systemic symptoms including fever/chills, nausea/vomting, diarrhea, abdominal pain, chest pain. She have h/o constipation, last BM was yesterday night. usually take medication to have a BM.    Endorsed compliance with her medications, but she does not know her meds. A nurse will come home and set up pill box for 2 weeks each time.    - History of urinary symptoms: Have urinary continence, wears diapers  - History of Hematuria: no  - Swelling: yes  - Hx of UTIs: yes, ESBL  - Hx of stones: no  - Rashes/Joint pain: no  - Family hx of kidney disease: no  - NSAID use: no       Allergies   Allergen Reactions     Aspirin      3/31/16 Per pt, tolerates 81 mg daily dose without ADR.     325 mg dose caused itchiness and hives.     Clarithromycin      Allergic reaction         Contrast Dye      Iodine      Pcn [Penicillins]        ACE/ARB/ARNI NOT PRESCRIBED (INTENTIONAL), Please choose reason not prescribed from choices below.  acetaminophen (TYLENOL) 500 MG tablet, Take 1 tablet (500 mg) by mouth 3 times daily as needed for mild pain  albuterol  (ACCUNEB) 0.63 MG/3ML neb solution, Take 3 mLs (0.63 mg) by nebulization every 4 hours (while awake)  albuterol (PROAIR HFA/PROVENTIL HFA/VENTOLIN HFA) 108 (90 Base) MCG/ACT inhaler, Inhale 2 puffs into the lungs 4 times daily  alendronate (FOSAMAX) 70 MG tablet, Take 1 tablet (70 mg) by mouth every 7 days  calcitRIOL (ROCALTROL) 0.25 MCG capsule, Take 1 capsule (0.25 mcg) by mouth daily  calcium carbonate 600 mg-vitamin D 400 units (CALCIUM 600 + D) 600-400 MG-UNIT per tablet, Take 1 tablet by mouth 2 times daily  capsaicin (ZOSTRIX) 0.025 % external cream, Apply three times a day as needed to legs. Wash hands after applying.  carboxymethylcellulose PF (REFRESH PLUS) 0.5 % ophthalmic solution, Place 1 drop into both eyes 4 times daily as needed for dry eyes  COMPOUNDED NON-CONTROLLED SUBSTANCE (CMPD RX) - PHARMACY TO MIX COMPOUNDED MEDICATION, Equal parts of 2% Benadryl, 2% vicious lidocaine and TC suspension maalox,  Swish and spit 5 ml every 6 hrs as needed for mouth pain  cycloSPORINE modified (GENERIC EQUIVALENT) 25 MG capsule, Take 3 capsules (75 mg) by mouth every morning AND 2 capsules (50 mg) At Bedtime.  Denture Care Products (EFFERDENT DENTURE CLEANSER) TBEF, Use nightly to clean oral appliance  dimethicone (AVEENO DAILY MOISTURIZING) 1.3 % LOTN lotion, Externally apply topically daily  febuxostat (ULORIC) 40 MG TABS tablet, Take 1 tablet (40 mg) by mouth daily  furosemide (LASIX) 20 MG tablet, Take 1 tablet (20 mg) by mouth 2 times daily  gabapentin (NEURONTIN) 300 MG capsule, Take 1 capsule (300 mg) by mouth At Bedtime  lidocaine (XYLOCAINE) 5 % external ointment, Apply topically as needed for moderate pain  magnesium hydroxide (MILK OF MAGNESIA) 400 MG/5ML suspension, Take 15 mLs by mouth daily as needed for constipation or heartburn  melatonin 3 MG tablet, TAKE ONE TABLET BY MOUTH EVERY NIGHT AS NEEDED FOR SLEEP.  mineral oil-white petrolatum (EUCERIN) CREA cream, Apply topically 2 times  daily  Multiple Vitamin (DAILY-SUMA MULTIVITAMIN) TABS, Take 1 tablet by mouth every morning  multivitamin w/minerals (MULTI-VITAMIN) tablet, Take 1 tablet by mouth daily Needs 4 month supply for International Travel  mycophenolate (GENERIC EQUIVALENT) 250 MG capsule, TAKE TWO CAPSULES BY MOUTH EVERY 12 HOURS  neomycin-polymyxin-HC 1 %, Place 4 drops in ear(s) 2 times daily  olopatadine (PATADAY) 0.2 % ophthalmic solution, Place 1 drop into both eyes daily  omeprazole (PRILOSEC) 20 MG DR capsule, Take 1 capsule (20 mg) by mouth 2 times daily el  order for DME, Equipment being ordered: compression stockings bilateral  Please measure and fit patient for stockings   Strength 15-30mmHg  Disp 2 pairs ( 4 socks)  1 refill over the time of 1 year  order for DME, Equipment being ordered:   1) cane  2) compression stockings 15mmHg, 3 pairs  Dx: gait stability, falls, edema  order for DME, Equipment being ordered: Nebulizer with adult mask and tubing  psyllium (METAMUCIL/KONSYL) 0.52 g capsule, Take 2 capsules by mouth 2 times daily  senna-docusate (SENOKOT-S/PERICOLACE) 8.6-50 MG tablet, Take 2 tablets by mouth 2 times daily Needs 4 month supply for International Travel  STATIN NOT PRESCRIBED, INTENTIONAL,, Not prescribed  Vitamin D3 (CHOLECALCIFEROL) 25 mcg (1000 units) tablet, Take 1 tablet (25 mcg) by mouth daily    albuterol (PROVENTIL) neb solution 2.5 mg        Past Medical History:   Diagnosis Date     Anemia in CKD (chronic kidney disease)      Cataract      CKD (chronic kidney disease) stage 3, GFR 30-59 ml/min (H)      Hepatitis C     cleared virus spontaneously 2013     High risk medication use      Hypertension, renal      Immunosuppressed status (H)      Liver replaced by transplant (H) 01/01/2010     Osteoporosis      Recurrent pregnancy loss without current pregnancy      Recurrent UTI 07/12/2021     Stroke, hemorrhagic (H) 01/01/2008     Syncope      Unspecified viral hepatitis C without hepatic coma         Past Surgical History:   Procedure Laterality Date     CATARACT IOL, RT/LT Right 2/18/15      SECTION       Incisional Hernia Repair  2004     INSERT SHUNT PORTAL TRANSJUGULAR INTRAHEPTIC  2005    shunt placement for liver failure     LAPAROSCOPIC SALPINGO-OOPHORECTOMY      left     NECK SURGERY  2010    fracture, in halo x 7months     TRANSPLANT LIVER RECIPIENT  DONOR  10/26/10     Upper GI Endoscopy with Band Ligation of Esoph/Gastric Varic. .         Social History     Tobacco Use     Smoking status: Never Smoker     Smokeless tobacco: Never Used   Substance Use Topics     Alcohol use: No     Drug use: No       Family History   Problem Relation Age of Onset     Hepatitis Other         Hep C, still in [place]     Cerebrovascular Disease Other      Cancer No family hx of      Diabetes No family hx of      Hypertension No family hx of      Thyroid Disease No family hx of      Glaucoma No family hx of      Macular Degeneration No family hx of        ROS: A 12 system review of systems was negative other than noted here or above.     Exam:  BP (!) 165/100   Pulse 67   Wt 105.1 kg (231 lb 9.6 oz)   LMP  (LMP Unknown)   SpO2 92%   Breastfeeding No   BMI 44.30 kg/m      GENERAL APPEARANCE: alert and no distress  EYES: PERRL, no scleral icterus  HENT: mouth without ulcers or lesions  RESP: lungs clear to auscultation, no crackles noted  CV: regular rhythm, normal rate, no rub  Extremities: no clubbing, cyanosis, or edema  SKIN: no visible facial rash  NEURO: mentation intact and speech normal  PSYCH: affect normal/bright    Results:    Lab on 2021   Component Date Value Ref Range Status     Color Urine 2021 Yellow  Colorless, Straw, Light Yellow, Yellow Final     Appearance Urine 2021 Clear  Clear Final     Glucose Urine 2021 Negative  Negative mg/dL Final     Bilirubin Urine 2021 Negative  Negative Final     Ketones Urine 2021 Negative  Negative mg/dL  Final     Specific Gravity Urine 12/17/2021 1.014  1.003 - 1.035 Final     Blood Urine 12/17/2021 Negative  Negative Final     pH Urine 12/17/2021 6.0  5.0 - 7.0 Final     Protein Albumin Urine 12/17/2021 Negative  Negative mg/dL Final     Urobilinogen Urine 12/17/2021 Normal  Normal, 2.0 mg/dL Final     Nitrite Urine 12/17/2021 Negative  Negative Final     Leukocyte Esterase Urine 12/17/2021 Negative  Negative Final     RBC Urine 12/17/2021 4* <=2 /HPF Final     WBC Urine 12/17/2021 1  <=5 /HPF Final     Squamous Epithelials Urine 12/17/2021 <1  <=1 /HPF Final     Hyaline Casts Urine 12/17/2021 1  <=2 /LPF Final     Total Protein Random Urine g/L 12/17/2021 0.31  g/L Final    The reference range has not been established for total protein in random urine samples.  The result should be integrated into the clinical context for interpretation.     Total Protein Urine g/gr Creatinine 12/17/2021 0.37* 0.00 - 0.20 g/g Cr Final     Creatinine Urine mg/dL 12/17/2021 84  mg/dL Final     WBC Count 12/17/2021 4.5  4.0 - 11.0 10e3/uL Final     RBC Count 12/17/2021 4.42  3.80 - 5.20 10e6/uL Final     Hemoglobin 12/17/2021 13.2  11.7 - 15.7 g/dL Final     Hematocrit 12/17/2021 42.0  35.0 - 47.0 % Final     MCV 12/17/2021 95  78 - 100 fL Final     MCH 12/17/2021 29.9  26.5 - 33.0 pg Final     MCHC 12/17/2021 31.4* 31.5 - 36.5 g/dL Final     RDW 12/17/2021 13.0  10.0 - 15.0 % Final     Platelet Count 12/17/2021 186  150 - 450 10e3/uL Final     Sodium 12/17/2021 143  133 - 144 mmol/L Final     Potassium 12/17/2021 5.4* 3.4 - 5.3 mmol/L Final     Chloride 12/17/2021 109  94 - 109 mmol/L Final     Carbon Dioxide (CO2) 12/17/2021 29  20 - 32 mmol/L Final     Anion Gap 12/17/2021 5  3 - 14 mmol/L Final     Urea Nitrogen 12/17/2021 25  7 - 30 mg/dL Final     Creatinine 12/17/2021 1.10* 0.52 - 1.04 mg/dL Final     Calcium 12/17/2021 9.6  8.5 - 10.1 mg/dL Final     Glucose 12/17/2021 92  70 - 99 mg/dL Final     Albumin 12/17/2021 3.5  3.4  - 5.0 g/dL Final     Phosphorus 12/17/2021 3.2  2.5 - 4.5 mg/dL Final     GFR Estimate 12/17/2021 56* >60 mL/min/1.73m2 Final    As of July 11, 2021, eGFR is calculated by the CKD-EPI creatinine equation, without race adjustment. eGFR can be influenced by muscle mass, exercise, and diet. The reported eGFR is an estimation only and is only applicable if the renal function is stable.       Assessment/Plan:     CKD stage IIIa  Pt with baseline creatinine of 1.1-1.3 with eGFR in 40's and 50's which put her into CKD stage IIIa. UA with few RBC, but also noted squamous cells which is c/w contaminated sample. Pt also endorsed irritation at her genital region because of her diapers. Very minimal proteinuria on UPCR. No renal imaging noted in her chart. With given improvement/baseline renal function, will continue to monitor her with repeat UA. If her hematuria and or proteinuria is worsening, will evalaute further for possible underlying GN. That said, no systemic symptoms that are suggestive of underlying GN.  Her CKD likely multifactorial including chronic use of CNI (cyclosporine), poorly controlled BP, obesity and or renovascular disease.  Morbid obesity puts her at higher risk of secondary FSGS  - strict BP control  - maintain cyclosporine levels at goal  - will get her renal imaging to assess the anatomy   - encouraged to loose weight    Exertional dyspnea   Morbid obesity and physical deconditioning are likely causes as there is no associated chest tightness or chest pain with those symptoms. But her last ECHO was done in 2018.  - will repeat ECMO   - follow up with primary care    CASTILLO stage II in June 2021, now resolving   Pt with peak creatinine of 2.3 when she had ESBL- Ecoli infection, now treated. No urinary symptoms today. UA with no WBC.   - continue to monitor    Hypertension, not at goal of < 140 systolic :  Pt clinic BP is slightly high, was only on furosemide 20 mg twice a day.   - starting on amlodipine  10 mg daily   - not starting on ACE/ARB in setting of hyperkalemia.    Hyperkalemia   Noted her potassium is at 5.4. was on lasix 20 mg twice a day, but did not take her meds this morning  - instructed her to go home and take her medications which she agreed to.  - continue to monitor    Liver transplant in 2010  Liver failure 2/2 hep C infection s/p liver transplant with stable transplant function. currently on cyclosporine and MMF.  - continue immunosuppression    Hyperuricemia : noted her uric acid being high at 7.5. no gouty episodes lately   - will continue to monitor for now, if > 8 and or cause gout, will consider medical therapy.  - discussed life style modifications.    BMD :  Husam, phos, vit D and PTH are WNL  continue on calcitriol    Metabolic acidosis :  Bicarb is WNL    Anemia :  Hb is WNL    Other   Morbid obesity, encouraged to loose weight   Sleep apnea : referral made to sleep clinic.    Total time spent on the day of clinic visit was 70 min including 35 min face to face time with pt, chart review,care every where record review, GI note review, imaging and lab review, documentation as above.    Ce Arambula  Dept of Nephrology and Hypertension  [facility]

## 2021-12-19 PROBLEM — N39.0 RECURRENT UTI: Status: ACTIVE | Noted: 2021-07-12

## 2021-12-22 ENCOUNTER — OFFICE VISIT (OUTPATIENT)
Dept: OPHTHALMOLOGY | Facility: CLINIC | Age: 56
End: 2021-12-22
Payer: MEDICARE

## 2021-12-22 DIAGNOSIS — H47.20 OPTIC ATROPHY: Primary | ICD-10-CM

## 2021-12-22 DIAGNOSIS — H02.103 PUNCTAL ECTROPIONS OF BOTH EYES: ICD-10-CM

## 2021-12-22 DIAGNOSIS — H04.123 DRY EYES, BILATERAL: ICD-10-CM

## 2021-12-22 DIAGNOSIS — H53.40 VISUAL FIELD DEFECT: Primary | ICD-10-CM

## 2021-12-22 DIAGNOSIS — H53.40 VISUAL FIELD DEFECT: ICD-10-CM

## 2021-12-22 DIAGNOSIS — H02.106 PUNCTAL ECTROPIONS OF BOTH EYES: ICD-10-CM

## 2021-12-22 PROCEDURE — 92083 EXTENDED VISUAL FIELD XM: CPT | Performed by: OPHTHALMOLOGY

## 2021-12-22 PROCEDURE — 92014 COMPRE OPH EXAM EST PT 1/>: CPT | Mod: GC | Performed by: OPHTHALMOLOGY

## 2021-12-22 PROCEDURE — 92133 CPTRZD OPH DX IMG PST SGM ON: CPT | Performed by: OPHTHALMOLOGY

## 2021-12-22 ASSESSMENT — REFRACTION_MANIFEST
OS_ADD: +2.50
OD_SPHERE: -1.50
OD_ADD: +2.50
OS_SPHERE: -0.50
OD_AXIS: 028
OS_CYLINDER: SPHERE
OD_CYLINDER: +0.75

## 2021-12-22 ASSESSMENT — EXTERNAL EXAM - LEFT EYE: OS_EXAM: NORMAL

## 2021-12-22 ASSESSMENT — CUP TO DISC RATIO
OS_RATIO: 0.05
OD_RATIO: 0.1

## 2021-12-22 ASSESSMENT — TONOMETRY
OD_IOP_MMHG: 20
OS_IOP_MMHG: 20
IOP_METHOD: ICARE

## 2021-12-22 ASSESSMENT — VISUAL ACUITY
CORRECTION_TYPE: GLASSES
OD_CC: 20/40
OS_CC: 20/60

## 2021-12-22 ASSESSMENT — CONF VISUAL FIELD
METHOD: COUNTING FINGERS
OD_NORMAL: 1
OS_NORMAL: 1

## 2021-12-22 ASSESSMENT — EXTERNAL EXAM - RIGHT EYE: OD_EXAM: NORMAL

## 2021-12-22 NOTE — PROGRESS NOTES
Assessment & Plan     Flor Navarro is a 56 year old female with the following diagnoses:   1. Optic atrophy    2. Visual field defect    3. Dry eyes, bilateral    4. Punctal ectropions of both eyes         Patient was last seen on 8/4/2020 for evaluation of optic disc edema in setting of right frontal meningioma.  Last visit she did not have any optic disc edema or vision loss related to the meningioma.  She has decreased vision in the left eye secondary to prior episode of nonarteritic anterior ischemic optic neuropathy or other optic neuropathy given superior RNFL thinning and crowded nerve.  This has been stable for two years.      Patient reports her vision is stable. She has eye watering and mattering, gritty sensation itching.     MRI Brain 2017:  Impression:   1. No acute intracranial pathology. No acute infarct.  2. Sequelae of old left cerebral intraparenchymal hemorrhage.   3. Unchanged small right frontal meningioma.    Visual acuity stable in both eyes 20/40 right eye, 20/60 left eye.  Color vision 11/11 right eye and 11/11 left eye.  Intraocular pressure  20 right eye and 20 left eye.     Exam notable for bilateral lower eyelid laxity and lower lid ectropion with high tear lake and epiphora both eyes. Anterior segment exam PCIOL s/p YAG capsulotomy both eyes.  Fundus exam tilted nerves with small cup to disc ratio, extramacular drusen.    OCT RNFL stable in both eyes right eye 99 mean thickness (105 last visit), left eye 90 mean thickness (94 last visit).    Octopus automated 30 degree visual field GTOP visual field testing: right eye: 88% false positive unreliable, left eye 33% false positive central scotoma.       It is my impression that patient's visual acuity is stable, and the left nerve demonstrates stable optic neuropathy changes without progression of thinning.     She is bothered by bilateral epiphora, she does have bilateral lower eyelid laxity and mild punctal ectropion with high  tear lake and she is constantly wiping away her tears. I will refer her to oculoplastics for evaluation. I will also recommend artificial tears for grittiness and itching.            Attending Physician Attestation:  Complete documentation of historical and exam elements from today's encounter can be found in the full encounter summary report (not reduplicated in this progress note).  I personally obtained the chief complaint(s) and history of present illness.  I confirmed and edited as necessary the review of systems, past medical/surgical history, family history, social history, and examination findings as documented by others; and I examined the patient myself.  I personally reviewed the relevant tests, images, and reports as documented above.  I formulated and edited as necessary the assessment and plan and discussed the findings and management plan with the patient and family. I personally reviewed the ophthalmic test(s) associated with this encounter, agree with the interpretation(s) as documented by the resident/fellow, and have edited the corresponding report(s) as necessary.  - Nik Aguilar MD  Ophthalmology Resident, PGY-3  [facility]

## 2021-12-22 NOTE — TELEPHONE ENCOUNTER
FUTURE VISIT INFORMATION      FUTURE VISIT INFORMATION:    Date: 1/20/22    Time: 10:00am    Location: [facility]  REFERRAL INFORMATION:    Referring provider:  Dr. Barreto    Referring providers clinic:  [facility]    Reason for visit/diagnosis  Punctal ectropions of both eyes     RECORDS REQUESTED FROM:       Clinic name Comments Records Status Imaging Status   [facility] OV/referral 12/22/21  Ov/notes 3/7/11-12/22/21 EPIC

## 2021-12-27 ENCOUNTER — TELEPHONE (OUTPATIENT)
Dept: FAMILY MEDICINE | Facility: CLINIC | Age: 56
End: 2021-12-27
Payer: MEDICARE

## 2021-12-27 NOTE — TELEPHONE ENCOUNTER
[facility] phone call message - order or referral request for patient:     Order or referral being requested: Patient's son and representative from medica called to request Orders for weekly nurse home visits through [facility].       Additional Comments: [facility] Fax [phone]    OK to leave a message on voice mail? Yes    Primary language: Malay      needed? Yes    Call taken on December 27, 2021 at 4:41 PM by Eliza Carrington CMA

## 2021-12-27 NOTE — TELEPHONE ENCOUNTER
Patient's son and  requesting skilled nursing visit orders through [facility]. Patient just returned from [place]. RN routing to PCP to reorder. Routing to CC to advise.     Nato Tijerina RN     Sbft shows the defect in the staple line from her previous surgery as per other recent evaluations - their is no significant small bowel abnormality - the abnormal small bowel seen on previous scan may be related to reconnection from her gastric bypass but does not appear obstructed.  Has she been set up to see dr martinez to discuss dilation of the anastomosis? I think this will help the most with her symptoms - no impedance to her swallowing was noted on the sbft

## 2021-12-28 NOTE — TELEPHONE ENCOUNTER
12/28/21 CC to fax order to [facility] once received from .    Shahla Shepherd  Care Coordinator

## 2022-01-03 ENCOUNTER — OFFICE VISIT (OUTPATIENT)
Dept: FAMILY MEDICINE | Facility: CLINIC | Age: 57
End: 2022-01-03
Payer: MEDICARE

## 2022-01-03 VITALS
HEART RATE: 74 BPM | SYSTOLIC BLOOD PRESSURE: 103 MMHG | WEIGHT: 231 LBS | DIASTOLIC BLOOD PRESSURE: 72 MMHG | BODY MASS INDEX: 43.61 KG/M2 | OXYGEN SATURATION: 96 % | RESPIRATION RATE: 16 BRPM | HEIGHT: 61 IN | TEMPERATURE: 98.1 F

## 2022-01-03 DIAGNOSIS — E66.01 OBESITY, CLASS III, BMI 40-49.9 (MORBID OBESITY) (H): ICD-10-CM

## 2022-01-03 DIAGNOSIS — D32.9 MENINGIOMA (H): Primary | Chronic | ICD-10-CM

## 2022-01-03 DIAGNOSIS — H57.9 GRITTY EYE: ICD-10-CM

## 2022-01-03 DIAGNOSIS — I63.9 HEMIPLEGIA OF RIGHT DOMINANT SIDE DUE TO INFARCTION OF BRAIN (H): ICD-10-CM

## 2022-01-03 DIAGNOSIS — Z71.89 COMPLEX CARE COORDINATION: ICD-10-CM

## 2022-01-03 DIAGNOSIS — G81.91 HEMIPLEGIA OF RIGHT DOMINANT SIDE DUE TO INFARCTION OF BRAIN (H): ICD-10-CM

## 2022-01-03 DIAGNOSIS — H04.223 EPIPHORA DUE TO INSUFFICIENT DRAINAGE OF BOTH SIDES: ICD-10-CM

## 2022-01-03 PROCEDURE — 99215 OFFICE O/P EST HI 40 MIN: CPT | Performed by: FAMILY MEDICINE

## 2022-01-03 RX ORDER — CARBOXYMETHYLCELLULOSE SODIUM 5 MG/ML
1 SOLUTION/ DROPS OPHTHALMIC 4 TIMES DAILY PRN
Qty: 30 EACH | Refills: 4 | Status: SHIPPED | OUTPATIENT
Start: 2022-01-03 | End: 2022-04-22

## 2022-01-03 ASSESSMENT — SOCIAL DETERMINANTS OF HEALTH (SDOH)
HOW OFTEN DO YOU ATTEND CHURCH OR RELIGIOUS SERVICES?: NEVER
HOW OFTEN DO YOU GET TOGETHER WITH FRIENDS OR RELATIVES?: ONCE A WEEK
DO YOU BELONG TO ANY CLUBS OR ORGANIZATIONS SUCH AS CHURCH GROUPS UNIONS, FRATERNAL OR ATHLETIC GROUPS, OR SCHOOL GROUPS?: NO
IN A TYPICAL WEEK, HOW MANY TIMES DO YOU TALK ON THE PHONE WITH FAMILY, FRIENDS, OR NEIGHBORS?: ONCE A WEEK
HOW OFTEN DO YOU ATTENT MEETINGS OF THE CLUB OR ORGANIZATION YOU BELONG TO?: NEVER

## 2022-01-03 ASSESSMENT — MIFFLIN-ST. JEOR: SCORE: 1564.31

## 2022-01-03 NOTE — PROGRESS NOTES
Assessment & Plan     Meningioma (H)  Incidental diagnosis due to problem number 2  Stable, just saw opthalmologist, had many questions. No repeat MRI needed unless new symptoms.    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 Spoke with son Carleen on phone who gave contact information for  through [facility] [place], entered in specialty comments. [facility] home care order submitted - see order for further details.    4/2019 pt eligible for 6.25 hrs daily PCA services.   Care Team:  Deborah Ruiz, RN-BC ph. [phone]) insurance RN case manager   Criss christianson mgr through [facility] [phone]  Mandie -  - [phone]  Home care via [facility]  Korey Valdez can be of assistance with care coordination   Theresa Arambula - nephrology   Laron Anne - hepatology   Nik Barreto - ophthalmology   [facility]/Elise - podiatry  Speaks Wolof  Transportation is a barrier - medical rides  Confusion with pharmacies x many years - rec [facility] for mailing and to decrease confusion/repeats/insurance denials  - Home Care Nursing Referral      Gritty eye, Epiphora due to insufficient drainage of both sides  Reviewed opthomology notes with patient who has been confused about suggested surgery to manage lower lid laxity and tear lake. Confirmed she has oculoplastics appointment on 1/20. Attempted to educate outside of eye surgery instead of inside with laser surgery for vision as she had previously. She will maintain this appointment to discuss options. Informed pt son via phone.   For grittiness:  - carboxymethylcellulose PF (REFRESH PLUS) 0.5 % ophthalmic solution  Dispense: 30 each; Refill: 4    Obesity, Class III, BMI 40-49.9 (morbid obesity) (H)  Today states she wants to pursue weight loss surgery. She has not seen   Tarsha since 2019. Will refer to discuss options in complex high risk patient  - Comprehensive Weight Management        41 minutes spent on the date of the encounter doing chart review, review of outside records, review of test results, interpretation of tests, patient visit, documentation and discussion with other provider(s)        Follow up 2 and 4 wks     The information in this document, created by the medical scribe for me, accurately reflects the services I personally performed and the decisions made by me. I have reviewed and approved this document for accuracy prior to leaving the patient care area.  Karely Sierra MD  10:03 AM, 01/03/22  [facility] AMI Ray is a 56 year old who presents for the following health issues accompanied by her .    HPI     Medication  The patient was prescribed eye drops two weeks ago by Dr. Moore in opthalmology which she has not yet received. Insurance may not cover eye drops, in which case she wants [employer] because she has a coupon. She reports that opthalmology recommended a small surgery under her eye because of excessive tearing, but she refused and wants reassurance this is an acceptable option. She has run out of calcium and needs her prescription to be renewed.     Weight  She reports that she does not eat very much and yet still maintains a high weight which she would like to decrease. She wants surgery to deal with this and is resistant to medicine.    Hair loss    She is concerned about loss of hair on the top of her head    Review of Systems   Positive for: Excessive tears, overweight, hair loss  Negative for:    This document serves as a record of the services and decisions personally performed and made by Karely Sierra MD. It was created on his/her behalf by Nestor Bueno, a trained medical scribe. The creation of this document is based the provider's statements to the medical scribe.  Nelson Bueno 10:03 AM,  "January 3, 2022        Objective    /72   Pulse 74   Temp 98.1  F (36.7  C) (Oral)   Resp 16   Ht 1.54 m (5' 0.63\")   Wt 104.8 kg (231 lb)   LMP  (LMP Unknown)   SpO2 96%   BMI 44.18 kg/m    Body mass index is 44.18 kg/m .  Physical Exam   GENERAL: healthy, alert and no distress  EYES: Tearing present on the right  PSYCH: Speech is fast with no latency, not pressured but carries sense of urgency with frequent interruption and speaks simultaneously with . Struggle to remember question during the visit. Seems distractible and somewhat disorganized. Unclear if this is loosening of associations vs distress.  clarifies statements many times.           "

## 2022-01-03 NOTE — Clinical Note
Needs to restart home care. Saw you fielded a phone call. Couldn't find Deborah Ruiz in epic. Thanks   c

## 2022-01-03 NOTE — PATIENT INSTRUCTIONS
Medication    1. I have renewed your prescription of calcium with Vitamin D    2. I have sent an order for your eye drops      Weight    1. We have referred you to weight management    Follow up with me on 1/14/2022

## 2022-01-03 NOTE — TELEPHONE ENCOUNTER
1/3/22 Received Home Care order from  and faxed to [facility] [phone], per request.    Shahla Shepherd  Care Coordinator

## 2022-01-19 ENCOUNTER — ANCILLARY PROCEDURE (OUTPATIENT)
Dept: ULTRASOUND IMAGING | Facility: CLINIC | Age: 57
End: 2022-01-19
Attending: INTERNAL MEDICINE
Payer: MEDICARE

## 2022-01-19 DIAGNOSIS — G47.30 SLEEP APNEA, UNSPECIFIED TYPE: ICD-10-CM

## 2022-01-19 PROCEDURE — 76770 US EXAM ABDO BACK WALL COMP: CPT | Performed by: RADIOLOGY

## 2022-01-20 ENCOUNTER — PRE VISIT (OUTPATIENT)
Dept: OPHTHALMOLOGY | Facility: CLINIC | Age: 57
End: 2022-01-20

## 2022-01-20 ENCOUNTER — OFFICE VISIT (OUTPATIENT)
Dept: OPHTHALMOLOGY | Facility: CLINIC | Age: 57
End: 2022-01-20
Payer: MEDICARE

## 2022-01-20 DIAGNOSIS — H02.103 PUNCTAL ECTROPIONS OF BOTH EYES: Primary | ICD-10-CM

## 2022-01-20 DIAGNOSIS — H02.106 PUNCTAL ECTROPIONS OF BOTH EYES: Primary | ICD-10-CM

## 2022-01-20 PROCEDURE — 99214 OFFICE O/P EST MOD 30 MIN: CPT | Mod: GC | Performed by: OPHTHALMOLOGY

## 2022-01-20 ASSESSMENT — LAGOPHTHALMOS
OS_LAGOPHTHALMOS: 0
OD_LAGOPHTHALMOS: 0

## 2022-01-20 ASSESSMENT — VISUAL ACUITY
OS_CC: 20/60
OS_CC+: +2
METHOD: SNELLEN - LINEAR
OD_CC: 20/40

## 2022-01-20 ASSESSMENT — TONOMETRY
OD_IOP_MMHG: 14
IOP_METHOD: ICARE
OS_IOP_MMHG: 14

## 2022-01-20 ASSESSMENT — CONF VISUAL FIELD
OS_NORMAL: 1
METHOD: COUNTING FINGERS
OD_NORMAL: 1

## 2022-01-20 ASSESSMENT — EXTERNAL EXAM - LEFT EYE: OS_EXAM: NORMAL

## 2022-01-20 ASSESSMENT — EXTERNAL EXAM - RIGHT EYE: OD_EXAM: NORMAL
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
## 2022-01-20 NOTE — NURSING NOTE
Chief Complaints and History of Present Illnesses   Patient presents with     Consult For     Chief Complaint(s) and History of Present Illness(es)     Consult For     Laterality: both eyes    Onset: gradual    Onset: years ago    Course: stable    Associated symptoms: tearing and itching.  Negative for eye pain, dryness, flashes and floaters    Pain scale: 0/10              Comments     New Pt here for consult with oculoplastics for bilateral lower eyelid laxity and mild punctal ectropion with high tear lake and she is constantly wiping away her tears.  A lot of tears in cold weather and when she is reading and writing.    Refresh plus EB 4x/day  Pataday BE daily    MALIK Carballo January 20, 2022 10:13 AM
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
## 2022-01-20 NOTE — PATIENT INSTRUCTIONS
Use artificial tears 3-4 x daily to help with eye comfort.  Use warm compresses 2 x daily. Take a wash cloth, make it hot with water and hold it over your eyelids for 2-3 minutes 2 x daily.  You can try Zyrtec, which is over the counter for allergies.

## 2022-01-28 ENCOUNTER — OFFICE VISIT (OUTPATIENT)
Dept: FAMILY MEDICINE | Facility: CLINIC | Age: 57
End: 2022-01-28
Payer: MEDICARE

## 2022-01-28 VITALS
TEMPERATURE: 97.5 F | DIASTOLIC BLOOD PRESSURE: 89 MMHG | BODY MASS INDEX: 44.76 KG/M2 | OXYGEN SATURATION: 92 % | SYSTOLIC BLOOD PRESSURE: 132 MMHG | WEIGHT: 234 LBS | RESPIRATION RATE: 16 BRPM | HEART RATE: 74 BPM

## 2022-01-28 DIAGNOSIS — L65.9 HAIR LOSS: ICD-10-CM

## 2022-01-28 DIAGNOSIS — I12.9 HYPERTENSION, RENAL: ICD-10-CM

## 2022-01-28 DIAGNOSIS — H04.203 TEARING EYES: ICD-10-CM

## 2022-01-28 DIAGNOSIS — M1A.3710 CHRONIC GOUT DUE TO RENAL IMPAIRMENT INVOLVING TOE OF RIGHT FOOT WITHOUT TOPHUS: ICD-10-CM

## 2022-01-28 DIAGNOSIS — N18.31 STAGE 3A CHRONIC KIDNEY DISEASE (H): ICD-10-CM

## 2022-01-28 DIAGNOSIS — R25.1 TREMOR OF RIGHT HAND: Primary | ICD-10-CM

## 2022-01-28 LAB
ANION GAP SERPL CALCULATED.3IONS-SCNC: 5 MMOL/L (ref 3–14)
BUN SERPL-MCNC: 25 MG/DL (ref 7–30)
CALCIUM SERPL-MCNC: 9.7 MG/DL (ref 8.5–10.1)
CHLORIDE BLD-SCNC: 110 MMOL/L (ref 94–109)
CO2 SERPL-SCNC: 26 MMOL/L (ref 20–32)
CREAT SERPL-MCNC: 1.11 MG/DL (ref 0.52–1.04)
GFR SERPL CREATININE-BSD FRML MDRD: 58 ML/MIN/1.73M2
GLUCOSE BLD-MCNC: 90 MG/DL (ref 70–99)
POTASSIUM BLD-SCNC: 5 MMOL/L (ref 3.4–5.3)
SODIUM SERPL-SCNC: 141 MMOL/L (ref 133–144)
TSH SERPL DL<=0.005 MIU/L-ACNC: 0.57 MU/L (ref 0.4–4)

## 2022-01-28 PROCEDURE — 82306 VITAMIN D 25 HYDROXY: CPT | Performed by: FAMILY MEDICINE

## 2022-01-28 PROCEDURE — 80048 BASIC METABOLIC PNL TOTAL CA: CPT | Performed by: FAMILY MEDICINE

## 2022-01-28 PROCEDURE — 99214 OFFICE O/P EST MOD 30 MIN: CPT | Performed by: FAMILY MEDICINE

## 2022-01-28 PROCEDURE — 84443 ASSAY THYROID STIM HORMONE: CPT | Performed by: FAMILY MEDICINE

## 2022-01-28 PROCEDURE — 36415 COLL VENOUS BLD VENIPUNCTURE: CPT | Performed by: FAMILY MEDICINE

## 2022-01-28 RX ORDER — FEBUXOSTAT 40 MG/1
40 TABLET, FILM COATED ORAL DAILY
Qty: 90 TABLET | Refills: 3 | Status: SHIPPED | OUTPATIENT
Start: 2022-01-28 | End: 2023-04-05

## 2022-01-28 RX ORDER — CETIRIZINE HYDROCHLORIDE 10 MG/1
10 TABLET ORAL DAILY
Qty: 90 TABLET | Refills: 3 | Status: SHIPPED | OUTPATIENT
Start: 2022-01-28 | End: 2023-03-03

## 2022-01-28 SDOH — ECONOMIC STABILITY: TRANSPORTATION INSECURITY
IN THE PAST 12 MONTHS, HAS THE LACK OF TRANSPORTATION KEPT YOU FROM MEDICAL APPOINTMENTS OR FROM GETTING MEDICATIONS?: YES

## 2022-01-28 SDOH — ECONOMIC STABILITY: TRANSPORTATION INSECURITY
IN THE PAST 12 MONTHS, HAS LACK OF TRANSPORTATION KEPT YOU FROM MEETINGS, WORK, OR FROM GETTING THINGS NEEDED FOR DAILY LIVING?: YES

## 2022-01-28 NOTE — LETTER
January 31, 2022    Flor Navarro  [address]  [facility] [address]    Dear Flor,    Thank you for getting your care at [facility]. Please see below for your test results. Everything looks good. I don't see a reason for the tremor of your left hand. I expect it will continue to improve.     Resulted Orders   Basic metabolic panel   Result Value Ref Range    Sodium 141 133 - 144 mmol/L    Potassium 5.0 3.4 - 5.3 mmol/L    Chloride 110 (H) 94 - 109 mmol/L    Carbon Dioxide (CO2) 26 20 - 32 mmol/L    Anion Gap 5 3 - 14 mmol/L    Urea Nitrogen 25 7 - 30 mg/dL    Creatinine 1.11 (H) 0.52 - 1.04 mg/dL    Calcium 9.7 8.5 - 10.1 mg/dL    Glucose 90 70 - 99 mg/dL    GFR Estimate 58 (L) >60 mL/min/1.73m2      Comment:      Effective December 21, 2021 eGFRcr in adults is calculated using the 2021 CKD-EPI creatinine equation which includes age and gender (Justice et al., [facility], DOI: 10.1056/LFUQpy8068333)   TSH with free T4 reflex   Result Value Ref Range    TSH 0.57 0.40 - 4.00 mU/L   Vitamin D Level   Result Value Ref Range    Vitamin D, Total (25-Hydroxy) 48 20 - 75 ug/L    Narrative    Season, race, dietary intake, and treatment affect the concentration of 25-hydroxy-Vitamin D. Values may decrease during winter months and increase during summer months. Values 20-29 ug/L may indicate Vitamin D insufficiency and values <20 ug/L may indicate Vitamin D deficiency.    Vitamin D determination is routinely performed by an immunoassay specific for 25 hydroxyvitamin D3.  If an individual is on vitamin D2(ergocalciferol) supplementation, please specify 25 OH vitamin D2 and D3 level determination by LCMSMS test VITD23.       Sincerely,    Karely Sierra MD

## 2022-01-28 NOTE — NURSING NOTE
Due to patient being non-English speaking/uses sign language, an  was used for this visit. Only for face-to-face interpretation by an external agency, date and length of interpretation can be found on the scanned worksheet.     name: Mango  Agency: [employer]  Language: Turkmen   Telephone number: [phone]  Type of interpretation: Telephone, spoken

## 2022-01-28 NOTE — PATIENT INSTRUCTIONS
Medications  1. We will have our pharmacy team contact your pharmacy to make sure they are doing the right thing.    2. Refilled Febuxostat for gout.    3. Refilled Cetirizine for eyes as instructed by ophthalmologist.    Blood pressure    1. Well controlled, continue your current medications    Tremor    1. Probably not concerning, labs taken today.    Hair loss    1. Probably related to age and Hijab, will check labs.

## 2022-01-28 NOTE — PROGRESS NOTES
Assessment & Plan     Tremor of right hand  Mild, improving by history, but did have recent hyperkalemia (mild). She was concerned it was stroke which does not seem to be the case.   - Basic metabolic panel  - TSH with free T4 reflex    Hair loss  She does not appear to be on any meds that contribute to telogen effluvium, however she likely has hair loss I suspect due to significantly laking dietary intake. Vitamin D labs taken for low p.o. intake with coexisting CKD3.    Stage 3a chronic kidney disease (H)  Vit d pending   Repeat bmp due to hyperkalemia     Chronic gout due to renal impairment involving toe of right foot without tophus  Doesn't appear she has been taking. Asking pharmD to evaluate.   - febuxostat (ULORIC) 40 MG TABS tablet  Dispense: 90 tablet; Refill: 3    Tearing eyes  rec by optho  - cetirizine (ZYRTEC) 10 MG tablet  Dispense: 90 tablet; Refill: 3    RENAL HYPERTENSION  Controlled for the first time on LASIX BID and amlodipine 10. NO Ace due to hyperlipidemia.        31 minutes spent on the date of the encounter doing chart review, history and exam, documentation and further activities per the note      The information in this document, created by the medical scribe for me, accurately reflects the services I personally performed and the decisions made by me. I have reviewed and approved this document for accuracy prior to leaving the patient care area.  Karely Sierra MD  12:55 PM, 01/28/22  [facility] AMI Ray is a 56 year old who presents for the following health issues accompanied by her Kyrgyz  via phone.    HPI   Eyes  The patient reports that they were told by opthalmology that they cannot receive their needed prescription until October. She says she does not get enough sun.    Shakiness  The patient additionally reports that within the last month or so, she has had shaky hands such that she double taps her phone. She affirms that she only  notices when using her phone. She felt unwell recently and she worries this is related to stroke. She only drinks a cup of tea around three times a week.    Pain  The patient reports only slight pain in her left toe, but strong pain in her right leg.    Hair Loss  Denies loss of body hair, or changes to the fingernail. She attests that she does not wear a hijab around the house but always wears one out of the house. She attests to thinning hair which concerns her.    Diet  The patient's son worries that she does not eat, and she affirms that she only had a cracker with peanut butter for breakfast. She admits that she frequently eats dry fruits like dates. She was told by someone at some point to stop eating certain foods, but has not followed up with this person. She wants to wait for her son Carleen to return from [place] before going to see weight loss.    The patient complains of their pharmacy being unreliable.    Patient denies any respiratory issues, but wants to have her throat looked at.    Review of Systems   Positive for: shakiness, leg pain, thinning hair, low dietary intake, mild toe pain  Negative for: Respirator issues, body hair    This document serves as a record of the services and decisions personally performed and made by Karely Sierra MD. It was created on his/her behalf by Nestor Bueno, a trained medical scribe. The creation of this document is based the provider's statements to the medical scribe.  Nelson Bueno 12:56 PM, January 28, 2022        Objective    /89   Pulse 74   Temp 97.5  F (36.4  C) (Oral)   Resp 16   Wt 106.1 kg (234 lb)   LMP  (LMP Unknown)   SpO2 92%   BMI 44.76 kg/m    Physical Exam   GENERAL: healthy, alert and no distress  HENT: Oral pharnyx is clear, tonsils normal in size tongue normal, oral cavity w/o lesions  SKIN: Nails without pitting or keratosis  NEURO: Very fine right sided tremor, low amplitude, nothing on the left.  finger-nose-finger  "bilaterally symmetric, missing target at fastest speed on the right.  No pronator drift, negative romberg. Face symmetric, shoulder shrug strong, normal speech  HEAD: No flaking or scarring on scalp. Pretty aggressive thinning along frontal, parietal, and vertex of the scalp, less so posterially  PSYCH: Rate of speech is fast and voluminous, talks over interpretere, switches between English and Uzbek frequently. Expresses distrust of pharmacy today again, \"pharmacy no good.\" Mildly concerning for stereotyped delusion vs paranoia, it does appear pharmacy is sending everything out per my review. Mood is good. Thought process scattered.    Lab on 12/17/2021   Component Date Value Ref Range Status     Vitamin D, Total (25-Hydroxy) 12/17/2021 55  20 - 75 ug/L Final     Parathyroid Hormone Intact 12/17/2021 33  18 - 80 pg/mL Final     Color Urine 12/17/2021 Yellow  Colorless, Straw, Light Yellow, Yellow Final     Appearance Urine 12/17/2021 Clear  Clear Final     Glucose Urine 12/17/2021 Negative  Negative mg/dL Final     Bilirubin Urine 12/17/2021 Negative  Negative Final     Ketones Urine 12/17/2021 Negative  Negative mg/dL Final     Specific Gravity Urine 12/17/2021 1.014  1.003 - 1.035 Final     Blood Urine 12/17/2021 Negative  Negative Final     pH Urine 12/17/2021 6.0  5.0 - 7.0 Final     Protein Albumin Urine 12/17/2021 Negative  Negative mg/dL Final     Urobilinogen Urine 12/17/2021 Normal  Normal, 2.0 mg/dL Final     Nitrite Urine 12/17/2021 Negative  Negative Final     Leukocyte Esterase Urine 12/17/2021 Negative  Negative Final     RBC Urine 12/17/2021 4* <=2 /HPF Final     WBC Urine 12/17/2021 1  <=5 /HPF Final     Squamous Epithelials Urine 12/17/2021 <1  <=1 /HPF Final     Hyaline Casts Urine 12/17/2021 1  <=2 /LPF Final     Total Protein Random Urine g/L 12/17/2021 0.31  g/L Final    The reference range has not been established for total protein in random urine samples.  The result should be integrated " into the clinical context for interpretation.     Total Protein Urine g/gr Creatinine 12/17/2021 0.37* 0.00 - 0.20 g/g Cr Final     Creatinine Urine mg/dL 12/17/2021 84  mg/dL Final     WBC Count 12/17/2021 4.5  4.0 - 11.0 10e3/uL Final     RBC Count 12/17/2021 4.42  3.80 - 5.20 10e6/uL Final     Hemoglobin 12/17/2021 13.2  11.7 - 15.7 g/dL Final     Hematocrit 12/17/2021 42.0  35.0 - 47.0 % Final     MCV 12/17/2021 95  78 - 100 fL Final     MCH 12/17/2021 29.9  26.5 - 33.0 pg Final     MCHC 12/17/2021 31.4* 31.5 - 36.5 g/dL Final     RDW 12/17/2021 13.0  10.0 - 15.0 % Final     Platelet Count 12/17/2021 186  150 - 450 10e3/uL Final     Sodium 12/17/2021 143  133 - 144 mmol/L Final     Potassium 12/17/2021 5.4* 3.4 - 5.3 mmol/L Final     Chloride 12/17/2021 109  94 - 109 mmol/L Final     Carbon Dioxide (CO2) 12/17/2021 29  20 - 32 mmol/L Final     Anion Gap 12/17/2021 5  3 - 14 mmol/L Final     Urea Nitrogen 12/17/2021 25  7 - 30 mg/dL Final     Creatinine 12/17/2021 1.10* 0.52 - 1.04 mg/dL Final     Calcium 12/17/2021 9.6  8.5 - 10.1 mg/dL Final     Glucose 12/17/2021 92  70 - 99 mg/dL Final     Albumin 12/17/2021 3.5  3.4 - 5.0 g/dL Final     Phosphorus 12/17/2021 3.2  2.5 - 4.5 mg/dL Final     GFR Estimate 12/17/2021 56* >60 mL/min/1.73m2 Final    As of July 11, 2021, eGFR is calculated by the CKD-EPI creatinine equation, without race adjustment. eGFR can be influenced by muscle mass, exercise, and diet. The reported eGFR is an estimation only and is only applicable if the renal function is stable.     Uric Acid 12/17/2021 7.5* 2.6 - 6.0 mg/dL Final

## 2022-01-28 NOTE — Clinical Note
Hi friends. Flor convinced (again) that pharmacy not sending her meds. Always possible, hoping you could call next week and check on this, and specifically, the fuboxestat - she was on for gout, her urate still high, and she should have run out of refills by my read of the refill history -   So I suspect that one fell off.   Thanks for any advice.   c

## 2022-01-29 LAB — DEPRECATED CALCIDIOL+CALCIFEROL SERPL-MC: 48 UG/L (ref 20–75)
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
## 2022-02-10 ENCOUNTER — DOCUMENTATION ONLY (OUTPATIENT)
Dept: FAMILY MEDICINE | Facility: CLINIC | Age: 57
End: 2022-02-10
Payer: MEDICARE

## 2022-02-11 ENCOUNTER — DOCUMENTATION ONLY (OUTPATIENT)
Dept: FAMILY MEDICINE | Facility: CLINIC | Age: 57
End: 2022-02-11

## 2022-02-11 NOTE — PROGRESS NOTES
"When opening a documentation only encounter, be sure to enter in \"Chief Complaint\" Forms and in \" Comments\" Title of form, description if needed.    Flor is a 56 year old  female  Form received via: Fax  Form now resides in: Provider Ready    Ronit Schneider MA                  "

## 2022-02-17 ENCOUNTER — TELEPHONE (OUTPATIENT)
Dept: GASTROENTEROLOGY | Facility: CLINIC | Age: 57
End: 2022-02-17
Payer: MEDICARE

## 2022-02-17 NOTE — TELEPHONE ENCOUNTER
Deborah called asking when is patient to be scheduled next with Dr Mackenzie as Follow-up, I looked into the chart and his last note from JUly 2021 states 6 month Follow-up (this would have been Jan 2022).  The next available open date is Tues, April 19, patient is scheduled w/labs and son is arranging her ride here.    Deborah [phone] would like for you to call her back when you have a chance.

## 2022-02-28 ENCOUNTER — DOCUMENTATION ONLY (OUTPATIENT)
Dept: FAMILY MEDICINE | Facility: CLINIC | Age: 57
End: 2022-02-28

## 2022-03-11 DIAGNOSIS — Z94.4 LIVER REPLACED BY TRANSPLANT (H): ICD-10-CM

## 2022-03-11 NOTE — TELEPHONE ENCOUNTER
"Request for medication refill:    cycloSPORINE modified (GENERIC EQUIVALENT) 25 MG capsule    Providers if patient needs an appointment and you are willing to give a one month supply please refill for one month and  send a letter/MyChart using \".SMILLIMITEDREFILL\" .smillimited and route chart to \"[facility] \" (Giving one month refill in non controlled medications is strongly recommended before denial)    If refill has been denied, meaning absolutely no refills without visit, please complete the smart phrase \".smirxrefuse\" and route it to the \"[facility] MED REFILLS\"  pool to inform the patient and the pharmacy.    Litzy Gregory, CMA        "

## 2022-03-14 RX ORDER — CYCLOSPORINE 25 MG/1
CAPSULE, LIQUID FILLED ORAL
Qty: 450 CAPSULE | Refills: 3 | Status: SHIPPED | OUTPATIENT
Start: 2022-03-14 | End: 2022-06-10

## 2022-03-16 ENCOUNTER — APPOINTMENT (OUTPATIENT)
Dept: CT IMAGING | Facility: CLINIC | Age: 57
DRG: 389 | End: 2022-03-16
Attending: EMERGENCY MEDICINE
Payer: MEDICARE

## 2022-03-16 ENCOUNTER — HOSPITAL ENCOUNTER (INPATIENT)
Facility: CLINIC | Age: 57
LOS: 2 days | Discharge: HOME | DRG: 389 | End: 2022-03-18
Attending: EMERGENCY MEDICINE | Admitting: INTERNAL MEDICINE
Payer: MEDICARE

## 2022-03-16 DIAGNOSIS — Z20.822 COVID-19 RULED OUT BY LABORATORY TESTING: ICD-10-CM

## 2022-03-16 DIAGNOSIS — K56.609 SBO (SMALL BOWEL OBSTRUCTION) (H): ICD-10-CM

## 2022-03-16 LAB
ALBUMIN SERPL-MCNC: 3.7 G/DL (ref 3.4–5)
ALBUMIN UR-MCNC: 20 MG/DL
ALP SERPL-CCNC: 92 U/L (ref 40–150)
ALT SERPL W P-5'-P-CCNC: 33 U/L (ref 0–50)
ANION GAP SERPL CALCULATED.3IONS-SCNC: 3 MMOL/L (ref 3–14)
APPEARANCE UR: CLEAR
AST SERPL W P-5'-P-CCNC: 29 U/L (ref 0–45)
BACTERIA #/AREA URNS HPF: ABNORMAL /HPF
BASOPHILS # BLD AUTO: 0 10E3/UL (ref 0–0.2)
BASOPHILS NFR BLD AUTO: 0 %
BILIRUB SERPL-MCNC: 0.8 MG/DL (ref 0.2–1.3)
BILIRUB UR QL STRIP: NEGATIVE
BUN SERPL-MCNC: 26 MG/DL (ref 7–30)
CALCIUM SERPL-MCNC: 9.3 MG/DL (ref 8.5–10.1)
CHLORIDE BLD-SCNC: 105 MMOL/L (ref 94–109)
CO2 SERPL-SCNC: 31 MMOL/L (ref 20–32)
COLOR UR AUTO: ABNORMAL
CREAT SERPL-MCNC: 1.37 MG/DL (ref 0.52–1.04)
EOSINOPHIL # BLD AUTO: 0.1 10E3/UL (ref 0–0.7)
EOSINOPHIL NFR BLD AUTO: 2 %
ERYTHROCYTE [DISTWIDTH] IN BLOOD BY AUTOMATED COUNT: 13.5 % (ref 10–15)
GFR SERPL CREATININE-BSD FRML MDRD: 45 ML/MIN/1.73M2
GLUCOSE BLD-MCNC: 118 MG/DL (ref 70–99)
GLUCOSE UR STRIP-MCNC: NEGATIVE MG/DL
HCT VFR BLD AUTO: 44.3 % (ref 35–47)
HGB BLD-MCNC: 14 G/DL (ref 11.7–15.7)
HGB UR QL STRIP: ABNORMAL
HOLD SPECIMEN: NORMAL
HOLD SPECIMEN: NORMAL
IMM GRANULOCYTES # BLD: 0 10E3/UL
IMM GRANULOCYTES NFR BLD: 1 %
KETONES UR STRIP-MCNC: NEGATIVE MG/DL
LEUKOCYTE ESTERASE UR QL STRIP: ABNORMAL
LIPASE SERPL-CCNC: 155 U/L (ref 73–393)
LYMPHOCYTES # BLD AUTO: 1.8 10E3/UL (ref 0.8–5.3)
LYMPHOCYTES NFR BLD AUTO: 25 %
MCH RBC QN AUTO: 29.2 PG (ref 26.5–33)
MCHC RBC AUTO-ENTMCNC: 31.6 G/DL (ref 31.5–36.5)
MCV RBC AUTO: 92 FL (ref 78–100)
MONOCYTES # BLD AUTO: 0.4 10E3/UL (ref 0–1.3)
MONOCYTES NFR BLD AUTO: 5 %
NEUTROPHILS # BLD AUTO: 4.9 10E3/UL (ref 1.6–8.3)
NEUTROPHILS NFR BLD AUTO: 67 %
NITRATE UR QL: NEGATIVE
NRBC # BLD AUTO: 0 10E3/UL
NRBC BLD AUTO-RTO: 0 /100
PH UR STRIP: 7 [PH] (ref 5–7)
PLATELET # BLD AUTO: 167 10E3/UL (ref 150–450)
POTASSIUM BLD-SCNC: 4.9 MMOL/L (ref 3.4–5.3)
PROT SERPL-MCNC: 8.2 G/DL (ref 6.8–8.8)
RBC # BLD AUTO: 4.8 10E6/UL (ref 3.8–5.2)
RBC URINE: 8 /HPF
SARS-COV-2 RNA RESP QL NAA+PROBE: NEGATIVE
SODIUM SERPL-SCNC: 139 MMOL/L (ref 133–144)
SP GR UR STRIP: 1.01 (ref 1–1.03)
SQUAMOUS EPITHELIAL: 1 /HPF
UROBILINOGEN UR STRIP-MCNC: NORMAL MG/DL
WBC # BLD AUTO: 7.2 10E3/UL (ref 4–11)
WBC URINE: 33 /HPF

## 2022-03-16 PROCEDURE — 74176 CT ABD & PELVIS W/O CONTRAST: CPT | Mod: 26 | Performed by: RADIOLOGY

## 2022-03-16 PROCEDURE — 99223 1ST HOSP IP/OBS HIGH 75: CPT | Mod: GC | Performed by: INTERNAL MEDICINE

## 2022-03-16 PROCEDURE — 80053 COMPREHEN METABOLIC PANEL: CPT | Performed by: EMERGENCY MEDICINE

## 2022-03-16 PROCEDURE — 99285 EMERGENCY DEPT VISIT HI MDM: CPT | Mod: 25 | Performed by: EMERGENCY MEDICINE

## 2022-03-16 PROCEDURE — 120N000002 HC R&B MED SURG/OB UMMC

## 2022-03-16 PROCEDURE — 36415 COLL VENOUS BLD VENIPUNCTURE: CPT | Performed by: EMERGENCY MEDICINE

## 2022-03-16 PROCEDURE — 81003 URINALYSIS AUTO W/O SCOPE: CPT | Performed by: EMERGENCY MEDICINE

## 2022-03-16 PROCEDURE — 94640 AIRWAY INHALATION TREATMENT: CPT

## 2022-03-16 PROCEDURE — 250N000012 HC RX MED GY IP 250 OP 636 PS 637: Performed by: HOSPITALIST

## 2022-03-16 PROCEDURE — 87086 URINE CULTURE/COLONY COUNT: CPT | Performed by: EMERGENCY MEDICINE

## 2022-03-16 PROCEDURE — 250N000012 HC RX MED GY IP 250 OP 636 PS 637: Performed by: STUDENT IN AN ORGANIZED HEALTH CARE EDUCATION/TRAINING PROGRAM

## 2022-03-16 PROCEDURE — 99285 EMERGENCY DEPT VISIT HI MDM: CPT | Performed by: EMERGENCY MEDICINE

## 2022-03-16 PROCEDURE — 99223 1ST HOSP IP/OBS HIGH 75: CPT | Mod: AI | Performed by: INTERNAL MEDICINE

## 2022-03-16 PROCEDURE — 85025 COMPLETE CBC W/AUTO DIFF WBC: CPT | Performed by: EMERGENCY MEDICINE

## 2022-03-16 PROCEDURE — U0005 INFEC AGEN DETEC AMPLI PROBE: HCPCS | Performed by: EMERGENCY MEDICINE

## 2022-03-16 PROCEDURE — 250N000011 HC RX IP 250 OP 636: Performed by: EMERGENCY MEDICINE

## 2022-03-16 PROCEDURE — 96361 HYDRATE IV INFUSION ADD-ON: CPT | Performed by: EMERGENCY MEDICINE

## 2022-03-16 PROCEDURE — 83690 ASSAY OF LIPASE: CPT | Performed by: EMERGENCY MEDICINE

## 2022-03-16 PROCEDURE — 96374 THER/PROPH/DIAG INJ IV PUSH: CPT | Performed by: EMERGENCY MEDICINE

## 2022-03-16 PROCEDURE — 250N000009 HC RX 250: Performed by: STUDENT IN AN ORGANIZED HEALTH CARE EDUCATION/TRAINING PROGRAM

## 2022-03-16 PROCEDURE — 258N000003 HC RX IP 258 OP 636: Performed by: EMERGENCY MEDICINE

## 2022-03-16 PROCEDURE — 250N000013 HC RX MED GY IP 250 OP 250 PS 637: Performed by: STUDENT IN AN ORGANIZED HEALTH CARE EDUCATION/TRAINING PROGRAM

## 2022-03-16 PROCEDURE — G1004 CDSM NDSC: HCPCS

## 2022-03-16 PROCEDURE — G1004 CDSM NDSC: HCPCS | Performed by: RADIOLOGY

## 2022-03-16 PROCEDURE — C9803 HOPD COVID-19 SPEC COLLECT: HCPCS | Performed by: EMERGENCY MEDICINE

## 2022-03-16 RX ORDER — CYCLOSPORINE 25 MG/1
25 CAPSULE, LIQUID FILLED ORAL EVERY MORNING
Status: DISCONTINUED | OUTPATIENT
Start: 2022-03-16 | End: 2022-03-16

## 2022-03-16 RX ORDER — ALBUTEROL SULFATE 0.83 MG/ML
3 SOLUTION RESPIRATORY (INHALATION)
Status: DISCONTINUED | OUTPATIENT
Start: 2022-03-16 | End: 2022-03-17

## 2022-03-16 RX ORDER — SODIUM CHLORIDE 9 MG/ML
INJECTION, SOLUTION INTRAVENOUS CONTINUOUS
Status: DISCONTINUED | OUTPATIENT
Start: 2022-03-16 | End: 2022-03-17

## 2022-03-16 RX ORDER — LANOLIN ALCOHOL/MO/W.PET/CERES
6 CREAM (GRAM) TOPICAL
Status: DISCONTINUED | OUTPATIENT
Start: 2022-03-16 | End: 2022-03-18 | Stop reason: HOSPADM

## 2022-03-16 RX ORDER — OLOPATADINE HYDROCHLORIDE 1 MG/ML
1 SOLUTION/ DROPS OPHTHALMIC DAILY
Status: DISCONTINUED | OUTPATIENT
Start: 2022-03-16 | End: 2022-03-18 | Stop reason: HOSPADM

## 2022-03-16 RX ORDER — ONDANSETRON 2 MG/ML
4 INJECTION INTRAMUSCULAR; INTRAVENOUS EVERY 6 HOURS PRN
Status: DISCONTINUED | OUTPATIENT
Start: 2022-03-16 | End: 2022-03-18 | Stop reason: HOSPADM

## 2022-03-16 RX ORDER — MORPHINE SULFATE 4 MG/ML
4 INJECTION, SOLUTION INTRAMUSCULAR; INTRAVENOUS ONCE
Status: COMPLETED | OUTPATIENT
Start: 2022-03-16 | End: 2022-03-16

## 2022-03-16 RX ORDER — ONDANSETRON 4 MG/1
4 TABLET, ORALLY DISINTEGRATING ORAL EVERY 6 HOURS PRN
Status: DISCONTINUED | OUTPATIENT
Start: 2022-03-16 | End: 2022-03-18 | Stop reason: HOSPADM

## 2022-03-16 RX ORDER — HYDROMORPHONE HCL IN WATER/PF 6 MG/30 ML
.2-.4 PATIENT CONTROLLED ANALGESIA SYRINGE INTRAVENOUS EVERY 4 HOURS PRN
Status: DISCONTINUED | OUTPATIENT
Start: 2022-03-16 | End: 2022-03-18 | Stop reason: HOSPADM

## 2022-03-16 RX ORDER — CYCLOSPORINE 25 MG/1
50 CAPSULE, LIQUID FILLED ORAL
Status: DISCONTINUED | OUTPATIENT
Start: 2022-03-16 | End: 2022-03-18 | Stop reason: HOSPADM

## 2022-03-16 RX ORDER — LIDOCAINE 40 MG/G
CREAM TOPICAL
Status: DISCONTINUED | OUTPATIENT
Start: 2022-03-16 | End: 2022-03-18 | Stop reason: HOSPADM

## 2022-03-16 RX ORDER — AMLODIPINE BESYLATE 10 MG/1
10 TABLET ORAL DAILY
Status: DISCONTINUED | OUTPATIENT
Start: 2022-03-16 | End: 2022-03-18 | Stop reason: HOSPADM

## 2022-03-16 RX ORDER — MYCOPHENOLATE MOFETIL 250 MG/1
500 CAPSULE ORAL
Status: DISCONTINUED | OUTPATIENT
Start: 2022-03-16 | End: 2022-03-18 | Stop reason: HOSPADM

## 2022-03-16 RX ORDER — CYCLOSPORINE 25 MG/1
75 CAPSULE, LIQUID FILLED ORAL EVERY MORNING
Status: DISCONTINUED | OUTPATIENT
Start: 2022-03-16 | End: 2022-03-18 | Stop reason: HOSPADM

## 2022-03-16 RX ORDER — CYCLOSPORINE 25 MG/1
50 CAPSULE, LIQUID FILLED ORAL
Status: DISCONTINUED | OUTPATIENT
Start: 2022-03-16 | End: 2022-03-16

## 2022-03-16 RX ORDER — PANTOPRAZOLE SODIUM 40 MG/1
40 TABLET, DELAYED RELEASE ORAL 2 TIMES DAILY
Status: DISCONTINUED | OUTPATIENT
Start: 2022-03-16 | End: 2022-03-18 | Stop reason: HOSPADM

## 2022-03-16 RX ADMIN — ALBUTEROL SULFATE 2.5 MG: 2.5 SOLUTION RESPIRATORY (INHALATION) at 19:47

## 2022-03-16 RX ADMIN — SODIUM CHLORIDE: 9 INJECTION, SOLUTION INTRAVENOUS at 13:45

## 2022-03-16 RX ADMIN — MORPHINE SULFATE 4 MG: 4 INJECTION INTRAVENOUS at 04:03

## 2022-03-16 RX ADMIN — MYCOPHENOLATE MOFETIL 500 MG: 250 CAPSULE ORAL at 20:26

## 2022-03-16 RX ADMIN — MYCOPHENOLATE MOFETIL 500 MG: 250 CAPSULE ORAL at 09:54

## 2022-03-16 RX ADMIN — PANTOPRAZOLE SODIUM 40 MG: 40 TABLET, DELAYED RELEASE ORAL at 20:26

## 2022-03-16 RX ADMIN — AMLODIPINE BESYLATE 10 MG: 10 TABLET ORAL at 09:54

## 2022-03-16 RX ADMIN — PANTOPRAZOLE SODIUM 40 MG: 40 TABLET, DELAYED RELEASE ORAL at 09:54

## 2022-03-16 RX ADMIN — SODIUM CHLORIDE: 9 INJECTION, SOLUTION INTRAVENOUS at 21:38

## 2022-03-16 RX ADMIN — CYCLOSPORINE 50 MG: 25 CAPSULE, LIQUID FILLED ORAL at 20:26

## 2022-03-16 RX ADMIN — CYCLOSPORINE 75 MG: 25 CAPSULE, LIQUID FILLED ORAL at 09:54

## 2022-03-16 RX ADMIN — SODIUM CHLORIDE: 9 INJECTION, SOLUTION INTRAVENOUS at 05:52

## 2022-03-16 RX ADMIN — SODIUM CHLORIDE 1000 ML: 9 INJECTION, SOLUTION INTRAVENOUS at 04:03

## 2022-03-16 ASSESSMENT — ACTIVITIES OF DAILY LIVING (ADL)
ADLS_ACUITY_SCORE: 12
ADLS_ACUITY_SCORE: 12
ADLS_ACUITY_SCORE: 8
ADLS_ACUITY_SCORE: 8
ADLS_ACUITY_SCORE: 12
ADLS_ACUITY_SCORE: 8
ADLS_ACUITY_SCORE: 12

## 2022-03-16 NOTE — ED PROVIDER NOTES
ED Provider Note  [facility]      History     Chief Complaint   Patient presents with     Abdominal Pain     HPI  Flor Navarro is a 56 year old female who has past medical history hypertension, liver transplant  presenting with abdominal pain since 8 PM yesterday.  Pain is in the mid abdomen but is all over.  She has had decreased bowel movements for 2 days, with small balloon yesterday.  No black or blood in her bowels.  Denies dysuria or hematuria.  No urinary retention.  No back pain.  No fevers.  No chest pain or shortness of breath.  Does not get paracentesis.  No change in color of skin or eyes.  No alcohol use.    Past Medical History  Past Medical History:   Diagnosis Date     Anemia in CKD (chronic kidney disease)      Cataract      CKD (chronic kidney disease) stage 3, GFR 30-59 ml/min (H)      Hepatitis C     cleared virus spontaneously      High risk medication use      Hypertension, renal      Immunosuppressed status (H)      Liver replaced by transplant (H) 2010     Osteoporosis      Recurrent pregnancy loss without current pregnancy      Recurrent UTI 2021     Stroke, hemorrhagic (H) 2008     Syncope      Unspecified viral hepatitis C without hepatic coma      Past Surgical History:   Procedure Laterality Date     CATARACT IOL, RT/LT Right 2/18/15      SECTION       Incisional Hernia Repair  2004     INSERT SHUNT PORTAL TRANSJUGULAR INTRAHEPTIC      shunt placement for liver failure     LAPAROSCOPIC SALPINGO-OOPHORECTOMY      left     NECK SURGERY  2010    fracture, in halo x 7months     TRANSPLANT LIVER RECIPIENT  DONOR  10/26/10     Upper GI Endoscopy with Band Ligation of Esoph/Gastric Varic. .       ACE/ARB/ARNI NOT PRESCRIBED (INTENTIONAL)  acetaminophen (TYLENOL) 500 MG tablet  albuterol (ACCUNEB) 0.63 MG/3ML neb solution  albuterol (PROAIR HFA/PROVENTIL HFA/VENTOLIN HFA) 108 (90 Base) MCG/ACT inhaler  alendronate  (FOSAMAX) 70 MG tablet  amLODIPine (NORVASC) 10 MG tablet  calcitRIOL (ROCALTROL) 0.25 MCG capsule  calcium carbonate 600 mg-vitamin D 400 units (CALTRATE) 600-400 MG-UNIT per tablet  capsaicin (ZOSTRIX) 0.025 % external cream  carboxymethylcellulose PF (REFRESH PLUS) 0.5 % ophthalmic solution  cetirizine (ZYRTEC) 10 MG tablet  COMPOUNDED NON-CONTROLLED SUBSTANCE (CMPD RX) - PHARMACY TO MIX COMPOUNDED MEDICATION  cycloSPORINE modified (GENERIC EQUIVALENT) 25 MG capsule  Denture Care Products (EFFERDENT DENTURE CLEANSER) TBEF  dimethicone (AVEENO DAILY MOISTURIZING) 1.3 % LOTN lotion  febuxostat (ULORIC) 40 MG TABS tablet  furosemide (LASIX) 20 MG tablet  gabapentin (NEURONTIN) 300 MG capsule  lidocaine (XYLOCAINE) 5 % external ointment  magnesium hydroxide (MILK OF MAGNESIA) 400 MG/5ML suspension  melatonin 3 MG tablet  mineral oil-white petrolatum (EUCERIN) CREA cream  Multiple Vitamin (DAILY-SUMA MULTIVITAMIN) TABS  multivitamin w/minerals (MULTI-VITAMIN) tablet  mycophenolate (GENERIC EQUIVALENT) 250 MG capsule  neomycin-polymyxin-HC 1 %  olopatadine (PATADAY) 0.2 % ophthalmic solution  omeprazole (PRILOSEC) 20 MG DR capsule  order for DME  order for DME  order for DME  psyllium (METAMUCIL/KONSYL) 0.52 g capsule  senna-docusate (SENOKOT-S/PERICOLACE) 8.6-50 MG tablet  STATIN NOT PRESCRIBED, INTENTIONAL,  Vitamin D3 (CHOLECALCIFEROL) 25 mcg (1000 units) tablet      Allergies   Allergen Reactions     Aspirin      3/31/16 Per pt, tolerates 81 mg daily dose without ADR.     325 mg dose caused itchiness and hives.     Clarithromycin      Allergic reaction         Contrast Dye      Iodine      Pcn [Penicillins]      Family History  Family History   Problem Relation Age of Onset     Hepatitis Other         Hep C, still in [place]     Cerebrovascular Disease Other      Cancer No family hx of      Diabetes No family hx of      Hypertension No family hx of      Thyroid Disease No family hx of      Glaucoma No family hx of   "    Macular Degeneration No family hx of      Social History   Social History     Tobacco Use     Smoking status: Never Smoker     Smokeless tobacco: Never Used   Substance Use Topics     Alcohol use: No     Drug use: No      Past medical history, past surgical history, medications, allergies, family history, and social history were reviewed with the patient. No additional pertinent items.       Review of Systems  A complete review of systems was performed with pertinent positives and negatives noted in the HPI, and all other systems negative.    Physical Exam   BP: (!) 131/94 (Pt very anxious)  Pulse: 84  Temp: 98.3  F (36.8  C)  Resp: 22  Height: 154.9 cm (5' 1\")  Weight: 104.3 kg (230 lb)  SpO2: 97 %  Physical Exam  Physical Exam   Constitutional: oriented to person, place, and time. appears well-developed and well-nourished.   HENT:   Head: Normocephalic and atraumatic.   Neck: Normal range of motion.   Pulmonary/Chest: Effort normal. No respiratory distress.   Cardiac: No murmurs, rubs, gallops. RRR.  Abdominal: Abdomen soft, tender palpation in the mid abdomen, nondistended. No rebound tenderness.  No CVA tenderness.  MSK: Long bones without deformity or evidence of trauma  Neurological: alert and oriented to person, place, and time.   Skin: Skin is warm and dry.   Psychiatric:  normal mood and affect.  behavior is normal. Thought content normal.     ED Course      Procedures         Results for orders placed or performed during the hospital encounter of 03/16/22   [facility] Draw *Canceled*     Status: None ()    Narrative    The following orders were created for panel order [facility] Draw.  Procedure                               Abnormality         Status                     ---------                               -----------         ------                       Please view results for these tests on the individual orders.   [facility] Draw     Status: None ()    Narrative    The following orders were created for " panel order [facility] [place] Draw.  Procedure                               Abnormality         Status                     ---------                               -----------         ------                     Extra Blue Top Tube[[id number]]                                                         Extra Red Top Tube[[id number]]                                                            Please view results for these tests on the individual orders.   CBC with platelets differential     Status: None ()    Narrative    The following orders were created for panel order CBC with platelets differential.  Procedure                               Abnormality         Status                     ---------                               -----------         ------                     CBC with platelets and d...[[id number]]                                                   Please view results for these tests on the individual orders.     Medications   0.9% sodium chloride BOLUS (has no administration in time range)     Followed by   sodium chloride 0.9% infusion (has no administration in time range)   morphine (PF) injection 4 mg (has no administration in time range)        Assessments & Plan (with Medical Decision Making)   MDM   patient presenting with abdominal pain.  CT does show a small bowel obstruction which would be consistent with her symptoms recently decreased bowel movement abdominal pain.  Patient be admitted to medicine for further care.  Pain meds given, she was made NPO.  Labs otherwise reassuring.    I have reviewed the nursing notes. I have reviewed the findings, diagnosis, plan and need for follow up with the patient.    New Prescriptions    No medications on file       Final diagnoses:   SBO (small bowel obstruction) (H)       --  Nestor Sanchez  [facility] EMERGENCY DEPARTMENT  3/16/2022     Nestor Sanchez MD  03/16/22 0417

## 2022-03-16 NOTE — PLAN OF CARE
Shift:   VS: Temp: 98.3  F (36.8  C) Temp src: Oral BP: 130/86 Pulse: 80   Resp: 20 SpO2: 95 % O2 Device: Nasal cannula Oxygen Delivery: 2 LPM  Pain: C/o mild abdominal pain, refused pain meds  Neuro: A&OX4, spoke Greek  Cardiac:   WNL  Respiratory: 2L NC with sats >94%  GI/Diet/Appetite: NPO, no nausea/vomiting  :  Adequate UO, no BM this shift  LDA's: PIV with IVF infusing  Skin: No new deficit noted  Activity: Sleeping most of the shift  Tests/Procedures:   Pertinent Labs/Lab Collection:      Plan: Continue with cares.

## 2022-03-16 NOTE — CONSULTS
Transplant Surgery  Inpatient Daily Progress Note  03/16/2022    Assessment & Plan: 57 yo F with h/o liver transplant 11 years ago presenting overnight with partial SBO.    Graft function:good, stable.  Immunosuppression management: Will monitor immunosuppression. Please send Tac levels daily. Complexity of management:Medium. Contributing factors: setting of SBO  Hematology: stable  Cardiorespiratory: Stable    GI/Nutrition: NPO for now Rec GG challenge today, NG if necessary   Endocrine: monitor  Fluid/Electrolytes: MIVF:continue IVF hydration while NPO.  : monitor UOP  Infectious disease: ppx  Prophylaxis: DVT, fall, GI, fungal  Disposition: Admit to medicine, following LFTs, Tac levels     Medical Decision Making: Medium  Consult [phone] straight forward, 40 minutes    TRINY/Fellow/Resident Provider: Elvin oJe MD     Faculty: Amna Rodriguez M.D.    I have reviewed history, examined patient and discussed plan with the fellow/resident/TRINY.  I concur with the findings in this note.    Immunosuppressive regimen, management and long term risks discussed in detail. Changes, when applicable made as per orders.    Total time: 45 min  Counseling time: 25 min                 __________________________________________________________________  Transplant History: Admitted 3/16/2022 for concern for SBO.   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    10/26/2010 (Liver), Postoperative day: 4159     Interval History: History is obtained from the patient  Overnight events: admission overnight. Pain much improved, some flatus now.    ROS:   A 10-point review of systems was negative except as noted above.    Curent Meds:    albuterol  3 mL Nebulization Q4H While awake     albuterol  2.5 mg Nebulization Once     amLODIPine  10 mg Oral Daily     cycloSPORINE modified  75 mg Oral QAM    And     cycloSPORINE modified  50 mg Oral QPM     mycophenolate  500 mg Oral BID IS      "olopatadine  1 drop Both Eyes Daily     pantoprazole  40 mg Oral BID     sodium chloride (PF)  3 mL Intracatheter Q8H       Physical Exam:     Admit Weight: 104.3 kg (230 lb)    Current Vitals:   /86 (BP Location: Right arm)   Pulse 80   Temp 98.3  F (36.8  C) (Oral)   Resp 20   Ht 1.549 m (5' 1\")   Wt 104.3 kg (230 lb)   LMP  (LMP Unknown)   SpO2 95%   BMI 43.46 kg/m           Vital sign ranges:    Temp:  [98.3  F (36.8  C)] 98.3  F (36.8  C)  Pulse:  [66-90] 80  Resp:  [18-28] 20  BP: (127-154)/(75-94) 130/86  SpO2:  [90 %-97 %] 95 %  Patient Vitals for the past 24 hrs:   BP Temp Temp src Pulse Resp SpO2 Height Weight   03/16/22 0951 130/86 -- -- 80 20 95 % -- --   03/16/22 0600 127/75 -- -- 66 24 97 % -- --   03/16/22 0558 -- -- -- -- 28 90 % -- --   03/16/22 0500 134/82 -- -- 90 18 95 % -- --   03/16/22 0400 (!) 154/86 -- -- 83 22 92 % -- --   03/16/22 0303 (!) 131/94 98.3  F (36.8  C) Oral 84 22 97 % 1.549 m (5' 1\") 104.3 kg (230 lb)     General Appearance: in no apparent distress.   Skin: normal, No rashes, induration, or jaundice  Heart: regular rate and rhythm  Lungs: clear to auscultation  Abdomen: obese, non-tender, moderately distended  Extremities: edema: absent.   Neurologic: awake, alert and oriented. Tremor absent.. Asterixis: absent.    Frailty Scores    There is no flowsheet data to display.         Data:   CMP  Recent Labs   Lab 03/16/22  0315      POTASSIUM 4.9   CHLORIDE 105   CO2 31   *   BUN 26   CR 1.37*   GFRESTIMATED 45*   JUAN 9.3   LIPASE 155   ALBUMIN 3.7   BILITOTAL 0.8   ALKPHOS 92   AST 29   ALT 33     CBC  Recent Labs   Lab 03/16/22  0315   HGB 14.0   WBC 7.2        COAGSNo lab results found in last 7 days.    Invalid input(s): XA   Urinalysis  Recent Labs   Lab Test 03/16/22  0557 12/17/21  1002 08/09/21  1217 06/25/21  1140   COLOR Light Yellow Yellow   < > Yellow   APPEARANCE Clear Clear   < > Slightly Cloudy   URINEGLC Negative Negative   < > " Negative   URINEBILI Negative Negative   < > Negative   URINEKETONE Negative Negative   < > Negative   SG 1.011 1.014   < > 1.012   UBLD Trace* Negative   < > Negative   URINEPH 7.0 6.0   < > 7.0   PROTEIN 20 * Negative   < > Negative   NITRITE Negative Negative   < > Negative   LEUKEST Moderate* Negative   < > Small*   RBCU 8* 4*   < > 1   WBCU 33* 1   < > 23*   UTPG  --  0.37*  --  0.21*    < > = values in this interval not displayed.     Virology:  CMV DNA Quantitation Specimen   Date Value Ref Range Status   01/22/2011 Whole blood, EDTA anticoagulant  Final     CMV Quantitative   Date Value Ref Range Status   01/22/2011 <100 <100 Copies/mL Final     Comment:     No CMV DNA detected.   12/28/2010 <100 <100 Copies/mL Final     Comment:     No CMV DNA detected.   11/13/2010 <100 <100 Copies/mL Final     Comment:     No CMV DNA detected.     CMV IgG Antibody   Date Value Ref Range Status   10/25/2010 >160.0 EU/mL Final     Comment:     Positive for anti-CMV IgG     Hepatitis C Antibody   Date Value Ref Range Status   10/25/2010 Positive (A) NEG Final     Comment:      High sample/cutoff ratio, confirmatory testing available.     Hep B Surface [place]   Date Value Ref Range Status   10/25/2010 23.6  Final     Comment:     Positive, Patient is considered to be immune to infection with hepatitis B   when   the value is greater than or equal to 12.0 mlU/mL.

## 2022-03-16 NOTE — PLAN OF CARE
Patient admitted for bowel obstruction. NPO. On iv fluid. Alert and oriented x 4. Vital signs stable. On 2 L oxygen. Denied pain, nausea, emesis. Ambulated in hallway with stand by assist. Stable. Hand off report was given to the receiving RN on 5 A. Plan:continue care.

## 2022-03-16 NOTE — H&P
"  INTERNAL MEDICINE   ADMISSION HISTORY & PHYSICAL   [facility] Service    Flor Navarro ([id number]) admitted on 3/16/2022  Primary care provider: Karely Sierra          Assessment and Plans   Flor Navarro is a 56 year old female with a PMHx significant for HTN and liver transplant (2010), obesity and CKD Stage IIIa who is admitted on 3/16/2022 for evaluation of abdominal pain found to have small bowel obstruction on CT A/P.     # Small Bowel Obstruction  Patient presented to the ED on 3/16/2022 with several hours of acute onset, severe, diffuse abdominal pain with 2 days of constipation.  No nausea or vomiting.  Not passing flatus.  Hemodynamically stable.  Abdominal exam was benign without tenderness to palpation or rebound/guarding.  CT abdomen pelvis demonstrating evidence of small bowel obstruction.  Has never had SBO's in the past.  Suspect patient's SBO is most likely due to possible underlying adhesions as patient has had a history of liver transplant surgery.  - S/p 1L NS. Continue NS @ 125mL/hr  - NPO except for meds and ice chips.  - Pain Control w/ IV Dilaudid PRN  - Antiemetic PRN  - No indication for NG tube decompression at this time  - Serial Abdominal Exam  - Defer gastrograffin enema for now until evaluation by Surgery  - Liver Transplant Surgery Consult (per general surgery due to hx of liver transplant)    # Liver Transplantation (2010) for Hepatitis C  Follows with Dr. Anne outpatient. No active issues at this time  - PTA Cyclosporine and Mycophenolate  - Hepatology Consult re. immunosuppression    # Splenic Artery Enlargement  Noted on CT A/P as \"somewhat difficult to visualize due to lack of IV contrast material and adjacent splenic vein\".   - Recommend follow up with contrast enhanced study. Defer to primary day team to order as necessary    # Hypertension  - PTA Amlodipine    # CKD Stage IIIa  Follows with Nephrology as outpatient. Baseline Cr. 1.1-1.3. Admission Cr 1.37.  - IVF as above. " Continue to monitor    # SURI  - CPAP    Home Medications Being Held - defer to primary team to resume as SBO improves  - Gabapentin  - Furosemide  - Febuxostat      Diet: NPO except for meds/ice chips  DVT Prophylaxis: Mechanical  Disposition: General Medicine. Pending improvement and resolution of SBO  Code Status: Full Code    Patient will be formally staffed in the AM.      Aaron Odell MD  Internal Medicine, PGY-3  Pager #: [phone]           Chief Complaint   - Abdominal Pain           History of Present Illness     Flor Navarro is a 56 year old female with a PMHx significant for HTN and liver transplant (2010), obesity and CKD Stage IIIa who is presenting with abdominal pain    History is obtained by patient's son due to language barrier.  Briefly, patient was in her usual state of health until approximately 7PM the night prior to presentation when she experienced acute onset severe 10/10 abdominal pain diffusely throughout but most prominently around her belly button.  She has been constipated for the past 2 days without stool output, but she did take milk of magnesia which resulted in a small BM x 1.  She otherwise has not had any nausea or vomiting.  Has never had a history of this type of pain in the past.  She has not had any recent fever/chills, chest pain, shortness of breath, or urinary symptoms.    In the ED, patient was afebrile and hemodynamically stable.  Initial labs showed normal CBC without evidence of leukocytosis or anemia.  Basic metabolic panel were within normal limits with creatinine of 1.37 which is similar to her baseline of 1.1-1.3.  Her liver function panel were all within normal limits as well.  Lipase normal.  CT abdomen pelvis was obtained which showed dilatation of multiple small bowel loops in the mid abdomen with mesenteric edema and fecal material throughout the lumen consists with small bowel obstruction.  Additionally, it also noted hepatic cirrhosis as well as  diffuse enlargement of the splenic artery.  At the time of ambulation by the medicine team, patient had received 1 L of normal saline followed by infusion at 125 mL/h as well as IV morphine 4 mg.  Patient denies any ongoing abdominal pain at this time.      Review of Systems    Constitutional: Denies fevers, night sweats, or unintentional weight change  Skin: Without rash, scaling, or erythema; good turgor  HEENT: Denies changes in vision, hearing, taste, no lymphadenopathy  Cardio: Denies palpations, chest pain, pain with walking, no orthopnea or PND  Respiratory: Denies SOB, GAYTAN, wheezing  GI: Denies abdominal pain, diarrhea, constipation, nausea, or vomiting  : Denies urinary frequency, dysuria, hematuria  MSK: Denies weakness, pain  Neuro: Denies focal weakness, diminished sensation, dizziness, HA, abnormal gait  Psych: Denies hallucinations, delusion  Heme: Denies bruising, bleeding  Endo: Denies intolerance to heat or cold, no dry skin, brittle hair    The remainder of the complete ROS was negative unless noted in the HPI         Past Medical History     Past Medical History:   Diagnosis Date     Anemia in CKD (chronic kidney disease)      Cataract      CKD (chronic kidney disease) stage 3, GFR 30-59 ml/min (H)      Hepatitis C     cleared virus spontaneously      High risk medication use      Hypertension, renal      Immunosuppressed status (H)      Liver replaced by transplant (H) 2010     Osteoporosis      Recurrent pregnancy loss without current pregnancy      Recurrent UTI 2021     Stroke, hemorrhagic (H) 2008     Syncope      Unspecified viral hepatitis C without hepatic coma          Past Surgical History     Past Surgical History:   Procedure Laterality Date     CATARACT IOL, RT/LT Right 2/18/15      SECTION       Incisional Hernia Repair  2004     INSERT SHUNT PORTAL TRANSJUGULAR INTRAHEPTIC  2005    shunt placement for liver failure     LAPAROSCOPIC  SALPINGO-OOPHORECTOMY      left     NECK SURGERY      fracture, in halo x 7months     TRANSPLANT LIVER RECIPIENT  DONOR  10/26/10     Upper GI Endoscopy with Band Ligation of Esoph/Gastric Varic. .            Medications     albuterol (PROVENTIL) neb solution 2.5 mg    ACE/ARB/ARNI NOT PRESCRIBED (INTENTIONAL), Please choose reason not prescribed from choices below.  acetaminophen (TYLENOL) 500 MG tablet, Take 1 tablet (500 mg) by mouth 3 times daily as needed for mild pain  albuterol (ACCUNEB) 0.63 MG/3ML neb solution, Take 3 mLs (0.63 mg) by nebulization every 4 hours (while awake)  albuterol (PROAIR HFA/PROVENTIL HFA/VENTOLIN HFA) 108 (90 Base) MCG/ACT inhaler, Inhale 2 puffs into the lungs 4 times daily  alendronate (FOSAMAX) 70 MG tablet, Take 1 tablet (70 mg) by mouth every 7 days  amLODIPine (NORVASC) 10 MG tablet, Take 1 tablet (10 mg) by mouth daily  calcitRIOL (ROCALTROL) 0.25 MCG capsule, Take 1 capsule (0.25 mcg) by mouth daily  calcium carbonate 600 mg-vitamin D 400 units (CALTRATE) 600-400 MG-UNIT per tablet, Take 1 tablet by mouth 2 times daily  capsaicin (ZOSTRIX) 0.025 % external cream, Apply three times a day as needed to legs. Wash hands after applying.  carboxymethylcellulose PF (REFRESH PLUS) 0.5 % ophthalmic solution, Place 1 drop into both eyes 4 times daily as needed for dry eyes  cetirizine (ZYRTEC) 10 MG tablet, Take 1 tablet (10 mg) by mouth daily  COMPOUNDED NON-CONTROLLED SUBSTANCE (CMPD RX) - PHARMACY TO MIX COMPOUNDED MEDICATION, Equal parts of 2% Benadryl, 2% vicious lidocaine and TC suspension maalox,  Swish and spit 5 ml every 6 hrs as needed for mouth pain  cycloSPORINE modified (GENERIC EQUIVALENT) 25 MG capsule, Take 3 capsules (75 mg) by mouth every morning AND 2 capsules (50 mg) At Bedtime.  Denture Care Products (EFFERDENT DENTURE CLEANSER) TBEF, Use nightly to clean oral appliance  dimethicone (AVEENO DAILY MOISTURIZING) 1.3 % LOTN lotion, Externally apply  topically daily  febuxostat (ULORIC) 40 MG TABS tablet, Take 1 tablet (40 mg) by mouth daily  furosemide (LASIX) 20 MG tablet, Take 1 tablet (20 mg) by mouth 2 times daily  gabapentin (NEURONTIN) 300 MG capsule, Take 1 capsule (300 mg) by mouth At Bedtime  lidocaine (XYLOCAINE) 5 % external ointment, Apply topically as needed for moderate pain  magnesium hydroxide (MILK OF MAGNESIA) 400 MG/5ML suspension, Take 15 mLs by mouth daily as needed for constipation or heartburn  melatonin 3 MG tablet, TAKE ONE TABLET BY MOUTH EVERY NIGHT AS NEEDED FOR SLEEP.  mineral oil-white petrolatum (EUCERIN) CREA cream, Apply topically 2 times daily  Multiple Vitamin (DAILY-SUMA MULTIVITAMIN) TABS, Take 1 tablet by mouth every morning  multivitamin w/minerals (MULTI-VITAMIN) tablet, Take 1 tablet by mouth daily Needs 4 month supply for International Travel  mycophenolate (GENERIC EQUIVALENT) 250 MG capsule, TAKE TWO CAPSULES BY MOUTH EVERY 12 HOURS  neomycin-polymyxin-HC 1 %, Place 4 drops in ear(s) 2 times daily  olopatadine (PATADAY) 0.2 % ophthalmic solution, Place 1 drop into both eyes daily  omeprazole (PRILOSEC) 20 MG DR capsule, Take 1 capsule (20 mg) by mouth 2 times daily el  order for DME, Equipment being ordered: compression stockings bilateral  Please measure and fit patient for stockings   Strength 15-30mmHg  Disp 2 pairs ( 4 socks)  1 refill over the time of 1 year  order for DME, Equipment being ordered:   1) cane  2) compression stockings 15mmHg, 3 pairs  Dx: gait stability, falls, edema  order for DME, Equipment being ordered: Nebulizer with adult mask and tubing  psyllium (METAMUCIL/KONSYL) 0.52 g capsule, Take 2 capsules by mouth 2 times daily  senna-docusate (SENOKOT-S/PERICOLACE) 8.6-50 MG tablet, Take 2 tablets by mouth 2 times daily Needs 4 month supply for International Travel  STATIN NOT PRESCRIBED, INTENTIONAL,, Not prescribed  Vitamin D3 (CHOLECALCIFEROL) 25 mcg (1000 units) tablet, Take 1 tablet (25 mcg)  "by mouth daily           Allergies     Allergies   Allergen Reactions     Aspirin      3/31/16 Per pt, tolerates 81 mg daily dose without ADR.     325 mg dose caused itchiness and hives.     Clarithromycin      Allergic reaction         Contrast Dye      Iodine      Pcn [Penicillins]             Social History     TOBACCO:  Denies  ETOH:  Denies  ILLICITS:  Denies         Family History     Family History   Problem Relation Age of Onset     Hepatitis Other         Hep C, still in [place]     Cerebrovascular Disease Other      Cancer No family hx of      Diabetes No family hx of      Hypertension No family hx of      Thyroid Disease No family hx of      Glaucoma No family hx of      Macular Degeneration No family hx of           Vitals and Exam     /82   Pulse 90   Temp 98.3  F (36.8  C) (Oral)   Resp 18   Ht 1.549 m (5' 1\")   Wt 104.3 kg (230 lb)   LMP  (LMP Unknown)   SpO2 95%   BMI 43.46 kg/m    Wt Readings from Last 2 Encounters:   03/16/22 104.3 kg (230 lb)   01/28/22 106.1 kg (234 lb)       Physical Exam:  General: Alert, oriented, laying in bed comfortably, in no apparent distress, appears nontoxic  Eyes: Eyes are clear, pupils are reactive, no scleral icterus.  EOMI  HENT: Oropharynx is clear. Dry mucous membrane  Cardiovascular: Regular rate and rhythm, normal S1 and S2, and no murmur noted. Good peripheral pulses in wrists bilaterally.  Respiratory: Clear to auscultation bilaterally. No wheezes or rhonchi appreciated  GI: Soft, non-tender, increased bowel sounds, no rebound or guarding  Musculoskeletal: Normal muscle bulk and tone.  Skin: Warm and dry, no rashes.   Neurologic: Neck supple. Cranial nerves are grossly intact.          Labs     CBC  Recent Labs   Lab 03/16/22  0315   WBC 7.2   RBC 4.80   HGB 14.0   HCT 44.3   MCV 92   MCH 29.2   MCHC 31.6   RDW 13.5        BMP  Recent Labs   Lab 03/16/22  0315      POTASSIUM 4.9   CHLORIDE 105   CO2 31   ANIONGAP 3   *   BUN 26 "   CR 1.37*   GFRESTIMATED 45*   JUAN 9.3     INRNo lab results found in last 7 days.    Liver panel  Recent Labs   Lab 03/16/22  0315   PROTTOTAL 8.2   ALBUMIN 3.7   BILITOTAL 0.8   ALKPHOS 92   AST 29   ALT 33     Urinalysis  Recent Labs   Lab Test 12/17/21  1002 12/01/21  1205   COLOR Yellow Yellow   APPEARANCE Clear Clear   URINEGLC Negative Negative   URINEBILI Negative Negative   URINEKETONE Negative Negative   SG 1.014 1.020   UBLD Negative Trace*   URINEPH 6.0 5.5   PROTEIN Negative Negative   UROBILINOGEN  --  0.2   NITRITE Negative Negative   LEUKEST Negative Negative   RBCU 4* 2-5*   WBCU 1 None Seen     Cultures  Last 6 Culture results with specimen source  Culture Micro   Date Value Ref Range Status   07/02/2021 (A)  Final    >100,000 colonies/mL  Escherichia coli ESBL  ESBL (extended beta lactamase) producing organisms require contact precautions.     07/02/2021   Final    <10,000 colonies/mL  urogenital fredi  Susceptibility testing not routinely done     10/16/2017 (A)  Final    >100,000 colonies/mL  Beta hemolytic Streptococcus group B  Beta Hemolytic Streptococcus groups A and B are susceptible to ampicillin, penicillin,   vancomycin, and the cephalosporins.  Susceptibility testing is not routinely done on these   organisms isolated from urine.     08/08/2017   Final    10,000 to 50,000 colonies/mL mixed urogenital fredi   07/27/2017 >100,000 colonies/mL Escherichia coli (A)  Final   02/27/2017 >100,000 colonies/mL Escherichia coli (A)  Final    Specimen Description   Date Value Ref Range Status   07/02/2021 Midstream Urine  Final   10/16/2017 Midstream Urine  Final   08/08/2017 Unspecified Urine  Final   07/27/2017 Midstream Urine  Final   02/27/2017 Midstream Urine  Final   02/03/2017 Throat  Final        Last check of C difficile  C Diff Toxin B PCR   Date Value Ref Range Status   10/16/2010   Final    Negative: Clostridium difficile target DNA sequences NOT detected, presumed     Comment:       negative for Clostridium difficile toxin B or the number of bacteria present   may be below the limit of detection for the test.   FDA approved assay performed using [employer] [place] GeneXpert real-time PCR.   A negative result does not exclude actual disease due to Clostridium   difficile   and may be due to improper collection, handling and storage of the specimen   or   the number of organisms in the specimen is below the detection limit of the   assay.     Imaging/procedure results:  Recent Results (from the past 48 hour(s))   CT Abdomen Pelvis w/o Contrast    Narrative    EXAM: CT ABDOMEN AND PELVIS WITHOUT CONTRAST  LOCATION: [facility]  DATE/TIME: 03/16/2022, 3:41 AM    INDICATION: Abdominal distention and pain. Suspect bowel obstruction.  COMPARISON: None.  TECHNIQUE: CT scan of the abdomen and pelvis was performed without IV contrast. Multiplanar reformats were obtained. Dose reduction techniques were used.  CONTRAST: None.    FINDINGS:   LOWER CHEST: Minimal linear atelectasis.    HEPATOBILIARY: Hepatic cirrhosis. Cholecystectomy. No biliary dilatation.    PANCREAS: Normal.    SPLEEN: Normal size spleen. Enlarged splenic artery. This is somewhat difficult to evaluate given the adjacent noncontrast splenic vein and redundancy of the splenic artery.    ADRENAL GLANDS: Unremarkable.    KIDNEYS/BLADDER: Severe right renal atrophy. No significant left-sided hydronephrosis. Mild ureteral dilatation distally extending up to the level of the distal ureters near the iliac vessels where the ureter returns to normal caliber. A mild stricture   at this location cannot be excluded. No left ureteral calculi. Bladder unremarkable.    BOWEL: Dilatation of multiple small bowel loops in the mid abdomen with mesenteric edema and fecal material throughout the lumen. Findings compatible with small bowel obstruction. A discrete transition point is difficult to visualize but likely in the    anterior mid-lower abdomen where there is transition to decompressed distal small bowel. Large amount of stool in the colon.    LYMPH NODES: No lymphadenopathy.    VASCULATURE: Normal caliber aorta.    PELVIC ORGANS: No free fluid.    MUSCULOSKELETAL: Degenerative changes in the spine. Chronic bilateral L5 pars interarticularis defects.      Impression    IMPRESSION:   1.  Dilatation of multiple small bowel loops in the mid abdomen with mesenteric edema and fecal material throughout the lumen. Findings compatible with small bowel obstruction. A discrete transition point difficult to visualize but likely in the anterior   lower mid abdomen.    2.  Severe renal atrophy on the right hip. Mild dilatation of the distal left ureter down to the level of the iliac vessels where there is abrupt tapering to the normal caliber ureter. This could be due to ureteral stricture. No hydronephrosis.    3.  Hepatic cirrhosis.    4.  Diffuse enlargement of the splenic artery. This is somewhat difficult to visualize due to lack of IV contrast material and adjacent splenic vein. Consider follow-up contrast enhanced study for further evaluation.       Internal Medicine Staff Addendum  Date of Service: 3/16/2022  I have seen and examined Flor Navarro with the resident team, reviewed the data and discussed the plan of care with the patient and the care team on P&FC Rounds.  I agree with the above documentation     I discussed pt's care with bedside RN, case management/social work today.  I personally reviewed labs, medications and past 24 hr notes.  Assessment/Plan/Diagnoses: plan/dx as above, which contains my edits and reflects our joint medical decision-making.     Rodolfo Okeefe MD  Internal Medicine/Pediatrics Hospitalist & Staff Physician   of Internal Medicine and Pediatrics  [facility]  Pager: [phone]

## 2022-03-16 NOTE — ED TRIAGE NOTES
Abdominal pain above belly button, now radiating to back. VSS w/ EMS, normal EKG. . Liver transplant 2010. Son otw to translate.     Constipated for 2-3 days, took milk of magnesia yesterday and had one BM.

## 2022-03-16 NOTE — ED NOTES
Pt ambulated independently to and from restroom. Provided urine sample. Continues to state that her pain is at 0/10. Son at bedside. VSS

## 2022-03-17 ENCOUNTER — APPOINTMENT (OUTPATIENT)
Dept: GENERAL RADIOLOGY | Facility: CLINIC | Age: 57
DRG: 389 | End: 2022-03-17
Attending: PEDIATRICS
Payer: MEDICARE

## 2022-03-17 LAB
ALBUMIN SERPL-MCNC: 2.9 G/DL (ref 3.4–5)
ALP SERPL-CCNC: 82 U/L (ref 40–150)
ALT SERPL W P-5'-P-CCNC: 28 U/L (ref 0–50)
ANION GAP SERPL CALCULATED.3IONS-SCNC: 5 MMOL/L (ref 3–14)
AST SERPL W P-5'-P-CCNC: 29 U/L (ref 0–45)
BACTERIA UR CULT: NORMAL
BILIRUB SERPL-MCNC: 1.1 MG/DL (ref 0.2–1.3)
BUN SERPL-MCNC: 19 MG/DL (ref 7–30)
CALCIUM SERPL-MCNC: 8.6 MG/DL (ref 8.5–10.1)
CHLORIDE BLD-SCNC: 112 MMOL/L (ref 94–109)
CO2 SERPL-SCNC: 25 MMOL/L (ref 20–32)
CREAT SERPL-MCNC: 1.27 MG/DL (ref 0.52–1.04)
CYCLOSPORINE BLD LC/MS/MS-MCNC: 64 UG/L (ref 50–400)
ERYTHROCYTE [DISTWIDTH] IN BLOOD BY AUTOMATED COUNT: 13.6 % (ref 10–15)
GFR SERPL CREATININE-BSD FRML MDRD: 49 ML/MIN/1.73M2
GLUCOSE BLD-MCNC: 86 MG/DL (ref 70–99)
HCT VFR BLD AUTO: 40.5 % (ref 35–47)
HGB BLD-MCNC: 12 G/DL (ref 11.7–15.7)
HOLD SPECIMEN: NORMAL
LACTATE SERPL-SCNC: 0.5 MMOL/L (ref 0.7–2)
MCH RBC QN AUTO: 29.1 PG (ref 26.5–33)
MCHC RBC AUTO-ENTMCNC: 29.6 G/DL (ref 31.5–36.5)
MCV RBC AUTO: 98 FL (ref 78–100)
PLATELET # BLD AUTO: 140 10E3/UL (ref 150–450)
POTASSIUM BLD-SCNC: 5 MMOL/L (ref 3.4–5.3)
PROT SERPL-MCNC: 6.8 G/DL (ref 6.8–8.8)
RBC # BLD AUTO: 4.13 10E6/UL (ref 3.8–5.2)
SODIUM SERPL-SCNC: 142 MMOL/L (ref 133–144)
TACROLIMUS BLD-MCNC: <3 UG/L (ref 5–15)
TME LAST DOSE: ABNORMAL H
TME LAST DOSE: ABNORMAL H
TME LAST DOSE: NORMAL H
TME LAST DOSE: NORMAL H
WBC # BLD AUTO: 3.8 10E3/UL (ref 4–11)

## 2022-03-17 PROCEDURE — 250N000009 HC RX 250: Performed by: HOSPITALIST

## 2022-03-17 PROCEDURE — 250N000012 HC RX MED GY IP 250 OP 636 PS 637: Performed by: STUDENT IN AN ORGANIZED HEALTH CARE EDUCATION/TRAINING PROGRAM

## 2022-03-17 PROCEDURE — 250N000013 HC RX MED GY IP 250 OP 250 PS 637: Performed by: STUDENT IN AN ORGANIZED HEALTH CARE EDUCATION/TRAINING PROGRAM

## 2022-03-17 PROCEDURE — 80158 DRUG ASSAY CYCLOSPORINE: CPT | Performed by: PEDIATRICS

## 2022-03-17 PROCEDURE — 85027 COMPLETE CBC AUTOMATED: CPT | Performed by: INTERNAL MEDICINE

## 2022-03-17 PROCEDURE — 74018 RADEX ABDOMEN 1 VIEW: CPT | Mod: 26 | Performed by: RADIOLOGY

## 2022-03-17 PROCEDURE — 250N000009 HC RX 250: Performed by: STUDENT IN AN ORGANIZED HEALTH CARE EDUCATION/TRAINING PROGRAM

## 2022-03-17 PROCEDURE — 250N000011 HC RX IP 250 OP 636: Performed by: STUDENT IN AN ORGANIZED HEALTH CARE EDUCATION/TRAINING PROGRAM

## 2022-03-17 PROCEDURE — 83605 ASSAY OF LACTIC ACID: CPT | Performed by: PEDIATRICS

## 2022-03-17 PROCEDURE — 94640 AIRWAY INHALATION TREATMENT: CPT

## 2022-03-17 PROCEDURE — 94640 AIRWAY INHALATION TREATMENT: CPT | Mod: 76

## 2022-03-17 PROCEDURE — 250N000009 HC RX 250: Performed by: INTERNAL MEDICINE

## 2022-03-17 PROCEDURE — 258N000003 HC RX IP 258 OP 636: Performed by: EMERGENCY MEDICINE

## 2022-03-17 PROCEDURE — 74018 RADEX ABDOMEN 1 VIEW: CPT

## 2022-03-17 PROCEDURE — 999N000157 HC STATISTIC RCP TIME EA 10 MIN

## 2022-03-17 PROCEDURE — 120N000002 HC R&B MED SURG/OB UMMC

## 2022-03-17 PROCEDURE — 36415 COLL VENOUS BLD VENIPUNCTURE: CPT | Performed by: PEDIATRICS

## 2022-03-17 PROCEDURE — 99232 SBSQ HOSP IP/OBS MODERATE 35: CPT | Mod: GC | Performed by: INTERNAL MEDICINE

## 2022-03-17 PROCEDURE — 80197 ASSAY OF TACROLIMUS: CPT | Performed by: PEDIATRICS

## 2022-03-17 PROCEDURE — 99232 SBSQ HOSP IP/OBS MODERATE 35: CPT | Performed by: PEDIATRICS

## 2022-03-17 PROCEDURE — 80053 COMPREHEN METABOLIC PANEL: CPT | Performed by: PEDIATRICS

## 2022-03-17 PROCEDURE — 250N000012 HC RX MED GY IP 250 OP 636 PS 637: Performed by: HOSPITALIST

## 2022-03-17 RX ORDER — ALBUTEROL SULFATE 0.83 MG/ML
2.5 SOLUTION RESPIRATORY (INHALATION) EVERY 6 HOURS PRN
Status: DISCONTINUED | OUTPATIENT
Start: 2022-03-17 | End: 2022-03-18

## 2022-03-17 RX ORDER — ALBUTEROL SULFATE 0.83 MG/ML
3 SOLUTION RESPIRATORY (INHALATION) 3 TIMES DAILY
Status: DISCONTINUED | OUTPATIENT
Start: 2022-03-17 | End: 2022-03-18

## 2022-03-17 RX ADMIN — CYCLOSPORINE 75 MG: 25 CAPSULE, LIQUID FILLED ORAL at 09:26

## 2022-03-17 RX ADMIN — AMLODIPINE BESYLATE 10 MG: 10 TABLET ORAL at 09:26

## 2022-03-17 RX ADMIN — DIATRIZOATE MEGLUMINE AND DIATRIZOATE SODIUM 75 ML: 660; 100 SOLUTION ORAL; RECTAL at 09:21

## 2022-03-17 RX ADMIN — ALBUTEROL SULFATE 2.5 MG: 2.5 SOLUTION RESPIRATORY (INHALATION) at 15:40

## 2022-03-17 RX ADMIN — CYCLOSPORINE 50 MG: 25 CAPSULE, LIQUID FILLED ORAL at 18:21

## 2022-03-17 RX ADMIN — PANTOPRAZOLE SODIUM 40 MG: 40 TABLET, DELAYED RELEASE ORAL at 09:25

## 2022-03-17 RX ADMIN — SODIUM CHLORIDE: 9 INJECTION, SOLUTION INTRAVENOUS at 04:27

## 2022-03-17 RX ADMIN — MYCOPHENOLATE MOFETIL 500 MG: 250 CAPSULE ORAL at 09:25

## 2022-03-17 RX ADMIN — ALBUTEROL SULFATE 2.5 MG: 2.5 SOLUTION RESPIRATORY (INHALATION) at 06:40

## 2022-03-17 RX ADMIN — ALBUTEROL SULFATE 2.5 MG: 2.5 SOLUTION RESPIRATORY (INHALATION) at 07:52

## 2022-03-17 RX ADMIN — MYCOPHENOLATE MOFETIL 500 MG: 250 CAPSULE ORAL at 18:21

## 2022-03-17 RX ADMIN — HYDROMORPHONE HYDROCHLORIDE 0.2 MG: 0.2 INJECTION, SOLUTION INTRAMUSCULAR; INTRAVENOUS; SUBCUTANEOUS at 04:26

## 2022-03-17 RX ADMIN — PANTOPRAZOLE SODIUM 40 MG: 40 TABLET, DELAYED RELEASE ORAL at 20:15

## 2022-03-17 RX ADMIN — ALBUTEROL SULFATE 2.5 MG: 2.5 SOLUTION RESPIRATORY (INHALATION) at 00:16

## 2022-03-17 ASSESSMENT — ACTIVITIES OF DAILY LIVING (ADL)
ADLS_ACUITY_SCORE: 8

## 2022-03-17 NOTE — PROGRESS NOTES
"    HEPATOLOGY PROGRESS NOTE    Date: 03/17/2022     ASSESSMENT:  56 year old female with a history of Liver transplant (10/2010) for Hep C, obesity, SURI and CKD Stage IIIa, admitted to the hospital for SBO and also has elevated creatinine. GI is consulted for management of her liver transplant immunosuppression.    # SBO  SBO seems to be improved, had 2 large BMs today. She describes these as black. Hemoglobin is stable.    # Status post liver transplant 2010  # Immunosuppression management  Cyclosporine trough returned at 64. Goal . No changes recommended.    RECOMMENDATIONS:  - Continue current immunosuppression regimen: cyclosporine 75mg qAM, 50 mg qPM, and  mg BID  - no additional immunosuppression checks necessary while inpatient  - management of SBO per primary and surgery teams      Gastroenterology follow up recommendations: Pending clinical course.      Thank you for involving us in this patient's care. Please do not hesitate to contact the GI service with any questions or concerns.      Pt care plan discussed with Dr. Anne, GI staff physician.    Rod Rowell MD  Gastroenterology Fellow  Division of Gastroenterology, Hepatology and Nutrition  [facility]  Pager: [phone]  _______________________________________________________________      Subjective: Doing well, had 2 large BMs, described as black in color which is abnormal for her. Abdominal pain gone. No nausea or vomiting.       Objective:  Blood pressure 131/77, pulse 82, temperature 98.2  F (36.8  C), temperature source Oral, resp. rate 21, height 1.549 m (5' 0.98\"), weight 112.7 kg (248 lb 7.3 oz), SpO2 95 %, not currently breastfeeding.    Gen: A&O, NAD  HEENT: sclera anicteric  Lungs: breathing comfortably on room air  Abd: soft, nontender, obese, no guarding  Skin: no jaundice, no stigmata of chronic liver disease  MS: appropriate muscle mass for age  Neuro: no asterixis      LABS:  BMP  Recent Labs   Lab 03/17/22  0636 " 03/16/22  0315    139   POTASSIUM 5.0 4.9   CHLORIDE 112* 105   JUAN 8.6 9.3   CO2 25 31   BUN 19 26   CR 1.27* 1.37*   GLC 86 118*     CBC  Recent Labs   Lab 03/17/22  0636 03/16/22  0315   WBC 3.8* 7.2   RBC 4.13 4.80   HGB 12.0 14.0   HCT 40.5 44.3   MCV 98 92   MCH 29.1 29.2   MCHC 29.6* 31.6   RDW 13.6 13.5   * 167     INRNo lab results found in last 7 days.  LFTs  Recent Labs   Lab 03/17/22  0636 03/16/22 0315   ALKPHOS 82 92   AST 29 29   ALT 28 33   BILITOTAL 1.1 0.8   PROTTOTAL 6.8 8.2   ALBUMIN 2.9* 3.7      PANC  Recent Labs   Lab 03/16/22  0315   LIPASE 155       IMAGING:    CT A/P 3/16:    IMPRESSION:   1.  Dilatation of multiple small bowel loops in the mid abdomen with mesenteric edema and fecal material throughout the lumen. Findings compatible with small bowel obstruction. A discrete transition point difficult to visualize but likely in the anterior   lower mid abdomen.     2.  Severe renal atrophy on the right hip. Mild dilatation of the distal left ureter down to the level of the iliac vessels where there is abrupt tapering to the normal caliber ureter. This could be due to ureteral stricture. No hydronephrosis.     3.  Hepatic cirrhosis.     4.  Diffuse enlargement of the splenic artery. This is somewhat difficult to visualize due to lack of IV contrast material and adjacent splenic vein. Consider follow-up contrast enhanced study for further evaluation.

## 2022-03-17 NOTE — PLAN OF CARE
Goal Outcome Evaluation: 0700 - 1900    Plan of Care Reviewed With: patient     Overall Patient Progress: improving    Outcome Evaluation: A&Ox4, Sinhala speaking, iPad  at bedside. Loose BMx2 this afternoon. Denies nausea, denies pain. Gastrograffin challenge today, with X-ray completed at 1730. VS remain stable on 2-3 L O2- pt reports intermittent O2 use at home, but family states she does not have O2 at home. Pt declined 4-eyes skin assessment, but denies any skin concerns. Awaiting gastrograffin results, with plan to advance diet as tolerated depending on result.

## 2022-03-17 NOTE — PLAN OF CARE
19:00-07:00  Admitted for small bowel obstruction.  Neuro: A&Ox4. Faroese speaking,  required. Son utilized to complete admission questions and assessment.  Cardiac: VSS.   Respiratory: Sating >90% on 2-3L NC. PRN neb ordered for wheezing.  GI/: Adequate urine output. No BM overnight  Diet/appetite: NPO.  Activity:  SBA  Pain: At acceptable level on current regimen.  IV dilaudid 0.2 given x1 for headache pain 6/10.  Skin: Patient refused full skin assessment.  LDA's: L. PIV infusing NS @ 125 mL/hr.    Plan: Continue with POC. Notify primary team with changes.        Goal Outcome Evaluation:    Plan of Care Reviewed With: patient

## 2022-03-18 VITALS
HEIGHT: 61 IN | RESPIRATION RATE: 16 BRPM | DIASTOLIC BLOOD PRESSURE: 81 MMHG | BODY MASS INDEX: 46.91 KG/M2 | HEART RATE: 73 BPM | TEMPERATURE: 98.2 F | WEIGHT: 248.46 LBS | SYSTOLIC BLOOD PRESSURE: 132 MMHG | OXYGEN SATURATION: 92 %

## 2022-03-18 LAB
ALBUMIN SERPL-MCNC: 3.1 G/DL (ref 3.4–5)
ALP SERPL-CCNC: 80 U/L (ref 40–150)
ALT SERPL W P-5'-P-CCNC: 26 U/L (ref 0–50)
ANION GAP SERPL CALCULATED.3IONS-SCNC: 6 MMOL/L (ref 3–14)
AST SERPL W P-5'-P-CCNC: 28 U/L (ref 0–45)
BILIRUB SERPL-MCNC: 1 MG/DL (ref 0.2–1.3)
BUN SERPL-MCNC: 16 MG/DL (ref 7–30)
CALCIUM SERPL-MCNC: 9.3 MG/DL (ref 8.5–10.1)
CHLORIDE BLD-SCNC: 109 MMOL/L (ref 94–109)
CO2 SERPL-SCNC: 26 MMOL/L (ref 20–32)
CREAT SERPL-MCNC: 1.11 MG/DL (ref 0.52–1.04)
CYCLOSPORINE BLD LC/MS/MS-MCNC: 68 UG/L (ref 50–400)
GFR SERPL CREATININE-BSD FRML MDRD: 58 ML/MIN/1.73M2
GLUCOSE BLD-MCNC: 85 MG/DL (ref 70–99)
POTASSIUM BLD-SCNC: 4.5 MMOL/L (ref 3.4–5.3)
PROT SERPL-MCNC: 7.1 G/DL (ref 6.8–8.8)
SODIUM SERPL-SCNC: 141 MMOL/L (ref 133–144)
TACROLIMUS BLD-MCNC: <3 UG/L (ref 5–15)
TME LAST DOSE: ABNORMAL H
TME LAST DOSE: ABNORMAL H
TME LAST DOSE: NORMAL H
TME LAST DOSE: NORMAL H

## 2022-03-18 PROCEDURE — 80053 COMPREHEN METABOLIC PANEL: CPT | Performed by: PEDIATRICS

## 2022-03-18 PROCEDURE — 80197 ASSAY OF TACROLIMUS: CPT | Performed by: INTERNAL MEDICINE

## 2022-03-18 PROCEDURE — 250N000012 HC RX MED GY IP 250 OP 636 PS 637: Performed by: HOSPITALIST

## 2022-03-18 PROCEDURE — 36415 COLL VENOUS BLD VENIPUNCTURE: CPT | Performed by: PEDIATRICS

## 2022-03-18 PROCEDURE — 250N000013 HC RX MED GY IP 250 OP 250 PS 637: Performed by: STUDENT IN AN ORGANIZED HEALTH CARE EDUCATION/TRAINING PROGRAM

## 2022-03-18 PROCEDURE — 80158 DRUG ASSAY CYCLOSPORINE: CPT | Performed by: PEDIATRICS

## 2022-03-18 PROCEDURE — 250N000012 HC RX MED GY IP 250 OP 636 PS 637: Performed by: STUDENT IN AN ORGANIZED HEALTH CARE EDUCATION/TRAINING PROGRAM

## 2022-03-18 PROCEDURE — 99239 HOSP IP/OBS DSCHRG MGMT >30: CPT | Performed by: INTERNAL MEDICINE

## 2022-03-18 RX ORDER — ALBUTEROL SULFATE 0.83 MG/ML
3 SOLUTION RESPIRATORY (INHALATION) EVERY 4 HOURS PRN
Status: DISCONTINUED | OUTPATIENT
Start: 2022-03-18 | End: 2022-03-18 | Stop reason: HOSPADM

## 2022-03-18 RX ADMIN — AMLODIPINE BESYLATE 10 MG: 10 TABLET ORAL at 12:18

## 2022-03-18 RX ADMIN — PANTOPRAZOLE SODIUM 40 MG: 40 TABLET, DELAYED RELEASE ORAL at 12:18

## 2022-03-18 RX ADMIN — MYCOPHENOLATE MOFETIL 500 MG: 250 CAPSULE ORAL at 12:18

## 2022-03-18 RX ADMIN — CYCLOSPORINE 75 MG: 25 CAPSULE, LIQUID FILLED ORAL at 12:18

## 2022-03-18 ASSESSMENT — ACTIVITIES OF DAILY LIVING (ADL)
ADLS_ACUITY_SCORE: 8

## 2022-03-18 NOTE — PLAN OF CARE
Goal Outcome Evaluation:    Plan of Care Reviewed With: patient, family     Overall Patient Progress: improving    Outcome Evaluation: SBO resolved-confirmed on Xray. Diet advanced to clear liquid. Stable on 2L NC overnight. 2 Loose BMs this shift. Voiding adequatel;y. Up IND in room. VSS. LS diminished but clear, int wheezing heard. Denies skin assessment but denies any issues.No pain overnight.

## 2022-03-18 NOTE — DISCHARGE SUMMARY
[facility]  Hospitalist Discharge Summary      Date of Admission:  3/16/2022  Date of Discharge:  3/18/2022  6:05 PM  Discharging Provider: Rashad Wilson MD  Discharge Service: Hospitalist Service, GOLD TEAM 8    Discharge Diagnoses   Small bowel obstruction  Hx of liver transplant due to hepatitis C  HTN  CKDIII  SURI      Follow-ups Needed After Discharge   Follow-up Appointments     Adult [facility] Follow-up and recommended labs and tests      Follow up with Dr. Anne , at (location with clinic name or city), within   1-2 weeks  for hospital follow- up. No follow up labs or test are needed.    Appointments on [place] and/or [facility] (with [facility] or [facility]   provider or service). Call [phone] if you haven't heard regarding   these appointments within 7 days of discharge.             Unresulted Labs Ordered in the Past 30 Days of this Admission     No orders found from 2/14/2022 to 3/17/2022.      These results will be followed up by NA    Discharge Disposition   Discharged to home  Condition at discharge: Stable    Hospital Course                  56 year old female with a PMHx significant for HTN and liver transplant (2010), obesity and CKD Stage IIIa who is admitted on 3/16/2022 for evaluation of abdominal pain found to have small bowel obstruction on CT A/P. This was managed conservatively with transplant surgery and GI consults. Patient started passing BM on day 2 and was able to tolerated diet on day 3. She was discharged to follow up with PCP and Dr. Anne     # Small Bowel Obstruction  Patient presented to the ED on 3/16/2022 with several hours of acute onset, severe, diffuse abdominal pain with 2 days of constipation.  No nausea or vomiting.  Not passing flatus.  Hemodynamically stable.  Abdominal exam was benign without tenderness to palpation or rebound/guarding.  CT abdomen pelvis demonstrating evidence of small bowel obstruction.  Has never had  "SBO's in the past.  Suspect patient's SBO is most likely due to possible underlying adhesions as patient has had a history of liver transplant surgery.  - S/p 1L NS. Continue NS @ 125mL/hr  - NPO except for meds and ice chips.  - Pain Control w/ IV Dilaudid PRN  - Antiemetic PRN  - No indication for NG tube  - Serial Abdominal Exams     # Liver Transplantation (2010) for Hepatitis C  Follows with Dr. Anne outpatient. No active issues at this time  - PTA Cyclosporine and Mycophenolate  - Hepatology Consult re. Immunosuppression, this was unchanged, patient to follow up with Dr. Anne as an outpatient.       # Splenic Artery Enlargement  Noted on CT A/P as \"somewhat difficult to visualize due to lack of IV contrast material and adjacent splenic vein\".   - Recommend follow up with contrast enhanced study. Defer to outpatient teams     # Hypertension  - PTA Amlodipine     # CKD Stage IIIa  Follows with Nephrology as outpatient. Baseline Cr. 1.1-1.3. Admission Cr 1.37.  - IVF as above. Continue to monitor     # SURI  - CPAP     Home Medications Being Held - defer to primary team to resume as SBO improves  - Gabapentin  - Furosemide  - Febuxostat        Diet: NPO except for meds/ice chips  DVT Prophylaxis: Mechanical  Disposition: General Medicine. Pending improvement and resolution of SBO  Code Status: Full Code           Consultations This Hospital Stay   GI HEPATOLOGY ADULT IP CONSULT  SURGERY GENERAL ADULT IP CONSULT  TRANSPLANT SURGERY LIVER ADULT IP CONSULT  SURGERY GENERAL ADULT IP CONSULT    Code Status   Prior    Time Spent on this Encounter   IRashad MD, personally saw the patient today and spent 35 minutes discharging this patient.       Rashad Wilson MD  [facility] ED UNIT [address] [place] [id number]  Phone: [phone]  ______________________________________________________________________    Physical Exam   Vital Signs:                    Weight: 248 lbs 7.33 oz  Gen: " NAD, sitting comfortably in bed  Eyes: PERRLA, EOMI, conjuctiva clear  Mouth: OP clear, no lesions  CV: RRR, no murmurs, 2+ radial pulses  RESP: CTA bilaterally, no w/r/c  Abd: soft, nontender, nondistended  Ext: no edema bilaterally, moving all extremities        Primary Care Physician   Karely Sierra    Discharge Orders      Reason for your hospital stay    You were admitted for small bowel obstruction that resolved with conservative management including not eating, IV fluids until you had bowel movements and could eat. Please follow up with Dr. Anne in 1-2 weeks. Thank you for trusting us with your care. Dr. Wilson     Activity    Your activity upon discharge: activity as tolerated     Adult [facility] Follow-up and recommended labs and tests    Follow up with Dr. Anne , at (location with clinic name or city), within 1-2 weeks  for hospital follow- up. No follow up labs or test are needed.    Appointments on [place] and/or [facility] (with [facility] or [facility] provider or service). Call [phone] if you haven't heard regarding these appointments within 7 days of discharge.     Diet    Follow this diet upon discharge: Orders Placed This Encounter      Regular Diet Adult       Significant Results and Procedures   Most Recent 3 CBC's:Recent Labs   Lab Test 03/17/22  0636 03/16/22  0315 12/17/21  0954   WBC 3.8* 7.2 4.5   HGB 12.0 14.0 13.2   MCV 98 92 95   * 167 186     Most Recent 3 BMP's:Recent Labs   Lab Test 03/18/22  0646 03/17/22  0636 03/16/22  0315    142 139   POTASSIUM 4.5 5.0 4.9   CHLORIDE 109 112* 105   CO2 26 25 31   BUN 16 19 26   CR 1.11* 1.27* 1.37*   ANIONGAP 6 5 3   JUAN 9.3 8.6 9.3   GLC 85 86 118*     Most Recent 2 LFT's:Recent Labs   Lab Test 03/18/22  0646 03/17/22  0636   AST 28 29   ALT 26 28   ALKPHOS 80 82   BILITOTAL 1.0 1.1     Most Recent 3 INR's:Recent Labs   Lab Test 04/30/19  1352 07/27/17  1736 07/27/17  1700   INR 1.05 0.9 0.98   ,   Results for orders placed or performed  during the hospital encounter of 03/16/22   CT Abdomen Pelvis w/o Contrast    Narrative    EXAM: CT ABDOMEN AND PELVIS WITHOUT CONTRAST  LOCATION: [facility]  DATE/TIME: 03/16/2022, 3:41 AM    INDICATION: Abdominal distention and pain. Suspect bowel obstruction.  COMPARISON: None.  TECHNIQUE: CT scan of the abdomen and pelvis was performed without IV contrast. Multiplanar reformats were obtained. Dose reduction techniques were used.  CONTRAST: None.    FINDINGS:   LOWER CHEST: Minimal linear atelectasis.    HEPATOBILIARY: Hepatic cirrhosis. Cholecystectomy. No biliary dilatation.    PANCREAS: Normal.    SPLEEN: Normal size spleen. Enlarged splenic artery. This is somewhat difficult to evaluate given the adjacent noncontrast splenic vein and redundancy of the splenic artery.    ADRENAL GLANDS: Unremarkable.    KIDNEYS/BLADDER: Severe right renal atrophy. No significant left-sided hydronephrosis. Mild ureteral dilatation distally extending up to the level of the distal ureters near the iliac vessels where the ureter returns to normal caliber. A mild stricture   at this location cannot be excluded. No left ureteral calculi. Bladder unremarkable.    BOWEL: Dilatation of multiple small bowel loops in the mid abdomen with mesenteric edema and fecal material throughout the lumen. Findings compatible with small bowel obstruction. A discrete transition point is difficult to visualize but likely in the   anterior mid-lower abdomen where there is transition to decompressed distal small bowel. Large amount of stool in the colon.    LYMPH NODES: No lymphadenopathy.    VASCULATURE: Normal caliber aorta.    PELVIC ORGANS: No free fluid.    MUSCULOSKELETAL: Degenerative changes in the spine. Chronic bilateral L5 pars interarticularis defects.      Impression    IMPRESSION:   1.  Dilatation of multiple small bowel loops in the mid abdomen with mesenteric edema and fecal material  throughout the lumen. Findings compatible with small bowel obstruction. A discrete transition point difficult to visualize but likely in the anterior   lower mid abdomen.    2.  Severe renal atrophy on the right hip. Mild dilatation of the distal left ureter down to the level of the iliac vessels where there is abrupt tapering to the normal caliber ureter. This could be due to ureteral stricture. No hydronephrosis.    3.  Hepatic cirrhosis.    4.  Diffuse enlargement of the splenic artery. This is somewhat difficult to visualize due to lack of IV contrast material and adjacent splenic vein. Consider follow-up contrast enhanced study for further evaluation.     XR Gastrografin Challenge    Narrative    EXAMINATION:  XR GASTROGRAFIN CHALLENGE 3/17/2022 5:50 PM.    COMPARISON: CT abdomen and pelvis 3/16/2022.    HISTORY:  Gastrografin UGI Challenge    FINDINGS: Frontal view of the abdomen 8 hours after administration of  gastrografin. Contrast opacifies the colon and rectum. Non-obstructive  bowel gas pattern of the small bowel. Surgical clips in the right  upper abdomen. Degenerative changes in the visualized spine.       Impression    IMPRESSION:   1. Negative for small bowel obstruction, contrast visualized in the  colon and rectum at 8hrs.     I have personally reviewed the examination and initial interpretation  and I agree with the findings.    WATSON CLAUDIO MD         SYSTEM ID:  [id number]     *Note: Due to a large number of results and/or encounters for the requested time period, some results have not been displayed. A complete set of results can be found in Results Review.       Discharge Medications   Discharge Medication List as of 3/18/2022  4:27 PM      CONTINUE these medications which have NOT CHANGED    Details   ACE/ARB/ARNI NOT PRESCRIBED (INTENTIONAL) Reason ACE/ARB was Not Prescribed: Hyperkalemia (baseline K+ greater than 5.5)No Print Out      acetaminophen (TYLENOL) 500 MG tablet Take 1 tablet (500  mg) by mouth 3 times daily as needed for mild pain, Disp-120 tablet, R-3, E-Prescribe      albuterol (ACCUNEB) 0.63 MG/3ML neb solution Take 3 mLs (0.63 mg) by nebulization every 4 hours (while awake), Disp-360 mL, R-1, E-Prescribe      albuterol (PROAIR HFA/PROVENTIL HFA/VENTOLIN HFA) 108 (90 Base) MCG/ACT inhaler Inhale 2 puffs into the lungs 4 times daily, Disp-18 g, R-1, E-PrescribePharmacy may dispense brand covered by insurance (Proair, or proventil or ventolin or generic albuterol inhaler)      alendronate (FOSAMAX) 70 MG tablet Take 1 tablet (70 mg) by mouth every 7 days, Disp-13 tablet, R-3, E-Prescribe      amLODIPine (NORVASC) 10 MG tablet Take 1 tablet (10 mg) by mouth daily, Disp-90 tablet, R-3, E-Prescribe      calcitRIOL (ROCALTROL) 0.25 MCG capsule Take 1 capsule (0.25 mcg) by mouth daily, Disp-90 capsule, R-3, E-Prescribe      calcium carbonate 600 mg-vitamin D 400 units (CALTRATE) 600-400 MG-UNIT per tablet Take 1 tablet by mouth 2 times daily, Disp-60 tablet, R-11, E-Prescribe      capsaicin (ZOSTRIX) 0.025 % external cream Apply three times a day as needed to legs. Wash hands after applying., Disp-237 g, R-3, E-Prescribe      carboxymethylcellulose PF (REFRESH PLUS) 0.5 % ophthalmic solution Place 1 drop into both eyes 4 times daily as needed for dry eyes, Disp-30 each, R-4, E-Prescribe      cetirizine (ZYRTEC) 10 MG tablet Take 1 tablet (10 mg) by mouth daily, Disp-90 tablet, R-3, E-Prescribe      COMPOUNDED NON-CONTROLLED SUBSTANCE (CMPD RX) - PHARMACY TO MIX COMPOUNDED MEDICATION Equal parts of 2% Benadryl, 2% vicious lidocaine and TC suspension maalox,  Swish and spit 5 ml every 6 hrs as needed for mouth pain, Disp-300 mL, R-1, E-Prescribe      cycloSPORINE modified (GENERIC EQUIVALENT) 25 MG capsule Take 3 capsules (75 mg) by mouth every morning AND 2 capsules (50 mg) At Bedtime., Disp-450 capsule, R-3, AMOL, E-Prescribe      Denture Care Products (EFFERDENT DENTURE CLEANSER) TBEF Use nightly  to clean oral appliance, Disp-30 tablet, R-11, E-Prescribe      dimethicone (AVEENO DAILY MOISTURIZING) 1.3 % LOTN lotion Externally apply topically dailyDisp-532 mL, [id number]      febuxostat (ULORIC) 40 MG TABS tablet Take 1 tablet (40 mg) by mouth daily, Disp-90 tablet, R-3, E-Prescribe      furosemide (LASIX) 20 MG tablet Take 1 tablet (20 mg) by mouth 2 times daily, Disp-180 tablet, R-3, E-Prescribe      gabapentin (NEURONTIN) 300 MG capsule Take 1 capsule (300 mg) by mouth At Bedtime, Disp-180 capsule, R-3, E-Prescribe      lidocaine (XYLOCAINE) 5 % external ointment Apply topically as needed for moderate painDisp-240 g, [id number]      magnesium hydroxide (MILK OF MAGNESIA) 400 MG/5ML suspension Take 15 mLs by mouth daily as needed for constipation or heartburn, Disp-769 mL, R-3, E-PrescribePlease refill monthly - she uses this medication freuqently      melatonin 3 MG tablet TAKE ONE TABLET BY MOUTH EVERY NIGHT AS NEEDED FOR SLEEP., Disp-100 tablet, R-3, E-PrescribePT MAY NEED TO pay out of pocket, this was discussed at our visit.      mineral oil-white petrolatum (EUCERIN) CREA cream Apply topically 2 times dailyDisp-454 g, [id number]      Multiple Vitamin (DAILY-SUMA MULTIVITAMIN) TABS Take 1 tablet by mouth every morning, Disp-100 tablet, R-4, E-Prescribe      multivitamin w/minerals (MULTI-VITAMIN) tablet Take 1 tablet by mouth daily Needs 4 month supply for International Travel, Disp-100 tablet, R-11, E-Prescribe      mycophenolate (GENERIC EQUIVALENT) 250 MG capsule TAKE TWO CAPSULES BY MOUTH EVERY 12 HOURS, Disp-120 capsule, R-11, AMOL, E-Prescribe      neomycin-polymyxin-HC 1 % Place 4 drops in ear(s) 2 times daily, Disp-10 mL, R-0, E-Prescribe      olopatadine (PATADAY) 0.2 % ophthalmic solution Place 1 drop into both eyes daily, Disp-2.5 mL, R-1, E-Prescribe      omeprazole (PRILOSEC) 20 MG DR capsule Take 1 capsule (20 mg) by mouth 2 times daily el, Disp-180 capsule, R-3, E-Prescribe       !! order for DME Equipment being ordered: compression stockings bilateral  Please measure and fit patient for stockings   Strength 15-30mmHg  Disp 2 pairs ( 4 socks)  1 refill over the time of 1 yearDisp-4 Units, R-1, Local Print      !! order for DME Equipment being ordered:   1) cane  2) compression stockings 15mmHg, 3 pairs  Dx: gait stability, falls, edemaDisp-1 each, R-0, Local Print      !! order for DME Equipment being ordered: Nebulizer with adult mask and tubingDisp-1 Units, R-0, Local Print      psyllium (METAMUCIL/KONSYL) 0.52 g capsule Take 2 capsules by mouth 2 times daily, Disp-180 capsule, R-3, E-Prescribe      senna-docusate (SENOKOT-S/PERICOLACE) 8.6-50 MG tablet Take 2 tablets by mouth 2 times daily Needs 4 month supply for International Travel, Disp-100 tablet, R-3, E-Prescribe      STATIN NOT PRESCRIBED, INTENTIONAL, Not prescribed, Disp-0 each, R-0, No Print Out      Vitamin D3 (CHOLECALCIFEROL) 25 mcg (1000 units) tablet Take 1 tablet (25 mcg) by mouth daily, Disp-100 tablet, R-3, E-Prescribe       !! - Potential duplicate medications found. Please discuss with provider.        Allergies   Allergies   Allergen Reactions     Aspirin      3/31/16 Per pt, tolerates 81 mg daily dose without ADR.     325 mg dose caused itchiness and hives.     Clarithromycin      Allergic reaction         Contrast Dye      Iodine      Pcn [Penicillins]

## 2022-03-18 NOTE — PROGRESS NOTES
HEPATOLOGY PROGRESS NOTE    Date: 03/18/2022     ASSESSMENT:  56 year old female with a history of Liver transplant (10/2010) for Hep C, obesity, SURI and CKD Stage IIIa, admitted to the hospital for SBO and also has elevated creatinine. GI is consulted for management of her liver transplant immunosuppression.     # SBO  Likely from adhesions related to prior intra-abdominal surgery. SBO seems to have resolved, tolerating clear liquid diet, 3 BMs charted in last 24 hours, 2 overnight.      # Status post liver transplant 2010  # Immunosuppression management  Cyclosporine trough returned at 64. Goal . No changes recommended.     RECOMMENDATIONS:  - Continue current immunosuppression regimen: cyclosporine 75mg qAM, 50 mg qPM, and  mg BID  - no additional immunosuppression checks necessary while inpatient  - management of SBO per primary and surgery teams, continue to advance diet  - we will sign off, please contact us with questions       Gastroenterology follow up recommendations: Dr. Anne appointment scheduled 4/19/22     Thank you for involving us in this patient's care. Please do not hesitate to contact the GI service with any questions or concerns.      Pt care plan discussed with Dr. Anne, GI staff physician.    Rod Rowell MD  Gastroenterology Fellow  Division of Gastroenterology, Hepatology and Nutrition  [facility]  Pager: [phone]  _______________________________________________________________

## 2022-03-18 NOTE — PLAN OF CARE
Discharged to: home  Via: son  Accompanied by: Son  Discharge Instructions: AVS reviewed with patient and son, declined  for review  Prescriptions: N/A  Follow Up Appointments: reviewed on AVS  Belongings: sent with patient      Pt exhibits understanding of above discharge instructions; all questions answered.     Discharge Paperwork: Signed, copied, and sent home with patient.

## 2022-03-18 NOTE — PROGRESS NOTES
"[facility]    Medicine Progress Note - Hospitalist Service, GOLD TEAM 8    Date of Admission:  3/16/2022    Assessment & Plan                56 year old female with a PMHx significant for HTN and liver transplant (2010), obesity and CKD Stage IIIa who is admitted on 3/16/2022 for evaluation of abdominal pain found to have small bowel obstruction on CT A/P.       Today's updates:  --Gastrografin challenge today, appreciate transplant surgery recommendations  --Sips and chips for now, expect will be able to advance diet in the next 24 hours depending on symptoms, XR's  --Trending cyclosporine levels per transplant recommendations, defer to their expertise for dosing    # Small Bowel Obstruction  Patient presented to the ED on 3/16/2022 with several hours of acute onset, severe, diffuse abdominal pain with 2 days of constipation.  No nausea or vomiting.  Not passing flatus.  Hemodynamically stable.  Abdominal exam was benign without tenderness to palpation or rebound/guarding.  CT abdomen pelvis demonstrating evidence of small bowel obstruction.  Has never had SBO's in the past.  Suspect patient's SBO is most likely due to possible underlying adhesions as patient has had a history of liver transplant surgery.  - S/p 1L NS. Continue NS @ 125mL/hr  - NPO except for meds and ice chips.  - Pain Control w/ IV Dilaudid PRN  - Antiemetic PRN  - No indication for NG tube decompression at this time  - Serial Abdominal Exams  - Planning gastrograffin challenge today Per Transplant Surgery; appr asst with order set     # Liver Transplantation (2010) for Hepatitis C  Follows with Dr. Anne outpatient. No active issues at this time  - PTA Cyclosporine and Mycophenolate  - Hepatology Consult re. immunosuppression     # Splenic Artery Enlargement  Noted on CT A/P as \"somewhat difficult to visualize due to lack of IV contrast material and adjacent splenic vein\".   - Recommend follow up with " "contrast enhanced study. Defer to primary day team to order as necessary     # Hypertension  - PTA Amlodipine     # CKD Stage IIIa  Follows with Nephrology as outpatient. Baseline Cr. 1.1-1.3. Admission Cr 1.37.  - IVF as above. Continue to monitor     # SURI  - CPAP     Home Medications Being Held - defer to primary team to resume as SBO improves  - Gabapentin  - Furosemide  - Febuxostat        Diet: NPO except for meds/ice chips  DVT Prophylaxis: Mechanical  Disposition: General Medicine. Pending improvement and resolution of SBO  Code Status: Full Code            Diet: NPO for Medical/Clinical Reasons Except for: Ice Chips, Meds    DVT Prophylaxis: Pneumatic Compression Devices  Li Catheter: Not present  Central Lines: None  Cardiac Monitoring: None  Code Status: Full Code      Disposition Plan   Expected Discharge: 03/21/2022     Anticipated discharge location:  Awaiting care coordination huddle  Delays:              The patient's care was discussed with the Bedside Nurse, Patient and trnsplt Consultant.    Nestor Upton MD  Hospitalist Service, [address]  Securely message with the Vocera Web Console (learn more here)  Text page via [facility] Paging/Directory   Please see signed in provider for up to date coverage information      Clinically Significant Risk Factors Present on Admission             # Severe Obesity: Estimated body mass index is 46.97 kg/m  as calculated from the following:    Height as of this encounter: 1.549 m (5' 0.98\").    Weight as of this encounter: 112.7 kg (248 lb 7.3 oz).      ______________________________________________________________________    Interval History      Overnight no acute events, patient feeling better this morning, tolerating abdominal exam with minimal pain  Says she is hungry  Says she would work to drink and eat  Pain adequately controlled with current medications  No bowel movements yet  Urine output " normal  Agreeable to Gastrografin challenge, will discuss with transplant surgery, at this point cautiously optimistic that she will be able to continue without NG tube or surgery hopefully advance diet within  The next 24 hours, which when discussed with her she was very excited about      Phone  used throughout the encounter.    Data reviewed today: I reviewed all medications, new labs and imaging results over the last 24 hours. I personally reviewed no images or EKG's today.    Physical Exam   Vital Signs: Temp: 98  F (36.7  C) Temp src: Oral BP: 125/56 Pulse: 82   Resp: 18 SpO2: 91 % O2 Device: None (Room air) Oxygen Delivery: 2 LPM  Weight: 248 lbs 7.33 oz  EXAM  General: Well-appearing obese woman lying flat in bed, no acute distress  Head: NC with, AT  Eye: symm gaze, anicteric sclerae  ENT: patent nares wo drainage/epistaxis, MMM  Pulm: CTAB anteriorly, comfortable WOB on room air  CV: normal rate, regular rhythm  GI: soft, NTND  Neuro: awake, alert, hearing speech and phonation, intact grossly       Data   Recent Labs   Lab 03/17/22  0636 03/16/22  0315   WBC 3.8* 7.2   HGB 12.0 14.0   MCV 98 92   * 167    139   POTASSIUM 5.0 4.9   CHLORIDE 112* 105   CO2 25 31   BUN 19 26   CR 1.27* 1.37*   ANIONGAP 5 3   JUAN 8.6 9.3   GLC 86 118*   ALBUMIN 2.9* 3.7   PROTTOTAL 6.8 8.2   BILITOTAL 1.1 0.8   ALKPHOS 82 92   ALT 28 33   AST 29 29   LIPASE  --  155     Recent Results (from the past 24 hour(s))   XR Gastrografin Challenge    Narrative    EXAMINATION:  XR GASTROGRAFIN CHALLENGE 3/17/2022 5:50 PM.    COMPARISON: CT abdomen and pelvis 3/16/2022.    HISTORY:  Gastrografin UGI Challenge    FINDINGS: Frontal view of the abdomen 8 hours after administration of  gastrografin. Contrast opacifies the colon and rectum. Non-obstructive  bowel gas pattern of the small bowel. Surgical clips in the right  upper abdomen. Degenerative changes in the visualized spine.       Impression    IMPRESSION:   1.  Negative for small bowel obstruction, contrast visualized in the  colon and rectum at 8hrs.     I have personally reviewed the examination and initial interpretation  and I agree with the findings.    WATSON CLAUDIO MD         SYSTEM ID:  [id number]     Medications       albuterol  3 mL Nebulization TID     amLODIPine  10 mg Oral Daily     cycloSPORINE modified  75 mg Oral QAM    And     cycloSPORINE modified  50 mg Oral QPM     mycophenolate  500 mg Oral BID IS     olopatadine  1 drop Both Eyes Daily     pantoprazole  40 mg Oral BID     sodium chloride (PF)  3 mL Intracatheter Q8H          **a portion of Dr ricardo previous note is copied and pasted above, it has been edited as needed to reflect events for today**

## 2022-03-18 NOTE — PLAN OF CARE
Goal Outcome Evaluation:    Plan of Care Reviewed With: patient     Overall Patient Progress: improving    Outcome Evaluation: Diet advanced to regular, pt tolerated. Pt denies pain. 1 medium loose stool during shift. Pt ambulated to bathroom, SBA. VSS on RA (pt declined use of oxygen). LS diminished in lower lobes, SOB noted when ambulating. Pt denies nausea.

## 2022-03-21 ENCOUNTER — TELEPHONE (OUTPATIENT)
Dept: GASTROENTEROLOGY | Facility: CLINIC | Age: 57
End: 2022-03-21
Payer: MEDICARE

## 2022-03-21 ENCOUNTER — DOCUMENTATION ONLY (OUTPATIENT)
Dept: FAMILY MEDICINE | Facility: CLINIC | Age: 57
End: 2022-03-21
Payer: MEDICARE

## 2022-03-21 NOTE — PROGRESS NOTES
"When opening a documentation only encounter, be sure to enter in \"Chief Complaint\" Forms and in \" Comments\" Title of form, description if needed.    Flor is a 56 year old  female  Form received via: Fax  Form now resides in: Provider Ready    Sabina Diaz MA    Form has been completed by provider.     Form sent out via: Fax to [facility] at Fax Number: [phone]  Patient informed: No, Reason:n/a  Output date: April 1, 2022    Sabina Diaz MA      **Please close the encounter**                  "

## 2022-03-21 NOTE — PROGRESS NOTES
"When opening a documentation only encounter, be sure to enter in \"Chief Complaint\" Forms and in \" Comments\" Title of form, description if needed.    Flor is a 56 year old  female  Form received via: Fax  Form now resides in: Provider Ready    Sabina Diaz MA    Unable to locate form in providers forms folder and the  completed forms folder.Per protocol encounter will be signed and addressed.    Sabina Diaz MA                "

## 2022-03-21 NOTE — TELEPHONE ENCOUNTER
[facility] Call Center    Phone Message    May a detailed message be left on voicemail: yes     Reason for Call: Other: Received post hospital discharge orders requesting patient to follow up with Dr. Anne within 1-2 weeks. Dr. Acevedo's first available is not until June. Please review for further follow up. Thanks!      Action Taken: Message routed to:  [facility] Hepatology     Travel Screening: Not Applicable

## 2022-03-23 ENCOUNTER — TELEPHONE (OUTPATIENT)
Dept: FAMILY MEDICINE | Facility: CLINIC | Age: 57
End: 2022-03-23
Payer: MEDICARE

## 2022-03-23 DIAGNOSIS — H10.13 ALLERGIC CONJUNCTIVITIS, BILATERAL: ICD-10-CM

## 2022-03-23 DIAGNOSIS — M25.511 PAIN IN JOINT OF RIGHT SHOULDER: ICD-10-CM

## 2022-03-23 RX ORDER — ACETAMINOPHEN 500 MG
500 TABLET ORAL 3 TIMES DAILY PRN
Qty: 120 TABLET | Refills: 3 | Status: SHIPPED | OUTPATIENT
Start: 2022-03-23 | End: 2022-04-22

## 2022-03-23 RX ORDER — OLOPATADINE HYDROCHLORIDE 2 MG/ML
1 SOLUTION/ DROPS OPHTHALMIC DAILY
Qty: 2.5 ML | Refills: 1 | Status: SHIPPED | OUTPATIENT
Start: 2022-03-23 | End: 2022-06-08

## 2022-03-23 NOTE — TELEPHONE ENCOUNTER
"[facility]: Post-Discharge Note  SITUATION                                                      Admission:  3/16/22        Discharge: 3/18/22       BACKGROUND                                                      Per hospital discharge summary and inpatient provider notes:  56 year old female with a PMHx significant for HTN and liver transplant (2010), obesity and CKD Stage IIIa who is admitted on 3/16/2022 for evaluation of abdominal pain found to have small bowel obstruction on CT A/P. This was managed conservatively with transplant surgery and GI consults. Patient started passing BM on day 2 and was able to tolerated diet on day 3. She was discharged to follow up with PCP and Dr. Anne     # Small Bowel Obstruction  Patient presented to the ED on 3/16/2022 with several hours of acute onset, severe, diffuse abdominal pain with 2 days of constipation.  No nausea or vomiting.  Not passing flatus.  Hemodynamically stable.  Abdominal exam was benign without tenderness to palpation or rebound/guarding.  CT abdomen pelvis demonstrating evidence of small bowel obstruction.  Has never had SBO's in the past.  Suspect patient's SBO is most likely due to possible underlying adhesions as patient has had a history of liver transplant surgery.  - S/p 1L NS. Continue NS @ 125mL/hr  - NPO except for meds and ice chips.  - Pain Control w/ IV Dilaudid PRN  - Antiemetic PRN  - No indication for NG tube  - Serial Abdominal Exams     # Liver Transplantation (2010) for Hepatitis C  Follows with Dr. Anne outpatient. No active issues at this time  - PTA Cyclosporine and Mycophenolate  - Hepatology Consult re. Immunosuppression, this was unchanged, patient to follow up with Dr. Anne as an outpatient.       # Splenic Artery Enlargement  Noted on CT A/P as \"somewhat difficult to visualize due to lack of IV contrast material and adjacent splenic vein\".   - Recommend follow up with contrast enhanced study. Defer to outpatient " teams    ASSESSMENT      RN called pt via [employer] services (ID [id number]) Pt states she is doing well. . Denies nausea vomiting, abdominal pain. Had BM last night. Appetite is good. Waiting for call back from Dr. Anne to get in    PLAN                                                      Outpatient Plan:   Adult [facility] Follow-up and recommended labs and tests      Follow up with Dr. Anne , at (location with clinic name or city), within   1-2 weeks  for hospital follow- up. No follow up labs or test are needed.    Future Appointments   Date Time Provider Department Center   3/29/2022  1:30 PM Elvin Borja APRN [facility]   3/30/2022  1:00 PM Bert Benites, [facility]   3/30/2022  1:20 PM Karely Sierra MD [facility]   6/17/2022  1:00 PM Ce Arambula MD [facility]         For any urgent concerns, please contact our 24 hour nurse triage line: [phone] ([phone])         Fabiola Arreola RN

## 2022-03-28 ENCOUNTER — DOCUMENTATION ONLY (OUTPATIENT)
Dept: FAMILY MEDICINE | Facility: CLINIC | Age: 57
End: 2022-03-28
Payer: MEDICARE

## 2022-03-28 DIAGNOSIS — Z53.9 DIAGNOSIS NOT YET DEFINED: Primary | ICD-10-CM

## 2022-03-28 PROCEDURE — G0180 MD CERTIFICATION HHA PATIENT: HCPCS | Performed by: FAMILY MEDICINE

## 2022-03-28 NOTE — PROGRESS NOTES
"When opening a documentation only encounter, be sure to enter in \"Chief Complaint\" Forms and in \" Comments\" Title of form, description if needed.    Flor is a 56 year old  female  Form received via: Fax  Form now resides in: Provider Ready    Sabina Diaz MA    Form has been completed by provider.     Form sent out via: Fax to [facility] at Fax Number: [phone]  Patient informed: No, Reason:n/a  Output date: April 1, 2022    Sabina Diaz MA      **Please close the encounter**                  "

## 2022-03-30 ENCOUNTER — OFFICE VISIT (OUTPATIENT)
Dept: FAMILY MEDICINE | Facility: CLINIC | Age: 57
End: 2022-03-30
Payer: MEDICARE

## 2022-03-30 ENCOUNTER — OFFICE VISIT (OUTPATIENT)
Dept: PHARMACY | Facility: CLINIC | Age: 57
End: 2022-03-30
Payer: MEDICARE

## 2022-03-30 VITALS
SYSTOLIC BLOOD PRESSURE: 148 MMHG | BODY MASS INDEX: 44.43 KG/M2 | WEIGHT: 235 LBS | OXYGEN SATURATION: 91 % | TEMPERATURE: 97.8 F | DIASTOLIC BLOOD PRESSURE: 92 MMHG | HEART RATE: 81 BPM

## 2022-03-30 DIAGNOSIS — I12.9 HYPERTENSION, RENAL: ICD-10-CM

## 2022-03-30 DIAGNOSIS — D69.6 THROMBOCYTOPENIA (H): ICD-10-CM

## 2022-03-30 DIAGNOSIS — M46.1 SACROILIITIS (H): ICD-10-CM

## 2022-03-30 DIAGNOSIS — M10.00 IDIOPATHIC GOUT, UNSPECIFIED CHRONICITY, UNSPECIFIED SITE: Primary | ICD-10-CM

## 2022-03-30 DIAGNOSIS — D84.821 IMMUNODEFICIENCY DUE TO DRUGS (CODE) (H): ICD-10-CM

## 2022-03-30 DIAGNOSIS — N90.89 VULVAR IRRITATION: ICD-10-CM

## 2022-03-30 DIAGNOSIS — Z94.4 LIVER REPLACED BY TRANSPLANT (H): ICD-10-CM

## 2022-03-30 DIAGNOSIS — K56.609 SBO (SMALL BOWEL OBSTRUCTION) (H): ICD-10-CM

## 2022-03-30 DIAGNOSIS — K56.609 SBO (SMALL BOWEL OBSTRUCTION) (H): Primary | ICD-10-CM

## 2022-03-30 DIAGNOSIS — I99.9: ICD-10-CM

## 2022-03-30 LAB
BASOPHILS # BLD AUTO: 0 10E3/UL (ref 0–0.2)
BASOPHILS NFR BLD AUTO: 1 %
EOSINOPHIL # BLD AUTO: 0.1 10E3/UL (ref 0–0.7)
EOSINOPHIL NFR BLD AUTO: 2 %
ERYTHROCYTE [DISTWIDTH] IN BLOOD BY AUTOMATED COUNT: 13.6 % (ref 10–15)
HCT VFR BLD AUTO: 43.2 % (ref 35–47)
HGB BLD-MCNC: 13.4 G/DL (ref 11.7–15.7)
IMM GRANULOCYTES # BLD: 0 10E3/UL
IMM GRANULOCYTES NFR BLD: 1 %
LYMPHOCYTES # BLD AUTO: 2.1 10E3/UL (ref 0.8–5.3)
LYMPHOCYTES NFR BLD AUTO: 34 %
MCH RBC QN AUTO: 29.1 PG (ref 26.5–33)
MCHC RBC AUTO-ENTMCNC: 31 G/DL (ref 31.5–36.5)
MCV RBC AUTO: 94 FL (ref 78–100)
MONOCYTES # BLD AUTO: 0.7 10E3/UL (ref 0–1.3)
MONOCYTES NFR BLD AUTO: 11 %
NEUTROPHILS # BLD AUTO: 3.2 10E3/UL (ref 1.6–8.3)
NEUTROPHILS NFR BLD AUTO: 52 %
PLATELET # BLD AUTO: 180 10E3/UL (ref 150–450)
RBC # BLD AUTO: 4.6 10E6/UL (ref 3.8–5.2)
WBC # BLD AUTO: 6.2 10E3/UL (ref 4–11)

## 2022-03-30 PROCEDURE — 85025 COMPLETE CBC W/AUTO DIFF WBC: CPT | Performed by: FAMILY MEDICINE

## 2022-03-30 PROCEDURE — 36415 COLL VENOUS BLD VENIPUNCTURE: CPT | Performed by: FAMILY MEDICINE

## 2022-03-30 PROCEDURE — 99207 PR NO CHARGE LOS: CPT | Performed by: PHARMACIST

## 2022-03-30 PROCEDURE — 99214 OFFICE O/P EST MOD 30 MIN: CPT | Performed by: FAMILY MEDICINE

## 2022-03-30 RX ORDER — AMOXICILLIN 250 MG
1-2 CAPSULE ORAL DAILY
Qty: 180 TABLET | Refills: 3 | Status: SHIPPED | OUTPATIENT
Start: 2022-03-30 | End: 2022-04-22

## 2022-03-30 NOTE — NURSING NOTE
Due to patient being non-English speaking/uses sign language, an  was used for this visit. Only for face-to-face interpretation by an external agency, date and length of interpretation can be found on the scanned worksheet.     name: Peyman Fields  Agency: [employer]  Language: Belarusian   Telephone number: [phone]  Type of interpretation: Telephone, spoken

## 2022-03-30 NOTE — PROGRESS NOTES
Medication Therapy Management (MTM) Encounter    ASSESSMENT:                            Medication Adherence/Access: overall adherence is appropriate as the patient has returned to her medication dose as before her admission, but the pt describes some missed doses on her part. She has forgotten to take her meds, but is not clear on the frequency of this or number of doses missed.   Flor dose not bring her medications or medication list.  There is a home care nurse that goes to the patient's home once a week, but I was not able to clarify if the medications held at discharged were held or changed since the hospitalization  Identified meds in our conversation were as follows.       HTN:   Uncontrolled, blood pressure today is elevated. Flor on current amlodipine dose.  Unclear if the patient is taking furosemide consistently.    GERD:   Controlled. Pt has resumed PPI therapy and GERD and GI function has returned to normal.     Liver transplant:   Controlled.  Pt has returned to regular dose of cyclosporine and mycophenolate.        PLAN:                            Continue on current therapy  Follow up with medication list from home  Continue to work on consistency in taking home medications.     Follow-up: with PCP    SUBJECTIVE/OBJECTIVE:                          Flor Navarro is a 56 year old female coming in for a TCM visit.  She was discharged from Dr. Sierra on 3/18/22 for small bowel obstruction.      Reason for visit: hospital follow up.    Allergies/ADRs: Reviewed in chart  Past Medical History: Reviewed in chart  Tobacco: She reports that she has never smoked. She has never used smokeless tobacco.  Alcohol: none    Flor was hospitalized for small bowel obstruction.  She was not discharged on any new medications and was instructed to present to PCP for assessment on what medications should be restarted.      Medication Adherence/Access: no issues reported  Patient reports missing some of her medications and  "is foer rgetful at times.      Liver transplant:   Continues to take cyclosporine and mycophenolate.  Identifies both drugs and confirms taking these at the same dose.     HTN:   Takes Amlodipine 10mg daily. Unchanged since hospital. Discharge summary states the patient was instructed to hold furosemide, but it is not clear from my interview if the patient has held this medication.      GERD:   Continues to take omeprazole 20 mg  Twice daily. Feels this is currently controlled.    Today's Vitals:   BP Readings from Last 1 Encounters:   03/30/22 (!) 148/92     Pulse Readings from Last 1 Encounters:   03/30/22 81     Wt Readings from Last 1 Encounters:   03/30/22 235 lb (106.6 kg)     Ht Readings from Last 1 Encounters:   03/16/22 5' 0.98\" (1.549 m)     Estimated body mass index is 44.43 kg/m  as calculated from the following:    Height as of 3/16/22: 5' 0.98\" (1.549 m).    Weight as of an earlier encounter on 3/30/22: 235 lb (106.6 kg).    Temp Readings from Last 1 Encounters:   03/30/22 97.8  F (36.6  C) (Oral)     ----------------  Post Discharge Medication Reconciliation Status: discharge medications reconciled and changed, per note/orders.    I spent 20 minutes with this patient today. Dr. Sierra was provided the recommendations above  in clinic today and is the authorizing prescriber for this visit through the pharmacist collaborative practice agreement.. A copy of the visit note was provided to the patient's provider(s).    The patient was given a summary of these recommendations. See Provider note/AVS from today.     Bert Benites, Pharm.D.       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

## 2022-03-30 NOTE — PATIENT INSTRUCTIONS
Preventative  1. We will do your covid booster with your next visit.   2. Avoid aggressive scrubbing on the vulva    Splenic Artery  1. Follow up with me in 1 month.  2. If you haven't seen Dr. Anne by then, I will order you another CT scan.    Constipation  1. Stop the liquid you are taking for your constipation once the pills come.   2. Continue the fiber capsule  3. Start taking senna tablets for your constipation. Start taking 1 pill a day.  You can use 2 to help you have 1 bowel movement a day.  4. Dates and figs are good for bowel movements in small numbers.  5. Continue drinking water    Back Pain  1. Tylenol, heat and if not improved, we can consider injections.    Attention homecare nursing  1. Discontinue milk of magnesia and all magnesia containing constipation products.  2, Start with 1 senna in the pill box daily and monitor her bowel movements. If the bowel movements are hard or cause pain, increase to 2 pills.

## 2022-03-30 NOTE — Clinical Note
"Dr. Anne: Flor said you were trying to get her in to see you sooner, after her SBO. I wanted to see if you wanted me to order a contrast enhanced CT scan to delineate her splenic artery \"enlargement\" any better. Thanks for any advice.   Karely "

## 2022-03-30 NOTE — PROGRESS NOTES
Hospitalization Follow-up Visit         [facility]       Hospital Follow-up Visit:    Hospital:  [facility]   Date of Admission: 3/16/22  Date of Discharge: 3/18/22  Reason(s) for Admission: Small bowel obstruction, Hx of liver transplant due to hepatitis C, HTN, CKDIII, SURI            Problems taking medications regularly:  None       Post Discharge Medication Reconciliation: discharge medications reconciled and changed, per note/orders. Difficult as she doesn' thave her meds and doesn' tknow them all. Able to use google images for some        Problems adhering to non-medication therapy:  None       Medications reviewed by: by PharmD    Summary of hospitalization:  [facility] discharge summary reviewed  Diagnostic Tests/Treatments reviewed.  Follow up needed: Dr. Anne  Other Healthcare Providers Involved in Patient s Care:         Homecare  Update since discharge: improved upper abdominal pain, there is still pain when she walks and she will need to take deep breaths.  Plan of care communicated with patient            A [employer]  was used for  this visit.      Back Pain  She reports pain in her back and points to the area where her back meets her buttock. She uses heat sometimes.         Review of Systems:   CONSTITUTIONAL: no fatigue, no unexpected change in weight  SKIN: no worrisome rashes, no worrisome moles, no worrisome lesions  EYES: no acute vision problems or changes  ENT: no ear problems, no mouth problems, no throat problems  RESP: no significant cough, no shortness of breath  CV: no chest pain, no palpitations, no new or worsening peripheral edema  GI: no nausea, no/ vomiting, no constipation, no diarrhea   This document serves as a record of the services and decisions personally performed and made by Karely Sierra MD. It was created on his/her behalf by Aury Aiken, a trained medical scribe. The creation of this document is based the provider's statements to  the medical scribe.  Nelson Aiken 1:49 PM, March 30, 2022       Physical Exam:     Vitals:    03/30/22 1320 03/30/22 1321   BP: (!) 147/55 (!) 148/92   Pulse: 81    Temp: 97.8  F (36.6  C)    TempSrc: Oral    SpO2: 91%    Weight: 106.6 kg (235 lb)      Body mass index is 44.43 kg/m .    GENERAL: healthy, alert and no distress  RESP: lungs clear to auscultation - no rales, no rhonchi, no wheezes  CV: regular rates and rhythm, normal S1 S2, no S3 or S4 and no murmur, no click or rub   ABDOMEN: soft, normal bowel sounds, obese, tender above the umbilicus but not in epigastrium, no rebound, no guarding  MS: sacrum is tender to palpation when standing, full spinal flexion.  PSYCH: Alert and oriented times 3; speech- coherent , normal rate and volume; able to articulate logical thoughts, able to abstract reason, no tangential thoughts, no hallucinations or delusions, affect- normal. Speech sounds fast, which is standard for her. Speaks over phone  multiple times.          Results:     Results from last visit   Results for orders placed or performed in visit on 03/30/22   CBC with platelets and differential     Status: Abnormal   Result Value Ref Range    WBC Count 6.2 4.0 - 11.0 10e3/uL    RBC Count 4.60 3.80 - 5.20 10e6/uL    Hemoglobin 13.4 11.7 - 15.7 g/dL    Hematocrit 43.2 35.0 - 47.0 %    MCV 94 78 - 100 fL    MCH 29.1 26.5 - 33.0 pg    MCHC 31.0 (L) 31.5 - 36.5 g/dL    RDW 13.6 10.0 - 15.0 %    Platelet Count 180 150 - 450 10e3/uL    % Neutrophils 52 %    % Lymphocytes 34 %    % Monocytes 11 %    % Eosinophils 2 %    % Basophils 1 %    % Immature Granulocytes 1 %    Absolute Neutrophils 3.2 1.6 - 8.3 10e3/uL    Absolute Lymphocytes 2.1 0.8 - 5.3 10e3/uL    Absolute Monocytes 0.7 0.0 - 1.3 10e3/uL    Absolute Eosinophils 0.1 0.0 - 0.7 10e3/uL    Absolute Basophils 0.0 0.0 - 0.2 10e3/uL    Absolute Immature Granulocytes 0.0 <=0.4 10e3/uL   CBC with platelets differential     Status: Abnormal     Narrative    The following orders were created for panel order CBC with platelets differential.  Procedure                               Abnormality         Status                     ---------                               -----------         ------                     CBC with platelets and d...[[id number]]  Abnormal            Final result                 Please view results for these tests on the individual orders.       Assessment and Plan      Flor was seen today for hospital f/u.    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 Appears fully resolved by imaging but still having some residual symptoms, which I suspect will improve. Recommend stool maintenance with senna instead of magnesium/aluminium products given ckd. Has appt with Dr. Anne for liver transplant. F/u in June.  -     senna-docusate (SENOKOT-S/PERICOLACE) 8.6-50 MG tablet; Take 1-2 tablets by mouth daily Needs 4 month supply for International Travel    Thrombocytopenia (H)  Noted on hospitalization  -     CBC with platelets differential; Future    Enlargement of splenic artery  Incidental finding on ct scan for sbo. Incompletely visualized. Currently apparently on a waiting list to see Dr. Omid montgomery and Dr. Bran. Unclear if related to prior liver disease. DIscussed electronically with Dr. Anne. Will order contrast CT so it is ready for his visit. Will ask staff to help pt schedule.     Sacroiliitis (H)  Recurrent from past, probably from hospital bed. Continue hot packs, tylenol. We will reassess next visit if injections or modalities are needed.  Vulvar Irritation  Describes and demonstrates excessive scrubbing with hygiene which I recommend avoiding.     E&M code to be billed if TCM cannot be: 58336    Type of decision making: High complexity (04543)      Options for treatment and  follow-up care were reviewed with the patient  Flor Navarro   engaged in the decision making process and verbalized understanding of the options discussed and agreed with the final plan.      The information in this document, created by the medical scribe for me, accurately reflects the services I personally performed and the decisions made by me. I have reviewed and approved this document for accuracy prior to leaving the patient care area.  Karely Sierra MD  1:46 PM, 03/30/22

## 2022-03-30 NOTE — LETTER
April 11, 2022      Flor Navarro  [address]  [place] [address]        Dear Flor,    Thank you for getting your care at [facility]. Please see below for your test results. Your numbers look better since being in the hospital. This is good news.     Resulted Orders   CBC with platelets and differential   Result Value Ref Range    WBC Count 6.2 4.0 - 11.0 10e3/uL    RBC Count 4.60 3.80 - 5.20 10e6/uL    Hemoglobin 13.4 11.7 - 15.7 g/dL    Hematocrit 43.2 35.0 - 47.0 %    MCV 94 78 - 100 fL    MCH 29.1 26.5 - 33.0 pg    MCHC 31.0 (L) 31.5 - 36.5 g/dL    RDW 13.6 10.0 - 15.0 %    Platelet Count 180 150 - 450 10e3/uL    % Neutrophils 52 %    % Lymphocytes 34 %    % Monocytes 11 %    % Eosinophils 2 %    % Basophils 1 %    % Immature Granulocytes 1 %    Absolute Neutrophils 3.2 1.6 - 8.3 10e3/uL    Absolute Lymphocytes 2.1 0.8 - 5.3 10e3/uL    Absolute Monocytes 0.7 0.0 - 1.3 10e3/uL    Absolute Eosinophils 0.1 0.0 - 0.7 10e3/uL    Absolute Basophils 0.0 0.0 - 0.2 10e3/uL    Absolute Immature Granulocytes 0.0 <=0.4 10e3/uL       Sincerely,    Karely Sierra MD

## 2022-04-01 ENCOUNTER — TELEPHONE (OUTPATIENT)
Dept: FAMILY MEDICINE | Facility: CLINIC | Age: 57
End: 2022-04-01
Payer: MEDICARE

## 2022-04-01 NOTE — TELEPHONE ENCOUNTER
Called patient using language line -patient requests appointment 4/20 around 1300. Patient states she must wait until her son  Is back in town for transportation assistance. Requests we send a letter with details of appointment.     Scheduled patient for  CT scan at [facility] 4/20/2022 at 1320.

## 2022-04-05 ENCOUNTER — DOCUMENTATION ONLY (OUTPATIENT)
Dept: FAMILY MEDICINE | Facility: CLINIC | Age: 57
End: 2022-04-05
Payer: MEDICARE

## 2022-04-12 DIAGNOSIS — Z91.041 CONTRAST MEDIA ALLERGY: Primary | ICD-10-CM

## 2022-04-12 RX ORDER — DIPHENHYDRAMINE HCL 25 MG
50 CAPSULE ORAL DAILY PRN
Qty: 2 CAPSULE | Refills: 0 | Status: SHIPPED | OUTPATIENT
Start: 2022-04-12 | End: 2022-04-13

## 2022-04-12 RX ORDER — METHYLPREDNISOLONE 32 MG/1
32 TABLET ORAL 2 TIMES DAILY PRN
Qty: 2 TABLET | Refills: 0 | Status: SHIPPED | OUTPATIENT
Start: 2022-04-12 | End: 2022-04-25

## 2022-04-15 DIAGNOSIS — N18.30 STAGE 3 CHRONIC KIDNEY DISEASE, UNSPECIFIED WHETHER STAGE 3A OR 3B CKD (H): ICD-10-CM

## 2022-04-15 NOTE — TELEPHONE ENCOUNTER

## 2022-04-18 RX ORDER — CALCITRIOL 0.25 UG/1
0.25 CAPSULE, LIQUID FILLED ORAL DAILY
Qty: 90 CAPSULE | Refills: 3 | Status: SHIPPED | OUTPATIENT
Start: 2022-04-18 | End: 2023-07-10

## 2022-04-22 ENCOUNTER — OFFICE VISIT (OUTPATIENT)
Dept: FAMILY MEDICINE | Facility: CLINIC | Age: 57
End: 2022-04-22
Payer: MEDICARE

## 2022-04-22 VITALS
RESPIRATION RATE: 16 BRPM | WEIGHT: 233 LBS | HEART RATE: 67 BPM | SYSTOLIC BLOOD PRESSURE: 141 MMHG | OXYGEN SATURATION: 94 % | BODY MASS INDEX: 44.05 KG/M2 | DIASTOLIC BLOOD PRESSURE: 95 MMHG | TEMPERATURE: 97.7 F
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
## 2022-04-22 DIAGNOSIS — J44.9 CHRONIC OBSTRUCTIVE PULMONARY DISEASE, UNSPECIFIED COPD TYPE (H): ICD-10-CM

## 2022-04-22 DIAGNOSIS — G63 NEUROPATHY DUE TO MEDICAL CONDITION (H): Primary | ICD-10-CM

## 2022-04-22 DIAGNOSIS — Z94.4 LIVER REPLACED BY TRANSPLANT (H): ICD-10-CM

## 2022-04-22 DIAGNOSIS — M25.511 PAIN IN JOINT OF RIGHT SHOULDER: ICD-10-CM

## 2022-04-22 DIAGNOSIS — M79.662 PAIN OF LEFT LOWER LEG: ICD-10-CM

## 2022-04-22 DIAGNOSIS — I99.9: ICD-10-CM

## 2022-04-22 DIAGNOSIS — Z91.041 CONTRAST MEDIA ALLERGY: ICD-10-CM

## 2022-04-22 DIAGNOSIS — H57.9 GRITTY EYE: ICD-10-CM

## 2022-04-22 DIAGNOSIS — L85.3 DRY SKIN: ICD-10-CM

## 2022-04-22 PROCEDURE — 99215 OFFICE O/P EST HI 40 MIN: CPT | Performed by: FAMILY MEDICINE

## 2022-04-22 RX ORDER — TIOTROPIUM BROMIDE 18 UG/1
18 CAPSULE ORAL; RESPIRATORY (INHALATION) DAILY
Qty: 90 CAPSULE | Refills: 3 | Status: SHIPPED | OUTPATIENT
Start: 2022-04-22 | End: 2022-04-22

## 2022-04-22 RX ORDER — METHYLPREDNISOLONE 32 MG/1
32 TABLET ORAL DAILY
Qty: 1 TABLET | Refills: 0 | Status: SHIPPED | OUTPATIENT
Start: 2022-04-22 | End: 2022-04-22

## 2022-04-22 RX ORDER — AMOXICILLIN 250 MG
1-2 CAPSULE ORAL DAILY
Qty: 180 TABLET | Refills: 3 | Status: SHIPPED | OUTPATIENT
Start: 2022-04-22 | End: 2022-06-08

## 2022-04-22 RX ORDER — ACETAMINOPHEN 500 MG
500 TABLET ORAL 3 TIMES DAILY PRN
Qty: 120 TABLET | Refills: 3 | Status: SHIPPED | OUTPATIENT
Start: 2022-04-22 | End: 2022-06-08

## 2022-04-22 RX ORDER — CAPSAICIN 0.025 %
CREAM (GRAM) TOPICAL
Qty: 237 G | Refills: 3 | Status: SHIPPED | OUTPATIENT
Start: 2022-04-22 | End: 2023-02-01

## 2022-04-22 RX ORDER — ALBUTEROL SULFATE 90 UG/1
2 AEROSOL, METERED RESPIRATORY (INHALATION) 4 TIMES DAILY
Qty: 18 G | Refills: 1 | Status: SHIPPED | OUTPATIENT
Start: 2022-04-22 | End: 2022-08-24

## 2022-04-22 RX ORDER — CARBOXYMETHYLCELLULOSE SODIUM 5 MG/ML
1 SOLUTION/ DROPS OPHTHALMIC 4 TIMES DAILY PRN
Qty: 30 EACH | Refills: 4 | Status: SHIPPED | OUTPATIENT
Start: 2022-04-22 | End: 2022-06-08

## 2022-04-22 RX ORDER — LANOLIN ALCOHOL/MO/W.PET/CERES
CREAM (GRAM) TOPICAL 2 TIMES DAILY
Qty: 454 G | Refills: 11 | Status: ON HOLD | OUTPATIENT
Start: 2022-04-22 | End: 2023-03-02

## 2022-04-22 RX ORDER — METHYLPREDNISOLONE 32 MG/1
32 TABLET ORAL DAILY
Qty: 1 TABLET | Refills: 0 | Status: SHIPPED | OUTPATIENT
Start: 2022-04-22 | End: 2022-06-08

## 2022-04-22 RX ORDER — DIPHENHYDRAMINE HCL 50 MG
50 CAPSULE ORAL
Qty: 2 CAPSULE | Refills: 0 | Status: SHIPPED | OUTPATIENT
Start: 2022-04-22 | End: 2023-03-31

## 2022-04-22 NOTE — NURSING NOTE
Due to patient being non-English speaking/uses sign language, an  was used for this visit. Only for face-to-face interpretation by an external agency, date and length of interpretation can be found on the scanned worksheet.     name: Angeline  Agency: [employer]  Language: Swedish   Telephone number: [phone]  Type of interpretation: Telephone, spoken

## 2022-04-22 NOTE — Clinical Note
I need to contact the home nurse. Pt doesn't know her meds or home RN name. I cannot find. I need her to tell me if home meds include BP meds. Please he.lp me get in touch with her RN.  c

## 2022-04-22 NOTE — PROGRESS NOTES
DME (Durable Medical Equipment) Orders and Documentation  Orders Placed This Encounter   Procedures     Cane Order      The patient was assessed and it was determined the patient is in need of the following listed DME Supplies/Equipment. Please complete supporting documentation below to demonstrate medical necessity.      DME All Other Item(s) Documentation    List reason for need and supporting documentation for medical necessity below for each DME item.     1. Safe ambulation

## 2022-04-22 NOTE — PROGRESS NOTES
Clinical Pharmacy Consult:                                                    Flor Navarro is a 56 year old female coming in for a clinical pharmacist consult.  She was referred to me from Karely Sierra MD .     Reason for Consult: medication confusion     Discussion:   Paperwork from insurance company:  Unclear what the concern is regarding medications?    Tylenol - needs to be delivered home  Laxative -  Senna - sent to specialty pharmacy.      Eye drops  - sent to specialty pharmcy    Cream for moisturizer:    Cream for pain - capsaicin         From [facility]

## 2022-04-22 NOTE — PROGRESS NOTES
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  - Cane Order        Contrast media allergy,  Enlargement of splenic artery    Showed up to CT scan but had not taken premedication which had reportedly not been mailed to her from [place] [facility]. Represcribed today and educated on how to take medication and she was able to repeat instructions. She needs this study before seeing Dr. Anne in June. Pharmacist noted today due to insurance she could not get MEDROL filled but will address this on Monday.  - methylPREDNISolone (MEDROL) 32 MG tablet  Dispense: 1 tablet; Refill: 0  - diphenhydrAMINE (BENADRYL) 50 MG capsule  Dispense: 2 capsule; Refill: 0    Chronic obstructive pulmonary disease, unspecified COPD type (H)  Starting Incruse Ellipta as a LAMA for ongoing dysmia symptoms no evidence of exacerbation.        45 minutes spent on the date of the encounter doing chart review, history and exam, documentation and further activities per the note     No follow-ups on file.    The information in this document, created by the medical scribe for me, accurately reflects the services I personally performed and the decisions made by me. I have reviewed and approved this document for accuracy prior to leaving the patient care area.  Karely Sierra MD  2:27 PM, 04/22/22  [facility] AMI Ray is a 56 year old who presents for the following health issues  accompanied by an Danish  virtually.     HPI   Patient's son is currently in [place]    Patient attests falling 15 days ago with a very swollen right knee and severe pain that brought her to tears. She denies any bleeding or bruising.     She recently left her cane in the hospital and attests to loosing her balance with her current cane, so she would like a  "4-legged cane with her name and address labelled on it.     Patient was previously had low oxygen and was concerned of about breathing issues. Patient reports that she needs her inhaler for sudden episodes of shortness of breath that follow abdominal pain while walking. She denies any problems going to the bathroom but reports getting up throughout the night to urinate. She denies any abdominal pain. She affirms that rigid area in her abdomen preceded her SPO. She denies coughing, denies sputum.     She does not know the names of the blood pressure medication she takes but affirms taking them.      Review of Systems   Positive for: Falling, Knee pain, shortness of breath  Negative for: Bruising, bleeding, coughing.      Objective    BP (!) 141/95   Pulse 67   Temp 97.7  F (36.5  C) (Oral)   Resp 16   Wt 105.7 kg (233 lb)   LMP  (LMP Unknown)   SpO2 94%   Breastfeeding No   BMI 44.05 kg/m    Body mass index is 44.05 kg/m .  Physical Exam   GENERAL: healthy, alert and no distress  RESP: lungs clear to auscultation - no rales, rhonchi or wheezes  CV: regular rate and rhythm, normal S1 S2, no S3 or S4, no murmur, click or rub, no peripheral edema and peripheral pulses strong  ABDOMEN:  Soft with mild area of firmness predates SPO  MS: Right knee without effusion mild tenderness to palpation. Medial tibial plateau but no crepitous  PSYCH: mentation appears normal, affect normal/bright    Ht Readings from Last 2 Encounters:   03/16/22 1.549 m (5' 0.98\")   01/03/22 1.54 m (5' 0.63\")                     "

## 2022-04-22 NOTE — PATIENT INSTRUCTIONS
4-legged Cane ordered    Medrol ordered for scan. Take 1 pill 12 hours prior to exam and 1 pill 2 hours prior to exam.    Blood Pressure  Will contact home nurse to check blood pressure medication regiment.    Breathing  Rescue inhaler reordered    Start using Spiriva once everyday      CC mailed label with name, address and telephone number for patient new cane.

## 2022-04-25 DIAGNOSIS — Z91.041 CONTRAST MEDIA ALLERGY: ICD-10-CM

## 2022-04-25 RX ORDER — METHYLPREDNISOLONE 32 MG/1
32 TABLET ORAL 2 TIMES DAILY PRN
Qty: 2 TABLET | Refills: 0 | Status: SHIPPED | OUTPATIENT
Start: 2022-04-25 | End: 2022-06-08

## 2022-04-25 NOTE — TELEPHONE ENCOUNTER
The rx we have is not compliant. If we could please get the rx rewritten as:    Methylprednisolone 16mg  2BID    Thank you

## 2022-04-28 NOTE — TELEPHONE ENCOUNTER
Pharmacy called back again today, they need 30 day prescription in order to bill medicare.    Routing to PCP to send a higher quantity of the medication.  Elisabet Olson RN    Procedure:  EGD  COLONOSCOPY    Relevant Problems   CARDIO   (+) Hypertension   (+) Mixed hyperlipidemia      ENDO   (+) Type 2 diabetes mellitus with hypoglycemia without coma, without long-term current use of insulin (HCC)      HEMATOLOGY   (+) Iron deficiency anemia      MUSCULOSKELETAL   (+) Fibromyalgia   (+) Rheumatoid arthritis (HCC)      NEURO/PSYCH   (+) Anxiety   (+) Fibromyalgia      PULMONARY   (+) Asthma      Other   (+) History of gastric bypass   (+) Schizoaffective disorder, bipolar type (HCC)   [place]'s on hydrocortisone    Physical Exam    Airway    Mallampati score: II  TM Distance: >3 FB  Neck ROM: full     Dental       Cardiovascular      Pulmonary      Other Findings        Anesthesia Plan  ASA Score- 3     Anesthesia Type- IV sedation with anesthesia with ASA Monitors  Additional Monitors:   Airway Plan:     Comment: I discussed the risks and benefits of IV sedation anesthesia including the possibility of the need to convert to general anesthesia and the potential risk of awareness  Plan Factors-Exercise comment: Limited from taking steps due to orthopedic issues, but able to ambulate through grocery store without cardiopulmonary limitation  Chart reviewed  Induction- intravenous  Postoperative Plan-     Informed Consent- Anesthetic plan and risks discussed with patient

## 2022-05-02 ENCOUNTER — TELEPHONE (OUTPATIENT)
Dept: FAMILY MEDICINE | Facility: CLINIC | Age: 57
End: 2022-05-02

## 2022-05-02 NOTE — TELEPHONE ENCOUNTER
[facility] Prior Authorization Team Request    Medication: Methylprednisolone 16mg  Dosing: take 2 tablets by mouth 12 hours before procedure with IV contrast  NDC (required for Medicaid members): [address]-48    Insurance SilverscShae RAMIREZ  BIN: [id number]  PCN: [id number]  Grp: [id number]  ID: [id number]    CoverMyMeds Key (if applicable):     Additional documentation:       Mehul Jackson @  [facility]:[place]   Phone Number: [phone]  Contact: P PHARM [place] (P [id number]) please send all responses to this pool.  Pharmacy NPI (required for Medicaid members):[id number]

## 2022-05-05 NOTE — TELEPHONE ENCOUNTER
PA Initiation    Medication: methylPREDNISolone 16MG tablets  Insurance Company: Shaun Solis - Phone [phone] Fax [phone]  Pharmacy Filling the Rx: [place], [place] - [address]  Filling Pharmacy Phone: [phone]  Filling Pharmacy Fax:    Start Date: 5/5/2022

## 2022-05-05 NOTE — TELEPHONE ENCOUNTER
PRIOR AUTHORIZATION DENIED    Medication: methylPREDNISolone 16MG tablets    Denial Date: 5/5/2022    Denial Rationale:          Appeal Information: If provider would like to appeal this decision we will need a detailed letter of medical necessity to start the process. Then re-route this request back to the PA pool.

## 2022-05-09 ENCOUNTER — OFFICE VISIT (OUTPATIENT)
Dept: URGENT CARE | Facility: URGENT CARE | Age: 57
End: 2022-05-09
Payer: MEDICARE

## 2022-05-09 VITALS
HEART RATE: 78 BPM | OXYGEN SATURATION: 90 % | SYSTOLIC BLOOD PRESSURE: 163 MMHG | DIASTOLIC BLOOD PRESSURE: 97 MMHG | WEIGHT: 235 LBS | HEIGHT: 60 IN | BODY MASS INDEX: 46.13 KG/M2 | TEMPERATURE: 97.2 F

## 2022-05-09 DIAGNOSIS — S69.92XA INJURY OF LEFT LITTLE FINGER, INITIAL ENCOUNTER: Primary | ICD-10-CM

## 2022-05-09 PROCEDURE — 99213 OFFICE O/P EST LOW 20 MIN: CPT | Performed by: NURSE PRACTITIONER

## 2022-05-09 NOTE — TELEPHONE ENCOUNTER
"ANAIS RN and patient's son Courtney called on behalf of patient. Patient is need of medication before \"she can have her CT scan done\".  Please follow up with Courtney @ [phone]  Patient thinks this is not the correct medication. \"Needs the sulfate\"?  methylPREDNISolone (MEDROL) 32 MG tablet 2 tablet       "

## 2022-05-09 NOTE — PATIENT INSTRUCTIONS
Wash with soap and water 2 times per day then use Vaseline or bacitracin and cover with bandaid.     If increased redness, pain, drainage occurs please return to clinic as this may be sign of infection

## 2022-05-09 NOTE — PROGRESS NOTES
Assessment & Plan      Diagnosis Comments   1. Injury of left little finger, initial encounter  Verbalized understanding; will monitor symptoms closely. Reviewed s/e to medications.        Patient Instructions   Wash with soap and water 2 times per day then use Vaseline or bacitracin and cover with bandaid.     If increased redness, pain, drainage occurs please return to clinic as this may be sign of infection    MOODY Campos [facility] CARE JOSEARGENIS Ray is a 56 year old female who presents to clinic today for the following health issues:  Chief Complaint   Patient presents with     Hand Injury     Left Pinky finger got jammed on 5/1/2022.     HPI    Patient slammed left finger in car about a week ago symptoms improving. Full ROM, laceration on pinking pad dry. Mild tenderness. Negative for erythema or drainage.       Review of Systems  Constitutional, HEENT, cardiovascular, pulmonary, gi and gu systems are negative, except as otherwise noted.      Objective    BP (!) 163/97 (BP Location: Left arm, Patient Position: Sitting, Cuff Size: Adult Large)   Pulse 78   Temp 97.2  F (36.2  C) (Tympanic)   Ht 1.524 m (5')   Wt 106.6 kg (235 lb)   LMP  (LMP Unknown)   SpO2 90%   BMI 45.90 kg/m    Physical Exam   GENERAL: healthy, alert and no distress  NECK: no adenopathy, no asymmetry, masses, or scars and thyroid normal to palpation  RESP: lungs clear to auscultation - no rales, rhonchi or wheezes  CV: regular rate and rhythm, normal S1 S2, no S3 or S4, no murmur, click or rub, no peripheral edema and peripheral pulses strong  MS: no gross musculoskeletal defects noted, no edema  SKIN: see above description. CMS is intact.

## 2022-05-09 NOTE — TELEPHONE ENCOUNTER
PA denied.  Reason given:      Medicare part D cannot cover a drug already covered by Medicare part B.

## 2022-05-10 NOTE — TELEPHONE ENCOUNTER
This appears to be an issue of billing the wrong insurance, at least per PA note placed that there is coverage via another plan. Please ask pharmacy to bill under covered service. If further assist is needed (med medically necessary due to contrast dye allergy) would consult with pharmD

## 2022-05-13 ENCOUNTER — TELEPHONE (OUTPATIENT)
Dept: FAMILY MEDICINE | Facility: CLINIC | Age: 57
End: 2022-05-13
Payer: MEDICARE

## 2022-05-13 DIAGNOSIS — K59.09 CONSTIPATION, CHRONIC: ICD-10-CM

## 2022-05-13 NOTE — TELEPHONE ENCOUNTER
Prior Authorization Approval    Authorization Effective Date: 1/1/2022  Authorization Expiration Date: 5/13/2023  Medication: Febuxostat 40 mg (Uloric)-APPROVED  Approved Dose/Quantity:   Reference #:     Insurance Company: Medicare Blue - Phone [phone] Fax [phone]  Expected CoPay:       CoPay Card Available:      Foundation Assistance Needed:    Which Pharmacy is filling the prescription (Not needed for infusion/clinic administered): [facility] - [place] [address]  Pharmacy Notified: Yes  Patient Notified: No    **Instructed pharmacy to notify patient when script is ready to /ship.**     Start Regimen: Taclonex ointment bid hand and knee, QD feet (May wear socks for compression at night). Stop once resolved and us PRN. \\n\\nAmlactin bid to feet\\nClobetasol sol to scalp bid prn Initiate Regimen: Taclonex ointment bid hand and knee, QD feet (May wear socks for compression at night). Stop once resolved and us PRN. \\n\\nAmlactin bid to feet\\nClobetasol sol to scalp bid prn

## 2022-05-13 NOTE — TELEPHONE ENCOUNTER
Central Prior Authorization Team   Phone: [phone]      PA Initiation    Medication: Febuxostat 40 mg (Uloric)  Insurance Company: Medicare Blue - Phone [phone] Fax [phone]  Pharmacy Filling the Rx: [facility] - [place], [place] - [address]  Filling Pharmacy Phone: [phone]  Filling Pharmacy Fax:    Start Date: 5/13/2022

## 2022-05-13 NOTE — TELEPHONE ENCOUNTER
"Request for medication refill:  psyllium (METAMUCIL/KONSYL) 0.52 g capsule    Providers if patient needs an appointment and you are willing to give a one month supply please refill for one month and  send a letter/MyChart using \".SMILLIMITEDREFILL\" .smillimited and route chart to \"P SMI \" (Giving one month refill in non controlled medications is strongly recommended before denial)    If refill has been denied, meaning absolutely no refills without visit, please complete the smart phrase \".smirxrefuse\" and route it to the \"P SMI MED REFILLS\"  pool to inform the patient and the pharmacy.    Ronit Schneider MA        "

## 2022-05-16 NOTE — TELEPHONE ENCOUNTER
"Pharmacy requesting refill of vitamin D3 25 mcg tablet. Last office visit 6/4/21 with Dr. Sierra. Routing to PCP to order if appropriate.   Breanna Cook RN      Request for medication refill:    Providers if patient needs an appointment and you are willing to give a one month supply please refill for one month and  send a letter/MyChart using \".SMILLIMITEDREFILL\" .smillimited and route chart to \"P SMI \" (Giving one month refill in non controlled medications is strongly recommended before denial)    If refill has been denied, meaning absolutely no refills without visit, please complete the smart phrase \".smirxrefuse\" and route it to the \"P SMI MED REFILLS\"  pool to inform the patient and the pharmacy.          " Stent inserted over the wire.

## 2022-05-18 ENCOUNTER — DOCUMENTATION ONLY (OUTPATIENT)
Dept: FAMILY MEDICINE | Facility: CLINIC | Age: 57
End: 2022-05-18

## 2022-05-18 NOTE — PROGRESS NOTES
"When opening a documentation only encounter, be sure to enter in \"Chief Complaint\" Forms and in \" Comments\" Title of form, description if needed.    Flor is a 56 year old  female  Form received via: Fax  Form now resides in: Provider Ready    Sabina Diaz MA    Per Karely Link Form can be shredded.    Sabina Diaz MA                "

## 2022-05-24 ENCOUNTER — TELEPHONE (OUTPATIENT)
Dept: FAMILY MEDICINE | Facility: CLINIC | Age: 57
End: 2022-05-24
Payer: MEDICARE

## 2022-05-24 DIAGNOSIS — E87.5 HYPERKALEMIA: ICD-10-CM

## 2022-05-24 NOTE — CONFIDENTIAL NOTE
RN spoke to the patient and the son,relay the message below.    Marianna Waterman RN    furosemide (LASIX) 20 MG tablet 180 tablet 3 5/24/2022  No   Sig - Route: Take 1 tablet (20 mg) by mouth 2 times daily - Oral   Class: E-Prescribe   Order: [id number]

## 2022-05-25 ENCOUNTER — ANCILLARY PROCEDURE (OUTPATIENT)
Dept: CT IMAGING | Facility: CLINIC | Age: 57
End: 2022-05-25
Attending: FAMILY MEDICINE
Payer: MEDICARE

## 2022-05-25 DIAGNOSIS — I99.9: ICD-10-CM

## 2022-05-25 LAB
CREAT BLD-MCNC: 1.5 MG/DL (ref 0.5–1)
GFR SERPL CREATININE-BSD FRML MDRD: 40 ML/MIN/1.73M2

## 2022-05-25 PROCEDURE — 74175 CTA ABDOMEN W/CONTRAST: CPT | Mod: MG | Performed by: RADIOLOGY

## 2022-05-25 PROCEDURE — 82565 ASSAY OF CREATININE: CPT | Performed by: PATHOLOGY

## 2022-05-25 PROCEDURE — G1004 CDSM NDSC: HCPCS | Mod: GC | Performed by: RADIOLOGY

## 2022-05-25 RX ORDER — FUROSEMIDE 20 MG
20 TABLET ORAL 2 TIMES DAILY
Qty: 180 TABLET | Refills: 3 | Status: SHIPPED | OUTPATIENT
Start: 2022-05-25 | End: 2023-09-15

## 2022-05-25 RX ORDER — IOPAMIDOL 755 MG/ML
100 INJECTION, SOLUTION INTRAVASCULAR ONCE
Status: COMPLETED | OUTPATIENT
Start: 2022-05-25 | End: 2022-05-25

## 2022-05-25 RX ADMIN — IOPAMIDOL 100 ML: 755 INJECTION, SOLUTION INTRAVASCULAR at 14:51

## 2022-05-29 ENCOUNTER — HOSPITAL ENCOUNTER (EMERGENCY)
Facility: CLINIC | Age: 57
Discharge: HOME | End: 2022-05-29
Attending: INTERNAL MEDICINE | Admitting: INTERNAL MEDICINE
Payer: MEDICARE

## 2022-05-29 ENCOUNTER — APPOINTMENT (OUTPATIENT)
Dept: GENERAL RADIOLOGY | Facility: CLINIC | Age: 57
End: 2022-05-29
Attending: NURSE PRACTITIONER
Payer: MEDICARE

## 2022-05-29 ENCOUNTER — APPOINTMENT (OUTPATIENT)
Dept: ULTRASOUND IMAGING | Facility: CLINIC | Age: 57
End: 2022-05-29
Attending: INTERNAL MEDICINE
Payer: MEDICARE

## 2022-05-29 ENCOUNTER — APPOINTMENT (OUTPATIENT)
Dept: CT IMAGING | Facility: CLINIC | Age: 57
End: 2022-05-29
Attending: NURSE PRACTITIONER
Payer: MEDICARE

## 2022-05-29 VITALS
SYSTOLIC BLOOD PRESSURE: 124 MMHG | HEART RATE: 88 BPM | WEIGHT: 235 LBS | RESPIRATION RATE: 21 BRPM | TEMPERATURE: 97.8 F | BODY MASS INDEX: 45.9 KG/M2 | DIASTOLIC BLOOD PRESSURE: 66 MMHG | OXYGEN SATURATION: 92 %

## 2022-05-29 DIAGNOSIS — R10.13 EPIGASTRIC PAIN: ICD-10-CM

## 2022-05-29 DIAGNOSIS — Z94.4 STATUS POST LIVER TRANSPLANTATION (H): ICD-10-CM

## 2022-05-29 LAB
ALBUMIN SERPL-MCNC: 3.4 G/DL (ref 3.4–5)
ALBUMIN UR-MCNC: NEGATIVE MG/DL
ALP SERPL-CCNC: 87 U/L (ref 40–150)
ALT SERPL W P-5'-P-CCNC: 25 U/L (ref 0–50)
AMMONIA PLAS-SCNC: 22 UMOL/L (ref 10–50)
ANION GAP SERPL CALCULATED.3IONS-SCNC: 4 MMOL/L (ref 3–14)
APPEARANCE UR: CLEAR
AST SERPL W P-5'-P-CCNC: 14 U/L (ref 0–45)
BACTERIA #/AREA URNS HPF: ABNORMAL /HPF
BASOPHILS # BLD AUTO: 0 10E3/UL (ref 0–0.2)
BASOPHILS NFR BLD AUTO: 0 %
BILIRUB SERPL-MCNC: 0.6 MG/DL (ref 0.2–1.3)
BILIRUB UR QL STRIP: NEGATIVE
BUN SERPL-MCNC: 34 MG/DL (ref 7–30)
CALCIUM SERPL-MCNC: 9.5 MG/DL (ref 8.5–10.1)
CHLORIDE BLD-SCNC: 103 MMOL/L (ref 94–109)
CO2 SERPL-SCNC: 31 MMOL/L (ref 20–32)
COLOR UR AUTO: ABNORMAL
CREAT SERPL-MCNC: 1.06 MG/DL (ref 0.52–1.04)
EOSINOPHIL # BLD AUTO: 0.1 10E3/UL (ref 0–0.7)
EOSINOPHIL NFR BLD AUTO: 2 %
ERYTHROCYTE [DISTWIDTH] IN BLOOD BY AUTOMATED COUNT: 13.3 % (ref 10–15)
GFR SERPL CREATININE-BSD FRML MDRD: 61 ML/MIN/1.73M2
GLUCOSE BLD-MCNC: 111 MG/DL (ref 70–99)
GLUCOSE UR STRIP-MCNC: NEGATIVE MG/DL
HCT VFR BLD AUTO: 42.3 % (ref 35–47)
HGB BLD-MCNC: 13.5 G/DL (ref 11.7–15.7)
HGB UR QL STRIP: NEGATIVE
IMM GRANULOCYTES # BLD: 0.1 10E3/UL
IMM GRANULOCYTES NFR BLD: 1 %
INR PPP: 0.88 (ref 0.85–1.15)
KETONES UR STRIP-MCNC: NEGATIVE MG/DL
LEUKOCYTE ESTERASE UR QL STRIP: ABNORMAL
LIPASE SERPL-CCNC: 204 U/L (ref 73–393)
LYMPHOCYTES # BLD AUTO: 2.3 10E3/UL (ref 0.8–5.3)
LYMPHOCYTES NFR BLD AUTO: 32 %
MCH RBC QN AUTO: 28.9 PG (ref 26.5–33)
MCHC RBC AUTO-ENTMCNC: 31.9 G/DL (ref 31.5–36.5)
MCV RBC AUTO: 91 FL (ref 78–100)
MONOCYTES # BLD AUTO: 0.6 10E3/UL (ref 0–1.3)
MONOCYTES NFR BLD AUTO: 8 %
NEUTROPHILS # BLD AUTO: 4.1 10E3/UL (ref 1.6–8.3)
NEUTROPHILS NFR BLD AUTO: 57 %
NITRATE UR QL: NEGATIVE
NRBC # BLD AUTO: 0 10E3/UL
NRBC BLD AUTO-RTO: 0 /100
PH UR STRIP: 7 [PH] (ref 5–7)
PLATELET # BLD AUTO: 215 10E3/UL (ref 150–450)
POTASSIUM BLD-SCNC: 5.2 MMOL/L (ref 3.4–5.3)
PROT SERPL-MCNC: 7.7 G/DL (ref 6.8–8.8)
RBC # BLD AUTO: 4.67 10E6/UL (ref 3.8–5.2)
RBC URINE: 1 /HPF
SODIUM SERPL-SCNC: 138 MMOL/L (ref 133–144)
SP GR UR STRIP: 1.01 (ref 1–1.03)
SQUAMOUS EPITHELIAL: <1 /HPF
TROPONIN I SERPL HS-MCNC: 7 NG/L
UROBILINOGEN UR STRIP-MCNC: NORMAL MG/DL
WBC # BLD AUTO: 7.2 10E3/UL (ref 4–11)
WBC URINE: 10 /HPF

## 2022-05-29 PROCEDURE — 82040 ASSAY OF SERUM ALBUMIN: CPT | Performed by: NURSE PRACTITIONER

## 2022-05-29 PROCEDURE — 82140 ASSAY OF AMMONIA: CPT | Performed by: NURSE PRACTITIONER

## 2022-05-29 PROCEDURE — 93005 ELECTROCARDIOGRAM TRACING: CPT | Performed by: INTERNAL MEDICINE

## 2022-05-29 PROCEDURE — 71046 X-RAY EXAM CHEST 2 VIEWS: CPT

## 2022-05-29 PROCEDURE — 93010 ELECTROCARDIOGRAM REPORT: CPT | Performed by: INTERNAL MEDICINE

## 2022-05-29 PROCEDURE — 85025 COMPLETE CBC W/AUTO DIFF WBC: CPT | Performed by: NURSE PRACTITIONER

## 2022-05-29 PROCEDURE — G1004 CDSM NDSC: HCPCS | Mod: GC | Performed by: RADIOLOGY

## 2022-05-29 PROCEDURE — 99285 EMERGENCY DEPT VISIT HI MDM: CPT | Mod: 25 | Performed by: INTERNAL MEDICINE

## 2022-05-29 PROCEDURE — 84484 ASSAY OF TROPONIN QUANT: CPT | Performed by: NURSE PRACTITIONER

## 2022-05-29 PROCEDURE — 85610 PROTHROMBIN TIME: CPT | Performed by: NURSE PRACTITIONER

## 2022-05-29 PROCEDURE — 81001 URINALYSIS AUTO W/SCOPE: CPT | Performed by: NURSE PRACTITIONER

## 2022-05-29 PROCEDURE — 71046 X-RAY EXAM CHEST 2 VIEWS: CPT | Mod: 26 | Performed by: RADIOLOGY

## 2022-05-29 PROCEDURE — 74176 CT ABD & PELVIS W/O CONTRAST: CPT | Mod: 26 | Performed by: RADIOLOGY

## 2022-05-29 PROCEDURE — 76700 US EXAM ABDOM COMPLETE: CPT | Mod: 26 | Performed by: RADIOLOGY

## 2022-05-29 PROCEDURE — 80053 COMPREHEN METABOLIC PANEL: CPT | Performed by: NURSE PRACTITIONER

## 2022-05-29 PROCEDURE — 76700 US EXAM ABDOM COMPLETE: CPT

## 2022-05-29 PROCEDURE — G1004 CDSM NDSC: HCPCS

## 2022-05-29 PROCEDURE — 83690 ASSAY OF LIPASE: CPT | Performed by: NURSE PRACTITIONER

## 2022-05-29 PROCEDURE — 250N000013 HC RX MED GY IP 250 OP 250 PS 637: Performed by: INTERNAL MEDICINE

## 2022-05-29 PROCEDURE — 36415 COLL VENOUS BLD VENIPUNCTURE: CPT | Performed by: NURSE PRACTITIONER

## 2022-05-29 RX ORDER — ALUMINA, MAGNESIA, AND SIMETHICONE 2400; 2400; 240 MG/30ML; MG/30ML; MG/30ML
30 SUSPENSION ORAL EVERY 4 HOURS PRN
Qty: 355 ML | Refills: 0 | Status: SHIPPED | OUTPATIENT
Start: 2022-05-29 | End: 2024-09-19

## 2022-05-29 RX ORDER — MAGNESIUM HYDROXIDE/ALUMINUM HYDROXICE/SIMETHICONE 120; 1200; 1200 MG/30ML; MG/30ML; MG/30ML
30 SUSPENSION ORAL ONCE
Status: COMPLETED | OUTPATIENT
Start: 2022-05-29 | End: 2022-05-29

## 2022-05-29 RX ADMIN — ALUMINUM HYDROXIDE, MAGNESIUM HYDROXIDE, AND SIMETHICONE 30 ML: 200; 200; 20 SUSPENSION ORAL at 22:42

## 2022-05-29 ASSESSMENT — ENCOUNTER SYMPTOMS
DIFFICULTY URINATING: 0
FREQUENCY: 0
CONFUSION: 0
NAUSEA: 0
SHORTNESS OF BREATH: 1
ABDOMINAL PAIN: 1
HEADACHES: 0
CONSTIPATION: 1
EYE REDNESS: 0
ARTHRALGIAS: 0
DYSURIA: 0
FEVER: 0
NECK STIFFNESS: 0
CHILLS: 0
COLOR CHANGE: 0
VOMITING: 0
COUGH: 0
ABDOMINAL DISTENTION: 1

## 2022-05-29 NOTE — ED PROVIDER NOTES
"ED Provider Note  [facility]      History     Chief Complaint   Patient presents with     Abdominal Pain     Shortness of Breath     HPI  Flor Navarro is a 56 year old female with a PMH of stroke, meningioma, HTN, hepatitis C, S/P liver transplant, immunosuppression, COPD, SBO, sacroiliitis, CKD stage III and recurrent UTI who presents to the ED today reporting abdominal pain and shortness of breath.  Patient states that her shortness of breath has been present for the past 2 days and her abdominal pain \"for a long time\" since before her liver transplant.  She states she was seen for similar symptoms 2 months ago.  She reports her pain has been worsening in general, but here in the ED states it is improved from earlier today.  She endorses constipation, but states that her last bowel movement was today.  She states this did not change her pain.  She also endorses abdominal distention and adds that she has been able to pass gas.  Patient denies vomiting.    Exam: CTA abdomen with contrast, 5/25/2022  IMPRESSION:  1. Stable size of the splenic aneurysm measuring up to 2 cm dated back to at least 2016.  2. Stable postoperative changes of liver transplant with patent  hepatic vasculature.  3. Unchanged atrophy of the right kidney.  4. Sequelae of portal hypertension with enlargement of the main portal vein and splenorenal varices.    Past Medical History  Past Medical History:   Diagnosis Date     Anemia in CKD (chronic kidney disease)      Cataract      CKD (chronic kidney disease) stage 3, GFR 30-59 ml/min (H)      Hepatitis C     cleared virus spontaneously 2013     High risk medication use      Hypertension, renal      Immunosuppressed status (H)      Liver replaced by transplant (H) 01/01/2010     Osteoporosis      Recurrent pregnancy loss without current pregnancy      Recurrent UTI 07/12/2021     Stroke, hemorrhagic (H) 01/01/2008     Syncope      Unspecified viral hepatitis C without " hepatic coma      Past Surgical History:   Procedure Laterality Date     CATARACT IOL, RT/LT Right 2/18/15      SECTION       Incisional Hernia Repair  2004     INSERT SHUNT PORTAL TRANSJUGULAR INTRAHEPTIC  2005    shunt placement for liver failure     LAPAROSCOPIC SALPINGO-OOPHORECTOMY      left     NECK SURGERY  2010    fracture, in halo x 7months     TRANSPLANT LIVER RECIPIENT  DONOR  10/26/10     Upper GI Endoscopy with Band Ligation of Esoph/Gastric Varic. .       alum & mag hydroxide-simethicone (MAALOX ADVANCED MAX ST) 400-400-40 MG/5ML SUSP suspension  ACE/ARB/ARNI NOT PRESCRIBED (INTENTIONAL)  acetaminophen (TYLENOL) 500 MG tablet  albuterol (ACCUNEB) 0.63 MG/3ML neb solution  albuterol (PROAIR HFA/PROVENTIL HFA/VENTOLIN HFA) 108 (90 Base) MCG/ACT inhaler  alendronate (FOSAMAX) 70 MG tablet  amLODIPine (NORVASC) 10 MG tablet  calcitRIOL (ROCALTROL) 0.25 MCG capsule  calcium carbonate 600 mg-vitamin D 400 units (CALTRATE) 600-400 MG-UNIT per tablet  capsaicin (ZOSTRIX) 0.025 % external cream  carboxymethylcellulose PF (REFRESH PLUS) 0.5 % ophthalmic solution  cetirizine (ZYRTEC) 10 MG tablet  COMPOUNDED NON-CONTROLLED SUBSTANCE (CMPD RX) - PHARMACY TO MIX COMPOUNDED MEDICATION  cycloSPORINE modified (GENERIC EQUIVALENT) 25 MG capsule  Denture Care Products (EFFERDENT DENTURE CLEANSER) TBEF  dimethicone (AVEENO DAILY MOISTURIZING) 1.3 % LOTN lotion  diphenhydrAMINE (BENADRYL) 50 MG capsule  febuxostat (ULORIC) 40 MG TABS tablet  furosemide (LASIX) 20 MG tablet  gabapentin (NEURONTIN) 300 MG capsule  lidocaine (XYLOCAINE) 5 % external ointment  melatonin 3 MG tablet  methylPREDNISolone (MEDROL) 32 MG tablet  methylPREDNISolone (MEDROL) 32 MG tablet  mineral oil-white petrolatum (EUCERIN) CREA cream  Multiple Vitamin (DAILY-SUMA MULTIVITAMIN) TABS  multivitamin w/minerals (MULTI-VITAMIN) tablet  mycophenolate (GENERIC EQUIVALENT) 250 MG capsule  neomycin-polymyxin-HC 1 %  olopatadine  (PATADAY) 0.2 % ophthalmic solution  omeprazole (PRILOSEC) 20 MG DR capsule  order for DME  order for DME  order for DME  psyllium (METAMUCIL/KONSYL) 0.52 g capsule  senna-docusate (SENOKOT-S/PERICOLACE) 8.6-50 MG tablet  STATIN NOT PRESCRIBED, INTENTIONAL,  umeclidinium (INCRUSE ELLIPTA) 62.5 MCG/INH inhaler  Vitamin D3 (CHOLECALCIFEROL) 25 mcg (1000 units) tablet      Allergies   Allergen Reactions     Aspirin      3/31/16 Per pt, tolerates 81 mg daily dose without ADR.     325 mg dose caused itchiness and hives.     Clarithromycin      Allergic reaction         Contrast Dye      Iodine      Pcn [Penicillins]      Family History  Family History   Problem Relation Age of Onset     Hepatitis Other         Hep C, still in [place]     Cerebrovascular Disease Other      Cancer No family hx of      Diabetes No family hx of      Hypertension No family hx of      Thyroid Disease No family hx of      Glaucoma No family hx of      Macular Degeneration No family hx of      Social History   Social History     Tobacco Use     Smoking status: Never Smoker     Smokeless tobacco: Never Used   Vaping Use     Vaping Use: Never used   Substance Use Topics     Alcohol use: No     Drug use: No      Past medical history, past surgical history, medications, allergies, family history, and social history were reviewed with the patient. No additional pertinent items.       Review of Systems   Constitutional: Negative for fever.   HENT: Negative for congestion.    Eyes: Negative for redness.   Respiratory: Positive for shortness of breath.    Cardiovascular: Negative for chest pain.   Gastrointestinal: Positive for abdominal distention, abdominal pain and constipation. Negative for vomiting.   Genitourinary: Negative for difficulty urinating.   Musculoskeletal: Negative for arthralgias and neck stiffness.   Skin: Negative for color change.   Neurological: Negative for headaches.   Psychiatric/Behavioral: Negative for confusion.   All other  systems reviewed and are negative.    A complete review of systems was performed with pertinent positives and negatives noted in the HPI, and all other systems negative.    Physical Exam   BP: (!) 144/96  Pulse: 88  Temp: 97.8  F (36.6  C)  Resp: 21  Weight: 106.6 kg (235 lb)  SpO2: 94 %  Physical Exam  Constitutional:       General: She is not in acute distress.     Appearance: She is not diaphoretic.   HENT:      Head: Atraumatic.      Mouth/Throat:      Pharynx: No oropharyngeal exudate.   Eyes:      General: No scleral icterus.     Pupils: Pupils are equal, round, and reactive to light.   Cardiovascular:      Rate and Rhythm: Normal rate and regular rhythm.      Heart sounds: Normal heart sounds. No murmur heard.    No friction rub. No gallop.   Pulmonary:      Effort: Pulmonary effort is normal. No respiratory distress.      Breath sounds: Normal breath sounds. No stridor. No wheezing, rhonchi or rales.   Chest:      Chest wall: No tenderness.   Abdominal:      General: Bowel sounds are normal. There is no distension.      Palpations: Abdomen is soft. There is no mass.      Tenderness: There is abdominal tenderness in the epigastric area. There is no right CVA tenderness, left CVA tenderness, guarding or rebound. Negative signs include Araya's sign, Rovsing's sign, McBurney's sign, psoas sign and obturator sign.      Hernia: No hernia is present.       Musculoskeletal:         General: No tenderness.      Cervical back: Neck supple.   Skin:     General: Skin is warm.      Findings: No rash.   Neurological:      General: No focal deficit present.      Cranial Nerves: No cranial nerve deficit.         ED Course     7:21 PM  The patient was seen and examined by Pepe Marcos MD in Room ED16.     Procedures                     Results for orders placed or performed during the hospital encounter of 05/29/22   CT Abdomen Pelvis w/o Contrast     Status: None    Narrative    Exam: CT ABDOMEN PELVIS W/O CONTRAST,  5/29/2022 8:05 PM    Indication: Abdominal distension    Comparison: CT dated 5/25/2022 and 3/16/2022    TECHNIQUE: CT of the abdomen and pelvis was obtained without the use  of intravenous contrast.    Findings:   Liver transplant. Unremarkable unenhanced evaluation of the pancreas,  spleen, and adrenal glands. Atrophic appearance of the right kidney.  Increased mildly dilated left ureter with abrupt transition point at  the pelvic brim. No obstructing urinary calculi are identified. No  left-sided hydronephrosis This has a similar though more prominent  appearance to CT dated 3/16/2022. Simple cyst in the left kidney.    The bladder is well-distended and otherwise unremarkable. Prominent  loops of small bowel throughout the abdomen, with air-fluid levels  proximally and fecal lysed small bowel material more distally, however  without any distended loops of bowel or focal transition point. Of  note bowel loops were more dilated on exam dated 3/16/2022.    No free air or free fluid in the abdomen or pelvis. No intra-abdominal  fluid collection.    No acute or suspicious osseous or soft tissue abnormalities.      Impression    Impression:   1. Again seen mildly dilated left ureter to the level of the pelvic  brim, increased from previous exams, without an obstructing urinary  calculus. No left hydronephrosis or perinephric fat stranding.  2. Prominent though nondistended or dilated small bowel loops with  air-fluid levels as well as fecal lie small bowel material. Small  bowel remains normal caliber.  Findings could represent continued  ileus though partial or developing obstruction not entirely excluded.    I have personally reviewed the examination and initial interpretation  and I agree with the findings.    OSMAR ISRAEL MD         SYSTEM ID:  [id number]   XR Chest 2 Views     Status: None    Narrative    Exam: XR CHEST 2 VW, 5/29/2022 8:12 PM    Indication: Shortness of breath    Comparison:  10/16/2017    Findings:   Mildly enlarged cardiac mediastinal silhouette. No pneumothorax or  pleural effusions. Hazy perihilar interstitial opacities. No focal  infectious consolidations.      Impression    Impression: Cardiomegaly with mild interstitial pulmonary edema. No  focal consolidations.    I have personally reviewed the examination and initial interpretation  and I agree with the findings.    OSMAR ISRAEL MD         SYSTEM ID:  [id number]    Liver Transplant     Status: None    Narrative    EXAMINATION: US LIVER TRANSPLANT, 5/29/2022 9:48 PM     COMPARISON: Same day CT    HISTORY: Pain    TECHNIQUE:  Gray-scale, color Doppler and spectral flow analysis.    FINDINGS:   There is no ascites.    Liver:   The liver demonstrates normal homogeneous echotexture. No  evidence of a focal hepatic mass.     Bile Ducts: Both the intra- and extrahepatic biliary system are of  normal caliber.  The common bile duct measures 3 mm in diameter.    Gallbladder: The gallbladder is surgically absent.    Kidneys:   Right kidney:  The right kidney demonstrates normal echotexture with  no evidence of a shadowing stone, focal mass or hydronephrosis.  Atrophic appearance.  8.3 cm in long axis dimension.  Left kidney:  The left kidney demonstrates normal echotexture with no  evidence of a shadowing stone, focal mass or hydronephrosis.   12.6 cm  in long axis dimension. 4.4 cm simple cyst in the left upper pole.    Pancreas: Visualized portions of the pancreas are normal in  appearance.    Spleen:  The spleen is mildly enlarged, measuring 14.5 cm.    Visualized portions of the aorta are unremarkable.    LIVER DOPPLER:  Splenic vein:  Patent continuous normal antegrade direction flow  towards the liver, 15 cm/sec.  Extrahepatic portal vein:  Patent continuous antegrade flow, 9 cm/sec.  Portal vein at anastomosis: Patent continuous antegrade flow, 29  cm/sec.  Intrahepatic portal vein:  Patent continuous antegrade flow,  20  cm/sec.  Right portal vein flow is antegrade, measuring 20 cm/sec.  Left portal vein flow is antegrade, measuring 19 cm/sec.    Inferior vena cava: patent with flow toward the heart throughout..  IVC above anastomosis:  35 cm/sec.  IVC at anastomosis:  38 cm/sec.  Intrahepatic IVC:  48 cm/sec.  Extrahepatic IVC:  6 cm/sec.    Right, mid, left hepatic veins: Patent with flow towards the inferior  vena cava.    Extrahepatic hepatic artery: Low resistance waveform with flow towards  the liver. 56 cm/sec with resistive index 0.62.  Right hepatic artery: 52 cm/sec with resistive index 0.79.  Left hepatic artery: 40 cm/sec with resistive index 0.72.      Impression    Impression:   1.  Unremarkable appearance of the transplant liver.  2.  Patent Doppler evaluation of the hepatic transplant.  3.  Mild splenomegaly.    I have personally reviewed the examination and initial interpretation  and I agree with the findings.    OSMAR ISRAEL MD         SYSTEM ID:  [id number]   Comprehensive metabolic panel     Status: Abnormal   Result Value Ref Range    Sodium 138 133 - 144 mmol/L    Potassium 5.2 3.4 - 5.3 mmol/L    Chloride 103 94 - 109 mmol/L    Carbon Dioxide (CO2) 31 20 - 32 mmol/L    Anion Gap 4 3 - 14 mmol/L    Urea Nitrogen 34 (H) 7 - 30 mg/dL    Creatinine 1.06 (H) 0.52 - 1.04 mg/dL    Calcium 9.5 8.5 - 10.1 mg/dL    Glucose 111 (H) 70 - 99 mg/dL    Alkaline Phosphatase 87 40 - 150 U/L    AST 14 0 - 45 U/L    ALT 25 0 - 50 U/L    Protein Total 7.7 6.8 - 8.8 g/dL    Albumin 3.4 3.4 - 5.0 g/dL    Bilirubin Total 0.6 0.2 - 1.3 mg/dL    GFR Estimate 61 >60 mL/min/1.73m2   Lipase     Status: Normal   Result Value Ref Range    Lipase 204 73 - 393 U/L   Ammonia     Status: Normal   Result Value Ref Range    Ammonia 22 10 - 50 umol/L   Troponin I     Status: Normal   Result Value Ref Range    Troponin I High Sensitivity 7 <54 ng/L   UA with Microscopic reflex to Culture     Status: Abnormal    Specimen: Urine, Clean Catch    Result Value Ref Range    Color Urine Straw Colorless, Straw, Light Yellow, Yellow    Appearance Urine Clear Clear    Glucose Urine Negative Negative mg/dL    Bilirubin Urine Negative Negative    Ketones Urine Negative Negative mg/dL    Specific Gravity Urine 1.007 1.003 - 1.035    Blood Urine Negative Negative    pH Urine 7.0 5.0 - 7.0    Protein Albumin Urine Negative Negative mg/dL    Urobilinogen Urine Normal Normal, 2.0 mg/dL    Nitrite Urine Negative Negative    Leukocyte Esterase Urine Small (A) Negative    Bacteria Urine Few (A) None Seen /HPF    RBC Urine 1 <=2 /HPF    WBC Urine 10 (H) <=5 /HPF    Squamous Epithelials Urine <1 <=1 /HPF    Narrative    Urine Culture not indicated   INR     Status: Normal   Result Value Ref Range    INR 0.88 0.85 - 1.15   CBC with platelets and differential     Status: None   Result Value Ref Range    WBC Count 7.2 4.0 - 11.0 10e3/uL    RBC Count 4.67 3.80 - 5.20 10e6/uL    Hemoglobin 13.5 11.7 - 15.7 g/dL    Hematocrit 42.3 35.0 - 47.0 %    MCV 91 78 - 100 fL    MCH 28.9 26.5 - 33.0 pg    MCHC 31.9 31.5 - 36.5 g/dL    RDW 13.3 10.0 - 15.0 %    Platelet Count 215 150 - 450 10e3/uL    % Neutrophils 57 %    % Lymphocytes 32 %    % Monocytes 8 %    % Eosinophils 2 %    % Basophils 0 %    % Immature Granulocytes 1 %    NRBCs per 100 WBC 0 <1 /100    Absolute Neutrophils 4.1 1.6 - 8.3 10e3/uL    Absolute Lymphocytes 2.3 0.8 - 5.3 10e3/uL    Absolute Monocytes 0.6 0.0 - 1.3 10e3/uL    Absolute Eosinophils 0.1 0.0 - 0.7 10e3/uL    Absolute Basophils 0.0 0.0 - 0.2 10e3/uL    Absolute Immature Granulocytes 0.1 <=0.4 10e3/uL    Absolute NRBCs 0.0 10e3/uL   EKG 12-lead, tracing only     Status: None (Preliminary result)   Result Value Ref Range    Systolic Blood Pressure  mmHg    Diastolic Blood Pressure  mmHg    Ventricular Rate 80 BPM    Atrial Rate 80 BPM    OH Interval 156 ms    QRS Duration 98 ms     ms    QTc 424 ms    P Axis 46 degrees    R AXIS -15 degrees    T Axis 29  degrees    Interpretation ECG Sinus rhythm  Normal ECG      CBC with platelets differential     Status: None    Narrative    The following orders were created for panel order CBC with platelets differential.  Procedure                               Abnormality         Status                     ---------                               -----------         ------                     CBC with platelets and d...[[id number]]                      Final result                 Please view results for these tests on the individual orders.     Medications   alum & mag hydroxide-simethicone (MAALOX) suspension 30 mL (30 mLs Oral Given 5/29/22 2242)        Assessments & Plan (with Medical Decision Making)  Upper abd pain in the pt with s/p liver transplant, etiology not clear but labs EKG trop, CT abd and Transplant US all neg. Improving with maalox po, will discharge with maalox and follow up with her PMD in one week.       I have reviewed the nursing notes. I have reviewed the findings, diagnosis, plan and need for follow up with the patient.    Discharge Medication List as of 5/29/2022 10:39 PM      START taking these medications    Details   alum & mag hydroxide-simethicone (MAALOX ADVANCED MAX ST) 400-400-40 MG/5ML SUSP suspension Take 30 mLs by mouth every 4 hours as needed for indigestion or heartburn, Disp-355 mL, R-0, Local Print             Final diagnoses:   Epigastric pain   Status post liver transplantation (H)   IMiguel, am serving as a trained medical scribe to document services personally performed by Pepe Marcos Md, MD, based on the provider's statements to me.     IPepe MD, was physically present and have reviewed and verified the accuracy of this note documented by Miguel Bunn.      --  Pepe Marcos MD  [facility] EMERGENCY DEPARTMENT  5/29/2022     Pepe Marcos MD  05/29/22 2553

## 2022-05-29 NOTE — ED TRIAGE NOTES
Pt comes in with abdominal pain and SOB that she has had for 1 week. She states she has been in the ER for the same complaints 2 weeks ago. Pt states she feels as though she is very distended which is making it very hard for her to breathe. Pt rates abdominal discomfort a 10/10 pressure feeling. It is no different with activity.     She had a CT done on 5/25 of her abdomen.         BP (!) 144/96   Pulse 88   Temp 97.8  F (36.6  C) (Oral)   Resp 21   Wt 106.6 kg (235 lb)   LMP  (LMP Unknown)   SpO2 94%   BMI 45.90 kg/m         Triage Assessment     Row Name 05/29/22 1144       Triage Assessment (Adult)    Airway WDL WDL       Respiratory WDL    Respiratory WDL WDL       Skin Circulation/Temperature WDL    Skin Circulation/Temperature WDL WDL       Cardiac WDL    Cardiac WDL WDL       Peripheral/Neurovascular WDL    Peripheral Neurovascular WDL WDL       Cognitive/Neuro/Behavioral WDL    Cognitive/Neuro/Behavioral WDL WDL       Warrior Coma Scale    Best Eye Response 4-->(E4) spontaneous    Best Motor Response 6-->(M6) obeys commands    Best Verbal Response 5-->(V5) oriented    Darek Coma Scale Score 15

## 2022-05-30 LAB
ATRIAL RATE - MUSE: 80 BPM
DIASTOLIC BLOOD PRESSURE - MUSE: NORMAL MMHG
INTERPRETATION ECG - MUSE: NORMAL
P AXIS - MUSE: 46 DEGREES
PR INTERVAL - MUSE: 156 MS
QRS DURATION - MUSE: 98 MS
QT - MUSE: 368 MS
QTC - MUSE: 424 MS
R AXIS - MUSE: -15 DEGREES
SYSTOLIC BLOOD PRESSURE - MUSE: NORMAL MMHG
T AXIS - MUSE: 29 DEGREES
VENTRICULAR RATE- MUSE: 80 BPM

## 2022-05-30 NOTE — ED PROVIDER NOTES
ED Triage Provider Note  [facility]  Encounter Date: May 29, 2022    History:  Chief Complaint   Patient presents with     Abdominal Pain     Shortness of Breath     Flor Navarro is a 56 year old female with a PMH of stroke, meningioma, HTN, hepatitis C, S/P liver transplant, immunosuppression, COPD, SBO, , CKD stage III and recurrent UTI who presents to the ED today reporting abdominal pain and shortness of breath.  Patient's son is used to has additional assistance with interpreting per patient request.  Patient reports chronic abdominal swelling and distention and shortness of breath, but since 4 days ago after completing a CTA of her abdomen, the distention of her abdomen and shortness of breath have worsened.    She denies headache, sore throat, cough, nausea, vomiting, diarrhea does endorse some constipation, denies urinary frequency or dysuria.  Per patient's son he has noticed that she has been increasingly drowsy and sleeping a lot more, as well as reports he believes she has been daydreaming and speaking to family member that are not there.      Review of Systems:  Review of Systems   Constitutional: Negative for chills and fever.   Respiratory: Positive for shortness of breath. Negative for cough.    Cardiovascular: Negative for chest pain and leg swelling.   Gastrointestinal: Positive for abdominal pain and constipation. Negative for nausea and vomiting.   Genitourinary: Negative for dysuria and frequency.   Neurological: Negative for headaches.       Exam:  BP (!) 144/96   Pulse 88   Temp 97.8  F (36.6  C) (Oral)   Resp 21   Wt 106.6 kg (235 lb)   LMP  (LMP Unknown)   SpO2 94%   BMI 45.90 kg/m    Physical Exam  Vitals and nursing note reviewed.   Cardiovascular:      Rate and Rhythm: Normal rate and regular rhythm.      Heart sounds: Normal heart sounds.   Pulmonary:      Effort: Pulmonary effort is normal.      Breath sounds: Normal breath sounds.    Abdominal:      General: Bowel sounds are normal. There is distension.      Tenderness: There is abdominal tenderness in the epigastric area and periumbilical area. There is no guarding or rebound. Negative signs include Araya's sign.         Procedure note:    Peripheral Venous Access     Performed by MOODY Conde CNP    Patient Identity confirmed: Yes    Consent given by: Patient    Indication for Exam: Vascular Access     Vein/Location: Left brachial    Catheter Size: 20 g    Number of Attempts: 1    Successful Catheter Insertion: Yes    Complications: None          Medical Decision Making:  Patient arriving to the ED with problem as above. A medical screening exam was performed. Lab orders, CT and chest x-ray initiated from Triage. The patient is appropriate to be immediately roomed.      MOODY Conde CNP on 5/29/2022 at 7:25 PM        Aaorn Heard APRN CNP  05/29/22 1929

## 2022-06-01 PROBLEM — I99.9: Status: RESOLVED | Noted: 2022-03-30 | Resolved: 2022-06-01

## 2022-06-01 PROBLEM — I72.8 SPLENIC ARTERY ANEURYSM (H): Status: ACTIVE | Noted: 2022-06-01

## 2022-06-08 ENCOUNTER — OFFICE VISIT (OUTPATIENT)
Dept: FAMILY MEDICINE | Facility: CLINIC | Age: 57
End: 2022-06-08
Payer: MEDICARE

## 2022-06-08 VITALS
BODY MASS INDEX: 47.22 KG/M2 | TEMPERATURE: 98 F | SYSTOLIC BLOOD PRESSURE: 134 MMHG | OXYGEN SATURATION: 94 % | RESPIRATION RATE: 22 BRPM | HEART RATE: 85 BPM | WEIGHT: 241.8 LBS | DIASTOLIC BLOOD PRESSURE: 89 MMHG

## 2022-06-08 DIAGNOSIS — H57.9 GRITTY EYE: ICD-10-CM

## 2022-06-08 DIAGNOSIS — R60.0 PEDAL EDEMA: ICD-10-CM

## 2022-06-08 DIAGNOSIS — D84.9 IMMUNOSUPPRESSED STATUS (H): ICD-10-CM

## 2022-06-08 DIAGNOSIS — Z94.4 LIVER REPLACED BY TRANSPLANT (H): ICD-10-CM

## 2022-06-08 DIAGNOSIS — G47.33 OBSTRUCTIVE SLEEP APNEA SYNDROME: ICD-10-CM

## 2022-06-08 DIAGNOSIS — E66.01 MORBID OBESITY (H): ICD-10-CM

## 2022-06-08 DIAGNOSIS — K56.600 PARTIAL INTESTINAL OBSTRUCTION, UNSPECIFIED CAUSE (H): Primary | ICD-10-CM

## 2022-06-08 DIAGNOSIS — I72.8 SPLENIC ARTERY ANEURYSM (H): ICD-10-CM

## 2022-06-08 DIAGNOSIS — H10.13 ALLERGIC CONJUNCTIVITIS, BILATERAL: ICD-10-CM

## 2022-06-08 DIAGNOSIS — M25.511 PAIN IN JOINT OF RIGHT SHOULDER: ICD-10-CM

## 2022-06-08 DIAGNOSIS — K56.609 SBO (SMALL BOWEL OBSTRUCTION) (H): ICD-10-CM

## 2022-06-08 PROBLEM — N39.0 RECURRENT UTI: Status: RESOLVED | Noted: 2021-07-12 | Resolved: 2022-06-08

## 2022-06-08 PROCEDURE — 91305 COVID-19,PF,PFIZER (12+ YRS): CPT | Performed by: FAMILY MEDICINE

## 2022-06-08 PROCEDURE — 99215 OFFICE O/P EST HI 40 MIN: CPT | Mod: 25 | Performed by: FAMILY MEDICINE

## 2022-06-08 PROCEDURE — 0054A COVID-19,PF,PFIZER (12+ YRS): CPT | Performed by: FAMILY MEDICINE

## 2022-06-08 RX ORDER — OLOPATADINE HYDROCHLORIDE 2 MG/ML
1 SOLUTION/ DROPS OPHTHALMIC DAILY
Qty: 2.5 ML | Refills: 1 | Status: SHIPPED | OUTPATIENT
Start: 2022-06-08 | End: 2022-08-24

## 2022-06-08 RX ORDER — CARBOXYMETHYLCELLULOSE SODIUM 5 MG/ML
1 SOLUTION/ DROPS OPHTHALMIC 4 TIMES DAILY PRN
Qty: 30 EACH | Refills: 4 | Status: SHIPPED | OUTPATIENT
Start: 2022-06-08 | End: 2023-03-31

## 2022-06-08 RX ORDER — ACETAMINOPHEN 500 MG
500 TABLET ORAL 3 TIMES DAILY PRN
Qty: 120 TABLET | Refills: 3 | Status: SHIPPED | OUTPATIENT
Start: 2022-06-08 | End: 2023-03-31

## 2022-06-08 RX ORDER — AMOXICILLIN 250 MG
2 CAPSULE ORAL 2 TIMES DAILY
Qty: 180 TABLET | Refills: 3 | Status: SHIPPED | OUTPATIENT
Start: 2022-06-08 | End: 2022-12-02

## 2022-06-08 ASSESSMENT — PATIENT HEALTH QUESTIONNAIRE - PHQ9: SUM OF ALL RESPONSES TO PHQ QUESTIONS 1-9: 9

## 2022-06-08 NOTE — COMMUNITY RESOURCES LIST (ENGLISH)
06/08/2022   [facility]  Ronit Schneider  For questions about this resource list or additional care needs, please contact your primary care clinic or care manager.  Phone: [phone]   Email: N/A   Address: [address] [place] [address]   Hours: N/A        Hotlines and Helplines       Hotline - Crisis help  1  [facility]  [facility] - Crisis Helpline Distance: 2.97 miles      COVID-19 Status: Phone/Virtual   [address] [place] [address]  Language: Ukrainian, Czech, English, Gujarati, Rolan, Yakut, Lao, Luxembourgish, Croatian, Vietnamese  Hours: Mon - Sun Open 24 Hours  Fees: Free   Phone: [phone] Email: [email or web address].org Website: [email or web address]://www.[facility].org/     2  [facility] Distance: 3.17 miles      COVID-19 Status: Phone/Virtual   [address] [address]  Language: English, Angolan, Citizen of [place]  Hours: Mon - Sun 10:00 AM - 10:00 PM   Phone: [phone] Email: [email or web address] Website: [email or web address]          Mental Health       Individual counseling  3  [facility] Distance: 1.39 miles      COVID-19 Status: Regular Operations, COVID-19 Status: Phone/Virtual   [address] [place] [address]  Language: English  Hours: Mon - Fri 8:00 AM - 5:00 PM  Fees: Insurance, Self Pay   Phone: [phone] Website: [email or web address]/[email or web address]/     4  [facility] Distance: 2.37 miles      COVID-19 Status: Phone/Virtual   [address] [place] [address]  Language: English, Canadian  Hours: Mon - Tue 8:00 AM - 5:00 PM , Wed 8:00 AM - 7:00 PM , Thu - Fri 8:00 AM - 5:00 PM  Fees: Insurance, Self Pay, Sliding Fee   Phone: [phone] Email: [email or web address] Website: [email or web address]     Mental health crisis care  5  [facility]  Acute Psychiatry Services Distance: 4.92 miles      COVID-19 Status: Regular Operations   [address] [place] [address]  Language: English  Hours: Mon - Sun Open 24 Hours  Fees: Insurance, Self Pay, Sliding Fee   Phone: [phone] Website: [email or web address]://www.[facility].org/specialty/psychiatry/acute-psychiatry-services/     6  [facility] Distance: 5.39 miles      COVID-19 Status: Regular Operations, COVID-19 Status: Phone/Virtual   [address] [place] [address]  Language: English, Guamanian  Hours: Mon - Fri 9:00 AM - 5:00 PM  Fees: Insurance, Self Pay   Phone: [phone]) [phone] Website: [email or web address]     Mental health support group  7  [facility] Distance: 4.45 miles      COVID-19 Status: Regular Operations, COVID-19 Status: Phone/Virtual   [address] [facility] [address] [place] [address]  Language: English, Thai, Danish  Hours: Mon - Fri 9:00 AM - 5:00 PM  Fees: Free   Phone: [phone]) [phone] Email: yareli@[facility] Website: [email or web address]://www.[employer]/[email or web address]/     8  [facility] Distance: 4.54 miles      COVID-19 Status: Regular Operations   [address] [place] [address]  Language: English  Hours: Tue 7:30 PM - 9:30 PM  Fees: Free   Phone: (587) 131-6096 Email: alphonso@[employer] Website: http://www.ppsupportmn.org/multiples          Important Numbers & Websites       Emergency Services   911  City Services   311  Poison Control   (507) 730-6079  Suicide Prevention Lifeline   (445) 545-4741 (TALK)  Child Abuse Hotline   (431) 222-9106 (4-A-Child)  Sexual Assault Hotline   (234) 134-8416 (HOPE)  National Runaway Safeline   (753) 633-5839 (RUNAWAY)  All-Options Talkline   (877) 336-6009  Substance Abuse Referral   (144) 959-4148 (HELP)

## 2022-06-08 NOTE — PROGRESS NOTES
Assessment & Plan     Partial intestinal obstruction, unspecified cause (H)vs SBO (small bowel obstruction) (H), Liver replaced by transplant (H)  Hospitalized with sbo in March of this year in the setting of hx of abd surgery for liver transplant and chronic constipation, she prefers to use strawberry maalox and relies overly heavily on this. Apparently has increased home usage of psyllium to 3 capsules bid, which I feel may be making the problem worse given the findings within the lumen of small bowel in CT on 5/29. Decrease psyllium, okay to use maalox only in acute situations and should stop now given concurrent CKD. Increase senna to 2 bid. We have done miralax in the past, have issues with adherence because not set up as a medication in med box, but this would be next step. Titrate to several soft bowel movements daily.   - senna-docusate (SENOKOT-S/PERICOLACE) 8.6-50 MG tablet  Dispense: 180 tablet; Refill: 3  - psyllium (METAMUCIL/KONSYL) 0.52 g capsule  Dispense: 180 capsule; Refill: 3        Splenic artery aneurysm (H)  Incidental finding 3/2022 on CT scan for SBO. Contrast CT May 2022 - Stable size of the splenic aneurysm measuring up to 2 cm dated back to at least 2016.stable, no follow up required.    Obstructive sleep apnea syndrome  On sleep study 2014 - not re-evaluated since. Records not available was done at unknown location in [place] per records.   Wt gain since 20#  Re-referred to sleep medicine 12/2021  I have reached out to our care coordinators asking to help schedule appointments for sleep medicine. She needs enough notification to arrange a ride.     Immunosuppressed status (H)  covid booster today    Pain in joint of right shoulder  refill  - acetaminophen (TYLENOL) 500 MG tablet  Dispense: 120 tablet; Refill: 3    Gritty eye  refill  - carboxymethylcellulose PF (REFRESH PLUS) 0.5 % ophthalmic solution  Dispense: 30 each; Refill: 4    Allergic conjunctivitis, bilateral  refill  -  olopatadine (PATADAY) 0.2 % ophthalmic solution  Dispense: 2.5 mL; Refill: 1    Pedal edema  Will have staff measure legs so [place] home therapy can send stockings with the correct width.   - Orthotics and Prosthetics DME Compression; Leg; Thigh; Bilateral; 20/30 mmHg; 4 Pair    Morbid obesity (H)  She again complains about inability to lose weight, she has been avoiding eating for 2 days which her son is very worried about. I am also concerned about her avoidance of eating. Re referred her to weight management, asking care coordination to follow up since there was no appointment made in January.       42 minutes spent on the date of the encounter doing chart review, history and exam, documentation and further activities per the note      The information in this document, created by the medical scribe for me, accurately reflects the services I personally performed and the decisions made by me. I have reviewed and approved this document for accuracy prior to leaving the patient care area.  Karely Sierra MD  1:56 PM, 06/08/22  [facility] AMI Ray is a 56 year old who presents for the following health issues  accompanied by her .    HPI   Abd pain  Seen in Ed 5/29 notes and results reviewed by me. CT scan concerning for air fluid levels in small bowel as well as fecal material. Pt hospitalized with recent SBO. Has had major abd surgery (liver transplant)     Now reports: her bowel movements have improved and are normal now. She is having 5-6 bowel movements a day because she is taking what she reports is Mylanta, but I suspect it is Maalox, because that is what she was given at the ED. Her abdominal pain is better. The patient reports she has been taking 3 tablets two times a day of psyllium for a few years.     She has not been eating a lot during the day, because she has been gaining weight. She has not made a follow up appointment for weight management yet.      Edema  Patient reports leg swelling that started over the weekend. She states there was no pain, but she was tearful because she was scared. Her son has been helping her put her socks on because of her abdominal pain. She would like more compression stockings ordered that are thicker and go up to her thigh.    Sleep Apnea  She reports she has a CPAP machine at home, but it does not work very well for her. She has not seen sleep medicine since a referral was sent 12/2021.      Review of Systems   Positive for: lower extremity edema, bowel movements  Negative for: abdominal pain    This document serves as a record of the services and decisions personally performed and made by Karely Sierra MD. It was created on his/her behalf by Aury Aiken, a trained medical scribe. The creation of this document is based the provider's statements to the medical scribe.  Scribscott Aiken 1:57 PM, June 8, 2022        Objective    /89   Pulse 85   Temp 98  F (36.7  C)   Resp 22   Wt 109.7 kg (241 lb 12.8 oz)   LMP  (LMP Unknown)   SpO2 94%   Breastfeeding No   BMI 47.22 kg/m    Body mass index is 47.22 kg/m .   Wt Readings from Last 10 Encounters:   06/08/22 109.7 kg (241 lb 12.8 oz)   05/29/22 106.6 kg (235 lb)   05/09/22 106.6 kg (235 lb)   04/22/22 105.7 kg (233 lb)   03/30/22 106.6 kg (235 lb)   03/16/22 112.7 kg (248 lb 7.3 oz)   01/28/22 106.1 kg (234 lb)   01/03/22 104.8 kg (231 lb)   12/17/21 105.1 kg (231 lb 9.6 oz)   08/09/21 102.1 kg (225 lb)   Vitals were reviewed and were normal.     Physical Exam   GENERAL: Mild distress, continues to gesture at her abdomen being painful, and at her swollen legs.   MS: 2+pedal edema on left leg, 1+ pedal edema on right leg.  PSYCH: impulsive, changes topic frequently, doesn't always wait for interpretation of what I have asked before speaking again. Affect labile.     Images reviewed by me showing multiple air fluid levels in the small bowel.   Admission on 05/29/2022,  Discharged on 05/29/2022   Component Date Value Ref Range Status     Ventricular Rate 05/29/2022 80  BPM Final     Atrial Rate 05/29/2022 80  BPM Final     AK Interval 05/29/2022 156  ms Final     QRS Duration 05/29/2022 98  ms Final     QT 05/29/2022 368  ms Final     QTc 05/29/2022 424  ms Final     P Axis 05/29/2022 46  degrees Final     R AXIS 05/29/2022 -15  degrees Final     T Axis 05/29/2022 29  degrees Final     Interpretation ECG 05/29/2022    Final                    Value:Sinus rhythm  Normal ECG  Unconfirmed report - interpretation of this ECG is computer generated - see medical record for final interpretation    Confirmed by - EMERGENCY ROOM, PHYSICIAN ([id number]),  ROSAURA HALL ([id number]) on 5/30/2022 9:12:16 AM       Sodium 05/29/2022 138  133 - 144 mmol/L Final     Potassium 05/29/2022 5.2  3.4 - 5.3 mmol/L Final     Chloride 05/29/2022 103  94 - 109 mmol/L Final     Carbon Dioxide (CO2) 05/29/2022 31  20 - 32 mmol/L Final     Anion Gap 05/29/2022 4  3 - 14 mmol/L Final     Urea Nitrogen 05/29/2022 34 (A) 7 - 30 mg/dL Final     Creatinine 05/29/2022 1.06 (A) 0.52 - 1.04 mg/dL Final     Calcium 05/29/2022 9.5  8.5 - 10.1 mg/dL Final     Glucose 05/29/2022 111 (A) 70 - 99 mg/dL Final     Alkaline Phosphatase 05/29/2022 87  40 - 150 U/L Final     AST 05/29/2022 14  0 - 45 U/L Final     ALT 05/29/2022 25  0 - 50 U/L Final     Protein Total 05/29/2022 7.7  6.8 - 8.8 g/dL Final     Albumin 05/29/2022 3.4  3.4 - 5.0 g/dL Final     Bilirubin Total 05/29/2022 0.6  0.2 - 1.3 mg/dL Final     GFR Estimate 05/29/2022 61  >60 mL/min/1.73m2 Final    Effective December 21, 2021 eGFRcr in adults is calculated using the 2021 CKD-EPI creatinine equation which includes age and gender (Justice et al., NEJM, DOI: 10.1056/NHJNnr2784229)     Lipase 05/29/2022 204  73 - 393 U/L Final     Ammonia 05/29/2022 22  10 - 50 umol/L Final     Troponin I High Sensitivity 05/29/2022 7  <54 ng/L Final    This Troponin-I result was  obtained using a Siemens Dimension Vista High Sensitivity Troponin-I assay (TNIH). Effective 11/23/21, nine labs/sites in the [facility] switched from a Siemens Thor Contemporary Troponin I assay (CTNI) to a Siemens Thor High-Sensitivity Troponin I assay (TNIH).     Color Urine 05/29/2022 Straw  Colorless, Straw, Light Yellow, Yellow Final     Appearance Urine 05/29/2022 Clear  Clear Final     Glucose Urine 05/29/2022 Negative  Negative mg/dL Final     Bilirubin Urine 05/29/2022 Negative  Negative Final     Ketones Urine 05/29/2022 Negative  Negative mg/dL Final     Specific Gravity Urine 05/29/2022 1.007  1.003 - 1.035 Final     Blood Urine 05/29/2022 Negative  Negative Final     pH Urine 05/29/2022 7.0  5.0 - 7.0 Final     Protein Albumin Urine 05/29/2022 Negative  Negative mg/dL Final     Urobilinogen Urine 05/29/2022 Normal  Normal, 2.0 mg/dL Final     Nitrite Urine 05/29/2022 Negative  Negative Final     Leukocyte Esterase Urine 05/29/2022 Small (A) Negative Final     Bacteria Urine 05/29/2022 Few (A) None Seen /HPF Final     RBC Urine 05/29/2022 1  <=2 /HPF Final     WBC Urine 05/29/2022 10 (A) <=5 /HPF Final     Squamous Epithelials Urine 05/29/2022 <1  <=1 /HPF Final     INR 05/29/2022 0.88  0.85 - 1.15 Final     WBC Count 05/29/2022 7.2  4.0 - 11.0 10e3/uL Final     RBC Count 05/29/2022 4.67  3.80 - 5.20 10e6/uL Final     Hemoglobin 05/29/2022 13.5  11.7 - 15.7 g/dL Final     Hematocrit 05/29/2022 42.3  35.0 - 47.0 % Final     MCV 05/29/2022 91  78 - 100 fL Final     MCH 05/29/2022 28.9  26.5 - 33.0 pg Final     MCHC 05/29/2022 31.9  31.5 - 36.5 g/dL Final     RDW 05/29/2022 13.3  10.0 - 15.0 % Final     Platelet Count 05/29/2022 215  150 - 450 10e3/uL Final     % Neutrophils 05/29/2022 57  % Final     % Lymphocytes 05/29/2022 32  % Final     % Monocytes 05/29/2022 8  % Final     % Eosinophils 05/29/2022 2  % Final     % Basophils 05/29/2022 0  % Final     % Immature Granulocytes 05/29/2022 1  %  Final     NRBCs per 100 WBC 05/29/2022 0  <1 /100 Final     Absolute Neutrophils 05/29/2022 4.1  1.6 - 8.3 10e3/uL Final     Absolute Lymphocytes 05/29/2022 2.3  0.8 - 5.3 10e3/uL Final     Absolute Monocytes 05/29/2022 0.6  0.0 - 1.3 10e3/uL Final     Absolute Eosinophils 05/29/2022 0.1  0.0 - 0.7 10e3/uL Final     Absolute Basophils 05/29/2022 0.0  0.0 - 0.2 10e3/uL Final     Absolute Immature Granulocytes 05/29/2022 0.1  <=0.4 10e3/uL Final     Absolute NRBCs 05/29/2022 0.0  10e3/uL Final

## 2022-06-08 NOTE — PROGRESS NOTES
Compression Stocking Measurements  Measure pt for compression stockings:  1)around the ankle (smallest part of ankle)  2)around the calf (widest part of calf)  3)knee length (floor/bottom of foot to about 2 cm below knee crease).   Measure in centimeters.   Then enter these measurements into this note.     Patient was wearing a compression stocking at the time of these measurements and declined to remove.   Ankle: 27.5 cm  Calf: 47 cm  Floor to knee: 48 cm   **patient requested measurement for above knee: 58 cm      DME (Durable Medical Equipment) Orders and Documentation  Orders Placed This Encounter   Procedures     Orthotics and Prosthetics DME Compression; Leg; Thigh; Bilateral; 20/30 mmHg; 4 Pair      The patient was assessed and it was determined the patient is in need of the following listed DME Supplies/Equipment. Please complete supporting documentation below to demonstrate medical necessity.      DME All Other Item(s) Documentation    List reason for need and supporting documentation for medical necessity below for each DME item.     1. Mgmt of swelling

## 2022-06-08 NOTE — COMMUNITY RESOURCES LIST (PATIENT PREFERRED LANGUAGE)
06/08/2022    [facility]  Ronit Kileyjudit  ??????? ??? ????? ??????? ??? ?? ???????? ??????? ???????? ? ???? ??????? ?????? ??????? ??????? ?? ???? ???????.  Phone: [phone]   Email: N/A   Address: [address] [place] [address]   Hours: N/A        ?????? ??????? ????? ????????       ???? ?????? - ?????? ???????  ?? ???????? ??????? - ???? - ??????? ???????? ??????? ???????? (AIFW)   ?????: ???: 2.37 ?????  1     ???? COVID-19: ???? / ???????   ??????: ??  ???: ???????? ???????? ?????????? ???????? ???????????? ????? ????????? ?????????? ???????? ????? ????   [address] [place] [address]    ?????? 24 ???? ????? - ??????? : ?????   Website: [email or web address] Email: [email or web address] Phone: [phone]      ???? ???? - ???? ????? ????.   ?????: ???: 2.97 ?????  2     ???? COVID-19: ???? / ???????   ???: ???????? ???????? ???????   [address] [place] [address]    10:00 ? - 10:00 ? ????? - ??????? : ?????   Website: [email or web address] Email: [email or web address] Phone: [phone]           ????? ???????       ????????? ???????  ???? ???????? ???? ??? - ????? ??????   ?????: ???: 4.45 ?????  3     ???? COVID-19: ?????? ?????? , ???? COVID-19: ???? / ???????   ??????: ????? ??????? ?????  ???: ???????   [address] [place] [address]    05:00 ? - 08:00 ? ??? ?????? - ??????? : ?????   Website: [email or web address]/ Phone: [phone]      ?? ???????? - ??????? ????????   ?????: ???: 1.39 ?????  4     ???? COVID-19: ???? / ???????   ??????: ????? ??????? ?????? ???? ????????  ???: ?????????? ???????   [address] [place] [address]    05:00 ? - 08:00 ? ???????? - ???????, 07:00 ? - 08:00 ? ????????, 05:00 ? - 08:00 ? ??? ?????? - ?????? : ?????   Website: [email or web address] Email: [email or web address] Phone: [phone]      ?????  ???? ????? ???????  [facility] - ????? ???? ?????? ?????   ?????: ???: 5.39 ?????  5     ???? COVID-19: ?????? ??????   ??????: ????? ??????? ?????? ???? ????????  ???: ???????   [address] [place] [address]    ?????? 24 ???? ????? - ??????? : ?????   Website: [email or web address]/[email or web address]/acute-[email or web address]-services/ Phone: [phone]      ???? ????? ????????   ?????: ???: 3.17 ?????  6     ???? COVID-19: ?????? ?????? , ???? COVID-19: ???? / ???????   ??????: ????? ??????? ?????  ???: ???????? ??????   [address] [facility] [address] [place] [address]    05:00 ? - 09:00 ? ??? ?????? - ??????? : ?????   Website: [email or web address] Phone: [phone]      ?????? ??? ????? ???????  ???? ??? ???????? ?? ???????? (LACM)   ?????: ???: 4.54 ?????  7     ???? COVID-19: ?????? ?????? , ???? COVID-19: ???? / ???????   ??????: ??  ???: ???????????? ???????? ???   [address] [place] [address]    05:00 ? - 09:00 ? ??? ?????? - ??????? : ?????   Website: [email or web address] Email: [email or web address] Phone: [phone]      ??? ????? ??? ??? ??????? ???????? - ??? ???   ?????: ???: 4.92 ?????  8     ???? COVID-19: ?????? ??????   ??????: ??  ???: ???????   [address] [place] [address]    09:30 ? - 07:30 ? ???????? : ?????   Website: [email or web address] [email or web address] Email: [email or web address] Phone: [phone]           ????? ?????? ????       ????? ???????   [address]  ????? ???????   311  ?????? ??????   [phone]  ????? ??????? ?? ????????   [phone] (TALK)  ???? ?????? ?????? ?????? ???????   [phone] (4-A-Child)  ???? ?????? ???????? ??????   [phone] (HOPE)  Safeline ??????? ???????   [phone] (RUNAWAY)  ???? ???????? Talkline   [phone]  ??????? ?? ????? ????????   [phone] (help)

## 2022-06-08 NOTE — Clinical Note
Also needs to see sleep med - referred in dec 2021 by me - pls help schedule. She states she will need notification of appointment to arrange medical ride/taxi.

## 2022-06-08 NOTE — PATIENT INSTRUCTIONS
"Swelling  Continue wearing your compression stockings.    Attention home nurse  Decrease psyllium to 1 tablet twice daily   Increase senna to 2 tablets twice daily.  She should be having soft frequent bowel movements to prevent SBO.     Weight Management referral  Per clinic, fax order to [phone], they will review and contact patient\". \"Patient must answer the phone\".  Successful send.  "

## 2022-06-10 DIAGNOSIS — Z94.4 LIVER REPLACED BY TRANSPLANT (H): ICD-10-CM

## 2022-06-10 RX ORDER — CYCLOSPORINE 25 MG/1
CAPSULE, LIQUID FILLED ORAL
Qty: 450 CAPSULE | Refills: 3 | Status: SHIPPED | OUTPATIENT
Start: 2022-06-10 | End: 2022-12-01

## 2022-06-13 DIAGNOSIS — N18.31 STAGE 3A CHRONIC KIDNEY DISEASE (H): Primary | ICD-10-CM

## 2022-06-14 ENCOUNTER — DOCUMENTATION ONLY (OUTPATIENT)
Dept: FAMILY MEDICINE | Facility: CLINIC | Age: 57
End: 2022-06-14

## 2022-06-17 DIAGNOSIS — D84.9 IMMUNOSUPPRESSED STATUS (H): ICD-10-CM

## 2022-06-17 RX ORDER — MULTIVITAMIN WITH FOLIC ACID 400 MCG
1 TABLET ORAL EVERY MORNING
Qty: 100 TABLET | Refills: 4 | Status: SHIPPED | OUTPATIENT
Start: 2022-06-17 | End: 2024-09-19

## 2022-06-17 NOTE — TELEPHONE ENCOUNTER

## 2022-06-21 DIAGNOSIS — Z94.4 LIVER REPLACED BY TRANSPLANT (H): Primary | ICD-10-CM

## 2022-07-01 ENCOUNTER — TELEPHONE (OUTPATIENT)
Dept: FAMILY MEDICINE | Facility: CLINIC | Age: 57
End: 2022-07-01

## 2022-07-01 DIAGNOSIS — R60.9 EDEMA, UNSPECIFIED TYPE: Primary | ICD-10-CM

## 2022-07-01 NOTE — TELEPHONE ENCOUNTER
[facility] phone call message - order or referral request for patient:     Order or referral being requested: Patients Home Care Nurse Harmony ([phone]); is requesting an order for stockings for 15-30mg sent to [facility]      Additional Comments:    OK to leave a message on voice mail? Yes    Primary language: Mongolian      needed? Yes    Call taken on July 1, 2022 at 11:15 AM by Gonzalez Curran

## 2022-07-01 NOTE — TELEPHONE ENCOUNTER
RN routing to PCP to write new prescription for DME order for 15 mmHg compression stockings. Please route back to [facility]

## 2022-07-08 NOTE — TELEPHONE ENCOUNTER
DME (Durable Medical Equipment) Orders and Documentation  Orders Placed This Encounter   Procedures     Compression Sleeve/Stocking Order      The patient was assessed and it was determined the patient is in need of the following listed DME Supplies/Equipment. Please complete supporting documentation below to demonstrate medical necessity.      Compression Sleeve/Stocking(s) Supplies Documentation  The patient needs compression stockings for edema and pain mgmt and preventing wounds       Re-ordered. See prior notes for leg measurements that they will require

## 2022-07-13 ENCOUNTER — OFFICE VISIT (OUTPATIENT)
Dept: FAMILY MEDICINE | Facility: CLINIC | Age: 57
End: 2022-07-13
Payer: MEDICARE

## 2022-07-13 ENCOUNTER — MEDICAL CORRESPONDENCE (OUTPATIENT)
Dept: FAMILY MEDICINE | Facility: CLINIC | Age: 57
End: 2022-07-13

## 2022-07-13 VITALS
TEMPERATURE: 98.1 F | RESPIRATION RATE: 18 BRPM | OXYGEN SATURATION: 95 % | WEIGHT: 241 LBS | HEART RATE: 76 BPM | BODY MASS INDEX: 47.07 KG/M2 | DIASTOLIC BLOOD PRESSURE: 80 MMHG | SYSTOLIC BLOOD PRESSURE: 130 MMHG

## 2022-07-13 DIAGNOSIS — G47.33 OSA (OBSTRUCTIVE SLEEP APNEA): ICD-10-CM

## 2022-07-13 DIAGNOSIS — Z94.4 LIVER REPLACED BY TRANSPLANT (H): ICD-10-CM

## 2022-07-13 DIAGNOSIS — R25.1 TREMOR: Primary | ICD-10-CM

## 2022-07-13 DIAGNOSIS — R82.90 ABNORMAL FINDING ON URINALYSIS: ICD-10-CM

## 2022-07-13 DIAGNOSIS — F33.42 DEPRESSION, MAJOR, RECURRENT, IN COMPLETE REMISSION (H): ICD-10-CM

## 2022-07-13 DIAGNOSIS — N18.31 STAGE 3A CHRONIC KIDNEY DISEASE (H): ICD-10-CM

## 2022-07-13 DIAGNOSIS — N25.81 SECONDARY HYPERPARATHYROIDISM (H): ICD-10-CM

## 2022-07-13 LAB
ALBUMIN MFR UR ELPH: 50.1 MG/DL
ALBUMIN SERPL BCG-MCNC: 4.2 G/DL (ref 3.5–5.2)
ALBUMIN UR-MCNC: 30 MG/DL
ALP SERPL-CCNC: 83 U/L (ref 35–104)
ALT SERPL W P-5'-P-CCNC: 21 U/L (ref 10–35)
ANION GAP SERPL CALCULATED.3IONS-SCNC: 13 MMOL/L (ref 7–15)
APPEARANCE UR: CLEAR
AST SERPL W P-5'-P-CCNC: 31 U/L (ref 10–35)
BACTERIA #/AREA URNS HPF: ABNORMAL /HPF
BILIRUB DIRECT SERPL-MCNC: <0.2 MG/DL (ref 0–0.3)
BILIRUB SERPL-MCNC: 0.6 MG/DL
BILIRUB UR QL STRIP: NEGATIVE
BUN SERPL-MCNC: 16.9 MG/DL (ref 6–20)
CALCIUM SERPL-MCNC: 10.2 MG/DL (ref 8.6–10)
CHLORIDE SERPL-SCNC: 106 MMOL/L (ref 98–107)
COLOR UR AUTO: YELLOW
CREAT SERPL-MCNC: 1.15 MG/DL (ref 0.51–0.95)
CREAT UR-MCNC: 77 MG/DL
DEPRECATED HCO3 PLAS-SCNC: 23 MMOL/L (ref 22–29)
ERYTHROCYTE [DISTWIDTH] IN BLOOD BY AUTOMATED COUNT: 14.1 % (ref 10–15)
GFR SERPL CREATININE-BSD FRML MDRD: 55 ML/MIN/1.73M2
GLUCOSE SERPL-MCNC: 98 MG/DL (ref 70–99)
GLUCOSE UR STRIP-MCNC: NEGATIVE MG/DL
HCT VFR BLD AUTO: 45.7 % (ref 35–47)
HGB BLD-MCNC: 13.8 G/DL (ref 11.7–15.7)
HGB UR QL STRIP: ABNORMAL
KETONES UR STRIP-MCNC: NEGATIVE MG/DL
LEUKOCYTE ESTERASE UR QL STRIP: ABNORMAL
MCH RBC QN AUTO: 27.7 PG (ref 26.5–33)
MCHC RBC AUTO-ENTMCNC: 30.2 G/DL (ref 31.5–36.5)
MCV RBC AUTO: 92 FL (ref 78–100)
NITRATE UR QL: POSITIVE
PH UR STRIP: 5 [PH] (ref 5–8)
PHOSPHATE SERPL-MCNC: 3.2 MG/DL (ref 2.5–4.5)
PLATELET # BLD AUTO: 179 10E3/UL (ref 150–450)
POTASSIUM SERPL-SCNC: 5.1 MMOL/L (ref 3.4–5.3)
PROT SERPL-MCNC: 7.8 G/DL (ref 6.4–8.3)
PROT/CREAT 24H UR: 0.65 MG/MG CR (ref 0–0.2)
PTH-INTACT SERPL-MCNC: 33 PG/ML (ref 15–65)
RBC # BLD AUTO: 4.98 10E6/UL (ref 3.8–5.2)
RBC #/AREA URNS AUTO: ABNORMAL /HPF
SODIUM SERPL-SCNC: 142 MMOL/L (ref 136–145)
SP GR UR STRIP: 1.01 (ref 1–1.03)
SQUAMOUS #/AREA URNS AUTO: ABNORMAL /LPF
UROBILINOGEN UR STRIP-ACNC: 0.2 E.U./DL
WBC # BLD AUTO: 4.6 10E3/UL (ref 4–11)
WBC #/AREA URNS AUTO: >100 /HPF

## 2022-07-13 PROCEDURE — 87086 URINE CULTURE/COLONY COUNT: CPT | Performed by: FAMILY MEDICINE

## 2022-07-13 PROCEDURE — 84100 ASSAY OF PHOSPHORUS: CPT | Performed by: FAMILY MEDICINE

## 2022-07-13 PROCEDURE — 85027 COMPLETE CBC AUTOMATED: CPT | Performed by: FAMILY MEDICINE

## 2022-07-13 PROCEDURE — 36415 COLL VENOUS BLD VENIPUNCTURE: CPT | Performed by: FAMILY MEDICINE

## 2022-07-13 PROCEDURE — 81001 URINALYSIS AUTO W/SCOPE: CPT | Performed by: FAMILY MEDICINE

## 2022-07-13 PROCEDURE — 80158 DRUG ASSAY CYCLOSPORINE: CPT | Performed by: FAMILY MEDICINE

## 2022-07-13 PROCEDURE — 99215 OFFICE O/P EST HI 40 MIN: CPT | Performed by: FAMILY MEDICINE

## 2022-07-13 PROCEDURE — 87088 URINE BACTERIA CULTURE: CPT | Performed by: FAMILY MEDICINE

## 2022-07-13 PROCEDURE — 84156 ASSAY OF PROTEIN URINE: CPT | Performed by: FAMILY MEDICINE

## 2022-07-13 PROCEDURE — 87186 SC STD MICRODIL/AGAR DIL: CPT | Performed by: FAMILY MEDICINE

## 2022-07-13 PROCEDURE — 83970 ASSAY OF PARATHORMONE: CPT | Performed by: FAMILY MEDICINE

## 2022-07-13 PROCEDURE — 80053 COMPREHEN METABOLIC PANEL: CPT | Performed by: FAMILY MEDICINE

## 2022-07-13 PROCEDURE — 82306 VITAMIN D 25 HYDROXY: CPT | Performed by: FAMILY MEDICINE

## 2022-07-13 PROCEDURE — 82248 BILIRUBIN DIRECT: CPT | Performed by: FAMILY MEDICINE

## 2022-07-13 ASSESSMENT — PATIENT HEALTH QUESTIONNAIRE - PHQ9: SUM OF ALL RESPONSES TO PHQ QUESTIONS 1-9: 0

## 2022-07-13 NOTE — Clinical Note
Pt reports only receiving one pair of stockings - I ordered 12 per the order set. Home RN called [facility] and said they haven't received order = can y'all refax or whatnot? THanks c

## 2022-07-13 NOTE — PATIENT INSTRUCTIONS
Sleep  I am sending another referral for a sleep study. They should be contacting you to schedule a visit with the sleep doctor.     Kidney disease  Your lab results will be mailed to you.       Your next appointment with me is August 24th.

## 2022-07-13 NOTE — Clinical Note
"FYI - Urine popped surprisingly infected. I am awaiting culture results to treat since she had no sx and we were doing routine collection for her renal doc, but am gone on vacay starting this weekend, so letting y'all know. She speaks \"Bahamian\" Latvian recent issues getting right dialect, fyi.  "

## 2022-07-13 NOTE — PROGRESS NOTES
Assessment & Plan     Tremor  Fine, low amplitude tremor, limited to 2 fingers of the right hand that doesn't seem the impair function. She was concerned it could represent recurrent stroke. No other sx or findings.    SURI (obstructive sleep apnea)  Re referred, she doesn't recall being contacted. Positive excessive daytime sleepiness, reports falling asleep sitting up. Doesn't have updated CPAP.  - Adult Sleep Eval & Management Referral    Secondary hyperparathyroidism (H),Stage 3a chronic kidney disease (H)  CKD IIIa May 2019. GFR 49, Cr ~1.2.Elev PTH w/ corrected Vit D, Ca borderline, Phos low = start 0.25mg calcitriol bedtime. Goals: corrected Ca x phos <55, mary jo phos 3.5-5.5, mary jo ca 8.4-10.2, Mary Jo iPTH    April 2020: Vit D 53, PTH 55, Cr 1.2 - advancing to stage 3B by July 2021 Re-referred to nephro, saw Dr. Theresa Arambula 12/2021. Due for labs.   Her last phosphorus and PTH was 12/2021.   - Parathyroid Hormone Intact  - Phosphorus  - Vitamin D Level    Addendum:  New UTI -   Nitrite + on urine. Has hx of VRE documented. UCx ordered. Given resistances and lack of symptoms driving reason for UA - will await culture results to treat. Communicated plan to covering physician next week as well as triage RN's in case pt calls w/ symptoms.     Depression, major, recurrent, in complete remission (H)  She endorses no sx of depression and has remained improved. However in June, she reported significant sx. This may have been due to a dialect issue with  on phone b/c today she struggled to understand the . We switched to an [place]  which solved this problem. Recommend 1 more PHQ9 next month to ensure accurate information, but for now appears to be in remission.       The information in this document, created by the medical scribe for me, accurately reflects the services I personally performed and the decisions made by me. I have reviewed and approved this document for accuracy  prior to leaving the patient care area.  Karely Sierra MD  3:32 PM, 07/13/22  [facility]  48 minutes spent on the date of the encounter doing chart review, review of test results, interpretation of tests, patient visit and documentation excluding time for procedures.    West Ray is a 56 year old accompanied by her , presenting for the following health issues:  Sleep Apnea (Pt here to follow up for liver transplant )      HPI     Sleep Apnea  The patient reports her home nurse mentioned her oxygen was low. Called [facility] home health nurse: [phone], but could not leave message because voicemail was full. She reports she is falling asleep during the day more often.     Tremor  She reports a tremor in her hands that make it difficult for her to use her phone.    Mood  She reports her mood has improved and that yesterday was a good day.     Chronic Kidney disease  Her nephrologist requested labs to be done.     Stockings  The patient reports she was only sent 1 pair of stockings when 12 were ordered. She was contacted by [employer]. She requests for the thick kind as well.       PHQ 12/18/2020 6/8/2022 7/13/2022   PHQ-9 Total Score - 9 0   Q9: Thoughts of better off dead/self-harm past 2 weeks Not at all Not at all Not at all   PHQ-A Total Score - - -   PHQ-A Mood affect on daily activities - - -   PHQ-A Suicide Ideation past 2 weeks - - -       Review of Systems   Positive for: fatigue, tremor  Negative for: n/a    This document serves as a record of the services and decisions personally performed and made by Karely Sierra MD. It was created on his/her behalf by Aury Aiken, a trained medical scribe. The creation of this document is based the provider's statements to the medical scribe.  Scribscott Aiken 3:32 PM, July 13, 2022        Objective    /80   Pulse 76   Temp 98.1  F (36.7  C) (Oral)   Resp 18   Wt 109.3 kg (241 lb)   LMP  (LMP Unknown)   SpO2 95%    BMI 47.07 kg/m    Body mass index is 47.07 kg/m .   Vitals were reviewed and were normal.   Physical Exam   GENERAL: healthy, alert and no distress  NEURO: right 1st and 4th digit has a very mild very fine tremor. Heel/shin is normal on left, on right it is likely normal but slightly inhibited by knee pain. No dysmetria on finger/nose testing bilaterally.  PSYCH: mentation appears normal, affect normal/bright                .  ..

## 2022-07-14 LAB
CYCLOSPORINE BLD LC/MS/MS-MCNC: 104 UG/L (ref 50–400)
DEPRECATED CALCIDIOL+CALCIFEROL SERPL-MC: 48 UG/L (ref 20–75)
TME LAST DOSE: NORMAL H
TME LAST DOSE: NORMAL H

## 2022-07-17 LAB
BACTERIA UR CULT: ABNORMAL
BACTERIA UR CULT: ABNORMAL

## 2022-07-18 DIAGNOSIS — N30.00 ACUTE CYSTITIS WITHOUT HEMATURIA: Primary | ICD-10-CM

## 2022-07-18 RX ORDER — SULFAMETHOXAZOLE/TRIMETHOPRIM 800-160 MG
1 TABLET ORAL 2 TIMES DAILY
Qty: 10 TABLET | Refills: 0 | Status: SHIPPED | OUTPATIENT
Start: 2022-07-18 | End: 2022-07-23

## 2022-07-18 NOTE — TELEPHONE ENCOUNTER
RN called patient with assistance of  and son. They would like medication sent to [facility]. Patient also requesting refill of Cellcept. Doesn't look like that is managed by [facility] and I encouraged family to reach out to PCP that manages it.     Ines Sheridan, RN

## 2022-07-18 NOTE — TELEPHONE ENCOUNTER
Diagnoses and all orders for this visit:    Acute cystitis without hematuria      Reviewed culture with sensitivities. Klebsiella x2. Will treat with 5 days of bactrim. RN to call and notify results and to ask which pharmacy meds can be sent to.      Chase Han DO  Covering for Dr. Sierra,

## 2022-08-01 DIAGNOSIS — Z94.4 LIVER TRANSPLANTED (H): ICD-10-CM

## 2022-08-01 NOTE — TELEPHONE ENCOUNTER
"Request for medication refill:  omeprazole (PRILOSEC) 20 MG DR capsule    Providers if patient needs an appointment and you are willing to give a one month supply please refill for one month and  send a letter/MyChart using \".SMILLIMITEDREFILL\" .smillimited and route chart to \"[facility] \" (Giving one month refill in non controlled medications is strongly recommended before denial)    If refill has been denied, meaning absolutely no refills without visit, please complete the smart phrase \".smirxrefuse\" and route it to the \"[facility] MED REFILLS\"  pool to inform the patient and the pharmacy.    Ronit Schneider MA        "

## 2022-08-02 ENCOUNTER — APPOINTMENT (OUTPATIENT)
Dept: GENERAL RADIOLOGY | Facility: CLINIC | Age: 57
DRG: 189 | End: 2022-08-02
Attending: FAMILY MEDICINE
Payer: MEDICARE

## 2022-08-02 ENCOUNTER — HOSPITAL ENCOUNTER (INPATIENT)
Facility: CLINIC | Age: 57
LOS: 13 days | Discharge: HOME HEALTH | DRG: 189 | End: 2022-08-15
Attending: FAMILY MEDICINE | Admitting: HOSPITALIST
Payer: MEDICARE

## 2022-08-02 ENCOUNTER — APPOINTMENT (OUTPATIENT)
Dept: ULTRASOUND IMAGING | Facility: CLINIC | Age: 57
DRG: 189 | End: 2022-08-02
Attending: FAMILY MEDICINE
Payer: MEDICARE

## 2022-08-02 ENCOUNTER — DOCUMENTATION ONLY (OUTPATIENT)
Dept: FAMILY MEDICINE | Facility: CLINIC | Age: 57
End: 2022-08-02

## 2022-08-02 DIAGNOSIS — I50.9 ACUTE ON CHRONIC CONGESTIVE HEART FAILURE, UNSPECIFIED HEART FAILURE TYPE (H): ICD-10-CM

## 2022-08-02 DIAGNOSIS — G47.33 OSA (OBSTRUCTIVE SLEEP APNEA): Primary | ICD-10-CM

## 2022-08-02 DIAGNOSIS — Z20.822 LAB TEST NEGATIVE FOR COVID-19 VIRUS: ICD-10-CM

## 2022-08-02 DIAGNOSIS — Z94.4 LIVER REPLACED BY TRANSPLANT (H): ICD-10-CM

## 2022-08-02 DIAGNOSIS — R10.11 ABDOMINAL PAIN, RIGHT UPPER QUADRANT: ICD-10-CM

## 2022-08-02 DIAGNOSIS — E66.01 OBESITY, CLASS III, BMI 40-49.9 (MORBID OBESITY) (H): ICD-10-CM

## 2022-08-02 LAB
ALBUMIN SERPL-MCNC: 3 G/DL (ref 3.4–5)
ALBUMIN UR-MCNC: 10 MG/DL
ALP SERPL-CCNC: 74 U/L (ref 40–150)
ALT SERPL W P-5'-P-CCNC: 32 U/L (ref 0–50)
ANION GAP SERPL CALCULATED.3IONS-SCNC: <1 MMOL/L (ref 3–14)
APPEARANCE UR: CLEAR
APTT PPP: 27 SECONDS (ref 22–38)
AST SERPL W P-5'-P-CCNC: 47 U/L (ref 0–45)
ATRIAL RATE - MUSE: 74 BPM
BASOPHILS # BLD AUTO: 0 10E3/UL (ref 0–0.2)
BASOPHILS NFR BLD AUTO: 1 %
BILIRUB SERPL-MCNC: 0.7 MG/DL (ref 0.2–1.3)
BILIRUB UR QL STRIP: NEGATIVE
BUN SERPL-MCNC: 21 MG/DL (ref 7–30)
CALCIUM SERPL-MCNC: 9.2 MG/DL (ref 8.5–10.1)
CHLORIDE BLD-SCNC: 112 MMOL/L (ref 94–109)
CO2 SERPL-SCNC: 29 MMOL/L (ref 20–32)
COLOR UR AUTO: ABNORMAL
CREAT SERPL-MCNC: 1.27 MG/DL (ref 0.52–1.04)
DIASTOLIC BLOOD PRESSURE - MUSE: NORMAL MMHG
EOSINOPHIL # BLD AUTO: 0.1 10E3/UL (ref 0–0.7)
EOSINOPHIL NFR BLD AUTO: 2 %
ERYTHROCYTE [DISTWIDTH] IN BLOOD BY AUTOMATED COUNT: 14.7 % (ref 10–15)
FLUAV RNA SPEC QL NAA+PROBE: NEGATIVE
FLUBV RNA RESP QL NAA+PROBE: NEGATIVE
GFR SERPL CREATININE-BSD FRML MDRD: 49 ML/MIN/1.73M2
GLUCOSE BLD-MCNC: 98 MG/DL (ref 70–99)
GLUCOSE UR STRIP-MCNC: NEGATIVE MG/DL
HCT VFR BLD AUTO: 38.5 % (ref 35–47)
HGB BLD-MCNC: 11.7 G/DL (ref 11.7–15.7)
HGB UR QL STRIP: ABNORMAL
IMM GRANULOCYTES # BLD: 0 10E3/UL
IMM GRANULOCYTES NFR BLD: 0 %
INR PPP: 0.97 (ref 0.85–1.15)
INTERPRETATION ECG - MUSE: NORMAL
KETONES UR STRIP-MCNC: NEGATIVE MG/DL
LEUKOCYTE ESTERASE UR QL STRIP: NEGATIVE
LIPASE SERPL-CCNC: 105 U/L (ref 73–393)
LYMPHOCYTES # BLD AUTO: 1.3 10E3/UL (ref 0.8–5.3)
LYMPHOCYTES NFR BLD AUTO: 29 %
MCH RBC QN AUTO: 27.9 PG (ref 26.5–33)
MCHC RBC AUTO-ENTMCNC: 30.4 G/DL (ref 31.5–36.5)
MCV RBC AUTO: 92 FL (ref 78–100)
MONOCYTES # BLD AUTO: 0.4 10E3/UL (ref 0–1.3)
MONOCYTES NFR BLD AUTO: 9 %
MUCOUS THREADS #/AREA URNS LPF: PRESENT /LPF
NEUTROPHILS # BLD AUTO: 2.6 10E3/UL (ref 1.6–8.3)
NEUTROPHILS NFR BLD AUTO: 59 %
NITRATE UR QL: NEGATIVE
NRBC # BLD AUTO: 0 10E3/UL
NRBC BLD AUTO-RTO: 1 /100
NT-PROBNP SERPL-MCNC: 1238 PG/ML (ref 0–900)
P AXIS - MUSE: 46 DEGREES
PH UR STRIP: 5.5 [PH] (ref 5–7)
PLATELET # BLD AUTO: 175 10E3/UL (ref 150–450)
POTASSIUM BLD-SCNC: 5.9 MMOL/L (ref 3.4–5.3)
PR INTERVAL - MUSE: 158 MS
PROT SERPL-MCNC: 7.2 G/DL (ref 6.8–8.8)
QRS DURATION - MUSE: 102 MS
QT - MUSE: 386 MS
QTC - MUSE: 428 MS
R AXIS - MUSE: -2 DEGREES
RBC # BLD AUTO: 4.2 10E6/UL (ref 3.8–5.2)
RBC URINE: 2 /HPF
SARS-COV-2 RNA RESP QL NAA+PROBE: NEGATIVE
SODIUM SERPL-SCNC: 141 MMOL/L (ref 133–144)
SP GR UR STRIP: 1.01 (ref 1–1.03)
SYSTOLIC BLOOD PRESSURE - MUSE: NORMAL MMHG
T AXIS - MUSE: 23 DEGREES
TROPONIN I SERPL HS-MCNC: 10 NG/L
UROBILINOGEN UR STRIP-MCNC: NORMAL MG/DL
VENTRICULAR RATE- MUSE: 74 BPM
WBC # BLD AUTO: 4.4 10E3/UL (ref 4–11)
WBC URINE: 1 /HPF

## 2022-08-02 PROCEDURE — 85025 COMPLETE CBC W/AUTO DIFF WBC: CPT | Performed by: FAMILY MEDICINE

## 2022-08-02 PROCEDURE — 84484 ASSAY OF TROPONIN QUANT: CPT | Performed by: FAMILY MEDICINE

## 2022-08-02 PROCEDURE — 83690 ASSAY OF LIPASE: CPT | Performed by: FAMILY MEDICINE

## 2022-08-02 PROCEDURE — 80053 COMPREHEN METABOLIC PANEL: CPT | Performed by: FAMILY MEDICINE

## 2022-08-02 PROCEDURE — 82040 ASSAY OF SERUM ALBUMIN: CPT | Performed by: FAMILY MEDICINE

## 2022-08-02 PROCEDURE — 120N000002 HC R&B MED SURG/OB UMMC

## 2022-08-02 PROCEDURE — C9803 HOPD COVID-19 SPEC COLLECT: HCPCS | Performed by: FAMILY MEDICINE

## 2022-08-02 PROCEDURE — 250N000011 HC RX IP 250 OP 636: Performed by: HOSPITALIST

## 2022-08-02 PROCEDURE — 93308 TTE F-UP OR LMTD: CPT | Mod: 26 | Performed by: FAMILY MEDICINE

## 2022-08-02 PROCEDURE — 99285 EMERGENCY DEPT VISIT HI MDM: CPT | Mod: 25 | Performed by: FAMILY MEDICINE

## 2022-08-02 PROCEDURE — 81001 URINALYSIS AUTO W/SCOPE: CPT | Performed by: FAMILY MEDICINE

## 2022-08-02 PROCEDURE — 83880 ASSAY OF NATRIURETIC PEPTIDE: CPT | Performed by: FAMILY MEDICINE

## 2022-08-02 PROCEDURE — 36415 COLL VENOUS BLD VENIPUNCTURE: CPT | Performed by: FAMILY MEDICINE

## 2022-08-02 PROCEDURE — 87636 SARSCOV2 & INF A&B AMP PRB: CPT | Performed by: FAMILY MEDICINE

## 2022-08-02 PROCEDURE — 94640 AIRWAY INHALATION TREATMENT: CPT | Performed by: FAMILY MEDICINE

## 2022-08-02 PROCEDURE — 96375 TX/PRO/DX INJ NEW DRUG ADDON: CPT | Performed by: FAMILY MEDICINE

## 2022-08-02 PROCEDURE — 96374 THER/PROPH/DIAG INJ IV PUSH: CPT | Performed by: FAMILY MEDICINE

## 2022-08-02 PROCEDURE — 71045 X-RAY EXAM CHEST 1 VIEW: CPT

## 2022-08-02 PROCEDURE — 99223 1ST HOSP IP/OBS HIGH 75: CPT | Mod: AI | Performed by: HOSPITALIST

## 2022-08-02 PROCEDURE — 85730 THROMBOPLASTIN TIME PARTIAL: CPT | Performed by: FAMILY MEDICINE

## 2022-08-02 PROCEDURE — 93308 TTE F-UP OR LMTD: CPT | Performed by: FAMILY MEDICINE

## 2022-08-02 PROCEDURE — 93005 ELECTROCARDIOGRAM TRACING: CPT | Mod: 59 | Performed by: FAMILY MEDICINE

## 2022-08-02 PROCEDURE — 5A09557 ASSISTANCE WITH RESPIRATORY VENTILATION, GREATER THAN 96 CONSECUTIVE HOURS, CONTINUOUS POSITIVE AIRWAY PRESSURE: ICD-10-PCS | Performed by: EMERGENCY MEDICINE

## 2022-08-02 PROCEDURE — 85610 PROTHROMBIN TIME: CPT | Performed by: FAMILY MEDICINE

## 2022-08-02 PROCEDURE — 250N000013 HC RX MED GY IP 250 OP 250 PS 637: Performed by: HOSPITALIST

## 2022-08-02 PROCEDURE — 250N000012 HC RX MED GY IP 250 OP 636 PS 637: Performed by: HOSPITALIST

## 2022-08-02 PROCEDURE — 93975 VASCULAR STUDY: CPT

## 2022-08-02 PROCEDURE — 96376 TX/PRO/DX INJ SAME DRUG ADON: CPT | Performed by: FAMILY MEDICINE

## 2022-08-02 PROCEDURE — 99291 CRITICAL CARE FIRST HOUR: CPT | Mod: 25 | Performed by: FAMILY MEDICINE

## 2022-08-02 PROCEDURE — 250N000009 HC RX 250: Performed by: FAMILY MEDICINE

## 2022-08-02 PROCEDURE — 250N000011 HC RX IP 250 OP 636: Performed by: FAMILY MEDICINE

## 2022-08-02 PROCEDURE — 93010 ELECTROCARDIOGRAM REPORT: CPT | Mod: 59 | Performed by: FAMILY MEDICINE

## 2022-08-02 RX ORDER — MULTIPLE VITAMINS W/ MINERALS TAB 9MG-400MCG
1 TAB ORAL DAILY
Status: DISCONTINUED | OUTPATIENT
Start: 2022-08-03 | End: 2022-08-16 | Stop reason: HOSPADM

## 2022-08-02 RX ORDER — VITAMIN B COMPLEX
25 TABLET ORAL DAILY
Status: DISCONTINUED | OUTPATIENT
Start: 2022-08-03 | End: 2022-08-16 | Stop reason: HOSPADM

## 2022-08-02 RX ORDER — ONDANSETRON 2 MG/ML
4 INJECTION INTRAMUSCULAR; INTRAVENOUS ONCE
Status: COMPLETED | OUTPATIENT
Start: 2022-08-02 | End: 2022-08-02

## 2022-08-02 RX ORDER — CALCITRIOL 0.25 UG/1
0.25 CAPSULE, LIQUID FILLED ORAL DAILY
Status: DISCONTINUED | OUTPATIENT
Start: 2022-08-02 | End: 2022-08-16 | Stop reason: HOSPADM

## 2022-08-02 RX ORDER — FUROSEMIDE 10 MG/ML
20 INJECTION INTRAMUSCULAR; INTRAVENOUS ONCE
Status: COMPLETED | OUTPATIENT
Start: 2022-08-02 | End: 2022-08-02

## 2022-08-02 RX ORDER — HYDROMORPHONE HCL IN WATER/PF 6 MG/30 ML
0.2 PATIENT CONTROLLED ANALGESIA SYRINGE INTRAVENOUS
Status: COMPLETED | OUTPATIENT
Start: 2022-08-02 | End: 2022-08-02

## 2022-08-02 RX ORDER — IPRATROPIUM BROMIDE AND ALBUTEROL SULFATE 2.5; .5 MG/3ML; MG/3ML
3 SOLUTION RESPIRATORY (INHALATION) ONCE
Status: COMPLETED | OUTPATIENT
Start: 2022-08-02 | End: 2022-08-02

## 2022-08-02 RX ORDER — AMOXICILLIN 250 MG
2 CAPSULE ORAL 2 TIMES DAILY
Status: DISCONTINUED | OUTPATIENT
Start: 2022-08-02 | End: 2022-08-16 | Stop reason: HOSPADM

## 2022-08-02 RX ORDER — GABAPENTIN 300 MG/1
300 CAPSULE ORAL AT BEDTIME
Status: DISCONTINUED | OUTPATIENT
Start: 2022-08-02 | End: 2022-08-16 | Stop reason: HOSPADM

## 2022-08-02 RX ORDER — OLOPATADINE HYDROCHLORIDE 1 MG/ML
1 SOLUTION/ DROPS OPHTHALMIC DAILY
Status: DISCONTINUED | OUTPATIENT
Start: 2022-08-03 | End: 2022-08-16 | Stop reason: HOSPADM

## 2022-08-02 RX ORDER — FEBUXOSTAT 40 MG/1
40 TABLET, FILM COATED ORAL DAILY
Status: DISCONTINUED | OUTPATIENT
Start: 2022-08-02 | End: 2022-08-16 | Stop reason: HOSPADM

## 2022-08-02 RX ORDER — CYCLOSPORINE 25 MG/1
75 CAPSULE, LIQUID FILLED ORAL EVERY MORNING
Status: DISCONTINUED | OUTPATIENT
Start: 2022-08-03 | End: 2022-08-16 | Stop reason: HOSPADM

## 2022-08-02 RX ORDER — LIDOCAINE 40 MG/G
CREAM TOPICAL
Status: DISCONTINUED | OUTPATIENT
Start: 2022-08-02 | End: 2022-08-16 | Stop reason: HOSPADM

## 2022-08-02 RX ORDER — CETIRIZINE HYDROCHLORIDE 10 MG/1
10 TABLET ORAL DAILY
Status: DISCONTINUED | OUTPATIENT
Start: 2022-08-02 | End: 2022-08-16 | Stop reason: HOSPADM

## 2022-08-02 RX ORDER — MYCOPHENOLATE MOFETIL 250 MG/1
500 CAPSULE ORAL
Status: DISCONTINUED | OUTPATIENT
Start: 2022-08-02 | End: 2022-08-16 | Stop reason: HOSPADM

## 2022-08-02 RX ORDER — HEPARIN SODIUM 5000 [USP'U]/.5ML
5000 INJECTION, SOLUTION INTRAVENOUS; SUBCUTANEOUS EVERY 12 HOURS
Status: DISCONTINUED | OUTPATIENT
Start: 2022-08-02 | End: 2022-08-06

## 2022-08-02 RX ORDER — CYCLOSPORINE 25 MG/1
50 CAPSULE, LIQUID FILLED ORAL AT BEDTIME
Status: DISCONTINUED | OUTPATIENT
Start: 2022-08-02 | End: 2022-08-16 | Stop reason: HOSPADM

## 2022-08-02 RX ORDER — FUROSEMIDE 10 MG/ML
20 INJECTION INTRAMUSCULAR; INTRAVENOUS EVERY 12 HOURS
Status: DISCONTINUED | OUTPATIENT
Start: 2022-08-03 | End: 2022-08-04

## 2022-08-02 RX ORDER — AMLODIPINE BESYLATE 10 MG/1
10 TABLET ORAL DAILY
Status: DISCONTINUED | OUTPATIENT
Start: 2022-08-02 | End: 2022-08-16 | Stop reason: HOSPADM

## 2022-08-02 RX ORDER — ALBUTEROL SULFATE 90 UG/1
2 AEROSOL, METERED RESPIRATORY (INHALATION) 4 TIMES DAILY
Status: DISCONTINUED | OUTPATIENT
Start: 2022-08-02 | End: 2022-08-16 | Stop reason: HOSPADM

## 2022-08-02 RX ADMIN — MYCOPHENOLATE MOFETIL 500 MG: 250 CAPSULE ORAL at 20:59

## 2022-08-02 RX ADMIN — IPRATROPIUM BROMIDE AND ALBUTEROL SULFATE 3 ML: .5; 3 SOLUTION RESPIRATORY (INHALATION) at 14:57

## 2022-08-02 RX ADMIN — HEPARIN SODIUM 5000 UNITS: 5000 INJECTION, SOLUTION INTRAVENOUS; SUBCUTANEOUS at 21:02

## 2022-08-02 RX ADMIN — SENNOSIDES AND DOCUSATE SODIUM 2 TABLET: 8.6; 5 TABLET ORAL at 20:57

## 2022-08-02 RX ADMIN — GABAPENTIN 300 MG: 300 CAPSULE ORAL at 20:58

## 2022-08-02 RX ADMIN — CALCITRIOL CAPSULES 0.25 MCG 0.25 MCG: 0.25 CAPSULE ORAL at 21:00

## 2022-08-02 RX ADMIN — FUROSEMIDE 20 MG: 10 INJECTION, SOLUTION INTRAMUSCULAR; INTRAVENOUS at 15:24

## 2022-08-02 RX ADMIN — CALCIUM CARBONATE 600 MG (1,500 MG)-VITAMIN D3 400 UNIT TABLET 1 TABLET: at 21:00

## 2022-08-02 RX ADMIN — CYCLOSPORINE 50 MG: 25 CAPSULE, LIQUID FILLED ORAL at 21:00

## 2022-08-02 RX ADMIN — Medication 1 CAPSULE: at 20:59

## 2022-08-02 RX ADMIN — CETIRIZINE HYDROCHLORIDE 10 MG: 10 TABLET, FILM COATED ORAL at 20:58

## 2022-08-02 RX ADMIN — HYDROMORPHONE HYDROCHLORIDE 0.2 MG: 0.2 INJECTION, SOLUTION INTRAMUSCULAR; INTRAVENOUS; SUBCUTANEOUS at 14:18

## 2022-08-02 RX ADMIN — OMEPRAZOLE 20 MG: 20 CAPSULE, DELAYED RELEASE ORAL at 20:58

## 2022-08-02 RX ADMIN — ALBUTEROL SULFATE 2 PUFF: 90 AEROSOL, METERED RESPIRATORY (INHALATION) at 20:56

## 2022-08-02 RX ADMIN — ONDANSETRON 4 MG: 2 INJECTION INTRAMUSCULAR; INTRAVENOUS at 14:18

## 2022-08-02 RX ADMIN — AMLODIPINE BESYLATE 10 MG: 10 TABLET ORAL at 20:57

## 2022-08-02 RX ADMIN — FUROSEMIDE 20 MG: 10 INJECTION, SOLUTION INTRAMUSCULAR; INTRAVENOUS at 20:01

## 2022-08-02 RX ADMIN — UMECLIDINIUM 1 PUFF: 62.5 AEROSOL, POWDER ORAL at 21:12

## 2022-08-02 RX ADMIN — FEBUXOSTAT 40 MG: 40 TABLET, FILM COATED ORAL at 20:59

## 2022-08-02 ASSESSMENT — ACTIVITIES OF DAILY LIVING (ADL)
ADLS_ACUITY_SCORE: 35

## 2022-08-02 NOTE — ED PROVIDER NOTES
ED Provider Note  [facility]      History     Chief Complaint   Patient presents with     Abdominal Pain     HPI  Flor Navarro is a 56 year old female who has a history of liver transplant, splenic artery stable aneurysm, sleep apnea, congestive heart failure, stage III chronic kidney disease, presenting due to right upper quadrant pain which radiates to the right lower back and shortness of breath.  She complains of nausea but no vomiting, no diarrhea.  She feels somewhat short of breath, states she has similar shortness of breath intermittently for which she will use inhalers at home.  Primary reason for presentation today is pain, but states she also is not sleeping well, frequently wakes up gasping for air.  Denies chest pain, cough, runny nose or sore throat denied urinary symptoms.  No fever, no cough no runny nose or sore throat    Past Medical History  Past Medical History:   Diagnosis Date     Anemia in CKD (chronic kidney disease)      Cataract      CKD (chronic kidney disease) stage 3, GFR 30-59 ml/min (H)      Hepatitis C     cleared virus spontaneously      High risk medication use      Hypertension, renal      Immunosuppressed status (H)      Liver replaced by transplant (H) 2010     Osteoporosis      Recurrent pregnancy loss without current pregnancy      Recurrent UTI 2021     Stroke, hemorrhagic (H) 2008     Syncope      Unspecified viral hepatitis C without hepatic coma      Past Surgical History:   Procedure Laterality Date     CATARACT IOL, RT/LT Right 2/18/15      SECTION       Incisional Hernia Repair  2004     INSERT SHUNT PORTAL TRANSJUGULAR INTRAHEPTIC      shunt placement for liver failure     LAPAROSCOPIC SALPINGO-OOPHORECTOMY      left     NECK SURGERY  2010    fracture, in [place] x 7months     TRANSPLANT LIVER RECIPIENT  DONOR  10/26/10     Upper GI Endoscopy with Band Ligation of Esoph/Gastric Varic. .        ACE/ARB/ARNI NOT PRESCRIBED (INTENTIONAL)  acetaminophen (TYLENOL) 500 MG tablet  albuterol (ACCUNEB) 0.63 MG/3ML neb solution  albuterol (PROAIR HFA/PROVENTIL HFA/VENTOLIN HFA) 108 (90 Base) MCG/ACT inhaler  alendronate (FOSAMAX) 70 MG tablet  alum & mag hydroxide-simethicone (MAALOX ADVANCED MAX ST) 400-400-40 MG/5ML SUSP suspension  amLODIPine (NORVASC) 10 MG tablet  calcitRIOL (ROCALTROL) 0.25 MCG capsule  calcium carbonate 600 mg-vitamin D 400 units (CALTRATE) 600-400 MG-UNIT per tablet  capsaicin (ZOSTRIX) 0.025 % external cream  carboxymethylcellulose PF (REFRESH PLUS) 0.5 % ophthalmic solution  cetirizine (ZYRTEC) 10 MG tablet  COMPOUNDED NON-CONTROLLED SUBSTANCE (CMPD RX) - PHARMACY TO MIX COMPOUNDED MEDICATION  cycloSPORINE modified (GENERIC EQUIVALENT) 25 MG capsule  [employer] (EFFERDENT DENTURE CLEANSER) TBEF  dimethicone (AVEENO DAILY MOISTURIZING) 1.3 % LOTN lotion  diphenhydrAMINE (BENADRYL) 50 MG capsule  febuxostat (ULORIC) 40 MG TABS tablet  furosemide (LASIX) 20 MG tablet  gabapentin (NEURONTIN) 300 MG capsule  lidocaine (XYLOCAINE) 5 % external ointment  melatonin 3 MG tablet  mineral oil-white petrolatum (EUCERIN) CREA cream  Multiple Vitamin (DAILY-SUMA MULTIVITAMIN) TABS  multivitamin w/minerals (MULTI-VITAMIN) tablet  mycophenolate (GENERIC EQUIVALENT) 250 MG capsule  neomycin-polymyxin-HC 1 %  olopatadine (PATADAY) 0.2 % ophthalmic solution  omeprazole (PRILOSEC) 20 MG DR capsule  order for DME  order for DME  order for DME  psyllium (METAMUCIL/KONSYL) 0.52 g capsule  senna-docusate (SENOKOT-S/PERICOLACE) 8.6-50 MG tablet  STATIN NOT PRESCRIBED, INTENTIONAL,  umeclidinium (INCRUSE ELLIPTA) 62.5 MCG/INH inhaler  Vitamin D3 (CHOLECALCIFEROL) 25 mcg (1000 units) tablet      Allergies   Allergen Reactions     Aspirin      3/31/16 Per pt, tolerates 81 mg daily dose without ADR.     325 mg dose caused itchiness and hives.     Clarithromycin      Allergic reaction         Contrast  Dye      Iodine      Pcn [Penicillins]      Family History  Family History   Problem Relation Age of Onset     Hepatitis Other         Hep C, still in [place]     Cerebrovascular Disease Other      Cancer No family hx of      Diabetes No family hx of      Hypertension No family hx of      Thyroid Disease No family hx of      Glaucoma No family hx of      Macular Degeneration No family hx of      Social History   Social History     Tobacco Use     Smoking status: Never Smoker     Smokeless tobacco: Never Used   Vaping Use     Vaping Use: Never used   Substance Use Topics     Alcohol use: No     Drug use: No      Past medical history, past surgical history, medications, allergies, family history, and social history were reviewed with the patient. No additional pertinent items.       Review of Systems  A complete review of systems was performed with pertinent positives and negatives noted in the HPI, and all other systems negative.    Physical Exam   BP: 121/66  Pulse: 69  Temp: 98.7  F (37.1  C)  Resp: 26  SpO2: 98 %  Physical Exam  Vitals and nursing note reviewed.   Constitutional:       General: She is in acute distress.      Appearance: She is not diaphoretic.   HENT:      Head: Atraumatic.      Mouth/Throat:      Pharynx: No oropharyngeal exudate.   Eyes:      General: No scleral icterus.     Pupils: Pupils are equal, round, and reactive to light.   Cardiovascular:      Rate and Rhythm: Normal rate and regular rhythm.      Heart sounds: Normal heart sounds. No murmur heard.  Pulmonary:      Effort: Respiratory distress (Tachypneic, respiratory rate 30 but will converse in 3-4 word sentences, she is on 6 L nasal cannula oxygen, breath sounds somewhat diminished throughout but symmetric) present.      Breath sounds: Normal breath sounds.   Abdominal:      General: Bowel sounds are normal.      Palpations: Abdomen is soft.      Tenderness: There is abdominal tenderness in the right upper quadrant and epigastric area.  There is no guarding or rebound.   Musculoskeletal:         General: No tenderness.      Right lower leg: Edema present.      Left lower leg: Edema present.   Skin:     General: Skin is warm.      Findings: No rash.   Neurological:      General: No focal deficit present.      Mental Status: She is oriented to person, place, and time.         ED Course      Procedures  Results for orders placed during the hospital encounter of 08/02/22    POC US ECHO LIMITED    Impression  Limited Bedside Cardiac Ultrasound, performed and interpreted by me.  Indication: Shortness of Breath.  Parasternal long axis, parasternal short axis, apical 4 chamber and lung views were acquired.  Image quality was satisfactory.    Findings:  Abnormal b lines at bilateral lung bases    IMPRESSION: Grossly normal left ventricular function and chamber size.  No pericardial effusion. B lines at bilateral lung bases suggesting pulmonary edema.             EKG Interpretation:      Interpreted by Rusty Alfredo MD  Time reviewed:1406   Symptoms at time of EKG: dyspnea   Rhythm: normal sinus   Rate: normal  Axis: NORMAL  Ectopy: none  Conduction: normal  ST Segments/ T Waves: No ST-T wave changes  Q Waves: none  Comparison to prior: No old EKG available    Clinical Impression: normal EKG  Critical Care Addendum    My initial assessment, based on my review of prehospital provider report, review of nursing observations, review of vital signs, focused history, physical exam, review of cardiac rhythm monitor and 12 lead ECG analysis, established that Flor Navarro has respiratory insufficiency and and Liver transplant patient with abdominal pain, which requires immediate intervention, and therefore she is critically ill.     After the initial assessment, the care team initiated multiple lab tests, initiated medication therapy with Albuterol, Lasix, Dilaudid, oxygen and initiated intensive non-invasive respiratory support to provide stabilization care. Due  to the critical nature of this patient, I reassessed nursing observations, vital signs, physical exam and respiratory status multiple times prior to her disposition.     Time also spent performing documentation and coordination of care.     Critical care time (excluding teaching time and procedures): 45 minutes.   The medical record was reviewed and interpreted.  Current labs reviewed and interpreted.  Previous labs reviewed and interpreted.  Current images reviewed and interpreted: Chest x-ray, liver transplant ultrasound.  Previous images reviewed and interpreted: Previous chest x-ray and liver transplant ultrasound.  EKG reviewed and interpreted: Normal EKG.   utilized.              Results for orders placed or performed during the hospital encounter of 08/02/22   XR Chest Port 1 View     Status: None    Narrative    CHEST ONE VIEW PORTABLE   8/2/2022 2:55 PM     HISTORY:  Dyspnea. Hypoxia.    COMPARISON: 5/29/2022.      Impression    IMPRESSION: Heart size appears stable. Pulmonary venous congestion has  increased slightly. Mild interstitial prominence throughout both lungs  suggests pulmonary edema, and has a similar appearance to the previous  exam. No pneumothorax.    PARK GARCIA MD         SYSTEM ID:  [id number]   US Liver Transplant     Status: None    Narrative    EXAM: US LIVER TRANSPLANT  LOCATION: [facility]  DATE/TIME: 8/2/2022 4:16 PM    INDICATION: Abdominal pain and the slightly increased liver enzymes in liver transplant patient  COMPARISON: Ultrasound 05/29/2022  TECHNIQUE: Limited abdominal ultrasound. Color flow with spectral Doppler and waveform analysis performed.     FINDINGS:    GALLBLADDER: Surgically absent     BILE DUCTS: No biliary dilatation. The common duct measures 2 mm.    LIVER: Normal transplant liver parenchyma with smooth contour. No focal mass.     RIGHT KIDNEY: Atrophic without hydronephrosis.     LEFT KIDNEY: No  hydronephrosis. Exophytic cyst measuring up to 3.3 cm, not significantly changed, no specific follow-up recommended.    PANCREAS: The visualized portions are normal.    No ascites.    ABDOMINAL DUPLEX:     Splenic vein: ?Patent continuous normal antegrade direction flow towards the liver, 21 cm/sec.  Extrahepatic portal vein: ?Patent continuous antegrade flow, 32 cm/sec.  Portal vein at anastomosis: Patent continuous antegrade flow, 22 cm/sec.  Intrahepatic portal vein: ?Patent continuous antegrade flow, 57 cm/sec.  Right portal vein flow is antegrade, measuring 53 cm/sec.  Left portal vein flow is antegrade, measuring 29 cm/sec.  ?  Inferior vena cava: Patent with flow toward the heart throughout.  ?  Right, mid, left hepatic veins: Patent with flow towards the inferior vena cava.  ?  Extrahepatic hepatic artery: High resistance waveform with flow towards the liver. 80-90 cm/sec with resistive index 0.88-1.0.  Right hepatic artery: 80 cm/sec with resistive index 0.91.  Left hepatic artery: 86 cm/sec with resistive index 0.85.        Impression    IMPRESSION:  1.  Unremarkable grayscale appearance of the transplant liver. No biliary obstruction.  2.  Patent transplant vasculature with proper directional flow as above. Relatively high resistance flow within the hepatic artery, new since prior examination. Differential is broad but includes rejection.       XR Chest Port 1 View     Status: None    Narrative    EXAM: XR CHEST PORT 1 VIEW  LOCATION: [facility]  DATE/TIME: 8/3/2022 4:54 AM    INDICATION: worsening dyspnea  COMPARISON: 08/02/2022      Impression    IMPRESSION: Cardiac silhouette is enlarged. Increasing interstitial and airspace infiltrates likely to represent worsening pulmonary edema, with infection in the lung bases not excludable. No pneumothorax.   POC US ECHO LIMITED     Status: None    Impression    Limited Bedside Cardiac Ultrasound, performed and  interpreted by me.   Indication: Shortness of Breath.  Parasternal long axis, parasternal short axis, apical 4 chamber and lung views were acquired.   Image quality was satisfactory.    Findings:    Abnormal b lines at bilateral lung bases    IMPRESSION: Grossly normal left ventricular function and chamber size.  No pericardial effusion. B lines at bilateral lung bases suggesting pulmonary edema.     Comprehensive metabolic panel     Status: Abnormal   Result Value Ref Range    Sodium 141 133 - 144 mmol/L    Potassium 5.9 (H) 3.4 - 5.3 mmol/L    Chloride 112 (H) 94 - 109 mmol/L    Carbon Dioxide (CO2) 29 20 - 32 mmol/L    Anion Gap <1 (L) 3 - 14 mmol/L    Urea Nitrogen 21 7 - 30 mg/dL    Creatinine 1.27 (H) 0.52 - 1.04 mg/dL    Calcium 9.2 8.5 - 10.1 mg/dL    Glucose 98 70 - 99 mg/dL    Alkaline Phosphatase 74 40 - 150 U/L    AST 47 (H) 0 - 45 U/L    ALT 32 0 - 50 U/L    Protein Total 7.2 6.8 - 8.8 g/dL    Albumin 3.0 (L) 3.4 - 5.0 g/dL    Bilirubin Total 0.7 0.2 - 1.3 mg/dL    GFR Estimate 49 (L) >60 mL/min/1.73m2   Lipase     Status: Normal   Result Value Ref Range    Lipase 105 73 - 393 U/L   Troponin I     Status: Normal   Result Value Ref Range    Troponin I High Sensitivity 10 <54 ng/L   INR     Status: Normal   Result Value Ref Range    INR 0.97 0.85 - 1.15   Partial thromboplastin time     Status: Normal   Result Value Ref Range    aPTT 27 22 - 38 Seconds   Nt probnp inpatient (BNP)     Status: Abnormal   Result Value Ref Range    N terminal Pro BNP Inpatient 1,238 (H) 0 - 900 pg/mL   Symptomatic; Unknown Influenza A/B & SARS-CoV2 (COVID-19) Virus PCR Multiplex Nasopharyngeal     Status: Normal    Specimen: Nasopharyngeal; Swab   Result Value Ref Range    Influenza A PCR Negative Negative    Influenza B PCR Negative Negative    SARS CoV2 PCR Negative Negative    Narrative    Testing was performed using the russ SARS-CoV-2 & Influenza A/B Assay on the russ [employer] System. This test should be ordered for the  detection of SARS-CoV-2 and influenza viruses in individuals who meet clinical and/or epidemiological criteria. Test performance is unknown in asymptomatic patients. This test is for in vitro diagnostic use under the FDA EUA for laboratories certified under CLIA to perform moderate and/or high complexity testing. This test has not been FDA cleared or approved. A negative result does not rule out the presence of PCR inhibitors in the specimen or target RNA in concentration below the limit of detection for the assay. If only one viral target is positive but coinfection with multiple targets is suspected, the sample should be re-tested with another FDA cleared, approved or authorized test, if coinfection would change clinical management. [facility] are certified under the Clinical Laboratory Improvement Amendments of 1988 (CLIA-88) as  qualified to perform moderate and/or high complexity laboratory testing.   CBC with platelets and differential     Status: Abnormal   Result Value Ref Range    WBC Count 4.4 4.0 - 11.0 10e3/uL    RBC Count 4.20 3.80 - 5.20 10e6/uL    Hemoglobin 11.7 11.7 - 15.7 g/dL    Hematocrit 38.5 35.0 - 47.0 %    MCV 92 78 - 100 fL    MCH 27.9 26.5 - 33.0 pg    MCHC 30.4 (L) 31.5 - 36.5 g/dL    RDW 14.7 10.0 - 15.0 %    Platelet Count 175 150 - 450 10e3/uL    % Neutrophils 59 %    % Lymphocytes 29 %    % Monocytes 9 %    % Eosinophils 2 %    % Basophils 1 %    % Immature Granulocytes 0 %    NRBCs per 100 WBC 1 (H) <1 /100    Absolute Neutrophils 2.6 1.6 - 8.3 10e3/uL    Absolute Lymphocytes 1.3 0.8 - 5.3 10e3/uL    Absolute Monocytes 0.4 0.0 - 1.3 10e3/uL    Absolute Eosinophils 0.1 0.0 - 0.7 10e3/uL    Absolute Basophils 0.0 0.0 - 0.2 10e3/uL    Absolute Immature Granulocytes 0.0 <=0.4 10e3/uL    Absolute NRBCs 0.0 10e3/uL   UA with Microscopic reflex to Culture     Status: Abnormal    Specimen: Urine, Clean Catch   Result Value Ref Range    Color Urine Light Yellow Colorless,  Straw, Light Yellow, Yellow    Appearance Urine Clear Clear    Glucose Urine Negative Negative mg/dL    Bilirubin Urine Negative Negative    Ketones Urine Negative Negative mg/dL    Specific Gravity Urine 1.010 1.003 - 1.035    Blood Urine Trace (A) Negative    pH Urine 5.5 5.0 - 7.0    Protein Albumin Urine 10  (A) Negative mg/dL    Urobilinogen Urine Normal Normal, 2.0 mg/dL    Nitrite Urine Negative Negative    Leukocyte Esterase Urine Negative Negative    Mucus Urine Present (A) None Seen /LPF    RBC Urine 2 <=2 /HPF    WBC Urine 1 <=5 /HPF    Narrative    Urine Culture not indicated   Blood gas venous     Status: Abnormal   Result Value Ref Range    pH Venous 7.00 (LL) 7.32 - 7.43    pCO2 Venous 123 (HH) 40 - 50 mm Hg    pO2 Venous 53 (H) 25 - 47 mm Hg    Bicarbonate Venous 30 (H) 21 - 28 mmol/L    Base Excess/Deficit (+/-) -4.0 -7.7 - 1.9 mmol/L    FIO2 56    Ammonia (on ice)     Status: Normal   Result Value Ref Range    Ammonia 34 10 - 50 umol/L   Comprehensive metabolic panel     Status: Abnormal   Result Value Ref Range    Sodium 140 133 - 144 mmol/L    Potassium 5.1 3.4 - 5.3 mmol/L    Chloride 109 94 - 109 mmol/L    Carbon Dioxide (CO2) 31 20 - 32 mmol/L    Anion Gap <1 (L) 3 - 14 mmol/L    Urea Nitrogen 22 7 - 30 mg/dL    Creatinine 1.69 (H) 0.52 - 1.04 mg/dL    Calcium 9.1 8.5 - 10.1 mg/dL    Glucose 205 (H) 70 - 99 mg/dL    Alkaline Phosphatase 84 40 - 150 U/L    AST 26 0 - 45 U/L    ALT 31 0 - 50 U/L    Protein Total 7.8 6.8 - 8.8 g/dL    Albumin 3.5 3.4 - 5.0 g/dL    Bilirubin Total 0.2 0.2 - 1.3 mg/dL    GFR Estimate 35 (L) >60 mL/min/1.73m2   CBC with platelets and differential     Status: Abnormal   Result Value Ref Range    WBC Count 8.9 4.0 - 11.0 10e3/uL    RBC Count 4.45 3.80 - 5.20 10e6/uL    Hemoglobin 12.6 11.7 - 15.7 g/dL    Hematocrit 43.6 35.0 - 47.0 %    MCV 98 78 - 100 fL    MCH 28.3 26.5 - 33.0 pg    MCHC 28.9 (L) 31.5 - 36.5 g/dL    RDW 14.8 10.0 - 15.0 %    Platelet Count 217 150 -  450 10e3/uL    % Neutrophils 74 %    % Lymphocytes 17 %    % Monocytes 7 %    % Eosinophils 1 %    % Basophils 0 %    % Immature Granulocytes 1 %    NRBCs per 100 WBC 0 <1 /100    Absolute Neutrophils 6.7 1.6 - 8.3 10e3/uL    Absolute Lymphocytes 1.5 0.8 - 5.3 10e3/uL    Absolute Monocytes 0.6 0.0 - 1.3 10e3/uL    Absolute Eosinophils 0.1 0.0 - 0.7 10e3/uL    Absolute Basophils 0.0 0.0 - 0.2 10e3/uL    Absolute Immature Granulocytes 0.1 <=0.4 10e3/uL    Absolute NRBCs 0.0 10e3/uL   Blood gas venous     Status: Abnormal   Result Value Ref Range    pH Venous 7.02 (LL) 7.32 - 7.43    pCO2 Venous 116 (HH) 40 - 50 mm Hg    pO2 Venous 62 (H) 25 - 47 mm Hg    Bicarbonate Venous 30 (H) 21 - 28 mmol/L    Base Excess/Deficit (+/-) -3.7 -7.7 - 1.9 mmol/L    FIO2 60    Blood gas venous     Status: Abnormal   Result Value Ref Range    pH Venous 7.08 (LL) 7.32 - 7.43    pCO2 Venous 102 (HH) 40 - 50 mm Hg    pO2 Venous 40 25 - 47 mm Hg    Bicarbonate Venous 30 (H) 21 - 28 mmol/L    Base Excess/Deficit (+/-) -2.5 -7.7 - 1.9 mmol/L    FIO2 60    Glucose by meter     Status: Abnormal   Result Value Ref Range    GLUCOSE BY METER POCT 205 (H) 70 - 99 mg/dL   Basic metabolic panel     Status: Abnormal   Result Value Ref Range    Sodium 138 133 - 144 mmol/L    Potassium 5.7 (H) 3.4 - 5.3 mmol/L    Chloride 108 94 - 109 mmol/L    Carbon Dioxide (CO2) 31 20 - 32 mmol/L    Anion Gap <1 (L) 3 - 14 mmol/L    Urea Nitrogen 25 7 - 30 mg/dL    Creatinine 1.72 (H) 0.52 - 1.04 mg/dL    Calcium 8.8 8.5 - 10.1 mg/dL    Glucose 165 (H) 70 - 99 mg/dL    GFR Estimate 34 (L) >60 mL/min/1.73m2   CBC with platelets     Status: Abnormal   Result Value Ref Range    WBC Count 8.6 4.0 - 11.0 10e3/uL    RBC Count 4.20 3.80 - 5.20 10e6/uL    Hemoglobin 11.6 (L) 11.7 - 15.7 g/dL    Hematocrit 41.2 35.0 - 47.0 %    MCV 98 78 - 100 fL    MCH 27.6 26.5 - 33.0 pg    MCHC 28.2 (L) 31.5 - 36.5 g/dL    RDW 14.7 10.0 - 15.0 %    Platelet Count 182 150 - 450 10e3/uL    Blood gas venous     Status: Abnormal   Result Value Ref Range    pH Venous 7.12 (LL) 7.32 - 7.43    pCO2 Venous 93 (HH) 40 - 50 mm Hg    pO2 Venous 48 (H) 25 - 47 mm Hg    Bicarbonate Venous 30 (H) 21 - 28 mmol/L    Base Excess/Deficit (+/-) -1.5 -7.7 - 1.9 mmol/L    FIO2 60    EKG 12-lead, tracing only     Status: None   Result Value Ref Range    Systolic Blood Pressure  mmHg    Diastolic Blood Pressure  mmHg    Ventricular Rate 74 BPM    Atrial Rate 74 BPM    NH Interval 158 ms    QRS Duration 102 ms     ms    QTc 428 ms    P Axis 46 degrees    R AXIS -2 degrees    T Axis 23 degrees    Interpretation ECG       Sinus rhythm  Normal ECG  Unconfirmed report - interpretation of this ECG is computer generated - see medical record for final interpretation  Confirmed by - EMERGENCY ROOM, PHYSICIAN ([id number]),  ARMAND DOUGLAS ([id number]) on 8/2/2022 9:24:47 PM     CBC with platelets differential     Status: Abnormal    Narrative    The following orders were created for panel order CBC with platelets differential.  Procedure                               Abnormality         Status                     ---------                               -----------         ------                     CBC with platelets and d...[[id number]]  Abnormal            Final result                 Please view results for these tests on the individual orders.   CBC with platelets differential     Status: Abnormal    Narrative    The following orders were created for panel order CBC with platelets differential.  Procedure                               Abnormality         Status                     ---------                               -----------         ------                     CBC with platelets and d...[[id number]]  Abnormal            Final result                 Please view results for these tests on the individual orders.     Medications   Vitamin D3 (CHOLECALCIFEROL) tablet 25 mcg (has no administration in time range)    umeclidinium (INCRUSE ELLIPTA) 62.5 MCG/INH inhaler 1 puff (1 puff Inhalation Given 8/2/22 2112)   senna-docusate (SENOKOT-S/PERICOLACE) 8.6-50 MG per tablet 2 tablet (2 tablets Oral Given 8/2/22 2057)   psyllium (METAMUCIL/KONSYL) capsule 1 capsule (1 capsule Oral Given 8/2/22 2059)   omeprazole (priLOSEC) CR capsule 20 mg (20 mg Oral Given 8/2/22 2058)   olopatadine (PATANOL) 0.1 % ophthalmic solution 1 drop (has no administration in time range)   mycophenolate (GENERIC EQUIVALENT) capsule 500 mg (500 mg Oral Given 8/2/22 2059)   multivitamin w/minerals (THERA-VIT-M) tablet 1 tablet (has no administration in time range)   gabapentin (NEURONTIN) capsule 300 mg ( Oral Canceled Entry 8/2/22 2109)   febuxostat (ULORIC) tablet 40 mg (40 mg Oral Given 8/2/22 2059)   cetirizine (zyrTEC) tablet 10 mg (10 mg Oral Given 8/2/22 2058)   calcium carbonate 600 mg-vitamin D 400 units (CALTRATE) per tablet 1 tablet (1 tablet Oral Given 8/2/22 2100)   calcitRIOL (ROCALTROL) capsule 0.25 mcg (0.25 mcg Oral Given 8/2/22 2100)   amLODIPine (NORVASC) tablet 10 mg (10 mg Oral Given 8/2/22 2057)   albuterol (PROVENTIL HFA/VENTOLIN HFA) inhaler (2 puffs Inhalation Given 8/2/22 2056)   lidocaine 1 % 0.1-1 mL (has no administration in time range)   lidocaine (LMX4) cream (has no administration in time range)   sodium chloride (PF) 0.9% PF flush 3 mL (3 mLs Intracatheter Given 8/3/22 0648)   sodium chloride (PF) 0.9% PF flush 3 mL (has no administration in time range)   melatonin tablet 1 mg (has no administration in time range)   Patient is already receiving anticoagulation with heparin, enoxaparin (LOVENOX), warfarin (COUMADIN)  or other anticoagulant medication (has no administration in time range)   heparin ANTICOAGULANT injection 5,000 Units (5,000 Units Subcutaneous Given 8/2/22 2102)   furosemide (LASIX) injection 20 mg (has no administration in time range)   cycloSPORINE modified (GENERIC EQUIVALENT) capsule 50 mg (50 mg Oral  Given 8/2/22 2100)   cycloSPORINE modified (GENERIC EQUIVALENT) capsule 75 mg (has no administration in time range)   HYDROmorphone (DILAUDID) injection 0.2 mg (0.2 mg Intravenous Given 8/2/22 1418)   ondansetron (ZOFRAN) injection 4 mg (4 mg Intravenous Given 8/2/22 1418)   ipratropium - albuterol 0.5 mg/2.5 mg/3 mL (DUONEB) neb solution 3 mL (3 mLs Nebulization Given 8/2/22 1457)   furosemide (LASIX) injection 20 mg (20 mg Intravenous Given 8/2/22 1524)   furosemide (LASIX) injection 20 mg (20 mg Intravenous Given 8/2/22 2001)   furosemide (LASIX) injection 40 mg (40 mg Intravenous Given 8/3/22 0624)        Assessments & Plan (with Medical Decision Making)   56-year-old woman with a history of liver transplant, sleep apnea and stage III kidney disease presenting due to right upper quadrant abdominal pain which radiates to the right low back, shortness of breath, and paroxysmal nocturnal dyspnea.  My initial differential diagnosis includes an acute exacerbation of congestive heart failure, upper airway obstruction, upper respiratory infection, pneumonia, liver transplant rejection, small bowel obstruction, pyelonephritis or ureterolithiasis,, allergic reaction, atypical presentation of ACS or sepsis.  On exam patient arrives on 6 L of oxygen, tachypneic, oxygen saturation mid to the low 90s and some mild respiratory distress but conversant.  She complains mainly through the  of upper abdominal pain which radiates to her right lower back, and on exam is tender in the right upper quadrant and epigastrium without guarding or rebound.  She has diminished breath sounds at the bases bilaterally, upper airway clear, no new edema is noted.  The remainder of a complete physical exam is normal.  Her initial EKG is normal as documented above, labs with mild decrease in kidney function, normal electrolytes, normal troponin, chest x-ray consistent with pulmonary edema.  Received DuoNeb, mask oxygen, IV Lasix,  Dilaudid for pain.  Appears much more comfortable, maintaining oxygen saturation in the mid 90s on 4 L oxygen mask.  When the patient falls asleep she does drop her sats rather precipitously which improved immediately upon awakening, normal mental status and she is now denying any significant respiratory distress.  Chest x-ray consistent with pulmonary edema congestive heart failure, slightly more prominent than previous chest x-ray.  B-type natriuretic peptide elevated, troponin and EKG unremarkable.  Patient maintaining oxygen saturations well on 4 L by mask though does drop her sats when she sleeps.  She has a history of sleep apnea.  Her urinalysis liver function tests unremarkable, transplant ultrasound with some relatively high resistance flow within the hepatic artery which is new since her prior exam, otherwise unremarkable, lab suggest mild acute kidney injury.  Serial exam benign, patient reports feeling somewhat better.  I would like to admit the patient for further treatment and evaluation of hypoxia, presumably due to mild congestive heart failure exacerbation, further evaluation of abdominal pain and liver transplant patient.  Patient currently stable on 4 L of oxygen.  Will Discuss with the medicine service.    I have reviewed the nursing notes. I have reviewed the findings, diagnosis, plan and need for follow up with the patient.    New Prescriptions    No medications on file       Final diagnoses:   Acute on chronic congestive heart failure, unspecified heart failure type (H)   Abdominal pain, right upper quadrant   Liver replaced by transplant (H)       --  Rusty Alfredo MD  [facility] EMERGENCY DEPARTMENT  8/2/2022     Rusty Alfredo MD  08/03/22 0780

## 2022-08-02 NOTE — ED NOTES
Bed: ED01  Expected date: 8/2/22  Expected time: 1:47 PM  Means of arrival: Ambulance  Comments:  Rajeev 619 55yo female, Back/abd pain, 's, sats 88 rm air, 94 on 6 liters

## 2022-08-02 NOTE — LETTER
Transition Communication Hand-off for Care Transitions to Next Level of Care Provider    Name: Flor Navarro  : 1965  MRN #: [id number]  Primary Care Provider: Karely Sierra     Primary Clinic:  [address] [address]     Reason for Hospitalization:  Abdominal pain, right upper quadrant [R10.11]  Liver replaced by transplant (H) [Z94.4]  Congestive heart failure (CHF) (H) [I50.9]  Acute on chronic congestive heart failure, unspecified heart failure type (H) [I50.9]  Admit Date/Time: 2022  1:53 PM  Discharge Date: 8/15/2022    Payor Source: Payor: MEDICARE / Plan: MEDICARE / Product Type: Medicare /     Reason for Communication Hand-off Referral: Other  continuity of care    Discharge Plan: per attached AVS    Concern for non-adherence with plan of care:   Y/N no  Discharge Needs Assessment:  Needs    Flowsheet Row Most Recent Value   Anticipated Changes Related to Illness none   Equipment Currently Used at Home cane, straight, walker, standard, shower chair, wheelchair, power  [first alert necklace]   # of Referrals Placed by CTS External Care Coordination        Follow-up plan:    Future Appointments   Date Time Provider Department Center   8/15/2022 12:15 PM Zac Silverman, PT [facility]   8/15/2022 12:30 PM Tiny Fonseca PA-C [facility]   8/15/2022  1:30 PM Raya Reis RD [facility]   2022  2:20 PM Karely Sierra MD [facility]   2022 10:00 AM Bessy Ward MD [facility]   12/15/2022  9:30 AM Laron Anne MD [facility]       Any outstanding tests or procedures:  none      Referrals     Future Labs/Procedures    Adult Sleep Eval & Management Referral     Process Instructions:    This will include comprehensive sleep evaluation with appropriate diagnostic testing, results follow up, and recommendations.    [employer]ang Total Score(s):  No data recorded    Comments:    Please be aware that coverage of these services is subject to the terms  and limitations of your health insurance plan.  Call member services at your health plan with any benefit or coverage questions.  [facility] will call you to coordinate your care as prescribed by your provider. If you don't hear from a representative within 2 business days, please call [phone].            Supplies     Future Labs/Procedures    Oxygen Adult/Peds     Process Instructions:    By signing this order, the Authorizing Provider is attesting that they have completed a face-to-face evaluation on the patient to determine their need for this equipment in the last 60 days. A new face-to-face evaluation is required each time  A new prescription for one of the specified items is ordered.   If an additional provider completed the evaluation, please indicate their name below.     **As of 2018, an order requisition and face sheet will print for all DME orders. Please give printed order and face sheet to patient if not obtaining product from [facility] D[facility] closet.     Comments:    See progress note for Face to face O2          Key Recommendations:  See attached AVS    Mike Dumont, RN    AVS/Discharge Summary is the source of truth; this is a helpful guide for improved communication of patient story

## 2022-08-02 NOTE — ED NOTES
Pt appears to be apneic at times when falling asleep. O2 drops to 45-50% but then increases again to 95% when awake and talking on 6L O2 via oxymask. MD phillips

## 2022-08-02 NOTE — PROGRESS NOTES
"When opening a documentation only encounter, be sure to enter in \"Chief Complaint\" Forms and in \" Comments\" Title of form, description if needed.    Flor is a 56 year old  female  Form received via: Fax  Form now resides in: Provider Ready    Ronit Schneider MA          Form has been completed by provider.     Form sent out via: Fax to eduPad at Fax Number: [phone]  Patient informed: No, Reason:  Output date: August 2, 2022    Ronit Schneider MA      **Please close the encounter**      "

## 2022-08-02 NOTE — LETTER
Transition Communication Hand-off for Care Transitions to Next Level of Care Provider    Name: Flor Navarro  : 1965  MRN #: [id number]  Primary Care Provider: Karely Sierra     Primary Clinic:  [address] [address]     Reason for Hospitalization:  Abdominal pain, right upper quadrant [R10.11]  Liver replaced by transplant (H) [Z94.4]  Congestive heart failure (CHF) (H) [I50.9]  Acute on chronic congestive heart failure, unspecified heart failure type (H) [I50.9]  Admit Date/Time: 2022  1:53 PM  Discharge Date: 8/15/2022    Payor Source: Payor: MEDICARE / Plan: MEDICARE / Product Type: Medicare /     Resumption of HC [facility]  ph: [phone]   fx: [phone]    CARLYN StaleyN, BA, RN, CMSRN, RNCC  Covering Units 6D/OBS  Pager: [phone]  Phone: [phone]  6th floor Weekend/Holiday Pager: [phone]  Observation weekend/after hours: [phone]

## 2022-08-03 ENCOUNTER — APPOINTMENT (OUTPATIENT)
Dept: CARDIOLOGY | Facility: CLINIC | Age: 57
DRG: 189 | End: 2022-08-03
Payer: MEDICARE

## 2022-08-03 ENCOUNTER — APPOINTMENT (OUTPATIENT)
Dept: GENERAL RADIOLOGY | Facility: CLINIC | Age: 57
DRG: 189 | End: 2022-08-03
Attending: EMERGENCY MEDICINE
Payer: MEDICARE

## 2022-08-03 ENCOUNTER — APPOINTMENT (OUTPATIENT)
Dept: GENERAL RADIOLOGY | Facility: CLINIC | Age: 57
DRG: 189 | End: 2022-08-03
Payer: MEDICARE

## 2022-08-03 ENCOUNTER — APPOINTMENT (OUTPATIENT)
Dept: ULTRASOUND IMAGING | Facility: CLINIC | Age: 57
DRG: 189 | End: 2022-08-03
Payer: MEDICARE

## 2022-08-03 LAB
ALBUMIN SERPL-MCNC: 3.5 G/DL (ref 3.4–5)
ALP SERPL-CCNC: 84 U/L (ref 40–150)
ALT SERPL W P-5'-P-CCNC: 31 U/L (ref 0–50)
AMMONIA PLAS-SCNC: 34 UMOL/L (ref 10–50)
ANION GAP SERPL CALCULATED.3IONS-SCNC: <1 MMOL/L (ref 3–14)
ANION GAP SERPL CALCULATED.3IONS-SCNC: <1 MMOL/L (ref 3–14)
AST SERPL W P-5'-P-CCNC: 26 U/L (ref 0–45)
BASE EXCESS BLDA CALC-SCNC: -0.5 MMOL/L (ref -9–1.8)
BASE EXCESS BLDA CALC-SCNC: -1 MMOL/L (ref -9–1.8)
BASE EXCESS BLDV CALC-SCNC: -1.5 MMOL/L (ref -7.7–1.9)
BASE EXCESS BLDV CALC-SCNC: -2.5 MMOL/L (ref -7.7–1.9)
BASE EXCESS BLDV CALC-SCNC: -3.7 MMOL/L (ref -7.7–1.9)
BASE EXCESS BLDV CALC-SCNC: -4 MMOL/L (ref -7.7–1.9)
BASOPHILS # BLD AUTO: 0 10E3/UL (ref 0–0.2)
BASOPHILS NFR BLD AUTO: 0 %
BILIRUB SERPL-MCNC: 0.2 MG/DL (ref 0.2–1.3)
BUN SERPL-MCNC: 22 MG/DL (ref 7–30)
BUN SERPL-MCNC: 25 MG/DL (ref 7–30)
CALCIUM SERPL-MCNC: 8.8 MG/DL (ref 8.5–10.1)
CALCIUM SERPL-MCNC: 9.1 MG/DL (ref 8.5–10.1)
CHLORIDE BLD-SCNC: 108 MMOL/L (ref 94–109)
CHLORIDE BLD-SCNC: 109 MMOL/L (ref 94–109)
CO2 SERPL-SCNC: 31 MMOL/L (ref 20–32)
CO2 SERPL-SCNC: 31 MMOL/L (ref 20–32)
CREAT SERPL-MCNC: 1.69 MG/DL (ref 0.52–1.04)
CREAT SERPL-MCNC: 1.72 MG/DL (ref 0.52–1.04)
CREAT SERPL-MCNC: 2.1 MG/DL (ref 0.51–0.95)
CYCLOSPORINE BLD LC/MS/MS-MCNC: 92 UG/L (ref 50–400)
EOSINOPHIL # BLD AUTO: 0.1 10E3/UL (ref 0–0.7)
EOSINOPHIL NFR BLD AUTO: 1 %
ERYTHROCYTE [DISTWIDTH] IN BLOOD BY AUTOMATED COUNT: 14.7 % (ref 10–15)
ERYTHROCYTE [DISTWIDTH] IN BLOOD BY AUTOMATED COUNT: 14.8 % (ref 10–15)
GFR SERPL CREATININE-BSD FRML MDRD: 27 ML/MIN/1.73M2
GFR SERPL CREATININE-BSD FRML MDRD: 34 ML/MIN/1.73M2
GFR SERPL CREATININE-BSD FRML MDRD: 35 ML/MIN/1.73M2
GLUCOSE BLD-MCNC: 165 MG/DL (ref 70–99)
GLUCOSE BLD-MCNC: 205 MG/DL (ref 70–99)
GLUCOSE BLDC GLUCOMTR-MCNC: 205 MG/DL (ref 70–99)
HCO3 BLD-SCNC: 29 MMOL/L (ref 21–28)
HCO3 BLD-SCNC: 29 MMOL/L (ref 21–28)
HCO3 BLDV-SCNC: 30 MMOL/L (ref 21–28)
HCT VFR BLD AUTO: 41.2 % (ref 35–47)
HCT VFR BLD AUTO: 43.6 % (ref 35–47)
HGB BLD-MCNC: 11.6 G/DL (ref 11.7–15.7)
HGB BLD-MCNC: 12.6 G/DL (ref 11.7–15.7)
IMM GRANULOCYTES # BLD: 0.1 10E3/UL
IMM GRANULOCYTES NFR BLD: 1 %
LVEF ECHO: NORMAL
LYMPHOCYTES # BLD AUTO: 1.5 10E3/UL (ref 0.8–5.3)
LYMPHOCYTES NFR BLD AUTO: 17 %
MCH RBC QN AUTO: 27.6 PG (ref 26.5–33)
MCH RBC QN AUTO: 28.3 PG (ref 26.5–33)
MCHC RBC AUTO-ENTMCNC: 28.2 G/DL (ref 31.5–36.5)
MCHC RBC AUTO-ENTMCNC: 28.9 G/DL (ref 31.5–36.5)
MCV RBC AUTO: 98 FL (ref 78–100)
MCV RBC AUTO: 98 FL (ref 78–100)
MONOCYTES # BLD AUTO: 0.6 10E3/UL (ref 0–1.3)
MONOCYTES NFR BLD AUTO: 7 %
NEUTROPHILS # BLD AUTO: 6.7 10E3/UL (ref 1.6–8.3)
NEUTROPHILS NFR BLD AUTO: 74 %
NRBC # BLD AUTO: 0 10E3/UL
NRBC BLD AUTO-RTO: 0 /100
O2/TOTAL GAS SETTING VFR VENT: 50 %
O2/TOTAL GAS SETTING VFR VENT: 56 %
O2/TOTAL GAS SETTING VFR VENT: 60 %
OXYHGB MFR BLD: 96 % (ref 92–100)
PCO2 BLD: 70 MM HG (ref 35–45)
PCO2 BLD: 76 MM HG (ref 35–45)
PCO2 BLDV: 102 MM HG (ref 40–50)
PCO2 BLDV: 116 MM HG (ref 40–50)
PCO2 BLDV: 123 MM HG (ref 40–50)
PCO2 BLDV: 93 MM HG (ref 40–50)
PH BLD: 7.19 [PH] (ref 7.35–7.45)
PH BLD: 7.22 [PH] (ref 7.35–7.45)
PH BLDV: 7 [PH] (ref 7.32–7.43)
PH BLDV: 7.02 [PH] (ref 7.32–7.43)
PH BLDV: 7.08 [PH] (ref 7.32–7.43)
PH BLDV: 7.12 [PH] (ref 7.32–7.43)
PLATELET # BLD AUTO: 182 10E3/UL (ref 150–450)
PLATELET # BLD AUTO: 217 10E3/UL (ref 150–450)
PO2 BLD: 102 MM HG (ref 80–105)
PO2 BLD: 81 MM HG (ref 80–105)
PO2 BLDV: 40 MM HG (ref 25–47)
PO2 BLDV: 48 MM HG (ref 25–47)
PO2 BLDV: 53 MM HG (ref 25–47)
PO2 BLDV: 62 MM HG (ref 25–47)
POTASSIUM BLD-SCNC: 5.1 MMOL/L (ref 3.4–5.3)
POTASSIUM BLD-SCNC: 5.7 MMOL/L (ref 3.4–5.3)
POTASSIUM SERPL-SCNC: 5.5 MMOL/L (ref 3.4–5.3)
PROT SERPL-MCNC: 7.8 G/DL (ref 6.8–8.8)
RBC # BLD AUTO: 4.2 10E6/UL (ref 3.8–5.2)
RBC # BLD AUTO: 4.45 10E6/UL (ref 3.8–5.2)
SODIUM SERPL-SCNC: 138 MMOL/L (ref 133–144)
SODIUM SERPL-SCNC: 140 MMOL/L (ref 133–144)
TME LAST DOSE: NORMAL H
TME LAST DOSE: NORMAL H
TSH SERPL DL<=0.005 MIU/L-ACNC: 0.77 UIU/ML (ref 0.3–4.2)
WBC # BLD AUTO: 8.6 10E3/UL (ref 4–11)
WBC # BLD AUTO: 8.9 10E3/UL (ref 4–11)

## 2022-08-03 PROCEDURE — 85027 COMPLETE CBC AUTOMATED: CPT | Performed by: HOSPITALIST

## 2022-08-03 PROCEDURE — 82805 BLOOD GASES W/O2 SATURATION: CPT | Performed by: PHYSICIAN ASSISTANT

## 2022-08-03 PROCEDURE — 76770 US EXAM ABDO BACK WALL COMP: CPT | Mod: 26 | Performed by: RADIOLOGY

## 2022-08-03 PROCEDURE — 71045 X-RAY EXAM CHEST 1 VIEW: CPT

## 2022-08-03 PROCEDURE — 99233 SBSQ HOSP IP/OBS HIGH 50: CPT | Performed by: INTERNAL MEDICINE

## 2022-08-03 PROCEDURE — 999N000157 HC STATISTIC RCP TIME EA 10 MIN

## 2022-08-03 PROCEDURE — 82310 ASSAY OF CALCIUM: CPT | Performed by: HOSPITALIST

## 2022-08-03 PROCEDURE — 84132 ASSAY OF SERUM POTASSIUM: CPT | Performed by: PHYSICIAN ASSISTANT

## 2022-08-03 PROCEDURE — 80158 DRUG ASSAY CYCLOSPORINE: CPT | Performed by: HOSPITALIST

## 2022-08-03 PROCEDURE — 96376 TX/PRO/DX INJ SAME DRUG ADON: CPT | Performed by: FAMILY MEDICINE

## 2022-08-03 PROCEDURE — 82565 ASSAY OF CREATININE: CPT | Performed by: STUDENT IN AN ORGANIZED HEALTH CARE EDUCATION/TRAINING PROGRAM

## 2022-08-03 PROCEDURE — 255N000002 HC RX 255 OP 636: Performed by: STUDENT IN AN ORGANIZED HEALTH CARE EDUCATION/TRAINING PROGRAM

## 2022-08-03 PROCEDURE — 36415 COLL VENOUS BLD VENIPUNCTURE: CPT | Performed by: STUDENT IN AN ORGANIZED HEALTH CARE EDUCATION/TRAINING PROGRAM

## 2022-08-03 PROCEDURE — 71045 X-RAY EXAM CHEST 1 VIEW: CPT | Mod: 26 | Performed by: RADIOLOGY

## 2022-08-03 PROCEDURE — 250N000012 HC RX MED GY IP 250 OP 636 PS 637: Performed by: HOSPITALIST

## 2022-08-03 PROCEDURE — 82803 BLOOD GASES ANY COMBINATION: CPT | Performed by: INTERNAL MEDICINE

## 2022-08-03 PROCEDURE — 80053 COMPREHEN METABOLIC PANEL: CPT | Performed by: EMERGENCY MEDICINE

## 2022-08-03 PROCEDURE — 76770 US EXAM ABDO BACK WALL COMP: CPT

## 2022-08-03 PROCEDURE — 94002 VENT MGMT INPAT INIT DAY: CPT

## 2022-08-03 PROCEDURE — 80158 DRUG ASSAY CYCLOSPORINE: CPT | Performed by: STUDENT IN AN ORGANIZED HEALTH CARE EDUCATION/TRAINING PROGRAM

## 2022-08-03 PROCEDURE — 250N000011 HC RX IP 250 OP 636: Performed by: PHYSICIAN ASSISTANT

## 2022-08-03 PROCEDURE — 71045 X-RAY EXAM CHEST 1 VIEW: CPT | Mod: 77

## 2022-08-03 PROCEDURE — 99291 CRITICAL CARE FIRST HOUR: CPT | Performed by: EMERGENCY MEDICINE

## 2022-08-03 PROCEDURE — 250N000011 HC RX IP 250 OP 636: Performed by: HOSPITALIST

## 2022-08-03 PROCEDURE — 85004 AUTOMATED DIFF WBC COUNT: CPT | Performed by: EMERGENCY MEDICINE

## 2022-08-03 PROCEDURE — 250N000011 HC RX IP 250 OP 636: Performed by: EMERGENCY MEDICINE

## 2022-08-03 PROCEDURE — 250N000013 HC RX MED GY IP 250 OP 250 PS 637: Performed by: HOSPITALIST

## 2022-08-03 PROCEDURE — 84443 ASSAY THYROID STIM HORMONE: CPT | Performed by: INTERNAL MEDICINE

## 2022-08-03 PROCEDURE — 99222 1ST HOSP IP/OBS MODERATE 55: CPT | Mod: GC | Performed by: INTERNAL MEDICINE

## 2022-08-03 PROCEDURE — 82803 BLOOD GASES ANY COMBINATION: CPT | Performed by: EMERGENCY MEDICINE

## 2022-08-03 PROCEDURE — 36415 COLL VENOUS BLD VENIPUNCTURE: CPT | Performed by: PHYSICIAN ASSISTANT

## 2022-08-03 PROCEDURE — 82140 ASSAY OF AMMONIA: CPT | Performed by: EMERGENCY MEDICINE

## 2022-08-03 PROCEDURE — 94660 CPAP INITIATION&MGMT: CPT

## 2022-08-03 PROCEDURE — 99207 PR NO BILLABLE SERVICE THIS VISIT: CPT | Mod: FS | Performed by: STUDENT IN AN ORGANIZED HEALTH CARE EDUCATION/TRAINING PROGRAM

## 2022-08-03 PROCEDURE — 999N000208 ECHOCARDIOGRAM COMPLETE

## 2022-08-03 PROCEDURE — 120N000002 HC R&B MED SURG/OB UMMC

## 2022-08-03 PROCEDURE — 93306 TTE W/DOPPLER COMPLETE: CPT | Mod: 26 | Performed by: INTERNAL MEDICINE

## 2022-08-03 PROCEDURE — 99207 PR NO BILLABLE SERVICE THIS VISIT: CPT | Performed by: PHYSICIAN ASSISTANT

## 2022-08-03 PROCEDURE — 36600 WITHDRAWAL OF ARTERIAL BLOOD: CPT

## 2022-08-03 PROCEDURE — 36415 COLL VENOUS BLD VENIPUNCTURE: CPT | Performed by: EMERGENCY MEDICINE

## 2022-08-03 RX ORDER — FUROSEMIDE 10 MG/ML
40 INJECTION INTRAMUSCULAR; INTRAVENOUS ONCE
Status: COMPLETED | OUTPATIENT
Start: 2022-08-03 | End: 2022-08-03

## 2022-08-03 RX ORDER — SODIUM POLYSTYRENE SULFONATE 15 G/60ML
30 SUSPENSION ORAL; RECTAL ONCE
Status: DISCONTINUED | OUTPATIENT
Start: 2022-08-03 | End: 2022-08-06

## 2022-08-03 RX ADMIN — CALCIUM CARBONATE 600 MG (1,500 MG)-VITAMIN D3 400 UNIT TABLET 1 TABLET: at 21:11

## 2022-08-03 RX ADMIN — CYCLOSPORINE 50 MG: 25 CAPSULE, LIQUID FILLED ORAL at 21:11

## 2022-08-03 RX ADMIN — FUROSEMIDE 40 MG: 10 INJECTION, SOLUTION INTRAMUSCULAR; INTRAVENOUS at 06:24

## 2022-08-03 RX ADMIN — FUROSEMIDE 20 MG: 10 INJECTION, SOLUTION INTRAMUSCULAR; INTRAVENOUS at 08:35

## 2022-08-03 RX ADMIN — SENNOSIDES AND DOCUSATE SODIUM 2 TABLET: 8.6; 5 TABLET ORAL at 21:11

## 2022-08-03 RX ADMIN — HEPARIN SODIUM 5000 UNITS: 5000 INJECTION, SOLUTION INTRAVENOUS; SUBCUTANEOUS at 08:35

## 2022-08-03 RX ADMIN — HUMAN ALBUMIN MICROSPHERES AND PERFLUTREN 6 ML: 10; .22 INJECTION, SOLUTION INTRAVENOUS at 15:37

## 2022-08-03 RX ADMIN — HEPARIN SODIUM 5000 UNITS: 5000 INJECTION, SOLUTION INTRAVENOUS; SUBCUTANEOUS at 21:11

## 2022-08-03 RX ADMIN — FUROSEMIDE 40 MG: 10 INJECTION, SOLUTION INTRAVENOUS at 19:42

## 2022-08-03 RX ADMIN — GABAPENTIN 300 MG: 300 CAPSULE ORAL at 21:11

## 2022-08-03 ASSESSMENT — ACTIVITIES OF DAILY LIVING (ADL)
ADLS_ACUITY_SCORE: 35
ADLS_ACUITY_SCORE: 48
ADLS_ACUITY_SCORE: 45
ADLS_ACUITY_SCORE: 35

## 2022-08-03 NOTE — ED NOTES
RN went in to check on patient as sats were low. Patient appeared to be drooling and was not responding to sternal rub. O2 was increased. Provider came into the room to assess patient. Respiratory therapist was paged. Labs were sent. Patient became more responsive and opened her eyes. Speech was incomprehensible. Patient was placed on cpap and oxygenation improved. Patient stable at this time. Will continue to monitor.

## 2022-08-03 NOTE — PROVIDER NOTIFICATION
Pt remains on BIPAP with current settings of 20/10, RR 24, FIO2 of 50%.   BS-diminish/clear.     1109: ABG done on right radius on FIO2 of 50% and brought to lab.

## 2022-08-03 NOTE — PROVIDER NOTIFICATION
Provider paged to advise regarding patient not receiving any of her AM medications.     Charli Au RN

## 2022-08-03 NOTE — ED PROVIDER NOTES
Brief update: The patient was boarding overnight at which time she became more altered and having difficulty maintaining oxygen saturation supplemental oxygen.  Gas shows significant CO2 retention.  Apparently she has not slept in several weeks and likely has sleep apnea according to history.  She was placed on BiPAP and drastically improved and is now awakening to minimal stimulation and is answering questions and speaking appropriately.  She has taken small sips of water.  Repeat x-ray shows slightly worsening pulmonary edema.  Bedside ultrasound shows good cardiac squeeze without pericardial effusion and minimal B-lines bilaterally.  [place] dose of Lasix was given.  Gases slowly improving the patient has drastically improved.  Plan for intermediate care on the [place].  I did update the hospitalist few times throughout the night to discuss care plan to facilitate transfer to the [place].    Critical Care Addendum    My initial assessment, based on my review of prehospital provider report, review of nursing observations, review of vital signs, focused history and physical exam, established that Flor Navarro has respiratory insufficiency, which requires immediate intervention, and therefore she is critically ill.     After the initial assessment, the care team initiated intensive non-invasive respiratory support to provide stabilization care. Due to the critical nature of this patient, I reassessed nursing observations, vital signs, physical exam and review of cardiac rhythm monitor multiple times prior to her disposition.     Time also spent performing documentation, reviewing test results and coordination of care.     Critical care time (excluding teaching time and procedures): 45 minutes.                 Nestor Sanchez MD  08/03/22 4970

## 2022-08-03 NOTE — PROGRESS NOTES
[facility]    Medicine Progress Note - Hospitalist Service, GOLD TEAM 19    Date of Admission:  8/2/2022    Assessment & Plan            Flor Navarro is a 56 year old female with PMH of Liver Tx for hepatitis C, HTN, CKDIII, SURI, Obesity, h/o SBO presented on 8/2/22 w/ SOB and abdominal pain. Patient transferred from [facility] to [place] 8/3/22 for ongoing care.     Acute hypoxic/hypercapnic respiratory failure  Suspect COPD with acute exacerbation   Suspect HFpEF with acute exacerbation  Per prior documentation, there is some documentation that she has COPD and takes inhalers at home. Per chart review, patient has been noncompliant with CPAP prior to transfer to [place]. CXR with pulmonary edema.  BNP is elevated at 1238. Noted to have bibasilar rales on exam. Initial VBG 7.00/123/53/30; she was placed on BiPAP w/ 80% FiO2. Gases improving, most recent 3hrs prior to transfer w/ ABG 7.19/76/81/29. TTE ordered, not yet completed at time of transfer.  - TTE  - Continue Lasix 20 mg IV twice daily  - Continue BiPAP treatment; consider HFNC when appropriate  - Repeat ABG now  - Recheck CXR  - Continue PTA prn albuterol and Umeclidinium daily  - Monitor on telemetry    Acute encephalopathy, improving  Patient noted to be excessively somnolent but arousable prior to transfer, likely d/t hypercapnia. On arrival to [place], she is awake and alert. She can answer some questions appropriately but repeats herself frequently.  - Continue BiPAP treatment    Chronic intermittent abdominal pain  H/o Small Bowel obstruction  Pt initially presented w/ RUQ abd pain radiating to right low back, nausea w/o vomiting.  WBC unremarkable. CMP baseline. UA bland. CT A/P: Again seen mildly dilated left ureter to the level of the pelvic brim, increased from previous exams, without an obstructing urinary calculus. No left hydronephrosis or perinephric fat stranding. Prominent though  nondistended or dilated small bowel loops with air-fluid levels as well as fecal lie small bowel material. Small bowel remains normal caliber.  Findings could represent continued ileus though partial or developing obstruction not entirely excluded.  - CTM     CASTILLO   CKD stage III  Baseline Cr ~1.1-1.2 range; on admission, Cr 1.27, now 1.72 after BID IV Lasix  - Consult nephrology, appreciate assistance  - Avoid nephrotoxic agents  - BMP daily     Hyperkalemia  K on admit was 5.9, now 5.7 this AM. Kayexalate ordered this AM.  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 No biliary obstruction. Patent transplant vasculature with proper directional flow as above. Relatively high resistance flow within the hepatic artery, new since prior examination. Differential is broad but includes rejection. LFTs are wnl except AST of 47.  - Continue PTA cyclosporine and mycophenolate  - Cyclosporine levels pending  - Consult transplant hepatology, appreciate assistance     HTN  - Continue PTA amlodipine 10 mg every other day  - Continue IV Lasix as above     GERD  - Continue PTA Omeprazole 20 mg BID     Obesity  H/o SURI  Not compliant with CPAP.  - Currently on BiPAP  - May trial CPAP while sleeping her on auto titrate mode once respiratory status/encephalopathy improve         Diet: Combination Diet Regular Diet Adult    DVT Prophylaxis: Enoxaparin (Lovenox) SQ  Li Catheter: Not present  Central Lines: None  Cardiac Monitoring: ACTIVE order. Indication: Acute decompensated heart failure (48 hours)  Code Status: Full Code      Disposition Plan    TBD     The patient's care was discussed with the Attending Physician, Dr. Bee, Bedside Nurse and Patient.    VANDANA DOTSON PA  Hospitalist Service, GOLD TEAM 19  [facility]  Securely  message with the [employer] Web Console (learn more here)  Text page via [facility] Paging/Directory   Please see signed in provider for up to date coverage information      Clinically Significant Risk Factors Present on Admission        # Hyperkalemia: K = 5.7 mmol/L (Ref range: 3.4 - 5.3 mmol/L) on admission, will monitor as appropriate       # Acute Kidney Injury, unspecified: based on a >150% or 0.3 mg/dL increase in creatinine on admission compared to past 90 day average, will monitor renal function    # Hypertension: home medication list includes antihypertensive(s)          ______________________________________________________________________    Interval History   Patient seen on arrival from [facility]. She is noted to be awake and alert. She feels hungry and is wondering when she can eat. She states her breathing is much better on the Bipap. She denies chest pain. She does not answer questions regarding abdominal pain, nausea or vomiting. She states she is cold and her upper back hurts from sitting up in bed all night, states she was told she is not allowed to lie down. She states she did not get much sleep last night. She has not felt feverish and no fevers have been recorded.    Data reviewed today: I reviewed all medications, new labs and imaging results over the last 24 hours. I personally reviewed the chest x-ray image(s) showing diffuse interstitial markings c/f pulmonary edema.    Physical Exam   Vital Signs:     BP: 117/71 Pulse: 65   Resp: 30 SpO2: 96 % O2 Device: BiPAP/CPAP Oxygen Delivery: 9 LPM  Weight: 0 lbs 0 oz  General: Pleasant, nontoxic appearance, no acute distress   Eyes: PERRL, EOMI, sclerae nonicteric   ENT: Oral mucosa and lips noted to be dry on BiPAP  Respiratory: Clear throughout, limited by extraneous Bipap noise, no respiratory distress   Cardiovascular: RRR, no murmurs, extremities without edema   Abdomen: Soft, NT, ND, quiet BS   Skin: Warm, dry, no pallor, no rash   Musculoskeletal:  Moves all extremities equally, no tenderness, no swelling  Neurologic: Alert and oriented to person and place +/- situation, CN II-XII grossly intact, no focal weakness, speech normal       Data   Recent Labs   Lab 08/03/22  0556 08/03/22  0310 08/03/22  0307 08/02/22  1414   WBC 8.6 8.9  --  4.4   HGB 11.6* 12.6  --  11.7   MCV 98 98  --  92    217  --  175   INR  --   --   --  0.97    140  --  141   POTASSIUM 5.7* 5.1  --  5.9*   CHLORIDE 108 109  --  112*   CO2 31 31  --  29   BUN 25 22  --  21   CR 1.72* 1.69*  --  1.27*   ANIONGAP <1* <1*  --  <1*   JUAN 8.8 9.1  --  9.2   * 205* 205* 98   ALBUMIN  --  3.5  --  3.0*   PROTTOTAL  --  7.8  --  7.2   BILITOTAL  --  0.2  --  0.7   ALKPHOS  --  84  --  74   ALT  --  31  --  32   AST  --  26  --  47*   LIPASE  --   --   --  105     Recent Results (from the past 24 hour(s))   XR Chest Port 1 View    Narrative    CHEST ONE VIEW PORTABLE   8/2/2022 2:55 PM     HISTORY:  Dyspnea. Hypoxia.    COMPARISON: 5/29/2022.      Impression    IMPRESSION: Heart size appears stable. Pulmonary venous congestion has  increased slightly. Mild interstitial prominence throughout both lungs  suggests pulmonary edema, and has a similar appearance to the previous  exam. No pneumothorax.    PARK GARCIA MD         SYSTEM ID:  [id number]   US Liver Transplant    Narrative    EXAM: US LIVER TRANSPLANT  LOCATION: [facility]  DATE/TIME: 8/2/2022 4:16 PM    INDICATION: Abdominal pain and the slightly increased liver enzymes in liver transplant patient  COMPARISON: Ultrasound 05/29/2022  TECHNIQUE: Limited abdominal ultrasound. Color flow with spectral Doppler and waveform analysis performed.     FINDINGS:    GALLBLADDER: Surgically absent     BILE DUCTS: No biliary dilatation. The common duct measures 2 mm.    LIVER: Normal transplant liver parenchyma with smooth contour. No focal mass.     RIGHT KIDNEY: Atrophic without  hydronephrosis.     LEFT KIDNEY: No hydronephrosis. Exophytic cyst measuring up to 3.3 cm, not significantly changed, no specific follow-up recommended.    PANCREAS: The visualized portions are normal.    No ascites.    ABDOMINAL DUPLEX:     Splenic vein: ?Patent continuous normal antegrade direction flow towards the liver, 21 cm/sec.  Extrahepatic portal vein: ?Patent continuous antegrade flow, 32 cm/sec.  Portal vein at anastomosis: Patent continuous antegrade flow, 22 cm/sec.  Intrahepatic portal vein: ?Patent continuous antegrade flow, 57 cm/sec.  Right portal vein flow is antegrade, measuring 53 cm/sec.  Left portal vein flow is antegrade, measuring 29 cm/sec.  ?  Inferior vena cava: Patent with flow toward the heart throughout.  ?  Right, mid, left hepatic veins: Patent with flow towards the inferior vena cava.  ?  Extrahepatic hepatic artery: High resistance waveform with flow towards the liver. 80-90 cm/sec with resistive index 0.88-1.0.  Right hepatic artery: 80 cm/sec with resistive index 0.91.  Left hepatic artery: 86 cm/sec with resistive index 0.85.        Impression    IMPRESSION:  1.  Unremarkable grayscale appearance of the transplant liver. No biliary obstruction.  2.  Patent transplant vasculature with proper directional flow as above. Relatively high resistance flow within the hepatic artery, new since prior examination. Differential is broad but includes rejection.       XR Chest Port 1 View    Narrative    EXAM: XR CHEST PORT 1 VIEW  LOCATION: [facility]  DATE/TIME: 8/3/2022 4:54 AM    INDICATION: worsening dyspnea  COMPARISON: 08/02/2022      Impression    IMPRESSION: Cardiac silhouette is enlarged. Increasing interstitial and airspace infiltrates likely to represent worsening pulmonary edema, with infection in the lung bases not excludable. No pneumothorax.   POC US ECHO LIMITED    Impression    Limited Bedside Cardiac Ultrasound, performed and  interpreted by me.   Indication: Shortness of Breath.  Parasternal long axis, parasternal short axis, apical 4 chamber and lung views were acquired.   Image quality was satisfactory.    Findings:    Abnormal b lines at bilateral lung bases    IMPRESSION: Grossly normal left ventricular function and chamber size.  No pericardial effusion. B lines at bilateral lung bases suggesting pulmonary edema.       Medications     - MEDICATION INSTRUCTIONS -         albuterol  2 puff Inhalation 4x Daily     amLODIPine  10 mg Oral Daily     calcitRIOL  0.25 mcg Oral Daily     calcium carbonate 600 mg-vitamin D 400 units  1 tablet Oral BID     cetirizine  10 mg Oral Daily     cycloSPORINE modified  50 mg Oral At Bedtime     cycloSPORINE modified  75 mg Oral QAM     febuxostat  40 mg Oral Daily     furosemide  20 mg Intravenous Q12H     gabapentin  300 mg Oral At Bedtime     heparin ANTICOAGULANT  5,000 Units Subcutaneous Q12H     multivitamin w/minerals  1 tablet Oral Daily     mycophenolate  500 mg Oral BID IS     olopatadine  1 drop Both Eyes Daily     omeprazole  20 mg Oral BID     psyllium  1 capsule Oral BID     senna-docusate  2 tablet Oral BID     sodium chloride (PF)  3 mL Intracatheter Q8H     sodium polystyrene  30 g Rectal Once     umeclidinium  1 puff Inhalation Daily     Vitamin D3  25 mcg Oral Daily

## 2022-08-03 NOTE — H&P
[facility]    History and Physical  Hospitalist       Date of Admission:  8/2/2022  Date of Service (when I saw the patient): 08/02/22    Assessment & Plan     Flor Navarro is a 56 year old female with PMH of Liver Tx for hepatitis C, HT, CKD III, SURI, Obesity, Hx of Small bowel obstruction came if for sob and abdominal pain.    Acute hypoxic respiratory failure  Suspect Acute Diastolic HF Vs systolic HF  -Presentation likely from acute diastolic HF  -DD COPD exacerbation  -Give lasix 20 mg IV BID  -Get ECHO  -Trend IO, Weight and BMP and K, Creat  -Not sure what the trigger is  -Start Telemetry    Chronic Intermittent Abdominal pain  Hx of Small Bowel obstruction  -I suspect the abdominal pain is chronic in nature  -she has hx of constipation. Though its not clear from her hx if she is constipated right now  -Abdominal exam is bening, though she is SP dilaudid in ER  -Montior    Obesity  SURI  -Not compliant with CPAP  -Out pt Follow-up with PCP  -May trial out CPAP while sleeping her on auto titrate mode      Suspect COPD. There is some documentation that she has COPD. She takes inhalers at home.   -Ct PTA PRN albuterol, Umeclidinium daily    Liver Tx  -USG liver showed unremarkable grayscale appearance of the transplant liver. No biliary obstruction. Patent transplant vasculature with proper directional flow as above. Relatively high resistance flow within the hepatic artery, new since prior examination. Differential is broad but includes rejection.  -LFTs are wnl except AST of 47  -RUQ abodminal pain is likely chronic in nature, monitor  -Ct PTA cyclosporin and mycophenolate  -Check cyclosporin levels in AM    CKD stage III  -Baseline creat is 1.1 to 1.2 range. On admit creat 1.27. Montior creat while diuresis.     Hyperkalemia: K on admit is 5.9. Anticipate it to improve with diuresis, Follow-up     HT Ct PTA amlodipine 10 mg every day  And lasix as IV    GERD. Ct  PTA Omeprazole 20 mg BID      DVT Prophylaxis: Enoxaparin (Lovenox) SQ  Code Status: Full Code    Disposition: Transfer to [place], anticipate pt being here for 2 to 3 days.     Paulina Valenzuela MD, MD    Primary Care Physician   Karely Sierra    Chief Complaint   Sob      History of Present Illness      Flor Navarro is a 56 year old female with PMH of Liver Tx for hepatitis C, HT, CKD III, SURI, Obesity, Hx of Small bowel obstruction came if for sob and abdominal pain. Frisian  service used. She can understand and speak English a little bit. She reports being sob for last three days. Was not able to sleep much for last three days. She also had RUQ and Epigastric abdominal pain. She had similar pain before. She had similar presentation before. Based on the chart review, she had intermittent RUQ pain from before. She had that since the liver Tx. On arrival in ER she was hypoxic. Her abdominal pain has now gone.     She denied any cough. No chest pain. No cold or congestion. No wheezing. No nausea or vomiting. No diarrhea. She is more on the side of constipation. No vomiting. No dysuria.     Reports taking water pills at home. Not sure the dose of her meds changed. Has some some calf harpal on both sides. She drinks 6 glasses of water per day.     No fevers or chills.     Past Medical History    I have reviewed this patient's medical history and updated it with pertinent information if needed.   Past Medical History:   Diagnosis Date     Anemia in CKD (chronic kidney disease)      Cataract      CKD (chronic kidney disease) stage 3, GFR 30-59 ml/min (H)      Hepatitis C     cleared virus spontaneously 2013     High risk medication use      Hypertension, renal      Immunosuppressed status (H)      Liver replaced by transplant (H) 01/01/2010     Osteoporosis      Recurrent pregnancy loss without current pregnancy      Recurrent UTI 07/12/2021     Stroke, hemorrhagic (H) 01/01/2008     Syncope      Unspecified  viral hepatitis C without hepatic coma        Past Surgical History   I have reviewed this patient's surgical history and updated it with pertinent information if needed.  Past Surgical History:   Procedure Laterality Date     CATARACT IOL, RT/LT Right 2/18/15      SECTION       Incisional Hernia Repair  2004     INSERT SHUNT PORTAL TRANSJUGULAR INTRAHEPTIC  2005    shunt placement for liver failure     LAPAROSCOPIC SALPINGO-OOPHORECTOMY      left     NECK SURGERY  2010    fracture, in halo x 7months     TRANSPLANT LIVER RECIPIENT  DONOR  10/26/10     Upper GI Endoscopy with Band Ligation of Esoph/Gastric Varic. .         Prior to Admission Medications   Prior to Admission Medications   Prescriptions Last Dose Informant Patient Reported? Taking?   ACE/ARB/ARNI NOT PRESCRIBED (INTENTIONAL)   No No   Sig: Please choose reason not prescribed from choices below.   COMPOUNDED NON-CONTROLLED SUBSTANCE (CMPD RX) - PHARMACY TO MIX COMPOUNDED MEDICATION   No No   Sig: Equal parts of 2% Benadryl, 2% vicious lidocaine and TC suspension maalox,  Swish and spit 5 ml every 6 hrs as needed for mouth pain   Denture Care Products (EFFERDENT DENTURE CLEANSER) TBEF   No No   Sig: Use nightly to clean oral appliance   Multiple Vitamin (DAILY-SUMA MULTIVITAMIN) TABS   No No   Sig: Take 1 tablet by mouth every morning   STATIN NOT PRESCRIBED, INTENTIONAL,   No No   Sig: Not prescribed   Vitamin D3 (CHOLECALCIFEROL) 25 mcg (1000 units) tablet   No No   Sig: Take 1 tablet (25 mcg) by mouth daily   acetaminophen (TYLENOL) 500 MG tablet   No No   Sig: Take 1 tablet (500 mg) by mouth 3 times daily as needed for mild pain   albuterol (ACCUNEB) 0.63 MG/3ML neb solution   No No   Sig: Take 3 mLs (0.63 mg) by nebulization every 4 hours (while awake)   albuterol (PROAIR HFA/PROVENTIL HFA/VENTOLIN HFA) 108 (90 Base) MCG/ACT inhaler   No No   Sig: Inhale 2 puffs into the lungs 4 times daily   alendronate (FOSAMAX) 70 MG tablet    No No   Sig: Take 1 tablet (70 mg) by mouth every 7 days   alum & mag hydroxide-simethicone (MAALOX ADVANCED MAX ST) 400-400-40 MG/5ML SUSP suspension   No No   Sig: Take 30 mLs by mouth every 4 hours as needed for indigestion or heartburn   amLODIPine (NORVASC) 10 MG tablet   No No   Sig: Take 1 tablet (10 mg) by mouth daily   calcitRIOL (ROCALTROL) 0.25 MCG capsule   No No   Sig: Take 1 capsule (0.25 mcg) by mouth daily   calcium carbonate 600 mg-vitamin D 400 units (CALTRATE) 600-400 MG-UNIT per tablet   No No   Sig: Take 1 tablet by mouth 2 times daily   capsaicin (ZOSTRIX) 0.025 % external cream   No No   Sig: Apply three times a day as needed to legs. Wash hands after applying.   carboxymethylcellulose PF (REFRESH PLUS) 0.5 % ophthalmic solution   No No   Sig: Place 1 drop into both eyes 4 times daily as needed for dry eyes   cetirizine (ZYRTEC) 10 MG tablet   No No   Sig: Take 1 tablet (10 mg) by mouth daily   cycloSPORINE modified (GENERIC EQUIVALENT) 25 MG capsule   No No   Sig: Take 3 capsules (75 mg) by mouth every morning AND 2 capsules (50 mg) At Bedtime.   dimethicone (AVEENO DAILY MOISTURIZING) 1.3 % LOTN lotion   No No   Sig: Externally apply topically daily   diphenhydrAMINE (BENADRYL) 50 MG capsule   No No   Sig: Take 1 capsule (50 mg) by mouth once as needed (2 hours before CT scan. Repeat if needed for itching.) Administer 30 min - 2 hours pre - IV contrast injection   febuxostat (ULORIC) 40 MG TABS tablet   No No   Sig: Take 1 tablet (40 mg) by mouth daily   furosemide (LASIX) 20 MG tablet   No No   Sig: Take 1 tablet (20 mg) by mouth 2 times daily   gabapentin (NEURONTIN) 300 MG capsule   No No   Sig: Take 1 capsule (300 mg) by mouth At Bedtime   lidocaine (XYLOCAINE) 5 % external ointment   No No   Sig: Apply topically as needed for moderate pain   melatonin 3 MG tablet   No No   Sig: TAKE ONE TABLET BY MOUTH EVERY NIGHT AS NEEDED FOR SLEEP.   mineral oil-white petrolatum (EUCERIN) CREA cream    No No   Sig: Apply topically 2 times daily   multivitamin w/minerals (MULTI-VITAMIN) tablet   No No   Sig: Take 1 tablet by mouth daily Needs 4 month supply for International Travel   mycophenolate (GENERIC EQUIVALENT) 250 MG capsule   No No   Sig: TAKE TWO CAPSULES BY MOUTH EVERY 12 HOURS   neomycin-polymyxin-HC 1 %   No No   Sig: Place 4 drops in ear(s) 2 times daily   olopatadine (PATADAY) 0.2 % ophthalmic solution   No No   Sig: Place 1 drop into both eyes daily   omeprazole (PRILOSEC) 20 MG DR capsule   No No   Sig: Take 1 capsule (20 mg) by mouth 2 times daily el   order for DME   No No   Sig: Equipment being ordered: Nebulizer with adult mask and tubing   order for DME   No No   Sig: Equipment being ordered:   1) cane  2) compression stockings 15mmHg, 3 pairs  Dx: gait stability, falls, edema   order for DME   No No   Sig: Equipment being ordered: compression stockings bilateral  Please measure and fit patient for stockings   Strength 15-30mmHg  Disp 2 pairs ( 4 socks)  1 refill over the time of 1 year   psyllium (METAMUCIL/KONSYL) 0.52 g capsule   No No   Sig: Take 1 capsule by mouth 2 times daily   senna-docusate (SENOKOT-S/PERICOLACE) 8.6-50 MG tablet   No No   Sig: Take 2 tablets by mouth 2 times daily   umeclidinium (INCRUSE ELLIPTA) 62.5 MCG/INH inhaler   No No   Sig: Inhale 1 puff into the lungs daily      Facility-Administered Medications: None     Allergies   Allergies   Allergen Reactions     Aspirin      3/31/16 Per pt, tolerates 81 mg daily dose without ADR.     325 mg dose caused itchiness and hives.     Clarithromycin      Allergic reaction         Contrast Dye      Iodine      Pcn [Penicillins]        Social History   I have reviewed this patient's social history and updated it with pertinent information if needed. Flor Navarro  reports that she has never smoked. She has never used smokeless tobacco. She reports that she does not drink alcohol and does not use drugs.    Family History   I  have reviewed this patient's family history and updated it with pertinent information if needed.   Family History   Problem Relation Age of Onset     Hepatitis Other         Hep C, still in [place]     Cerebrovascular Disease Other      Cancer No family hx of      Diabetes No family hx of      Hypertension No family hx of      Thyroid Disease No family hx of      Glaucoma No family hx of      Macular Degeneration No family hx of        Review of Systems      The 10 point Review of Systems is negative other than noted in the HPI or here.     Physical Exam      Temp: 98.7  F (37.1  C) Temp src: Oral BP: 121/66 Pulse: 77   Resp: 17 SpO2: 100 % O2 Device: Nasal cannula Oxygen Delivery: 6 LPM  Vital Signs with Ranges  Temp:  [98.7  F (37.1  C)] 98.7  F (37.1  C)  Pulse:  [66-85] 77  Resp:  [17-26] 17  BP: (121)/(66) 121/66  SpO2:  [82 %-100 %] 100 %  0 lbs 0 oz    Constitutional: Awake, alert, cooperative, appears sob, sitting propped up  Eyes: Conjunctiva and pupils examined and normal.  HEENT: dry mucous membranes  Respiratory: AE is good bonth sides in the middle of her chest and upper lung fields. BL insp crackles at bases.  Cardiovascular: Mild presacral edema noted. Difficult to appreciate JVD due to obesity and thick nec. Regular rate and rhythm, normal S1 and S2, and no murmur noted.  GI: Soft, non-distended, non-tender, normal bowel sounds.  Lymph/Hematologic: No anterior cervical or supraclavicular adenopathy.  Skin: No rashes, no cyanosis. More subcut fat in the legs and thighs. She has stockings on board. Minimal pedal edema noted on exam.   Musculoskeletal: No joint swelling, erythema or tenderness.  Neurologic: Cranial nerves 2-12 intact, normal strength and sensation.  Psychiatric: Alert, oriented to person, place and time, no obvious anxiety or depression.    Data    Data reviewed today:      EKG: Sinus, QS wave in inferior lead, otherwise unremarkable  Imaging: CXR showed BL pulmonary  congestion      Recent Labs   Lab 08/02/22  1414   WBC 4.4   HGB 11.7   MCV 92      INR 0.97      POTASSIUM 5.9*   CHLORIDE 112*   CO2 29   BUN 21   CR 1.27*   ANIONGAP <1*   JUAN 9.2   GLC 98   ALBUMIN 3.0*   PROTTOTAL 7.2   BILITOTAL 0.7   ALKPHOS 74   ALT 32   AST 47*   LIPASE 105       Recent Results (from the past 24 hour(s))   XR Chest Port 1 View    Narrative    CHEST ONE VIEW PORTABLE   8/2/2022 2:55 PM     HISTORY:  Dyspnea. Hypoxia.    COMPARISON: 5/29/2022.      Impression    IMPRESSION: Heart size appears stable. Pulmonary venous congestion has  increased slightly. Mild interstitial prominence throughout both lungs  suggests pulmonary edema, and has a similar appearance to the previous  exam. No pneumothorax.    PARK GARCIA MD         SYSTEM ID:  [id number]   US Liver Transplant    Narrative    EXAM: US LIVER TRANSPLANT  LOCATION: [facility] [place]  DATE/TIME: 8/2/2022 4:16 PM    INDICATION: Abdominal pain and the slightly increased liver enzymes in liver transplant patient  COMPARISON: Ultrasound 05/29/2022  TECHNIQUE: Limited abdominal ultrasound. Color flow with spectral Doppler and waveform analysis performed.     FINDINGS:    GALLBLADDER: Surgically absent     BILE DUCTS: No biliary dilatation. The common duct measures 2 mm.    LIVER: Normal transplant liver parenchyma with smooth contour. No focal mass.     RIGHT KIDNEY: Atrophic without hydronephrosis.     LEFT KIDNEY: No hydronephrosis. Exophytic cyst measuring up to 3.3 cm, not significantly changed, no specific follow-up recommended.    PANCREAS: The visualized portions are normal.    No ascites.    ABDOMINAL DUPLEX:     Splenic vein: ?Patent continuous normal antegrade direction flow towards the liver, 21 cm/sec.  Extrahepatic portal vein: ?Patent continuous antegrade flow, 32 cm/sec.  Portal vein at anastomosis: Patent continuous antegrade flow, 22 cm/sec.  Intrahepatic portal vein:  ?Patent continuous antegrade flow, 57 cm/sec.  Right portal vein flow is antegrade, measuring 53 cm/sec.  Left portal vein flow is antegrade, measuring 29 cm/sec.  ?  Inferior vena cava: Patent with flow toward the heart throughout.  ?  Right, mid, left hepatic veins: Patent with flow towards the inferior vena cava.  ?  Extrahepatic hepatic artery: High resistance waveform with flow towards the liver. 80-90 cm/sec with resistive index 0.88-1.0.  Right hepatic artery: 80 cm/sec with resistive index 0.91.  Left hepatic artery: 86 cm/sec with resistive index 0.85.        Impression    IMPRESSION:  1.  Unremarkable grayscale appearance of the transplant liver. No biliary obstruction.  2.  Patent transplant vasculature with proper directional flow as above. Relatively high resistance flow within the hepatic artery, new since prior examination. Differential is broad but includes rejection.

## 2022-08-03 NOTE — ED NOTES
Patient left [facility] ED to [facility] with belongings - including clothing, keys, and cell phone.

## 2022-08-03 NOTE — ED NOTES
Pt awakes to stimulation, attempted to converse with pt via  for am med administration.  Unable understand pt via .  Removed Bipap for clarity, pt stats immediately dropped to 45-50%.  Bipap replaced.  Pt unable to stay away for oral med administration.  Pt presents with foam at mouth. Suction performed before BiPAP reapplication.  Pt also repositioned in bed with 30% head elevation.  Pt currently VSS with normal resp via BiPAP at 50%,  ED [facility] agreed not to administer oral meds at this time due to pt inability to swallow and stay away.  Will reassess.  IV/SUB Q meds given as per protocol.   Pt remains sleeping during med administration.

## 2022-08-03 NOTE — PROGRESS NOTES
[facility]    Medicine Progress Note - Hospitalist Service, GOLD TEAM 19    Date of Admission:  8/2/2022    Assessment & Plan    Flor Navaror is a 56 year old female with PMH of Liver Tx for hepatitis C, HTN, CKDIII, SURI, Obesity, h/o SBO presented on 8/2/22 w/ SOB and abdominal pain.     Acute hypoxic hypercapnic respiratory failure due to SURI, ? COPD with acute exacerbation and possibly HFpEF with acute exacerbation  ---   Noncompliant with CPAP   ---   CXR with pulmonary edema  ---   BNP is elevated at 1238  ---   She has bibasilar Rales on exam  ---   initial ABG revealed seven-point 0/123/53/30  ---   On BiPAP since earlier this morning with 80% FiO2  ---   ABG improved to 7.19, 76, 81 and 29  ---   TTE pending  ---   Continue Lasix 20 mg IV twice daily  ---   Continue BiPAP treatment  ---   Monitor on telemetry    Acute encephalopathy  ---   Patient is excessively somnolent  ---   Due to hypercapnia  ---   Continue BiPAP treatment    Chronic Intermittent Abdominal pain  Hx of Small Bowel obstruction  ---   Suspect the abdominal pain is chronic in nature  ---   She has hx of constipation.   ---   Abdominal exam is bening  ---   Montior     Obesity  SURI  ---   Not compliant with CPAP  ---   Currently on BiPAP  ---   May trial out CPAP while sleeping her on auto titrate mode        Suspect COPD  ---   There is some documentation that she has COPD.   ---   She takes inhalers at home.   ---   Continue PTA prn albuterol and Umeclidinium daily     Liver Tx  ---   USG liver showed unremarkable grayscale appearance of the transplant liver. No biliary obstruction. Patent transplant vasculature with proper directional flow as above. Relatively high resistance flow within the hepatic artery, new since prior examination. Differential is broad but includes rejection.  ---   LFTs are wnl except AST of 47  ---   RUQ abodminal pain is likely chronic in nature, monitor  ---   Ct  PTA cyclosporin and mycophenolate  ---   Cyclosporin levels pending     CASTILLO superimposed CKD stage III  ---   Baseline creat is 1.1 to 1.2 range.   ---  On admit creat 1.27 ---> IV Lasix 20 mg iv bid ---> 1.9 ---> 1.72  ---   We will consult with nephrology service     Hyperkalemia  ---   K on admit was 5.9 ---> 5.7 this am.   ---   Continue Lasix 20 mg IV twice daily   ---   Will treat her with a dose of Kayexalate today     HTN  ---   Continue PTA amlodipine 10 mg every other day  ---   Continue IV Lasix     GERD.   ---   Continue PTA Omeprazole 20 mg BID          Diet: Combination Diet Regular Diet Adult    DVT Prophylaxis:   Enoxaparin (Lovenox) SQ  Code Status:   Full Code  Li Catheter: Not present  Central Lines: None  Cardiac Monitoring: ACTIVE order. Indication: Acute decompensated heart failure (48 hours)  Disposition:   Transfer to [place] today          Johnathon Goldstein MD  Hospitalist Service, [address]  Securely message with the Vocera Web Console (learn more here)  Text page via [facility] Paging/Directory   Please see signed in provider for up to date coverage information      ______________________________________________________________________    Interval History   Excessively somnolent but arousable.  Follows commands appropriately.  She is able to communicate in Urdu language.  She has no complaints.    Data reviewed today: I reviewed all medications, new labs and imaging results over the last 24 hours. I personally reviewed the chest x-ray image(s) showing Pulmonary edema.    Physical Exam   Vital Signs: Temp: 98.7  F (37.1  C) Temp src: Oral BP: 115/65 Pulse: 65   Resp: 30 SpO2: 94 % O2 Device: BiPAP/CPAP Oxygen Delivery: 9 LPM  Weight: 0 lbs 0 oz  General: Excessively somnolent but arousable, NAD.  HEENT:  NC/AT, PERRL, EOMI, neck supple, no thyromegaly, op clear, mmm.  CVS:  NL s 1 and s2, no m/r/g.  Lungs: Bibasilar rhonchi, no  wheezing   Abd:  Soft, + bs, NT, no rebound or gaurding, no fluid shift.  Ext:  No c/c.  Lymph: Trace edema.  Neuro:  Nonfocal.  Musculoskeletal: No calf tenderness to palpation.    Skin:  No rash.  Psychiatry:  Mood and affect appropriate.      Data   Recent Labs   Lab 08/03/22  0556 08/03/22  0310 08/03/22  0307 08/02/22  1414   WBC 8.6 8.9  --  4.4   HGB 11.6* 12.6  --  11.7   MCV 98 98  --  92    217  --  175   INR  --   --   --  0.97    140  --  141   POTASSIUM 5.7* 5.1  --  5.9*   CHLORIDE 108 109  --  112*   CO2 31 31  --  29   BUN 25 22  --  21   CR 1.72* 1.69*  --  1.27*   ANIONGAP <1* <1*  --  <1*   JUAN 8.8 9.1  --  9.2   * 205* 205* 98   ALBUMIN  --  3.5  --  3.0*   PROTTOTAL  --  7.8  --  7.2   BILITOTAL  --  0.2  --  0.7   ALKPHOS  --  84  --  74   ALT  --  31  --  32   AST  --  26  --  47*   LIPASE  --   --   --  105     Recent Results (from the past 24 hour(s))   XR Chest Port 1 View    Narrative    CHEST ONE VIEW PORTABLE   8/2/2022 2:55 PM     HISTORY:  Dyspnea. Hypoxia.    COMPARISON: 5/29/2022.      Impression    IMPRESSION: Heart size appears stable. Pulmonary venous congestion has  increased slightly. Mild interstitial prominence throughout both lungs  suggests pulmonary edema, and has a similar appearance to the previous  exam. No pneumothorax.    PARK GARCIA MD         SYSTEM ID:  [id number]   US Liver Transplant    Narrative    EXAM: US LIVER TRANSPLANT  LOCATION: [facility]  DATE/TIME: 8/2/2022 4:16 PM    INDICATION: Abdominal pain and the slightly increased liver enzymes in liver transplant patient  COMPARISON: Ultrasound 05/29/2022  TECHNIQUE: Limited abdominal ultrasound. Color flow with spectral Doppler and waveform analysis performed.     FINDINGS:    GALLBLADDER: Surgically absent     BILE DUCTS: No biliary dilatation. The common duct measures 2 mm.    LIVER: Normal transplant liver parenchyma with smooth contour. No  focal mass.     RIGHT KIDNEY: Atrophic without hydronephrosis.     LEFT KIDNEY: No hydronephrosis. Exophytic cyst measuring up to 3.3 cm, not significantly changed, no specific follow-up recommended.    PANCREAS: The visualized portions are normal.    No ascites.    ABDOMINAL DUPLEX:     Splenic vein: ?Patent continuous normal antegrade direction flow towards the liver, 21 cm/sec.  Extrahepatic portal vein: ?Patent continuous antegrade flow, 32 cm/sec.  Portal vein at anastomosis: Patent continuous antegrade flow, 22 cm/sec.  Intrahepatic portal vein: ?Patent continuous antegrade flow, 57 cm/sec.  Right portal vein flow is antegrade, measuring 53 cm/sec.  Left portal vein flow is antegrade, measuring 29 cm/sec.  ?  Inferior vena cava: Patent with flow toward the heart throughout.  ?  Right, mid, left hepatic veins: Patent with flow towards the inferior vena cava.  ?  Extrahepatic hepatic artery: High resistance waveform with flow towards the liver. 80-90 cm/sec with resistive index 0.88-1.0.  Right hepatic artery: 80 cm/sec with resistive index 0.91.  Left hepatic artery: 86 cm/sec with resistive index 0.85.        Impression    IMPRESSION:  1.  Unremarkable grayscale appearance of the transplant liver. No biliary obstruction.  2.  Patent transplant vasculature with proper directional flow as above. Relatively high resistance flow within the hepatic artery, new since prior examination. Differential is broad but includes rejection.       XR Chest Port 1 View    Narrative    EXAM: XR CHEST PORT 1 VIEW  LOCATION: [facility]  DATE/TIME: 8/3/2022 4:54 AM    INDICATION: worsening dyspnea  COMPARISON: 08/02/2022      Impression    IMPRESSION: Cardiac silhouette is enlarged. Increasing interstitial and airspace infiltrates likely to represent worsening pulmonary edema, with infection in the lung bases not excludable. No pneumothorax.   POC US ECHO LIMITED    Impression     Limited Bedside Cardiac Ultrasound, performed and interpreted by me.   Indication: Shortness of Breath.  Parasternal long axis, parasternal short axis, apical 4 chamber and lung views were acquired.   Image quality was satisfactory.    Findings:    Abnormal b lines at bilateral lung bases    IMPRESSION: Grossly normal left ventricular function and chamber size.  No pericardial effusion. B lines at bilateral lung bases suggesting pulmonary edema.       Medications     - MEDICATION INSTRUCTIONS -         albuterol  2 puff Inhalation 4x Daily     amLODIPine  10 mg Oral Daily     calcitRIOL  0.25 mcg Oral Daily     calcium carbonate 600 mg-vitamin D 400 units  1 tablet Oral BID     cetirizine  10 mg Oral Daily     cycloSPORINE modified  50 mg Oral At Bedtime     cycloSPORINE modified  75 mg Oral QAM     febuxostat  40 mg Oral Daily     furosemide  20 mg Intravenous Q12H     gabapentin  300 mg Oral At Bedtime     heparin ANTICOAGULANT  5,000 Units Subcutaneous Q12H     multivitamin w/minerals  1 tablet Oral Daily     mycophenolate  500 mg Oral BID IS     olopatadine  1 drop Both Eyes Daily     omeprazole  20 mg Oral BID     psyllium  1 capsule Oral BID     senna-docusate  2 tablet Oral BID     sodium chloride (PF)  3 mL Intracatheter Q8H     umeclidinium  1 puff Inhalation Daily     Vitamin D3  25 mcg Oral Daily

## 2022-08-03 NOTE — CONSULTS
Nephrology Initial Consult  August 3, 2022      Flor Navarro MRN:[id number] YOB: 1965  Date of Admission:8/2/2022  Primary care provider: Karely Sierra  Requesting physician: Guillermo Watson MD    ASSESSMENT AND RECOMMENDATIONS:   1..CASTILLO on CKD stage 3a:Patient has a baseline creatinine  Of 1.1-1.3. 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    She did receive Lasix 20 mg twice daily after which her creatinine did abigail from 1.2 which was baseline to 1.7.  Her chest x-ray does look congested and might have cardiorenal syndrome.  The other possibility is that her cyclosporine level might be elevated which might be causing this.  She does have right renal atropy which could be the reason why she has elevated creatinine at baseline.  She does have dilation of IVC which probably indicate it is cardiorenal  2.  Hyperkalemia 2/2 respiratory acidosis and possible CNI toxicity:    Patient is on cyclosporine which can cause hyperkalemia also she came in with a pH of 7 CO2 of 123 PO2 of 53 and bicarb of 30.  She is noncompliant to her CPAP which has caused her to develop respiratory acidosis and probably in the setting of CHF excerebration it has worsen.  She is also hyperglycemic which can cause her to develop hyperkalemia she came in with a potassium of 5.9 which is gone down to 5.5 .  In [facility] she did get Kayexalate and Lasix 20 mg twice daily  3.  Acute hypoxic respiratory failure secondary to severe CHF with preserved EF her ejection fraction is 60 to 65%: On chest x-ray she looks congested she had a ultrasound of her lung which showed curly B-lines and an echo which showed IVC dilation she might have right-sided heart failure      4.  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  Obstructive sleep  apnea/obesity hypoventilation syndrome leading to carbon dioxide narcosis    Plan:  Agree with BiPAP placement  -Repeat ABG  -Patient is getting IV Lasix 20 mg twice daily but this might not be enough.  There is no way to determine if she has effective diuresis is no I's and O's charted  -Would recommend accurate I's and O's  -Daily weight  -Get cyclosporine level  -Consider consulting cardiology  Avoid nephrotoxic medication  -Avoid hypotensive episodes    Recommendations were communicated to primary team via notes    Seen and discussed with Dr. Netta Ortiz MD   Division of Renal Disease and Hypertension  [facility]  [email or web address]  Vocera Web Console      REASON FOR CONSULT:CASTILLO on ckd hyperkalemia  HISTORY OF PRESENT ILLNESS:  Admitting provider and nursing notes reviewed  Flor Navarro is a 56 year old female with PMH of Liver Tx for hepatitis C, HT, CKD III, SURI, Obesity, Hx of Small bowel obstruction came if for sob and abdominal pain.    History obtain from chart review as patient is lethargic and also obtain from the family member at bedside  In chart review it appears that she was  sob for last three days. Was not able to sleep much for last three days. She also had RUQ and Epigastric abdominal pain. She had similar pain before. She had similar presentation before. Based on the chart review, she had intermittent RUQ pain from before. She had that since the liver Tx. On arrival in ER she was hypoxic. Her abdominal pain has gone which was endorsed by the family member at baseline     From chart review yesterday She denied any cough. No chest pain. No cold or congestion. No wheezing. No nausea or vomiting. No diarrhea. She is more on the side of constipation. No vomiting. No dysuria.      She also Reported at that time  taking water pills at home. From her primary nephrologist note she is on torsemide 60 mg BID and it appears this dose was reduced recently.     Patient is lethargic as she came with carbon  dioxide narcosis and at the time of admission her pH was 7 CO2 of 123 PO2 of 53 and bicarb of 30.  She did get 2 doses of Lasix 20 mg twice daily and after that her creatinine did went up from 1.2-1.7.  At baseline she does have CKD and her baseline creatinine is 1.1-1.3.  On the ultrasound she was noted to have atrophy of the right kidney.  She is on cyclosporine for her liver transplant and she was found to be hyperkalemic to 5.9.        PAST MEDICAL HISTORY:    Past Medical History:   Diagnosis Date     Anemia in CKD (chronic kidney disease)      Cataract      CKD (chronic kidney disease) stage 3, GFR 30-59 ml/min (H)      Hepatitis C     cleared virus spontaneously      High risk medication use      Hypertension, renal      Immunosuppressed status (H)      Liver replaced by transplant (H) 2010     Osteoporosis      Recurrent pregnancy loss without current pregnancy      Recurrent UTI 2021     Stroke, hemorrhagic (H) 2008     Syncope      Unspecified viral hepatitis C without hepatic coma        Past Surgical History:   Procedure Laterality Date     CATARACT IOL, RT/LT Right 2/18/15      SECTION       Incisional Hernia Repair  2004     INSERT SHUNT PORTAL TRANSJUGULAR INTRAHEPTIC      shunt placement for liver failure     LAPAROSCOPIC SALPINGO-OOPHORECTOMY      left     NECK SURGERY      fracture, in halo x 7months     TRANSPLANT LIVER RECIPIENT  DONOR  10/26/10     Upper GI Endoscopy with Band Ligation of Esoph/Gastric Varic. .          MEDICATIONS:  PTA Meds  Prior to Admission medications    Medication Sig Last Dose Taking? Auth Provider Long Term End Date   ACE/ARB/ARNI NOT PRESCRIBED (INTENTIONAL) Please choose reason not prescribed from choices below.   Karely Sierra MD Yes    acetaminophen (TYLENOL) 500 MG tablet Take 1 tablet (500 mg) by mouth 3 times daily as needed for mild pain   Karely Sierra MD     albuterol (ACCUNEB) 0.63 MG/3ML neb solution Take 3  mLs (0.63 mg) by nebulization every 4 hours (while awake)   Karely Sierra MD Yes    albuterol (PROAIR HFA/PROVENTIL HFA/VENTOLIN HFA) 108 (90 Base) MCG/ACT inhaler Inhale 2 puffs into the lungs 4 times daily   Karely Sierra MD Yes    alendronate (FOSAMAX) 70 MG tablet Take 1 tablet (70 mg) by mouth every 7 days   Karely Sierra MD Yes    alum & mag hydroxide-simethicone (MAALOX ADVANCED MAX ST) 400-400-40 MG/5ML SUSP suspension Take 30 mLs by mouth every 4 hours as needed for indigestion or heartburn   Pepe Marcos MD     amLODIPine (NORVASC) 10 MG tablet Take 1 tablet (10 mg) by mouth daily   Ce Arambula MD Yes 3/17/22   calcitRIOL (ROCALTROL) 0.25 MCG capsule Take 1 capsule (0.25 mcg) by mouth daily   Karely Sierra MD Yes    calcium carbonate 600 mg-vitamin D 400 units (CALTRATE) 600-400 MG-UNIT per tablet Take 1 tablet by mouth 2 times daily   Karely Sierra MD     capsaicin (ZOSTRIX) 0.025 % external cream Apply three times a day as needed to legs. Wash hands after applying.   Karely Sierra MD     carboxymethylcellulose PF (REFRESH PLUS) 0.5 % ophthalmic solution Place 1 drop into both eyes 4 times daily as needed for dry eyes   Karely Sierra MD     cetirizine (ZYRTEC) 10 MG tablet Take 1 tablet (10 mg) by mouth daily   Karely Sierra MD     COMPOUNDED NON-CONTROLLED SUBSTANCE (CMPD RX) - PHARMACY TO MIX COMPOUNDED MEDICATION Equal parts of 2% Benadryl, 2% vicious lidocaine and TC suspension maalox,  Swish and spit 5 ml every 6 hrs as needed for mouth pain   Nestor Hawley MD     cycloSPORINE modified (GENERIC EQUIVALENT) 25 MG capsule Take 3 capsules (75 mg) by mouth every morning AND 2 capsules (50 mg) At Bedtime.   Laron Anne MD Yes    Denture Care Products (EFFERDENT DENTURE CLEANSER) TBEF Use nightly to clean oral appliance   Karely Sierra MD     dimethicone (AVEENO DAILY MOISTURIZING) 1.3 % LOTN lotion Externally apply topically daily   Karely Sierra MD     diphenhydrAMINE (BENADRYL)  50 MG capsule Take 1 capsule (50 mg) by mouth once as needed (2 hours before CT scan. Repeat if needed for itching.) Administer 30 min - 2 hours pre - IV contrast injection   Karely Sierra MD     febuxostat (ULORIC) 40 MG TABS tablet Take 1 tablet (40 mg) by mouth daily   Karely Sierra MD     furosemide (LASIX) 20 MG tablet Take 1 tablet (20 mg) by mouth 2 times daily   Karely Sierra MD Yes    gabapentin (NEURONTIN) 300 MG capsule Take 1 capsule (300 mg) by mouth At Bedtime   Karely Sierra MD Yes    lidocaine (XYLOCAINE) 5 % external ointment Apply topically as needed for moderate pain   Karely Sierra MD No    melatonin 3 MG tablet TAKE ONE TABLET BY MOUTH EVERY NIGHT AS NEEDED FOR SLEEP.   Karely Sierra MD     mineral oil-white petrolatum (EUCERIN) CREA cream Apply topically 2 times daily   Karely Sierra MD     Multiple Vitamin (DAILY-SUMA MULTIVITAMIN) TABS Take 1 tablet by mouth every morning   Karely Sierra MD     multivitamin w/minerals (MULTI-VITAMIN) tablet Take 1 tablet by mouth daily Needs 4 month supply for International Travel   Karely Sierra MD     mycophenolate (GENERIC EQUIVALENT) 250 MG capsule TAKE TWO CAPSULES BY MOUTH EVERY 12 HOURS   Laron Anne MD Yes    neomycin-polymyxin-HC 1 % Place 4 drops in ear(s) 2 times daily   Karely Sierra MD     olopatadine (PATADAY) 0.2 % ophthalmic solution Place 1 drop into both eyes daily   Karely Sierra MD     omeprazole (PRILOSEC) 20 MG DR capsule Take 1 capsule (20 mg) by mouth 2 times daily Ayala Decker,      order for DME Equipment being ordered: compression stockings bilateral  Please measure and fit patient for stockings   Strength 15-30mmHg  Disp 2 pairs ( 4 socks)  1 refill over the time of 1 year   Karely Sierra MD     order for DME Equipment being ordered:   1) cane  2) compression stockings 15mmHg, 3 pairs  Dx: gait stability, falls, edema   Karely Sierra MD     order for DME Equipment being ordered: Nebulizer with adult mask and tubing    Rusty Doherty MD     psyllium (METAMUCIL/KONSYL) 0.52 g capsule Take 1 capsule by mouth 2 times daily   Karely Sierra MD     senna-docusate (SENOKOT-S/PERICOLACE) 8.6-50 MG tablet Take 2 tablets by mouth 2 times daily   Karely Sierra MD     STATIN NOT PRESCRIBED, INTENTIONAL, Not prescribed   Karely Sierra MD     umeclidinium (INCRUSE ELLIPTA) 62.5 MCG/INH inhaler Inhale 1 puff into the lungs daily   Karely Sierra MD     Vitamin D3 (CHOLECALCIFEROL) 25 mcg (1000 units) tablet Take 1 tablet (25 mcg) by mouth daily   Karely Sierra MD No       Current Meds    albuterol  2 puff Inhalation 4x Daily     amLODIPine  10 mg Oral Daily     calcitRIOL  0.25 mcg Oral Daily     calcium carbonate 600 mg-vitamin D 400 units  1 tablet Oral BID     cetirizine  10 mg Oral Daily     cycloSPORINE modified  50 mg Oral At Bedtime     cycloSPORINE modified  75 mg Oral QAM     febuxostat  40 mg Oral Daily     furosemide  20 mg Intravenous Q12H     furosemide  40 mg Intravenous Once     gabapentin  300 mg Oral At Bedtime     heparin ANTICOAGULANT  5,000 Units Subcutaneous Q12H     multivitamin w/minerals  1 tablet Oral Daily     mycophenolate  500 mg Oral BID IS     olopatadine  1 drop Both Eyes Daily     omeprazole  20 mg Oral BID     psyllium  1 capsule Oral BID     senna-docusate  2 tablet Oral BID     sodium chloride (PF)  3 mL Intracatheter Q8H     sodium polystyrene  30 g Rectal Once     umeclidinium  1 puff Inhalation Daily     Vitamin D3  25 mcg Oral Daily     Infusion Meds    - MEDICATION INSTRUCTIONS -         ALLERGIES:    Allergies   Allergen Reactions     Aspirin      3/31/16 Per pt, tolerates 81 mg daily dose without ADR.     325 mg dose caused itchiness and hives.     Clarithromycin      Allergic reaction         Contrast Dye      Iodine      Pcn [Penicillins]        REVIEW OF SYSTEMS:  A comprehensive of systems was negative except as noted above.    SOCIAL HISTORY:   Social History     Socioeconomic History     Marital  status: Single     Spouse name: Not on file     Number of children: Not on file     Years of education: Not on file     Highest education level: Not on file   Occupational History     Not on file   Tobacco Use     Smoking status: Never Smoker     Smokeless tobacco: Never Used   Vaping Use     Vaping Use: Never used   Substance and Sexual Activity     Alcohol use: No     Drug use: No     Sexual activity: Not Currently   Other Topics Concern     Parent/sibling w/ CABG, MI or angioplasty before 65F 55M? Not Asked      Service No     Blood Transfusions Yes     Caffeine Concern No     Occupational Exposure No     Hobby Hazards No     Sleep Concern No     Stress Concern Yes     Comment: needs help at home for cares     Weight Concern Yes     Comment: wants to lose weight not gain weight     Special Diet No     Back Care Yes     Comment: uses a patch to relieve pain     Exercise Not Asked     Bike Helmet Not Asked     Seat Belt Yes     Self-Exams Not Asked   Social History Narrative    One son Carleen        GynHx:             Gets transportation via insurance - needs assistance due to unsteady gait from CVA     Social Determinants of Health     Financial Resource Strain: High Risk     Difficulty of Paying Living Expenses: Very hard   Food Insecurity: Food Insecurity Present     Worried About Running Out of Food in the Last Year: Sometimes true     Ran Out of Food in the Last Year: Sometimes true   Transportation Needs: Unmet Transportation Needs     Lack of Transportation (Medical): Yes     Lack of Transportation (Non-Medical): Yes   Physical Activity: Not on file   Stress: Stress Concern Present     Feeling of Stress : Rather much   Social Connections: Socially Isolated     Frequency of Communication with Friends and Family: Once a week     Frequency of Social Gatherings with Friends and Family: Once a week     Attends Zoroastrian Services: Never     Active Member of Clubs or Organizations: No     Attends Club  or Organization Meetings: Never     Marital Status:    Intimate Partner Violence: Not At Risk     Fear of Current or Ex-Partner: No     Emotionally Abused: No     Physically Abused: No     Sexually Abused: No   Housing Stability: Not on file         FAMILY MEDICAL HISTORY:   Family History   Problem Relation Age of Onset     Hepatitis Other         Hep C, still in [place]     Cerebrovascular Disease Other      Cancer No family hx of      Diabetes No family hx of      Hypertension No family hx of      Thyroid Disease No family hx of      Glaucoma No family hx of      Macular Degeneration No family hx of          PHYSICAL EXAM:   Temp  Av.7  F (37.1  C)  Min: 98.7  F (37.1  C)  Max: 98.7  F (37.1  C)      Pulse  Av.6  Min: 65  Max: 117 Resp  Av.6  Min: 17  Max: 42  FiO2 (%)  Av %  Min: 60 %  Max: 60 %  SpO2  Av.2 %  Min: 82 %  Max: 100 %       /71   Pulse 65   Temp 98.7  F (37.1  C) (Oral)   Resp 30   LMP  (LMP Unknown)   SpO2 99%       Admit       GENERAL APPEARANCE: Patient is lethargic and is on BiPAP  EYES: No scleral icterus, pupils equal  Lymphatics: no cervical or supraclavicular LAD  Pulmonary: lungs clear to auscultation with equal breath sounds bilaterally, no clubbing  CV: Patient has regular rhythm, normal rate, no rub   - JVD negative   - Edema negative  GI: soft, nontender, normal bowel sounds  MS: no evidence of inflammation in joints, no muscle tenderness  : No de jesus  SKIN: no rash, warm, dry, no cyanosis  NEURO: face symmetric,     LABS:   I have reviewed the following labs:  CMP  Recent Labs   Lab 22  0556 22  0310 22  0307 22  1414    140  --  141   POTASSIUM 5.7* 5.1  --  5.9*   CHLORIDE 108 109  --  112*   CO2 31 31  --  29   ANIONGAP <1* <1*  --  <1*   * 205* 205* 98   BUN 25 22  --  21   CR 1.72* 1.69*  --  1.27*   GFRESTIMATED 34* 35*  --  49*   JUAN 8.8 9.1  --  9.2   PROTTOTAL  --  7.8  --  7.2   ALBUMIN  --  3.5   --  3.0*   BILITOTAL  --  0.2  --  0.7   ALKPHOS  --  84  --  74   AST  --  26  --  47*   ALT  --  31  --  32     CBC  Recent Labs   Lab 08/03/22  0556 08/03/22  0310 08/02/22  1414   HGB 11.6* 12.6 11.7   WBC 8.6 8.9 4.4   RBC 4.20 4.45 4.20   HCT 41.2 43.6 38.5   MCV 98 98 92   MCH 27.6 28.3 27.9   MCHC 28.2* 28.9* 30.4*   RDW 14.7 14.8 14.7    217 175     INR  Recent Labs   Lab 08/02/22  1414   INR 0.97   PTT 27     ABG  Recent Labs   Lab 08/03/22  1501 08/03/22  1110 08/03/22  0556 08/03/22  0421   PH 7.22* 7.19*  --   --    PCO2 70* 76*  --   --    PO2 102 81  --   --    HCO3 29* 29*  --   --    O2PER 60 50 60 60      URINE STUDIES  Recent Labs   Lab Test 08/02/22  1727 07/13/22  1609 05/29/22  1941 03/16/22  0557 12/17/21  1002 12/01/21  1205 08/09/21  1217   COLOR Light Yellow Yellow Straw Light Yellow   < > Yellow Dark Yellow*   APPEARANCE Clear Clear Clear Clear   < > Clear Slightly Cloudy*   URINEGLC Negative Negative Negative Negative   < > Negative Negative   URINEBILI Negative Negative Negative Negative   < > Negative Negative   URINEKETONE Negative Negative Negative Negative   < > Negative Negative   SG 1.010 1.015 1.007 1.011   < > 1.020 1.020   UBLD Trace* Small* Negative Trace*   < > Trace* Trace*   URINEPH 5.5 5.0 7.0 7.0   < > 5.5 5.5   PROTEIN 10 * 30 * Negative 20 *   < > Negative Negative   UROBILINOGEN  --  0.2  --   --   --  0.2 0.2   NITRITE Negative Positive* Negative Negative   < > Negative Positive*   LEUKEST Negative Small* Small* Moderate*   < > Negative Trace*   RBCU 2 None Seen 1 8*   < > 2-5* None Seen   WBCU 1 >100* 10* 33*   < > None Seen 10-25*    < > = values in this interval not displayed.     Recent Labs   Lab Test 12/17/21  1002 06/25/21  1140 02/17/20  1307 02/27/17  1314 01/23/17  1137 03/30/15  1310 08/18/14  1445   UTPG 0.37* 0.21* 0.21* 0.18 0.17 0.18 0.10     PTH  Recent Labs   Lab Test 07/13/22  1609 12/17/21  0954 12/01/21  1205 01/06/21  1713 04/03/20  1128  12/05/19  1142 05/28/19  1659 03/31/16  1640 08/18/14  1434   PTHI 33 33 30 110* 55 132* 120* 105* 75*     IRON STUDIES  Recent Labs   Lab Test 08/18/14  1434   IRON 64      IRONSAT 22   RAMESH 117       IMAGING:  All imaging studies reviewed by me.     Daquan Ortiz MD

## 2022-08-03 NOTE — PROGRESS NOTES
Paged by ED staff overnight for patient's somnolence.    Went to examine patient. Per notes she was able to interact and communicate with . Now she would wake up with difficulty but go back to sleep. It was noted that she might have COPD on admission (no prior diagnosis) and has SURI (but is not compliant with CPAP). Decision made to get blood gas and start BIPAP. Gas with 7.00/123/53    After initiation of bipap, gas was slightly improved, but mentation was greatly improved and able to carry on conversation.     With clinical improvement, continue bipap, consider repeat gas later in the AM    Order for [place] bed request updated to [facility]    Rashad Wilson MD

## 2022-08-04 ENCOUNTER — APPOINTMENT (OUTPATIENT)
Dept: ULTRASOUND IMAGING | Facility: CLINIC | Age: 57
DRG: 189 | End: 2022-08-04
Payer: MEDICARE

## 2022-08-04 LAB
ALBUMIN SERPL BCG-MCNC: 3.2 G/DL (ref 3.5–5.2)
ALBUMIN UR-MCNC: NEGATIVE MG/DL
ALP SERPL-CCNC: 68 U/L (ref 35–104)
ALT SERPL W P-5'-P-CCNC: 11 U/L (ref 10–35)
ANION GAP SERPL CALCULATED.3IONS-SCNC: 8 MMOL/L (ref 7–15)
APPEARANCE UR: CLEAR
AST SERPL W P-5'-P-CCNC: 23 U/L (ref 10–35)
BASE EXCESS BLDV CALC-SCNC: 2.2 MMOL/L (ref -7.7–1.9)
BASE EXCESS BLDV CALC-SCNC: 4 MMOL/L (ref -7.7–1.9)
BILIRUB SERPL-MCNC: 0.5 MG/DL
BILIRUB UR QL STRIP: NEGATIVE
BUN SERPL-MCNC: 28.3 MG/DL (ref 6–20)
CALCIUM SERPL-MCNC: 8.8 MG/DL (ref 8.6–10)
CHLORIDE SERPL-SCNC: 102 MMOL/L (ref 98–107)
COLOR UR AUTO: NORMAL
CREAT SERPL-MCNC: 1.96 MG/DL (ref 0.51–0.95)
CREAT SERPL-MCNC: 2.15 MG/DL (ref 0.51–0.95)
CYCLOSPORINE BLD LC/MS/MS-MCNC: 40 UG/L (ref 50–400)
DEPRECATED HCO3 PLAS-SCNC: 25 MMOL/L (ref 22–29)
ERYTHROCYTE [DISTWIDTH] IN BLOOD BY AUTOMATED COUNT: 14.6 % (ref 10–15)
GFR SERPL CREATININE-BSD FRML MDRD: 26 ML/MIN/1.73M2
GFR SERPL CREATININE-BSD FRML MDRD: 29 ML/MIN/1.73M2
GLUCOSE SERPL-MCNC: 134 MG/DL (ref 70–99)
GLUCOSE UR STRIP-MCNC: NEGATIVE MG/DL
HCO3 BLDV-SCNC: 30 MMOL/L (ref 21–28)
HCO3 BLDV-SCNC: 32 MMOL/L (ref 21–28)
HCT VFR BLD AUTO: 39.2 % (ref 35–47)
HGB BLD-MCNC: 11.3 G/DL (ref 11.7–15.7)
HGB UR QL STRIP: NEGATIVE
KETONES UR STRIP-MCNC: NEGATIVE MG/DL
LEUKOCYTE ESTERASE UR QL STRIP: NEGATIVE
MCH RBC QN AUTO: 27.8 PG (ref 26.5–33)
MCHC RBC AUTO-ENTMCNC: 28.8 G/DL (ref 31.5–36.5)
MCV RBC AUTO: 96 FL (ref 78–100)
NITRATE UR QL: NEGATIVE
O2/TOTAL GAS SETTING VFR VENT: 0 %
O2/TOTAL GAS SETTING VFR VENT: 50 %
PCO2 BLDV: 64 MM HG (ref 40–50)
PCO2 BLDV: 65 MM HG (ref 40–50)
PH BLDV: 7.28 [PH] (ref 7.32–7.43)
PH BLDV: 7.31 [PH] (ref 7.32–7.43)
PH UR STRIP: 5 [PH] (ref 5–7)
PLATELET # BLD AUTO: 172 10E3/UL (ref 150–450)
PO2 BLDV: 31 MM HG (ref 25–47)
PO2 BLDV: 50 MM HG (ref 25–47)
POTASSIUM SERPL-SCNC: 4.8 MMOL/L (ref 3.4–5.3)
POTASSIUM SERPL-SCNC: 4.9 MMOL/L (ref 3.4–5.3)
POTASSIUM SERPL-SCNC: 5 MMOL/L (ref 3.4–5.3)
POTASSIUM SERPL-SCNC: 5 MMOL/L (ref 3.4–5.3)
PROT SERPL-MCNC: 6.3 G/DL (ref 6.4–8.3)
RBC # BLD AUTO: 4.07 10E6/UL (ref 3.8–5.2)
SODIUM SERPL-SCNC: 134 MMOL/L (ref 136–145)
SODIUM SERPL-SCNC: 135 MMOL/L (ref 136–145)
SP GR UR STRIP: 1.01 (ref 1–1.03)
TME LAST DOSE: ABNORMAL H
TME LAST DOSE: ABNORMAL H
UROBILINOGEN UR STRIP-MCNC: NORMAL MG/DL
WBC # BLD AUTO: 4.4 10E3/UL (ref 4–11)

## 2022-08-04 PROCEDURE — 81003 URINALYSIS AUTO W/O SCOPE: CPT | Performed by: STUDENT IN AN ORGANIZED HEALTH CARE EDUCATION/TRAINING PROGRAM

## 2022-08-04 PROCEDURE — 93975 VASCULAR STUDY: CPT | Mod: 26 | Performed by: STUDENT IN AN ORGANIZED HEALTH CARE EDUCATION/TRAINING PROGRAM

## 2022-08-04 PROCEDURE — 120N000002 HC R&B MED SURG/OB UMMC

## 2022-08-04 PROCEDURE — 99233 SBSQ HOSP IP/OBS HIGH 50: CPT | Mod: GC | Performed by: INTERNAL MEDICINE

## 2022-08-04 PROCEDURE — 36415 COLL VENOUS BLD VENIPUNCTURE: CPT | Performed by: PHYSICIAN ASSISTANT

## 2022-08-04 PROCEDURE — 36415 COLL VENOUS BLD VENIPUNCTURE: CPT | Performed by: PEDIATRICS

## 2022-08-04 PROCEDURE — 250N000012 HC RX MED GY IP 250 OP 636 PS 637: Performed by: HOSPITALIST

## 2022-08-04 PROCEDURE — 76770 US EXAM ABDO BACK WALL COMP: CPT

## 2022-08-04 PROCEDURE — 999N000215 HC STATISTIC HFNC ADULT NON-CPAP

## 2022-08-04 PROCEDURE — 84132 ASSAY OF SERUM POTASSIUM: CPT | Performed by: PHYSICIAN ASSISTANT

## 2022-08-04 PROCEDURE — 99222 1ST HOSP IP/OBS MODERATE 55: CPT | Mod: GC | Performed by: INTERNAL MEDICINE

## 2022-08-04 PROCEDURE — 99233 SBSQ HOSP IP/OBS HIGH 50: CPT | Performed by: PEDIATRICS

## 2022-08-04 PROCEDURE — 93975 VASCULAR STUDY: CPT

## 2022-08-04 PROCEDURE — 82803 BLOOD GASES ANY COMBINATION: CPT | Performed by: PHYSICIAN ASSISTANT

## 2022-08-04 PROCEDURE — 999N000248 HC STATISTIC IV INSERT WITH US BY RN

## 2022-08-04 PROCEDURE — 84300 ASSAY OF URINE SODIUM: CPT | Performed by: STUDENT IN AN ORGANIZED HEALTH CARE EDUCATION/TRAINING PROGRAM

## 2022-08-04 PROCEDURE — 999N000157 HC STATISTIC RCP TIME EA 10 MIN

## 2022-08-04 PROCEDURE — 999N000043 HC STATISTIC CTO2 CONT OXYGEN TECH TIME EA 90 MIN

## 2022-08-04 PROCEDURE — 87086 URINE CULTURE/COLONY COUNT: CPT | Performed by: STUDENT IN AN ORGANIZED HEALTH CARE EDUCATION/TRAINING PROGRAM

## 2022-08-04 PROCEDURE — 94660 CPAP INITIATION&MGMT: CPT

## 2022-08-04 PROCEDURE — 82565 ASSAY OF CREATININE: CPT | Performed by: STUDENT IN AN ORGANIZED HEALTH CARE EDUCATION/TRAINING PROGRAM

## 2022-08-04 PROCEDURE — 250N000013 HC RX MED GY IP 250 OP 250 PS 637: Performed by: HOSPITALIST

## 2022-08-04 PROCEDURE — 82803 BLOOD GASES ANY COMBINATION: CPT | Performed by: PEDIATRICS

## 2022-08-04 PROCEDURE — 999N000185 HC STATISTIC TRANSPORT TIME EA 15 MIN

## 2022-08-04 PROCEDURE — 36415 COLL VENOUS BLD VENIPUNCTURE: CPT | Performed by: STUDENT IN AN ORGANIZED HEALTH CARE EDUCATION/TRAINING PROGRAM

## 2022-08-04 PROCEDURE — 84295 ASSAY OF SERUM SODIUM: CPT | Performed by: STUDENT IN AN ORGANIZED HEALTH CARE EDUCATION/TRAINING PROGRAM

## 2022-08-04 PROCEDURE — 250N000011 HC RX IP 250 OP 636: Performed by: HOSPITALIST

## 2022-08-04 PROCEDURE — 85027 COMPLETE CBC AUTOMATED: CPT | Performed by: PHYSICIAN ASSISTANT

## 2022-08-04 RX ORDER — CARBOXYMETHYLCELLULOSE SODIUM 5 MG/ML
1 SOLUTION/ DROPS OPHTHALMIC
Status: DISCONTINUED | OUTPATIENT
Start: 2022-08-04 | End: 2022-08-16 | Stop reason: HOSPADM

## 2022-08-04 RX ORDER — ACETAMINOPHEN 500 MG
500 TABLET ORAL EVERY 6 HOURS PRN
Status: DISCONTINUED | OUTPATIENT
Start: 2022-08-04 | End: 2022-08-16 | Stop reason: HOSPADM

## 2022-08-04 RX ADMIN — CYCLOSPORINE 75 MG: 25 CAPSULE, LIQUID FILLED ORAL at 08:13

## 2022-08-04 RX ADMIN — CALCIUM CARBONATE 600 MG (1,500 MG)-VITAMIN D3 400 UNIT TABLET 1 TABLET: at 19:18

## 2022-08-04 RX ADMIN — ALBUTEROL SULFATE 2 PUFF: 90 AEROSOL, METERED RESPIRATORY (INHALATION) at 08:15

## 2022-08-04 RX ADMIN — OMEPRAZOLE 20 MG: 20 CAPSULE, DELAYED RELEASE ORAL at 00:07

## 2022-08-04 RX ADMIN — MYCOPHENOLATE MOFETIL 500 MG: 250 CAPSULE ORAL at 08:13

## 2022-08-04 RX ADMIN — HEPARIN SODIUM 5000 UNITS: 5000 INJECTION, SOLUTION INTRAVENOUS; SUBCUTANEOUS at 08:15

## 2022-08-04 RX ADMIN — Medication 1 CAPSULE: at 19:18

## 2022-08-04 RX ADMIN — MYCOPHENOLATE MOFETIL 500 MG: 250 CAPSULE ORAL at 17:14

## 2022-08-04 RX ADMIN — Medication 1 CAPSULE: at 08:16

## 2022-08-04 RX ADMIN — ALBUTEROL SULFATE 2 PUFF: 90 AEROSOL, METERED RESPIRATORY (INHALATION) at 17:15

## 2022-08-04 RX ADMIN — HEPARIN SODIUM 5000 UNITS: 5000 INJECTION, SOLUTION INTRAVENOUS; SUBCUTANEOUS at 19:18

## 2022-08-04 RX ADMIN — ALBUTEROL SULFATE 2 PUFF: 90 AEROSOL, METERED RESPIRATORY (INHALATION) at 19:18

## 2022-08-04 RX ADMIN — CETIRIZINE HYDROCHLORIDE 10 MG: 10 TABLET, FILM COATED ORAL at 08:14

## 2022-08-04 RX ADMIN — CALCITRIOL CAPSULES 0.25 MCG 0.25 MCG: 0.25 CAPSULE ORAL at 08:16

## 2022-08-04 RX ADMIN — CALCIUM CARBONATE 600 MG (1,500 MG)-VITAMIN D3 400 UNIT TABLET 1 TABLET: at 08:14

## 2022-08-04 RX ADMIN — OLOPATADINE HYDROCHLORIDE 1 DROP: 1 SOLUTION OPHTHALMIC at 17:14

## 2022-08-04 RX ADMIN — Medication 25 MCG: at 08:14

## 2022-08-04 RX ADMIN — OMEPRAZOLE 20 MG: 20 CAPSULE, DELAYED RELEASE ORAL at 19:18

## 2022-08-04 RX ADMIN — GABAPENTIN 300 MG: 300 CAPSULE ORAL at 21:11

## 2022-08-04 RX ADMIN — SENNOSIDES AND DOCUSATE SODIUM 2 TABLET: 8.6; 5 TABLET ORAL at 08:14

## 2022-08-04 RX ADMIN — ALBUTEROL SULFATE 2 PUFF: 90 AEROSOL, METERED RESPIRATORY (INHALATION) at 00:08

## 2022-08-04 RX ADMIN — Medication 1 CAPSULE: at 00:08

## 2022-08-04 RX ADMIN — OLOPATADINE HYDROCHLORIDE 1 DROP: 1 SOLUTION OPHTHALMIC at 17:16

## 2022-08-04 RX ADMIN — OMEPRAZOLE 20 MG: 20 CAPSULE, DELAYED RELEASE ORAL at 08:16

## 2022-08-04 RX ADMIN — FUROSEMIDE 20 MG: 10 INJECTION, SOLUTION INTRAMUSCULAR; INTRAVENOUS at 08:14

## 2022-08-04 RX ADMIN — FUROSEMIDE 20 MG: 10 INJECTION, SOLUTION INTRAMUSCULAR; INTRAVENOUS at 00:08

## 2022-08-04 RX ADMIN — FEBUXOSTAT 40 MG: 40 TABLET, FILM COATED ORAL at 08:15

## 2022-08-04 RX ADMIN — AMLODIPINE BESYLATE 10 MG: 10 TABLET ORAL at 08:14

## 2022-08-04 ASSESSMENT — ACTIVITIES OF DAILY LIVING (ADL)
ADLS_ACUITY_SCORE: 48
ADLS_ACUITY_SCORE: 48
ADLS_ACUITY_SCORE: 44
ADLS_ACUITY_SCORE: 48

## 2022-08-04 NOTE — PROGRESS NOTES
Major shift events: Patient arrived to unit around 1440 via EMS from [facility]. Transferred to bed via assist of 2. Bipap settings remain 50% and 20/10. Patient requires English .     Neuro: A&Ox4. Sleepy after 1700. Arouses to stimulation.   Cardiac: SR. VSS.   Respiratory: Sating % on Bipap. Settings above.  GI/: Has not had a BM or made urine since arriving to unit.   Diet/appetite: Patient reports being hungry. But unable to eat due to being on Bipap and not tolerating breaks at this time.   Activity:  Assist of 2, up to chair and in halls.  Pain: At acceptable level on current regimen.   Skin: No new deficits noted. 2 RN skin check completed. Patient's skin is clean, dry, and intact.   LDA's: 1 right PIV. Saline locked.     Plan: Continue with POC. Notify primary team with changes.    Charli Au RN

## 2022-08-04 NOTE — CONSULTS
"    GASTROENTEROLOGY CONSULTATION      Date of Admission:  8/2/2022           Reason for Consultation:   We were asked by Jane Wise of Medicine to evaluate this patient with \"s/p DDLT 2010\"         ASSESSMENT AND RECOMMENDATIONS:   Assessment:    56 year old female with a history of OLTx for Hep C (2010, primary hepatologist Dr. Anne), on long term IS with CsA and MMF, HTN, CKDIII, SURI, obesity, hx of recurrent small bowel obstruction that presented with hypercapnic respiratory failure and volume overload. Our service is consulted to comment on her history of liver transplant.     #. S/p OLTx for Hepatitis C (10/26/2010)  #. Long term immunosuppression  Patient is well-known to the GI service, admitted for not hepatology reason.  Has been on long-term dual agent immunosuppression under the guidance of Dr. Anne.  On review of her admission transplant ultrasound it would appear that she has an elevated resistive index in her hepatic artery.  This is likely of little consequence at this time and may be a product of circulatory compromise and/or cardiac dysfunction.  We are not concerned at this time for any form of rejection.  Liver chemistries are within normal limits.  Due to renal dysfunction that is worked worsened from her baseline of CKD 3, we recommend a brief pause on her cyclosporine during this acute period.    #. Hypercapnic respiratory failure  #. Fluid overload  Management per primary team.      Recommendations:  -- hold CsA dose PM of 8/4 and AM of 8/5, resume PM 8/5  -- continue MMF  -- no need to obtain further levels at this time  -- no need to trend liver chemistries at this time  -- hepatology will continue to follow peripherally, please do not hesitate to reach out with questions or concerns    Gastroenterology outpatient follow up recommendations: NTD, will follow up with primary hepatologist Dr. Anne    Thank you for involving us in this patient's care. Please do not hesitate to contact the " GI service with any questions or concerns.     Pt care plan discussed with Dr. Leventhal, GI staff physician.    Seb Moreira MD, PGY5  Gastroenterology Fellow  [facility]  See [facility] for GI on-call information    -------------------------------------------------------------------------------------------------------------------           History of Present Illness:   56 year old female with a history of OLTx for Hep C (2010, primary hepatologist Dr. Anne), on long term IS with CsA and MMF, HTN, CKDIII, SURI, obesity, hx of recurrent small bowel obstruction that presented with hypercapnic respiratory failure and volume overload. Our service is consulted to comment on her history of liver transplant.     Briefly, patient is super pleasant today. She has no GI complaints whatsoever. She tells us about chest pain (previsou sternum fracture) and she tells us about her love for Dr. Anne.     Some issues with fluid retention lately.     Denies abdominal pain today.     We explained RUQ U/S with dopplers to patient.     Understands why we are manipulating her immunosuppression in the hospital (kidney function) and all of her questions were answered. Plan of care understood.    Targeted GI ROS negative otherwise.          Data:   Key relevant labs:   Lab Results   Component Value Date    WBC 4.4 08/04/2022    HGB 11.3 (L) 08/04/2022    HCT 39.2 08/04/2022     08/04/2022     (L) 08/04/2022    POTASSIUM 5.0 08/04/2022    POTASSIUM 5.0 08/04/2022    CHLORIDE 102 08/04/2022    CO2 25 08/04/2022    BUN 28.3 (H) 08/04/2022    CR 2.15 (H) 08/04/2022     (H) 08/04/2022    SED 69 (H) 01/25/2011    DD 8.5 (H) 03/10/2010    NTBNPI 1,238 (H) 08/02/2022    NTBNP 109 09/27/2010    TROPONIN 0.00 05/25/2014    TROPI  07/27/2017     <0.015  The 99th percentile for upper reference range is 0.045 ug/L.  Troponin values in   the range of 0.045 - 0.120 ug/L may be associated with risks of adverse   clinical  events.      AST 23 08/04/2022    ALT 11 08/04/2022    ALKPHOS 68 08/04/2022    BILITOTAL 0.5 08/04/2022    DAMON 34 08/03/2022    INR 0.97 08/02/2022       Key relevant imaging:    US Liver Transplant: 8/2/22    EXAM: US LIVER TRANSPLANT  LOCATION: [facility]  DATE/TIME: 8/2/2022 4:16 PM     INDICATION: Abdominal pain and the slightly increased liver enzymes in liver transplant patient  COMPARISON: Ultrasound 05/29/2022  TECHNIQUE: Limited abdominal ultrasound. Color flow with spectral Doppler and waveform analysis performed.     FINDINGS:     GALLBLADDER: Surgically absent      BILE DUCTS: No biliary dilatation. The common duct measures 2 mm.     LIVER: Normal transplant liver parenchyma with smooth contour. No focal mass.      RIGHT KIDNEY: Atrophic without hydronephrosis.      LEFT KIDNEY: No hydronephrosis. Exophytic cyst measuring up to 3.3 cm, not significantly changed, no specific follow-up recommended.     PANCREAS: The visualized portions are normal.     No ascites.     ABDOMINAL DUPLEX:      Splenic vein: ?Patent continuous normal antegrade direction flow towards the liver, 21 cm/sec.  Extrahepatic portal vein: ?Patent continuous antegrade flow, 32 cm/sec.  Portal vein at anastomosis: Patent continuous antegrade flow, 22 cm/sec.  Intrahepatic portal vein: ?Patent continuous antegrade flow, 57 cm/sec.  Right portal vein flow is antegrade, measuring 53 cm/sec.  Left portal vein flow is antegrade, measuring 29 cm/sec.  ?  Inferior vena cava: Patent with flow toward the heart throughout.  ?  Right, mid, left hepatic veins: Patent with flow towards the inferior vena cava.  ?  Extrahepatic hepatic artery: High resistance waveform with flow towards the liver. 80-90 cm/sec with resistive index 0.88-1.0.  Right hepatic artery: 80 cm/sec with resistive index 0.91.  Left hepatic artery: 86 cm/sec with resistive index 0.85.                                                                          IMPRESSION:  1.  Unremarkable grayscale appearance of the transplant liver. No biliary obstruction.  2.  Patent transplant vasculature with proper directional flow as above. Relatively high resistance flow within the hepatic artery, new since prior examination. Differential is broad but includes rejection.                    Previous Endoscopy:   Colonoscopy: 9/2009    Impression:          - A 8 mm, non-bleeding polyp in the sigmoid colon.                        Resected and retrieved.                        Dimunitive sigmoid polyp removed                        - The examination was otherwise normal.   Recommendation:      - Use fiber, for example Citrucel, Fibercon, Konsyl or                        Metamucil.                        Pt had initially consented for an EGD, but after the                        colonoscopy refused to go thro an upper endoscopy,                        stated she was done, did not want oral intubation, case                        discussed with inpatient consult team. As she is post                        TIPPS and history of wanda is questionable, we will not                        procede with EGD at this time.                                                                                      electronically signed by JOLANTA Liang   __________________   Merced Liang MD   Signed Date: 9/9/2009 02:10:15 PM   Number of Addenda: 0   I was physically present for the entire viewing portion of the exam.       __________________________   Signature of teaching physician     B4c/D4c     ERCP: 9/2010    The  film was normal. The esophagus was successfully intubated        under direct vision. The scope was advanced to a normal major papilla in        the descending duodenum without detailed examination of the pharynx,        larynx and associated structures, and upper GI tract. The upper GI tract        was grossly normal. The ventral pancreatic duct was deeply  cannulated        with the short-nosed traction sphincterotome. Contrast was injected. The        entire opacified area was normal. A straight 0.021 inch Tracer Metro        Direct wire was passed into the ventral pancreatic duct. The bile duct        was deeply cannulated with the short-nosed traction sphincterotome.        Contrast was injected. The lower third of the main bile duct contained        one stone, which was 8 mm in diameter. The main bile duct was mildly        dilated and diffusely dilated. The largest diameter was 10 mm. A        straight 0.021 inch Tracer Metro Direct wire was passed into the biliary        tree. A 4 mm biliary sphincterotomy was made with a monofilament        short-tip traction sphincterotome using ERBE electrocautery. There was        no post-sphincterotomy bleeding. The biliary orifice was dilated using a        10 mm Hurricane balloon. Oozing of blood was seen from sphincterotomy        site after balloon dilation. The biliary tree was swept with a balloon        starting at the bifurcation and left main hepatic duct. One stone was        removed and no stones were left. Large amounts of sludge was removed.        One 4 Fr by 3 cm Castro pancreatic stent with a full external pigtail and        a single internal flap was placed 3 cm into the ventral pancreatic duct.        Clear fluid flowed through the stent(s). The stent was in good position.        One 10 Fr by 5 cm biliary stent with a single external flap and a single        internal flap was placed 5 cm into the bile duct. Bile flowed through        the stent. The stent was in good position. Oozing from sphincterotomy        site was controlled by injecting and spraying a total of 30cc of        1:10,000 solution of epinephrine through the ERCP scope. Through the        ERCP scope clipping was attempted. Placement of 1 device was attempted.        None failed. Bleeding had stopped at the end of the procedure.                                                                                     Impression:          - Dilated CBD with stone and sludge. A small biliary                        sphincterotomy was performed to separate the biliary and                        pancreatic orifices. Biliary orifice was dilated using a                        10mm Hurricane balloon which led to oozing from the                        sphincterotomy site. Sludge and stone removed from CBD.                        A pancreatic stent was placed to reduce the risk of post                        ERCP pancreatitis. A biliary stent was placed to mantain                        drainage. Hemostasis was acheived by injeting and                        spraying a total of 30 cc of 1:10,000 epinephrine at the                        sphincterotomy site. A resolution clip was also deployed                        to control bleeding. No bleeding at the end of the                        procedure.   Recommendation:      - [facility].                        - Avoid aspirin and nonsteroidal anti-inflammatory                        medicines for 1 week.                        - Transfuse 1 unit of FFP in PACU.                        - Check Hb/Hct Q8hr                        - Follow LFT's                        - Surgical evaluation for cholecystectomy.                        - Obtain AXR in 3 weeks to check for spontaneous passage                        of stents. Schedule EGD with stent removal if stents                        still there.                                                                                      electronically signed by JOSE ANTONIO Hutson   ________________   Arsenio Hutson MD   Signed Date: 9/6/2010 03:23:09 PM   Number of Addenda: 0   I was physically present for the entire viewing portion of the exam.          Medications:     Current Facility-Administered Medications   Medication     albuterol (PROVENTIL  HFA/VENTOLIN HFA) inhaler     amLODIPine (NORVASC) tablet 10 mg     calcitRIOL (ROCALTROL) capsule 0.25 mcg     calcium carbonate 600 mg-vitamin D 400 units (CALTRATE) per tablet 1 tablet     cetirizine (zyrTEC) tablet 10 mg     cycloSPORINE modified (GENERIC EQUIVALENT) capsule 50 mg     cycloSPORINE modified (GENERIC EQUIVALENT) capsule 75 mg     febuxostat (ULORIC) tablet 40 mg     furosemide (LASIX) injection 20 mg     gabapentin (NEURONTIN) capsule 300 mg     heparin ANTICOAGULANT injection 5,000 Units     lidocaine (LMX4) cream     lidocaine 1 % 0.1-1 mL     melatonin tablet 1 mg     multivitamin w/minerals (THERA-VIT-M) tablet 1 tablet     mycophenolate (GENERIC EQUIVALENT) capsule 500 mg     olopatadine (PATANOL) 0.1 % ophthalmic solution 1 drop     omeprazole (priLOSEC) CR capsule 20 mg     Patient is already receiving anticoagulation with heparin, enoxaparin (LOVENOX), warfarin (COUMADIN)  or other anticoagulant medication     psyllium (METAMUCIL/KONSYL) capsule 1 capsule     senna-docusate (SENOKOT-S/PERICOLACE) 8.6-50 MG per tablet 2 tablet     sodium chloride (PF) 0.9% PF flush 3 mL     sodium chloride (PF) 0.9% PF flush 3 mL     sodium polystyrene (KAYEXALATE) suspension 30 g     umeclidinium (INCRUSE ELLIPTA) 62.5 MCG/INH inhaler 1 puff     Vitamin D3 (CHOLECALCIFEROL) tablet 25 mcg             Physical Exam:   /71 (BP Location: Right arm)   Pulse 60   Temp 98.6  F (37  C) (Axillary)   Resp 26   Wt 110.5 kg (243 lb 8 oz)   LMP  (LMP Unknown)   SpO2 92%   BMI 47.56 kg/m    Wt:   Wt Readings from Last 2 Encounters:   08/04/22 110.5 kg (243 lb 8 oz)   07/13/22 109.3 kg (241 lb)      Constitutional: cooperative, pleasant, no acute distress  Eyes: Sclera anicteric/injected  Ears/nose/mouth/throat: HFNC in place  CV: Diffuse edema  Respiratory: Unlabored breathing on HFNC, speaking in full sentences  Abd: obese  Skin:, no jaundice  Neuro: AAO x 3  Psych: Normal affect  MSK: No gross  deformities          Past Medical History:   Reviewed and edited as appropriate  Past Medical History:   Diagnosis Date     Anemia in CKD (chronic kidney disease)      Cataract      CKD (chronic kidney disease) stage 3, GFR 30-59 ml/min (H)      Hepatitis C     cleared virus spontaneously      High risk medication use      Hypertension, renal      Immunosuppressed status (H)      Liver replaced by transplant (H) 2010     Osteoporosis      Recurrent pregnancy loss without current pregnancy      Recurrent UTI 2021     Stroke, hemorrhagic (H) 2008     Syncope      Unspecified viral hepatitis C without hepatic coma             Past Surgical History:   Reviewed and edited as appropriate   Past Surgical History:   Procedure Laterality Date     CATARACT IOL, RT/LT Right 2/18/15      SECTION       Incisional Hernia Repair  2004     INSERT SHUNT PORTAL TRANSJUGULAR INTRAHEPTIC      shunt placement for liver failure     LAPAROSCOPIC SALPINGO-OOPHORECTOMY      left     NECK SURGERY  2010    fracture, in [facility] x 7months     TRANSPLANT LIVER RECIPIENT  DONOR  10/26/10     Upper GI Endoscopy with Band Ligation of Esoph/Gastric Varic. .              Social History:   Alcohol use: none  Smoking history: denies  Living situation: in a home in [place], [place]         Family History:   Reviewed and edited as appropriate  Family History   Problem Relation Age of Onset     Hepatitis Other         Hep C, still in [place]     Cerebrovascular Disease Other      Cancer No family hx of      Diabetes No family hx of      Hypertension No family hx of      Thyroid Disease No family hx of      Glaucoma No family hx of      Macular Degeneration No family hx of      No known history of colorectal cancer or inflammatory bowel disease.    Known history of OLTx for Hep C.          Allergies:   Reviewed and edited as appropriate     Allergies   Allergen Reactions     Aspirin      3/31/16 Per pt,  tolerates 81 mg daily dose without ADR.     325 mg dose caused itchiness and hives.     Clarithromycin      Allergic reaction         Contrast Dye      Iodine      Pcn [Penicillins]               Review of Systems:     A complete 10 point review of systems was performed and is negative except as noted in the HPI

## 2022-08-04 NOTE — PROGRESS NOTES
Care Management Follow Up    Length of Stay (days): 2    Expected Discharge Date: 08/08/2022     Concerns to be Addressed:     CMA  Patient plan of care discussed at interdisciplinary rounds: Yes    Anticipated Discharge Disposition:  TBD     Anticipated Discharge Services: TBD    Anticipated Discharge DME:      Patient/family educated on Medicare website which has current facility and service quality ratings:  TBD  Education Provided on the Discharge Plan:    Patient/Family in Agreement with the Plan:      Referrals Placed by CM/SW:  TBD  Private pay costs discussed: Not applicable    Additional Information:  RNCC attempted to complete CMA with patient's daughter via phone, interpretive services, RNCC reached daughter's VM, left message for callback. SW aware and may complete CMA if time allows today as RNCC will not be available the rest of today.    CARLYN StaleyN, BA, RN, CMSRN, RNCC  Covering Units 6D/OBS  Pager: [phone]  Phone: [phone]  6th floor Weekend/Holiday Pager: [phone]  Observation weekend/after hours: [phone]

## 2022-08-04 NOTE — PROGRESS NOTES
[facility]    Medicine Progress Note - Hospitalist Service, GOLD TEAM 7    Date of Admission:  8/2/2022    Assessment & Plan        Flor Navarro is a 56 year old female with PMH of Liver Tx for hepatitis C, HTN, CKDIII, SURI, Obesity, h/o SBO presented on 8/2/22 w/ SOB and abdominal pain. Patient transferred from [facility] to [place] 8/3/22 for ongoing care.     Acute hypoxic/hypercapnic respiratory failure  Suspect COPD with acute exacerbation   Suspect HFpEF with acute exacerbation  Per prior documentation, there is some documentation that she has COPD and takes inhalers at home. Per chart review, patient has been noncompliant with CPAP prior to transfer to [place]. CXR with pulmonary edema.  BNP is elevated at 1238. Noted to have bibasilar rales on exam. Improved with diuresis, will hold further and trial HFNC with repeat gas post transition  - Hold furosemide per nephrology  - Transition to HFNC   - if stable respiratory status may resume diet   - VBG 2-3 hours post HFNC transition  - Continue PTA prn albuterol and Umeclidinium daily  - Monitor on telemetry     Acute encephalopathy, improving  Patient noted to be excessively somnolent but arousable prior to transfer, likely d/t hypercapnia. No confusion at present  - Respiratory management as above     Chronic intermittent abdominal pain  H/o Small Bowel obstruction  Pt initially presented w/ RUQ abd pain radiating to right low back, nausea w/o vomiting.  WBC unremarkable. CMP baseline. UA bland. CT A/P: Again seen mildly dilated left ureter to the level of the pelvic brim, increased from previous exams, without an obstructing urinary calculus. No left hydronephrosis or perinephric fat stranding. Prominent though nondistended or dilated small bowel loops with air-fluid levels as well as fecal lie small bowel material. Small bowel remains normal caliber.  Findings could represent continued ileus though partial or  developing obstruction not entirely excluded.  - CTM     CASTILLO   CKD stage III  Baseline Cr ~1.1-1.2 range; on admission, Cr 1.27, now 1.72 after BID IV Lasix  - Consult nephrology, appreciate assistance  - Avoid nephrotoxic agents  - BMP daily  - Hold further diuresis     Hyperkalemia  K on admit was 5.9, now normalized  - BMP daily     Hx DDLT (2010) 2/2 Hepatitis C  - USG liver showed unremarkable grayscale appearance of the transplant liver. No biliary obstruction. Patent transplant vasculature with proper directional flow as above. Relatively high resistance flow within the hepatic artery, new since prior examination. Differential is broad but includes rejection. LFTs are wnl except AST of 47.  - Transplant hepatology consulted, appreciate assistance  - Hold PM and tomorrow AM cyclosporine dose  - Continue MMF     HTN  - Continue PTA amlodipine 10 mg every other day  - Diuresis as above     GERD  - Continue PTA Omeprazole 20 mg BID     Obesity  H/o SURI  Not compliant with CPAP.  - HFNC during day, CPAP at night       Diet: Regular Diet Adult    DVT Prophylaxis: Enoxaparin (Lovenox) SQ  Li Catheter: Not present  Central Lines: None  Cardiac Monitoring: ACTIVE order. Indication: Acute decompensated heart failure (48 hours)  Code Status: Full Code      Disposition Plan      Expected Discharge Date: 08/08/2022    Discharge Delays: Oxygen Needs - Arrange Home O2            The patient's care was discussed with the Bedside Nurse, Patient and nephrology Consultant.    Breanna Zuluaga MD  Hospitalist Service, GOLD TEAM [address] [place] [address]  Securely message with the Vocera Web Console (learn more here)  Text page via [facility] Paging/Directory   Please see signed in provider for up to date coverage information      Clinically Significant Risk Factors Present on Admission                     ______________________________________________________________________    Interval History    Flor did well overnight with no acute issues.  Today she would like to try HFNC and is hungry/requesting to eat.  She notes a mild headache but has no other complaints or concerns.    The remainder of a 4 point ROS is negative unless otherwise noted above.    Data reviewed today: I reviewed all medications, new labs and imaging results over the last 24 hours.    Physical Exam   Vital Signs: Temp: 97.7  F (36.5  C) Temp src: Oral BP: 113/40 Pulse: 71   Resp: 27 SpO2: 98 % O2 Device: (S) High Flow Nasal Cannula (HFNC) Oxygen Delivery: 50 LPM  Weight: 243 lbs 8 oz  Gen: Asleep but easily awoken, in no acute distress while sleeping on BIPAP  HEENT: NC/AT, sclera anicteric  Resp: Rare crackles posteriorly, no wheezes, no increased WOB on BiPAP  CV: RRR, no mumurs  Abd: Soft, non-tender, non-distended  Extrem: Warm and well perfused, trace LE edema bilaterally

## 2022-08-04 NOTE — PROGRESS NOTES
Patient right kidney size is 6 cm from 9 cm will do a stat U/S renal doppler and will make the patient npo for the test

## 2022-08-04 NOTE — PLAN OF CARE
Pt A&Ox4, calls appropriately. No falls. Pt up to bedside commode x2 overnight, large BM overnight. Pt voiding after lasix. Standing weight completed. Pt remains on Bipap throughout the night;  Decreased FiO2 to 50%, settings still 20/10, tolerating well. When pt up to commode, pt wears HFNC 80% 50L, also tolerating well. Pt swallows pills well. Cyclosporine level drawn before administration of PO cyclosporine. Will continue to monitor.    Problem: Plan of Care - These are the overarching goals to be used throughout the patient stay.    Goal: Plan of Care Review/Shift Note  Description: The Plan of Care Review/Shift note should be completed every shift.  The Outcome Evaluation is a brief statement about your assessment that the patient is improving, declining, or no change.  This information will be displayed automatically on your shift note.  Outcome: Ongoing, Progressing  Flowsheets (Taken 8/3/2022 2221)  Plan of Care Reviewed With: patient  Outcome Evaluation: pt able to take Bipap off for sips and meds, tolerating getting up to commode to urinate  Overall Patient Progress: improving   Goal Outcome Evaluation:    Plan of Care Reviewed With: patient     Overall Patient Progress: improving    Outcome Evaluation: pt able to take Bipap off for sips and meds, tolerating getting up to commode to urinate

## 2022-08-05 LAB
ALBUMIN SERPL BCG-MCNC: 3.6 G/DL (ref 3.5–5.2)
ALP SERPL-CCNC: 73 U/L (ref 35–104)
ALT SERPL W P-5'-P-CCNC: 17 U/L (ref 10–35)
ANION GAP SERPL CALCULATED.3IONS-SCNC: 7 MMOL/L (ref 7–15)
AST SERPL W P-5'-P-CCNC: 24 U/L (ref 10–35)
BASE EXCESS BLDV CALC-SCNC: 2.5 MMOL/L (ref -7.7–1.9)
BILIRUB SERPL-MCNC: 0.5 MG/DL
BUN SERPL-MCNC: 27.5 MG/DL (ref 6–20)
CALCIUM SERPL-MCNC: 9.1 MG/DL (ref 8.6–10)
CHLORIDE SERPL-SCNC: 104 MMOL/L (ref 98–107)
CREAT SERPL-MCNC: 1.5 MG/DL (ref 0.51–0.95)
CREAT UR-MCNC: 71.3 MG/DL
DEPRECATED HCO3 PLAS-SCNC: 28 MMOL/L (ref 22–29)
ERYTHROCYTE [DISTWIDTH] IN BLOOD BY AUTOMATED COUNT: 14.4 % (ref 10–15)
FRACT EXCRET NA UR+SERPL-RTO: 1 %
GFR SERPL CREATININE-BSD FRML MDRD: 40 ML/MIN/1.73M2
GLUCOSE SERPL-MCNC: 132 MG/DL (ref 70–99)
HCO3 BLDV-SCNC: 31 MMOL/L (ref 21–28)
HCT VFR BLD AUTO: 40.3 % (ref 35–47)
HGB BLD-MCNC: 11.9 G/DL (ref 11.7–15.7)
MCH RBC QN AUTO: 27.7 PG (ref 26.5–33)
MCHC RBC AUTO-ENTMCNC: 29.5 G/DL (ref 31.5–36.5)
MCV RBC AUTO: 94 FL (ref 78–100)
O2/TOTAL GAS SETTING VFR VENT: 50 %
PCO2 BLDV: 64 MM HG (ref 40–50)
PH BLDV: 7.29 [PH] (ref 7.32–7.43)
PLATELET # BLD AUTO: 182 10E3/UL (ref 150–450)
PO2 BLDV: 57 MM HG (ref 25–47)
POTASSIUM SERPL-SCNC: 4.8 MMOL/L (ref 3.4–5.3)
POTASSIUM SERPL-SCNC: 4.8 MMOL/L (ref 3.4–5.3)
POTASSIUM SERPL-SCNC: 5.2 MMOL/L (ref 3.4–5.3)
PROT SERPL-MCNC: 6.8 G/DL (ref 6.4–8.3)
RBC # BLD AUTO: 4.3 10E6/UL (ref 3.8–5.2)
SODIUM SERPL-SCNC: 139 MMOL/L (ref 136–145)
SODIUM UR-SCNC: 49 MMOL/L
WBC # BLD AUTO: 3.7 10E3/UL (ref 4–11)

## 2022-08-05 PROCEDURE — 80053 COMPREHEN METABOLIC PANEL: CPT | Performed by: PHYSICIAN ASSISTANT

## 2022-08-05 PROCEDURE — 250N000012 HC RX MED GY IP 250 OP 636 PS 637: Performed by: HOSPITALIST

## 2022-08-05 PROCEDURE — 99223 1ST HOSP IP/OBS HIGH 75: CPT | Mod: GC | Performed by: INTERNAL MEDICINE

## 2022-08-05 PROCEDURE — 99233 SBSQ HOSP IP/OBS HIGH 50: CPT | Performed by: PEDIATRICS

## 2022-08-05 PROCEDURE — 82803 BLOOD GASES ANY COMBINATION: CPT | Performed by: PEDIATRICS

## 2022-08-05 PROCEDURE — 120N000002 HC R&B MED SURG/OB UMMC

## 2022-08-05 PROCEDURE — 250N000013 HC RX MED GY IP 250 OP 250 PS 637: Performed by: PEDIATRICS

## 2022-08-05 PROCEDURE — 84132 ASSAY OF SERUM POTASSIUM: CPT | Performed by: PHYSICIAN ASSISTANT

## 2022-08-05 PROCEDURE — 94660 CPAP INITIATION&MGMT: CPT

## 2022-08-05 PROCEDURE — 250N000011 HC RX IP 250 OP 636: Performed by: HOSPITALIST

## 2022-08-05 PROCEDURE — 36415 COLL VENOUS BLD VENIPUNCTURE: CPT | Performed by: PHYSICIAN ASSISTANT

## 2022-08-05 PROCEDURE — 85027 COMPLETE CBC AUTOMATED: CPT | Performed by: PEDIATRICS

## 2022-08-05 PROCEDURE — 999N000157 HC STATISTIC RCP TIME EA 10 MIN

## 2022-08-05 PROCEDURE — 250N000013 HC RX MED GY IP 250 OP 250 PS 637: Performed by: HOSPITALIST

## 2022-08-05 PROCEDURE — 99232 SBSQ HOSP IP/OBS MODERATE 35: CPT | Mod: GC | Performed by: INTERNAL MEDICINE

## 2022-08-05 RX ADMIN — SENNOSIDES AND DOCUSATE SODIUM 2 TABLET: 8.6; 5 TABLET ORAL at 09:29

## 2022-08-05 RX ADMIN — CALCIUM CARBONATE 600 MG (1,500 MG)-VITAMIN D3 400 UNIT TABLET 1 TABLET: at 19:31

## 2022-08-05 RX ADMIN — HEPARIN SODIUM 5000 UNITS: 5000 INJECTION, SOLUTION INTRAVENOUS; SUBCUTANEOUS at 09:32

## 2022-08-05 RX ADMIN — ALBUTEROL SULFATE 2 PUFF: 90 AEROSOL, METERED RESPIRATORY (INHALATION) at 13:03

## 2022-08-05 RX ADMIN — CALCIUM CARBONATE 600 MG (1,500 MG)-VITAMIN D3 400 UNIT TABLET 1 TABLET: at 09:28

## 2022-08-05 RX ADMIN — CETIRIZINE HYDROCHLORIDE 10 MG: 10 TABLET, FILM COATED ORAL at 09:34

## 2022-08-05 RX ADMIN — ALBUTEROL SULFATE 2 PUFF: 90 AEROSOL, METERED RESPIRATORY (INHALATION) at 19:32

## 2022-08-05 RX ADMIN — Medication 25 MCG: at 09:30

## 2022-08-05 RX ADMIN — UMECLIDINIUM 1 PUFF: 62.5 AEROSOL, POWDER ORAL at 09:28

## 2022-08-05 RX ADMIN — MYCOPHENOLATE MOFETIL 500 MG: 250 CAPSULE ORAL at 09:28

## 2022-08-05 RX ADMIN — MYCOPHENOLATE MOFETIL 500 MG: 250 CAPSULE ORAL at 18:32

## 2022-08-05 RX ADMIN — OMEPRAZOLE 20 MG: 20 CAPSULE, DELAYED RELEASE ORAL at 19:31

## 2022-08-05 RX ADMIN — Medication 1 MG: at 19:31

## 2022-08-05 RX ADMIN — ALBUTEROL SULFATE 2 PUFF: 90 AEROSOL, METERED RESPIRATORY (INHALATION) at 09:34

## 2022-08-05 RX ADMIN — CYCLOSPORINE 50 MG: 25 CAPSULE, LIQUID FILLED ORAL at 19:32

## 2022-08-05 RX ADMIN — Medication 1 CAPSULE: at 09:29

## 2022-08-05 RX ADMIN — OLOPATADINE HYDROCHLORIDE 1 DROP: 1 SOLUTION OPHTHALMIC at 09:35

## 2022-08-05 RX ADMIN — FEBUXOSTAT 40 MG: 40 TABLET, FILM COATED ORAL at 09:29

## 2022-08-05 RX ADMIN — HEPARIN SODIUM 5000 UNITS: 5000 INJECTION, SOLUTION INTRAVENOUS; SUBCUTANEOUS at 19:32

## 2022-08-05 RX ADMIN — Medication 1 TABLET: at 13:04

## 2022-08-05 RX ADMIN — AMLODIPINE BESYLATE 10 MG: 10 TABLET ORAL at 09:30

## 2022-08-05 RX ADMIN — OMEPRAZOLE 20 MG: 20 CAPSULE, DELAYED RELEASE ORAL at 09:28

## 2022-08-05 RX ADMIN — CALCITRIOL CAPSULES 0.25 MCG 0.25 MCG: 0.25 CAPSULE ORAL at 09:30

## 2022-08-05 RX ADMIN — ALBUTEROL SULFATE 2 PUFF: 90 AEROSOL, METERED RESPIRATORY (INHALATION) at 18:32

## 2022-08-05 RX ADMIN — Medication 1 CAPSULE: at 19:32

## 2022-08-05 RX ADMIN — GABAPENTIN 300 MG: 300 CAPSULE ORAL at 21:14

## 2022-08-05 RX ADMIN — Medication 1 DROP: at 21:14

## 2022-08-05 ASSESSMENT — ACTIVITIES OF DAILY LIVING (ADL)
ADLS_ACUITY_SCORE: 44
DRESS: 0-->INDEPENDENT
ADLS_ACUITY_SCORE: 35
DRESS: 0-->INDEPENDENT
ADLS_ACUITY_SCORE: 35
ADLS_ACUITY_SCORE: 35
TRANSFERRING: 1-->ASSISTANCE (EQUIPMENT/PERSON) NEEDED
ADLS_ACUITY_SCORE: 35
DOING_ERRANDS_INDEPENDENTLY_DIFFICULTY: NO
ADLS_ACUITY_SCORE: 44
ADLS_ACUITY_SCORE: 44
DRESSING/BATHING_DIFFICULTY: YES
ADLS_ACUITY_SCORE: 44
WEAR_GLASSES_OR_BLIND: NO
ADLS_ACUITY_SCORE: 44
WALKING_OR_CLIMBING_STAIRS: AMBULATION DIFFICULTY, REQUIRES EQUIPMENT
TOILETING_ASSISTANCE: TOILETING DIFFICULTY, ASSISTANCE 1 PERSON
ADLS_ACUITY_SCORE: 44
TRANSFERRING: 0-->ASSISTANCE NEEDED (DEVELOPMENTALLY APPROPRIATE)
CHANGE_IN_FUNCTIONAL_STATUS_SINCE_ONSET_OF_CURRENT_ILLNESS/INJURY: NO
ADLS_ACUITY_SCORE: 35
TOILETING_ISSUES: YES
CONCENTRATING,_REMEMBERING_OR_MAKING_DECISIONS_DIFFICULTY: NO
FALL_HISTORY_WITHIN_LAST_SIX_MONTHS: NO
BATHING: 1-->ASSISTANCE NEEDED
DIFFICULTY_EATING/SWALLOWING: NO
TOILETING: 0-->INDEPENDENT
EQUIPMENT_CURRENTLY_USED_AT_HOME: CANE, STRAIGHT;WALKER, STANDARD;SHOWER CHAIR
WALKING_OR_CLIMBING_STAIRS_DIFFICULTY: YES
ADLS_ACUITY_SCORE: 44
TOILETING: 0-->INDEPENDENT
DRESSING/BATHING: BATHING DIFFICULTY, ASSISTANCE 1 PERSON

## 2022-08-05 NOTE — PROGRESS NOTES
Nephrology Progress Note  08/04/2022         Assessment & Recommendations:   1..CASTILLO  on CKD stage 3a:It appears unclear.Patient has a baseline creatinine  Of 1.1-1.3. 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    She did receive Lasix 20 mg twice daily after which her creatinine did abigail from 1.2 which was baseline to 1.7 and on 8/3 received 40 mg of IV lasix afte which her creatinine went upto 2.15 .  Her chest x-ray does look congested and might have cardiorenal syndrome.  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  She does have dilation of IVC which probably indicate it is cardiorenal but other possibility is that she had an undocumented episode which lead to ATN.  2.  Hyperkalemia 2/2 respiratory acidosis :(resolved)  3.  Acute hypoxic respiratory failure secondary to severe CHF with preserved EF her ejection fraction is 60 to 65%: On chest x-ray she looks congested she had a ultrasound of her lung which showed curly B-lines and an echo which showed IVC dilation she might have right-sided heart failure      4.  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  Obstructive sleep apnea/obesity hypoventilation syndrome leading to carbon dioxide narcosis (improving)     Plan:  -Would recommend accurate I's and O's  -Daily weight  -Consider consulting cardiology for possible right heart cath as its unclear what cause her to develop heart failure.Her echo is probably not accurate because of he habitus  Avoid nephrotoxic medication  -Avoid hypotensive episodes  -U/S renal with doppler did not give us information regarding her renal vasculature because of her habitus     Recommendations were communicated to primary team via notes     Seen and  discussed with Dr. Netta Ortiz MD   Division of Renal Disease and Hypertension  [facility]  tyrail  [employer] Web Console    Interval History :   Nursing and provider notes from last 24 hours reviewed.  In the last 24 hours Flor Navarro is awake after  Resolution of CO2 narcosis. She reports her abdominal pain is gone   Review of Systems:   I reviewed the following systems:  GI: Normal appetite. No nausea or vomiting or diarrhea.   Neuro:  No confusion  Constitutional:  No fever or chills  CV: No dyspnea or edema.  No chest pain.    Physical Exam:   I/O last 3 completed shifts:  In: 1500 [P.O.:1500]  Out: 500 [Urine:500]   /40 (BP Location: Right arm)   Pulse 71   Temp 98.1  F (36.7  C) (Oral)   Resp 27   Wt 110.5 kg (243 lb 8 oz)   LMP  (LMP Unknown)   SpO2 98%   BMI 47.56 kg/m       GENERAL APPEARANCE: Patient is awake and on high flow  EYES:  No scleral icterus, pupils equal  PULM: lungs clear  to auscultation bilaterally, equal air movement, no clubbing  CV: Patient has regular rhythm, normal rate, no rub     -JVD -ve     -edema -ve  GI: soft, non tender, positive distended, bowel sounds are present  INTEGUMENT: no cyanosis, no rash  NEURO:  No asterixis   Access none    Labs:   All labs reviewed by me  Electrolytes/Renal - Recent Labs   Lab Test 08/04/22  1731 08/04/22  0740 08/04/22  0055 08/03/22  1652 08/03/22  0556 08/03/22  0310 08/02/22  1414 07/13/22  1609 01/28/22  1352 12/17/21  0954 04/08/20  1340 04/03/20  1128 04/09/19  1555 03/04/19  1704 10/16/17  1315 08/16/17  1144 12/04/15  0929 05/22/15  0857   NA  --  135*  --   --  138 140   < > 142   < > 143   < >  --    < > 140   < >  --    < > 140   POTASSIUM 4.8 5.0  5.0 4.9 5.5* 5.7* 5.1   < > 5.1   < > 5.4*   < >  --    < > 4.8   < >  --    < > 4.9   CHLORIDE  --  102  --   --  108 109   < > 106   < > 109   < >  --    < > 106   < >  --    < > 106   CO2  --  25  --   --  31 31   < > 23   < > 29   < >  --    < > 27   < >  --    < > 28    BUN  --  28.3*  --   --  25 22   < > 16.9   < > 25   < >  --    < > 28   < >  --    < > 23   CR  --  2.15*  --  2.10* 1.72* 1.69*   < > 1.15*   < > 1.10*   < >  --    < > 1.12*   < >  --    < > 1.20*   GLC  --  134*  --   --  165* 205*   < > 98   < > 92   < >  --    < > 93   < >  --    < > 100*   JUAN  --  8.8  --   --  8.8 9.1   < > 10.2*   < > 9.6   < >  --    < > 9.0   < >  --    < > 9.2   MAG  --   --   --   --   --   --   --   --   --   --   --   --   --  1.8  --  2.1  --  1.8   PHOS  --   --   --   --   --   --   --  3.2  --  3.2  --  3.9   < > 3.2  --   --    < > 4.2    < > = values in this interval not displayed.       CBC -   Recent Labs   Lab Test 08/04/22  0740 08/03/22  0556 08/03/22  0310   WBC 4.4 8.6 8.9   HGB 11.3* 11.6* 12.6    182 217       LFTs -   Recent Labs   Lab Test 08/04/22  0740 08/03/22  0310 08/02/22  1414   ALKPHOS 68 84 74   BILITOTAL 0.5 0.2 0.7   ALT 11 31 32   AST 23 26 47*   PROTTOTAL 6.3* 7.8 7.2   ALBUMIN 3.2* 3.5 3.0*       Iron Panel -   Recent Labs   Lab Test 08/18/14  1434   IRON 64   IRONSAT 22   RAMESH 117         Imaging:  All imaging studies reviewed by me.     Current Medications:    albuterol  2 puff Inhalation 4x Daily     amLODIPine  10 mg Oral Daily     calcitRIOL  0.25 mcg Oral Daily     calcium carbonate 600 mg-vitamin D 400 units  1 tablet Oral BID     cetirizine  10 mg Oral Daily     [Held by provider] cycloSPORINE modified  50 mg Oral At Bedtime     [Held by provider] cycloSPORINE modified  75 mg Oral QAM     febuxostat  40 mg Oral Daily     gabapentin  300 mg Oral At Bedtime     heparin ANTICOAGULANT  5,000 Units Subcutaneous Q12H     multivitamin w/minerals  1 tablet Oral Daily     mycophenolate  500 mg Oral BID IS     olopatadine  1 drop Both Eyes Daily     omeprazole  20 mg Oral BID     psyllium  1 capsule Oral BID     senna-docusate  2 tablet Oral BID     sodium chloride (PF)  3 mL Intracatheter Q8H     sodium polystyrene  30 g Rectal Once      umeclidinium  1 puff Inhalation Daily     Vitamin D3  25 mcg Oral Daily       - MEDICATION INSTRUCTIONS -       Daquan Ortiz MD

## 2022-08-05 NOTE — PROGRESS NOTES
[facility]    Medicine Progress Note - Hospitalist Service, GOLD TEAM 7    Date of Admission:  8/2/2022    Assessment & Plan        Flor Navarro is a 56 year old female with PMH of Liver Tx for hepatitis C, HTN, CKDIII, SURI, Obesity, h/o SBO presented on 8/2/22 w/ SOB and abdominal pain. [place] [facility] [place] for ongoing care.      Acute hypoxic/hypercapnic respiratory failure  Suspect COPD with acute exacerbation   Suspect HFpEF with acute exacerbation  Per prior documentation, there is some documentation that she has COPD and takes inhalers at home. Per chart review, patient has been noncompliant with CPAP prior to transfer to [facility]. CXR with pulmonary edema.  BNP is elevated at 1238. Noted to have bibasilar rales on exam. Improved with diuresis, will hold further and trial HFNC with repeat gas post transition  -Nephrology and cardiology consulted, appreciate assistance  - HFNC, wean as tolerated  - Continue PTA prn albuterol and Umeclidinium daily  - Monitor on telemetry     Acute encephalopathy, resolved  Patient noted to be excessively somnolent but arousable prior to transfer, likely d/t hypercapnia. No confusion at present  - Respiratory management as above     Chronic intermittent abdominal pain  H/o Small Bowel obstruction  Pt initially presented w/ RUQ abd pain radiating to right low back, nausea w/o vomiting.  WBC unremarkable. CMP baseline. UA bland.   - CTM     CASTILLO   CKD stage III  Baseline Cr ~1.1-1.2 range; on admission, Cr 1.27 but increased acutely.  Now downtrending  - Consult nephrology, appreciate assistance  - Avoid nephrotoxic agents  - BMP daily  - Hold further diuresis     Hyperkalemia, resolved  K on admit was 5.9, now normalized  - BMP daily     Hx DDLT (2010) 2/2 Hepatitis C  - USG liver showed unremarkable grayscale appearance of the transplant liver. No biliary obstruction. Patent transplant vasculature  with proper directional flow as above. Relatively high resistance flow within the hepatic artery, new since prior examination. Differential is broad but includes rejection. LFTs are wnl except AST of 47.  - Transplant hepatology consulted, appreciate assistance  -Resume cyclosporine dose  - Continue MMF     HTN  - Continue PTA amlodipine 10 mg every other day  - Diuresis as above     GERD  - Continue PTA Omeprazole 20 mg BID     Obesity  H/o SURI  Not compliant with CPAP.  - HFNC during day, CPAP at night         Diet: Regular Diet Adult    DVT Prophylaxis: Enoxaparin (Lovenox) SQ  Li Catheter: Not present  Central Lines: None  Cardiac Monitoring: ACTIVE order. Indication: Acute decompensated heart failure (48 hours)  Code Status: Full Code      Disposition Plan      Expected Discharge Date: 08/08/2022    Discharge Delays: Oxygen Needs - Arrange Home O2            The patient's care was discussed with the Bedside Nurse, Patient and nephrology and cardiology Consultant.    Breanna Zuluaga MD  Hospitalist Service, [address]  Securely message with the Vocera Web Console (learn more here)  Text page via [facility] Paging/Directory   Please see signed in provider for up to date coverage information      Clinically Significant Risk Factors Present on Admission                     ______________________________________________________________________    Interval History   Flor did well overnight with no acute issues.  Today she notes that she will had been having trouble breathing but that it feels much better now.  She is thankful for the care she is receiving and amenable to review with cardiology as well as ongoing cares as noted above.  She has no new complaints or concerns    The remainder of a 4 point ROS is negative unless otherwise noted above.    Data reviewed today: I reviewed all medications, new labs and imaging results over the last 24 hours.      Physical Exam   Vital Signs: Temp: 97.7  F (36.5  C) Temp src: Axillary BP: 120/67 Pulse: 70   Resp: 20 SpO2: 93 % O2 Device: BiPAP/CPAP Oxygen Delivery: 50 LPM  Weight: 242 lbs 4.8 oz  Gen: Awake, alert, in no acute distress on high flow nasal cannula sitting up in chair  HEENT: NC/AT, sclera anicteric  Resp: Rare crackles posteriorly, no wheezes, no increased WOB on high flow nasal cannula  CV: RRR, no mumurs  Abd: Soft, non-tender, non-distended  Extrem: Warm and well perfused, trace LE edema bilaterally

## 2022-08-05 NOTE — CONSULTS
Care Management Initial Consult    General Information  Assessment completed with: Patient, Flor  Type of CM/SW Visit: Initial Assessment    Primary Care Provider verified and updated as needed: Yes   Readmission within the last 30 days: no previous admission in last 30 days      Reason for Consult: discharge planning  Advance Care Planning:            Communication Assessment  Patient's communication style: spoken language (non-English)    Hearing Difficulty or Deaf: no   Wear Glasses or Blind: no    Cognitive  Cognitive/Neuro/Behavioral: WDL                      Living Environment:   People in home: alone     Current living Arrangements: house      Able to return to prior arrangements: yes       Family/Social Support:  Care provided by: homecare agency  Provides care for: no one, unable/limited ability to care for self  Marital Status: Single             Description of Support System:           Current Resources:   Patient receiving home care services: No     Community Resources: PCA  Equipment currently used at home: cane, straight, walker, standard, shower chair  Supplies currently used at home: None    Employment/Financial:  Employment Status: disabled        Financial Concerns:             Lifestyle & Psychosocial Needs:  Social Determinants of Health     Tobacco Use: Low Risk      Smoking Tobacco Use: Never Smoker     Smokeless Tobacco Use: Never Used   Alcohol Use: Not on file   Financial Resource Strain: High Risk     Difficulty of Paying Living Expenses: Very hard   Food Insecurity: Food Insecurity Present     Worried About Running Out of Food in the Last Year: Sometimes true     Ran Out of Food in the Last Year: Sometimes true   Transportation Needs: Unmet Transportation Needs     Lack of Transportation (Medical): Yes     Lack of Transportation (Non-Medical): Yes   Physical Activity: Not on file   Stress: Stress Concern Present     Feeling of Stress : Rather much   Social Connections: Socially Isolated      Frequency of Communication with Friends and Family: Once a week     Frequency of Social Gatherings with Friends and Family: Once a week     Attends Anglican Services: Never     Active Member of Clubs or Organizations: No     Attends Club or Organization Meetings: Never     Marital Status:    Intimate Partner Violence: Not At Risk     Fear of Current or Ex-Partner: No     Emotionally Abused: No     Physically Abused: No     Sexually Abused: No   Depression: Not at risk     PHQ-2 Score: 0   Housing Stability: Not on file       Functional Status:  Prior to admission patient needed assistance:   Dependent ADLs:: Ambulation-cane, Ambulation-walker          Mental Health Status:          Chemical Dependency Status:                Values/Beliefs:  Spiritual, Cultural Beliefs, Anglican Practices, Values that affect care:                 Additional Information:  RNCC met with patient to go over discharge planning, case management assessment. Patient lives in a house alone, lives on a main level so stairs are not an issue, uses a cane/walker at baseline. Patient states they had had an electric w/c but are not using it and may need a new one, unsure of company, possibly [employer]. Patient has PCA hours, 6 hours daily, 7 days a week and their son serves as PCA. Patient also has 3 hours a week homemaking services and 3 hours a week for someone to go over mail and other chore duties. Patient asked more than once through [employer] interpretation for increasing PCA services, will need to go through their [facility] CM. Patient has a listed HC RN from bedside nurse, Harmony Coleman who writer attempted to call, ph: [phone] for details, left  for call back. Harmony Coleman  states she is an RN Case Manager with [facility]. Patient endorses that they will need a ride home from the hospital at discharge time. Patient has [employer] [facility], so could use Provide A Ride, likely need w/c transportation. Patient when asked  about home O2 states they have inhalers, no home O2. Patient is on HFNC, will likely need home O2 set up prior to discharge pending hospital course. RNCC will continue to follow for discharge planning.    Update: RNCC spoke with LAURA Roa RN with [facility]. Per Harmony her role is medicine set up once per week on Friday which patient has been independent with taking, along with VS, assessments and patient is compliant with medications. Patient may have undiagnosed sleep apnea per Harmony, could possibly need CPAP set up at discharge time. Per Harmony, patient's son Courtney lives with her but leaves to work most of the day. Per Harmony, she is unaware of PCA hours, and [facility] does not provide those services.     LAURA Roa RN  [facility]  Ph: [phone]    [employer] [facility] A [facility]  Ph: [phone]  After Hours: [phone]    DME    [employer]  Ph: [phone]  Priority Fax: [phone]    CARLYN StaleyN, BA, RN, CMSRN, RNCC  Covering Units 6D/OBS  Pager: [phone]  Phone: [phone]  6th floor Weekend/Holiday Pager: [phone]  Observation weekend/after hours: [phone]

## 2022-08-05 NOTE — PROGRESS NOTES
Nephrology Progress Note  08/05/2022         Assessment & Recommendations:   1..CASTILLO  on CKD stage 3a:It appears unclear.Patient has a baseline creatinine  Of 1.1-1.3. 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  She does have dilation of IVC which probably indicate it is cardiorenal but other possibility is that she had an undocumented episode which lead to ATN.Another possibility with holding diuresis her creatinine improved.  2.  Hyperkalemia 2/2 respiratory acidosis :(resolved)  3.  Acute hypoxic respiratory failure secondary to severe CHF with preserved EF her ejection fraction is 60 to 65%: On chest x-ray she looks congested she had a ultrasound of her lung which showed curly B-lines and an echo which showed IVC dilation she might have right-sided heart failure      4.  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  Obstructive sleep apnea/obesity hypoventilation syndrome leading to carbon dioxide narcosis (improving)     Plan:  -Would recommend accurate I's and O's  -Daily weight  -Consider consulting cardiology for possible right heart cath as its unclear what cause her to develop heart failure.Her echo is probably not accurate because of her habitus  Avoid nephrotoxic medication  -Avoid hypotensive episodes  -would continue to hold diuretics as it is not clear if she had heart failure and given the improvement of creatinine it appears that she was over diuresis with IV Lasix of 20 mg      Recommendations were communicated to primary team via verbally     Seen and discussed with Dr. Netta Ortiz MD   Division of Renal Disease and Hypertension  [facility]  [facility] Web [facility]    Interval History :   Nursing and provider notes from last 24 hours reviewed.  In the last 24 hours Flor Navarro is awake after  Resolution  of CO2 narcosis. She feels better no new complain does not feel short of breath  Review of Systems:   I reviewed the following systems:  GI: Normal appetite. No nausea or vomiting or diarrhea.   Neuro:  No confusion  Constitutional:  No fever or chills  CV: No dyspnea or edema.  No chest pain.    Physical Exam:   I/O last 3 completed shifts:  In: 800 [P.O.:800]  Out: 400 [Urine:400]   /69 (BP Location: Right arm)   Pulse 81   Temp 98  F (36.7  C) (Oral)   Resp 20   Wt 109.9 kg (242 lb 4.8 oz)   LMP  (LMP Unknown)   SpO2 95%   BMI 47.32 kg/m       GENERAL APPEARANCE: Patient is awake and on high flow  EYES:  No scleral icterus, pupils equal  PULM: lungs clear  to auscultation bilaterally, equal air movement, no clubbing  CV: Patient has regular rhythm, normal rate, no rub     -JVD -ve     -edema -ve  GI: soft, non tender, positive distended, bowel sounds are present  INTEGUMENT: no cyanosis, no rash  NEURO:  No asterixis   Access none    Labs:   All labs reviewed by me  Electrolytes/Renal -   Recent Labs   Lab Test 08/05/22  0923 08/04/22  2103 08/04/22  1731 08/04/22  0740 08/03/22  1652 08/03/22  0556 08/02/22  1414 07/13/22  1609 01/28/22  1352 12/17/21  0954 04/08/20  1340 04/03/20  1128 04/09/19  1555 03/04/19  1704 10/16/17  1315 08/16/17  1144 12/04/15  0929 05/22/15  0857    134*  --  135*  --  138   < > 142   < > 143   < >  --    < > 140   < >  --    < > 140   POTASSIUM 4.8  4.8  --  4.8 5.0  5.0   < > 5.7*   < > 5.1   < > 5.4*   < >  --    < > 4.8   < >  --    < > 4.9   CHLORIDE 104  --   --  102  --  108   < > 106   < > 109   < >  --    < > 106   < >  --    < > 106   CO2 28  --   --  25  --  31   < > 23   < > 29   < >  --    < > 27   < >  --    < > 28   BUN 27.5*  --   --  28.3*  --  25   < > 16.9   < > 25   < >  --    < > 28   < >  --    < > 23   CR 1.50* 1.96*  --  2.15*   < > 1.72*   < > 1.15*   < > 1.10*   < >  --    < > 1.12*   < >  --    < > 1.20*   *  --   --  134*  --   165*   < > 98   < > 92   < >  --    < > 93   < >  --    < > 100*   JUAN 9.1  --   --  8.8  --  8.8   < > 10.2*   < > 9.6   < >  --    < > 9.0   < >  --    < > 9.2   MAG  --   --   --   --   --   --   --   --   --   --   --   --   --  1.8  --  2.1  --  1.8   PHOS  --   --   --   --   --   --   --  3.2  --  3.2  --  3.9   < > 3.2  --   --    < > 4.2    < > = values in this interval not displayed.       CBC -   Recent Labs   Lab Test 08/05/22 0923 08/04/22  0740 08/03/22  0556   WBC 3.7* 4.4 8.6   HGB 11.9 11.3* 11.6*    172 182       LFTs -   Recent Labs   Lab Test 08/05/22 0923 08/04/22  0740 08/03/22  0310   ALKPHOS 73 68 84   BILITOTAL 0.5 0.5 0.2   ALT 17 11 31   AST 24 23 26   PROTTOTAL 6.8 6.3* 7.8   ALBUMIN 3.6 3.2* 3.5       Iron Panel -   Recent Labs   Lab Test 08/18/14  1434   IRON 64   IRONSAT 22   RAMESH 117         Imaging:  All imaging studies reviewed by me.     Current Medications:    albuterol  2 puff Inhalation 4x Daily     amLODIPine  10 mg Oral Daily     calcitRIOL  0.25 mcg Oral Daily     calcium carbonate 600 mg-vitamin D 400 units  1 tablet Oral BID     cetirizine  10 mg Oral Daily     cycloSPORINE modified  50 mg Oral At Bedtime     cycloSPORINE modified  75 mg Oral QAM     febuxostat  40 mg Oral Daily     gabapentin  300 mg Oral At Bedtime     heparin ANTICOAGULANT  5,000 Units Subcutaneous Q12H     multivitamin w/minerals  1 tablet Oral Daily     mycophenolate  500 mg Oral BID IS     olopatadine  1 drop Both Eyes Daily     omeprazole  20 mg Oral BID     psyllium  1 capsule Oral BID     senna-docusate  2 tablet Oral BID     sodium chloride (PF)  3 mL Intracatheter Q8H     sodium polystyrene  30 g Rectal Once     umeclidinium  1 puff Inhalation Daily     Vitamin D3  25 mcg Oral Daily       - MEDICATION INSTRUCTIONS -       Daquan Ortiz MD

## 2022-08-05 NOTE — CONSULTS
[facility]    Cardiology Consult Note-Cardiology      Date of Admission:  8/2/2022  Consult Requested by: Nephrology consult service  Reason for Consult: Possible RHC cath for HF evaluation    Assessment & Plan: SL   Flor Navarro is a 56 year old female with h/o liver transplant, HTN, CKDIII, SURI, obesity who presented with AHRF and abdominal pain. Cardiology was consulted for evaluation of volume status in setting of possible heart failure decompensation.     Pt presented with AHRF in ED thought 2/2 to possible diastolic dysfunction, HF. Initial VBG showed respiratory acidosis requiring escalation to BIPAP (50 LPM O2). Pt given IV lasix throughout admission but was held on 8/4 after pt developed CASTILLO on CKD. Pt respiratory status improved on BIPAP and Cr improving with holding lasix.     Pt has no hx of MI or HF. Last TTE on 8/3 showed normal LV and RV function with EF 60-65%. LV diastolic function indeterminate d/t poor windows. IVC was dilated but not able to interpret in setting of BIPAP. EKG on admission showed sinus rhythm, no conduction abnormalities.    Pt hx of several week progressive dyspnea and orthopnea certainly c/f acute heart failure decompensation. However, her CASTILLO with diuresis and improvement of Cr while holding them is contradictory to someone who we expect is volume overloaded. In the setting of acute HF decompensation, we would not expect diuresis to improve Cr. Furthermore, on bedside U/S per cardiology team (while pt was on HFNC) showed a non-distended, collapsible IVC, suggesting euvolemia.     It is unclear to us why her respiratory status acutely decompensated. Given her progressive dyspnea, her clinical picture does not really match with a COPD exacerbation. CXR on admission shows significant bilateral opacities. When compared to her CXR in May 2022, there does not seem to be significant changes from her previous image despite her  current acute respiratory decompensation. Would rule out other etiologies of hypoxia.       #AHRF on HFNC (35 LPM)  #CXR suggesting pulmonary edema  #TTE (8/3), LVEF 60-65%  -  not volume overloaded based on bedside IVC assessment (<2.1 cm, collapsible > 50%)   - RHC not indicated at this time  - Recommend further workup to rule out other etiologies of hypoxia, as clinical data is not suggestive of a cardiac etiology       The patient's care was discussed with the cardiology fellow Dr. Bedolla and Attending Physician, Dr. Sun.    ---  Dakota Roa, MS4  [employer]    I, George Bedolla MD, was present with the medical/TRINY student who participated in the service and in the documentation of the note.  I have verified the history and personally performed the physical exam and medical decision making.  I agree with the assessment and plan of care as documented in the note.       ______________________________________________________________________    Chief Complaint   Shortness of breath    History is obtained from the patient    History of Present Illness   Flor Navarro is a 56 year old female who presented with AHRF in ED thought 2/2 to possible diastolic dysfunction, HF. Initial VBG showed respiratory acidosis requiring escalation to BIPAP (50 LPM O2). Pt given IV lasix throughout admission but was held on 8/4 after pt developed CASTILLO on CKD. Pt respiratory status improved on BIPAP and Cr improving with holding lasix.     Per pt, she has had several week progressive dyspnea. She reports her son first noticed it when she was winded after walking one block. She also endorses orthopnea and chest pain but says that the chest pain is related to a traumatic accident she had a long time ago (in [place]?). Reports pain with palpation of her sternum. Pt reports her breathing has improved since her admission to the hospital.     Review of Systems   The 10 point Review of Systems is negative other than  noted in the HPI or here.     Past Medical History    I have reviewed this patient's medical history and updated it with pertinent information if needed.   Past Medical History:   Diagnosis Date     Anemia in CKD (chronic kidney disease)      Cataract      CKD (chronic kidney disease) stage 3, GFR 30-59 ml/min (H)      Hepatitis C     cleared virus spontaneously      High risk medication use      Hypertension, renal      Immunosuppressed status (H)      Liver replaced by transplant (H) 2010     Osteoporosis      Recurrent pregnancy loss without current pregnancy      Recurrent UTI 2021     Stroke, hemorrhagic (H) 2008     Syncope      Unspecified viral hepatitis C without hepatic coma        Past Surgical History   I have reviewed this patient's surgical history and updated it with pertinent information if needed.  Past Surgical History:   Procedure Laterality Date     CATARACT IOL, RT/LT Right 2/18/15      SECTION       Incisional Hernia Repair  2004     INSERT SHUNT PORTAL TRANSJUGULAR INTRAHEPTIC      shunt placement for liver failure     LAPAROSCOPIC SALPINGO-OOPHORECTOMY      left     NECK SURGERY  2010    fracture, in halo x 7months     TRANSPLANT LIVER RECIPIENT  DONOR  10/26/10     Upper GI Endoscopy with Band Ligation of Esoph/Gastric Varic. .         Social History   I have reviewed this patient's social history and updated it with pertinent information if needed.  Social History     Tobacco Use     Smoking status: Never Smoker     Smokeless tobacco: Never Used   Vaping Use     Vaping Use: Never used   Substance Use Topics     Alcohol use: No     Drug use: No     Family History   I have reviewed this patient's family history and updated it with pertinent information if needed.   I have reviewed this patient's family history and updated it with pertinent information if needed.  Family History   Problem Relation Age of Onset     Hepatitis Other         Hep C,  still in [place]     Cerebrovascular Disease Other      Cancer No family hx of      Diabetes No family hx of      Hypertension No family hx of      Thyroid Disease No family hx of      Glaucoma No family hx of      Macular Degeneration No family hx of        Medications   I have reviewed this patient's current medications    Allergies   Allergies   Allergen Reactions     Aspirin      3/31/16 Per pt, tolerates 81 mg daily dose without ADR.     325 mg dose caused itchiness and hives.     Clarithromycin      Allergic reaction         Contrast Dye      Iodine      Pcn [Penicillins]        Physical Exam   Vital Signs: Temp: 97.8  F (36.6  C) Temp src: Axillary BP: 133/77 Pulse: 82   Resp: 20 SpO2: 96 % O2 Device: High Flow Nasal Cannula (HFNC) Oxygen Delivery: 35 LPM  Weight: 242 lbs 4.8 oz    General Appearance: alert, interactive, obese, NAD  Eyes: no scleral icterus  Respiratory: clear lung sounds bilaterally  Cardiovascular: RRR, no murmurs, trace peripheral edema on BLE, JVP 8cm to level of mandible  GI: soft, obese, irregularly distended in RLQ, non tender, non-rigid  Skin: warm, dry  Musculoskeletal: no deformities noted  Neurologic: alert and oriented x3, answers questions and follow commands appropriately  Psychiatric: mood and affect appropriate        Data   I personally reviewed the EKG tracing showing sinus rhythm and the chest x-ray image(s) showing bibasilar opacities c/f pulmonary edema.

## 2022-08-05 NOTE — PLAN OF CARE
Pt A&Ox4, calls appropriately. No falls. Pt up to bedside commode x1 overnight, large BM overnight. Pt remains on Bipap throughout the night;  FiO2 50%, settings still 20/10, tolerating well. When pt up to commode, pt wears HFNC 100% 50L, also tolerating well. Pt swallows pills well. Cyclosporine held per provider order. Pt c/o no pain, no PRNs provided. Will continue to monitor.     Problem: Plan of Care - These are the overarching goals to be used throughout the patient stay.    Goal: Plan of Care Review/Shift Note  Description: The Plan of Care Review/Shift note should be completed every shift.  The Outcome Evaluation is a brief statement about your assessment that the patient is improving, declining, or no change.  This information will be displayed automatically on your shift note.  Outcome: Ongoing, Progressing  Flowsheets (Taken 8/4/2022 2200)  Plan of Care Reviewed With: patient  Outcome Evaluation: off Bipap during day and only on Bipap at night  Overall Patient Progress: improving   Goal Outcome Evaluation:    Plan of Care Reviewed With: patient     Overall Patient Progress: improving    Outcome Evaluation: off Bipap during day and only on Bipap at night

## 2022-08-05 NOTE — PROGRESS NOTES
Hepatology Inpatient Sign Off Note    Inpatient Hepatology consults service will sign off. No further recommendations at this time. If primary team has addition questions, please page consult fellow listed in rSuthi.    Current GI Consult Staff: Leventhal    Outgoing recommendations:  - resume PM cyclosporine dose today 8/5/22 as previous, then dose as typical     Follow up recommendations:   - will follow up with outpatient transplant hepatology at dischrage.    Case discussed with Dr. Leventhal.     Seb Moreira MD  Gastroenterology Fellow  Division of Gastroenterology, Hepatology and Nutrition  [facility]  [id number]  See [facility] for GI on-call information

## 2022-08-06 LAB
ALBUMIN SERPL BCG-MCNC: 3.4 G/DL (ref 3.5–5.2)
ALP SERPL-CCNC: 65 U/L (ref 35–104)
ALT SERPL W P-5'-P-CCNC: 15 U/L (ref 10–35)
ANION GAP SERPL CALCULATED.3IONS-SCNC: 6 MMOL/L (ref 7–15)
AST SERPL W P-5'-P-CCNC: 23 U/L (ref 10–35)
BACTERIA UR CULT: NORMAL
BILIRUB SERPL-MCNC: 0.5 MG/DL
BUN SERPL-MCNC: 25.1 MG/DL (ref 6–20)
CALCIUM SERPL-MCNC: 9 MG/DL (ref 8.6–10)
CHLORIDE SERPL-SCNC: 103 MMOL/L (ref 98–107)
CREAT SERPL-MCNC: 1.21 MG/DL (ref 0.51–0.95)
DEPRECATED HCO3 PLAS-SCNC: 30 MMOL/L (ref 22–29)
GFR SERPL CREATININE-BSD FRML MDRD: 52 ML/MIN/1.73M2
GLUCOSE SERPL-MCNC: 107 MG/DL (ref 70–99)
HOLD SPECIMEN: NORMAL
LACTATE SERPL-SCNC: 1.1 MMOL/L (ref 0.7–2)
POTASSIUM SERPL-SCNC: 5.1 MMOL/L (ref 3.4–5.3)
PROT SERPL-MCNC: 6.3 G/DL (ref 6.4–8.3)
SODIUM SERPL-SCNC: 139 MMOL/L (ref 136–145)

## 2022-08-06 PROCEDURE — 120N000002 HC R&B MED SURG/OB UMMC

## 2022-08-06 PROCEDURE — 999N000157 HC STATISTIC RCP TIME EA 10 MIN

## 2022-08-06 PROCEDURE — 250N000013 HC RX MED GY IP 250 OP 250 PS 637: Performed by: PEDIATRICS

## 2022-08-06 PROCEDURE — 250N000012 HC RX MED GY IP 250 OP 636 PS 637: Performed by: HOSPITALIST

## 2022-08-06 PROCEDURE — 84132 ASSAY OF SERUM POTASSIUM: CPT | Performed by: PHYSICIAN ASSISTANT

## 2022-08-06 PROCEDURE — 83605 ASSAY OF LACTIC ACID: CPT | Performed by: PEDIATRICS

## 2022-08-06 PROCEDURE — 80053 COMPREHEN METABOLIC PANEL: CPT | Performed by: PHYSICIAN ASSISTANT

## 2022-08-06 PROCEDURE — 99233 SBSQ HOSP IP/OBS HIGH 50: CPT | Performed by: PEDIATRICS

## 2022-08-06 PROCEDURE — 250N000011 HC RX IP 250 OP 636: Performed by: HOSPITALIST

## 2022-08-06 PROCEDURE — 250N000013 HC RX MED GY IP 250 OP 250 PS 637: Performed by: HOSPITALIST

## 2022-08-06 PROCEDURE — 94660 CPAP INITIATION&MGMT: CPT

## 2022-08-06 PROCEDURE — 36415 COLL VENOUS BLD VENIPUNCTURE: CPT | Performed by: PHYSICIAN ASSISTANT

## 2022-08-06 RX ADMIN — GABAPENTIN 300 MG: 300 CAPSULE ORAL at 22:30

## 2022-08-06 RX ADMIN — CALCIUM CARBONATE 600 MG (1,500 MG)-VITAMIN D3 400 UNIT TABLET 1 TABLET: at 09:49

## 2022-08-06 RX ADMIN — MYCOPHENOLATE MOFETIL 500 MG: 250 CAPSULE ORAL at 09:49

## 2022-08-06 RX ADMIN — Medication 1 MG: at 22:30

## 2022-08-06 RX ADMIN — SENNOSIDES AND DOCUSATE SODIUM 2 TABLET: 8.6; 5 TABLET ORAL at 20:15

## 2022-08-06 RX ADMIN — ALBUTEROL SULFATE 2 PUFF: 90 AEROSOL, METERED RESPIRATORY (INHALATION) at 20:30

## 2022-08-06 RX ADMIN — UMECLIDINIUM 1 PUFF: 62.5 AEROSOL, POWDER ORAL at 09:50

## 2022-08-06 RX ADMIN — CYCLOSPORINE 50 MG: 25 CAPSULE, LIQUID FILLED ORAL at 20:15

## 2022-08-06 RX ADMIN — ACETAMINOPHEN 500 MG: 500 TABLET, FILM COATED ORAL at 08:35

## 2022-08-06 RX ADMIN — AMLODIPINE BESYLATE 10 MG: 10 TABLET ORAL at 09:49

## 2022-08-06 RX ADMIN — CALCITRIOL CAPSULES 0.25 MCG 0.25 MCG: 0.25 CAPSULE ORAL at 09:48

## 2022-08-06 RX ADMIN — OLOPATADINE HYDROCHLORIDE 1 DROP: 1 SOLUTION OPHTHALMIC at 09:55

## 2022-08-06 RX ADMIN — ALBUTEROL SULFATE 2 PUFF: 90 AEROSOL, METERED RESPIRATORY (INHALATION) at 09:50

## 2022-08-06 RX ADMIN — OMEPRAZOLE 20 MG: 20 CAPSULE, DELAYED RELEASE ORAL at 09:50

## 2022-08-06 RX ADMIN — ALBUTEROL SULFATE 2 PUFF: 90 AEROSOL, METERED RESPIRATORY (INHALATION) at 12:05

## 2022-08-06 RX ADMIN — Medication 1 CAPSULE: at 20:15

## 2022-08-06 RX ADMIN — Medication 1 CAPSULE: at 09:49

## 2022-08-06 RX ADMIN — CYCLOSPORINE 75 MG: 25 CAPSULE, LIQUID FILLED ORAL at 09:48

## 2022-08-06 RX ADMIN — OMEPRAZOLE 20 MG: 20 CAPSULE, DELAYED RELEASE ORAL at 20:30

## 2022-08-06 RX ADMIN — CETIRIZINE HYDROCHLORIDE 10 MG: 10 TABLET, FILM COATED ORAL at 09:49

## 2022-08-06 RX ADMIN — ALBUTEROL SULFATE 2 PUFF: 90 AEROSOL, METERED RESPIRATORY (INHALATION) at 15:46

## 2022-08-06 RX ADMIN — HEPARIN SODIUM 5000 UNITS: 5000 INJECTION, SOLUTION INTRAVENOUS; SUBCUTANEOUS at 09:50

## 2022-08-06 RX ADMIN — MYCOPHENOLATE MOFETIL 500 MG: 250 CAPSULE ORAL at 18:54

## 2022-08-06 RX ADMIN — Medication 25 MCG: at 09:49

## 2022-08-06 RX ADMIN — Medication 1 TABLET: at 09:48

## 2022-08-06 RX ADMIN — CALCIUM CARBONATE 600 MG (1,500 MG)-VITAMIN D3 400 UNIT TABLET 1 TABLET: at 20:15

## 2022-08-06 RX ADMIN — FEBUXOSTAT 40 MG: 40 TABLET, FILM COATED ORAL at 09:49

## 2022-08-06 ASSESSMENT — ACTIVITIES OF DAILY LIVING (ADL)
ADLS_ACUITY_SCORE: 35

## 2022-08-06 NOTE — PLAN OF CARE
Pt A&Ox4, calls appropriately. No falls. Pt up to bedside commode x1 overnight. Pt remains on Bipap throughout the night;  FiO2 decreased to 40%, settings still 20/10, tolerating well. When pt up to commode, pt wears HFNC 80% 35L, also tolerating well. Pt swallows pills well. Pt c/o no pain, no PRNs provided. Will continue to monitor.    Problem: Plan of Care - These are the overarching goals to be used throughout the patient stay.    Goal: Plan of Care Review/Shift Note  Description: The Plan of Care Review/Shift note should be completed every shift.  The Outcome Evaluation is a brief statement about your assessment that the patient is improving, declining, or no change.  This information will be displayed automatically on your shift note.  Outcome: Ongoing, Progressing  Flowsheets (Taken 8/5/2022 0845)  Plan of Care Reviewed With: patient  Outcome Evaluation: Pt denies SOB, reports her breathing is better, remains on Bipap overnight  Overall Patient Progress: improving   Goal Outcome Evaluation:    Plan of Care Reviewed With: patient     Overall Patient Progress: improving    Outcome Evaluation: Pt denies SOB, reports her breathing is better, remains on Bipap overnight

## 2022-08-06 NOTE — PROGRESS NOTES
1133: Just wanted to FYI that I tried transitioning her to oxymask 15 L and she desated to 84%. Placed back on HHFNC. I will continue to wean as able! Thx!    NURSING PROGRESS NOTE  Shift Summary    Date: August 6, 2022     Neuro/Musculoskeletal:  A&Ox4.   Cardiac:  SR.  VSS.   Respiratory:  Sating in the 90s on HHFNC 40L, 80% FiO2.  GI/:  Adequate urine output.  LBM: 8/6  Diet/Appetite:  Tolerating regular diet.  Activity:  SBA to commode.  Pain:  HA- tylenol helps.  Skin:  No new deficits noted.     Plan: Continue to wean O2 + increase activity as tolerated.    Nadia Nogueira RN  .................................................... August 6, 2022   2:36 PM  [facility] ([facility]): [facility] ICU (Unit 6D)

## 2022-08-06 NOTE — PROGRESS NOTES
[facility]    Medicine Progress Note - Hospitalist Service, GOLD TEAM 7    Date of Admission:  8/2/2022    Assessment & Plan        Flor Navarro is a 56 year old female with PMH of Liver Tx for hepatitis C, HTN, CKDIII, SURI, Obesity, h/o SBO presented on 8/2/22 w/ SOB and abdominal pain. Patient transferred from [facility] to [place] for acute hypoxic, hypercarbic respiratory failure.     Acute hypoxic, hypercapnic respiratory failure  Per prior documentation, there is some documentation that she has COPD and takes inhalers at home. Per chart review, patient has been noncompliant with CPAP prior to transfer to [place]. CXR with pulmonary edema.  BNP is elevated at 1238. Noted to have bibasilar rales on exam. Improved with diuresis but now with slowed improvement.  Will continue to wean as tolerated as CASTILLO resolves.  -Nephrology and cardiology consulted, appreciate assistance  - HFNC, wean as tolerated  - Continue PTA prn albuterol and Umeclidinium daily  - Monitor on telemetry     Chronic intermittent abdominal pain  H/o Small Bowel obstruction  Pt initially presented w/ RUQ abd pain radiating to right low back, nausea w/o vomiting.  WBC unremarkable. CMP baseline. UA bland.   - CTM     CASTILLO   CKD stage III  Baseline Cr ~1.1-1.2 range; on admission, Cr 1.27 but increased acutely.  Now downtrending  - Consult nephrology, appreciate assistance  - Avoid nephrotoxic agents  - BMP daily  - Hold further diuresis     Hx DDLT (2010) 2/2 Hepatitis C  - USG liver showed unremarkable grayscale appearance of the transplant liver. No biliary obstruction. Patent transplant vasculature with proper directional flow as above. Relatively high resistance flow within the hepatic artery, new since prior examination. Differential is broad but includes rejection. LFTs are wnl except AST of 47.  - Transplant hepatology consulted, appreciate assistance  -Resume cyclosporine dose  - Continue  MMF     HTN  - Continue PTA amlodipine 10 mg every other day  - Diuresis as above     GERD  - Continue PTA Omeprazole 20 mg BID     Obesity  H/o SURI  Not compliant with CPAP.  - HFNC during day, CPAP at night    Acute encephalopathy, resolved  Patient noted to be excessively somnolent but arousable prior to transfer, likely d/t hypercapnia. No confusion at present  - Respiratory management as above    Hyperkalemia, resolved  K on admit was 5.9, now normalized  - BMP daily       Diet: Regular Diet Adult  Room Service    DVT Prophylaxis: Ambulate every shift  Li Catheter: Not present  Central Lines: None  Cardiac Monitoring: ACTIVE order. Indication: Acute decompensated heart failure (48 hours)  Code Status: Full Code      Disposition Plan     Expected Discharge Date: 08/08/2022    Discharge Delays: Oxygen Needs - Arrange Home O2  Destination: home          The patient's care was discussed with the Bedside Nurse and Patient.    Breanna Zuluaga MD  Hospitalist Service, [address]  Securely message with the Vocera Web Console (learn more here)  Text page via [facility] Paging/Directory   Please see signed in provider for up to date coverage information      Clinically Significant Risk Factors Present on Admission                     ______________________________________________________________________    Interval History   Flor did well overnight and continues to feel better.  She feels her breathing is steadily improving and is bothered by the VTE prophylaxis shots.  She is amenable to ambulation as an alternative and she has no other complaints or concerns.    The remainder of a 4 point ROS is negative unless otherwise noted above.    Data reviewed today: I reviewed all medications, new labs and imaging results over the last 24 hours.    Physical Exam   Vital Signs: Temp: 97.5  F (36.4  C) Temp src: Axillary BP: 118/70 Pulse: 64   Resp: 14 SpO2: 98 % O2  Device: BiPAP/CPAP Oxygen Delivery: 35 LPM  Weight: 244 lbs 0 oz  Gen: Awake, alert, in no acute distress on high flow nasal cannula sitting up in bed  HEENT: NC/AT, sclera anicteric  Resp: Rare crackles posteriorly, no wheezes, no increased WOB on high flow nasal cannula  CV: RRR, no mumurs  Abd: Soft, non-tender, non-distended  Extrem: Warm and well perfused, trace LE edema bilaterally

## 2022-08-06 NOTE — PLAN OF CARE
NURSING PROGRESS NOTE  Shift Summary    Date: August 5, 2022     Neuro/Musculoskeletal:  A&Ox4.   Cardiac:  SR.  VSS.   Respiratory:  Sating in the 90s on HHFNC 80% Fio2, 35 L. SOB w/ activity.  GI/:  Adequate urine output. LBM: 8/5- loose, undigested food.  Diet/Appetite:  Tolerating regular diet. Pt requiring help with ordering food d/t language barrier.  Activity:  Assist of 1 to commode.  Pain:  Denies this afternoon. Mild HA this AM.  Skin:  No new deficits noted.    Pertinent Shift Updates: Cards consult today    Plan: Continue to wean O2 + increase activity as tolerated    Nadia Nogueira RN  .................................................... August 5, 2022   7:32 PM  [facility] ([facility]): [place]  [facility] (Unit 6D)      Goal Outcome Evaluation:    Plan of Care Reviewed With: patient     Overall Patient Progress: improving    Outcome Evaluation: HHFNC @ 80% FiO2, 35 lpm. Pt reports feeling better and that breathing has improved. Cards consult today. Continue to wean O2 as tolerated.

## 2022-08-07 ENCOUNTER — APPOINTMENT (OUTPATIENT)
Dept: CT IMAGING | Facility: CLINIC | Age: 57
DRG: 189 | End: 2022-08-07
Attending: PEDIATRICS
Payer: MEDICARE

## 2022-08-07 ENCOUNTER — APPOINTMENT (OUTPATIENT)
Dept: PHYSICAL THERAPY | Facility: CLINIC | Age: 57
DRG: 189 | End: 2022-08-07
Attending: HOSPITALIST
Payer: MEDICARE

## 2022-08-07 LAB
ALBUMIN SERPL BCG-MCNC: 3.6 G/DL (ref 3.5–5.2)
ALP SERPL-CCNC: 67 U/L (ref 35–104)
ALT SERPL W P-5'-P-CCNC: 16 U/L (ref 10–35)
ANION GAP SERPL CALCULATED.3IONS-SCNC: 7 MMOL/L (ref 7–15)
AST SERPL W P-5'-P-CCNC: 20 U/L (ref 10–35)
BILIRUB SERPL-MCNC: 0.4 MG/DL
BUN SERPL-MCNC: 19.4 MG/DL (ref 6–20)
CALCIUM SERPL-MCNC: 9.5 MG/DL (ref 8.6–10)
CHLORIDE SERPL-SCNC: 104 MMOL/L (ref 98–107)
CREAT SERPL-MCNC: 0.98 MG/DL (ref 0.51–0.95)
DEPRECATED HCO3 PLAS-SCNC: 31 MMOL/L (ref 22–29)
GFR SERPL CREATININE-BSD FRML MDRD: 67 ML/MIN/1.73M2
GLUCOSE SERPL-MCNC: 113 MG/DL (ref 70–99)
HOLD SPECIMEN: NORMAL
POTASSIUM SERPL-SCNC: 4.9 MMOL/L (ref 3.4–5.3)
POTASSIUM SERPL-SCNC: 4.9 MMOL/L (ref 3.4–5.3)
PROT SERPL-MCNC: 6.5 G/DL (ref 6.4–8.3)
SODIUM SERPL-SCNC: 142 MMOL/L (ref 136–145)

## 2022-08-07 PROCEDURE — 250N000013 HC RX MED GY IP 250 OP 250 PS 637: Performed by: HOSPITALIST

## 2022-08-07 PROCEDURE — G1010 CDSM STANSON: HCPCS | Mod: GC | Performed by: RADIOLOGY

## 2022-08-07 PROCEDURE — 94660 CPAP INITIATION&MGMT: CPT

## 2022-08-07 PROCEDURE — G1010 CDSM STANSON: HCPCS

## 2022-08-07 PROCEDURE — 97530 THERAPEUTIC ACTIVITIES: CPT | Mod: GP | Performed by: PHYSICAL THERAPIST

## 2022-08-07 PROCEDURE — 97162 PT EVAL MOD COMPLEX 30 MIN: CPT | Mod: GP | Performed by: PHYSICAL THERAPIST

## 2022-08-07 PROCEDURE — 80053 COMPREHEN METABOLIC PANEL: CPT | Performed by: PHYSICIAN ASSISTANT

## 2022-08-07 PROCEDURE — 999N000185 HC STATISTIC TRANSPORT TIME EA 15 MIN

## 2022-08-07 PROCEDURE — 250N000012 HC RX MED GY IP 250 OP 636 PS 637: Performed by: HOSPITALIST

## 2022-08-07 PROCEDURE — 84132 ASSAY OF SERUM POTASSIUM: CPT | Performed by: PHYSICIAN ASSISTANT

## 2022-08-07 PROCEDURE — 82040 ASSAY OF SERUM ALBUMIN: CPT | Performed by: PHYSICIAN ASSISTANT

## 2022-08-07 PROCEDURE — 71250 CT THORAX DX C-: CPT | Mod: MG

## 2022-08-07 PROCEDURE — 999N000157 HC STATISTIC RCP TIME EA 10 MIN

## 2022-08-07 PROCEDURE — 250N000013 HC RX MED GY IP 250 OP 250 PS 637: Performed by: PEDIATRICS

## 2022-08-07 PROCEDURE — 36415 COLL VENOUS BLD VENIPUNCTURE: CPT | Performed by: PHYSICIAN ASSISTANT

## 2022-08-07 PROCEDURE — 99233 SBSQ HOSP IP/OBS HIGH 50: CPT | Performed by: PEDIATRICS

## 2022-08-07 PROCEDURE — 71250 CT THORAX DX C-: CPT | Mod: 26 | Performed by: RADIOLOGY

## 2022-08-07 PROCEDURE — 120N000002 HC R&B MED SURG/OB UMMC

## 2022-08-07 PROCEDURE — 97161 PT EVAL LOW COMPLEX 20 MIN: CPT | Mod: GP | Performed by: PHYSICAL THERAPIST

## 2022-08-07 RX ADMIN — CYCLOSPORINE 50 MG: 25 CAPSULE, LIQUID FILLED ORAL at 20:30

## 2022-08-07 RX ADMIN — MYCOPHENOLATE MOFETIL 500 MG: 250 CAPSULE ORAL at 18:28

## 2022-08-07 RX ADMIN — CYCLOSPORINE 75 MG: 25 CAPSULE, LIQUID FILLED ORAL at 09:32

## 2022-08-07 RX ADMIN — CALCIUM CARBONATE 600 MG (1,500 MG)-VITAMIN D3 400 UNIT TABLET 1 TABLET: at 20:30

## 2022-08-07 RX ADMIN — FEBUXOSTAT 40 MG: 40 TABLET, FILM COATED ORAL at 09:35

## 2022-08-07 RX ADMIN — GABAPENTIN 300 MG: 300 CAPSULE ORAL at 21:23

## 2022-08-07 RX ADMIN — SENNOSIDES AND DOCUSATE SODIUM 2 TABLET: 8.6; 5 TABLET ORAL at 09:33

## 2022-08-07 RX ADMIN — Medication 1 TABLET: at 16:32

## 2022-08-07 RX ADMIN — OLOPATADINE HYDROCHLORIDE 1 DROP: 1 SOLUTION OPHTHALMIC at 09:34

## 2022-08-07 RX ADMIN — CALCITRIOL CAPSULES 0.25 MCG 0.25 MCG: 0.25 CAPSULE ORAL at 09:32

## 2022-08-07 RX ADMIN — AMLODIPINE BESYLATE 10 MG: 10 TABLET ORAL at 09:32

## 2022-08-07 RX ADMIN — CALCIUM CARBONATE 600 MG (1,500 MG)-VITAMIN D3 400 UNIT TABLET 1 TABLET: at 09:32

## 2022-08-07 RX ADMIN — ACETAMINOPHEN 500 MG: 500 TABLET, FILM COATED ORAL at 16:32

## 2022-08-07 RX ADMIN — UMECLIDINIUM 1 PUFF: 62.5 AEROSOL, POWDER ORAL at 09:34

## 2022-08-07 RX ADMIN — CETIRIZINE HYDROCHLORIDE 10 MG: 10 TABLET, FILM COATED ORAL at 09:32

## 2022-08-07 RX ADMIN — MYCOPHENOLATE MOFETIL 500 MG: 250 CAPSULE ORAL at 09:32

## 2022-08-07 RX ADMIN — Medication 1 CAPSULE: at 09:32

## 2022-08-07 RX ADMIN — Medication 25 MCG: at 09:32

## 2022-08-07 RX ADMIN — ALBUTEROL SULFATE 2 PUFF: 90 AEROSOL, METERED RESPIRATORY (INHALATION) at 09:33

## 2022-08-07 RX ADMIN — OMEPRAZOLE 20 MG: 20 CAPSULE, DELAYED RELEASE ORAL at 09:35

## 2022-08-07 RX ADMIN — ALBUTEROL SULFATE 2 PUFF: 90 AEROSOL, METERED RESPIRATORY (INHALATION) at 20:30

## 2022-08-07 RX ADMIN — ALBUTEROL SULFATE 2 PUFF: 90 AEROSOL, METERED RESPIRATORY (INHALATION) at 16:32

## 2022-08-07 RX ADMIN — OMEPRAZOLE 20 MG: 20 CAPSULE, DELAYED RELEASE ORAL at 20:30

## 2022-08-07 ASSESSMENT — ACTIVITIES OF DAILY LIVING (ADL)
ADLS_ACUITY_SCORE: 35

## 2022-08-07 NOTE — PLAN OF CARE
/85 (BP Location: Right arm)   Pulse 70   Temp 97.4  F (36.3  C) (Axillary)   Resp 20   Wt 110.7 kg (244 lb)   LMP  (LMP Unknown)   SpO2 95%   BMI 47.65 kg/m       Uneventful night pt on HFNC at 60%  then  BIPAP 20/10 no acute changes noted.  Problem: Plan of Care - These are the overarching goals to be used throughout the patient stay.    Goal: Optimal Comfort and Wellbeing  Outcome: Ongoing, Progressing

## 2022-08-07 NOTE — PLAN OF CARE
BP (!) 139/90 (BP Location: Right arm, Cuff Size: Adult Regular)   Pulse 89   Temp 98  F (36.7  C) (Oral)   Resp 20   Wt 110.7 kg (244 lb)   LMP  (LMP Unknown)   SpO2 94%   BMI 47.65 kg/m       NURSING PROGRESS NOTE  Shift Summary  Date: August 7, 2022   Neuro/Musculoskeletal:  A&Ox4.  Cardiac:  SR. Mild hypertension.   Respiratory:  Sating in the 90s on HFNC 35 L, 50%  GI/:  Adequate urine output.  LBM: 8/7- multiple, loose stools- undigested food.  Diet/Appetite:  Tolerating regular diet.  Activity: SBA to commode.  Pain:  Denies while at rest. Increased back pain with activity.  Skin:  No new deficits noted.     Pertinent Shift Updates: Still unable to wean O2, provider ordered chest CT-awaiting results.    Plan: Wean O2 and increase activity as tolerated. Awaiting CT results for cause of hypoxia.    Nadia Nogueira RN  .................................................... August 7, 2022   6:38 PM  [facility] ([facility]): [facility] ICU (Unit 6D)      Goal Outcome Evaluation:    Plan of Care Reviewed With: patient     Overall Patient Progress: no change    Outcome Evaluation: Pt feels that her breathing has improved, however we haven't been able to wean her O2. Primary provider ordered chest CT to determine underlying cause of hypoxia.

## 2022-08-07 NOTE — PROGRESS NOTES
"   08/07/22 1227   Quick Adds   Type of Visit Initial PT Evaluation   Living Environment   People in Home child(flaquita), adult   Current Living Arrangements house   Home Accessibility stairs to enter home   Living Environment Comments denies stairs being present but hard to be sure 100% questions understood despite  use; pt sleeps on main level, son sleeps upstairs. Difficult to determine if pt has 24/7 assist at home despite many questions phrased differently,  use. Pt may have been trying to indicate she has a PCA. She mentioned wanting more \"hours\".   Self-Care   Usual Activity Tolerance fair   Current Activity Tolerance moderate  (to good)   Regular Exercise   (reports doing leg exercises in bed)   Equipment Currently Used at Home cane, straight;walker, standard;shower chair;wheelchair, power  (first alert necklace)   Fall history within last six months yes   Number of times patient has fallen within last six months 4   Activity/Exercise/Self-Care Comment Per pt, feels she needs a back brace for back pain that is 1x/wk or month depending on status; reports ongoing R LE pain from a fall 5 days ago, denies fx but notes ongoing issues at her R knee. At baseline can walk 1-2 blocks per report. Pt notes R UE weakness (including ) and reduced ROM since a CVA. Notes sitting to perform ex is more uncomfortable per pt report of abdominal girth.   General Information   Onset of Illness/Injury or Date of Surgery 08/02/22   Referring Physician Paulina Vaelnzuela MD   Patient/Family Therapy Goals Statement (PT) To get tube out of my nose.   Pertinent History of Current Problem (include personal factors and/or comorbidities that impact the POC) Flor Navarro is a 56 year old female with PMH of Liver Tx for hepatitis C, HTN, CKDIII, SURI, Obesity, h/o SBO presented on 8/2/22 w/ SOB and abdominal pain. Patient transferred from [facility] [place] to [place] for acute hypoxic, hypercarbic respiratory failure. "   Existing Precautions/Restrictions oxygen therapy device and L/min;cardiac   General Observations on FiO2 60% via HFNC; activity: ambulate with assist; Greenlandic  used   Cognition   Cognitive Status Comments able to communicate with  assist;  was not always interpreting as pt speaks and understands some English   Pain Assessment   Patient Currently in Pain Yes, see Vital Sign flowsheet  (low back, R knee/thigh)   Posture    Posture Comments reduced upright posture   Range of Motion (ROM)   ROM Comment slightly reduced hip flexion per body habitus (abdomen), reduced R shoulder abduction and flexion AROM   Strength (Manual Muscle Testing)   Strength Comments no MMT R LE due to pain, pt guarded. Due to communication barriers hard to get accurate MMT to L LE or UEs, but appears to have at least 3/5 L shoulder flex, 3-/5 R shoulder flex   Bed Mobility   Comment, (Bed Mobility) NT, pt declined bed return   Transfers   Comment, (Transfers) BENITO sit<>stand, SOB with task   Gait/Stairs (Locomotion)   Comment, (Gait/Stairs) CGA side-stepping (limited by HFNC)   Balance   Balance Comments SBA sitting static balance; slightly impaired standing static and dynamic balance   Clinical Impression   Criteria for Skilled Therapeutic Intervention Yes, treatment indicated   PT Diagnosis (PT) impaired mobility   Influenced by the following impairments reduced activity tolerance   Functional limitations due to impairments below baseline bed mobility, transfers, gait   Clinical Presentation (PT Evaluation Complexity) Evolving/Changing   Clinical Presentation Rationale clinical judgement, [facility]   Clinical Decision Making (Complexity) moderate complexity   Planned Therapy Interventions (PT) balance training;bed mobility training;home exercise program;neuromuscular re-education;patient/family education;postural re-education;ROM (range of motion);strengthening;stair training;stretching;transfer training;progressive  activity/exercise;wheelchair management/propulsion training;risk factor education;home program guidelines;gait training   Risk & Benefits of therapy have been explained evaluation/treatment results reviewed;care plan/treatment goals reviewed;risks/benefits reviewed;current/potential barriers reviewed;participants voiced agreement with care plan;participants included;patient   PT Discharge Planning   PT Discharge Recommendation (DC Rec) home with assist;home with home care physical therapy;Transitional Care Facility   PT Rationale for DC Rec Pt at this time is presenting with below baseline activity tolerance. Pending LOS and progress may be safe to go home if able to resume walking household distances. At this time she was limited by her lines but also SOB. Will work with RT to see if we can ambulate pt in the future with adaptations. Pt presently on HFNC.   PT Brief overview of current status Ax1 to transfer   Total Evaluation Time   Total Evaluation Time (Minutes) 14   Physical Therapy Goals   PT Frequency 6x/week   PT Predicted Duration/Target Date for Goal Attainment 08/14/22   PT Goals Bed Mobility;Transfers;Gait

## 2022-08-07 NOTE — PROGRESS NOTES
[facility]    Medicine Progress Note - Hospitalist Service, GOLD TEAM 7    Date of Admission:  8/2/2022    Assessment & Plan        Flor Navarro is a 56 year old female with PMH of Liver Tx for hepatitis C, HTN, CKDIII, SURI, Obesity, h/o SBO presented on 8/2/22 w/ SOB and abdominal pain. Patient transferred from [facility] to [place] for acute hypoxic, hypercarbic respiratory failure.     Acute hypoxic, hypercapnic respiratory failure  Per prior documentation, there is some documentation that she has COPD and takes inhalers at home. Per chart review, patient has been noncompliant with CPAP prior to transfer to [place]. CXR with pulmonary edema.  BNP is elevated at 1238. Noted to have bibasilar rales on exam. Improved with diuresis but now with slowed improvement.  CT Chest 8/7/2022 to evaluate for persistent pulmonary edema versus other changes.  -Nephrology and cardiology consulted, appreciate assistance  - HFNC, wean as tolerated  - Continue PTA prn albuterol and Umeclidinium daily  - CT Chest     Chronic intermittent abdominal pain  H/o Small Bowel obstruction  Pt initially presented w/ RUQ abd pain radiating to right low back, nausea w/o vomiting.  WBC unremarkable. CMP baseline. UA bland.   - CTM     CASTILLO   CKD stage III  Baseline Cr ~1.1-1.2 range; on admission, Cr 1.27 but increased acutely.  Now downtrending  - Consult nephrology, appreciate assistance  - Avoid nephrotoxic agents  - BMP daily  - Hold further diuresis     Hx DDLT (2010) 2/2 Hepatitis C  - USG liver showed unremarkable grayscale appearance of the transplant liver. No biliary obstruction. Patent transplant vasculature with proper directional flow as above. Relatively high resistance flow within the hepatic artery, new since prior examination. Differential is broad but includes rejection. LFTs are wnl except AST of 47.  - Transplant hepatology consulted, appreciate assistance  -Resume cyclosporine  dose  - Continue MMF     HTN  - Continue PTA amlodipine 10 mg every other day  - Diuresis as above     GERD  - Continue PTA Omeprazole 20 mg BID     Obesity  H/o SURI  Not compliant with CPAP.  - HFNC during day, CPAP at night    Acute encephalopathy, resolved  Patient noted to be excessively somnolent but arousable prior to transfer, likely d/t hypercapnia. No confusion at present  - Respiratory management as above    Hyperkalemia, resolved  K on admit was 5.9, now normalized  - BMP daily         Diet: Regular Diet Adult  Room Service    DVT Prophylaxis: Ambulate every shift  Li Catheter: Not present  Central Lines: None  Cardiac Monitoring: None  Code Status: Full Code      Disposition Plan     Expected Discharge Date: 08/09/2022    Discharge Delays: Oxygen Needs - Arrange Home O2  Destination: home          The patient's care was discussed with the Bedside Nurse and Patient.    Breanna Zuluaga MD  Hospitalist Service, [facility] TEAM [address] [place] [address]  Securely message with the Vocera Web Console (learn more here)  Text page via [employer] Paging/Directory   Please see signed in provider for up to date coverage information      Clinically Significant Risk Factors Present on Admission                      ______________________________________________________________________    Interval History   Nursing notes reviewed, no acute events overnight.  Bothered by burnt toast this morning but no other complaints.  She denies difficulty breathing and is amenable to CT scan for further evaluation.  No new concerns.    The remainder of a 4 point ROS is negative unless otherwise noted above.    Data reviewed today: I reviewed all medications, new labs and imaging results over the last 24 hours.    Physical Exam   Vital Signs: Temp: 97.4  F (36.3  C) Temp src: Axillary BP: 133/85 Pulse: 70   Resp: 20 SpO2: 95 % O2 Device: High Flow Nasal Cannula (HFNC) Oxygen Delivery: 40 LPM  Weight: 244  lbs 0 oz  Gen: Awake, alert, in no acute distress on high flow nasal cannula sitting up in bed eating breakfast  HEENT: NC/AT, sclera anicteric  Resp: Rare crackles posteriorly, no wheezes, no increased WOB on high flow nasal cannula  CV: RRR, no mumurs  Abd: Soft, non-tender, non-distended  Extrem: Warm and well perfused, trace LE edema bilaterally  Neuro: Awake, alert, oriented X3, answering questions appropriately.

## 2022-08-07 NOTE — PROGRESS NOTES
/83 (BP Location: Right arm, Cuff Size: Adult Regular)   Pulse 86   Temp 97.1  F (36.2  C)   Resp 18   Wt 110.7 kg (244 lb)   LMP  (LMP Unknown)   SpO2 95%   BMI 47.65 kg/m       Neuro: A&Ox4.   Cardiac: Afebrile, VSS.   Respiratory: HFNC @60%FIO2 weaned from 80% FIO2  GI/: Voiding spontaneously. No BM this shift.   Diet/appetite: Tolerating diet. Denies nausea   Activity: Up A-1   Pain: Denies   Skin: No new deficits noted.  Lines:PIV  Drains: None  No new complaints.Will continue to monitor and follow plan of care.

## 2022-08-08 ENCOUNTER — DOCUMENTATION ONLY (OUTPATIENT)
Dept: FAMILY MEDICINE | Facility: CLINIC | Age: 57
End: 2022-08-08

## 2022-08-08 LAB
ALBUMIN SERPL BCG-MCNC: 3.8 G/DL (ref 3.5–5.2)
ALP SERPL-CCNC: 75 U/L (ref 35–104)
ALT SERPL W P-5'-P-CCNC: 16 U/L (ref 10–35)
ANION GAP SERPL CALCULATED.3IONS-SCNC: 9 MMOL/L (ref 7–15)
AST SERPL W P-5'-P-CCNC: 27 U/L (ref 10–35)
BILIRUB SERPL-MCNC: 0.6 MG/DL
BUN SERPL-MCNC: 17.1 MG/DL (ref 6–20)
CALCIUM SERPL-MCNC: 10 MG/DL (ref 8.6–10)
CHLORIDE SERPL-SCNC: 102 MMOL/L (ref 98–107)
CREAT SERPL-MCNC: 0.96 MG/DL (ref 0.51–0.95)
DEPRECATED HCO3 PLAS-SCNC: 30 MMOL/L (ref 22–29)
GFR SERPL CREATININE-BSD FRML MDRD: 69 ML/MIN/1.73M2
GLUCOSE SERPL-MCNC: 97 MG/DL (ref 70–99)
HOLD SPECIMEN: NORMAL
LACTATE SERPL-SCNC: 1.4 MMOL/L (ref 0.7–2)
POTASSIUM SERPL-SCNC: 5.2 MMOL/L (ref 3.4–5.3)
PROT SERPL-MCNC: 7.3 G/DL (ref 6.4–8.3)
SODIUM SERPL-SCNC: 141 MMOL/L (ref 136–145)

## 2022-08-08 PROCEDURE — 999N000157 HC STATISTIC RCP TIME EA 10 MIN

## 2022-08-08 PROCEDURE — 99233 SBSQ HOSP IP/OBS HIGH 50: CPT | Performed by: PEDIATRICS

## 2022-08-08 PROCEDURE — 94660 CPAP INITIATION&MGMT: CPT

## 2022-08-08 PROCEDURE — 250N000012 HC RX MED GY IP 250 OP 636 PS 637: Performed by: HOSPITALIST

## 2022-08-08 PROCEDURE — 36415 COLL VENOUS BLD VENIPUNCTURE: CPT | Performed by: NURSE PRACTITIONER

## 2022-08-08 PROCEDURE — 250N000013 HC RX MED GY IP 250 OP 250 PS 637: Performed by: HOSPITALIST

## 2022-08-08 PROCEDURE — 83605 ASSAY OF LACTIC ACID: CPT | Performed by: NURSE PRACTITIONER

## 2022-08-08 PROCEDURE — 36415 COLL VENOUS BLD VENIPUNCTURE: CPT | Performed by: PHYSICIAN ASSISTANT

## 2022-08-08 PROCEDURE — 120N000002 HC R&B MED SURG/OB UMMC

## 2022-08-08 PROCEDURE — 80053 COMPREHEN METABOLIC PANEL: CPT | Performed by: PHYSICIAN ASSISTANT

## 2022-08-08 PROCEDURE — 250N000013 HC RX MED GY IP 250 OP 250 PS 637: Performed by: PEDIATRICS

## 2022-08-08 RX ADMIN — CETIRIZINE HYDROCHLORIDE 10 MG: 10 TABLET, FILM COATED ORAL at 09:38

## 2022-08-08 RX ADMIN — ALBUTEROL SULFATE 2 PUFF: 90 AEROSOL, METERED RESPIRATORY (INHALATION) at 18:03

## 2022-08-08 RX ADMIN — AMLODIPINE BESYLATE 10 MG: 10 TABLET ORAL at 09:41

## 2022-08-08 RX ADMIN — CALCIUM CARBONATE 600 MG (1,500 MG)-VITAMIN D3 400 UNIT TABLET 1 TABLET: at 19:51

## 2022-08-08 RX ADMIN — ALBUTEROL SULFATE 2 PUFF: 90 AEROSOL, METERED RESPIRATORY (INHALATION) at 19:53

## 2022-08-08 RX ADMIN — ALBUTEROL SULFATE 2 PUFF: 90 AEROSOL, METERED RESPIRATORY (INHALATION) at 09:48

## 2022-08-08 RX ADMIN — GABAPENTIN 300 MG: 300 CAPSULE ORAL at 21:44

## 2022-08-08 RX ADMIN — FEBUXOSTAT 40 MG: 40 TABLET, FILM COATED ORAL at 09:42

## 2022-08-08 RX ADMIN — UMECLIDINIUM 1 PUFF: 62.5 AEROSOL, POWDER ORAL at 09:48

## 2022-08-08 RX ADMIN — OMEPRAZOLE 20 MG: 20 CAPSULE, DELAYED RELEASE ORAL at 19:51

## 2022-08-08 RX ADMIN — Medication 1 TABLET: at 12:03

## 2022-08-08 RX ADMIN — SENNOSIDES AND DOCUSATE SODIUM 2 TABLET: 8.6; 5 TABLET ORAL at 19:49

## 2022-08-08 RX ADMIN — CYCLOSPORINE 75 MG: 25 CAPSULE, LIQUID FILLED ORAL at 09:39

## 2022-08-08 RX ADMIN — CALCITRIOL CAPSULES 0.25 MCG 0.25 MCG: 0.25 CAPSULE ORAL at 09:41

## 2022-08-08 RX ADMIN — MYCOPHENOLATE MOFETIL 500 MG: 250 CAPSULE ORAL at 09:41

## 2022-08-08 RX ADMIN — Medication 1 CAPSULE: at 19:48

## 2022-08-08 RX ADMIN — Medication 1 DROP: at 19:51

## 2022-08-08 RX ADMIN — ALBUTEROL SULFATE 2 PUFF: 90 AEROSOL, METERED RESPIRATORY (INHALATION) at 12:03

## 2022-08-08 RX ADMIN — OMEPRAZOLE 20 MG: 20 CAPSULE, DELAYED RELEASE ORAL at 09:43

## 2022-08-08 RX ADMIN — Medication 1 CAPSULE: at 09:41

## 2022-08-08 RX ADMIN — Medication 25 MCG: at 09:41

## 2022-08-08 RX ADMIN — CYCLOSPORINE 50 MG: 25 CAPSULE, LIQUID FILLED ORAL at 19:51

## 2022-08-08 RX ADMIN — MYCOPHENOLATE MOFETIL 500 MG: 250 CAPSULE ORAL at 18:03

## 2022-08-08 RX ADMIN — OLOPATADINE HYDROCHLORIDE 1 DROP: 1 SOLUTION OPHTHALMIC at 09:48

## 2022-08-08 RX ADMIN — CALCIUM CARBONATE 600 MG (1,500 MG)-VITAMIN D3 400 UNIT TABLET 1 TABLET: at 09:41

## 2022-08-08 ASSESSMENT — ACTIVITIES OF DAILY LIVING (ADL)
ADLS_ACUITY_SCORE: 34
ADLS_ACUITY_SCORE: 35
ADLS_ACUITY_SCORE: 34
ADLS_ACUITY_SCORE: 35
ADLS_ACUITY_SCORE: 34

## 2022-08-08 NOTE — PROGRESS NOTES
Care Management Follow Up    Length of Stay (days): 6    Expected Discharge Date: 08/11/2022?     Concerns to be Addressed: discharge planning, medical readiness.   Patient plan of care discussed at interdisciplinary rounds: Yes    Anticipated Discharge Disposition: TBD  Anticipated Discharge Services: TBD  Anticipated Discharge DME: TBD    Referrals Placed by CM/SW: No new referrals at this time.   Private pay costs discussed: Not applicable    Additional Information:  Call received from patient's nurse care coordinator with Deborah [facility]. Deborah incidentally learned of the patient's hospitalization after contacting her sonCourtney to complete a quarterly check in. Deborah learned that patient's son is out of the [facility] at this time and he vaguely stated he would be returning to [place] in about a week. Deborah noted that patient's son is her PCA and if she were to discharge to home at this time she would not have a caregiver. Writer noted that patient has varying therapy recs and continues on HFNC, discharge date and plan is TBD. Deborah is requesting an update tomorrow, writer provided her with the 6D RNCC contact number. Care coordination will continue to follow for discharge planning.     Deborah Krishnamurthy, nurse care coordinator w/[facility] [place]: [phone]    Maddie Galvez, RNCC, BSN    [facility]    [address]  [address] [place] [id number]    [email or web address]    Office: [phone] Pager: [phone]    To contact the weekend RNCC  [place] (0800 - 1630) Saturday and Sunday    Units: 4A, 4C, 4E, 5A and 5B- Pager 1: [phone]    Units: 6A, 6B, 6C, 6D- Pager 2: [phone]    Units: 7A, 7B, 7C, 7D, and 5C-Pager 3: [phone]

## 2022-08-08 NOTE — PROGRESS NOTES
[facility]  Inpatient Primary Care Note    Flor Navarro MRN# [id number]   Age: 56 year old YOB: 1965     8/8/2022 8:46 AM    Patient admitted: 8/2/2022  1:53 PM  Reason for admission: Abdominal pain, right upper quadrant [R10.11]  Liver replaced by transplant (H) [Z94.4]  Congestive heart failure (CHF) (H) [I50.9]  Acute on chronic congestive heart failure, unspecified heart failure type (H) [I50.9]  Primary inpatient team: Hospitalists - Dr. Zuluaga     Home clinic: [facility]  Primary care provider: Karely Sierra MD         Primary provider communication:     1. I have reviewed the patient s admission History & Physical: Yes  2. I have reviewed associated daily notes: Yes  3. Additional comments:   Flor requires an Tajik Kazakh  (dialect issue)   She doesn't know her meds - she takes what is in her box, but is not familiar  I have attempted to get records re: SURI and encouraged follow up with sleep - but hasn't happened. Suspect this is strong effect on obesity/inability to loose weight as well as htn  Would be happy to coordinate care back to ambulatory - page me [phone] or call care coordinator [phone]    I appreciate the contributions of the inpatient team to the care of my patient.      Karely Sierra MD

## 2022-08-08 NOTE — PROGRESS NOTES
Care Management Follow Up    Length of Stay (days): 6    Expected Discharge Date: 08/11/2022     Concerns to be Addressed: discharge planning     Patient plan of care discussed at interdisciplinary rounds: Yes    Anticipated Discharge Disposition: Home     Anticipated Discharge Services: PCA  Anticipated Discharge DME: Oxygen    Patient/family educated on Medicare website which has current facility and service quality ratings:    Education Provided on the Discharge Plan:    Patient/Family in Agreement with the Plan:      Referrals Placed by CM/SW: External Care Coordination  Private pay costs discussed: Not applicable    Additional Information:  SW spoke with Pt's [employer] . SW identified that at this time PT's recommendation was for Pt to discharge home with assist. Per the PT's [facility] Worker the Pt's son is out of the country and an exact return date is not known however it is believed to be within a month. Pt does not have a PCA. Per tx team Pt is not discharge medically ready at this time. SW to follow & complete TCU referral when Pt is medically ready.      ________________    JACKIE Mckeon, Rumford Community HospitalSW  6D   [facility]  Phone: [phone]  Pager: [phone]  Fax: [phone]

## 2022-08-08 NOTE — PROGRESS NOTES
[facility]    Medicine Progress Note - Hospitalist Service, GOLD TEAM 7    Date of Admission:  8/2/2022    Assessment & Plan        Flor Navarro is a 56 year old female with PMH of Liver Tx for hepatitis C, HTN, CKDIII, SURI, Obesity, h/o SBO presented on 8/2/22 w/ SOB and abdominal pain. Patient transferred from [facility] to [place] for acute hypoxic, hypercarbic respiratory failure.      Acute hypoxic, hypercapnic respiratory failure  Per prior documentation, there is some documentation that she has COPD and takes inhalers at home. Per chart review, patient has been noncompliant with CPAP prior to transfer to [place]. CXR with pulmonary edema.  BNP is elevated at 1238. Noted to have bibasilar rales on exam. Improved with diuresis but now with slowed improvement.  CT Chest 8/7/2022 showed bilateral lower lobe ground glass opacities: edema versus atypical infection.  Given transplant status, will follow up with transplant ID  - Nephrology and cardiology consulted, appreciate assistance  - Transplant ID consultation  - HFNC, wean as tolerated  - Continue PTA prn albuterol and Umeclidinium daily     Chronic intermittent abdominal pain  H/o Small Bowel obstruction  Pt initially presented w/ RUQ abd pain radiating to right low back, nausea w/o vomiting.  WBC unremarkable. CMP baseline. UA bland.   - CTM     CASTILLO   CKD stage III  Baseline Cr ~1.1-1.2 range; on admission, Cr 1.27 but increased acutely.  Now downtrending  - Consult nephrology, appreciate assistance  - Avoid nephrotoxic agents  - BMP daily  - Hold further diuresis per nephrology and cardiology     Hx DDLT (2010) 2/2 Hepatitis C  - USG liver showed unremarkable grayscale appearance of the transplant liver. No biliary obstruction. Patent transplant vasculature with proper directional flow as above. Relatively high resistance flow within the hepatic artery, new since prior examination. Differential is broad but  includes rejection. LFTs are wnl except AST of 47.  - Transplant hepatology consulted, appreciate assistance  - Resume cyclosporine dose   - Cyclosporine level in AM  - Continue MMF     HTN  - Continue PTA amlodipine 10 mg every other day  - Diuresis as above     GERD  - Continue PTA Omeprazole 20 mg BID     Obesity  H/o SURI  Not compliant with CPAP.  - HFNC during day, CPAP at night    Acute encephalopathy, resolved  Patient noted to be excessively somnolent but arousable prior to transfer, likely d/t hypercapnia. No confusion at present  - Respiratory management as above    Hyperkalemia, resolved  K on admit was 5.9, now normalized  - BMP daily         Diet: Regular Diet Adult  Room Service    DVT Prophylaxis: Ambulate every shift  Li Catheter: Not present  Central Lines: None  Cardiac Monitoring: None  Code Status: Full Code      Disposition Plan     Expected Discharge Date: 08/09/2022    Discharge Delays: Oxygen Needs - Arrange Home O2  Destination: home          The patient's care was discussed with the Bedside Nurse, Care Coordinator/, Patient and Transplant ID Consultant.    Breanna Zuluaga MD  Hospitalist Service, [facility] TEAM [address] [place] [address]  Securely message with the Vocera Web Console (learn more here)  Text page via [facility] Paging/Directory   Please see signed in provider for up to date coverage information      Clinically Significant Risk Factors Present on Admission                      ______________________________________________________________________    Interval History   Flor did well overnight and overall is feeling well.  She asked about her oxygen support and about why it was still needed.  She voiced understanding of the ongoing investigations as noted above and is amenable to these evaluations though she would like to be discharged as soon as it is safe to do so.  No other questions or concerns.    The remainder of a 4 point ROS is  negative unless otherwise noted above.    Data reviewed today: I reviewed all medications, new labs and imaging results over the last 24 hours.     Physical Exam   Vital Signs: Temp: 98.2  F (36.8  C) Temp src: Oral BP: 128/88 Pulse: 78   Resp: 14 SpO2: 94 % O2 Device: High Flow Nasal Cannula (HFNC) Oxygen Delivery: 35 LPM  Weight: 244 lbs 0 oz  Gen: Awake, alert, in no acute distress on high flow nasal cannula sitting up in bed  HEENT: NC/AT, sclera anicteric  Resp: Rare crackles at bases bilaterally, no wheezes, no increased WOB on high flow nasal cannula  CV: RRR, no mumurs  Abd: Soft, non-tender, non-distended  Extrem: Warm and well perfused, trace LE edema bilaterally  Neuro: Awake, alert, oriented X3, answering questions appropriately.

## 2022-08-08 NOTE — PROGRESS NOTES
Shift Summary: Continuing to require same oxygen needs. ID consulted today. Fall precautions in place. Vitals stable. Will continue to monitor.    Abilio Childers RN on 8/8/2022 at 6:12 PM

## 2022-08-08 NOTE — PLAN OF CARE
BP (!) 134/91 (BP Location: Right arm, Cuff Size: Adult Regular)   Pulse 80   Temp 98.6  F (37  C) (Oral)   Resp 22   Wt 110.7 kg (244 lb)   LMP  (LMP Unknown)   SpO2 95%   BMI 47.65 kg/m       Pt  is A&Ox4, calls appropriately. Pt  had multiple loose stools, hold bowel regimen. Pt remains on Bipap throughout the night;   15/10 FiO2 40%,  pt denies pain  voiding without difficulty.  No acute changes noted.   Problem: Plan of Care - These are the overarching goals to be used throughout the patient stay.    Goal: Plan of Care Review/Shift Note  Description: The Plan of Care Review/Shift note should be completed every shift.  The Outcome Evaluation is a brief statement about your assessment that the patient is improving, declining, or no change.  This information will be displayed automatically on your shift note.  Outcome: Ongoing, Progressing  Flowsheets (Taken 8/4/2022 2200)  Plan of Care Reviewed With: patient  Outcome Evaluation: off Bipap during day and only on Bipap at night  Overall Patient Progress: improving   Goal Outcome Evaluation:    Plan of Care Reviewed With: patient     Overall Patient Progress: improving    Outcome Evaluation: off Bipap during day and only on Bipap at night    Problem: Gas Exchange Impaired  Goal: Optimal Gas Exchange  Outcome: Ongoing, Progressing  Intervention: Optimize Oxygenation and Ventilation  Recent Flowsheet Documentation  Taken 8/8/2022 0415 by Lu Gibbons RN  Head of Bed (HOB) Positioning: HOB at 30 degrees  Taken 8/8/2022 0000 by Lu Gibbons RN  Head of Bed (HOB) Positioning: HOB at 30 degrees  Taken 8/7/2022 2000 by Lu Gibbons RN  Head of Bed (HOB) Positioning: HOB at 30 degrees     Problem: Gas Exchange Impaired  Goal: Optimal Gas Exchange  Outcome: Ongoing, Progressing  Intervention: Optimize Oxygenation and Ventilation  Recent Flowsheet Documentation  Taken 8/8/2022 0415 by Lu Gibbons RN  Head of Bed (HOB) Positioning: HOB at 30  degrees  Taken 8/8/2022 0000 by Lu Gibbons, RN  Head of Bed (HOB) Positioning: HOB at 30 degrees  Taken 8/7/2022 2000 by Lu Gibbons RN  Head of Bed (HOB) Positioning: HOB at 30 degrees   Goal Outcome Evaluation:

## 2022-08-09 ENCOUNTER — APPOINTMENT (OUTPATIENT)
Dept: PHYSICAL THERAPY | Facility: CLINIC | Age: 57
DRG: 189 | End: 2022-08-09
Payer: MEDICARE

## 2022-08-09 LAB
ALBUMIN SERPL BCG-MCNC: 3.4 G/DL (ref 3.5–5.2)
ALP SERPL-CCNC: 72 U/L (ref 35–104)
ALT SERPL W P-5'-P-CCNC: 16 U/L (ref 10–35)
ANION GAP SERPL CALCULATED.3IONS-SCNC: 7 MMOL/L (ref 7–15)
AST SERPL W P-5'-P-CCNC: 23 U/L (ref 10–35)
BILIRUB SERPL-MCNC: 0.5 MG/DL
BUN SERPL-MCNC: 19.3 MG/DL (ref 6–20)
C PNEUM DNA SPEC QL NAA+PROBE: NOT DETECTED
CALCIUM SERPL-MCNC: 9.5 MG/DL (ref 8.6–10)
CHLORIDE SERPL-SCNC: 103 MMOL/L (ref 98–107)
CMV DNA SPEC NAA+PROBE-ACNC: NOT DETECTED IU/ML
CREAT SERPL-MCNC: 1.05 MG/DL (ref 0.51–0.95)
CYCLOSPORINE BLD LC/MS/MS-MCNC: 69 UG/L (ref 50–400)
DEPRECATED HCO3 PLAS-SCNC: 30 MMOL/L (ref 22–29)
ERYTHROCYTE [DISTWIDTH] IN BLOOD BY AUTOMATED COUNT: 14.4 % (ref 10–15)
FLUAV H1 2009 PAND RNA SPEC QL NAA+PROBE: NOT DETECTED
FLUAV H1 RNA SPEC QL NAA+PROBE: NOT DETECTED
FLUAV H3 RNA SPEC QL NAA+PROBE: NOT DETECTED
FLUAV RNA SPEC QL NAA+PROBE: NOT DETECTED
FLUBV RNA SPEC QL NAA+PROBE: NOT DETECTED
GFR SERPL CREATININE-BSD FRML MDRD: 62 ML/MIN/1.73M2
GLUCOSE SERPL-MCNC: 105 MG/DL (ref 70–99)
HADV DNA SPEC QL NAA+PROBE: NOT DETECTED
HCOV PNL SPEC NAA+PROBE: NOT DETECTED
HCT VFR BLD AUTO: 40.7 % (ref 35–47)
HGB BLD-MCNC: 12.2 G/DL (ref 11.7–15.7)
HMPV RNA SPEC QL NAA+PROBE: NOT DETECTED
HPIV1 RNA SPEC QL NAA+PROBE: NOT DETECTED
HPIV2 RNA SPEC QL NAA+PROBE: NOT DETECTED
HPIV3 RNA SPEC QL NAA+PROBE: NOT DETECTED
HPIV4 RNA SPEC QL NAA+PROBE: NOT DETECTED
LACTATE SERPL-SCNC: 1.5 MMOL/L (ref 0.7–2)
M PNEUMO DNA SPEC QL NAA+PROBE: NOT DETECTED
MCH RBC QN AUTO: 27.7 PG (ref 26.5–33)
MCHC RBC AUTO-ENTMCNC: 30 G/DL (ref 31.5–36.5)
MCV RBC AUTO: 92 FL (ref 78–100)
PLATELET # BLD AUTO: 167 10E3/UL (ref 150–450)
POTASSIUM SERPL-SCNC: 5.1 MMOL/L (ref 3.4–5.3)
PROT SERPL-MCNC: 6.4 G/DL (ref 6.4–8.3)
RBC # BLD AUTO: 4.41 10E6/UL (ref 3.8–5.2)
RSV RNA SPEC QL NAA+PROBE: NOT DETECTED
RSV RNA SPEC QL NAA+PROBE: NOT DETECTED
RV+EV RNA SPEC QL NAA+PROBE: NOT DETECTED
SARS-COV-2 RNA RESP QL NAA+PROBE: NEGATIVE
SODIUM SERPL-SCNC: 140 MMOL/L (ref 136–145)
TME LAST DOSE: NORMAL H
TME LAST DOSE: NORMAL H
WBC # BLD AUTO: 3.7 10E3/UL (ref 4–11)

## 2022-08-09 PROCEDURE — 99232 SBSQ HOSP IP/OBS MODERATE 35: CPT | Performed by: STUDENT IN AN ORGANIZED HEALTH CARE EDUCATION/TRAINING PROGRAM

## 2022-08-09 PROCEDURE — 36415 COLL VENOUS BLD VENIPUNCTURE: CPT | Performed by: STUDENT IN AN ORGANIZED HEALTH CARE EDUCATION/TRAINING PROGRAM

## 2022-08-09 PROCEDURE — 250N000013 HC RX MED GY IP 250 OP 250 PS 637: Performed by: PEDIATRICS

## 2022-08-09 PROCEDURE — 87385 HISTOPLASMA CAPSUL AG IA: CPT | Performed by: STUDENT IN AN ORGANIZED HEALTH CARE EDUCATION/TRAINING PROGRAM

## 2022-08-09 PROCEDURE — 83615 LACTATE (LD) (LDH) ENZYME: CPT | Performed by: STUDENT IN AN ORGANIZED HEALTH CARE EDUCATION/TRAINING PROGRAM

## 2022-08-09 PROCEDURE — 87449 NOS EACH ORGANISM AG IA: CPT | Performed by: STUDENT IN AN ORGANIZED HEALTH CARE EDUCATION/TRAINING PROGRAM

## 2022-08-09 PROCEDURE — 87486 CHLMYD PNEUM DNA AMP PROBE: CPT | Performed by: STUDENT IN AN ORGANIZED HEALTH CARE EDUCATION/TRAINING PROGRAM

## 2022-08-09 PROCEDURE — 80053 COMPREHEN METABOLIC PANEL: CPT | Performed by: PHYSICIAN ASSISTANT

## 2022-08-09 PROCEDURE — U0005 INFEC AGEN DETEC AMPLI PROBE: HCPCS | Performed by: STUDENT IN AN ORGANIZED HEALTH CARE EDUCATION/TRAINING PROGRAM

## 2022-08-09 PROCEDURE — 97110 THERAPEUTIC EXERCISES: CPT | Mod: GP

## 2022-08-09 PROCEDURE — 120N000002 HC R&B MED SURG/OB UMMC

## 2022-08-09 PROCEDURE — 86606 ASPERGILLUS ANTIBODY: CPT | Performed by: STUDENT IN AN ORGANIZED HEALTH CARE EDUCATION/TRAINING PROGRAM

## 2022-08-09 PROCEDURE — 86698 HISTOPLASMA ANTIBODY: CPT | Performed by: STUDENT IN AN ORGANIZED HEALTH CARE EDUCATION/TRAINING PROGRAM

## 2022-08-09 PROCEDURE — 94660 CPAP INITIATION&MGMT: CPT

## 2022-08-09 PROCEDURE — 85027 COMPLETE CBC AUTOMATED: CPT | Performed by: PEDIATRICS

## 2022-08-09 PROCEDURE — 99223 1ST HOSP IP/OBS HIGH 75: CPT | Performed by: STUDENT IN AN ORGANIZED HEALTH CARE EDUCATION/TRAINING PROGRAM

## 2022-08-09 PROCEDURE — 250N000012 HC RX MED GY IP 250 OP 636 PS 637: Performed by: HOSPITALIST

## 2022-08-09 PROCEDURE — 83605 ASSAY OF LACTIC ACID: CPT | Performed by: STUDENT IN AN ORGANIZED HEALTH CARE EDUCATION/TRAINING PROGRAM

## 2022-08-09 PROCEDURE — 36415 COLL VENOUS BLD VENIPUNCTURE: CPT | Performed by: PEDIATRICS

## 2022-08-09 PROCEDURE — 87633 RESP VIRUS 12-25 TARGETS: CPT | Performed by: STUDENT IN AN ORGANIZED HEALTH CARE EDUCATION/TRAINING PROGRAM

## 2022-08-09 PROCEDURE — 80158 DRUG ASSAY CYCLOSPORINE: CPT | Performed by: PEDIATRICS

## 2022-08-09 PROCEDURE — 87899 AGENT NOS ASSAY W/OPTIC: CPT | Performed by: STUDENT IN AN ORGANIZED HEALTH CARE EDUCATION/TRAINING PROGRAM

## 2022-08-09 PROCEDURE — 250N000013 HC RX MED GY IP 250 OP 250 PS 637: Performed by: HOSPITALIST

## 2022-08-09 PROCEDURE — 999N000157 HC STATISTIC RCP TIME EA 10 MIN

## 2022-08-09 RX ADMIN — MYCOPHENOLATE MOFETIL 500 MG: 250 CAPSULE ORAL at 17:21

## 2022-08-09 RX ADMIN — CYCLOSPORINE 75 MG: 25 CAPSULE, LIQUID FILLED ORAL at 09:37

## 2022-08-09 RX ADMIN — UMECLIDINIUM 1 PUFF: 62.5 AEROSOL, POWDER ORAL at 09:41

## 2022-08-09 RX ADMIN — MYCOPHENOLATE MOFETIL 500 MG: 250 CAPSULE ORAL at 09:39

## 2022-08-09 RX ADMIN — GABAPENTIN 300 MG: 300 CAPSULE ORAL at 21:38

## 2022-08-09 RX ADMIN — Medication 1 DROP: at 20:13

## 2022-08-09 RX ADMIN — OMEPRAZOLE 20 MG: 20 CAPSULE, DELAYED RELEASE ORAL at 20:01

## 2022-08-09 RX ADMIN — Medication 1 CAPSULE: at 20:01

## 2022-08-09 RX ADMIN — ACETAMINOPHEN 500 MG: 500 TABLET, FILM COATED ORAL at 09:22

## 2022-08-09 RX ADMIN — CALCITRIOL CAPSULES 0.25 MCG 0.25 MCG: 0.25 CAPSULE ORAL at 09:30

## 2022-08-09 RX ADMIN — CALCIUM CARBONATE 600 MG (1,500 MG)-VITAMIN D3 400 UNIT TABLET 1 TABLET: at 20:01

## 2022-08-09 RX ADMIN — CYCLOSPORINE 50 MG: 25 CAPSULE, LIQUID FILLED ORAL at 20:00

## 2022-08-09 RX ADMIN — ALBUTEROL SULFATE 2 PUFF: 90 AEROSOL, METERED RESPIRATORY (INHALATION) at 19:59

## 2022-08-09 RX ADMIN — ALBUTEROL SULFATE 2 PUFF: 90 AEROSOL, METERED RESPIRATORY (INHALATION) at 11:11

## 2022-08-09 RX ADMIN — CALCIUM CARBONATE 600 MG (1,500 MG)-VITAMIN D3 400 UNIT TABLET 1 TABLET: at 09:31

## 2022-08-09 RX ADMIN — Medication 1 TABLET: at 11:11

## 2022-08-09 RX ADMIN — ALBUTEROL SULFATE 2 PUFF: 90 AEROSOL, METERED RESPIRATORY (INHALATION) at 09:23

## 2022-08-09 RX ADMIN — FEBUXOSTAT 40 MG: 40 TABLET, FILM COATED ORAL at 09:33

## 2022-08-09 RX ADMIN — ALBUTEROL SULFATE 2 PUFF: 90 AEROSOL, METERED RESPIRATORY (INHALATION) at 17:22

## 2022-08-09 RX ADMIN — SENNOSIDES AND DOCUSATE SODIUM 2 TABLET: 8.6; 5 TABLET ORAL at 09:32

## 2022-08-09 RX ADMIN — CETIRIZINE HYDROCHLORIDE 10 MG: 10 TABLET, FILM COATED ORAL at 09:33

## 2022-08-09 RX ADMIN — AMLODIPINE BESYLATE 10 MG: 10 TABLET ORAL at 09:30

## 2022-08-09 RX ADMIN — Medication 1 CAPSULE: at 09:35

## 2022-08-09 RX ADMIN — OMEPRAZOLE 20 MG: 20 CAPSULE, DELAYED RELEASE ORAL at 09:39

## 2022-08-09 RX ADMIN — OLOPATADINE HYDROCHLORIDE 1 DROP: 1 SOLUTION OPHTHALMIC at 09:39

## 2022-08-09 RX ADMIN — Medication 25 MCG: at 09:35

## 2022-08-09 RX ADMIN — ACETAMINOPHEN 500 MG: 500 TABLET, FILM COATED ORAL at 17:21

## 2022-08-09 RX ADMIN — SENNOSIDES AND DOCUSATE SODIUM 2 TABLET: 8.6; 5 TABLET ORAL at 20:01

## 2022-08-09 ASSESSMENT — ACTIVITIES OF DAILY LIVING (ADL)
ADLS_ACUITY_SCORE: 34

## 2022-08-09 NOTE — CONSULTS
[facility]  Transplant Infectious Disease Consult Note - New Patient     Patient:  Flor Navarro, Date of birth 1965, Medical record number [id number]  Date of Visit:  08/09/2022  Consult requested by Dr. Triston Styles for evaluation of Hypoxic respiratory failure         Assessment and Recommendations:   Recommendations:  1. For ongoing work up of hypoxia along with basilar GGOs, please send for following tests (have ordered for you)  - COVID PCR  - RVP  - serum and urine Histoplasma antigen  - serum Beta-d-glucan  - urine legionella antigen  - Fungal antibody panel  - LDH  2. If BDG/LDH significantly elevated, will discuss sending induced sputum for PJP vs obtaining bronchoscopy  3. Wean HFNC as tolerated  4. Monitor off antibiotics at present    Thank you very much for this consultation. Transplant Infectious Disease will continue to follow with you.    Assessment:  57 y/o lady, PMHx OLT 10/26/2010 for hepatitis C, HTN, CKDIII, SURI, Obesity, h/o SBO presented on 8/2/22 w/ SOB and abdominal pain. Patient [place] [facility] [place] and was transferred for hypoxic respiratory failure    #Acute hypoxic respiratory failure:  #Chest CT with bibasilar peribronchovascular groundglass opacities:  Patient presents with abdominal pain and noted to be hypoxic on admission. Her chief complaint was shortness of breath, but denied fevers, weight loss, B-symptoms, cough or productive sputum. No obvious infectious symptoms on history. Initial concern for CHF (elevated BNP) and was diuresed with some initial improvement however unable to wean off HFNC. Latest Chest imaging (CT from 8/7) with bilateral lower lobe ground-glass opacities. On personal review, degree of hypoxia seems more significant than can be explained on basis of chest imaging findings. Imaging not typical for bacterial infections, not typical for fungal infections either. Lower lobe predominant GGOs do not fit with viral  "infections which are usually more diffuse. Mycobacterial infections generally do not cause the degree of hypoxia seen. It is however reasonable to expand infectious workup for some endemic fungal infections. PJP on the differential given hypoxia and GGO - however patient not producing sputum. Will obtain BDG to begin with    GEORGETTE Gonzales  Staff Physician, Infectious Diseases  Pager [phone]         History of Infectious Disease Illness:     55 y/o lady, PMHx OLT 10/26/2010 for hepatitis C, HTN, CKDIII, SURI, Obesity, h/o SBO presented on 8/2/22 w/ SOB and abdominal pain. Patient originally presented [place] the [facility] [place] and was transferred for hypoxic respiratory failure    Patient first presented on 8/2/22 with complaints of abdominal pain in the RUQ and shortness of breath. These symptoms had started a few days prior to admission. Patient denied fevers, chills, night sweats or a productive cough. Reported history of COPD and patient mentioning she takes inhalers at home, but denied wheezing on admission. Also reported history of SURI, with some intermittent compliance with CPAP at home prior to transfer. On admission, concern for pulmonary edema, BNP slightly elevated. Patient initially placed on BIPAP and showed slow improvement with diuresis. However, hypoxia persisted and unable to be weaned off HFNC. She was seen by Cardiology and Nephrology who were both of the assessment that she was now euvolemic. On 8/7, patient underwent chest CT which showed \"bilateral lower lobe predominant peribronchovascular groundglass opacities differential atelectasis, edema, atypical infection\". ID consulted    Patient has remained afebrile throughout admission. She has been hemodynamically stable. Currently on HFNC    Patient does not have history of treatment for Hep C. She denies knowledge of prior TB diagnosis or treatment, or LTBI treatment - she is 12 years post transplant and no documented infection since. Born and " raised in [place], has been in the [place]. Lives with son. Has only lined in [place]. No pets at home. No allergies      Transplants:  10/26/2010 (Liver), Postoperative day:  4305.  Coordinator Mary Crawford    Review of Systems:  CONSTITUTIONAL:  No fevers or chills. No rigors, no night sweats. No weight loss  EYES: negative for icterus or acute vision changes  ENT:  negative for acute hearing loss, tinnitus, sore throat  RESPIRATORY:  negative for cough, sputum production. + shortness of breath, started 3 days prior to admission  CARDIOVASCULAR:  negative for chest pain, palpitations  GASTROINTESTINAL:  negative for nausea, vomiting, diarrhea or constipation  GENITOURINARY:  negative for dysuria or hematuria  HEME:  No easy bruising or bleeding  INTEGUMENT:  negative for rash or pruritus  NEURO:  Negative for headache or tremor    Past Medical History:   Diagnosis Date     Anemia in CKD (chronic kidney disease)      Cataract      CKD (chronic kidney disease) stage 3, GFR 30-59 ml/min (H)      Hepatitis C     cleared virus spontaneously      High risk medication use      Hypertension, renal      Immunosuppressed status (H)      Liver replaced by transplant (H) 2010     Osteoporosis      Recurrent pregnancy loss without current pregnancy      Recurrent UTI 2021     Stroke, hemorrhagic (H) 2008     Syncope      Unspecified viral hepatitis C without hepatic coma        Past Surgical History:   Procedure Laterality Date     CATARACT IOL, RT/LT Right 2/18/15      SECTION       Incisional Hernia Repair  2004     INSERT SHUNT PORTAL TRANSJUGULAR INTRAHEPTIC      shunt placement for liver failure     LAPAROSCOPIC SALPINGO-OOPHORECTOMY      left     NECK SURGERY  2010    fracture, in halo x 7months     TRANSPLANT LIVER RECIPIENT  DONOR  10/26/10     Upper GI Endoscopy with Band Ligation of Esoph/Gastric Varic. .         Family History   Problem Relation Age of  Onset     Hepatitis Other         Hep C, still in [place]     Cerebrovascular Disease Other      Cancer No family hx of      Diabetes No family hx of      Hypertension No family hx of      Thyroid Disease No family hx of      Glaucoma No family hx of      Macular Degeneration No family hx of        Social History     Social History Narrative    One son Carleen        GynHx:             Gets transportation via insurance - needs assistance due to unsteady gait from CVA     Social History     Tobacco Use     Smoking status: Never Smoker     Smokeless tobacco: Never Used   Vaping Use     Vaping Use: Never used   Substance Use Topics     Alcohol use: No     Drug use: No       Immunization History   Administered Date(s) Administered     COVID-19,PF,Penny 04/10/2021     COVID-19,PF,Pfizer (12+ Yrs) 2021     COVID-19,PF,Pfizer 12+ Yrs ( and After) 2022     HEPA 05/10/2000, 2006     HepB 1999, 2000, 2000, 04/15/2008     Influenza (IIV3) PF 10/08/2004, 2007, 10/23/2007, 10/29/2008, 10/23/2009, 10/22/2011, 2012, 10/02/2013     Influenza Quad, Recombinant, pf(RIV4) (Flublok) 2019, 2020, 2021     Influenza Vaccine IM > 6 months Valent IIV4 (Alfuria,Fluzone) 2015, 10/27/2016, 10/03/2017, 10/18/2018, 2018     MMR 2006, 2007     Pneumo Conj 13-V (2010&after) 2016     Pneumococcal 23 valent 2006, 2012     TD (ADULT, 7+) 2007, 04/15/2008     TDAP Vaccine (Adacel) 2006     TDAP Vaccine (Boostrix) 2018     Tdap (Adacel,Boostrix) 2018     Twinrix A/B 2015     Typhoid IM 2006, 2006, 2015     Varicella 2006, 10/23/2007, 2010     Varicella Pt Report Hx of Varicella/Chicken Pox 2006, 10/23/2007       Patient Active Problem List   Diagnosis     PVD (POSTERIOR VITREOUS DETACHMENT) OS     Hyperlipidemia LDL goal <160     CKD (chronic kidney disease) stage 3, GFR  30-59 ml/min (H)     Hypertension, renal     Liver replaced by transplant     High risk medication use     Anemia in CKD (chronic kidney disease)     Stroke, hemorrhagic (H)     Osteoporosis     Stress incontinence     Immunosuppressed status (H)     Ganglion cyst     Vitamin D deficiency     Shoulder joint pain     Pseudophakia - Left Eye     Constipation, chronic     Secondary hyperparathyroidism (H)     Mixed hyperlipidemia     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)     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)     Obesity, Class III, BMI 40-49.9 (morbid obesity) (H)     SURI (obstructive sleep apnea)     Depression, major, recurrent, in complete remission (H)     Fall     Anxiety     Moderate episode of recurrent major depressive disorder (H)     Gait abnormality     Thrombocytopenia (H)     Chronic gout due to renal impairment involving toe of left foot without tophus     Primary osteoarthritis of left ankle     Meningioma (H)     Complex care coordination     SBO (small bowel obstruction) (H)     Sacroiliitis (H)     Chronic obstructive pulmonary disease, unspecified COPD type (H)     Splenic artery aneurysm (H)     Congestive heart failure (CHF) (H)            Current Medications & Allergies:       albuterol  2 puff Inhalation 4x Daily     amLODIPine  10 mg Oral Daily     calcitRIOL  0.25 mcg Oral Daily     calcium carbonate 600 mg-vitamin D 400 units  1 tablet Oral BID     cetirizine  10 mg Oral Daily     cycloSPORINE modified  50 mg Oral At Bedtime     cycloSPORINE modified  75 mg Oral QAM     febuxostat  40 mg Oral Daily     gabapentin  300 mg Oral At Bedtime     multivitamin w/minerals  1 tablet Oral Daily     mycophenolate  500 mg Oral BID IS     olopatadine  1 drop Both Eyes Daily     omeprazole  20 mg Oral BID     psyllium  1 capsule Oral BID     senna-docusate  2 tablet Oral BID     sodium chloride (PF)  3 mL Intracatheter Q8H     umeclidinium  1 puff Inhalation Daily      Vitamin D3  25 mcg Oral Daily       Infusions/Drips:      - MEDICATION INSTRUCTIONS -         Allergies   Allergen Reactions     Aspirin      3/31/16 Per pt, tolerates 81 mg daily dose without ADR.     325 mg dose caused itchiness and hives.     Clarithromycin      Allergic reaction         Contrast Dye      Iodine      Pcn [Penicillins]             Physical Exam:     Ranges for vital signs:  Temp:  [97.6  F (36.4  C)-98  F (36.7  C)] 98  F (36.7  C)  Pulse:  [] 73  Resp:  [14-27] 20  BP: (126-151)/(67-91) 130/80  FiO2 (%):  [40 %-60 %] 40 %  SpO2:  [93 %-96 %] 96 %  Vitals:    08/05/22 0700 08/06/22 0400 08/09/22 0753   Weight: 109.9 kg (242 lb 4.8 oz) 110.7 kg (244 lb) 117.1 kg (258 lb 1.6 oz)       Physical Examination:  GENERAL:  well-developed, well-nourished, sitting in chair in no acute distress.  HEAD:  Head is normocephalic, atraumatic   EYES:  Eyes have anicteric sclerae without conjunctival injection   ENT:  Oropharynx is moist without exudates or ulcers. Tongue is midline  NECK:  Supple. No cervical lymphadenopathy  LUNGS:  On HFNC, no use of accessory muscles, reduced breath sounds at bases  CARDIOVASCULAR:  Regular rate and rhythm with no murmurs, gallops or rubs.  ABDOMEN:  Normal bowel sounds, soft, nontender. No appreciable hepatosplenomegaly.  SKIN:  No acute rashes.   NEUROLOGIC:  Grossly nonfocal. Active x4 extremities         Laboratory Data:       Inflammatory Markers    Recent Labs   Lab Test 10/01/20  1409 12/23/15  1258   CRP 17.0* 6.8       Metabolic Studies       Recent Labs   Lab Test 08/09/22  0619 08/08/22  1820 08/08/22  0749 08/07/22  0707 08/06/22  1653 08/02/22  1414 07/13/22  1609 05/28/19  1659 04/09/19  1555 03/04/19  1704     --  141   < >  --    < > 142   < > 139.6 140   POTASSIUM 5.1  --  5.2   < > 5.1   < > 5.1   < > 4.8* 4.8   CHLORIDE 103  --  102   < >  --    < > 106   < > 102.8 106   CO2 30*  --  30*   < >  --    < > 23   < > 26.9 27   ANIONGAP 7  --  9   < >   --    < > 13   < >  --  7   BUN 19.3  --  17.1   < >  --    < > 16.9   < >  --  28   CR 1.05*  --  0.96*   < >  --    < > 1.15*   < >  --  1.12*   GFRESTIMATED 62  --  69   < >  --    < > 55*   < >  --  55*   *  --  97   < >  --    < > 98   < >  --  93   A1C  --   --   --   --   --   --   --   --  5.3  --    JUAN 9.5  --  10.0   < >  --    < > 10.2*   < >  --  9.0   PHOS  --   --   --   --   --   --  3.2   < >  --  3.2   MAG  --   --   --   --   --   --   --   --   --  1.8   LACT  --  1.4  --   --  1.1  --   --    < >  --   --     < > = values in this interval not displayed.       Hepatic Studies    Recent Labs   Lab Test 08/09/22 0619 08/08/22  0749 08/04/22  0740 08/03/22  0310 08/02/22  1414 07/13/22  1609 05/29/22  1919   BILITOTAL 0.5 0.6   < > 0.2   < > 0.6 0.6   DBIL  --   --   --   --   --  <0.20  --    ALKPHOS 72 75   < > 84   < > 83 87   PROTTOTAL 6.4 7.3   < > 7.8   < > 7.8 7.7   ALBUMIN 3.4* 3.8   < > 3.5   < > 4.2 3.4   AST 23 27   < > 26   < > 31 14   ALT 16 16   < > 31   < > 21 25   DAMON  --   --   --  34  --   --  22    < > = values in this interval not displayed.       Hematology Studies   Recent Labs   Lab Test 08/09/22  0619 08/05/22  0923 08/04/22  0740 08/03/22  0556 08/03/22  0310 08/02/22  1414 06/21/21  1412 11/18/20  1417 10/01/20  1409 08/08/17  1206 07/27/17  1700   WBC 3.7* 3.7* 4.4 8.6 8.9 4.4   < >  --  5.3   < > 4.0   ANEU  --   --   --   --   --   --   --   --  3.2  --  1.9   ANEUTAUTO  --   --   --   --  6.7 2.6   < >  --   --   --   --    ALYM  --   --   --   --   --   --   --  1.5 1.5   < > 1.6   ALYMPAUTO  --   --   --   --  1.5 1.3   < >  --   --   --   --    CAROL ANN  --   --   --   --   --   --   --   --  0.4  --  0.3   AMONOAUTO  --   --   --   --  0.6 0.4   < >  --   --   --   --    AEOS  --   --   --   --   --   --   --   --  0.1  --  0.2   AEOSAUTO  --   --   --   --  0.1 0.1   < >  --   --   --   --    ABSBASO  --   --   --   --  0.0 0.0   < >  --   --   --   --    HGB  12.2 11.9 11.3* 11.6* 12.6 11.7   < > 13.0 13.2   < > 13.2   HCT 40.7 40.3 39.2 41.2 43.6 38.5   < > 43.4 41.7   < > 39.9    182 172 182 217 175   < >  --  187   < > 105*    < > = values in this interval not displayed.       Urine Studies     Recent Labs   Lab Test 08/04/22  1933 08/02/22  1727 07/13/22  1609 05/29/22  1941 03/16/22  0557 12/17/21  1002 06/25/21  1140 02/17/20  1307 12/05/19  1142 03/04/19  1708   URINEPH 5.0 5.5 5.0 7.0 7.0 6.0   < > 5.0   < > 5.0   NITRITE Negative Negative Positive* Negative Negative Negative   < > Positive*   < > Positive*   LEUKEST Negative Negative Small* Small* Moderate* Negative   < > Moderate*  --  Trace*   WBCU  --  1 >100* 10* 33* 1   < > 145*   < > 34*   UYEAST  --   --   --   --   --   --   --   --   --  Few*   UWBCLM  --   --   --   --   --   --   --  Present*  --   --     < > = values in this interval not displayed.       Medication levels    Recent Labs   Lab Test 08/03/22 2039 07/13/22  1609 03/18/22  0646   CYCLSP 40*   < > 68   TACROL  --   --  <3.0*    < > = values in this interval not displayed.       Microbiology:  Fungal testing  No lab results found.    Invalid input(s): HIFUN, FUNGL    Last Culture results   Rapid Strep A Screen   Date Value Ref Range Status   02/03/2017 NEGATIVE Negative Final     Culture   Date Value Ref Range Status   08/04/2022 <10,000 CFU/mL Mixture of urogenital fredi  Final   07/13/2022 >100,000 CFU/mL Klebsiella pneumoniae (A)  Final   07/13/2022 10,000-50,000 CFU/mL Klebsiella pneumoniae (A)  Final   03/16/2022 50,000-100,000 CFU/mL Mixture of urogenital fredi  Final   08/09/2021 >100,000 CFU/mL Escherichia coli ESBL (A)  Final     Culture Micro   Date Value Ref Range Status   07/02/2021 (A)  Final    >100,000 colonies/mL  Escherichia coli ESBL  ESBL (extended beta lactamase) producing organisms require contact precautions.     07/02/2021   Final    <10,000 colonies/mL  urogenital fredi  Susceptibility testing not routinely  done     10/16/2017 (A)  Final    >100,000 colonies/mL  Beta hemolytic Streptococcus group B  Beta Hemolytic Streptococcus groups A and B are susceptible to ampicillin, penicillin,   vancomycin, and the cephalosporins.  Susceptibility testing is not routinely done on these   organisms isolated from urine.     08/08/2017   Final    10,000 to 50,000 colonies/mL mixed urogenital fredi   07/27/2017 >100,000 colonies/mL Escherichia coli (A)  Final   02/27/2017 >100,000 colonies/mL Escherichia coli (A)  Final   02/03/2017 No Beta Streptococcus isolated  Final   03/31/2016 >100,000 colonies/mL Escherichia coli (A)  Final   12/22/2015 No growth  Final   12/19/2015 (A)  Final    >100,000 colonies/mL Escherichia coli ESBL ESBL producers are resistant to all   cephalosporins (including 3rd generation).  ESBL producers are usually   susceptible to amikacin and the carbapenems.           Last check of C difficile  C Diff Toxin B PCR   Date Value Ref Range Status   10/16/2010   Final    Negative: Clostridium difficile target DNA sequences NOT detected, presumed     Comment:      negative for Clostridium difficile toxin B or the number of bacteria present   may be below the limit of detection for the test.   FDA approved assay performed using [employer] GeneXpert real-time PCR.   A negative result does not exclude actual disease due to Clostridium   difficile   and may be due to improper collection, handling and storage of the specimen   or   the number of organisms in the specimen is below the detection limit of the   assay.       Quantiferon testing   Recent Labs   Lab Test 08/03/22  0310 08/02/22  1414   LYMPH 17 29       Virology:  Coronavirus-19 testing    Recent Labs   Lab Test 08/02/22  1421 03/16/22  0556   [id number] Negative Negative       Respiratory virus (non-coronavirus-19) testing    Recent Labs   Lab Test 08/02/22  1421   INFZA Negative   INFZB Negative       CMV viral loads  No lab results found.    CMV resistance  testing  No lab results found.    No results found for: H6RES    EBV DNA Copies/mL   Date Value Ref Range Status   01/22/2011 <1000 <1000 Copies/mL Final   11/16/2010 <1000 <1000 Copies/mL Final        Hepatitis C Antibody   Date Value Ref Range Status   10/25/2010 Positive (A) NEG Final     Comment:      High sample/cutoff ratio, confirmatory testing available.       CMV IgG Antibody   Date Value Ref Range Status   10/25/2010 >160.0 EU/mL Final     Comment:     Positive for anti-CMV IgG     CMV IgM Antibody   Date Value Ref Range Status   10/25/2010 <0.90 <0.90 Final     Comment:     No detectable antibody.     EBV IgG Antibody Interpretation   Date Value Ref Range Status   10/25/2010 Positive, suggests immunologic exposure.  Final     Imaging:  Recent Results (from the past 48 hour(s))   CT Chest w/o Contrast    Narrative    EXAMINATION: CT CHEST W/O CONTRAST, 8/7/2022 3:09 PM    TECHNIQUE:  Helical CT images from the thoracic inlet through the lung  bases were obtained without IV contrast.     COMPARISON: Chest x-ray 8/3/2022    HISTORY: Patient with recent pulmonary edema and persistent hypoxia of  unclear etiology (normovolemic per nephrology and cardiology) evaluate  for pulmonary edema versus effusion versus emphysematous changes.    FINDINGS:    Chest:     Thyroid gland is unremarkable. Heart size normal. No pericardial  effusion. No significant coronary artery calcifications. Thoracic  aorta main pulmonary arteries are normal in caliber. Esophagus is  unremarkable.    No mediastinal or axillary adenopathy.    Trachea and central airways are clear. Bibasilar subsegmental  atelectasis. No pleural effusion or pneumothorax. Bilateral lower lobe  predominant peribronchovascular groundglass opacities. Mild apical  centrilobular emphysematous changes.    Abdomen: Examination of the upper abdomen is limited. Post surgical  liver transplant changes. Adrenals are within normal limits. Right  renal atrophy.    Bones:  No suspicious osseous lesion.    Soft Tissues: No suspicious mass.      Impression    IMPRESSION: Mild centrilobular emphysematous changes bilateral lung  apices.  1.  Bilateral lower lobe predominant peribronchovascular groundglass  opacities differential atelectasis, edema, atypical infection.  2.  No pleural effusion.    I have personally reviewed the examination and initial interpretation  and I agree with the findings.    TIM RUELAS MD         SYSTEM ID:  [id number]     CXR 8/2/22:  IMPRESSION: Heart size appears stable. Pulmonary venous congestion has  increased slightly. Mild interstitial prominence throughout both lungs  suggests pulmonary edema, and has a similar appearance to the previous  exam. No pneumothorax.

## 2022-08-09 NOTE — PROGRESS NOTES
SPIRITUAL HEALTH SERVICES  SPIRITUAL ASSESSMENT Progress Note  [facility] UU [id number]    REFERRAL SOURCE: Self Initiated  Visit    I introduced self and SHS to the pt. She welcomed SHS support. Pt identified herself as Buddhist of an  an [place]ian descent. She mainly spoke Sinhala and little english. She mentioned she is doing much better, she mentioned that she has support. She mentioned she is grateful to Allah/God. I prayed for her and provided her with a prayer mat and islamic prayer booklet    PLAN: SHS will remain the same    Heike Waterman  Chaplain Resident  Phone: [phone]

## 2022-08-09 NOTE — PROGRESS NOTES
[facility]    Medicine Progress Note - Hospitalist Service, GOLD TEAM 7    Date of Admission:  8/2/2022    Assessment & Plan            56 year old female with PMH of Liver Tx for hepatitis C, HTN, CKDIII, SURI, Obesity, h/o SBO presented on 8/2/22 w/ SOB and abdominal pain. Patient transferred from [facility] to [place] for acute hypoxic, hypercarbic respiratory failure. Transplant ID on board     Acute hypoxic, hypercapnic respiratory failure  Per prior documentation, there is some documentation that she has COPD and takes inhalers at home. Per chart review, patient has been noncompliant with CPAP prior to transfer to [place]. CXR with pulmonary edema.  BNP is elevated at 1238. Noted to have bibasilar rales on exam. Improved with diuresis but now with slowed improvement.  CT Chest 8/7/2022 showed bilateral lower lobe ground glass opacities: edema versus atypical infection.  Given transplant status, will follow up with transplant ID  - Nephrology and cardiology consulted, appreciate assistance. Think pt is dry.  - Transplant ID consultation for possible atypical infection. Will get more hx for exposures.   - HFNC, wean as tolerated  - Continue PTA prn albuterol and Umeclidinium daily     Chronic intermittent abdominal pain  H/o Small Bowel obstruction  Pt initially presented w/ RUQ abd pain radiating to right low back, nausea w/o vomiting.  WBC unremarkable. CMP baseline. UA bland.   - CTM     CASTILLO   CKD stage III  Baseline Cr ~1.1-1.2 range; on admission, Cr 1.27 but increased acutely.  Now downtrending to baseline  - Consult nephrology, appreciate assistance  - Avoid nephrotoxic agents  - BMP daily  - Hold further diuresis per nephrology and cardiology.      Hx DDLT (2010) 2/2 Hepatitis C  - USG liver showed unremarkable grayscale appearance of the transplant liver. No biliary obstruction. Patent transplant vasculature with proper directional flow as above.  Relatively high resistance flow within the hepatic artery, new since prior examination. Differential is broad but includes rejection. LFTs are wnl except AST of 47.  - Transplant hepatology consulted, appreciate assistance  - Continue Cyclosporine level in AM  - Continue MMF     HTN  - Continue PTA amlodipine 10 mg every other day  - Diuresis as above     GERD  - Continue PTA Omeprazole 20 mg BID     Obesity  H/o SURI  Not compliant with CPAP.  - HFNC during day, CPAP at night     Acute encephalopathy, resolved  Patient noted to be excessively somnolent but arousable prior to transfer, likely d/t hypercapnia. No confusion at present  - Respiratory management as above     Hyperkalemia, resolved  K on admit was 5.9, now normalized  - BMP daily       Diet: Regular Diet Adult  Room Service    DVT Prophylaxis: Ambulatory  Li Catheter: Not present  Central Lines: None  Cardiac Monitoring: None  Code Status: Full Code      Disposition Plan      Expected Discharge Date: 08/11/2022    Discharge Delays: Oxygen Needs - Arrange Home O2  Destination: home          The patient's care was discussed with the Patient and Patient's Family. And MONI Styles MD  Hospitalist Service, [facility] TEAM [address] [place] [address]  Securely message with the Vocera Web Console (learn more here)  Text page via [facility] Paging/Directory   Please see signed in provider for up to date coverage information      Clinically Significant Risk Factors Present on Admission                      ______________________________________________________________________    Interval History   Pt says there is improvement of SOB no CP. Abominal fullness is getting better. No N,V,D    Data reviewed today: I reviewed all medications, new labs and imaging results over the last 24 hours. I personally reviewed    Physical Exam   Vital Signs: Temp: 97.6  F (36.4  C) Temp src: Oral BP: 134/83 Pulse: 80   Resp: 24 SpO2: 93 % O2 Device:  High Flow Nasal Cannula (HFNC) Oxygen Delivery: 30 LPM  Weight: 244 lbs 0 oz  Constitutional: HFNC but comfortable  Respiratory: No added sounds no wheezez  Cardiovascular: Minimal LE Edema. Normal S1 S2    Data

## 2022-08-09 NOTE — PLAN OF CARE
Major Shift Events:  A&Ox4, Maltese speaking. SR overnight, denies chest pain, afebrile. Pt on Bipap @ 40% overnight, on HFNC during day. Voiding adequately into commode, LBM 8/8. Denies pain.  Plan: try to wean oxygen as needed, possible diuresis, notify team with acute changes    For vital signs and complete assessments, please see documentation flowsheets.    Goal Outcome Evaluation:    Plan of Care Reviewed With: patient     Overall Patient Progress: no change

## 2022-08-10 LAB
ALBUMIN SERPL BCG-MCNC: 3.6 G/DL (ref 3.5–5.2)
ALP SERPL-CCNC: 71 U/L (ref 35–104)
ALT SERPL W P-5'-P-CCNC: 21 U/L (ref 10–35)
ANION GAP SERPL CALCULATED.3IONS-SCNC: 8 MMOL/L (ref 7–15)
AST SERPL W P-5'-P-CCNC: 28 U/L (ref 10–35)
BILIRUB SERPL-MCNC: 0.5 MG/DL
BUN SERPL-MCNC: 23.9 MG/DL (ref 6–20)
CALCIUM SERPL-MCNC: 9.2 MG/DL (ref 8.6–10)
CHLORIDE SERPL-SCNC: 102 MMOL/L (ref 98–107)
CREAT SERPL-MCNC: 1.12 MG/DL (ref 0.51–0.95)
DEPRECATED HCO3 PLAS-SCNC: 29 MMOL/L (ref 22–29)
ERYTHROCYTE [DISTWIDTH] IN BLOOD BY AUTOMATED COUNT: 14.4 % (ref 10–15)
GFR SERPL CREATININE-BSD FRML MDRD: 57 ML/MIN/1.73M2
GLUCOSE SERPL-MCNC: 107 MG/DL (ref 70–99)
HCT VFR BLD AUTO: 39.1 % (ref 35–47)
HGB BLD-MCNC: 11.8 G/DL (ref 11.7–15.7)
L PNEUMO1 AG UR QL IA: NEGATIVE
LACTATE SERPL-SCNC: 1.5 MMOL/L (ref 0.7–2)
LDH SERPL L TO P-CCNC: 280 U/L (ref 0–250)
MCH RBC QN AUTO: 27.7 PG (ref 26.5–33)
MCHC RBC AUTO-ENTMCNC: 30.2 G/DL (ref 31.5–36.5)
MCV RBC AUTO: 92 FL (ref 78–100)
PLATELET # BLD AUTO: 141 10E3/UL (ref 150–450)
POTASSIUM SERPL-SCNC: 4.9 MMOL/L (ref 3.4–5.3)
PROT SERPL-MCNC: 6.7 G/DL (ref 6.4–8.3)
RBC # BLD AUTO: 4.26 10E6/UL (ref 3.8–5.2)
SODIUM SERPL-SCNC: 139 MMOL/L (ref 136–145)
WBC # BLD AUTO: 3.6 10E3/UL (ref 4–11)

## 2022-08-10 PROCEDURE — 120N000002 HC R&B MED SURG/OB UMMC

## 2022-08-10 PROCEDURE — 83605 ASSAY OF LACTIC ACID: CPT | Performed by: STUDENT IN AN ORGANIZED HEALTH CARE EDUCATION/TRAINING PROGRAM

## 2022-08-10 PROCEDURE — 250N000013 HC RX MED GY IP 250 OP 250 PS 637: Performed by: HOSPITALIST

## 2022-08-10 PROCEDURE — 999N000157 HC STATISTIC RCP TIME EA 10 MIN

## 2022-08-10 PROCEDURE — 80053 COMPREHEN METABOLIC PANEL: CPT | Performed by: STUDENT IN AN ORGANIZED HEALTH CARE EDUCATION/TRAINING PROGRAM

## 2022-08-10 PROCEDURE — 85027 COMPLETE CBC AUTOMATED: CPT | Performed by: STUDENT IN AN ORGANIZED HEALTH CARE EDUCATION/TRAINING PROGRAM

## 2022-08-10 PROCEDURE — 94660 CPAP INITIATION&MGMT: CPT

## 2022-08-10 PROCEDURE — 36415 COLL VENOUS BLD VENIPUNCTURE: CPT | Performed by: STUDENT IN AN ORGANIZED HEALTH CARE EDUCATION/TRAINING PROGRAM

## 2022-08-10 PROCEDURE — 99232 SBSQ HOSP IP/OBS MODERATE 35: CPT | Performed by: STUDENT IN AN ORGANIZED HEALTH CARE EDUCATION/TRAINING PROGRAM

## 2022-08-10 PROCEDURE — 250N000012 HC RX MED GY IP 250 OP 636 PS 637: Performed by: HOSPITALIST

## 2022-08-10 RX ORDER — SIMETHICONE 80 MG
80 TABLET,CHEWABLE ORAL EVERY 6 HOURS PRN
Status: DISCONTINUED | OUTPATIENT
Start: 2022-08-10 | End: 2022-08-16 | Stop reason: HOSPADM

## 2022-08-10 RX ADMIN — ALBUTEROL SULFATE 2 PUFF: 90 AEROSOL, METERED RESPIRATORY (INHALATION) at 12:14

## 2022-08-10 RX ADMIN — Medication 1 CAPSULE: at 20:05

## 2022-08-10 RX ADMIN — MYCOPHENOLATE MOFETIL 500 MG: 250 CAPSULE ORAL at 09:40

## 2022-08-10 RX ADMIN — Medication 25 MCG: at 09:39

## 2022-08-10 RX ADMIN — CALCIUM CARBONATE 600 MG (1,500 MG)-VITAMIN D3 400 UNIT TABLET 1 TABLET: at 09:39

## 2022-08-10 RX ADMIN — CALCITRIOL CAPSULES 0.25 MCG 0.25 MCG: 0.25 CAPSULE ORAL at 09:39

## 2022-08-10 RX ADMIN — ALBUTEROL SULFATE 2 PUFF: 90 AEROSOL, METERED RESPIRATORY (INHALATION) at 17:03

## 2022-08-10 RX ADMIN — ALBUTEROL SULFATE 2 PUFF: 90 AEROSOL, METERED RESPIRATORY (INHALATION) at 09:41

## 2022-08-10 RX ADMIN — UMECLIDINIUM 1 PUFF: 62.5 AEROSOL, POWDER ORAL at 09:41

## 2022-08-10 RX ADMIN — MYCOPHENOLATE MOFETIL 500 MG: 250 CAPSULE ORAL at 17:03

## 2022-08-10 RX ADMIN — CYCLOSPORINE 75 MG: 25 CAPSULE, LIQUID FILLED ORAL at 09:39

## 2022-08-10 RX ADMIN — CALCIUM CARBONATE 600 MG (1,500 MG)-VITAMIN D3 400 UNIT TABLET 1 TABLET: at 20:05

## 2022-08-10 RX ADMIN — SENNOSIDES AND DOCUSATE SODIUM 2 TABLET: 8.6; 5 TABLET ORAL at 20:05

## 2022-08-10 RX ADMIN — CETIRIZINE HYDROCHLORIDE 10 MG: 10 TABLET, FILM COATED ORAL at 09:39

## 2022-08-10 RX ADMIN — CYCLOSPORINE 50 MG: 25 CAPSULE, LIQUID FILLED ORAL at 20:05

## 2022-08-10 RX ADMIN — FEBUXOSTAT 40 MG: 40 TABLET, FILM COATED ORAL at 09:40

## 2022-08-10 RX ADMIN — Medication 1 MG: at 21:32

## 2022-08-10 RX ADMIN — ALBUTEROL SULFATE 2 PUFF: 90 AEROSOL, METERED RESPIRATORY (INHALATION) at 20:05

## 2022-08-10 RX ADMIN — OLOPATADINE HYDROCHLORIDE 1 DROP: 1 SOLUTION OPHTHALMIC at 09:41

## 2022-08-10 RX ADMIN — OMEPRAZOLE 20 MG: 20 CAPSULE, DELAYED RELEASE ORAL at 09:40

## 2022-08-10 RX ADMIN — Medication 1 CAPSULE: at 09:39

## 2022-08-10 RX ADMIN — OMEPRAZOLE 20 MG: 20 CAPSULE, DELAYED RELEASE ORAL at 20:05

## 2022-08-10 RX ADMIN — Medication 1 TABLET: at 12:14

## 2022-08-10 RX ADMIN — GABAPENTIN 300 MG: 300 CAPSULE ORAL at 21:32

## 2022-08-10 RX ADMIN — AMLODIPINE BESYLATE 10 MG: 10 TABLET ORAL at 09:40

## 2022-08-10 RX ADMIN — SENNOSIDES AND DOCUSATE SODIUM 2 TABLET: 8.6; 5 TABLET ORAL at 09:39

## 2022-08-10 ASSESSMENT — ACTIVITIES OF DAILY LIVING (ADL)
ADLS_ACUITY_SCORE: 31
ADLS_ACUITY_SCORE: 31
ADLS_ACUITY_SCORE: 34
ADLS_ACUITY_SCORE: 31
ADLS_ACUITY_SCORE: 34
ADLS_ACUITY_SCORE: 31
ADLS_ACUITY_SCORE: 34
ADLS_ACUITY_SCORE: 34
ADLS_ACUITY_SCORE: 31
ADLS_ACUITY_SCORE: 34

## 2022-08-10 NOTE — PLAN OF CARE
Major Shift Events:  O2 wean from 30L 45% HFNC to 3L via oxymask.  Neuro- A&O x4, no neurological deficits.   Cardiac- HRR, tele reading SR in the 90s. No ectopy.  Resp- Lungs clear and dim. On 3L via oxymask. Some GAYTAN, but denies any SOB at rest. IS started and pt is using. Pt instructed to sit up as much as possible due to body habitus.  GI/- Voiding without issue. 1 bm this shift. Denies any N&V. Appetite is good. Pt does have assistance with ordering.   Skin- Unchanged, no new concerns.  Pt up SBA in room as she tolerates. Up with NST this AM to wash up at the sink, unable to shower due to O2 needs at the time. Pt updated on the plan by MD. Pulmonology team seen the pt and talked with her. Pts son updated while pt was face timing with him, he is aware of the current plan.   Plan: Over night O2 test. Continue to wean O2 as able.   For vital signs and complete assessments, please see documentation flowsheets.

## 2022-08-10 NOTE — PROGRESS NOTES
[facility]    Medicine Progress Note - Hospitalist Service, GOLD TEAM 7    Date of Admission:  8/2/2022    Assessment & Plan              56 year old female with PMH of Liver Tx for hepatitis C, HTN, CKDIII, SURI, Obesity, h/o SBO presented on 8/2/22 w/ SOB and abdominal pain. Patient transferred from [facility] to [place] for acute hypoxic, hypercarbic respiratory failure. Transplant ID on board     Acute hypoxic, hypercapnic respiratory failure  Per prior documentation, there is some documentation that she has COPD and takes inhalers at home. Per chart review, patient has been noncompliant with CPAP prior to transfer to [place]. CXR with pulmonary edema.  BNP is elevated at 1238. Noted to have bibasilar rales on exam. Improved with diuresis but now with slowed improvement.  CT Chest 8/7/2022 showed bilateral lower lobe ground glass opacities: edema versus atypical infection.  Given transplant status, will follow up with transplant ID.  Pt says no exposure to birds, hot tubs, smoking, TB. Never positive for TB screen. Never got treatment for it.   Plan:  - Nephrology and cardiology consulted. Doesn't seem fluid overload.   - Transplant ID consultation for possible atypical infection. Will get more hx for exposures. If everything is negative then will consider pulm consult  - HFNC, wean as tolerated  - Continue PTA prn albuterol and Umeclidinium daily     Chronic intermittent abdominal pain  H/o Small Bowel obstruction  Pt initially presented w/ RUQ abd pain radiating to right low back, nausea w/o vomiting.  WBC unremarkable. CMP baseline. UA bland.   - CTM     CASTILLO   CKD stage III  Baseline Cr ~1.1-1.2 range; on admission, Cr 1.27 but increased acutely.  Now downtrending to baseline  - Consult nephrology, appreciate assistance  - Avoid nephrotoxic agents  - BMP daily  - Hold further diuresis per nephrology and cardiology.      Hx DDLT (2010) 2/2 Hepatitis C  - USG  liver showed unremarkable grayscale appearance of the transplant liver. No biliary obstruction. Patent transplant vasculature with proper directional flow as above. Relatively high resistance flow within the hepatic artery, new since prior examination. Differential is broad but includes rejection. LFTs are wnl except AST of 47.  - Transplant hepatology consulted, appreciate assistance  - Continue Cyclosporine level in AM  - Continue MMF     HTN  - Continue PTA amlodipine 10 mg every other day  - Diuresis as above     GERD  - Continue PTA Omeprazole 20 mg BID     Obesity  H/o SURI  Not compliant with CPAP.  - HFNC during day, CPAP at night     Acute encephalopathy, resolved  Patient noted to be excessively somnolent but arousable prior to transfer, likely d/t hypercapnia. No confusion at present  - Respiratory management as above     Hyperkalemia, resolved  K on admit was 5.9, now normalized  - BMP daily       Diet: Regular Diet Adult  Room Service    DVT Prophylaxis: Ambulatory  Li Catheter: Not present  Central Lines: None  Cardiac Monitoring: None  Code Status: Full Code      Disposition Plan      Expected Discharge Date: 08/14/2022    Discharge Delays: Oxygen Needs - Arrange Home O2  Destination: home  Discharge Comments: Pending O2 improvement. Needs further work up        The patient's care was discussed with the Patient and Patient's Family. And ID     VALERIE COVARRUBIAS MD  Hospitalist Service, [facility] TEAM [address] [place] [address]  Securely message with the Vocera Web Console (learn more here)  Text page via [facility] Paging/Directory   Please see signed in provider for up to date coverage information      Clinically Significant Risk Factors Present on Admission                      ______________________________________________________________________    Interval History   Pt is feeling better no SOB, CP, No N,V,D just abdominal fullness. Pt says no exposure to birds, hot tubs,  smoking, TB. Never positive for TB screen. Never got treatment for it     Data reviewed today: I reviewed all medications, new labs and imaging results over the last 24 hours. I personally reviewed    Physical Exam   Vital Signs: Temp: 97.5  F (36.4  C) Temp src: Axillary BP: 117/72 Pulse: 71   Resp: 18 SpO2: 93 % O2 Device: High Flow Nasal Cannula (HFNC) Oxygen Delivery: 30 LPM  Weight: 240 lbs 1.6 oz  Constitutional: HFNC but comfortable  Respiratory: No added sounds no wheezez  Cardiovascular: Minimal LE Edema. Normal S1 S2    Data

## 2022-08-10 NOTE — PROGRESS NOTES
N: WNL  C:  WNL x/ bilateral ankle edema  R: HFNC 30L 40%  GI/:  WNL  Diet/Appetite: WNL  Activity: Up x1 to BR, commode available  Pain: PRN Tylenol given for minor aches and pains, otherwise denies pain.  Skin:  No new deficits noted.     Plan: POC & Notify MD of changes

## 2022-08-10 NOTE — PROGRESS NOTES
CLINICAL NUTRITION SERVICES - ASSESSMENT NOTE     Nutrition Prescription    Malnutrition Status:    Patient does not meet two of the established criteria necessary for diagnosing malnutrition    Recommendations already ordered by Registered Dietitian (RD):  Updated meal ordering system with pt's likes.     Future/Additional Recommendations:  Monitor PO intake      REASON FOR ASSESSMENT  Flor Navarro is a/an 56 year old female assessed by the dietitian for LOS    NUTRITION HISTORY  Pt reports that her appetite is good. She is eating 3 meals per day. Per note in meal ordering system pt dislikes meat, eggs, and orange juice. Pt provided more meal preferences: likes toast, hot tea, and pineapple.     CURRENT NUTRITION ORDERS  Diet: Regular  Room Service with Assist   Intake/Tolerance: Intake of 100% of meals. Per meal ordering system meals meeting >100% estimated energy needs and >75% estimated protein needs.     LABS  Labs reviewed    MEDICATIONS  Medications reviewed  Caltrate   Thera-vit-m  Metamucil   Senokot  Vit D3 25 mcg     ANTHROPOMETRICS  Height: 5'   Most Recent Weight: 108.9 kg (240 lb 1.6 oz)    IBW: 45.5 kg (239%)   BMI: Obesity Grade III BMI >40  Weight History: Weight stable.   Wt Readings from Last 10 Encounters:   08/10/22 108.9 kg (240 lb 1.6 oz)   07/13/22 109.3 kg (241 lb)   06/08/22 109.7 kg (241 lb 12.8 oz)   05/29/22 106.6 kg (235 lb)   05/09/22 106.6 kg (235 lb)   04/22/22 105.7 kg (233 lb)   03/30/22 106.6 kg (235 lb)   03/16/22 112.7 kg (248 lb 7.3 oz)   01/28/22 106.1 kg (234 lb)   01/03/22 104.8 kg (231 lb)     Dosing Weight: 61 kg (adjusted based on dry weight of 108.9 kg and IBW)     ASSESSED NUTRITION NEEDS  Estimated Energy Needs: 1953-5136 kcals/day (20 - 25 kcals/kg)  Justification: Obese  Estimated Protein Needs: 75-90 grams protein/day (1.2 - 1.5 grams of pro/kg)  Justification: Hypercatabolism with acute illness  Estimated Fluid Needs: 1 mL/kcal  Justification:  Maintenance    PHYSICAL FINDINGS  See malnutrition section below.    MALNUTRITION  % Intake: No decreased intake noted  % Weight Loss: None noted  Subcutaneous Fat Loss: None observed  Muscle Loss: None observed  Fluid Accumulation/Edema: Mild  Malnutrition Diagnosis: Patient does not meet two of the established criteria necessary for diagnosing malnutrition    NUTRITION DIAGNOSIS  Predicted inadequate nutrient intake (calories/protein) related to good appetite currently as evidenced by potential for decline with LOS      INTERVENTIONS  Implementation  Nutrition Education: Discussed current PO intake and role of RD.    Modify composition of meals/snacks: Updated meal ordering system      Goals  Patient to consume % of nutritionally adequate meal trays TID, or the equivalent with supplements/snacks.     Monitoring/Evaluation  Progress toward goals will be monitored and evaluated per protocol.    Katelin Álvarez RD, LD  6D RD pager [phone]  Weekend/ED RD pager [phone]

## 2022-08-10 NOTE — PLAN OF CARE
Major Shift Events:  A&Ox4, Vietnamese speaking. SR overnight, denies chest pain, afebrile. Pt on Bipap @ 40% overnight, on HFNC during day. Voiding adequately into commode, LBM 8/8. Denies pain. Urine sample sent this shift.   Plan: wean oxygen as able, notify team with acute changes    For vital signs and complete assessments, please see documentation flowsheets.    Goal Outcome Evaluation:    Plan of Care Reviewed With: patient     Overall Patient Progress: no change

## 2022-08-11 ENCOUNTER — TELEPHONE (OUTPATIENT)
Dept: TRANSPLANT | Facility: CLINIC | Age: 57
End: 2022-08-11

## 2022-08-11 ENCOUNTER — APPOINTMENT (OUTPATIENT)
Dept: PHYSICAL THERAPY | Facility: CLINIC | Age: 57
DRG: 189 | End: 2022-08-11
Payer: MEDICARE

## 2022-08-11 LAB
1,3 BETA GLUCAN SER-MCNC: 52 PG/ML
ALBUMIN SERPL BCG-MCNC: 3.7 G/DL (ref 3.5–5.2)
ALP SERPL-CCNC: 76 U/L (ref 35–104)
ALT SERPL W P-5'-P-CCNC: 21 U/L (ref 10–35)
ANION GAP SERPL CALCULATED.3IONS-SCNC: 9 MMOL/L (ref 7–15)
AST SERPL W P-5'-P-CCNC: 28 U/L (ref 10–35)
BILIRUB SERPL-MCNC: 0.5 MG/DL
BUN SERPL-MCNC: 19.6 MG/DL (ref 6–20)
CALCIUM SERPL-MCNC: 9.6 MG/DL (ref 8.6–10)
CHLORIDE SERPL-SCNC: 101 MMOL/L (ref 98–107)
CREAT SERPL-MCNC: 0.98 MG/DL (ref 0.51–0.95)
DEPRECATED HCO3 PLAS-SCNC: 28 MMOL/L (ref 22–29)
ERYTHROCYTE [DISTWIDTH] IN BLOOD BY AUTOMATED COUNT: 14.1 % (ref 10–15)
GFR SERPL CREATININE-BSD FRML MDRD: 67 ML/MIN/1.73M2
GLUCOSE SERPL-MCNC: 129 MG/DL (ref 70–99)
HCT VFR BLD AUTO: 40.4 % (ref 35–47)
HGB BLD-MCNC: 12.4 G/DL (ref 11.7–15.7)
MCH RBC QN AUTO: 28.1 PG (ref 26.5–33)
MCHC RBC AUTO-ENTMCNC: 30.7 G/DL (ref 31.5–36.5)
MCV RBC AUTO: 91 FL (ref 78–100)
OBSERVATION IMP: NEGATIVE
PLATELET # BLD AUTO: 141 10E3/UL (ref 150–450)
POTASSIUM SERPL-SCNC: 4.5 MMOL/L (ref 3.4–5.3)
PROT SERPL-MCNC: 6.9 G/DL (ref 6.4–8.3)
RBC # BLD AUTO: 4.42 10E6/UL (ref 3.8–5.2)
SODIUM SERPL-SCNC: 138 MMOL/L (ref 136–145)
WBC # BLD AUTO: 3.5 10E3/UL (ref 4–11)

## 2022-08-11 PROCEDURE — 999N000157 HC STATISTIC RCP TIME EA 10 MIN

## 2022-08-11 PROCEDURE — 94762 N-INVAS EAR/PLS OXIMTRY CONT: CPT

## 2022-08-11 PROCEDURE — 99232 SBSQ HOSP IP/OBS MODERATE 35: CPT | Performed by: STUDENT IN AN ORGANIZED HEALTH CARE EDUCATION/TRAINING PROGRAM

## 2022-08-11 PROCEDURE — 97530 THERAPEUTIC ACTIVITIES: CPT | Mod: GP

## 2022-08-11 PROCEDURE — 250N000013 HC RX MED GY IP 250 OP 250 PS 637: Performed by: STUDENT IN AN ORGANIZED HEALTH CARE EDUCATION/TRAINING PROGRAM

## 2022-08-11 PROCEDURE — 250N000012 HC RX MED GY IP 250 OP 636 PS 637: Performed by: HOSPITALIST

## 2022-08-11 PROCEDURE — 97116 GAIT TRAINING THERAPY: CPT | Mod: GP

## 2022-08-11 PROCEDURE — 250N000013 HC RX MED GY IP 250 OP 250 PS 637: Performed by: HOSPITALIST

## 2022-08-11 PROCEDURE — 85014 HEMATOCRIT: CPT | Performed by: STUDENT IN AN ORGANIZED HEALTH CARE EDUCATION/TRAINING PROGRAM

## 2022-08-11 PROCEDURE — 80053 COMPREHEN METABOLIC PANEL: CPT | Performed by: STUDENT IN AN ORGANIZED HEALTH CARE EDUCATION/TRAINING PROGRAM

## 2022-08-11 PROCEDURE — 36415 COLL VENOUS BLD VENIPUNCTURE: CPT | Performed by: STUDENT IN AN ORGANIZED HEALTH CARE EDUCATION/TRAINING PROGRAM

## 2022-08-11 PROCEDURE — 120N000002 HC R&B MED SURG/OB UMMC

## 2022-08-11 RX ADMIN — Medication 1 MG: at 20:08

## 2022-08-11 RX ADMIN — MYCOPHENOLATE MOFETIL 500 MG: 250 CAPSULE ORAL at 17:01

## 2022-08-11 RX ADMIN — OMEPRAZOLE 20 MG: 20 CAPSULE, DELAYED RELEASE ORAL at 08:51

## 2022-08-11 RX ADMIN — CALCIUM CARBONATE 600 MG (1,500 MG)-VITAMIN D3 400 UNIT TABLET 1 TABLET: at 08:50

## 2022-08-11 RX ADMIN — CYCLOSPORINE 75 MG: 25 CAPSULE, LIQUID FILLED ORAL at 08:51

## 2022-08-11 RX ADMIN — ALBUTEROL SULFATE 2 PUFF: 90 AEROSOL, METERED RESPIRATORY (INHALATION) at 11:27

## 2022-08-11 RX ADMIN — CALCITRIOL CAPSULES 0.25 MCG 0.25 MCG: 0.25 CAPSULE ORAL at 08:50

## 2022-08-11 RX ADMIN — ALBUTEROL SULFATE 2 PUFF: 90 AEROSOL, METERED RESPIRATORY (INHALATION) at 17:01

## 2022-08-11 RX ADMIN — MYCOPHENOLATE MOFETIL 500 MG: 250 CAPSULE ORAL at 08:51

## 2022-08-11 RX ADMIN — CETIRIZINE HYDROCHLORIDE 10 MG: 10 TABLET, FILM COATED ORAL at 08:51

## 2022-08-11 RX ADMIN — OMEPRAZOLE 20 MG: 20 CAPSULE, DELAYED RELEASE ORAL at 20:09

## 2022-08-11 RX ADMIN — ALBUTEROL SULFATE 2 PUFF: 90 AEROSOL, METERED RESPIRATORY (INHALATION) at 08:51

## 2022-08-11 RX ADMIN — Medication 1 TABLET: at 11:27

## 2022-08-11 RX ADMIN — FEBUXOSTAT 40 MG: 40 TABLET, FILM COATED ORAL at 08:51

## 2022-08-11 RX ADMIN — CYCLOSPORINE 50 MG: 25 CAPSULE, LIQUID FILLED ORAL at 20:08

## 2022-08-11 RX ADMIN — Medication 1 CAPSULE: at 08:51

## 2022-08-11 RX ADMIN — AMLODIPINE BESYLATE 10 MG: 10 TABLET ORAL at 08:51

## 2022-08-11 RX ADMIN — SIMETHICONE 80 MG: 80 TABLET, CHEWABLE ORAL at 17:45

## 2022-08-11 RX ADMIN — CALCIUM CARBONATE 600 MG (1,500 MG)-VITAMIN D3 400 UNIT TABLET 1 TABLET: at 20:09

## 2022-08-11 RX ADMIN — GABAPENTIN 300 MG: 300 CAPSULE ORAL at 20:11

## 2022-08-11 RX ADMIN — ALBUTEROL SULFATE 2 PUFF: 90 AEROSOL, METERED RESPIRATORY (INHALATION) at 20:10

## 2022-08-11 RX ADMIN — Medication 25 MCG: at 08:51

## 2022-08-11 RX ADMIN — OLOPATADINE HYDROCHLORIDE 1 DROP: 1 SOLUTION OPHTHALMIC at 08:51

## 2022-08-11 RX ADMIN — Medication 1 CAPSULE: at 20:09

## 2022-08-11 RX ADMIN — UMECLIDINIUM 1 PUFF: 62.5 AEROSOL, POWDER ORAL at 08:51

## 2022-08-11 ASSESSMENT — ACTIVITIES OF DAILY LIVING (ADL)
ADLS_ACUITY_SCORE: 34

## 2022-08-11 NOTE — PLAN OF CARE
NURSING PROGRESS NOTE  Shift Summary      Date: August 11, 2022     Neuro/Musculoskeletal:  A&Ox4. English speaking, understands some English. iPad  in room. Moves all extremities.   Cardiac:  SR.  VSS.     Respiratory:  Sating in the 90s on 4L oxymask. Overnight oximetry study done by RT.   GI/:  Adequate urine output.  1 BM overnight.   Diet/Appetite:  Tolerating regular diet.  Activity:  Standby assist.    Pain:  Denies.   Skin:  No new deficits noted.   LDAs + Drips/IVF:  PIV in LUE.   Protocols/Labs:  AM labs ordered.     Pertinent Shift Updates:  Patient slept between cares, no acute events overnight.       Plan:  Continue to monitor patient.       Sangeetha Garcia RN  .................................................... August 11, 2022   5:22 AM  [facility] ([facility]): [facility] ICU (Unit 6D)

## 2022-08-11 NOTE — PLAN OF CARE
Neuro- A&O x4, no neurological deficits.   Cardiac- HRR, tele reading SR in the 90s. No ectopy.  Resp- Lungs clear and dim. On 3L via oxymask. Some GAYTAN, but denies any SOB at rest. O2 test done with ambulation and pt desatted to 83% while on RA, MD notified of results.   GI/- Voiding without issue. Denies any N&V. Appetite is good. Pt does have assistance with ordering.   Skin- Unchanged, no new concerns.  Pt worked with PT today for recommendations for discharge. Pt had family and friends visiting today. Pt states she feels much better. Respiratory status continues to improve.   Plan: Possibly discharge in next few days depending on home O2 needs and if the pt will have adequate assistance.   For vital signs and complete assessments, please see documentation flowsheets.

## 2022-08-11 NOTE — PROGRESS NOTES
Care Management Follow Up    Length of Stay (days): 9  Expected Discharge Date:    Concerns to be Addressed: discharge planning     Patient plan of care discussed at interdisciplinary rounds: Yes    Anticipated Discharge Disposition: Son needs to return to [place] for pt to have safe home D-C. May need TCU  Anticipated Discharge Services:     Services Prior to Admission:  PCA 6.25hrs/day  Homemaking 3hrs/week  4days/week adult day care for 6 hours.     Deborah Krishnamurthy nurse care coordinator w/[facility]: [phone]    LAURA Roa RN  [facility]  Ph: [phone]     [facility] Provide A Ride  Ph: [phone]  After Hours: [phone]     Anticipated Discharge DME: new O2      Additional Information:  Received call from [facility] 7 end of day stating pt is ready for discharge and needs new O2. Reviewed that patient will not be able to go straight home until her son/PCA returns from out of the country. During huddle today, heard that may not happen until next week. Additionally, pt would need oxygen testing and orders to go home.     Provider will speak with pt now, to determine when son is returning. May need to refer to TCU if son won't return soon per PT recs.   RNCC called son's phone now, no answer, no VM available.     Updated 6D SW.   Left  for [facility] [place] with above info.     Pat James, RN, [place]  Float Care Coordinator  Covering 6D RNCC   Pager: [phone]

## 2022-08-11 NOTE — PROGRESS NOTES
I certify that this patient, Flor Navarro has been under my care (or a nurse practitioner or physican's assistant working with me). This is the face-to-face encounter for oxygen medical necessity.      Flor Navarro is now in a chronic stable state and continues to require supplemental oxygen. Patient has continued oxygen desaturation due to Chronic Respiratory Failure with Hypoxia J96.11. Related to Obstructive sleep apnea    Alternative treatment(s) tried or considered and deemed clinically infective for treatment of Chronic Respiratory Failure with Hypoxia J96.11 include inhalers  If portability is ordered, is the patient mobile within the home? yes    Patients who qualify for home O2 coverage under the CMS guidelines require ABG tests or O2 sat readings obtained closest to, but no earlier than 2 days prior to the discharge, as evidence of the need for home oxygen therapy. Testing must be performed while patient is in the chronic stable state. See notes for O2 sats.

## 2022-08-11 NOTE — PROGRESS NOTES
Overnight oximetry study started by RT. Pt unable to tolerate study at room air, deSATs to below 86% Sp02. Study started with 3L oxyplus mask with pt Sp02 @ 90%. RN notified of oximetry study.    Rudy Moore, RT

## 2022-08-11 NOTE — PROGRESS NOTES
[facility]    Medicine Progress Note - Hospitalist Service, GOLD TEAM 7    Date of Admission:  8/2/2022    Assessment & Plan              56 year old female with PMH of Liver Tx for hepatitis C, HTN, CKDIII, SURI, Obesity, h/o SBO presented on 8/2/22 w/ SOB and abdominal pain. Patient transferred from [facility] to [place] for acute hypoxic, hypercarbic respiratory failure likely related to SURI with non adherence     Acute hypoxic, hypercapnic respiratory failure  Per prior documentation, there is some documentation that she has COPD and takes inhalers at home. Per chart review, patient has been noncompliant with CPAP prior to transfer to [place]. CXR with pulmonary edema.  BNP is elevated at 1238. Noted to have bibasilar rales on exam. Improved with diuresis but now with slowed improvement.  CT Chest 8/7/2022 showed bilateral lower lobe ground glass opacities: edema versus atypical infection.  Given transplant status, will follow up with transplant ID but work up is neg. Nephrology and cardiology consulted. Doesn't seem fluid overload.  Pt says no exposure to birds, hot tubs, smoking, TB. Never positive for TB screen. Never got treatment for it. She likely has underlying SURI. Overnight O2 test needs 3 L of O2 to keep sat >90%. Will get a walk test  Plan:  - Walk test and Discharge on O2 and have her follow up with sleep clinic. Needs also 3 L of O2  - PT/OT  - Will ask her if she has help at home     Chronic intermittent abdominal pain  H/o Small Bowel obstruction  Pt initially presented w/ RUQ abd pain radiating to right low back, nausea w/o vomiting.  WBC unremarkable. CMP baseline. UA bland.   - CTM     CASTILLO   CKD stage III  Baseline Cr ~1.1-1.2 range; on admission, Cr 1.27 but increased acutely.  Now downtrending to baseline  - Consult nephrology, appreciate assistance  - Avoid nephrotoxic agents  - BMP daily  - Hold further diuresis per nephrology and cardiology.       Hx DDLT (2010) 2/2 Hepatitis C  - USG liver showed unremarkable grayscale appearance of the transplant liver. No biliary obstruction. Patent transplant vasculature with proper directional flow as above. Relatively high resistance flow within the hepatic artery, new since prior examination. Differential is broad but includes rejection. LFTs are wnl except AST of 47.  - Transplant hepatology consulted, appreciate assistance  - Continue Cyclosporine level in AM  - Continue MMF     HTN  - Continue PTA amlodipine 10 mg every other day  - Diuresis as above     GERD  - Continue PTA Omeprazole 20 mg BID     Obesity  H/o SURI  Not compliant with CPAP.  - CPAP at night  - tx as above     Acute encephalopathy, resolved  Patient noted to be excessively somnolent but arousable prior to transfer, likely d/t hypercapnia. No confusion at present  - Respiratory management as above     Hyperkalemia, resolved  K on admit was 5.9, now normalized  - BMP daily       Diet: Regular Diet Adult  Room Service    DVT Prophylaxis: Ambulatory  Li Catheter: Not present  Central Lines: None  Cardiac Monitoring: None  Code Status: Full Code      Disposition Plan     Expected Discharge Date: 08/14/2022    Discharge Delays: Oxygen Needs - Arrange Home O2  Destination: home  Discharge Comments: Pending O2 improvement. Needs further work up        The patient's care was discussed with the Patient and Patient's Family. And ID     VALERIE COVARRUBIAS MD  Hospitalist Service, [facility] TEAM [address] [place] [address]  Securely message with the Vocera Web Console (learn more here)  Text page via [facility] Paging/Directory   Please see signed in provider for up to date coverage information      Clinically Significant Risk Factors Present on Admission                      ______________________________________________________________________    Interval History   Has tremendous improvement in Sx. No SOB or CP. On 4 L of face  mask.    Data reviewed today: I reviewed all medications, new labs and imaging results over the last 24 hours. I personally reviewed    Physical Exam   Vital Signs: Temp: 97.8  F (36.6  C) Temp src: Oral BP: (!) 129/92 Pulse: 90   Resp: 21 SpO2: 95 % O2 Device: Oxymask Oxygen Delivery: 5 LPM  Weight: 240 lbs 1.6 oz  Constitutional: HFNC but comfortable  Respiratory: No added sounds no wheezez  Cardiovascular: Minimal LE Edema. Normal S1 S2    Data

## 2022-08-12 LAB
ALBUMIN SERPL BCG-MCNC: 3.8 G/DL (ref 3.5–5.2)
ALBUMIN SERPL BCG-MCNC: 3.9 G/DL (ref 3.5–5.2)
ALP SERPL-CCNC: 75 U/L (ref 35–104)
ALP SERPL-CCNC: 76 U/L (ref 35–104)
ALT SERPL W P-5'-P-CCNC: 20 U/L (ref 10–35)
ALT SERPL W P-5'-P-CCNC: 21 U/L (ref 10–35)
ANION GAP SERPL CALCULATED.3IONS-SCNC: 10 MMOL/L (ref 7–15)
ANION GAP SERPL CALCULATED.3IONS-SCNC: 10 MMOL/L (ref 7–15)
AST SERPL W P-5'-P-CCNC: 26 U/L (ref 10–35)
AST SERPL W P-5'-P-CCNC: 30 U/L (ref 10–35)
BILIRUB SERPL-MCNC: 0.4 MG/DL
BILIRUB SERPL-MCNC: 0.5 MG/DL
BUN SERPL-MCNC: 19.8 MG/DL (ref 6–20)
BUN SERPL-MCNC: 21 MG/DL (ref 6–20)
CALCIUM SERPL-MCNC: 9.7 MG/DL (ref 8.6–10)
CALCIUM SERPL-MCNC: 9.9 MG/DL (ref 8.6–10)
CHLORIDE SERPL-SCNC: 101 MMOL/L (ref 98–107)
CHLORIDE SERPL-SCNC: 102 MMOL/L (ref 98–107)
CREAT SERPL-MCNC: 1.01 MG/DL (ref 0.51–0.95)
CREAT SERPL-MCNC: 1.06 MG/DL (ref 0.51–0.95)
DEPRECATED HCO3 PLAS-SCNC: 28 MMOL/L (ref 22–29)
DEPRECATED HCO3 PLAS-SCNC: 29 MMOL/L (ref 22–29)
ERYTHROCYTE [DISTWIDTH] IN BLOOD BY AUTOMATED COUNT: 13.9 % (ref 10–15)
GFR SERPL CREATININE-BSD FRML MDRD: 61 ML/MIN/1.73M2
GFR SERPL CREATININE-BSD FRML MDRD: 65 ML/MIN/1.73M2
GLUCOSE SERPL-MCNC: 106 MG/DL (ref 70–99)
GLUCOSE SERPL-MCNC: 117 MG/DL (ref 70–99)
HCT VFR BLD AUTO: 41.7 % (ref 35–47)
HGB BLD-MCNC: 12.3 G/DL (ref 11.7–15.7)
MCH RBC QN AUTO: 27.6 PG (ref 26.5–33)
MCHC RBC AUTO-ENTMCNC: 29.5 G/DL (ref 31.5–36.5)
MCV RBC AUTO: 94 FL (ref 78–100)
PLATELET # BLD AUTO: 153 10E3/UL (ref 150–450)
POTASSIUM SERPL-SCNC: 5 MMOL/L (ref 3.4–5.3)
POTASSIUM SERPL-SCNC: 5.5 MMOL/L (ref 3.4–5.3)
PROT SERPL-MCNC: 6.9 G/DL (ref 6.4–8.3)
PROT SERPL-MCNC: 7 G/DL (ref 6.4–8.3)
RBC # BLD AUTO: 4.46 10E6/UL (ref 3.8–5.2)
SCANNED LAB RESULT: NORMAL
SCANNED LAB RESULT: NORMAL
SODIUM SERPL-SCNC: 140 MMOL/L (ref 136–145)
SODIUM SERPL-SCNC: 140 MMOL/L (ref 136–145)
WBC # BLD AUTO: 4.2 10E3/UL (ref 4–11)

## 2022-08-12 PROCEDURE — 80053 COMPREHEN METABOLIC PANEL: CPT | Performed by: STUDENT IN AN ORGANIZED HEALTH CARE EDUCATION/TRAINING PROGRAM

## 2022-08-12 PROCEDURE — 250N000012 HC RX MED GY IP 250 OP 636 PS 637: Performed by: HOSPITALIST

## 2022-08-12 PROCEDURE — 36415 COLL VENOUS BLD VENIPUNCTURE: CPT | Performed by: STUDENT IN AN ORGANIZED HEALTH CARE EDUCATION/TRAINING PROGRAM

## 2022-08-12 PROCEDURE — 94660 CPAP INITIATION&MGMT: CPT

## 2022-08-12 PROCEDURE — 84450 TRANSFERASE (AST) (SGOT): CPT | Performed by: STUDENT IN AN ORGANIZED HEALTH CARE EDUCATION/TRAINING PROGRAM

## 2022-08-12 PROCEDURE — 120N000002 HC R&B MED SURG/OB UMMC

## 2022-08-12 PROCEDURE — 99232 SBSQ HOSP IP/OBS MODERATE 35: CPT | Performed by: STUDENT IN AN ORGANIZED HEALTH CARE EDUCATION/TRAINING PROGRAM

## 2022-08-12 PROCEDURE — 85027 COMPLETE CBC AUTOMATED: CPT | Performed by: STUDENT IN AN ORGANIZED HEALTH CARE EDUCATION/TRAINING PROGRAM

## 2022-08-12 PROCEDURE — 250N000013 HC RX MED GY IP 250 OP 250 PS 637: Performed by: HOSPITALIST

## 2022-08-12 PROCEDURE — 999N000157 HC STATISTIC RCP TIME EA 10 MIN

## 2022-08-12 RX ADMIN — AMLODIPINE BESYLATE 10 MG: 10 TABLET ORAL at 09:50

## 2022-08-12 RX ADMIN — CALCITRIOL CAPSULES 0.25 MCG 0.25 MCG: 0.25 CAPSULE ORAL at 09:46

## 2022-08-12 RX ADMIN — CYCLOSPORINE 75 MG: 25 CAPSULE, LIQUID FILLED ORAL at 09:53

## 2022-08-12 RX ADMIN — CETIRIZINE HYDROCHLORIDE 10 MG: 10 TABLET, FILM COATED ORAL at 09:49

## 2022-08-12 RX ADMIN — Medication 1 CAPSULE: at 19:57

## 2022-08-12 RX ADMIN — UMECLIDINIUM 1 PUFF: 62.5 AEROSOL, POWDER ORAL at 09:53

## 2022-08-12 RX ADMIN — SENNOSIDES AND DOCUSATE SODIUM 2 TABLET: 8.6; 5 TABLET ORAL at 09:51

## 2022-08-12 RX ADMIN — Medication 25 MCG: at 09:52

## 2022-08-12 RX ADMIN — Medication 1 TABLET: at 12:37

## 2022-08-12 RX ADMIN — CYCLOSPORINE 50 MG: 25 CAPSULE, LIQUID FILLED ORAL at 19:57

## 2022-08-12 RX ADMIN — OMEPRAZOLE 20 MG: 20 CAPSULE, DELAYED RELEASE ORAL at 19:58

## 2022-08-12 RX ADMIN — ALBUTEROL SULFATE 2 PUFF: 90 AEROSOL, METERED RESPIRATORY (INHALATION) at 15:44

## 2022-08-12 RX ADMIN — ALBUTEROL SULFATE 2 PUFF: 90 AEROSOL, METERED RESPIRATORY (INHALATION) at 09:54

## 2022-08-12 RX ADMIN — OMEPRAZOLE 20 MG: 20 CAPSULE, DELAYED RELEASE ORAL at 09:52

## 2022-08-12 RX ADMIN — MYCOPHENOLATE MOFETIL 500 MG: 250 CAPSULE ORAL at 09:53

## 2022-08-12 RX ADMIN — ALBUTEROL SULFATE 2 PUFF: 90 AEROSOL, METERED RESPIRATORY (INHALATION) at 19:58

## 2022-08-12 RX ADMIN — CALCIUM CARBONATE 600 MG (1,500 MG)-VITAMIN D3 400 UNIT TABLET 1 TABLET: at 09:46

## 2022-08-12 RX ADMIN — Medication 1 CAPSULE: at 09:52

## 2022-08-12 RX ADMIN — ALBUTEROL SULFATE 2 PUFF: 90 AEROSOL, METERED RESPIRATORY (INHALATION) at 12:37

## 2022-08-12 RX ADMIN — MYCOPHENOLATE MOFETIL 500 MG: 250 CAPSULE ORAL at 17:07

## 2022-08-12 RX ADMIN — FEBUXOSTAT 40 MG: 40 TABLET, FILM COATED ORAL at 09:52

## 2022-08-12 RX ADMIN — SENNOSIDES AND DOCUSATE SODIUM 2 TABLET: 8.6; 5 TABLET ORAL at 19:57

## 2022-08-12 RX ADMIN — CALCIUM CARBONATE 600 MG (1,500 MG)-VITAMIN D3 400 UNIT TABLET 1 TABLET: at 19:58

## 2022-08-12 RX ADMIN — GABAPENTIN 300 MG: 300 CAPSULE ORAL at 21:14

## 2022-08-12 RX ADMIN — OLOPATADINE HYDROCHLORIDE 1 DROP: 1 SOLUTION OPHTHALMIC at 09:54

## 2022-08-12 ASSESSMENT — ACTIVITIES OF DAILY LIVING (ADL)
ADLS_ACUITY_SCORE: 30
ADLS_ACUITY_SCORE: 34
ADLS_ACUITY_SCORE: 30
ADLS_ACUITY_SCORE: 34

## 2022-08-12 NOTE — PROGRESS NOTES
[facility]    Medicine Progress Note - Hospitalist Service, GOLD TEAM 7    Date of Admission:  8/2/2022    Assessment & Plan              56 year old female with PMH of Liver Tx for hepatitis C, HTN, CKDIII, SURI, Obesity, h/o SBO presented on 8/2/22 w/ SOB and abdominal pain. Patient transferred from [facility] to [facility] for acute hypoxic, hypercarbic respiratory failure likely related to SURI with non adherence     Acute hypoxic, hypercapnic respiratory failure  Per prior documentation, there is some documentation that she has COPD and takes inhalers at home. Per chart review, patient has been noncompliant with CPAP prior to transfer to [facility]. CXR with pulmonary edema.  BNP is elevated at 1238. Noted to have bibasilar rales on exam. Improved with diuresis but now with slowed improvement.  CT Chest 8/7/2022 showed bilateral lower lobe ground glass opacities: edema versus atypical infection.  Given transplant status, will follow up with transplant ID but work up is neg. Nephrology and cardiology consulted. Doesn't seem fluid overload.  Pt says no exposure to birds, hot tubs, smoking, TB. Never positive for TB screen. Never got treatment for it. She likely has underlying SURI. Overnight O2 test needs 3 L of O2 to keep sat >90%. And need 3 L with ambulation  Plan:  - Discharge with O2. Will get a face to face and order O2.   - Follow up with sleep clinic.  - PT/OT. She doesn't want to go to TCU and wants to go home but her son (PCA) will return to the [place] from an international travel 08/15. Likely no Discharge till then.      Chronic intermittent abdominal pain  H/o Small Bowel obstruction  Pt initially presented w/ RUQ abd pain radiating to right low back, nausea w/o vomiting.  WBC unremarkable. CMP baseline. UA bland.   - CTM     CASTILLO   CKD stage III  Baseline Cr ~1.1-1.2 range; on admission, Cr 1.27 but increased acutely.  Now downtrending to baseline  - Consult  nephrology, appreciate assistance  - Avoid nephrotoxic agents  - BMP daily  - Hold further diuresis per nephrology and cardiology.      Hx DDLT (2010) 2/2 Hepatitis C  - USG liver showed unremarkable grayscale appearance of the transplant liver. No biliary obstruction. Patent transplant vasculature with proper directional flow as above. Relatively high resistance flow within the hepatic artery, new since prior examination. Differential is broad but includes rejection. LFTs are wnl except AST of 47.  - Transplant hepatology consulted, appreciate assistance  - Continue Cyclosporine level in AM  - Continue MMF     HTN  - Continue PTA amlodipine 10 mg every other day  - Diuresis as above     GERD  - Continue PTA Omeprazole 20 mg BID     Obesity  H/o SURI  Not compliant with CPAP.  - CPAP at night  - tx as above     Acute encephalopathy, resolved likely metabolic related to hypercapnia as above  Patient noted to be excessively somnolent but arousable prior to transfer, likely d/t hypercapnia. No confusion at present  - Respiratory management as above     Hyperkalemia, resolved  K on admit was 5.9, now normalized  - BMP daily       Diet: Regular Diet Adult  Room Service    DVT Prophylaxis: Ambulatory  Li Catheter: Not present  Central Lines: None  Cardiac Monitoring: None  Code Status: Full Code      Disposition Plan      Expected Discharge Date: 08/13/2022    Discharge Delays: Oxygen Needs - Arrange Home O2  Destination: home  Discharge Comments: Will need O2 home. Altagracia get walk test done and overnight oxi test, Will refer her to sleep specialist. Will need a ride home        The patient's care was discussed with the Patient and Patient's Family. MONI COVARRUBIAS MD  Hospitalist Service, GOLD TEAM [address] [place] [address]  Securely message with the Vocera Web Console (learn more here)  Text page via [employer] Paging/Directory   Please see signed in provider for up to date coverage  information      Clinically Significant Risk Factors Present on Admission                      ______________________________________________________________________    Interval History   Has tremendous improvement in Sx. No SOB or CP. On 3 L of face mask. No N/V/D. Okay with plan above. Son also is called and updated    Data reviewed today: I reviewed all medications, new labs and imaging results over the last 24 hours. I personally reviewed    Physical Exam   Vital Signs: Temp: 98  F (36.7  C) Temp src: Oral BP: 118/62 Pulse: 78   Resp: 19 SpO2: 93 % O2 Device: Oxymask Oxygen Delivery: 3 LPM  Weight: 240 lbs 1.6 oz  Constitutional: HFNC but comfortable  Respiratory: No added sounds no wheezez  Cardiovascular: Minimal LE Edema. Normal S1 S2    Data

## 2022-08-12 NOTE — PROGRESS NOTES
[facility]  Transplant Infectious Disease Progress Note     Patient:  Flor Navarro, Date of birth 1965, Medical record number [id number]  Date of Visit:  08/12/2022         Assessment and Recommendations:   Recommendations:  1. Low suspicion for active infection as an explanation for patient's hypoxia  2. Follow up remaining tests - serum and urine Histoplasma antigen, Fungal antibody panel  3. No indication for antibiotics or antifungals at present  4. Continue weaning off O2 per primary team     Assessment:  57 y/o lady, PMHx OLT 10/26/2010 for hepatitis C, HTN, CKDIII, SURI, Obesity, h/o SBO presented on 8/2/22 w/ SOB and abdominal pain. Patient originally presented to the [facility] and was transferred for hypoxic respiratory failure     #Acute hypoxic respiratory failure:  #Chest CT with bibasilar peribronchovascular groundglass opacities:  Patient presents with abdominal pain and noted to be hypoxic on admission. Her chief complaint was shortness of breath, but denied fevers, weight loss, B-symptoms, cough or productive sputum. No obvious infectious symptoms on history. Initial concern for CHF (elevated BNP) and was diuresed with some initial improvement however took time to be weaned off HFNC. Latest Chest imaging (CT from 8/7) with bilateral lower lobe ground-glass opacities. Imaging not typical for bacterial infections, not typical for fungal infections either. Lower lobe predominant GGOs do not fit with viral infections which are usually more diffuse. Mycobacterial infections generally do not cause the degree of hypoxia seen. With a negative BDG and relatively low elevation of LDH, PJP is also unlikely. Repeat COVID testing and RVP negative. No exposure to birds, hot tub, no prior diagnosis/treatment for TB. Likely that her hypoxia is due to underlying SURI. Now weaned down to 3L O2 by facemask.   Low suspicion for active infection. Follow up remaining tests (Fungal antibody  panel, histoplasma antigens). No indication for antibiotics or antifungals at present    Transplant ID will sign off  Please call us back with questions or concerns    Guillermo Marie MD. Pager [phone]         Interval History:   Patient was last seen by ID 8/9/22. Since then, she has remained afebrile and hemodynamically stable. Her O2 support has been gradually weaned down, she is now off HFNC and on facemask at 3L/min. Denies cough, sputum production, chills, rigors. Denies nausea, vomiting, diarrhea.  LDH very mildly elevated at 280, BDG negative, COVID 19 testing and RVP negative       Transplants:  10/26/2010 (Liver), Postoperative day:  4308.  Coordinator Mary Crawford    Review of Systems:  Remaining systems reviewed and negative         Current Medications & Allergies:       albuterol  2 puff Inhalation 4x Daily     amLODIPine  10 mg Oral Daily     calcitRIOL  0.25 mcg Oral Daily     calcium carbonate 600 mg-vitamin D 400 units  1 tablet Oral BID     cetirizine  10 mg Oral Daily     cycloSPORINE modified  50 mg Oral At Bedtime     cycloSPORINE modified  75 mg Oral QAM     febuxostat  40 mg Oral Daily     gabapentin  300 mg Oral At Bedtime     multivitamin w/minerals  1 tablet Oral Daily     mycophenolate  500 mg Oral BID IS     olopatadine  1 drop Both Eyes Daily     omeprazole  20 mg Oral BID     psyllium  1 capsule Oral BID     senna-docusate  2 tablet Oral BID     sodium chloride (PF)  3 mL Intracatheter Q8H     umeclidinium  1 puff Inhalation Daily     Vitamin D3  25 mcg Oral Daily       Infusions/Drips:    - MEDICATION INSTRUCTIONS -         Allergies   Allergen Reactions     Aspirin      3/31/16 Per pt, tolerates 81 mg daily dose without ADR.     325 mg dose caused itchiness and hives.     Clarithromycin      Allergic reaction         Contrast Dye      Iodine      Pcn [Penicillins]             Physical Exam:     Ranges for vital signs:  Temp:  [97.7  F (36.5  C)-98  F (36.7  C)] 97.7  F  (36.5  C)  Pulse:  [78-93] 85  Resp:  [16-38] 24  BP: (118-148)/(62-96) 129/74  SpO2:  [80 %-96 %] 92 %  Vitals:    08/06/22 0400 08/09/22 0753 08/10/22 0400   Weight: 110.7 kg (244 lb) 117.1 kg (258 lb 1.6 oz) 108.9 kg (240 lb 1.6 oz)       Physical Examination:  GENERAL:  well-developed, well-nourished, in bed in no acute distress.  HEAD:  Head is normocephalic, atraumatic   EYES:  Eyes have anicteric sclerae without conjunctival injection   ENT:  Oropharynx is moist without exudates or ulcers. Tongue is midline, facemask on +  NECK:  Supple. No cervical lymphadenopathy  LUNGS:  Clear to auscultation bilateral. On 3L O2 by facemask, no use of accessory muscles  CARDIOVASCULAR:  Regular rate and rhythm with no murmur  ABDOMEN:  Normal bowel sounds, soft, nontender.   SKIN:  No acute rashes.    NEUROLOGIC:  Grossly nonfocal. Active x4 extremities         Laboratory Data:     Inflammatory Markers    Recent Labs   Lab Test 10/01/20  1409 12/23/15  1258   CRP 17.0* 6.8     Metabolic Studies       Recent Labs   Lab Test 08/12/22  0712 08/11/22  1046 08/10/22  2002 08/10/22  0653 08/09/22  1931 08/09/22  1553 08/02/22  1414 07/13/22  1609 05/28/19  1659 04/09/19  1555 03/04/19  1704    138  --    < >  --   --    < > 142   < > 139.6 140   POTASSIUM 5.5* 4.5  --    < >  --   --    < > 5.1   < > 4.8* 4.8   CHLORIDE 102 101  --    < >  --   --    < > 106   < > 102.8 106   CO2 28 28  --    < >  --   --    < > 23   < > 26.9 27   ANIONGAP 10 9  --    < >  --   --    < > 13   < >  --  7   BUN 19.8 19.6  --    < >  --   --    < > 16.9   < >  --  28   CR 1.06* 0.98*  --    < >  --   --    < > 1.15*   < >  --  1.12*   GFRESTIMATED 61 67  --    < >  --   --    < > 55*   < >  --  55*   * 129*  --    < >  --   --    < > 98   < >  --  93   A1C  --   --   --   --   --   --   --   --   --  5.3  --    JUAN 9.7 9.6  --    < >  --   --    < > 10.2*   < >  --  9.0   PHOS  --   --   --   --   --   --   --  3.2   < >  --  3.2   MAG   --   --   --   --   --   --   --   --   --   --  1.8   LACT  --   --  1.5  --  1.5  --    < >  --    < >  --   --    FGTL  --   --   --   --   --  52  --   --   --   --   --     < > = values in this interval not displayed.       Hepatic Studies    Recent Labs   Lab Test 08/12/22  0712 08/11/22  1046 08/10/22  0653 08/09/22  0619 08/02/22  1414 07/13/22  1609 08/18/14  1434 07/07/14  1024   BILITOTAL 0.4 0.5 0.5 0.5   < > 0.6   < > 1.0   BILIDELTA  --   --   --   --   --   --   --  0.2   BILICONJ  --   --   --   --   --   --   --  0.0   DBIL  --   --   --   --   --  <0.20   < >  --    ALKPHOS 76 76 71 72   < > 83   < > 77   PROTTOTAL 7.0 6.9 6.7 6.4   < > 7.8   < > 7.1   ALBUMIN 3.8 3.7 3.6 3.4*   < > 4.2   < > 4.0   AST 30 28 28 23   < > 31   < > 16   ALT 21 21 21 16   < > 21   < > 19   LDH  --   --   --  280*  --   --   --   --     < > = values in this interval not displayed.       Hematology Studies   Recent Labs   Lab Test 08/12/22 0712 08/11/22  1046 08/10/22  0653 08/09/22  0619 08/03/22  0556 08/03/22  0310 08/02/22  1414 06/21/21  1412 11/18/20  1417 10/01/20  1409 08/08/17  1206 07/27/17  1700   WBC 4.2 3.5* 3.6* 3.7*   < > 8.9 4.4   < >  --  5.3   < > 4.0   ANEU  --   --   --   --   --   --   --   --   --  3.2  --  1.9   ANEUTAUTO  --   --   --   --   --  6.7 2.6   < >  --   --   --   --    ALYM  --   --   --   --   --   --   --   --  1.5 1.5   < > 1.6   ALYMPAUTO  --   --   --   --   --  1.5 1.3   < >  --   --   --   --    CAROL ANN  --   --   --   --   --   --   --   --   --  0.4  --  0.3   AMONOAUTO  --   --   --   --   --  0.6 0.4   < >  --   --   --   --    AEOS  --   --   --   --   --   --   --   --   --  0.1  --  0.2   AEOSAUTO  --   --   --   --   --  0.1 0.1   < >  --   --   --   --    ABSBASO  --   --   --   --   --  0.0 0.0   < >  --   --   --   --    HGB 12.3 12.4 11.8 12.2   < > 12.6 11.7   < > 13.0 13.2   < > 13.2   HCT 41.7 40.4 39.1 40.7   < > 43.6 38.5   < > 43.4 41.7   < > 39.9    141*  141* 167   < > 217 175   < >  --  187   < > 105*    < > = values in this interval not displayed.       Urine Studies     Recent Labs   Lab Test 08/04/22  1933 08/02/22  1727 07/13/22  1609 05/29/22  1941 03/16/22  0557 12/17/21  1002 06/25/21  1140 02/17/20  1307 12/05/19  1142 03/04/19  1708   URINEPH 5.0 5.5 5.0 7.0 7.0 6.0   < > 5.0   < > 5.0   NITRITE Negative Negative Positive* Negative Negative Negative   < > Positive*   < > Positive*   LEUKEST Negative Negative Small* Small* Moderate* Negative   < > Moderate*  --  Trace*   WBCU  --  1 >100* 10* 33* 1   < > 145*   < > 34*   UYEAST  --   --   --   --   --   --   --   --   --  Few*   UWBCLM  --   --   --   --   --   --   --  Present*  --   --     < > = values in this interval not displayed.       Microbiology:  Fungal testing  Recent Labs   Lab Test 08/09/22  1553   FGTL 52   FGTLI Negative       Last Culture results   Rapid Strep A Screen   Date Value Ref Range Status   02/03/2017 NEGATIVE Negative Final     Culture   Date Value Ref Range Status   08/04/2022 <10,000 CFU/mL Mixture of urogenital fredi  Final   07/13/2022 >100,000 CFU/mL Klebsiella pneumoniae (A)  Final   07/13/2022 10,000-50,000 CFU/mL Klebsiella pneumoniae (A)  Final   03/16/2022 50,000-100,000 CFU/mL Mixture of urogenital fredi  Final   08/09/2021 >100,000 CFU/mL Escherichia coli ESBL (A)  Final     Culture Micro   Date Value Ref Range Status   07/02/2021 (A)  Final    >100,000 colonies/mL  Escherichia coli ESBL  ESBL (extended beta lactamase) producing organisms require contact precautions.     07/02/2021   Final    <10,000 colonies/mL  urogenital fredi  Susceptibility testing not routinely done     10/16/2017 (A)  Final    >100,000 colonies/mL  Beta hemolytic Streptococcus group B  Beta Hemolytic Streptococcus groups A and B are susceptible to ampicillin, penicillin,   vancomycin, and the cephalosporins.  Susceptibility testing is not routinely done on these   organisms isolated from  urine.     08/08/2017   Final    10,000 to 50,000 colonies/mL mixed urogenital fredi   07/27/2017 >100,000 colonies/mL Escherichia coli (A)  Final   02/27/2017 >100,000 colonies/mL Escherichia coli (A)  Final   02/03/2017 No Beta Streptococcus isolated  Final   03/31/2016 >100,000 colonies/mL Escherichia coli (A)  Final   12/22/2015 No growth  Final   12/19/2015 (A)  Final    >100,000 colonies/mL Escherichia coli ESBL ESBL producers are resistant to all   cephalosporins (including 3rd generation).  ESBL producers are usually   susceptible to amikacin and the carbapenems.           Last check of C difficile  C Diff Toxin B PCR   Date Value Ref Range Status   10/16/2010   Final    Negative: Clostridium difficile target DNA sequences NOT detected, presumed     Comment:      negative for Clostridium difficile toxin B or the number of bacteria present   may be below the limit of detection for the test.   FDA approved assay performed using [employer] GeneXpert real-time PCR.   A negative result does not exclude actual disease due to Clostridium   difficile   and may be due to improper collection, handling and storage of the specimen   or   the number of organisms in the specimen is below the detection limit of the   assay.       Virology:  Coronavirus-19 testing    Recent Labs   Lab Test 08/09/22  1743 08/02/22  1421 03/16/22  0556   PICQG36VGQ Negative Negative Negative       Respiratory virus (non-coronavirus-19) testing    Recent Labs   Lab Test 08/09/22  1743 08/02/22  1421   IFLUA Not Detected  --    INFZA  --  Negative   FLUAH1 Not Detected  --    MA4959 Not Detected  --    FLUAH3 Not Detected  --    IFLUB Not Detected  --    INFZB  --  Negative   PIV1 Not Detected  --    PIV2 Not Detected  --    PIV3 Not Detected  --    PIV4 Not Detected  --    RSVA Not Detected  --    RSVB Not Detected  --    HMPV Not Detected  --    RHINEV Not Detected  --    ADENOV Not Detected  --    CORONA Not Detected  --        CMV viral loads     Recent Labs   Lab Test 08/09/22  0619   CMVQNT Not Detected         EBV DNA Copies/mL   Date Value Ref Range Status   01/22/2011 <1000 <1000 Copies/mL Final   11/16/2010 <1000 <1000 Copies/mL Final       Imaging:  No results found for this or any previous visit (from the past 48 hour(s)).     CT Chest w/o contrast 8/7/22:  IMPRESSION: Mild centrilobular emphysematous changes bilateral lung  apices.  1.  Bilateral lower lobe predominant peribronchovascular groundglass  opacities differential atelectasis, edema, atypical infection.  2.  No pleural effusion.

## 2022-08-12 NOTE — PROGRESS NOTES
Care Management Follow Up    Length of Stay (days): 10    Expected Discharge Date: 08/15/2022     Concerns to be Addressed: discharge planning     Patient plan of care discussed at interdisciplinary rounds: Yes    Anticipated Discharge Disposition: Home     Anticipated Discharge Services: PCA  Anticipated Discharge DME: Oxygen    Patient/family educated on Medicare website which has current facility and service quality ratings:    Education Provided on the Discharge Plan:    Patient/Family in Agreement with the Plan:      Referrals Placed by CM/SW: External Care Coordination  Private pay costs discussed: Not applicable    Additional Information:  RNCC received call this morning from Deborah Ruiz with [employer], . Deborah asks that we reach out to patient's son, Courtney, patients PCA  agency, and [facility] to update on discharge plan/date once it is known when patient's son will be returning. Per Courtney, his plane comes in to [place] scheduled for Monday at 6 p.m., can take patient back home on Tuesday, 8/16/22. RNCC called and left VMs with [facility] and with [facility] ([facility]) for patient return. RNCC available for other discharge planning needs.    [facility]  ph: [phone]   fx: [phone]  Resumption orders placed,  RN, VM left to update patient return, fax AVS on discharge.    [facility]  Ph: [phone]  Patient has 6.25 hours/day and homemaking 3 hours/week  VM left to update patient return.    Deborah Ruiz    [facility]  Ph: [phone]    DAYNA Staley, BA, RN, CMSRN, RNCC  Covering Units 6D/OBS  Pager: [phone]  Phone: [phone]  6th floor Weekend/Holiday Pager: [phone]  Observation weekend/after hours: [phone]

## 2022-08-12 NOTE — PLAN OF CARE
Neuro: A&Ox4. Follows commands. Call light appropriate. Pt is Yi speaking, iPad  at bedside.   Cardiac: SR, 70s-80s. VSS.    Respiratory: Sating  > 90% on 3L oxymask. Pt tried nasal cannula at 3L, unable to keep O2 sats above 90%.   GI/: Adequate urine output via commode.  LBM 8/12.  Diet/appetite: Tolerating reg diet. Eating well.  Activity: SBAup to commode and pt able to shower using shower chair.   Pain: Pt denies pain throughout shift.   Skin: No new deficits noted.  LDA's: L PIV.     Plan to discharge pt to [facility] on Monday 8/15 when son arrives from [place]. Will continue to attempt to wean o2 needs and will notify primary care with any further changes.     Maria Guadalupe Bustos RN

## 2022-08-12 NOTE — PLAN OF CARE
Shift Summary: 8/12/22     Neuro: A&O x4. Speaks Greenlandic. Ipad at bedside for translation needs. Patient able to interpret English. Stand by assist in the room.    Cardiac: NSR on tele.      Resp: Patient refused CPAP at night. Remained on 3L oxy-mask through the night.      GI/: Bowel movement previous shift. Voiding in bedside commode with adequate UO. Only allows female staff to assist with bathroom needs. Regular diet with good appetite    Skin: No skin issues.     Plan: Patient to discharge home on Monday 8/15/22 when son arrives to [facility] from [place].      Goal Outcome Evaluation:    Plan of Care Reviewed With: patient     Overall Patient Progress: improving     Christopher Mojica RN on 8/12/2022 at 4:17 AM

## 2022-08-13 LAB
ALBUMIN SERPL BCG-MCNC: 3.7 G/DL (ref 3.5–5.2)
ALP SERPL-CCNC: 77 U/L (ref 35–104)
ALT SERPL W P-5'-P-CCNC: 21 U/L (ref 10–35)
ANION GAP SERPL CALCULATED.3IONS-SCNC: 7 MMOL/L (ref 7–15)
ASPERGILLUS AB SER QL ID: NORMAL
AST SERPL W P-5'-P-CCNC: 25 U/L (ref 10–35)
B DERMAT AB SER QL ID: NORMAL
BILIRUB SERPL-MCNC: 0.4 MG/DL
BUN SERPL-MCNC: 25.4 MG/DL (ref 6–20)
C IMMITIS AB SER QL ID: NOT DETECTED
CALCIUM SERPL-MCNC: 9.3 MG/DL (ref 8.6–10)
CHLORIDE SERPL-SCNC: 101 MMOL/L (ref 98–107)
CREAT SERPL-MCNC: 1.11 MG/DL (ref 0.51–0.95)
DEPRECATED HCO3 PLAS-SCNC: 31 MMOL/L (ref 22–29)
ERYTHROCYTE [DISTWIDTH] IN BLOOD BY AUTOMATED COUNT: 13.9 % (ref 10–15)
GFR SERPL CREATININE-BSD FRML MDRD: 58 ML/MIN/1.73M2
GLUCOSE SERPL-MCNC: 102 MG/DL (ref 70–99)
H CAPSUL AB SER QL ID: NORMAL
HCT VFR BLD AUTO: 39.7 % (ref 35–47)
HGB BLD-MCNC: 11.8 G/DL (ref 11.7–15.7)
MCH RBC QN AUTO: 27.6 PG (ref 26.5–33)
MCHC RBC AUTO-ENTMCNC: 29.7 G/DL (ref 31.5–36.5)
MCV RBC AUTO: 93 FL (ref 78–100)
PLATELET # BLD AUTO: 143 10E3/UL (ref 150–450)
POTASSIUM SERPL-SCNC: 5.1 MMOL/L (ref 3.4–5.3)
PROT SERPL-MCNC: 6.9 G/DL (ref 6.4–8.3)
RBC # BLD AUTO: 4.28 10E6/UL (ref 3.8–5.2)
SODIUM SERPL-SCNC: 139 MMOL/L (ref 136–145)
WBC # BLD AUTO: 4.4 10E3/UL (ref 4–11)

## 2022-08-13 PROCEDURE — 99232 SBSQ HOSP IP/OBS MODERATE 35: CPT | Performed by: STUDENT IN AN ORGANIZED HEALTH CARE EDUCATION/TRAINING PROGRAM

## 2022-08-13 PROCEDURE — 250N000012 HC RX MED GY IP 250 OP 636 PS 637: Performed by: HOSPITALIST

## 2022-08-13 PROCEDURE — 85027 COMPLETE CBC AUTOMATED: CPT | Performed by: STUDENT IN AN ORGANIZED HEALTH CARE EDUCATION/TRAINING PROGRAM

## 2022-08-13 PROCEDURE — 80053 COMPREHEN METABOLIC PANEL: CPT | Performed by: STUDENT IN AN ORGANIZED HEALTH CARE EDUCATION/TRAINING PROGRAM

## 2022-08-13 PROCEDURE — 250N000013 HC RX MED GY IP 250 OP 250 PS 637: Performed by: HOSPITALIST

## 2022-08-13 PROCEDURE — 36415 COLL VENOUS BLD VENIPUNCTURE: CPT | Performed by: STUDENT IN AN ORGANIZED HEALTH CARE EDUCATION/TRAINING PROGRAM

## 2022-08-13 PROCEDURE — 120N000002 HC R&B MED SURG/OB UMMC

## 2022-08-13 PROCEDURE — 82040 ASSAY OF SERUM ALBUMIN: CPT | Performed by: STUDENT IN AN ORGANIZED HEALTH CARE EDUCATION/TRAINING PROGRAM

## 2022-08-13 RX ADMIN — CALCITRIOL CAPSULES 0.25 MCG 0.25 MCG: 0.25 CAPSULE ORAL at 09:21

## 2022-08-13 RX ADMIN — MYCOPHENOLATE MOFETIL 500 MG: 250 CAPSULE ORAL at 09:20

## 2022-08-13 RX ADMIN — CALCIUM CARBONATE 600 MG (1,500 MG)-VITAMIN D3 400 UNIT TABLET 1 TABLET: at 19:50

## 2022-08-13 RX ADMIN — Medication 1 TABLET: at 11:06

## 2022-08-13 RX ADMIN — CYCLOSPORINE 50 MG: 25 CAPSULE, LIQUID FILLED ORAL at 19:49

## 2022-08-13 RX ADMIN — CALCIUM CARBONATE 600 MG (1,500 MG)-VITAMIN D3 400 UNIT TABLET 1 TABLET: at 09:23

## 2022-08-13 RX ADMIN — AMLODIPINE BESYLATE 10 MG: 10 TABLET ORAL at 09:21

## 2022-08-13 RX ADMIN — Medication 1 CAPSULE: at 19:50

## 2022-08-13 RX ADMIN — UMECLIDINIUM 1 PUFF: 62.5 AEROSOL, POWDER ORAL at 09:23

## 2022-08-13 RX ADMIN — CYCLOSPORINE 75 MG: 25 CAPSULE, LIQUID FILLED ORAL at 09:20

## 2022-08-13 RX ADMIN — Medication 1 CAPSULE: at 09:20

## 2022-08-13 RX ADMIN — MYCOPHENOLATE MOFETIL 500 MG: 250 CAPSULE ORAL at 17:20

## 2022-08-13 RX ADMIN — ALBUTEROL SULFATE 2 PUFF: 90 AEROSOL, METERED RESPIRATORY (INHALATION) at 15:21

## 2022-08-13 RX ADMIN — Medication 25 MCG: at 09:21

## 2022-08-13 RX ADMIN — OMEPRAZOLE 20 MG: 20 CAPSULE, DELAYED RELEASE ORAL at 09:20

## 2022-08-13 RX ADMIN — OLOPATADINE HYDROCHLORIDE 1 DROP: 1 SOLUTION OPHTHALMIC at 09:23

## 2022-08-13 RX ADMIN — ALBUTEROL SULFATE 2 PUFF: 90 AEROSOL, METERED RESPIRATORY (INHALATION) at 09:23

## 2022-08-13 RX ADMIN — GABAPENTIN 300 MG: 300 CAPSULE ORAL at 22:04

## 2022-08-13 RX ADMIN — ALBUTEROL SULFATE 2 PUFF: 90 AEROSOL, METERED RESPIRATORY (INHALATION) at 19:48

## 2022-08-13 RX ADMIN — OMEPRAZOLE 20 MG: 20 CAPSULE, DELAYED RELEASE ORAL at 19:50

## 2022-08-13 RX ADMIN — ALBUTEROL SULFATE 2 PUFF: 90 AEROSOL, METERED RESPIRATORY (INHALATION) at 11:05

## 2022-08-13 RX ADMIN — SENNOSIDES AND DOCUSATE SODIUM 2 TABLET: 8.6; 5 TABLET ORAL at 09:21

## 2022-08-13 RX ADMIN — FEBUXOSTAT 40 MG: 40 TABLET, FILM COATED ORAL at 09:21

## 2022-08-13 RX ADMIN — SENNOSIDES AND DOCUSATE SODIUM 2 TABLET: 8.6; 5 TABLET ORAL at 19:50

## 2022-08-13 RX ADMIN — CETIRIZINE HYDROCHLORIDE 10 MG: 10 TABLET, FILM COATED ORAL at 09:21

## 2022-08-13 ASSESSMENT — ACTIVITIES OF DAILY LIVING (ADL)
ADLS_ACUITY_SCORE: 34

## 2022-08-13 NOTE — PLAN OF CARE
End of shift summary(1900-0730)-                Neuro: A&Ox4. Follows commands. Call light appropriate. Pt is Lao speaking, iPad  at bedside.   Cardiac: SR, 70s-80s. VSS.    Respiratory: Sating  > 90% on 3L oxymask.while awake.Increased to 5L while sleeping   GI/: Adequate urine output via commode.  LBM 8/12.  Diet/appetite: Tolerating reg diet. Eating well.  Activity: SBA  to commode    Pain: Pt denies pain throughout shift.   Skin: No new deficits noted.  LDA's: L PIV.      Plan to discharge pt to [facility] on Monday 8/15 when son arrives from [place]. Will continue to attempt to wean o2 needs and will notify primary care with any further changes

## 2022-08-13 NOTE — PLAN OF CARE
Neuro: A&Ox4. Follows commands. Call light appropriate. Pt is Korean speaking, iPad  at bedside.   Cardiac: SR, 70s-80s. VSS.    Respiratory: Sating  > 90% on 3L oxymask. Pt requires oxy mask at 5-6L while napping during the day.   GI/: Adequate urine output via commode.  LBM 8/13.   Diet/appetite: Tolerating reg diet. Eating well.  Activity: SBA up to commode.   Pain: Pt denies pain throughout shift.   Skin: No new deficits noted.  LDA's: L PIV.      Plan to discharge pt to [facility] on Tuesday 8/16 when son arrives from [place] on Monday 8/15 in the evening. Will continue to attempt to wean o2 needs and will notify primary care with any further changes.      Maria Guadalupe Bustos, RN

## 2022-08-13 NOTE — PROGRESS NOTES
[facility]    Medicine Progress Note - Hospitalist Service, GOLD TEAM 7    Date of Admission:  8/2/2022    Assessment & Plan              56 year old female with PMH of Liver Tx for hepatitis C, HTN, CKDIII, SURI, Obesity, h/o SBO presented on 8/2/22 w/ SOB and abdominal pain. Patient transferred from [facility] to [place] for acute hypoxic, hypercarbic respiratory failure likely related to SURI with non adherence     Acute hypoxic, hypercapnic respiratory failure  Per prior documentation, there is some documentation that she has COPD and takes inhalers at home. Per chart review, patient has been noncompliant with CPAP prior to transfer to [place]. CXR with pulmonary edema.  BNP is elevated at 1238. Noted to have bibasilar rales on exam. Improved with diuresis but now with slowed improvement.  CT Chest 8/7/2022 showed bilateral lower lobe ground glass opacities: edema versus atypical infection.  Given transplant status, will follow up with transplant ID but work up is neg. Nephrology and cardiology consulted. Doesn't seem fluid overload.  Pt says no exposure to birds, hot tubs, smoking, TB. Never positive for TB screen. Never got treatment for it. All infectious work up is negative. She likely has underlying SURI. Overnight O2 test needs 3 L of O2 to keep sat >90%. And need 3 L with ambulation  Plan:  - Discharge with O2. Will get a face to face and order O2. Will likely need face mask as she is a mouth breather.   - Follow up with sleep clinic.  - PT/OT. She doesn't want to go to TCU and wants to go home but her son (PCA) will return to the [place] from an international travel 08/15. Likely no Discharge till then.      Chronic intermittent abdominal pain  H/o Small Bowel obstruction  Pt initially presented w/ RUQ abd pain radiating to right low back, nausea w/o vomiting.  WBC unremarkable. CMP baseline. UA bland.   - CTM     CASTILLO   CKD stage III  Baseline Cr ~1.1-1.2 range;  on admission, Cr 1.27 but increased acutely.  Now downtrending to baseline  - Consult nephrology, appreciate assistance  - Avoid nephrotoxic agents  - BMP daily  - Hold further diuresis per nephrology and cardiology.      Hx DDLT (2010) 2/2 Hepatitis C  - USG liver showed unremarkable grayscale appearance of the transplant liver. No biliary obstruction. Patent transplant vasculature with proper directional flow as above. Relatively high resistance flow within the hepatic artery, new since prior examination. Differential is broad but includes rejection. LFTs are wnl except AST of 47.  - Transplant hepatology consulted, appreciate assistance  - Continue Cyclosporine level in AM  - Continue MMF     HTN  - Continue PTA amlodipine 10 mg every other day  - Diuresis as above     GERD  - Continue PTA Omeprazole 20 mg BID     Obesity  H/o SURI  Not compliant with CPAP.  - CPAP at night  - tx as above     Acute encephalopathy, resolved likely metabolic related to hypercapnia as above  Patient noted to be excessively somnolent but arousable prior to transfer, likely d/t hypercapnia. No confusion at present  - Respiratory management as above     Hyperkalemia, resolved  K on admit was 5.9, now normalized  - BMP daily       Diet: Regular Diet Adult  Room Service    DVT Prophylaxis: Ambulatory  Li Catheter: Not present  Central Lines: None  Cardiac Monitoring: None  Code Status: Full Code      Disposition Plan      Expected Discharge Date: 08/16/2022    Discharge Delays: Oxygen Needs - Arrange Home O2  Destination: home  Discharge Comments: Denies TCU. Son will be in the [place] on Monday. Will need Oxygen.        The patient's care was discussed with the patient  VALERIE COVARRUBIAS MD  Hospitalist Service, GOLD TEAM [address] [place] [address]  Securely message with the Vocera Web Console (learn more here)  Text page via [facility] Paging/Directory   Please see signed in provider for up to date coverage  information      Clinically Significant Risk Factors Present on Admission                      ______________________________________________________________________    Interval History    No SOB or CP. On 3 L of face mask. No N/V/D. Okay with plan above.     Data reviewed today: I reviewed all medications, new labs and imaging results over the last 24 hours. I personally reviewed    Physical Exam   Vital Signs: Temp: 97.7  F (36.5  C) Temp src: Oral BP: 133/88 Pulse: 99   Resp: 20 SpO2: 95 % O2 Device: Oxymask Oxygen Delivery: 3 LPM  Weight: 239 lbs 10.24 oz  Constitutional: HFNC but comfortable  Respiratory: No added sounds no wheezez  Cardiovascular: Minimal LE Edema. Normal S1 S2    Data

## 2022-08-14 LAB
ALBUMIN SERPL BCG-MCNC: 3.8 G/DL (ref 3.5–5.2)
ALP SERPL-CCNC: 73 U/L (ref 35–104)
ALT SERPL W P-5'-P-CCNC: 17 U/L (ref 10–35)
ANION GAP SERPL CALCULATED.3IONS-SCNC: 9 MMOL/L (ref 7–15)
AST SERPL W P-5'-P-CCNC: 27 U/L (ref 10–35)
BILIRUB SERPL-MCNC: 0.4 MG/DL
BUN SERPL-MCNC: 23.1 MG/DL (ref 6–20)
CALCIUM SERPL-MCNC: 9.5 MG/DL (ref 8.6–10)
CHLORIDE SERPL-SCNC: 100 MMOL/L (ref 98–107)
CREAT SERPL-MCNC: 1.11 MG/DL (ref 0.51–0.95)
CYCLOSPORINE BLD LC/MS/MS-MCNC: 47 UG/L (ref 50–400)
DEPRECATED HCO3 PLAS-SCNC: 30 MMOL/L (ref 22–29)
ERYTHROCYTE [DISTWIDTH] IN BLOOD BY AUTOMATED COUNT: 13.6 % (ref 10–15)
GFR SERPL CREATININE-BSD FRML MDRD: 58 ML/MIN/1.73M2
GLUCOSE SERPL-MCNC: 108 MG/DL (ref 70–99)
HCT VFR BLD AUTO: 39.4 % (ref 35–47)
HGB BLD-MCNC: 11.7 G/DL (ref 11.7–15.7)
MCH RBC QN AUTO: 27.7 PG (ref 26.5–33)
MCHC RBC AUTO-ENTMCNC: 29.7 G/DL (ref 31.5–36.5)
MCV RBC AUTO: 93 FL (ref 78–100)
PLATELET # BLD AUTO: 167 10E3/UL (ref 150–450)
POTASSIUM SERPL-SCNC: 5.3 MMOL/L (ref 3.4–5.3)
PROT SERPL-MCNC: 7 G/DL (ref 6.4–8.3)
RBC # BLD AUTO: 4.23 10E6/UL (ref 3.8–5.2)
SODIUM SERPL-SCNC: 139 MMOL/L (ref 136–145)
TME LAST DOSE: ABNORMAL H
TME LAST DOSE: ABNORMAL H
WBC # BLD AUTO: 4.4 10E3/UL (ref 4–11)

## 2022-08-14 PROCEDURE — 99232 SBSQ HOSP IP/OBS MODERATE 35: CPT | Performed by: STUDENT IN AN ORGANIZED HEALTH CARE EDUCATION/TRAINING PROGRAM

## 2022-08-14 PROCEDURE — 250N000013 HC RX MED GY IP 250 OP 250 PS 637: Performed by: PEDIATRICS

## 2022-08-14 PROCEDURE — 80053 COMPREHEN METABOLIC PANEL: CPT | Performed by: STUDENT IN AN ORGANIZED HEALTH CARE EDUCATION/TRAINING PROGRAM

## 2022-08-14 PROCEDURE — 36415 COLL VENOUS BLD VENIPUNCTURE: CPT | Performed by: STUDENT IN AN ORGANIZED HEALTH CARE EDUCATION/TRAINING PROGRAM

## 2022-08-14 PROCEDURE — 85027 COMPLETE CBC AUTOMATED: CPT | Performed by: STUDENT IN AN ORGANIZED HEALTH CARE EDUCATION/TRAINING PROGRAM

## 2022-08-14 PROCEDURE — 250N000013 HC RX MED GY IP 250 OP 250 PS 637: Performed by: HOSPITALIST

## 2022-08-14 PROCEDURE — 80158 DRUG ASSAY CYCLOSPORINE: CPT | Performed by: STUDENT IN AN ORGANIZED HEALTH CARE EDUCATION/TRAINING PROGRAM

## 2022-08-14 PROCEDURE — 250N000012 HC RX MED GY IP 250 OP 636 PS 637: Performed by: HOSPITALIST

## 2022-08-14 PROCEDURE — 120N000002 HC R&B MED SURG/OB UMMC

## 2022-08-14 RX ORDER — FUROSEMIDE 20 MG
20 TABLET ORAL
Status: DISCONTINUED | OUTPATIENT
Start: 2022-08-14 | End: 2022-08-14

## 2022-08-14 RX ADMIN — CALCIUM CARBONATE 600 MG (1,500 MG)-VITAMIN D3 400 UNIT TABLET 1 TABLET: at 19:40

## 2022-08-14 RX ADMIN — Medication 1 DROP: at 23:14

## 2022-08-14 RX ADMIN — OMEPRAZOLE 20 MG: 20 CAPSULE, DELAYED RELEASE ORAL at 09:49

## 2022-08-14 RX ADMIN — CALCITRIOL CAPSULES 0.25 MCG 0.25 MCG: 0.25 CAPSULE ORAL at 09:50

## 2022-08-14 RX ADMIN — SENNOSIDES AND DOCUSATE SODIUM 2 TABLET: 8.6; 5 TABLET ORAL at 19:40

## 2022-08-14 RX ADMIN — AMLODIPINE BESYLATE 10 MG: 10 TABLET ORAL at 09:50

## 2022-08-14 RX ADMIN — SENNOSIDES AND DOCUSATE SODIUM 2 TABLET: 8.6; 5 TABLET ORAL at 09:50

## 2022-08-14 RX ADMIN — CYCLOSPORINE 75 MG: 25 CAPSULE, LIQUID FILLED ORAL at 09:50

## 2022-08-14 RX ADMIN — Medication 1 CAPSULE: at 09:50

## 2022-08-14 RX ADMIN — Medication 1 CAPSULE: at 19:40

## 2022-08-14 RX ADMIN — FEBUXOSTAT 40 MG: 40 TABLET, FILM COATED ORAL at 09:50

## 2022-08-14 RX ADMIN — CYCLOSPORINE 50 MG: 25 CAPSULE, LIQUID FILLED ORAL at 19:40

## 2022-08-14 RX ADMIN — ALBUTEROL SULFATE 2 PUFF: 90 AEROSOL, METERED RESPIRATORY (INHALATION) at 16:01

## 2022-08-14 RX ADMIN — Medication 1 TABLET: at 12:35

## 2022-08-14 RX ADMIN — OLOPATADINE HYDROCHLORIDE 1 DROP: 1 SOLUTION OPHTHALMIC at 09:52

## 2022-08-14 RX ADMIN — Medication 25 MCG: at 09:50

## 2022-08-14 RX ADMIN — UMECLIDINIUM 1 PUFF: 62.5 AEROSOL, POWDER ORAL at 09:52

## 2022-08-14 RX ADMIN — CALCIUM CARBONATE 600 MG (1,500 MG)-VITAMIN D3 400 UNIT TABLET 1 TABLET: at 09:50

## 2022-08-14 RX ADMIN — MYCOPHENOLATE MOFETIL 500 MG: 250 CAPSULE ORAL at 09:49

## 2022-08-14 RX ADMIN — MYCOPHENOLATE MOFETIL 500 MG: 250 CAPSULE ORAL at 17:55

## 2022-08-14 RX ADMIN — GABAPENTIN 300 MG: 300 CAPSULE ORAL at 23:01

## 2022-08-14 RX ADMIN — OMEPRAZOLE 20 MG: 20 CAPSULE, DELAYED RELEASE ORAL at 19:40

## 2022-08-14 RX ADMIN — ALBUTEROL SULFATE 2 PUFF: 90 AEROSOL, METERED RESPIRATORY (INHALATION) at 09:52

## 2022-08-14 RX ADMIN — CETIRIZINE HYDROCHLORIDE 10 MG: 10 TABLET, FILM COATED ORAL at 09:50

## 2022-08-14 ASSESSMENT — ACTIVITIES OF DAILY LIVING (ADL)
ADLS_ACUITY_SCORE: 34

## 2022-08-14 NOTE — PLAN OF CARE
End of shift summary(1900-0730)-            Neuro: A&Ox4. Follows commands. Call light appropriate. Pt is English speaking, iPad  at bedside.   Cardiac: SR, 70s-80s. VSS.    Respiratory: Sating  > 90% on 5L nasal cannula  GI/: Adequate urine output via commode.  LBM 8/14.  Diet/appetite: Tolerating reg diet. Eating well.  Activity: SBA  to commode    Pain: Pt denies pain throughout shift.   Skin: No new deficits noted.  LDA's: L PIV.      Plan to discharge pt to [facility] on Tuesday 8/16 when son arrives from [place]. Will continue to attempt to wean o2 needs and will notify primary care with any further changes

## 2022-08-14 NOTE — PROGRESS NOTES
NURSING PROGRESS NOTE   Walk Test / Home Oxygen Note      Flor Navarro was assessed on August 14, 2022 for Home Oxygen needs.      Oxygen readings:    On room air at rest (while awake):  pulse oximetry (SpO2) = 78%    On oxygen at rest (while awake):  SpO2 improved to 93% on 3L via oxymask.     On room air with exercise/activity:  SpO2 = 83%    On oxygen with exercise/activity:  SpO2 improved to 94% on 3L via oxymask.    Maria Guadalupe Bustos RN .................................................... August 14, 2022   4:22 PM  [facility] ([facility]): [place]  [facility]Unit [facility]

## 2022-08-14 NOTE — PLAN OF CARE
Neuro: A&Ox4. Follows commands. Call light appropriate. Pt is Portuguese speaking, iPad  at bedside.   Cardiac: SR, 70s-80s. VSS.    Respiratory: Sating  > 90% on 3L oxymask.   GI/: Adequate urine output via commode.  LBM 8/14.   Diet/appetite: Tolerating reg diet. Eating well.  Activity: SBA up to commode.   Pain: Pt denies pain throughout shift.   Skin: No new deficits noted.  LDA's: L PIV.     Plan to discharge pt on Monday 8/15. Son is planning to arrive at 6:00PM, will plan for pt to discharge at 8:00PM per primary.     **RN on day shift 8/15 to call DME after office opens at 0800 to confirm home O2 order was received: [phone]**    Will continue with plan of care and notify primary team of any further changes.     Maria Guadalupe Bustos RN

## 2022-08-14 NOTE — PROGRESS NOTES
[facility]    Medicine Progress Note - Hospitalist Service, GOLD TEAM 7    Date of Admission:  8/2/2022    Assessment & Plan              56 year old female with PMH of Liver Tx for hepatitis C, HTN, CKDIII, SURI, Obesity, h/o SBO presented on 8/2/22 w/ SOB and abdominal pain. Patient transferred from [facility] to [place] for acute hypoxic, hypercarbic respiratory failure likely related to SURI with non adherence     Acute hypoxic, hypercapnic respiratory failure  Per prior documentation, there is some documentation that she has COPD and takes inhalers at home. Per chart review, patient has been noncompliant with CPAP prior to transfer to [place]. CXR with pulmonary edema.  BNP is elevated at 1238. Noted to have bibasilar rales on exam. Improved with diuresis but now with slowed improvement.  CT Chest 8/7/2022 showed bilateral lower lobe ground glass opacities: edema versus atypical infection.  Given transplant status, will follow up with transplant ID but work up is neg. Nephrology and cardiology consulted. Doesn't seem fluid overload.  Pt says no exposure to birds, hot tubs, smoking, TB. Never positive for TB screen. Never got treatment for it. All infectious work up is negative. She likely has underlying SURI. Overnight O2 test needs 3 L of O2 to keep sat >90%. And need 3 L with ambulation  Plan:  - Discharge with O2. Will get a face to face and order O2. Will likely need face mask as she is a mouth breather.   - Follow up with sleep clinic.  - PT/OT. She doesn't want to go to TCU and wants to go home but her son (PCA) will return to the [place] from an international travel 08/15. Likely no Discharge till then.      Chronic intermittent abdominal pain  H/o Small Bowel obstruction  Pt initially presented w/ RUQ abd pain radiating to right low back, nausea w/o vomiting.  WBC unremarkable. CMP baseline. UA bland.   - CTM     CASTILLO   CKD stage III  Baseline Cr ~1.1-1.2 range;  on admission, Cr 1.27 but increased acutely.  Now downtrending to baseline  - Consult nephrology, appreciate assistance  - Avoid nephrotoxic agents  - BMP daily  - Hold further diuresis per nephrology and cardiology.      Hx DDLT (2010) 2/2 Hepatitis C  - USG liver showed unremarkable grayscale appearance of the transplant liver. No biliary obstruction. Patent transplant vasculature with proper directional flow as above. Relatively high resistance flow within the hepatic artery, new since prior examination. Differential is broad but includes rejection. LFTs are wnl except AST of 47.  - Transplant hepatology consulted, appreciate assistance  - Continue Cyclosporine level in AM  - Continue MMF     HTN  - Continue PTA amlodipine 10 mg every other day  - Diuresis as above     GERD  - Continue PTA Omeprazole 20 mg BID     Obesity  H/o SURI  Not compliant with CPAP.  - CPAP at night  - tx as above     Acute encephalopathy, resolved likely metabolic related to hypercapnia as above  Patient noted to be excessively somnolent but arousable prior to transfer, likely d/t hypercapnia. No confusion at present  - Respiratory management as above     Hyperkalemia, resolved  K on admit was 5.9, now normalized  - BMP daily       Diet: Regular Diet Adult  Room Service    DVT Prophylaxis: Ambulatory  Li Catheter: Not present  Central Lines: None  Cardiac Monitoring: None  Code Status: Full Code      Disposition Plan      Expected Discharge Date: 08/16/2022    Discharge Delays: Oxygen Needs - Arrange Home O2  Destination: home  Discharge Comments: Denies TCU. Son will be in the [place] on Monday. Will need Oxygen.        The patient's care was discussed with the patient  VALERIE COVARRUBIAS MD  Hospitalist Service, GOLD TEAM [address] [place] [address]  Securely message with the Vocera Web Console (learn more here)  Text page via [facility] Paging/Directory   Please see signed in provider for up to date coverage  information      Clinically Significant Risk Factors Present on Admission                      ______________________________________________________________________    Interval History    No SOB or CP. On 3 L of face mask. No N/V/D. Okay with plan above.     Data reviewed today: I reviewed all medications, new labs and imaging results over the last 24 hours. I personally reviewed    Physical Exam   Vital Signs: Temp: 98  F (36.7  C) Temp src: Oral BP: 120/75 Pulse: 92   Resp: 23 SpO2: 97 % O2 Device: Nasal cannula Oxygen Delivery: 5 LPM  Weight: 239 lbs 10.24 oz  Constitutional: HFNC but comfortable  Respiratory: No added sounds no wheezez  Cardiovascular: Minimal LE Edema. Normal S1 S2    Data

## 2022-08-15 ENCOUNTER — DOCUMENTATION ONLY (OUTPATIENT)
Dept: OTHER | Facility: CLINIC | Age: 57
End: 2022-08-15

## 2022-08-15 ENCOUNTER — DOCUMENTATION ONLY (OUTPATIENT)
Dept: HOME HEALTH SERVICES | Facility: CLINIC | Age: 57
End: 2022-08-15

## 2022-08-15 VITALS
SYSTOLIC BLOOD PRESSURE: 105 MMHG | DIASTOLIC BLOOD PRESSURE: 65 MMHG | OXYGEN SATURATION: 93 % | WEIGHT: 239.64 LBS | HEART RATE: 80 BPM | BODY MASS INDEX: 46.8 KG/M2 | TEMPERATURE: 97.6 F | RESPIRATION RATE: 22 BRPM

## 2022-08-15 LAB
CYCLOSPORINE BLD LC/MS/MS-MCNC: 52 UG/L (ref 50–400)
HOLD SPECIMEN: NORMAL
HOLD SPECIMEN: NORMAL
TME LAST DOSE: NORMAL H
TME LAST DOSE: NORMAL H

## 2022-08-15 PROCEDURE — 80158 DRUG ASSAY CYCLOSPORINE: CPT | Performed by: STUDENT IN AN ORGANIZED HEALTH CARE EDUCATION/TRAINING PROGRAM

## 2022-08-15 PROCEDURE — 250N000012 HC RX MED GY IP 250 OP 636 PS 637: Performed by: HOSPITALIST

## 2022-08-15 PROCEDURE — 250N000013 HC RX MED GY IP 250 OP 250 PS 637: Performed by: HOSPITALIST

## 2022-08-15 PROCEDURE — 36415 COLL VENOUS BLD VENIPUNCTURE: CPT | Performed by: STUDENT IN AN ORGANIZED HEALTH CARE EDUCATION/TRAINING PROGRAM

## 2022-08-15 PROCEDURE — 250N000013 HC RX MED GY IP 250 OP 250 PS 637: Performed by: PEDIATRICS

## 2022-08-15 PROCEDURE — 99239 HOSP IP/OBS DSCHRG MGMT >30: CPT | Performed by: STUDENT IN AN ORGANIZED HEALTH CARE EDUCATION/TRAINING PROGRAM

## 2022-08-15 PROCEDURE — 999N000157 HC STATISTIC RCP TIME EA 10 MIN

## 2022-08-15 RX ADMIN — ALBUTEROL SULFATE 2 PUFF: 90 AEROSOL, METERED RESPIRATORY (INHALATION) at 18:44

## 2022-08-15 RX ADMIN — Medication 25 MCG: at 10:17

## 2022-08-15 RX ADMIN — CALCIUM CARBONATE 600 MG (1,500 MG)-VITAMIN D3 400 UNIT TABLET 1 TABLET: at 19:25

## 2022-08-15 RX ADMIN — Medication 1 CAPSULE: at 10:17

## 2022-08-15 RX ADMIN — CYCLOSPORINE 75 MG: 25 CAPSULE, LIQUID FILLED ORAL at 10:18

## 2022-08-15 RX ADMIN — FEBUXOSTAT 40 MG: 40 TABLET, FILM COATED ORAL at 10:17

## 2022-08-15 RX ADMIN — OMEPRAZOLE 20 MG: 20 CAPSULE, DELAYED RELEASE ORAL at 19:26

## 2022-08-15 RX ADMIN — Medication 1 TABLET: at 12:11

## 2022-08-15 RX ADMIN — MYCOPHENOLATE MOFETIL 500 MG: 250 CAPSULE ORAL at 10:18

## 2022-08-15 RX ADMIN — CYCLOSPORINE 50 MG: 25 CAPSULE, LIQUID FILLED ORAL at 19:25

## 2022-08-15 RX ADMIN — Medication 1 CAPSULE: at 19:26

## 2022-08-15 RX ADMIN — ACETAMINOPHEN 500 MG: 500 TABLET, FILM COATED ORAL at 10:17

## 2022-08-15 RX ADMIN — UMECLIDINIUM 1 PUFF: 62.5 AEROSOL, POWDER ORAL at 10:19

## 2022-08-15 RX ADMIN — CETIRIZINE HYDROCHLORIDE 10 MG: 10 TABLET, FILM COATED ORAL at 10:18

## 2022-08-15 RX ADMIN — CALCIUM CARBONATE 600 MG (1,500 MG)-VITAMIN D3 400 UNIT TABLET 1 TABLET: at 10:18

## 2022-08-15 RX ADMIN — ALBUTEROL SULFATE 2 PUFF: 90 AEROSOL, METERED RESPIRATORY (INHALATION) at 10:19

## 2022-08-15 RX ADMIN — SENNOSIDES AND DOCUSATE SODIUM 2 TABLET: 8.6; 5 TABLET ORAL at 19:25

## 2022-08-15 RX ADMIN — AMLODIPINE BESYLATE 10 MG: 10 TABLET ORAL at 10:18

## 2022-08-15 RX ADMIN — CALCITRIOL CAPSULES 0.25 MCG 0.25 MCG: 0.25 CAPSULE ORAL at 10:18

## 2022-08-15 RX ADMIN — MYCOPHENOLATE MOFETIL 500 MG: 250 CAPSULE ORAL at 18:45

## 2022-08-15 RX ADMIN — OLOPATADINE HYDROCHLORIDE 1 DROP: 1 SOLUTION OPHTHALMIC at 10:20

## 2022-08-15 RX ADMIN — ALBUTEROL SULFATE 2 PUFF: 90 AEROSOL, METERED RESPIRATORY (INHALATION) at 12:11

## 2022-08-15 RX ADMIN — OMEPRAZOLE 20 MG: 20 CAPSULE, DELAYED RELEASE ORAL at 10:18

## 2022-08-15 ASSESSMENT — ACTIVITIES OF DAILY LIVING (ADL)
ADLS_ACUITY_SCORE: 34

## 2022-08-15 NOTE — PROGRESS NOTES
I certify that this patient, Flor Navarro has been under my care (or a nurse practitioner or physican's assistant working with me). This is the face-to-face encounter for oxygen medical necessity.       Flor Navarro is now in a chronic stable state and continues to require supplemental oxygen. Patient has continued oxygen desaturation due to Chronic Respiratory Failure with Hypoxia J96.11. Related to Obstructive sleep apnea     Alternative treatment(s) tried or considered and deemed clinically infective for treatment of Chronic Respiratory Failure with Hypoxia J96.11 include inhalers  If portability is ordered, is the patient mobile within the home? yes     Patients who qualify for home O2 coverage under the CMS guidelines require ABG tests or O2 sat readings obtained closest to, but no earlier than 2 days prior to the discharge, as evidence of the need for home oxygen therapy. Testing must be performed while patient is in the chronic stable state. See notes for O2 sats.    Will need face mask not nasal cannula

## 2022-08-15 NOTE — PROGRESS NOTES
Shift Summary (3607-7246):    Neuro: patient remains alert and oriented x4, able to use call light appropriately. Patient is Armenian speaking with iPad  at bedside. Denies pain.     Cardiac: NSR 80s. VSS.     Respiratory: on 3-5L NC/oxymask, refusing Bipap. Dayshift RN to call DME to confirm home O2 order was received for discharge.     GI/: Regular diet, tolerating well. Up to commode with assist of 1, voiding adequately. 1 BM.     Plan to discharge tonight at 8 pm after son returns from [place] today.

## 2022-08-15 NOTE — PROGRESS NOTES
SPIRITUAL HEALTH SERVICES  SPIRITUAL ASSESSMENT Progress Note  [facility] ([place]) 6D IMC       REFERRAL SOURCE: Follow up  visit, Pentecostal specific.     DATA: Pt Flor Navarro identifies as Pentecostal and is of [place] descent.     I introduced myself to Flor as the Lead Pentecostal  and oriented her to [facility].     Flor spoke to me in Russian Yi without the need of an . She stated that she was from the same state in [place] as my family. Flor spoke highly of her love for [place] and express an interest in [facility] and resources.     She requested Islamic incantations/prayer at bedside. We prayed together at her request. . I also provided Davis with an Islamic prayer rug and a sealed bottle of Dianelys water.       Davis stated that she will discharge today.    PLAN: No follow up needed at this time. [facility] is available to Flor per request.     Sharonda Pérez  Lead Pentecostal   Pager [phone]    [facility] remains available 24/7 for emergent requests/referrals, either by having the switchboard page the on-call  or by entering an ASAP/STAT consult in Epic (this will also page the on-call ).

## 2022-08-15 NOTE — DISCHARGE INSTRUCTIONS
Oxygen Provider:  Arranged through [facility] [employer], contact number [phone].  If you have any questions or concerns please call the oxygen company directly.

## 2022-08-15 NOTE — PROGRESS NOTES
[facility]    Medicine Progress Note - Hospitalist Service, GOLD TEAM 7    Date of Admission:  8/2/2022    Assessment & Plan              56 year old female with PMH of Liver Tx for hepatitis C, HTN, CKDIII, SURI, Obesity, h/o SBO presented on 8/2/22 w/ SOB and abdominal pain. Patient transferred from [facility] to [place] for acute hypoxic, hypercarbic respiratory failure likely related to SURI with non adherence     Acute hypoxic, hypercapnic respiratory failure  Per prior documentation, there is some documentation that she has COPD and takes inhalers at home. Per chart review, patient has been noncompliant with CPAP prior to transfer to [place]. CXR with pulmonary edema.  BNP is elevated at 1238. Noted to have bibasilar rales on exam. Improved with diuresis but now with slowed improvement.  CT Chest 8/7/2022 showed bilateral lower lobe ground glass opacities: edema versus atypical infection.  Given transplant status, will follow up with transplant ID but work up is neg. Nephrology and cardiology consulted. Doesn't seem fluid overload.  Pt says no exposure to birds, hot tubs, smoking, TB. Never positive for TB screen. Never got treatment for it. All infectious work up is negative. She likely has underlying SURI. Overnight O2 test needs 3 L of O2 to keep sat >90%. And need 3 L with ambulation  Plan:  - Discharge with O2. Will get a face to face and order O2. Will likely need face mask as she is a mouth breather.   - Follow up with sleep clinic.  - PT/OT. She doesn't want to go to TCU and wants to go home but her son (PCA) will return to the [place] from an international travel 08/15. Likely no Discharge till then.      Chronic intermittent abdominal pain  H/o Small Bowel obstruction  Pt initially presented w/ RUQ abd pain radiating to right low back, nausea w/o vomiting.  WBC unremarkable. CMP baseline. UA bland.   - CTM     CASTILLO   CKD stage III  Baseline Cr ~1.1-1.2 range;  on admission, Cr 1.27 but increased acutely.  Now downtrending to baseline  - Consult nephrology, appreciate assistance  - Avoid nephrotoxic agents  - BMP daily  - Hold further diuresis per nephrology and cardiology.      Hx DDLT (2010) 2/2 Hepatitis C  - USG liver showed unremarkable grayscale appearance of the transplant liver. No biliary obstruction. Patent transplant vasculature with proper directional flow as above. Relatively high resistance flow within the hepatic artery, new since prior examination. Differential is broad but includes rejection. LFTs are wnl except AST of 47.  - Transplant hepatology consulted, appreciate assistance  - Continue Cyclosporine level in AM  - Continue MMF     HTN  - Continue PTA amlodipine 10 mg every other day  - Diuresis as above     GERD  - Continue PTA Omeprazole 20 mg BID     Obesity  H/o SURI  Not compliant with CPAP.  - CPAP at night  - tx as above     Acute encephalopathy, resolved likely metabolic related to hypercapnia as above  Patient noted to be excessively somnolent but arousable prior to transfer, likely d/t hypercapnia. No confusion at present  - Respiratory management as above     Hyperkalemia, resolved  K on admit was 5.9, now normalized  - BMP daily       Diet: Regular Diet Adult  Room Service  Diet    DVT Prophylaxis: Ambulatory  Li Catheter: Not present  Central Lines: None  Cardiac Monitoring: None  Code Status: Full Code      Disposition Plan      Expected Discharge Date: 08/15/2022,  6:00 PM  Discharge Delays: Oxygen Needs - Arrange Home O2  Destination: home  Discharge Comments: Denies TCU. Son will be in the [place] on Monday. Will need Oxygen.        The patient's care was discussed with the patient, nurse and Son   VALERIE COVARRUBIAS MD  Hospitalist Service, [address]  Securely message with the Vocera Web Console (learn more here)  Text page via [facility] Paging/Directory   Please see signed in provider  for up to date coverage information      Clinically Significant Risk Factors Present on Admission                      ______________________________________________________________________    Interval History    Son to pick her up today at around 8PM. His flight from [place] will arrive at 6 PM. He is in agreement with the plan.     Data reviewed today: I reviewed all medications, new labs and imaging results over the last 24 hours. I personally reviewed    Physical Exam   Vital Signs: Temp: 97.8  F (36.6  C) Temp src: Axillary BP: 135/81 Pulse: 87   Resp: 19 SpO2: 94 % O2 Device: Nasal cannula Oxygen Delivery: 4 LPM  Weight: 239 lbs 10.24 oz  Constitutional: HFNC but comfortable  Respiratory: No added sounds no wheezez  Cardiovascular: Minimal LE Edema. Normal S1 S2    Data

## 2022-08-15 NOTE — PLAN OF CARE
Problem: Plan of Care - These are the overarching goals to be used throughout the patient stay.    Goal: Plan of Care Review/Shift Note    Neuro: A&Ox4.   Cardiac: SR VSS.   Respiratory: Sating 90 on 3 L NC or Oxy mask.  GI/: Adequate urine output. BM X1, using commode with assistance  1   Diet/appetite: Tolerating regular diet. Eating well.  Activity:  Assist of 1 , up to chair and in halls.  Pain: At acceptable level on current regimen.   Skin: No new deficits noted.  LDA's: PiV, saline locked       Plan: Continue with POC. Notify primary team with changes.     Patient to d/s  today at 8 pm.O2 tank has been delivered and it is in a room. RN needs to call calling [facility], ph: [phone] for home O2 and to schedule  pt Taxi ride at [phone]

## 2022-08-15 NOTE — PROGRESS NOTES
Care Management Discharge Note    Discharge Date: 08/15/2022       Discharge Disposition: Home    Discharge Services: PCA    Discharge DME: Oxygen    Discharge Transportation: patient's son coming to get patient tonight 8 p.m.    Private pay costs discussed: Not applicable    PAS Confirmation Code:  n/a  Patient/family educated on Medicare website which has current facility and service quality ratings:  n/a    Education Provided on the Discharge Plan:  yes  Persons Notified of Discharge Plans: patient's son Courtney  Patient/Family in Agreement with the Plan:  yes    Handoff Referral Completed: Yes    Additional Information:  Patient discharged from hospital, patient's son Courtney coming to get patient tonight. Resumption of HC RN, PCA services. Pt being set up for home O2, bedside RN instructed on calling [facility], ph: [phone] for home O2, transportation O2 set up. Patient's son was also called by RNCC to go over IMM form, offered questions, faxed form to [facility] ([phone]). RNCC available for any further discharge planning needs.    [facility]  ph: [phone]   fx: [phone]  HC RN  Orders faxed over, intake notified    DME    [facility]  [address] [place] [address]  Phone: [phone]  Fax: [phone]    Vendor to supply--Home oxygen      _______________________________________________    Ride setup through [employer] for 6:30 p.m. today, Ph: [phone] if need to delay for any reason.    [facility] was made aware of home O2 delivery time, discharge time from hospital, will bring O2 tank, deliver O2 concentrator this evening.     RNCC received call from ELBA Lincoln at [phone] stating that patient's Home O2 wasn't ordered/being sent, so [facility], and bedside nursing. Patient already has received their oxygen tank from [facility] at this time, so [facility] has had home O2 orders, is sending home O2.     DAYNA Staley,  JOE, RN, CMSRN, RNCC  Covering Units 6D/OBS  Pager: [phone]  Phone: [phone]  6th floor Weekend/Holiday Pager: [phone]  Observation weekend/after hours: [phone]

## 2022-08-15 NOTE — PROGRESS NOTES
Received intake call for home oxygen at 12:44PM. Reviewed patient's chart; Patient qualifies under insurance guidelines and all documentation is in the chart including a good order.   1:19PM - Called the care coordinator, Shanika to confirm we received the order.  She was not available.  I shared that information with Zena, who was able to transfer me to the patient's room.    1:24PM - Attempted to speak with patient but I needed an .   1:41PM - Through an Hungarian ,called to offer choice and patient is okay with [facility] setting them up. Discussed equipment with patient and informed them that we would be to bedside with oxygen in the next 2 hours.   2:04PM - Called care coordinator to go over the information and provide ETA.   2:10PM - Received a page from Mike, regarding this patient.  2:13PM - Spoke with Alex regarding oxygen equipment and timeframes.

## 2022-08-16 NOTE — PROGRESS NOTES
8/15/2022 7:16 [facility]: RE : O2    Now anticipating home oxygen to be delivered to bedside vs. Home set up due to change of discharge . Care team informed. Please call [facility] with any questions/concerns .

## 2022-08-16 NOTE — PLAN OF CARE
Physical Therapy Discharge Summary    Reason for therapy discharge:    Discharged to home with home therapy.    Progress towards therapy goal(s). See goals on Care Plan in [facility] electronic health record for goal details.  Goals partially met.  Barriers to achieving goals:   discharge from facility.    Therapy recommendation(s):    Continued therapy is recommended.  Rationale/Recommendations:  progress mobility.

## 2022-08-16 NOTE — PROGRESS NOTES
Patient discharged to home with son. All patient belongings sent with patient. Education provided on home oxygen use and machine, questions answered as able within scope of practice. Follow up appointment and medication information sent with son.

## 2022-08-19 ENCOUNTER — TELEPHONE (OUTPATIENT)
Dept: FAMILY MEDICINE | Facility: CLINIC | Age: 57
End: 2022-08-19

## 2022-08-19 NOTE — TELEPHONE ENCOUNTER
Central Prior Authorization Team   Phone: [phone]      PRIOR AUTHORIZATION DENIED    Medication: olopatadine (PATADAY) 0.2 % ophthalmic solution - DENIED    Denial Date: 8/19/2022    Denial Rational: MEDICATION IS EXCLUDED FROM PATIENT'S PHARMACY BENEFITS

## 2022-08-24 ENCOUNTER — OFFICE VISIT (OUTPATIENT)
Dept: FAMILY MEDICINE | Facility: CLINIC | Age: 57
End: 2022-08-24
Payer: MEDICARE

## 2022-08-24 VITALS
DIASTOLIC BLOOD PRESSURE: 82 MMHG | BODY MASS INDEX: 46.48 KG/M2 | TEMPERATURE: 98 F | HEART RATE: 77 BPM | OXYGEN SATURATION: 94 % | WEIGHT: 238 LBS | SYSTOLIC BLOOD PRESSURE: 132 MMHG | RESPIRATION RATE: 26 BRPM

## 2022-08-24 DIAGNOSIS — J44.9 CHRONIC OBSTRUCTIVE PULMONARY DISEASE, UNSPECIFIED COPD TYPE (H): ICD-10-CM

## 2022-08-24 DIAGNOSIS — Z02.89 ENCOUNTER FOR COMPLETION OF FORM WITH PATIENT: ICD-10-CM

## 2022-08-24 DIAGNOSIS — Z71.3 DIETARY COUNSELING AND SURVEILLANCE: ICD-10-CM

## 2022-08-24 DIAGNOSIS — J96.01 ACUTE RESPIRATORY FAILURE WITH HYPOXIA (H): Primary | ICD-10-CM

## 2022-08-24 DIAGNOSIS — I12.9 HYPERTENSION, RENAL: ICD-10-CM

## 2022-08-24 DIAGNOSIS — G47.33 OSA (OBSTRUCTIVE SLEEP APNEA): ICD-10-CM

## 2022-08-24 PROBLEM — I50.9 CONGESTIVE HEART FAILURE (CHF) (H): Status: RESOLVED | Noted: 2022-08-02 | Resolved: 2022-08-24

## 2022-08-24 PROCEDURE — 99214 OFFICE O/P EST MOD 30 MIN: CPT | Performed by: FAMILY MEDICINE

## 2022-08-24 RX ORDER — AMLODIPINE BESYLATE 10 MG/1
10 TABLET ORAL DAILY
Qty: 90 TABLET | Refills: 3 | Status: SHIPPED | OUTPATIENT
Start: 2022-08-24 | End: 2023-09-01

## 2022-08-24 RX ORDER — ALBUTEROL SULFATE 90 UG/1
2 AEROSOL, METERED RESPIRATORY (INHALATION) 4 TIMES DAILY
Qty: 18 G | Refills: 1 | Status: SHIPPED | OUTPATIENT
Start: 2022-08-24 | End: 2023-06-30

## 2022-08-24 NOTE — PATIENT INSTRUCTIONS
COPD  I have sent a prescription for your rescue inhaler today.  I have ordered an oxygen tank for you today, this will be delivered to your home.     In the future, we can take care of you at [place].

## 2022-08-24 NOTE — PROGRESS NOTES
Hospitalization Follow-up Visit         [facility]       Hospital Follow-up Visit:    Hospital:  [facility]   Date of Admission: 22  Date of Discharge: 8/15/22 gold team hospitalist service on [place].   Reason(s) for Admission: hospital transfer for acute hypoxic, hypercarbic respiratory failure requiring prolonged high flow by nasal cannula, infectious workup was negative. Went home on O2 because she has not had a recent sleep evaluation. CASTILLO imrpoved at discharge. Transplant meds stable w/o dosing changes. Discussed tests and treatments.            Problems taking medications regularly:  None       Post Discharge Medication Reconciliation: Completed by me today. Noted olopatadine eye drops were not covered by insurance, so she has not been taking those. She also appears to have an  order for amlodipine. Other medications are the same.        Problems adhering to non-medication therapy:  None       Medications reviewed by: by PharmD    Summary of hospitalization:  [facility] discharge summary reviewed  Diagnostic Tests/Treatments reviewed.  Follow up needed: bmp and oxygen orders, f/u with Dr. Ward for sleep evaluation scheduled .   Other Healthcare Providers Involved in Patient s Care:          Dr. Bryce Anne for liver transplant  Home care nurse: fátima, pulmonary, nephrology Dr. Arambula  Update since discharge: improved.   Plan of care communicated with patient            An Grenadian Khmer  was used for  this visit.      She is using her oxygen at night, but the tank is so big she is not able to bring it with her. She is running out of her albuterol rescue inhaler before the end of the month. She states she is only getting 1 inhaler a month from the pharmacy. The eye drops she has been using have been helping with her symptoms.     She reports she is still experiencing shaking in her hands, she notices this more when she is holding a phone. She  reports some trouble with her ears and hearing since leaving the hospital.     She brings in paperwork from [employer], adult .          Review of Systems:   CONSTITUTIONAL: no fatigue, no unexpected change in weight  SKIN: no worrisome rashes, no worrisome moles, no worrisome lesions  EYES: no acute vision problems or changes  ENT: no ear problems, no mouth problems, no throat problems  RESP: no significant cough, no shortness of breath  CV: no chest pain, no palpitations, no new or worsening peripheral edema  GI: no nausea, no vomiting, no constipation, no diarrhea     This document serves as a record of the services and decisions personally performed and made by Karely Sierra MD. It was created on his/her behalf by Aury Aiken, a trained medical scribe. The creation of this document is based the provider's statements to the medical scribe.  Scribscott Aiken 2:14 PM, August 24, 2022         Physical Exam:     Vitals:    08/24/22 1355 08/24/22 1520   BP: 132/82    Pulse: 77    Resp:  26   Temp: 98  F (36.7  C)    TempSrc: Oral    SpO2: 94%    Weight: 108 kg (238 lb)      Body mass index is 46.48 kg/m .    GENERAL: healthy, alert, well nourished, well hydrated, no distress  PSYCH: speech- coherent , normal rate and volume; affect- normal         Results:   Results from last visit   creatinine 1.1, gfr 58 which is stable, normal cbc, normal cyclosporine levels  Assessment and Plan      Flor was seen today for hospital f/u and refill request.    Diagnoses and all orders for this visit:    Acute respiratory failure with hypoxia (H)  Reviewed all of the consultants workup in hospital and spoke with inpatient care team during her stay. Evaluation negative for heart failure or infectious cause and pulmonary evaluation pointed towards untreated SURI as cause. She has been doing very well at home. Oxygen just in home environment but would benefit from more portability. Sent referrals multiple times for sleep  and due to hospitalizations this has not been completed. Her next appt is now not until Nov. Oxygen ordered as below. Ensure delivery to home.  -     Oxygen Order    Chronic obstructive pulmonary disease, unspecified COPD type (H)  She is using daily controller and used up rescue inhaler so a new one provided today. Hopeful with adequate treatment she could get off home oxygen. Would likely need to see pulmonary ongoing.  -     albuterol (PROAIR HFA/PROVENTIL HFA/VENTOLIN HFA) 108 (90 Base) MCG/ACT inhaler; Inhale 2 puffs into the lungs 4 times daily  -     Oxygen Order    SURI (obstructive sleep apnea)  Has appt as noted above. Will await orders and continue nocturnal oxygen until adequately treated.     Dietary counseling and surveillance- LOW SALT, Hypertension, renal  Refill of amlodipine. Appears she ran out while in hospital. creatinine was stable so will not recheck today.   -     amLODIPine (NORVASC) 10 MG tablet; Take 1 tablet (10 mg) by mouth daily    Encounter for completion of form with patient  Pt has forms from [employer] where she does adult . Theses were completed by me today and will need attached problem list sent. Diet was a low salt diet.         E&M code to be billed if TCM cannot be: 40981    Type of decision making: High complexity ([id number])      Options for treatment and follow-up care were reviewed with the patient  Flor Navarro   engaged in the decision making process and verbalized understanding of the options discussed and agreed with the final plan.    The information in this document, created by the medical scribe for me, accurately reflects the services I personally performed and the decisions made by me. I have reviewed and approved this document for accuracy prior to leaving the patient care area.  Karely Sierra MD  2:13 PM, 08/24/22

## 2022-08-24 NOTE — PROGRESS NOTES
DME (Durable Medical Equipment) Orders and Documentation  Orders Placed This Encounter   Procedures     Oxygen Order      The patient was assessed and it was determined the patient is in need of the following listed DME Supplies/Equipment. Please complete supporting documentation below to demonstrate medical necessity.      Oxygen Use Documentation  I certify that this patient, Flor Navarro has been under my care and that I, or a nurse practitioner or physician's assistant working with me, had a face-to-face encounter that meets face-to-face encounter requirements with this patient on August 24, 2022.    Flor Navarro is now in a chronic stable state and continues to require supplemental oxygen due to continued oxygen desaturation.  This patient has been treated in part, or in whole for the following medical condition(s):  COPD J44.9  acute hypoxic respiratory failure    SURI  Treatments tried and failed or ruled out to treat hypoxemia include diuresis, BP mgmt, assessment of other end organ function, consultation with pulmonary and cardiology .  If portability is ordered, is the patient mobile within the home? yes

## 2022-08-31 DIAGNOSIS — M81.8 OTHER OSTEOPOROSIS WITHOUT CURRENT PATHOLOGICAL FRACTURE: ICD-10-CM

## 2022-08-31 DIAGNOSIS — G63 NEUROPATHY DUE TO MEDICAL CONDITION (H): ICD-10-CM

## 2022-08-31 RX ORDER — GABAPENTIN 300 MG/1
300 CAPSULE ORAL AT BEDTIME
Qty: 180 CAPSULE | Refills: 3 | Status: SHIPPED | OUTPATIENT
Start: 2022-08-31 | End: 2023-09-15

## 2022-08-31 RX ORDER — VITAMIN B COMPLEX
25 TABLET ORAL DAILY
Qty: 100 TABLET | Refills: 3 | Status: SHIPPED | OUTPATIENT
Start: 2022-08-31 | End: 2023-09-01

## 2022-08-31 NOTE — TELEPHONE ENCOUNTER
"Request for medication refill:  Vitamin D3 (CHOLECALCIFEROL) 25 mcg (1000 units) tablet  Providers if patient needs an appointment and you are willing to give a one month supply please refill for one month and  send a letter/MyChart using \".SMILLIMITEDREFILL\" .smillimited and route chart to \"[facility] \" (Giving one month refill in non controlled medications is strongly recommended before denial)    If refill has been denied, meaning absolutely no refills without visit, please complete the smart phrase \".smirxrefuse\" and route it to the \"P [facility] MED REFILLS\"  pool to inform the patient and the pharmacy.    Myrna De La Garza        "

## 2022-08-31 NOTE — TELEPHONE ENCOUNTER
"Request for medication refill:  gabapentin (NEURONTIN) 300 MG capsule    Providers if patient needs an appointment and you are willing to give a one month supply please refill for one month and  send a letter/MyChart using \".SMILLIMITEDREFILL\" .smillimited and route chart to \"P [facility] \" (Giving one month refill in non controlled medications is strongly recommended before denial)    If refill has been denied, meaning absolutely no refills without visit, please complete the smart phrase \".smirxrefuse\" and route it to the \"P [facility] MED REFILLS\"  pool to inform the patient and the pharmacy.    Ronit Schneider MA        "

## 2022-09-09 ENCOUNTER — DOCUMENTATION ONLY (OUTPATIENT)
Dept: FAMILY MEDICINE | Facility: CLINIC | Age: 57
End: 2022-09-09

## 2022-09-09 DIAGNOSIS — Z94.4 LIVER REPLACED BY TRANSPLANT (H): Primary | ICD-10-CM

## 2022-09-09 RX ORDER — MYCOPHENOLATE MOFETIL 250 MG/1
CAPSULE ORAL
Qty: 120 CAPSULE | Refills: 11 | Status: SHIPPED | OUTPATIENT
Start: 2022-09-09 | End: 2023-09-27

## 2022-09-14 ENCOUNTER — DOCUMENTATION ONLY (OUTPATIENT)
Dept: FAMILY MEDICINE | Facility: CLINIC | Age: 57
End: 2022-09-14

## 2022-09-14 NOTE — PROGRESS NOTES
"When opening a documentation only encounter, be sure to enter in \"Chief Complaint\" Forms and in \" Comments\" Title of form, description if needed.    Flor is a 56 year old  female  Form received via: Fax  Form now resides in: Provider Edward Rice MA                  "

## 2022-09-19 ENCOUNTER — MEDICAL CORRESPONDENCE (OUTPATIENT)
Dept: HEALTH INFORMATION MANAGEMENT | Facility: CLINIC | Age: 57
End: 2022-09-19

## 2022-09-19 NOTE — PROGRESS NOTES
Form has been completed by provider.     Form sent out via: Fax to [facility] at Fax Number: [phone]  Patient informed: N/A  Output date: September 19, 2022    Darius Durant MA      **Please close the encounter**

## 2022-09-20 NOTE — PROGRESS NOTES
Form has been completed by provider.     Form sent out via: Fax to [facility] at Fax Number: [phone]  Patient informed: N/A  Output date: September 20, 2022    Darius uDrant MA      **Please close the encounter**

## 2022-09-23 ENCOUNTER — OFFICE VISIT (OUTPATIENT)
Dept: FAMILY MEDICINE | Facility: CLINIC | Age: 57
End: 2022-09-23
Payer: MEDICARE

## 2022-09-23 VITALS
WEIGHT: 235.4 LBS | DIASTOLIC BLOOD PRESSURE: 90 MMHG | OXYGEN SATURATION: 89 % | SYSTOLIC BLOOD PRESSURE: 129 MMHG | BODY MASS INDEX: 44.45 KG/M2 | TEMPERATURE: 97.9 F | HEART RATE: 83 BPM | HEIGHT: 61 IN

## 2022-09-23 DIAGNOSIS — Z23 NEED FOR PROPHYLACTIC VACCINATION AND INOCULATION AGAINST INFLUENZA: ICD-10-CM

## 2022-09-23 DIAGNOSIS — Z23 HIGH PRIORITY FOR 2019-NCOV VACCINE: ICD-10-CM

## 2022-09-23 DIAGNOSIS — J96.21 ACUTE ON CHRONIC RESPIRATORY FAILURE WITH HYPOXIA (H): Primary | ICD-10-CM

## 2022-09-23 PROCEDURE — 91312 COVID-19,PF,PFIZER BOOSTER BIVALENT: CPT | Performed by: FAMILY MEDICINE

## 2022-09-23 PROCEDURE — G0008 ADMIN INFLUENZA VIRUS VAC: HCPCS | Performed by: FAMILY MEDICINE

## 2022-09-23 PROCEDURE — 90682 RIV4 VACC RECOMBINANT DNA IM: CPT | Performed by: FAMILY MEDICINE

## 2022-09-23 PROCEDURE — 99214 OFFICE O/P EST MOD 30 MIN: CPT | Mod: 25 | Performed by: FAMILY MEDICINE

## 2022-09-23 PROCEDURE — 0124A COVID-19,PF,PFIZER BOOSTER BIVALENT: CPT | Performed by: FAMILY MEDICINE

## 2022-09-23 NOTE — PROGRESS NOTES
"  Assessment & Plan     Acute on chronic respiratory failure with hypoxia (H)  SATS 89 here today off oxygen because she did not receive portable oxygen delivery I ordered at last visit on 8/24. Resubmitted DME orders for portable oxygen, CC was not available at time so sent message through Epic. We gave her oxygen at 3 Lpm for 30 minutes in clinic before she went home.  - Oxygen Order    High priority for 2019-nCoV vaccine  - COVID-19,PF,PFIZER BOOSTER BIVALENT 12+Yrs    Need for prophylactic vaccination and inoculation against influenza  - INFLUENZA QUAD, RECOMBINANT, P-FREE (RIV4) (FLUBLOK)    Chronic illness with exacerbation  Diagnosis or treatment significantly limited by social determinants of health - ESL, exercise, stress       No follow-ups on file.    The information in this document, created by the medical scribe for me, accurately reflects the services I personally performed and the decisions made by me. I have reviewed and approved this document for accuracy prior to leaving the patient care area.  Karely Sierra MD  4:22 PM, 09/23/22  [facility] AMI Ray is a 56 year old accompanied by her , presenting for the following health issues:  oxygen (Pt would like a prescription for oxygen.), Imm/Inj (COVID-19 VACCINE), and Imm/Inj (Flu Shot)      HPI     Patient denies receiving mobile oxygen yet. Patient reports she does not know what the Lpm she receives of oxygen.        Review of Systems   Positive for: Hypoxia  Negative for:      Objective    BP (!) 129/90   Pulse 83   Temp 97.9  F (36.6  C) (Oral)   Ht 1.553 m (5' 1.14\")   Wt 106.8 kg (235 lb 6.4 oz)   LMP  (LMP Unknown)   SpO2 (!) 89%   BMI 44.27 kg/m    Body mass index is 44.27 kg/m .   HYPOXIC  Physical Exam   GENERAL: healthy, alert and no distress appears to be mentating well, unlabored breathing.   PSYCH: mentation appears normal, affect normal/bright              "

## 2022-09-23 NOTE — PATIENT INSTRUCTIONS
You received your Flu shot and COVID booster today.     2.   We will follow through on getting you mobile oxygen unit.      Oxygen order faxed to [facility] [phone]. Successful send.

## 2022-09-23 NOTE — PROGRESS NOTES
DME (Durable Medical Equipment) Orders and Documentation  Orders Placed This Encounter   Procedures     Oxygen Order      The patient was assessed and it was determined the patient is in need of the following listed DME Supplies/Equipment. Please complete supporting documentation below to demonstrate medical necessity.      Oxygen Use Documentation  I certify that this patient, Flor Navarro has been under my care and that I, or a nurse practitioner or physician's assistant working with me, had a face-to-face encounter that meets face-to-face encounter requirements with this patient on September 23, 2022.    Flor Navarro is now in a chronic stable state and continues to require supplemental oxygen due to continued oxygen desaturation.  This patient has been treated in part, or in whole for the following medical condition(s):  SURI    Acute on chronic hypoxic respiratory failure   Treatments tried and failed or ruled out to treat hypoxemia include hospitalization, CPAP device  If portability is ordered, is the patient mobile within the home? yes

## 2022-09-23 NOTE — Clinical Note
Flor did not get her portable oxygen so was hypoxic at her appointment! Please help me ensure she gets O2 at home PORTABLE. TY!!

## 2022-09-26 ENCOUNTER — TELEPHONE (OUTPATIENT)
Dept: FAMILY MEDICINE | Facility: CLINIC | Age: 57
End: 2022-09-26

## 2022-09-26 NOTE — TELEPHONE ENCOUNTER
Signed forms and medication list faxed to [phone].  Copy scanned into chart.    Maya Linares  Care Coordinator  [facility]  Phone:[phone]

## 2022-10-04 ENCOUNTER — DOCUMENTATION ONLY (OUTPATIENT)
Dept: FAMILY MEDICINE | Facility: CLINIC | Age: 57
End: 2022-10-04

## 2022-10-04 NOTE — PROGRESS NOTES
"Form has been completed by provider.     Form sent out via: Fax to [facility] at Fax Number: [phone]  Patient informed: No  Output date: October 5, 2022    Mignon Schultz RN      **Please close the encounter**      When opening a documentation only encounter, be sure to enter in \"Chief Complaint\" Forms and in \" Comments\" Title of form, description if needed.    Flor is a 57 year old  female  Form received via: Fax  Form now resides in: Provider Ready    Mignon Schultz RN                  "

## 2022-10-26 ENCOUNTER — TELEPHONE (OUTPATIENT)
Dept: FAMILY MEDICINE | Facility: CLINIC | Age: 57
End: 2022-10-26

## 2022-10-26 DIAGNOSIS — M25.561 CHRONIC PAIN OF BOTH KNEES: Primary | ICD-10-CM

## 2022-10-26 DIAGNOSIS — M25.562 CHRONIC PAIN OF BOTH KNEES: Primary | ICD-10-CM

## 2022-10-26 DIAGNOSIS — G89.29 CHRONIC PAIN OF BOTH KNEES: Primary | ICD-10-CM

## 2022-10-26 NOTE — TELEPHONE ENCOUNTER
[facility] phone call message - order or referral request for patient:     Order or referral being requested: Patient's Medicare Care Coordinator Deborah [phone] requesting knee braces be ordered.  Patient reports she had then ordered a long time ago and needs a them ordered again        Additional Comments: for both knees    OK to leave a message on voice mail? Yes    Primary language: Czech      needed? Yes    Call taken on October 26, 2022 at 12:40 PM by Maya Linares

## 2022-10-28 NOTE — TELEPHONE ENCOUNTER
DME (Durable Medical Equipment) Orders and Documentation  Orders Placed This Encounter   Procedures     Knee Supplies Order      The patient was assessed and it was determined the patient is in need of the following listed DME Supplies/Equipment. Please complete supporting documentation below to demonstrate medical necessity.      Knee Bracing Supplies Documentation  Patient requires the use of the ordered bracing device due to following medical need/condition: pain mgmt

## 2022-11-04 ENCOUNTER — OFFICE VISIT (OUTPATIENT)
Dept: SLEEP MEDICINE | Facility: CLINIC | Age: 57
End: 2022-11-04
Attending: FAMILY MEDICINE
Payer: MEDICARE

## 2022-11-04 VITALS
DIASTOLIC BLOOD PRESSURE: 91 MMHG | OXYGEN SATURATION: 93 % | HEIGHT: 61 IN | HEART RATE: 71 BPM | WEIGHT: 238 LBS | SYSTOLIC BLOOD PRESSURE: 133 MMHG | BODY MASS INDEX: 44.93 KG/M2

## 2022-11-04 DIAGNOSIS — G47.30 OBSERVED SLEEP APNEA: Primary | ICD-10-CM

## 2022-11-04 DIAGNOSIS — G47.10 HYPERSOMNIA: ICD-10-CM

## 2022-11-04 DIAGNOSIS — R06.09 DOE (DYSPNEA ON EXERTION): ICD-10-CM

## 2022-11-04 DIAGNOSIS — J96.21 ACUTE ON CHRONIC RESPIRATORY FAILURE WITH HYPOXIA (H): ICD-10-CM

## 2022-11-04 PROBLEM — G47.33 OSA (OBSTRUCTIVE SLEEP APNEA): Status: RESOLVED | Noted: 2017-09-12 | Resolved: 2022-11-04

## 2022-11-04 PROCEDURE — 99205 OFFICE O/P NEW HI 60 MIN: CPT | Performed by: INTERNAL MEDICINE

## 2022-11-04 RX ORDER — METHYLCELLULOSE 500 MG/1
2 TABLET ORAL 2 TIMES DAILY
COMMUNITY
Start: 2022-10-14 | End: 2023-02-01

## 2022-11-04 NOTE — LETTER
11/4/2022         RE: Flor Navarro  [address]  [facility] [address]        Dear Colleague,    Thank you for referring your patient, Flor Navarro, to the [facility]. Please see a copy of my visit note below.    Chief complaint:Consultation requested by Triston Styles MD for evaluation of untreated sleep apnea    History obtained from chart as well as from patient via .  Getting quality history was somewhat hampered by the use of iPad .    History of Present Illness:57 year old Upper sorbian speaking female with PMH of Liver Tx for hepatitis C, HTN, CKDIII, SURI, Obesity, and recent small bowel obstruction.  She was found to have hypoxemic hypercapneic respiratory failure thought related to untreated obstructive sleep apnea.  She was treated with high flow nasal cannula and bilevel PAP.  Patient has never had a sleep study.  She was discharged on supplemental oxygen during the day and at night.  She reports today that she is using the oxygen at night.  She is not using it much during the day when she is outside the home.    She reports sleepiness during the day for the last 3 months or so.  She does not recall an event that provoked this.  She has been dozing off during her day program.  She has been sleeping very well at night.  Typically going to bed around 11 PM and sleeping until the morning 8 or 9 AM.  She is not taking any sleep aids.  In the past she had suffered insomnia and took melatonin.  There was a period of time when she had daytime sleepiness in the past that resolved with discontinuing melatonin.  She is taking naps most days.  She is also dozing off unintentionally.  She describes dyspnea with exertion.  She also has some instability regarding her gait    She experiences morning headaches and dry mouth.  She gets up at night to go to the bathroom but usually can return to sleep okay.  She does have a history of some nightmares.  No  sleepwalking, sleep talking or dream enactment behavior.  She is not aware of whether or not she snores.  No classic symptoms of restless legs.  She typically drinks 2 caffeinated beverages a day.    She sleeps in a flat bed at home with 1 pillow.  She does not use alcohol or other substances.    [place] Sleepiness Scale  ESS 21  (Less than 10 normal)    Insomnia Severity Scale   BRIGID 16  Total score categories:  0-7 = No clinically significant insomnia   8-14 = Subthreshold insomnia   15-21 = Clinical insomnia (moderate severity)  22-28 = Clinical insomnia (severe)    STOP-BANG  Loud Snore   ?  Excessively Tired/Sleepy   1  Observed apnea   0  Hypertension   1  BMI> 35 kg/m2   1  Age >50   1  Neck >16 in/40cm   1  Male Gender   0  Total =   5  (0-2 low, 3-4 intermediate, 5-8 high risk of SURI)      Past Medical History:   Diagnosis Date     Anemia in CKD (chronic kidney disease)      Cataract      CKD (chronic kidney disease) stage 3, GFR 30-59 ml/min (H)      Hepatitis C     cleared virus spontaneously 2013     High risk medication use      Hypertension, renal      Immunosuppressed status (H)      Liver replaced by transplant (H) 01/01/2010     Osteoporosis      Recurrent pregnancy loss without current pregnancy      Recurrent UTI 07/12/2021     Stroke, hemorrhagic (H) 01/01/2008    Left cerebral intraparenchymal hemorrhage     Syncope      Unspecified viral hepatitis C without hepatic coma        Allergies   Allergen Reactions     Aspirin      3/31/16 Per pt, tolerates 81 mg daily dose without ADR.     325 mg dose caused itchiness and hives.     Clarithromycin      Allergic reaction         Contrast Dye      Iodine      Pcn [Penicillins]        Current Outpatient Medications   Medication     ACE/ARB/ARNI NOT PRESCRIBED (INTENTIONAL)     acetaminophen (TYLENOL) 500 MG tablet     albuterol (ACCUNEB) 0.63 MG/3ML neb solution     albuterol (PROAIR HFA/PROVENTIL HFA/VENTOLIN HFA) 108 (90 Base) MCG/ACT inhaler      alendronate (FOSAMAX) 70 MG tablet     alum & mag hydroxide-simethicone (MAALOX ADVANCED MAX ST) 400-400-40 MG/5ML SUSP suspension     amLODIPine (NORVASC) 10 MG tablet     calcitRIOL (ROCALTROL) 0.25 MCG capsule     calcium carbonate 600 mg-vitamin D 400 units (CALTRATE) 600-400 MG-UNIT per tablet     cetirizine (ZYRTEC) 10 MG tablet     COMPOUNDED NON-CONTROLLED SUBSTANCE (CMPD RX) - PHARMACY TO MIX COMPOUNDED MEDICATION     cycloSPORINE modified (GENERIC EQUIVALENT) 25 MG capsule     Denture Care Products (EFFERDENT DENTURE CLEANSER) TBEF     dimethicone (AVEENO DAILY MOISTURIZING) 1.3 % LOTN lotion     diphenhydrAMINE (BENADRYL) 50 MG capsule     febuxostat (ULORIC) 40 MG TABS tablet     furosemide (LASIX) 20 MG tablet     gabapentin (NEURONTIN) 300 MG capsule     ketotifen (ZADITOR) 0.025 % ophthalmic solution     lidocaine (XYLOCAINE) 5 % external ointment     mineral oil-white petrolatum (EUCERIN) CREA cream     Multiple Vitamin (DAILY-SUMA MULTIVITAMIN) TABS     multivitamin w/minerals (MULTI-VITAMIN) tablet     mycophenolate (GENERIC EQUIVALENT) 250 MG capsule     neomycin-polymyxin-HC 1 %     omeprazole (PRILOSEC) 20 MG DR capsule     order for DME     order for DME     order for DME     psyllium (METAMUCIL/KONSYL) 0.52 g capsule     senna-docusate (SENOKOT-S/PERICOLACE) 8.6-50 MG tablet     SM FIBER LAXATIVE 500 MG TABS tablet     STATIN NOT PRESCRIBED, INTENTIONAL,     umeclidinium (INCRUSE ELLIPTA) 62.5 MCG/INH inhaler     Vitamin D3 (CHOLECALCIFEROL) 25 mcg (1000 units) tablet     capsaicin (ZOSTRIX) 0.025 % external cream     carboxymethylcellulose PF (REFRESH PLUS) 0.5 % ophthalmic solution     melatonin 3 MG tablet     No current facility-administered medications for this visit.       Social History     Socioeconomic History     Marital status: Single     Spouse name: Not on file     Number of children: Not on file     Years of education: Not on file     Highest education level: Not on file    Occupational History     Not on file   Tobacco Use     Smoking status: Never     Smokeless tobacco: Never   Vaping Use     Vaping Use: Never used   Substance and Sexual Activity     Alcohol use: No     Drug use: No     Sexual activity: Not Currently   Other Topics Concern     Parent/sibling w/ CABG, MI or angioplasty before 65F 55M? Not Asked      Service No     Blood Transfusions Yes     Caffeine Concern No     Occupational Exposure No     Hobby Hazards No     Sleep Concern No     Stress Concern Yes     Comment: needs help at home for cares     Weight Concern Yes     Comment: wants to lose weight not gain weight     Special Diet No     Back Care Yes     Comment: uses a patch to relieve pain     Exercise Not Asked     Bike Helmet Not Asked     Seat Belt Yes     Self-Exams Not Asked   Social History Narrative    One son Carleen        GynHx:             Gets transportation via insurance - needs assistance due to unsteady gait from CVA     Social Determinants of Health     Financial Resource Strain: High Risk     Difficulty of Paying Living Expenses: Very hard   Food Insecurity: Food Insecurity Present     Worried About Running Out of Food in the Last Year: Sometimes true     Ran Out of Food in the Last Year: Sometimes true   Transportation Needs: Unmet Transportation Needs     Lack of Transportation (Medical): Yes     Lack of Transportation (Non-Medical): Yes   Physical Activity: Not on file   Stress: Stress Concern Present     Feeling of Stress : Rather much   Social Connections: Socially Isolated     Frequency of Communication with Friends and Family: Once a week     Frequency of Social Gatherings with Friends and Family: Once a week     Attends Muslim Services: Never     Active Member of Clubs or Organizations: No     Attends Club or Organization Meetings: Never     Marital Status:    Intimate Partner Violence: Not At Risk     Fear of Current or Ex-Partner: No     Emotionally Abused: No  "    Physically Abused: No     Sexually Abused: No   Housing Stability: Not on file       Family History   Problem Relation Age of Onset     Hepatitis Other         Hep C, still in [place]     Cerebrovascular Disease Other      Cancer No family hx of      Diabetes No family hx of      Hypertension No family hx of      Thyroid Disease No family hx of      Glaucoma No family hx of      Macular Degeneration No family hx of        EXAM:  BP (!) 133/91   Pulse 71   Ht 1.549 m (5' 1\")   Wt 108 kg (238 lb)   LMP  (LMP Unknown)   SpO2 93%   BMI 44.97 kg/m    GENERAL: Alert and no distress  EYES: Eyes grossly normal to inspection.  No discharge or erythema, or obvious scleral/conjunctival abnormalities.  Oropharynx Mallampati 3  RESP: No audible wheeze, cough, or visible cyanosis.  No visible retractions or increased work of breathing.    Chest is clear to auscultation bilaterally, no rales, wheezes identified  Cardiac tones regular rate and rhythm S1-S2 normal  Extremities are perfused there is trace bilateral lower extremity edema  SKIN: Visible skin clear. No significant rash, abnormal pigmentation or lesions.  NEURO: Cranial nerves grossly intact.  Mentation and speech appropriate for age.  Right-sided weakness of the upper extremity compared to the left.  PSYCH: Mentation appears normal, affect normal, judgement and insight intact, normal speech and appearance well-groomed.       CT Chest 8/7/2022:  FINDINGS:   Chest:    Thyroid gland is unremarkable. Heart size normal. No pericardial effusion. No significant coronary artery calcifications. Thoracic aorta main pulmonary arteries are normal in caliber. Esophagus is unremarkable.   No mediastinal or axillary adenopathy.   Trachea and central airways are clear. Bibasilar subsegmental  atelectasis. No pleural effusion or pneumothorax. Bilateral lower lobe predominant peribronchovascular groundglass opacities. Mild apical centrilobular emphysematous changes.  Abdomen: " Examination of the upper abdomen is limited. Post surgical  liver transplant changes. Adrenals are within normal limits. Rightrenal atrophy.  Bones: No suspicious osseous lesion.  Soft Tissues: No suspicious mass.                                                         IMPRESSION: Mild centrilobular emphysematous changes bilateral lung apices.  1.  Bilateral lower lobe predominant peribronchovascular groundglass opacities differential atelectasis, edema, atypical infection.  2.  No pleural effusion.      CARDIAC ECHO: 8/2/2022  Interpretation Summary  Global and regional left ventricular function is normal with an EF of 60-65%.  Mild concentric wall thickening consistent with left ventricular hypertrophy is present.   Left ventricular diastolic function is indeterminate.  Global right ventricular function is normal.  Normal valve assessment.  Dilation of the inferior vena cava is present with abnormal respiratory variation in diameter.        TSH   Date Value Ref Range Status   08/03/2022 0.77 0.30 - 4.20 uIU/mL Final   01/28/2022 0.57 0.40 - 4.00 mU/L Final   08/17/2016 0.81 0.40 - 4.00 mU/L Final       ASSESSMENT:  57-year-old female status post liver transplant, complains of 3 months of daytime sleepiness.  No clear inciting etiology.  Recent hospitalization showed ABG with complex acidosis evidence for both acute and chronic respiratory acidosis was present.  Patient is been prescribed supplemental oxygen without clear underlying diagnosis.  Presumptive diagnosis is obstructive sleep apnea however obstructive sleep apnea should not cause chronic respiratory failure.  Obesity hypoventilation or other hypoventilation syndromes can.  They should be explored.  Patient has abnormal CT scan of the chest, no recent pulmonary function testing.  Differential diagnosis for chronic respiratory failure remains quite broad and obesity hypoventilation should be a diagnosis of exclusion. More thorough evaluation of respiratory  failure is indicated.    PLAN:  Recommended in lab polysomnography with transcutaneous CO2 monitoring and an updated baseline arterial blood gas test.  Also recommended formal comprehensive pulmonary function testing with MIP, and MEP.  Consider further cardiopulmonary evaluation.    80 minutes spent on the date of the encounter doing chart review, history and exam, documentation and further activities per the note    Bessy Ward M.D.  Pulmonary/Critical Care/Sleep Medicine    [facility]   Floor [address], [address]   [address] [place], [place] [address]. [place] [address]   Appointments: [phone]    The above note was dictated using voice recognition software and may include typographical errors. Please contact the author for any clarifications.                Again, thank you for allowing me to participate in the care of your patient.        Sincerely,        Bessy Ward MD

## 2022-11-04 NOTE — PROGRESS NOTES
Chief complaint:Consultation requested by Triston Styles MD for evaluation of untreated sleep apnea    History obtained from chart as well as from patient via .  Getting quality history was somewhat hampered by the use of iPad .    History of Present Illness:57 year old Bulgarian speaking female with PMH of Liver Tx for hepatitis C, HTN, CKDIII, SURI, Obesity, and recent small bowel obstruction.  She was found to have hypoxemic hypercapneic respiratory failure thought related to untreated obstructive sleep apnea.  She was treated with high flow nasal cannula and bilevel PAP.  Patient has never had a sleep study.  She was discharged on supplemental oxygen during the day and at night.  She reports today that she is using the oxygen at night.  She is not using it much during the day when she is outside the home.    She reports sleepiness during the day for the last 3 months or so.  She does not recall an event that provoked this.  She has been dozing off during her day program.  She has been sleeping very well at night.  Typically going to bed around 11 PM and sleeping until the morning 8 or 9 AM.  She is not taking any sleep aids.  In the past she had suffered insomnia and took melatonin.  There was a period of time when she had daytime sleepiness in the past that resolved with discontinuing melatonin.  She is taking naps most days.  She is also dozing off unintentionally.  She describes dyspnea with exertion.  She also has some instability regarding her gait    She experiences morning headaches and dry mouth.  She gets up at night to go to the bathroom but usually can return to sleep okay.  She does have a history of some nightmares.  No sleepwalking, sleep talking or dream enactment behavior.  She is not aware of whether or not she snores.  No classic symptoms of restless legs.  She typically drinks 2 caffeinated beverages a day.    She sleeps in a flat bed at home with 1 pillow.  She does not use  alcohol or other substances.    Volant Sleepiness Scale  ESS 21  (Less than 10 normal)    Insomnia Severity Scale   BRIGID 16  Total score categories:  0-7 = No clinically significant insomnia   8-14 = Subthreshold insomnia   15-21 = Clinical insomnia (moderate severity)  22-28 = Clinical insomnia (severe)    STOP-BANG  Loud Snore   ?  Excessively Tired/Sleepy   1  Observed apnea   0  Hypertension   1  BMI> 35 kg/m2   1  Age >50   1  Neck >16 in/40cm   1  Male Gender   0  Total =   5  (0-2 low, 3-4 intermediate, 5-8 high risk of SURI)      Past Medical History:   Diagnosis Date     Anemia in CKD (chronic kidney disease)      Cataract      CKD (chronic kidney disease) stage 3, GFR 30-59 ml/min (H)      Hepatitis C     cleared virus spontaneously 2013     High risk medication use      Hypertension, renal      Immunosuppressed status (H)      Liver replaced by transplant (H) 01/01/2010     Osteoporosis      Recurrent pregnancy loss without current pregnancy      Recurrent UTI 07/12/2021     Stroke, hemorrhagic (H) 01/01/2008    Left cerebral intraparenchymal hemorrhage     Syncope      Unspecified viral hepatitis C without hepatic coma        Allergies   Allergen Reactions     Aspirin      3/31/16 Per pt, tolerates 81 mg daily dose without ADR.     325 mg dose caused itchiness and hives.     Clarithromycin      Allergic reaction         Contrast Dye      Iodine      Pcn [Penicillins]        Current Outpatient Medications   Medication     ACE/ARB/ARNI NOT PRESCRIBED (INTENTIONAL)     acetaminophen (TYLENOL) 500 MG tablet     albuterol (ACCUNEB) 0.63 MG/3ML neb solution     albuterol (PROAIR HFA/PROVENTIL HFA/VENTOLIN HFA) 108 (90 Base) MCG/ACT inhaler     alendronate (FOSAMAX) 70 MG tablet     alum & mag hydroxide-simethicone (MAALOX ADVANCED MAX ST) 400-400-40 MG/5ML SUSP suspension     amLODIPine (NORVASC) 10 MG tablet     calcitRIOL (ROCALTROL) 0.25 MCG capsule     calcium carbonate 600 mg-vitamin D 400 units (CALTRATE)  600-400 MG-UNIT per tablet     cetirizine (ZYRTEC) 10 MG tablet     COMPOUNDED NON-CONTROLLED SUBSTANCE (CMPD RX) - PHARMACY TO MIX COMPOUNDED MEDICATION     cycloSPORINE modified (GENERIC EQUIVALENT) 25 MG capsule     Denture Care Products (EFFERDENT DENTURE CLEANSER) TBEF     dimethicone (AVEENO DAILY MOISTURIZING) 1.3 % LOTN lotion     diphenhydrAMINE (BENADRYL) 50 MG capsule     febuxostat (ULORIC) 40 MG TABS tablet     furosemide (LASIX) 20 MG tablet     gabapentin (NEURONTIN) 300 MG capsule     ketotifen (ZADITOR) 0.025 % ophthalmic solution     lidocaine (XYLOCAINE) 5 % external ointment     mineral oil-white petrolatum (EUCERIN) CREA cream     Multiple Vitamin (DAILY-SUMA MULTIVITAMIN) TABS     multivitamin w/minerals (MULTI-VITAMIN) tablet     mycophenolate (GENERIC EQUIVALENT) 250 MG capsule     neomycin-polymyxin-HC 1 %     omeprazole (PRILOSEC) 20 MG DR capsule     order for DME     order for DME     order for DME     psyllium (METAMUCIL/KONSYL) 0.52 g capsule     senna-docusate (SENOKOT-S/PERICOLACE) 8.6-50 MG tablet     SM FIBER LAXATIVE 500 MG TABS tablet     STATIN NOT PRESCRIBED, INTENTIONAL,     umeclidinium (INCRUSE ELLIPTA) 62.5 MCG/INH inhaler     Vitamin D3 (CHOLECALCIFEROL) 25 mcg (1000 units) tablet     capsaicin (ZOSTRIX) 0.025 % external cream     carboxymethylcellulose PF (REFRESH PLUS) 0.5 % ophthalmic solution     melatonin 3 MG tablet     No current facility-administered medications for this visit.       Social History     Socioeconomic History     Marital status: Single     Spouse name: Not on file     Number of children: Not on file     Years of education: Not on file     Highest education level: Not on file   Occupational History     Not on file   Tobacco Use     Smoking status: Never     Smokeless tobacco: Never   Vaping Use     Vaping Use: Never used   Substance and Sexual Activity     Alcohol use: No     Drug use: No     Sexual activity: Not Currently   Other Topics Concern      Parent/sibling w/ CABG, MI or angioplasty before 65F 55M? Not Asked      Service No     Blood Transfusions Yes     Caffeine Concern No     Occupational Exposure No     Hobby Hazards No     Sleep Concern No     Stress Concern Yes     Comment: needs help at home for cares     Weight Concern Yes     Comment: wants to lose weight not gain weight     Special Diet No     Back Care Yes     Comment: uses a patch to relieve pain     Exercise Not Asked     Bike Helmet Not Asked     Seat Belt Yes     Self-Exams Not Asked   Social History Narrative    One son Carleen        GynHx:             Gets transportation via insurance - needs assistance due to unsteady gait from CVA     Social Determinants of Health     Financial Resource Strain: High Risk     Difficulty of Paying Living Expenses: Very hard   Food Insecurity: Food Insecurity Present     Worried About Running Out of Food in the Last Year: Sometimes true     Ran Out of Food in the Last Year: Sometimes true   Transportation Needs: Unmet Transportation Needs     Lack of Transportation (Medical): Yes     Lack of Transportation (Non-Medical): Yes   Physical Activity: Not on file   Stress: Stress Concern Present     Feeling of Stress : Rather much   Social Connections: Socially Isolated     Frequency of Communication with Friends and Family: Once a week     Frequency of Social Gatherings with Friends and Family: Once a week     Attends Latter day Services: Never     Active Member of Clubs or Organizations: No     Attends Club or Organization Meetings: Never     Marital Status:    Intimate Partner Violence: Not At Risk     Fear of Current or Ex-Partner: No     Emotionally Abused: No     Physically Abused: No     Sexually Abused: No   Housing Stability: Not on file       Family History   Problem Relation Age of Onset     Hepatitis Other         Hep C, still in [place]     Cerebrovascular Disease Other      Cancer No family hx of      Diabetes No family hx of   "    Hypertension No family hx of      Thyroid Disease No family hx of      Glaucoma No family hx of      Macular Degeneration No family hx of        EXAM:  BP (!) 133/91   Pulse 71   Ht 1.549 m (5' 1\")   Wt 108 kg (238 lb)   LMP  (LMP Unknown)   SpO2 93%   BMI 44.97 kg/m    GENERAL: Alert and no distress  EYES: Eyes grossly normal to inspection.  No discharge or erythema, or obvious scleral/conjunctival abnormalities.  Oropharynx Mallampati 3  RESP: No audible wheeze, cough, or visible cyanosis.  No visible retractions or increased work of breathing.    Chest is clear to auscultation bilaterally, no rales, wheezes identified  Cardiac tones regular rate and rhythm S1-S2 normal  Extremities are perfused there is trace bilateral lower extremity edema  SKIN: Visible skin clear. No significant rash, abnormal pigmentation or lesions.  NEURO: Cranial nerves grossly intact.  Mentation and speech appropriate for age.  Right-sided weakness of the upper extremity compared to the left.  PSYCH: Mentation appears normal, affect normal, judgement and insight intact, normal speech and appearance well-groomed.       CT Chest 8/7/2022:  FINDINGS:   Chest:    Thyroid gland is unremarkable. Heart size normal. No pericardial effusion. No significant coronary artery calcifications. Thoracic aorta main pulmonary arteries are normal in caliber. Esophagus is unremarkable.   No mediastinal or axillary adenopathy.   Trachea and central airways are clear. Bibasilar subsegmental  atelectasis. No pleural effusion or pneumothorax. Bilateral lower lobe predominant peribronchovascular groundglass opacities. Mild apical centrilobular emphysematous changes.  Abdomen: Examination of the upper abdomen is limited. Post surgical  liver transplant changes. Adrenals are within normal limits. Rightrenal atrophy.  Bones: No suspicious osseous lesion.  Soft Tissues: No suspicious mass.                                                         IMPRESSION: " Mild centrilobular emphysematous changes bilateral lung apices.  1.  Bilateral lower lobe predominant peribronchovascular groundglass opacities differential atelectasis, edema, atypical infection.  2.  No pleural effusion.      CARDIAC ECHO: 8/2/2022  Interpretation Summary  Global and regional left ventricular function is normal with an EF of 60-65%.  Mild concentric wall thickening consistent with left ventricular hypertrophy is present.   Left ventricular diastolic function is indeterminate.  Global right ventricular function is normal.  Normal valve assessment.  Dilation of the inferior vena cava is present with abnormal respiratory variation in diameter.        TSH   Date Value Ref Range Status   08/03/2022 0.77 0.30 - 4.20 uIU/mL Final   01/28/2022 0.57 0.40 - 4.00 mU/L Final   08/17/2016 0.81 0.40 - 4.00 mU/L Final       ASSESSMENT:  57-year-old female status post liver transplant, complains of 3 months of daytime sleepiness.  No clear inciting etiology.  Recent hospitalization showed ABG with complex acidosis evidence for both acute and chronic respiratory acidosis was present.  Patient is been prescribed supplemental oxygen without clear underlying diagnosis.  Presumptive diagnosis is obstructive sleep apnea however obstructive sleep apnea should not cause chronic respiratory failure.  Obesity hypoventilation or other hypoventilation syndromes can.  They should be explored.  Patient has abnormal CT scan of the chest, no recent pulmonary function testing.  Differential diagnosis for chronic respiratory failure remains quite broad and obesity hypoventilation should be a diagnosis of exclusion. More thorough evaluation of respiratory failure is indicated.    PLAN:  Recommended in lab polysomnography with transcutaneous CO2 monitoring and an updated baseline arterial blood gas test.  Also recommended formal comprehensive pulmonary function testing with MIP, and MEP.  Consider further cardiopulmonary  evaluation.    80 minutes spent on the date of the encounter doing chart review, history and exam, documentation and further activities per the note    Bessy Ward M.D.  Pulmonary/Critical Care/Sleep Medicine    [facility]   [address], [address]   [address] [place] [address]   Appointments: [phone]    The above note was dictated using voice recognition software and may include typographical errors. Please contact the author for any clarifications.

## 2022-11-04 NOTE — PATIENT INSTRUCTIONS
"      MY TREATMENT INFORMATION FOR SLEEP APNEA-  Flor MEMO Navarro    DOCTOR : Bessy Ward MD    Am I having a sleep study at a sleep center?  --->Due to normal delays, you will be contacted within 2-4 weeks to schedule    Am I having a home sleep study?  --->Watch the video for the device you are using:    -/drop off device-   https://www.[employer] Experience[employer].com/watch?v=yGGFBdELGhk    -Disposable device sent out require phone/computer application-   https://www.[employer].com/watch?v=BCce_vbiwxE      Frequently asked questions:  1. What is Obstructive Sleep Apnea (SURI)? USRI is the most common type of sleep apnea. Apnea means, \"without breath.\"  Apnea is most often caused by narrowing or collapse of the upper airway as muscles relax during sleep.   Almost everyone has occasional apneas. Most people with sleep apnea have had brief interruptions at night frequently for many years.  The severity of sleep apnea is related to how frequent and severe the events are.   2. What are the consequences of SURI? Symptoms include: feeling sleepy during the day, snoring loudly, gasping or stopping of breathing, trouble sleeping, and occasionally morning headaches or heartburn at night.  Sleepiness can be serious and even increase the risk of falling asleep while driving. Other health consequences may include development of high blood pressure and other cardiovascular disease in persons who are susceptible. Untreated SURI  can contribute to heart disease, stroke and diabetes.   3. What are the treatment options? In most situations, sleep apnea is a lifelong disease that must be managed with daily therapy. Medications are not effective for sleep apnea and surgery is generally not considered until other therapies have been tried. Your treatment is your choice . Continuous Positive Airway (CPAP) works right away and is the therapy that is effective in nearly everyone. An oral device to hold your jaw forward is usually the next most " reliable option. Other options include postioning devices (to keep you off your back), weight loss, and surgery including a tongue pacing device. There is more detail about some of these options below.  4. Are my sleep studies covered by insurance? Although we will request verification of coverage, we advise you also check in advance of the study to ensure there is coverage.    Important tips for those choosing CPAP and similar devices   Know your equipment:  CPAP is continuous positive airway pressure that prevents obstructive sleep apnea by keeping the throat from collapsing while you are sleeping. In most cases, the device is  smart  and can slowly self-adjusts if your throat collapses and keeps a record every day of how well you are treated-this information is available to you and your care team.  BPAP is bilevel positive airway pressure that keeps your throat open and also assists each breath with a pressure boost to maintain adequate breathing.  Special kinds of BPAP are used in patients who have inadequate breathing from lung or heart disease. In most cases, the device is  smart  and can slowly self-adjusts to assist breathing. Like CPAP, the device keeps a record of how well you are treated.  Your mask is your connection to the device. You get to choose what feels most comfortable and the staff will help to make sure if fits. Here: are some examples of the different masks that are available:       Key points to remember on your journey with sleep apnea:  Sleep study.  PAP devices often need to be adjusted during a sleep study to show that they are effective and adjusted right.  Good tips to remember: Try wearing just the mask during a quiet time during the day so your body adapts to wearing it. A humidifier is recommended for comfort in most cases to prevent drying of your nose and throat. Allergy medication from your provider may help you if you are having nasal congestion.  Getting settled-in. It takes  more than one night for most of us to get used to wearing a mask. Try wearing just the mask during a quiet time during the day so your body adapts to wearing it. A humidifier is recommended for comfort in most cases. Our team will work with you carefully on the first day and will be in contact within 4 days and again at 2 and 4 weeks for advice and remote device adjustments. Your therapy is evaluated by the device each day.   Use it every night. The more you are able to sleep naturally for 7-8 hours, the more likely you will have good sleep and to prevent health risks or symptoms from sleep apnea. Even if you use it 4 hours it helps. Occasionally all of us are unable to use a medical therapy, in sleep apnea, it is not dangerous to miss one night.   Communicate. Call our skilled team on the number provided on the first day if your visit for problems that make it difficult to wear the device. Over 2 out of 3 patients can learn to wear the device long-term with help from our team. Remember to call our team or your sleep providers if you are unable to wear the device as we may have other solutions for those who cannot adapt to mask CPAP therapy. It is recommended that you sleep your sleep provider within the first 3 months and yearly after that if you are not having problems.   Use it for your health. We encourage use of CPAP masks during daytime quiet periods to allow your face and brain to adapt to the sensation of CPAP so that it will be a more natural sensation to awaken to at night or during naps. This can be very useful during the first few weeks or months of adapting to CPAP though it does not help medically to wear CPAP during wakefulness and  should not be used as a strategy just to meet guidelines.  Take care of your equipment. Make sure you clean your mask and tubing using directions every day and that your filter and mask are replaced as recommended or if they are not working.     BESIDES CPAP, WHAT OTHER  THERAPIES ARE THERE?    Positioning Device  Positioning devices are generally used when sleep apnea is mild and only occurs on your back.This example shows a pillow that straps around the waist. It may be appropriate for those whose sleep study shows milder sleep apnea that occurs primarily when lying flat on one's back. Preliminary studies have shown benefit but effectiveness at home may need to be verified by a home sleep test. These devices are generally not covered by medical insurance.  Examples of devices that maintain sleeping on the back to prevent snoring and mild sleep apnea.    Belt type body positioner  http://[email or web address]    Electronic reminder  http://nightshifttherapy.com/            Oral Appliance  What is oral appliance therapy?  An oral appliance device fits on your teeth at night like a retainer used after having braces. The device is made by a specialized dentist and requires several visits over 1-2 months before a manufactured device is made to fit your teeth and is adjusted to prevent your sleep apnea. Once an oral device is working properly, snoring should be improved. A home sleep test may be recommended at that time if to determine whether the sleep apnea is adequately treated.       Some things to remember:  -Oral devices are often, but not always, covered by your medical insurance. Be sure to check with your insurance provider.   -If you are referred for oral therapy, you will be given a list of specialized dentists to consider or you may choose to visit the Web site of the American Academy of Dental Sleep Medicine  -Oral devices are less likely to work if you have severe sleep apnea or are extremely overweight.     More detailed information  An oral appliance is a small acrylic device that fits over the upper and lower teeth  (similar to a retainer or a mouth guard). This device slightly moves jaw forward, which moves the base of the tongue forward, opens the airway, improves breathing  for effective treat snoring and obstructive sleep apnea in perhaps 7 out of 10 people .  The best working devices are custom-made by a dental device  after a mold is made of the teeth 1, 2, 3.  When is an oral appliance indicated?  Oral appliance therapy is recommended as a first-line treatment for patients with primary snoring, mild sleep apnea, and for patients with moderate sleep apnea who prefer appliance therapy to use of CPAP4, 5. Severity of sleep apnea is determined by sleep testing and is based on the number of respiratory events per hour of sleep.   How successful is oral appliance therapy?  The success rate of oral appliance therapy in patients with mild sleep apnea is 75-80% while in patients with moderate sleep apnea it is 50-70%. The chance of success in patients with severe sleep apnea is 40-50%. The research also shows that oral appliances have a beneficial effect on the cardiovascular health of SURI patients at the same magnitude as CPAP therapy7.  Oral appliances should be a second-line treatment in cases of severe sleep apnea, but if not completely successful then a combination therapy utilizing CPAP plus oral appliance therapy may be effective. Oral appliances tend to be effective in a broad range of patients although studies show that the patients who have the highest success are females, younger patients, those with milder disease, and less severe obesity. 3, 6.   Finding a dentist that practices dental sleep medicine  Specific training is available through the American Academy of Dental Sleep Medicine for dentists interested in working in the field of sleep. To find a dentist who is educated in the field of sleep and the use of oral appliances, near you, visit the Web site of the American Academy of Dental Sleep Medicine.    References  1. Naila et al. Objectively measured vs self-reported compliance during oral appliance therapy for sleep-disordered breathing. Chest 2013;  144(5): 8487-2585.  2. Amos et al. Objective measurement of compliance during oral appliance therapy for sleep-disordered breathing. Thorax 2013; 68(1): 91-96.  3. Jackie et al. Mandibular advancement devices in 620 men and women with SURI and snoring: tolerability and predictors of treatment success. Chest 2004; 125: 1513-5694.  4. Juan, et al. Oral appliances for snoring and SURI: a review. Sleep 2006; 29: 244-262.  5. Orville et al. Oral appliance treatment for SURI: an update. J Clin Sleep Med 2014; 10(2): 215-227.  6. Alem et al. Predictors of OSAH treatment outcome. J Dent Res 2007; 86: 0627-5928.      Weight Loss:    Weight loss is a long-term strategy that may improve sleep apnea in some patients.    Weight management is a personal decision and the decision should be based on your interest and the potential benefits.  If you are interested in exploring weight loss strategies, the following discussion covers the impact on weight loss on sleep apnea and the approaches that may be successful.    Being overweight does not necessarily mean you will have health consequences.  Those who have BMI over 35 or over 27 with existing medical conditions carries greater risk.   Weight loss decreases severity of sleep apnea in most people with obesity. For those with mild obesity who have developed snoring with weight gain, even 15-30 pound weight loss can improve and occasionally eliminate sleep apnea.  Structured and life-long dietary and health habits are necessary to lose weight and keep healthier weight levels.     Though there may be significant health benefits from weight loss, long-term weight loss is very difficult to achieve- studies show success with dietary management in less than 10% of people. In addition, substantial weight loss may require years of dietary control and may be difficult if patients have severe obesity. In these cases, surgical management may be considered.  Finally, older  individuals who have tolerated obesity without health complications may be less likely to benefit from weight loss strategies.      [unfilled]    Surgery:    Surgery for obstructive sleep apnea is considered generally only when other therapies fail to work. Surgery may be discussed with you if you are having a difficult time tolerating CPAP and or when there is an abnormal structure that requires surgical correction.  Nose and throat surgeries often enlarge the airway to prevent collapse.  Most of these surgeries create pain for 1-2 weeks and up to half of the most common surgeries are not effective throughout life.  You should carefully discuss the benefits and drawbacks to surgery with your sleep provider and surgeon to determine if it is the best solution for you.   More information  Surgery for SURI is directed at areas that are responsible for narrowing or complete obstruction of the airway during sleep.  There are a wide range of procedures available to enlarge and/or stabilize the airway to prevent blockage of breathing in the three major areas where it can occur: the palate, tongue, and nasal regions.  Successful surgical treatment depends on the accurate identification of the factors responsible for obstructive sleep apnea in each person.  A personalized approach is required because there is no single treatment that works well for everyone.  Because of anatomic variation, consultation with an examination by a sleep surgeon is a critical first step in determining what surgical options are best for each patient.  In some cases, examination during sedation may be recommended in order to guide the selection of procedures.  Patients will be counseled about risks and benefits as well as the typical recovery course after surgery. Surgery is typically not a cure for a person s SURI.  However, surgery will often significantly improve one s SURI severity (termed  success rate ).  Even in the absence of a cure, surgery  will decrease the cardiovascular risk associated with OSA7; improve overall quality of life8 (sleepiness, functionality, sleep quality, etc).      Palate Procedures:  Patients with SURI often have narrowing of their airway in the region of their tonsils and uvula.  The goals of palate procedures are to widen the airway in this region as well as to help the tissues resist collapse.  Modern palate procedure techniques focus on tissue conservation and soft tissue rearrangement, rather than tissue removal.  Often the uvula is preserved in this procedure. Residual sleep apnea is common in patient after pharyngoplasty with an average reduction in sleep apnea events of 33%2.      Tongue Procedures:  ExamWhile patients are awake, the muscles that surround the throat are active and keep this region open for breathing. These muscles relax during sleep, allowing the tongue and other structures to collapse and block breathing.  There are several different tongue procedures available.  Selection of a tongue base procedure depends on characteristics seen on physical exam.  Generally, procedures are aimed at removing bulky tissues in this area or preventing the back of the tongue from falling back during sleep.  Success rates for tongue surgery range from 50-62%3.    Hypoglossal Nerve Stimulation:  Hypoglossal nerve stimulation has recently received approval from the [place] Food and Drug Administration for the treatment of obstructive sleep apnea.  This is based on research showing that the system was safe and effective in treating sleep apnea6.  Results showed that the median AHI score decreased 68%, from 29.3 to 9.0. This therapy uses an implant system that senses breathing patterns and delivers mild stimulation to airway muscles, which keeps the airway open during sleep.  The system consists of three fully implanted components: a small generator (similar in size to a pacemaker), a breathing sensor, and a stimulation lead.   Using a small handheld remote, a patient turns the therapy on before bed and off upon awakening.    Candidates for this device must be greater than 18 years of age, have moderate to severe SURI (AHI between 15-65), BMI less than 35, have tried CPAP/oral appliance for at least 8 weeks without success, and have appropriate upper airway anatomy (determined by a sleep endoscopy performed by Dr. Homero Clancy).    Hypoglossal Nerve Stimulation Pathway:    The sleep surgeon s office will work with the patient through the insurance prior-authorization process (including communications and appeals).    Nasal Procedures:  Nasal obstruction can interfere with nasal breathing during the day and night.  Studies have shown that relief of nasal obstruction can improve the ability of some patients to tolerate positive airway pressure therapy for obstructive sleep apnea1.  Treatment options include medications such as nasal saline, topical corticosteroid and antihistamine sprays, and oral medications such as antihistamines or decongestants. Non-surgical treatments can include external nasal dilators for selected patients. If these are not successful by themselves, surgery can improve the nasal airway either alone or in combination with these other options.      Combination Procedures:  Combination of surgical procedures and other treatments may be recommended, particularly if patients have more than one area of narrowing or persistent positional disease.  The success rate of combination surgery ranges from 66-80%2,3.    References  Paolo ERIC. The Role of the Nose in Snoring and Obstructive Sleep Apnoea: An Update.  Eur Arch Otorhinolaryngol. 2011; 268: 1365-73.   Ju SM; Rafa JA; Geo JR; Pallanch JF; Nayla MB; Annabelle SG; Mitchel GUSTAFSON. Surgical modifications of the upper airway for obstructive sleep apnea in adults: a systematic review and meta-analysis. SLEEP 2010;33(10):0735-9906. Mariya KEITH. Hypopharyngeal surgery in  obstructive sleep apnea: an evidence-based medicine review.  Arch Otolaryngol Head Neck Surg. 2006 Feb;132(2):206-13.  Agustin YH1, Clay Y, Estevan JIMBO. The efficacy of anatomically based multilevel surgery for obstructive sleep apnea. Otolaryngol Head Neck Surg. 2003 Oct;129(4):327-35.  Mariya KEITH, Goldberg A. Hypopharyngeal Surgery in Obstructive Sleep Apnea: An Evidence-Based Medicine Review. Arch Otolaryngol Head Neck Surg. 2006 Feb;132(2):206-13.  Lisa BRADEN et al. Upper-Airway Stimulation for Obstructive Sleep Apnea.  N Engl J Med. 2014 Jan 9;370(2):139-49.  Reji Y et al. Increased Incidence of Cardiovascular Disease in Middle-aged Men with Obstructive Sleep Apnea. Am J Respir Crit Care Med; 2002 166: 159-165  Cunninghampriscilla JOHNSTON et al. Studying Life Effects and Effectiveness of Palatopharyngoplasty (SLEEP) study: Subjective Outcomes of Isolated Uvulopalatopharyngoplasty. Otolaryngol Head Neck Surg. 2011; 144: 623-631.        WHAT IF I ONLY HAVE SNORING?    Mandibular advancement devices, lateral sleep positioning, long-term weight loss and treatment of nasal allergies have been shown to improve snoring.  Exercising tongue muscles with a game (Sparkle.csttps://Allmoxy.Tauntr/us/rachana/soundly-reduce-snoring/ee0296160029) or stimulating the tongue during the day with a device (https://doi.org/10.3390/zhp71919375) have improved snoring in some individuals.    Remember to Drive Safe... Drive Alive     Sleep health profoundly affects your health, mood, and your safety.  Thirty three percent of the population (one in three of us) is not getting enough sleep and many have a sleep disorder. Not getting enough sleep or having an untreated / undertreated sleep condition may make us sleepy without even knowing it. In fact, our driving could be dramatically impaired due to our sleep health. As your provider, here are some things I would like you to know about driving:     Here are some warning signs for impairment and dangerous drowsy driving:               -Having been awake more than 16 hours               -Looking tired               -Eyelid drooping              -Head nodding (it could be too late at this point)              -Driving for more than 30 minutes     Some things you could do to make the driving safer if you are experiencing some drowsiness:              -Stop driving and rest              -Call for transportation              -Make sure your sleep disorder is adequately treated     Some things that have been shown NOT to work when experiencing drowsiness while driving:              -Turning on the radio              -Opening windows              -Eating any  distracting  /  entertaining  foods (e.g., sunflower seeds, candy, or any other)              -Talking on the phone      Your decision may not only impact your life, but also the life of others. Please, remember to drive safe for yourself and all of us.         VBG/ABG/PFT Scheduling Phone Numbers      VBG ONLY LAB NUMBERS:      [place] (Out Patient Lab): [phone] (Will do VBG only)     [facility] (Out Patient Lab): [phone] (Will do VBG only)     [place] (Out Patient Lab): [phone] Will do VBG (please have patients schedule>24 hours out)     [facility] (Out Patient Lab): [phone] (Will do VBG only)      [place] (Out Patient Lab): [phone] (option 3) Will do VBG      [place] (Out Patient Lab): [phone] (Will do VBG only)      [place]: Fax orders for VBG to [place] @ [phone]      [place] (Out Patient Lab): [phone]     [facility] [phone] **ONLY TAKE WALK INS**              Hours: 7am-7pm (Monday- Friday)              9 am-12:00 pm (Weekend/Holidays)

## 2022-11-18 NOTE — TELEPHONE ENCOUNTER
Provider E-Visit time total (minutes): 3   Returned call to home care nurse, unable to reach. Left VM with verbal orders per protocol as requested. Left callback number for questions.    Elisabet Olson RN

## 2022-12-01 DIAGNOSIS — Z94.4 LIVER REPLACED BY TRANSPLANT (H): ICD-10-CM

## 2022-12-01 DIAGNOSIS — K56.600 PARTIAL INTESTINAL OBSTRUCTION, UNSPECIFIED CAUSE (H): ICD-10-CM

## 2022-12-01 RX ORDER — CYCLOSPORINE 25 MG/1
CAPSULE, LIQUID FILLED ORAL
Qty: 150 CAPSULE | Refills: 11 | Status: SHIPPED | OUTPATIENT
Start: 2022-12-01 | End: 2023-12-04

## 2022-12-01 NOTE — TELEPHONE ENCOUNTER
"Last seen 9/23/2022    Request for medication refill:  senna-docusate (SENOKOT-S/PERICOLACE) 8.6-50 MG tablet  Last sent 6/8/2022 180 tab 3 RF    Providers if patient needs an appointment and you are willing to give a one month supply please refill for one month and  send a letter/MyChart using \".SMILLIMITEDREFILL\" .smillimited and route chart to \"P SMI \" (Giving one month refill in non controlled medications is strongly recommended before denial)    If refill has been denied, meaning absolutely no refills without visit, please complete the smart phrase \".smirxrefuse\" and route it to the \"P SMI MED REFILLS\"  pool to inform the patient and the pharmacy.    Mignon Schultz RN        "

## 2022-12-02 RX ORDER — DOCUSATE SODIUM 50MG AND SENNOSIDES 8.6MG 8.6; 5 MG/1; MG/1
TABLET, FILM COATED ORAL
Qty: 180 TABLET | Refills: 3 | Status: SHIPPED | OUTPATIENT
Start: 2022-12-02 | End: 2023-07-10

## 2022-12-13 ENCOUNTER — DOCUMENTATION ONLY (OUTPATIENT)
Dept: FAMILY MEDICINE | Facility: CLINIC | Age: 57
End: 2022-12-13

## 2022-12-13 NOTE — PROGRESS NOTES
"Form has been completed by provider.     Form sent out via: Fax to [facility] at Fax Number: [phone]  Patient informed: No  Output date: December 19, 2022    Mignon Schultz RN      **Please close the encounter**    When opening a documentation only encounter, be sure to enter in \"Chief Complaint\" Forms and in \" Comments\" Title of form, description if needed.    Flor is a 57 year old  female  Form received via: Fax  Form now resides in: Provider Ready    Mignon Schultz RN                  "

## 2022-12-16 ENCOUNTER — APPOINTMENT (OUTPATIENT)
Dept: GENERAL RADIOLOGY | Facility: CLINIC | Age: 57
End: 2022-12-16
Attending: EMERGENCY MEDICINE
Payer: MEDICARE

## 2022-12-16 ENCOUNTER — HOSPITAL ENCOUNTER (EMERGENCY)
Facility: CLINIC | Age: 57
Discharge: HOME | End: 2022-12-16
Attending: EMERGENCY MEDICINE | Admitting: EMERGENCY MEDICINE
Payer: MEDICARE

## 2022-12-16 ENCOUNTER — OFFICE VISIT (OUTPATIENT)
Dept: FAMILY MEDICINE | Facility: CLINIC | Age: 57
End: 2022-12-16
Payer: MEDICARE

## 2022-12-16 VITALS
TEMPERATURE: 98.1 F | OXYGEN SATURATION: 95 % | SYSTOLIC BLOOD PRESSURE: 119 MMHG | BODY MASS INDEX: 44.37 KG/M2 | HEART RATE: 60 BPM | WEIGHT: 235 LBS | HEIGHT: 61 IN | DIASTOLIC BLOOD PRESSURE: 80 MMHG | RESPIRATION RATE: 20 BRPM

## 2022-12-16 VITALS
OXYGEN SATURATION: 95 % | WEIGHT: 238 LBS | HEIGHT: 61 IN | HEART RATE: 74 BPM | TEMPERATURE: 97.8 F | BODY MASS INDEX: 44.93 KG/M2 | SYSTOLIC BLOOD PRESSURE: 110 MMHG | RESPIRATION RATE: 24 BRPM | DIASTOLIC BLOOD PRESSURE: 73 MMHG

## 2022-12-16 DIAGNOSIS — R09.02 HYPOXIA: ICD-10-CM

## 2022-12-16 DIAGNOSIS — E78.5 HYPERLIPIDEMIA LDL GOAL <160: ICD-10-CM

## 2022-12-16 DIAGNOSIS — Z94.4 LIVER REPLACED BY TRANSPLANT (H): ICD-10-CM

## 2022-12-16 DIAGNOSIS — J96.01 ACUTE RESPIRATORY FAILURE WITH HYPOXIA (H): Primary | ICD-10-CM

## 2022-12-16 LAB
ALBUMIN SERPL BCG-MCNC: 3.9 G/DL (ref 3.5–5.2)
ALP SERPL-CCNC: 78 U/L (ref 35–104)
ALT SERPL W P-5'-P-CCNC: 16 U/L (ref 10–35)
AMMONIA PLAS-SCNC: 32 UMOL/L (ref 11–51)
ANION GAP SERPL CALCULATED.3IONS-SCNC: 9 MMOL/L (ref 7–15)
AST SERPL W P-5'-P-CCNC: 22 U/L (ref 10–35)
BASE EXCESS BLDV CALC-SCNC: 4.4 MMOL/L (ref -7.7–1.9)
BILIRUB SERPL-MCNC: 0.4 MG/DL
BUN SERPL-MCNC: 22 MG/DL (ref 6–20)
CALCIUM SERPL-MCNC: 9.6 MG/DL (ref 8.6–10)
CHLORIDE SERPL-SCNC: 106 MMOL/L (ref 98–107)
CHOLEST SERPL-MCNC: 246 MG/DL
CREAT SERPL-MCNC: 1.27 MG/DL (ref 0.51–0.95)
DEPRECATED HCO3 PLAS-SCNC: 28 MMOL/L (ref 22–29)
ERYTHROCYTE [DISTWIDTH] IN BLOOD BY AUTOMATED COUNT: 13.6 % (ref 10–15)
GFR SERPL CREATININE-BSD FRML MDRD: 49 ML/MIN/1.73M2
GLUCOSE SERPL-MCNC: 98 MG/DL (ref 70–99)
HCO3 BLDV-SCNC: 32 MMOL/L (ref 21–28)
HCT VFR BLD AUTO: 42.7 % (ref 35–47)
HDLC SERPL-MCNC: 44 MG/DL
HGB BLD-MCNC: 12.9 G/DL (ref 11.7–15.7)
INR PPP: 0.94 (ref 0.85–1.15)
LDLC SERPL CALC-MCNC: 166 MG/DL
MCH RBC QN AUTO: 29.1 PG (ref 26.5–33)
MCHC RBC AUTO-ENTMCNC: 30.2 G/DL (ref 31.5–36.5)
MCV RBC AUTO: 96 FL (ref 78–100)
NONHDLC SERPL-MCNC: 202 MG/DL
NT-PROBNP SERPL-MCNC: 825 PG/ML (ref 0–900)
O2/TOTAL GAS SETTING VFR VENT: 40 %
PCO2 BLDV: 60 MM HG (ref 40–50)
PH BLDV: 7.34 [PH] (ref 7.32–7.43)
PLATELET # BLD AUTO: 182 10E3/UL (ref 150–450)
PO2 BLDV: 28 MM HG (ref 25–47)
POTASSIUM SERPL-SCNC: 5.1 MMOL/L (ref 3.4–5.3)
PROT SERPL-MCNC: 7.3 G/DL (ref 6.4–8.3)
RBC # BLD AUTO: 4.44 10E6/UL (ref 3.8–5.2)
SARS-COV-2 RNA RESP QL NAA+PROBE: NEGATIVE
SODIUM SERPL-SCNC: 143 MMOL/L (ref 136–145)
TRIGL SERPL-MCNC: 181 MG/DL
TROPONIN T SERPL HS-MCNC: 18 NG/L
WBC # BLD AUTO: 4.7 10E3/UL (ref 4–11)

## 2022-12-16 PROCEDURE — 71046 X-RAY EXAM CHEST 2 VIEWS: CPT

## 2022-12-16 PROCEDURE — 36415 COLL VENOUS BLD VENIPUNCTURE: CPT | Performed by: EMERGENCY MEDICINE

## 2022-12-16 PROCEDURE — 85027 COMPLETE CBC AUTOMATED: CPT | Performed by: FAMILY MEDICINE

## 2022-12-16 PROCEDURE — 80053 COMPREHEN METABOLIC PANEL: CPT | Performed by: FAMILY MEDICINE

## 2022-12-16 PROCEDURE — 99215 OFFICE O/P EST HI 40 MIN: CPT | Performed by: FAMILY MEDICINE

## 2022-12-16 PROCEDURE — 84484 ASSAY OF TROPONIN QUANT: CPT | Performed by: EMERGENCY MEDICINE

## 2022-12-16 PROCEDURE — 83880 ASSAY OF NATRIURETIC PEPTIDE: CPT | Performed by: EMERGENCY MEDICINE

## 2022-12-16 PROCEDURE — 99284 EMERGENCY DEPT VISIT MOD MDM: CPT | Mod: CS | Performed by: EMERGENCY MEDICINE

## 2022-12-16 PROCEDURE — 82140 ASSAY OF AMMONIA: CPT | Performed by: FAMILY MEDICINE

## 2022-12-16 PROCEDURE — C9803 HOPD COVID-19 SPEC COLLECT: HCPCS | Performed by: EMERGENCY MEDICINE

## 2022-12-16 PROCEDURE — U0003 INFECTIOUS AGENT DETECTION BY NUCLEIC ACID (DNA OR RNA); SEVERE ACUTE RESPIRATORY SYNDROME CORONAVIRUS 2 (SARS-COV-2) (CORONAVIRUS DISEASE [COVID-19]), AMPLIFIED PROBE TECHNIQUE, MAKING USE OF HIGH THROUGHPUT TECHNOLOGIES AS DESCRIBED BY CMS-2020-01-R: HCPCS | Performed by: EMERGENCY MEDICINE

## 2022-12-16 PROCEDURE — 36415 COLL VENOUS BLD VENIPUNCTURE: CPT | Performed by: FAMILY MEDICINE

## 2022-12-16 PROCEDURE — 80061 LIPID PANEL: CPT | Performed by: FAMILY MEDICINE

## 2022-12-16 PROCEDURE — 82803 BLOOD GASES ANY COMBINATION: CPT | Performed by: EMERGENCY MEDICINE

## 2022-12-16 PROCEDURE — 85610 PROTHROMBIN TIME: CPT | Performed by: FAMILY MEDICINE

## 2022-12-16 PROCEDURE — 71046 X-RAY EXAM CHEST 2 VIEWS: CPT | Mod: 26 | Performed by: RADIOLOGY

## 2022-12-16 PROCEDURE — 99284 EMERGENCY DEPT VISIT MOD MDM: CPT | Mod: CS,25 | Performed by: EMERGENCY MEDICINE

## 2022-12-16 ASSESSMENT — ENCOUNTER SYMPTOMS
WEAKNESS: 0
COUGH: 0
SHORTNESS OF BREATH: 1
FREQUENCY: 0
MYALGIAS: 1
CHILLS: 0
LIGHT-HEADEDNESS: 0
ADENOPATHY: 0
HEADACHES: 0
AGITATION: 0
COLOR CHANGE: 0
SORE THROAT: 0
CONFUSION: 0
DIARRHEA: 0
DYSURIA: 0
EYE DISCHARGE: 0
ABDOMINAL PAIN: 0
VOMITING: 0
NAUSEA: 0
FEVER: 0

## 2022-12-16 ASSESSMENT — ACTIVITIES OF DAILY LIVING (ADL): ADLS_ACUITY_SCORE: 33

## 2022-12-16 NOTE — ED TRIAGE NOTES
Per EMS: At the clinic and O2 70%at clinic EMS O2 86-91% on R/A per EMS. She normally is on 3L.

## 2022-12-16 NOTE — ED TRIAGE NOTES
Pt sent to ED from clinic d/t O2 saturation of 70%. Reading was obtained without oxygen administration. Pt is on a baseline 3L at home. 95% O2 sat in triage with oxygen in place at 3L. Pt reports pain and swelling in BLE. Denies CP     Triage Assessment     Row Name 12/16/22 1527       Triage Assessment (Adult)    Airway WDL WDL       Respiratory WDL    Respiratory WDL X    Rhythm/Pattern, Respiratory dyspnea on exertion       Cardiac WDL    Cardiac WDL WDL

## 2022-12-16 NOTE — PROGRESS NOTES
Assessment & Plan     Acute respiratory failure with hypoxia (H)  Called clinic rapid response due to hypoxia and confusion. Sats still low on 2 LPM after 20 min - increase to 3 via nasal cannula. Called son Courtney, he did not answer. Sent him a text in follow-up. Called again and was able to reach him. He will meet her at [facility]. Sign out given to Emergency physician. Broad differential include COPD but suspect SURI is contributor here. She HAS NEVER been on daytime oxygen    2 L at home via face mask NIGHT ONLY but based on description could be CPAP? 3 L needed in office to normalize her sats.     Hyperlipidemia LDL goal <160  - Lipid panel  - Lipid panel    Liver replaced by transplant  - Comprehensive metabolic panel  - INR  - CBC with platelets  - Ammonia  - Comprehensive metabolic panel  - INR  - CBC with platelets  - Ammonia    Chronic illness with severe exacerbation and decision regarding hospitalization      No follow-ups on file.    The information in this document, created by the medical scribe for me, accurately reflects the services I personally performed and the decisions made by me. I have reviewed and approved this document for accuracy prior to leaving the patient care area.  Karely Sierra MD  1:53 PM, 12/16/22  [facility] AMI Ray is a 57 year old, presenting for the following health issues:  Follow Up (Patient following up from episode she had at home ), Tachycardia (Concerned about high heart rate and trouble breathing ), and Tremors (Hands are shaking and having trouble holding things )  She is accompanied by a  via phone.    HPI     Patient's oxygen level was down to 77% going into the appointment and is now back to 94% on 2 LPM oxygen. She does not use oxygen at home, but is actively using her inhaler once daily. She notes that coming here, she felt her heart beat extremely felt, a pressure in her back, and shortness of breath that  "knocked her over.  affirms that patient's speech is confused, combining both Italian and English. Patient agrees to going to the hospital, but wants to do so with her son. Patient knows it is Friday and December, but is unable to state the date. She states the current season is Spring.     Son Courtney called by phone, he affirms that she has been fasting. She has oxygen at home, but does not have a portable one as the one at home is too heavy to carry. This is described as a facemask for night, so it is unclear if this is true oxygen as she has SURI. Patient reports that she had a banana this morning for breakfast.      Review of Systems   Positive for:  Negative for:      Objective    /73 (BP Location: Right arm, Patient Position: Sitting, Cuff Size: Adult Large)   Pulse 74   Temp 97.8  F (36.6  C)   Resp 24   Ht 1.549 m (5' 1\")   Wt 108 kg (238 lb)   LMP  (LMP Unknown)   SpO2 95%   BMI 44.97 kg/m    Body mass index is 44.97 kg/m .  Physical Exam   GENERAL: Appears unwell, acutely ill.   RESP: Respiratory rate 26, shallow breathing, no crackles or wheezing.  CV: regular rate and rhythm, distant heart tones  NEURO: Able to speak in complete sentences, language switches frequently. Oriented to day, month with two tries, and disoriented to season.  Results for orders placed or performed during the hospital encounter of 12/16/22   XR Chest 2 Views     Status: None    Narrative    Chest 2 views    INDICATION: Short air    COMPARISON: 8/7/2022 chest CT and 8/3/2022 plain film    FINDINGS: Heart size upper normal. Streaky perihilar densities are  present which may indicate mild pulmonary edema but this is less  prominent than the August radiograph. Hemidiaphragm flattening on the  lateral view. Bones appear grossly      Impression    IMPRESSION: Mild probable pulmonary edema decreased from August.  Hyperinflated lungs.    JUAN C CURTIS MD         SYSTEM ID:  [id number]   Blood gas venous     " Status: Abnormal   Result Value Ref Range    pH Venous 7.34 7.32 - 7.43    pCO2 Venous 60 (H) 40 - 50 mm Hg    pO2 Venous 28 25 - 47 mm Hg    Bicarbonate Venous 32 (H) 21 - 28 mmol/L    Base Excess/Deficit (+/-) 4.4 (H) -7.7 - 1.9 mmol/L    FIO2 40    Results for orders placed or performed in visit on 12/16/22   CBC with platelets     Status: Abnormal   Result Value Ref Range    WBC Count 4.7 4.0 - 11.0 10e3/uL    RBC Count 4.44 3.80 - 5.20 10e6/uL    Hemoglobin 12.9 11.7 - 15.7 g/dL    Hematocrit 42.7 35.0 - 47.0 %    MCV 96 78 - 100 fL    MCH 29.1 26.5 - 33.0 pg    MCHC 30.2 (L) 31.5 - 36.5 g/dL    RDW 13.6 10.0 - 15.0 %    Platelet Count 182 150 - 450 10e3/uL   Ammonia     Status: Normal   Result Value Ref Range    Ammonia 32 11 - 51 umol/L

## 2022-12-16 NOTE — ED PROVIDER NOTES
ED Provider Note  [facility]      History     Chief Complaint   Patient presents with     Shortness of Breath     HPI  Flor Navarro is a 57 year old female who has a history of hepatitis C status post renal transplant , hypertension, chronic kidney disease, stroke, and recurrent UTI who presented to the clinic today for a regular visit.  Patient had oxygen saturations performed which were in the 70s.  She was placed on oxygen by EMS with oxygen saturations in the 86 to 91%.  Patient is normally on oxygen at 3 L by nasal cannula at home, and the oxygen saturations in the clinic were performed while patient was on room air.  She does report that she has had some increased shortness of breath.  She has no fever.  She has no cough.  She has some chronic lower extremity pain without swelling.  She denies chest pain.  She has no known exposures.    Past Medical History  Past Medical History:   Diagnosis Date     Anemia in CKD (chronic kidney disease)      Cataract      CKD (chronic kidney disease) stage 3, GFR 30-59 ml/min (H)      Hepatitis C     cleared virus spontaneously      High risk medication use      Hypertension, renal      Immunosuppressed status (H)      Liver replaced by transplant (H) 2010     Osteoporosis      Recurrent pregnancy loss without current pregnancy      Recurrent UTI 2021     Stroke, hemorrhagic (H) 2008    Left cerebral intraparenchymal hemorrhage     Syncope      Unspecified viral hepatitis C without hepatic coma      Past Surgical History:   Procedure Laterality Date     CATARACT IOL, RT/LT Right 2/18/15      SECTION       Incisional Hernia Repair  2004     INSERT SHUNT PORTAL TRANSJUGULAR INTRAHEPTIC      shunt placement for liver failure     LAPAROSCOPIC SALPINGO-OOPHORECTOMY      left     NECK SURGERY  2010    fracture, in halo x 7months     TRANSPLANT LIVER RECIPIENT  DONOR  10/26/10     Upper GI Endoscopy  with Band Ligation of Esoph/Gastric Varic. .       ACE/ARB/ARNI NOT PRESCRIBED (INTENTIONAL)  acetaminophen (TYLENOL) 500 MG tablet  albuterol (ACCUNEB) 0.63 MG/3ML neb solution  albuterol (PROAIR HFA/PROVENTIL HFA/VENTOLIN HFA) 108 (90 Base) MCG/ACT inhaler  alendronate (FOSAMAX) 70 MG tablet  alum & mag hydroxide-simethicone (MAALOX ADVANCED MAX ST) 400-400-40 MG/5ML SUSP suspension  amLODIPine (NORVASC) 10 MG tablet  calcitRIOL (ROCALTROL) 0.25 MCG capsule  calcium carbonate 600 mg-vitamin D 400 units (CALTRATE) 600-400 MG-UNIT per tablet  capsaicin (ZOSTRIX) 0.025 % external cream  carboxymethylcellulose PF (REFRESH PLUS) 0.5 % ophthalmic solution  cetirizine (ZYRTEC) 10 MG tablet  COMPOUNDED NON-CONTROLLED SUBSTANCE (CMPD RX) - PHARMACY TO MIX COMPOUNDED MEDICATION  cycloSPORINE modified (GENERIC EQUIVALENT) 25 MG capsule  Denture Care Products (EFFERDENT DENTURE CLEANSER) TBEF  dimethicone (AVEENO DAILY MOISTURIZING) 1.3 % LOTN lotion  diphenhydrAMINE (BENADRYL) 50 MG capsule  febuxostat (ULORIC) 40 MG TABS tablet  furosemide (LASIX) 20 MG tablet  gabapentin (NEURONTIN) 300 MG capsule  ketotifen (ZADITOR) 0.025 % ophthalmic solution  lidocaine (XYLOCAINE) 5 % external ointment  melatonin 3 MG tablet  mineral oil-white petrolatum (EUCERIN) CREA cream  Multiple Vitamin (DAILY-SUMA MULTIVITAMIN) TABS  multivitamin w/minerals (MULTI-VITAMIN) tablet  mycophenolate (GENERIC EQUIVALENT) 250 MG capsule  neomycin-polymyxin-HC 1 %  omeprazole (PRILOSEC) 20 MG DR capsule  order for DME  order for DME  order for DME  psyllium (METAMUCIL/KONSYL) 0.52 g capsule  SENEXON-S 8.6-50 MG tablet  SM FIBER LAXATIVE 500 MG TABS tablet  STATIN NOT PRESCRIBED, INTENTIONAL,  umeclidinium (INCRUSE ELLIPTA) 62.5 MCG/INH inhaler  Vitamin D3 (CHOLECALCIFEROL) 25 mcg (1000 units) tablet      Allergies   Allergen Reactions     Aspirin      3/31/16 Per pt, tolerates 81 mg daily dose without ADR.     325 mg dose caused itchiness and hives.      "Clarithromycin      Allergic reaction         Contrast Dye      Iodine      Pcn [Penicillins]      Family History  Family History   Problem Relation Age of Onset     Hepatitis Other         Hep C, still in [place]     Cerebrovascular Disease Other      Cancer No family hx of      Diabetes No family hx of      Hypertension No family hx of      Thyroid Disease No family hx of      Glaucoma No family hx of      Macular Degeneration No family hx of      Social History   Social History     Tobacco Use     Smoking status: Never     Smokeless tobacco: Never   Vaping Use     Vaping Use: Never used   Substance Use Topics     Alcohol use: No     Drug use: No      Past medical history, past surgical history, medications, allergies, family history, and social history were reviewed with the patient. No additional pertinent items.       Review of Systems   Constitutional: Negative for chills and fever.   HENT: Negative for congestion and sore throat.    Eyes: Negative for discharge.   Respiratory: Positive for shortness of breath. Negative for cough.    Cardiovascular: Negative for chest pain and leg swelling.   Gastrointestinal: Negative for abdominal pain, diarrhea, nausea and vomiting.   Genitourinary: Negative for dysuria and frequency.   Musculoskeletal: Positive for myalgias.   Skin: Negative for color change and rash.   Neurological: Negative for weakness, light-headedness and headaches.   Hematological: Negative for adenopathy.   Psychiatric/Behavioral: Negative for agitation, behavioral problems and confusion.       Physical Exam   BP: 119/80  Pulse: 60  Temp: 98.1  F (36.7  C)  Resp: 20  Height: 154.9 cm (5' 1\")  Weight: 106.6 kg (235 lb)  SpO2: 95 %  Physical Exam  Constitutional:       General: She is not in acute distress.     Appearance: She is well-developed and well-nourished.   HENT:      Head: Normocephalic and atraumatic.      Mouth/Throat:      Mouth: Oropharynx is clear and moist.   Eyes:      " Conjunctiva/sclera: Conjunctivae normal.      Pupils: Pupils are equal, round, and reactive to light.   Neck:      Thyroid: No thyromegaly.      Trachea: No tracheal deviation.   Cardiovascular:      Rate and Rhythm: Normal rate and regular rhythm.      Pulses: Intact distal pulses.      Heart sounds: Normal heart sounds. No murmur heard.  Pulmonary:      Effort: Pulmonary effort is normal. No respiratory distress.      Breath sounds: Normal breath sounds. No wheezing.   Chest:      Chest wall: No tenderness.   Abdominal:      General: There is no distension.      Palpations: Abdomen is soft.      Tenderness: There is no abdominal tenderness.   Musculoskeletal:         General: No tenderness or edema.      Cervical back: Normal range of motion and neck supple.   Skin:     General: Skin is warm.      Findings: No rash.   Neurological:      Mental Status: She is alert and oriented to person, place, and time.      Sensory: No sensory deficit.      Motor: Motor strength is normal.   Psychiatric:         Mood and Affect: Mood and affect normal.         Behavior: Behavior normal.       ED Course      Procedures               Results for orders placed or performed during the hospital encounter of 12/16/22   XR Chest 2 Views     Status: None    Narrative    Chest 2 views    INDICATION: Short air    COMPARISON: 8/7/2022 chest CT and 8/3/2022 plain film    FINDINGS: Heart size upper normal. Streaky perihilar densities are  present which may indicate mild pulmonary edema but this is less  prominent than the August radiograph. Hemidiaphragm flattening on the  lateral view. Bones appear grossly      Impression    IMPRESSION: Mild probable pulmonary edema decreased from August.  Hyperinflated lungs.    JUAN C CURTIS MD         SYSTEM ID:  [id number]   Blood gas venous     Status: Abnormal   Result Value Ref Range    pH Venous 7.34 7.32 - 7.43    pCO2 Venous 60 (H) 40 - 50 mm Hg    pO2 Venous 28 25 - 47 mm Hg    Bicarbonate  Venous 32 (H) 21 - 28 mmol/L    Base Excess/Deficit (+/-) 4.4 (H) -7.7 - 1.9 mmol/L    FIO2 40    Nt probnp inpatient (BNP)     Status: Normal   Result Value Ref Range    N terminal Pro BNP Inpatient 825 0 - 900 pg/mL   Troponin T, High Sensitivity     Status: Abnormal   Result Value Ref Range    Troponin T, High Sensitivity 18 (H) <=14 ng/L   Asymptomatic COVID-19 Virus (Coronavirus) by PCR Nasopharyngeal     Status: Normal    Specimen: Nasopharyngeal; Swab   Result Value Ref Range    SARS CoV2 PCR Negative Negative    Narrative    Testing was performed using the Xpert Xpress SARS-CoV-2 Assay on the Cepheid Gene-Xpert Instrument Systems. Additional information about this Emergency Use Authorization (EUA) assay can be found via the Lab Guide. This test should be ordered for the detection of SARS-CoV-2 in individuals who meet SARS-CoV-2 clinical and/or epidemiological criteria as well as from individuals without symptoms or other reasons to suspect COVID-19. Test performance for asymptomatic patients has only been established in anterior nasal swab specimens. This test is for in vitro diagnostic use under the FDA EUA for laboratories certified under CLIA to perform high complexity testing. This test has not been FDA cleared or approved. A negative result does not rule out the presence of PCR inhibitors in the specimen or target RNA concentration below the limit of detection for the assay. The possibility of a false negative should be considered if the patient's recent exposure or clinical presentation suggests COVID-19. This test was validated by [facility]. These Laboratories are certified under the Clinical Laboratory Improvement Amendments (CLIA) as qualified to perform high complexity testing.     Results for orders placed or performed in visit on 12/16/22   CBC with platelets     Status: Abnormal   Result Value Ref Range    WBC Count 4.7 4.0 - 11.0 10e3/uL    RBC Count 4.44 3.80 - 5.20  10e6/uL    Hemoglobin 12.9 11.7 - 15.7 g/dL    Hematocrit 42.7 35.0 - 47.0 %    MCV 96 78 - 100 fL    MCH 29.1 26.5 - 33.0 pg    MCHC 30.2 (L) 31.5 - 36.5 g/dL    RDW 13.6 10.0 - 15.0 %    Platelet Count 182 150 - 450 10e3/uL   Ammonia     Status: Normal   Result Value Ref Range    Ammonia 32 11 - 51 umol/L     Medications - No data to display     Assessments & Plan (with Medical Decision Making)   Patient is a 57-year-old female with a history of hepatitis C status post renal transplant 2010, hypertension, chronic kidney disease, and stroke.  She had a routine clinic visit today and was found to have some confusion and O2 saturations were checked and were 77%.  She was placed on oxygen at 2 L with improvement of her oxygen saturations to 94%.  Patient does apparently have a history of sleep apnea as well as airway reactivity.  She is typically on home oxygen at 3 L.  She also has prescriptions for nebulization treatments as well as Lasix.    Patient does report that she has had some increase in her shortness of breath over the past several days.  She denies any fever.  She has no change in her typical leg pain or swelling.  She denies chest pain or abdominal pain.  She has no nausea or vomiting.  She has no known exposures.  She has a slight cough.    On exam, patient's blood pressure is 119/80 with a pulse rate of 60 and temperature 98.1.  Respirations are 20 with O2 saturations 95% on 3 L by nasal cannula.  Her lungs are clear.  Heart is regular without murmurs.    Labs will be obtained.  A chest x-ray is also ordered.    Patient's COVID swab was negative.  Her high-sensitivity troponin returned at 18 which is at her baseline.    Chest x-ray revealed borderline cardiomegaly with streaky perihilar densities which may indicate mild pulmonary edema but look improved from in August film.    Her BNP was 825.    Venous blood gas shows no evidence of acidosis with a PCO2 of 60 and a PO2 28.    Patient is back to her  baseline.  She is no longer confused and has normal vital signs.  She has no evidence of infection.  She is wishing to be discharged.  She was instructed return should she develop increasing shortness of breath, fevers, or vomiting.  Otherwise she will follow-up with primary care as scheduled.    I have reviewed the nursing notes. I have reviewed the findings, diagnosis, plan and need for follow up with the patient.    Discharge Medication List as of 12/16/2022  6:53 PM          Final diagnoses:   Hypoxia       --  Rusty Cavazos  [facility] EMERGENCY DEPARTMENT  12/16/2022     Rusty Cavazos MD  12/16/22 2038

## 2022-12-16 NOTE — LETTER
December 19, 2022      Flor Navarro  [address]  [place] [facility] [place] [address]    Flor:   I'm hopeful you are doing better. We NEED to find a solution to your problem of requiring oxygen during the day. I think part of this is because of your sleep apnea being untreated so please make sure to go to your sleep medicine appointment on 1/11/23 and to see Dr. Ward after that.    Your cholesterol is very high- I think we need to talk about restarting meds. I know you don't want to take more meds, but this could reduce your risk of another stroke.  Resulted Orders   Lipid panel   Result Value Ref Range    Cholesterol 246 (H) <200 mg/dL    Triglycerides 181 (H) <150 mg/dL    Direct Measure HDL 44 (L) >=50 mg/dL    LDL Cholesterol Calculated 166 (H) <=100 mg/dL    Non HDL Cholesterol 202 (H) <130 mg/dL    Narrative    Cholesterol  Desirable:  <200 mg/dL    Triglycerides  Normal:  Less than 150 mg/dL  Borderline High:  150-199 mg/dL  High:  200-499 mg/dL  Very High:  Greater than or equal to 500 mg/dL    Direct Measure HDL  Female:  Greater than or equal to 50 mg/dL   Male:  Greater than or equal to 40 mg/dL    LDL Cholesterol  Desirable:  <100mg/dL  Above Desirable:  100-129 mg/dL   Borderline High:  130-159 mg/dL   High:  160-189 mg/dL   Very High:  >= 190 mg/dL    Non HDL Cholesterol  Desirable:  130 mg/dL  Above Desirable:  130-159 mg/dL  Borderline High:  160-189 mg/dL  High:  190-219 mg/dL  Very High:  Greater than or equal to 220 mg/dL   Comprehensive metabolic panel   Result Value Ref Range    Sodium 143 136 - 145 mmol/L    Potassium 5.1 3.4 - 5.3 mmol/L    Chloride 106 98 - 107 mmol/L    Carbon Dioxide (CO2) 28 22 - 29 mmol/L    Anion Gap 9 7 - 15 mmol/L    Urea Nitrogen 22.0 (H) 6.0 - 20.0 mg/dL    Creatinine 1.27 (H) 0.51 - 0.95 mg/dL    Calcium 9.6 8.6 - 10.0 mg/dL    Glucose 98 70 - 99 mg/dL    Alkaline Phosphatase 78 35 - 104 U/L    AST 22 10 - 35 U/L    ALT 16 10 - 35 U/L    Protein Total 7.3 6.4 -  8.3 g/dL    Albumin 3.9 3.5 - 5.2 g/dL    Bilirubin Total 0.4 <=1.2 mg/dL    GFR Estimate 49 (L) >60 mL/min/1.73m2      Comment:      Effective December 21, 2021 eGFRcr in adults is calculated using the 2021 CKD-EPI creatinine equation which includes age and gender (Justice et al., NE, DOI: 10.1056/QLWIho2331194)   INR   Result Value Ref Range    INR 0.94 0.85 - 1.15   CBC with platelets   Result Value Ref Range    WBC Count 4.7 4.0 - 11.0 10e3/uL    RBC Count 4.44 3.80 - 5.20 10e6/uL    Hemoglobin 12.9 11.7 - 15.7 g/dL    Hematocrit 42.7 35.0 - 47.0 %    MCV 96 78 - 100 fL    MCH 29.1 26.5 - 33.0 pg    MCHC 30.2 (L) 31.5 - 36.5 g/dL    RDW 13.6 10.0 - 15.0 %    Platelet Count 182 150 - 450 10e3/uL   Ammonia   Result Value Ref Range    Ammonia 32 11 - 51 umol/L     If you have any questions or concerns, please call the clinic at the number listed above.       Sincerely,      Karely Sierra MD

## 2022-12-16 NOTE — NURSING NOTE
Patient was sent via EMS to [facility] ED due to hypoxia. Patient between 77% O2 saturation while talking on RA to 85% when resting. Patient was placed on oxygen 3L and satting at 95%. She had bouts of confusion and took a taxi to the clinic. Shallow breathing, respiration rates between 24-26 bpm. She also states she had a moment yesterday evening where she dropped her beverage and had a small neuro event.     Ines Sheridan RN

## 2022-12-17 NOTE — DISCHARGE INSTRUCTIONS
Continue to use your home oxygen  Return if increasing breathing difficulty, fevers, vomiting  Primary care 1 week.

## 2023-01-06 DIAGNOSIS — M81.8 OTHER OSTEOPOROSIS WITHOUT CURRENT PATHOLOGICAL FRACTURE: Primary | ICD-10-CM

## 2023-01-06 NOTE — TELEPHONE ENCOUNTER
"Last seen 12/16/22    Request for medication refill:  calcium carbonate 600 mg-vitamin D 400 units (CALTRATE) 600-400 MG-UNIT per tablet  Providers if patient needs an appointment and you are willing to give a one month supply please refill for one month and  send a letter/MyChart using \".SMILLIMITEDREFILL\" .smillimited and route chart to \"P SMI \" (Giving one month refill in non controlled medications is strongly recommended before denial)    If refill has been denied, meaning absolutely no refills without visit, please complete the smart phrase \".smirxrefuse\" and route it to the \"P SMI MED REFILLS\"  pool to inform the patient and the pharmacy.    Mignon Schultz RN        "

## 2023-01-09 RX ORDER — CALCIUM CARBONATE/VITAMIN D3 600 MG-10
TABLET ORAL
Qty: 60 TABLET | Refills: 11 | Status: SHIPPED | OUTPATIENT
Start: 2023-01-09

## 2023-01-11 ENCOUNTER — THERAPY VISIT (OUTPATIENT)
Dept: SLEEP MEDICINE | Facility: CLINIC | Age: 58
End: 2023-01-11
Attending: INTERNAL MEDICINE
Payer: MEDICARE

## 2023-01-11 DIAGNOSIS — G47.30 OBSERVED SLEEP APNEA: ICD-10-CM

## 2023-01-11 DIAGNOSIS — R06.09 DOE (DYSPNEA ON EXERTION): ICD-10-CM

## 2023-01-11 DIAGNOSIS — G47.10 HYPERSOMNIA: ICD-10-CM

## 2023-01-11 DIAGNOSIS — G47.33 OSA (OBSTRUCTIVE SLEEP APNEA): ICD-10-CM

## 2023-01-11 DIAGNOSIS — R06.89 HYPOVENTILATION: ICD-10-CM

## 2023-01-11 DIAGNOSIS — J96.21 ACUTE ON CHRONIC RESPIRATORY FAILURE WITH HYPOXIA (H): ICD-10-CM

## 2023-01-11 PROCEDURE — 95811 POLYSOM 6/>YRS CPAP 4/> PARM: CPT | Performed by: INTERNAL MEDICINE

## 2023-01-12 NOTE — NURSING NOTE
Patient was originally a Diagnostic but per on-call provider, split.  Completed a split night PSG per provider order.    Preliminary AHI N/A.  A final therapeutic PAP pressure was not achieved.    Supine REM was not seen on therapeutic pressure. Patient reports feeling not refreshed in AM.

## 2023-01-12 NOTE — PATIENT INSTRUCTIONS
[facility]    1. Your sleep study will be reviewed by a sleep physician within the next few days.     2. Please follow up in the sleep clinic as scheduled, or, make an appointment with your sleep provider to be seen within two weeks to discuss the results of the sleep study.    3. If you have any questions or problems with your treatment plan, please contact your sleep clinic provider at [phone] to further manage your condition.    4. Please review your attached medication list, and, at your follow-up appointment advise your sleep clinic provider about any changes.    5. Go to [email or web address] for more information about your sleep problems.    CHRIS Ng  January 12, 2023

## 2023-01-16 ENCOUNTER — DOCUMENTATION ONLY (OUTPATIENT)
Dept: FAMILY MEDICINE | Facility: CLINIC | Age: 58
End: 2023-01-16
Payer: MEDICARE

## 2023-01-16 ENCOUNTER — TELEPHONE (OUTPATIENT)
Dept: SLEEP MEDICINE | Facility: CLINIC | Age: 58
End: 2023-01-16

## 2023-01-16 NOTE — TELEPHONE ENCOUNTER
LVM on pt's phone with [place]  to schedule PFT appt. Also spoke directly with son who does pt's scheduling. Gave him the phone number to call to schedule PFT before f/u appt with Dr Ward per her notes

## 2023-01-16 NOTE — PROGRESS NOTES
"When opening a documentation only encounter, be sure to enter in \"Chief Complaint\" Forms and in \" Comments\" Title of form, description if needed.    Flor is a 57 year old  female  Form received via: Fax  Form now resides in: Provider Ready    Mignon Schultz RN                  "

## 2023-01-18 DIAGNOSIS — R06.09 DOE (DYSPNEA ON EXERTION): ICD-10-CM

## 2023-01-18 DIAGNOSIS — G47.10 HYPERSOMNIA: ICD-10-CM

## 2023-01-18 DIAGNOSIS — G47.30 OBSERVED SLEEP APNEA: ICD-10-CM

## 2023-01-18 DIAGNOSIS — J96.21 ACUTE ON CHRONIC RESPIRATORY FAILURE WITH HYPOXIA (H): ICD-10-CM

## 2023-01-18 LAB
BASE EXCESS BLDA CALC-SCNC: -0.9 MMOL/L (ref -9.6–2)
COHGB MFR BLD: 1.5 % (ref 0–2)
HCO3 BLDA-SCNC: 24 MMOL/L (ref 21–28)
HGB BLD-MCNC: 12.4 G/DL (ref 11.7–15.7)
METHGB MFR BLD: 0.3 % (ref 0–3)
O2/TOTAL GAS SETTING VFR VENT: 21 %
OXYHGB MFR BLDA: 81 % (ref 92–100)
PCO2 BLDA: 48 MM HG (ref 35–45)
PH BLDA: 7.34 [PH] (ref 7.35–7.45)
PO2 BLDA: 53 MM HG (ref 80–105)

## 2023-01-18 PROCEDURE — 85018 HEMOGLOBIN: CPT | Performed by: PATHOLOGY

## 2023-01-18 PROCEDURE — 36600 WITHDRAWAL OF ARTERIAL BLOOD: CPT | Performed by: INTERNAL MEDICINE

## 2023-01-18 PROCEDURE — 94729 DIFFUSING CAPACITY: CPT | Performed by: INTERNAL MEDICINE

## 2023-01-18 PROCEDURE — 82803 BLOOD GASES ANY COMBINATION: CPT | Performed by: PATHOLOGY

## 2023-01-18 PROCEDURE — 83050 HGB METHEMOGLOBIN QUAN: CPT | Performed by: PATHOLOGY

## 2023-01-18 PROCEDURE — 82375 ASSAY CARBOXYHB QUANT: CPT | Performed by: PATHOLOGY

## 2023-01-18 PROCEDURE — 94375 RESPIRATORY FLOW VOLUME LOOP: CPT | Performed by: INTERNAL MEDICINE

## 2023-01-18 NOTE — PROGRESS NOTES
This patient comes into the clinic today at the request of Dr. Ward for an ABG (arterial blood gas).    ABG obtained from left  radial artery after positive modified Terry's Test performed.      Pressure applied.  No complications noted.      This service provided today was under the direct supervision of Dr. Medina, who was available if needed.

## 2023-01-19 ENCOUNTER — TELEPHONE (OUTPATIENT)
Dept: FAMILY MEDICINE | Facility: CLINIC | Age: 58
End: 2023-01-19
Payer: MEDICARE

## 2023-01-19 ENCOUNTER — DOCUMENTATION ONLY (OUTPATIENT)
Dept: FAMILY MEDICINE | Facility: CLINIC | Age: 58
End: 2023-01-19
Payer: MEDICARE

## 2023-01-19 PROBLEM — G47.33 OSA (OBSTRUCTIVE SLEEP APNEA): Status: ACTIVE | Noted: 2023-01-10

## 2023-01-19 PROBLEM — G47.33 OSA (OBSTRUCTIVE SLEEP APNEA): Status: ACTIVE | Noted: 2017-09-12

## 2023-01-19 PROBLEM — R06.89 HYPOVENTILATION: Status: ACTIVE | Noted: 2023-01-10

## 2023-01-19 LAB
DLCOCOR-%PRED-PRE: 83 %
DLCOCOR-PRE: 15.4 ML/MIN/MMHG
DLCOUNC-%PRED-PRE: 79 %
DLCOUNC-PRE: 14.69 ML/MIN/MMHG
DLCOUNC-PRED: 18.37 ML/MIN/MMHG
ERV-%PRED-PRE: 217 %
ERV-PRE: 0.28 L
ERV-PRED: 0.13 L
EXPTIME-PRE: 2.89 SEC
FEF2575-%PRED-PRE: 84 %
FEF2575-PRE: 1.76 L/SEC
FEF2575-PRED: 2.09 L/SEC
FEFMAX-%PRED-PRE: 79 %
FEFMAX-PRE: 4.72 L/SEC
FEFMAX-PRED: 5.95 L/SEC
FEV1-%PRED-PRE: 67 %
FEV1-PRE: 1.45 L
FEV1FEV6-PRE: 87 %
FEV1FEV6-PRED: 81 %
FEV1FVC-PRE: 87 %
FEV1FVC-PRED: 81 %
FEV1SVC-PRE: 80 %
FEV1SVC-PRED: 73 %
FIFMAX-PRE: 4.01 L/SEC
FIO2-PRE: 21 %
FVC-%PRED-PRE: 62 %
FVC-PRE: 1.66 L
FVC-PRED: 2.67 L
IC-%PRED-PRE: 54 %
IC-PRE: 1.54 L
IC-PRED: 2.8 L
MEP-PRE: 72 CMH2O
MIP-PRE: -35 CMH2O
VA-%PRED-PRE: 71 %
VA-PRE: 3.01 L
VC-%PRED-PRE: 62 %
VC-PRE: 1.82 L
VC-PRED: 2.93 L

## 2023-01-19 NOTE — TELEPHONE ENCOUNTER
Type of Form: Medical Opinion  Patient's PCP: Link   Placed in Green Color Bin    If form is for FMLA, Disabilty, or Medicare please schedule an appointment for patient and route to PCP to decide if appointment is needed.

## 2023-01-19 NOTE — PROCEDURES
" SLEEP STUDY INTERPRETATION  SPLIT NIGHT STUDY      Patient: PREET ALANIZ  YOB: 1965  Study Date: 1/10/2023  MRN: [id number]  Referring Provider: -  Ordering Provider: Bessy Ward MD    Indications for Polysomnography: The patient is a 57 year old Female who is 5' 1\" and weighs 235.0 lbs. Her BMI is 44.9, [place] sleepiness scale 21 and neck circumference is - cm. Relevant medical history includes s/p liver transplant 2010 for Hepatitis C, chronic kidney disease, left cerebral intraparenchymal hemorrhage, obesity, insomnia, recent respiratory failure.). A diagnostic polysomnogram was performed to evaluate for sleep apnea/hypoventilation/hypoxemia. After 131.0 minutes of sleep time the patient exhibited sufficient respiratory events qualifying her for a CPAP trial which was then initiated.    Polysomnogram Data: A full night polysomnogram recorded the standard physiologic parameters including EEG, EOG, EMG, ECG, nasal and oral airflow. Respiratory parameters of chest and abdominal movements were recorded with respiratory inductance plethysmography. Oxygen saturation was recorded by pulse oximetry.  Hypopnea scoring rule used: 1B 4%    Diagnostic PSG  Sleep Architecture: Decreased sleep efficiency with increased arousal index.  The total recording time of the polysomnogram was 210.8 minutes. The total sleep time was 131.0 minutes. Sleep latency was normal at 7.8 minutes without the use of a sleep aid. REM latency was 162.0 minutes. Arousal index was increased at 56.3 arousals per hour. Sleep efficiency was decreased at 62.1%. Wake after sleep onset was 72.0 minutes. The patient spent 19.5% of total sleep time in Stage N1, 64.1% in Stage N2, 2.3% in Stage N3, and 14.1% in REM. Time in REM supine was - minutes.    Respiration: Resting/wake hypoxemia with normal TCMCO2. During sleep severe obstructive sleep apnea noted with hypoxemia and hypoventilation    Events ? The polysomnogram revealed a " presence of 36 obstructive, 6 central, and 11 mixed apneas resulting in an apnea index of 24.3 events per hour. There were 114 obstructive hypopneas and 6 central hypopneas resulting in an obstructive hypopnea index of 52.2 and central hypopnea index of 2.7 events per hour. The combined apnea/hypopnea index was 79.2 events per hour (central apnea/hypopnea index was 5.5 events per hour).  The REM AHI was 48.6 events per hour. The supine AHI was - events per hour. The RERA index was 10.1 events per hour. The RDI was 89.3 events per hour.    Snoring - was reported as mild.    Respiratory rate and pattern - was notable for tachypnea respiratory rates 20-22, and regular pattern.    Sustained Sleep Associated Hypoventilation - Transcutaneous carbon dioxide monitoring was used, significant hypoventilation was present with a maximum change from 45.2 to 85.0 mmHg.    Sleep Associated Hypoxemia - (Greater than 5 minutes O2 sat at or below 88%) was present. Baseline oxygen saturation was 85.6%. Lowest oxygen saturation was 53.0%. Time spent less than or equal to 88% was 95.8 minutes. Time spent less than or equal to 89% was 104.2 minutes.     Treatment PSG  Sleep Architecture: improved sleep consolidation on PAP therapy.  At 12:46:23 AM the patient was placed on PAP treatment and was titrated at pressures ranging from BiLevel 10/5/0 cmH2O up to BiLevel 15/6/0 O2:2 cmH2O. The total recording time of the treatment portion of the study was 261.6 minutes. The total sleep time was 261.0 minutes. During the treatment portion of the study the sleep latency was 0.0 minutes. REM latency was 62.5 minutes. Arousal index was normal at 6.4 arousals per hour. Sleep efficiency was normal at 99.8%. Wake after sleep onset was 0.5 minutes. The patient spent 0.6% of total sleep time in Stage N1, 42.7% in Stage N2, 35.4% in Stage N3, and 21.3% in REM. Time in REM supine was - minutes.     Respiration: Incomplete titration with improvement of  obstructive events, however residual hypoxemia and hypoventilation present. Pt unable to consistently trigger bilevel, particularly in REM, with subsequent low tidal volumes.    The final pressure was BiLevel 13/6/0 O2:2 cmH2O with an AHI of 6.5 events per hour. Time in REM supine on final pressure was - minutes.     CPRESS without flow during events labeled as obatruction could be evidence of inablilty to trigger, respiratory uscle weakness    Movement Activity: No abnormal movement activity.    Periodic Limb Movements  o During the diagnostic portion of the study, there were - PLMs recorded. The PLM index was - movements per hour. The PLM Arousal Index was - per hour.  o During the treatment portion of the study, there were - PLMs recorded. The PLM index was - movements per hour. The PLM Arousal Index was - per hour.    REM EMG Activity - Excessive transient/sustained muscle activity was not present.    Nocturnal Behavior - Abnormal sleep related behaviors were not noted.    Bruxism - None apparent.    Cardiac Summary: Normal sinus rhythm and rates.  During the diagnostic portion of the study, the average pulse rate was 70.8 bpm. The minimum pulse rate was 48.0 bpm while the maximum pulse rate was 106.0 bpm.    During the treatment portion of the study, the average pulse rate was 79.7 bpm. The minimum pulse rate was 58.0 bpm while the maximum pulse rate was 102.0 bpm.     Arrhythmias were not noted.      Assessment:     Resting/wake hypoxemia. During sleep severe obstructive sleep apnea noted with hypoxemia and hypoventilation    Decreased sleep efficiency with increased arousal index.    Incomplete titration with improvement of obstructive events, however residual hypoxemia and hypoventilation present. Patient  unable to consistently trigger bilevel, particularly in REM, with subsequent low tidal volumes.    Sleep architecture improved on PAP therapy.    No abnormal movement activity.    Normal sinus rhythm and  rates.        Recommendations:    Further evaluation of respiratory failure recommended.    Repeat PSG with TCMCO2 and all night PAP titration, bilevel and higher trigger sensitivity.     Advice regarding the risks of drowsy driving.    Suggest optimizing sleep schedule and avoiding sleep deprivation.    Weight management (BMI > 30).    Pharmacologic therapy should be used for management of restless legs syndrome only if present and clinically indicated and not based on the presence of periodic limb movements alone.    Diagnostic Codes:   Obstructive Sleep Apnea G47.33  Sleep Hypoxemia/Hypoventilation G47.36             _____________________________________   Electronically Signed By: Bessy Ward MD 1/19/2023

## 2023-01-20 LAB — SLPCOMP: NORMAL

## 2023-01-20 NOTE — PROGRESS NOTES
Form has been completed by provider.     Form sent out via: Fax to [employer]  at Fax Number: [phone]  Patient informed: N/A  Output date: January 20, 2023    Darius Durant MA      **Please close the encounter**

## 2023-01-26 ENCOUNTER — OFFICE VISIT (OUTPATIENT)
Dept: SLEEP MEDICINE | Facility: CLINIC | Age: 58
End: 2023-01-26
Payer: MEDICARE

## 2023-01-26 VITALS
HEART RATE: 72 BPM | BODY MASS INDEX: 44.37 KG/M2 | DIASTOLIC BLOOD PRESSURE: 97 MMHG | WEIGHT: 235 LBS | SYSTOLIC BLOOD PRESSURE: 139 MMHG | OXYGEN SATURATION: 90 % | HEIGHT: 61 IN

## 2023-01-26 DIAGNOSIS — G47.10 HYPERSOMNIA: ICD-10-CM

## 2023-01-26 DIAGNOSIS — G47.33 OSA (OBSTRUCTIVE SLEEP APNEA): Primary | ICD-10-CM

## 2023-01-26 DIAGNOSIS — R06.89 HYPOVENTILATION: ICD-10-CM

## 2023-01-26 PROCEDURE — 99214 OFFICE O/P EST MOD 30 MIN: CPT | Performed by: INTERNAL MEDICINE

## 2023-01-26 NOTE — PATIENT INSTRUCTIONS

## 2023-01-26 NOTE — PROGRESS NOTES
Chief complaint: Follow-up sleep study    History of Present Illness: 57-year-old Korean speaking female seen with  via iPad.  Patient has a past medical history of liver transplant for hepatitis C, hypertension, chronic kidney disease and obesity.  She also has a recent small bowel obstruction and was found to have hypoxemic hypercapnic respiratory failure.  She recently had a sleep study that showed severe obstructive sleep apnea with hypoxemia and hypoventilation.  Bilevel was initiated during the study however it was incompletely titrated.  She had significant improvement in sleep consolidation on PAP therapy.  She reports today that she felt that she tolerated it well and did feel better the next day.    She continues to struggle with daytime sleepiness as well as shortness of breath and weakness.  She also has been having some abdominal pain.  She has an appointment with her liver transplant team next week.  She sleeps on a bed that is flat at home and she reports that is comfortable.  She continues to report good sleep quality at home.    Her son is out of town and she does not think she could do a repeat sleep study until he gets back.    [place] Sleepiness Scale  Total score - [place]: 21 (11/4/2022 10:29 AM)   (Less than 10 normal)        Past Medical History:   Diagnosis Date     Anemia in CKD (chronic kidney disease)      Cataract      CKD (chronic kidney disease) stage 3, GFR 30-59 ml/min (H)      Hepatitis C     cleared virus spontaneously 2013     High risk medication use      Hypertension, renal      Immunosuppressed status (H)      Liver replaced by transplant (H) 01/01/2010     Osteoporosis      Recurrent pregnancy loss without current pregnancy      Recurrent UTI 07/12/2021     Stroke, hemorrhagic (H) 01/01/2008    Left cerebral intraparenchymal hemorrhage     Syncope      Unspecified viral hepatitis C without hepatic coma        Allergies   Allergen Reactions     Aspirin      3/31/16  Per pt, tolerates 81 mg daily dose without ADR.     325 mg dose caused itchiness and hives.     Clarithromycin      Allergic reaction         Contrast Dye      Iodine      Pcn [Penicillins]        Current Outpatient Medications   Medication     acetaminophen (TYLENOL) 500 MG tablet     albuterol (ACCUNEB) 0.63 MG/3ML neb solution     albuterol (PROAIR HFA/PROVENTIL HFA/VENTOLIN HFA) 108 (90 Base) MCG/ACT inhaler     alendronate (FOSAMAX) 70 MG tablet     alum & mag hydroxide-simethicone (MAALOX ADVANCED MAX ST) 400-400-40 MG/5ML SUSP suspension     amLODIPine (NORVASC) 10 MG tablet     calcitRIOL (ROCALTROL) 0.25 MCG capsule     calcium carbonate 600 mg-vitamin D 400 units (CALTRATE) 600-400 MG-UNIT per tablet     calcium carbonate-vitamin D (CALTRATE) 600-10 MG-MCG per tablet     cetirizine (ZYRTEC) 10 MG tablet     COMPOUNDED NON-CONTROLLED SUBSTANCE (CMPD RX) - PHARMACY TO MIX COMPOUNDED MEDICATION     cycloSPORINE modified (GENERIC EQUIVALENT) 25 MG capsule     Denture Care Products (EFFERDENT DENTURE CLEANSER) TBEF     diphenhydrAMINE (BENADRYL) 50 MG capsule     febuxostat (ULORIC) 40 MG TABS tablet     furosemide (LASIX) 20 MG tablet     gabapentin (NEURONTIN) 300 MG capsule     ketotifen (ZADITOR) 0.025 % ophthalmic solution     lidocaine (XYLOCAINE) 5 % external ointment     Multiple Vitamin (DAILY-SUMA MULTIVITAMIN) TABS     multivitamin w/minerals (MULTI-VITAMIN) tablet     mycophenolate (GENERIC EQUIVALENT) 250 MG capsule     neomycin-polymyxin-HC 1 %     omeprazole (PRILOSEC) 20 MG DR capsule     order for DME     order for DME     order for DME     psyllium (METAMUCIL/KONSYL) 0.52 g capsule     SENEXON-S 8.6-50 MG tablet     SM FIBER LAXATIVE 500 MG TABS tablet     umeclidinium (INCRUSE ELLIPTA) 62.5 MCG/INH inhaler     Vitamin D3 (CHOLECALCIFEROL) 25 mcg (1000 units) tablet     ACE/ARB/ARNI NOT PRESCRIBED (INTENTIONAL)     capsaicin (ZOSTRIX) 0.025 % external cream     carboxymethylcellulose PF  (REFRESH PLUS) 0.5 % ophthalmic solution     dimethicone (AVEENO DAILY MOISTURIZING) 1.3 % LOTN lotion     melatonin 3 MG tablet     mineral oil-white petrolatum (EUCERIN) CREA cream     STATIN NOT PRESCRIBED, INTENTIONAL,     No current facility-administered medications for this visit.       Social History     Socioeconomic History     Marital status: Single     Spouse name: Not on file     Number of children: Not on file     Years of education: Not on file     Highest education level: Not on file   Occupational History     Not on file   Tobacco Use     Smoking status: Never     Smokeless tobacco: Never   Vaping Use     Vaping Use: Never used   Substance and Sexual Activity     Alcohol use: No     Drug use: No     Sexual activity: Not Currently   Other Topics Concern     Parent/sibling w/ CABG, MI or angioplasty before 65F 55M? Not Asked      Service No     Blood Transfusions Yes     Caffeine Concern No     Occupational Exposure No     Hobby Hazards No     Sleep Concern No     Stress Concern Yes     Comment: needs help at home for cares     Weight Concern Yes     Comment: wants to lose weight not gain weight     Special Diet No     Back Care Yes     Comment: uses a patch to relieve pain     Exercise Not Asked     Bike Helmet Not Asked     Seat Belt Yes     Self-Exams Not Asked   Social History Narrative    One son Carleen        GynHx:             Gets transportation via insurance - needs assistance due to unsteady gait from CVA     Social Determinants of Health     Financial Resource Strain: Not on file   Food Insecurity: Not on file   Transportation Needs: Unmet Transportation Needs     Lack of Transportation (Medical): Yes     Lack of Transportation (Non-Medical): Yes   Physical Activity: Not on file   Stress: Not on file   Social Connections: Not on file   Intimate Partner Violence: Not At Risk     Fear of Current or Ex-Partner: No     Emotionally Abused: No     Physically Abused: No     Sexually  "Abused: No   Housing Stability: Not on file       Family History   Problem Relation Age of Onset     Hepatitis Other         Hep C, still in [place]     Cerebrovascular Disease Other      Cancer No family hx of      Diabetes No family hx of      Hypertension No family hx of      Thyroid Disease No family hx of      Glaucoma No family hx of      Macular Degeneration No family hx of            EXAM:  Ht 1.549 m (5' 1\")   Wt 106.6 kg (235 lb)   LMP  (LMP Unknown)   BMI 44.40 kg/m    GENERAL: Alert  EYES: Eyes grossly normal to inspection.  No discharge or erythema, or obvious scleral/conjunctival abnormalities.  RESP: No audible wheeze, cough, or visible cyanosis.  No visible retractions or increased work of breathing.  Lungs clear to auscultation bilaterally  Cardiac tones regular rate and rhythm S1-S2 normal  Abdomen is obese, well-healed surgical scars, soft, mildly tender  There is bilateral lower extremity edema  SKIN: Visible skin clear. No significant rash, abnormal pigmentation or lesions.  NEURO: Cranial nerves grossly intact.  Mentation and speech appropriate for age.  PSYCH: Mentation appears normal, she becomes tearful discussing her abdominal pain and ongoing symptoms    PSG split-night 1/10/2023  Weight 235 lbs BMI 44.9  AHI 79.2, RDI 89.3, Lowest O2 Sat 53% time with oxygen saturation less than or equal to 88% 95 minutes  Improved sleep consolidation on PAP therapy  Patient was initiated on bilevel with incomplete titration.      PFTs/ABG 1/18/2023:      TSH   Date Value Ref Range Status   08/03/2022 0.77 0.30 - 4.20 uIU/mL Final   01/28/2022 0.57 0.40 - 4.00 mU/L Final   08/17/2016 0.81 0.40 - 4.00 mU/L Final         ASSESSMENT:  57-year-old female with obesity, liver transplant for hepatitis C, hypertension, chronic kidney disease, excessive daytime sleepiness, severe obstructive sleep apnea with hypoxemia and hypoventilation.  It appears that the hypoventilation is minimal at the beginning of the " night and progresses with airway obstruction.  She had an incomplete Pap titration.    PLAN:  Recommended repeat sleep study with all-night Pap titration with TCM starting with CPAP of 7, titrate CPAP to resolve obstruction then switched to bilevel to manage hypoventilation and hypoxemia.  If titration findings adequate settings we will go ahead and put in orders for a PAP device to use at home ASAP.  Patient encouraged to follow-up with her transplant team.  She is agreeable with this plan.      32 minutes spent on the date of the encounter doing chart review, history and exam, documentation and further activities per the note    Bessy Ward M.D.  Pulmonary/Critical Care/Sleep Medicine    [facility]   Floor 1, [address]   [address] [place], [place] [address]. [place] [address]   Appointments: [phone]    The above note was dictated using voice recognition software and may include typographical errors. Please contact the author for any clarifications.

## 2023-01-27 PROBLEM — J96.12 CHRONIC RESPIRATORY FAILURE WITH HYPOXIA AND HYPERCAPNIA (H): Status: ACTIVE | Noted: 2022-09-23

## 2023-01-27 PROBLEM — J96.11 CHRONIC RESPIRATORY FAILURE WITH HYPOXIA AND HYPERCAPNIA (H): Status: ACTIVE | Noted: 2022-09-23

## 2023-01-30 NOTE — PROGRESS NOTES
Form has been completed by provider.     Form sent out via: [facility] [place] [facility] at Fax Number: [phone]  Patient informed: N/A  Output date: January 30, 2023    Darius Durant MA      **Please close the encounter**

## 2023-02-01 ENCOUNTER — CARE COORDINATION (OUTPATIENT)
Dept: FAMILY MEDICINE | Facility: CLINIC | Age: 58
End: 2023-02-01

## 2023-02-01 ENCOUNTER — ANCILLARY PROCEDURE (OUTPATIENT)
Dept: GENERAL RADIOLOGY | Facility: CLINIC | Age: 58
End: 2023-02-01
Attending: FAMILY MEDICINE
Payer: MEDICARE

## 2023-02-01 ENCOUNTER — OFFICE VISIT (OUTPATIENT)
Dept: FAMILY MEDICINE | Facility: CLINIC | Age: 58
End: 2023-02-01
Payer: MEDICARE

## 2023-02-01 VITALS
TEMPERATURE: 97 F | BODY MASS INDEX: 44.4 KG/M2 | OXYGEN SATURATION: 87 % | WEIGHT: 235 LBS | RESPIRATION RATE: 24 BRPM | SYSTOLIC BLOOD PRESSURE: 149 MMHG | DIASTOLIC BLOOD PRESSURE: 90 MMHG

## 2023-02-01 DIAGNOSIS — R10.84 ABDOMINAL PAIN, GENERALIZED: ICD-10-CM

## 2023-02-01 DIAGNOSIS — I63.9 HEMIPLEGIA OF RIGHT DOMINANT SIDE DUE TO INFARCTION OF BRAIN (H): ICD-10-CM

## 2023-02-01 DIAGNOSIS — M25.562 ACUTE PAIN OF LEFT KNEE: ICD-10-CM

## 2023-02-01 DIAGNOSIS — K59.04 CHRONIC IDIOPATHIC CONSTIPATION: ICD-10-CM

## 2023-02-01 DIAGNOSIS — J96.11 CHRONIC RESPIRATORY FAILURE WITH HYPOXIA AND HYPERCAPNIA (H): ICD-10-CM

## 2023-02-01 DIAGNOSIS — M25.562 ACUTE PAIN OF LEFT KNEE: Primary | ICD-10-CM

## 2023-02-01 DIAGNOSIS — J96.12 CHRONIC RESPIRATORY FAILURE WITH HYPOXIA AND HYPERCAPNIA (H): ICD-10-CM

## 2023-02-01 DIAGNOSIS — R60.0 BILATERAL LEG EDEMA: ICD-10-CM

## 2023-02-01 DIAGNOSIS — R06.89 HYPOVENTILATION: ICD-10-CM

## 2023-02-01 DIAGNOSIS — G81.91 HEMIPLEGIA OF RIGHT DOMINANT SIDE DUE TO INFARCTION OF BRAIN (H): ICD-10-CM

## 2023-02-01 DIAGNOSIS — I12.9 HYPERTENSION, RENAL: ICD-10-CM

## 2023-02-01 PROCEDURE — 99215 OFFICE O/P EST HI 40 MIN: CPT | Performed by: FAMILY MEDICINE

## 2023-02-01 PROCEDURE — 74018 RADEX ABDOMEN 1 VIEW: CPT | Mod: FY | Performed by: RADIOLOGY

## 2023-02-01 PROCEDURE — 73562 X-RAY EXAM OF KNEE 3: CPT | Mod: LT | Performed by: RADIOLOGY

## 2023-02-01 RX ORDER — CHLORTHALIDONE 25 MG/1
25 TABLET ORAL DAILY
Qty: 90 TABLET | Refills: 1 | Status: SHIPPED | OUTPATIENT
Start: 2023-02-01 | End: 2023-08-16

## 2023-02-01 RX ORDER — METHYLCELLULOSE 500 MG/1
1000 TABLET ORAL DAILY
Qty: 90 TABLET | Refills: 3 | Status: SHIPPED | OUTPATIENT
Start: 2023-02-01

## 2023-02-01 NOTE — Clinical Note
Needs printed copy of DME order for stockings -they want to take to DME themselves Also worried she wont get oxygen at home - Mohamed as your ph# in case c

## 2023-02-01 NOTE — PROGRESS NOTES
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 There is chronic lower extremity edema, likely worsened by amlodipine and only mildly controlled with compression stockings. She lacks flexion on exam and is very difficult to determine site of maximal pain, lateral joint line seems worst on examination. X-Ray more concerning for medial compartment, no osteophytes except at superior pole of patella. Although on sunrise view, she does have radio opaque finding laterally. Unclear what this represents. Hence my differential is extremely broad including OA, meniscal injury,  PFS and foreign body. Recommend she see [facility] in Sports medicine here and Courtney will schedule this appointment.     Suspect that her long term sequelae of stroke impacts gait and knee. She requests brace, recommend  Against this for mobility and fall risk, at least until better understanding of cause.    Hypoventilation  Chronic respiratory failure with hypoxia and hypercapnia (H)  This is the second time she presents hypoxic, last month sent to ER due to severity and mental status, but not sent home with portable oxygen. Large and heavy oxygen machine at home not portable and cannot be moved with her, recommend continue use at night at 3 LPM. She needs a more mobile unit for moving around and leaving home. Hyper oxygenated her for rapid trip home, recommend not leave without O2. Set up oxygen to be delivered tonight and Care coordinator info given  To son if they don't come.     Sleep study titrate BiPAP late in February according to patient. This is a critical appointment for her. Actually scheduled April 26th  - Oxygen Order      Hypertension, renal  Uncontrolled in clinic, she  reports that this is measured hypertensive at home as well. She has not seen nephrology in over a year. Initially on hydrochlorothiazide, switched to Lasiks due to development of lower extremity edema and hyperkalemia, after which ACEI/ARBs are prohibited. Started on amlodipine by nephrology and maxed on that. Consideration of thiazide dieretic in addition to loop or hydralazine or beta blocker. For adherence and in consideration that she is a non-english speaker,  chose chlorthalidone 25.   - chlorthalidone (HYGROTON) 25 MG tablet  Dispense: 90 tablet; Refill: 1    Bilateral leg edema  Reordered stockings but prefer to be thigh high as edema spreads. Prefers printed order and take to DME so they can choose themselves. Route to Care coordination to insure they get printed copy.  - Compression Sleeve/Stocking Order    Abdominal pain, generalized  - X-RAY ABDOMEN 1 VW - UPRIGHT  Chronic idiopathic constipation  KUB not reveling for previous bowel obstruction and there is stool burden on right, in setting of poorly penetrated xray, related to chronic intermittent constipation. More detailed review of medications with son shows she is not taking Fiber supplement at all. She is using Senna twice BID recommend restarting fiber laxative and she can use her home maalox up to once a week.   - SM FIBER LAXATIVE 500 MG TABS tablet  Dispense: 90 tablet; Refill: 3    58 minutes spent on the date of the encounter doing chart review, history and exam, documentation and further activities per the note     MED REC REQUIRED  Post Medication Reconciliation Status:     No follow-ups on file.    The information in this document, created by the medical scribe for me, accurately reflects the services I personally performed and the decisions made by me. I have reviewed and approved this document for accuracy prior to leaving the patient care area.  Karely Sierra MD  3:12 PM, 02/01/23  [facility] AMI Ray  is a 57 year old accompanied by her , presenting for the following health issues:  Shortness of Breath and ER F/U      HPI   Pain  Patient points to her left leg, explaining that it is swollen and very severely painful. She finds this pain intolerable. Pain starts in the knee and moves down her leg. 10 months ago she slammed her leg with the kitchen door, at the time the pain was minimal, but has recently gotten especially bad. The top of the knee is the point of maximal pain.      Mood  Patient reports that her brother  earlier today in [place].     Patient had difficulty getting to the appointment today as the medical [employer] service did not pick her up and she instead got a ride from a neighbor.     Sleep  Patient reports that she lewis not have oxygen at home, which she very much needs. She went to the sleep specialist a few days ago. She is scheduled for a sleep study at the end of February.    Blood pressure  Patient is additionally concerned about her elevated blood pressure. Home nurses have told her that her at home blood pressures are very high.     Abdominal pain  Patient requests and US due to abdominal pain. Patient has not been eating. She still feels hunger, denies vomiting, and denies changes in stool. She has been eating oranges, and is not able to get much other food down.     After reviewing imaging, she agrees that she has been constipated. She has been taking two tablets psyllium both morning and night.    Review of Systems   Positive for: LLE swelling, pain, low oxygen, hypertension, shortness of breath, disrupted sleep, abdominal pain.  Negative for: Vomiting, loss of appetite, changes in stool    This document serves as a record of the services and decisions personally performed and made by Karely Sierra MD. It was created on his/her behalf by Nestor Bueno, a trained medical scribe. The creation of this document is based the provider's statements to the medical scribe.  Nelson Baez  Myles 3:13 PM, February 1, 2023        Objective    BP (!) 149/90   Temp 97  F (36.1  C)   Resp 24   Wt 106.6 kg (235 lb)   LMP  (LMP Unknown)   SpO2 (!) 87%   BMI 44.40 kg/m    Body mass index is 44.4 kg/m .   SpO2 91% on 2L  Physical Exam   GENERAL: healthy, alert and no distress  RESP: Diminished aeration at bilateral bases  CV: regular rate and rhythm, normal S1 S2, no S3 or S4, no murmur, click or rub, no peripheral edema and peripheral pulses strong  MS: Swelling of left leg > right. She is wearing compression stockings bilaterally. Soft tissue swelling from toes to level of the knee. Bilateral joint line tenderness. Extension is full. Bend just past 90 degrees and then stops due to pain. No effusion.    PSYCH: Intermittently tearful    No results found for any visits on 02/01/23.

## 2023-02-01 NOTE — PATIENT INSTRUCTIONS
1.   Prescribed 25 mg chlorthalidone, take once daily    Pain - Knee  Ordered ankle to knee compression socks    2.   X-Ray left knee     3.  Referred to Sports medicine for knee pain    Pain - Abdomen  1.   X-Ray Abdomen    2.  Recommend Milk of Magnesia once a week for relief.    3.  Prescribed fiber supplement      DME order for Compression Stockings faxed to [facility] [phone]. Successful send. BRENDA  Sent copy of order to patient home also, after seeing message from . BRENDA

## 2023-02-01 NOTE — PROGRESS NOTES
DME (Durable Medical Equipment) Orders and Documentation  Orders Placed This Encounter   Procedures     Compression Sleeve/Stocking Order      The patient was assessed and it was determined the patient is in need of the following listed DME Supplies/Equipment. Please complete supporting documentation below to demonstrate medical necessity.      Compression Sleeve/Stocking(s) Supplies Documentation  The patient needs compression stockings for preventing wounds wound(s).          Oxygen Documentation  I certify that this patient, Flor Navarro has been under my care (or a nurse practitioner or physican's assistant working with me). This is the face-to-face encounter for oxygen medical necessity.      At the time of this encounter supplemental oxygen is reasonable and necessary and is expected to improve the patient's condition in a home setting.       Patient has continued oxygen desaturation due to Chronic Respiratory Failure with Hypoxia J96.11.    If portability is ordered, is the patient mobile within the home? yes

## 2023-02-02 ENCOUNTER — TELEPHONE (OUTPATIENT)
Dept: FAMILY MEDICINE | Facility: CLINIC | Age: 58
End: 2023-02-02
Payer: MEDICARE

## 2023-02-02 NOTE — TELEPHONE ENCOUNTER
"Care Coordinator returning [facility] call.  had given patient /family CC direct number at yesterday's clinic visit, if they had not received oxygen. CC explained to Courtney that she will contact [facility] ([phone]) and get back to him. Courtney agreed.   CC had spoke to [facility] GRAYSON yesterday about working on this order as CC was out at 4:00p.m. Not sure of where things are at as there is no documentation in patient chart.  CC spoke to Representative at [facility] to see exactly what is needed from \[facility]\"" so that we can get the portable oxygen out to patient home. Representative stated that he\" would have to transfer this to the oxygen team and they would then reach out to the family\". CC asked again about also receiving a call so we know what it is they need. (Clinic notes not yet signed)  I explained that the patient's son, Courtney spoke english and to please call him at [phone]. CC was told that she \"would receive a call after the team spoke to the family\".   CC contacted Courtney and informed him of plan. Explained that he could contact CC anytime. Courtney understands. Forwarding to  as TAMIA.    Shahla Shepherd  Care Coordinator  [facility]  [phone]    "

## 2023-02-06 NOTE — TELEPHONE ENCOUNTER
"2/6/23 CC reached out to [facility] to follow up on oxygen order from last week, as CC as not received a call. CC spoke to Cammy, who spoke to the\"Oxygen Liaison\". Per Cammy, \"the patient received a portable oxygen machine from [place] last August (2022). Cammy was to contact patient son Courtney ([phone]).From the conversations patient does have a home tank, that plugs into the wall and a portable oxygen machine, \"Portable Oxy Concentrator\".      Shahla Shepherd  Care Coordinator  [facility]  [phone]    "

## 2023-02-07 NOTE — PROGRESS NOTES
sent oxygen orders to [facility], as patient reported during visit today that there was no oxygen supplies at home. Patient's provider wrote orders during visit.     AUDIE Fonseca

## 2023-02-09 ENCOUNTER — DOCUMENTATION ONLY (OUTPATIENT)
Dept: FAMILY MEDICINE | Facility: CLINIC | Age: 58
End: 2023-02-09
Payer: MEDICARE

## 2023-02-09 NOTE — PROGRESS NOTES
"When opening a documentation only encounter, be sure to enter in \"Chief Complaint\" Forms and in \" Comments\" Title of form, description if needed.    Flor is a 57 year old  female  Form received via: Fax  Form now resides in: Provider Ready    Darius Durant MA          Form has been completed by provider.     Form sent out via: Fax to [place] at Fax Number: [phone]  Patient informed: N/A  Output date: February 13, 2023    Darius Durant MA      **Please close the encounter**      "

## 2023-02-10 DIAGNOSIS — Z53.9 DIAGNOSIS NOT YET DEFINED: Primary | ICD-10-CM

## 2023-02-10 PROCEDURE — G0179 MD RECERTIFICATION HHA PT: HCPCS | Performed by: FAMILY MEDICINE

## 2023-02-23 ENCOUNTER — APPOINTMENT (OUTPATIENT)
Dept: CT IMAGING | Facility: CLINIC | Age: 58
DRG: 682 | End: 2023-02-23
Attending: EMERGENCY MEDICINE
Payer: MEDICARE

## 2023-02-23 ENCOUNTER — APPOINTMENT (OUTPATIENT)
Dept: CARDIOLOGY | Facility: CLINIC | Age: 58
DRG: 682 | End: 2023-02-23
Attending: EMERGENCY MEDICINE
Payer: MEDICARE

## 2023-02-23 ENCOUNTER — HOSPITAL ENCOUNTER (INPATIENT)
Facility: CLINIC | Age: 58
LOS: 7 days | Discharge: HOME | DRG: 682 | End: 2023-03-02
Attending: EMERGENCY MEDICINE | Admitting: INTERNAL MEDICINE
Payer: MEDICARE

## 2023-02-23 ENCOUNTER — APPOINTMENT (OUTPATIENT)
Dept: GENERAL RADIOLOGY | Facility: CLINIC | Age: 58
DRG: 682 | End: 2023-02-23
Attending: EMERGENCY MEDICINE
Payer: MEDICARE

## 2023-02-23 DIAGNOSIS — G47.33 OSA (OBSTRUCTIVE SLEEP APNEA): ICD-10-CM

## 2023-02-23 DIAGNOSIS — E86.0 DEHYDRATION: ICD-10-CM

## 2023-02-23 DIAGNOSIS — J96.12 CHRONIC RESPIRATORY FAILURE WITH HYPOXIA AND HYPERCAPNIA (H): ICD-10-CM

## 2023-02-23 DIAGNOSIS — J81.0 ACUTE PULMONARY EDEMA (H): ICD-10-CM

## 2023-02-23 DIAGNOSIS — Z11.52 ENCOUNTER FOR SCREENING LABORATORY TESTING FOR SEVERE ACUTE RESPIRATORY SYNDROME CORONAVIRUS 2 (SARS-COV-2): ICD-10-CM

## 2023-02-23 DIAGNOSIS — R06.89 HYPOVENTILATION: Primary | ICD-10-CM

## 2023-02-23 DIAGNOSIS — N17.9 ACUTE KIDNEY INJURY (H): ICD-10-CM

## 2023-02-23 DIAGNOSIS — J96.11 CHRONIC RESPIRATORY FAILURE WITH HYPOXIA AND HYPERCAPNIA (H): ICD-10-CM

## 2023-02-23 DIAGNOSIS — R41.0 ACUTE CONFUSION: ICD-10-CM

## 2023-02-23 DIAGNOSIS — E87.5 HYPERKALEMIA: ICD-10-CM

## 2023-02-23 DIAGNOSIS — R09.02 HYPOXIA: ICD-10-CM

## 2023-02-23 LAB
ALBUMIN SERPL BCG-MCNC: 3.7 G/DL (ref 3.5–5.2)
ALP SERPL-CCNC: 85 U/L (ref 35–104)
ALT SERPL W P-5'-P-CCNC: 21 U/L (ref 10–35)
AMMONIA PLAS-SCNC: 43 UMOL/L (ref 11–51)
ANION GAP SERPL CALCULATED.3IONS-SCNC: 13 MMOL/L (ref 7–15)
AST SERPL W P-5'-P-CCNC: 24 U/L (ref 10–35)
BASE EXCESS BLDV CALC-SCNC: -1.2 MMOL/L (ref -7.7–1.9)
BASE EXCESS BLDV CALC-SCNC: -1.2 MMOL/L (ref -7.7–1.9)
BASE EXCESS BLDV CALC-SCNC: -3 MMOL/L (ref -7.7–1.9)
BASOPHILS # BLD AUTO: 0 10E3/UL (ref 0–0.2)
BASOPHILS NFR BLD AUTO: 0 %
BILIRUB SERPL-MCNC: 0.3 MG/DL
BUN SERPL-MCNC: 49 MG/DL (ref 6–20)
CALCIUM SERPL-MCNC: 9.3 MG/DL (ref 8.6–10)
CHLORIDE SERPL-SCNC: 103 MMOL/L (ref 98–107)
CREAT SERPL-MCNC: 3.04 MG/DL (ref 0.51–0.95)
DEPRECATED HCO3 PLAS-SCNC: 24 MMOL/L (ref 22–29)
EOSINOPHIL # BLD AUTO: 0 10E3/UL (ref 0–0.7)
EOSINOPHIL NFR BLD AUTO: 0 %
ERYTHROCYTE [DISTWIDTH] IN BLOOD BY AUTOMATED COUNT: 14.3 % (ref 10–15)
FLUAV RNA SPEC QL NAA+PROBE: NEGATIVE
FLUBV RNA RESP QL NAA+PROBE: NEGATIVE
GFR SERPL CREATININE-BSD FRML MDRD: 17 ML/MIN/1.73M2
GLUCOSE SERPL-MCNC: 120 MG/DL (ref 70–99)
HCO3 BLDV-SCNC: 28 MMOL/L (ref 21–28)
HCO3 BLDV-SCNC: 28 MMOL/L (ref 21–28)
HCO3 BLDV-SCNC: 29 MMOL/L (ref 21–28)
HCT VFR BLD AUTO: 41.8 % (ref 35–47)
HGB BLD-MCNC: 11.3 G/DL (ref 11.7–15.7)
HOLD SPECIMEN: NORMAL
IMM GRANULOCYTES # BLD: 0.1 10E3/UL
IMM GRANULOCYTES NFR BLD: 2 %
LACTATE SERPL-SCNC: 1.5 MMOL/L (ref 0.7–2)
LVEF ECHO: NORMAL
LYMPHOCYTES # BLD AUTO: 0.7 10E3/UL (ref 0.8–5.3)
LYMPHOCYTES NFR BLD AUTO: 11 %
MCH RBC QN AUTO: 27.3 PG (ref 26.5–33)
MCHC RBC AUTO-ENTMCNC: 27 G/DL (ref 31.5–36.5)
MCV RBC AUTO: 101 FL (ref 78–100)
MONOCYTES # BLD AUTO: 0.5 10E3/UL (ref 0–1.3)
MONOCYTES NFR BLD AUTO: 8 %
NEUTROPHILS # BLD AUTO: 5 10E3/UL (ref 1.6–8.3)
NEUTROPHILS NFR BLD AUTO: 79 %
NRBC # BLD AUTO: 0.2 10E3/UL
NRBC BLD AUTO-RTO: 3 /100
NT-PROBNP SERPL-MCNC: ABNORMAL PG/ML (ref 0–900)
O2/TOTAL GAS SETTING VFR VENT: 5 %
O2/TOTAL GAS SETTING VFR VENT: 50 %
O2/TOTAL GAS SETTING VFR VENT: 60 %
PCO2 BLDV: 72 MM HG (ref 40–50)
PCO2 BLDV: 79 MM HG (ref 40–50)
PCO2 BLDV: 83 MM HG (ref 40–50)
PH BLDV: 7.13 [PH] (ref 7.32–7.43)
PH BLDV: 7.17 [PH] (ref 7.32–7.43)
PH BLDV: 7.2 [PH] (ref 7.32–7.43)
PLAT MORPH BLD: ABNORMAL
PLATELET # BLD AUTO: 196 10E3/UL (ref 150–450)
PO2 BLDV: 28 MM HG (ref 25–47)
PO2 BLDV: 50 MM HG (ref 25–47)
PO2 BLDV: 52 MM HG (ref 25–47)
POLYCHROMASIA BLD QL SMEAR: SLIGHT
POTASSIUM SERPL-SCNC: 5.6 MMOL/L (ref 3.4–5.3)
PROCALCITONIN SERPL IA-MCNC: 0.52 NG/ML
PROT SERPL-MCNC: 6.8 G/DL (ref 6.4–8.3)
RBC # BLD AUTO: 4.14 10E6/UL (ref 3.8–5.2)
RBC MORPH BLD: ABNORMAL
RSV RNA SPEC NAA+PROBE: NEGATIVE
SARS-COV-2 RNA RESP QL NAA+PROBE: NEGATIVE
SODIUM SERPL-SCNC: 140 MMOL/L (ref 136–145)
TROPONIN T SERPL HS-MCNC: 34 NG/L
WBC # BLD AUTO: 6.4 10E3/UL (ref 4–11)

## 2023-02-23 PROCEDURE — 82803 BLOOD GASES ANY COMBINATION: CPT | Performed by: HOSPITALIST

## 2023-02-23 PROCEDURE — 93306 TTE W/DOPPLER COMPLETE: CPT | Mod: 26 | Performed by: INTERNAL MEDICINE

## 2023-02-23 PROCEDURE — 82140 ASSAY OF AMMONIA: CPT | Performed by: EMERGENCY MEDICINE

## 2023-02-23 PROCEDURE — C9803 HOPD COVID-19 SPEC COLLECT: HCPCS | Performed by: EMERGENCY MEDICINE

## 2023-02-23 PROCEDURE — 93005 ELECTROCARDIOGRAM TRACING: CPT

## 2023-02-23 PROCEDURE — 250N000011 HC RX IP 250 OP 636: Performed by: NURSE PRACTITIONER

## 2023-02-23 PROCEDURE — 999N000128 HC STATISTIC PERIPHERAL IV START W/O US GUIDANCE

## 2023-02-23 PROCEDURE — 83605 ASSAY OF LACTIC ACID: CPT | Performed by: EMERGENCY MEDICINE

## 2023-02-23 PROCEDURE — 84484 ASSAY OF TROPONIN QUANT: CPT | Performed by: EMERGENCY MEDICINE

## 2023-02-23 PROCEDURE — 82803 BLOOD GASES ANY COMBINATION: CPT | Performed by: EMERGENCY MEDICINE

## 2023-02-23 PROCEDURE — G1010 CDSM STANSON: HCPCS

## 2023-02-23 PROCEDURE — 250N000013 HC RX MED GY IP 250 OP 250 PS 637: Performed by: INTERNAL MEDICINE

## 2023-02-23 PROCEDURE — 36415 COLL VENOUS BLD VENIPUNCTURE: CPT | Performed by: HOSPITALIST

## 2023-02-23 PROCEDURE — 999N000156 HC STATISTIC RCP CONSULT EA 30 MIN

## 2023-02-23 PROCEDURE — G1010 CDSM STANSON: HCPCS | Performed by: RADIOLOGY

## 2023-02-23 PROCEDURE — 258N000003 HC RX IP 258 OP 636: Performed by: EMERGENCY MEDICINE

## 2023-02-23 PROCEDURE — 999N000127 HC STATISTIC PERIPHERAL IV START W US GUIDANCE

## 2023-02-23 PROCEDURE — 84155 ASSAY OF PROTEIN SERUM: CPT | Performed by: HOSPITALIST

## 2023-02-23 PROCEDURE — 71046 X-RAY EXAM CHEST 2 VIEWS: CPT

## 2023-02-23 PROCEDURE — 93306 TTE W/DOPPLER COMPLETE: CPT

## 2023-02-23 PROCEDURE — 36415 COLL VENOUS BLD VENIPUNCTURE: CPT | Performed by: NURSE PRACTITIONER

## 2023-02-23 PROCEDURE — 250N000012 HC RX MED GY IP 250 OP 636 PS 637: Performed by: NURSE PRACTITIONER

## 2023-02-23 PROCEDURE — 70450 CT HEAD/BRAIN W/O DYE: CPT | Mod: 26 | Performed by: RADIOLOGY

## 2023-02-23 PROCEDURE — 250N000009 HC RX 250: Performed by: INTERNAL MEDICINE

## 2023-02-23 PROCEDURE — 82803 BLOOD GASES ANY COMBINATION: CPT | Performed by: NURSE PRACTITIONER

## 2023-02-23 PROCEDURE — 120N000002 HC R&B MED SURG/OB UMMC

## 2023-02-23 PROCEDURE — 94660 CPAP INITIATION&MGMT: CPT

## 2023-02-23 PROCEDURE — 999N000157 HC STATISTIC RCP TIME EA 10 MIN

## 2023-02-23 PROCEDURE — 84450 TRANSFERASE (AST) (SGOT): CPT | Performed by: HOSPITALIST

## 2023-02-23 PROCEDURE — 85025 COMPLETE CBC W/AUTO DIFF WBC: CPT | Performed by: EMERGENCY MEDICINE

## 2023-02-23 PROCEDURE — 99207 PR APP CREDIT; MD BILLING SHARED VISIT: CPT | Mod: FS | Performed by: INTERNAL MEDICINE

## 2023-02-23 PROCEDURE — 83880 ASSAY OF NATRIURETIC PEPTIDE: CPT | Performed by: EMERGENCY MEDICINE

## 2023-02-23 PROCEDURE — 93010 ELECTROCARDIOGRAM REPORT: CPT | Performed by: EMERGENCY MEDICINE

## 2023-02-23 PROCEDURE — 94640 AIRWAY INHALATION TREATMENT: CPT

## 2023-02-23 PROCEDURE — C9803 HOPD COVID-19 SPEC COLLECT: HCPCS

## 2023-02-23 PROCEDURE — 71046 X-RAY EXAM CHEST 2 VIEWS: CPT | Mod: 26 | Performed by: RADIOLOGY

## 2023-02-23 PROCEDURE — 99285 EMERGENCY DEPT VISIT HI MDM: CPT | Mod: 25,CS | Performed by: EMERGENCY MEDICINE

## 2023-02-23 PROCEDURE — 96360 HYDRATION IV INFUSION INIT: CPT

## 2023-02-23 PROCEDURE — 87637 SARSCOV2&INF A&B&RSV AMP PRB: CPT | Performed by: EMERGENCY MEDICINE

## 2023-02-23 PROCEDURE — 84145 PROCALCITONIN (PCT): CPT | Performed by: NURSE PRACTITIONER

## 2023-02-23 PROCEDURE — 250N000013 HC RX MED GY IP 250 OP 250 PS 637: Performed by: NURSE PRACTITIONER

## 2023-02-23 PROCEDURE — 5A09457 ASSISTANCE WITH RESPIRATORY VENTILATION, 24-96 CONSECUTIVE HOURS, CONTINUOUS POSITIVE AIRWAY PRESSURE: ICD-10-PCS | Performed by: STUDENT IN AN ORGANIZED HEALTH CARE EDUCATION/TRAINING PROGRAM

## 2023-02-23 PROCEDURE — 99222 1ST HOSP IP/OBS MODERATE 55: CPT | Mod: AI | Performed by: NURSE PRACTITIONER

## 2023-02-23 PROCEDURE — 250N000011 HC RX IP 250 OP 636: Performed by: EMERGENCY MEDICINE

## 2023-02-23 PROCEDURE — 99285 EMERGENCY DEPT VISIT HI MDM: CPT | Mod: 25 | Performed by: EMERGENCY MEDICINE

## 2023-02-23 PROCEDURE — 93005 ELECTROCARDIOGRAM TRACING: CPT | Performed by: EMERGENCY MEDICINE

## 2023-02-23 PROCEDURE — 96360 HYDRATION IV INFUSION INIT: CPT | Performed by: EMERGENCY MEDICINE

## 2023-02-23 PROCEDURE — 36415 COLL VENOUS BLD VENIPUNCTURE: CPT | Performed by: EMERGENCY MEDICINE

## 2023-02-23 PROCEDURE — 84132 ASSAY OF SERUM POTASSIUM: CPT | Performed by: EMERGENCY MEDICINE

## 2023-02-23 PROCEDURE — 99285 EMERGENCY DEPT VISIT HI MDM: CPT | Mod: CS,25

## 2023-02-23 RX ORDER — MYCOPHENOLATE MOFETIL 250 MG/1
500 CAPSULE ORAL 2 TIMES DAILY
Status: DISCONTINUED | OUTPATIENT
Start: 2023-02-23 | End: 2023-02-24

## 2023-02-23 RX ORDER — CHLORTHALIDONE 25 MG/1
25 TABLET ORAL DAILY
Status: DISCONTINUED | OUTPATIENT
Start: 2023-02-24 | End: 2023-03-02 | Stop reason: HOSPADM

## 2023-02-23 RX ORDER — HEPARIN SODIUM 5000 [USP'U]/.5ML
5000 INJECTION, SOLUTION INTRAVENOUS; SUBCUTANEOUS EVERY 12 HOURS
Status: DISCONTINUED | OUTPATIENT
Start: 2023-02-23 | End: 2023-03-02 | Stop reason: HOSPADM

## 2023-02-23 RX ORDER — POLYETHYLENE GLYCOL 3350 17 G/17G
17 POWDER, FOR SOLUTION ORAL DAILY PRN
Status: DISCONTINUED | OUTPATIENT
Start: 2023-02-23 | End: 2023-03-02 | Stop reason: HOSPADM

## 2023-02-23 RX ORDER — FEBUXOSTAT 40 MG/1
40 TABLET, FILM COATED ORAL DAILY
Status: DISCONTINUED | OUTPATIENT
Start: 2023-02-24 | End: 2023-03-02 | Stop reason: HOSPADM

## 2023-02-23 RX ORDER — LIDOCAINE 40 MG/G
CREAM TOPICAL
Status: DISCONTINUED | OUTPATIENT
Start: 2023-02-23 | End: 2023-03-02 | Stop reason: HOSPADM

## 2023-02-23 RX ORDER — AMLODIPINE BESYLATE 10 MG/1
10 TABLET ORAL DAILY
Status: DISCONTINUED | OUTPATIENT
Start: 2023-02-24 | End: 2023-03-02 | Stop reason: HOSPADM

## 2023-02-23 RX ORDER — FUROSEMIDE 10 MG/ML
40 INJECTION INTRAMUSCULAR; INTRAVENOUS ONCE
Status: COMPLETED | OUTPATIENT
Start: 2023-02-23 | End: 2023-02-23

## 2023-02-23 RX ORDER — CYCLOSPORINE 25 MG/1
50 CAPSULE, LIQUID FILLED ORAL AT BEDTIME
Status: DISCONTINUED | OUTPATIENT
Start: 2023-02-23 | End: 2023-02-24

## 2023-02-23 RX ORDER — ONDANSETRON 2 MG/ML
4 INJECTION INTRAMUSCULAR; INTRAVENOUS EVERY 6 HOURS PRN
Status: DISCONTINUED | OUTPATIENT
Start: 2023-02-23 | End: 2023-03-02 | Stop reason: HOSPADM

## 2023-02-23 RX ORDER — PANTOPRAZOLE SODIUM 40 MG/1
40 TABLET, DELAYED RELEASE ORAL
Status: DISCONTINUED | OUTPATIENT
Start: 2023-02-23 | End: 2023-03-02 | Stop reason: HOSPADM

## 2023-02-23 RX ORDER — CALCITRIOL 0.25 UG/1
0.25 CAPSULE, LIQUID FILLED ORAL DAILY
Status: DISCONTINUED | OUTPATIENT
Start: 2023-02-24 | End: 2023-03-02 | Stop reason: HOSPADM

## 2023-02-23 RX ORDER — ALBUTEROL SULFATE 0.63 MG/3ML
1 SOLUTION RESPIRATORY (INHALATION)
Status: DISCONTINUED | OUTPATIENT
Start: 2023-02-23 | End: 2023-02-23

## 2023-02-23 RX ORDER — BISACODYL 5 MG
5 TABLET, DELAYED RELEASE (ENTERIC COATED) ORAL DAILY PRN
Status: DISCONTINUED | OUTPATIENT
Start: 2023-02-23 | End: 2023-03-02 | Stop reason: HOSPADM

## 2023-02-23 RX ORDER — CYCLOSPORINE 25 MG/1
75 CAPSULE, LIQUID FILLED ORAL
Status: DISCONTINUED | OUTPATIENT
Start: 2023-02-24 | End: 2023-02-24

## 2023-02-23 RX ORDER — FUROSEMIDE 20 MG
20 TABLET ORAL 2 TIMES DAILY
Status: DISCONTINUED | OUTPATIENT
Start: 2023-02-24 | End: 2023-02-28

## 2023-02-23 RX ORDER — ONDANSETRON 4 MG/1
4 TABLET, ORALLY DISINTEGRATING ORAL EVERY 6 HOURS PRN
Status: DISCONTINUED | OUTPATIENT
Start: 2023-02-23 | End: 2023-03-02 | Stop reason: HOSPADM

## 2023-02-23 RX ORDER — CARBOXYMETHYLCELLULOSE SODIUM 5 MG/ML
1 SOLUTION/ DROPS OPHTHALMIC
Status: DISCONTINUED | OUTPATIENT
Start: 2023-02-23 | End: 2023-03-02 | Stop reason: HOSPADM

## 2023-02-23 RX ORDER — ACETAMINOPHEN 325 MG/1
650 TABLET ORAL EVERY 6 HOURS PRN
Status: DISCONTINUED | OUTPATIENT
Start: 2023-02-23 | End: 2023-03-02 | Stop reason: HOSPADM

## 2023-02-23 RX ORDER — ACETAMINOPHEN 650 MG/1
650 SUPPOSITORY RECTAL EVERY 6 HOURS PRN
Status: DISCONTINUED | OUTPATIENT
Start: 2023-02-23 | End: 2023-03-02 | Stop reason: HOSPADM

## 2023-02-23 RX ORDER — SODIUM CHLORIDE, SODIUM LACTATE, POTASSIUM CHLORIDE, CALCIUM CHLORIDE 600; 310; 30; 20 MG/100ML; MG/100ML; MG/100ML; MG/100ML
125 INJECTION, SOLUTION INTRAVENOUS CONTINUOUS
Status: DISCONTINUED | OUTPATIENT
Start: 2023-02-23 | End: 2023-02-23

## 2023-02-23 RX ORDER — BISACODYL 5 MG
10 TABLET, DELAYED RELEASE (ENTERIC COATED) ORAL DAILY PRN
Status: DISCONTINUED | OUTPATIENT
Start: 2023-02-23 | End: 2023-03-02 | Stop reason: HOSPADM

## 2023-02-23 RX ORDER — LEVALBUTEROL INHALATION SOLUTION 0.31 MG/3ML
0.31 SOLUTION RESPIRATORY (INHALATION)
Status: DISCONTINUED | OUTPATIENT
Start: 2023-02-23 | End: 2023-02-23

## 2023-02-23 RX ORDER — LEVALBUTEROL INHALATION SOLUTION 1.25 MG/3ML
1.25 SOLUTION RESPIRATORY (INHALATION)
Status: DISCONTINUED | OUTPATIENT
Start: 2023-02-24 | End: 2023-02-24

## 2023-02-23 RX ADMIN — SODIUM CHLORIDE, POTASSIUM CHLORIDE, SODIUM LACTATE AND CALCIUM CHLORIDE 125 ML/HR: 600; 310; 30; 20 INJECTION, SOLUTION INTRAVENOUS at 13:58

## 2023-02-23 RX ADMIN — MYCOPHENOLATE MOFETIL 500 MG: 250 CAPSULE ORAL at 21:26

## 2023-02-23 RX ADMIN — HEPARIN SODIUM 5000 UNITS: 5000 INJECTION, SOLUTION INTRAVENOUS; SUBCUTANEOUS at 21:27

## 2023-02-23 RX ADMIN — KETOTIFEN FUMARATE 1 DROP: 0.35 SOLUTION/ DROPS OPHTHALMIC at 22:32

## 2023-02-23 RX ADMIN — PANTOPRAZOLE SODIUM 40 MG: 40 TABLET, DELAYED RELEASE ORAL at 21:27

## 2023-02-23 RX ADMIN — CYCLOSPORINE 50 MG: 25 CAPSULE, LIQUID FILLED ORAL at 22:31

## 2023-02-23 RX ADMIN — FUROSEMIDE 40 MG: 10 INJECTION, SOLUTION INTRAVENOUS at 15:15

## 2023-02-23 RX ADMIN — LEVALBUTEROL HYDROCHLORIDE 1.25 MG: 1.25 SOLUTION RESPIRATORY (INHALATION) at 23:49

## 2023-02-23 ASSESSMENT — ACTIVITIES OF DAILY LIVING (ADL)
ADLS_ACUITY_SCORE: 35

## 2023-02-23 NOTE — PROGRESS NOTES
RT called down to do STAT ABG. Upon arrival vascular was placing a line due to previous one infiltrating. After they were done pt stated that she didn't want to be poked anymore. RT explained to her through family (that was interpreting) why it was necessary but she didn't want it. RT informed doctor and he was okay with holding off for now. RT also updated RN.    RT will continue to be available if needed.    Dixon Dowell,RRT.

## 2023-02-23 NOTE — PROGRESS NOTES
RT called to bedside to start patient on bipap for low oxygen saturations. Upon arrival patient was somnolent wearing a 5lpm oxyplus mask with SpO2 reading of 85%. Patient was able to follow commands but would not remain awake long. With further investigation RT noted a VBG was drawn at 1542 that showed significant respiratory acidosis (7.20/72/50/28), to note RT was not called to start BiPAP until 1730.    RT informed family who was interpreting what the machine was and that RT would return in 30 minutes to draw blood.

## 2023-02-23 NOTE — PROGRESS NOTES
"[facility]  Inpatient Primary Care Note    Flor Navarro MRN# [id number]   Age: 57 year old YOB: 1965     2/23/2023 3:20 PM    Patient admitted: 2/23/2023 12:21 PM  Reason for admission: Acute pulmonary edema (H) [J81.0]  Primary inpatient team: sachin, current in ED under care of Dr. Fontenot    Home clinic: [facility]  Primary care provider: Karely Sierra MD         Primary provider communication:     1. I have reviewed the patient s history and reviewed labs YES. Additional comments:     Pt very well known to me. In ED r/t hallucinations/AMS with increase in baseline Cr and BNP. Suspect will require admission. Has been stable >10 yrs in terms of transplant hx, so would recommend admission to [facility] if possible.       Speaks \"Romanian\" Uzbek, so if translation is an issue, please clarify with the  (this has come up in the past).    Son Courtney is very knowledgable about her care and is good about answering the phone if he is not currently present. I am available to discuss case anytime, pgr [phone].      Karely Sierra MD      "

## 2023-02-23 NOTE — ED TRIAGE NOTES
Brought from home for multiple complaints, primarily changes in mentation per family.  Has been confused for 3 days, +hallucinations. Trembling. Ear/eye swelling.  On oxygen for SURI, but has been wearing 3L continuously.   At baseline, she uses minimal assistance and is oriented.  Hx liver tx 2011     Triage Assessment     Row Name 02/23/23 1216       Triage Assessment (Adult)    Airway WDL WDL       Respiratory WDL    Respiratory WDL X       Skin Circulation/Temperature WDL    Skin Circulation/Temperature WDL WDL       Cardiac WDL    Cardiac WDL WDL       Peripheral/Neurovascular WDL    Peripheral Neurovascular WDL WDL       Cognitive/Neuro/Behavioral WDL    Cognitive/Neuro/Behavioral WDL WDL

## 2023-02-23 NOTE — LETTER
Transition Communication Hand-off for Care Transitions to Next Level of Care Provider    Name: Flor Navarro  : 1965  MRN #: [id number]  Primary Care Provider: Karely Sierra     Primary Clinic:  [address] [address]     Reason for Hospitalization:  Dehydration [E86.0]  Hyperkalemia [E87.5]  Acute pulmonary edema (H) [J81.0]  Acute confusion [R41.0]  Hypoxia [R09.02]  Acute kidney injury (H) [N17.9]  Admit Date/Time: 2023 12:21 PM  Discharge Date: 3/2/2023    Payor Source: Payor: MEDICARE / Plan: MEDICARE / Product Type: Medicare /     HC PT/RN referral/resumption    DAYNA Staley, BA, RN, CMSRN, RNCC  Covering Units 6D/OBS  Pager: [phone]  Phone: [phone]  6th floor Weekend/Holiday Pager: [phone]  Observation weekend/after hours: [phone]

## 2023-02-24 ENCOUNTER — APPOINTMENT (OUTPATIENT)
Dept: ULTRASOUND IMAGING | Facility: CLINIC | Age: 58
DRG: 682 | End: 2023-02-24
Attending: HOSPITALIST
Payer: MEDICARE

## 2023-02-24 LAB
ALBUMIN SERPL BCG-MCNC: 3.7 G/DL (ref 3.5–5.2)
ALBUMIN UR-MCNC: 50 MG/DL
ALLEN'S TEST: YES
ALP SERPL-CCNC: 83 U/L (ref 35–104)
ALT SERPL W P-5'-P-CCNC: 20 U/L (ref 10–35)
AMMONIA PLAS-SCNC: 73 UMOL/L (ref 11–51)
ANION GAP SERPL CALCULATED.3IONS-SCNC: 10 MMOL/L (ref 7–15)
ANION GAP SERPL CALCULATED.3IONS-SCNC: 10 MMOL/L (ref 7–15)
ANION GAP SERPL CALCULATED.3IONS-SCNC: 9 MMOL/L (ref 7–15)
APPEARANCE UR: ABNORMAL
APTT PPP: 27 SECONDS (ref 22–38)
AST SERPL W P-5'-P-CCNC: 21 U/L (ref 10–35)
ATRIAL RATE - MUSE: 71 BPM
BASE EXCESS BLDA CALC-SCNC: -1.8 MMOL/L (ref -9–1.8)
BASE EXCESS BLDV CALC-SCNC: -0.4 MMOL/L (ref -7.7–1.9)
BASE EXCESS BLDV CALC-SCNC: -1.1 MMOL/L (ref -7.7–1.9)
BASE EXCESS BLDV CALC-SCNC: -2.5 MMOL/L (ref -7.7–1.9)
BASE EXCESS BLDV CALC-SCNC: 0.5 MMOL/L (ref -7.7–1.9)
BASE EXCESS BLDV CALC-SCNC: 0.7 MMOL/L (ref -7.7–1.9)
BILIRUB SERPL-MCNC: 0.3 MG/DL
BILIRUB UR QL STRIP: NEGATIVE
BUN SERPL-MCNC: 51.1 MG/DL (ref 6–20)
BUN SERPL-MCNC: 55 MG/DL (ref 6–20)
BUN SERPL-MCNC: 56.2 MG/DL (ref 6–20)
C PNEUM DNA SPEC QL NAA+PROBE: NOT DETECTED
CALCIUM SERPL-MCNC: 8.8 MG/DL (ref 8.6–10)
CALCIUM SERPL-MCNC: 9 MG/DL (ref 8.6–10)
CALCIUM SERPL-MCNC: 9.3 MG/DL (ref 8.6–10)
CHLORIDE SERPL-SCNC: 105 MMOL/L (ref 98–107)
CHLORIDE SERPL-SCNC: 105 MMOL/L (ref 98–107)
CHLORIDE SERPL-SCNC: 107 MMOL/L (ref 98–107)
COLOR UR AUTO: YELLOW
CREAT SERPL-MCNC: 2.6 MG/DL (ref 0.51–0.95)
CREAT SERPL-MCNC: 2.78 MG/DL (ref 0.51–0.95)
CREAT SERPL-MCNC: 2.89 MG/DL (ref 0.51–0.95)
CRP SERPL-MCNC: 61.4 MG/L
CYCLOSPORINE BLD LC/MS/MS-MCNC: 58 UG/L (ref 50–400)
CYCLOSPORINE BLD LC/MS/MS-MCNC: 70 UG/L (ref 50–400)
DEPRECATED HCO3 PLAS-SCNC: 25 MMOL/L (ref 22–29)
DEPRECATED HCO3 PLAS-SCNC: 25 MMOL/L (ref 22–29)
DEPRECATED HCO3 PLAS-SCNC: 27 MMOL/L (ref 22–29)
DIASTOLIC BLOOD PRESSURE - MUSE: NORMAL MMHG
ERYTHROCYTE [DISTWIDTH] IN BLOOD BY AUTOMATED COUNT: 14.2 % (ref 10–15)
FLUAV H1 2009 PAND RNA SPEC QL NAA+PROBE: NOT DETECTED
FLUAV H1 RNA SPEC QL NAA+PROBE: NOT DETECTED
FLUAV H3 RNA SPEC QL NAA+PROBE: NOT DETECTED
FLUAV RNA SPEC QL NAA+PROBE: NOT DETECTED
FLUBV RNA SPEC QL NAA+PROBE: NOT DETECTED
GFR SERPL CREATININE-BSD FRML MDRD: 18 ML/MIN/1.73M2
GFR SERPL CREATININE-BSD FRML MDRD: 19 ML/MIN/1.73M2
GFR SERPL CREATININE-BSD FRML MDRD: 21 ML/MIN/1.73M2
GLUCOSE BLDC GLUCOMTR-MCNC: 95 MG/DL (ref 70–99)
GLUCOSE SERPL-MCNC: 105 MG/DL (ref 70–99)
GLUCOSE SERPL-MCNC: 129 MG/DL (ref 70–99)
GLUCOSE SERPL-MCNC: 97 MG/DL (ref 70–99)
GLUCOSE UR STRIP-MCNC: NEGATIVE MG/DL
HADV DNA SPEC QL NAA+PROBE: NOT DETECTED
HBA1C MFR BLD: 5.9 %
HCO3 BLD-SCNC: 27 MMOL/L (ref 21–28)
HCO3 BLDV-SCNC: 26 MMOL/L (ref 21–28)
HCO3 BLDV-SCNC: 28 MMOL/L (ref 21–28)
HCO3 BLDV-SCNC: 29 MMOL/L (ref 21–28)
HCOV PNL SPEC NAA+PROBE: NOT DETECTED
HCT VFR BLD AUTO: 40.2 % (ref 35–47)
HGB BLD-MCNC: 11.2 G/DL (ref 11.7–15.7)
HGB UR QL STRIP: ABNORMAL
HMPV RNA SPEC QL NAA+PROBE: NOT DETECTED
HOLD SPECIMEN: NORMAL
HOLD SPECIMEN: NORMAL
HPIV1 RNA SPEC QL NAA+PROBE: NOT DETECTED
HPIV2 RNA SPEC QL NAA+PROBE: NOT DETECTED
HPIV3 RNA SPEC QL NAA+PROBE: NOT DETECTED
HPIV4 RNA SPEC QL NAA+PROBE: NOT DETECTED
HYALINE CASTS: 18 /LPF
INR PPP: 0.98 (ref 0.85–1.15)
INTERPRETATION ECG - MUSE: NORMAL
KETONES UR STRIP-MCNC: NEGATIVE MG/DL
LEUKOCYTE ESTERASE UR QL STRIP: ABNORMAL
M PNEUMO DNA SPEC QL NAA+PROBE: NOT DETECTED
MCH RBC QN AUTO: 27.5 PG (ref 26.5–33)
MCHC RBC AUTO-ENTMCNC: 27.9 G/DL (ref 31.5–36.5)
MCV RBC AUTO: 99 FL (ref 78–100)
MRSA DNA SPEC QL NAA+PROBE: NEGATIVE
MUCOUS THREADS #/AREA URNS LPF: PRESENT /LPF
NITRATE UR QL: NEGATIVE
O2/TOTAL GAS SETTING VFR VENT: 50 %
O2/TOTAL GAS SETTING VFR VENT: 50 %
O2/TOTAL GAS SETTING VFR VENT: 60 %
O2/TOTAL GAS SETTING VFR VENT: 70 %
O2/TOTAL GAS SETTING VFR VENT: 70 %
O2/TOTAL GAS SETTING VFR VENT: 93 %
P AXIS - MUSE: 57 DEGREES
PCO2 BLD: 68 MM HG (ref 35–45)
PCO2 BLDV: 44 MM HG (ref 40–50)
PCO2 BLDV: 64 MM HG (ref 40–50)
PCO2 BLDV: 65 MM HG (ref 40–50)
PCO2 BLDV: 70 MM HG (ref 40–50)
PCO2 BLDV: 75 MM HG (ref 40–50)
PH BLD: 7.21 [PH] (ref 7.35–7.45)
PH BLDV: 7.17 [PH] (ref 7.32–7.43)
PH BLDV: 7.21 [PH] (ref 7.32–7.43)
PH BLDV: 7.25 [PH] (ref 7.32–7.43)
PH BLDV: 7.27 [PH] (ref 7.32–7.43)
PH BLDV: 7.38 [PH] (ref 7.32–7.43)
PH UR STRIP: 5 [PH] (ref 5–7)
PLATELET # BLD AUTO: 179 10E3/UL (ref 150–450)
PO2 BLD: 221 MM HG (ref 80–105)
PO2 BLDV: 44 MM HG (ref 25–47)
PO2 BLDV: 48 MM HG (ref 25–47)
PO2 BLDV: 61 MM HG (ref 25–47)
PO2 BLDV: 70 MM HG (ref 25–47)
PO2 BLDV: 73 MM HG (ref 25–47)
POTASSIUM SERPL-SCNC: 5.2 MMOL/L (ref 3.4–5.3)
POTASSIUM SERPL-SCNC: 5.2 MMOL/L (ref 3.4–5.3)
POTASSIUM SERPL-SCNC: 5.4 MMOL/L (ref 3.4–5.3)
PR INTERVAL - MUSE: 158 MS
PROCALCITONIN SERPL IA-MCNC: 0.47 NG/ML
PROT SERPL-MCNC: 6.8 G/DL (ref 6.4–8.3)
QRS DURATION - MUSE: 100 MS
QT - MUSE: 360 MS
QTC - MUSE: 391 MS
R AXIS - MUSE: -7 DEGREES
RBC # BLD AUTO: 4.08 10E6/UL (ref 3.8–5.2)
RBC URINE: 8 /HPF
RSV RNA SPEC QL NAA+PROBE: NOT DETECTED
RSV RNA SPEC QL NAA+PROBE: NOT DETECTED
RV+EV RNA SPEC QL NAA+PROBE: NOT DETECTED
SA TARGET DNA: NEGATIVE
SODIUM SERPL-SCNC: 139 MMOL/L (ref 136–145)
SODIUM SERPL-SCNC: 140 MMOL/L (ref 136–145)
SODIUM SERPL-SCNC: 144 MMOL/L (ref 136–145)
SP GR UR STRIP: 1.02 (ref 1–1.03)
SQUAMOUS EPITHELIAL: 3 /HPF
SYSTOLIC BLOOD PRESSURE - MUSE: NORMAL MMHG
T AXIS - MUSE: 33 DEGREES
TME LAST DOSE: NORMAL H
TRANSITIONAL EPI: 3 /HPF
TROPONIN T SERPL HS-MCNC: 36 NG/L
UROBILINOGEN UR STRIP-MCNC: NORMAL MG/DL
VENTRICULAR RATE- MUSE: 71 BPM
WBC # BLD AUTO: 5.1 10E3/UL (ref 4–11)
WBC CLUMPS #/AREA URNS HPF: PRESENT /HPF
WBC URINE: >182 /HPF

## 2023-02-24 PROCEDURE — 87086 URINE CULTURE/COLONY COUNT: CPT | Performed by: NURSE PRACTITIONER

## 2023-02-24 PROCEDURE — 87486 CHLMYD PNEUM DNA AMP PROBE: CPT | Performed by: HOSPITALIST

## 2023-02-24 PROCEDURE — 36415 COLL VENOUS BLD VENIPUNCTURE: CPT | Performed by: INTERNAL MEDICINE

## 2023-02-24 PROCEDURE — 85730 THROMBOPLASTIN TIME PARTIAL: CPT | Performed by: HOSPITALIST

## 2023-02-24 PROCEDURE — 80158 DRUG ASSAY CYCLOSPORINE: CPT | Performed by: INTERNAL MEDICINE

## 2023-02-24 PROCEDURE — 82140 ASSAY OF AMMONIA: CPT | Performed by: HOSPITALIST

## 2023-02-24 PROCEDURE — 85610 PROTHROMBIN TIME: CPT | Performed by: HOSPITALIST

## 2023-02-24 PROCEDURE — 87641 MR-STAPH DNA AMP PROBE: CPT | Performed by: INTERNAL MEDICINE

## 2023-02-24 PROCEDURE — 86140 C-REACTIVE PROTEIN: CPT | Performed by: HOSPITALIST

## 2023-02-24 PROCEDURE — 94640 AIRWAY INHALATION TREATMENT: CPT

## 2023-02-24 PROCEDURE — 36415 COLL VENOUS BLD VENIPUNCTURE: CPT | Performed by: HOSPITALIST

## 2023-02-24 PROCEDURE — C9113 INJ PANTOPRAZOLE SODIUM, VIA: HCPCS | Performed by: HOSPITALIST

## 2023-02-24 PROCEDURE — 36600 WITHDRAWAL OF ARTERIAL BLOOD: CPT

## 2023-02-24 PROCEDURE — 82310 ASSAY OF CALCIUM: CPT | Performed by: INTERNAL MEDICINE

## 2023-02-24 PROCEDURE — 250N000012 HC RX MED GY IP 250 OP 636 PS 637: Performed by: INTERNAL MEDICINE

## 2023-02-24 PROCEDURE — 87633 RESP VIRUS 12-25 TARGETS: CPT | Performed by: HOSPITALIST

## 2023-02-24 PROCEDURE — 87040 BLOOD CULTURE FOR BACTERIA: CPT | Performed by: INTERNAL MEDICINE

## 2023-02-24 PROCEDURE — 94640 AIRWAY INHALATION TREATMENT: CPT | Mod: 76

## 2023-02-24 PROCEDURE — 250N000009 HC RX 250: Performed by: HOSPITALIST

## 2023-02-24 PROCEDURE — 93975 VASCULAR STUDY: CPT | Mod: 26 | Performed by: RADIOLOGY

## 2023-02-24 PROCEDURE — 93975 VASCULAR STUDY: CPT

## 2023-02-24 PROCEDURE — 80158 DRUG ASSAY CYCLOSPORINE: CPT | Performed by: HOSPITALIST

## 2023-02-24 PROCEDURE — 94660 CPAP INITIATION&MGMT: CPT

## 2023-02-24 PROCEDURE — 82803 BLOOD GASES ANY COMBINATION: CPT | Performed by: HOSPITALIST

## 2023-02-24 PROCEDURE — 85027 COMPLETE CBC AUTOMATED: CPT | Performed by: NURSE PRACTITIONER

## 2023-02-24 PROCEDURE — 84145 PROCALCITONIN (PCT): CPT | Performed by: INTERNAL MEDICINE

## 2023-02-24 PROCEDURE — 99233 SBSQ HOSP IP/OBS HIGH 50: CPT | Performed by: INTERNAL MEDICINE

## 2023-02-24 PROCEDURE — 250N000011 HC RX IP 250 OP 636: Performed by: HOSPITALIST

## 2023-02-24 PROCEDURE — 99418 PROLNG IP/OBS E/M EA 15 MIN: CPT | Performed by: INTERNAL MEDICINE

## 2023-02-24 PROCEDURE — 250N000011 HC RX IP 250 OP 636: Performed by: INTERNAL MEDICINE

## 2023-02-24 PROCEDURE — 83036 HEMOGLOBIN GLYCOSYLATED A1C: CPT | Performed by: STUDENT IN AN ORGANIZED HEALTH CARE EDUCATION/TRAINING PROGRAM

## 2023-02-24 PROCEDURE — 99222 1ST HOSP IP/OBS MODERATE 55: CPT | Mod: GC | Performed by: INTERNAL MEDICINE

## 2023-02-24 PROCEDURE — 82803 BLOOD GASES ANY COMBINATION: CPT | Performed by: INTERNAL MEDICINE

## 2023-02-24 PROCEDURE — 81001 URINALYSIS AUTO W/SCOPE: CPT | Performed by: NURSE PRACTITIONER

## 2023-02-24 PROCEDURE — 84484 ASSAY OF TROPONIN QUANT: CPT | Performed by: HOSPITALIST

## 2023-02-24 PROCEDURE — 250N000011 HC RX IP 250 OP 636: Performed by: NURSE PRACTITIONER

## 2023-02-24 PROCEDURE — 80048 BASIC METABOLIC PNL TOTAL CA: CPT | Performed by: NURSE PRACTITIONER

## 2023-02-24 PROCEDURE — 82962 GLUCOSE BLOOD TEST: CPT

## 2023-02-24 PROCEDURE — 120N000002 HC R&B MED SURG/OB UMMC

## 2023-02-24 PROCEDURE — 999N000157 HC STATISTIC RCP TIME EA 10 MIN

## 2023-02-24 RX ORDER — FUROSEMIDE 10 MG/ML
40 INJECTION INTRAMUSCULAR; INTRAVENOUS ONCE
Status: COMPLETED | OUTPATIENT
Start: 2023-02-24 | End: 2023-02-24

## 2023-02-24 RX ORDER — NICOTINE POLACRILEX 4 MG
15-30 LOZENGE BUCCAL
Status: DISCONTINUED | OUTPATIENT
Start: 2023-02-24 | End: 2023-03-02 | Stop reason: HOSPADM

## 2023-02-24 RX ORDER — MYCOPHENOLATE MOFETIL 200 MG/ML
500 POWDER, FOR SUSPENSION ORAL ONCE
Status: COMPLETED | OUTPATIENT
Start: 2023-02-24 | End: 2023-02-24

## 2023-02-24 RX ORDER — CYCLOSPORINE 25 MG/1
75 CAPSULE, LIQUID FILLED ORAL EVERY MORNING
Status: DISCONTINUED | OUTPATIENT
Start: 2023-02-24 | End: 2023-03-02 | Stop reason: HOSPADM

## 2023-02-24 RX ORDER — MYCOPHENOLATE MOFETIL 250 MG/1
500 CAPSULE ORAL 2 TIMES DAILY
Status: DISCONTINUED | OUTPATIENT
Start: 2023-02-24 | End: 2023-03-02 | Stop reason: HOSPADM

## 2023-02-24 RX ORDER — MYCOPHENOLATE MOFETIL 200 MG/ML
500 POWDER, FOR SUSPENSION ORAL 2 TIMES DAILY
Status: DISCONTINUED | OUTPATIENT
Start: 2023-02-24 | End: 2023-03-02 | Stop reason: HOSPADM

## 2023-02-24 RX ORDER — CYCLOSPORINE 25 MG/1
50 CAPSULE, LIQUID FILLED ORAL EVERY EVENING
Status: DISCONTINUED | OUTPATIENT
Start: 2023-02-24 | End: 2023-03-02 | Stop reason: HOSPADM

## 2023-02-24 RX ORDER — DEXTROSE MONOHYDRATE 25 G/50ML
25-50 INJECTION, SOLUTION INTRAVENOUS
Status: DISCONTINUED | OUTPATIENT
Start: 2023-02-24 | End: 2023-03-02 | Stop reason: HOSPADM

## 2023-02-24 RX ORDER — MYCOPHENOLATE MOFETIL 200 MG/ML
500 POWDER, FOR SUSPENSION ORAL
Status: DISCONTINUED | OUTPATIENT
Start: 2023-02-24 | End: 2023-02-24

## 2023-02-24 RX ORDER — IPRATROPIUM BROMIDE AND ALBUTEROL SULFATE 2.5; .5 MG/3ML; MG/3ML
3 SOLUTION RESPIRATORY (INHALATION)
Status: DISCONTINUED | OUTPATIENT
Start: 2023-02-24 | End: 2023-02-24

## 2023-02-24 RX ORDER — CYCLOSPORINE 100 MG/ML
75 SOLUTION ORAL EVERY MORNING
Status: DISCONTINUED | OUTPATIENT
Start: 2023-02-24 | End: 2023-03-02 | Stop reason: HOSPADM

## 2023-02-24 RX ORDER — MYCOPHENOLATE MOFETIL 250 MG/1
500 CAPSULE ORAL
Status: DISCONTINUED | OUTPATIENT
Start: 2023-02-24 | End: 2023-02-24

## 2023-02-24 RX ORDER — CEFTAZIDIME 2 G/1
2 INJECTION, POWDER, FOR SOLUTION INTRAVENOUS EVERY 24 HOURS
Status: DISCONTINUED | OUTPATIENT
Start: 2023-02-24 | End: 2023-02-26

## 2023-02-24 RX ORDER — CYCLOSPORINE 100 MG/ML
50 SOLUTION ORAL EVERY EVENING
Status: DISCONTINUED | OUTPATIENT
Start: 2023-02-24 | End: 2023-03-02 | Stop reason: HOSPADM

## 2023-02-24 RX ORDER — LEVALBUTEROL INHALATION SOLUTION 1.25 MG/3ML
1.25 SOLUTION RESPIRATORY (INHALATION) EVERY 4 HOURS
Status: DISCONTINUED | OUTPATIENT
Start: 2023-02-24 | End: 2023-02-25

## 2023-02-24 RX ADMIN — CYCLOSPORINE 75 MG: 100 SOLUTION ORAL at 12:56

## 2023-02-24 RX ADMIN — IPRATROPIUM BROMIDE 0.5 MG: 0.5 SOLUTION RESPIRATORY (INHALATION) at 16:39

## 2023-02-24 RX ADMIN — LEVALBUTEROL HYDROCHLORIDE 1.25 MG: 1.25 SOLUTION RESPIRATORY (INHALATION) at 16:39

## 2023-02-24 RX ADMIN — MYCOPHENOLATE MOFETIL 500 MG: 200 POWDER, FOR SUSPENSION ORAL at 20:56

## 2023-02-24 RX ADMIN — LEVALBUTEROL HYDROCHLORIDE 1.25 MG: 1.25 SOLUTION RESPIRATORY (INHALATION) at 19:51

## 2023-02-24 RX ADMIN — FUROSEMIDE 40 MG: 10 INJECTION, SOLUTION INTRAVENOUS at 06:24

## 2023-02-24 RX ADMIN — CYCLOSPORINE 50 MG: 100 SOLUTION ORAL at 19:16

## 2023-02-24 RX ADMIN — HEPARIN SODIUM 5000 UNITS: 5000 INJECTION, SOLUTION INTRAVENOUS; SUBCUTANEOUS at 07:55

## 2023-02-24 RX ADMIN — LEVALBUTEROL HYDROCHLORIDE 1.25 MG: 1.25 SOLUTION RESPIRATORY (INHALATION) at 11:40

## 2023-02-24 RX ADMIN — KETOTIFEN FUMARATE 1 DROP: 0.35 SOLUTION/ DROPS OPHTHALMIC at 20:56

## 2023-02-24 RX ADMIN — IPRATROPIUM BROMIDE 0.5 MG: 0.5 SOLUTION RESPIRATORY (INHALATION) at 19:51

## 2023-02-24 RX ADMIN — PANTOPRAZOLE SODIUM 40 MG: 40 INJECTION, POWDER, LYOPHILIZED, FOR SOLUTION INTRAVENOUS at 07:52

## 2023-02-24 RX ADMIN — FUROSEMIDE 40 MG: 10 INJECTION, SOLUTION INTRAVENOUS at 17:02

## 2023-02-24 RX ADMIN — IPRATROPIUM BROMIDE 0.5 MG: 0.5 SOLUTION RESPIRATORY (INHALATION) at 11:40

## 2023-02-24 RX ADMIN — PANTOPRAZOLE SODIUM 40 MG: 40 INJECTION, POWDER, LYOPHILIZED, FOR SOLUTION INTRAVENOUS at 19:16

## 2023-02-24 RX ADMIN — CEFTAZIDIME 2 G: 2 INJECTION, POWDER, FOR SOLUTION INTRAVENOUS at 11:04

## 2023-02-24 RX ADMIN — HEPARIN SODIUM 5000 UNITS: 5000 INJECTION, SOLUTION INTRAVENOUS; SUBCUTANEOUS at 20:56

## 2023-02-24 RX ADMIN — KETOTIFEN FUMARATE 1 DROP: 0.35 SOLUTION/ DROPS OPHTHALMIC at 08:01

## 2023-02-24 RX ADMIN — MYCOPHENOLATE MOFETIL 500 MG: 200 POWDER, FOR SUSPENSION ORAL at 12:56

## 2023-02-24 RX ADMIN — LEVALBUTEROL HYDROCHLORIDE 1.25 MG: 1.25 SOLUTION RESPIRATORY (INHALATION) at 08:25

## 2023-02-24 RX ADMIN — MYCOPHENOLATE MOFETIL 500 MG: 200 POWDER, FOR SUSPENSION ORAL at 12:57

## 2023-02-24 ASSESSMENT — ACTIVITIES OF DAILY LIVING (ADL)
ADLS_ACUITY_SCORE: 35
ADLS_ACUITY_SCORE: 35
ADLS_ACUITY_SCORE: 43
ADLS_ACUITY_SCORE: 35
ADLS_ACUITY_SCORE: 43
ADLS_ACUITY_SCORE: 35
ADLS_ACUITY_SCORE: 43
ADLS_ACUITY_SCORE: 43

## 2023-02-24 NOTE — PROVIDER NOTIFICATION
ED room 7  [place]. A  VBG's labs:  ph-7.17  Co2-79   Pt on BIPAP. Thanks. Yane Reed RN   [facility] Night cross cover paged.

## 2023-02-24 NOTE — PROGRESS NOTES
Medicine cross cover note -    VBG now worsening but the patient is reportedly more alert.  I spoke with RT and they will adjust her pressures and potentially switch to AVAPs if needed.    D/w triage physician and we will potentially plan to keep the patient on [place] overnight pending reassessment.

## 2023-02-24 NOTE — CONSULTS
GASTROENTEROLOGY CONSULTATION    Date of Admission:  2/23/2023       ASSESSMENT AND RECOMMENDATIONS:   57 year old female with HCV s/p liver transplant 2010 on MMF and cyclosporin, SURI, CKD, HTN, who is admitted with hypercarbic respiratory failure, and CASTILLO. GI consulted for immunosuppressant adjustment.    # Post OLTx 10/26/2010  # Treated HCV  No prior history of rejection. Has been taking immunosuppressants. In the setting of CASTILLO will need cyclosporin level for further adjustment. Continue current dose of MMF and cyclosporin for now. Cyclosporin goal 50-75 with >10 years post transplant.     # Elevated ammonia  Encephalopathy also could be explained by other causes in this patient including hypercapnia and CASTILLO. Patient liver enzymes is normal and has no prior history of rejection. Normal INR suggesting normal liver function. Ammonia is produced in the intestinal tract, detoxified in the liver, and excreted through the kidneys. Mild elevation of ammonia could also be affected by CASTILLO (though rare with normal liver function). Other possible cause is new shunt development which could be evaluated by cross sectional imaging.     # Toxic metabolic encephalopathy  # Hypercapnic, SURI could cause advance fibrosis. No sign of cirrhosis of her liver at this time.  # CASTILLO on CKD    RECOMMENDATIONS  - Please obtain US liver transplant doppler  - Please obtain infection work-up including blood cultures, UA/UC  - If remains having elevated ammonia with improved kidney function, could consider cross-sectional imaging with contrast to evaluate for portosystemic shunts.    Gastroenterology follow up recommendations: Dr. Anne, missed last appointment 12/2022; will need rescheduling.    Thank you for involving us in this patient's care. Please do not hesitate to contact the GI service with any questions or concerns.     Patient care plan discussed with Dr. Waterman, GI staff physician.    Judah Winters MD  GI fellow  Page  "[phone]          Chief Complaint:   We were asked to evaluate this patient with \"Hx of Liver Tx, encephalopathy\"  History is obtained from medical record and patient  (limited history).          History of Present Illness:   57 year old female with HCV s/p liver transplant  on MMF and cyclosporin, SURI, CKD, HTN, who is admitted with hypercarbic respiratory failure, and CASTILLO. GI consulted for immunosuppressant adjustment.    Patient was brought in by family members with hallucination for the past few days. Reports has been taking immunosuppression.  Was admitted in 2022 with similar symptom of hypercapnea, CASTILLO, due to nonadhering to CPAP.     Prior endoscopy:   - colonoscopy 2009 -   Impression:          - A 8 mm, non-bleeding polyp in the sigmoid colon.                        Resected and retrieved.                        Dimunitive sigmoid polyp removed                        - The examination was otherwise normal.           Past Medical History:   Reviewed and edited as appropriate  Past Medical History:   Diagnosis Date     Anemia in CKD (chronic kidney disease)      Cataract      CKD (chronic kidney disease) stage 3, GFR 30-59 ml/min (H)      Hepatitis C     cleared virus spontaneously      High risk medication use      Hypertension, renal      Immunosuppressed status (H)      Liver replaced by transplant (H) 2010     Osteoporosis      Recurrent pregnancy loss without current pregnancy      Recurrent UTI 2021     Stroke, hemorrhagic (H) 2008    Left cerebral intraparenchymal hemorrhage     Syncope      Unspecified viral hepatitis C without hepatic coma             Past Surgical History:   Reviewed and edited as appropriate   Past Surgical History:   Procedure Laterality Date     CATARACT IOL, RT/LT Right 2/18/15      SECTION       Incisional Hernia Repair  2004     INSERT SHUNT PORTAL TRANSJUGULAR INTRAHEPTIC  2005    shunt placement for liver failure     " LAPAROSCOPIC SALPINGO-OOPHORECTOMY      left     NECK SURGERY  2010    fracture, in halo x 7months     TRANSPLANT LIVER RECIPIENT  DONOR  10/26/10     Upper GI Endoscopy with Band Ligation of Esoph/Gastric Varic. .              Social History:   Reviewed and edited as appropriate  Social History     Socioeconomic History     Marital status: Single     Spouse name: Not on file     Number of children: Not on file     Years of education: Not on file     Highest education level: Not on file   Occupational History     Not on file   Tobacco Use     Smoking status: Never     Smokeless tobacco: Never   Vaping Use     Vaping Use: Never used   Substance and Sexual Activity     Alcohol use: No     Drug use: No     Sexual activity: Not Currently   Other Topics Concern     Parent/sibling w/ CABG, MI or angioplasty before 65F 55M? Not Asked      Service No     Blood Transfusions Yes     Caffeine Concern No     Occupational Exposure No     Hobby Hazards No     Sleep Concern No     Stress Concern Yes     Comment: needs help at home for cares     Weight Concern Yes     Comment: wants to lose weight not gain weight     Special Diet No     Back Care Yes     Comment: uses a patch to relieve pain     Exercise Not Asked     Bike Helmet Not Asked     Seat Belt Yes     Self-Exams Not Asked   Social History Narrative    One son Carleen        GynHx:             Gets transportation via insurance - needs assistance due to unsteady gait from CVA     Social Determinants of Health     Financial Resource Strain: Not on file   Food Insecurity: Not on file   Transportation Needs: Unmet Transportation Needs     Lack of Transportation (Medical): Yes     Lack of Transportation (Non-Medical): Yes   Physical Activity: Not on file   Stress: Not on file   Social Connections: Not on file   Intimate Partner Violence: Not At Risk     Fear of Current or Ex-Partner: No     Emotionally Abused: No     Physically Abused: No     Sexually  Abused: No   Housing Stability: Not on file            Family History:   Reviewed and edited as appropriate  No known history of gastrointestinal/liver disease or  gastrointestinal malignancies       Allergies:   Reviewed and edited as appropriate     Allergies   Allergen Reactions     Aspirin      3/31/16 Per pt, tolerates 81 mg daily dose without ADR.     325 mg dose caused itchiness and hives.     Clarithromycin      Allergic reaction         Contrast Dye      Iodine      Pcn [Penicillins]             Medications:   (Not in a hospital admission)            Review of Systems:     A complete review of systems was performed and is negative except as noted in the HPI           Physical Exam:   /71   Pulse 71   Temp 97.4  F (36.3  C) (Axillary)   Resp 18   Wt 110.6 kg (243 lb 14.4 oz)   LMP  (LMP Unknown)   SpO2 92%   BMI 46.08 kg/m    Wt:   Wt Readings from Last 2 Encounters:   02/24/23 110.6 kg (243 lb 14.4 oz)   02/01/23 106.6 kg (235 lb)      Constitutional: in acute distress, anxious, obese  HEENT: Sclera anicteric  CV: No edema  Respiratory: on BIPAP  Abdomen: Distended, nontender, no peritoneal signs  Skin: warm, perfused  Neuro: AAO x 2, positive asterixis         Data:   Labs and imaging below were independently reviewed and interpreted    Imaging: Reviewed.    Attestation:  This patient has been seen and evaluated by me, Courtney Waterman.  Discussed with the house staff team or resident(s) and agree with the findings and plan in this note.

## 2023-02-24 NOTE — PROGRESS NOTES
After speaking with ED provider about pt's hypercapnia put pt on AVAPS: 12, 25/14, 550, E5, 50%. Breath sounds clear in the upper lobes and diminished in the lowers. Pt xopenex dose increased to 1.25 mg due to 0.31 mg being a  dose. Follow up VBG ordered at 0000 at which point we will reassess pt needs. RT will continue to follow.

## 2023-02-24 NOTE — H&P
[facility]    History and Physical - Hospitalist Service, GOLD TEAM        Date of Admission:  2/23/2023    Assessment & Plan      Flor Navarro is a 57 year old Italian Lithuanian speaking woman  admitted on 2/23/2023. She has a history of CKD, hypertension, hepatitis C s/p liver transplant in 2010, CVA and SURI who is admitted with hypercarbic respirator failure, volume overload and acute kidney injury.    1) Acute on chronic hypercarbic respiratory failure - The patient has a history of severe sleep apnea with prior admission for hypercarbia.  At present she is not able to provide a history secondary to somnolence and altered mental status which I suspect is driven by her hypercarbia.  - Started on BiPAP in the ED after she was found to have a pCO2 of 72 and pH of 7.2.  Will continue.  We will recheck a VBG now.  - Once more alert will need to obtain in  and discuss whether or not she is using for CPAP set up    2) Acute encephalopathy - Presents with several days of hallucinations, trembling and confusion.  At present she is too somnolent for me to assess orientation.  Hypercarbia is likely contributing, but we will also need to rule out a urinary tract infection or other source of infection.  CT head negative.  - Procalcitonin pending, UA pending  - Continue with BiPAP as above  - Not currently on antibiotics    3) Acute kidney injury, question of cardiorenal syndrome- Creatinine 3.04 today up from 1.27 in December.  Etiology unclear.  This could represent cardiorenal syndrome given some evidence for volume overload on chest imaging and exam, but her echocardiogram shows a normal ejection fraction (diastolic function could not be assessed).  Given her current mental condition we are unable to obtain a standing weight.  - She was treated with diuretics in our emergency department.  Will repeat basic metabolic panel in the a.m. to see if her creatine is  "improving.  - Would follow daily weights, compared to prior and consider additional diuresis  - Hold home gabapentin  - Awaiting UA, OK to straight cath  - Will hold off on renal US pending UA    4) History of liver transplant secondary to hepatitis C  - Continue home mycophenolate, cyclosporine    5) History of hypertension  - Continue home amlodipine, Lasix, chlorthalidone     Diet: Regular Diet Adult    DVT Prophylaxis: Heparin SQ  Li Catheter: Not present  Lines: None     Cardiac Monitoring: None  Code Status:   Full    Clinically Significant Risk Factors Present on Admission        # Hyperkalemia: Highest K = 5.6 mmol/L in last 2 days, will monitor as appropriate          # Acute Kidney Injury, unspecified: based on a >150% or 0.3 mg/dL increase in last creatinine compared to past 90 day average, will monitor renal function  # Hypertension: home medication list includes antihypertensive(s)   # Non-Invasive mechanical ventilation: current O2 Device: BiPAP/CPAP  # Acute hypoxic respiratory failure: continue supplemental O2 as needed     # Severe Obesity: Estimated body mass index is 44.4 kg/m  as calculated from the following:    Height as of 1/26/23: 1.549 m (5' 1\").    Weight as of 2/1/23: 106.6 kg (235 lb).           Disposition Plan      Expected Discharge Date: 02/24/2023                The patient's care was discussed with the Attending Physician, Dr. Carson.    MOODY [facility]  Hospitalist Service, [facility]  Securely message with [employer] (more info)  Text page via [facility] Paging/Directory   See signed in provider for up to date coverage information    ______________________________________________________________________    Chief Complaint   Brought in by family with confusion and hallucinations    History is obtained from the patient, chart    History of Present Illness   Flor Navarro is a 57 year old Slovak Bulgarian speaking " woman  admitted on 2023. She has a history of CKD, hypertension, hepatitis C s/p liver transplant in , CVA and SURI who is admitted with hypercarbic respirator failure, volume overload and acute kidney injury.    When I saw the patient she was barely arousable to sternal rub and shoulder pinch and unable to carry on a conversation.  History was obtained through chart review.    It appears the patient was brought in today by family members concerned that she was hallucinating, talking to family members that were not present and had a tremor.  It appears this been going on for several days.  They also report she has had very poor sleep and has been waking throughout the night.    Review of her chart shows that she was admitted in August of last year with acute hypoxic and hypercarbic respiratory failure attributed to sleep apnea with nonadherence to her CPAP regimen.              Past Medical History    Past Medical History:   Diagnosis Date     Anemia in CKD (chronic kidney disease)      Cataract      CKD (chronic kidney disease) stage 3, GFR 30-59 ml/min (H)      Hepatitis C     cleared virus spontaneously      High risk medication use      Hypertension, renal      Immunosuppressed status (H)      Liver replaced by transplant (H) 2010     Osteoporosis      Recurrent pregnancy loss without current pregnancy      Recurrent UTI 2021     Stroke, hemorrhagic (H) 2008    Left cerebral intraparenchymal hemorrhage     Syncope      Unspecified viral hepatitis C without hepatic coma        Past Surgical History   Past Surgical History:   Procedure Laterality Date     CATARACT IOL, RT/LT Right 2/18/15      SECTION       Incisional Hernia Repair  2004     INSERT SHUNT PORTAL TRANSJUGULAR INTRAHEPTIC      shunt placement for liver failure     LAPAROSCOPIC SALPINGO-OOPHORECTOMY      left     NECK SURGERY  2010    fracture, in halo x 7months     TRANSPLANT LIVER RECIPIENT   DONOR  10/26/10     Upper GI Endoscopy with Band Ligation of Esoph/Gastric Varic. .         Prior to Admission Medications   Prior to Admission Medications   Prescriptions Last Dose Informant Patient Reported? Taking?   ACE/ARB/ARNI NOT PRESCRIBED (INTENTIONAL)   No No   Sig: Please choose reason not prescribed from choices below.   COMPOUNDED NON-CONTROLLED SUBSTANCE (CMPD RX) - PHARMACY TO MIX COMPOUNDED MEDICATION   No No   Sig: Equal parts of 2% Benadryl, 2% vicious lidocaine and TC suspension maalox,  Swish and spit 5 ml every 6 hrs as needed for mouth pain   Denture Care Products (EFFERDENT DENTURE CLEANSER) TBEF   No No   Sig: Use nightly to clean oral appliance   Multiple Vitamin (DAILY-SUMA MULTIVITAMIN) TABS   No No   Sig: Take 1 tablet by mouth every morning   SENEXON-S 8.6-50 MG tablet   No No   Sig: TAKE TWO TABLETS BY MOUTH TWICE A DAY   SM FIBER LAXATIVE 500 MG TABS tablet   No No   Sig: Take 2 tablets (1,000 mg) by mouth daily   STATIN NOT PRESCRIBED, INTENTIONAL,   No No   Sig: Not prescribed   Patient not taking: Reported on 1/26/2023   Vitamin D3 (CHOLECALCIFEROL) 25 mcg (1000 units) tablet   No No   Sig: Take 1 tablet (25 mcg) by mouth daily   acetaminophen (TYLENOL) 500 MG tablet   No No   Sig: Take 1 tablet (500 mg) by mouth 3 times daily as needed for mild pain   albuterol (ACCUNEB) 0.63 MG/3ML neb solution   No No   Sig: Take 3 mLs (0.63 mg) by nebulization every 4 hours (while awake)   albuterol (PROAIR HFA/PROVENTIL HFA/VENTOLIN HFA) 108 (90 Base) MCG/ACT inhaler   No No   Sig: Inhale 2 puffs into the lungs 4 times daily   alendronate (FOSAMAX) 70 MG tablet   No No   Sig: Take 1 tablet (70 mg) by mouth every 7 days   alum & mag hydroxide-simethicone (MAALOX ADVANCED MAX ST) 400-400-40 MG/5ML SUSP suspension   No No   Sig: Take 30 mLs by mouth every 4 hours as needed for indigestion or heartburn   amLODIPine (NORVASC) 10 MG tablet   No No   Sig: Take 1 tablet (10 mg) by mouth daily    calcitRIOL (ROCALTROL) 0.25 MCG capsule   No No   Sig: Take 1 capsule (0.25 mcg) by mouth daily   calcium carbonate 600 mg-vitamin D 400 units (CALTRATE) 600-400 MG-UNIT per tablet   No No   Sig: Take 1 tablet by mouth 2 times daily   calcium carbonate-vitamin D (CALTRATE) 600-10 MG-MCG per tablet   No No   Sig: TAKE ONE TABLET BY MOUTH TWICE A DAY   carboxymethylcellulose PF (REFRESH PLUS) 0.5 % ophthalmic solution   No No   Sig: Place 1 drop into both eyes 4 times daily as needed for dry eyes   Patient not taking: Reported on 11/4/2022   cetirizine (ZYRTEC) 10 MG tablet   No No   Sig: Take 1 tablet (10 mg) by mouth daily   chlorthalidone (HYGROTON) 25 MG tablet   No No   Sig: Take 1 tablet (25 mg) by mouth daily for 180 days   cycloSPORINE modified (GENERIC EQUIVALENT) 25 MG capsule   No No   Sig: TAKE THREE CAPSULES BY MOUTH EVERY MORNING & TAKE TWO CAPSULES BY MOUTH EVERY NIGHT AT BEDTIME   dimethicone (AVEENO DAILY MOISTURIZING) 1.3 % LOTN lotion   No No   Sig: Externally apply topically daily   Patient not taking: Reported on 1/26/2023   diphenhydrAMINE (BENADRYL) 50 MG capsule   No No   Sig: Take 1 capsule (50 mg) by mouth once as needed (2 hours before CT scan. Repeat if needed for itching.) Administer 30 min - 2 hours pre - IV contrast injection   febuxostat (ULORIC) 40 MG TABS tablet   No No   Sig: Take 1 tablet (40 mg) by mouth daily   furosemide (LASIX) 20 MG tablet   No No   Sig: Take 1 tablet (20 mg) by mouth 2 times daily   gabapentin (NEURONTIN) 300 MG capsule   No No   Sig: Take 1 capsule (300 mg) by mouth At Bedtime   ketotifen (ZADITOR) 0.025 % ophthalmic solution   Yes No   Sig: Place 1 drop into both eyes 2 times daily   lidocaine (XYLOCAINE) 5 % external ointment   No No   Sig: Apply topically as needed for moderate pain   melatonin 3 MG tablet   No No   Sig: TAKE ONE TABLET BY MOUTH EVERY NIGHT AS NEEDED FOR SLEEP.   Patient not taking: Reported on 11/4/2022   mineral oil-white petrolatum  (EUCERIN) CREA cream   No No   Sig: Apply topically 2 times daily   Patient not taking: Reported on 1/26/2023   multivitamin w/minerals (MULTI-VITAMIN) tablet   No No   Sig: Take 1 tablet by mouth daily Needs 4 month supply for International Travel   mycophenolate (GENERIC EQUIVALENT) 250 MG capsule   No No   Sig: TAKE TWO CAPSULES BY MOUTH EVERY 12 HOURS   neomycin-polymyxin-HC 1 %   No No   Sig: Place 4 drops in ear(s) 2 times daily   omeprazole (PRILOSEC) 20 MG DR capsule   No No   Sig: Take 1 capsule (20 mg) by mouth 2 times daily el   order for DME   No No   Sig: Equipment being ordered: Nebulizer with adult mask and tubing   order for DME   No No   Sig: Equipment being ordered:   1) cane  2) compression stockings 15mmHg, 3 pairs  Dx: gait stability, falls, edema   order for DME   No No   Sig: Equipment being ordered: compression stockings bilateral  Please measure and fit patient for stockings   Strength 15-30mmHg  Disp 2 pairs ( 4 socks)  1 refill over the time of 1 year   umeclidinium (INCRUSE ELLIPTA) 62.5 MCG/INH inhaler   No No   Sig: Inhale 1 puff into the lungs daily      Facility-Administered Medications: None           Physical Exam   Vital Signs: Temp: 98.8  F (37.1  C) Temp src: Temporal BP: 109/59 Pulse: 86   Resp: 17 SpO2: 93 % O2 Device: BiPAP/CPAP Oxygen Delivery: 4 LPM    Physical Exam   Constitutional:   Chronically ill appearing, well developed, resting comfortably   Cardiovascular: Regular rate and rhythm without murmurs or gallops  Pulmonary/Chest: Minimal crackles, no respiratory distress.  GI: Soft with good bowel sounds.  Non-tender, non-distended, with no guarding, no rebound, no peritoneal signs.   Musculoskeletal:  2+ generalized edema   Skin: Skin is warm and dry. No rash noted.   Neurological: Somnolent, briefly wakes to sternal rub or shoulder pinch  Psychiatric:  Somnolent      Medical Decision Making       35 MINUTES SPENT BY ME on the date of service doing chart review, history,  exam, documentation & further activities per the note.      Data   Echo reviewed, CXR reviewed, BMP, BNP CBC, EKG and past admission notes all reviewed.

## 2023-02-24 NOTE — CONSULTS
[facility] Pulmonology Consultation    Flor Navarro  MRN: [id number]  : 1965    Date of Admission: 2023  Date of Service: 23    Reason for consult:   Hypoxic and hypercarbic respiratory failure  Requesting physician:  STEFANIE Gaming    Assessment:  Acute on chronic hypoxic hypercarbic respiratory failure, 3L home O2  Severe SURI on home PAP  Pulmonary HTN on TTE    Ms. Navarro is a 57 year old with known severe SURI, likely OHS, with newly diagnosed pulmonary hypertension. She is very tenusous,with altered mental status from hypercarbia vs hyperammonemia vs alterate etiology not identified, with persistent hypercarbia and acute on chronic hypoxia, also with CASTILLO.  Given history of several months of increasing edema, suspect development of pulmonary HTN contributing to symptoms. Perhaps related to chronic hypoxia (?OHS vs alternate etiology) and severe sleep apnea, but consider cardiology consult for evaluation of other etiologies. Her PFTs were of poor quality, and lung volumes not completed to hard to say if she has a component of restrictive lung disease as well. She definitely has untreated SURI based on recent sleep study.   We would expect her blood gasses to have improved more than they have given continuous bipap all day. She is on high pressures (20/10) and getting adequate tidal volumes sometimes, but multiple low TV/minute ventilation alarms. We would place her on AVAPS with goal TVs 7-8cc/kg IBW (330-80). If her mental status does not improve, discussion regarding definitive airway management should occur. She should be monitored very closely until pH normalizes, and mental status improves.    Recommendations:  -- agree with aggressive diuresis as tolerated  -- should use PAP with naps and sleep. Of note, we feel she would benefit from volume assured ventilation with AVAPS give profound respiratory acidosis  -- close monitoring of VBGs, consider q2h until pH above 7.3  --  agree with empiric antibiotics now  -- infectious workup with strep and legionella antigens, sputum culture  -- consider cardiology for evaluation of new pulmonary hypertension  -- consider nephrology for CASTILLO    Chief complaint:  somnolence    HPI:  Presented to ED on  after family noted a few days of somnolence, tremor, hallucinations at home. She has a history of liver transplant in  for hep c. She has severe SURI, and is awaiting a PAP titration study.  She has not been using pap for several days prior to admission because the mask does not fit well. She has noticed swelling in her legs and abdomen over the last 8 months.    Review of systems: complete 10 point review of systems completed and negative except as noted above in HPI.    Medical history:    Past Medical History:   Diagnosis Date     Anemia in CKD (chronic kidney disease)      Cataract      CKD (chronic kidney disease) stage 3, GFR 30-59 ml/min (H)      Hepatitis C     cleared virus spontaneously      High risk medication use      Hypertension, renal      Immunosuppressed status (H)      Liver replaced by transplant (H) 2010     Osteoporosis      Recurrent pregnancy loss without current pregnancy      Recurrent UTI 2021     Stroke, hemorrhagic (H) 2008    Left cerebral intraparenchymal hemorrhage     Syncope      Unspecified viral hepatitis C without hepatic coma      Social history: never smoker. Limited history given bipap and somnolence.    Surgical history:    Past Surgical History:   Procedure Laterality Date     CATARACT IOL, RT/LT Right 2/18/15      SECTION       Incisional Hernia Repair  2004     INSERT SHUNT PORTAL TRANSJUGULAR INTRAHEPTIC      shunt placement for liver failure     LAPAROSCOPIC SALPINGO-OOPHORECTOMY      left     NECK SURGERY  2010    fracture, in halo x 7months     TRANSPLANT LIVER RECIPIENT  DONOR  10/26/10     Upper GI Endoscopy with Band Ligation of Esoph/Gastric  Varic. .       Family history: I have reviewed family history in EMR.  Family History   Problem Relation Age of Onset     Hepatitis Other         Hep C, still in [place]     Cerebrovascular Disease Other      Cancer No family hx of      Diabetes No family hx of      Hypertension No family hx of      Thyroid Disease No family hx of      Glaucoma No family hx of      Macular Degeneration No family hx of       Allergies:    Allergies   Allergen Reactions     Aspirin      3/31/16 Per pt, tolerates 81 mg daily dose without ADR.     325 mg dose caused itchiness and hives.     Clarithromycin      Allergic reaction         Contrast Dye      Iodine      Pcn [Penicillins]      Medications:    Current Facility-Administered Medications   Medication     acetaminophen (TYLENOL) tablet 650 mg    Or     acetaminophen (TYLENOL) Suppository 650 mg     [Held by provider] amLODIPine (NORVASC) tablet 10 mg     bisacodyl (DULCOLAX) EC tablet 5 mg    Or     bisacodyl (DULCOLAX) EC tablet 10 mg     [Held by provider] calcitRIOL (ROCALTROL) capsule 0.25 mcg     carboxymethylcellulose PF (REFRESH PLUS) 0.5 % ophthalmic solution 1 drop     cefTAZidime (FORTAZ) 2 g vial to attach to  ml bag for ADULTS or NS 50 ml bag for PEDS     [Held by provider] chlorthalidone (HYGROTON) tablet 25 mg     cycloSPORINE modified (GENERIC EQUIVALENT) capsule 50 mg     cycloSPORINE modified (GENERIC EQUIVALENT) capsule 75 mg     glucose gel 15-30 g    Or     dextrose 50 % injection 25-50 mL    Or     glucagon injection 1 mg     [Held by provider] febuxostat (ULORIC) tablet 40 mg     [Held by provider] furosemide (LASIX) tablet 20 mg     heparin ANTICOAGULANT injection 5,000 Units     ipratropium (ATROVENT) 0.02 % neb solution 0.5 mg     ketotifen (ZADITOR) 0.025 % ophthalmic solution 1 drop     levalbuterol (XOPENEX) neb solution 1.25 mg     lidocaine (LMX4) cream     lidocaine 1 % 0.1-1 mL     melatonin tablet 1 mg     mycophenolate (GENERIC EQUIVALENT)  capsule 500 mg     No lozenges or gum should be given while patient on BIPAP/AVAPS/AVAPS AE     ondansetron (ZOFRAN ODT) ODT tab 4 mg    Or     ondansetron (ZOFRAN) injection 4 mg     [Held by provider] pantoprazole (PROTONIX) EC tablet 40 mg     pantoprazole (PROTONIX) IV push injection 40 mg     Patient may continue current oral medications     polyethylene glycol (MIRALAX) Packet 17 g     sodium chloride (PF) 0.9% PF flush 3 mL     sodium chloride (PF) 0.9% PF flush 3 mL     [Held by provider] umeclidinium (INCRUSE ELLIPTA) 62.5 MCG/ACT inhaler 1 puff     Current Outpatient Medications   Medication Sig     ACE/ARB/ARNI NOT PRESCRIBED (INTENTIONAL) Please choose reason not prescribed from choices below.     acetaminophen (TYLENOL) 500 MG tablet Take 1 tablet (500 mg) by mouth 3 times daily as needed for mild pain     albuterol (ACCUNEB) 0.63 MG/3ML neb solution Take 3 mLs (0.63 mg) by nebulization every 4 hours (while awake)     albuterol (PROAIR HFA/PROVENTIL HFA/VENTOLIN HFA) 108 (90 Base) MCG/ACT inhaler Inhale 2 puffs into the lungs 4 times daily     alendronate (FOSAMAX) 70 MG tablet Take 1 tablet (70 mg) by mouth every 7 days     alum & mag hydroxide-simethicone (MAALOX ADVANCED MAX ST) 400-400-40 MG/5ML SUSP suspension Take 30 mLs by mouth every 4 hours as needed for indigestion or heartburn     amLODIPine (NORVASC) 10 MG tablet Take 1 tablet (10 mg) by mouth daily     calcitRIOL (ROCALTROL) 0.25 MCG capsule Take 1 capsule (0.25 mcg) by mouth daily     calcium carbonate 600 mg-vitamin D 400 units (CALTRATE) 600-400 MG-UNIT per tablet Take 1 tablet by mouth 2 times daily     calcium carbonate-vitamin D (CALTRATE) 600-10 MG-MCG per tablet TAKE ONE TABLET BY MOUTH TWICE A DAY     carboxymethylcellulose PF (REFRESH PLUS) 0.5 % ophthalmic solution Place 1 drop into both eyes 4 times daily as needed for dry eyes (Patient not taking: Reported on 11/4/2022)     cetirizine (ZYRTEC) 10 MG tablet Take 1 tablet (10 mg)  by mouth daily     chlorthalidone (HYGROTON) 25 MG tablet Take 1 tablet (25 mg) by mouth daily for 180 days     COMPOUNDED NON-CONTROLLED SUBSTANCE (CMPD RX) - PHARMACY TO MIX COMPOUNDED MEDICATION Equal parts of 2% Benadryl, 2% vicious lidocaine and TC suspension maalox,  Swish and spit 5 ml every 6 hrs as needed for mouth pain     cycloSPORINE modified (GENERIC EQUIVALENT) 25 MG capsule TAKE THREE CAPSULES BY MOUTH EVERY MORNING & TAKE TWO CAPSULES BY MOUTH EVERY NIGHT AT BEDTIME     Denture Care Products (EFFERDENT DENTURE CLEANSER) TBEF Use nightly to clean oral appliance     dimethicone (AVEENO DAILY MOISTURIZING) 1.3 % LOTN lotion Externally apply topically daily (Patient not taking: Reported on 1/26/2023)     diphenhydrAMINE (BENADRYL) 50 MG capsule Take 1 capsule (50 mg) by mouth once as needed (2 hours before CT scan. Repeat if needed for itching.) Administer 30 min - 2 hours pre - IV contrast injection     febuxostat (ULORIC) 40 MG TABS tablet Take 1 tablet (40 mg) by mouth daily     furosemide (LASIX) 20 MG tablet Take 1 tablet (20 mg) by mouth 2 times daily     gabapentin (NEURONTIN) 300 MG capsule Take 1 capsule (300 mg) by mouth At Bedtime     ketotifen (ZADITOR) 0.025 % ophthalmic solution Place 1 drop into both eyes 2 times daily     lidocaine (XYLOCAINE) 5 % external ointment Apply topically as needed for moderate pain     melatonin 3 MG tablet TAKE ONE TABLET BY MOUTH EVERY NIGHT AS NEEDED FOR SLEEP. (Patient not taking: Reported on 11/4/2022)     mineral oil-white petrolatum (EUCERIN) CREA cream Apply topically 2 times daily (Patient not taking: Reported on 1/26/2023)     Multiple Vitamin (DAILY-SUMA MULTIVITAMIN) TABS Take 1 tablet by mouth every morning     multivitamin w/minerals (MULTI-VITAMIN) tablet Take 1 tablet by mouth daily Needs 4 month supply for International Travel     mycophenolate (GENERIC EQUIVALENT) 250 MG capsule TAKE TWO CAPSULES BY MOUTH EVERY 12 HOURS      neomycin-polymyxin-HC 1 % Place 4 drops in ear(s) 2 times daily     omeprazole (PRILOSEC) 20 MG DR capsule Take 1 capsule (20 mg) by mouth 2 times daily el     order for DME Equipment being ordered: compression stockings bilateral  Please measure and fit patient for stockings   Strength 15-30mmHg  Disp 2 pairs ( 4 socks)  1 refill over the time of 1 year     order for DME Equipment being ordered:   1) cane  2) compression stockings 15mmHg, 3 pairs  Dx: gait stability, falls, edema     order for DME Equipment being ordered: Nebulizer with adult mask and tubing     SENEXON-S 8.6-50 MG tablet TAKE TWO TABLETS BY MOUTH TWICE A DAY     SM FIBER LAXATIVE 500 MG TABS tablet Take 2 tablets (1,000 mg) by mouth daily     STATIN NOT PRESCRIBED, INTENTIONAL, Not prescribed (Patient not taking: Reported on 1/26/2023)     umeclidinium (INCRUSE ELLIPTA) 62.5 MCG/INH inhaler Inhale 1 puff into the lungs daily     Vitamin D3 (CHOLECALCIFEROL) 25 mcg (1000 units) tablet Take 1 tablet (25 mcg) by mouth daily     Objective:  /71   Pulse 71   Temp 97.4  F (36.3  C) (Axillary)   Resp 18   Wt 110.6 kg (243 lb 14.4 oz)   LMP  (LMP Unknown)   SpO2 92%   BMI 46.08 kg/m      Gen: in no acute distress, somnolent, lying in bed  HEENT: bipap mask in place  CV: RRR, bilateral lower extremity non-pitting edema  Pulm: no increased work of breathing, bipap in place  GI: soft, non tender  MSK: no gross deformities, no joint swelling  Integ: no rashes or lesions appreciated  Neuro: somnolent, arouses to loud verbal stimuli    Data:    I have personally reviewed laboratory data. Notably: no leukocytosis, CASTILLO with Cr 3.04 on arrival (BL 1.1), ammonia 71, crp elevated 61, bnp 11,405 (prior 825 on 12/16/2022), procal 0.52.    I have personally reviewed imaging available. Notably:   CXR 2/23/2023 Findings:  2 views of the chest. Trachea is midline. Cardiac silhouette appears  enlarged and margins are somewhat obscured. Increased diffuse  mixed  interstitial and airspace patchy opacities. No appreciable  pneumothorax. Small bilateral pleural effusions. The visualized upper  abdomen is unremarkable.    CT chest 8/7/2022 IMPRESSION: Mild centrilobular emphysematous changes bilateral lung apices.  1.  Bilateral lower lobe predominant peribronchovascular groundglass opacities differential atelectasis, edema, atypical infection.  2.  No pleural effusion.    I have personally reviewed cardiac studies. Notably:  TTE 2/23/2023 Interpretation Summary  Global and regional left ventricular function is normal with an EF of 55-60%.  Global right ventricular function is normal.  Left ventricular diastolic function is indeterminate.  Pulmonary hypertension is present.  Right ventricular systolic pressure is 40mmHg above the right atrial pressure.  Dilation of the inferior vena cava is present with normal respiratory variation in diameter.  No pericardial effusion is present.  This study was compared with the study from 8/3/2022 .Pulmonary artery pressure was not assessable in the previous study. PHT is new otherwise no Change.    Most recent PFTs 1/18/2023:       PSG 1/10/2023 Respiration: Resting/wake hypoxemia with normal TCMCO2. During sleep severe obstructive sleep apnea noted with hypoxemia and hypoventilation    Events ? The polysomnogram revealed a presence of 36 obstructive, 6 central, and 11 mixed apneas resulting in an apnea index of 24.3 events per hour. There were 114 obstructive hypopneas and 6 central hypopneas resulting in an obstructive hypopnea index of 52.2 and central hypopnea index of 2.7 events per hour. The combined apnea/hypopnea index was 79.2 events per hour (central apnea/hypopnea index was 5.5 events per hour).  The REM AHI was 48.6 events per hour. The supine AHI was - events per hour. The RERA index was 10.1 events per hour. The RDI was 89.3 events per hour.    Snoring - was reported as mild.    Respiratory rate and pattern - was  notable for tachypnea respiratory rates 20-22, and regular pattern.    Sustained Sleep Associated Hypoventilation - Transcutaneous carbon dioxide monitoring was used, significant hypoventilation was present with a maximum change from 45.2 to 85.0 mmHg.  Sleep Associated Hypoxemia - (Greater than 5 minutes O2 sat at or below 88%) was present. Baseline oxygen saturation was 85.6%. Lowest oxygen saturation was 53.0%. Time spent less than or equal to 88% was 95.8 minutes. Time spent less than or equal to 89% was 104.2 minutes.    Elisabet Jacobson DO  Pulmonary fellow  Pager: [phone]    Patient discussed and seen with Dr. Hayes.

## 2023-02-24 NOTE — PLAN OF CARE
Neuro: A&Ox4.   Cardiac: Afebrile, VSS.   Respiratory: BiPap at night, lung sounds clear, dyspnea on exertion  GI/: Voiding spontaneously. 1 BM this shift.  Diet/appetite: Tolerating regular diet . Denies nausea.   Activity: Up with 1 assist    Pain: Denies   Skin: No new deficits noted.  Lines: L PIV, SL  Drains: none  Replacements: none    Plan: Continue with current POC. Report changes to primary team.

## 2023-02-24 NOTE — PROGRESS NOTES
Saw patient first on 2/23 at 10:30pm as she was found to have a pH of 7.13/pCO2 of 83 on routine chart review (prior to her transfer to )- worse than on admission  On interview, patient was awake, alert and was attempting to use her phone to call her family, was on Oxymask and off Bipap  She stated her name was Flor, knew she was in [place] in the hospital. Told me she was from [place] and asked me where I was from.   Lungs were clear to auscultation w some crackles in the bases  She did have some muscle twitching in upper extremities    Spoke with RN who stated that she was not notified of results of VBG obtained at 9PM (and thus team was notified), and that patient had been off Bipap for 2 hours as she was eating dinner.   Spoke with Lauren's team and plan made to have patient remain on EB for an [facility] bed.    At 10:30pm, patient had a VBG which did not show much of a change in her pH or pCO2 and she was started back on Bipap. VBG ordered for 4 hours later which showed mild improvement in her pCO2- but not compared to admission, and no improvement in her pH  Examined patient again. She was asleep on Bipap, would wake up, states her name and place. Not oriented to time. Some asterixis present on exam. Protecting airway. Pupils equal and reactive. Bilateral hand  100%, moves both feet against resistance on command. Unable to auscaltate posterior lung fields, but axillary and anterior lungs fields clear breath sounds.    Unclear why patient is not improving her CO2 despite being on AVAPS for several hours  - Spoke with RT and ICU team. Patient switched to Bipap. Repeat VBG ordered for 90min after the change  - In terms of underlying causes driving the hypercapnia,  Does seem to have high intravascular volume per ECHO on 2/23 and CXR. 1 x 40mg IV lasix given  - has a chart Hx of COPD and is on umeclidinium- anticholingeric. Will schedule duoneb Q4H (Ipratropium + Levalbuterol). Clinically does not appear to be  in exacerbation. RVP ordered per ICU team  - In terms of other reasons - primary  could be encephalopathy and thus worsening of her hypercapnia. Checking ammonia given her liver disease, despite post transplant. Will also check cyclosporine levels and Liver US transplant  - Other reasons for encephalopathy (primary)- CT head wnl, UA is still pending. No clinical evidence of infection - afebrile, WBC wnl  - While she does have an CASTILLO and elevated BUN, not to a degree that should cause  Uremia.    All repeat labs are pending this AM- CMP, RVP, Troponin, INR, PTT, Liver US, Cyclosporine level  ABG this AM returned with very mild improving/stable blood gas pH 7.2  Ammonia level is elevated at 72. Paged the liver team to discuss further and they will see her today  Also spoke with ICU AM team . At this time, continue current management with close follow-up of VBG    Placed all oral meds on hold except IS. Per RN assessment, she is safe to take PO (in terms of mental status)    Gave sign out verbally to AM team

## 2023-02-25 LAB
ALBUMIN SERPL BCG-MCNC: 3.5 G/DL (ref 3.5–5.2)
ALP SERPL-CCNC: 76 U/L (ref 35–104)
ALT SERPL W P-5'-P-CCNC: 9 U/L (ref 10–35)
ANION GAP SERPL CALCULATED.3IONS-SCNC: 12 MMOL/L (ref 7–15)
AST SERPL W P-5'-P-CCNC: 18 U/L (ref 10–35)
BACTERIA UR CULT: ABNORMAL
BASE EXCESS BLDV CALC-SCNC: 3.5 MMOL/L (ref -7.7–1.9)
BILIRUB SERPL-MCNC: 0.3 MG/DL
BUN SERPL-MCNC: 57.8 MG/DL (ref 6–20)
CALCIUM SERPL-MCNC: 9.1 MG/DL (ref 8.6–10)
CHLORIDE SERPL-SCNC: 105 MMOL/L (ref 98–107)
CREAT SERPL-MCNC: 2.15 MG/DL (ref 0.51–0.95)
CYCLOSPORINE BLD LC/MS/MS-MCNC: 50 UG/L (ref 50–400)
DEPRECATED HCO3 PLAS-SCNC: 24 MMOL/L (ref 22–29)
ERYTHROCYTE [DISTWIDTH] IN BLOOD BY AUTOMATED COUNT: 14.2 % (ref 10–15)
GFR SERPL CREATININE-BSD FRML MDRD: 26 ML/MIN/1.73M2
GLUCOSE SERPL-MCNC: 100 MG/DL (ref 70–99)
HCO3 BLDV-SCNC: 30 MMOL/L (ref 21–28)
HCT VFR BLD AUTO: 37.9 % (ref 35–47)
HGB BLD-MCNC: 10.7 G/DL (ref 11.7–15.7)
HOLD SPECIMEN: NORMAL
INR PPP: 0.89 (ref 0.85–1.15)
MAGNESIUM SERPL-MCNC: 1.9 MG/DL (ref 1.7–2.3)
MCH RBC QN AUTO: 27.3 PG (ref 26.5–33)
MCHC RBC AUTO-ENTMCNC: 28.2 G/DL (ref 31.5–36.5)
MCV RBC AUTO: 97 FL (ref 78–100)
O2/TOTAL GAS SETTING VFR VENT: 13 %
PCO2 BLDV: 57 MM HG (ref 40–50)
PH BLDV: 7.33 [PH] (ref 7.32–7.43)
PLATELET # BLD AUTO: 168 10E3/UL (ref 150–450)
PO2 BLDV: 68 MM HG (ref 25–47)
POTASSIUM SERPL-SCNC: 5.1 MMOL/L (ref 3.4–5.3)
PROCALCITONIN SERPL IA-MCNC: 0.29 NG/ML
PROT SERPL-MCNC: 6.4 G/DL (ref 6.4–8.3)
RBC # BLD AUTO: 3.92 10E6/UL (ref 3.8–5.2)
SODIUM SERPL-SCNC: 141 MMOL/L (ref 136–145)
TME LAST DOSE: NORMAL H
TME LAST DOSE: NORMAL H
WBC # BLD AUTO: 4.8 10E3/UL (ref 4–11)

## 2023-02-25 PROCEDURE — 94660 CPAP INITIATION&MGMT: CPT

## 2023-02-25 PROCEDURE — 82803 BLOOD GASES ANY COMBINATION: CPT | Performed by: INTERNAL MEDICINE

## 2023-02-25 PROCEDURE — 36415 COLL VENOUS BLD VENIPUNCTURE: CPT | Performed by: INTERNAL MEDICINE

## 2023-02-25 PROCEDURE — 120N000002 HC R&B MED SURG/OB UMMC

## 2023-02-25 PROCEDURE — 94640 AIRWAY INHALATION TREATMENT: CPT | Mod: 76

## 2023-02-25 PROCEDURE — 94640 AIRWAY INHALATION TREATMENT: CPT

## 2023-02-25 PROCEDURE — 83735 ASSAY OF MAGNESIUM: CPT | Performed by: INTERNAL MEDICINE

## 2023-02-25 PROCEDURE — 250N000012 HC RX MED GY IP 250 OP 636 PS 637: Performed by: INTERNAL MEDICINE

## 2023-02-25 PROCEDURE — 84145 PROCALCITONIN (PCT): CPT | Performed by: INTERNAL MEDICINE

## 2023-02-25 PROCEDURE — 250N000011 HC RX IP 250 OP 636: Performed by: INTERNAL MEDICINE

## 2023-02-25 PROCEDURE — 250N000011 HC RX IP 250 OP 636: Performed by: NURSE PRACTITIONER

## 2023-02-25 PROCEDURE — 99232 SBSQ HOSP IP/OBS MODERATE 35: CPT | Performed by: INTERNAL MEDICINE

## 2023-02-25 PROCEDURE — 999N000185 HC STATISTIC TRANSPORT TIME EA 15 MIN

## 2023-02-25 PROCEDURE — 250N000009 HC RX 250: Performed by: INTERNAL MEDICINE

## 2023-02-25 PROCEDURE — 250N000011 HC RX IP 250 OP 636: Performed by: HOSPITALIST

## 2023-02-25 PROCEDURE — 250N000009 HC RX 250: Performed by: HOSPITALIST

## 2023-02-25 PROCEDURE — 85027 COMPLETE CBC AUTOMATED: CPT | Performed by: INTERNAL MEDICINE

## 2023-02-25 PROCEDURE — 36415 COLL VENOUS BLD VENIPUNCTURE: CPT | Performed by: STUDENT IN AN ORGANIZED HEALTH CARE EDUCATION/TRAINING PROGRAM

## 2023-02-25 PROCEDURE — C9113 INJ PANTOPRAZOLE SODIUM, VIA: HCPCS | Performed by: HOSPITALIST

## 2023-02-25 PROCEDURE — 85610 PROTHROMBIN TIME: CPT | Performed by: INTERNAL MEDICINE

## 2023-02-25 PROCEDURE — 82374 ASSAY BLOOD CARBON DIOXIDE: CPT | Performed by: INTERNAL MEDICINE

## 2023-02-25 PROCEDURE — 80158 DRUG ASSAY CYCLOSPORINE: CPT | Performed by: STUDENT IN AN ORGANIZED HEALTH CARE EDUCATION/TRAINING PROGRAM

## 2023-02-25 PROCEDURE — 999N000157 HC STATISTIC RCP TIME EA 10 MIN

## 2023-02-25 RX ORDER — LEVALBUTEROL INHALATION SOLUTION 1.25 MG/3ML
1.25 SOLUTION RESPIRATORY (INHALATION)
Status: DISCONTINUED | OUTPATIENT
Start: 2023-02-25 | End: 2023-02-26

## 2023-02-25 RX ORDER — FUROSEMIDE 10 MG/ML
40 INJECTION INTRAMUSCULAR; INTRAVENOUS ONCE
Status: COMPLETED | OUTPATIENT
Start: 2023-02-25 | End: 2023-02-25

## 2023-02-25 RX ADMIN — IPRATROPIUM BROMIDE 0.5 MG: 0.5 SOLUTION RESPIRATORY (INHALATION) at 00:10

## 2023-02-25 RX ADMIN — IPRATROPIUM BROMIDE 0.5 MG: 0.5 SOLUTION RESPIRATORY (INHALATION) at 08:37

## 2023-02-25 RX ADMIN — IPRATROPIUM BROMIDE 0.5 MG: 0.5 SOLUTION RESPIRATORY (INHALATION) at 12:38

## 2023-02-25 RX ADMIN — PANTOPRAZOLE SODIUM 40 MG: 40 INJECTION, POWDER, LYOPHILIZED, FOR SOLUTION INTRAVENOUS at 07:55

## 2023-02-25 RX ADMIN — HEPARIN SODIUM 5000 UNITS: 5000 INJECTION, SOLUTION INTRAVENOUS; SUBCUTANEOUS at 07:55

## 2023-02-25 RX ADMIN — KETOTIFEN FUMARATE 1 DROP: 0.35 SOLUTION/ DROPS OPHTHALMIC at 20:00

## 2023-02-25 RX ADMIN — CEFTAZIDIME 2 G: 2 INJECTION, POWDER, FOR SOLUTION INTRAVENOUS at 10:34

## 2023-02-25 RX ADMIN — IPRATROPIUM BROMIDE 0.5 MG: 0.5 SOLUTION RESPIRATORY (INHALATION) at 21:20

## 2023-02-25 RX ADMIN — LEVALBUTEROL HYDROCHLORIDE 1.25 MG: 1.25 SOLUTION RESPIRATORY (INHALATION) at 21:21

## 2023-02-25 RX ADMIN — HEPARIN SODIUM 5000 UNITS: 5000 INJECTION, SOLUTION INTRAVENOUS; SUBCUTANEOUS at 19:41

## 2023-02-25 RX ADMIN — MYCOPHENOLATE MOFETIL 500 MG: 250 CAPSULE ORAL at 07:54

## 2023-02-25 RX ADMIN — CYCLOSPORINE 75 MG: 25 CAPSULE, LIQUID FILLED ORAL at 07:54

## 2023-02-25 RX ADMIN — LEVALBUTEROL HYDROCHLORIDE 1.25 MG: 1.25 SOLUTION RESPIRATORY (INHALATION) at 12:38

## 2023-02-25 RX ADMIN — MYCOPHENOLATE MOFETIL 500 MG: 250 CAPSULE ORAL at 19:42

## 2023-02-25 RX ADMIN — LEVALBUTEROL HYDROCHLORIDE 1.25 MG: 1.25 SOLUTION RESPIRATORY (INHALATION) at 00:10

## 2023-02-25 RX ADMIN — CYCLOSPORINE 50 MG: 25 CAPSULE, LIQUID FILLED ORAL at 18:44

## 2023-02-25 RX ADMIN — LEVALBUTEROL HYDROCHLORIDE 1.25 MG: 1.25 SOLUTION RESPIRATORY (INHALATION) at 08:37

## 2023-02-25 RX ADMIN — FUROSEMIDE 40 MG: 10 INJECTION, SOLUTION INTRAVENOUS at 14:04

## 2023-02-25 RX ADMIN — PANTOPRAZOLE SODIUM 40 MG: 40 INJECTION, POWDER, LYOPHILIZED, FOR SOLUTION INTRAVENOUS at 19:41

## 2023-02-25 ASSESSMENT — ACTIVITIES OF DAILY LIVING (ADL)
ADLS_ACUITY_SCORE: 43

## 2023-02-25 NOTE — PROGRESS NOTES
[facility]    Medicine Progress Note - Hospitalist Service, GOLD TEAM 9    Date of Admission:  2/23/2023    Assessment & Plan   Flor Navarro is a 57 year old woman  admitted on 2/23/2023. She has a history of CKD, hypertension, hepatitis C s/p liver transplant in 2010, CVA and SURI who is admitted with hypoxic and hypercarbic respiratory failure, volume overload and acute kidney injury.    Changes today  Liver transplant sono wnl  +UTI sith >100k klebs on UCx, sens P, on ceftaz  bipap at least at night  Blood cultures NGTD  Tolerating diuresis, Cr downtrending, additional lasix 40 iv now     Acute on chronic hypoxic and hypercarbic respiratory failure  Possible pneumonia in immunocompromised patient  Pulmonary hypertension, new  The patient has a history of severe sleep apnea with prior admission for hypercarbia.  No clear trigger for event, but had been more confused for many days. No wheezing on exam to suggest asthma/COPD so have not started steroids yet.  - Pulm consult appreciated  - Use PAP with naps and sleep  - Close monitoring of VBGs   - Ceftazidime for possible pneumonia; checking urine legionella, strep pneumo, and sputum culture  - Considered cardiology for pulmonary hypertension. Continue diuresis and trend Cr  - Q4H ipratropium and levalbuterol  - Furosemide 40mg IV x 2 today     Acute encephalopathy, improved  UTI  Acute kidney injury, improving  Presents with several days of hallucinations, trembling and confusion. Hypercarbia is likely contributing. UA suggestive of UTI  - Ceftazidime for UTI   - klebs >100k, sens P  - Continue with BiPAP as above     History of liver transplant secondary to hepatitis C  - Continue home mycophenolate, cyclosporine. Hepatology consulted     History of hypertension  - Holding home amlodipine, chlorthalidone         Diet: Regular Diet Adult    DVT Prophylaxis: Heparin SQ  Li Catheter: Not present  Lines: None      Cardiac Monitoring: None  Code Status: Full Code      Clinically Significant Risk Factors        # Hyperkalemia: Highest K = 5.6 mmol/L in last 2 days, will monitor as appropriate                         Disposition Plan      Expected Discharge Date: 02/28/2023                  Ortiz Pablo MD  Hospitalist Service, GOLD TEAM 9  [facility]  Securely message with [employer] (more info)  Text page via [employer] Paging/Directory   See signed in provider for up to date coverage information  ______________________________________________________________________    Interval History   No events  Subjectively feels much improved overnight with bipap    Physical Exam   Vital Signs: Temp: 97.9  F (36.6  C) Temp src: Oral BP: 115/71 Pulse: 70   Resp: 23 SpO2: 95 % O2 Device: High Flow Nasal Cannula (HFNC) Oxygen Delivery: 30 LPM  Weight: 240 lbs 1.3 oz    General Appearance: A&Ox3 in NAD  Respiratory: bipap in place with normal work of breathing   Cardiovascular: regular  GI: soft nt  Skin: wwp  Other: Moving four extremities spontaneously      Medical Decision Making       30 MINUTES SPENT BY ME on the date of service doing chart review, history, exam, documentation & further activities per the note.      Data

## 2023-02-25 NOTE — PROGRESS NOTES
[facility]    Medicine Progress Note - Hospitalist Service, GOLD TEAM 10    Date of Admission:  2/23/2023    Assessment & Plan   Flor Navarro is a 57 year old woman  admitted on 2/23/2023. She has a history of CKD, hypertension, hepatitis C s/p liver transplant in 2010, CVA and SURI who is admitted with hypoxic and hypercarbic respiratory failure, volume overload and acute kidney injury.    Changes today  Pulm and GI consulted  Take off BIPAP this evening as mental status improved; continue overnight  Started antibiotics; UA suggestive of UTI. Blood cultures pending     Acute on chronic hypoxic and hypercarbic respiratory failure  Possible pneumonia in immunocompromised patient  Pulmonary hypertension, new  The patient has a history of severe sleep apnea with prior admission for hypercarbia.  No clear trigger for event, but had been more confused for many days. No wheezing on exam to suggest asthma/COPD so have not started steroids yet.  - Pulm consult appreciated  - Use PAP with naps and sleep  - Close monitoring of VBGs; will repeat this evening and in the AM  - Ceftazidime for possible pneumonia; checking urine legionella, strep pneumo, and sputum culture  - Consider cardiology for pulmonary hypertension  - Q4H ipratropium and levalbuterol  - Furosemide 40mg IV x 2 today     Acute encephalopathy  Possible UTI  Presents with several days of hallucinations, trembling and confusion. Hypercarbia is likely contributing. UA suggestive of UTI  - Ceftazidime for UTI vs pneumonia. Blood and urine cultures pending  - Continue with BiPAP as above     Acute kidney injury, improving  - US renal pending. UA suggestive of UTI  - Will hold off on renal US pending UA     History of liver transplant secondary to hepatitis C  - Continue home mycophenolate, cyclosporine. Hepatology consulted     History of hypertension  - Holding home amlodipine, chlorthalidone       Diet: Clear Liquid  "Diet    DVT Prophylaxis: Heparin SQ  Li Catheter: Not present  Lines: None     Cardiac Monitoring: None  Code Status: Full Code      Clinically Significant Risk Factors        # Hyperkalemia: Highest K = 5.6 mmol/L in last 2 days, will monitor as appropriate                 # Severe Obesity: Estimated body mass index is 46.08 kg/m  as calculated from the following:    Height as of 1/26/23: 1.549 m (5' 1\").    Weight as of this encounter: 110.6 kg (243 lb 14.4 oz)., PRESENT ON ADMISSION         Disposition Plan     Expected Discharge Date: 02/25/2023                  Rogelio León MD  Hospitalist Service, GOLD TEAM 10  [facility]  Securely message with [employer] (more info)  Text page via [employer] Paging/Directory   See signed in provider for up to date coverage information  ______________________________________________________________________    Interval History   More awake today. Does not know what happened. Denies shortness of breath No difficulty urinating or pain with urination. No abdominal pain. Does not use bipap at home    Physical Exam   Vital Signs: Temp: 97.4  F (36.3  C) Temp src: Axillary BP: 120/72 Pulse: 72   Resp: 22 SpO2: 92 % O2 Device: Oxymask Oxygen Delivery: 13 LPM  Weight: 243 lbs 14.4 oz    Constitutional: Awake, answers questions appropriately, thirsty, no distress  Respiratory: Breathing comfortably on face mask. No wheezing  GI: Normal bowel sounds, nontender    Medical Decision Making       80 MINUTES SPENT BY ME on the date of service doing chart review, history, exam, documentation & further activities per the note.      "

## 2023-02-26 ENCOUNTER — APPOINTMENT (OUTPATIENT)
Dept: GENERAL RADIOLOGY | Facility: CLINIC | Age: 58
DRG: 682 | End: 2023-02-26
Attending: INTERNAL MEDICINE
Payer: MEDICARE

## 2023-02-26 LAB
ANION GAP SERPL CALCULATED.3IONS-SCNC: 12 MMOL/L (ref 7–15)
BASOPHILS # BLD AUTO: 0 10E3/UL (ref 0–0.2)
BASOPHILS NFR BLD AUTO: 1 %
BUN SERPL-MCNC: 51.7 MG/DL (ref 6–20)
CALCIUM SERPL-MCNC: 8.8 MG/DL (ref 8.6–10)
CHLORIDE SERPL-SCNC: 104 MMOL/L (ref 98–107)
CREAT SERPL-MCNC: 1.6 MG/DL (ref 0.51–0.95)
DEPRECATED HCO3 PLAS-SCNC: 25 MMOL/L (ref 22–29)
EOSINOPHIL # BLD AUTO: 0.2 10E3/UL (ref 0–0.7)
EOSINOPHIL NFR BLD AUTO: 3 %
ERYTHROCYTE [DISTWIDTH] IN BLOOD BY AUTOMATED COUNT: 14.3 % (ref 10–15)
GFR SERPL CREATININE-BSD FRML MDRD: 37 ML/MIN/1.73M2
GLUCOSE SERPL-MCNC: 146 MG/DL (ref 70–99)
HCT VFR BLD AUTO: 38.2 % (ref 35–47)
HGB BLD-MCNC: 10.8 G/DL (ref 11.7–15.7)
HOLD SPECIMEN: NORMAL
IMM GRANULOCYTES # BLD: 0 10E3/UL
IMM GRANULOCYTES NFR BLD: 1 %
LYMPHOCYTES # BLD AUTO: 1.3 10E3/UL (ref 0.8–5.3)
LYMPHOCYTES NFR BLD AUTO: 28 %
MCH RBC QN AUTO: 27.4 PG (ref 26.5–33)
MCHC RBC AUTO-ENTMCNC: 28.3 G/DL (ref 31.5–36.5)
MCV RBC AUTO: 97 FL (ref 78–100)
MONOCYTES # BLD AUTO: 0.4 10E3/UL (ref 0–1.3)
MONOCYTES NFR BLD AUTO: 10 %
NEUTROPHILS # BLD AUTO: 2.6 10E3/UL (ref 1.6–8.3)
NEUTROPHILS NFR BLD AUTO: 57 %
NRBC # BLD AUTO: 0 10E3/UL
NRBC BLD AUTO-RTO: 0 /100
PLATELET # BLD AUTO: 172 10E3/UL (ref 150–450)
PLATELET # BLD AUTO: ABNORMAL 10*3/UL
POTASSIUM SERPL-SCNC: 4.7 MMOL/L (ref 3.4–5.3)
RBC # BLD AUTO: 3.94 10E6/UL (ref 3.8–5.2)
SODIUM SERPL-SCNC: 141 MMOL/L (ref 136–145)
WBC # BLD AUTO: 4.5 10E3/UL (ref 4–11)

## 2023-02-26 PROCEDURE — 250N000011 HC RX IP 250 OP 636: Performed by: NURSE PRACTITIONER

## 2023-02-26 PROCEDURE — 120N000002 HC R&B MED SURG/OB UMMC

## 2023-02-26 PROCEDURE — 85049 AUTOMATED PLATELET COUNT: CPT | Performed by: NURSE PRACTITIONER

## 2023-02-26 PROCEDURE — 250N000011 HC RX IP 250 OP 636: Performed by: HOSPITALIST

## 2023-02-26 PROCEDURE — 250N000012 HC RX MED GY IP 250 OP 636 PS 637: Performed by: INTERNAL MEDICINE

## 2023-02-26 PROCEDURE — 250N000011 HC RX IP 250 OP 636: Performed by: INTERNAL MEDICINE

## 2023-02-26 PROCEDURE — 82310 ASSAY OF CALCIUM: CPT | Performed by: INTERNAL MEDICINE

## 2023-02-26 PROCEDURE — 71045 X-RAY EXAM CHEST 1 VIEW: CPT | Mod: 26 | Performed by: RADIOLOGY

## 2023-02-26 PROCEDURE — 999N000157 HC STATISTIC RCP TIME EA 10 MIN

## 2023-02-26 PROCEDURE — 71045 X-RAY EXAM CHEST 1 VIEW: CPT

## 2023-02-26 PROCEDURE — 94640 AIRWAY INHALATION TREATMENT: CPT

## 2023-02-26 PROCEDURE — 250N000009 HC RX 250: Performed by: INTERNAL MEDICINE

## 2023-02-26 PROCEDURE — 94640 AIRWAY INHALATION TREATMENT: CPT | Mod: 76

## 2023-02-26 PROCEDURE — 94660 CPAP INITIATION&MGMT: CPT

## 2023-02-26 PROCEDURE — 36415 COLL VENOUS BLD VENIPUNCTURE: CPT | Performed by: NURSE PRACTITIONER

## 2023-02-26 PROCEDURE — C9113 INJ PANTOPRAZOLE SODIUM, VIA: HCPCS | Performed by: HOSPITALIST

## 2023-02-26 PROCEDURE — 99232 SBSQ HOSP IP/OBS MODERATE 35: CPT | Performed by: INTERNAL MEDICINE

## 2023-02-26 RX ORDER — CEFTRIAXONE 1 G/1
1 INJECTION, POWDER, FOR SOLUTION INTRAMUSCULAR; INTRAVENOUS EVERY 24 HOURS
Status: COMPLETED | OUTPATIENT
Start: 2023-02-26 | End: 2023-03-01

## 2023-02-26 RX ORDER — LEVALBUTEROL INHALATION SOLUTION 1.25 MG/3ML
1.25 SOLUTION RESPIRATORY (INHALATION) 3 TIMES DAILY
Status: DISCONTINUED | OUTPATIENT
Start: 2023-02-26 | End: 2023-03-02 | Stop reason: HOSPADM

## 2023-02-26 RX ADMIN — MYCOPHENOLATE MOFETIL 500 MG: 250 CAPSULE ORAL at 19:34

## 2023-02-26 RX ADMIN — PANTOPRAZOLE SODIUM 40 MG: 40 INJECTION, POWDER, LYOPHILIZED, FOR SOLUTION INTRAVENOUS at 07:20

## 2023-02-26 RX ADMIN — IPRATROPIUM BROMIDE 0.5 MG: 0.5 SOLUTION RESPIRATORY (INHALATION) at 21:21

## 2023-02-26 RX ADMIN — HEPARIN SODIUM 5000 UNITS: 5000 INJECTION, SOLUTION INTRAVENOUS; SUBCUTANEOUS at 07:40

## 2023-02-26 RX ADMIN — KETOTIFEN FUMARATE 1 DROP: 0.35 SOLUTION/ DROPS OPHTHALMIC at 19:35

## 2023-02-26 RX ADMIN — CYCLOSPORINE 75 MG: 25 CAPSULE, LIQUID FILLED ORAL at 07:23

## 2023-02-26 RX ADMIN — CEFTRIAXONE SODIUM 1 G: 1 INJECTION, POWDER, FOR SOLUTION INTRAMUSCULAR; INTRAVENOUS at 18:50

## 2023-02-26 RX ADMIN — IPRATROPIUM BROMIDE 0.5 MG: 0.5 SOLUTION RESPIRATORY (INHALATION) at 07:59

## 2023-02-26 RX ADMIN — CYCLOSPORINE 50 MG: 25 CAPSULE, LIQUID FILLED ORAL at 18:50

## 2023-02-26 RX ADMIN — MYCOPHENOLATE MOFETIL 500 MG: 250 CAPSULE ORAL at 07:23

## 2023-02-26 RX ADMIN — LEVALBUTEROL HYDROCHLORIDE 1.25 MG: 1.25 SOLUTION RESPIRATORY (INHALATION) at 07:59

## 2023-02-26 RX ADMIN — LEVALBUTEROL HYDROCHLORIDE 1.25 MG: 1.25 SOLUTION RESPIRATORY (INHALATION) at 21:21

## 2023-02-26 RX ADMIN — IPRATROPIUM BROMIDE 0.5 MG: 0.5 SOLUTION RESPIRATORY (INHALATION) at 13:10

## 2023-02-26 RX ADMIN — HEPARIN SODIUM 5000 UNITS: 5000 INJECTION, SOLUTION INTRAVENOUS; SUBCUTANEOUS at 19:35

## 2023-02-26 RX ADMIN — PANTOPRAZOLE SODIUM 40 MG: 40 INJECTION, POWDER, LYOPHILIZED, FOR SOLUTION INTRAVENOUS at 18:50

## 2023-02-26 RX ADMIN — CEFTAZIDIME 2 G: 2 INJECTION, POWDER, FOR SOLUTION INTRAVENOUS at 07:20

## 2023-02-26 RX ADMIN — LEVALBUTEROL HYDROCHLORIDE 1.25 MG: 1.25 SOLUTION RESPIRATORY (INHALATION) at 13:10

## 2023-02-26 ASSESSMENT — ACTIVITIES OF DAILY LIVING (ADL)
DEPENDENT_IADLS:: CLEANING;COOKING;LAUNDRY;SHOPPING;MEAL PREPARATION;MONEY MANAGEMENT;TRANSPORTATION
ADLS_ACUITY_SCORE: 43

## 2023-02-26 NOTE — PROGRESS NOTES
Shift Summary:    Neuro: alert and oriented x4, uses call light appropriately. Denies pain.     Cardiac: NSR 60-80s. VSS.     Respiratory: between HFNC 30L 65% FiO2 and Bipap 20/10 50% FiO2. Lung sounds clear/diminished.     GI/: Regular diet. Up to commode with SBA. 1 BM.

## 2023-02-26 NOTE — CONSULTS
Care Management Initial Consult    General Information  Assessment completed with: Children (Son of the patient (Carleen, Phone: [phone]) who is very involved/supportive and knowledgable of the pt's medical history),    Type of CM/SW Visit: Initial Assessment    Primary Care Provider verified and updated as needed: Yes   Readmission within the last 30 days: no previous admission in last 30 days   Reason for Consult: other (see comments) (elevated risk score)  Advance Care Planning: Advance Care Planning Reviewed: no concerns identified          Communication Assessment  Patient's communication style: spoken language (non-English) (Irish English, needs an )             Cognitive  Cognitive/Neuro/Behavioral: WDL  Level of Consciousness: alert  Arousal Level: opens eyes spontaneously  Orientation: oriented x 4  Mood/Behavior: cooperative  Best Language: 0 - No aphasia  Speech: clear, spontaneous, logical    Living Environment:   People in home: child(flaquita), adult, other relative(s) (son and his wife)     Current living Arrangements: other (see comments) ([facility])      Able to return to prior arrangements: yes       Family/Social Support:  Care provided by: child(flaquita)  Provides care for: no one, unable/limited ability to care for self  Marital Status: Single  Children, Other (specify) (dtr in law)          Description of Support System: Supportive, Involved    Support Assessment: Adequate family and caregiver support, Adequate social supports    Current Resources:   Patient receiving home care services: No     Community Resources: PCA (pt's son is the PCA of the pt, 10 hours a day).   Equipment currently used at home: cane, straight, walker, standard (electric scooter utilized during the summer outside per the son)  Supplies currently used at home: None    Employment/Financial:  Employment Status: disabled        Financial Concerns: No concerns identified   Referral to Financial Worker: No        Lifestyle & Psychosocial Needs:  Social Determinants of Health     Tobacco Use: Low Risk      Smoking Tobacco Use: Never     Smokeless Tobacco Use: Never     Passive Exposure: Not on file   Alcohol Use: Not on file   Financial Resource Strain: Not on file   Food Insecurity: Not on file   Transportation Needs: Unmet Transportation Needs     Lack of Transportation (Medical): Yes     Lack of Transportation (Non-Medical): Yes   Physical Activity: Not on file   Stress: Not on file   Social Connections: Not on file   Intimate Partner Violence: Not At Risk     Fear of Current or Ex-Partner: No     Emotionally Abused: No     Physically Abused: No     Sexually Abused: No   Depression: Not at risk     PHQ-2 Score: 2   Housing Stability: Not on file       Functional Status:  Prior to admission patient needed assistance:   Dependent ADLs:: Ambulation-cane, Ambulation-walker, Bathing  Dependent IADLs:: Cleaning, Cooking, Laundry, Shopping, Meal Preparation, Money Management, Transportation (son assists with pretty much all ADLs at home, son reports to the SW that the pt recieves meals daily from [employer])       Mental Health Status:  Mental Health Status: No Current Concerns       Chemical Dependency Status:  Chemical Dependency Status: No Current Concerns             Values/Beliefs:  Spiritual, Cultural Beliefs, Latter-day Practices, Values that affect care: yes  Description of Beliefs that Will Affect Care: Mormon            Additional Information:  Per H&P:  Flor Navarro is a 57 year old Kosovan Serbian speaking woman  admitted on 2/23/2023. She has a history of CKD, hypertension, hepatitis C s/p liver transplant in 2010, CVA and SURI who is admitted with hypercarbic respirator failure, volume overload and acute kidney injury.    Throughout the conversation, pt's son Carleen was very pleasant and is very knowledgeable of the pt's medical history and is very involved in the pt's care with his wife/pt's daughter in  law. Durant told the  that the pt has an assigned SW through [facility] (Criss, Phone: [phone]) and receives homemaking/IHS services through [facility] (Nic, son didn't have the phone number on hand). Per SW note on 8/12/2022, phone number is [phone].     Per  notes during pt's admission August of 2022, pt is noted to have a CM through her [employer] insurance (Deborah Joshuanaeem, Phone: [phone]) and a HC RN with [facility] (Harmony Coleman, Phone: [phone]) who in the note on 8/5, reports she comes to the home on Fridays to help with med management & the pt was compliant. Son didn't report this to the  today so not known if this is still utilized.    Giovanni Verde, JACKIE, [facility]  2/26/2023  6C/D  on assigned weekends    Social Work and Care Management Department       SEARCHABLE in [employer] - search SOCIAL WORK       [place] (0800 - 1630) Saturday and Sunday     Units: 4A, 4C, & 4E Pager: [phone]     Units: 5A & 5B Pager: [phone]     Units: 6A & 6B   Pager: [phone]     Units: 6C & 6D Pager: [phone]     Units: 7A &7B  Pager: [phone]     Units: 7C, 7D, & 5C Pager: [phone]     Unit: [place] ED Pager: [phone]      [facility] (4870-3043) Saturday and Sunday      Units: 5 Ortho, 5 Med/Surg & WB ED  Pager:[phone]     Units: 6 Med/Surg, 8A, & 10A ICU  Pager: [phone]        After hours (1630 - 0000) Saturday & Sunday; (1487-0806) Mon-Fri; (4679-8762) [facility] Recognized Holidays     Units: ALL  Pager: [phone]

## 2023-02-26 NOTE — PROGRESS NOTES
Physician Attestation   I, Ortiz Pablo MD, was present with the medical/TRINY student who participated in the service and in the documentation of the note.  I have verified the history and personally performed the physical exam and medical decision making.  I agree with the assessment and plan of care as documented in the note.      Key findings:     Overall markedly improved   Using BiPAP with good relief  Fully alert and comfortable this am  Appreciate GI input  Clinically, volume status much improved with lasix IV hypervolemia on arrival. Suspect pulmonary hypertension on TTE read 2/2 cardiogenic pulmonary edema, responding to lasix. Chest X-ray with much improved vascular congestion. Continue lasix 40 iv today and follow clinically.      30 MINUTES SPENT BY ME on the date of service doing chart review, history, exam, documentation & further activities per the note.        Ortiz Pablo MD  Date of Service (when I saw the patient): 02/26/23    [facility]    Progress Note - Hospitalist Service, GOLD TEAM 9       Date of Admission:  2/23/2023    Assessment & Plan   Flor Navarro is a 57 year old woman  admitted on 2/23/2023. She has a history of CKD, hypertension, hepatitis C s/p liver transplant in 2010, CVA and SURI who is admitted with hypoxic and hypercarbic respiratory failure, volume overload and acute kidney injury in setting of acute UTI.     Changes today  - CXR- improvement in pulmonary edema  - Lasix 40mg IV x 1, will consider repeat lasix in PM pending re-evaluation of fluid status  - Switch abx to ceftriaxone 1g Q24hr, planned 7 day course ending 3/2     Acute on chronic hypoxic and hypercarbic respiratory failure  Possible pneumonia in immunocompromised patient  Pulmonary hypertension, new  The patient has a history of severe sleep apnea with prior admission for hypercarbia. Volume overloaded in setting of UTI and CASTILLO with significant  pulmonary edema likely contributing to her respiratory failure and evidence of new pulmonary hypertension that was found on ECHO. Still requiring supplemental oxygen, but respiratory status clinically improved with diuresis.   - Pulm consult appreciated  - Use PAP with naps and sleep  - Q4H ipratropium and levalbuterol  - Lasix 40mg IV x 1, will consider repeat lasix in PM pending re-evaluation of fluid status  - Repeat CXR   - Can consider repeat ECHO to assess for ongoing pulmonary hypertension after volume overload is resolved    UTI   Acute encephalopathy, improved  Acute kidney injury, improving  Presents with several days of hallucinations, trembling and confusion. Hypercarbia is likely contributing. UA suggestive of UTI, urine culture with >100k ampicillin resistant klebsiella sensitive. No longer appears encephalopathic. CASTILLO improving with diuresis.  - s/p Ceftaz 2g Q24hr (2/24-2/26)  - Ceftriaxone 1gQ2hr (2/27-3/2)  - Continue with BiPAP as above     History of liver transplant secondary to hepatitis C  - Continue home mycophenolate, cyclosporine. Hepatology consulted     History of hypertension  -  Holding home amlodipine, chlorthalidone        Diet: Regular Diet Adult    DVT Prophylaxis: Heparin SQ  Li Catheter: Not present  Fluids: None  Lines: None     Cardiac Monitoring: None  Code Status: Full Code      Disposition Plan     Expected Discharge Date: 02/28/2023                The patient's care was discussed with the Attending Physician, Dr. Ortiz Pablo.    Stephanie Liu  Medical Student  Hospitalist Service, [address]  Securely message with [employer] (more info)  Text page via [employer] Paging/Directory   See signed in provider for up to date coverage information  ______________________________________________________________________    Interval History   No acute events overnight. Nursing notes reviewed.     Tolerated BIPAP overnight. This  morning, her breathing feels better. Denies fever, chills, N/V, or abdominal pain. She is tolerating PO intake appropriately. She has had increased urination with lasix. Last bowel movement was overnight.    Physical Exam   Vital Signs: Temp: 97.9  F (36.6  C) Temp src: Oral BP: 131/74 Pulse: 66   Resp: 20 SpO2: 96 % O2 Device: BiPAP/CPAP Oxygen Delivery: 30 LPM  Weight: 240 lbs 1.3 oz    Constitutional: Awake, alert, and oriented. Appears comfortable in bed, eating breakfast and facetiming  Respiratory: High flow NC on. Mildly increased work of breathing, using some accessory respiratory muscles. Breath sounds diminished in basal lungs bilaterally.   Cardiovascular: RRR, well perfused.   GI: Abdomen non-distended.   Musculoskeletal: Spontaneously moves all extremities.   Neuro: Alert and oriented. Cranial nerves grossly intact.     Data   Recent Labs   Lab 02/26/23  0612 02/25/23  0922 02/25/23  0815 02/24/23  1342 02/24/23  0747 02/24/23  0728 02/23/23  2235   WBC 4.5 4.8  --   --   --  5.1  --    HGB 10.8* 10.7*  --   --   --  11.2*  --    MCV 97 97  --   --   --  99  --     168  --   --   --  179  --    INR  --   --  0.89  --   --  0.98  --      --  141 139  --  140 144   POTASSIUM 4.7  --  5.1 5.2  --  5.2 5.4*   CHLORIDE 104  --  105 105  --  105 107   CO2 25  --  24 25  --  25 27   BUN 51.7*  --  57.8* 55.0*  --  56.2* 51.1*   CR 1.60*  --  2.15* 2.60*  --  2.78* 2.89*   ANIONGAP 12  --  12 9  --  10 10   JUAN 8.8  --  9.1 8.8  --  9.0 9.3   *  --  100* 97   < > 105* 129*   ALBUMIN  --   --  3.5  --   --   --  3.7   PROTTOTAL  --   --  6.4  --   --   --  6.8   BILITOTAL  --   --  0.3  --   --   --  0.3   ALKPHOS  --   --  76  --   --   --  83   ALT  --   --  9*  --   --   --  20   AST  --   --  18  --   --   --  21    < > = values in this interval not displayed.

## 2023-02-26 NOTE — PROGRESS NOTES
Intensive Care Unit   Nursing Note            Neuro:  AAO x 4. PERRL.  Moves all extremities.  Follows commands.  Cardiovascular:  RRR.  Hemodynamically stable.  Pulmonary:  High Flow Nasal Canula. 30L 80%.   GI/:  Adequate UOP per bedside commode.     Skin:  Intact except incisional areas.     Plan of Care:     AM labs noted; electrolytes replaced as indicated.  Family updated  Continue to monitor closely.    Recent Labs   Lab 02/25/23  0814 02/24/23  2108 02/24/23  1617 02/24/23  1342 02/24/23  0728 02/24/23  0639   PH  --   --   --   --   --  7.21*   PCO2  --   --   --   --   --  68*   PO2  --   --   --   --   --  221*   HCO3  --   --   --   --   --  27   O2PER 13 93 50 50   < > 70    < > = values in this interval not displayed.       Lab Results   Component Value Date    TROPI  07/27/2017     <0.015  The 99th percentile for upper reference range is 0.045 ug/L.  Troponin values in   the range of 0.045 - 0.120 ug/L may be associated with risks of adverse   clinical events.      TROPI <0.012 11/25/2010    TROPI <0.012 09/15/2010    TROPI <0.012 09/05/2010    TROPI 0.014 11/11/2009    TROPONIN 0.00 05/25/2014    TROPONIN 0.00 08/23/2010    TROPONIN 0.01 11/29/2008    TROPONIN <0.04 10/05/2006    TROPONIN <0.07 07/17/2005       ROUTINE IP LABS (Last four results)  BMP  Recent Labs   Lab 02/25/23  0815 02/24/23  1342 02/24/23  0747 02/24/23  0728 02/23/23  2235    139  --  140 144   POTASSIUM 5.1 5.2  --  5.2 5.4*   CHLORIDE 105 105  --  105 107   JUAN 9.1 8.8  --  9.0 9.3   CO2 24 25 -- 25 27   BUN 57.8* 55.0*  --  56.2* 51.1*   CR 2.15* 2.60*  --  2.78* 2.89*   * 97 95 105* 129*     CBC  Recent Labs   Lab 02/25/23  0922 02/24/23  0728 02/23/23  1242   WBC 4.8 5.1 6.4   RBC 3.92 4.08 4.14   HGB 10.7* 11.2* 11.3*   HCT 37.9 40.2 41.8   MCV 97 99 101*   MCH 27.3 27.5 27.3   MCHC 28.2* 27.9* 27.0*   RDW 14.2 14.2 14.3    179 196     INR  Recent Labs   Lab 02/25/23  0815 02/24/23  0728   INR 0.89  0.98     LACTIC ACID  Lactic Acid (mmol/L)   Date Value   02/23/2023 1.5   08/10/2022 1.5   08/09/2022 1.5   08/08/2022 1.4   04/04/2016 1.2   05/25/2014 1.5   11/02/2010 1.2   11/01/2010 1.8     Blood Glucose  Glucose (mg/dL)   Date Value   07/27/2017 88   01/23/2011 119 (H)   01/23/2011 69   12/16/2010 121 (H)   12/15/2010 119 (H)   12/15/2010 146 (H)       Intake/Output Summary (Last 24 hours) at 2/25/2023 2603  Last data filed at 2/25/2023 1746  Gross per 24 hour   Intake --   Output 100 ml   Net -100 ml       Kasia Rollins RN    Intensive Care Unit

## 2023-02-27 LAB
AMMONIA PLAS-SCNC: 30 UMOL/L (ref 11–51)
ANION GAP SERPL CALCULATED.3IONS-SCNC: 9 MMOL/L (ref 7–15)
BASOPHILS # BLD AUTO: 0 10E3/UL (ref 0–0.2)
BASOPHILS NFR BLD AUTO: 1 %
BUN SERPL-MCNC: 31 MG/DL (ref 6–20)
CALCIUM SERPL-MCNC: 9.1 MG/DL (ref 8.6–10)
CHLORIDE SERPL-SCNC: 105 MMOL/L (ref 98–107)
CREAT SERPL-MCNC: 1.06 MG/DL (ref 0.51–0.95)
DEPRECATED HCO3 PLAS-SCNC: 28 MMOL/L (ref 22–29)
EOSINOPHIL # BLD AUTO: 0.2 10E3/UL (ref 0–0.7)
EOSINOPHIL NFR BLD AUTO: 4 %
ERYTHROCYTE [DISTWIDTH] IN BLOOD BY AUTOMATED COUNT: 14.2 % (ref 10–15)
GFR SERPL CREATININE-BSD FRML MDRD: 61 ML/MIN/1.73M2
GLUCOSE SERPL-MCNC: 103 MG/DL (ref 70–99)
HCT VFR BLD AUTO: 40.8 % (ref 35–47)
HGB BLD-MCNC: 11.4 G/DL (ref 11.7–15.7)
IMM GRANULOCYTES # BLD: 0.1 10E3/UL
IMM GRANULOCYTES NFR BLD: 1 %
L PNEUMO1 AG UR QL IA: NEGATIVE
LYMPHOCYTES # BLD AUTO: 1.4 10E3/UL (ref 0.8–5.3)
LYMPHOCYTES NFR BLD AUTO: 28 %
MCH RBC QN AUTO: 27 PG (ref 26.5–33)
MCHC RBC AUTO-ENTMCNC: 27.9 G/DL (ref 31.5–36.5)
MCV RBC AUTO: 97 FL (ref 78–100)
MONOCYTES # BLD AUTO: 0.4 10E3/UL (ref 0–1.3)
MONOCYTES NFR BLD AUTO: 8 %
NEUTROPHILS # BLD AUTO: 2.9 10E3/UL (ref 1.6–8.3)
NEUTROPHILS NFR BLD AUTO: 58 %
NRBC # BLD AUTO: 0 10E3/UL
NRBC BLD AUTO-RTO: 0 /100
PLATELET # BLD AUTO: 188 10E3/UL (ref 150–450)
POTASSIUM SERPL-SCNC: 5.1 MMOL/L (ref 3.4–5.3)
RBC # BLD AUTO: 4.23 10E6/UL (ref 3.8–5.2)
S PNEUM AG SPEC QL: NEGATIVE
SODIUM SERPL-SCNC: 142 MMOL/L (ref 136–145)
WBC # BLD AUTO: 5 10E3/UL (ref 4–11)

## 2023-02-27 PROCEDURE — 87899 AGENT NOS ASSAY W/OPTIC: CPT | Performed by: INTERNAL MEDICINE

## 2023-02-27 PROCEDURE — 999N000043 HC STATISTIC CTO2 CONT OXYGEN TECH TIME EA 90 MIN

## 2023-02-27 PROCEDURE — 82140 ASSAY OF AMMONIA: CPT | Performed by: INTERNAL MEDICINE

## 2023-02-27 PROCEDURE — 250N000011 HC RX IP 250 OP 636: Performed by: INTERNAL MEDICINE

## 2023-02-27 PROCEDURE — C9113 INJ PANTOPRAZOLE SODIUM, VIA: HCPCS | Performed by: HOSPITALIST

## 2023-02-27 PROCEDURE — 999N000215 HC STATISTIC HFNC ADULT NON-CPAP

## 2023-02-27 PROCEDURE — 80048 BASIC METABOLIC PNL TOTAL CA: CPT | Performed by: INTERNAL MEDICINE

## 2023-02-27 PROCEDURE — 99232 SBSQ HOSP IP/OBS MODERATE 35: CPT | Performed by: INTERNAL MEDICINE

## 2023-02-27 PROCEDURE — 999N000157 HC STATISTIC RCP TIME EA 10 MIN

## 2023-02-27 PROCEDURE — 94640 AIRWAY INHALATION TREATMENT: CPT | Mod: 76

## 2023-02-27 PROCEDURE — 85025 COMPLETE CBC W/AUTO DIFF WBC: CPT | Performed by: INTERNAL MEDICINE

## 2023-02-27 PROCEDURE — 120N000002 HC R&B MED SURG/OB UMMC

## 2023-02-27 PROCEDURE — 36415 COLL VENOUS BLD VENIPUNCTURE: CPT | Performed by: INTERNAL MEDICINE

## 2023-02-27 PROCEDURE — 250N000011 HC RX IP 250 OP 636: Performed by: NURSE PRACTITIONER

## 2023-02-27 PROCEDURE — 250N000011 HC RX IP 250 OP 636: Performed by: HOSPITALIST

## 2023-02-27 PROCEDURE — 250N000009 HC RX 250: Performed by: INTERNAL MEDICINE

## 2023-02-27 PROCEDURE — 250N000012 HC RX MED GY IP 250 OP 636 PS 637: Performed by: INTERNAL MEDICINE

## 2023-02-27 PROCEDURE — 250N000013 HC RX MED GY IP 250 OP 250 PS 637: Performed by: NURSE PRACTITIONER

## 2023-02-27 RX ORDER — FUROSEMIDE 20 MG
20 TABLET ORAL
Status: DISCONTINUED | OUTPATIENT
Start: 2023-02-28 | End: 2023-03-02 | Stop reason: HOSPADM

## 2023-02-27 RX ORDER — FUROSEMIDE 10 MG/ML
40 INJECTION INTRAMUSCULAR; INTRAVENOUS ONCE
Status: COMPLETED | OUTPATIENT
Start: 2023-02-27 | End: 2023-02-27

## 2023-02-27 RX ADMIN — CYCLOSPORINE 50 MG: 25 CAPSULE, LIQUID FILLED ORAL at 17:14

## 2023-02-27 RX ADMIN — FUROSEMIDE 40 MG: 10 INJECTION, SOLUTION INTRAVENOUS at 10:30

## 2023-02-27 RX ADMIN — LEVALBUTEROL HYDROCHLORIDE 1.25 MG: 1.25 SOLUTION RESPIRATORY (INHALATION) at 08:51

## 2023-02-27 RX ADMIN — HEPARIN SODIUM 5000 UNITS: 5000 INJECTION, SOLUTION INTRAVENOUS; SUBCUTANEOUS at 08:59

## 2023-02-27 RX ADMIN — MYCOPHENOLATE MOFETIL 500 MG: 250 CAPSULE ORAL at 20:12

## 2023-02-27 RX ADMIN — LEVALBUTEROL HYDROCHLORIDE 1.25 MG: 1.25 SOLUTION RESPIRATORY (INHALATION) at 13:59

## 2023-02-27 RX ADMIN — IPRATROPIUM BROMIDE 0.5 MG: 0.5 SOLUTION RESPIRATORY (INHALATION) at 21:02

## 2023-02-27 RX ADMIN — LEVALBUTEROL HYDROCHLORIDE 1.25 MG: 1.25 SOLUTION RESPIRATORY (INHALATION) at 21:02

## 2023-02-27 RX ADMIN — PANTOPRAZOLE SODIUM 40 MG: 40 INJECTION, POWDER, LYOPHILIZED, FOR SOLUTION INTRAVENOUS at 08:58

## 2023-02-27 RX ADMIN — CYCLOSPORINE 75 MG: 25 CAPSULE, LIQUID FILLED ORAL at 08:59

## 2023-02-27 RX ADMIN — HEPARIN SODIUM 5000 UNITS: 5000 INJECTION, SOLUTION INTRAVENOUS; SUBCUTANEOUS at 20:12

## 2023-02-27 RX ADMIN — KETOTIFEN FUMARATE 1 DROP: 0.35 SOLUTION/ DROPS OPHTHALMIC at 20:12

## 2023-02-27 RX ADMIN — MYCOPHENOLATE MOFETIL 500 MG: 250 CAPSULE ORAL at 08:58

## 2023-02-27 RX ADMIN — CEFTRIAXONE SODIUM 1 G: 1 INJECTION, POWDER, FOR SOLUTION INTRAMUSCULAR; INTRAVENOUS at 17:14

## 2023-02-27 RX ADMIN — IPRATROPIUM BROMIDE 0.5 MG: 0.5 SOLUTION RESPIRATORY (INHALATION) at 08:51

## 2023-02-27 RX ADMIN — IPRATROPIUM BROMIDE 0.5 MG: 0.5 SOLUTION RESPIRATORY (INHALATION) at 13:59

## 2023-02-27 RX ADMIN — PANTOPRAZOLE SODIUM 40 MG: 40 INJECTION, POWDER, LYOPHILIZED, FOR SOLUTION INTRAVENOUS at 20:14

## 2023-02-27 ASSESSMENT — ACTIVITIES OF DAILY LIVING (ADL)
ADLS_ACUITY_SCORE: 43

## 2023-02-27 ASSESSMENT — VISUAL ACUITY: OU: NORMAL ACUITY

## 2023-02-27 NOTE — PLAN OF CARE
Major Shift Events:      I/A:  Neuro: A&OX4. Moves all extremities, denies pain. No overt deficits noted.  Pulm: Lungs coarse and diminished. On HFNC 30LPM 60% FiO2. Dry cough.  CV: HR 60s SR. -130s. Afebrile.  PV: 2+ radial and DP pulses, mild edema noted.  GI: Soft abdomen, +BS. Regular diet, eating well. +BS, BM today.  : Voids spontaneously without difficulty.  Skin: See flowsheets.  MS: Up with minimal assistance.  Lines: PIV.  Psych/Social: Patient speaking with family via videochat; no family at bedside.  Goal Outcome Evaluation: Plan of Care Reviewed With: patient. Overall Patient Progress: no change.      Plan: Continue to monitor, notify Gold 9 of any concerns.  For vital signs and complete assessments, please see documentation flowsheets.

## 2023-02-27 NOTE — PROGRESS NOTES
Physician Attestation   I, Ortiz Pablo MD, was present with the medical/TRINY student who participated in the service and in the documentation of the note.  I have verified the history and personally performed the physical exam and medical decision making.  I agree with the assessment and plan of care as documented in the note.      Key findings:     Doing significantly better with lasix  Another lasix 40 iv daily today, consider lasix 20 po bid PTA in am  CASTILLO resolved  Tolerated HFNC overnight  Still markedly debilitated at baseline  PT/OT. Previously recommended TCU which was declined at last discharge when she went home  Sleep study ordered as inpatient as per hypercarbia on arrival compounding hypoxic failure from volume overload             Ortiz Pablo MD  Date of Service (when I saw the patient): 02/27/23    [facility]    Progress Note - Hospitalist Service, GOLD TEAM 9       Date of Admission:  2/23/2023    Assessment & Plan   Flor Navarro is a 57 year old woman  admitted on 2/23/2023. She has a history of CKD, hypertension, hepatitis C s/p liver transplant in 2010, CVA and SURI who is admitted with hypoxic and hypercarbic respiratory failure, volume overload and acute kidney injury in setting of acute UTI.     Changes today  - IV lasix 40 today  - Plan to resume PTA lasix tomorrow  - Strict I/O  - Ok to trial HFNC overnight, switch to BIPAP if additional respiratory support needed     Acute on chronic hypoxic and hypercarbic respiratory failure  Possible pneumonia in immunocompromised patient  Pulmonary hypertension, new  The patient has a history of severe sleep apnea with prior admission for hypercarbia. Volume overloaded in setting of UTI and CASTILLO with significant pulmonary edema likely contributing to her respiratory failure and evidence of new pulmonary hypertension that was found on ECHO. Repeat CXR 2/26 showing significant improvement  in pulmonary edema after diuresis, but could not rule out infection. Still requiring supplemental oxygen, but respiratory status clinically improved with diuresis so lower suspicion for pneumonia.   - Pulm consult appreciated  - Q4H ipratropium and levalbuterol  - Lasix 40mg IV x 1  - Plan to resume PTA lasix 2/28  - Abx as below  - Can consider repeat ECHO to assess for ongoing pulmonary hypertension after volume overload is resolved  - Ok to trial HFNC overnight, switch to BIPAP if additional respiratory support needed     UTI   Acute encephalopathy, improved  Acute kidney injury, improving  Presents with several days of hallucinations, trembling and confusion. Hypercarbia is likely contributing. UA suggestive of UTI, urine culture with >100k ampicillin resistant klebsiella sensitive. No longer appears encephalopathic. CASTILLO improving with diuresis.  - s/p Ceftaz 2g Q24hr (2/24-2/26)  - Ceftriaxone 1gQ2hr (2/27-3/2)  - Continue with HFNC vs BiPAP as above     History of liver transplant secondary to hepatitis C  - Continue home mycophenolate, cyclosporine. Hepatology consulted     History of hypertension  -  Holding home amlodipine, chlorthalidone    Deconditioning  - PT/OT     Diet: Regular Diet Adult    DVT Prophylaxis: Heparin SQ  Li Catheter: Not present  Fluids: None  Lines: None     Cardiac Monitoring: None  Code Status: Full Code         Disposition Plan     Expected Discharge Date: 02/28/2023      Destination: home;home with family          The patient's care was discussed with the Attending Physician, Dr. Ortiz Pablo .    Stephanie Sibley  Medical Student  Hospitalist Service, [address]  Securely message with [employer] (more info)  Text page via [employer] Paging/Directory   See signed in provider for up to date coverage information  ______________________________________________________________________    Interval History   Tolerated HFNC overnight.      Breathing feels better today. She prefers wearing the HFNC. She is POing well. Continuing to make appropriate urine given directics. Last BM was yesterday.     Physical Exam   Vital Signs: Temp: 98.2  F (36.8  C) Temp src: Oral BP: 134/74 Pulse: 76   Resp: 18 SpO2: 94 % O2 Device: High Flow Nasal Cannula (HFNC) Oxygen Delivery: 30 LPM  Weight: 240 lbs 1.3 oz    Constitutional: Awake, alert, and oriented. Appears comfortable in bed with HFNC on.  Respiratory: Breathing comfortably with HFNC on. No increased work of breathing. Diminished breath sounds at lung bases, otherwise overall clear to auscultation.   Cardiovascular: RRR, well perfused.   Skin: Scattered bruises  Neuro: Alert and oriented. Cranial nerves grossly intact.     Data     I have personally reviewed the following data over the past 24 hrs:    5.0  \   11.4 (L)   / 188     142 105 31.0 (H) /  103 (H)   5.1 28 1.06 (H) \       Imaging results reviewed over the past 24 hrs:   No results found for this or any previous visit (from the past 24 hour(s)).

## 2023-02-27 NOTE — PROGRESS NOTES
Brief GI progress note:    Patient is seen today. Alert and oriented.     /79 (BP Location: Right arm)   Pulse 70   Temp 97.6  F (36.4  C) (Oral)   Resp 18   Wt 106.3 kg (234 lb 6.4 oz)   LMP  (LMP Unknown)   SpO2 98%   BMI 44.29 kg/m    Is on high flow nasal canula  No asterixis.    Cr 1.06.     # Post OLTx 10/26/2010  # Treated HCV  No prior history of rejection. Has been taking immunosuppressants. Continue current dose of MMF and cyclosporin for now. Cyclosporin goal 50-75 with >10 years post transplant.      # Elevated ammonia, resolved  # Toxic metabolic encephalopathy, resolved  Encephalopathy also could be explained by other causes in this patient including hypercapnia and CASTILLO. Normal INR suggesting normal liver function.     # Portal hypertension, new on echo    Plan  - check cyclosporin level tomorrow (ordered for you)  - If having confusion in the future, would further work-up for post transplant portosystemic shunt as outpatient (CT abd/pelvis with contrast)    Discussed with Dr. Waterman.  Judah Winters MD  Gastroenterology/Hepatology Fellow

## 2023-02-27 NOTE — PLAN OF CARE
"Shift Updates  - x1 40mg Lasix administerd.  -Strict I/O  -Per Pulmonology wants patient on BiPAP during naps and at night.    Significant Dates:  2/25 Admitted to 6D        Neuro/Mental/Sitter:  Alert and oriented. Patient likes to lay in the dark and sleep on and off throughout the day. Encouraged patient to have more of a day/night rotation, receptive doing well this shift.  Cardio: NSR. No concerns  Resp/O2 Needs: HFLC throught this shift 30L/60%. Should have BiPAP on while taking naps and sleeping, not always receptive to it during day shift d/t not being able to eat and drink when she wants.  Musculoskeletal/Activity: Bedside commode. Weakness present. Recovers quickly with oxygenation and activity.  GI/: No concerns. X1 40mg Lasix administered.  Diet:  Regular diet  Pertinent Labs:    Creat 1.06  Procalcitonin- 0.29  Bun: 31.0  Ammonia: 30  No need for ABG's this shift  Vascular access:  PIV x1  Drips/Pain: SL  Drains/CTs:  None  Major Wounds/Skin:  Scattered bruises on abdomen and BUE's.                      Goal Outcome Evaluation:      Plan of Care Reviewed With: patient    Overall Patient Progress: no changeOverall Patient Progress: no change        Problem: Plan of Care - These are the overarching goals to be used throughout the patient stay.    Goal: Plan of Care Review  Description: The Plan of Care Review/Shift note should be completed every shift.  The Outcome Evaluation is a brief statement about your assessment that the patient is improving, declining, or no change.  This information will be displayed automatically on your shift note.  Outcome: Progressing  Flowsheets (Taken 2/27/2023 1330)  Plan of Care Reviewed With: patient  Overall Patient Progress: no change  Goal: Patient-Specific Goal (Individualized)  Description: You can add care plan individualizations to a care plan. Examples of Individualization might be:  \"Parent requests to be called daily at 9am for status\", \"I have a hard time " "hearing out of my right ear\", or \"Do not touch me to wake me up as it startles me\".  Outcome: Progressing  Goal: Absence of Hospital-Acquired Illness or Injury  Outcome: Progressing  Intervention: Identify and Manage Fall Risk  Recent Flowsheet Documentation  Taken 2/27/2023 1322 by Tarun Owen RN  Safety Promotion/Fall Prevention:    activity supervised    assistive device/personal items within reach    lighting adjusted    clutter free environment maintained    mobility aid in reach    nonskid shoes/slippers when out of bed    patient and family education  Taken 2/27/2023 0853 by Tarun Owen RN  Safety Promotion/Fall Prevention:    activity supervised    assistive device/personal items within reach    lighting adjusted    clutter free environment maintained    mobility aid in reach    nonskid shoes/slippers when out of bed    patient and family education  Intervention: Prevent Skin Injury  Recent Flowsheet Documentation  Taken 2/27/2023 1322 by Tarun Owen RN  Body Position:    right    side-lying  Taken 2/27/2023 1202 by Tarun Owen RN  Body Position:    right    side-lying  Taken 2/27/2023 1027 by Tarun Owen RN  Body Position:    right    side-lying  Taken 2/27/2023 0853 by Tarun Owen RN  Body Position:    right    side-lying  Intervention: Prevent and Manage VTE (Venous Thromboembolism) Risk  Recent Flowsheet Documentation  Taken 2/27/2023 1322 by Tarun Owen RN  VTE Prevention/Management: SCDs (sequential compression devices) off  Taken 2/27/2023 0853 by Tarun Owen RN  VTE Prevention/Management: SCDs (sequential compression devices) off  Goal: Optimal Comfort and Wellbeing  Outcome: Progressing  Goal: Readiness for Transition of Care  Outcome: Progressing              "

## 2023-02-27 NOTE — PROGRESS NOTES
Shift Summary:    Neuro: alert and oriented x4, uses call light appropriately. Denies pain.     Cardiac: NSR 60-80s. VSS.     Respiratory: HFNC 30L 50%. Lung sounds diminished.     GI/: regular diet with good appetite. Voiding regularly, 1 BM. Up to commode with SBA.

## 2023-02-28 ENCOUNTER — APPOINTMENT (OUTPATIENT)
Dept: PHYSICAL THERAPY | Facility: CLINIC | Age: 58
DRG: 682 | End: 2023-02-28
Attending: INTERNAL MEDICINE
Payer: MEDICARE

## 2023-02-28 ENCOUNTER — APPOINTMENT (OUTPATIENT)
Dept: CARDIOLOGY | Facility: CLINIC | Age: 58
DRG: 682 | End: 2023-02-28
Attending: STUDENT IN AN ORGANIZED HEALTH CARE EDUCATION/TRAINING PROGRAM
Payer: MEDICARE

## 2023-02-28 LAB
ANION GAP SERPL CALCULATED.3IONS-SCNC: 9 MMOL/L (ref 7–15)
BASOPHILS # BLD AUTO: 0 10E3/UL (ref 0–0.2)
BASOPHILS NFR BLD AUTO: 0 %
BUN SERPL-MCNC: 27.7 MG/DL (ref 6–20)
CALCIUM SERPL-MCNC: 8.9 MG/DL (ref 8.6–10)
CHLORIDE SERPL-SCNC: 102 MMOL/L (ref 98–107)
CREAT SERPL-MCNC: 1.13 MG/DL (ref 0.51–0.95)
CYCLOSPORINE BLD LC/MS/MS-MCNC: 59 UG/L (ref 50–400)
DEPRECATED HCO3 PLAS-SCNC: 30 MMOL/L (ref 22–29)
EOSINOPHIL # BLD AUTO: 0.2 10E3/UL (ref 0–0.7)
EOSINOPHIL NFR BLD AUTO: 4 %
ERYTHROCYTE [DISTWIDTH] IN BLOOD BY AUTOMATED COUNT: 14.2 % (ref 10–15)
GFR SERPL CREATININE-BSD FRML MDRD: 56 ML/MIN/1.73M2
GLUCOSE BLDC GLUCOMTR-MCNC: 113 MG/DL (ref 70–99)
GLUCOSE BLDC GLUCOMTR-MCNC: 128 MG/DL (ref 70–99)
GLUCOSE SERPL-MCNC: 120 MG/DL (ref 70–99)
HCT VFR BLD AUTO: 39.4 % (ref 35–47)
HGB BLD-MCNC: 11.4 G/DL (ref 11.7–15.7)
HOLD SPECIMEN: NORMAL
IMM GRANULOCYTES # BLD: 0 10E3/UL
IMM GRANULOCYTES NFR BLD: 1 %
LVEF ECHO: NORMAL
LYMPHOCYTES # BLD AUTO: 1.3 10E3/UL (ref 0.8–5.3)
LYMPHOCYTES NFR BLD AUTO: 27 %
MCH RBC QN AUTO: 27.3 PG (ref 26.5–33)
MCHC RBC AUTO-ENTMCNC: 28.9 G/DL (ref 31.5–36.5)
MCV RBC AUTO: 95 FL (ref 78–100)
MONOCYTES # BLD AUTO: 0.4 10E3/UL (ref 0–1.3)
MONOCYTES NFR BLD AUTO: 9 %
NEUTROPHILS # BLD AUTO: 2.9 10E3/UL (ref 1.6–8.3)
NEUTROPHILS NFR BLD AUTO: 59 %
NRBC # BLD AUTO: 0 10E3/UL
NRBC BLD AUTO-RTO: 0 /100
PLATELET # BLD AUTO: 164 10E3/UL (ref 150–450)
POTASSIUM SERPL-SCNC: 5.1 MMOL/L (ref 3.4–5.3)
RBC # BLD AUTO: 4.17 10E6/UL (ref 3.8–5.2)
SODIUM SERPL-SCNC: 141 MMOL/L (ref 136–145)
TME LAST DOSE: NORMAL H
TME LAST DOSE: NORMAL H
WBC # BLD AUTO: 4.9 10E3/UL (ref 4–11)

## 2023-02-28 PROCEDURE — 36415 COLL VENOUS BLD VENIPUNCTURE: CPT | Performed by: STUDENT IN AN ORGANIZED HEALTH CARE EDUCATION/TRAINING PROGRAM

## 2023-02-28 PROCEDURE — 94660 CPAP INITIATION&MGMT: CPT

## 2023-02-28 PROCEDURE — 93308 TTE F-UP OR LMTD: CPT | Mod: 26 | Performed by: STUDENT IN AN ORGANIZED HEALTH CARE EDUCATION/TRAINING PROGRAM

## 2023-02-28 PROCEDURE — 99233 SBSQ HOSP IP/OBS HIGH 50: CPT | Mod: GC | Performed by: INTERNAL MEDICINE

## 2023-02-28 PROCEDURE — 99232 SBSQ HOSP IP/OBS MODERATE 35: CPT | Performed by: STUDENT IN AN ORGANIZED HEALTH CARE EDUCATION/TRAINING PROGRAM

## 2023-02-28 PROCEDURE — 36415 COLL VENOUS BLD VENIPUNCTURE: CPT | Performed by: INTERNAL MEDICINE

## 2023-02-28 PROCEDURE — 250N000011 HC RX IP 250 OP 636: Performed by: INTERNAL MEDICINE

## 2023-02-28 PROCEDURE — 93321 DOPPLER ECHO F-UP/LMTD STD: CPT

## 2023-02-28 PROCEDURE — 94640 AIRWAY INHALATION TREATMENT: CPT | Mod: 76

## 2023-02-28 PROCEDURE — C9113 INJ PANTOPRAZOLE SODIUM, VIA: HCPCS | Performed by: HOSPITALIST

## 2023-02-28 PROCEDURE — 250N000012 HC RX MED GY IP 250 OP 636 PS 637: Performed by: INTERNAL MEDICINE

## 2023-02-28 PROCEDURE — 97116 GAIT TRAINING THERAPY: CPT | Mod: GP

## 2023-02-28 PROCEDURE — 250N000011 HC RX IP 250 OP 636: Performed by: HOSPITALIST

## 2023-02-28 PROCEDURE — 250N000011 HC RX IP 250 OP 636: Performed by: NURSE PRACTITIONER

## 2023-02-28 PROCEDURE — 97530 THERAPEUTIC ACTIVITIES: CPT | Mod: GP

## 2023-02-28 PROCEDURE — 85025 COMPLETE CBC W/AUTO DIFF WBC: CPT | Performed by: INTERNAL MEDICINE

## 2023-02-28 PROCEDURE — 94640 AIRWAY INHALATION TREATMENT: CPT

## 2023-02-28 PROCEDURE — 80048 BASIC METABOLIC PNL TOTAL CA: CPT | Performed by: INTERNAL MEDICINE

## 2023-02-28 PROCEDURE — 93321 DOPPLER ECHO F-UP/LMTD STD: CPT | Mod: 26 | Performed by: STUDENT IN AN ORGANIZED HEALTH CARE EDUCATION/TRAINING PROGRAM

## 2023-02-28 PROCEDURE — 80158 DRUG ASSAY CYCLOSPORINE: CPT | Performed by: STUDENT IN AN ORGANIZED HEALTH CARE EDUCATION/TRAINING PROGRAM

## 2023-02-28 PROCEDURE — 93325 DOPPLER ECHO COLOR FLOW MAPG: CPT | Mod: 26 | Performed by: STUDENT IN AN ORGANIZED HEALTH CARE EDUCATION/TRAINING PROGRAM

## 2023-02-28 PROCEDURE — 999N000157 HC STATISTIC RCP TIME EA 10 MIN

## 2023-02-28 PROCEDURE — 250N000009 HC RX 250: Performed by: INTERNAL MEDICINE

## 2023-02-28 PROCEDURE — 250N000013 HC RX MED GY IP 250 OP 250 PS 637: Performed by: INTERNAL MEDICINE

## 2023-02-28 PROCEDURE — 97162 PT EVAL MOD COMPLEX 30 MIN: CPT | Mod: GP

## 2023-02-28 PROCEDURE — 120N000002 HC R&B MED SURG/OB UMMC

## 2023-02-28 PROCEDURE — 93325 DOPPLER ECHO COLOR FLOW MAPG: CPT

## 2023-02-28 RX ORDER — CHLORTHALIDONE 25 MG/1
25 TABLET ORAL DAILY
Status: CANCELLED | OUTPATIENT
Start: 2023-03-01

## 2023-02-28 RX ORDER — FEBUXOSTAT 40 MG/1
40 TABLET, FILM COATED ORAL DAILY
Status: CANCELLED | OUTPATIENT
Start: 2023-03-01

## 2023-02-28 RX ORDER — PANTOPRAZOLE SODIUM 40 MG/1
40 TABLET, DELAYED RELEASE ORAL
Status: CANCELLED | OUTPATIENT
Start: 2023-02-28

## 2023-02-28 RX ORDER — AMLODIPINE BESYLATE 10 MG/1
10 TABLET ORAL DAILY
Status: CANCELLED | OUTPATIENT
Start: 2023-03-01

## 2023-02-28 RX ORDER — CALCITRIOL 0.25 UG/1
0.25 CAPSULE, LIQUID FILLED ORAL DAILY
Status: CANCELLED | OUTPATIENT
Start: 2023-03-01

## 2023-02-28 RX ADMIN — IPRATROPIUM BROMIDE 0.5 MG: 0.5 SOLUTION RESPIRATORY (INHALATION) at 15:34

## 2023-02-28 RX ADMIN — IPRATROPIUM BROMIDE 0.5 MG: 0.5 SOLUTION RESPIRATORY (INHALATION) at 08:49

## 2023-02-28 RX ADMIN — KETOTIFEN FUMARATE 1 DROP: 0.35 SOLUTION/ DROPS OPHTHALMIC at 09:00

## 2023-02-28 RX ADMIN — HEPARIN SODIUM 5000 UNITS: 5000 INJECTION, SOLUTION INTRAVENOUS; SUBCUTANEOUS at 09:22

## 2023-02-28 RX ADMIN — PANTOPRAZOLE SODIUM 40 MG: 40 TABLET, DELAYED RELEASE ORAL at 16:30

## 2023-02-28 RX ADMIN — HEPARIN SODIUM 5000 UNITS: 5000 INJECTION, SOLUTION INTRAVENOUS; SUBCUTANEOUS at 20:18

## 2023-02-28 RX ADMIN — PANTOPRAZOLE SODIUM 40 MG: 40 INJECTION, POWDER, LYOPHILIZED, FOR SOLUTION INTRAVENOUS at 09:00

## 2023-02-28 RX ADMIN — CYCLOSPORINE 75 MG: 25 CAPSULE, LIQUID FILLED ORAL at 09:00

## 2023-02-28 RX ADMIN — FUROSEMIDE 20 MG: 20 TABLET ORAL at 16:30

## 2023-02-28 RX ADMIN — LEVALBUTEROL HYDROCHLORIDE 1.25 MG: 1.25 SOLUTION RESPIRATORY (INHALATION) at 15:34

## 2023-02-28 RX ADMIN — LEVALBUTEROL HYDROCHLORIDE 1.25 MG: 1.25 SOLUTION RESPIRATORY (INHALATION) at 21:41

## 2023-02-28 RX ADMIN — KETOTIFEN FUMARATE 1 DROP: 0.35 SOLUTION/ DROPS OPHTHALMIC at 20:19

## 2023-02-28 RX ADMIN — MYCOPHENOLATE MOFETIL 500 MG: 250 CAPSULE ORAL at 20:18

## 2023-02-28 RX ADMIN — MYCOPHENOLATE MOFETIL 500 MG: 250 CAPSULE ORAL at 09:00

## 2023-02-28 RX ADMIN — CEFTRIAXONE SODIUM 1 G: 1 INJECTION, POWDER, FOR SOLUTION INTRAMUSCULAR; INTRAVENOUS at 18:29

## 2023-02-28 RX ADMIN — CYCLOSPORINE 50 MG: 25 CAPSULE, LIQUID FILLED ORAL at 18:27

## 2023-02-28 RX ADMIN — IPRATROPIUM BROMIDE 0.5 MG: 0.5 SOLUTION RESPIRATORY (INHALATION) at 21:41

## 2023-02-28 RX ADMIN — LEVALBUTEROL HYDROCHLORIDE 1.25 MG: 1.25 SOLUTION RESPIRATORY (INHALATION) at 08:49

## 2023-02-28 RX ADMIN — FUROSEMIDE 20 MG: 20 TABLET ORAL at 09:00

## 2023-02-28 ASSESSMENT — ACTIVITIES OF DAILY LIVING (ADL)
ADLS_ACUITY_SCORE: 40
ADLS_ACUITY_SCORE: 43
ADLS_ACUITY_SCORE: 40
ADLS_ACUITY_SCORE: 40
ADLS_ACUITY_SCORE: 43
ADLS_ACUITY_SCORE: 40
ADLS_ACUITY_SCORE: 40
ADLS_ACUITY_SCORE: 43
ADLS_ACUITY_SCORE: 43
ADLS_ACUITY_SCORE: 40
ADLS_ACUITY_SCORE: 40
ADLS_ACUITY_SCORE: 43

## 2023-02-28 NOTE — PROGRESS NOTES
GI progress note:    Patient is seen today. Alert and oriented. No complains, good appetite.     BP (!) 141/73 (BP Location: Right arm, Patient Position: Supine)   Pulse 61   Temp 98.1  F (36.7  C) (Oral)   Resp 20   Wt 106.3 kg (234 lb 6.4 oz)   LMP  (LMP Unknown)   SpO2 94%   BMI 44.29 kg/m    Is on high flow nasal canula  No asterixis.    Cr 1.13.  Cyclosporin level today 2/28/23 at 50.    # Post OLTx 10/26/2010  # Treated HCV  No prior history of rejection. Has been taking immunosuppressants. Continue current dose of MMF and cyclosporin for now. Cyclosporin goal 50-75 with >10 years post transplant.      # Elevated ammonia, resolved  # Toxic metabolic encephalopathy, resolved  Encephalopathy also could be explained by other causes in this patient including hypercapnia and CASTILLO which is now no longer confused. Ammonia is also now normalized. Normal INR suggesting normal liver function.     # Portal hypertension, new on echo  # SURI, poor compliance to CPAP    Plan  - check cyclosporin level weekly unless develops CASTILLO, next would be March 7, 2023 if patient remains admitted  - If having confusion in the future, would further work-up for post transplant portosystemic shunt as outpatient (CT abd/pelvis with contrast)    Discussed with Dr. Waterman.  Judah Winters MD  Gastroenterology/Hepatology Fellow

## 2023-02-28 NOTE — PLAN OF CARE
End of shift summary([id number])-       Neuro: alert and oriented x4, uses call light appropriately. Denies pain.      Cardiac:  NSR 60-80s. VSS.      Respiratory: Bipap 50%. Coarse & diminished lung sounds throughout.      GI/: . Voiding regularly, Up to commode with SBA.     Diet: Regular, no meat    Pain: Denies

## 2023-02-28 NOTE — PROGRESS NOTES
[facility] Pulmonology Follow up    Flor Navarro  MRN: [id number]  : 1965    Date of Admission: 2023  Date of Service: 2023    Assessment:  Acute on chronic hypoxic hypercarbic respiratory failure  Severe SURI on home PAP  Pulmonary HTN on TTE     Ms. Navarro is a 57 year old with known severe SURI, likely OHS, with newly diagnosed pulmonary hypertension. She presented with acute on chronic hypoxic hyperacarbic respiratory failure, initially quite somnolent. This has improved, as have blood gasses. Appreciate primary team management including diuresis and coordinating oxygen/PAP home care, and working with therapies. We will follow peripherally, please reach out to our team with any questions or concerns.    Recommendations:  -- continue BiPAP at night and with naps  -- wean supplemental O2 as able, use of IS, and up to chair/activity as tolerated  -- appreciate RT assistance in coordinating home oxygen  -- will need to see sleep medicine as outpatient for further NIV titration    Subjective:  Feeling about back to baseline from a breathing standpoint. Doesn't have oxygen at home, but does have PAP machine.     Review of systems: focused review of systems completed and negative except as noted above.    Objective:    /48 (BP Location: Right arm)   Pulse 76   Temp 98.2  F (36.8  C) (Axillary)   Resp 24   Wt 106.3 kg (234 lb 6.4 oz)   LMP  (LMP Unknown)   SpO2 90%   BMI 44.29 kg/m      Gen: in no acute distress, lying in bed.  CV: RRR, BLE edema  Pulm: no increased work of breathing, few crackles bibasilar  GI: soft, not distended  MSK: no gross deformities, no joint swelling  Integ: no rashes or lesions appreciated  Neuro: speech clear, alert and oriented  Psych: calm, appropriate    Data:  I have personally reviewed new labs, no new imaging.    Elisabet Jacobson, DO  Pulmonary fellow

## 2023-02-28 NOTE — PROGRESS NOTES
02/28/23 0900   Appointment Info   Signing Clinician's Name / Credentials (PT) Ivonne Phelan, PT       Present yes  (ipad, at times pt doesn't/won't use)   Language Kiswahili   Living Environment   People in Home child(flaquita), adult   Current Living Arrangements house   Home Accessibility stairs to enter home;stairs within home   Number of Stairs, Main Entrance 2  (None in front, 2 in from garage/back)   Number of Stairs, Within Home, Primary greater than 10 stairs  (to upper level when son and his wife stay)   Stair Railings, Within Home, Primary   (Pt states she does not have to go upstairs, but also states no bedroom on main floor)   Living Environment Comments denies need to do stairs but hard to be sure 100% questions understood despite  use; pt sleeps on main level, son sleeps upstairs. Difficult to determine if pt has 24/7 assist at home despite many questions phrased differently,  use. Pt does appear to state her son works as her PCA   Self-Care   Usual Activity Tolerance fair   Current Activity Tolerance poor   Equipment Currently Used at Home cane, straight;walker, standard  (may have electric scooter for outside?)   Activity/Exercise/Self-Care Comment Took some time to decipher if pt uses O2 at home, initially stating yes but after multiple different questions appears pt may be referring to using an inhaler.   General Information   Onset of Illness/Injury or Date of Surgery 02/23/23   Referring Physician Shanon Martinez MD   Patient/Family Therapy Goals Statement (PT) to have an easier time breathing again   Pertinent History of Current Problem (include personal factors and/or comorbidities that impact the POC) 57 year old woman  admitted on 2/23/2023. She has a history of CKD, hypertension, hepatitis C s/p liver transplant in 2010, CVA and SURI who is admitted with hypoxic and hypercarbic respiratory failure, volume overload and acute kidney injury in setting of  acute UTI.   Existing Precautions/Restrictions oxygen therapy device and L/min  (HFNC 60% 30L, sats mid 90's until short walk bedside then 86-88% wiht recovery in less than 30 sec to 90% when sitting)   Cognition   Affect/Mental Status (Cognition) WFL   Orientation Status (Cognition) person;place;situation   Follows Commands (Cognition) follows one-step commands;50-74% accuracy;increased processing time needed;physical/tactile prompts required;repetition of directions required;verbal cues/prompting required  (language, even with attempting to use , is a barrier)   Behavioral Issues overwhelmed easily   Safety Deficit (Cognition) minimal deficit;impulsivity;insight into deficits/self-awareness;judgment;problem-solving   Posture    Posture Forward head position;Protracted shoulders   Range of Motion (ROM)   ROM Comment B LEs WFL   Strength (Manual Muscle Testing)   Strength Comments B LEs mildly decreased based on functional mobility, activity tolerance is greater barrier currently   Bed Mobility   Comment, (Bed Mobility) CGA sup<>sit  (mostly sitting up in bed)   Transfers   Comment, (Transfers) min A sit<>stand   Gait/Stairs (Locomotion)   Comment, (Gait/Stairs) Min A 2' HHA   Clinical Impression   Criteria for Skilled Therapeutic Intervention Yes, treatment indicated   PT Diagnosis (PT) impaired functional mobility   Influenced by the following impairments decreased activity tolerance, strength   Functional limitations due to impairments below baseline bed mobility, transfers, gait   Clinical Presentation (PT Evaluation Complexity) Evolving/Changing   Clinical Presentation Rationale clinical judgement, [facility]   Clinical Decision Making (Complexity) moderate complexity   Planned Therapy Interventions (PT) balance training;bed mobility training;home exercise program;neuromuscular re-education;patient/family education;postural re-education;ROM (range of motion);strengthening;stair training;stretching;transfer  training;progressive activity/exercise;wheelchair management/propulsion training;risk factor education;home program guidelines;gait training   Risk & Benefits of therapy have been explained evaluation/treatment results reviewed;care plan/treatment goals reviewed;risks/benefits reviewed;current/potential barriers reviewed;participants voiced agreement with care plan;participants included;patient   PT Total Evaluation Time   PT Eval, Moderate Complexity Minutes (21289) 7   Plan of Care Review   Plan of Care Reviewed With patient   Physical Therapy Goals   PT Frequency 6x/week   PT Predicted Duration/Target Date for Goal Attainment 03/15/23   PT Goals Bed Mobility;Transfers;Gait;Stairs   PT: Bed Mobility Modified independent;Supine to/from sit;Rolling   PT: Transfers Modified independent;Sit to/from stand;Bed to/from chair;Assistive device   PT: Gait Supervision/stand-by assist;Assistive device;100 feet   PT: Stairs Minimal assist;2 stairs   Therapeutic Activity   Therapeutic Activities: dynamic activities to improve functional performance Minutes (79817) 24   Symptoms Noted During/After Treatment Fatigue;Shortness of breath   Gait Training   Gait Training Minutes (26452) 8   Symptoms Noted During/After Treatment (Gait Training) fatigue;shortness of breath   PT Discharge Planning   PT Plan standing/short gait activities within restrictions of O2, progress to amb distances with w/c follow when able to switch to O2 Oxymask   PT Discharge Recommendation (DC Rec) home with assist;home with home care physical therapy   PT Rationale for DC Rec Pt mobilizing fairly well with HHA or walker at bedside, but does desat with activity. At home pt reports she has 6 hrs of PCA and this may be provided by pt's son. OT holding for now, will see pt another sessoin to determine if has acute OT needs.   Total Session Time   Timed Code Treatment Minutes 32   Total Session Time (sum of timed and untimed services) 39

## 2023-02-28 NOTE — PROGRESS NOTES
Physician Attestation   I, Shanon Wolf MD, was present with the medical/TRINY student who participated in the service and in the documentation of the note.  I have verified the history and personally performed the physical exam and medical decision making.  I agree with the assessment and plan of care as documented in the note.      Key findings:   Continuing to wean oxygen.  Appreciate pulmonology evaluation.  Will work with RT on home AVAPS settings, and work to set up home O2 (already on this, but didn't have portable tank) and PAP for nighttime.  Transition back to PO diuretics.  Echo repeated today, pulmonary hypertension improved compared to prior.    Please see A&P for additional details of medical decision making.    I have personally reviewed the following data over the past 24 hrs:    4.9  \   11.4 (L)   / 164     141 102 27.7 (H) /  113 (H)   5.1 30 (H) 1.13 (H) \         Shanon Wolf MD  Date of Service (when I saw the patient): 02/28/23    [facility]    Progress Note - Hospitalist Service, GOLD TEAM 9       Date of Admission:  2/23/2023    Assessment & Plan   Flor Navarro is a 57 year old woman  admitted on 2/23/2023. She has a history of CKD, hypertension, hepatitis C s/p liver transplant in 2010, CVA and SURI who is admitted with hypoxic and hypercarbic respiratory failure, volume overload and acute kidney injury in setting of acute UTI.     Changes today  - Repeat ECHO- improved pulmonary artery pressure  - Wean HFNC as able      Acute on chronic hypoxic and hypercarbic respiratory failure  Possible pneumonia in immunocompromised patient  Pulmonary hypertension, new  The patient has a history of severe sleep apnea with prior admission for hypercarbia. On 3L O2 at baseline and undergoing outpatient sleep study/evaluation for home BiPAP. Volume overloaded in setting of UTI and CASTILLO with significant pulmonary edema likely contributing to her  respiratory failure and evidence of new pulmonary hypertension found on ECHO, which improved on repeat ECHO after diuresis. Repeat CXR 2/26 showing significant improvement in pulmonary edema after diuresis, but could not rule out infection. Still requiring supplemental oxygen, but respiratory status clinically improved and lungs now overall clear to ascultation. No cough and afebrile so lower suspicion for pneumonia.   - Pulm consult appreciated  - Q4H ipratropium and levalbuterol  - PTA lasix PO 40mg  - Abx as below  - Ok for HFNC during the day, wean as able  - Needs BIPAP overnight and during naps     UTI   Acute encephalopathy, improved  Acute kidney injury, improving  Presents with several days of hallucinations, trembling and confusion. Hypercarbia likely contributing. UA suggestive of UTI, urine culture with >100k ampicillin resistant klebsiella sensitive. No longer appears encephalopathic. CASTILLO improved with diuresis.  - s/p Ceftaz 2g Q24hr (2/24-2/26)  - Ceftriaxone 1gQ2hr (2/27-3/2)  - Continue with HFNC, BiPAP as above     History of liver transplant secondary to hepatitis C  - Continue home mycophenolate, cyclosporine. Hepatology consulted     History of hypertension  -  Holding home amlodipine, chlorthalidone     Deconditioning  - PT/OT     Diet: Regular Diet Adult No Red Meat, No Beef, No Pork, Other - please comment    DVT Prophylaxis: Heparin SQ  Li Catheter: Not present  Fluids: None  Lines: None     Cardiac Monitoring: None  Code Status: Full Code      Disposition Plan      Expected Discharge Date: 03/02/2023      Destination: home;home with family          The patient's care was discussed with the Attending Physician, Dr. Shanon Martinez.    Stephanie Liu  Medical Student  Hospitalist Service, [address]  Securely message with [employer] (more info)  Text page via [employer] Paging/Directory   See signed in provider for up to date coverage  information  ______________________________________________________________________    Interval History   No acute events overnight. Nursing notes reviewed.     This morning Flor is doing well. Says breathing feels improved. No cough. She tolerated BiPAP overnight. POing well. No abdominal pain. Last bowel movement overnight. Continuing to make appropriate urine.    Physical Exam   Vital Signs: Temp: 97.2  F (36.2  C) Temp src: Axillary BP: (!) 141/73 Pulse: 61   Resp: 22 SpO2: 94 % O2 Device: BiPAP/CPAP Oxygen Delivery: 30 LPM  Weight: 234 lbs 6.4 oz    Constitutional: Awake, alert, and oriented. Resting comfortably in bed.   Respiratory: Breathing comfortably with HFNC on. Lungs overall clear to auscultation. No coarse breath sounds, wheezes, or ronchi heard.   Cardiovascular: Regular rate and rhythm. Well perfused.   GI: Abdomen soft.   Neurologic: Alert and oriented. Cranial nerves grossly intact.       Data     I have personally reviewed the following data over the past 24 hrs:    4.9  \   11.4 (L)   / 164     141 102 27.7 (H) /  120 (H)   5.1 30 (H) 1.13 (H) \       Imaging results reviewed over the past 24 hrs:   No results found for this or any previous visit (from the past 24 hour(s)).

## 2023-03-01 LAB
ANION GAP SERPL CALCULATED.3IONS-SCNC: 10 MMOL/L (ref 7–15)
BACTERIA BLD CULT: NO GROWTH
BACTERIA BLD CULT: NO GROWTH
BASOPHILS # BLD AUTO: 0 10E3/UL (ref 0–0.2)
BASOPHILS NFR BLD AUTO: 0 %
BUN SERPL-MCNC: 26.8 MG/DL (ref 6–20)
CALCIUM SERPL-MCNC: 9.1 MG/DL (ref 8.6–10)
CHLORIDE SERPL-SCNC: 100 MMOL/L (ref 98–107)
CREAT SERPL-MCNC: 1.13 MG/DL (ref 0.51–0.95)
DEPRECATED HCO3 PLAS-SCNC: 31 MMOL/L (ref 22–29)
EOSINOPHIL # BLD AUTO: 0.2 10E3/UL (ref 0–0.7)
EOSINOPHIL NFR BLD AUTO: 4 %
ERYTHROCYTE [DISTWIDTH] IN BLOOD BY AUTOMATED COUNT: 14 % (ref 10–15)
GFR SERPL CREATININE-BSD FRML MDRD: 56 ML/MIN/1.73M2
GLUCOSE SERPL-MCNC: 104 MG/DL (ref 70–99)
HCT VFR BLD AUTO: 39.8 % (ref 35–47)
HGB BLD-MCNC: 11.5 G/DL (ref 11.7–15.7)
IMM GRANULOCYTES # BLD: 0 10E3/UL
IMM GRANULOCYTES NFR BLD: 1 %
LYMPHOCYTES # BLD AUTO: 1.3 10E3/UL (ref 0.8–5.3)
LYMPHOCYTES NFR BLD AUTO: 29 %
MCH RBC QN AUTO: 27.3 PG (ref 26.5–33)
MCHC RBC AUTO-ENTMCNC: 28.9 G/DL (ref 31.5–36.5)
MCV RBC AUTO: 95 FL (ref 78–100)
MONOCYTES # BLD AUTO: 0.4 10E3/UL (ref 0–1.3)
MONOCYTES NFR BLD AUTO: 9 %
NEUTROPHILS # BLD AUTO: 2.6 10E3/UL (ref 1.6–8.3)
NEUTROPHILS NFR BLD AUTO: 57 %
NRBC # BLD AUTO: 0 10E3/UL
NRBC BLD AUTO-RTO: 0 /100
PLATELET # BLD AUTO: 165 10E3/UL (ref 150–450)
POTASSIUM SERPL-SCNC: 5.1 MMOL/L (ref 3.4–5.3)
PROCALCITONIN SERPL IA-MCNC: 0.16 NG/ML
RBC # BLD AUTO: 4.21 10E6/UL (ref 3.8–5.2)
SODIUM SERPL-SCNC: 141 MMOL/L (ref 136–145)
WBC # BLD AUTO: 4.6 10E3/UL (ref 4–11)

## 2023-03-01 PROCEDURE — 250N000011 HC RX IP 250 OP 636: Performed by: NURSE PRACTITIONER

## 2023-03-01 PROCEDURE — 250N000012 HC RX MED GY IP 250 OP 636 PS 637: Performed by: INTERNAL MEDICINE

## 2023-03-01 PROCEDURE — 94640 AIRWAY INHALATION TREATMENT: CPT | Mod: 76

## 2023-03-01 PROCEDURE — 99232 SBSQ HOSP IP/OBS MODERATE 35: CPT | Performed by: STUDENT IN AN ORGANIZED HEALTH CARE EDUCATION/TRAINING PROGRAM

## 2023-03-01 PROCEDURE — 250N000013 HC RX MED GY IP 250 OP 250 PS 637: Performed by: INTERNAL MEDICINE

## 2023-03-01 PROCEDURE — 250N000013 HC RX MED GY IP 250 OP 250 PS 637: Performed by: NURSE PRACTITIONER

## 2023-03-01 PROCEDURE — 250N000011 HC RX IP 250 OP 636: Performed by: INTERNAL MEDICINE

## 2023-03-01 PROCEDURE — 120N000002 HC R&B MED SURG/OB UMMC

## 2023-03-01 PROCEDURE — 999N000157 HC STATISTIC RCP TIME EA 10 MIN

## 2023-03-01 PROCEDURE — 94640 AIRWAY INHALATION TREATMENT: CPT

## 2023-03-01 PROCEDURE — 80048 BASIC METABOLIC PNL TOTAL CA: CPT | Performed by: INTERNAL MEDICINE

## 2023-03-01 PROCEDURE — 250N000009 HC RX 250: Performed by: INTERNAL MEDICINE

## 2023-03-01 PROCEDURE — 85025 COMPLETE CBC W/AUTO DIFF WBC: CPT | Performed by: INTERNAL MEDICINE

## 2023-03-01 PROCEDURE — 84145 PROCALCITONIN (PCT): CPT | Performed by: STUDENT IN AN ORGANIZED HEALTH CARE EDUCATION/TRAINING PROGRAM

## 2023-03-01 PROCEDURE — 36415 COLL VENOUS BLD VENIPUNCTURE: CPT | Performed by: INTERNAL MEDICINE

## 2023-03-01 PROCEDURE — 94660 CPAP INITIATION&MGMT: CPT

## 2023-03-01 RX ADMIN — FUROSEMIDE 20 MG: 20 TABLET ORAL at 09:19

## 2023-03-01 RX ADMIN — IPRATROPIUM BROMIDE 0.5 MG: 0.5 SOLUTION RESPIRATORY (INHALATION) at 20:51

## 2023-03-01 RX ADMIN — KETOTIFEN FUMARATE 1 DROP: 0.35 SOLUTION/ DROPS OPHTHALMIC at 09:21

## 2023-03-01 RX ADMIN — PANTOPRAZOLE SODIUM 40 MG: 40 TABLET, DELAYED RELEASE ORAL at 09:18

## 2023-03-01 RX ADMIN — MYCOPHENOLATE MOFETIL 500 MG: 250 CAPSULE ORAL at 20:22

## 2023-03-01 RX ADMIN — CEFTRIAXONE SODIUM 1 G: 1 INJECTION, POWDER, FOR SOLUTION INTRAMUSCULAR; INTRAVENOUS at 18:12

## 2023-03-01 RX ADMIN — CHLORTHALIDONE 25 MG: 25 TABLET ORAL at 09:20

## 2023-03-01 RX ADMIN — LEVALBUTEROL HYDROCHLORIDE 1.25 MG: 1.25 SOLUTION RESPIRATORY (INHALATION) at 08:49

## 2023-03-01 RX ADMIN — CALCITRIOL CAPSULES 0.25 MCG 0.25 MCG: 0.25 CAPSULE ORAL at 09:18

## 2023-03-01 RX ADMIN — LEVALBUTEROL HYDROCHLORIDE 1.25 MG: 1.25 SOLUTION RESPIRATORY (INHALATION) at 20:51

## 2023-03-01 RX ADMIN — MYCOPHENOLATE MOFETIL 500 MG: 250 CAPSULE ORAL at 09:18

## 2023-03-01 RX ADMIN — PANTOPRAZOLE SODIUM 40 MG: 40 TABLET, DELAYED RELEASE ORAL at 16:36

## 2023-03-01 RX ADMIN — CYCLOSPORINE 75 MG: 25 CAPSULE, LIQUID FILLED ORAL at 09:19

## 2023-03-01 RX ADMIN — HEPARIN SODIUM 5000 UNITS: 5000 INJECTION, SOLUTION INTRAVENOUS; SUBCUTANEOUS at 20:23

## 2023-03-01 RX ADMIN — UMECLIDINIUM 1 PUFF: 62.5 AEROSOL, POWDER ORAL at 09:23

## 2023-03-01 RX ADMIN — FUROSEMIDE 20 MG: 20 TABLET ORAL at 16:35

## 2023-03-01 RX ADMIN — IPRATROPIUM BROMIDE 0.5 MG: 0.5 SOLUTION RESPIRATORY (INHALATION) at 08:49

## 2023-03-01 RX ADMIN — FEBUXOSTAT 40 MG: 40 TABLET, FILM COATED ORAL at 09:20

## 2023-03-01 RX ADMIN — HEPARIN SODIUM 5000 UNITS: 5000 INJECTION, SOLUTION INTRAVENOUS; SUBCUTANEOUS at 09:22

## 2023-03-01 RX ADMIN — LEVALBUTEROL HYDROCHLORIDE 1.25 MG: 1.25 SOLUTION RESPIRATORY (INHALATION) at 15:15

## 2023-03-01 RX ADMIN — KETOTIFEN FUMARATE 1 DROP: 0.35 SOLUTION/ DROPS OPHTHALMIC at 20:23

## 2023-03-01 RX ADMIN — IPRATROPIUM BROMIDE 0.5 MG: 0.5 SOLUTION RESPIRATORY (INHALATION) at 15:14

## 2023-03-01 RX ADMIN — AMLODIPINE BESYLATE 10 MG: 10 TABLET ORAL at 09:18

## 2023-03-01 RX ADMIN — CYCLOSPORINE 50 MG: 25 CAPSULE, LIQUID FILLED ORAL at 18:12

## 2023-03-01 ASSESSMENT — ACTIVITIES OF DAILY LIVING (ADL)
ADLS_ACUITY_SCORE: 42
ADLS_ACUITY_SCORE: 40
ADLS_ACUITY_SCORE: 42
ADLS_ACUITY_SCORE: 40
ADLS_ACUITY_SCORE: 40
ADLS_ACUITY_SCORE: 42
ADLS_ACUITY_SCORE: 40
ADLS_ACUITY_SCORE: 42
ADLS_ACUITY_SCORE: 40
ADLS_ACUITY_SCORE: 42

## 2023-03-01 NOTE — PROGRESS NOTES
Clinical Nutrition Services- Brief Note    Reviewed nutrition risk factors due to LOS. Pt is tolerating a Regular diet, eating well per nursing documentation (% of meals per Flowsheets). Weight trending down slightly since admission, suspect fluid-related due to diuretics. No weight loss per chart review.     Wt Readings from Last 30 Encounters:   02/27/23 106.3 kg (234 lb 6.4 oz)   02/01/23 106.6 kg (235 lb)   01/26/23 106.6 kg (235 lb)   12/16/22 106.6 kg (235 lb)   12/16/22 108 kg (238 lb)   11/04/22 108 kg (238 lb)   09/23/22 106.8 kg (235 lb 6.4 oz)   08/24/22 108 kg (238 lb)   08/12/22 108.7 kg (239 lb 10.2 oz)   07/13/22 109.3 kg (241 lb)   06/08/22 109.7 kg (241 lb 12.8 oz)   05/29/22 106.6 kg (235 lb)   05/09/22 106.6 kg (235 lb)   04/22/22 105.7 kg (233 lb)   03/30/22 106.6 kg (235 lb)   03/16/22 112.7 kg (248 lb 7.3 oz)   01/28/22 106.1 kg (234 lb)   01/03/22 104.8 kg (231 lb)   12/17/21 105.1 kg (231 lb 9.6 oz)   08/09/21 102.1 kg (225 lb)   07/08/21 103 kg (227 lb)   07/02/21 101.6 kg (224 lb)   06/21/21 101.6 kg (224 lb)   06/04/21 103 kg (227 lb)   05/10/21 104 kg (229 lb 4.5 oz)   05/07/21 104 kg (229 lb 4.5 oz)   04/30/21 103.9 kg (229 lb)   04/09/21 102.5 kg (226 lb)   03/15/21 102.5 kg (226 lb)   03/12/21 103 kg (227 lb)     No nutrition issues identified at this time. RD will continue to follow per nutrition protocol.  Katelin Munoz, MS, RD, LD, CNSC  6D RD pager: [phone]

## 2023-03-01 NOTE — PROGRESS NOTES
SW received call from Jany from [employer]([facility]ajeev) who wanted to confirm that Pt has PCA services thru  [facility] and her sone is the PCA. That [facility] is her CADI waiver  [phone] and that she receives Homeaking services thru [facility] [phone]. See KENAN note from 2/26 as well.       ________________    JACKIE Mckeon, [facility]  6D   [facility]  Phone: [phone]  Pager: [phone]  Fax: [phone]

## 2023-03-01 NOTE — PROGRESS NOTES
Physician Attestation   I, Shanon Wolf MD, was present with the medical/TRINY student who participated in the service and in the documentation of the note.  I have verified the history and personally performed the physical exam and medical decision making.  I agree with the assessment and plan of care as documented in the note.      Key findings:   Weaned to oxymask as of this morning.  Pulm recommending AVAPS at night on discharge.  Last day of antibiotics.  Continue O2 wean as able, work on home O2 setup (documentation below).      Oxygen Documentation  I certify that this patient, Flor Navarro has been under my care (or a nurse practitioner or physican's assistant working with me). This is the face-to-face encounter for oxygen medical necessity.    At the time of this encounter supplemental oxygen is reasonable and necessary and is expected to improve the patient's condition in a home setting.     Patient has continued oxygen desaturation due to Chronic Respiratory Failure with Hypoxia J96.11.  If portability is ordered, is the patient mobile within the home? yes    Nocturnal BiPAP Documentation  I attest that I have seen and evaluated Flor Navarro face to face. She has a chronic illness of G47.33 - Obstructive sleep apnea (adult) (pediatric). This patient would greatly benefit from a home BiPAP device for nocturnal use for recovery breaths. I strongly believe therapy is necessary to improve future outcomes and decreased hospital readmissions.   I, the undersigned, certify that the above prescribed supplies are medically necessary for this patient and is both reasonable and necessary in reference to accepted standards of medical and necessary in reference to accepted standards of medical practice in the treatment of this patient's condition and is not prescribed as a convenience.      Please see A&P for additional details of medical decision making.    I have personally reviewed the following data over  the past 24 hrs:    4.6  \   11.5 (L)   / 165     141 100 26.8 (H) /  104 (H)   5.1 31 (H) 1.13 (H) \       Procal: 0.16 (H) CRP: N/A Lactic Acid: N/A           Shanon Wolf MD  Date of Service (when I saw the patient): 03/01/23    [facility]    Progress Note - Hospitalist Service, GOLD TEAM 9       Date of Admission:  2/23/2023    Assessment & Plan   Flor Navarro is a 57 year old woman  admitted on 2/23/2023. She has a history of CKD, hypertension, hepatitis C s/p liver transplant in 2010, CVA and SURI who is admitted with hypoxic and hypercarbic respiratory failure, volume overload and acute kidney injury in setting of acute UTI.     Changes today  - Repeat procal- downtrended to 0.16  - Last dose ceftriaxone today  - Will need order for portable oxygen and BIPAP at home on discharge     Acute on chronic hypoxic and hypercarbic respiratory failure  Possible pneumonia in immunocompromised patient  Pulmonary hypertension, new  The patient has a history of severe sleep apnea with prior admission for hypercarbia. On 3L O2 at baseline (not a portable unit) and undergoing outpatient sleep study/evaluation for home BiPAP. Volume overloaded in setting of UTI and CASTILLO with significant pulmonary edema likely contributing to her respiratory failure and evidence of new pulmonary hypertension on ECHO, which was improved on repeat ECHO after diuresis. Repeat CXR 2/26 showed significant improvement in pulmonary edema after diuresis, but could not rule out infection. Lungs remain clear on auscultation, and patient tolerating oxygen weaning appropriately. Suspicion for pneumonia remains low.  - Pulm consult appreciated  - Q4H ipratropium and levalbuterol  - PTA lasix PO 40mg  - Abx as below  - Repeat procal   - Supplemental oxygen as needed, wean as tolerated to baseline 3L  - Needs BIPAP overnight and during naps  - Will need order for portable oxygen and BIPAP for home at  discharge     UTI   Acute encephalopathy, resolved  Acute kidney injury, improving  Presents with several days of hallucinations, trembling and confusion. UA suggestive of UTI, urine culture with >100k ampicillin resistant klebsiella sensitive. Encephalopathy resolved with resolution of hypercarbia and abx for UTI. CASTILLO improved with diuresis, Cr now appears at baseline 1- 1.3  - s/p Ceftaz 2g Q24hr (2/24-2/26)  - Ceftriaxone 1gQ2hr (2/27-3/1)     History of liver transplant secondary to hepatitis C  - Continue home mycophenolate, cyclosporine. Hepatology consulted     History of hypertension  -  Resume amlodipine, chlorthalidone     Deconditioning  - PT/OT     Diet: Regular Diet Adult No Red Meat, No Beef, No Pork, Other - please comment    DVT Prophylaxis: Heparin SQ  Li Catheter: Not present  Fluids: None  Lines: None     Cardiac Monitoring: None  Code Status: Full Code      Disposition Plan      Expected Discharge Date: 03/03/2023      Destination: home;home with family          The patient's care was discussed with the Attending Physician, Dr. Shanon Martinez.    Stephanie Liu  Medical Student  Hospitalist Service, [address]  Securely message with [employer] (more info)  Text page via [facility] Paging/Directory   See signed in provider for up to date coverage information  ______________________________________________________________________    Interval History   No acute events overnight. Nursing notes reviewed. Tolerated BIPAP overnight.     This morning, Flor is doing well. She reports her breathing feels at baseline. Tolerating PO intake. Last bowel movement was overnight. Continues to make appropriate urine. Denies cough, but bedside nurse did report tan sputum production earlier in morning.    Physical Exam   Vital Signs: Temp: 98.4  F (36.9  C) Temp src: Oral BP: 122/80 Pulse: 63   Resp: 22 SpO2: 93 % O2 Device: BiPAP/CPAP Oxygen Delivery: 30  LPM  Weight: 234 lbs 6.4 oz    Constitutional: Awake, alert, and oriented. Comfortable in bed with oxymask on.   Respiratory: Breathing comfortably on oxymask. No increased work of breathing. Lungs clear to auscultation.   Cardiovascular: RRR, well perfused  GI: Abdomen non-distended, non-tender.   Skin: Scattered bruises, consistent with previous days.   Musculoskeletal: Spontaneously moves extremities.   Neurologic: Awake, alert, and oriented. Cranial nerves grossly intact.     Data     I have personally reviewed the following data over the past 24 hrs:    4.6  \   11.5 (L)   / 165     141 100 26.8 (H) /  104 (H)   5.1 31 (H) 1.13 (H) \       Procal: 0.16 (H) CRP: N/A Lactic Acid: N/A         Imaging results reviewed over the past 24 hrs:   No results found for this or any previous visit (from the past 24 hour(s)).

## 2023-03-01 NOTE — PROGRESS NOTES
Care Management Follow Up    Length of Stay (days): 6    Expected Discharge Date: 03/03/2023     Concerns to be Addressed:       Patient plan of care discussed at interdisciplinary rounds: Yes    Anticipated Discharge Disposition:       Anticipated Discharge Services:    Anticipated Discharge DME:      Patient/family educated on Medicare website which has current facility and service quality ratings:    Education Provided on the Discharge Plan:    Patient/Family in Agreement with the Plan:      Referrals Placed by CM/SW:    Private pay costs discussed: Not applicable    Additional Information:  RNCC took call from [facility] on pt regarding home O2, patient has been on service for home O2 with them, has concentrator already for home transport. RNCC also spoke with patient's HC RN , [facility], through [facility], placed HC orders as pt has HC PT recs, which the agency can add at discharge time. Bedside RN can call [facility] at [phone] if patient discharges and has any O2 order changes or needs. Patient has been on HFNC and BiPAP as of late, 4L oxiplus today, nearing readiness for discharge. RNCC available for any further needs.      [facility] [place], Phone: [phone]  [facility]  ph: [phone]   fx: [phone]  HC RN/PT      CARLYN StaleyN, BA, RN, CMSRN, RNCC  Covering Units 6D/OBS  Pager: [phone]  Phone: [phone]  6th floor Weekend/Holiday Pager: [phone]  Observation weekend/after hours: [phone]

## 2023-03-01 NOTE — PLAN OF CARE
ICU End of Shift Summary. See flowsheets for vital signs and detailed assessment.    Changes this shift: Patient denies any pain.  Able to wean HFNC to 20L on 55% when up in good position, while lying flat HFNC requirements increased back up to 30L on 60%. VSS otherwise stable.  Good appetite.    Plan: Continue to attempt to wean O2 requirements as able. Plan for ECCO and xray. Continue POC and notify providers of any changes.        Recommendations:  -- continue BiPAP at night and with naps  -- wean supplemental O2 as able, use of IS, and up to chair/activity as tolerated  -- appreciate RT assistance in coordinating home oxygen  -- will need to see sleep medicine as outpatient for further NIV titration    Goal Outcome Evaluation:      Plan of Care Reviewed With: patient, child    Overall Patient Progress: improvingOverall Patient Progress: improving    Outcome Evaluation: Slight improvement with ability to wean fio2 requirements but when patient is in a suboptimal position still requires more O2.

## 2023-03-01 NOTE — PLAN OF CARE
End of shift summary([id number])-        Neuro: alert and oriented x4, uses call light appropriately. Denies pain.      Cardiac:  NSR 60-80s. VSS.      Respiratory: Bipap 50%. Coarse & diminished lung sounds throughout.      GI/: . Voiding regularly, Up to commode with SBA.      Diet: Regular, no meat     Pain: Denies

## 2023-03-02 ENCOUNTER — DOCUMENTATION ONLY (OUTPATIENT)
Dept: HOME HEALTH SERVICES | Facility: CLINIC | Age: 58
End: 2023-03-02
Payer: MEDICARE

## 2023-03-02 ENCOUNTER — APPOINTMENT (OUTPATIENT)
Dept: PHYSICAL THERAPY | Facility: CLINIC | Age: 58
DRG: 682 | End: 2023-03-02
Payer: MEDICARE

## 2023-03-02 VITALS
TEMPERATURE: 97.7 F | HEART RATE: 85 BPM | DIASTOLIC BLOOD PRESSURE: 66 MMHG | WEIGHT: 234.4 LBS | RESPIRATION RATE: 20 BRPM | BODY MASS INDEX: 44.29 KG/M2 | OXYGEN SATURATION: 94 % | SYSTOLIC BLOOD PRESSURE: 113 MMHG

## 2023-03-02 LAB
ANION GAP SERPL CALCULATED.3IONS-SCNC: 10 MMOL/L (ref 7–15)
BASOPHILS # BLD AUTO: 0 10E3/UL (ref 0–0.2)
BASOPHILS NFR BLD AUTO: 1 %
BUN SERPL-MCNC: 28.3 MG/DL (ref 6–20)
CALCIUM SERPL-MCNC: 9.4 MG/DL (ref 8.6–10)
CHLORIDE SERPL-SCNC: 102 MMOL/L (ref 98–107)
CREAT SERPL-MCNC: 1.21 MG/DL (ref 0.51–0.95)
DEPRECATED HCO3 PLAS-SCNC: 30 MMOL/L (ref 22–29)
EOSINOPHIL # BLD AUTO: 0.2 10E3/UL (ref 0–0.7)
EOSINOPHIL NFR BLD AUTO: 4 %
ERYTHROCYTE [DISTWIDTH] IN BLOOD BY AUTOMATED COUNT: 14.1 % (ref 10–15)
GFR SERPL CREATININE-BSD FRML MDRD: 52 ML/MIN/1.73M2
GLUCOSE SERPL-MCNC: 108 MG/DL (ref 70–99)
HCT VFR BLD AUTO: 40.1 % (ref 35–47)
HGB BLD-MCNC: 11.4 G/DL (ref 11.7–15.7)
IMM GRANULOCYTES # BLD: 0 10E3/UL
IMM GRANULOCYTES NFR BLD: 0 %
LYMPHOCYTES # BLD AUTO: 1.2 10E3/UL (ref 0.8–5.3)
LYMPHOCYTES NFR BLD AUTO: 26 %
MCH RBC QN AUTO: 27.1 PG (ref 26.5–33)
MCHC RBC AUTO-ENTMCNC: 28.4 G/DL (ref 31.5–36.5)
MCV RBC AUTO: 96 FL (ref 78–100)
MONOCYTES # BLD AUTO: 0.4 10E3/UL (ref 0–1.3)
MONOCYTES NFR BLD AUTO: 9 %
NEUTROPHILS # BLD AUTO: 2.7 10E3/UL (ref 1.6–8.3)
NEUTROPHILS NFR BLD AUTO: 60 %
NRBC # BLD AUTO: 0 10E3/UL
NRBC BLD AUTO-RTO: 0 /100
PLATELET # BLD AUTO: 176 10E3/UL (ref 150–450)
POTASSIUM SERPL-SCNC: 4.5 MMOL/L (ref 3.4–5.3)
RBC # BLD AUTO: 4.2 10E6/UL (ref 3.8–5.2)
SODIUM SERPL-SCNC: 142 MMOL/L (ref 136–145)
WBC # BLD AUTO: 4.6 10E3/UL (ref 4–11)

## 2023-03-02 PROCEDURE — 94640 AIRWAY INHALATION TREATMENT: CPT | Mod: 76

## 2023-03-02 PROCEDURE — 85025 COMPLETE CBC W/AUTO DIFF WBC: CPT | Performed by: INTERNAL MEDICINE

## 2023-03-02 PROCEDURE — 97116 GAIT TRAINING THERAPY: CPT | Mod: GP

## 2023-03-02 PROCEDURE — 36415 COLL VENOUS BLD VENIPUNCTURE: CPT | Performed by: INTERNAL MEDICINE

## 2023-03-02 PROCEDURE — 97530 THERAPEUTIC ACTIVITIES: CPT | Mod: GP

## 2023-03-02 PROCEDURE — 94640 AIRWAY INHALATION TREATMENT: CPT

## 2023-03-02 PROCEDURE — 250N000012 HC RX MED GY IP 250 OP 636 PS 637: Performed by: INTERNAL MEDICINE

## 2023-03-02 PROCEDURE — 94660 CPAP INITIATION&MGMT: CPT

## 2023-03-02 PROCEDURE — 80048 BASIC METABOLIC PNL TOTAL CA: CPT | Performed by: INTERNAL MEDICINE

## 2023-03-02 PROCEDURE — 999N000157 HC STATISTIC RCP TIME EA 10 MIN

## 2023-03-02 PROCEDURE — 99239 HOSP IP/OBS DSCHRG MGMT >30: CPT | Performed by: STUDENT IN AN ORGANIZED HEALTH CARE EDUCATION/TRAINING PROGRAM

## 2023-03-02 PROCEDURE — 250N000013 HC RX MED GY IP 250 OP 250 PS 637: Performed by: INTERNAL MEDICINE

## 2023-03-02 PROCEDURE — 250N000011 HC RX IP 250 OP 636: Performed by: NURSE PRACTITIONER

## 2023-03-02 PROCEDURE — 250N000013 HC RX MED GY IP 250 OP 250 PS 637: Performed by: NURSE PRACTITIONER

## 2023-03-02 PROCEDURE — 250N000009 HC RX 250: Performed by: INTERNAL MEDICINE

## 2023-03-02 RX ADMIN — MYCOPHENOLATE MOFETIL 500 MG: 250 CAPSULE ORAL at 07:46

## 2023-03-02 RX ADMIN — CHLORTHALIDONE 25 MG: 25 TABLET ORAL at 07:46

## 2023-03-02 RX ADMIN — HEPARIN SODIUM 5000 UNITS: 5000 INJECTION, SOLUTION INTRAVENOUS; SUBCUTANEOUS at 07:48

## 2023-03-02 RX ADMIN — AMLODIPINE BESYLATE 10 MG: 10 TABLET ORAL at 07:46

## 2023-03-02 RX ADMIN — CYCLOSPORINE 50 MG: 25 CAPSULE, LIQUID FILLED ORAL at 16:24

## 2023-03-02 RX ADMIN — FUROSEMIDE 20 MG: 20 TABLET ORAL at 07:46

## 2023-03-02 RX ADMIN — LEVALBUTEROL HYDROCHLORIDE 1.25 MG: 1.25 SOLUTION RESPIRATORY (INHALATION) at 08:25

## 2023-03-02 RX ADMIN — FEBUXOSTAT 40 MG: 40 TABLET, FILM COATED ORAL at 07:50

## 2023-03-02 RX ADMIN — FUROSEMIDE 20 MG: 20 TABLET ORAL at 16:21

## 2023-03-02 RX ADMIN — PANTOPRAZOLE SODIUM 40 MG: 40 TABLET, DELAYED RELEASE ORAL at 16:22

## 2023-03-02 RX ADMIN — KETOTIFEN FUMARATE 1 DROP: 0.35 SOLUTION/ DROPS OPHTHALMIC at 07:49

## 2023-03-02 RX ADMIN — PANTOPRAZOLE SODIUM 40 MG: 40 TABLET, DELAYED RELEASE ORAL at 07:46

## 2023-03-02 RX ADMIN — UMECLIDINIUM 1 PUFF: 62.5 AEROSOL, POWDER ORAL at 07:49

## 2023-03-02 RX ADMIN — IPRATROPIUM BROMIDE 0.5 MG: 0.5 SOLUTION RESPIRATORY (INHALATION) at 08:25

## 2023-03-02 RX ADMIN — CYCLOSPORINE 75 MG: 25 CAPSULE, LIQUID FILLED ORAL at 07:46

## 2023-03-02 RX ADMIN — LEVALBUTEROL HYDROCHLORIDE 1.25 MG: 1.25 SOLUTION RESPIRATORY (INHALATION) at 14:30

## 2023-03-02 RX ADMIN — IPRATROPIUM BROMIDE 0.5 MG: 0.5 SOLUTION RESPIRATORY (INHALATION) at 14:30

## 2023-03-02 RX ADMIN — CALCITRIOL CAPSULES 0.25 MCG 0.25 MCG: 0.25 CAPSULE ORAL at 07:48

## 2023-03-02 ASSESSMENT — ACTIVITIES OF DAILY LIVING (ADL)
ADLS_ACUITY_SCORE: 42
ADLS_ACUITY_SCORE: 42
ADLS_ACUITY_SCORE: 40
ADLS_ACUITY_SCORE: 39
ADLS_ACUITY_SCORE: 40
ADLS_ACUITY_SCORE: 40
ADLS_ACUITY_SCORE: 42
ADLS_ACUITY_SCORE: 42
ADLS_ACUITY_SCORE: 40
ADLS_ACUITY_SCORE: 40

## 2023-03-02 NOTE — DISCHARGE SUMMARY
[facility]  Discharge Summary - Medicine & Pediatrics       Date of Admission:  2/23/2023  Date of Discharge:  3/2/2023  Discharging Provider: Shanon Martinez  Discharge Service: Hospitalist Service, GOLD TEAM 9    Discharge Diagnoses     Acute on chronic hypoxic, hypercarbic respiratory failure  Pulmonary hypertension  SURI  Acute encephalopathy  Acute kidney injury   Urinary tract infection  Chronic kidney disease  History of liver transplant secondary to Hepatitis C  History of hypertension  Deconditioning    Follow-ups Needed After Discharge   Follow up with outpatient sleep study as scheduled.     Follow up with primary care provider regarding lung function, ongoing need for supplemental oxygen, and possible pulmonary hypertension.     Discharge Disposition   Discharged to home  Condition at discharge: Good    Hospital Course   Flor Navarro was admitted on 2/23/2023 with acute on chronic hypoxic, hypercarbic respiratory failure, volume overload, and acute encephalopathy in the setting of UTI.  The following problems were addressed during her hospitalization:    Acute on chronic hypoxic, hypercarbic respiratory failure  Pulmonary hypertension  SURI  Patient has a history of severe SURI and hypoventilation with prior admissions for hypercarbia and ongoing outpatient workup for sleep study, on 3L oxygen at baseline. She was found in acute on chronic hypoxic, hypercarbic respiratory failure on admission requiring BIPAP. CXR on admission demonstrated significant pulmonary edema. Of note, ECHO immediately preceding admission demonstrated new pulmonary hypertension. The etiology of her acute on chronic hypoxic, hypercarbic respiratory failure was felt to be volume overload in the setting of CASTILLO leading to pulmonary edema. She was diuresed with IV lasix with good effect. Her respiratory status improved with diuresis and BIPAP, both of which she tolerated appropriately. Repeat  ECHO after diuresis demonstrated improvement in pulmonary hypertension, suggesting this new finding was at least partially secondary to her volume overload. Her supplemental oxygen requirement was weaned to her baseline 3L by NC, although she continued to require BIPAP at night. Pulmonology and respiratory therapy were consulted during this admission, and recommended that she should continue BIPAP overnight in the setting of her severe SURI. Orders were placed for home BIPAP on discharge.     She should follow up as scheduled for her outpatient sleep study. She should follow up her with primary care provider regarding pulmonary hypertension found on ECHO. Repeat ECHO could be considered in 1-2 months.      Acute encephalopathy  Acute kidney injury   Urinary tract infection  Chronic kidney disease  Patient has a history of chronic kidney disease with baseline Cr 1-1.3. On admission, she was encephalopathic, found to have an acute kidney injury with Cr 2.89 and a urinary tract infection on UA and urine culture. Her UTI was treated with ceftazidime later narrowed to ceftriaxone to complete a 7 day course. The etiology of her encephalopathy was felt to be multifactorial with contributions from her UTI and hypercarbia contributing. Her mental status improved and ultimately returning to baseline as her UTI was treated and her respiratory status improved. Her CASTILLO improved with diuresis as above, and her Cr was at baseline on the day of discharge.     History of liver transplant secondary to Hepatitis C  Patient has a history of liver transplant in 2010 secondary to hepatitis C. She follows with Dr. Anne in the outpatient setting. Hepatology was consulted during this admission given her history of transplant. Her PTA immunosuppressants were continued during this hospitalization.     History of hypertension  Patient has a history of hypertension. PTA anti-hypertensives were initially held on admission in the setting of acute  illness. PTA anti-hypertensives were resumed on hospital day 5, which she tolerated well. Blood pressures remained appropriate throughout this hospitalization.     Deconditioning  PT/OT were consulted during this admission. Patient successfully participated in PT/OT cares.     Consultations This Hospital Stay   NURSING TO CONSULT FOR VASCULAR ACCESS CARE IP CONSULT  NURSING TO CONSULT FOR VASCULAR ACCESS CARE IP CONSULT  RESPIRATORY CARE IP CONSULT  PULMONARY GENERAL ADULT IP CONSULT  GI HEPATOLOGY ADULT IP CONSULT  CARE MANAGEMENT / SOCIAL WORK IP CONSULT  PHYSICAL THERAPY ADULT IP CONSULT  OCCUPATIONAL THERAPY ADULT IP CONSULT  SLEEP STUDIES IP CONSULT    Code Status   Full Code       The patient was discussed with Dr. Shanon Liu  [facility] 6D INTERMEDIATE CARE  [address] [facility] [address] [facility] [address]  Phone: [phone]  Fax: [phone]      Physician Attestation   I, Shanon Wolf MD, was present with the medical/TRINY student who participated in the service and in the documentation of the note.  I have verified the history and personally performed the physical exam and medical decision making.  I agree with the assessment and plan of care as documented in the note.      Time Spent on this Encounter   IShanon MD, personally saw the patient today and spent greater than 30 minutes discharging this patient.    Shanon Wolf MD  Date of Service (when I saw the patient): 03/02/23    ______________________________________________________________________    Physical Exam   Vital Signs: Temp: 97.6  F (36.4  C) Temp src: Oral BP: 124/88 Pulse: 85   Resp: 20 SpO2: 92 % O2 Device: Nasal cannula Oxygen Delivery: 2.5 LPM  Weight: 234 lbs 6.4 oz     Constitutional: Awake, alert, and oriented. No acute distress. Sitting up comfortably in bed.    Respiratory: Breathing comfortably with NC on. No increased work of breathing at rest. No audible breath sounds,  wheezes, or ronchi. Lungs clear on auscultation.   Cardiovascular: RRR, well perfused.   GI: Abdomen soft, non-distended.   Neurologic: Alert and oriented. Cranially nerves grossly intact.       Primary Care Physician   Karely Sierra    Discharge Orders      Sleep DME    If providing a ResMed device for this [place] patient, please add [facility] as an Integrator in [facility] along with patient's MRN: [id number] and CSN: [id number].     Home Care Referral      Reason for your hospital stay    1. Respiratory failure and carbon dioxide buildup.  Wearing the BiPAP machine for breathing at night is the best way to prevent this in the future.    2. Urinary tract infection.  This was treated with antibiotics during admission.     Activity    Your activity upon discharge: activity as tolerated     Oxygen Adult/Peds    Oxygen Documentation  I certify that this patient, Flor Navarro has been under my care (or a nurse practitioner or physican's assistant working with me). This is the face-to-face encounter for oxygen medical necessity.      At the time of this encounter supplemental oxygen is reasonable and necessary and is expected to improve the patient's condition in a home setting.       Patient has continued oxygen desaturation due to Chronic Respiratory Failure with Hypoxia J96.11.    If portability is ordered, is the patient mobile within the home? yes     Non-Invasive Ventilator    Non-Invasive Vent Documentation  I attest that I have seen and evaluated Flor Navarro face to face. She has a chronic illness of G47.33 - Obstructive sleep apnea (adult) (pediatric). This patient would greatly benefit from a home Non Invasive Ventilator device for nocturnal and daytime use with portability for recovery breaths. This device would aide in ventilator support to improve pulmonary status and decrease work of breathing. I strongly believe therapy is necessary to improve future outcomes and decreased hospital  readmissions.     I, the undersigned, certify that the above prescribed supplies are medically necessary for this patient and is both reasonable and necessary in reference to accepted standards of medical and necessary in reference to accepted standards of medical practice in the treatment of this patient's condition and is not prescribed as a convenience.     Diet    Follow this diet upon discharge: Orders Placed This Encounter      Regular Diet Adult No Red Meat, No Beef, No Pork, Other - please comment       Significant Results and Procedures   Results for orders placed or performed during the hospital encounter of 02/23/23   XR Chest 2 Views    Narrative    Exam: XR CHEST 2 VIEWS, 2/23/2023 1:02 PM    Comparison: Chest x-ray 12/16/2022    History: hypoxia    Findings:  2 views of the chest. Trachea is midline. Cardiac silhouette appears  enlarged and margins are somewhat obscured. Increased diffuse mixed  interstitial and airspace patchy opacities. No appreciable  pneumothorax. Small bilateral pleural effusions. The visualized upper  abdomen is unremarkable.      Impression    Impression: Increased diffuse mixed interstitial and airspace  opacities compared to chest x-ray 12/16/2022, could represent  increasing atelectasis/cardiogenic pulmonary edema however cannot  exclude infection. Recommend follow-up to resolution.    I have personally reviewed the examination and initial interpretation  and I agree with the findings.    JUAN C CURTIS MD         SYSTEM ID:  [id number]   CT Head w/o Contrast    Narrative    EXAM: CT HEAD W/O CONTRAST  2/23/2023 1:51 PM     HISTORY:  confusion       COMPARISON:  Brain MRI 7/27/2017    TECHNIQUE: Using multidetector thin collimation helical acquisition  technique, axial, coronal and sagittal CT images from the skull base  to the vertex were obtained without intravenous contrast.   (topogram) image(s) also obtained and reviewed.    FINDINGS:  No intracranial hemorrhage,  mass effect, or midline shift. No acute  loss of gray-white matter differentiation in the cerebral hemispheres.  Ventricles are proportionate to the cerebral sulci. Clear basal  cisterns. Right frontal calcified meningioma.    The bony calvaria and the bones of the skull base are normal. Mucous  retention cyst within the left maxillary sinus with small amount of  frothy debris. The visualized portions of the paranasal sinuses and  mastoid air cells are clear. Grossly normal orbits.       Impression    IMPRESSION: No acute intracranial pathology. Calcified right frontal  meningioma.    ADELFO LOFTON MD         SYSTEM ID:  [id number]    Liver Transplant    Narrative    EXAMINATION: TEMPORARY, 2/24/2023 9:59 AM    COMPARISON: Liver transplant ultrasound 8/2/2022, 5/29/2022.    HISTORY: Encephalopathy, history of liver transplant in 2010.    TECHNIQUE: The abdomen was scanned in standard fashion with  specialized ultrasound transducer(s) using both grey scale and limited  color Doppler techniques.    Findings:    Liver: Hepatic parenchyma is of normal echogenicity without evidence  for focal mass. No peritransplant fluid collection identified. The  common bile duct measures up to 1.4 mm.    Pancreas: The visualized portions of pancreas are unremarkable.    DOPPLER:  Doppler evaluation shows flow towards the liver in the  portal venous system.     Flow is   25 cm/sec in the extrahepatic native portal vein  78 cm/sec at the portal vein anastomosis  41 cm/sec in the intrahepatic transplant portal vein.     Flow is:  33 cm/sec in the leftward of the branch portal vein  29 cm/sec in the rightward branch of the portal vein  both flow towards the liver.     Flow in the hepatic artery is towards the liver and   67 cm/sec peak systolic  10 cm/sec minimum diastolic  0.85 resistive index.     The splenic vein is patent and flow is towards the liver. The left,  middle, and right hepatic veins are patent with flow towards the  IVC.  The IVC is patent with flow towards the heart.  Aorta is not dilated.      Impression    Impression:   1. Normal grayscale evaluation of the hepatic transplant.  2. Patent Doppler evaluation of the hepatic vasculature.    I have personally reviewed the examination and initial interpretation  and I agree with the findings.    MARYCHUY TRIPLETT MD         SYSTEM ID:  [id number]   XR Chest Port 1 View    Narrative     Examination:  XR CHEST PORT 1 VIEW     Date:  2023 10:07 AM      Clinical Information: Hx liver tx, admitted for UTI c/b volume  overload and AHRF. Evaluate for pulmonary edema now that s/p diuresis  vs infection     Additional Information: none    Comparison: Chest x-ray 2023     Findings:   The pulmonary vasculature is indistinct. Peribronchial cuffing.  Interstitial prominence. Cardiac silhouette is enlarged. No large  focal opacities. Costophrenic angles are sharp. No acute osseous  injury.      Impression    Impression:  1. Interval decrease in pulmonary edema but significant edema  persists.  Cannot rule out concurrent infection.  2. Cardiomegaly.    TIM RUELAS MD         SYSTEM ID:  [id number]   Echocardiogram Complete     Value    LVEF  55-60%    Narrative    [id number]  [id number]  [id number]  [id number]^SERGIO^DAVID^BENITEZ     [facility],[place]  Echocardiography Laboratory  [address] [place] [address]     Name: PREET ALANIZ  MRN: [id number]  : 1965  Study Date: 2023 03:27 PM  Age: 57 yrs  Gender: Female  Patient Location: [facility]  Reason For Study: CHF  Ordering Physician: DAVID HILL  Performed By: Cassie Dumont     BSA: 2.0 m2  Height: 61 in  Weight: 235 lb  HR: 76  BP: 128/75 mmHg  ______________________________________________________________________________  Procedure  Complete Portable Echo Adult.  ______________________________________________________________________________  Interpretation  Summary  Global and regional left ventricular function is normal with an EF of 55-60%.  Global right ventricular function is normal.  Left ventricular diastolic function is indeterminate.  Pulmonary hypertension is present.  Right ventricular systolic pressure is 40mmHg above the right atrial pressure.  Dilation of the inferior vena cava is present with normal respiratory  variation in diameter.  No pericardial effusion is present.     This study was compared with the study from 8/3/2022 .Pulmonary artery  pressure was not assessable in the previous study. PHT is new otherwise no  change.  ______________________________________________________________________________  Left Ventricle  Left ventricular size is normal. Left ventricular wall thickness is normal.  Global and regional left ventricular function is normal with an EF of 55-60%.  Left ventricular diastolic function is indeterminate.     Right Ventricle  The right ventricle is normal size. Global right ventricular function is  normal.     Atria  The right atria appears normal. Mild left atrial enlargement is present.     Mitral Valve  The mitral valve is normal. Trace mitral insufficiency is present.     Aortic Valve  Aortic valve is normal in structure and function. The aortic valve is  tricuspid.     Tricuspid Valve  The tricuspid valve is normal. Trace tricuspid insufficiency is present.  Pulmonary hypertension is present. Right ventricular systolic pressure is  40mmHg above the right atrial pressure.     Pulmonic Valve  The pulmonic valve is normal.     Vessels  The aorta root is normal. IVC diameter and respiratory changes fall into an  intermediate range suggesting an RA pressure of 8 mmHg. Dilation of the  inferior vena cava is present with normal respiratory variation in diameter.     Pericardium  No pericardial effusion is present.     Compared to Previous Study  This study was compared with the study from 8/3/2022 . Pulmonary artery  pressure was not  assessable in the previous study. PHT is new otherwise no  change.  ______________________________________________________________________________  MMode/2D Measurements & Calculations     IVSd: 1.1 cm  LVIDd: 5.0 cm  LVIDs: 2.9 cm  LVPWd: 1.0 cm  FS: 41.3 %  LV mass(C)d: 190.6 grams  LV mass(C)dI: 94.2 grams/m2  asc Aorta Diam: 3.3 cm  LA Volume (BP): 76.4 ml  LA Volume Index (BP): 37.8 ml/m2  RWT: 0.42     Doppler Measurements & Calculations  MV E max sabine: 78.1 cm/sec  MV A max sabine: 64.5 cm/sec  MV E/A: 1.2  MV dec slope: 343.0 cm/sec2  MV dec time: 0.23 sec  TR max sabine: 318.0 cm/sec  TR max P.4 mmHg  E/E' avg: 10.3  Lateral E/e': 10.0  Medial E/e': 10.6     ______________________________________________________________________________  Report approved by: Michelle ROBLES 2023 04:20 PM         Echo Limited     Value    LVEF  55-60%    Narrative    [id number]  [id number]  [id number]  [id number]^SHEY^STEPHANIA     [facility],[place]  Echocardiography Laboratory  [address] [place] [id number]     Name: PREET ALANIZ  MRN: [id number]  : 1965  Study Date: 2023 10:02 AM  Age: 57 yrs  Gender: Female  Patient Location: [facility]  Reason For Study: Pulmonary Hypertension  Ordering Physician: STEPHANIA KAT  Performed By: Cassie Dumont     BSA: 2.0 m2  Height: 61 in  Weight: 234 lb  HR: 61  BP: 132/82 mmHg  ______________________________________________________________________________  Procedure  Limited Portable Echo Adult.  ______________________________________________________________________________  Interpretation Summary  Global and regional left ventricular function is normal with an EF of 55-60%.  The RV is not clearly visualized but the size and function do not appear to be  grossly abnormal.  Mild tricuspid insufficiency is present.  The estimated PA systolic pressure is 34 mmHg.  IVC diameter <2.1 cm collapsing >50% with sniff suggests a normal RA pressure  of  3 mmHg.  No pericardial effusion is present.  This study was compared with the study from 2023. The RA pressure is  lower. No significant changes.  ______________________________________________________________________________  Left Ventricle  Global and regional left ventricular function is normal with an EF of 55-60%.     Right Ventricle  The RV is not clearly visualized but the size and function do not appear to be  grossly abnormal.     Mitral Valve  Mild mitral insufficiency is present.     Aortic Valve  The aortic valve is tricuspid. Trace aortic insufficiency is present.     Tricuspid Valve  Mild tricuspid insufficiency is present. The right ventricular systolic  pressure is approximated at 31.8 mmHg plus the right atrial pressure.     Pulmonic Valve  The valve leaflets are not well visualized. Trace pulmonic insufficiency is  present.     Vessels  IVC diameter <2.1 cm collapsing >50% with sniff suggests a normal RA pressure  of 3 mmHg.     Pericardium  No pericardial effusion is present.     Compared to Previous Study  This study was compared with the study from 2023 . The RA pressure is  lower. No significant changes noted.     ______________________________________________________________________________  Doppler Measurements & Calculations     TR max sabine: 282.0 cm/sec  TR max P.8 mmHg     ______________________________________________________________________________  Report approved by: Roger Carlos 2023 10:23 AM           *Note: Due to a large number of results and/or encounters for the requested time period, some results have not been displayed. A complete set of results can be found in Results Review.       Discharge Medications   Current Discharge Medication List      CONTINUE these medications which have NOT CHANGED    Details   ACE/ARB/ARNI NOT PRESCRIBED (INTENTIONAL) Please choose reason not prescribed from choices below.    Associated Diagnoses: Stage 3a chronic kidney  disease (H)      acetaminophen (TYLENOL) 500 MG tablet Take 1 tablet (500 mg) by mouth 3 times daily as needed for mild pain  Qty: 120 tablet, Refills: 3    Associated Diagnoses: Pain in joint of right shoulder      albuterol (ACCUNEB) 0.63 MG/3ML neb solution Take 3 mLs (0.63 mg) by nebulization every 4 hours (while awake)  Qty: 360 mL, Refills: 1    Associated Diagnoses: Mild intermittent asthma with acute exacerbation      albuterol (PROAIR HFA/PROVENTIL HFA/VENTOLIN HFA) 108 (90 Base) MCG/ACT inhaler Inhale 2 puffs into the lungs 4 times daily  Qty: 18 g, Refills: 1    Comments: Pharmacy may dispense brand covered by insurance (Proair, or proventil or ventolin or generic albuterol inhaler), Needs a second inhaler - only has a few puffs left - please mail ASAP  Associated Diagnoses: Chronic obstructive pulmonary disease, unspecified COPD type (H)      alendronate (FOSAMAX) 70 MG tablet Take 1 tablet (70 mg) by mouth every 7 days  Qty: 13 tablet, Refills: 3    Associated Diagnoses: Other osteoporosis without current pathological fracture      alum & mag hydroxide-simethicone (MAALOX ADVANCED MAX ST) 400-400-40 MG/5ML SUSP suspension Take 30 mLs by mouth every 4 hours as needed for indigestion or heartburn  Qty: 355 mL, Refills: 0      amLODIPine (NORVASC) 10 MG tablet Take 1 tablet (10 mg) by mouth daily  Qty: 90 tablet, Refills: 3    Associated Diagnoses: Hypertension, renal      calcitRIOL (ROCALTROL) 0.25 MCG capsule Take 1 capsule (0.25 mcg) by mouth daily  Qty: 90 capsule, Refills: 3    Associated Diagnoses: Stage 3 chronic kidney disease, unspecified whether stage 3a or 3b CKD (H)      calcium carbonate 600 mg-vitamin D 400 units (CALTRATE) 600-400 MG-UNIT per tablet Take 1 tablet by mouth 2 times daily  Qty: 60 tablet, Refills: 11      calcium carbonate-vitamin D (CALTRATE) 600-10 MG-MCG per tablet TAKE ONE TABLET BY MOUTH TWICE A DAY  Qty: 60 tablet, Refills: 11    Associated Diagnoses: Other osteoporosis  without current pathological fracture      carboxymethylcellulose PF (REFRESH PLUS) 0.5 % ophthalmic solution Place 1 drop into both eyes 4 times daily as needed for dry eyes  Qty: 30 each, Refills: 4    Associated Diagnoses: Gritty eye      cetirizine (ZYRTEC) 10 MG tablet Take 1 tablet (10 mg) by mouth daily  Qty: 90 tablet, Refills: 3    Associated Diagnoses: Tearing eyes      chlorthalidone (HYGROTON) 25 MG tablet Take 1 tablet (25 mg) by mouth daily for 180 days  Qty: 90 tablet, Refills: 1    Associated Diagnoses: Hypertension, renal      COMPOUNDED NON-CONTROLLED SUBSTANCE (CMPD RX) - PHARMACY TO MIX COMPOUNDED MEDICATION Equal parts of 2% Benadryl, 2% vicious lidocaine and TC suspension maalox,  Swish and spit 5 ml every 6 hrs as needed for mouth pain  Qty: 300 mL, Refills: 1    Associated Diagnoses: Tongue mass      cycloSPORINE modified (GENERIC EQUIVALENT) 25 MG capsule TAKE THREE CAPSULES BY MOUTH EVERY MORNING & TAKE TWO CAPSULES BY MOUTH EVERY NIGHT AT BEDTIME  Qty: 150 capsule, Refills: 11    Associated Diagnoses: Liver replaced by transplant (H)      Denture Care Products (EFFERDENT DENTURE CLEANSER) TBEF Use nightly to clean oral appliance  Qty: 30 tablet, Refills: 11    Associated Diagnoses: Ill-fitting dentures      diphenhydrAMINE (BENADRYL) 50 MG capsule Take 1 capsule (50 mg) by mouth once as needed (2 hours before CT scan. 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 tophus      furosemide (LASIX) 20 MG tablet Take 1 tablet (20 mg) by mouth 2 times daily  Qty: 180 tablet, Refills: 3    Associated Diagnoses: Hyperkalemia      gabapentin (NEURONTIN) 300 MG capsule Take 1 capsule  (300 mg) by mouth At Bedtime  Qty: 180 capsule, Refills: 3    Associated Diagnoses: 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)      ketotifen (ZADITOR) 0.025 % ophthalmic solution Place 1 drop into both eyes 2 times daily      lidocaine (XYLOCAINE) 5 % external ointment Apply topically as needed for moderate pain  Qty: 240 g, Refills: 3    Associated Diagnoses: 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  Qty: 100 tablet, Refills: 3    Comments: PT MAY NEED TO pay out of pocket, this was discussed at our visit.  Associated Diagnoses: Primary insomnia      Multiple Vitamin (DAILY-SUMA MULTIVITAMIN) TABS Take 1 tablet by mouth every morning  Qty: 100 tablet, Refills: 4    Associated Diagnoses: Immunosuppressed status (H)      multivitamin w/minerals (MULTI-VITAMIN) tablet Take 1 tablet by mouth daily Needs 4 month supply for International Travel  Qty: 100 tablet, Refills: 11    Associated Diagnoses: Liver replaced by transplant (H)      mycophenolate (GENERIC EQUIVALENT) 250 MG capsule TAKE TWO CAPSULES BY MOUTH EVERY 12 HOURS  Qty: 120 capsule, Refills: 11    Associated Diagnoses: Liver replaced by transplant (H)      neomycin-polymyxin-HC 1 % Place 4 drops in ear(s) 2 times daily  Qty: 10 mL, Refills: 0    Associated Diagnoses: Acute reactive otitis externa of left ear      omeprazole (PRILOSEC) 20 MG DR capsule Take 1 capsule (20 mg) by mouth 2 times daily el  Qty: 180 capsule, Refills: 3    Associated Diagnoses: Liver transplanted (H)      !! order for DME Equipment being ordered: compression stockings bilateral  Please measure and fit patient for stockings   Strength 15-30mmHg  Disp 2 pairs ( 4 socks)  1 refill over the time of 1 year  Qty: 4 Units, Refills: 1    Associated Diagnoses: Localized edema      !! order for DME Equipment being ordered:   1) cane  2) compression stockings 15mmHg, 3 pairs  Dx: gait stability, falls, edema  Qty:  1 each, Refills: 0    Associated Diagnoses: Gait instability; Falls frequently; Localized edema      !! order for DME Equipment being ordered: Nebulizer with adult mask and tubing  Qty: 1 Units, Refills: 0    Associated Diagnoses: Acute bronchitis, unspecified organism      SENEXON-S 8.6-50 MG tablet TAKE TWO TABLETS BY MOUTH TWICE A DAY  Qty: 180 tablet, Refills: 3    Associated Diagnoses: Partial intestinal obstruction, unspecified cause (H)      SM FIBER LAXATIVE 500 MG TABS tablet Take 2 tablets (1,000 mg) by mouth daily  Qty: 90 tablet, Refills: 3    Associated Diagnoses: Chronic idiopathic constipation      STATIN NOT PRESCRIBED, INTENTIONAL, Not prescribed  Qty: 0 each, Refills: 0    Associated Diagnoses: High risk medication use      umeclidinium (INCRUSE ELLIPTA) 62.5 MCG/INH inhaler Inhale 1 puff into the lungs daily  Qty: 30 each, Refills: 11    Comments: 1st rx sent to [facility], future Rx to be filled by mailorder/specialty pharmacy.  Associated Diagnoses: Chronic obstructive pulmonary disease, unspecified COPD type (H)      Vitamin D3 (CHOLECALCIFEROL) 25 mcg (1000 units) tablet Take 1 tablet (25 mcg) by mouth daily  Qty: 100 tablet, Refills: 3    Associated Diagnoses: Other osteoporosis without current pathological fracture       !! - Potential duplicate medications found. Please discuss with provider.      STOP taking these medications       dimethicone (AVEENO DAILY MOISTURIZING) 1.3 % LOTN lotion Comments:   Reason for Stopping:         mineral oil-white petrolatum (EUCERIN) CREA cream Comments:   Reason for Stopping:             Allergies   Allergies   Allergen Reactions     Aspirin      3/31/16 Per pt, tolerates 81 mg daily dose without ADR.     325 mg dose caused itchiness and hives.     Clarithromycin      Allergic reaction         Contrast Dye      Iodine      Pcn [Penicillins]

## 2023-03-02 NOTE — PROGRESS NOTES
Care Management Follow Up    Length of Stay (days): 7    Expected Discharge Date: 03/03/2023     Concerns to be Addressed:       Patient plan of care discussed at interdisciplinary rounds: Yes    Anticipated Discharge Disposition:       Anticipated Discharge Services:    Anticipated Discharge DME:      Patient/family educated on Medicare website which has current facility and service quality ratings:    Education Provided on the Discharge Plan:    Patient/Family in Agreement with the Plan:      Referrals Placed by CM/SW:    Private pay costs discussed: Not applicable    Additional Information:  Patient home discharge today. RNCC called discussed with son Carleen, who is coming at 4:30 p.m., [facility]. RNCC went over with pt's son TODD, received verbal approval for signature and faxed form to [facility] ([phone]). RNCC followed up with LAURA Antunez RN on discharge plan and Harmony will meet patient at home tomorrow. Patient's son bringing home O2 concentrator to hospital. RNCC available for any further needs.    DME    [facility]  [address] [place] [address]   [id number]  Phone: [phone]  Fax: [phone]    Vendor to supply--  BiPAP, home O2    _______________________________________________        Harmony Coleman, Phone: [phone]  [facility]  ph: [phone]   fx: [phone]  HC RN/PT     ELBA through her [employer] insurance (Deborah Ruiz, Phone: [phone]) was updated of discharge     CARLYN StaleyN, BA, RN, CMSRN, RNCC  Covering Units 6D/OBS  Pager: [phone]  Phone: [phone]  6th floor Weekend/Holiday Pager: [phone]  Observation weekend/after hours: [phone]

## 2023-03-02 NOTE — PLAN OF CARE
Goal Outcome Evaluation:  /66 (BP Location: Right arm, Cuff Size: Adult Regular)   Pulse 85   Temp 97.7  F (36.5  C) (Oral)   Resp 20   Wt 106.3 kg (234 lb 6.4 oz)   LMP  (LMP Unknown)   SpO2 94%   BMI 44.29 kg/m      D: Patient admitted for resp failure, CASTILLO and UTI.  I/A: Patient is A&OX4, on SR and O2 2.5 NC, LS clear/diminished at bases, on Reg with strict I&O.  Discharging home with bipap (set up, teaching done and sent with machine by [facility]).  Son Mohamed to bring home oxygen tank.  Discharge instruction provided, no meds script, to continue with same regiment. She scheduled for sleep study on 4/26 and see her provider on 3/31/23.  Patient and son verbalize understanding.  D: Discharge home with son and took all personal belongs, on WC at 1745 and PIV off.

## 2023-03-02 NOTE — PLAN OF CARE
ICU End of Shift Summary. See flowsheets for vital signs and detailed assessment.    Changes this shift: VSS on 3-4 lpm nasal cannula. Denies pain. Up to commode with SBA.     Plan: Continue to monitor and follow POC    Goal Outcome Evaluation:      Plan of Care Reviewed With: patient    Overall Patient Progress: improvingOverall Patient Progress: improving    Outcome Evaluation: Oxygen weaned from high flow nasal cannula down to nasal cannula.

## 2023-03-02 NOTE — PLAN OF CARE
Major Shift Events: No major events. On 3 L nasal cannula. Tolerating regular diet. Up SBA to commode. KERRY GARCIA.  Plan: Discharge this PM around 8688-3923 when pt's son gets here. He is going to bring home O2 for pt's transport to home.  For vital signs and complete assessments, please see documentation flowsheets.     Addendum: Attempted to wean to 1.5 L nasal cannula, unable to maintain O2 sats > 92%. Increased to 2.5 L.

## 2023-03-02 NOTE — PROGRESS NOTES
[facility] for Sleep Device.     I received RX for NIV device. I reviewed patient's chart and diagnosis. Based on findings, I recommend new RX for BIPAP S [facility]:  for Diagnosis of SURI, Hypoventilation based on Sleep Study. I spoke with Alex and let him know we will just need a RX for BIPAP S  if MD agreeS. We have all other documentation necessary. Insurance verified and no prior auth. Is needed for sleep devices. Possible discharge today or tomorrow.       DIAGNOSIS: SURI, HYPOVENTILATION  PSG DATE: 1/10/2023  PSG SCORIN%  AHI: 79.2  **QUALIFYING CRITERIA BIPAP S**  SLEEP STUDY SURI CPAP TRIED AND RULED OUT. PATIENT NEEDS BIPAP. EPIC SLEEP PLAN: Bilevel was initiated during the study however it was incompletely titrated. She had significant improvement in sleep consolidation on PAP therapy. She reports today that she felt that she tolerated it well and did feel better the next day. Recommended repeat sleep study with all-night Pap titration with TCM starting with CPAP of 7, titrate CPAP to resolve obstruction then switched to bilevel to manage hypoventilation and hypoxemia. PAP treatment and was titrated at pressures ranging from BiLevel 10/5/0 cmH2O up to BiLevel 15/6/0. If titration findings adequate settings we will go ahead and put in orders for a PAP device to use at home ASA. Her son is out of town and she does not think she could do a repeat sleep study until he gets back.    DIAGNOSIS: SURI HYPOVENTILATION RESPIRATORY FAILURE.  SLEEP STUDY DONE AND QUILIFIES FOR BIPAP S CPAP TRIED AND RULED OUT.  PFT (DATE/RESULT): 2023 FEV1/FVC 81% MIP -35, FVC 62%, UPLOADED.  ABG (DATE/RESULT): 2023 CO2 68, UPLOADED.  VANCE (DATE/RESULT): 2023 SLEEP STUDY Greater than 5 minutes O2 sat at or below 88%) was present. Baseline oxygen saturation was 85.6%. Lowest oxygen saturation was 53.0%. Time spent less than or equal to 88% was 95.8 minutes.  MEDICAL NECESSITY/F2F NOTES  (DATE/SIGN/COSIGN): HAVE SLEEP STUDY STATING NEED FOR BIPAP  RX (DATE/SIGN/COSIGN): 3/1/2023 NIV BRIAN MABRY MD: STEPHANIA KAT.... NEED RX FOR BIPAP  IS OXYGEN NEEDED: HAS O2 THROUGH US MAY NEED AROUND 3 LPM...    INSURANCE CALLED: MEDICARE  DATE/TIME: 3/2/2023  IS [facility] IN NETWORK? YES  CODE VERIFIED: , ,   IS PRIOR AUTH REQUIRED? NO FOR ALL CODES    NEED Danish  - PLEASE CALL HER SON, KEVYN, TO SET UP APPOINTMENTS. HIS PHONE NUMBER -[phone].    Rosangela Horton  Respiratory Therapist   [facility]  [address]  [address]  [place] [address]  [email or web address]  [email or web address]  Office: [phone] Fax: [phone]  Employed by [facility]

## 2023-03-03 ENCOUNTER — DOCUMENTATION ONLY (OUTPATIENT)
Dept: FAMILY MEDICINE | Facility: CLINIC | Age: 58
End: 2023-03-03
Payer: MEDICARE

## 2023-03-03 DIAGNOSIS — H04.203 TEARING EYES: ICD-10-CM

## 2023-03-03 RX ORDER — CETIRIZINE HYDROCHLORIDE 10 MG/1
10 TABLET ORAL DAILY
Qty: 90 TABLET | Refills: 3 | Status: SHIPPED | OUTPATIENT
Start: 2023-03-03 | End: 2023-09-15

## 2023-03-03 NOTE — TELEPHONE ENCOUNTER
"Request for medication refill:  cetirizine (ZYRTEC) 10 MG tablet    Providers if patient needs an appointment and you are willing to give a one month supply please refill for one month and  send a letter/MyChart using \".SMILLIMITEDREFILL\" .smillimited and route chart to \"P SMI \" (Giving one month refill in non controlled medications is strongly recommended before denial)    If refill has been denied, meaning absolutely no refills without visit, please complete the smart phrase \".smirxrefuse\" and route it to the \"P SMI MED REFILLS\"  pool to inform the patient and the pharmacy.    Darius Durant MA        "

## 2023-03-03 NOTE — PROGRESS NOTES
"When opening a documentation only encounter, be sure to enter in \"Chief Complaint\" Forms and in \" Comments\" Title of form, description if needed.    Flor is a 57 year old  female  Form received via: Fax  Form now resides in: Provider Edward De La Garza                "

## 2023-03-06 ENCOUNTER — DOCUMENTATION ONLY (OUTPATIENT)
Dept: FAMILY MEDICINE | Facility: CLINIC | Age: 58
End: 2023-03-06
Payer: MEDICARE

## 2023-03-06 NOTE — PROGRESS NOTES
"When opening a documentation only encounter, be sure to enter in \"Chief Complaint\" Forms and in \" Comments\" Title of form, description if needed.    Flor is a 57 year old  female  Form received via: Fax  Form now resides in: Provider Ready    Tisha Cardoso                  "

## 2023-03-07 NOTE — PROGRESS NOTES
Form has been completed by provider.     Form sent out via: Fax to [employer] [facility] [employer] at Fax Number: [phone]  Patient informed: N/A  Output date: March 7, 2023    Darius Durant MA      **Please close the encounter**

## 2023-03-08 NOTE — PROGRESS NOTES
Form has been completed by provider.     Form sent out via: Fax to [facility] at Fax Number: [phone]  Patient informed: N/A  Output date: March 8, 2023    Darius Durant MA      **Please close the encounter**

## 2023-03-09 ENCOUNTER — TELEPHONE (OUTPATIENT)
Dept: FAMILY MEDICINE | Facility: CLINIC | Age: 58
End: 2023-03-09
Payer: MEDICARE

## 2023-03-09 NOTE — TELEPHONE ENCOUNTER
[facility] phone call message - order or referral request for patient:     Order or referral being requested: In home PT 1X or 2X per week for a total of 9 weeks.     Regarding weakness, balance, and fall risk       OK to leave a message on voice mail? Yes    Primary language: Telugu      needed? Yes    Call taken on March 9, 2023 at 12:53 PM by Raya Sharpe

## 2023-03-13 ENCOUNTER — DOCUMENTATION ONLY (OUTPATIENT)
Dept: FAMILY MEDICINE | Facility: CLINIC | Age: 58
End: 2023-03-13
Payer: MEDICARE

## 2023-03-17 ENCOUNTER — DOCUMENTATION ONLY (OUTPATIENT)
Dept: FAMILY MEDICINE | Facility: CLINIC | Age: 58
End: 2023-03-17
Payer: MEDICARE

## 2023-03-17 NOTE — PROGRESS NOTES
"When opening a documentation only encounter, be sure to enter in \"Chief Complaint\" Forms and in \" Comments\" Title of form, description if needed.    Flor is a 57 year old  female  Form received via: Fax  Form now resides in: Provider Ready    Litzy Stahl MA                  "

## 2023-03-20 NOTE — PROGRESS NOTES
Form has been completed by provider.     Form sent out via: Fax to [facility] at Fax Number: [phone]  Patient informed: N/A  Output date: March 20, 2023    Darius Durant MA      **Please close the encounter**

## 2023-03-22 ENCOUNTER — THERAPY VISIT (OUTPATIENT)
Dept: SLEEP MEDICINE | Facility: CLINIC | Age: 58
End: 2023-03-22
Attending: INTERNAL MEDICINE
Payer: MEDICARE

## 2023-03-22 ENCOUNTER — TELEPHONE (OUTPATIENT)
Dept: FAMILY MEDICINE | Facility: CLINIC | Age: 58
End: 2023-03-22

## 2023-03-22 DIAGNOSIS — G47.10 HYPERSOMNIA: ICD-10-CM

## 2023-03-22 DIAGNOSIS — R06.89 HYPOVENTILATION: ICD-10-CM

## 2023-03-22 DIAGNOSIS — G47.33 OSA (OBSTRUCTIVE SLEEP APNEA): ICD-10-CM

## 2023-03-22 PROCEDURE — 95810 POLYSOM 6/> YRS 4/> PARAM: CPT | Performed by: INTERNAL MEDICINE

## 2023-03-22 NOTE — TELEPHONE ENCOUNTER
Returned call to home care PT, unable to reach. Left VM with verbal orders per protocol as requested. Left callback number for questions.    CATRACHO CORTES RN

## 2023-03-22 NOTE — TELEPHONE ENCOUNTER
[facility] phone call message - order or referral request for patient:     Order or referral being requested: Verbal orders      Additional Comments: Home Physical Therapy    -1-2x a week for 9 wks  For: endurance, strength, and balance    OK to leave a message on voice mail? Yes    Primary language: Divehi      needed? Yes    Call taken on March 22, 2023 at 10:30 AM by Camelia Ceron

## 2023-03-23 NOTE — NURSING NOTE
Completed a all night titration PSG per provider order.    Preliminary AHI 79.2.  A final therapeutic PAP pressure was achieved.    Supine REM was seen on therapeutic pressure.    Patient reports feeling not refreshed in AM.

## 2023-03-23 NOTE — PATIENT INSTRUCTIONS
[place] [facility]    1. Your sleep study will be reviewed by a sleep physician within the next few days.     2. Please follow up in the sleep clinic as scheduled, or, make an appointment with your sleep provider to be seen within two weeks to discuss the results of the sleep study.    3. If you have any questions or problems with your treatment plan, please contact your sleep clinic provider at [phone] to further manage your condition.    4. Please review your attached medication list, and, at your follow-up appointment advise your sleep clinic provider about any changes.    5. Go to [email or web address] for more information about your sleep problems.    CHRIS Ng  March 23, 2023

## 2023-03-28 LAB — SLPCOMP: NORMAL

## 2023-03-28 NOTE — PROCEDURES
" SLEEP STUDY INTERPRETATION  TITRATION STUDY      Patient: PREET ALANIZ  YOB: 1965  Study Date: 3/22/2023  MRN: [id number]  Referring Provider: -  Ordering Provider: Bessy Ward MD    Indications for Polysomnography: The patient is a 57 year old Female who is 5' 1\" and weight  235 lbs. Her BMI is 44.9, [place] sleepiness scale 21 and neck circumference is 17 in cm. Relevant medical history includes s/p liver transplant 2010 for Hepatitis C, chronic kidney disease, left cerebral intraparenchymal hemorrhage, obesity, insomnia and recent split night PSG with resting/wake hypoxemia, severe obstructive sleep apnea with hypoxemia and hypoventilation and incomplete PAP titration. A polysomnogram with PAP titration was performed to correct for sleep apnea/hypoventilation/hypoxemia.    Polysomnogram Data: A full night polysomnogram recorded the standard physiologic parameters including EEG, EOG, EMG, ECG, nasal and oral airflow. Respiratory parameters of chest and abdominal movements were recorded with respiratory inductance plethysmography. Oxygen saturation was recorded by pulse oximetry. Hypopnea scoring rule used: 1B 4%.    Treatment PSG  Sleep Architecture: On PAP therapy with was normal sleep efficiency and normal arousal index.  The total recording time of the polysomnogram was 450.9 minutes. The total sleep time was 386.0 minutes. Sleep latency was increased at 39.6 minutes without the use of a sleep aid. REM latency was 63.0 minutes. Arousal index was normal at 9.3 arousals per hour. Sleep efficiency was normal at 85.6%. Wake after sleep onset was 25.0 minutes. The patient spent 4.5% of total sleep time in Stage N1, 13.9% in Stage N2, 45.2% in Stage N3, and 36.4% in REM. Time in REM supine was 44.5 minutes.    Respiration: Final Bilevel setting of IPAP 17, EPAP 8 and O2 at 3L/m resolved obstructive events and improved hypoxemia and hypoventilation. (No REM supine at final settings)    The patient " was titrated at pressures ranging from CPAP 7 cmH2O up to BiLevel 17/8 O2:3 cmH2O. The final pressure achieved was BiLevel 17/8/0 O2:3 cmH2O with a residual AHI of 1.8 events per hour. Time in REM supine on final pressure was - minutes.   - This titration was considered Adequate (no REM supine at final settings)    Snoring - was reported as absent on treatment.    Respiratory rate and pattern - was notable for respiratory rate18 bpm at final settings.    Sustained Sleep Associated Hypoventilation - Transcutaneous carbon dioxide monitoring was used, significant hypoventilation was present with a maximum change from 40.5 to 65.0 mmHg and 150.9 minutes at or greater than 55 mmHg. (Final TCM 54)    Sleep Associated Hypoxemia - (Greater than 5 minutes O2 sat at or below 88%) was present. Baseline oxygen saturation was 83.0%. Lowest oxygen saturation was 49.0%. Time spent less than or equal to 88% was 357.4 minutes. Time spent less than or equal to 89% was 363.8 minutes.    Movement Activity: No abnormal movement activity.    Periodic Limb Activity - There were 5 PLMs during the entire study. The PLM index was 0.8 movements per hour. The PLM Arousal Index was - per hour.    REM EMG Activity - Excessive transient/sustained muscle activity was not present.    Nocturnal Behavior - Abnormal sleep related behaviors were not noted.    Bruxism - None apparent.    Cardiac Summary: Normal sinus rhythm and rates with occasional early wide QRS complex (PVCs.)  The average pulse rate was 74.4 bpm. The minimum pulse rate was 61.0 bpm while the maximum pulse rate was 102.0 bpm. Arrhythmias were not noted.    Assessment:     Final Bilevel setting of IPAP 17, EPAP 8 and O2 at 3L/m resolved obstructive events and improved hypoxemia and hypoventilation. (No REM supine at final settings)    n PAP therapy with was normal sleep efficiency and normal arousal index.    No abnormal movement activity.    Normal sinus rhythm and rates with  occasional early wide QRS complex (PVCs.)      Recommendations:    Treatment of SURI with Bilvel IPAP 17 cmH2O, EPAP 8 cmH2O and O2 at 4L/m. Recommend clinical follow up with sleep management team, for coaching and review of effectiveness and compliance measures.    Recommend Overnight oximetry when meeting compliance goals.    Advice regarding the risks of drowsy driving.    Suggest optimizing sleep schedule and avoiding sleep deprivation.    Weight management (BMI > 30).    Pharmacologic therapy should be used for management of restless legs syndrome only if present and clinically indicated and not based on the presence of periodic limb movements alone.    Diagnostic Codes:   Obstructive Sleep Apnea G47.33  Sleep Hypoxemia/Hypoventilation G47.36         _____________________________________   Electronically Signed By: Bessy Ward MD 3/28/2023

## 2023-03-31 ENCOUNTER — OFFICE VISIT (OUTPATIENT)
Dept: FAMILY MEDICINE | Facility: CLINIC | Age: 58
End: 2023-03-31
Payer: MEDICARE

## 2023-03-31 VITALS
BODY MASS INDEX: 46.17 KG/M2 | DIASTOLIC BLOOD PRESSURE: 80 MMHG | TEMPERATURE: 97.8 F | HEIGHT: 60 IN | HEART RATE: 80 BPM | OXYGEN SATURATION: 91 % | SYSTOLIC BLOOD PRESSURE: 113 MMHG | WEIGHT: 235.2 LBS

## 2023-03-31 DIAGNOSIS — L85.3 XEROSIS CUTIS: ICD-10-CM

## 2023-03-31 DIAGNOSIS — G47.36 HYPOXEMIA ASSOCIATED WITH SLEEP: ICD-10-CM

## 2023-03-31 DIAGNOSIS — R26.2 DIFFICULTY WALKING: ICD-10-CM

## 2023-03-31 DIAGNOSIS — M46.1 SACROILIITIS (H): ICD-10-CM

## 2023-03-31 DIAGNOSIS — N18.31 STAGE 3A CHRONIC KIDNEY DISEASE (H): ICD-10-CM

## 2023-03-31 DIAGNOSIS — G47.33 OSA (OBSTRUCTIVE SLEEP APNEA): Primary | ICD-10-CM

## 2023-03-31 DIAGNOSIS — Z94.4 LIVER REPLACED BY TRANSPLANT (H): ICD-10-CM

## 2023-03-31 DIAGNOSIS — M54.50 ACUTE LEFT-SIDED LOW BACK PAIN WITHOUT SCIATICA: ICD-10-CM

## 2023-03-31 DIAGNOSIS — G63 NEUROPATHY DUE TO MEDICAL CONDITION (H): ICD-10-CM

## 2023-03-31 PROBLEM — E86.0 DEHYDRATION: Status: RESOLVED | Noted: 2023-02-23 | Resolved: 2023-03-31

## 2023-03-31 PROBLEM — J96.12 CHRONIC RESPIRATORY FAILURE WITH HYPOXIA AND HYPERCAPNIA (H): Status: RESOLVED | Noted: 2022-09-23 | Resolved: 2023-03-31

## 2023-03-31 PROBLEM — E87.5 HYPERKALEMIA: Status: RESOLVED | Noted: 2023-02-23 | Resolved: 2023-03-31

## 2023-03-31 PROBLEM — R41.0 ACUTE CONFUSION: Status: RESOLVED | Noted: 2023-02-23 | Resolved: 2023-03-31

## 2023-03-31 PROBLEM — J96.11 CHRONIC RESPIRATORY FAILURE WITH HYPOXIA AND HYPERCAPNIA (H): Status: RESOLVED | Noted: 2022-09-23 | Resolved: 2023-03-31

## 2023-03-31 PROBLEM — J81.0 ACUTE PULMONARY EDEMA (H): Status: RESOLVED | Noted: 2023-02-23 | Resolved: 2023-03-31

## 2023-03-31 PROBLEM — R06.89 HYPOVENTILATION: Status: RESOLVED | Noted: 2023-01-10 | Resolved: 2023-03-31

## 2023-03-31 PROBLEM — K56.609 SBO (SMALL BOWEL OBSTRUCTION) (H): Status: RESOLVED | Noted: 2022-03-16 | Resolved: 2023-03-31

## 2023-03-31 PROBLEM — N17.9 ACUTE KIDNEY INJURY (H): Status: RESOLVED | Noted: 2023-02-23 | Resolved: 2023-03-31

## 2023-03-31 PROBLEM — R09.02 HYPOXIA: Status: RESOLVED | Noted: 2023-02-23 | Resolved: 2023-03-31

## 2023-03-31 LAB
ALBUMIN UR-MCNC: NEGATIVE MG/DL
APPEARANCE UR: CLEAR
BILIRUB UR QL STRIP: NEGATIVE
COLOR UR AUTO: ABNORMAL
CREAT UR-MCNC: 114 MG/DL
GLUCOSE UR STRIP-MCNC: NEGATIVE MG/DL
HGB UR QL STRIP: NEGATIVE
KETONES UR STRIP-MCNC: NEGATIVE MG/DL
LEUKOCYTE ESTERASE UR QL STRIP: NEGATIVE
MICROALBUMIN UR-MCNC: 63.2 MG/L
MICROALBUMIN/CREAT UR: 55.44 MG/G CR (ref 0–25)
NITRATE UR QL: NEGATIVE
PH UR STRIP: 5 [PH] (ref 5–8)
SP GR UR STRIP: 1.01 (ref 1–1.03)
UROBILINOGEN UR STRIP-ACNC: 0.2 E.U./DL

## 2023-03-31 PROCEDURE — 82043 UR ALBUMIN QUANTITATIVE: CPT | Performed by: FAMILY MEDICINE

## 2023-03-31 PROCEDURE — 82570 ASSAY OF URINE CREATININE: CPT | Performed by: FAMILY MEDICINE

## 2023-03-31 PROCEDURE — 99215 OFFICE O/P EST HI 40 MIN: CPT | Performed by: FAMILY MEDICINE

## 2023-03-31 PROCEDURE — 81003 URINALYSIS AUTO W/O SCOPE: CPT | Performed by: FAMILY MEDICINE

## 2023-03-31 RX ORDER — MINERAL OIL/HYDROPHIL PETROLAT
OINTMENT (GRAM) TOPICAL PRN
Qty: 420 G | Refills: 4 | Status: SHIPPED | OUTPATIENT
Start: 2023-03-31 | End: 2023-06-02

## 2023-03-31 RX ORDER — ACETAMINOPHEN 500 MG
500 TABLET ORAL 2 TIMES DAILY PRN
Qty: 120 TABLET | Refills: 3 | Status: SHIPPED | OUTPATIENT
Start: 2023-03-31 | End: 2024-03-05

## 2023-03-31 NOTE — PROGRESS NOTES
Assessment & Plan     SURI (obstructive sleep apnea)  Hypoxemia associated with sleep  She just had her PSG but lacks the results and these were reviewed today. She had questions about cleaning, information provided verbally and in the AVS so son/PCA can review it. Unfortunately, reports being a belly sleeper and has been using BiPAP during the day. Emphasized that she needs to wear BiPAP a certain number of hours per day during sleep, recommend 6 hours. Educated on reasoning and she reports feeling better, oxygen normalized, and blood pressure greatly improved.     BiPAP, IPAP 17, EPAP 8, O2 at 3 L/min    Stage 3a chronic kidney disease (H)  Hospitalized with pulmonary edema and acute injury, appears resolved. Due for UA and proteinuria associated with CKD.  - Albumin Random Urine Quantitative with Creat Ratio  - UA reflex to Microscopic    Xerosis cutis  - mineral oil-hydrophilic petrolatum (AQUAPHOR) external ointment  Dispense: 420 g; Refill: 4      Body mass index (BMI) 40.0-44.9, adult (H)  Now with control of SURI, hope for improvement. She notes improvement in abdominal size, and is more comfortable. Monitor weight. Liberalized dietary restrictions from hospital stay.    Difficulty walking, Neuropathy due to medical condition  Has had scooter for mobility since 2012 originally prescribed from PCP. Difficulty walking from multitude of issues including neuropathy as well as body mass, weakness developed over years and worsened by recent hospitalization, and history of stroke. Evaluated by OT for mobility and it has been determined that a power wheelchair will meet her needs more appropriately then a scooter. I am therefore recommending that she get a power wheelchair.     Ordered mobility assessment with OT for assessing mobility in order to get groceries.  - Wheelchair Order    Sacroiliitis (H), Acute left-sided low back pain without sciatica, Liver replaced by transplant  History in past with likely flare  due to positional injury with associated low back muscle spasm into thoracic back. Avoid NSAIDs given CKD, already using tylenol twice daily and given liver transplant, would not increase this. Will add topical heat and current home PT and encouraged to notify about injury to modify care plan.    Immunosuppressing medications reevaluated in recent hospital stay in February, standard follow-up with transplant team, continue to monitor  - capsaicin-menthol-methyl sal 0.025-1-12 % external cream  Dispense: 56.6 g; Refill: 3  - acetaminophen (TYLENOL) 500 MG tablet  Dispense: 120 tablet; Refill: 3      58 minutes spent by me on the date of the encounter doing chart review, history and exam, documentation and further activities per the note       No follow-ups on file.    The information in this document, created by the medical scribe for me, accurately reflects the services I personally performed and the decisions made by me. I have reviewed and approved this document for accuracy prior to leaving the patient care area.  Karely Sierra MD  2:17 PM, 03/31/23  [facility] AMI Ray is a 57 year old accompanied by an  via phone, presenting for the following health issues:  Flank Pain (Pt reports she has been having left side pain, under the breast since yesterday.)    Additional Questions 3/31/2023   Roomed by brenda brooks   Accompanied by self     Patient Reported Additional Medications 3/31/2023   Patient reports taking the following new medications No new meds reported     HPI     Patient affirms the she feels that she is not carrying as much water and overall feels much better than previous.    Pain  Patient reports left sided pain that wraps around to under her breast that is a 5-6/10 on the pain scale. This pain has persisted for 2 days, and started after bending down. She describes this pain as a pressure. She denies change in urination. She agrees that heat has  "relieved pain.    She denies constipation, passing stool 2-3 times a day.    She attests to experiencing pain while walking and praying, and is unable to tolerate walking more than a couple blocks. She requests a wheel chair in order to do day to day tasks like grocery shopping. She reports that she had a motorized chair previously, but this broke.     Other equipment utilized in the home includes a cane and a straight walker.    Patient reports real age is 60.      Edema  Patient reports that she has been using the compression socks as ordered, and has new socks delivered every 3 months.     Sleep  She has completed the sleep study last week, but has not yet seen the results. She requests a copy of the sleep study to share with family. She has difficulty using the Bipap as she prefers to sleep on her side and stomach. She attests to using it for up to 6 hours while awake, but is not using it while sleeping.     Diet  Patient reports that she was told to avoid red meat following her discharge from the hospital earlier in the month.     She is currently fasting for Ramadan.    Eyes  Patient reports that she is experiencing itchy eye. She is using drops.    Skin  Patient is experiencing itchy skin, for which she uses Aveeno from a tube that she purchases OTC. Additionally, she is has been recommended to use alcohol wipes to clean her mask.     Review of Systems   Positive for: Left sided pain, sleep apnea, eye itchiness, skin itchiness  Negative for: dysuria, change in urination, constipation.    This document serves as a record of the services and decisions personally performed and made by Karely Sierra MD. It was created on his/her behalf by Nestor Bueno, a trained medical scribe. The creation of this document is based the provider's statements to the medical scribe.  Ameliaibscott Bueno 2:31 PM, March 31, 2023        Objective    /80   Pulse 80   Temp 97.8  F (36.6  C) (Oral)   Ht 1.534 m (5' 0.39\")   Wt " 106.7 kg (235 lb 3.2 oz)   LMP  (LMP Unknown)   SpO2 91%   BMI 45.34 kg/m    Body mass index is 45.34 kg/m .   Wt Readings from Last 10 Encounters:   03/31/23 106.7 kg (235 lb 3.2 oz)   02/27/23 106.3 kg (234 lb 6.4 oz)   02/01/23 106.6 kg (235 lb)   01/26/23 106.6 kg (235 lb)   12/16/22 106.6 kg (235 lb)   12/16/22 108 kg (238 lb)   11/04/22 108 kg (238 lb)   09/23/22 106.8 kg (235 lb 6.4 oz)   08/24/22 108 kg (238 lb)   08/12/22 108.7 kg (239 lb 10.2 oz)       Physical Exam   GENERAL: Appears mildly uncomfortable, shifting position multiple times during visit. Antalgic gait.   ABDOMEN: Incisional hernia associated with central area of the abdominal scar. Tender to palpation along inferior left aspect of rip from insertion of spine to Axilla. No CVA tenderness. Some associated left lower back spasm. Left SI joint tender to palpation.  PSYCH: mentation appears normal, affect normal/bright    Sleep study via Sleep Medicine 3/22/23 reviewed

## 2023-03-31 NOTE — PATIENT INSTRUCTIONS
Compression socks will arrive by mail or can be picked up from [employer] company.     2.   Medications refilled    3.   Okay to eat meat.    4.   Labs done today    5.   Prescribed Aquaphor lotion for itchy skin.     Pain    1.   [place] from a chair to avoid making pain worse.     2.   Referred to Occupational therapy for motorized chair.    3.   Continue using Acetaminophen    4.  Ordered a pain cream, apply to area of pain 3 times a day.    5. Ok to use heat packs.     Sleep   Use the Bipap machine while sleeping at night. My goal is for you to use it for 6   hours a night. You do not need to use it during the daytime.     How to Clean CPAP Parts  Cleaning the CPAP equipment well does not require expensive equipment or an excessive amount of time. You need less than 30 minutes to take good care of it.    CPAP , like those made by the [employer] and [employer] brands, as well as specialized detergents, are available for purchase, but they are not necessary. These types of products are also not usually covered by health insurance.    What You Will Need  CPAP mask, headgear, hose, humidifier water chamber,  A cloth or small sponge,  Warm water,  Mild, ammonia-free, antibacterial soap,  A clean towel or paper towels for drying.  Steps to Cleaning CPAP Equipment  First, unplug the device and take off the parts to be cleaned. Each CPAP device is slightly different, so you may want to review the  s instructions. Remove the CPAP tubing from the device output valve and the water chamber from the device. Take the mask off the headgear. Disassemble any of the other pieces which can be easily reassembled later.  Next, make sure the CPAP device is unplugged before wiping off the outside with a damp cloth.  Then, make a soapy solution using warm water and a few drops of mild dish soap. Instead of using soap, you can also make a 1:1 solution with white vinegar.  Place the parts in the solution. Allow  each part to soak for a few minutes and make sure that the water and soap (or vinegar) is allowed to flow through the entire length of the hose.  Use the cloth or sponge to wipe off all the external surfaces of the CPAP components.  Rinse each part with clean water.  Use a clean towel to remove any excess water and pat each piece dry.  Then, let them air dry for about an hour.  Once the parts are fully dry, carefully put them back together. Refer to the instruction manual if you are unsure how to reassemble the CPAP pieces correctly.  Quickly turn on the device machine to make sure that it runs and that there are not any air leaks.  Related article: Overcoming Obstacles to Life-Saving Sleep Apnea Treatment.    How to Maintain Clean CPAP Equipment  Help your device work well for longer by following a few simple maintenance tips.    Avoid Harsh Detergents & Cleaning Processes  Do not use any concentrated or overly harsh cleaning products. Also, avoid products with a strong scent or perfume. We recommend using Bibi dish soap.    You should never place the CPAP device or its parts in the  or washing machine as it could cause damage.    Refill with Distilled Water  Regular tap water has minerals that will deposit and build up in the equipment over time. That is why it is important to only use clear, distilled water when refilling the humidifier tank. It can also be helpful to dump out any access water in the tank each morning, then fill it up with distilled water before nightly use.    Wash and Replace Filters Regularly  Some CPAP device models rely on filters to purify the air. Review the instruction manual to find out how often these filters need to be cleaned and replaced. It is good to rinse them regularly, blot them dry, then allow them time to air dry completely. Some models require replacement filters as often as once or twice per month.    Have It Serviced as Needed  If your sleep apnea symptoms seem to  worsen or it seems that the CPAP device is not running well, take it to your equipment provider to be checked out.    Replace Parts as Needed  CPAP components are not made to last forever. If you see signs of cracking or wear, change out the headgear, mask, and tube. Even without visible damage, most manufacturers advise replacing these parts yearly.

## 2023-03-31 NOTE — LETTER
April 3, 2023      Flor Navarro  [address]  [facility] [address]      Flor Ramos    Your urine results show that there is some protein leaking into your urine. I would like you to see your kidney doctor Theresa Arambula (the nephrologist) to follow up on your kidney problems.  Call [phone] to schedule.  Resulted Orders   Albumin Random Urine Quantitative with Creat Ratio   Result Value Ref Range    Creatinine Urine mg/dL 114.0 mg/dL      Comment:      The reference ranges have not been established in urine creatinine. The results should be integrated into the clinical context for interpretation.    Albumin Urine mg/L 63.2 mg/L      Comment:      The reference ranges have not been established in urine albumin. The results should be integrated into the clinical context for interpretation.    Albumin Urine mg/g Cr 55.44 (H) 0.00 - 25.00 mg/g Cr      Comment:      Microalbuminuria is defined as an albumin:creatinine ratio of 17 to 299 for males and 25 to 299 for females. A ratio of albumin:creatinine of 300 or higher is indicative of overt proteinuria.  Due to biologic variability, positive results should be confirmed by a second, first-morning random or 24-hour timed urine specimen. If there is discrepancy, a third specimen is recommended. When 2 out of 3 results are in the microalbuminuria range, this is evidence for incipient nephropathy and warrants increased efforts at glucose control, blood pressure control, and institution of therapy with an angiotensin-converting-enzyme (ACE) inhibitor (if the patient can tolerate it).     UA reflex to Microscopic   Result Value Ref Range    Color Urine Dark Yellow (A) Colorless, Straw, Light Yellow, Yellow    Appearance Urine Clear Clear    Glucose Urine Negative Negative mg/dL    Bilirubin Urine Negative Negative    Ketones Urine Negative Negative mg/dL    Specific Gravity Urine 1.015 1.005 - 1.030    Blood Urine Negative Negative    pH Urine  5.0 5.0 - 8.0    Protein Albumin Urine Negative Negative mg/dL    Urobilinogen Urine 0.2 0.2, 1.0 E.U./dL    Nitrite Urine Negative Negative    Leukocyte Esterase Urine Negative Negative       If you have any questions or concerns, please call the clinic at the number listed above.       Sincerely,      Karely Sierra MD

## 2023-03-31 NOTE — PROGRESS NOTES
DME (Durable Medical Equipment) Orders and Documentation  No orders of the defined types were placed in this encounter.     The patient was assessed and it was determined the patient is in need of the following listed DME Supplies/Equipment. Please complete supporting documentation below to demonstrate medical necessity.     Scooter for mobility - electric   Pt uses multiple mobility aids including walker, cane and in summer, has previously used electric scooter to get to grocery store and shop independently.     DME All Other Item(s) Documentation    List reason for need and supporting documentation for medical necessity below for each DME item.     1. [employer] in community, assist with mobility

## 2023-04-03 ENCOUNTER — DOCUMENTATION ONLY (OUTPATIENT)
Dept: RESPIRATORY THERAPY | Facility: CLINIC | Age: 58
End: 2023-04-03
Payer: MEDICARE

## 2023-04-03 NOTE — PROGRESS NOTES
I RECEIVED AN EMAIL THAT THIS PATIENT IS NOT BEING FOLLOWED BY [facility]. I DO NOT HAVE THAT ABILITY IN [facility] RIGHT KNOW, SO I EMAILED CHIP BLOCH AND ASKED HIM TO ENROLL THE PATIENT.

## 2023-04-04 ENCOUNTER — DOCUMENTATION ONLY (OUTPATIENT)
Dept: SLEEP MEDICINE | Facility: CLINIC | Age: 58
End: 2023-04-04
Payer: MEDICARE

## 2023-04-04 DIAGNOSIS — M1A.3710 CHRONIC GOUT DUE TO RENAL IMPAIRMENT INVOLVING TOE OF RIGHT FOOT WITHOUT TOPHUS: ICD-10-CM

## 2023-04-04 NOTE — TELEPHONE ENCOUNTER
"Request for medication refill:  febuxostat (ULORIC) 40 MG TABS tablet    Providers if patient needs an appointment and you are willing to give a one month supply please refill for one month and  send a letter/MyChart using \".SMILLIMITEDREFILL\" .smillimited and route chart to \"P SMI \" (Giving one month refill in non controlled medications is strongly recommended before denial)    If refill has been denied, meaning absolutely no refills without visit, please complete the smart phrase \".smirxrefuse\" and route it to the \"P SMI MED REFILLS\"  pool to inform the patient and the pharmacy.    Darius Durant MA        " Airway patent, Nasal mucosa clear. Mouth with normal mucosa. Throat has no vesicles, mildly injected oropharynx, no exudates and uvula is midline. Clear rhinorrhea.

## 2023-04-05 ENCOUNTER — DOCUMENTATION ONLY (OUTPATIENT)
Dept: SLEEP MEDICINE | Facility: CLINIC | Age: 58
End: 2023-04-05
Payer: MEDICARE

## 2023-04-05 RX ORDER — FEBUXOSTAT 40 MG/1
40 TABLET, FILM COATED ORAL DAILY
Qty: 90 TABLET | Refills: 3 | Status: SHIPPED | OUTPATIENT
Start: 2023-04-05 | End: 2023-09-15

## 2023-04-05 NOTE — PROGRESS NOTES
3 day Sleep therapy management telephone visit    Diagnostic AHI: 79.2  PSG    Confirmed with patient at time of call- Yes Patient is still interested in STM service       Subjective measures:  Spoke with patiens son he stated his Mom is doing really with her CPAP.   Patient data looks good the last week.  Patient happy with CPAP.          Objective data     Order Settings for PAP  Bi-level 18/9 cm H20                   Device settings from machine                           Assessment: Nightly usage over four hours      Action plan: Patient to have 14 day STM visit. Patient has a follow up visit scheduled:   yes within 31-90 days of set up    Replacement device: No  STM ordered by provider: Yes     Total time spent on accessing and  interpreting remote patient PAP therapy data  10 minutes    Total time spent counseling, coaching  and reviewing PAP therapy data with patient  6 minutes    [address] no

## 2023-04-06 ENCOUNTER — DOCUMENTATION ONLY (OUTPATIENT)
Dept: SLEEP MEDICINE | Facility: CLINIC | Age: 58
End: 2023-04-06
Payer: MEDICARE

## 2023-04-06 DIAGNOSIS — J96.11 CHRONIC HYPOXEMIC RESPIRATORY FAILURE (H): Primary | ICD-10-CM

## 2023-04-07 ENCOUNTER — TELEPHONE (OUTPATIENT)
Dept: FAMILY MEDICINE | Facility: CLINIC | Age: 58
End: 2023-04-07
Payer: MEDICARE

## 2023-04-07 DIAGNOSIS — G47.36 HYPOXEMIA ASSOCIATED WITH SLEEP: Primary | ICD-10-CM

## 2023-04-07 NOTE — TELEPHONE ENCOUNTER
[facility] phone call message- general phone call:    Reason for call: The pt would like an pulse oximeter to monitor pt oxygen. Please send prescription to [facility]. Any questions please contact [facility] at [phone]    Return call needed: Yes    OK to leave a message on voice mail? Yes    Primary language: Amharic      needed? Yes    Call taken on April 7, 2023 at 3:40 PM by Gomez Granados

## 2023-04-14 NOTE — TELEPHONE ENCOUNTER
DME (Durable Medical Equipment) Orders and Documentation  Orders Placed This Encounter   Procedures     Miscellaneous DME Order      The patient was assessed and it was determined the patient is in need of the following listed DME Supplies/Equipment. Please complete supporting documentation below to demonstrate medical necessity.        Monitor oxygen levels at home as she is transitioning off oxygen during the day

## 2023-04-20 ENCOUNTER — DOCUMENTATION ONLY (OUTPATIENT)
Dept: SLEEP MEDICINE | Facility: CLINIC | Age: 58
End: 2023-04-20
Payer: MEDICARE

## 2023-05-02 ENCOUNTER — HOSPITAL ENCOUNTER (OUTPATIENT)
Dept: OCCUPATIONAL THERAPY | Facility: CLINIC | Age: 58
Setting detail: THERAPIES SERIES
Discharge: HOME | End: 2023-05-02
Attending: FAMILY MEDICINE
Payer: MEDICARE

## 2023-05-02 ENCOUNTER — MEDICAL CORRESPONDENCE (OUTPATIENT)
Dept: HEALTH INFORMATION MANAGEMENT | Facility: CLINIC | Age: 58
End: 2023-05-02

## 2023-05-02 DIAGNOSIS — R26.2 DIFFICULTY WALKING: ICD-10-CM

## 2023-05-02 PROCEDURE — 97542 WHEELCHAIR MNGMENT TRAINING: CPT | Mod: GO | Performed by: OCCUPATIONAL THERAPIST

## 2023-05-02 NOTE — PROGRESS NOTES
SEATING AND WHEELED MOBILITY ASSESSMENT  05/02/23 1500   Quick Adds   Quick Adds Certification       Present No   Language Other  (Bengali)    Comments son fluent in english and assists with PLOF and interpreting   General Information    Rehab Discipline OT   Funding Medicare/MA/CADI   Service Outpatient;Occupational Therapy;Seating/Wheeled Mobility Evaluation   Height 5   Weight 235   Start Of Care Date 05/02/23   Referring Physician Karely Sierra   Orders Evaluate And Treat As Indicated;Per Therapist Evaluation   Orders Date 03/31/23   Others Present at Evaluation son   Patient/Caregiver Goals power mobility   Rehabilitation Technology Supplier [employer] to be contacted   Current Community Support Family/Friend Caregiver;Personal Care Attendant;Meals On Wheels;Transportation Service;Emergency Call System  (10 hours pca (son))   Patient role/Employment history Disabled   Fall Risk Screen   Fall screen completed by OT   Have you fallen 2 or more times in the past year? Yes   Have you fallen and had an injury in the past year? Yes   Is patient a fall risk? Yes   Fall screen comments 12 or more falls in the last year, has hit head   Medical History   Onset Of Illness/injury Or Date Of Surgery 3/31/23   Medical Diagnosis Nervous and Auditory  Stroke, hemorrhagic (H)  Shoulder joint pain  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)  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)  Meningioma (H)     Respiratory  SURI (obstructive sleep apnea)     Digestive  Vitamin D deficiency  Constipation, chronic  Obesity, Class III, BMI 40-49.9 (morbid obesity) (H)     Endocrine  Hyperlipidemia LDL goal <160  Secondary hyperparathyroidism (H)  Mixed hyperlipidemia  Chronic gout due to renal impairment involving toe of left foot without tophus  Hypoxemia associated with sleep     Circulatory  Hypertension, renal  Splenic artery aneurysm (H)     Musculoskeletal and Integumentary  PVD  (POSTERIOR VITREOUS DETACHMENT) OS  Osteoporosis  Primary osteoarthritis of left ankle  Sacroiliitis (H)  Bilateral leg edema     Immune  Immunosuppressed status (H)     Urinary  CKD (chronic kidney disease) stage 3, GFR 30-59 ml/min (H)  Anemia in CKD (chronic kidney disease)  Stress incontinence     Hematologic  Thrombocytopenia (H)     Behavioral  Depression, major, recurrent, in complete remission (H)  Moderate episode of recurrent major depressive disorder (H)     Other  Liver replaced by transplant   Cardio-Respiratory Status CPAP   Home Accessibility   Living Environment [facility] Entrance Stairs  (2 to enter then 12 to main level)   All Rooms Wheelchair Accessible No   Home Accessibility Comments attached garage   Community ADL   Transportation Transportation Services   Cognitive/Visual/Hearing Status   Observations No Problems Observed During Evaluation   Vision Impaired;Corrective Lenses  (needs surgery again)   Hearing Intact   ADL Status   Feeding Independent   Grooming/Hygiene Independent   Dressing Requires Assist   Toileting Requires Assist   Bathing Requires Assist;Uses Equipment  (shower chair)   Meal Preparation Requires Assist   Home Management Requires Assist   Balance   Unsupported Sitting Balance Uses Upper Extremities for Balance   Sitting Balance in Chair Uses Upper Extremities for Balance   Standing Balance Uses Upper Extremities for Balance   Ambulation   Ambulation Ambulatory   Ambulation Assist Requires Assist   Ambulation Equipment Cane;4 Wheeled Walker with Seat;2 Wheeled Walker   Ambulation Comments HHA with cane in clinic 15.38 seconds   Transfers   Transfer Assist Moderate Assist   Transfer Method Stand Pivot   Neuromuscular   Pain Yes   Pain Location 7  (back and legs)   Coordination UE Impaired;LE Impaired  (very slowed and uncoordinated)   Sensory Deficits Reported Le numbness and tingling   Head and Neck   Head and Neck Position Flexed   Head Control Limited    Tone/Movement of Head and Neck due to prior neck surgery/injury   Upper Extremities   UE ROM L WFL R limited to about 90 degrees with limited rotations   UE Strength L 3/5 R 2/5   Dominance Right   Pelvis   Anterior/Posterior Pelvis Position Posterior Tilt   Pelvic Obliquity Partially Flexible   Trunk   Anterior/Posterior Trunk Position Increased Thoracic Kyphosis   Lower Extremities   LE ROM L WFL R hip limited to aboit 90d egrees in sitting and knee lacking end ranges   LE Strength L 3/5 R 2/5   Foot Positioning Plantar flexed   LE Comments Severe dependent edema   Patient Measurements   Other per atp 18x18 in clinic appropraite   Education Assessment   Barriers to Learning Language   Preferred Learning Style Listening   Assessment/Plan   Criteria for Skilled Interventions Met Yes, Treatment Indicated   Treatment Diagnosis Impaired participation in MRADLS and IADLs   Therapy Frequency once   Planned Therapy Interventions Wheelchair Management/Propulsion Training   Planned Therapy Interventions Comments Determine need for power mobility due to limited functional endurance and safety secondary to stroke.  Patient has multilevel home that does not support a wheelchair but has an attached garage that she could safely store chair for independence with community participation including appointments as she is now dependent on her son for all of this.  Patient independently and easily [employer] group 3 power wheelchair.  Trials to be done with vendor   Risks and benefits of treatment have been explained Yes   Patient/family & other staff in agreement with plan of care Yes   Comments [employer] to be contacted   Session Time   OT Wheelchair Management Minutes (13833) 40   Certification   Certification date from 05/02/23   Certification date to 05/02/23   Adult OT Eval Goals   OT Eval Goals (Adult) 1    OT Goal 1   Goal Identifier Power mobility   Goal Description Patient to demonstrate a successful clinical trial of the  recommended wheelchair;   Goal Progress Met today   Target Date 05/02/23   Date Met 05/02/23   Electronically signed by:  Katia HUI/JHON, ATP      Occupational Therapist, Assistive   [phone]      fax: [phone]      [email or web address]  Seating Clinic- [facility], [address]  [address]  [place] [address]

## 2023-05-04 ENCOUNTER — OFFICE VISIT (OUTPATIENT)
Dept: SLEEP MEDICINE | Facility: CLINIC | Age: 58
End: 2023-05-04
Payer: MEDICARE

## 2023-05-04 ENCOUNTER — DOCUMENTATION ONLY (OUTPATIENT)
Dept: HOME HEALTH SERVICES | Facility: CLINIC | Age: 58
End: 2023-05-04

## 2023-05-04 VITALS
WEIGHT: 236 LBS | HEART RATE: 81 BPM | SYSTOLIC BLOOD PRESSURE: 119 MMHG | BODY MASS INDEX: 46.33 KG/M2 | OXYGEN SATURATION: 98 % | HEIGHT: 60 IN | RESPIRATION RATE: 18 BRPM | DIASTOLIC BLOOD PRESSURE: 76 MMHG

## 2023-05-04 DIAGNOSIS — G47.33 OBSTRUCTIVE SLEEP APNEA (ADULT) (PEDIATRIC): ICD-10-CM

## 2023-05-04 DIAGNOSIS — J96.11 CHRONIC HYPOXEMIC RESPIRATORY FAILURE (H): Primary | ICD-10-CM

## 2023-05-04 DIAGNOSIS — E66.01 OBESITY, CLASS III, BMI 40-49.9 (MORBID OBESITY) (H): ICD-10-CM

## 2023-05-04 DIAGNOSIS — R06.89 HYPOVENTILATION: ICD-10-CM

## 2023-05-04 PROCEDURE — 99214 OFFICE O/P EST MOD 30 MIN: CPT | Performed by: INTERNAL MEDICINE

## 2023-05-04 NOTE — PROGRESS NOTES
Chief complaint: Follow-up titration study and bilevel therapy    History of Present Illness: 57-year-old Lithuanian speaking female seen with  via the phone.  She has past medical history of liver transplant for hepatitis C, hypertension, chronic kidney disease, obesity.  She was recently found to have hypoxemic hypercapnic respiratory failure.  She presented with excessive daytime sleepiness.  She had a sleep study that showed severe obstructive sleep apnea with hypoxemia and hypoventilation.  She recently had a all-night titration study to optimize settings.  After that study was reviewed adjustment settings were adjusted.  She reports today that she is sleeping very well.  She uses it every time she sleeps.  She gets up about once a night to go to the bathroom.  She is waking up with some marks on her face from the mask.  If she loosens the mask he will start to leak.  She is not taking naps.  She is no longer dozing off unintentionally.  She also notices that she is no longer having jerking during the day.  Previously she would have difficulty holding a glass for instance because sometimes she would have myoclonic jerks.    She reports that she is cleaning her supplies with soap and then an antibacterial wipe.  I did caution her against using antibacterial products.    She is using supplemental oxygen into the PAP system.  It should be at 3 L/min.    [place] Sleepiness Scale  Total score - [place]: 6 (5/4/2023  1:18 PM)   (Less than 10 normal)    Insomnia Severity Scale  BRIGID Total Score: 1  (normal 0-7, mild 8-14, moderate 15-21, severe 22-28)    Past Medical History:   Diagnosis Date     Anemia in CKD (chronic kidney disease)      Cataract      CKD (chronic kidney disease) stage 3, GFR 30-59 ml/min (H)      Hepatitis C     cleared virus spontaneously 2013     High risk medication use      Hypertension, renal      Immunosuppressed status (H)      Liver replaced by transplant (H) 01/01/2010      Osteoporosis      Recurrent pregnancy loss without current pregnancy      Recurrent UTI 07/12/2021     Stroke, hemorrhagic (H) 01/01/2008    Left cerebral intraparenchymal hemorrhage     Syncope      Unspecified viral hepatitis C without hepatic coma        Allergies   Allergen Reactions     Aspirin      3/31/16 Per pt, tolerates 81 mg daily dose without ADR.     325 mg dose caused itchiness and hives.     Clarithromycin      Allergic reaction         Contrast Dye      Iodine      Pcn [Penicillins]        Current Outpatient Medications   Medication     ACE/ARB/ARNI NOT PRESCRIBED (INTENTIONAL)     acetaminophen (TYLENOL) 500 MG tablet     albuterol (ACCUNEB) 0.63 MG/3ML neb solution     albuterol (PROAIR HFA/PROVENTIL HFA/VENTOLIN HFA) 108 (90 Base) MCG/ACT inhaler     alendronate (FOSAMAX) 70 MG tablet     alum & mag hydroxide-simethicone (MAALOX ADVANCED MAX ST) 400-400-40 MG/5ML SUSP suspension     amLODIPine (NORVASC) 10 MG tablet     calcitRIOL (ROCALTROL) 0.25 MCG capsule     calcium carbonate 600 mg-vitamin D 400 units (CALTRATE) 600-400 MG-UNIT per tablet     calcium carbonate-vitamin D (CALTRATE) 600-10 MG-MCG per tablet     capsaicin-menthol-methyl sal 0.025-1-12 % external cream     cetirizine (ZYRTEC) 10 MG tablet     chlorthalidone (HYGROTON) 25 MG tablet     COMPOUNDED NON-CONTROLLED SUBSTANCE (CMPD RX) - PHARMACY TO MIX COMPOUNDED MEDICATION     cycloSPORINE modified (GENERIC EQUIVALENT) 25 MG capsule     Denture Care Products (EFFERDENT DENTURE CLEANSER) TBEF     febuxostat (ULORIC) 40 MG TABS tablet     furosemide (LASIX) 20 MG tablet     gabapentin (NEURONTIN) 300 MG capsule     ketotifen (ZADITOR) 0.025 % ophthalmic solution     lidocaine (XYLOCAINE) 5 % external ointment     mineral oil-hydrophilic petrolatum (AQUAPHOR) external ointment     Multiple Vitamin (DAILY-SUMA MULTIVITAMIN) TABS     multivitamin w/minerals (MULTI-VITAMIN) tablet     mycophenolate (GENERIC EQUIVALENT) 250 MG capsule      omeprazole (PRILOSEC) 20 MG DR capsule     order for DME     order for DME     order for DME     SENEXON-S 8.6-50 MG tablet     SM FIBER LAXATIVE 500 MG TABS tablet     STATIN NOT PRESCRIBED, INTENTIONAL,     umeclidinium (INCRUSE ELLIPTA) 62.5 MCG/INH inhaler     Vitamin D3 (CHOLECALCIFEROL) 25 mcg (1000 units) tablet     No current facility-administered medications for this visit.       Social History     Socioeconomic History     Marital status: Single     Spouse name: Not on file     Number of children: Not on file     Years of education: Not on file     Highest education level: Not on file   Occupational History     Not on file   Tobacco Use     Smoking status: Never     Smokeless tobacco: Never   Vaping Use     Vaping status: Never Used     Passive vaping exposure: Yes   Substance and Sexual Activity     Alcohol use: No     Drug use: No     Sexual activity: Not Currently   Other Topics Concern     Parent/sibling w/ CABG, MI or angioplasty before 65F 55M? Not Asked      Service No     Blood Transfusions Yes     Caffeine Concern No     Occupational Exposure No     Hobby Hazards No     Sleep Concern No     Stress Concern Yes     Comment: needs help at home for cares     Weight Concern Yes     Comment: wants to lose weight not gain weight     Special Diet No     Back Care Yes     Comment: uses a patch to relieve pain     Exercise Not Asked     Bike Helmet Not Asked     Seat Belt Yes     Self-Exams Not Asked   Social History Narrative    One son Carleen        GynHx:             Gets transportation via insurance - needs assistance due to unsteady gait from CVA     Social Determinants of Health     Financial Resource Strain: High Risk (2020)    Overall Financial Resource Strain (CARDIA)      Difficulty of Paying Living Expenses: Very hard   Food Insecurity: Food Insecurity Present (2020)    Hunger Vital Sign      Worried About Running Out of Food in the Last Year: Sometimes true      Ran Out  of Food in the Last Year: Sometimes true   Transportation Needs: Unmet Transportation Needs (1/28/2022)    PRAPARE - Transportation      Lack of Transportation (Medical): Yes      Lack of Transportation (Non-Medical): Yes   Physical Activity: Not on file   Stress: Stress Concern Present (1/3/2022)    [employer] - Occupational Stress Questionnaire      Feeling of Stress : Rather much   Social Connections: Socially Isolated (1/3/2022)    Social Connection and Isolation Panel [NHANES]      Frequency of Communication with Friends and Family: Once a week      Frequency of Social Gatherings with Friends and Family: Once a week      Attends [employer]ist Services: Never      Active Member of Clubs or Organizations: No      Attends Club or Organization Meetings: Never      Marital Status:    Intimate Partner Violence: Not At Risk (1/28/2022)    Humiliation, Afraid, Rape, and Kick questionnaire      Fear of Current or Ex-Partner: No      Emotionally Abused: No      Physically Abused: No      Sexually Abused: No   Housing Stability: Not on file       Family History   Problem Relation Age of Onset     Hepatitis Other         Hep C, still in [place]     Cerebrovascular Disease Other      Cancer No family hx of      Diabetes No family hx of      Hypertension No family hx of      Thyroid Disease No family hx of      Glaucoma No family hx of      Macular Degeneration No family hx of            EXAM:  /76   Pulse 81   Resp 18   Ht 1.524 m (5')   Wt 107 kg (236 lb)   LMP  (LMP Unknown)   SpO2 98%   BMI 46.09 kg/m    GENERAL: Alert and no distress  EYES: Eyes grossly normal to inspection.  No discharge or erythema, or obvious scleral/conjunctival abnormalities.  RESP: No audible wheeze, cough, or visible cyanosis.  No visible retractions or increased work of breathing.    SKIN: Visible skin clear. No significant rash, abnormal pigmentation or lesions.  NEURO: Cranial nerves grossly intact.   Mentation and speech appropriate for age.  PSYCH: Mentation appears normal, affect normal, normal speech and appearance well-groomed.       PSG Split-night 1/10/2023  Weight 235 lbs BMI 44.9  AHI 79.2, RDI 89.3, Lowest O2 Sat 53% time with oxygen saturation less than or equal to 88% 95 minutes  Improved sleep consolidation on PAP therapy  Patient was initiated on bilevel with incomplete titration.    PSG 3/22/2023  Weight 235 lbs BMI 44.9  Final Bilevel setting of IPAP 17, EPAP 8 and O2 at 3L/m resolved obstructive events and improved hypoxemia and hypoventilation. (No REM supine at final settings)    ResMed air curve 10 the auto bilevel PAP download from 4/4/2023 to 5/3/2023 reviewed:  Per cent of days used greater than 4 Hours 100% (minimum goal greater than 70%)  Average use on days used: 9 hours 36 min  Settings: Min EPAP 9 cmH2O    Max IPAP 18 cmH2O  Average AHI 3.2 events per hour (goal less than 5)  Leak acceptable    TSH   Date Value Ref Range Status   08/03/2022 0.77 0.30 - 4.20 uIU/mL Final   01/28/2022 0.57 0.40 - 4.00 mU/L Final   08/17/2016 0.81 0.40 - 4.00 mU/L Final       PFT 1/15/2023      ASSESSMENT:  57-year-old female with obesity, liver transplant for hep C, hypertension, chronic kidney disease, severe obstructive sleep apnea with hypoxemia and hypoventilation.  She has mild hypoventilation during the day as evidenced by daytime ABG.  Restrictive pattern to PFTs but unable to obtain lung volumes.  MIP and MEP reduced consistent with respiratory muscle weakness however this testing is not very specific.  Can also reflect fitness or poor effort/understanding of testing process.  With bilevel and supplemental oxygen she is meeting compliance goals, getting excellent clinical benefit with resolution of daytime sleepiness and myoclonic jerking.  She is also has normal AHI on download.    PLAN:  Patient is congratulated on meeting compliance goals.  She is encouraged to use bilevel with oxygen every  time she sleeps.  Recommended overnight oximetry to be done on the current settings including oxygen at 3 L/min.  Patient should follow-up with me to review results.  Recommend that she avoid any antibacterial .  She should only use something gentle such as baby shampoo, Miranda or Ivory.  No Bibi dishwashing liquid.  She is agreeable with this plan.    39 minutes spent by me on the date of the encounter doing chart review, history and exam, documentation and further activities per the note    Bessy Ward M.D.  Pulmonary/Critical Care/Sleep Medicine    [facility]   Floor 1, [address]   [address] [place] [address]. [place] [address]   Appointments: [phone]    The above note was dictated using voice recognition software and may include typographical errors. Please contact the author for any clarifications.

## 2023-05-04 NOTE — LETTER
5/4/2023         RE: Flor Navarro  [address]  [facility] [address]        Dear Colleague,    Thank you for referring your patient, Flor Navarro, to the [facility] [place]. Please see a copy of my visit note below.    Chief complaint: Follow-up titration study and bilevel therapy    History of Present Illness: 57-year-old Faroese speaking female seen with  via the phone.  She has past medical history of liver transplant for hepatitis C, hypertension, chronic kidney disease, obesity.  She was recently found to have hypoxemic hypercapnic respiratory failure.  She presented with excessive daytime sleepiness.  She had a sleep study that showed severe obstructive sleep apnea with hypoxemia and hypoventilation.  She recently had a all-night titration study to optimize settings.  After that study was reviewed adjustment settings were adjusted.  She reports today that she is sleeping very well.  She uses it every time she sleeps.  She gets up about once a night to go to the bathroom.  She is waking up with some marks on her face from the mask.  If she loosens the mask he will start to leak.  She is not taking naps.  She is no longer dozing off unintentionally.  She also notices that she is no longer having jerking during the day.  Previously she would have difficulty holding a glass for instance because sometimes she would have myoclonic jerks.    She reports that she is cleaning her supplies with soap and then an antibacterial wipe.  I did caution her against using antibacterial products.    She is using supplemental oxygen into the PAP system.  It should be at 3 L/min.    [place] Sleepiness Scale  Total score - [place]: 6 (5/4/2023  1:18 PM)   (Less than 10 normal)    Insomnia Severity Scale  BRIGID Total Score: 1  (normal 0-7, mild 8-14, moderate 15-21, severe 22-28)    Past Medical History:   Diagnosis Date     Anemia in CKD (chronic kidney disease)      Cataract      CKD  (chronic kidney disease) stage 3, GFR 30-59 ml/min (H)      Hepatitis C     cleared virus spontaneously 2013     High risk medication use      Hypertension, renal      Immunosuppressed status (H)      Liver replaced by transplant (H) 01/01/2010     Osteoporosis      Recurrent pregnancy loss without current pregnancy      Recurrent UTI 07/12/2021     Stroke, hemorrhagic (H) 01/01/2008    Left cerebral intraparenchymal hemorrhage     Syncope      Unspecified viral hepatitis C without hepatic coma        Allergies   Allergen Reactions     Aspirin      3/31/16 Per pt, tolerates 81 mg daily dose without ADR.     325 mg dose caused itchiness and hives.     Clarithromycin      Allergic reaction         Contrast Dye      Iodine      Pcn [Penicillins]        Current Outpatient Medications   Medication     ACE/ARB/ARNI NOT PRESCRIBED (INTENTIONAL)     acetaminophen (TYLENOL) 500 MG tablet     albuterol (ACCUNEB) 0.63 MG/3ML neb solution     albuterol (PROAIR HFA/PROVENTIL HFA/VENTOLIN HFA) 108 (90 Base) MCG/ACT inhaler     alendronate (FOSAMAX) 70 MG tablet     alum & mag hydroxide-simethicone (MAALOX ADVANCED MAX ST) 400-400-40 MG/5ML SUSP suspension     amLODIPine (NORVASC) 10 MG tablet     calcitRIOL (ROCALTROL) 0.25 MCG capsule     calcium carbonate 600 mg-vitamin D 400 units (CALTRATE) 600-400 MG-UNIT per tablet     calcium carbonate-vitamin D (CALTRATE) 600-10 MG-MCG per tablet     capsaicin-menthol-methyl sal 0.025-1-12 % external cream     cetirizine (ZYRTEC) 10 MG tablet     chlorthalidone (HYGROTON) 25 MG tablet     COMPOUNDED NON-CONTROLLED SUBSTANCE (CMPD RX) - PHARMACY TO MIX COMPOUNDED MEDICATION     cycloSPORINE modified (GENERIC EQUIVALENT) 25 MG capsule     Denture Care Products (EFFERDENT DENTURE CLEANSER) TBEF     febuxostat (ULORIC) 40 MG TABS tablet     furosemide (LASIX) 20 MG tablet     gabapentin (NEURONTIN) 300 MG capsule     ketotifen (ZADITOR) 0.025 % ophthalmic solution     lidocaine (XYLOCAINE) 5  % external ointment     mineral oil-hydrophilic petrolatum (AQUAPHOR) external ointment     Multiple Vitamin (DAILY-SUMA MULTIVITAMIN) TABS     multivitamin w/minerals (MULTI-VITAMIN) tablet     mycophenolate (GENERIC EQUIVALENT) 250 MG capsule     omeprazole (PRILOSEC) 20 MG DR capsule     order for DME     order for DME     order for DME     SENEXON-S 8.6-50 MG tablet     SM FIBER LAXATIVE 500 MG TABS tablet     STATIN NOT PRESCRIBED, INTENTIONAL,     umeclidinium (INCRUSE ELLIPTA) 62.5 MCG/INH inhaler     Vitamin D3 (CHOLECALCIFEROL) 25 mcg (1000 units) tablet     No current facility-administered medications for this visit.       Social History     Socioeconomic History     Marital status: Single     Spouse name: Not on file     Number of children: Not on file     Years of education: Not on file     Highest education level: Not on file   Occupational History     Not on file   Tobacco Use     Smoking status: Never     Smokeless tobacco: Never   Vaping Use     Vaping status: Never Used     Passive vaping exposure: Yes   Substance and Sexual Activity     Alcohol use: No     Drug use: No     Sexual activity: Not Currently   Other Topics Concern     Parent/sibling w/ CABG, MI or angioplasty before 65F 55M? Not Asked      Service No     Blood Transfusions Yes     Caffeine Concern No     Occupational Exposure No     Hobby Hazards No     Sleep Concern No     Stress Concern Yes     Comment: needs help at home for cares     Weight Concern Yes     Comment: wants to lose weight not gain weight     Special Diet No     Back Care Yes     Comment: uses a patch to relieve pain     Exercise Not Asked     Bike Helmet Not Asked     Seat Belt Yes     Self-Exams Not Asked   Social History Narrative    One son Carleen        GynHx:             Gets transportation via insurance - needs assistance due to unsteady gait from CVA     Social Determinants of Health     Financial Resource Strain: High Risk (2020)    Overall  Financial Resource Strain (CARDIA)      Difficulty of Paying Living Expenses: Very hard   Food Insecurity: Food Insecurity Present (12/18/2020)    Hunger Vital Sign      Worried About Running Out of Food in the Last Year: Sometimes true      Ran Out of Food in the Last Year: Sometimes true   Transportation Needs: Unmet Transportation Needs (1/28/2022)    PRAPARE - Transportation      Lack of Transportation (Medical): Yes      Lack of Transportation (Non-Medical): Yes   Physical Activity: Not on file   Stress: Stress Concern Present (1/3/2022)    [employer] [place] [employer] - Occupational Stress Questionnaire      Feeling of Stress : Rather much   Social Connections: Socially Isolated (1/3/2022)    Social Connection and Isolation Panel [NHANES]      Frequency of Communication with Friends and Family: Once a week      Frequency of Social Gatherings with Friends and Family: Once a week      Attends Synagogue Services: Never      Active Member of Clubs or Organizations: No      Attends Club or Organization Meetings: Never      Marital Status:    Intimate Partner Violence: Not At Risk (1/28/2022)    Humiliation, Afraid, Rape, and Kick questionnaire      Fear of Current or Ex-Partner: No      Emotionally Abused: No      Physically Abused: No      Sexually Abused: No   Housing Stability: Not on file       Family History   Problem Relation Age of Onset     Hepatitis Other         Hep C, still in [place]     Cerebrovascular Disease Other      Cancer No family hx of      Diabetes No family hx of      Hypertension No family hx of      Thyroid Disease No family hx of      Glaucoma No family hx of      Macular Degeneration No family hx of            EXAM:  /76   Pulse 81   Resp 18   Ht 1.524 m (5')   Wt 107 kg (236 lb)   LMP  (LMP Unknown)   SpO2 98%   BMI 46.09 kg/m    GENERAL: Alert and no distress  EYES: Eyes grossly normal to inspection.  No discharge or erythema, or obvious  scleral/conjunctival abnormalities.  RESP: No audible wheeze, cough, or visible cyanosis.  No visible retractions or increased work of breathing.    SKIN: Visible skin clear. No significant rash, abnormal pigmentation or lesions.  NEURO: Cranial nerves grossly intact.  Mentation and speech appropriate for age.  PSYCH: Mentation appears normal, affect normal, normal speech and appearance well-groomed.       PSG Split-night 1/10/2023  Weight 235 lbs BMI 44.9  AHI 79.2, RDI 89.3, Lowest O2 Sat 53% time with oxygen saturation less than or equal to 88% 95 minutes  Improved sleep consolidation on PAP therapy  Patient was initiated on bilevel with incomplete titration.    PSG 3/22/2023  Weight 235 lbs BMI 44.9  Final Bilevel setting of IPAP 17, EPAP 8 and O2 at 3L/m resolved obstructive events and improved hypoxemia and hypoventilation. (No REM supine at final settings)    ResMed air curve 10 the auto bilevel PAP download from 4/4/2023 to 5/3/2023 reviewed:  Per cent of days used greater than 4 Hours 100% (minimum goal greater than 70%)  Average use on days used: 9 hours 36 min  Settings: Min EPAP 9 cmH2O    Max IPAP 18 cmH2O  Average AHI 3.2 events per hour (goal less than 5)  Leak acceptable    TSH   Date Value Ref Range Status   08/03/2022 0.77 0.30 - 4.20 uIU/mL Final   01/28/2022 0.57 0.40 - 4.00 mU/L Final   08/17/2016 0.81 0.40 - 4.00 mU/L Final       PFT 1/15/2023      ASSESSMENT:  57-year-old female with obesity, liver transplant for hep C, hypertension, chronic kidney disease, severe obstructive sleep apnea with hypoxemia and hypoventilation.  She has mild hypoventilation during the day as evidenced by daytime ABG.  Restrictive pattern to PFTs but unable to obtain lung volumes.  MIP and MEP reduced consistent with respiratory muscle weakness however this testing is not very specific.  Can also reflect fitness or poor effort/understanding of testing process.  With bilevel and supplemental oxygen she is meeting  compliance goals, getting excellent clinical benefit with resolution of daytime sleepiness and myoclonic jerking.  She is also has normal AHI on download.    PLAN:  Patient is congratulated on meeting compliance goals.  She is encouraged to use bilevel with oxygen every time she sleeps.  Recommended overnight oximetry to be done on the current settings including oxygen at 3 L/min.  Patient should follow-up with me to review results.  Recommend that she avoid any antibacterial .  She should only use something gentle such as baby shampoo, Miranda or Ivory.  No Bibi dishwashing liquid.  She is agreeable with this plan.    39 minutes spent by me on the date of the encounter doing chart review, history and exam, documentation and further activities per the note    Bessy Ward M.D.  Pulmonary/Critical Care/Sleep Medicine    [facility]   Floor [address], [address]   [address] [place] [address]. [place] [address]   Appointments: [phone]    The above note was dictated using voice recognition software and may include typographical errors. Please contact the author for any clarifications.              Again, thank you for allowing me to participate in the care of your patient.        Sincerely,        Bessy Ward MD

## 2023-05-04 NOTE — PROGRESS NOTES
[facility] received a message that care coordinator, Charles, had called in an VANCE order.  Dr. Ward is requesting that an VANCE be done a week prior to her follow-up on 6/16/2023.  I returned the call and left voicemail.  I don't see an order in Epic or as a fax sent to her [employer] account.  We will need an order.  We should be able to accommodate.  Call [phone] with further questions.

## 2023-05-04 NOTE — NURSING NOTE
6 week follow up has been scheduled.  [facility] has been contacted to get pt's VANCE testing completed before follow up in June to discuss VANCE results.      VIKAS Vinson

## 2023-05-04 NOTE — PATIENT INSTRUCTIONS
For general sleep health questions:   http://sleepeducation.org    For tips about PAP and COVID-19:  https://www.thoracic.org/patients/patient-resources/resources/covid-19-and-home-pap-therapy.pdf    For general info about COVID-19 including vaccines:  https://Professionali.ru.org/covid19      Continue PAP therapy every night, for all hours that you are sleeping (including naps.)  As always, try to get at least 8 hours of sleep or more each day, keep a regular sleep schedule, and avoid sleep deprivation. Avoid alcohol.  Reasons that you might need a change to your pressure therapy would be weight gain or loss, waking having inadvertently removed your PAP overnight, having previously felt refreshed by sleep with CPAP use and now waking un-refreshed, and return of daytime sleepiness. Also, the development of new medical problems  (such as heart failure, stroke, medications such as narcotics) can sometimes affect breathing at night and change your PAP therapy needs.  Please bring PAP with you if you are hospitalized.  If anticipating surgery be sure to discuss with your surgeon that you have sleep apnea and use PAP therapy.    Maintain your equipment as recommended which includes routine cleaning and replacement of supplies.      Call DME for any questions regarding supplies or maintenance.    [facility], [facility] [phone]    Do not drive on engage in potentially dangerous activities if feeling sleepy.          Tips for your PAP use-    Mask fitting tips  Mask fitting exercise:    To improve your mask seal and your mobility at night, put mask on and secure in place.  Lie down in bed with full pressure and roll to one side, adjust headgear while in that position to eliminate any leaks. Repeat process rolling to other side.     The mask seal does not have to be perfect:   CPAP machines are designed to make up for small leaks. However, you will not tolerate leaks blowing  in your eyes so you will need to adjust.   Any leak should only be near or at the bottom of the mask.  We expect your mask to leak slightly at night.    Do not over-tighten the headgear straps, tighter IS NOT better, we expect minimal leak.    First try re-positioning the mask or headgear before tightening the headgear straps.  Mask leaks are expected due to changing sleeping positions. Try pulling the mask away from your skin allowing the cushion to re-inflate will minimize the leak.  If you struggle for a good fit, try turning the CPAP off and then readjust the mask by pulling it away from your face and then turning back on the CPAP.        Humidifier tips  Humidifiers can be adjusted to increase or decrease the amount of moisture according to your comfort level. You may need to adjust this frequently at first, but then might only change it with seasonal weather changes.     Try INCREASING the humidity if:  You experience a dry, irritated nasal passage or throat.  You have a runny, drippy nose or sneezing fits after using CPAP.  You experience nasal congestion during or after CPAP use.    Try DECREASING the humidity if:  You have excessive condensation or  rain out  in the tubing or mask.  Otherwise keep the tubing warm during the night by running it underneath the blankets or pillow.      Clinic visit after initial PAP set-up   Bring your equipment with you to your 5-8 week follow up clinic visit.  We will be extracting your data from the machine if not available from the cloud based modem.        Travel  Always take your equipment with you when you travel.  If you fly with your equipment bring it on with you as a carry on.  Medical equipment does not count as a carry on.    If you travel international the machines take 110-240v.  The only adapter needed is the adapter that will fit into the receptacle (outlet).    You may also want to bring an extension cord as many hotel rooms have limited outlets at the  bedside.  Do not travel with water in your humidifier chamber.     Cleaning and Maintenance Guidelines    Equipment Frequency Cleaning Method   Mask First Day    Daily      Weekly Soak mask in hot soapy water for 30 minutes, rinse and air dry.  Wipe nasal cushion with a hot soapy (Ivory, baby shampoo) cloth and rinse.  Baby wipes may also be used.  Do not use anti-bacterial soaps,Bibi  liquid soap, rubbing alcohol, bleach or ammonia.  Wash frame in hot soapy water (Ivory, baby shampoo) rinse and let air dry   Headgear Biweekly Wash in hot soapy water, rinse and air dry   Reusable Gray Filter Weekly Wash in hot soapy water, rinse, put in towel squeeze moisture out, let air dry   Disposable White Filter Check Weekly Replace when brown or gray in color; at least every 2 to 3 months   Humidifier Chamber Daily    Weekly Empty distilled water from humidifier and let air dry    Hand wash in hot soapy water, rinse and air dry   Tubing Weekly Wash in hot soapy water, rinse and let air dry   Mask, Tubing and Humidifier Chamber As needed Disinfect: Soak in 1 part distilled white vinegar to 3 parts hot water for 30 minutes, rinse well and air dry  Not the material headgear        MASK AND SUPPLY REORDERING and EQUIPMENT NEEDS through your DME and per your insurance  Reminder: Most insurance companies will allow for a new mask, headgear, tubing, and reusable gray filter every six months.  Disposable white ultra-fine filters are covered monthly.      HOME AND SAFETY INSTRUCTIONS  Do not use frayed or cracked electrical cords, multi plug adaptors, or switched receptacles  Do not immerse electrical equipment into water  Assure that electrical cords do not become a tripping hazard     Your BMI is Body mass index is 46.09 kg/m .    What is BMI?  Body mass index (BMI) is one way to tell whether you are at a healthy weight, overweight, or obese. It measures your weight in relation to your height.  A BMI of 18.5 to 24.9 is in the  healthy range. A person with a BMI of 25 to 29.9 is considered overweight, and someone with a BMI of 30 or greater is considered obese.  Another way to find out if you are at risk for health problems caused by overweight and obesity is to measure your waist. If you are a woman and your waist is more than 35 inches, or if you are a man and your waist is more than 40 inches, your risk of disease may be higher.  More than two-thirds of American adults are considered overweight or obese. Being overweight or obese increases the risk for further weight gain.  Excess weight may lead to heart disease and diabetes. Creating and following plans for healthy eating and physical activity may help you improve your health.    Methods for maintaining or losing weight.  Weight control is part of healthy lifestyle and includes exercise, emotional health, and healthy eating habits.  Careful eating habits lifelong is the mainstay of weight control.  Though there are significant health benefits from weight loss, long-term weight loss with diet alone may be very difficult to achieve- studies show long-term success with dietary management in less than 10% of people. Attaining a healthy weight may be especially difficult to achieve in those with severe obesity. In some cases, medications, devices and surgical management might be considered.    What can you do?  If you are overweight or obese and are interested in methods for weight loss, you should discuss this with your provider. In addition, we recommend that you review healthy life styles and methods for weight loss available through the National Institutes of Health patient information sites:   http://win.niddk.nih.gov/publications/index.htm

## 2023-05-05 ENCOUNTER — TELEPHONE (OUTPATIENT)
Dept: NEPHROLOGY | Facility: CLINIC | Age: 58
End: 2023-05-05
Payer: MEDICARE

## 2023-05-05 ENCOUNTER — DOCUMENTATION ONLY (OUTPATIENT)
Dept: SLEEP MEDICINE | Facility: CLINIC | Age: 58
End: 2023-05-05
Payer: MEDICARE

## 2023-05-05 NOTE — PROGRESS NOTES
[facility] SIGNED BY: CANDELARIA OSCAR  DATE: 5/4/23  STUDY PERFORMED ON (PAP/O2/RA): ON CURRENT BIPAP AND OXYGEN SETTINGS  DOWNLOAD METHOD (BREATHE/NONIN): NONIN  PROVIDER FAX: [phone]    DID YOU DOCUMENT CONTACT ATTEMPTS IN [facility]? YES   AFTER FIRST ATTEMPT PLEASE VERIFY PHONE NUMBER IN [employer] MATCHES PHONE NUMBER IN EPIC.    5/5 AV- CALLED PT WITH DAVID MATHEW [facility] FOR PATIENT

## 2023-05-12 ENCOUNTER — LAB (OUTPATIENT)
Dept: LAB | Facility: CLINIC | Age: 58
End: 2023-05-12
Payer: MEDICARE

## 2023-05-12 ENCOUNTER — OFFICE VISIT (OUTPATIENT)
Dept: NEPHROLOGY | Facility: CLINIC | Age: 58
End: 2023-05-12
Attending: INTERNAL MEDICINE
Payer: MEDICARE

## 2023-05-12 VITALS
BODY MASS INDEX: 45.53 KG/M2 | DIASTOLIC BLOOD PRESSURE: 81 MMHG | WEIGHT: 233.13 LBS | SYSTOLIC BLOOD PRESSURE: 120 MMHG | HEART RATE: 71 BPM

## 2023-05-12 DIAGNOSIS — D84.9 IMMUNOSUPPRESSION (H): ICD-10-CM

## 2023-05-12 DIAGNOSIS — N18.32 STAGE 3B CHRONIC KIDNEY DISEASE (H): Primary | ICD-10-CM

## 2023-05-12 DIAGNOSIS — I10 BENIGN ESSENTIAL HYPERTENSION: ICD-10-CM

## 2023-05-12 DIAGNOSIS — Z94.4 LIVER REPLACED BY TRANSPLANT (H): ICD-10-CM

## 2023-05-12 DIAGNOSIS — N17.9 AKI (ACUTE KIDNEY INJURY) (H): ICD-10-CM

## 2023-05-12 DIAGNOSIS — E66.01 MORBID OBESITY (H): ICD-10-CM

## 2023-05-12 DIAGNOSIS — N18.31 STAGE 3A CHRONIC KIDNEY DISEASE (H): ICD-10-CM

## 2023-05-12 DIAGNOSIS — G47.33 OSA (OBSTRUCTIVE SLEEP APNEA): ICD-10-CM

## 2023-05-12 LAB
ALBUMIN SERPL BCG-MCNC: 4 G/DL (ref 3.5–5.2)
ANION GAP SERPL CALCULATED.3IONS-SCNC: 10 MMOL/L (ref 7–15)
BUN SERPL-MCNC: 22.5 MG/DL (ref 6–20)
CALCIUM SERPL-MCNC: 10.1 MG/DL (ref 8.6–10)
CHLORIDE SERPL-SCNC: 105 MMOL/L (ref 98–107)
CREAT SERPL-MCNC: 1.35 MG/DL (ref 0.51–0.95)
DEPRECATED HCO3 PLAS-SCNC: 26 MMOL/L (ref 22–29)
ERYTHROCYTE [DISTWIDTH] IN BLOOD BY AUTOMATED COUNT: 14.4 % (ref 10–15)
GFR SERPL CREATININE-BSD FRML MDRD: 45 ML/MIN/1.73M2
GLUCOSE SERPL-MCNC: 107 MG/DL (ref 70–99)
HCT VFR BLD AUTO: 36.3 % (ref 35–47)
HGB BLD-MCNC: 11.7 G/DL (ref 11.7–15.7)
MCH RBC QN AUTO: 28.4 PG (ref 26.5–33)
MCHC RBC AUTO-ENTMCNC: 32.2 G/DL (ref 31.5–36.5)
MCV RBC AUTO: 88 FL (ref 78–100)
PHOSPHATE SERPL-MCNC: 4.4 MG/DL (ref 2.5–4.5)
PLATELET # BLD AUTO: 207 10E3/UL (ref 150–450)
POTASSIUM SERPL-SCNC: 4.4 MMOL/L (ref 3.4–5.3)
RBC # BLD AUTO: 4.12 10E6/UL (ref 3.8–5.2)
SODIUM SERPL-SCNC: 141 MMOL/L (ref 136–145)
WBC # BLD AUTO: 3.8 10E3/UL (ref 4–11)

## 2023-05-12 PROCEDURE — 36415 COLL VENOUS BLD VENIPUNCTURE: CPT | Performed by: PATHOLOGY

## 2023-05-12 PROCEDURE — 85027 COMPLETE CBC AUTOMATED: CPT | Performed by: PATHOLOGY

## 2023-05-12 PROCEDURE — 99215 OFFICE O/P EST HI 40 MIN: CPT | Performed by: INTERNAL MEDICINE

## 2023-05-12 PROCEDURE — 80069 RENAL FUNCTION PANEL: CPT | Performed by: PATHOLOGY

## 2023-05-12 PROCEDURE — G0463 HOSPITAL OUTPT CLINIC VISIT: HCPCS | Performed by: INTERNAL MEDICINE

## 2023-05-12 ASSESSMENT — PAIN SCALES - GENERAL: PAINLEVEL: NO PAIN (0)

## 2023-05-12 NOTE — PATIENT INSTRUCTIONS
Cut down on calcium supplementation   Increase water intake   Increase fresh fruits and vegetables.

## 2023-05-12 NOTE — PROGRESS NOTES
.    Nephrology Clinic Note  May 12, 2023    I was asked to see this patient by Dr. Cardoza     CC: CKD     HPI: Flor Navarro is a 57 year old female with PMH significant for HTN, liver transplant, morbid obesity who presents for evaluation and management of CKD.      Pt speaks Hebrew and used telephone  today.    Pt endorsed feeling fairly well since her discharge from hospital in feb. She did have intermittent breathing difficulties for which she was evaluated by pulmonology. Otherwise denied other systemic symptoms today including recurrent fevers, chills, nausea, vomiting, abdominal pain, chest pain.      Endorsed compliance with her medications, but she does not know her meds. A nurse will come home and set up pill box for 2 weeks each time.     - History of urinary symptoms: Have urinary continence, wears diapers  - History of Hematuria: no  - Swelling: yes  - Hx of UTIs: yes, ESBL  - Hx of stones: no  - Rashes/Joint pain: no  - Family hx of kidney disease: no  - NSAID use: no      Allergies   Allergen Reactions     Aspirin      3/31/16 Per pt, tolerates 81 mg daily dose without ADR.     325 mg dose caused itchiness and hives.     Clarithromycin      Allergic reaction         Contrast Dye      Iodine      Pcn [Penicillins]        ACE/ARB/ARNI NOT PRESCRIBED (INTENTIONAL), Please choose reason not prescribed from choices below.  acetaminophen (TYLENOL) 500 MG tablet, Take 1 tablet (500 mg) by mouth 2 times daily as needed for mild pain  albuterol (ACCUNEB) 0.63 MG/3ML neb solution, Take 3 mLs (0.63 mg) by nebulization every 4 hours (while awake)  albuterol (PROAIR HFA/PROVENTIL HFA/VENTOLIN HFA) 108 (90 Base) MCG/ACT inhaler, Inhale 2 puffs into the lungs 4 times daily  alendronate (FOSAMAX) 70 MG tablet, Take 1 tablet (70 mg) by mouth every 7 days  alum & mag hydroxide-simethicone (MAALOX ADVANCED MAX ST) 400-400-40 MG/5ML SUSP suspension, Take 30 mLs by mouth every 4 hours as needed for indigestion  or heartburn  amLODIPine (NORVASC) 10 MG tablet, Take 1 tablet (10 mg) by mouth daily  calcitRIOL (ROCALTROL) 0.25 MCG capsule, Take 1 capsule (0.25 mcg) by mouth daily  calcium carbonate 600 mg-vitamin D 400 units (CALTRATE) 600-400 MG-UNIT per tablet, Take 1 tablet by mouth 2 times daily  calcium carbonate-vitamin D (CALTRATE) 600-10 MG-MCG per tablet, TAKE ONE TABLET BY MOUTH TWICE A DAY  capsaicin-menthol-methyl sal 0.025-1-12 % external cream, Apply to low back three times daily for pain  cetirizine (ZYRTEC) 10 MG tablet, Take 1 tablet (10 mg) by mouth daily  chlorthalidone (HYGROTON) 25 MG tablet, Take 1 tablet (25 mg) by mouth daily for 180 days  COMPOUNDED NON-CONTROLLED SUBSTANCE (CMPD RX) - PHARMACY TO MIX COMPOUNDED MEDICATION, Equal parts of 2% Benadryl, 2% vicious lidocaine and TC suspension maalox,  Swish and spit 5 ml every 6 hrs as needed for mouth pain  cycloSPORINE modified (GENERIC EQUIVALENT) 25 MG capsule, TAKE THREE CAPSULES BY MOUTH EVERY MORNING & TAKE TWO CAPSULES BY MOUTH EVERY NIGHT AT BEDTIME  Denture Care Products (EFFERDENT DENTURE CLEANSER) TBEF, Use nightly to clean oral appliance  febuxostat (ULORIC) 40 MG TABS tablet, Take 1 tablet (40 mg) by mouth daily  furosemide (LASIX) 20 MG tablet, Take 1 tablet (20 mg) by mouth 2 times daily  gabapentin (NEURONTIN) 300 MG capsule, Take 1 capsule (300 mg) by mouth At Bedtime  ketotifen (ZADITOR) 0.025 % ophthalmic solution, Place 1 drop into both eyes 2 times daily  lidocaine (XYLOCAINE) 5 % external ointment, Apply topically as needed for moderate pain  mineral oil-hydrophilic petrolatum (AQUAPHOR) external ointment, Apply topically as needed for dry skin or other (itching)  Multiple Vitamin (DAILY-SUMA MULTIVITAMIN) TABS, Take 1 tablet by mouth every morning  multivitamin w/minerals (MULTI-VITAMIN) tablet, Take 1 tablet by mouth daily Needs 4 month supply for International Travel  mycophenolate (GENERIC EQUIVALENT) 250 MG capsule, TAKE TWO  CAPSULES BY MOUTH EVERY 12 HOURS  omeprazole (PRILOSEC) 20 MG DR capsule, Take 1 capsule (20 mg) by mouth 2 times daily el  order for DME, Equipment being ordered: compression stockings bilateral  Please measure and fit patient for stockings   Strength 15-30mmHg  Disp 2 pairs ( 4 socks)  1 refill over the time of 1 year  order for DME, Equipment being ordered:   1) cane  2) compression stockings 15mmHg, 3 pairs  Dx: gait stability, falls, edema  order for DME, Equipment being ordered: Nebulizer with adult mask and tubing  SENEXON-S 8.6-50 MG tablet, TAKE TWO TABLETS BY MOUTH TWICE A DAY  SM FIBER LAXATIVE 500 MG TABS tablet, Take 2 tablets (1,000 mg) by mouth daily  STATIN NOT PRESCRIBED, INTENTIONAL,, Not prescribed  umeclidinium (INCRUSE ELLIPTA) 62.5 MCG/INH inhaler, Inhale 1 puff into the lungs daily  Vitamin D3 (CHOLECALCIFEROL) 25 mcg (1000 units) tablet, Take 1 tablet (25 mcg) by mouth daily    No current facility-administered medications on file prior to visit.      Past Medical History:   Diagnosis Date     Anemia in CKD (chronic kidney disease)      Cataract      CKD (chronic kidney disease) stage 3, GFR 30-59 ml/min (H)      Hepatitis C     cleared virus spontaneously      High risk medication use      Hypertension, renal      Immunosuppressed status (H)      Liver replaced by transplant (H) 2010     Osteoporosis      Recurrent pregnancy loss without current pregnancy      Recurrent UTI 2021     Stroke, hemorrhagic (H) 2008    Left cerebral intraparenchymal hemorrhage     Syncope      Unspecified viral hepatitis C without hepatic coma        Past Surgical History:   Procedure Laterality Date     CATARACT IOL, RT/LT Right 2/18/15      SECTION       Incisional Hernia Repair  2004     INSERT SHUNT PORTAL TRANSJUGULAR INTRAHEPTIC      shunt placement for liver failure     LAPAROSCOPIC SALPINGO-OOPHORECTOMY      left     NECK SURGERY  2010    fracture, in halo x 7months      TRANSPLANT LIVER RECIPIENT  DONOR  10/26/10     Upper GI Endoscopy with Band Ligation of Esoph/Gastric Varic. .         Social History     Tobacco Use     Smoking status: Never     Smokeless tobacco: Never   Vaping Use     Vaping status: Never Used     Passive vaping exposure: Yes   Substance Use Topics     Alcohol use: No     Drug use: No       Family History   Problem Relation Age of Onset     Hepatitis Other         Hep C, still in [place]     Cerebrovascular Disease Other      Cancer No family hx of      Diabetes No family hx of      Hypertension No family hx of      Thyroid Disease No family hx of      Glaucoma No family hx of      Macular Degeneration No family hx of        ROS: A 12 system review of systems was negative other than noted here or above.     Exam:  /81 (BP Location: Right arm, Patient Position: Sitting, Cuff Size: Adult Large)   Pulse 71   Wt 105.7 kg (233 lb 2 oz)   LMP  (LMP Unknown)   BMI 45.53 kg/m      GENERAL APPEARANCE: alert and no distress  EYES:  no scleral icterus  HENT: No gross abnormalities noted  NECK: supple, no adenopathy  RESP: lungs clear to auscultation   CV: regular rhythm, normal rate, no rub  Extremities: no significant lower extremity edema  SKIN: no rash  NEURO: mentation intact and speech normal  PSYCH: affect normal/bright    Results:    Lab on 2023   Component Date Value Ref Range Status     Sodium 2023 141  136 - 145 mmol/L Final     Potassium 2023 4.4  3.4 - 5.3 mmol/L Final     Chloride 2023 105  98 - 107 mmol/L Final     Carbon Dioxide (CO2) 2023 26  22 - 29 mmol/L Final     Anion Gap 2023 10  7 - 15 mmol/L Final     Glucose 2023 107 (H)  70 - 99 mg/dL Final     Urea Nitrogen 2023 22.5 (H)  6.0 - 20.0 mg/dL Final     Creatinine 2023 1.35 (H)  0.51 - 0.95 mg/dL Final     GFR Estimate 2023 45 (L)  >60 mL/min/1.73m2 Final    eGFR calculated using  CKD-EPI equation.     Calcium  05/12/2023 10.1 (H)  8.6 - 10.0 mg/dL Final     Albumin 05/12/2023 4.0  3.5 - 5.2 g/dL Final     Phosphorus 05/12/2023 4.4  2.5 - 4.5 mg/dL Final     WBC Count 05/12/2023 3.8 (L)  4.0 - 11.0 10e3/uL Final     RBC Count 05/12/2023 4.12  3.80 - 5.20 10e6/uL Final     Hemoglobin 05/12/2023 11.7  11.7 - 15.7 g/dL Final     Hematocrit 05/12/2023 36.3  35.0 - 47.0 % Final     MCV 05/12/2023 88  78 - 100 fL Final     MCH 05/12/2023 28.4  26.5 - 33.0 pg Final     MCHC 05/12/2023 32.2  31.5 - 36.5 g/dL Final     RDW 05/12/2023 14.4  10.0 - 15.0 % Final     Platelet Count 05/12/2023 207  150 - 450 10e3/uL Final       Assessment/Plan:     CKD stage IIIa  Recurrent CASTILLO episodes, last one was in February, now resolved.  Pt with baseline creatinine of 1.1-1.3 with eGFR in 40's and 50's which put her into CKD stage IIIa. UA with few RBC in the past, but unfortunately not able to provide urine sample today. Will repeat the lab again to know persistence of Hematuria. UPCR was mildly elevated but no repeat noted since 202. No renal imaging noted in her chart. With given improvement/baseline renal function, will continue to monitor her with repeat UA. If her hematuria and or proteinuria is worsening, will evalaute further for possible underlying GN. That said, no systemic symptoms that are suggestive of underlying GN.  Her CKD likely multifactorial including chronic use of CNI (cyclosporine), poorly controlled BP, obesity and or renovascular disease.  Morbid obesity puts her at higher risk of secondary FSGS  - strict BP control  - maintain cyclosporine levels at goal  - encouraged to loose weight  - encouraged to be well hydrated while being on CNI  - low salt diet and increase in fresh fruits and vegetables.     Exertional dyspnea   Morbid obesity and or physical deconditioning are likely causes as there is no associated chest tightness or chest pain with those symptoms.  - follow up with primary care/pulmonology     Hypertension,  not at goal of < 140 systolic :  Pt clinic BP is slightly high, was only on furosemide 20 mg twice a day.   - started on amlodipine 10 mg daily during last visit, continue  - not starting on ACE/ARB in setting of hyperkalemia.     Hyperkalemia, resolved     Liver transplant in 2010  Liver failure 2/2 hep C infection s/p liver transplant with stable transplant function. currently on cyclosporine and MMF.  - continue immunosuppression     Hyperuricemia : noted her uric acid being high at 7.5. no gouty episodes lately   - will continue to monitor for now, if > 8 and or cause gout, will consider medical therapy.  - discussed life style modifications.     BMD :  Husam, phos, vit D and PTH are WNL  continue on calcitriol     Metabolic acidosis :  Bicarb is WNL     Anemia :  Hb is WNL     Other   Morbid obesity, encouraged to loose weight   Sleep apnea : referral made to sleep clinic.     Total time spent on the day of clinic visit was 40 min including 30 min face to face time with pt, chart review,care every where record review, GI note review, imaging and lab review, documentation as above.    Ce Arambula  Dept of Nephrology and Hypertension  [facility]

## 2023-05-12 NOTE — LETTER
5/12/2023       RE: Flor Navarro  [address]  [facility] [address]     Dear Colleague,    Thank you for referring your patient, Flor Navarro, to the [facility] [place] at [facility]. Please see a copy of my visit note below.    .    Nephrology Clinic Note  May 12, 2023    I was asked to see this patient by Dr. Cardoza     CC: CKD     HPI: Flor Navarro is a 57 year old female with PMH significant for HTN, liver transplant, morbid obesity who presents for evaluation and management of CKD.      Pt speaks Yoruba and used telephone  today.    Pt endorsed feeling fairly well since her discharge from hospital in feb. She did have intermittent breathing difficulties for which she was evaluated by pulmonology. Otherwise denied other systemic symptoms today including recurrent fevers, chills, nausea, vomiting, abdominal pain, chest pain.      Endorsed compliance with her medications, but she does not know her meds. A nurse will come home and set up pill box for 2 weeks each time.     - History of urinary symptoms: Have urinary continence, wears diapers  - History of Hematuria: no  - Swelling: yes  - Hx of UTIs: yes, ESBL  - Hx of stones: no  - Rashes/Joint pain: no  - Family hx of kidney disease: no  - NSAID use: no      Allergies   Allergen Reactions    Aspirin      3/31/16 Per pt, tolerates 81 mg daily dose without ADR.     325 mg dose caused itchiness and hives.    Clarithromycin      Allergic reaction        Contrast Dye     Iodine     Pcn [Penicillins]        ACE/ARB/ARNI NOT PRESCRIBED (INTENTIONAL), Please choose reason not prescribed from choices below.  acetaminophen (TYLENOL) 500 MG tablet, Take 1 tablet (500 mg) by mouth 2 times daily as needed for mild pain  albuterol (ACCUNEB) 0.63 MG/3ML neb solution, Take 3 mLs (0.63 mg) by nebulization every 4 hours (while awake)  albuterol (PROAIR HFA/PROVENTIL HFA/VENTOLIN HFA)  108 (90 Base) MCG/ACT inhaler, Inhale 2 puffs into the lungs 4 times daily  alendronate (FOSAMAX) 70 MG tablet, Take 1 tablet (70 mg) by mouth every 7 days  alum & mag hydroxide-simethicone (MAALOX ADVANCED MAX ST) 400-400-40 MG/5ML SUSP suspension, Take 30 mLs by mouth every 4 hours as needed for indigestion or heartburn  amLODIPine (NORVASC) 10 MG tablet, Take 1 tablet (10 mg) by mouth daily  calcitRIOL (ROCALTROL) 0.25 MCG capsule, Take 1 capsule (0.25 mcg) by mouth daily  calcium carbonate 600 mg-vitamin D 400 units (CALTRATE) 600-400 MG-UNIT per tablet, Take 1 tablet by mouth 2 times daily  calcium carbonate-vitamin D (CALTRATE) 600-10 MG-MCG per tablet, TAKE ONE TABLET BY MOUTH TWICE A DAY  capsaicin-menthol-methyl sal 0.025-1-12 % external cream, Apply to low back three times daily for pain  cetirizine (ZYRTEC) 10 MG tablet, Take 1 tablet (10 mg) by mouth daily  chlorthalidone (HYGROTON) 25 MG tablet, Take 1 tablet (25 mg) by mouth daily for 180 days  COMPOUNDED NON-CONTROLLED SUBSTANCE (CMPD RX) - PHARMACY TO MIX COMPOUNDED MEDICATION, Equal parts of 2% Benadryl, 2% vicious lidocaine and TC suspension maalox,  Swish and spit 5 ml every 6 hrs as needed for mouth pain  cycloSPORINE modified (GENERIC EQUIVALENT) 25 MG capsule, TAKE THREE CAPSULES BY MOUTH EVERY MORNING & TAKE TWO CAPSULES BY MOUTH EVERY NIGHT AT BEDTIME  Denture Care Products (EFFERDENT DENTURE CLEANSER) TBEF, Use nightly to clean oral appliance  febuxostat (ULORIC) 40 MG TABS tablet, Take 1 tablet (40 mg) by mouth daily  furosemide (LASIX) 20 MG tablet, Take 1 tablet (20 mg) by mouth 2 times daily  gabapentin (NEURONTIN) 300 MG capsule, Take 1 capsule (300 mg) by mouth At Bedtime  ketotifen (ZADITOR) 0.025 % ophthalmic solution, Place 1 drop into both eyes 2 times daily  lidocaine (XYLOCAINE) 5 % external ointment, Apply topically as needed for moderate pain  mineral oil-hydrophilic petrolatum (AQUAPHOR) external ointment, Apply topically as  needed for dry skin or other (itching)  Multiple Vitamin (DAILY-SUMA MULTIVITAMIN) TABS, Take 1 tablet by mouth every morning  multivitamin w/minerals (MULTI-VITAMIN) tablet, Take 1 tablet by mouth daily Needs 4 month supply for International Travel  mycophenolate (GENERIC EQUIVALENT) 250 MG capsule, TAKE TWO CAPSULES BY MOUTH EVERY 12 HOURS  omeprazole (PRILOSEC) 20 MG DR capsule, Take 1 capsule (20 mg) by mouth 2 times daily el  order for DME, Equipment being ordered: compression stockings bilateral  Please measure and fit patient for stockings   Strength 15-30mmHg  Disp 2 pairs ( 4 socks)  1 refill over the time of 1 year  order for DME, Equipment being ordered:   1) cane  2) compression stockings 15mmHg, 3 pairs  Dx: gait stability, falls, edema  order for DME, Equipment being ordered: Nebulizer with adult mask and tubing  SENEXON-S 8.6-50 MG tablet, TAKE TWO TABLETS BY MOUTH TWICE A DAY  SM FIBER LAXATIVE 500 MG TABS tablet, Take 2 tablets (1,000 mg) by mouth daily  STATIN NOT PRESCRIBED, INTENTIONAL,, Not prescribed  umeclidinium (INCRUSE ELLIPTA) 62.5 MCG/INH inhaler, Inhale 1 puff into the lungs daily  Vitamin D3 (CHOLECALCIFEROL) 25 mcg (1000 units) tablet, Take 1 tablet (25 mcg) by mouth daily    No current facility-administered medications on file prior to visit.      Past Medical History:   Diagnosis Date    Anemia in CKD (chronic kidney disease)     Cataract     CKD (chronic kidney disease) stage 3, GFR 30-59 ml/min (H)     Hepatitis C     cleared virus spontaneously 2013    High risk medication use     Hypertension, renal     Immunosuppressed status (H)     Liver replaced by transplant (H) 01/01/2010    Osteoporosis     Recurrent pregnancy loss without current pregnancy     Recurrent UTI 07/12/2021    Stroke, hemorrhagic (H) 01/01/2008    Left cerebral intraparenchymal hemorrhage    Syncope     Unspecified viral hepatitis C without hepatic coma        Past Surgical History:   Procedure Laterality Date     CATARACT IOL, RT/LT Right 2/18/15     SECTION      Incisional Hernia Repair  2004    INSERT SHUNT PORTAL TRANSJUGULAR INTRAHEPTIC  2005    shunt placement for liver failure    LAPAROSCOPIC SALPINGO-OOPHORECTOMY      left    NECK SURGERY  2010    fracture, in halo x 7months    TRANSPLANT LIVER RECIPIENT  DONOR  10/26/10    Upper GI Endoscopy with Band Ligation of Esoph/Gastric Varic. .         Social History     Tobacco Use    Smoking status: Never    Smokeless tobacco: Never   Vaping Use    Vaping status: Never Used     Passive vaping exposure: Yes   Substance Use Topics    Alcohol use: No    Drug use: No       Family History   Problem Relation Age of Onset    Hepatitis Other         Hep C, still in [place]    Cerebrovascular Disease Other     Cancer No family hx of     Diabetes No family hx of     Hypertension No family hx of     Thyroid Disease No family hx of     Glaucoma No family hx of     Macular Degeneration No family hx of        ROS: A 12 system review of systems was negative other than noted here or above.     Exam:  /81 (BP Location: Right arm, Patient Position: Sitting, Cuff Size: Adult Large)   Pulse 71   Wt 105.7 kg (233 lb 2 oz)   LMP  (LMP Unknown)   BMI 45.53 kg/m      GENERAL APPEARANCE: alert and no distress  EYES:  no scleral icterus  HENT: No gross abnormalities noted  NECK: supple, no adenopathy  RESP: lungs clear to auscultation   CV: regular rhythm, normal rate, no rub  Extremities: no significant lower extremity edema  SKIN: no rash  NEURO: mentation intact and speech normal  PSYCH: affect normal/bright    Results:    Lab on 2023   Component Date Value Ref Range Status    Sodium 2023 141  136 - 145 mmol/L Final    Potassium 2023 4.4  3.4 - 5.3 mmol/L Final    Chloride 2023 105  98 - 107 mmol/L Final    Carbon Dioxide (CO2) 2023 26  22 - 29 mmol/L Final    Anion Gap 2023 10  7 - 15 mmol/L Final    Glucose 2023 107 (H)   70 - 99 mg/dL Final    Urea Nitrogen 05/12/2023 22.5 (H)  6.0 - 20.0 mg/dL Final    Creatinine 05/12/2023 1.35 (H)  0.51 - 0.95 mg/dL Final    GFR Estimate 05/12/2023 45 (L)  >60 mL/min/1.73m2 Final    eGFR calculated using 2021 CKD-EPI equation.    Calcium 05/12/2023 10.1 (H)  8.6 - 10.0 mg/dL Final    Albumin 05/12/2023 4.0  3.5 - 5.2 g/dL Final    Phosphorus 05/12/2023 4.4  2.5 - 4.5 mg/dL Final    WBC Count 05/12/2023 3.8 (L)  4.0 - 11.0 10e3/uL Final    RBC Count 05/12/2023 4.12  3.80 - 5.20 10e6/uL Final    Hemoglobin 05/12/2023 11.7  11.7 - 15.7 g/dL Final    Hematocrit 05/12/2023 36.3  35.0 - 47.0 % Final    MCV 05/12/2023 88  78 - 100 fL Final    MCH 05/12/2023 28.4  26.5 - 33.0 pg Final    MCHC 05/12/2023 32.2  31.5 - 36.5 g/dL Final    RDW 05/12/2023 14.4  10.0 - 15.0 % Final    Platelet Count 05/12/2023 207  150 - 450 10e3/uL Final       Assessment/Plan:     CKD stage IIIa  Recurrent CASTILLO episodes, last one was in February, now resolved.  Pt with baseline creatinine of 1.1-1.3 with eGFR in 40's and 50's which put her into CKD stage IIIa. UA with few RBC in the past, but unfortunately not able to provide urine sample today. Will repeat the lab again to know persistence of Hematuria. UPCR was mildly elevated but no repeat noted since 202. No renal imaging noted in her chart. With given improvement/baseline renal function, will continue to monitor her with repeat UA. If her hematuria and or proteinuria is worsening, will evalaute further for possible underlying GN. That said, no systemic symptoms that are suggestive of underlying GN.  Her CKD likely multifactorial including chronic use of CNI (cyclosporine), poorly controlled BP, obesity and or renovascular disease.  Morbid obesity puts her at higher risk of secondary FSGS  - strict BP control  - maintain cyclosporine levels at goal  - encouraged to loose weight  - encouraged to be well hydrated while being on CNI  - low salt diet and increase in fresh  fruits and vegetables.     Exertional dyspnea   Morbid obesity and or physical deconditioning are likely causes as there is no associated chest tightness or chest pain with those symptoms.  - follow up with primary care/pulmonology     Hypertension, not at goal of < 140 systolic :  Pt clinic BP is slightly high, was only on furosemide 20 mg twice a day.   - started on amlodipine 10 mg daily during last visit, continue  - not starting on ACE/ARB in setting of hyperkalemia.     Hyperkalemia, resolved     Liver transplant in 2010  Liver failure 2/2 hep C infection s/p liver transplant with stable transplant function. currently on cyclosporine and MMF.  - continue immunosuppression     Hyperuricemia : noted her uric acid being high at 7.5. no gouty episodes lately   - will continue to monitor for now, if > 8 and or cause gout, will consider medical therapy.  - discussed life style modifications.     BMD :  Husam, phos, vit D and PTH are WNL  continue on calcitriol     Metabolic acidosis :  Bicarb is WNL     Anemia :  Hb is WNL     Other   Morbid obesity, encouraged to loose weight   Sleep apnea : referral made to sleep clinic.     Total time spent on the day of clinic visit was 40 min including 30 min face to face time with pt, chart review,care every where record review, GI note review, imaging and lab review, documentation as above.    Ce Arambula  Dept of Nephrology and Hypertension  [facility]

## 2023-05-15 ENCOUNTER — DOCUMENTATION ONLY (OUTPATIENT)
Dept: FAMILY MEDICINE | Facility: CLINIC | Age: 58
End: 2023-05-15
Payer: MEDICARE

## 2023-05-15 NOTE — PROGRESS NOTES
"When opening a documentation only encounter, be sure to enter in \"Chief Complaint\" Forms and in \" Comments\" Title of form, description if needed.    Flor is a 57 year old  female  Form received via: Fax  Form now resides in: Provider Edward Durant MA                  "

## 2023-05-19 DIAGNOSIS — Z53.9 DIAGNOSIS NOT YET DEFINED: Primary | ICD-10-CM

## 2023-05-19 PROCEDURE — G0179 MD RECERTIFICATION HHA PT: HCPCS | Performed by: FAMILY MEDICINE

## 2023-05-22 ENCOUNTER — TELEPHONE (OUTPATIENT)
Dept: SLEEP MEDICINE | Facility: CLINIC | Age: 58
End: 2023-05-22
Payer: MEDICARE

## 2023-05-22 DIAGNOSIS — R06.89 HYPOVENTILATION: ICD-10-CM

## 2023-05-22 DIAGNOSIS — G47.33 OSA (OBSTRUCTIVE SLEEP APNEA): ICD-10-CM

## 2023-05-22 DIAGNOSIS — J96.11 CHRONIC HYPOXEMIC RESPIRATORY FAILURE (H): ICD-10-CM

## 2023-05-22 DIAGNOSIS — G47.10 HYPERSOMNIA: ICD-10-CM

## 2023-05-22 DIAGNOSIS — J96.11 CHRONIC HYPOXEMIC RESPIRATORY FAILURE (H): Primary | ICD-10-CM

## 2023-05-22 DIAGNOSIS — G47.33 OBSTRUCTIVE SLEEP APNEA (ADULT) (PEDIATRIC): ICD-10-CM

## 2023-05-22 DIAGNOSIS — E66.01 OBESITY, CLASS III, BMI 40-49.9 (MORBID OBESITY) (H): ICD-10-CM

## 2023-05-22 NOTE — PROGRESS NOTES
Form has been completed by provider.     Form sent out via: Fax to [facility] at Fax Number: [phone]  Patient informed: N/A  Output date: May 22, 2023    Darius Durant MA      **Please close the encounter**

## 2023-05-22 NOTE — TELEPHONE ENCOUNTER
Oximetry has been resulted and  scanned into chart.  Encounter forwarded to provider for review.    Recording Start Date: 5/16/2023    Completed on: 5/17/2023    Duration: 8  Hrs: 07 Minutes: 12 Seconds   Time (min) Spent below 88%: 22.1 min      VIKAS Vinson

## 2023-05-26 NOTE — TELEPHONE ENCOUNTER
OVERNIGHT OXIMETRY INTERPRETATION     I reviewed the overnight oximetry testing results from 5/17/2023 done on IPAP 18 EPAP 9 and room air.  Analysis time: 8 Hours 7 minutes.  Time  below 88% oxygen saturation 22 minutes.  Lowest oxygen saturation 78%  Oxygen desaturation index 9.9  Pattern consistent with mild residual hypoxemia.  I recommend resuming O2 into Pap.    Bessy Ward M.D.  Pulmonary/Critical Care/Sleep Medicine

## 2023-06-02 ENCOUNTER — OFFICE VISIT (OUTPATIENT)
Dept: FAMILY MEDICINE | Facility: CLINIC | Age: 58
End: 2023-06-02
Payer: MEDICARE

## 2023-06-02 VITALS
WEIGHT: 233.5 LBS | BODY MASS INDEX: 44.08 KG/M2 | SYSTOLIC BLOOD PRESSURE: 115 MMHG | HEART RATE: 80 BPM | OXYGEN SATURATION: 93 % | DIASTOLIC BLOOD PRESSURE: 82 MMHG | TEMPERATURE: 98 F | HEIGHT: 61 IN

## 2023-06-02 DIAGNOSIS — I63.9 HEMIPLEGIA OF RIGHT DOMINANT SIDE DUE TO INFARCTION OF BRAIN (H): ICD-10-CM

## 2023-06-02 DIAGNOSIS — G63 NEUROPATHY DUE TO MEDICAL CONDITION (H): Primary | ICD-10-CM

## 2023-06-02 DIAGNOSIS — Z02.89 ENCOUNTER FOR COMPLETION OF FORM WITH PATIENT: ICD-10-CM

## 2023-06-02 DIAGNOSIS — M25.561 CHRONIC PAIN OF BOTH KNEES: ICD-10-CM

## 2023-06-02 DIAGNOSIS — M25.562 CHRONIC PAIN OF BOTH KNEES: ICD-10-CM

## 2023-06-02 DIAGNOSIS — G89.29 CHRONIC BILATERAL LOW BACK PAIN WITHOUT SCIATICA: ICD-10-CM

## 2023-06-02 DIAGNOSIS — G89.29 CHRONIC PAIN OF BOTH KNEES: ICD-10-CM

## 2023-06-02 DIAGNOSIS — L85.3 XEROSIS CUTIS: ICD-10-CM

## 2023-06-02 DIAGNOSIS — D32.9 MENINGIOMA (H): ICD-10-CM

## 2023-06-02 DIAGNOSIS — G81.91 HEMIPLEGIA OF RIGHT DOMINANT SIDE DUE TO INFARCTION OF BRAIN (H): ICD-10-CM

## 2023-06-02 DIAGNOSIS — M46.1 SACROILIITIS (H): ICD-10-CM

## 2023-06-02 DIAGNOSIS — M54.50 CHRONIC BILATERAL LOW BACK PAIN WITHOUT SCIATICA: ICD-10-CM

## 2023-06-02 PROBLEM — D69.6 THROMBOCYTOPENIA (H): Status: RESOLVED | Noted: 2020-11-27 | Resolved: 2023-06-02

## 2023-06-02 PROCEDURE — 99215 OFFICE O/P EST HI 40 MIN: CPT | Performed by: FAMILY MEDICINE

## 2023-06-02 NOTE — PATIENT INSTRUCTIONS
Skin    Prescribed eucerin/minerin cream for dry skin over legs. Apply twice daily.     Knee    Paper prescription for knee sleeve and belt given today.     Meningioma    Referred to Neuro.     Back pain     Recommend looking into QUTENZA patches: [email or web address]    Refilled cream if affordable    Follow-up 1 month

## 2023-06-02 NOTE — PROGRESS NOTES
DME (Durable Medical Equipment) Orders and Documentation  Orders Placed This Encounter   Procedures     Miscellaneous DME Order      The patient was assessed and it was determined the patient is in need of the following listed DME Supplies/Equipment. Please complete supporting documentation below to demonstrate medical necessity.      DME All Other Item(s) Documentation    List reason for need and supporting documentation for medical necessity below for each DME item.     1. Low back pain, hx of sacroilitis - needs for pain mgmt           DME (Durable Medical Equipment) Orders and Documentation  Orders Placed This Encounter   Procedures     Miscellaneous DME Order     Knee Supplies Order Knee Sleeve/Brace; Bilateral; Open      The patient was assessed and it was determined the patient is in need of the following listed DME Supplies/Equipment. Please complete supporting documentation below to demonstrate medical necessity.      Knee Bracing Supplies Documentation  Patient requires the use of the ordered bracing device due to following medical need/condition: pain control, swelling management

## 2023-06-02 NOTE — PROGRESS NOTES
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 Nighttime gabapentin dose kept low to avoid side effects. Discussed use of Qutenza vs topical capsicin patch give the evidence. Expresses frustration over lack of insurance coverage but can check website for reduced cost program.    Meningioma (H)  Originally discovered in 2008 with slight increase in size in 2017. Has been visualized by other modalities intermittently over years but no distinct follow-up with exception of seeing ophthalmology in 2021 related to decreased vision. No optic disc edema. Decreased vision in left eye secondary NAION. Referral to neurosurgery for definitive management and imaging recommendations.     Chronic bilateral low back pain without sciatica  Multifactorial, likely due to DJD plus carrying extra weight plus neuropathy. Has had benefit from SI joint belt several years ago and would like to try again. Prefers printed order to bring to DME company.   - Miscellaneous DME Order    Chronic pain of both knees  Chronic, benefits from compression with knee sleeve. Ordered today and can be picked up at [employer].   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 Can use walker in meantime. Has short distance she is able to manage independently due to hx of stroke    46 minutes spent by me on the date of the encounter doing chart review, history and exam, documentation and further activities per the note     No follow-ups on file.  The information in this document, created by the medical scribe for me,  "accurately reflects the services I personally performed and the decisions made by me. I have reviewed and approved this document for accuracy prior to leaving the patient care area.  Karely Sierra MD  2:05 PM, 06/02/23    [facility] AMI Ray is a 57 year old accompanied by an  via phone, presenting for the following health issues:  RECHECK (Pt is here for medications follow up.) and Derm Problem (Pt reports she has been having itching around the inner thighs and down both legs for 25 days.)        6/2/2023     1:55 PM   Additional Questions   Roomed by brenda brooks   Accompanied by self         6/2/2023     1:55 PM   Patient Reported Additional Medications   Patient reports taking the following new medications No new meds reported     HPI     Skin  Patient reports increased itchiness over bilateral thighs and groins over the last 20-25 days. She has not been managing this itchiness with anything.     Knee  Additionally she complains of knee pain and would like a belt/compression sleeve just for her knees. She would prefer one with velcro.     Back pain  Patient requests medication for back pain. She has historically used heat cream, but insurance will not cover this.     Sleep Apnea  She is now sleeping well at night with minimal disruption. She is highly satisfied with treatment for this.     Review of Systems   Positive for: Knee pain, dry skin, back pain  Negative for: Sleep disruption    This document serves as a record of the services and decisions personally performed and made by Karely Sierra MD. It was created on his/her behalf by Nestor Bueno, a trained medical scribe. The creation of this document is based the provider's statements to the medical scribe.  Nelson Bueno 2:05 PM, June 2, 2023        Objective    /82   Pulse 80   Temp 98  F (36.7  C) (Oral)   Ht 1.545 m (5' 0.83\")   Wt 105.9 kg (233 lb 8 oz)   LMP  (LMP Unknown)   SpO2 " 93%   BMI 44.37 kg/m    Body mass index is 44.37 kg/m .  Physical Exam   GENERAL: healthy, alert and no distress  SKIN: Bilateral legs dry without rash. Wearing compression stocking with some sock edema.   MS: Tender along joint line for bilateral knees  PSYCH: Gregarious, alternates speech between english and Yi. Affect bright

## 2023-06-05 ENCOUNTER — DOCUMENTATION ONLY (OUTPATIENT)
Dept: FAMILY MEDICINE | Facility: CLINIC | Age: 58
End: 2023-06-05
Payer: MEDICARE

## 2023-06-05 NOTE — PROGRESS NOTES
"When opening a documentation only encounter, be sure to enter in \"Chief Complaint\" Forms and in \" Comments\" Title of form, description if needed.    Flor is a 57 year old  female  Form received via: Appointment  Form now resides in: Provider Ready    Tisha Cardoso      Form has been completed by provider.     Form sent out via: Mailed to Patient's Address   Patient informed: Yes  Output date: June 5, 2023    Tisha Cardoso      **Please close the encounter**                "

## 2023-06-07 DIAGNOSIS — D32.9 MENINGIOMA (H): Primary | ICD-10-CM

## 2023-06-07 RX ORDER — METHYLPREDNISOLONE 32 MG/1
TABLET ORAL
Qty: 2 TABLET | Refills: 0 | Status: SHIPPED | OUTPATIENT
Start: 2023-06-07 | End: 2023-07-24

## 2023-06-09 NOTE — TELEPHONE ENCOUNTER
RECORDS RECEIVED FROM: Internal   REASON FOR VISIT: Meningioma    Date of Appt: 7/7/23 3:30pm    NOTES (FOR ALL VISITS) STATUS DETAILS   OFFICE NOTE from referring provider Internal 6/2/23, 3/31/23  Karely Sierra MD @ [facility]     DISCHARGE SUMMARY from hospital Internal 2/23/23-3/2/23 Shanon Martinez MD @ [facility]     MEDICATION LIST Internal    IMAGING  (FOR ALL VISITS)     MRI (HEAD, NECK, SPINE) Internal [facility]  6/30/23 MR Brain   CT (HEAD, NECK, SPINE) Internal [facility]  2/23/23 CT Head

## 2023-06-16 ENCOUNTER — OFFICE VISIT (OUTPATIENT)
Dept: SLEEP MEDICINE | Facility: CLINIC | Age: 58
End: 2023-06-16
Payer: MEDICARE

## 2023-06-16 VITALS
HEART RATE: 68 BPM | BODY MASS INDEX: 43.82 KG/M2 | DIASTOLIC BLOOD PRESSURE: 66 MMHG | HEIGHT: 61 IN | SYSTOLIC BLOOD PRESSURE: 103 MMHG | WEIGHT: 232.1 LBS

## 2023-06-16 DIAGNOSIS — G47.33 OSA (OBSTRUCTIVE SLEEP APNEA): Primary | ICD-10-CM

## 2023-06-16 PROCEDURE — 99213 OFFICE O/P EST LOW 20 MIN: CPT | Performed by: INTERNAL MEDICINE

## 2023-06-16 ASSESSMENT — SLEEP AND FATIGUE QUESTIONNAIRES
HOW LIKELY ARE YOU TO NOD OFF OR FALL ASLEEP WHEN YOU ARE A PASSENGER IN A CAR FOR AN HOUR WITHOUT A BREAK: WOULD NEVER DOZE
HOW LIKELY ARE YOU TO NOD OFF OR FALL ASLEEP IN A CAR, WHILE STOPPED FOR A FEW MINUTES IN TRAFFIC: WOULD NEVER DOZE
HOW LIKELY ARE YOU TO NOD OFF OR FALL ASLEEP WHILE LYING DOWN TO REST IN THE AFTERNOON WHEN CIRCUMSTANCES PERMIT: WOULD NEVER DOZE
HOW LIKELY ARE YOU TO NOD OFF OR FALL ASLEEP WHILE SITTING AND READING: WOULD NEVER DOZE
HOW LIKELY ARE YOU TO NOD OFF OR FALL ASLEEP WHILE WATCHING TV: WOULD NEVER DOZE
HOW LIKELY ARE YOU TO NOD OFF OR FALL ASLEEP WHILE SITTING INACTIVE IN A PUBLIC PLACE: WOULD NEVER DOZE
HOW LIKELY ARE YOU TO NOD OFF OR FALL ASLEEP WHILE SITTING QUIETLY AFTER LUNCH WITHOUT ALCOHOL: WOULD NEVER DOZE
HOW LIKELY ARE YOU TO NOD OFF OR FALL ASLEEP WHILE SITTING AND TALKING TO SOMEONE: WOULD NEVER DOZE

## 2023-06-16 NOTE — PATIENT INSTRUCTIONS
Keep the Oxygen flow rate at 3 L/m into the Bilevel machine         For general sleep health questions:       http://sleepeducation.org    Medicare and Medicaid patients starting on CPAP or biPAP on or after 5/12/2023  Medicare patients should schedule an IN PERSON CLINIC appointment to provide your CPAP/biPAP usage data to a provider within 90 days of starting CPAP    For new ResMed devices, sign up for device rachana to monitor your device for your followup visits  We encourage you to utilize the [employer] rachana or website ([employer]) to monitor your therapy progress and share the data with your healthcare team when you discuss your sleep apnea.                                                      Know your equipment:  CPAP is continuous positive airway pressure that prevents obstructive sleep apnea by keeping the throat from collapsing while you are sleeping. In most cases, the device is  smart  and can slowly self-adjusts if your throat collapses and keeps a record every day of how well you are treated-this information is available to you and your care team.  BPAP is bilevel positive airway pressure that keeps your throat open and also assists each breath with a pressure boost to maintain adequate breathing.  Special kinds of BPAP are used in patients who have inadequate breathing from lung or heart disease. In most cases, the device is  smart  and can slowly self-adjusts to assist breathing. Like CPAP, the device keeps a record of how well you are treated.  Your mask is your connection to the device. You get to choose what feels most comfortable and the staff will help to make sure if fits. Here: are some examples of the different masks that are available:          Continue PAP therapy every night, for all hours that you are sleeping (including naps.)  As always, try to get at least 8 hours of sleep or more each day, keep a regular sleep schedule, and avoid sleep deprivation. Avoid alcohol.  Reasons  that you might need a change to your pressure therapy would be weight gain or loss, waking having inadvertently removed your PAP overnight, having previously felt refreshed by sleep with CPAP use and now waking un-refreshed, and return of daytime sleepiness. Also, the development of new medical problems  (such as heart failure, stroke, medications such as narcotics) can sometimes affect breathing at night and change your PAP therapy needs.  Please bring PAP with you if you are hospitalized.  If anticipating surgery be sure to discuss with your surgeon that you have sleep apnea and use PAP therapy.    Maintain your equipment as recommended which includes routine cleaning and replacement of supplies.      Call DME for any questions regarding supplies or maintenance.    [facility], [facility] [phone]    Do not drive on engage in potentially dangerous activities if feeling sleepy.    Please follow up in sleep clinic again in 6 months.        Tips for your PAP use-    Mask fitting tips  Mask fitting exercise:    To improve your mask seal and your mobility at night, put mask on and secure in place.  Lie down in bed with full pressure and roll to one side, adjust headgear while in that position to eliminate any leaks. Repeat process rolling to other side.     The mask seal does not have to be perfect:   CPAP machines are designed to make up for small leaks. However, you will not tolerate leaks blowing in your eyes so you will need to adjust.   Any leak should only be near or at the bottom of the mask.  We expect your mask to leak slightly at night.    Do not over-tighten the headgear straps, tighter IS NOT better, we expect minimal leak.    First try re-positioning the mask or headgear before tightening the headgear straps.  Mask leaks are expected due to changing sleeping positions. Try pulling the mask away from your skin allowing the cushion to re-inflate will minimize  the leak.  If you struggle for a good fit, try turning the CPAP off and then readjust the mask by pulling it away from your face and then turning back on the CPAP.        Humidifier tips  Humidifiers can be adjusted to increase or decrease the amount of moisture according to your comfort level. You may need to adjust this frequently at first, but then might only change it with seasonal weather changes.     Try INCREASING the humidity if:  You experience a dry, irritated nasal passage or throat.  You have a runny, drippy nose or sneezing fits after using CPAP.  You experience nasal congestion during or after CPAP use.    Try DECREASING the humidity if:  You have excessive condensation or  rain out  in the tubing or mask.  Otherwise keep the tubing warm during the night by running it underneath the blankets or pillow.      Clinic visit after initial PAP set-up   Bring your equipment with you to your 5-8 week follow up clinic visit.  We will be extracting your data from the machine if not available from the cloud based modem.        Travel  Always take your equipment with you when you travel.  If you fly with your equipment bring it on with you as a carry on.  Medical equipment does not count as a carry on.    If you travel international the machines take 110-240v.  The only adapter needed is the adapter that will fit into the receptacle (outlet).    You may also want to bring an extension cord as many hotel rooms have limited outlets at the bedside.  Do not travel with water in your humidifier chamber.     Cleaning and Maintenance Guidelines    Equipment Frequency Cleaning Method   Mask First Day    Daily      Weekly Soak mask in hot soapy water for 30 minutes, rinse and air dry.  Wipe nasal cushion with a hot soapy (Ivory, baby shampoo) cloth and rinse.  Baby wipes may also be used.  Do not use anti-bacterial soaps,Bibi  liquid soap, rubbing alcohol, bleach or ammonia.  Wash frame in hot soapy water (Ivory, baby  shampoo) rinse and let air dry   Headgear Biweekly Wash in hot soapy water, rinse and air dry   Reusable Gray Filter Weekly Wash in hot soapy water, rinse, put in towel squeeze moisture out, let air dry   Disposable White Filter Check Weekly Replace when brown or gray in color; at least every 2 to 3 months   Humidifier Chamber Daily    Weekly Empty distilled water from humidifier and let air dry    Hand wash in hot soapy water, rinse and air dry   Tubing Weekly Wash in hot soapy water, rinse and let air dry   Mask, Tubing and Humidifier Chamber As needed Disinfect: Soak in 1 part distilled white vinegar to 3 parts hot water for 30 minutes, rinse well and air dry  Not the material headgear        MASK AND SUPPLY REORDERING and EQUIPMENT NEEDS through your DME and per your insurance  Reminder: Most insurance companies will allow for a new mask, headgear, tubing, and reusable gray filter every six months.  Disposable white ultra-fine filters are covered monthly.      HOME AND SAFETY INSTRUCTIONS  Do not use frayed or cracked electrical cords, multi plug adaptors, or switched receptacles  Do not immerse electrical equipment into water  Assure that electrical cords do not become a tripping hazard

## 2023-06-16 NOTE — PROGRESS NOTES
Chief complaint: Follow-up overnight oximetry    History of Present Illness: 57-year-old Greek speaking female seen with  via iPad.  She has medical history of liver transplant for hepatitis C, hypertension, chronic kidney disease, obesity.  She is recently found to have hypoxemic hypercapnic respiratory failure after presenting with excessive daytime sleepiness.  Sleep study showed severe obstructive sleep apnea with hypoxemia and hypoventilation.  She is currently on bilevel and is here to follow-up results of overnight oximetry done on bilevel and supplemental oxygen at 3 L/min.    She denies any new sleep concerns.  She continues to use bilevel nightly.  She denies excessive daytime sleepiness.  Typical bedtimes around 1130 with rise time around 9.    Overnight oximetry did show some mild residual hypoxemia in a pattern that suggests some desaturations that could be apneas.    Reviewed download from the last month and it does show residual AHI increased to 7.3 predominantly obstructive events.  Current settings are IPAP 18, EPAP 9.    Sherman Sleepiness Scale  Total score - Sherman: 0 (6/16/2023 11:15 AM)   (Less than 10 normal)    Insomnia Severity Scale  BRIGID Total Score: 1  (normal 0-7, mild 8-14, moderate 15-21, severe 22-28)    Past Medical History:   Diagnosis Date     Anemia in CKD (chronic kidney disease)      Cataract      CKD (chronic kidney disease) stage 3, GFR 30-59 ml/min (H)      Hepatitis C     cleared virus spontaneously 2013     High risk medication use      Hypertension, renal      Immunosuppressed status (H)      Liver replaced by transplant (H) 01/01/2010     Osteoporosis      Recurrent pregnancy loss without current pregnancy      Recurrent UTI 07/12/2021     Stroke, hemorrhagic (H) 01/01/2008    Left cerebral intraparenchymal hemorrhage     Syncope      Unspecified viral hepatitis C without hepatic coma        Allergies   Allergen Reactions     Aspirin      3/31/16 Per pt,  tolerates 81 mg daily dose without ADR.     325 mg dose caused itchiness and hives.     Clarithromycin      Allergic reaction         Contrast Dye      Iodine      Pcn [Penicillins]        Current Outpatient Medications   Medication     acetaminophen (TYLENOL) 500 MG tablet     albuterol (ACCUNEB) 0.63 MG/3ML neb solution     albuterol (PROAIR HFA/PROVENTIL HFA/VENTOLIN HFA) 108 (90 Base) MCG/ACT inhaler     alendronate (FOSAMAX) 70 MG tablet     alum & mag hydroxide-simethicone (MAALOX ADVANCED MAX ST) 400-400-40 MG/5ML SUSP suspension     amLODIPine (NORVASC) 10 MG tablet     calcitRIOL (ROCALTROL) 0.25 MCG capsule     calcium carbonate 600 mg-vitamin D 400 units (CALTRATE) 600-400 MG-UNIT per tablet     calcium carbonate-vitamin D (CALTRATE) 600-10 MG-MCG per tablet     capsaicin-menthol-methyl sal 0.025-1-12 % external cream     cetirizine (ZYRTEC) 10 MG tablet     chlorthalidone (HYGROTON) 25 MG tablet     COMPOUNDED NON-CONTROLLED SUBSTANCE (CMPD RX) - PHARMACY TO MIX COMPOUNDED MEDICATION     cycloSPORINE modified (GENERIC EQUIVALENT) 25 MG capsule     Denture Care Products (EFFERDENT DENTURE CLEANSER) TBEF     febuxostat (ULORIC) 40 MG TABS tablet     furosemide (LASIX) 20 MG tablet     gabapentin (NEURONTIN) 300 MG capsule     ketotifen (ZADITOR) 0.025 % ophthalmic solution     lidocaine (XYLOCAINE) 5 % external ointment     methylPREDNISolone (MEDROL) 32 MG tablet     mineral oil-white petrolatum (EUCERIN/MINERIN) cream     Multiple Vitamin (DAILY-SUMA MULTIVITAMIN) TABS     multivitamin w/minerals (MULTI-VITAMIN) tablet     mycophenolate (GENERIC EQUIVALENT) 250 MG capsule     omeprazole (PRILOSEC) 20 MG DR capsule     order for DME     order for DME     order for DME     SENEXON-S 8.6-50 MG tablet     SM FIBER LAXATIVE 500 MG TABS tablet     STATIN NOT PRESCRIBED, INTENTIONAL,     umeclidinium (INCRUSE ELLIPTA) 62.5 MCG/INH inhaler     Vitamin D3 (CHOLECALCIFEROL) 25 mcg (1000 units) tablet      ACE/ARB/ARNI NOT PRESCRIBED (INTENTIONAL)     No current facility-administered medications for this visit.       Social History     Socioeconomic History     Marital status: Single     Spouse name: Not on file     Number of children: Not on file     Years of education: Not on file     Highest education level: Not on file   Occupational History     Not on file   Tobacco Use     Smoking status: Never     Smokeless tobacco: Never   Vaping Use     Vaping status: Never Used     Passive vaping exposure: Yes   Substance and Sexual Activity     Alcohol use: No     Drug use: No     Sexual activity: Not Currently   Other Topics Concern     Parent/sibling w/ CABG, MI or angioplasty before 65F 55M? Not Asked      Service No     Blood Transfusions Yes     Caffeine Concern No     Occupational Exposure No     Hobby Hazards No     Sleep Concern No     Stress Concern Yes     Comment: needs help at home for cares     Weight Concern Yes     Comment: wants to lose weight not gain weight     Special Diet No     Back Care Yes     Comment: uses a patch to relieve pain     Exercise Not Asked     Bike Helmet Not Asked     Seat Belt Yes     Self-Exams Not Asked   Social History Narrative    One son Carleen        GynHx:             Gets transportation via insurance - needs assistance due to unsteady gait from CVA     Social Determinants of Health     Financial Resource Strain: High Risk (2020)    Overall Financial Resource Strain (CARDIA)      Difficulty of Paying Living Expenses: Very hard   Food Insecurity: Food Insecurity Present (2020)    Hunger Vital Sign      Worried About Running Out of Food in the Last Year: Sometimes true      Ran Out of Food in the Last Year: Sometimes true   Transportation Needs: Unmet Transportation Needs (2022)    PRAPARE - Transportation      Lack of Transportation (Medical): Yes      Lack of Transportation (Non-Medical): Yes   Physical Activity: Not on file   Stress: Stress  "Concern Present (1/3/2022)    [employer] - Occupational Stress Questionnaire      Feeling of Stress : Rather much   Social Connections: Socially Isolated (1/3/2022)    Social Connection and Isolation Panel [NHANES]      Frequency of Communication with Friends and Family: Once a week      Frequency of Social Gatherings with Friends and Family: Once a week      Attends Adventism Services: Never      Active Member of Clubs or Organizations: No      Attends Club or Organization Meetings: Never      Marital Status:    Intimate Partner Violence: Not At Risk (1/28/2022)    Humiliation, Afraid, Rape, and Kick questionnaire      Fear of Current or Ex-Partner: No      Emotionally Abused: No      Physically Abused: No      Sexually Abused: No   Housing Stability: Not on file       Family History   Problem Relation Age of Onset     Hepatitis Other         Hep C, still in [place]     Cerebrovascular Disease Other      Cancer No family hx of      Diabetes No family hx of      Hypertension No family hx of      Thyroid Disease No family hx of      Glaucoma No family hx of      Macular Degeneration No family hx of            EXAM:  /66   Pulse 68   Ht 1.545 m (5' 0.83\")   Wt 105.3 kg (232 lb 1.6 oz)   LMP  (LMP Unknown)   BMI 44.10 kg/m    GENERAL: Aalert and no distress  EYES: Eyes grossly normal to inspection.  No discharge or erythema, or obvious scleral/conjunctival abnormalities.  RESP: No audible wheeze, cough, or visible cyanosis.  No visible retractions or increased work of breathing.    SKIN: Visible skin clear. No significant rash, abnormal pigmentation or lesions.  NEURO: Cranial nerves grossly intact.    PSYCH: Mentation appears normal,normal speech and appearance well-groomed.          PSG Split-night 1/10/2023  Weight 235 lbs BMI 44.9  AHI 79.2, RDI 89.3, Lowest O2 Sat 53% time with oxygen saturation less than or equal to 88% 95 minutes  Improved sleep consolidation on PAP " therapy  Patient was initiated on bilevel with incomplete titration.     PSG 3/22/2023  Weight 235 lbs BMI 44.9  Final Bilevel setting of IPAP 17, EPAP 8 and O2 at 3L/m resolved obstructive events and improved hypoxemia and hypoventilation. (No REM supine at final settings)       ResMed air curve 10 via auto PAP download from 5/17/2023 to 6/15/2023 reviewed:  Per cent of days used greater than 4 Hours 100% (minimum goal greater than 70%)  Average use on days used: 9 hours 13 min  Settings: EPAP 9 cmH2O    IPAP 18 cmH2O  Average AHI 7.3 events per hour (goal less than 5)  Leak acceptable    TSH   Date Value Ref Range Status   08/03/2022 0.77 0.30 - 4.20 uIU/mL Final   01/28/2022 0.57 0.40 - 4.00 mU/L Final   08/17/2016 0.81 0.40 - 4.00 mU/L Final          ASSESSMENT:  57-year-old female with obesity, liver transplant for hep C, hypertension, chronic kidney disease, severe obstructive sleep apnea with hypoxemia and hypoventilation.  She had an excellent clinical benefit with resolution of excessive daytime sleepiness.  She is meeting compliance goals with bilevel.  There is mild residual hypoxemia despite bilevel and supplemental oxygen at 3 L/min.  AHI slightly elevated that could explain explain the residual hypoxemia.    PLAN:  Patient is encouraged to continue to use bilevel all night every night with 3 L supplemental oxygen.  We will make a change to her settings by increasing EPAP and IPAP by 1 cm water.  I will recheck a download in the background in the next 2 to 3 weeks.  Patient should follow-up in the next 6 months with a download.  Work with [facility] for supplies and for oxygen.      25 minutes spent by me on the date of the encounter doing chart review, history and exam, documentation and further activities per the note    Bessy Ward M.D.  Pulmonary/Critical Care/Sleep Medicine    [facility]   Floor 1, [address]   [address] [place] [address]   Appointments: [phone]    The above note was dictated using voice recognition software and may include typographical errors. Please contact the author for any clarifications.

## 2023-06-20 ENCOUNTER — TELEPHONE (OUTPATIENT)
Dept: NEUROSURGERY | Facility: CLINIC | Age: 58
End: 2023-06-20
Payer: MEDICARE

## 2023-06-20 NOTE — TELEPHONE ENCOUNTER
[facility] Prior Authorization Team   Phone: [phone]  Fax: [phone]    Prior Authorization Approval    Medication: METHYLPREDNISOLONE  Authorization Effective Date: 1/1/2023  Authorization Expiration Date: 6/15/2024  Approved Dose/Quantity: 2 for 1 day  Reference #: [id number]   Insurance Company: [employer] - Phone [phone] Fax [phone]  Expected CoPay: $0     CoPay Card Available: No    Financial Assistance Needed: No  Which Pharmacy is filling the prescription: [facility] - [place], [place] - [address] [place] [address] [facility]  Pharmacy Notified: Yes  Patient Notified: Yes

## 2023-06-26 ENCOUNTER — ANCILLARY PROCEDURE (OUTPATIENT)
Dept: GENERAL RADIOLOGY | Facility: CLINIC | Age: 58
End: 2023-06-26
Attending: FAMILY MEDICINE
Payer: MEDICARE

## 2023-06-26 ENCOUNTER — OFFICE VISIT (OUTPATIENT)
Dept: FAMILY MEDICINE | Facility: CLINIC | Age: 58
End: 2023-06-26
Payer: MEDICARE

## 2023-06-26 VITALS
BODY MASS INDEX: 44.09 KG/M2 | DIASTOLIC BLOOD PRESSURE: 89 MMHG | HEART RATE: 76 BPM | WEIGHT: 232 LBS | SYSTOLIC BLOOD PRESSURE: 129 MMHG | OXYGEN SATURATION: 95 % | RESPIRATION RATE: 18 BRPM

## 2023-06-26 DIAGNOSIS — R06.02 SOB (SHORTNESS OF BREATH): ICD-10-CM

## 2023-06-26 DIAGNOSIS — R06.02 SOB (SHORTNESS OF BREATH): Primary | ICD-10-CM

## 2023-06-26 DIAGNOSIS — J45.20 MILD INTERMITTENT ASTHMA WITHOUT COMPLICATION: ICD-10-CM

## 2023-06-26 PROCEDURE — 71046 X-RAY EXAM CHEST 2 VIEWS: CPT | Mod: FY | Performed by: RADIOLOGY

## 2023-06-26 PROCEDURE — 99215 OFFICE O/P EST HI 40 MIN: CPT | Performed by: FAMILY MEDICINE

## 2023-06-26 RX ORDER — ALBUTEROL SULFATE 0.63 MG/3ML
1 SOLUTION RESPIRATORY (INHALATION) 3 TIMES DAILY PRN
Qty: 360 ML | Refills: 4 | Status: SHIPPED | OUTPATIENT
Start: 2023-06-26 | End: 2023-06-30

## 2023-06-26 NOTE — PATIENT INSTRUCTIONS
"Chest X-ray performed    2.   Recommend wearing a N95 or KN95 mask while outside in poor air quality. Can google \"air quality index (city)\" mask for any level over 50.    3.   Prescribed albuterol for symptoms as needed. Use through nebulizer or inhaler    4.   No changes in other medication  "

## 2023-06-26 NOTE — PROGRESS NOTES
Assessment & Plan     SOB (shortness of breath)    Broad differential including SURI, PE (but no tachycardia) and poor air quality. Doubt infectious process given lack of symptoms. SATS ranged mostly 93-97 while seated even while respiratory rate was high. Appears to be shallowly breathing. Medications are largely unchanged. Historically, there has been adherence to medications, but she has been doing well last year. No crackles, few concerns with heart failure, reviewed ECHO. While walking back to get X-Ray, she reported feeling anxious and the need to speed up which makes me concerned there's a psychogenic component. She improved greatly after a period of rest in the room. Vitals appear stable when compared to last several months of objective data.. X-rays are reassuring without pneumothorax or fluid overload, but there is borderline cardiomegaly. Reports excellent adherence to BIPAP for 10-12 hours a night which is beyond what sleep doctor recommended.    Reviewed need for N95 or comparable mask when air quality is poor. Taught her how to look up air quality index on google. Reviewed albuterol use and she needs more nebulizers so these are refilled.  - XR Chest 2 Views    53 minutes spent by me on the date of the encounter doing chart review, history and exam, documentation and further activities per the note       No follow-ups on file.    The information in this document, created by the medical scribe for me, accurately reflects the services I personally performed and the decisions made by me. I have reviewed and approved this document for accuracy prior to leaving the patient care area.  Karely Sierra MD  4:43 PM, 06/26/23  [facility] AMI Ray is a 57 year old, presenting for the following health issues:  RECHECK (Pt here for follow up) and Shortness of Breath (Pt states shortness of breath; feels bloated when short of breath)        6/26/2023     4:20 PM   Additional  Questions   Roomed by Bayron   Accompanied by Self     HPI   Pulmonary  Patient complains of difficulty breathing with increased pressure over her abdomen with inhaling.     Patient reports denies any coughing, fever. The patient notes that the straps on her oxygen mask are causing pain. She tries to cushion the straps but this does not completely alleviate discomfort. Patient feels bloated but denies any gas. The patient feels nervous when she is short of breath.    Patient reports episodes of tearfulness associated with how she is feeling with her breathing and abdomen.       Gastrointestinal  The patient reports she has bowel movements 1-3 times per day, and she does not have any complaints with this.         Review of Systems   Positive for: Shortness of breath, bloating  Negative for: Pain, coughing, fever    This document serves as a record of the services and decisions personally performed and made by Karely Sierra MD. It was created on his/her behalf by Alcides Galeana, a trained medical scribe. The creation of this document is based the provider's statements to the medical scribe.  Scribe Alcides Galeana 6:06 PM, June 26, 2023      Objective    /89 (BP Location: Left arm, Patient Position: Sitting, Cuff Size: Adult Regular)   Pulse 76   Resp 18   Wt 105.2 kg (232 lb)   LMP  (LMP Unknown)   SpO2 95%   BMI 44.09 kg/m    Body mass index is 44.09 kg/m .  Physical Exam   GENERAL: mild respiratory distress  RESP: Rate 38 per minute, lungs clear to auscultation - no rales, rhonchi or wheezes  CV: regular rate and rhythm, normal S1 S2, no S3 or S4, no murmur, click or rub, no peripheral edema and peripheral pulses strong  ABDOMEN: soft, non tender, no masses, palpable scar, mild protrusion of abdominal wall around central scar  MS: no gross musculoskeletal defects noted, no edema  NEURO:mentation intact and speech normal, normal gait  PSYCH: dressed appropriately for weather and occasion, behavior  rushed, speech rapid interruptive, psychomotor agitation present, affect anxious, improved greatly after quiet time in room, respiratory rate dropped to 20.    Xray - Reviewed and interpreted by me.  No obvious cephalization. Borderline cardiomegaly, no pulmonary edema, inspiration is moderate, costovertebral angles are clear.

## 2023-06-30 ENCOUNTER — TELEPHONE (OUTPATIENT)
Dept: FAMILY MEDICINE | Facility: CLINIC | Age: 58
End: 2023-06-30

## 2023-06-30 ENCOUNTER — ANCILLARY PROCEDURE (OUTPATIENT)
Dept: MRI IMAGING | Facility: CLINIC | Age: 58
End: 2023-06-30
Attending: PHYSICIAN ASSISTANT
Payer: MEDICARE

## 2023-06-30 DIAGNOSIS — D32.9 MENINGIOMA (H): ICD-10-CM

## 2023-06-30 DIAGNOSIS — J45.20 MILD INTERMITTENT ASTHMA WITHOUT COMPLICATION: ICD-10-CM

## 2023-06-30 DIAGNOSIS — J44.9 CHRONIC OBSTRUCTIVE PULMONARY DISEASE, UNSPECIFIED COPD TYPE (H): ICD-10-CM

## 2023-06-30 PROCEDURE — A9585 GADOBUTROL INJECTION: HCPCS | Mod: JZ | Performed by: RADIOLOGY

## 2023-06-30 PROCEDURE — 70553 MRI BRAIN STEM W/O & W/DYE: CPT | Mod: MG | Performed by: RADIOLOGY

## 2023-06-30 PROCEDURE — G1010 CDSM STANSON: HCPCS | Mod: GC | Performed by: RADIOLOGY

## 2023-06-30 RX ORDER — GADOBUTROL 604.72 MG/ML
10 INJECTION INTRAVENOUS ONCE
Status: COMPLETED | OUTPATIENT
Start: 2023-06-30 | End: 2023-06-30

## 2023-06-30 RX ORDER — ALBUTEROL SULFATE 90 UG/1
2 AEROSOL, METERED RESPIRATORY (INHALATION) 4 TIMES DAILY
Qty: 18 G | Refills: 1 | Status: SHIPPED | OUTPATIENT
Start: 2023-06-30 | End: 2023-07-24

## 2023-06-30 RX ORDER — ALBUTEROL SULFATE 0.63 MG/3ML
1 SOLUTION RESPIRATORY (INHALATION) EVERY 4 HOURS PRN
Qty: 360 ML | Refills: 4 | Status: SHIPPED | OUTPATIENT
Start: 2023-06-30 | End: 2023-09-15

## 2023-06-30 RX ADMIN — GADOBUTROL 10 ML: 604.72 INJECTION INTRAVENOUS at 17:45

## 2023-06-30 NOTE — DISCHARGE INSTRUCTIONS
MRI Contrast Discharge Instructions    The IV contrast you received today will pass out of your body in your  urine. This will happen in the next 24 hours. You will not feel this process.  Your urine will not change color.    Drink at least 4 extra glasses of water or juice today (unless your doctor  has restricted your fluids). This reduces the stress on your kidneys.  You may take your regular medicines.    If you are on dialysis: It is best to have dialysis today.    If you have a reaction: Most reactions happen right away. If you have  any new symptoms after leaving the hospital (such as hives or swelling),  call your hospital at the correct number below. Or call your family doctor.  If you have breathing distress or wheezing, call 911.    Special instructions: ***    I have read and understand the above information.    Signature:______________________________________ Date:___________    Staff:__________________________________________ Date:___________     Time:__________    [facility] Departments:    ___[place]: [phone]  ___[facility]: [phone]  ___[place]: [phone] ___[facility]: [phone]  ___[facility]: [phone]  ___[facility]: [phone]  ___[phone]  ___[facility]: [phone]  ___[phone]

## 2023-06-30 NOTE — TELEPHONE ENCOUNTER
"Last seen 6/26/2023    Request for medication refill:  albuterol (PROAIR HFA/PROVENTIL HFA/VENTOLIN HFA) 108 (90 Base) MCG/ACT inhaler  Providers if patient needs an appointment and you are willing to give a one month supply please refill for one month and  send a letter/MyChart using \".SMILLIMITEDREFILL\" .smillimited and route chart to \"[facility] \" (Giving one month refill in non controlled medications is strongly recommended before denial)    If refill has been denied, meaning absolutely no refills without visit, please complete the smart phrase \".smirxrefuse\" and route it to the \"P SMI MED REFILLS\"  pool to inform the patient and the pharmacy.    RAVI SAMANIEGO MA      "

## 2023-06-30 NOTE — TELEPHONE ENCOUNTER
Good morning,    Pt was refilling her albuterol nebs and was questioning frequency. Previously she nebbed 1 vial q4h prn, this updated rx states tid prn. Please clarify directions.    Thank you for your time,  Dianne Heard PharmD  [facility]   [phone]

## 2023-06-30 NOTE — TELEPHONE ENCOUNTER
[facility] phone call message- patient requesting a refill:    Full Medication Name:albuterol (PROAIR HFA/PROVENTIL HFA/VENTOLIN HFA) 108 (90 Base) MCG/ACT inhaler      Pharmacy confirmed as       [facility] - [place], [place] - [address]  [address] [place] [id number]  Phone: [phone] Fax: [phone]      : Yes    Additional Comments: The patient is requesting a refill.  The pharmacy also wants to know why the patient received a pediatric dose of albuterol solution.    OK to leave a message on voice mail? Yes    Primary language: Kinyarwanda      needed? Yes    Call taken on June 30, 2023 at 11:26 AM by Gomez Granados

## 2023-07-05 ENCOUNTER — DOCUMENTATION ONLY (OUTPATIENT)
Dept: FAMILY MEDICINE | Facility: CLINIC | Age: 58
End: 2023-07-05
Payer: MEDICARE

## 2023-07-05 ENCOUNTER — MEDICAL CORRESPONDENCE (OUTPATIENT)
Dept: HEALTH INFORMATION MANAGEMENT | Facility: CLINIC | Age: 58
End: 2023-07-05
Payer: MEDICARE

## 2023-07-05 NOTE — PROGRESS NOTES
"When opening a documentation only encounter, be sure to enter in \"Chief Complaint\" Forms and in \" Comments\" Title of form, description if needed.    Flor is a 57 year old  female  Form received via: Fax  Form now resides in: Provider Ready    Darius Durant MA    Form has been completed by provider.     Form sent out via: Fax to [facility] at Fax Number: [phone]  Patient informed: Yes  Output date: July 6, 2023    JAIME CHIU      **Please close the encounter**                  "

## 2023-07-07 ENCOUNTER — OFFICE VISIT (OUTPATIENT)
Dept: NEUROSURGERY | Facility: CLINIC | Age: 58
End: 2023-07-07
Attending: FAMILY MEDICINE
Payer: MEDICARE

## 2023-07-07 ENCOUNTER — PRE VISIT (OUTPATIENT)
Dept: NEUROSURGERY | Facility: CLINIC | Age: 58
End: 2023-07-07

## 2023-07-07 VITALS — OXYGEN SATURATION: 95 % | DIASTOLIC BLOOD PRESSURE: 69 MMHG | HEART RATE: 80 BPM | SYSTOLIC BLOOD PRESSURE: 114 MMHG

## 2023-07-07 DIAGNOSIS — D32.9 MENINGIOMA (H): ICD-10-CM

## 2023-07-07 PROCEDURE — 99204 OFFICE O/P NEW MOD 45 MIN: CPT | Performed by: PHYSICIAN ASSISTANT

## 2023-07-07 NOTE — PROGRESS NOTES
[facility]  Department of Neurosurgery  Center for Skull Base and Pituitary Surgery    Name: Flor Navarro  MRN: [id number]  Age: 57 year old  : 1965  Referring provider: Karely Sierra  2023      Chief Complaint:   Right frontal meningioma, new patient consult    History of Present Illness:   Flor Navarro is a 57 year old female with a history of hemorrhagic CVA (right hemiplegia), HLD, CKD stage 3, history of C1 fracture, gout, SURI, and s/p liver transplant who is here today as a new patient regarding a right frontal meningioma which was found in . She's here today with the aide of an Syriac video . She feels well. She has no complaints today.     Review of Systems:   Pertinent items are noted in HPI or as in patient entered ROS below, remainder of complete ROS is negative.     Active Medications:     Current Outpatient Medications:      acetaminophen (TYLENOL) 500 MG tablet, Take 1 tablet (500 mg) by mouth 2 times daily as needed for mild pain, Disp: 120 tablet, Rfl: 3     albuterol (ACCUNEB) 0.63 MG/3ML neb solution, Take 3 mLs (0.63 mg) by nebulization every 4 hours as needed for shortness of breath, wheezing or cough, Disp: 360 mL, Rfl: 4     albuterol (PROAIR HFA/PROVENTIL HFA/VENTOLIN HFA) 108 (90 Base) MCG/ACT inhaler, Inhale 2 puffs into the lungs 4 times daily, Disp: 18 g, Rfl: 1     ACE/ARB/ARNI NOT PRESCRIBED (INTENTIONAL), Please choose reason not prescribed from choices below., Disp: , Rfl:      alendronate (FOSAMAX) 70 MG tablet, Take 1 tablet (70 mg) by mouth every 7 days, Disp: 13 tablet, Rfl: 3     alum & mag hydroxide-simethicone (MAALOX ADVANCED MAX ST) 400-400-40 MG/5ML SUSP suspension, Take 30 mLs by mouth every 4 hours as needed for indigestion or heartburn, Disp: 355 mL, Rfl: 0     amLODIPine (NORVASC) 10 MG tablet, Take 1 tablet (10 mg) by mouth daily, Disp: 90 tablet, Rfl: 3     calcitRIOL (ROCALTROL) 0.25 MCG capsule, Take 1 capsule (0.25 mcg)  by mouth daily, Disp: 90 capsule, Rfl: 3     calcium carbonate 600 mg-vitamin D 400 units (CALTRATE) 600-400 MG-UNIT per tablet, Take 1 tablet by mouth 2 times daily, Disp: 60 tablet, Rfl: 11     calcium carbonate-vitamin D (CALTRATE) 600-10 MG-MCG per tablet, TAKE ONE TABLET BY MOUTH TWICE A DAY, Disp: 60 tablet, Rfl: 11     capsaicin-menthol-methyl sal 0.025-1-12 % external cream, Apply to low back three times daily for pain, Disp: 56.6 g, Rfl: 3     cetirizine (ZYRTEC) 10 MG tablet, Take 1 tablet (10 mg) by mouth daily, Disp: 90 tablet, Rfl: 3     chlorthalidone (HYGROTON) 25 MG tablet, Take 1 tablet (25 mg) by mouth daily for 180 days, Disp: 90 tablet, Rfl: 1     COMPOUNDED NON-CONTROLLED SUBSTANCE (CMPD RX) - PHARMACY TO MIX COMPOUNDED MEDICATION, Equal parts of 2% Benadryl, 2% vicious lidocaine and TC suspension maalox,  Swish and spit 5 ml every 6 hrs as needed for mouth pain, Disp: 300 mL, Rfl: 1     cycloSPORINE modified (GENERIC EQUIVALENT) 25 MG capsule, TAKE THREE CAPSULES BY MOUTH EVERY MORNING & TAKE TWO CAPSULES BY MOUTH EVERY NIGHT AT BEDTIME, Disp: 150 capsule, Rfl: 11     Denture Care Products (EFFERDENT DENTURE CLEANSER) TBEF, Use nightly to clean oral appliance, Disp: 30 tablet, Rfl: 11     febuxostat (ULORIC) 40 MG TABS tablet, Take 1 tablet (40 mg) by mouth daily, Disp: 90 tablet, Rfl: 3     furosemide (LASIX) 20 MG tablet, Take 1 tablet (20 mg) by mouth 2 times daily, Disp: 180 tablet, Rfl: 3     gabapentin (NEURONTIN) 300 MG capsule, Take 1 capsule (300 mg) by mouth At Bedtime, Disp: 180 capsule, Rfl: 3     ketotifen (ZADITOR) 0.025 % ophthalmic solution, Place 1 drop into both eyes 2 times daily, Disp: , Rfl:      lidocaine (XYLOCAINE) 5 % external ointment, Apply topically as needed for moderate pain, Disp: 240 g, Rfl: 3     methylPREDNISolone (MEDROL) 32 MG tablet, Take one tablet by mouth 12 hours prior to the procedure with IV contrast and then again one tablet by mouth 2 hours prior to  procedure with IV contrast, Disp: 2 tablet, Rfl: 0     mineral oil-white petrolatum (EUCERIN/MINERIN) cream, Apply topically 2 times daily, Disp: 454 g, Rfl: 4     Multiple Vitamin (DAILY-SUMA MULTIVITAMIN) TABS, Take 1 tablet by mouth every morning, Disp: 100 tablet, Rfl: 4     multivitamin w/minerals (MULTI-VITAMIN) tablet, Take 1 tablet by mouth daily Needs 4 month supply for International Travel, Disp: 100 tablet, Rfl: 11     mycophenolate (GENERIC EQUIVALENT) 250 MG capsule, TAKE TWO CAPSULES BY MOUTH EVERY 12 HOURS, Disp: 120 capsule, Rfl: 11     omeprazole (PRILOSEC) 20 MG DR capsule, Take 1 capsule (20 mg) by mouth 2 times daily el, Disp: 180 capsule, Rfl: 3     order for DME, Equipment being ordered: compression stockings bilateral Please measure and fit patient for stockings  Strength 15-30mmHg Disp 2 pairs ( 4 socks) 1 refill over the time of 1 year, Disp: 4 Units, Rfl: 1     order for DME, Equipment being ordered:  1) cane 2) compression stockings 15mmHg, 3 pairs Dx: gait stability, falls, edema, Disp: 1 each, Rfl: 0     order for DME, Equipment being ordered: Nebulizer with adult mask and tubing, Disp: 1 Units, Rfl: 0     SENEXON-S 8.6-50 MG tablet, TAKE TWO TABLETS BY MOUTH TWICE A DAY, Disp: 180 tablet, Rfl: 3     SM FIBER LAXATIVE 500 MG TABS tablet, Take 2 tablets (1,000 mg) by mouth daily, Disp: 90 tablet, Rfl: 3     STATIN NOT PRESCRIBED, INTENTIONAL,, Not prescribed, Disp: 0 each, Rfl: 0     umeclidinium (INCRUSE ELLIPTA) 62.5 MCG/INH inhaler, Inhale 1 puff into the lungs daily, Disp: 30 each, Rfl: 11     Vitamin D3 (CHOLECALCIFEROL) 25 mcg (1000 units) tablet, Take 1 tablet (25 mcg) by mouth daily, Disp: 100 tablet, Rfl: 3      Allergies:   Aspirin, Clarithromycin, Contrast dye, Iodine, and Pcn [penicillins]      Past Medical History:  Past Medical History:   Diagnosis Date     Anemia in CKD (chronic kidney disease)      Cataract      CKD (chronic kidney disease) stage 3, GFR 30-59 ml/min (H)       Hepatitis C     cleared virus spontaneously      High risk medication use      Hypertension, renal      Immunosuppressed status (H)      Liver replaced by transplant (H) 2010     Osteoporosis      Recurrent pregnancy loss without current pregnancy      Recurrent UTI 2021     Stroke, hemorrhagic (H) 2008    Left cerebral intraparenchymal hemorrhage     Syncope      Unspecified viral hepatitis C without hepatic coma      Patient Active Problem List   Diagnosis     PVD (POSTERIOR VITREOUS DETACHMENT) OS     Hyperlipidemia LDL goal <160     CKD (chronic kidney disease) stage 3, GFR 30-59 ml/min (H)     Hypertension, renal     Liver replaced by transplant     High risk medication use     Anemia in CKD (chronic kidney disease)     Stroke, hemorrhagic (H)     Osteoporosis     Stress incontinence     Immunosuppressed status (H)     Ganglion cyst     Vitamin D deficiency     Shoulder joint pain     Pseudophakia - Left Eye     Constipation, chronic     Secondary hyperparathyroidism (H)     Mixed hyperlipidemia     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)     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)     Obesity, Class III, BMI 40-49.9 (morbid obesity) (H)     SURI (obstructive sleep apnea)     Depression, major, recurrent, in complete remission (H)     Fall     Anxiety     Moderate episode of recurrent major depressive disorder (H)     Gait abnormality     Chronic gout due to renal impairment involving toe of left foot without tophus     Primary osteoarthritis of left ankle     Meningioma (H)     Complex care coordination     Sacroiliitis (H)     Splenic artery aneurysm (H)     Dietary counseling and surveillance- LOW SALT     Bilateral leg edema     Hypoxemia associated with sleep        Past Surgical History:  Past Surgical History:   Procedure Laterality Date     CATARACT IOL, RT/LT Right 2/18/15      SECTION       Incisional Hernia Repair  2004     INSERT SHUNT  PORTAL TRANSJUGULAR INTRAHEPTIC      shunt placement for liver failure     LAPAROSCOPIC SALPINGO-OOPHORECTOMY      left     NECK SURGERY  2010    fracture, in halo x 7months     TRANSPLANT LIVER RECIPIENT  DONOR  10/26/10     Upper GI Endoscopy with Band Ligation of Esoph/Gastric Varic. .         Family History:   Family History   Problem Relation Age of Onset     Hepatitis Other         Hep C, still in [place]     Cerebrovascular Disease Other      Cancer No family hx of      Diabetes No family hx of      Hypertension No family hx of      Thyroid Disease No family hx of      Glaucoma No family hx of      Macular Degeneration No family hx of          Social History:   Social History     Tobacco Use     Smoking status: Never     Smokeless tobacco: Never   Vaping Use     Vaping Use: Never used   Substance Use Topics     Alcohol use: No     Drug use: No        Physical Exam:   /69   Pulse 80   LMP  (LMP Unknown)   SpO2 95%   General: No acute distress.   Eyes: Conjunctivae are normal.  MSK: Moves all extremities.  No obvious deformity.  Neuro: The patient is fully oriented. Speech is normal. Extraocular movements are intact without nystagmus. Facial sensation is intact in V1, V2, V3 distributions. Facial nerve function is normal, rated as a House Brackmann 1. There is no pronator drift. Gait is normal  Psych: Normal mood and affect. Behavior is normal.      Imaging:  We reviewed her MRI from 2023. This shows a small right frontal dural based lesion which is stable over the past 10+ years.      Assessment:  Right frontal meningioma, new patient consult    Plan:  We discussed the stability of this right frontal meningioma over at least the past 10 years. She can follow up with us in 3-5 years with repeat MRI prior to appointment, and was encouraged to reach out with new questions or concerns in the meantime.       Mireille Pastor PA-C  Department of Neurosurgery  Center for Skull Base and Pituitary  Surgery  [facility]

## 2023-07-07 NOTE — LETTER
2023       RE: Flor Navarro  [address]  [facility] [address]       Dear Colleague,    Thank you for referring your patient, Flor Navarro, to the [facility] [place] at [facility]. Please see a copy of my visit note below.      [facility]  Department of Neurosurgery  Center for Skull Base and Pituitary Surgery    Name: Flor Navarro  MRN: [id number]  Age: 57 year old  : 1965  Referring provider: Karely Sierra  2023      Chief Complaint:   Right frontal meningioma, new patient consult    History of Present Illness:   Flor Navarro is a 57 year old female with a history of hemorrhagic CVA (right hemiplegia), HLD, CKD stage 3, history of C1 fracture, gout, SURI, and s/p liver transplant who is here today as a new patient regarding a right frontal meningioma which was found in . She's here today with the aide of an Setswana video . She feels well. She has no complaints today.     Review of Systems:   Pertinent items are noted in HPI or as in patient entered ROS below, remainder of complete ROS is negative.     Active Medications:     Current Outpatient Medications:     acetaminophen (TYLENOL) 500 MG tablet, Take 1 tablet (500 mg) by mouth 2 times daily as needed for mild pain, Disp: 120 tablet, Rfl: 3    albuterol (ACCUNEB) 0.63 MG/3ML neb solution, Take 3 mLs (0.63 mg) by nebulization every 4 hours as needed for shortness of breath, wheezing or cough, Disp: 360 mL, Rfl: 4    albuterol (PROAIR HFA/PROVENTIL HFA/VENTOLIN HFA) 108 (90 Base) MCG/ACT inhaler, Inhale 2 puffs into the lungs 4 times daily, Disp: 18 g, Rfl: 1    ACE/ARB/ARNI NOT PRESCRIBED (INTENTIONAL), Please choose reason not prescribed from choices below., Disp: , Rfl:     alendronate (FOSAMAX) 70 MG tablet, Take 1 tablet (70 mg) by mouth every 7 days, Disp: 13 tablet, Rfl: 3    alum & mag hydroxide-simethicone (MAALOX  ADVANCED MAX ST) 400-400-40 MG/5ML SUSP suspension, Take 30 mLs by mouth every 4 hours as needed for indigestion or heartburn, Disp: 355 mL, Rfl: 0    amLODIPine (NORVASC) 10 MG tablet, Take 1 tablet (10 mg) by mouth daily, Disp: 90 tablet, Rfl: 3    calcitRIOL (ROCALTROL) 0.25 MCG capsule, Take 1 capsule (0.25 mcg) by mouth daily, Disp: 90 capsule, Rfl: 3    calcium carbonate 600 mg-vitamin D 400 units (CALTRATE) 600-400 MG-UNIT per tablet, Take 1 tablet by mouth 2 times daily, Disp: 60 tablet, Rfl: 11    calcium carbonate-vitamin D (CALTRATE) 600-10 MG-MCG per tablet, TAKE ONE TABLET BY MOUTH TWICE A DAY, Disp: 60 tablet, Rfl: 11    capsaicin-menthol-methyl sal 0.025-1-12 % external cream, Apply to low back three times daily for pain, Disp: 56.6 g, Rfl: 3    cetirizine (ZYRTEC) 10 MG tablet, Take 1 tablet (10 mg) by mouth daily, Disp: 90 tablet, Rfl: 3    chlorthalidone (HYGROTON) 25 MG tablet, Take 1 tablet (25 mg) by mouth daily for 180 days, Disp: 90 tablet, Rfl: 1    COMPOUNDED NON-CONTROLLED SUBSTANCE (CMPD RX) - PHARMACY TO MIX COMPOUNDED MEDICATION, Equal parts of 2% Benadryl, 2% vicious lidocaine and TC suspension maalox,  Swish and spit 5 ml every 6 hrs as needed for mouth pain, Disp: 300 mL, Rfl: 1    cycloSPORINE modified (GENERIC EQUIVALENT) 25 MG capsule, TAKE THREE CAPSULES BY MOUTH EVERY MORNING & TAKE TWO CAPSULES BY MOUTH EVERY NIGHT AT BEDTIME, Disp: 150 capsule, Rfl: 11    Denture Care Products (EFFERDENT DENTURE CLEANSER) TBEF, Use nightly to clean oral appliance, Disp: 30 tablet, Rfl: 11    febuxostat (ULORIC) 40 MG TABS tablet, Take 1 tablet (40 mg) by mouth daily, Disp: 90 tablet, Rfl: 3    furosemide (LASIX) 20 MG tablet, Take 1 tablet (20 mg) by mouth 2 times daily, Disp: 180 tablet, Rfl: 3    gabapentin (NEURONTIN) 300 MG capsule, Take 1 capsule (300 mg) by mouth At Bedtime, Disp: 180 capsule, Rfl: 3    ketotifen (ZADITOR) 0.025 % ophthalmic solution, Place 1 drop into both eyes 2 times  daily, Disp: , Rfl:     lidocaine (XYLOCAINE) 5 % external ointment, Apply topically as needed for moderate pain, Disp: 240 g, Rfl: 3    methylPREDNISolone (MEDROL) 32 MG tablet, Take one tablet by mouth 12 hours prior to the procedure with IV contrast and then again one tablet by mouth 2 hours prior to procedure with IV contrast, Disp: 2 tablet, Rfl: 0    mineral oil-white petrolatum (EUCERIN/MINERIN) cream, Apply topically 2 times daily, Disp: 454 g, Rfl: 4    Multiple Vitamin (DAILY-SUMA MULTIVITAMIN) TABS, Take 1 tablet by mouth every morning, Disp: 100 tablet, Rfl: 4    multivitamin w/minerals (MULTI-VITAMIN) tablet, Take 1 tablet by mouth daily Needs 4 month supply for International Travel, Disp: 100 tablet, Rfl: 11    mycophenolate (GENERIC EQUIVALENT) 250 MG capsule, TAKE TWO CAPSULES BY MOUTH EVERY 12 HOURS, Disp: 120 capsule, Rfl: 11    omeprazole (PRILOSEC) 20 MG DR capsule, Take 1 capsule (20 mg) by mouth 2 times daily el, Disp: 180 capsule, Rfl: 3    order for DME, Equipment being ordered: compression stockings bilateral Please measure and fit patient for stockings  Strength 15-30mmHg Disp 2 pairs ( 4 socks) 1 refill over the time of 1 year, Disp: 4 Units, Rfl: 1    order for DME, Equipment being ordered:  1) cane 2) compression stockings 15mmHg, 3 pairs Dx: gait stability, falls, edema, Disp: 1 each, Rfl: 0    order for DME, Equipment being ordered: Nebulizer with adult mask and tubing, Disp: 1 Units, Rfl: 0    SENEXON-S 8.6-50 MG tablet, TAKE TWO TABLETS BY MOUTH TWICE A DAY, Disp: 180 tablet, Rfl: 3    SM FIBER LAXATIVE 500 MG TABS tablet, Take 2 tablets (1,000 mg) by mouth daily, Disp: 90 tablet, Rfl: 3    STATIN NOT PRESCRIBED, INTENTIONAL,, Not prescribed, Disp: 0 each, Rfl: 0    umeclidinium (INCRUSE ELLIPTA) 62.5 MCG/INH inhaler, Inhale 1 puff into the lungs daily, Disp: 30 each, Rfl: 11    Vitamin D3 (CHOLECALCIFEROL) 25 mcg (1000 units) tablet, Take 1 tablet (25 mcg) by mouth daily, Disp: 100  tablet, Rfl: 3      Allergies:   Aspirin, Clarithromycin, Contrast dye, Iodine, and Pcn [penicillins]      Past Medical History:  Past Medical History:   Diagnosis Date    Anemia in CKD (chronic kidney disease)     Cataract     CKD (chronic kidney disease) stage 3, GFR 30-59 ml/min (H)     Hepatitis C     cleared virus spontaneously 2013    High risk medication use     Hypertension, renal     Immunosuppressed status (H)     Liver replaced by transplant (H) 01/01/2010    Osteoporosis     Recurrent pregnancy loss without current pregnancy     Recurrent UTI 07/12/2021    Stroke, hemorrhagic (H) 01/01/2008    Left cerebral intraparenchymal hemorrhage    Syncope     Unspecified viral hepatitis C without hepatic coma      Patient Active Problem List   Diagnosis    PVD (POSTERIOR VITREOUS DETACHMENT) OS    Hyperlipidemia LDL goal <160    CKD (chronic kidney disease) stage 3, GFR 30-59 ml/min (H)    Hypertension, renal    Liver replaced by transplant    High risk medication use    Anemia in CKD (chronic kidney disease)    Stroke, hemorrhagic (H)    Osteoporosis    Stress incontinence    Immunosuppressed status (H)    Ganglion cyst    Vitamin D deficiency    Shoulder joint pain    Pseudophakia - Left Eye    Constipation, chronic    Secondary hyperparathyroidism (H)    Mixed hyperlipidemia    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)    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)    Obesity, Class III, BMI 40-49.9 (morbid obesity) (H)    SURI (obstructive sleep apnea)    Depression, major, recurrent, in complete remission (H)    Fall    Anxiety    Moderate episode of recurrent major depressive disorder (H)    Gait abnormality    Chronic gout due to renal impairment involving toe of left foot without tophus    Primary osteoarthritis of left ankle    Meningioma (H)    Complex care coordination    Sacroiliitis (H)    Splenic artery aneurysm (H)    Dietary counseling and surveillance- LOW SALT    Bilateral leg  edema    Hypoxemia associated with sleep        Past Surgical History:  Past Surgical History:   Procedure Laterality Date    CATARACT IOL, RT/LT Right 2/18/15     SECTION      Incisional Hernia Repair  2004    INSERT SHUNT PORTAL TRANSJUGULAR INTRAHEPTIC  2005    shunt placement for liver failure    LAPAROSCOPIC SALPINGO-OOPHORECTOMY      left    NECK SURGERY  2010    fracture, in halo x 7months    TRANSPLANT LIVER RECIPIENT  DONOR  10/26/10    Upper GI Endoscopy with Band Ligation of Esoph/Gastric Varic. .         Family History:   Family History   Problem Relation Age of Onset    Hepatitis Other         Hep C, still in [place]    Cerebrovascular Disease Other     Cancer No family hx of     Diabetes No family hx of     Hypertension No family hx of     Thyroid Disease No family hx of     Glaucoma No family hx of     Macular Degeneration No family hx of          Social History:   Social History     Tobacco Use    Smoking status: Never    Smokeless tobacco: Never   Vaping Use    Vaping Use: Never used   Substance Use Topics    Alcohol use: No    Drug use: No        Physical Exam:   /69   Pulse 80   LMP  (LMP Unknown)   SpO2 95%   General: No acute distress.   Eyes: Conjunctivae are normal.  MSK: Moves all extremities.  No obvious deformity.  Neuro: The patient is fully oriented. Speech is normal. Extraocular movements are intact without nystagmus. Facial sensation is intact in V1, V2, V3 distributions. Facial nerve function is normal, rated as a House Brackmann 1. There is no pronator drift. Gait is normal  Psych: Normal mood and affect. Behavior is normal.      Imaging:  We reviewed her MRI from 2023. This shows a small right frontal dural based lesion which is stable over the past 10+ years.      Assessment:  Right frontal meningioma, new patient consult    Plan:  We discussed the stability of this right frontal meningioma over at least the past 10 years. She can follow up with us  in 3-5 years with repeat MRI prior to appointment, and was encouraged to reach out with new questions or concerns in the meantime.             Again, thank you for allowing me to participate in the care of your patient.      Sincerely,    Mireille Pastor PA-C

## 2023-07-10 DIAGNOSIS — K56.600 PARTIAL INTESTINAL OBSTRUCTION, UNSPECIFIED CAUSE (H): ICD-10-CM

## 2023-07-10 RX ORDER — AMOXICILLIN 250 MG
2 CAPSULE ORAL 2 TIMES DAILY
Qty: 180 TABLET | Refills: 3 | Status: SHIPPED | OUTPATIENT
Start: 2023-07-10 | End: 2024-01-29

## 2023-07-10 NOTE — TELEPHONE ENCOUNTER
"Request for medication refill:SENEXON-S 8.6-50 MG tablet  LV- 06/26/2023  Providers if patient needs an appointment and you are willing to give a one month supply please refill for one month and  send a letter/MyChart using \".SMILLIMITEDREFILL\" .smillimited and route chart to \"P [facility] \" (Giving one month refill in non controlled medications is strongly recommended before denial)    If refill has been denied, meaning absolutely no refills without visit, please complete the smart phrase \".smirxrefuse\" and route it to the \"P SMI MED REFILLS\"  pool to inform the patient and the pharmacy.    Rogelio Jules CMA        "

## 2023-07-14 ENCOUNTER — DOCUMENTATION ONLY (OUTPATIENT)
Dept: FAMILY MEDICINE | Facility: CLINIC | Age: 58
End: 2023-07-14
Payer: MEDICARE

## 2023-07-14 NOTE — PROGRESS NOTES
"When opening a documentation only encounter, be sure to enter in \"Chief Complaint\" Forms and in \" Comments\" Title of form, description if needed.    Flor is a 57 year old  female  Form received via: Fax  Form now resides in: Provider Ready    Darius Durant MA    Form has been completed by provider.     Form sent out via: Fax to [facility] at Fax Number: [phone]  Patient informed: Yes  Output date: July 18, 2023    JAIME CHIU      **Please close the encounter**                  "

## 2023-07-18 DIAGNOSIS — Z53.9 DIAGNOSIS NOT YET DEFINED: Primary | ICD-10-CM

## 2023-07-18 PROCEDURE — G0179 MD RECERTIFICATION HHA PT: HCPCS | Performed by: STUDENT IN AN ORGANIZED HEALTH CARE EDUCATION/TRAINING PROGRAM

## 2023-07-21 ENCOUNTER — DOCUMENTATION ONLY (OUTPATIENT)
Dept: FAMILY MEDICINE | Facility: CLINIC | Age: 58
End: 2023-07-21
Payer: MEDICARE

## 2023-07-21 DIAGNOSIS — J96.11 CHRONIC RESPIRATORY FAILURE WITH HYPOXIA (H): Primary | ICD-10-CM

## 2023-07-24 ENCOUNTER — OFFICE VISIT (OUTPATIENT)
Dept: FAMILY MEDICINE | Facility: CLINIC | Age: 58
End: 2023-07-24
Payer: MEDICARE

## 2023-07-24 VITALS
TEMPERATURE: 97.9 F | SYSTOLIC BLOOD PRESSURE: 121 MMHG | DIASTOLIC BLOOD PRESSURE: 77 MMHG | HEART RATE: 88 BPM | OXYGEN SATURATION: 94 % | WEIGHT: 235 LBS | HEIGHT: 60 IN | RESPIRATION RATE: 30 BRPM | BODY MASS INDEX: 46.13 KG/M2

## 2023-07-24 DIAGNOSIS — J44.9 CHRONIC OBSTRUCTIVE PULMONARY DISEASE, UNSPECIFIED COPD TYPE (H): ICD-10-CM

## 2023-07-24 DIAGNOSIS — M25.511 ACUTE PAIN OF RIGHT SHOULDER: Primary | ICD-10-CM

## 2023-07-24 DIAGNOSIS — H57.9 ITCHY EYES: ICD-10-CM

## 2023-07-24 DIAGNOSIS — D32.9 MENINGIOMA (H): ICD-10-CM

## 2023-07-24 DIAGNOSIS — D84.9 IMMUNOSUPPRESSED STATUS (H): ICD-10-CM

## 2023-07-24 PROBLEM — I72.8 SPLENIC ARTERY ANEURYSM (H): Status: RESOLVED | Noted: 2022-06-01 | Resolved: 2023-07-24

## 2023-07-24 PROCEDURE — 0134A COVID-19 BIVALENT 18+ (MODERNA): CPT | Performed by: FAMILY MEDICINE

## 2023-07-24 PROCEDURE — 91313 COVID-19 BIVALENT 18+ (MODERNA): CPT | Performed by: FAMILY MEDICINE

## 2023-07-24 PROCEDURE — 99215 OFFICE O/P EST HI 40 MIN: CPT | Mod: 25 | Performed by: FAMILY MEDICINE

## 2023-07-24 RX ORDER — ALBUTEROL SULFATE 90 UG/1
2 AEROSOL, METERED RESPIRATORY (INHALATION) 4 TIMES DAILY
Qty: 18 G | Refills: 1 | Status: SHIPPED | OUTPATIENT
Start: 2023-07-24 | End: 2023-09-15

## 2023-07-24 ASSESSMENT — PAIN SCALES - GENERAL: PAINLEVEL: SEVERE PAIN (6)

## 2023-07-24 NOTE — PATIENT INSTRUCTIONS
1 Albuterol inhaler ordered to [facility] today.   Everyday inhaler refilled by mail order.  Referred to eye doctor. They will call Courtney to schedule.  Ordered Lung testing for your next lung doctor appointment.They will contact you for appointment  Paper referral given for pool physical therapy. Message me with the location name when you find out.  Bivalent COVID booster given.  Continue to follow-up monthly. We will contact you to set next appointment

## 2023-07-24 NOTE — PROGRESS NOTES
Assessment & Plan     Acute pain of right shoulder  Seems muscular and seems chronic though worse during the last 7 days. Requests pool therapy for a place she doesn't know the name of. She will bring referral herself.  - Physical Therapy Referral  - Physical Therapy Referral    Immunosuppressed status (H)  - COVID-19,PF,MODERNA BIVALENT 18+Yrs    Chronic obstructive pulmonary disease, unspecified COPD type (H)  Lots of confusion about inhalers and use of multiple pharmacies. Given no albuterol at home, we will HFA today at [facility]. Patient's son will  albuterol nebs separately. INCRUSE Inhaler will be sent through her usual mail order pharmacy. History of incomplete PFTs lacked flow volume loops. To evaluate for restriction, will repeat and sent to pulmonary given her severe SURI is well controlled on her BIPAP therapy, but she is still having shortness of breath.  - albuterol (PROAIR HFA/PROVENTIL HFA/VENTOLIN HFA) 108 (90 Base) MCG/ACT inhaler  Dispense: 18 g; Refill: 1  - umeclidinium (INCRUSE ELLIPTA) 62.5 MCG/ACT inhaler  Dispense: 90 each; Refill: 3  - Adult Pulmonary Medicine  Referral  - General PFT Lab (Please always keep checked)  - Pulmonary Function Test    Itchy eyes; Meningioma (H)  She requested ophthalmology referral. She does have a meningioma that has intermittently required seeing ophthalmologist for seeing if there is any optic nerve involvement. According to past notes, meningioma is stable and won't need further MRI for 3-5 years. For neurosurgery Dr. Pastor.  - Adult Eye  Referral            42 minutes spent by me on the date of the encounter doing chart review, history and exam, documentation and further activities per the note.       Return in about 4 weeks (around 8/21/2023).    The information in this document, created by the medical scribe for me, accurately reflects the services I personally performed and the decisions made by me. I have reviewed and approved  this document for accuracy prior to leaving the patient care area.  Karely Sierra MD  3:19 PM, 07/24/23   [facility] is a 57 year old, presenting for the following health issues:  Pain (Back right shoulder. Pain scale 6 )        7/24/2023     2:32 PM   Additional Questions   Roomed by Zaida   Accompanied by self     HPI     Right Shoulder Pain  She reports pain in the right shoulder. This pain is worse when she leans over to pray. The pain makes it difficult to bend. The pain began 7-10 days ago after an indeterminate cause. She notes the worst of the pain is at shoulder joint and deltoid. She notes her upper extremity is heavy. This heaviness is not new. She denies any chest pain or left shoulder pain. Courtney massages her shoulder but this increases pain. She is wondering if pool physical therapy is a potential treatment.    COPD  She notes that she has been wearing a mask when outside which has helped her breathing. She has not been using her inhalers because she is running out and has not received either the daily or the rescue inhaler from the pharmacy. She is using a nebulizer at night. She notes that is getting nebulizer machine albuterol ordered but could only get it from a certain location and her son is picking it up today. She does not want to undergo surgery.     Dental Work  She reports going to the dentist where they advised her to undergo fillings and 6 crowns for 18 teeth. This procedure would cost thousands of dollars. She reports that her dental insurance would only cover $6000 of the treatment. Dental insurance covers the partial dentures. Her dental insurance does not cover any crowns. She states she will take care of her dental work when she goes to [place].    Eye problems  She notes intermittent itchy eyes that began three days ago.    Review of Systems   Positive for: Right shoulder pain, Difficulty breathing, itchy eyes  Negative for: Chest pain, left  shoulder pain    This document serves as a record of the services and decisions personally performed and made by Karely Sierra MD. It was created on his/her behalf by Alcides Galeana, a trained medical scribe. The creation of this document is based the provider's statements to the medical scribe.  Scribe Alcides Galeana 3:00 PM, July 24, 2023        Objective    /77   Pulse 88   Temp 97.9  F (36.6  C) (Oral)   Resp 30   Ht 1.524 m (5')   Wt 106.6 kg (235 lb)   LMP  (LMP Unknown)   SpO2 94%   BMI 45.90 kg/m    Body mass index is 45.9 kg/m .  Physical Exam   GENERAL: healthy, alert and no distress  MS: pain over the anterior glenohumeral joint. Muscular pain of the right trapezius which has large degree of spasm. Tender to palp over pectoral minor insertion but no bony tenderness. Tender along right clavicle and tense up right shoulder after any palpation.   PSYCH: mentation appears normal, affect normal/bright

## 2023-07-26 ENCOUNTER — DOCUMENTATION ONLY (OUTPATIENT)
Dept: FAMILY MEDICINE | Facility: CLINIC | Age: 58
End: 2023-07-26
Payer: MEDICARE

## 2023-07-26 NOTE — PROGRESS NOTES
7/26/2023    Care Coordinator successfully faxed a PT referral along with the last clinic note and face sheet to [facility] ([phone]) per patient's son Courtney.  CC called son, Courtney ([phone]) back to confirm the referral was sent.       Edie Livingston  Care Coordinator

## 2023-08-04 DIAGNOSIS — Z94.4 LIVER REPLACED BY TRANSPLANT (H): ICD-10-CM

## 2023-08-04 RX ORDER — MULTIPLE VITAMINS W/ MINERALS TAB 9MG-400MCG
1 TAB ORAL DAILY
Qty: 100 TABLET | Refills: 3 | Status: SHIPPED | OUTPATIENT
Start: 2023-08-04 | End: 2024-03-05

## 2023-08-04 NOTE — TELEPHONE ENCOUNTER
"Request for medication refill:  multivitamin w/minerals (MULTI-VITAMIN) tablet     Providers if patient needs an appointment and you are willing to give a one month supply please refill for one month and  send a letter/MyChart using \".SMILLIMITEDREFILL\" .smillimited and route chart to \"[facility] \" (Giving one month refill in non controlled medications is strongly recommended before denial)    If refill has been denied, meaning absolutely no refills without visit, please complete the smart phrase \".smirxrefuse\" and route it to the \"[facility] MED REFILLS\"  pool to inform the patient and the pharmacy.    Mignon Schultz RN      "

## 2023-08-04 NOTE — TELEPHONE ENCOUNTER
[facility] phone call message- patient requesting a refill:    Full Medication Name: Multiple Vitamin (DAILY-SUMA MULTIVITAMIN) TABS     Dose: Take 1 tablet by mouth every morning - Oral     Pharmacy confirmed as   [facility] - [place], [place] - [address]   [address] [place] [address] [facility] [place] [address]  Phone: [phone] Fax: [phone]  : Yes    Additional Comments: completely out     OK to leave a message on voice mail? Yes    Primary language: Sami      needed? Yes    Call taken on August 4, 2023 at 11:16 AM by Kranthi Cabral

## 2023-08-05 ENCOUNTER — TELEPHONE (OUTPATIENT)
Dept: NEPHROLOGY | Facility: CLINIC | Age: 58
End: 2023-08-05
Payer: MEDICARE

## 2023-08-05 NOTE — TELEPHONE ENCOUNTER
+ interp LVM // pt needs to schedule 6 month follow up with Dr. Arambula (Return Neph) around November/December - can manually schedule into Return Acute Kidney Injury slot - with labs prior // first attempt, AN 8.5.23

## 2023-08-10 DIAGNOSIS — Z94.4 LIVER REPLACED BY TRANSPLANT (H): Primary | ICD-10-CM

## 2023-08-10 DIAGNOSIS — Z13.220 LIPID SCREENING: ICD-10-CM

## 2023-08-12 ENCOUNTER — TELEPHONE (OUTPATIENT)
Dept: NEPHROLOGY | Facility: CLINIC | Age: 58
End: 2023-08-12
Payer: MEDICARE

## 2023-08-12 NOTE — TELEPHONE ENCOUNTER
+ interp LVM & sent letter // pt needs to schedule 6 month return neph with Dr. Arambula, or a fellow, around November 2023 // second attempt, AN 8.12.23

## 2023-08-16 ENCOUNTER — OFFICE VISIT (OUTPATIENT)
Dept: FAMILY MEDICINE | Facility: CLINIC | Age: 58
End: 2023-08-16
Payer: MEDICARE

## 2023-08-16 VITALS
OXYGEN SATURATION: 95 % | WEIGHT: 235.4 LBS | HEART RATE: 85 BPM | SYSTOLIC BLOOD PRESSURE: 133 MMHG | RESPIRATION RATE: 20 BRPM | BODY MASS INDEX: 45.97 KG/M2 | DIASTOLIC BLOOD PRESSURE: 83 MMHG

## 2023-08-16 DIAGNOSIS — F33.0 MILD EPISODE OF RECURRENT MAJOR DEPRESSIVE DISORDER (H): ICD-10-CM

## 2023-08-16 DIAGNOSIS — I12.9 HYPERTENSION, RENAL: Primary | ICD-10-CM

## 2023-08-16 DIAGNOSIS — H10.13 ALLERGIC CONJUNCTIVITIS, BILATERAL: ICD-10-CM

## 2023-08-16 DIAGNOSIS — R60.0 BILATERAL LEG EDEMA: ICD-10-CM

## 2023-08-16 DIAGNOSIS — M94.0 COSTOCHONDRITIS: ICD-10-CM

## 2023-08-16 PROCEDURE — 99215 OFFICE O/P EST HI 40 MIN: CPT | Performed by: FAMILY MEDICINE

## 2023-08-16 RX ORDER — CHLORTHALIDONE 25 MG/1
25 TABLET ORAL DAILY
Qty: 90 TABLET | Refills: 1 | Status: SHIPPED | OUTPATIENT
Start: 2023-08-16 | End: 2024-03-15

## 2023-08-16 RX ORDER — OLOPATADINE HYDROCHLORIDE 1 MG/ML
1 SOLUTION/ DROPS OPHTHALMIC 2 TIMES DAILY
Qty: 10 ML | Refills: 1 | Status: SHIPPED | OUTPATIENT
Start: 2023-08-16 | End: 2024-03-03

## 2023-08-16 NOTE — Clinical Note
Joanna - see that you have been reaching out to pt to schedule - thank you!  I saw her today and she said go ahead and schedule her anytime with Tyesha in November, preferring an afternoon appointments. I will see her monthly betweeen now and then and she'll get an AVS with the appointment.  Thanks! I updated her demographics too so son gets appointment calls in future.  c

## 2023-08-16 NOTE — PROGRESS NOTES
Assessment & Plan     Hypertension, renal  Blood pressure is well controled today. Though a little bit higher than last visit. It appears her chlorthalidone prescription  so resent. Review of chart shows nephrology has been trying to reach out to her. She hasn't been answering her phone because she can't understand and requested her son's phone has been put in as the contact. I also contacted the Nephrologist to schedule her 6 month follow-up visit in November.  - chlorthalidone (HYGROTON) 25 MG tablet  Dispense: 90 tablet; Refill: 1    Depression, major, recurrent, mild (H) - may have component of PTSD  Symptoms are uncontrolled. She revealed some old trauma that was previously unknown to me that is causing intrusive memories. She feels her 4 times weekly trips to adult  are most helpful because of the social engagement. She's previously been on medications between 3830-7937 but not since then and doesn't feel additional interventions such as medications or therapy would be helpful at this time. There may be a component of PTSD contribution here. Will continue to follow.    Bilateral leg edema  Reports that she hasn't gotten her leg stockings. Message sent to care coordination.     Allergic conjunctivitis, bilateral  New onset itching and watering not responsive to artificial tears. And exam findings are consistent with this diagnosis. Patanol BID PRN.  - olopatadine (PATANOL) 0.1 % ophthalmic solution  Dispense: 10 mL; Refill: 1    Costochondritis  She was previously sent to PT for pool therapy for right shoulder pain. This would also be helpful for left ribs. She didn't get copy of paper referral, so I printed and provided it today because she wants to bring it in person because she doesn't know the name of the place.        49 minutes spent by me on the date of the encounter doing chart review, history and exam, documentation and further activities per the note      Return in about 4 weeks  (around 9/13/2023).    The information in this document, created by the medical scribe for me, accurately reflects the services I personally performed and the decisions made by me. I have reviewed and approved this document for accuracy prior to leaving the patient care area.  Karely Sierra MD  3:16 PM, 08/16/23    [facility] is a 57 year old, presenting for the following health issues:  RECHECK (Patient is here for follow up; wanting chest Xray/)        8/16/2023     3:04 PM   Additional Questions   Roomed by Bayron   Accompanied by Self       HPI     Chest   She denies pain in her chest but is worried about getting an xray.     Left flank pain  The patient reports pain to left ribs upon palpation.    Right upper extremity pain  Pain originates in her right shoulder and radiates down her arm to her hand. She is worried this pain is associated with her chest. She has not begun PT because she did not receive the paper referral for PT to give to her chosen clinic.     Irritated eyes  She notes that her eye drops have not helped her itchy, watery eyes. This symptoms keep her up at night.     Heat tolerance  She reports that her air conditioning broke and she will have to pay thousands of dollars to fix this. This is concerning with an upcoming heat wave.    Mood  She states that she is doing okay. Going to the  3-4 days a week has helped because it provides a community. She states that she continues to have crying spells. Sometimes she finds herself thinking about her trauma. Her brother's wife was not letting her eat and wouldn't give them any fruit. She had to ask her sister to get any food and would have to hide this from her brother's wife. She feels she has adequate support.      Review of Systems   Positive for: flashbacks, right upper extremity pain, left flank pain  Negative for:     This document serves as a record of the services and decisions personally  performed and made by Karely Sierra MD. It was created on his/her behalf by Alcides Galeana, a trained medical scribe. The creation of this document is based the provider's statements to the medical scribe.  Scribscott Galeana 3:16 PM, August 16, 2023        Objective    /83 (BP Location: Left arm, Patient Position: Sitting, Cuff Size: Adult Regular)   Pulse 85   Resp 20   Wt 106.8 kg (235 lb 6.4 oz)   LMP  (LMP Unknown)   SpO2 95%   BMI 45.97 kg/m    Body mass index is 45.97 kg/m .  Physical Exam   GENERAL: healthy, alert and no distress  EYES: bilateral conjunctival injection, no drainage  RESP: lungs clear to auscultation - no rales, rhonchi or wheezes  CV: regular rate and rhythm, normal S1 S2, no S3 or S4, no murmur, click or rub, no peripheral edema and peripheral pulses strong  MS: Tender to palpation over the ribs inferior to axillary area  PSYCH: Dressed appropriately for weather and occasion. Behavior cooperative. Eye contact normal. Speech is regular in rate, volume, and prosody. Affect tearful, full range and appropriate to content. Thought content significant for intrusive memories about traumatic content from childhood

## 2023-08-16 NOTE — PATIENT INSTRUCTIONS
Breathing test scheduled for November 8th. Pulmonologist appointment is scheduled for after this test.  Eye doctor appointment next month in [place].  Eye drops prescribed use in both eyes every 12 hours. Report results to eye doctor.  Get hijab and socks wet to cool down in hot weather. Can also add water mist to fans.  Go to Kidney doctor appointment. They will contact [facility] for a November appointment.  Follow-up with me in 1 month. This is already scheduled

## 2023-08-16 NOTE — Clinical Note
Pt still in need of stockings for DME - ordered a bit ago and they were supposed to mail - never received  c

## 2023-08-22 ENCOUNTER — TELEPHONE (OUTPATIENT)
Dept: FAMILY MEDICINE | Facility: CLINIC | Age: 58
End: 2023-08-22
Payer: MEDICARE

## 2023-08-22 NOTE — TELEPHONE ENCOUNTER
8/22/23    Care Coordinator contacted son of patient ([phone]) to confirm the DME facility that they use and if they wanted to  an order for compression stockings, or if they wanted it delivered. Son, Courtney, stated going through [facility] would be fine, and they would like the supplies delivered to the home address. CC confirmed that the order would be faxed to [facility]. CC attempted to contact [facility] ([phone]) to make sure these supplies could be delivered, but CC had to leave a message and left direct call back number.     CC successfully faxed the DME order for compression stockings, along with clinic note from 2/1/23, to [facility] ([phone]) CC noted on the fax that patient would like supplies delivered to home address.      Edie Livingston  Care Coordinator   [phone]

## 2023-08-23 ENCOUNTER — MEDICAL CORRESPONDENCE (OUTPATIENT)
Dept: HEALTH INFORMATION MANAGEMENT | Facility: CLINIC | Age: 58
End: 2023-08-23
Payer: MEDICARE

## 2023-08-23 NOTE — PROGRESS NOTES
REQUISITION AND JUSTIFICATION FOR DURABLE MEDICAL EQUIPMENT    Patient Name:  Flor Navarro  MR #:  [id number]  :  1965  Age/Gender:  57 year old female  Visit Date:  Flor Navarro seen for seating and wheeled mobility evaluation by Katia HUI/L, ATP on 23.    CLINICAL CRITERIA FOR MOBILITY ASSISTIVE EQUIPMENT  Coverage Criteria Per Local Coverage Determination  A) Flor has mobility limitations due to hemorrhagic stoke with hemiplegia, HTN, PVD, IA , CKD and liver transplant that significantly impairs her ability to participate in all of her community mobility-related activities of daily living (MRADL). Current equipment used is cane, walker with seat and standard walker all with assist. This patient needs the new equipment requested to be able to allow for safe and independent participation in MRADLS and IADLS. Please see additional documentation in the seating and wheeled mobility report for details.   Flor had a successful clinical trial here, and also a successful trial at home with the recommended equipment. Flor is very willing and physically / cognitively able to use the recommended equipment to assist her with mobility-related activities of daily living and general mobility. A group 3 power wheelchair is being requested because it has better suspension for a smooth ride and has the capabilities of expandable electronics to operate the  power seat functions Flor needs for independence with her activities of daily living. A Group 2 power wheelchair does not have sufficient electronics to support this patient's progressive neurological deficits due to CVA with hemiplegia.  B) Flor's mobility limitation cannot be sufficiently and safely resolved by the use of an appropriately fitted cane or walker because she requires assistance for safe mobility with significant falls history. Distance and time to ambulate 25 ft with cane and HHA in clinic. Strength of legs is L 3/5 R 2/5 for one maximal  repetition. Fatigue also impacts this patient's ability to ambulate, regardless of the gait aid.    C) Flor does not have sufficient upper extremity function to self-propel an optimally-configured manual wheelchair in her home to perform MRADLs during a typical day due to limitations in strength, endurance, range of motion, and coordination. Distance and time to propel a light weight manual wheelchair Nt due to hemiplegia.  Strength of arms is L 3\5 R 2/5.  D)  Flor is not able to use a POV/scooter because it will not fit in her home environment. Flor is unable to safely transfer to and from a POV, unable to operate the Koalifyer steering system, and unable to maintain postural stability and position while operating the POV. Flor needs more appropriate seating and positioning than any scooter seat provides.  E) The need for this equipment is LIFETIME.     RECOMMENDATIONS FOR MOBILITY BASE, SEATING SYSTEM AND COMPONENTS  Quantum Q6 Edge 3MP-SS - this mid wheel drive power wheelchair is needed for this patient to continue to have independent mobility and to be able to allow her to complete or assist in all of her community mobility related activities of daily living. This wheelchair will also have the seating and positioning system and seat function she needs to be able to use and tolerate the wheelchair full time, and have functional and comfortable positioning for a full day's activities. She has had a CVA with hemiplegia which impairs her ability to move in her home without the use of the requested wheelchair. Her home is multi level and thus this equipment would be used to safely access her community.     100 amp Q-Logic 3 EX controller -  Needed for operation of the joystick for mobility and seat functions    Harness for expandable controller - needs to be inline for communication between the controller and the joystick    QZUtA LabsLogic 3 EX Joystick - this is the joystick needed to be used by this patient for moving  this wheelchair and also controlling the movement of the seat functions.    Swing away joystick mount - needed to be able to move the joystick out of the way during transfers, or when at the desk using the computer, or at the table eating, so as not to inadvertently hit the joystick, thus moving the wheelchair or turning the power on or off without her knowledge.    YVAN Balance Power Tilt and Recline  - This seating function combination is needed for this patient to be able to perform independent weight shifts and also to be able to change her seated position without the request of a care giver. She requires a powered seating system with both tilt and recline to optimize pressure distribution and postural repositioning.   The power tilt seat allows her to change her position by rotating rearward without changing the angle of her hips or knees. Due to atrophy of her buttocks she is at high risk of developing pressure ulcers if not able to change her position. Due to compromised ability to exert herself for weight shifting, the power tilt seat allows her to do this by operating it through the joystick. She can also use a slight degree of tilt for assisting in her seating and positioning for normal functions during the day a to assist keeping her in a midline, erect and upright position by using the effect of gravity.   The power reclining back feature also reduces pressures by allowing her to change the angle of her hips and knees into a more open and supine / recumbent position. This increases her tolerance and be able to remain in the requested wheelchair for a complete day and not be dependent on care givers to change her position or transfer her to another surface for comfort or resting. The recline feature can be used to access the perineal area necessary for toileting assistance. The power tilt seat and power recline back are necessary features that allow tasks to be done by the care giver without the need for  transferring back to bed for these activities.  The medical justification for these seat functions is consistent with the RESNA Position Papers regarding these seat function interventions (Monique et al., 2009). These seat functions will also reduce upper extremity strain per the Paralyzed 's of Pavithra Guidelines for upper limb preservation (Boninger, et al., 2005).    Power elevating seat - Vertical movement is necessary to allow her to function and participate in a three-dimensional world. This seat feature will increase her ability to reach by bringing her closer for better access with weak arms while reaching into cupboards or closets.  During the home trial she was able to use this feature to aid in transfers and active reach.  She is transferring up to moderate assistance. This requested seat lift feature promotes safety with and improved independence with lateral transfers by allowing a level transfer or transfer from a higher to lower surface, which is gravity-assisted.  It also facilitates forward transfer by allowing legs, hips to be more extended, thereby lessening the strength required for the user to perform a stand-pivot transfer.  Power seat elevation also allows the user to have eye contact with others and reduces cervical strain and pain (including headaches from poor positioning). Vertical rise also provides psycho-social benefits of being on peer level and speaking eye-to-eye. Additionally, seat elevation allows certain medications to be kept out of reach of children but remain accessible to the user.    YVAN comfort Solid curved and padded back support - firm and contoured back support is needed to support Flor's thoracolumbar area in an upright and midline position, with appropriate support pads as needed. This will provide support whether she is in the upright or tilted/reclined position. This back support is essential to provide sufficient lateral contour to maximize her postural  alignment and minimize her tendency to develop scoliosis and other secondary complications.    Stealth Comfort Plus headrest and mounting hardware - needed to keep Flor's head in an erect, midline and upright position whether she is in the upright, tilted or reclined position. Her head and neck need to be supported as well as the rest of her body.    Stealth worlds best mounting hardware - needed for mounting the requested headrest in the most appropriate position on the back of the wheelchair for optimal head and neck support and to be able to be removed for transfers.     Power Positioning electronics to be operated through the Q logic controller - this is needed to allow her to use the joystick of the wheelchair for control of mobility and operation of the power seat function. This is important for this patient with limited strength and coordination so as not to require a separate switch for operation of the seat function.    Width extension for footplate- needed to safely support LE    Power elevating foot legrest- Allows for elevation and extension of lower extremities while elevating. This can improve circulation and prevent/reduce edema (with recline/tilt combination).  The lower legs of this wheelchair user act as a reservoir for fluid accumulation due to lack of movement. Elevation of this patient's legs above the level of the heart (left atrium) is recommended as part of the management of edema in conjunction with other measures such as support garments  This patient has lower extremity dependent edema while sitting in her wheelchair, which resolves with elevation of her legs. This feature, when used with the power recline back or tilt seat, can increase Flor's sitting tolerance while positioning her in a more natural position. This can also be helpful if she fatigues and requires rests throughout the day, without transferring her back to bed.  Due to inability to perform a functional weight shifting,  she is at risk for developing pressure ulcers. With the use of this feature it will allow for optimal weight shifting in conjunction with tilt and recline.    Per RESNA white paper on elevating legrests, using elevating legrests and tilting more than 30 degrees in combination with full recline significantly improves lower leg hemodynamics status as measured by near-infrared spectroscopy (Annette et al., 2010)  Additionally, these legrests can improve ground clearance to navigate thresholds and slopes and still allowing the legs to achieve a tight 90 degree position for typical driving conditions. This position shortens the overall functional wheelbase for improved maneuverability    YVAN comfort SPP seat cushion - this pressure distribution and positioning seat cushion will optimally  distribute seating pressures to prevent pressure ulcers, but also provide a stable base of support for her to use during MRADLs.    2 Batteries and  - gel sealed, and two are necessary to power the wheelchair. They are maintenance free and are safe for travel on the road or in the air. They are necessary to provide reliable use of the power wheelchair on a single charge.    This equipment is reasonable and necessary with reference to accepted standards of medical practice and treatment of this patient's condition and is not being recommended as a convenience item. Without this recommended equipment, she is highly likely to sustain injuries from falls, develop pressure sores or postural compensation, and/or be bed confined, which those costs far exceed the cost of the requested equipment.    Electronically signed by:  Katia IVERSON, ATP      Occupational Therapist, Assistive   [phone]      fax: [phone]      [email or web address]  Seating Clinic- [facility], [address]  [address]  [place] [address]    I have read and concur with the above  recommendations.    Physician Printed Name __________________________________________    Physician SIgnature  _____________________________________________    Date of SIgnature ______________________________    Physician Phone  ______________________________

## 2023-08-25 ENCOUNTER — DOCUMENTATION ONLY (OUTPATIENT)
Dept: FAMILY MEDICINE | Facility: CLINIC | Age: 58
End: 2023-08-25
Payer: MEDICARE

## 2023-08-25 NOTE — PROGRESS NOTES
"When opening a documentation only encounter, be sure to enter in \"Chief Complaint\" Forms and in \" Comments\" Title of form, description if needed.    Flor is a 57 year old  female  Form received via: Fax  Form now resides in: Provider Edward CHIU                "

## 2023-08-29 ENCOUNTER — DOCUMENTATION ONLY (OUTPATIENT)
Dept: FAMILY MEDICINE | Facility: CLINIC | Age: 58
End: 2023-08-29
Payer: MEDICARE

## 2023-08-30 ENCOUNTER — DOCUMENTATION ONLY (OUTPATIENT)
Dept: FAMILY MEDICINE | Facility: CLINIC | Age: 58
End: 2023-08-30
Payer: MEDICARE

## 2023-08-30 ENCOUNTER — MEDICAL CORRESPONDENCE (OUTPATIENT)
Dept: HEALTH INFORMATION MANAGEMENT | Facility: CLINIC | Age: 58
End: 2023-08-30
Payer: MEDICARE

## 2023-08-30 DIAGNOSIS — Z53.9 DIAGNOSIS NOT YET DEFINED: Primary | ICD-10-CM

## 2023-09-01 DIAGNOSIS — I12.9 HYPERTENSION, RENAL: ICD-10-CM

## 2023-09-01 DIAGNOSIS — M81.8 OTHER OSTEOPOROSIS WITHOUT CURRENT PATHOLOGICAL FRACTURE: ICD-10-CM

## 2023-09-01 RX ORDER — AMLODIPINE BESYLATE 10 MG/1
10 TABLET ORAL DAILY
Qty: 90 TABLET | Refills: 3 | Status: SHIPPED | OUTPATIENT
Start: 2023-09-01 | End: 2023-09-15

## 2023-09-01 RX ORDER — CHOLECALCIFEROL (VITAMIN D3) 25 MCG
1 TABLET ORAL DAILY
Qty: 100 TABLET | Refills: 3 | Status: SHIPPED | OUTPATIENT
Start: 2023-09-01 | End: 2024-09-24

## 2023-09-01 NOTE — TELEPHONE ENCOUNTER
"Request for medication refill:    amLODIPine (NORVASC) 10 MG tablet       Vitamin D3 (CHOLECALCIFEROL) 25 mcg (1000 units) tablet       LV- 08/16/2023    Providers if patient needs an appointment and you are willing to give a one month supply please refill for one month and  send a letter/MyChart using \".SMILLIMITEDREFILL\" .smillimited and route chart to \"P [facility] \" (Giving one month refill in non controlled medications is strongly recommended before denial)    If refill has been denied, meaning absolutely no refills without visit, please complete the smart phrase \".smirxrefuse\" and route it to the \"P SMI MED REFILLS\"  pool to inform the patient and the pharmacy.    Rogelio Jules CMA      "

## 2023-09-07 ENCOUNTER — OFFICE VISIT (OUTPATIENT)
Dept: OPHTHALMOLOGY | Facility: CLINIC | Age: 58
End: 2023-09-07
Attending: FAMILY MEDICINE
Payer: MEDICARE

## 2023-09-07 DIAGNOSIS — H52.13 MYOPIA OF BOTH EYES WITH REGULAR ASTIGMATISM AND PRESBYOPIA: ICD-10-CM

## 2023-09-07 DIAGNOSIS — H52.223 MYOPIA OF BOTH EYES WITH REGULAR ASTIGMATISM AND PRESBYOPIA: ICD-10-CM

## 2023-09-07 DIAGNOSIS — H02.106 PUNCTAL ECTROPIONS OF BOTH EYES: ICD-10-CM

## 2023-09-07 DIAGNOSIS — H47.20 OPTIC ATROPHY: Primary | ICD-10-CM

## 2023-09-07 DIAGNOSIS — H02.103 PUNCTAL ECTROPIONS OF BOTH EYES: ICD-10-CM

## 2023-09-07 DIAGNOSIS — H04.123 DRY EYES, BILATERAL: ICD-10-CM

## 2023-09-07 DIAGNOSIS — H52.4 MYOPIA OF BOTH EYES WITH REGULAR ASTIGMATISM AND PRESBYOPIA: ICD-10-CM

## 2023-09-07 DIAGNOSIS — D32.9 MENINGIOMA (H): ICD-10-CM

## 2023-09-07 PROCEDURE — 92015 DETERMINE REFRACTIVE STATE: CPT | Mod: GY | Performed by: STUDENT IN AN ORGANIZED HEALTH CARE EDUCATION/TRAINING PROGRAM

## 2023-09-07 PROCEDURE — 92014 COMPRE OPH EXAM EST PT 1/>: CPT | Performed by: STUDENT IN AN ORGANIZED HEALTH CARE EDUCATION/TRAINING PROGRAM

## 2023-09-07 ASSESSMENT — VISUAL ACUITY
OD_SC+: -1
METHOD: SNELLEN - LINEAR
OS_PH_SC: 20/70
OD_SC: 20/80
OS_SC: 20/80
OD_PH_SC: 20/70
OS_PH_SC+: -1

## 2023-09-07 ASSESSMENT — CONF VISUAL FIELD
OS_NORMAL: 1
OD_INFERIOR_NASAL_RESTRICTION: 0
OD_NORMAL: 1
OS_INFERIOR_TEMPORAL_RESTRICTION: 0
OD_INFERIOR_TEMPORAL_RESTRICTION: 0
OD_SUPERIOR_TEMPORAL_RESTRICTION: 0
OS_INFERIOR_NASAL_RESTRICTION: 0
OS_SUPERIOR_TEMPORAL_RESTRICTION: 0
OS_SUPERIOR_NASAL_RESTRICTION: 0
OD_SUPERIOR_NASAL_RESTRICTION: 0

## 2023-09-07 ASSESSMENT — TONOMETRY
OD_IOP_MMHG: 16
OS_IOP_MMHG: 16
IOP_METHOD: APPLANATION

## 2023-09-07 ASSESSMENT — REFRACTION_MANIFEST
OS_ADD: +3.00
OS_SPHERE: -1.00
OS_CYLINDER: +0.50
OD_AXIS: 066
OD_ADD: +3.00
OD_CYLINDER: +0.75
OS_AXIS: 167
OD_SPHERE: -1.50

## 2023-09-07 ASSESSMENT — CUP TO DISC RATIO
OD_RATIO: 0.1
OS_RATIO: 0.05

## 2023-09-07 ASSESSMENT — EXTERNAL EXAM - LEFT EYE: OS_EXAM: NORMAL

## 2023-09-07 ASSESSMENT — EXTERNAL EXAM - RIGHT EYE: OD_EXAM: NORMAL

## 2023-09-07 NOTE — PROGRESS NOTES
Current Eye Medications:  none.     Subjective:  Comprehensive Eye Exam.  Patient complains of both eyes itching and epiphora.  She would like to get a new glasses prescription.  Patanol was not covered by insurance, so she would like a different prescription from Dr. Will.       Objective:  See Ophthalmology Exam.       Assessment:  Flor Navarro is a 57 year old female who presents with:   Encounter Diagnoses   Name Primary?    Optic atrophy - Left Eye Yes    Meningioma (H)     Dry eyes, bilateral     Punctal ectropion of both eyes     Myopia of both eyes with regular astigmatism and presbyopia        Plan:  Use ketotifen twice a day in both eyes for itchiness (prescription sent to pharmacy)    Glasses prescription given     If eye watering worsens, recommend seeing Dr. Santos again (oculoplastics doctor that you saw in Jan 2022)    Hortencia Will MD  [phone]

## 2023-09-07 NOTE — PATIENT INSTRUCTIONS
Use ketotifen twice a day in both eyes for itchiness (prescription sent to pharmacy)    Glasses prescription given     If eye watering worsens, recommend seeing Dr. Santos again (oculoplastics doctor that you saw in Jan 2022)    Hortencia Will MD  [phone]

## 2023-09-07 NOTE — LETTER
9/7/2023         RE: Flor Navarro  [address]  [place] [facility] [address]        Dear Colleague,    Thank you for referring your patient, Flor Navarro, to the [facility]. Please see a copy of my visit note below.     Current Eye Medications:  none.     Subjective:  Comprehensive Eye Exam.  Patient complains of both eyes itching and epiphora.  She would like to get a new glasses prescription.  Patanol was not covered by insurance, so she would like a different prescription from Dr. Will.       Objective:  See Ophthalmology Exam.       Assessment:  Flor Navarro is a 57 year old female who presents with:   Encounter Diagnoses   Name Primary?     Optic atrophy - Left Eye Yes     Meningioma (H)      Dry eyes, bilateral      Punctal ectropion of both eyes      Myopia of both eyes with regular astigmatism and presbyopia        Plan:  Use ketotifen twice a day in both eyes for itchiness (prescription sent to pharmacy)    Glasses prescription given     If eye watering worsens, recommend seeing Dr. Santos again (oculoplastics doctor that you saw in Jan 2022)    Hortencia Will MD  [phone]      Again, thank you for allowing me to participate in the care of your patient.        Sincerely,        Hortencia Will MD   negative

## 2023-09-15 ENCOUNTER — TELEPHONE (OUTPATIENT)
Dept: FAMILY MEDICINE | Facility: CLINIC | Age: 58
End: 2023-09-15

## 2023-09-15 ENCOUNTER — OFFICE VISIT (OUTPATIENT)
Dept: FAMILY MEDICINE | Facility: CLINIC | Age: 58
End: 2023-09-15
Payer: MEDICARE

## 2023-09-15 VITALS
DIASTOLIC BLOOD PRESSURE: 81 MMHG | HEART RATE: 82 BPM | OXYGEN SATURATION: 96 % | BODY MASS INDEX: 46.32 KG/M2 | SYSTOLIC BLOOD PRESSURE: 125 MMHG | WEIGHT: 237.2 LBS | RESPIRATION RATE: 20 BRPM

## 2023-09-15 DIAGNOSIS — M1A.3710 CHRONIC GOUT DUE TO RENAL IMPAIRMENT INVOLVING TOE OF RIGHT FOOT WITHOUT TOPHUS: ICD-10-CM

## 2023-09-15 DIAGNOSIS — H04.203 TEARING EYES: ICD-10-CM

## 2023-09-15 DIAGNOSIS — E66.01 OBESITY, CLASS III, BMI 40-49.9 (MORBID OBESITY) (H): ICD-10-CM

## 2023-09-15 DIAGNOSIS — N25.81 SECONDARY HYPERPARATHYROIDISM (H): ICD-10-CM

## 2023-09-15 DIAGNOSIS — Z79.899 POLYPHARMACY: ICD-10-CM

## 2023-09-15 DIAGNOSIS — I63.9 HEMIPLEGIA OF RIGHT DOMINANT SIDE DUE TO INFARCTION OF BRAIN (H): ICD-10-CM

## 2023-09-15 DIAGNOSIS — J44.9 CHRONIC OBSTRUCTIVE PULMONARY DISEASE, UNSPECIFIED COPD TYPE (H): ICD-10-CM

## 2023-09-15 DIAGNOSIS — G63 NEUROPATHY DUE TO MEDICAL CONDITION (H): ICD-10-CM

## 2023-09-15 DIAGNOSIS — N18.30 STAGE 3 CHRONIC KIDNEY DISEASE, UNSPECIFIED WHETHER STAGE 3A OR 3B CKD (H): ICD-10-CM

## 2023-09-15 DIAGNOSIS — M81.8 OTHER OSTEOPOROSIS WITHOUT CURRENT PATHOLOGICAL FRACTURE: ICD-10-CM

## 2023-09-15 DIAGNOSIS — M46.1 SACROILIITIS (H): ICD-10-CM

## 2023-09-15 DIAGNOSIS — R26.2 DIFFICULTY WALKING: ICD-10-CM

## 2023-09-15 DIAGNOSIS — I12.9 HYPERTENSION, RENAL: Primary | ICD-10-CM

## 2023-09-15 DIAGNOSIS — G81.91 HEMIPLEGIA OF RIGHT DOMINANT SIDE DUE TO INFARCTION OF BRAIN (H): ICD-10-CM

## 2023-09-15 DIAGNOSIS — E87.5 HYPERKALEMIA: ICD-10-CM

## 2023-09-15 PROCEDURE — 84100 ASSAY OF PHOSPHORUS: CPT | Performed by: FAMILY MEDICINE

## 2023-09-15 PROCEDURE — 80048 BASIC METABOLIC PNL TOTAL CA: CPT | Performed by: FAMILY MEDICINE

## 2023-09-15 PROCEDURE — 99215 OFFICE O/P EST HI 40 MIN: CPT | Mod: 25 | Performed by: FAMILY MEDICINE

## 2023-09-15 PROCEDURE — 82306 VITAMIN D 25 HYDROXY: CPT | Performed by: FAMILY MEDICINE

## 2023-09-15 PROCEDURE — G0008 ADMIN INFLUENZA VIRUS VAC: HCPCS | Performed by: FAMILY MEDICINE

## 2023-09-15 PROCEDURE — 83970 ASSAY OF PARATHORMONE: CPT | Performed by: FAMILY MEDICINE

## 2023-09-15 PROCEDURE — 90682 RIV4 VACC RECOMBINANT DNA IM: CPT | Performed by: FAMILY MEDICINE

## 2023-09-15 PROCEDURE — 36415 COLL VENOUS BLD VENIPUNCTURE: CPT | Performed by: FAMILY MEDICINE

## 2023-09-15 RX ORDER — CALCITRIOL 0.25 UG/1
0.25 CAPSULE, LIQUID FILLED ORAL DAILY
Qty: 90 CAPSULE | Refills: 3 | Status: SHIPPED | OUTPATIENT
Start: 2023-09-15 | End: 2024-09-19

## 2023-09-15 RX ORDER — FUROSEMIDE 20 MG
20 TABLET ORAL 2 TIMES DAILY
Qty: 180 TABLET | Refills: 3 | Status: SHIPPED | OUTPATIENT
Start: 2023-09-15 | End: 2024-09-23 | Stop reason: SINTOL

## 2023-09-15 RX ORDER — CETIRIZINE HYDROCHLORIDE 10 MG/1
10 TABLET ORAL DAILY
Qty: 90 TABLET | Refills: 3 | Status: SHIPPED | OUTPATIENT
Start: 2023-09-15

## 2023-09-15 RX ORDER — LIDOCAINE 50 MG/G
OINTMENT TOPICAL PRN
Qty: 240 G | Refills: 3 | Status: SHIPPED | OUTPATIENT
Start: 2023-09-15 | End: 2024-09-19

## 2023-09-15 RX ORDER — GABAPENTIN 300 MG/1
300 CAPSULE ORAL AT BEDTIME
Qty: 180 CAPSULE | Refills: 3 | Status: SHIPPED | OUTPATIENT
Start: 2023-09-15 | End: 2024-03-27

## 2023-09-15 RX ORDER — FEBUXOSTAT 40 MG/1
40 TABLET, FILM COATED ORAL DAILY
Qty: 90 TABLET | Refills: 3 | Status: SHIPPED | OUTPATIENT
Start: 2023-09-15

## 2023-09-15 RX ORDER — ALENDRONATE SODIUM 70 MG/1
70 TABLET ORAL
Qty: 13 TABLET | Refills: 3 | Status: SHIPPED | OUTPATIENT
Start: 2023-09-15 | End: 2024-03-27

## 2023-09-15 RX ORDER — ALBUTEROL SULFATE 90 UG/1
2 AEROSOL, METERED RESPIRATORY (INHALATION) 4 TIMES DAILY
Qty: 18 G | Refills: 1 | Status: SHIPPED | OUTPATIENT
Start: 2023-09-15 | End: 2023-10-16

## 2023-09-15 RX ORDER — ALBUTEROL SULFATE 0.63 MG/3ML
1 SOLUTION RESPIRATORY (INHALATION) EVERY 4 HOURS PRN
Qty: 360 ML | Refills: 4 | Status: SHIPPED | OUTPATIENT
Start: 2023-09-15 | End: 2023-10-16

## 2023-09-15 RX ORDER — AMLODIPINE BESYLATE 10 MG/1
10 TABLET ORAL DAILY
Qty: 90 TABLET | Refills: 3 | Status: SHIPPED | OUTPATIENT
Start: 2023-09-15

## 2023-09-15 NOTE — PATIENT INSTRUCTIONS
Wheel chair reordered.  Nebulizer medications refilled.  Labs today.  Referred to weight management to try new medications.  Blood pressure cuff and Oximeter ordered.  Flu shot administered today.  Follow-up with me in 1 months. We will contact Courtney to schedule.

## 2023-09-15 NOTE — CONFIDENTIAL NOTE
9/15/23    Care Coordinator contacted [facility] ([phone]) regarding a wheelchair order for patient. CC spoke with a representative who stated they were waiting on insurance approval through Medicare, which went through and was approved as of 9/14/23. Representative from [facility] stated Shital would be giving the patient a call as soon as possible to set up delivery of the wheelchair.      Edie Livingston  Care Coordinator        
oral

## 2023-09-15 NOTE — Clinical Note
"Last thing: Flor states that the \"nurse\" not sure if was actually PT that was helpign her in pool therapy said their time was done and she can't go anymore. I don't see any records that she met her goals - can you help with this? Where is she going and can she still?   Secondly - can you please submit a compass report about the severely delayed wheelchair stuff for our 2 patients? Thank you  c"

## 2023-09-15 NOTE — CONFIDENTIAL NOTE
9/15/2023    Care Coordinator contacted [facility] ([phone]) to inquire about compression stockings for patient. CC spoke with representative, Dilan, who said they received the order for the compression stockings on 8/22/23. On 8/22/23 representative, Shirley, had called the patient's son and told them, they will have to call again around 10/18/23- that is when they will be able to order 2 more pairs of compression stockings for the patient. DME orders are billed through patient's secondary insurance- [employer]. Through this insurance, patient is eligible for 2 pairs of compression stockings every 6 months. Patient last received 2 pairs of compression stockings on 4/18/23 per [facility] records. Therefore, will be eligible again on 10/18/23.     They recommend patient/family calls ([phone]) again around 10/18 for the stockings. They recommend patient comes into the facility to get resized, or patient can measure at home to make sure the stockings are the correct size.       Edie Livingstno  Care Coordinator  [phone]

## 2023-09-15 NOTE — PROGRESS NOTES
Assessment & Plan     Hypertension, renal  At goal, continue current therapies. Home BP monitor ordered. She was instructed to write down her results.   - amLODIPine (NORVASC) 10 MG tablet  Dispense: 90 tablet; Refill: 3  - Home Blood Pressure Monitor Order    Difficulty walking; 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)  She still does not have her wheel chair despite info being provided previously. Appreciate care coordinators assistance with this [facility]  - Wheelchair Order    Obesity, Class III, BMI 40-49.9 (morbid obesity) (H)  Patient has previously been seen at [facility] Management in 2019. Expresses frustration with low intake and lack of weight loss. Pool therapy cancelled because of lack of staffing. She can't exercise because of multiple co morbidities. May be eligible for semaglutide or phentermine as she did not have good success with naltrexone in the past. Will refer to weight management for consideration.   - Adult Comprehensive Weight Management  Referral    Polypharmacy  She struggles with understanding the pharmacy system and did review her medications as being indicated. She notes frustration with the amount of medications she takes. Recommend only using the mail order pharmacy for deliveries.    Secondary hyperparathyroidism (H)  Due for annual labs to reassess calcitriol dosing. Will send results by letter. No symptoms.  - Vitamin D Level  - Basic metabolic panel  - Phosphorus  - Parathyroid Hormone Intact  - Vitamin D Level  - Basic metabolic panel  - Phosphorus  - Parathyroid Hormone Intact    Chronic obstructive pulmonary disease, unspecified COPD type (H)  Refill  - albuterol (PROAIR HFA/PROVENTIL HFA/VENTOLIN HFA) 108 (90 Base) MCG/ACT inhaler  Dispense: 18 g; Refill: 1  - albuterol (ACCUNEB) 0.63 MG/3ML neb solution  Dispense: 360 mL; Refill: 4  - Other Respiratory Supplies    Other osteoporosis without current pathological  fracture  Refill  - alendronate (FOSAMAX) 70 MG tablet  Dispense: 13 tablet; Refill: 3    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)  Refill  - gabapentin (NEURONTIN) 300 MG capsule  Dispense: 180 capsule; Refill: 3  - lidocaine (XYLOCAINE) 5 % external ointment  Dispense: 240 g; Refill: 3    Tearing eyes  Refill  - cetirizine (ZYRTEC) 10 MG tablet  Dispense: 90 tablet; Refill: 3    Stage 3 chronic kidney disease, unspecified whether stage 3a or 3b CKD (H)  Refill  - calcitRIOL (ROCALTROL) 0.25 MCG capsule  Dispense: 90 capsule; Refill: 3    Sacroiliitis (H)  Refill  - capsaicin-menthol-methyl sal 0.025-1-12 % external cream  Dispense: 56.6 g; Refill: 3    Hyperkalemia  Refill  - furosemide (LASIX) 20 MG tablet  Dispense: 180 tablet; Refill: 3    Chronic gout due to renal impairment 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    The information in this document, created by the medical scribe for me, accurately reflects the services I personally performed and the decisions made by me. I have reviewed and approved this document for accuracy prior to leaving the patient care area.  Karely Sierra MD  12:59 PM, 09/15/23    [facility] Ray is a 57 year old, presenting for the following health issues:  RECHECK (Pt here for follow up)        9/15/2023    12:44 PM   Additional Questions   Roomed by Bayron   Accompanied by Self       HPI   Lower extremity edema  She reports that she still hasn't received her compression stockings. A nurse for her home care told her that they did not have the order for the stockings. She has a similar issue with obtaining a wheelchair. She is planning to use to the wheel chair for outside use.    Medications  She  notes that she has not been getting her medications because the pharmacy she has been going to have not had them. She is still receiving mail order medication. She needs nebulizer medications refilled. She has been using them but is running low.     Diet  She is trying to eat less and lose weight. She has been frustrated with her inability to lose weight especially in her stomach. She has not been able to ambulate distances due to exhaustion. She is curious about other interventions for loss of stomach weight. She has been to weight management several times and was not able to lose weight. While there she did take a medication. She is open to returning to weight management to try new medication.     Right upper extremity  She has not seen a nurse that was to accompany her to a swimming pool and sauna. The therapist stopped coming to the pool therapy for about a month because her order was finished.    Review of Systems   Positive for: lower extremity edema, right upper extremity pain  Negative for:     This document serves as a record of the services and decisions personally performed and made by Karely Sierra MD. It was created on his/her behalf by Alcides Galeana, a trained medical scribe. The creation of this document is based the provider's statements to the medical scribe.  Scribe Alcides Galeana 1:00 PM, September 15, 2023      Objective    /81 (BP Location: Left arm, Patient Position: Sitting, Cuff Size: Adult Regular)   Pulse 82   Resp 20   Wt 107.6 kg (237 lb 3.2 oz)   LMP  (LMP Unknown)   SpO2 96%   BMI 46.32 kg/m    Body mass index is 46.32 kg/m .  Physical Exam   GENERAL: healthy, alert and no distress  PSYCH: intermittently tearful. Affect full range.

## 2023-09-15 NOTE — Clinical Note
Still no compression stockings! This has been going on for months - if [facility] can't get, can we please use other agency? Thx  2nd issue is the electric wheelchair - started in March 2023 - they said they need a letter from a doctor - I need to know what they need, since as we saw w/ the previous pt, we are getting conflicting documentation requirements from the same agency!!! Pt needs asap c

## 2023-09-15 NOTE — PROGRESS NOTES
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 No    2- Does the pt have quadriplegia, a fixed hip angle, a trunk cast or brace, excessive extensor tone of the trunk muscles or a need to rest in a recumbent position two or more times during the day? Yes     3- Does the pt have a cast, brace or musculoskeletal condition, which prevents 90 degree flexion of the knee, or does the pt have significant edema of the lower extremities that requires an elevating leg rest, or is a reclining back ordered? Yes   Should have elevating leg rest due to edema     4- Does the pt have a need for arm height different than available using non-adjustable arms? No     5- How many hours per day does the pt usually spend in the wheelchair? 6 (rounded up to the next hour)    6- Does the pt have severe weakness of the upper extremities due to a neurologic, muscular, or cardiopulmonary disease/condition? Yes     7- Is the pt unable to operate any type of manual wheelchair? Yes      Per OT lu 5/2/23:  Treatment Diagnosis Impaired participation in MRADLS and IADLs  Therapy Frequency once  Planned Therapy Interventions Wheelchair Management/Propulsion Training  Planned Therapy Interventions Comments Determine need for power mobility due to limited functional endurance and safety secondary to stroke. Patient has multilevel home that does not support a wheelchair but has an attached garage that she could safely store chair for independence with community participation including appointments as she is now dependent on her son for all of this. Patient independently and easily [place] group 3 power wheelchair. Trials to be done with vendor  Risks and benefits of treatment have been explained Yes  Patient/family & other staff in agreement with plan of care  Yes  Comments noreen to be contacted  Session Time  OT Wheelchair Management Minutes ([id number]) 40  Certification  Certification date from 05/02/23     Flor had a successful clinical trial here, and also a successful trial at home with the recommended equipment. Flor is very willing and physically / cognitively able to use the recommended equipment to assist her with mobility-related activities of daily living and general mobility. A group 3 power wheelchair is being requested because it has better suspension for a smooth ride and has the capabilities of expandable electronics to operate the power seat functions Flor needs for independence with her activities of daily living. A Group 2 power wheelchair does not have sufficient electronics to support this patient's progressive neurological deficits due to CVA with hemiplegia.  B) Flor's mobility limitation cannot be sufficiently and safely resolved by the use of an appropriately fitted cane or walker because she requires assistance for safe mobility with significant falls history. Distance and time to ambulate 25 ft with cane and HHA in clinic. Strength of legs is L 3/5 R 2/5 for one maximal repetition. Fatigue also impacts this patient's ability to ambulate, regardless of the gait aid.    C) Flor does not have sufficient upper extremity function to self-propel an optimally-configured manual wheelchair in her home to perform MRADLs during a typical day due to limitations in strength, endurance, range of motion, and coordination. Distance and time to propel a light weight manual wheelchair Nt due to hemiplegia. Strength of arms is L 3\5 R 2/5.  D) Flor is not able to use a POV/scooter because it will not fit in her home environment. Flor is unable to safely transfer to and from a POV, unable to operate the tiller steering system, and unable to maintain postural stability and position while operating the POV. Flor needs more appropriate seating and positioning  than any scooter seat provides.     RECOMMENDATIONS FOR MOBILITY BASE, SEATING SYSTEM AND COMPONENTS  Quantum Q6 Tigre 3MP-SS - this mid wheel drive power wheelchair is needed for this patient to continue to have independent mobility and to be able to allow her to complete or assist in all of her community mobility related activities of daily living. This wheelchair will also have the seating and positioning system and seat function she needs to be able to use and tolerate the wheelchair full time, and have functional and comfortable positioning for a full day's activities. She has had a CVA with hemiplegia which impairs her ability to move in her home without the use of the requested wheelchair. Her home is multi level and thus this equipment would be used to safely access her community.    100 amp Q-Logic 3 EX controller - Needed for operation of the joystick for mobility and seat functions    Harness for expandable controller - needs to be inline for communication between the controller and the joystick    Q-Logic 3 EX Joystick - this is the joystick needed to be used by this patient for moving this wheelchair and also controlling the movement of the seat functions.    Swing away joystick mount - needed to be able to move the joystick out of the way during transfers, or when at the desk using the computer, or at the table eating, so as not to inadvertently hit the joystick, thus moving the wheelchair or turning the power on or off without her knowledge.    YVAN Balance Power Tilt and Recline - This seating function combination is needed for this patient to be able to perform independent weight shifts and also to be able to change her seated position without the request of a care giver. She requires a powered seating system with both tilt and recline to optimize pressure distribution and postural repositioning.   The power reclining back feature also reduces pressures by allowing her to change the angle of her hips  and knees into a more open and supine / recumbent position. This increases her tolerance and be able to remain in the requested wheelchair for a complete day and not be dependent on care givers to change her position or transfer her to another surface for comfort or resting. The recline feature can be used to access the perineal area necessary for toileting assistance. The power tilt seat and power recline back are necessary features that allow tasks to be done by the care giver without the need for transferring back to bed for these activities.   LiveRe best mounting hardware - needed for mounting the requested headrest in the most appropriate position on the back of the wheelchair for optimal head and neck support and to be able to be removed for transfers.    Power Positioning electronics to be operated through the Q logic controller - this is needed to allow her to use the joystick of the wheelchair for control of mobility and operation of the power seat function. This is important for this patient with limited strength and coordination so as not to require a separate switch for operation of the seat function.    Width extension for footplate- needed to safely support LE    Power elevating foot legrest- Allows for elevation and extension of lower extremities while elevating. This can improve circulation and prevent/reduce edema (with recline/tilt combination). The lower legs of this wheelchair user act as a reservoir for fluid accumulation due to lack of movement. Elevation of this patient's legs above the level of the heart (left atrium) is recommended as part of the management of edema in conjunction with other measures such as support garments This patient has lower extremity dependent edema while sitting in her wheelchair, which resolves with elevation of her legs. This feature, when used with the power recline back or tilt seat, can increase Flor's sitting tolerance while positioning her in a more  natural position. This can also be helpful if she fatigues and requires rests throughout the day, without transferring her back to bed. Due to inability to perform a functional weight shifting, she is at risk for developing pressure ulcers. With the use of this feature it will allow for optimal weight shifting in conjunction with tilt and recline.   2 Batteries and  - gel sealed, and two are necessary to power the wheelchair. They are maintenance free and are safe for travel on the road or in the air. They are necessary to provide reliable use of the power wheelchair on a single charge.    This equipment is reasonable and necessary with reference to accepted standards of medical practice and treatment of this patient's condition and is not being recommended as a convenience item. Without this recommended equipment, she is highly likely to sustain injuries from falls, develop pressure sores or postural compensation, and/or be bed confined, which those costs far exceed the cost of the requested equipment. DME (Durable Medical Equipment) Orders and Documentation  Orders Placed This Encounter   Procedures    Wheelchair Order    Other Respiratory Supplies    Home Blood Pressure Monitor Order        The patient was assessed and it was determined the patient is in need of the following listed DME Supplies/Equipment. Please complete supporting documentation below to demonstrate medical necessity.      Wheelchair Documentation  1. The patient has mobility limitations that impairs their ability to participate in one or more mobility related activities: Grooming, Bathing, and ambulation .  2. The patient's mobility limitations cannot be safely resolved by using a cane/walker:No cannot be resolved  3. The patients home has adequate access to use a manual wheelchair:No she cannot use due to hemiplegia  4. The use of a manual wheelchair on a regular basis will improve the patients ability to participate in mobility related  ADL's at home:No can't use manual   5. The patient is willing to use a manual wheelchair at home:No  6. The patient has adequate upper body strength and the mental capability to safely use a manual wheelchair and/or has a caregiver that is able to assist: No  7. Does the patient have a lower extremity injury or edema?Yes      DME (Durable Medical Equipment) Orders and Documentation  Orders Placed This Encounter   Procedures    Wheelchair Order    Other Respiratory Supplies    Home Blood Pressure Monitor Order      The patient was assessed and it was determined the patient is in need of the following listed DME Supplies/Equipment. Please complete supporting documentation below to demonstrate medical necessity.        DME All Other Item(s) Documentation    List reason for need and supporting documentation for medical necessity below for each DME item.     1. Oximeter for COPD   2. BP for renal hypertension home monitoring

## 2023-09-16 LAB
ANION GAP SERPL CALCULATED.3IONS-SCNC: 11 MMOL/L (ref 7–15)
BUN SERPL-MCNC: 28.2 MG/DL (ref 6–20)
CALCIUM SERPL-MCNC: 9.9 MG/DL (ref 8.6–10)
CHLORIDE SERPL-SCNC: 106 MMOL/L (ref 98–107)
CREAT SERPL-MCNC: 1.35 MG/DL (ref 0.51–0.95)
DEPRECATED HCO3 PLAS-SCNC: 24 MMOL/L (ref 22–29)
EGFRCR SERPLBLD CKD-EPI 2021: 45 ML/MIN/1.73M2
GLUCOSE SERPL-MCNC: 104 MG/DL (ref 70–99)
PHOSPHATE SERPL-MCNC: 3.6 MG/DL (ref 2.5–4.5)
POTASSIUM SERPL-SCNC: 4.7 MMOL/L (ref 3.4–5.3)
PTH-INTACT SERPL-MCNC: 27 PG/ML (ref 15–65)
SODIUM SERPL-SCNC: 141 MMOL/L (ref 136–145)

## 2023-09-20 ENCOUNTER — DOCUMENTATION ONLY (OUTPATIENT)
Dept: FAMILY MEDICINE | Facility: CLINIC | Age: 58
End: 2023-09-20
Payer: MEDICARE

## 2023-09-20 LAB — DEPRECATED CALCIDIOL+CALCIFEROL SERPL-MC: 48 UG/L (ref 20–75)

## 2023-09-20 NOTE — PROGRESS NOTES
"When opening a documentation only encounter, be sure to enter in \"Chief Complaint\" Forms and in \" Comments\" Title of form, description if needed.    Flor is a 57 year old  female  Form received via: Fax  Form now resides in: Provider Ready    Siomraa Cobos    Form has been completed by provider.     Form sent out via: Fax to [facility] at Fax Number: [phone]  Patient informed: Yes  Output date: September 22, 2023    JAIME CHIU      **Please close the encounter**             "

## 2023-09-25 DIAGNOSIS — Z94.4 LIVER REPLACED BY TRANSPLANT (H): ICD-10-CM

## 2023-09-27 RX ORDER — MYCOPHENOLATE MOFETIL 250 MG/1
500 CAPSULE ORAL 2 TIMES DAILY
Qty: 120 CAPSULE | Refills: 11 | Status: SHIPPED | OUTPATIENT
Start: 2023-09-27

## 2023-10-09 ENCOUNTER — APPOINTMENT (OUTPATIENT)
Dept: OPTOMETRY | Facility: CLINIC | Age: 58
End: 2023-10-09
Payer: MEDICARE

## 2023-10-09 PROCEDURE — 92341 FIT SPECTACLES BIFOCAL: CPT | Performed by: OPHTHALMOLOGY

## 2023-10-11 DIAGNOSIS — J44.9 COPD (CHRONIC OBSTRUCTIVE PULMONARY DISEASE) (H): Primary | ICD-10-CM

## 2023-10-11 NOTE — CONFIDENTIAL NOTE
RECORDS RECEIVED FROM: internal    DATE RECEIVED: 11/8/23    NOTES STATUS DETAILS   OFFICE NOTE from referring provider internal    Rigo, MD Karely      OFFICE NOTE from other specialist     DISCHARGE SUMMARY from hospital     DISCHARGE REPORT from the ER internal  8/2/22 Jensen    MEDICATION LIST internal     IMAGING  (NEED IMAGES AND REPORTS)     CT SCAN internal  8/7/22   CHEST XRAY (CXR) internal  6.26.23, 2.26.23, 2.23.23, 12.16.22, 8.3.22 more in epic    TESTS     PULMONARY FUNCTION TESTING (PFT) internal  1.18.23,     Scheduled 11/8/23        Action 10.11.23 sv    Action Taken Message sent to nurse pool to place CXR order

## 2023-10-12 ENCOUNTER — TELEPHONE (OUTPATIENT)
Dept: PULMONOLOGY | Facility: CLINIC | Age: 58
End: 2023-10-12
Payer: MEDICARE

## 2023-10-12 NOTE — TELEPHONE ENCOUNTER
Patient Contacted for the patient to call back and schedule the following:    Appointment type: CXR  Provider: CHIN  Return date: 11/08/23  Specialty phone number: [phone]  Additional appointment(s) needed: N/A  Additonal Notes: N/A

## 2023-10-16 ENCOUNTER — ANCILLARY PROCEDURE (OUTPATIENT)
Dept: GENERAL RADIOLOGY | Facility: CLINIC | Age: 58
End: 2023-10-16
Attending: FAMILY MEDICINE
Payer: MEDICARE

## 2023-10-16 ENCOUNTER — OFFICE VISIT (OUTPATIENT)
Dept: FAMILY MEDICINE | Facility: CLINIC | Age: 58
End: 2023-10-16
Payer: MEDICARE

## 2023-10-16 VITALS
RESPIRATION RATE: 22 BRPM | OXYGEN SATURATION: 95 % | SYSTOLIC BLOOD PRESSURE: 128 MMHG | DIASTOLIC BLOOD PRESSURE: 88 MMHG | HEART RATE: 81 BPM

## 2023-10-16 DIAGNOSIS — R07.9 CHEST PAIN, UNSPECIFIED TYPE: ICD-10-CM

## 2023-10-16 DIAGNOSIS — R52 PAIN: ICD-10-CM

## 2023-10-16 DIAGNOSIS — J44.9 CHRONIC OBSTRUCTIVE PULMONARY DISEASE, UNSPECIFIED COPD TYPE (H): ICD-10-CM

## 2023-10-16 DIAGNOSIS — R07.9 CHEST PAIN, UNSPECIFIED TYPE: Primary | ICD-10-CM

## 2023-10-16 LAB
ALBUMIN SERPL BCG-MCNC: 4.4 G/DL (ref 3.5–5.2)
ALP SERPL-CCNC: 92 U/L (ref 35–104)
ALT SERPL W P-5'-P-CCNC: 21 U/L (ref 0–50)
ANION GAP SERPL CALCULATED.3IONS-SCNC: 17 MMOL/L (ref 7–15)
AST SERPL W P-5'-P-CCNC: 26 U/L (ref 0–45)
BILIRUB SERPL-MCNC: 0.4 MG/DL
BUN SERPL-MCNC: 27.3 MG/DL (ref 8–23)
CALCIUM SERPL-MCNC: 10.1 MG/DL (ref 8.6–10)
CHLORIDE SERPL-SCNC: 101 MMOL/L (ref 98–107)
CREAT SERPL-MCNC: 1.27 MG/DL (ref 0.51–0.95)
DEPRECATED HCO3 PLAS-SCNC: 21 MMOL/L (ref 22–29)
EGFRCR SERPLBLD CKD-EPI 2021: 48 ML/MIN/1.73M2
ERYTHROCYTE [DISTWIDTH] IN BLOOD BY AUTOMATED COUNT: 12.9 % (ref 10–15)
GLUCOSE SERPL-MCNC: 106 MG/DL (ref 70–99)
HCT VFR BLD AUTO: 36.1 % (ref 35–47)
HGB BLD-MCNC: 12 G/DL (ref 11.7–15.7)
MCH RBC QN AUTO: 29.3 PG (ref 26.5–33)
MCHC RBC AUTO-ENTMCNC: 33.2 G/DL (ref 31.5–36.5)
MCV RBC AUTO: 88 FL (ref 78–100)
NT-PROBNP SERPL-MCNC: 172 PG/ML (ref 0–900)
PLATELET # BLD AUTO: 220 10E3/UL (ref 150–450)
POTASSIUM SERPL-SCNC: 4.3 MMOL/L (ref 3.4–5.3)
PROT SERPL-MCNC: 7.9 G/DL (ref 6.4–8.3)
RBC # BLD AUTO: 4.09 10E6/UL (ref 3.8–5.2)
SODIUM SERPL-SCNC: 139 MMOL/L (ref 135–145)
WBC # BLD AUTO: 5.6 10E3/UL (ref 4–11)

## 2023-10-16 PROCEDURE — 83880 ASSAY OF NATRIURETIC PEPTIDE: CPT | Performed by: FAMILY MEDICINE

## 2023-10-16 PROCEDURE — 80053 COMPREHEN METABOLIC PANEL: CPT | Performed by: FAMILY MEDICINE

## 2023-10-16 PROCEDURE — 93005 ELECTROCARDIOGRAM TRACING: CPT | Performed by: FAMILY MEDICINE

## 2023-10-16 PROCEDURE — 36415 COLL VENOUS BLD VENIPUNCTURE: CPT | Performed by: FAMILY MEDICINE

## 2023-10-16 PROCEDURE — 71046 X-RAY EXAM CHEST 2 VIEWS: CPT | Mod: FY | Performed by: RADIOLOGY

## 2023-10-16 PROCEDURE — 99214 OFFICE O/P EST MOD 30 MIN: CPT | Performed by: FAMILY MEDICINE

## 2023-10-16 PROCEDURE — 85027 COMPLETE CBC AUTOMATED: CPT | Performed by: FAMILY MEDICINE

## 2023-10-16 RX ORDER — ALBUTEROL SULFATE 90 UG/1
2 AEROSOL, METERED RESPIRATORY (INHALATION) 4 TIMES DAILY
Qty: 18 G | Refills: 1 | Status: SHIPPED | OUTPATIENT
Start: 2023-10-16 | End: 2024-01-15

## 2023-10-16 RX ORDER — ALBUTEROL SULFATE 0.63 MG/3ML
1 SOLUTION RESPIRATORY (INHALATION) EVERY 4 HOURS PRN
Qty: 360 ML | Refills: 4 | Status: SHIPPED | OUTPATIENT
Start: 2023-10-16

## 2023-10-16 NOTE — LETTER
October 18, 2023      Flor MEMO Navarro  [address]  [place] [address]        Pankaj:     Your labs are stable, which is reassuring. I did talk to our  and would like in the future if Carleen mentions if you are having CHEST PAIN - this will ensure you get a sooner appointment. Take care and see you next month.    Resulted Orders   N terminal pro BNP outpatient   Result Value Ref Range    N Terminal Pro BNP Outpatient 172 0 - 900 pg/mL      Comment:      Reference range shown and results flagged as abnormal are for the outpatient, non acute settings. Establishing a baseline value for each individual patient is useful for follow-up.    Suggested inpatient cut points for confirming diagnosis of CHF in an acute setting are:  >450 pg/mL (age 18 to less than 50)  >900 pg/mL (age 50 to less than 75)  >1800 pg/mL (75 yrs and older)    An inpatient or emergency department NT-proPBNP <300 pg/mL effectively rules out acute CHF, with 99% negative predictive value.       CBC with platelets   Result Value Ref Range    WBC Count 5.6 4.0 - 11.0 10e3/uL    RBC Count 4.09 3.80 - 5.20 10e6/uL    Hemoglobin 12.0 11.7 - 15.7 g/dL    Hematocrit 36.1 35.0 - 47.0 %    MCV 88 78 - 100 fL    MCH 29.3 26.5 - 33.0 pg    MCHC 33.2 31.5 - 36.5 g/dL    RDW 12.9 10.0 - 15.0 %    Platelet Count 220 150 - 450 10e3/uL   Comprehensive metabolic panel   Result Value Ref Range    Sodium 139 135 - 145 mmol/L      Comment:      Reference intervals for this test were updated on 09/26/2023 to more accurately reflect our healthy population. There may be differences in the flagging of prior results with similar values performed with this method. Interpretation of those prior results can be made in the context of the updated reference intervals.     Potassium 4.3 3.4 - 5.3 mmol/L    Carbon Dioxide (CO2) 21 (L) 22 - 29 mmol/L    Anion Gap 17 (H) 7 - 15 mmol/L    Urea Nitrogen 27.3 (H) 8.0 - 23.0 mg/dL    Creatinine 1.27 (H)  0.51 - 0.95 mg/dL    GFR Estimate 48 (L) >60 mL/min/1.73m2    Calcium 10.1 (H) 8.6 - 10.0 mg/dL    Chloride 101 98 - 107 mmol/L    Glucose 106 (H) 70 - 99 mg/dL    Alkaline Phosphatase 92 35 - 104 U/L    AST 26 0 - 45 U/L      Comment:      Reference intervals for this test were updated on 6/12/2023 to more accurately reflect our healthy population. There may be differences in the flagging of prior results with similar values performed with this method. Interpretation of those prior results can be made in the context of the updated reference intervals.    ALT 21 0 - 50 U/L      Comment:      Reference intervals for this test were updated on 6/12/2023 to more accurately reflect our healthy population. There may be differences in the flagging of prior results with similar values performed with this method. Interpretation of those prior results can be made in the context of the updated reference intervals.      Protein Total 7.9 6.4 - 8.3 g/dL    Albumin 4.4 3.5 - 5.2 g/dL    Bilirubin Total 0.4 <=1.2 mg/dL       If you have any questions or concerns, please call the clinic at the number listed above.       Sincerely,      Karely Sierra MD

## 2023-10-16 NOTE — PROGRESS NOTES
Assessment & Plan     Chest pain, unspecified type; Pain  Patient's son Carleen called around noon when she developed chest pain at home. Reports that she has a burning sensation associated with it. She doesn't have other ACS symptoms associated except for the severity. EKG was normal and unchanged from previous. Chest xray unrevealing. Given the location and nature of the pain I am suspecting that this could be early shingles with pain before rash. She was instructed to monitor for evolution of rash and she was instructed to call back for valtrex prescription if this presents. Otherwise the pain is likely musculoskeletal in nature and not improved with 1000 mg BID of tylenol. She can't use NSAIDS due to CKD, so we will use topical Diclofenac and she can also use her previous prescription of capsaicin topically four times daily. She knows to wear gloves for application of the capsaicin.  - XR Chest 2 Views  - EKG 12-lead complete w/read - Clinics  - N terminal pro BNP outpatient  - CBC with platelets  - Comprehensive metabolic panel  - N terminal pro BNP outpatient  - CBC with platelets  - Comprehensive metabolic panel  - diclofenac (VOLTAREN) 1 % topical gel  Dispense: 100 g; Refill: 3    Chronic obstructive pulmonary disease, unspecified COPD type (H)  Needs refills on rescue medications. She's used a variety of pharmacies recently which I think has caused some confusion. Notified [facility] that she is out and she needs her refills as soon as possible. Favor using [facility] for all medications.  - albuterol (ACCUNEB) 0.63 MG/3ML neb solution  Dispense: 360 mL; Refill: 4  - albuterol (PROAIR HFA/PROVENTIL HFA/VENTOLIN HFA) 108 (90 Base) MCG/ACT inhaler  Dispense: 18 g; Refill: 1    2 or more chronic and stable illnesses:   1 undiagnosed new problem.  4 unique tests.   Diagnosis or treatment significantly limited by social determinants of health - non english  speaker  Prescription drug management        No follow-ups on file.    The information in this document, created by the medical scribe for me, accurately reflects the services I personally performed and the decisions made by me. I have reviewed and approved this document for accuracy prior to leaving the patient care area.  Karely Sierra MD  3:11 PM, 10/16/23   [facility] AMI Ray is a 58 year old, presenting for the following health issues:  Chest Pain (Pt mentioned chest pain only on left side; since 12 o clock, coming and going) and Back Pain        10/16/2023     2:53 PM   Additional Questions   Roomed by Bayron   Accompanied by Self       HPI   Chest pain  The patient reports chest pain for 3 hours. She notes pain in her left mid back, left flank, and left ribs. Her pain has involved some pinching on the left flank. It is improved with sleep. The pain is severe enough that she cries and considered going to an Emergency Department. She denies cough and fever. She notes some shortness of breath. She denies any belly pain. She does have occasional watery eyes but no acute loss of vision. She qualifies the pain as achy and burning. She notes that her pain is present even when she is inactive. She has tried tylenol which has provided minimal relief in pain.     Inhaler  She has not been able to use her inhaler much as she is out of her current supply. She is sleeping well with her nebulizer at night        Review of Systems   Positive for: left flank, left mid-back, and left rib pain.   Negative for:     This document serves as a record of the services and decisions personally performed and made by Karely Sierra MD. It was created on his/her behalf by Alcides Galeana, a trained medical scribe. The creation of this document is based the provider's statements to the medical scribe.  Scribscott Galeana 3:11 PM, October 16, 2023       Objective    /88 (BP Location: Left arm, Patient  Position: Sitting, Cuff Size: Adult Regular)   Pulse 81   Resp 22   LMP  (LMP Unknown)   SpO2 95%   There is no height or weight on file to calculate BMI.  Wt Readings from Last 10 Encounters:   09/15/23 107.6 kg (237 lb 3.2 oz)   08/16/23 106.8 kg (235 lb 6.4 oz)   07/24/23 106.6 kg (235 lb)   06/26/23 105.2 kg (232 lb)   06/16/23 105.3 kg (232 lb 1.6 oz)   06/02/23 105.9 kg (233 lb 8 oz)   05/12/23 105.7 kg (233 lb 2 oz)   05/04/23 107 kg (236 lb)   03/31/23 106.7 kg (235 lb 3.2 oz)   02/27/23 106.3 kg (234 lb 6.4 oz)      Physical Exam   GENERAL: healthy, alert and no distress  RESP: lungs clear to auscultation - no rales, rhonchi or wheezes  CV: regular rate and rhythm, normal S1 S2, no S3 or S4, no murmur, click or rub, no peripheral edema and peripheral pulses strong  ABDOMEN: soft, nontender, no hepatosplenomegaly, no masses and bowel sounds normal  MS: no gross musculoskeletal defects noted, no edema; pain with palpation in a wedge shaped area over the lower left scapula and midthoracic wrapping around the trunk to the inferior ribs anteriorly. No tenderness to light touch.  SKIN: Skin of her back and abdomen is completely clear  PSYCH: mentation appears normal, affect normal/bright  EKG Interpretation:  By Karely Sierra MD    Indication:Chest pain  Symptoms at time of EKG: lateral chest pain going to the back   Interpretation: appears normal, NSR, normal axis, normal intervals, no acute ST/T changes c/w ischemia, no LVH by voltage criteria, unchanged from previous tracings      Patient informed at visit.

## 2023-10-16 NOTE — PATIENT INSTRUCTIONS
Albuterol inhaler sent to mail pharmacy.  Blood test, EKG, and Chest xray today.  If a shingles rash develops call [facility] so we can give you medication for it.   Voltaren prescription sent through mail pharmacy. Can be used 4 times a day.

## 2023-10-20 DIAGNOSIS — Z94.4 LIVER TRANSPLANTED (H): ICD-10-CM

## 2023-10-20 NOTE — TELEPHONE ENCOUNTER
[facility] phone call message- general phone call:    Reason for call: This medication needs to be signed by Dr. Sierra, and sent to [place], [place] - [address] [place] [address] [phone]     Call taken on October 20, 2023 at 11:03 AM by Kranthi Cabral

## 2023-10-24 NOTE — TELEPHONE ENCOUNTER
"Request for medication refill: omeprazole (PRILOSEC) 20 MG DR capsule     Providers if patient needs an appointment and you are willing to give a one month supply please refill for one month and  send a letter/MyChart using \".SMILLIMITEDREFILL\" .smillimited and route chart to \"[facility] \" (Giving one month refill in non controlled medications is strongly recommended before denial)    If refill has been denied, meaning absolutely no refills without visit, please complete the smart phrase \".smirxrefuse\" and route it to the \"[facility] MED REFILLS\"  pool to inform the patient and the pharmacy.    JAIME GALARZA 10/16/2023  "

## 2023-10-25 NOTE — TELEPHONE ENCOUNTER
I see the request for refill to [facility] - however pt has been using multiple pharmacies. Please call pt ramakrishna Valdez - rec using ONE pharmacy. Mail order pharm is the one we have been using and I recommend. Please confirm this is ok, otherwise pt med schedule will be off cycle and there will be errors. Med refill not yet sent

## 2023-10-27 DIAGNOSIS — Z94.4 LIVER TRANSPLANTED (H): ICD-10-CM

## 2023-10-27 NOTE — TELEPHONE ENCOUNTER
Prior Authorization Approval    Authorization Effective Date: 7/31/2018  Authorization Expiration Date: 1/27/2019  Medication: Lidocaine 2% gel - APPROVED  Approved Dose/Quantity: UD  Reference #: [id number]   Insurance Company: [employer] - Phone [phone] Fax [phone]  Expected CoPay: $0.00     CoPay Card Available: No   Foundation Assistance Needed: N/A  Which Pharmacy is filling the prescription (Not needed for infusion/clinic administered): [facility] - [place] [address]  Pharmacy Notified: Yes  Patient Notified: Yes         27-Oct-2023

## 2023-10-27 NOTE — TELEPHONE ENCOUNTER
"Request for medication refill: omeprazole (PRILOSEC) 20 MG DR capsule     Providers if patient needs an appointment and you are willing to give a one month supply please refill for one month and  send a letter/MyChart using \".SMILLIMITEDREFILL\" .smillimited and route chart to \"[facility] \" (Giving one month refill in non controlled medications is strongly recommended before denial)    If refill has been denied, meaning absolutely no refills without visit, please complete the smart phrase \".smirxrefuse\" and route it to the \"P [facility] MED REFILLS\"  pool to inform the patient and the pharmacy.    JAIME CHIU    Pending new refill as Jose from pharmacy reported patient wanting prescription sent to mail specialty pharmacy.   "

## 2023-10-27 NOTE — TELEPHONE ENCOUNTER
The patient's homecare called back to check the status of the refill request. I recited the information on chart and she will follow up with the son. Also, Edie, could you update the number you gave to the son?

## 2023-11-01 ENCOUNTER — DOCUMENTATION ONLY (OUTPATIENT)
Dept: FAMILY MEDICINE | Facility: CLINIC | Age: 58
End: 2023-11-01
Payer: COMMERCIAL

## 2023-11-01 DIAGNOSIS — Z53.9 DIAGNOSIS NOT YET DEFINED: Primary | ICD-10-CM

## 2023-11-01 PROCEDURE — G0179 MD RECERTIFICATION HHA PT: HCPCS | Performed by: FAMILY MEDICINE

## 2023-11-01 NOTE — PROGRESS NOTES
"When opening a documentation only encounter, be sure to enter in \"Chief Complaint\" Forms and in \" Comments\" Title of form, description if needed.    Flor is a 58 year old  female  Form received via: Fax  Form now resides in: Provider Ready    Tony Stewart MA      Form has been completed by provider.     Form sent out via: Fax to [facility] at Fax Number: [phone]  Patient informed: N/A   Output date: November 2, 2023    Tony Stewart MA      **Please close the encounter**          "

## 2023-11-08 ENCOUNTER — OFFICE VISIT (OUTPATIENT)
Dept: PULMONOLOGY | Facility: CLINIC | Age: 58
End: 2023-11-08
Attending: FAMILY MEDICINE
Payer: MEDICARE

## 2023-11-08 ENCOUNTER — ANCILLARY PROCEDURE (OUTPATIENT)
Dept: GENERAL RADIOLOGY | Facility: CLINIC | Age: 58
End: 2023-11-08
Attending: STUDENT IN AN ORGANIZED HEALTH CARE EDUCATION/TRAINING PROGRAM
Payer: MEDICARE

## 2023-11-08 ENCOUNTER — PRE VISIT (OUTPATIENT)
Dept: PULMONOLOGY | Facility: CLINIC | Age: 58
End: 2023-11-08

## 2023-11-08 VITALS
OXYGEN SATURATION: 96 % | HEART RATE: 75 BPM | RESPIRATION RATE: 18 BRPM | SYSTOLIC BLOOD PRESSURE: 117 MMHG | DIASTOLIC BLOOD PRESSURE: 77 MMHG | BODY MASS INDEX: 45.9 KG/M2 | WEIGHT: 235 LBS

## 2023-11-08 DIAGNOSIS — J44.9 COPD (CHRONIC OBSTRUCTIVE PULMONARY DISEASE) (H): ICD-10-CM

## 2023-11-08 DIAGNOSIS — J44.9 CHRONIC OBSTRUCTIVE PULMONARY DISEASE, UNSPECIFIED COPD TYPE (H): ICD-10-CM

## 2023-11-08 PROCEDURE — 99214 OFFICE O/P EST MOD 30 MIN: CPT | Mod: 25 | Performed by: INTERNAL MEDICINE

## 2023-11-08 PROCEDURE — 71046 X-RAY EXAM CHEST 2 VIEWS: CPT | Performed by: RADIOLOGY

## 2023-11-08 PROCEDURE — 94726 PLETHYSMOGRAPHY LUNG VOLUMES: CPT | Performed by: INTERNAL MEDICINE

## 2023-11-08 PROCEDURE — 94729 DIFFUSING CAPACITY: CPT | Performed by: INTERNAL MEDICINE

## 2023-11-08 PROCEDURE — G0463 HOSPITAL OUTPT CLINIC VISIT: HCPCS | Performed by: INTERNAL MEDICINE

## 2023-11-08 PROCEDURE — 94375 RESPIRATORY FLOW VOLUME LOOP: CPT | Performed by: INTERNAL MEDICINE

## 2023-11-08 ASSESSMENT — PAIN SCALES - GENERAL: PAINLEVEL: NO PAIN (0)

## 2023-11-08 NOTE — PROGRESS NOTES
Reason for Visit  Flor Navarro is a 58 year old year old female who is being seen for Consult (New consult on Flor)    ILD HPI    Flor Navarro is a 58-year-old female who is seen today for diagnosis of COPD.  She carries diagnosis of COPD that is mainly based on some mild emphysematous changes on her CT scan and PFTs that did show moderate obstruction in the past.  She also has a history of liver transplant secondary to hepatitis C and stage III chronic kidney disease as well as morbid obesity with a BMI of 44.  The patient is interviewed today through a telephone  overall she reports that she has been doing relatively well.  She has been started on a COPD regimen in the past including Incruse Ellipta once in the morning she has an albuterol rescue inhaler that she uses 2-3 times a day typically and has a albuterol nebulizer that she uses typically once a day in the afternoon.  She feels that she generally does well on this regimen.  She also has known severe obstructive sleep apnea and is currently on BiPAP which she states she uses every night.  She has not ever been on home oxygen at least not recently.  Generally she feels she is able to do her activities of daily living.  She lives at home with her son she does attend adult  what sounds like 4 times a week.  Generally feels her breathing is okay on the current regimen she does state that she does some exercises at the adult .  Not clear how frequent this is.      Current Outpatient Medications   Medication    ACE/ARB/ARNI NOT PRESCRIBED (INTENTIONAL)    acetaminophen (TYLENOL) 500 MG tablet    albuterol (ACCUNEB) 0.63 MG/3ML neb solution    albuterol (PROAIR HFA/PROVENTIL HFA/VENTOLIN HFA) 108 (90 Base) MCG/ACT inhaler    alendronate (FOSAMAX) 70 MG tablet    alum & mag hydroxide-simethicone (MAALOX ADVANCED MAX ST) 400-400-40 MG/5ML SUSP suspension    amLODIPine (NORVASC) 10 MG tablet    calcitRIOL (ROCALTROL) 0.25 MCG capsule     calcium carbonate 600 mg-vitamin D 400 units (CALTRATE) 600-400 MG-UNIT per tablet    calcium carbonate-vitamin D (CALTRATE) 600-10 MG-MCG per tablet    capsaicin-menthol-methyl sal 0.025-1-12 % external cream    cetirizine (ZYRTEC) 10 MG tablet    chlorthalidone (HYGROTON) 25 MG tablet    cycloSPORINE modified (GENERIC EQUIVALENT) 25 MG capsule    Denture Care Products (EFFERDENT DENTURE CLEANSER) TBEF    diclofenac (VOLTAREN) 1 % topical gel    febuxostat (ULORIC) 40 MG TABS tablet    furosemide (LASIX) 20 MG tablet    gabapentin (NEURONTIN) 300 MG capsule    ketotifen (ZADITOR) 0.025 % ophthalmic solution    lidocaine (XYLOCAINE) 5 % external ointment    mineral oil-white petrolatum (EUCERIN/MINERIN) cream    Multiple Vitamin (DAILY-SUMA MULTIVITAMIN) TABS    multivitamin w/minerals (MULTI-VITAMIN) tablet    mycophenolate (GENERIC EQUIVALENT) 250 MG capsule    olopatadine (PATANOL) 0.1 % ophthalmic solution    omeprazole (PRILOSEC) 20 MG DR capsule    order for DME    order for DME    order for DME    senna-docusate (SENEXON-S) 8.6-50 MG tablet    SM FIBER LAXATIVE 500 MG TABS tablet    STATIN NOT PRESCRIBED, INTENTIONAL,    umeclidinium (INCRUSE ELLIPTA) 62.5 MCG/ACT inhaler    VITAMIN D3 25 MCG (1000 UT) tablet     No current facility-administered medications for this visit.     Allergies   Allergen Reactions    Aspirin      3/31/16 Per pt, tolerates 81 mg daily dose without ADR.     325 mg dose caused itchiness and hives.    Clarithromycin      Allergic reaction        Contrast Dye     Iodine     Pcn [Penicillins]      Past Medical History:   Diagnosis Date    Anemia in CKD (chronic kidney disease)     Cataract     CKD (chronic kidney disease) stage 3, GFR 30-59 ml/min (H)     Hepatitis C     cleared virus spontaneously 2013    High risk medication use     Hypertension, renal     Immunosuppressed status (H24)     Liver replaced by transplant (H) 01/01/2010    Osteoporosis     Recurrent pregnancy loss without  current pregnancy     Recurrent UTI 2021    Stroke, hemorrhagic (H) 2008    Left cerebral intraparenchymal hemorrhage    Syncope     Unspecified viral hepatitis C without hepatic coma        Past Surgical History:   Procedure Laterality Date    CATARACT IOL, RT/LT Right 2/18/15     SECTION      Incisional Hernia Repair  2004    INSERT SHUNT PORTAL TRANSJUGULAR INTRAHEPTIC  2005    shunt placement for liver failure    LAPAROSCOPIC SALPINGO-OOPHORECTOMY      left    NECK SURGERY  2010    fracture, in halo x 7months    TRANSPLANT LIVER RECIPIENT  DONOR  10/26/10    Upper GI Endoscopy with Band Ligation of Esoph/Gastric Varic. .         Social History     Socioeconomic History    Marital status: Single     Spouse name: Not on file    Number of children: Not on file    Years of education: Not on file    Highest education level: Not on file   Occupational History    Not on file   Tobacco Use    Smoking status: Never    Smokeless tobacco: Never   Vaping Use    Vaping Use: Never used   Substance and Sexual Activity    Alcohol use: No    Drug use: No    Sexual activity: Not Currently   Other Topics Concern    Parent/sibling w/ CABG, MI or angioplasty before 65F 55M? Not Asked     Service No    Blood Transfusions Yes    Caffeine Concern No    Occupational Exposure No    Hobby Hazards No    Sleep Concern No    Stress Concern Yes     Comment: needs help at home for cares    Weight Concern Yes     Comment: wants to lose weight not gain weight    Special Diet No    Back Care Yes     Comment: uses a patch to relieve pain    Exercise Not Asked    Bike Helmet Not Asked    Seat Belt Yes    Self-Exams Not Asked   Social History Narrative    One son Carleen        GynHx:             Gets transportation via insurance - needs assistance due to unsteady gait from CVA     Social Determinants of Health     Financial Resource Strain: High Risk (2020)    Overall Financial Resource Strain  (CARDIA)     Difficulty of Paying Living Expenses: Very hard   Food Insecurity: Food Insecurity Present (12/18/2020)    Hunger Vital Sign     Worried About Running Out of Food in the Last Year: Sometimes true     Ran Out of Food in the Last Year: Sometimes true   Transportation Needs: Unmet Transportation Needs (1/28/2022)    PRAPARE - Transportation     Lack of Transportation (Medical): Yes     Lack of Transportation (Non-Medical): Yes   Physical Activity: Not on file   Stress: Stress Concern Present (1/3/2022)    [employer] - Occupational Stress Questionnaire     Feeling of Stress : Rather much   Social Connections: Socially Isolated (1/3/2022)    Social Connection and Isolation Panel [NHANES]     Frequency of Communication with Friends and Family: Once a week     Frequency of Social Gatherings with Friends and Family: Once a week     Attends Jew Services: Never     Active Member of Clubs or Organizations: No     Attends Club or Organization Meetings: Never     Marital Status:    Interpersonal Safety: Low Risk  (10/16/2023)    Interpersonal Safety     Do you feel physically and emotionally safe where you currently live?: Yes     Within the past 12 months, have you been hit, slapped, kicked or otherwise physically hurt by someone?: No     Within the past 12 months, have you been humiliated or emotionally abused in other ways by your partner or ex-partner?: No   Housing Stability: Not on file       Family History   Problem Relation Age of Onset    Hepatitis Other         Hep C, still in [place]    Cerebrovascular Disease Other     Cancer No family hx of     Diabetes No family hx of     Hypertension No family hx of     Thyroid Disease No family hx of     Glaucoma No family hx of     Macular Degeneration No family hx of        ROS Pulmonary    A complete ROS was otherwise negative except as noted in the HPI.  Vitals:    11/08/23 1421   BP: 117/77   Pulse: 75   Resp: 18   SpO2:  96%   Weight: 106.6 kg (235 lb)     Exam:   GENERAL APPEARANCE: Well developed, obese alert, and in no apparent distress.  NECK: supple, no masses, no thyromegaly.  LYMPHATICS: No significant axillary, cervical, or supraclavicular nodes.  RESP: Mildly decreased breath sounds bilaterally.  No crackles or wheezes heard.  CV: Normal S1, S2, regular rhythm, normal rate, no rub, no murmur,  no gallop, no LE edema.   ABDOMEN:  Bowel sounds normal, soft, nontender, no HSM or masses.   MS: extremities normal- no clubbing, no cyanosis.  PSYCH: mentation appears normal. and affect normal/bright  Results: I have reviewed all imaging, PFTs and other relavent tests, please see below for details, PFT and imaging results were reviewed with the patient.  PFTs: Normal spirometry and lung volumes and diffusing capacity.  These are significantly improved in both the FVC and FEV1 compared to her prior pulmonary function test from January 2023.   Chest x-ray (reviewed by me) demonstrates relatively clear lung fields with some mild possible lower lobe interstitial prominence.  Likely this is soft tissue changes.    Assessment and plan:    58-year-old female with COPD.    Problems 1: COPD: Mild emphysema noted on her CT scan with previous obstruction however this has resolved with the use of her inhalers.  She seems to be doing very well on her current regimen.  I did discuss with the patient that I think regular exercise would benefit her.  She did understand this and will look into more exercise at her facility.  I think a pulmonary rehab program might benefit the patient at some point in the future as well.  Otherwise I would not make any changes in her regimen she will continue her Incruse Ellipta 1 puff in the morning her albuterol inhaler as needed and her nebulized albuterol once a day in the afternoon.    Problem #2: SURI: Continues to use her BiPAP follows in the sleep clinic.      Problem #3: Morbid obesity: Certainly could  affect the patient's breathing did discuss the importance of weight loss with the patient and she understands that.      Given that she is doing relatively well I do not think she needs frequent follow-up in the pulmonary clinic.  We should see her back in 1 year with pulmonary function test in the general pulmonary clinic.    I did discuss with the patient that if she does develop any new respiratory symptoms that she should call and we would evaluate her sooner.    I spent 45 minutes on the date of the encounter doing chart review, history and exam, interpretation of any new PFTs and imaging, documentation and further activities as noted above.          CBC   Recent Labs   Lab Test 10/16/23  1548 05/12/23  0821   RBC 4.09 4.12   HGB 12.0 11.7   HCT 36.1 36.3    207       Basic Metabolic Panel  Recent Labs   Lab Test 10/16/23  1548 09/15/23  1345    141   POTASSIUM 4.3 4.7   CHLORIDE 101 106   CO2 21* 24   BUN 27.3* 28.2*   * 104*   JUAN 10.1* 9.9       INR  Recent Labs   Lab Test 02/25/23  0815 02/24/23  0728   INR 0.89 0.98       PFT      Latest Ref Rng & Units 11/8/2023    12:11 PM   PFT   FVC L 2.13  P   FEV1 L 1.76  P   FVC% % 80  P   FEV1% % 82  P      P Preliminary result           CC:

## 2023-11-08 NOTE — NURSING NOTE
Chief Complaint   Patient presents with    Consult     New consult on Flor Wilkes, CMA CMA at 2:23 PM on 11/8/2023

## 2023-11-08 NOTE — LETTER
11/8/2023         RE: Flor Navarro  [address]  [facility] [address]        Dear Colleague,    Thank you for referring your patient, Flor Navarro, to the [facility] FOR LUNG SCIENCE AND [facility] [place]. Please see a copy of my visit note below.    Reason for Visit  Flor Navarro is a 58 year old year old female who is being seen for Consult (New consult on Flor)    ILD HPI    Flor Navarro is a 58-year-old female who is seen today for diagnosis of COPD.  She carries diagnosis of COPD that is mainly based on some mild emphysematous changes on her CT scan and PFTs that did show moderate obstruction in the past.  She also has a history of liver transplant secondary to hepatitis C and stage III chronic kidney disease as well as morbid obesity with a BMI of 44.  The patient is interviewed today through a telephone  overall she reports that she has been doing relatively well.  She has been started on a COPD regimen in the past including Incruse Ellipta once in the morning she has an albuterol rescue inhaler that she uses 2-3 times a day typically and has a albuterol nebulizer that she uses typically once a day in the afternoon.  She feels that she generally does well on this regimen.  She also has known severe obstructive sleep apnea and is currently on BiPAP which she states she uses every night.  She has not ever been on home oxygen at least not recently.  Generally she feels she is able to do her activities of daily living.  She lives at home with her son she does attend adult  what sounds like 4 times a week.  Generally feels her breathing is okay on the current regimen she does state that she does some exercises at the adult .  Not clear how frequent this is.      Current Outpatient Medications   Medication     ACE/ARB/ARNI NOT PRESCRIBED (INTENTIONAL)     acetaminophen (TYLENOL) 500 MG tablet     albuterol (ACCUNEB) 0.63 MG/3ML neb solution      albuterol (PROAIR HFA/PROVENTIL HFA/VENTOLIN HFA) 108 (90 Base) MCG/ACT inhaler     alendronate (FOSAMAX) 70 MG tablet     alum & mag hydroxide-simethicone (MAALOX ADVANCED MAX ST) 400-400-40 MG/5ML SUSP suspension     amLODIPine (NORVASC) 10 MG tablet     calcitRIOL (ROCALTROL) 0.25 MCG capsule     calcium carbonate 600 mg-vitamin D 400 units (CALTRATE) 600-400 MG-UNIT per tablet     calcium carbonate-vitamin D (CALTRATE) 600-10 MG-MCG per tablet     capsaicin-menthol-methyl sal 0.025-1-12 % external cream     cetirizine (ZYRTEC) 10 MG tablet     chlorthalidone (HYGROTON) 25 MG tablet     cycloSPORINE modified (GENERIC EQUIVALENT) 25 MG capsule     Denture Care Products (EFFERDENT DENTURE CLEANSER) TBEF     diclofenac (VOLTAREN) 1 % topical gel     febuxostat (ULORIC) 40 MG TABS tablet     furosemide (LASIX) 20 MG tablet     gabapentin (NEURONTIN) 300 MG capsule     ketotifen (ZADITOR) 0.025 % ophthalmic solution     lidocaine (XYLOCAINE) 5 % external ointment     mineral oil-white petrolatum (EUCERIN/MINERIN) cream     Multiple Vitamin (DAILY-SUMA MULTIVITAMIN) TABS     multivitamin w/minerals (MULTI-VITAMIN) tablet     mycophenolate (GENERIC EQUIVALENT) 250 MG capsule     olopatadine (PATANOL) 0.1 % ophthalmic solution     omeprazole (PRILOSEC) 20 MG DR capsule     order for DME     order for DME     order for DME     senna-docusate (SENEXON-S) 8.6-50 MG tablet     SM FIBER LAXATIVE 500 MG TABS tablet     STATIN NOT PRESCRIBED, INTENTIONAL,     umeclidinium (INCRUSE ELLIPTA) 62.5 MCG/ACT inhaler     VITAMIN D3 25 MCG (1000 UT) tablet     No current facility-administered medications for this visit.     Allergies   Allergen Reactions     Aspirin      3/31/16 Per pt, tolerates 81 mg daily dose without ADR.     325 mg dose caused itchiness and hives.     Clarithromycin      Allergic reaction         Contrast Dye      Iodine      Pcn [Penicillins]      Past Medical History:   Diagnosis Date     Anemia in CKD (chronic  kidney disease)      Cataract      CKD (chronic kidney disease) stage 3, GFR 30-59 ml/min (H)      Hepatitis C     cleared virus spontaneously      High risk medication use      Hypertension, renal      Immunosuppressed status (H24)      Liver replaced by transplant (H) 2010     Osteoporosis      Recurrent pregnancy loss without current pregnancy      Recurrent UTI 2021     Stroke, hemorrhagic (H) 2008    Left cerebral intraparenchymal hemorrhage     Syncope      Unspecified viral hepatitis C without hepatic coma        Past Surgical History:   Procedure Laterality Date     CATARACT IOL, RT/LT Right 2/18/15      SECTION       Incisional Hernia Repair  2004     INSERT SHUNT PORTAL TRANSJUGULAR INTRAHEPTIC      shunt placement for liver failure     LAPAROSCOPIC SALPINGO-OOPHORECTOMY      left     NECK SURGERY  2010    fracture, in halo x 7months     TRANSPLANT LIVER RECIPIENT  DONOR  10/26/10     Upper GI Endoscopy with Band Ligation of Esoph/Gastric Varic. .         Social History     Socioeconomic History     Marital status: Single     Spouse name: Not on file     Number of children: Not on file     Years of education: Not on file     Highest education level: Not on file   Occupational History     Not on file   Tobacco Use     Smoking status: Never     Smokeless tobacco: Never   Vaping Use     Vaping Use: Never used   Substance and Sexual Activity     Alcohol use: No     Drug use: No     Sexual activity: Not Currently   Other Topics Concern     Parent/sibling w/ CABG, MI or angioplasty before 65F 55M? Not Asked      Service No     Blood Transfusions Yes     Caffeine Concern No     Occupational Exposure No     Hobby Hazards No     Sleep Concern No     Stress Concern Yes     Comment: needs help at home for cares     Weight Concern Yes     Comment: wants to lose weight not gain weight     Special Diet No     Back Care Yes     Comment: uses a patch to relieve pain      Exercise Not Asked     Bike Helmet Not Asked     Seat Belt Yes     Self-Exams Not Asked   Social History Narrative    One son Carleen        GynHx:             Gets transportation via insurance - needs assistance due to unsteady gait from CVA     Social Determinants of Health     Financial Resource Strain: High Risk (2020)    Overall Financial Resource Strain (CARDIA)      Difficulty of Paying Living Expenses: Very hard   Food Insecurity: Food Insecurity Present (2020)    Hunger Vital Sign      Worried About Running Out of Food in the Last Year: Sometimes true      Ran Out of Food in the Last Year: Sometimes true   Transportation Needs: Unmet Transportation Needs (2022)    PRAPARE - Transportation      Lack of Transportation (Medical): Yes      Lack of Transportation (Non-Medical): Yes   Physical Activity: Not on file   Stress: Stress Concern Present (1/3/2022)    [employer] - Occupational Stress Questionnaire      Feeling of Stress : Rather much   Social Connections: Socially Isolated (1/3/2022)    Social Connection and Isolation Panel [NHANES]      Frequency of Communication with Friends and Family: Once a week      Frequency of Social Gatherings with Friends and Family: Once a week      Attends Taoist Services: Never      Active Member of Clubs or Organizations: No      Attends Club or Organization Meetings: Never      Marital Status:    Interpersonal Safety: Low Risk  (10/16/2023)    Interpersonal Safety      Do you feel physically and emotionally safe where you currently live?: Yes      Within the past 12 months, have you been hit, slapped, kicked or otherwise physically hurt by someone?: No      Within the past 12 months, have you been humiliated or emotionally abused in other ways by your partner or ex-partner?: No   Housing Stability: Not on file       Family History   Problem Relation Age of Onset     Hepatitis Other         Hep C, still in  [place]     Cerebrovascular Disease Other      Cancer No family hx of      Diabetes No family hx of      Hypertension No family hx of      Thyroid Disease No family hx of      Glaucoma No family hx of      Macular Degeneration No family hx of        ROS Pulmonary    A complete ROS was otherwise negative except as noted in the HPI.  Vitals:    11/08/23 1421   BP: 117/77   Pulse: 75   Resp: 18   SpO2: 96%   Weight: 106.6 kg (235 lb)     Exam:   GENERAL APPEARANCE: Well developed, obese alert, and in no apparent distress.  NECK: supple, no masses, no thyromegaly.  LYMPHATICS: No significant axillary, cervical, or supraclavicular nodes.  RESP: Mildly decreased breath sounds bilaterally.  No crackles or wheezes heard.  CV: Normal S1, S2, regular rhythm, normal rate, no rub, no murmur,  no gallop, no LE edema.   ABDOMEN:  Bowel sounds normal, soft, nontender, no HSM or masses.   MS: extremities normal- no clubbing, no cyanosis.  PSYCH: mentation appears normal. and affect normal/bright  Results: I have reviewed all imaging, PFTs and other relavent tests, please see below for details, PFT and imaging results were reviewed with the patient.  PFTs: Normal spirometry and lung volumes and diffusing capacity.  These are significantly improved in both the FVC and FEV1 compared to her prior pulmonary function test from January 2023.   Chest x-ray (reviewed by me) demonstrates relatively clear lung fields with some mild possible lower lobe interstitial prominence.  Likely this is soft tissue changes.    Assessment and plan:    58-year-old female with COPD.    Problems 1: COPD: Mild emphysema noted on her CT scan with previous obstruction however this has resolved with the use of her inhalers.  She seems to be doing very well on her current regimen.  I did discuss with the patient that I think regular exercise would benefit her.  She did understand this and will look into more exercise at her facility.  I think a pulmonary rehab  program might benefit the patient at some point in the future as well.  Otherwise I would not make any changes in her regimen she will continue her Incruse Ellipta 1 puff in the morning her albuterol inhaler as needed and her nebulized albuterol once a day in the afternoon.    Problem #2: SURI: Continues to use her BiPAP follows in the sleep clinic.      Problem #3: Morbid obesity: Certainly could affect the patient's breathing did discuss the importance of weight loss with the patient and she understands that.      Given that she is doing relatively well I do not think she needs frequent follow-up in the pulmonary clinic.  We should see her back in 1 year with pulmonary function test in the general pulmonary clinic.    I did discuss with the patient that if she does develop any new respiratory symptoms that she should call and we would evaluate her sooner.    I spent 45 minutes on the date of the encounter doing chart review, history and exam, interpretation of any new PFTs and imaging, documentation and further activities as noted above.          CBC   Recent Labs   Lab Test 10/16/23  1548 05/12/23  0821   RBC 4.09 4.12   HGB 12.0 11.7   HCT 36.1 36.3    207       Basic Metabolic Panel  Recent Labs   Lab Test 10/16/23  1548 09/15/23  1345    141   POTASSIUM 4.3 4.7   CHLORIDE 101 106   CO2 21* 24   BUN 27.3* 28.2*   * 104*   JUAN 10.1* 9.9       INR  Recent Labs   Lab Test 02/25/23  0815 02/24/23  0728   INR 0.89 0.98       PFT      Latest Ref Rng & Units 11/8/2023    12:11 PM   PFT   FVC L 2.13  P   FEV1 L 1.76  P   FVC% % 80  P   FEV1% % 82  P      P Preliminary result           CC:        Again, thank you for allowing me to participate in the care of your patient.        Sincerely,        David Morris Perlman, MD

## 2023-11-09 ENCOUNTER — TELEPHONE (OUTPATIENT)
Dept: NEPHROLOGY | Facility: CLINIC | Age: 58
End: 2023-11-09
Payer: MEDICARE

## 2023-11-09 NOTE — TELEPHONE ENCOUNTER
Called patient and talked with son with a appointment reminder for Tues. 11/21/23 @ 4:00 pm with Dr. Arambula and a lab draw @ 3:00 pm    Logan Dumont on 11/9/2023 at 9:56 AM

## 2023-11-11 LAB
DLCOUNC-%PRED-PRE: 81 %
DLCOUNC-PRE: 15.04 ML/MIN/MMHG
DLCOUNC-PRED: 18.35 ML/MIN/MMHG
ERV-%PRED-PRE: 25 %
ERV-PRE: 0.25 L
ERV-PRED: 0.98 L
EXPTIME-PRE: 4.67 SEC
FEF2575-%PRED-PRE: 85 %
FEF2575-PRE: 1.75 L/SEC
FEF2575-PRED: 2.05 L/SEC
FEFMAX-%PRED-PRE: 96 %
FEFMAX-PRE: 5.68 L/SEC
FEFMAX-PRED: 5.89 L/SEC
FEV1-%PRED-PRE: 82 %
FEV1-PRE: 1.76 L
FEV1FEV6-PRE: 82 %
FEV1FEV6-PRED: 81 %
FEV1FVC-PRE: 82 %
FEV1FVC-PRED: 81 %
FEV1SVC-PRE: 77 %
FEV1SVC-PRED: 72 %
FIFMAX-PRE: 4.81 L/SEC
FRCPLETH-%PRED-PRE: 77 %
FRCPLETH-PRE: 1.95 L
FRCPLETH-PRED: 2.53 L
FVC-%PRED-PRE: 80 %
FVC-PRE: 2.13 L
FVC-PRED: 2.64 L
IC-%PRED-PRE: 103 %
IC-PRE: 2.04 L
IC-PRED: 1.96 L
RVPLETH-%PRED-PRE: 97 %
RVPLETH-PRE: 1.7 L
RVPLETH-PRED: 1.75 L
TLCPLETH-%PRED-PRE: 89 %
TLCPLETH-PRE: 3.99 L
TLCPLETH-PRED: 4.44 L
VA-%PRED-PRE: 79 %
VA-PRE: 3.38 L
VC-%PRED-PRE: 77 %
VC-PRE: 2.29 L
VC-PRED: 2.93 L

## 2023-11-13 ENCOUNTER — TELEPHONE (OUTPATIENT)
Dept: PULMONOLOGY | Facility: CLINIC | Age: 58
End: 2023-11-13
Payer: MEDICARE

## 2023-11-13 NOTE — TELEPHONE ENCOUNTER
Patient Contacted for the patient to call back and schedule the following:    Appointment type: RTN  Provider: PERLMAN  Return date: 11/07/24  Specialty phone number: [phone]  Additional appointment(s) needed: PFT  Additonal Notes: N/A

## 2023-11-14 DIAGNOSIS — N18.32 STAGE 3B CHRONIC KIDNEY DISEASE (H): Primary | ICD-10-CM

## 2023-11-21 ENCOUNTER — LAB (OUTPATIENT)
Dept: LAB | Facility: CLINIC | Age: 58
End: 2023-11-21
Attending: INTERNAL MEDICINE
Payer: MEDICARE

## 2023-11-21 ENCOUNTER — OFFICE VISIT (OUTPATIENT)
Dept: NEPHROLOGY | Facility: CLINIC | Age: 58
End: 2023-11-21
Attending: INTERNAL MEDICINE
Payer: MEDICARE

## 2023-11-21 VITALS
OXYGEN SATURATION: 96 % | WEIGHT: 234.8 LBS | BODY MASS INDEX: 45.86 KG/M2 | SYSTOLIC BLOOD PRESSURE: 142 MMHG | DIASTOLIC BLOOD PRESSURE: 84 MMHG | HEART RATE: 73 BPM

## 2023-11-21 DIAGNOSIS — R09.02 HYPOXIA: ICD-10-CM

## 2023-11-21 DIAGNOSIS — Z13.220 LIPID SCREENING: ICD-10-CM

## 2023-11-21 DIAGNOSIS — I10 HYPERTENSION, ESSENTIAL: Primary | ICD-10-CM

## 2023-11-21 DIAGNOSIS — E66.01 MORBID OBESITY (H): ICD-10-CM

## 2023-11-21 DIAGNOSIS — N18.31 STAGE 3A CHRONIC KIDNEY DISEASE (H): ICD-10-CM

## 2023-11-21 DIAGNOSIS — E83.52 HYPERCALCEMIA: ICD-10-CM

## 2023-11-21 DIAGNOSIS — D84.9 IMMUNOSUPPRESSION (H): ICD-10-CM

## 2023-11-21 DIAGNOSIS — E79.0 HYPERURICEMIA: ICD-10-CM

## 2023-11-21 DIAGNOSIS — Z94.4 LIVER REPLACED BY TRANSPLANT (H): ICD-10-CM

## 2023-11-21 DIAGNOSIS — N18.32 STAGE 3B CHRONIC KIDNEY DISEASE (H): ICD-10-CM

## 2023-11-21 LAB
ALBUMIN MFR UR ELPH: 23.4 MG/DL
ALBUMIN SERPL BCG-MCNC: 4.4 G/DL (ref 3.5–5.2)
ALBUMIN UR-MCNC: 10 MG/DL
ALP SERPL-CCNC: 96 U/L (ref 40–150)
ALT SERPL W P-5'-P-CCNC: 16 U/L (ref 0–50)
ANION GAP SERPL CALCULATED.3IONS-SCNC: 11 MMOL/L (ref 7–15)
APPEARANCE UR: CLEAR
AST SERPL W P-5'-P-CCNC: 18 U/L (ref 0–45)
BILIRUB DIRECT SERPL-MCNC: <0.2 MG/DL (ref 0–0.3)
BILIRUB SERPL-MCNC: 0.4 MG/DL
BILIRUB UR QL STRIP: NEGATIVE
BUN SERPL-MCNC: 24.6 MG/DL (ref 8–23)
CALCIUM SERPL-MCNC: 10.1 MG/DL (ref 8.6–10)
CHLORIDE SERPL-SCNC: 106 MMOL/L (ref 98–107)
CHOLEST SERPL-MCNC: 297 MG/DL
COLOR UR AUTO: ABNORMAL
CREAT SERPL-MCNC: 1.22 MG/DL (ref 0.51–0.95)
CREAT UR-MCNC: 76.9 MG/DL
DEPRECATED HCO3 PLAS-SCNC: 24 MMOL/L (ref 22–29)
EGFRCR SERPLBLD CKD-EPI 2021: 51 ML/MIN/1.73M2
ERYTHROCYTE [DISTWIDTH] IN BLOOD BY AUTOMATED COUNT: 13.2 % (ref 10–15)
GLUCOSE SERPL-MCNC: 107 MG/DL (ref 70–99)
GLUCOSE UR STRIP-MCNC: NEGATIVE MG/DL
HCT VFR BLD AUTO: 36.2 % (ref 35–47)
HDLC SERPL-MCNC: 37 MG/DL
HGB BLD-MCNC: 12.4 G/DL (ref 11.7–15.7)
HGB UR QL STRIP: NEGATIVE
KETONES UR STRIP-MCNC: NEGATIVE MG/DL
LDLC SERPL CALC-MCNC: ABNORMAL MG/DL
LEUKOCYTE ESTERASE UR QL STRIP: NEGATIVE
MCH RBC QN AUTO: 29.9 PG (ref 26.5–33)
MCHC RBC AUTO-ENTMCNC: 34.3 G/DL (ref 31.5–36.5)
MCV RBC AUTO: 87 FL (ref 78–100)
MUCOUS THREADS #/AREA URNS LPF: PRESENT /LPF
NITRATE UR QL: NEGATIVE
NONHDLC SERPL-MCNC: 260 MG/DL
PH UR STRIP: 5 [PH] (ref 5–7)
PHOSPHATE SERPL-MCNC: 2.9 MG/DL (ref 2.5–4.5)
PLATELET # BLD AUTO: 214 10E3/UL (ref 150–450)
POTASSIUM SERPL-SCNC: 4.2 MMOL/L (ref 3.4–5.3)
PROT SERPL-MCNC: 7.8 G/DL (ref 6.4–8.3)
PROT/CREAT 24H UR: 0.3 MG/MG CR (ref 0–0.2)
RBC # BLD AUTO: 4.15 10E6/UL (ref 3.8–5.2)
RBC URINE: 1 /HPF
SODIUM SERPL-SCNC: 141 MMOL/L (ref 135–145)
SP GR UR STRIP: 1.01 (ref 1–1.03)
SQUAMOUS EPITHELIAL: <1 /HPF
TRIGL SERPL-MCNC: 522 MG/DL
UROBILINOGEN UR STRIP-MCNC: NORMAL MG/DL
WBC # BLD AUTO: 5.6 10E3/UL (ref 4–11)
WBC URINE: <1 /HPF

## 2023-11-21 PROCEDURE — 99000 SPECIMEN HANDLING OFFICE-LAB: CPT | Performed by: PATHOLOGY

## 2023-11-21 PROCEDURE — 80061 LIPID PANEL: CPT | Performed by: PATHOLOGY

## 2023-11-21 PROCEDURE — 84156 ASSAY OF PROTEIN URINE: CPT | Performed by: PATHOLOGY

## 2023-11-21 PROCEDURE — 36415 COLL VENOUS BLD VENIPUNCTURE: CPT | Performed by: PATHOLOGY

## 2023-11-21 PROCEDURE — 84100 ASSAY OF PHOSPHORUS: CPT | Performed by: PATHOLOGY

## 2023-11-21 PROCEDURE — 80053 COMPREHEN METABOLIC PANEL: CPT | Performed by: PATHOLOGY

## 2023-11-21 PROCEDURE — 81001 URINALYSIS AUTO W/SCOPE: CPT | Performed by: PATHOLOGY

## 2023-11-21 PROCEDURE — 80158 DRUG ASSAY CYCLOSPORINE: CPT | Performed by: INTERNAL MEDICINE

## 2023-11-21 PROCEDURE — 85027 COMPLETE CBC AUTOMATED: CPT | Performed by: PATHOLOGY

## 2023-11-21 PROCEDURE — G0463 HOSPITAL OUTPT CLINIC VISIT: HCPCS | Performed by: INTERNAL MEDICINE

## 2023-11-21 PROCEDURE — 82248 BILIRUBIN DIRECT: CPT | Performed by: PATHOLOGY

## 2023-11-21 PROCEDURE — 99214 OFFICE O/P EST MOD 30 MIN: CPT | Performed by: INTERNAL MEDICINE

## 2023-11-21 ASSESSMENT — PAIN SCALES - GENERAL: PAINLEVEL: MILD PAIN (2)

## 2023-11-21 NOTE — LETTER
11/21/2023       RE: Flor Navarro  [address]  [facility] [address]     Dear Colleague,    Thank you for referring your patient, Flor Navarro, to the [facility] [place] at [facility]. Please see a copy of my visit note below.    .    Nephrology Clinic Note  November 21, 2023    I was asked to see this patient by Dr. Cardoza     CC: CKD     HPI: Flor Navarro is a 57 year old female with PMH significant for HTN, liver transplant, morbid obesity who presents for evaluation and management of CKD.      Pt speaks Ukrainian and used telephone  today.    Pt endorsed feeling fairly well since her discharge from hospital in feb. She did have intermittent breathing difficulties for which she was evaluated by pulmonology. Otherwise denied other systemic symptoms today including recurrent fevers, chills, nausea, vomiting, abdominal pain, chest pain.      Endorsed compliance with her medications, but she does not know her meds. A nurse will come home and set up pill box for 2 weeks each time.    11/27/2023  Pt presented to nephrology clinic for follow up. C/o right sided thoracic back pain started few days ago. Denied any trauma to that place. Endorsed taking tylenol for her knees but not helping with her back pain that much. Asked if she can take any other meds for pain. Denied other systemic symptoms including recurrent fevers/chills, nausea/vomiting, diarrhea, abdominal pain, chest pain. Have had chronic SOB which improves with nebulizer which she uses daily. She endorsed that she had PT scheduled and able to be active during that time. Endorses compliant with her medications, but she is not sure about her meds as nurse comes every Friday to fill her pill box. Requested for new BP monitor and pulse oximeter.     - History of urinary symptoms: Have urinary continence, wears diapers  - History of Hematuria: no  - Swelling: yes  - Hx of  UTIs: yes, ESBL  - Hx of stones: no  - Rashes/Joint pain: no  - Family hx of kidney disease: no  - NSAID use: no      Allergies   Allergen Reactions    Aspirin      3/31/16 Per pt, tolerates 81 mg daily dose without ADR.     325 mg dose caused itchiness and hives.    Clarithromycin      Allergic reaction        Contrast Dye     Iodine     Pcn [Penicillins]        ACE/ARB/ARNI NOT PRESCRIBED (INTENTIONAL), Please choose reason not prescribed from choices below.  acetaminophen (TYLENOL) 500 MG tablet, Take 1 tablet (500 mg) by mouth 2 times daily as needed for mild pain  albuterol (ACCUNEB) 0.63 MG/3ML neb solution, Take 3 mLs (0.63 mg) by nebulization every 4 hours as needed for shortness of breath, wheezing or cough  albuterol (PROAIR HFA/PROVENTIL HFA/VENTOLIN HFA) 108 (90 Base) MCG/ACT inhaler, Inhale 2 puffs into the lungs 4 times daily  alendronate (FOSAMAX) 70 MG tablet, Take 1 tablet (70 mg) by mouth every 7 days  alum & mag hydroxide-simethicone (MAALOX ADVANCED MAX ST) 400-400-40 MG/5ML SUSP suspension, Take 30 mLs by mouth every 4 hours as needed for indigestion or heartburn  amLODIPine (NORVASC) 10 MG tablet, Take 1 tablet (10 mg) by mouth daily  calcitRIOL (ROCALTROL) 0.25 MCG capsule, Take 1 capsule (0.25 mcg) by mouth daily  calcium carbonate 600 mg-vitamin D 400 units (CALTRATE) 600-400 MG-UNIT per tablet, Take 1 tablet by mouth 2 times daily  calcium carbonate-vitamin D (CALTRATE) 600-10 MG-MCG per tablet, TAKE ONE TABLET BY MOUTH TWICE A DAY  capsaicin-menthol-methyl sal 0.025-1-12 % external cream, Apply to low back three times daily for pain  cetirizine (ZYRTEC) 10 MG tablet, Take 1 tablet (10 mg) by mouth daily  chlorthalidone (HYGROTON) 25 MG tablet, Take 1 tablet (25 mg) by mouth daily  cycloSPORINE modified (GENERIC EQUIVALENT) 25 MG capsule, TAKE THREE CAPSULES BY MOUTH EVERY MORNING & TAKE TWO CAPSULES BY MOUTH EVERY NIGHT AT BEDTIME  Denture Care Products (EFFERDENT DENTURE CLEANSER) TBEF,  Use nightly to clean oral appliance  diclofenac (VOLTAREN) 1 % topical gel, Apply 2 g topically 4 times daily for 90 days  febuxostat (ULORIC) 40 MG TABS tablet, Take 1 tablet (40 mg) by mouth daily  furosemide (LASIX) 20 MG tablet, Take 1 tablet (20 mg) by mouth 2 times daily  gabapentin (NEURONTIN) 300 MG capsule, Take 1 capsule (300 mg) by mouth At Bedtime  ketotifen (ZADITOR) 0.025 % ophthalmic solution, Place 1 drop into both eyes 2 times daily as needed for itching  lidocaine (XYLOCAINE) 5 % external ointment, Apply topically as needed for moderate pain  mineral oil-white petrolatum (EUCERIN/MINERIN) cream, Apply topically 2 times daily  Multiple Vitamin (DAILY-SUMA MULTIVITAMIN) TABS, Take 1 tablet by mouth every morning  multivitamin w/minerals (MULTI-VITAMIN) tablet, Take 1 tablet by mouth daily  mycophenolate (GENERIC EQUIVALENT) 250 MG capsule, TAKE TWO CAPSULES BY MOUTH TWICE A DAY  olopatadine (PATANOL) 0.1 % ophthalmic solution, Place 1 drop into both eyes 2 times daily for 200 days  omeprazole (PRILOSEC) 20 MG DR capsule, Take 1 capsule (20 mg) by mouth 2 times daily el  order for DME, Equipment being ordered: compression stockings bilateral  Please measure and fit patient for stockings   Strength 15-30mmHg  Disp 2 pairs ( 4 socks)  1 refill over the time of 1 year  order for DME, Equipment being ordered:   1) cane  2) compression stockings 15mmHg, 3 pairs  Dx: gait stability, falls, edema  order for DME, Equipment being ordered: Nebulizer with adult mask and tubing  senna-docusate (SENEXON-S) 8.6-50 MG tablet, Take 2 tablets by mouth 2 times daily  SM FIBER LAXATIVE 500 MG TABS tablet, Take 2 tablets (1,000 mg) by mouth daily  STATIN NOT PRESCRIBED, INTENTIONAL,, Not prescribed  umeclidinium (INCRUSE ELLIPTA) 62.5 MCG/ACT inhaler, Inhale 1 puff into the lungs daily  VITAMIN D3 25 MCG (1000 UT) tablet, TAKE ONE TABLET BY MOUTH ONCE DAILY    No current facility-administered medications on file prior to  visit.      Past Medical History:   Diagnosis Date    Anemia in CKD (chronic kidney disease)     Cataract     CKD (chronic kidney disease) stage 3, GFR 30-59 ml/min (H)     Hepatitis C     cleared virus spontaneously     High risk medication use     Hypertension, renal     Immunosuppressed status (H24)     Liver replaced by transplant (H) 2010    Osteoporosis     Recurrent pregnancy loss without current pregnancy     Recurrent UTI 2021    Stroke, hemorrhagic (H) 2008    Left cerebral intraparenchymal hemorrhage    Syncope     Unspecified viral hepatitis C without hepatic coma        Past Surgical History:   Procedure Laterality Date    CATARACT IOL, RT/LT Right 2/18/15     SECTION      Incisional Hernia Repair  2004    INSERT SHUNT PORTAL TRANSJUGULAR INTRAHEPTIC  2005    shunt placement for liver failure    LAPAROSCOPIC SALPINGO-OOPHORECTOMY      left    NECK SURGERY  2010    fracture, in halo x 7months    TRANSPLANT LIVER RECIPIENT  DONOR  10/26/10    Upper GI Endoscopy with Band Ligation of Esoph/Gastric Varic. .         Social History     Tobacco Use    Smoking status: Never    Smokeless tobacco: Never   Vaping Use    Vaping Use: Never used   Substance Use Topics    Alcohol use: No    Drug use: No       Family History   Problem Relation Age of Onset    Hepatitis Other         Hep C, still in [place]    Cerebrovascular Disease Other     Cancer No family hx of     Diabetes No family hx of     Hypertension No family hx of     Thyroid Disease No family hx of     Glaucoma No family hx of     Macular Degeneration No family hx of        ROS: A 12 system review of systems was negative other than noted here or above.     Exam:  BP (!) 142/84 (BP Location: Right arm, Patient Position: Sitting, Cuff Size: Adult Regular)   Pulse 73   Wt 106.5 kg (234 lb 12.8 oz)   LMP  (LMP Unknown)   SpO2 96%   BMI 45.86 kg/m      GENERAL APPEARANCE: alert and no distress  EYES:  no scleral  icterus  HENT: No gross abnormalities noted  NECK: supple, no adenopathy  RESP: lungs clear to auscultation   CV: regular rhythm, normal rate, no rub  Extremities: no significant lower extremity edema  SKIN: no rash  NEURO: mentation intact and speech normal  PSYCH: affect normal/bright    Results:    Lab on 05/12/2023   Component Date Value Ref Range Status    Sodium 05/12/2023 141  136 - 145 mmol/L Final    Potassium 05/12/2023 4.4  3.4 - 5.3 mmol/L Final    Chloride 05/12/2023 105  98 - 107 mmol/L Final    Carbon Dioxide (CO2) 05/12/2023 26  22 - 29 mmol/L Final    Anion Gap 05/12/2023 10  7 - 15 mmol/L Final    Glucose 05/12/2023 107 (H)  70 - 99 mg/dL Final    Urea Nitrogen 05/12/2023 22.5 (H)  6.0 - 20.0 mg/dL Final    Creatinine 05/12/2023 1.35 (H)  0.51 - 0.95 mg/dL Final    GFR Estimate 05/12/2023 45 (L)  >60 mL/min/1.73m2 Final    eGFR calculated using 2021 CKD-EPI equation.    Calcium 05/12/2023 10.1 (H)  8.6 - 10.0 mg/dL Final    Albumin 05/12/2023 4.0  3.5 - 5.2 g/dL Final    Phosphorus 05/12/2023 4.4  2.5 - 4.5 mg/dL Final    WBC Count 05/12/2023 3.8 (L)  4.0 - 11.0 10e3/uL Final    RBC Count 05/12/2023 4.12  3.80 - 5.20 10e6/uL Final    Hemoglobin 05/12/2023 11.7  11.7 - 15.7 g/dL Final    Hematocrit 05/12/2023 36.3  35.0 - 47.0 % Final    MCV 05/12/2023 88  78 - 100 fL Final    MCH 05/12/2023 28.4  26.5 - 33.0 pg Final    MCHC 05/12/2023 32.2  31.5 - 36.5 g/dL Final    RDW 05/12/2023 14.4  10.0 - 15.0 % Final    Platelet Count 05/12/2023 207  150 - 450 10e3/uL Final       Assessment/Plan:     CKD stage IIIa  Recurrent CASTILLO episodes, last one was in February, now resolved.  Pt with baseline creatinine of 1.1-1.3 with eGFR in 40's and 50's which put her into CKD stage IIIa. UA with few RBC in the past, repeat was with out hematuria. UPCR was mildly elevated.  No renal imaging noted in her chart. With given improvement/baseline renal function, will continue to monitor her with repeat UA. If her hematuria  and or proteinuria is worsening, will evalaute further for possible underlying GN. That said, no systemic symptoms that are suggestive of underlying GN.  Her CKD likely multifactorial including chronic use of CNI (cyclosporine), poorly controlled BP, obesity and or renovascular disease.  Morbid obesity puts her at higher risk of secondary FSGS  - strict BP control  - maintain cyclosporine levels at goal  - encouraged to loose weight  - encouraged to be well hydrated while being on CNI  - low salt diet and increase in fresh fruits and vegetables.     Exertional dyspnea   Morbid obesity and or physical deconditioning are likely causes as there is no associated chest tightness or chest pain with those symptoms.  - follow up with primary care/pulmonology     Hypertension, not at goal of < 140 systolic :  Pt clinic BP is slightly high, was only on furosemide 20 mg twice a day.   - started on amlodipine 10 mg daily during last visit, continue  - not starting on ACE/ARB in setting of hyperkalemia.     Hyperkalemia, resolved     Liver transplant in 2010  Liver failure 2/2 hep C infection s/p liver transplant with stable transplant function. currently on cyclosporine and MMF.  - continue immunosuppression per GI     Hyperuricemia : noted her uric acid being high at 7.5. no gouty episodes lately   - will continue to monitor for now, if > 8 and or cause gout, will consider medical therapy.  - discussed life style modifications.     BMD :  Husam, phos, vit D and PTH are WNL  continue on calcitriol     Metabolic acidosis :  Bicarb is WNL     Anemia :  Hb is WNL     Other   Morbid obesity, encouraged to loose weight   Sleep apnea : follows with sleep clinic.    Ce Arambula  Dept of Nephrology and Hypertension  [facility]        Again, thank you for allowing me to participate in the care of your patient.      Sincerely,    Ce Arambula MD

## 2023-11-21 NOTE — PROGRESS NOTES
.    Nephrology Clinic Note  November 21, 2023    I was asked to see this patient by Dr. Cardoza     CC: CKD     HPI: Flor Navarro is a 57 year old female with PMH significant for HTN, liver transplant, morbid obesity who presents for evaluation and management of CKD.      Pt speaks Icelandic and used telephone  today.    Pt endorsed feeling fairly well since her discharge from hospital in feb. She did have intermittent breathing difficulties for which she was evaluated by pulmonology. Otherwise denied other systemic symptoms today including recurrent fevers, chills, nausea, vomiting, abdominal pain, chest pain.      Endorsed compliance with her medications, but she does not know her meds. A nurse will come home and set up pill box for 2 weeks each time.    11/27/2023  Pt presented to nephrology clinic for follow up. C/o right sided thoracic back pain started few days ago. Denied any trauma to that place. Endorsed taking tylenol for her knees but not helping with her back pain that much. Asked if she can take any other meds for pain. Denied other systemic symptoms including recurrent fevers/chills, nausea/vomiting, diarrhea, abdominal pain, chest pain. Have had chronic SOB which improves with nebulizer which she uses daily. She endorsed that she had PT scheduled and able to be active during that time. Endorses compliant with her medications, but she is not sure about her meds as nurse comes every Friday to fill her pill box. Requested for new BP monitor and pulse oximeter.     - History of urinary symptoms: Have urinary continence, wears diapers  - History of Hematuria: no  - Swelling: yes  - Hx of UTIs: yes, ESBL  - Hx of stones: no  - Rashes/Joint pain: no  - Family hx of kidney disease: no  - NSAID use: no      Allergies   Allergen Reactions     Aspirin      3/31/16 Per pt, tolerates 81 mg daily dose without ADR.     325 mg dose caused itchiness and hives.     Clarithromycin      Allergic reaction          Contrast Dye      Iodine      Pcn [Penicillins]        ACE/ARB/ARNI NOT PRESCRIBED (INTENTIONAL), Please choose reason not prescribed from choices below.  acetaminophen (TYLENOL) 500 MG tablet, Take 1 tablet (500 mg) by mouth 2 times daily as needed for mild pain  albuterol (ACCUNEB) 0.63 MG/3ML neb solution, Take 3 mLs (0.63 mg) by nebulization every 4 hours as needed for shortness of breath, wheezing or cough  albuterol (PROAIR HFA/PROVENTIL HFA/VENTOLIN HFA) 108 (90 Base) MCG/ACT inhaler, Inhale 2 puffs into the lungs 4 times daily  alendronate (FOSAMAX) 70 MG tablet, Take 1 tablet (70 mg) by mouth every 7 days  alum & mag hydroxide-simethicone (MAALOX ADVANCED MAX ST) 400-400-40 MG/5ML SUSP suspension, Take 30 mLs by mouth every 4 hours as needed for indigestion or heartburn  amLODIPine (NORVASC) 10 MG tablet, Take 1 tablet (10 mg) by mouth daily  calcitRIOL (ROCALTROL) 0.25 MCG capsule, Take 1 capsule (0.25 mcg) by mouth daily  calcium carbonate 600 mg-vitamin D 400 units (CALTRATE) 600-400 MG-UNIT per tablet, Take 1 tablet by mouth 2 times daily  calcium carbonate-vitamin D (CALTRATE) 600-10 MG-MCG per tablet, TAKE ONE TABLET BY MOUTH TWICE A DAY  capsaicin-menthol-methyl sal 0.025-1-12 % external cream, Apply to low back three times daily for pain  cetirizine (ZYRTEC) 10 MG tablet, Take 1 tablet (10 mg) by mouth daily  chlorthalidone (HYGROTON) 25 MG tablet, Take 1 tablet (25 mg) by mouth daily  cycloSPORINE modified (GENERIC EQUIVALENT) 25 MG capsule, TAKE THREE CAPSULES BY MOUTH EVERY MORNING & TAKE TWO CAPSULES BY MOUTH EVERY NIGHT AT BEDTIME  Denture Care Products (EFFERDENT DENTURE CLEANSER) TBEF, Use nightly to clean oral appliance  diclofenac (VOLTAREN) 1 % topical gel, Apply 2 g topically 4 times daily for 90 days  febuxostat (ULORIC) 40 MG TABS tablet, Take 1 tablet (40 mg) by mouth daily  furosemide (LASIX) 20 MG tablet, Take 1 tablet (20 mg) by mouth 2 times daily  gabapentin (NEURONTIN) 300 MG  capsule, Take 1 capsule (300 mg) by mouth At Bedtime  ketotifen (ZADITOR) 0.025 % ophthalmic solution, Place 1 drop into both eyes 2 times daily as needed for itching  lidocaine (XYLOCAINE) 5 % external ointment, Apply topically as needed for moderate pain  mineral oil-white petrolatum (EUCERIN/MINERIN) cream, Apply topically 2 times daily  Multiple Vitamin (DAILY-SUMA MULTIVITAMIN) TABS, Take 1 tablet by mouth every morning  multivitamin w/minerals (MULTI-VITAMIN) tablet, Take 1 tablet by mouth daily  mycophenolate (GENERIC EQUIVALENT) 250 MG capsule, TAKE TWO CAPSULES BY MOUTH TWICE A DAY  olopatadine (PATANOL) 0.1 % ophthalmic solution, Place 1 drop into both eyes 2 times daily for 200 days  omeprazole (PRILOSEC) 20 MG DR capsule, Take 1 capsule (20 mg) by mouth 2 times daily el  order for DME, Equipment being ordered: compression stockings bilateral  Please measure and fit patient for stockings   Strength 15-30mmHg  Disp 2 pairs ( 4 socks)  1 refill over the time of 1 year  order for DME, Equipment being ordered:   1) cane  2) compression stockings 15mmHg, 3 pairs  Dx: gait stability, falls, edema  order for DME, Equipment being ordered: Nebulizer with adult mask and tubing  senna-docusate (SENEXON-S) 8.6-50 MG tablet, Take 2 tablets by mouth 2 times daily  SM FIBER LAXATIVE 500 MG TABS tablet, Take 2 tablets (1,000 mg) by mouth daily  STATIN NOT PRESCRIBED, INTENTIONAL,, Not prescribed  umeclidinium (INCRUSE ELLIPTA) 62.5 MCG/ACT inhaler, Inhale 1 puff into the lungs daily  VITAMIN D3 25 MCG (1000 UT) tablet, TAKE ONE TABLET BY MOUTH ONCE DAILY    No current facility-administered medications on file prior to visit.      Past Medical History:   Diagnosis Date     Anemia in CKD (chronic kidney disease)      Cataract      CKD (chronic kidney disease) stage 3, GFR 30-59 ml/min (H)      Hepatitis C     cleared virus spontaneously 2013     High risk medication use      Hypertension, renal      Immunosuppressed status  (H24)      Liver replaced by transplant (H) 2010     Osteoporosis      Recurrent pregnancy loss without current pregnancy      Recurrent UTI 2021     Stroke, hemorrhagic (H) 2008    Left cerebral intraparenchymal hemorrhage     Syncope      Unspecified viral hepatitis C without hepatic coma        Past Surgical History:   Procedure Laterality Date     CATARACT IOL, RT/LT Right 2/18/15      SECTION       Incisional Hernia Repair  2004     INSERT SHUNT PORTAL TRANSJUGULAR INTRAHEPTIC  2005    shunt placement for liver failure     LAPAROSCOPIC SALPINGO-OOPHORECTOMY      left     NECK SURGERY  2010    fracture, in halo x 7months     TRANSPLANT LIVER RECIPIENT  DONOR  10/26/10     Upper GI Endoscopy with Band Ligation of Esoph/Gastric Varic. .         Social History     Tobacco Use     Smoking status: Never     Smokeless tobacco: Never   Vaping Use     Vaping Use: Never used   Substance Use Topics     Alcohol use: No     Drug use: No       Family History   Problem Relation Age of Onset     Hepatitis Other         Hep C, still in [place]     Cerebrovascular Disease Other      Cancer No family hx of      Diabetes No family hx of      Hypertension No family hx of      Thyroid Disease No family hx of      Glaucoma No family hx of      Macular Degeneration No family hx of        ROS: A 12 system review of systems was negative other than noted here or above.     Exam:  BP (!) 142/84 (BP Location: Right arm, Patient Position: Sitting, Cuff Size: Adult Regular)   Pulse 73   Wt 106.5 kg (234 lb 12.8 oz)   LMP  (LMP Unknown)   SpO2 96%   BMI 45.86 kg/m      GENERAL APPEARANCE: alert and no distress  EYES:  no scleral icterus  HENT: No gross abnormalities noted  NECK: supple, no adenopathy  RESP: lungs clear to auscultation   CV: regular rhythm, normal rate, no rub  Extremities: no significant lower extremity edema  SKIN: no rash  NEURO: mentation intact and speech normal  PSYCH: affect  normal/bright    Results:    Lab on 05/12/2023   Component Date Value Ref Range Status     Sodium 05/12/2023 141  136 - 145 mmol/L Final     Potassium 05/12/2023 4.4  3.4 - 5.3 mmol/L Final     Chloride 05/12/2023 105  98 - 107 mmol/L Final     Carbon Dioxide (CO2) 05/12/2023 26  22 - 29 mmol/L Final     Anion Gap 05/12/2023 10  7 - 15 mmol/L Final     Glucose 05/12/2023 107 (H)  70 - 99 mg/dL Final     Urea Nitrogen 05/12/2023 22.5 (H)  6.0 - 20.0 mg/dL Final     Creatinine 05/12/2023 1.35 (H)  0.51 - 0.95 mg/dL Final     GFR Estimate 05/12/2023 45 (L)  >60 mL/min/1.73m2 Final    eGFR calculated using 2021 CKD-EPI equation.     Calcium 05/12/2023 10.1 (H)  8.6 - 10.0 mg/dL Final     Albumin 05/12/2023 4.0  3.5 - 5.2 g/dL Final     Phosphorus 05/12/2023 4.4  2.5 - 4.5 mg/dL Final     WBC Count 05/12/2023 3.8 (L)  4.0 - 11.0 10e3/uL Final     RBC Count 05/12/2023 4.12  3.80 - 5.20 10e6/uL Final     Hemoglobin 05/12/2023 11.7  11.7 - 15.7 g/dL Final     Hematocrit 05/12/2023 36.3  35.0 - 47.0 % Final     MCV 05/12/2023 88  78 - 100 fL Final     MCH 05/12/2023 28.4  26.5 - 33.0 pg Final     MCHC 05/12/2023 32.2  31.5 - 36.5 g/dL Final     RDW 05/12/2023 14.4  10.0 - 15.0 % Final     Platelet Count 05/12/2023 207  150 - 450 10e3/uL Final       Assessment/Plan:     CKD stage IIIa  Recurrent CASTILLO episodes, last one was in February, now resolved.  Pt with baseline creatinine of 1.1-1.3 with eGFR in 40's and 50's which put her into CKD stage IIIa. UA with few RBC in the past, repeat was with out hematuria. UPCR was mildly elevated.  No renal imaging noted in her chart. With given improvement/baseline renal function, will continue to monitor her with repeat UA. If her hematuria and or proteinuria is worsening, will evalaute further for possible underlying GN. That said, no systemic symptoms that are suggestive of underlying GN.  Her CKD likely multifactorial including chronic use of CNI (cyclosporine), poorly controlled BP,  obesity and or renovascular disease.  Morbid obesity puts her at higher risk of secondary FSGS  - strict BP control  - maintain cyclosporine levels at goal  - encouraged to loose weight  - encouraged to be well hydrated while being on CNI  - low salt diet and increase in fresh fruits and vegetables.     Exertional dyspnea   Morbid obesity and or physical deconditioning are likely causes as there is no associated chest tightness or chest pain with those symptoms.  - follow up with primary care/pulmonology     Hypertension, not at goal of < 140 systolic :  Pt clinic BP is slightly high, was only on furosemide 20 mg twice a day.   - started on amlodipine 10 mg daily during last visit, continue  - not starting on ACE/ARB in setting of hyperkalemia.     Hyperkalemia, resolved     Liver transplant in 2010  Liver failure 2/2 hep C infection s/p liver transplant with stable transplant function. currently on cyclosporine and MMF.  - continue immunosuppression per GI     Hyperuricemia : noted her uric acid being high at 7.5. no gouty episodes lately   - will continue to monitor for now, if > 8 and or cause gout, will consider medical therapy.  - discussed life style modifications.     BMD :  Husam, phos, vit D and PTH are WNL  continue on calcitriol     Metabolic acidosis :  Bicarb is WNL     Anemia :  Hb is WNL     Other   Morbid obesity, encouraged to loose weight   Sleep apnea : follows with sleep clinic.    Ce Arambula  Dept of Nephrology and Hypertension  [facility]

## 2023-11-21 NOTE — NURSING NOTE
Chief Complaint   Patient presents with    RECHECK     6 month follow up // okay to schedule per Karely Sierra MD staff message //     BP (!) 142/84 (BP Location: Right arm, Patient Position: Sitting, Cuff Size: Adult Regular)   Pulse 73   Wt 106.5 kg (234 lb 12.8 oz)   LMP  (LMP Unknown)   SpO2 96%   BMI 45.86 kg/m    Karen Aly [facility]

## 2023-11-22 ENCOUNTER — TELEPHONE (OUTPATIENT)
Dept: TRANSPLANT | Facility: CLINIC | Age: 58
End: 2023-11-22
Payer: MEDICARE

## 2023-11-22 LAB
CYCLOSPORINE BLD LC/MS/MS-MCNC: 439 UG/L (ref 50–400)
TME LAST DOSE: ABNORMAL H
TME LAST DOSE: ABNORMAL H

## 2023-11-22 NOTE — TELEPHONE ENCOUNTER
DATE:  11/22/2023     TIME OF RECEIPT FROM LAB:  8:37 AM     ORDERING PROVIDER: Omid    LAB TEST:  CON    LAB VALUE:  [id number]      Collected at 2:40pm on 11/21/23.

## 2023-11-24 ENCOUNTER — TELEPHONE (OUTPATIENT)
Dept: NEPHROLOGY | Facility: CLINIC | Age: 58
End: 2023-11-24
Payer: MEDICARE

## 2023-11-24 NOTE — TELEPHONE ENCOUNTER
Writer left a message for Melissa with information of where to fax the DME orders to. Provided number call back.  Katiana Gasca LPN  Nephrology  [phone]

## 2023-11-24 NOTE — TELEPHONE ENCOUNTER
[facility] Call Center    Phone Message    May a detailed message be left on voicemail: yes     Reason for Call: Other: Allyson from [facility] calls stating that patient recently received DME orders for BP monitor and pulse oximeter and is wondering where those orders were sent to. Allyson is requesting a call back.     Action Taken: Message routed to:  [facility] ([facility]): Nephrology    Travel Screening: Not Applicable

## 2023-12-04 DIAGNOSIS — Z94.4 LIVER REPLACED BY TRANSPLANT (H): ICD-10-CM

## 2023-12-04 RX ORDER — CYCLOSPORINE 25 MG/1
CAPSULE, LIQUID FILLED ORAL
Qty: 150 CAPSULE | Refills: 11 | Status: SHIPPED | OUTPATIENT
Start: 2023-12-04 | End: 2024-03-07 | Stop reason: DRUGHIGH

## 2023-12-04 NOTE — TELEPHONE ENCOUNTER
Received call back. Will fax DME orders to [facility] in [facility]  Katiana SUZETTE Gasca  Nephrology  [phone]

## 2023-12-07 ENCOUNTER — ANCILLARY PROCEDURE (OUTPATIENT)
Dept: MAMMOGRAPHY | Facility: CLINIC | Age: 58
End: 2023-12-07
Attending: FAMILY MEDICINE
Payer: MEDICARE

## 2023-12-07 DIAGNOSIS — Z12.31 VISIT FOR SCREENING MAMMOGRAM: ICD-10-CM

## 2023-12-07 PROCEDURE — 77067 SCR MAMMO BI INCL CAD: CPT | Mod: GC

## 2023-12-14 ENCOUNTER — OFFICE VISIT (OUTPATIENT)
Dept: SLEEP MEDICINE | Facility: CLINIC | Age: 58
End: 2023-12-14
Payer: MEDICARE

## 2023-12-14 VITALS
BODY MASS INDEX: 43.99 KG/M2 | SYSTOLIC BLOOD PRESSURE: 125 MMHG | DIASTOLIC BLOOD PRESSURE: 82 MMHG | OXYGEN SATURATION: 95 % | WEIGHT: 233 LBS | HEART RATE: 77 BPM | HEIGHT: 61 IN

## 2023-12-14 DIAGNOSIS — E66.01 OBESITY, CLASS III, BMI 40-49.9 (MORBID OBESITY) (H): ICD-10-CM

## 2023-12-14 DIAGNOSIS — G47.33 OSA (OBSTRUCTIVE SLEEP APNEA): Primary | ICD-10-CM

## 2023-12-14 PROCEDURE — 99213 OFFICE O/P EST LOW 20 MIN: CPT | Performed by: INTERNAL MEDICINE

## 2023-12-14 ASSESSMENT — SLEEP AND FATIGUE QUESTIONNAIRES
HOW LIKELY ARE YOU TO NOD OFF OR FALL ASLEEP WHILE WATCHING TV: WOULD NEVER DOZE
HOW LIKELY ARE YOU TO NOD OFF OR FALL ASLEEP WHILE SITTING AND READING: WOULD NEVER DOZE
HOW LIKELY ARE YOU TO NOD OFF OR FALL ASLEEP WHEN YOU ARE A PASSENGER IN A CAR FOR AN HOUR WITHOUT A BREAK: WOULD NEVER DOZE
HOW LIKELY ARE YOU TO NOD OFF OR FALL ASLEEP WHILE SITTING AND TALKING TO SOMEONE: WOULD NEVER DOZE
HOW LIKELY ARE YOU TO NOD OFF OR FALL ASLEEP WHILE SITTING QUIETLY AFTER LUNCH WITHOUT ALCOHOL: WOULD NEVER DOZE
HOW LIKELY ARE YOU TO NOD OFF OR FALL ASLEEP WHILE LYING DOWN TO REST IN THE AFTERNOON WHEN CIRCUMSTANCES PERMIT: WOULD NEVER DOZE
HOW LIKELY ARE YOU TO NOD OFF OR FALL ASLEEP IN A CAR, WHILE STOPPED FOR A FEW MINUTES IN TRAFFIC: WOULD NEVER DOZE
HOW LIKELY ARE YOU TO NOD OFF OR FALL ASLEEP WHILE SITTING INACTIVE IN A PUBLIC PLACE: WOULD NEVER DOZE

## 2023-12-14 NOTE — PATIENT INSTRUCTIONS
For general sleep health questions:   https://www.thensf.org/sleep-health-topics/  http://sleepeducation.org    Medicare and Medicaid patients starting on CPAP or biPAP on or after 5/12/2023  Medicare patients should schedule an IN PERSON CLINIC appointment to provide your CPAP/biPAP usage data to a provider within 90 days of starting CPAP    For new ResMed devices, sign up for device rachana to monitor your device for your followup visits  We encourage you to utilize the Atter[employer] rachana or website (Linq3 web StudioNow) to monitor your therapy progress and share the data with your healthcare team when you discuss your sleep apnea.                                                      Know your equipment:  CPAP is continuous positive airway pressure that prevents obstructive sleep apnea by keeping the throat from collapsing while you are sleeping. In most cases, the device is  smart  and can slowly self-adjusts if your throat collapses and keeps a record every day of how well you are treated-this information is available to you and your care team.  BPAP is bilevel positive airway pressure that keeps your throat open and also assists each breath with a pressure boost to maintain adequate breathing.  Special kinds of BPAP are used in patients who have inadequate breathing from lung or heart disease. In most cases, the device is  smart  and can slowly self-adjusts to assist breathing. Like CPAP, the device keeps a record of how well you are treated.  Your mask is your connection to the device. You get to choose what feels most comfortable and the staff will help to make sure if fits. Here: are some examples of the different masks that are available:          Continue PAP therapy every night, for all hours that you are sleeping (including naps.)  As always, try to get at least 8 hours of sleep or more each day, keep a regular sleep schedule, and avoid sleep deprivation. Avoid alcohol.  Reasons that you might need a  change to your pressure therapy would be weight gain or loss, waking having inadvertently removed your PAP overnight, having previously felt refreshed by sleep with CPAP use and now waking un-refreshed, and return of daytime sleepiness. Also, the development of new medical problems  (such as heart failure, stroke, medications such as narcotics) can sometimes affect breathing at night and change your PAP therapy needs.  Please bring PAP with you if you are hospitalized.  If anticipating surgery be sure to discuss with your surgeon that you have sleep apnea and use PAP therapy.    Maintain your equipment as recommended which includes routine cleaning and replacement of supplies.      Call DME for any questions regarding supplies or maintenance.    [facility], [facility] [phone]    Do not drive on engage in potentially dangerous activities if feeling sleepy.    Please follow up in sleep clinic again in 12 months.        Tips for your PAP use-    Mask fitting tips  Mask fitting exercise:    To improve your mask seal and your mobility at night, put mask on and secure in place.  Lie down in bed with full pressure and roll to one side, adjust headgear while in that position to eliminate any leaks. Repeat process rolling to other side.     The mask seal does not have to be perfect:   CPAP machines are designed to make up for small leaks. However, you will not tolerate leaks blowing in your eyes so you will need to adjust.   Any leak should only be near or at the bottom of the mask.  We expect your mask to leak slightly at night.    Do not over-tighten the headgear straps, tighter IS NOT better, we expect minimal leak.    First try re-positioning the mask or headgear before tightening the headgear straps.  Mask leaks are expected due to changing sleeping positions. Try pulling the mask away from your skin allowing the cushion to re-inflate will minimize the leak.  If you  struggle for a good fit, try turning the CPAP off and then readjust the mask by pulling it away from your face and then turning back on the CPAP.        Humidifier tips  Humidifiers can be adjusted to increase or decrease the amount of moisture according to your comfort level. You may need to adjust this frequently at first, but then might only change it with seasonal weather changes.     Try INCREASING the humidity if:  You experience a dry, irritated nasal passage or throat.  You have a runny, drippy nose or sneezing fits after using CPAP.  You experience nasal congestion during or after CPAP use.    Try DECREASING the humidity if:  You have excessive condensation or  rain out  in the tubing or mask.  Otherwise keep the tubing warm during the night by running it underneath the blankets or pillow.      Clinic visit after initial PAP set-up   Bring your equipment with you to your 5-8 week follow up clinic visit.  We will be extracting your data from the machine if not available from the cloud based modem.        Travel  Always take your equipment with you when you travel.  If you fly with your equipment bring it on with you as a carry on.  Medical equipment does not count as a carry on.    If you travel international the machines take 110-240v.  The only adapter needed is the adapter that will fit into the receptacle (outlet).    You may also want to bring an extension cord as many hotel rooms have limited outlets at the bedside.  Do not travel with water in your humidifier chamber.     Cleaning and Maintenance Guidelines    Equipment Frequency Cleaning Method   Mask First Day    Daily      Weekly Soak mask in hot soapy water for 30 minutes, rinse and air dry.  Wipe nasal cushion with a hot soapy (Ivory, baby shampoo) cloth and rinse.  Baby wipes may also be used.  Do not use anti-bacterial soaps,Bibi  liquid soap, rubbing alcohol, bleach or ammonia.  Wash frame in hot soapy water (Ivory, baby shampoo) rinse and let  air dry   Headgear Biweekly Wash in hot soapy water, rinse and air dry   Reusable Gray Filter Weekly Wash in hot soapy water, rinse, put in towel squeeze moisture out, let air dry   Disposable White Filter Check Weekly Replace when brown or gray in color; at least every 2 to 3 months   Humidifier Chamber Daily    Weekly Empty distilled water from humidifier and let air dry    Hand wash in hot soapy water, rinse and air dry   Tubing Weekly Wash in hot soapy water, rinse and let air dry   Mask, Tubing and Humidifier Chamber As needed Disinfect: Soak in 1 part distilled white vinegar to 3 parts hot water for 30 minutes, rinse well and air dry  Not the material headgear        MASK AND SUPPLY REORDERING and EQUIPMENT NEEDS through your DME and per your insurance  Reminder: Most insurance companies will allow for a new mask, headgear, tubing, and reusable gray filter every six months.  Disposable white ultra-fine filters are covered monthly.      HOME AND SAFETY INSTRUCTIONS  Do not use frayed or cracked electrical cords, multi plug adaptors, or switched receptacles  Do not immerse electrical equipment into water  Assure that electrical cords do not become a tripping hazard   Your BMI is Body mass index is 44.28 kg/m .    What is BMI?  Body mass index (BMI) is one way to tell whether you are at a healthy weight, overweight, or obese. It measures your weight in relation to your height.  A BMI of 18.5 to 24.9 is in the healthy range. A person with a BMI of 25 to 29.9 is considered overweight, and someone with a BMI of 30 or greater is considered obese.  Another way to find out if you are at risk for health problems caused by overweight and obesity is to measure your waist. If you are a woman and your waist is more than 35 inches, or if you are a man and your waist is more than 40 inches, your risk of disease may be higher.  More than two-thirds of American adults are considered overweight or obese. Being overweight or  obese increases the risk for further weight gain.  Excess weight may lead to heart disease and diabetes. Creating and following plans for healthy eating and physical activity may help you improve your health.    Methods for maintaining or losing weight.  Weight control is part of healthy lifestyle and includes exercise, emotional health, and healthy eating habits.  Careful eating habits lifelong is the mainstay of weight control.  Though there are significant health benefits from weight loss, long-term weight loss with diet alone may be very difficult to achieve- studies show long-term success with dietary management in less than 10% of people. Attaining a healthy weight may be especially difficult to achieve in those with severe obesity. In some cases, medications, devices and surgical management might be considered.    What can you do?  If you are overweight or obese and are interested in methods for weight loss, you should discuss this with your provider. In addition, we recommend that you review healthy life styles and methods for weight loss available through the National Institutes of Health patient information sites:   http://win.niddk.nih.gov/publications/index.htm

## 2023-12-14 NOTE — PROGRESS NOTES
Chief complaint: Follow-up severe obstructive sleep apnea with hypoxemia and hypoventilation    LOV 6/16/2023    History of Present Illness: 58-year-old Slovak speaking female seen with  via iPad.  She has past medical history notable for liver transplantation for hepatitis C, hypertension, chronic kidney disease, obesity.  She presented with severe excessive daytime sleepiness and was found to have hypoxemic hypercapnic respiratory failure.  Sleep study shows severe obstructive sleep apnea with hypoxemia and hypoventilation and she was started on bilevel therapy.  At her last visit an adjustment was made to the bilevel settings to optimize AHI as well as hypoxemia that was noted to be residual on overnight oximetry despite being on additional supplemental oxygen at 3 L/min.    Today she reports no difficulty tolerating that pressure adjustment.  She also reports excellent clinical benefit.  She is alert during the day.  In fact if she tries to take a nap she cannot sleep.  She uses the device every night.  She reminds me that prior to using the device she would fall asleep eating.  She denies new sleep concerns.  She is satisfied with the mask.      Ages [place] Sleepiness Scale  Total score - Ages [place]: 0 (12/14/2023  1:10 PM)   (Less than 10 normal)    Insomnia Severity Scale  BRIGID Total Score: 1  (normal 0-7, mild 8-14, moderate 15-21, severe 22-28)    Past Medical History:   Diagnosis Date    Anemia in CKD (chronic kidney disease)     Cataract     CKD (chronic kidney disease) stage 3, GFR 30-59 ml/min (H)     Hepatitis C     cleared virus spontaneously 2013    High risk medication use     Hypertension, renal     Immunosuppressed status (H24)     Liver replaced by transplant (H) 01/01/2010    Osteoporosis     Recurrent pregnancy loss without current pregnancy     Recurrent UTI 07/12/2021    Stroke, hemorrhagic (H) 01/01/2008    Left cerebral intraparenchymal hemorrhage    Syncope     Unspecified viral  hepatitis C without hepatic coma        Allergies   Allergen Reactions    Aspirin      3/31/16 Per pt, tolerates 81 mg daily dose without ADR.     325 mg dose caused itchiness and hives.    Clarithromycin      Allergic reaction        Contrast Dye     Iodine     Pcn [Penicillins]        Current Outpatient Medications   Medication    ACE/ARB/ARNI NOT PRESCRIBED (INTENTIONAL)    acetaminophen (TYLENOL) 500 MG tablet    albuterol (ACCUNEB) 0.63 MG/3ML neb solution    albuterol (PROAIR HFA/PROVENTIL HFA/VENTOLIN HFA) 108 (90 Base) MCG/ACT inhaler    alendronate (FOSAMAX) 70 MG tablet    alum & mag hydroxide-simethicone (MAALOX ADVANCED MAX ST) 400-400-40 MG/5ML SUSP suspension    amLODIPine (NORVASC) 10 MG tablet    calcitRIOL (ROCALTROL) 0.25 MCG capsule    calcium carbonate 600 mg-vitamin D 400 units (CALTRATE) 600-400 MG-UNIT per tablet    calcium carbonate-vitamin D (CALTRATE) 600-10 MG-MCG per tablet    capsaicin-menthol-methyl sal 0.025-1-12 % external cream    cetirizine (ZYRTEC) 10 MG tablet    chlorthalidone (HYGROTON) 25 MG tablet    cycloSPORINE modified (GENERIC EQUIVALENT) 25 MG capsule    Denture Care Products (EFFERDENT DENTURE CLEANSER) TBEF    diclofenac (VOLTAREN) 1 % topical gel    febuxostat (ULORIC) 40 MG TABS tablet    furosemide (LASIX) 20 MG tablet    gabapentin (NEURONTIN) 300 MG capsule    ketotifen (ZADITOR) 0.025 % ophthalmic solution    lidocaine (XYLOCAINE) 5 % external ointment    mineral oil-white petrolatum (EUCERIN/MINERIN) cream    Multiple Vitamin (DAILY-SUMA MULTIVITAMIN) TABS    multivitamin w/minerals (MULTI-VITAMIN) tablet    mycophenolate (GENERIC EQUIVALENT) 250 MG capsule    olopatadine (PATANOL) 0.1 % ophthalmic solution    omeprazole (PRILOSEC) 20 MG DR capsule    order for DME    order for DME    order for DME    senna-docusate (SENEXON-S) 8.6-50 MG tablet    SM FIBER LAXATIVE 500 MG TABS tablet    STATIN NOT PRESCRIBED, INTENTIONAL,    umeclidinium (INCRUSE ELLIPTA) 62.5  MCG/ACT inhaler    VITAMIN D3 25 MCG (1000 UT) tablet     No current facility-administered medications for this visit.       Social History     Socioeconomic History    Marital status: Single     Spouse name: Not on file    Number of children: Not on file    Years of education: Not on file    Highest education level: Not on file   Occupational History    Not on file   Tobacco Use    Smoking status: Never    Smokeless tobacco: Never   Vaping Use    Vaping Use: Never used   Substance and Sexual Activity    Alcohol use: No    Drug use: No    Sexual activity: Not Currently   Other Topics Concern    Parent/sibling w/ CABG, MI or angioplasty before 65F 55M? Not Asked     Service No    Blood Transfusions Yes    Caffeine Concern No    Occupational Exposure No    Hobby Hazards No    Sleep Concern No    Stress Concern Yes     Comment: needs help at home for cares    Weight Concern Yes     Comment: wants to lose weight not gain weight    Special Diet No    Back Care Yes     Comment: uses a patch to relieve pain    Exercise Not Asked    Bike Helmet Not Asked    Seat Belt Yes    Self-Exams Not Asked   Social History Narrative    One son Carleen        GynHx:             Gets transportation via insurance - needs assistance due to unsteady gait from CVA     Social Determinants of Health     Financial Resource Strain: High Risk (2020)    Overall Financial Resource Strain (CARDIA)     Difficulty of Paying Living Expenses: Very hard   Food Insecurity: Food Insecurity Present (2020)    Hunger Vital Sign     Worried About Running Out of Food in the Last Year: Sometimes true     Ran Out of Food in the Last Year: Sometimes true   Transportation Needs: Unmet Transportation Needs (2022)    PRAPARE - Transportation     Lack of Transportation (Medical): Yes     Lack of Transportation (Non-Medical): Yes   Physical Activity: Not on file   Stress: Stress Concern Present (1/3/2022)    [employer] of  "Occupational Health - Occupational Stress Questionnaire     Feeling of Stress : Rather much   Social Connections: Socially Isolated (1/3/2022)    Social Connection and Isolation Panel [NHANES]     Frequency of Communication with Friends and Family: Once a week     Frequency of Social Gatherings with Friends and Family: Once a week     Attends Uatsdin Services: Never     Active Member of Clubs or Organizations: No     Attends Club or Organization Meetings: Never     Marital Status:    Interpersonal Safety: Low Risk  (10/16/2023)    Interpersonal Safety     Do you feel physically and emotionally safe where you currently live?: Yes     Within the past 12 months, have you been hit, slapped, kicked or otherwise physically hurt by someone?: No     Within the past 12 months, have you been humiliated or emotionally abused in other ways by your partner or ex-partner?: No   Housing Stability: Not on file       Family History   Problem Relation Age of Onset    Hepatitis Other         Hep C, still in [place]    Cerebrovascular Disease Other     Cancer No family hx of     Diabetes No family hx of     Hypertension No family hx of     Thyroid Disease No family hx of     Glaucoma No family hx of     Macular Degeneration No family hx of            EXAM:  /82   Pulse 77   Ht 1.545 m (5' 0.83\")   Wt 105.7 kg (233 lb)   LMP  (LMP Unknown)   SpO2 95%   BMI 44.28 kg/m    GENERAL: Alert and no distress  EYES: Eyes grossly normal to inspection.  No obvious scleral/conjunctival abnormalities.  RESP: No audible wheeze, cough, or visible cyanosis.  No visible retractions or increased work of breathing.    SKIN: Visible skin clear. No significant rash, abnormal pigmentation or lesions.  NEURO: Cranial nerves grossly intact.    PSYCH: Mentation appears normal, affect normal, judgement and insight intact, normal speech and appearance well-groomed.       PSG Split-night 1/10/2023  Weight 235 lbs BMI 44.9  AHI 79.2, RDI 89.3, " Lowest O2 Sat 53% time with oxygen saturation less than or equal to 88% 95 minutes  Improved sleep consolidation on PAP therapy  Patient was initiated on bilevel with incomplete titration.     PSG 3/22/2023  Weight 235 lbs BMI 44.9  Final Bilevel setting of IPAP 17, EPAP 8 and O2 at 3L/m resolved obstructive events and improved hypoxemia and hypoventilation. (No REM supine at final settings)       [employer] AirCurve 10 VautoPAP download from 11/11/2023 to 12/10/2023 reviewed:  Per cent of days used greater than 4 Hours 100 % (minimum goal greater than 70%)  Average use on days used: 11 hours  25 min  Settings: EPAP 10 cmH2O    IPAP 19 cmH2O  Average AHI 2.5 events per hour (goal less than 5)  Leak acceptable    TSH   Date Value Ref Range Status   08/03/2022 0.77 0.30 - 4.20 uIU/mL Final   01/28/2022 0.57 0.40 - 4.00 mU/L Final   08/17/2016 0.81 0.40 - 4.00 mU/L Final         ASSESSMENT:  58-year-old female with history of obesity, liver transplant for hep C, hypertension, chronic kidney disease, severe obstructive sleep apnea with hypoxemia and hypoventilation.  She has had excellent excellent clinical benefit with the use of bilevel and supplemental oxygen.  She has had normalization of AHI after the most recent pressure change.  Symptoms of daytime sleepiness have completely resolved.  Ongoing treatment of obstructive sleep apnea with respiratory failure medically indicated    PLAN:  Orders generated keep her supplies up-to-date for the next year.  Patient is encouraged to continue using bilevel all night every night with the 3 L supplemental oxygen into the system.  Follow-up in 1 year.  Contact me if any new issues arise.      23 minutes spent by me on the date of the encounter doing chart review, history and exam, documentation and further activities per the note    Bessy Ward M.D.  Pulmonary/Critical Care/Sleep Medicine    [facility]   Floor  [address], [address]   [address] [place] [address]. [place] [address]   Appointments: [phone]    The above note was dictated using voice recognition software and may include typographical errors. Please contact the author for any clarifications.

## 2023-12-15 ENCOUNTER — TELEPHONE (OUTPATIENT)
Dept: GASTROENTEROLOGY | Facility: CLINIC | Age: 58
End: 2023-12-15

## 2023-12-26 ENCOUNTER — OFFICE VISIT (OUTPATIENT)
Dept: FAMILY MEDICINE | Facility: CLINIC | Age: 58
End: 2023-12-26
Payer: MEDICARE

## 2023-12-26 VITALS
WEIGHT: 234.8 LBS | OXYGEN SATURATION: 96 % | SYSTOLIC BLOOD PRESSURE: 112 MMHG | BODY MASS INDEX: 44.62 KG/M2 | DIASTOLIC BLOOD PRESSURE: 74 MMHG | HEART RATE: 85 BPM

## 2023-12-26 DIAGNOSIS — J44.9 CHRONIC OBSTRUCTIVE PULMONARY DISEASE, UNSPECIFIED COPD TYPE (H): ICD-10-CM

## 2023-12-26 DIAGNOSIS — Z13.9 ENCOUNTER FOR SCREENING INVOLVING SOCIAL DETERMINANTS OF HEALTH (SDOH): ICD-10-CM

## 2023-12-26 DIAGNOSIS — Z02.89 ENCOUNTER FOR COMPLETION OF FORM WITH PATIENT: ICD-10-CM

## 2023-12-26 DIAGNOSIS — M46.1 SACROILIITIS (H): Primary | ICD-10-CM

## 2023-12-26 PROCEDURE — 99214 OFFICE O/P EST MOD 30 MIN: CPT | Mod: GC

## 2023-12-26 ASSESSMENT — PATIENT HEALTH QUESTIONNAIRE - PHQ9: SUM OF ALL RESPONSES TO PHQ QUESTIONS 1-9: 0

## 2023-12-26 NOTE — PROGRESS NOTES
DME (Durable Medical Equipment) Orders and Documentation  Orders Placed This Encounter   Procedures    Nebulizer and Supplies Order        The patient was assessed and it was determined the patient is in need of the following listed DME Supplies/Equipment. Please complete supporting documentation below to demonstrate medical necessity.      DME All Other Item(s) Documentation    List reason for need and supporting documentation for medical necessity below for each DME item.     COPD

## 2023-12-26 NOTE — PROGRESS NOTES
"  Assessment & Plan       Encounter for completion of form with patient  Encounter for screening involving social determinants of health (SDoH)  Completed Special Diet form with the patient and son. PCS will assistance with faxing form over to [facility].    Sacroiliitis (H24)  Refills provided.   - capsaicin-menthol-methyl sal 0.025-1-12 % external cream  Dispense: 56.6 g; Refill: 3    Chronic obstructive pulmonary disease, unspecified COPD type (H)  Stable on medication regimen. Request for Nebulizer tubing. DME order provided.    - Miscellaneous Order for DME - ONLY FOR DME       BMI:   Estimated body mass index is 44.62 kg/m  as calculated from the following:    Height as of 12/14/23: 1.545 m (5' 0.83\").    Weight as of this encounter: 106.5 kg (234 lb 12.8 oz).       Return in about 1 year (around 12/26/2024) for Routine preventive .    Rishi Chaney MD  [facility] AMI Ray is a 58 year old, presenting for the following health issues:  Forms (Forms for [facility].)        10/16/2023     2:53 PM   Additional Questions   Roomed by Bayron   Accompanied by Self       HPI     58-year-old Pashto speaking female seen with  via son. She has past medical history notable for liver transplantation for hepatitis C, hypertension, chronic kidney disease, obesity.       Special diet forms   - Needs to be completed once a year  - She has not been able to ambulate distances due to exhaustion    Sacroiliitis   - Capsaicin cream need refills  - Works well      COPD  - Nebulizer with adult mask and tubing   - Request for new tubing         Review of Systems   Constitutional, HEENT, cardiovascular, pulmonary, gi and gu systems are negative, except as otherwise noted.      Objective    /74   Pulse 85   Wt 106.5 kg (234 lb 12.8 oz)   LMP  (LMP Unknown)   SpO2 96%   BMI 44.62 kg/m    Body mass index is 44.62 kg/m .  Physical Exam   GENERAL: healthy, alert and no " distress  RESP: lungs clear to auscultation - no rales, rhonchi or wheezes  CV: regular rate and rhythm, normal S1 S2, no S3 or S4, no murmur, click or rub, no peripheral edema and peripheral pulses strong  ABDOMEN: soft, nontender, no hepatosplenomegaly, no masses and bowel sounds normal  MS: no gross musculoskeletal defects noted, no edema  NEURO: Normal strength and tone, mentation intact and speech normal  PSYCH: mentation appears normal, affect normal/bright        ----- Service Performed and Documented by Resident or Fellow ------

## 2023-12-30 NOTE — PATIENT INSTRUCTIONS
Patient Education   Here is the plan from today's visit    1. Sacroiliitis (H24)  - capsaicin-menthol-methyl sal 0.025-1-12 % external cream; Apply to low back three times daily for pain  Dispense: 56.6 g; Refill: 3    2. Chronic obstructive pulmonary disease, unspecified COPD type (H)  - Miscellaneous Order for DME - ONLY FOR DME    3. Encounter for completion of form with patient   Encounter for screening involving social determinants of health (SDoH)  Special diet forms completed.     Please call or return to clinic if your symptoms don't go away.    Follow up plan  No follow-ups on file.    Thank you for coming to [facility] today.  Lab Testing:  **If you had lab testing today and your results are reassuring or normal they will be mailed to you or sent through [employer] within 7 days.   **If the lab tests need quick action we will call you with the results.  **If you are having labs done on a different day, please call [phone] to schedule at [facility] or [phone] for other [facility] locations. Labs do not offer walk-in appointments.  The phone number we will call with results is # [phone] (home) . If this is not the best number please call our clinic and change the number.  Medication Refills:  If you need any refills please call your pharmacy and they will contact us.   If you need to  your refill at a new pharmacy, please contact the new pharmacy directly. The new pharmacy will help you get your medications transferred faster.   Scheduling:  If you have any concerns about today's visit or wish to schedule another appointment please call our office during normal business hours [phone] (8-5:00 M-F). If you can no longer make a scheduled visit, please cancel via [employer] or call us to cancel.   If a referral was made to an [facility] specialty provider and you do not get a call from central scheduling, please refer to directions on your visit summary or  call our office during normal business hours for assistance.   If a Mammogram was ordered for you at the [facility] call [phone] to schedule or change your appointment.  If you had an XRay/CT/Ultrasound/MRI ordered the number is [phone] to schedule or change your radiology appointment.   [facility] has limited ultrasound appointments available on Wednesdays, if you would like your ultrasound at [facility], please call [phone] to schedule.   Medical Concerns:  If you have urgent medical concerns please call [phone] at any time of the day.    Rishi Chaney MD

## 2024-01-10 ENCOUNTER — DOCUMENTATION ONLY (OUTPATIENT)
Dept: FAMILY MEDICINE | Facility: CLINIC | Age: 59
End: 2024-01-10
Payer: MEDICARE

## 2024-01-10 NOTE — PROGRESS NOTES
1/10/24    Care Coordinator successfully faxed a DME order for nebulizer and supplies, along with clinic note from 12/26/23 to [facility] ([phone]).    Edie Livingston  Care Coordinator- Kyara

## 2024-01-10 NOTE — PROGRESS NOTES
"When opening a documentation only encounter, be sure to enter in \"Chief Complaint\" Forms and in \" Comments\" Title of form, description if needed.    Flor is a 58 year old  female  Form received via: Fax  Form now resides in: Provider Ready    Tony Stewart MA        Form has been completed by provider.     Form sent out via: Fax to [facility] at Fax Number: [phone]  Patient informed: N/A  Output date: January 17, 2024    Tony Stewart MA      **Please close the encounter**         "

## 2024-01-15 ENCOUNTER — OFFICE VISIT (OUTPATIENT)
Dept: FAMILY MEDICINE | Facility: CLINIC | Age: 59
End: 2024-01-15
Payer: MEDICARE

## 2024-01-15 VITALS
DIASTOLIC BLOOD PRESSURE: 100 MMHG | TEMPERATURE: 97.9 F | SYSTOLIC BLOOD PRESSURE: 151 MMHG | BODY MASS INDEX: 45.31 KG/M2 | WEIGHT: 230.8 LBS | OXYGEN SATURATION: 96 % | HEART RATE: 69 BPM | RESPIRATION RATE: 22 BRPM | HEIGHT: 60 IN

## 2024-01-15 DIAGNOSIS — I12.9 HYPERTENSION, RENAL: Primary | ICD-10-CM

## 2024-01-15 DIAGNOSIS — M25.512 CHRONIC LEFT SHOULDER PAIN: ICD-10-CM

## 2024-01-15 DIAGNOSIS — G81.91 HEMIPLEGIA OF RIGHT DOMINANT SIDE DUE TO INFARCTION OF BRAIN (H): ICD-10-CM

## 2024-01-15 DIAGNOSIS — N18.32 STAGE 3B CHRONIC KIDNEY DISEASE (H): ICD-10-CM

## 2024-01-15 DIAGNOSIS — N25.81 SECONDARY HYPERPARATHYROIDISM (H): ICD-10-CM

## 2024-01-15 DIAGNOSIS — G89.29 CHRONIC LEFT SHOULDER PAIN: ICD-10-CM

## 2024-01-15 DIAGNOSIS — R60.0 BILATERAL LEG EDEMA: ICD-10-CM

## 2024-01-15 DIAGNOSIS — I63.9 HEMIPLEGIA OF RIGHT DOMINANT SIDE DUE TO INFARCTION OF BRAIN (H): ICD-10-CM

## 2024-01-15 DIAGNOSIS — J44.9 CHRONIC OBSTRUCTIVE PULMONARY DISEASE, UNSPECIFIED COPD TYPE (H): ICD-10-CM

## 2024-01-15 DIAGNOSIS — Z53.9 DIAGNOSIS NOT YET DEFINED: Primary | ICD-10-CM

## 2024-01-15 DIAGNOSIS — Z12.11 SCREEN FOR COLON CANCER: ICD-10-CM

## 2024-01-15 PROBLEM — F33.1 MODERATE EPISODE OF RECURRENT MAJOR DEPRESSIVE DISORDER (H): Status: RESOLVED | Noted: 2019-01-28 | Resolved: 2024-01-15

## 2024-01-15 PROBLEM — F33.0 MILD EPISODE OF RECURRENT MAJOR DEPRESSIVE DISORDER (H): Status: RESOLVED | Noted: 2017-09-12 | Resolved: 2024-01-15

## 2024-01-15 LAB
FASTING STATUS PATIENT QL REPORTED: ABNORMAL
POTASSIUM SERPL-SCNC: 4.3 MMOL/L (ref 3.4–5.3)
PTH-INTACT SERPL-MCNC: 36 PG/ML (ref 15–65)
TRIGL SERPL-MCNC: 454 MG/DL

## 2024-01-15 PROCEDURE — 36415 COLL VENOUS BLD VENIPUNCTURE: CPT | Performed by: FAMILY MEDICINE

## 2024-01-15 PROCEDURE — 99215 OFFICE O/P EST HI 40 MIN: CPT | Mod: 25 | Performed by: FAMILY MEDICINE

## 2024-01-15 PROCEDURE — 91320 SARSCV2 VAC 30MCG TRS-SUC IM: CPT | Performed by: FAMILY MEDICINE

## 2024-01-15 PROCEDURE — G0179 MD RECERTIFICATION HHA PT: HCPCS | Performed by: FAMILY MEDICINE

## 2024-01-15 PROCEDURE — 82088 ASSAY OF ALDOSTERONE: CPT | Performed by: FAMILY MEDICINE

## 2024-01-15 PROCEDURE — 84244 ASSAY OF RENIN: CPT | Mod: 90 | Performed by: FAMILY MEDICINE

## 2024-01-15 PROCEDURE — 99000 SPECIMEN HANDLING OFFICE-LAB: CPT | Performed by: FAMILY MEDICINE

## 2024-01-15 PROCEDURE — 84478 ASSAY OF TRIGLYCERIDES: CPT | Performed by: FAMILY MEDICINE

## 2024-01-15 PROCEDURE — 84132 ASSAY OF SERUM POTASSIUM: CPT | Performed by: FAMILY MEDICINE

## 2024-01-15 PROCEDURE — 83970 ASSAY OF PARATHORMONE: CPT | Performed by: FAMILY MEDICINE

## 2024-01-15 PROCEDURE — 90480 ADMN SARSCOV2 VAC 1/ONLY CMP: CPT | Performed by: FAMILY MEDICINE

## 2024-01-15 RX ORDER — ALBUTEROL SULFATE 0.63 MG/3ML
1 SOLUTION RESPIRATORY (INHALATION) EVERY 4 HOURS PRN
Qty: 360 ML | Refills: 4 | Status: CANCELLED | OUTPATIENT
Start: 2024-01-15

## 2024-01-15 RX ORDER — ALBUTEROL SULFATE 90 UG/1
2 AEROSOL, METERED RESPIRATORY (INHALATION) 4 TIMES DAILY
Qty: 18 G | Refills: 1 | Status: SHIPPED | OUTPATIENT
Start: 2024-01-15 | End: 2024-03-15

## 2024-01-15 NOTE — LETTER
January 19, 2024      Flor MEMO Navarro  [address]  [facility] [address]        Dear ,    We are writing to inform you of your stool test results.They are normal.     Resulted Orders   Fecal colorectal cancer screen FIT - Future (S+30)   Result Value Ref Range    Occult Blood Screen FIT Negative Negative       If you have any questions or concerns, please call the clinic at the number listed above.       Sincerely,      Karely Sierra MD

## 2024-01-15 NOTE — PATIENT INSTRUCTIONS
Please ensure she's on 10 mg of amlodipine daily.   Knee high compression socks ordered. Oximeter ordered.   Shoulder cream for pain reordered. Let me know if you would like to do any physical therapy.  Albuterol inhaler refilled.  FIT test ordered.  Blood draw for kidney and cholesterol labs today.   Follow-up with me in 1 month

## 2024-01-15 NOTE — PROGRESS NOTES
DME (Durable Medical Equipment) Orders and Documentation  Orders Placed This Encounter   Procedures    Other Respiratory Supplies    Compression Sleeve/Stocking Order        The patient was assessed and it was determined the patient is in need of the following listed DME Supplies/Equipment. Please complete supporting documentation below to demonstrate medical necessity.              DME (Durable Medical Equipment) Orders and Documentation  Orders Placed This Encounter   Procedures    Other Respiratory Supplies    Compression Sleeve/Stocking Order      The patient was assessed and it was determined the patient is in need of the following listed DME Supplies/Equipment. Please complete supporting documentation below to demonstrate medical necessity.      DME All Other Item(s) Documentation    List reason for need and supporting documentation for medical necessity below for each DME item.     1. Compression stockings for edema mgmt and wound prevention  2. Oximeter for home O2 monitoring - on chronic O2 at night for COPD

## 2024-01-15 NOTE — Clinical Note
Jt Arambula: Saw Flor in follow up today - her BP continues to be high. Started 10 amlodipine with you in August, in addition to chlorthalidone 25 and furosemide 20 (for hyperkalemia, really). Wanted to see your thoughts on additional agents since ACE/ARB not really an option. Working on weight loss and increasing vegetables.  Thanks c

## 2024-01-16 ENCOUNTER — DOCUMENTATION ONLY (OUTPATIENT)
Dept: FAMILY MEDICINE | Facility: CLINIC | Age: 59
End: 2024-01-16
Payer: MEDICARE

## 2024-01-16 LAB — ALDOST SERPL-MCNC: 11 NG/DL (ref 0–31)

## 2024-01-16 PROCEDURE — 82274 ASSAY TEST FOR BLOOD FECAL: CPT | Performed by: FAMILY MEDICINE

## 2024-01-16 NOTE — PROGRESS NOTES
"  Assessment & Plan   Flor was seen today for recheck.    Diagnoses and all orders for this visit:    Hypertension, renal  Meeting requirements for treatment resistant hypertension based on at home blood pressures which nurse is also reporting and \"3 agents\" though really only on 2 for HTN. Ordered renin, aldosterone, and potassium labs today. Currently on 25 of chlorthalidone, 20 of lasix, amlodipine 10. Family is less familiar with amlodipine but will message nurse to see if she is taking. [place] back from nurse and she is taking it daily. Can't take ACE/ARB due to history of hyperkalemia which was resistant and required treatment. She's working on weight loss and increasing vegetables in her diet which we reviewed today.    We will reach out to Dr. Arambula for suggestions on alternative control methods. Will call [facility] with recommendations.  -     Aldosterone; Future  -     Renin activity; Future  -     Aldosterone Renin Ratio; Future  -     Potassium; Future  -     Cancel: Aldosterone  -     Cancel: Renin activity  -     Aldosterone Renin Ratio  -     Potassium    Stage 3b chronic kidney disease (H)  Above hypertension likely contributes here. Reviewed recent nephrology note. Labs are updated but she had a triglyceride level that was very high drawn at her last visit, greater than 500. Will repeat today to ensure accuracy and start treatment if needed. She's reluctant to take statin medications.   -     Triglycerides; Future  -     Triglycerides    Chronic left shoulder pain  With underlying spasm. Not sure that she wants to do any more evaluation or see physical therapy. Topical capsaicin has been helpful. Refill sent.  -     capsaicin-menthol-methyl sal 0.025-1-12 % external cream; Apply to low back three times daily for pain    Chronic obstructive pulmonary disease, unspecified COPD type (H)  Needs refill of handheld inhaler. Reviewed recent note from sleep medicine. Compliant with SURI treatment.  -     " albuterol (PROAIR HFA/PROVENTIL HFA/VENTOLIN HFA) 108 (90 Base) MCG/ACT inhaler; Inhale 2 puffs into the lungs 4 times daily  -     Other Respiratory Supplies    Screen for colon cancer  -     Fecal colorectal cancer screen FIT - Future (S+30); Future  -     Fecal colorectal cancer screen FIT - Future (S+30)    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)  Noted gait difficulty. She has assistance with transportation and assistance in the home. She gets transportation via insurance for medical visits.    Bilateral leg edema  On Lasix which is primarily used for renal hypertension but helpful for leg edema. Compression stockings ordered for the year.  -     Compression Sleeve/Stocking Order    Secondary hyperparathyroidism (H24)  Due for reassessment of calcitriol dosing.  -     Parathyroid Hormone Intact; Future  -     Parathyroid Hormone Intact    Other orders  -     COVID-19 12+ (2023-24) (PFIZER)    40 minutes spent by me on the date of the encounter doing chart review, history and exam, documentation and further activities per the note    No follow-ups on file.    The information in this document, created by the medical scribe for me, accurately reflects the services I personally performed and the decisions made by me. I have reviewed and approved this document for accuracy prior to leaving the patient care area.  Karely Sierra MD  3:36 PM, 01/15/24   [facility] AMI Ray is a 58 year old, presenting for the following health issues:  RECHECK        1/15/2024     3:22 PM   Additional Questions   Roomed by Bayron   Accompanied by Son       HPI   Her son is present at today's visit for interpretation. She now has a 1 month old grandchild which she is very happy and excited about.     Left shoulder pain  She reports severe burning pain in her left shoulder and on the lateral aspect of her left chest. The cream helps transiently. The pain has been present for 3-4  months.    Blood Pressure  Her home nurse does feel that her blood pressure is high. She is fasting at today's visit and did take her blood pressure medications.    Sleep  She is sleeping with her mask on.    Forms  At her previous visit to [facility] she filled out a form of [facility] support with getting food. She has not heard anything back from the [facility] but is still able to get food.    Medical devices  She would also like an oximeter ordered. She also needs knee high compression socks. She denies any lower extremity wounds.    Diet  She reports she has lost some weight due to not eating much.     Cancer screening  She would prefer to use a FIT test versus a colonoscopy. She did have her mammogram done.     Review of Systems   Positive for: left shoulder pain  Negative for: lower extremity wounds    This document serves as a record of the services and decisions personally performed and made by Karely Sierra MD. It was created on his/her behalf by Alcides Galeana, a trained medical scribe. The creation of this document is based the provider's statements to the medical scribe.  Scribe Alcides Galeana 3:36 PM, January 15, 2024       Objective    BP (!) 151/100   Pulse 69   Temp 97.9  F (36.6  C) (Oral)   Resp 22   Ht 1.524 m (5')   Wt 104.7 kg (230 lb 12.8 oz)   LMP  (LMP Unknown)   SpO2 96%   BMI 45.08 kg/m    Body mass index is 45.08 kg/m .  BP Readings from Last 6 Encounters:   01/15/24 (!) 151/100   12/26/23 112/74   12/14/23 125/82   11/21/23 (!) 142/84   11/08/23 117/77   10/16/23 128/88      Physical Exam   GENERAL: healthy, alert and no distress  MS: Diffuse muscle spasms throughout the lower trap and extending around the rib cage to left mid axillary line.  NEURO: Normal tone, mentation intact and speech normal. Unstable gait, uses the arms of the chair to get up on to table.  PSYCH: mentation appears normal, affect normal/bright

## 2024-01-16 NOTE — PROGRESS NOTES
"1/16/24    Care Coordinator successfully faxed a DME order for compression stockings to [facility] ([phone]) to inquire about stocking order. Per CC note from 9/15, \"DME orders are billed through patient's secondary insurance- [employer] [place]. Through this insurance, patient is eligible for 2 pairs of compression stockings every 6 months. Patient last received 2 pairs of compression stockings on 4/18/23 per [facility] records. Therefore, will be eligible again on 10/18/23.\"     CC contacted [facility] to ask if patient had ordered 2 more pairs in October. A representative stated patient had not ordered 2 more pairs in October, therefore they are eligible for a re-order now. Representative stating the family needs to call for the order. CC will contact patient/family to give them the number to call for compression stockings ([phone]). If patient needs more compression stockings than 2 pairs every 6 months, they would need to pay out of pocket since insurance will not cover more than this amount and frequency.     CC also successfully faxed a DME order for an oximeter to [facility]- [facility] does not offer oximeters ([phone]) [facility] should be reaching out to patient to confirm order.     Edie Livingston  Care Coordinator- [facility]  [phone]  "

## 2024-01-17 ENCOUNTER — TELEPHONE (OUTPATIENT)
Dept: FAMILY MEDICINE | Facility: CLINIC | Age: 59
End: 2024-01-17
Payer: MEDICARE

## 2024-01-17 DIAGNOSIS — E78.5 HYPERLIPIDEMIA LDL GOAL <160: ICD-10-CM

## 2024-01-17 DIAGNOSIS — I12.9 HYPERTENSION, RENAL: Primary | ICD-10-CM

## 2024-01-17 DIAGNOSIS — E78.5 HYPERLIPIDEMIA LDL GOAL <100: ICD-10-CM

## 2024-01-17 RX ORDER — ATORVASTATIN CALCIUM 40 MG/1
40 TABLET, FILM COATED ORAL DAILY
Qty: 90 TABLET | Refills: 1 | Status: SHIPPED | OUTPATIENT
Start: 2024-01-17 | End: 2024-07-15

## 2024-01-17 RX ORDER — CARVEDILOL 12.5 MG/1
12.5 TABLET ORAL 2 TIMES DAILY WITH MEALS
Qty: 180 TABLET | Refills: 1 | Status: SHIPPED | OUTPATIENT
Start: 2024-01-17 | End: 2024-07-15

## 2024-01-17 NOTE — TELEPHONE ENCOUNTER
Discussed case with Dr. Arambula. BP uncontrolled. Home BP's from home RN also elevated per son who reports that she keeps mentioning it is high, but doesn't have numbers. Called son Courtney today. Courtney will write down BP's from home RN visits until next visit next month.     Plan: hypertension - start 12.5 coreg two times daily   Write down #s and bring to appointment     Hypertriglyceridemia, high risk ASCVD - discussed with pt and family - willing to take meds. Start statin    The 10-year ASCVD risk score (María VAZQUEZ, et al., 2019) is: 10.1%    Values used to calculate the score:      Age: 58 years      Sex: Female      Is Non- : No      Diabetic: No      Tobacco smoker: No      Systolic Blood Pressure: 151 mmHg      Is BP treated: Yes      HDL Cholesterol: 37 mg/dL      Total Cholesterol: 297 mg/dL

## 2024-01-17 NOTE — CONFIDENTIAL NOTE
1/17/24    Care Coordinator contacted patient's son, Courtney to check in a give information regarding DME orders. CC was going to provide the number for [facility] for compression stockings, but Courtney had stated that Flor was contacted already and the compression stockings would be shipped out to their home address.    As for the oximeter, CC contacted [facility] ([phone]) since patient also receives oxygen supplies through them. A representative stated patient's insurance will not cover the oximeter, but patient had ordered a pulse wrist oximeter on 5/17/23 and paid out of pocket. This order was picked up from the [place] location.     Will route to Dr. Sierra.    Edie Livingston  Care Coordinator- [facility]  [phone]

## 2024-01-18 LAB — HEMOCCULT STL QL IA: NEGATIVE

## 2024-01-21 LAB — RENIN PLAS-CCNC: 1.3 NG/ML/HR

## 2024-01-22 LAB — ALDOST/RENIN PLAS-RTO: 8.5 {RATIO} (ref 0–25)

## 2024-01-24 ENCOUNTER — DOCUMENTATION ONLY (OUTPATIENT)
Dept: FAMILY MEDICINE | Facility: CLINIC | Age: 59
End: 2024-01-24
Payer: MEDICARE

## 2024-01-24 NOTE — PROGRESS NOTES
"When opening a documentation only encounter, be sure to enter in \"Chief Complaint\" Forms and in \" Comments\" Title of form, description if needed.    Flor is a 58 year old  female  Form received via: Fax  Form now resides in: Provider Ready    JAIME CHIU    Form has been completed by provider.     Form sent out via: Fax to [facility] at Fax Number: [phone]  Patient informed: N/A  Output date: January 26, 2024    JAIME CHIU      **Please close the encounter**              "

## 2024-01-24 NOTE — PROGRESS NOTES
"When opening a documentation only encounter, be sure to enter in \"Chief Complaint\" Forms and in \" Comments\" Title of form, description if needed.    Flor is a 58 year old  female  Form received via: Fax  Form now resides in: Provider Ready    JAIME CHIU      Form has been completed by provider.     Form sent out via: Fax to [email or web address] at Fax Number: [phone]  Patient informed: N/A  Output date: January 29, 2024    Tony Stewart MA      **Please close the encounter**             "

## 2024-01-29 ENCOUNTER — DOCUMENTATION ONLY (OUTPATIENT)
Dept: FAMILY MEDICINE | Facility: CLINIC | Age: 59
End: 2024-01-29
Payer: MEDICARE

## 2024-01-29 DIAGNOSIS — K56.600 PARTIAL INTESTINAL OBSTRUCTION, UNSPECIFIED CAUSE (H): ICD-10-CM

## 2024-01-29 RX ORDER — AMOXICILLIN 250 MG
2 CAPSULE ORAL 2 TIMES DAILY
Qty: 180 TABLET | Refills: 3 | Status: SHIPPED | OUTPATIENT
Start: 2024-01-29

## 2024-01-29 NOTE — TELEPHONE ENCOUNTER
"Request for medication refill: senna-docusate (SENEXON-S) 8.6-50 MG tablet     Providers if patient needs an appointment and you are willing to give a one month supply please refill for one month and  send a letter/MyChart using \".SMILLIMITEDREFILL\" .smillimited and route chart to \"P SMI \" (Giving one month refill in non controlled medications is strongly recommended before denial)    If refill has been denied, meaning absolutely no refills without visit, please complete the smart phrase \".smirxrefuse\" and route it to the \"P SMI MED REFILLS\"  pool to inform the patient and the pharmacy.    JAIME CHIU      "

## 2024-01-29 NOTE — PROGRESS NOTES
"When opening a documentation only encounter, be sure to enter in \"Chief Complaint\" Forms and in \" Comments\" Title of form, description if needed.    Flor is a 58 year old  female  Form received via: Fax  Form now resides in: Provider Ready    JAIME CHIU      Form has been completed by provider.     Form sent out via: Fax to [facility] at Fax Number: [phone]  Patient informed: N/A  Output date: February 2, 2024    Tony Stewart MA      **Please close the encounter**             "

## 2024-02-05 DIAGNOSIS — M81.8 OTHER OSTEOPOROSIS WITHOUT CURRENT PATHOLOGICAL FRACTURE: Primary | ICD-10-CM

## 2024-02-05 NOTE — TELEPHONE ENCOUNTER
Hello pt has an order that is going to ship today for del tomorrow can we please get this sent over so we can send pt order together

## 2024-02-13 ENCOUNTER — DOCUMENTATION ONLY (OUTPATIENT)
Dept: FAMILY MEDICINE | Facility: CLINIC | Age: 59
End: 2024-02-13
Payer: MEDICARE

## 2024-02-13 NOTE — PROGRESS NOTES
"When opening a documentation only encounter, be sure to enter in \"Chief Complaint\" Forms and in \" Comments\" Title of form, description if needed.    Flor is a 58 year old  female  Form received via: Fax  Form now resides in: Provider Ready    Tony Stewart MA          Form has been completed by provider.     Form sent out via: Fax to [facility] at Fax Number: [phone]  Patient informed: N/A  Output date: February 19, 2024    Tony Stewart MA      **Please close the encounter**      "

## 2024-02-19 ENCOUNTER — LAB (OUTPATIENT)
Dept: LAB | Facility: CLINIC | Age: 59
End: 2024-02-19
Payer: MEDICARE

## 2024-02-19 DIAGNOSIS — Z94.4 LIVER REPLACED BY TRANSPLANT (H): ICD-10-CM

## 2024-02-19 LAB
ERYTHROCYTE [DISTWIDTH] IN BLOOD BY AUTOMATED COUNT: 13 % (ref 10–15)
HCT VFR BLD AUTO: 35.7 % (ref 35–47)
HGB BLD-MCNC: 11.5 G/DL (ref 11.7–15.7)
MCH RBC QN AUTO: 29.4 PG (ref 26.5–33)
MCHC RBC AUTO-ENTMCNC: 32.2 G/DL (ref 31.5–36.5)
MCV RBC AUTO: 91 FL (ref 78–100)
PLATELET # BLD AUTO: 185 10E3/UL (ref 150–450)
RBC # BLD AUTO: 3.91 10E6/UL (ref 3.8–5.2)
WBC # BLD AUTO: 5.4 10E3/UL (ref 4–11)

## 2024-02-19 PROCEDURE — 80053 COMPREHEN METABOLIC PANEL: CPT

## 2024-02-19 PROCEDURE — 82248 BILIRUBIN DIRECT: CPT

## 2024-02-19 PROCEDURE — 36415 COLL VENOUS BLD VENIPUNCTURE: CPT

## 2024-02-19 PROCEDURE — 85027 COMPLETE CBC AUTOMATED: CPT

## 2024-02-19 PROCEDURE — 80158 DRUG ASSAY CYCLOSPORINE: CPT

## 2024-02-20 LAB
ALBUMIN SERPL BCG-MCNC: 4.3 G/DL (ref 3.5–5.2)
ALP SERPL-CCNC: 94 U/L (ref 40–150)
ALT SERPL W P-5'-P-CCNC: 19 U/L (ref 0–50)
ANION GAP SERPL CALCULATED.3IONS-SCNC: 13 MMOL/L (ref 7–15)
AST SERPL W P-5'-P-CCNC: 18 U/L (ref 0–45)
BILIRUB DIRECT SERPL-MCNC: <0.2 MG/DL (ref 0–0.3)
BILIRUB SERPL-MCNC: 0.5 MG/DL
BUN SERPL-MCNC: 53.1 MG/DL (ref 8–23)
CALCIUM SERPL-MCNC: 9.6 MG/DL (ref 8.6–10)
CHLORIDE SERPL-SCNC: 105 MMOL/L (ref 98–107)
CREAT SERPL-MCNC: 1.56 MG/DL (ref 0.51–0.95)
CYCLOSPORINE BLD LC/MS/MS-MCNC: 140 UG/L (ref 50–400)
DEPRECATED HCO3 PLAS-SCNC: 24 MMOL/L (ref 22–29)
EGFRCR SERPLBLD CKD-EPI 2021: 38 ML/MIN/1.73M2
GLUCOSE SERPL-MCNC: 124 MG/DL (ref 70–99)
POTASSIUM SERPL-SCNC: 4.2 MMOL/L (ref 3.4–5.3)
PROT SERPL-MCNC: 7.5 G/DL (ref 6.4–8.3)
SODIUM SERPL-SCNC: 142 MMOL/L (ref 135–145)
TME LAST DOSE: NORMAL H
TME LAST DOSE: NORMAL H

## 2024-02-21 ENCOUNTER — TELEPHONE (OUTPATIENT)
Dept: TRANSPLANT | Facility: CLINIC | Age: 59
End: 2024-02-21
Payer: MEDICARE

## 2024-02-21 DIAGNOSIS — Z94.4 LIVER TRANSPLANTED (H): Primary | ICD-10-CM

## 2024-02-21 DIAGNOSIS — D84.9 IMMUNOSUPPRESSED STATUS (H): ICD-10-CM

## 2024-02-21 NOTE — TELEPHONE ENCOUNTER
ISSUE:   Cyclosporine level 140 on 2/19, goal , dose 75 mg AM and 50 mg PM.    PLAN:   Call Patient and confirm this was an accurate 12-hour trough.   Verify Cyclosporine dose 75 mg AM and 50 mg PM.   Confirm no new medications or illness.   Confirm no missed doses.   If accurate trough and accurate dose, decrease Cyclosporine dose to 50 mg BID     Is this more than a 50% increase or decrease in current IS dose: Yes  If YES, justification: above goal     Repeat labs in 1 weeks.  *If > 50% change in immunosuppression dose, repeat labs in 1 week.   Mary Crawford, RN      OUTCOME:   Call placed with English .  Flor confirms she took CSA around 0800 on 2/19 and had labs at 1500. No dose change recommended due to inaccurate trough. Pt will repeat labs tomorrow and both Flor Navarro and her son, Maria Esther, verbalized understanding to get labs drawn BEFORE taking morning medications. Appt scheduled for 9AM, lab orders placed.

## 2024-02-22 ENCOUNTER — LAB (OUTPATIENT)
Dept: LAB | Facility: CLINIC | Age: 59
End: 2024-02-22
Payer: MEDICARE

## 2024-02-22 DIAGNOSIS — D84.9 IMMUNOSUPPRESSED STATUS (H): ICD-10-CM

## 2024-02-22 DIAGNOSIS — Z94.4 LIVER TRANSPLANTED (H): ICD-10-CM

## 2024-02-22 LAB
ANION GAP SERPL CALCULATED.3IONS-SCNC: 13 MMOL/L (ref 7–15)
BUN SERPL-MCNC: 49.2 MG/DL (ref 8–23)
CALCIUM SERPL-MCNC: 9.9 MG/DL (ref 8.6–10)
CHLORIDE SERPL-SCNC: 105 MMOL/L (ref 98–107)
CREAT SERPL-MCNC: 1.57 MG/DL (ref 0.51–0.95)
CYCLOSPORINE BLD LC/MS/MS-MCNC: 66 UG/L (ref 50–400)
DEPRECATED HCO3 PLAS-SCNC: 23 MMOL/L (ref 22–29)
EGFRCR SERPLBLD CKD-EPI 2021: 38 ML/MIN/1.73M2
GLUCOSE SERPL-MCNC: 112 MG/DL (ref 70–99)
POTASSIUM SERPL-SCNC: 4.4 MMOL/L (ref 3.4–5.3)
SODIUM SERPL-SCNC: 141 MMOL/L (ref 135–145)
TME LAST DOSE: NORMAL H
TME LAST DOSE: NORMAL H

## 2024-02-22 PROCEDURE — 80158 DRUG ASSAY CYCLOSPORINE: CPT | Performed by: INTERNAL MEDICINE

## 2024-02-22 PROCEDURE — 80048 BASIC METABOLIC PNL TOTAL CA: CPT | Performed by: PATHOLOGY

## 2024-02-22 PROCEDURE — 99000 SPECIMEN HANDLING OFFICE-LAB: CPT | Performed by: PATHOLOGY

## 2024-02-22 PROCEDURE — 36415 COLL VENOUS BLD VENIPUNCTURE: CPT | Performed by: PATHOLOGY

## 2024-03-04 ENCOUNTER — ANCILLARY PROCEDURE (OUTPATIENT)
Dept: GENERAL RADIOLOGY | Facility: CLINIC | Age: 59
End: 2024-03-04
Attending: FAMILY MEDICINE
Payer: MEDICARE

## 2024-03-04 ENCOUNTER — OFFICE VISIT (OUTPATIENT)
Dept: FAMILY MEDICINE | Facility: CLINIC | Age: 59
End: 2024-03-04
Payer: MEDICARE

## 2024-03-04 VITALS
DIASTOLIC BLOOD PRESSURE: 77 MMHG | SYSTOLIC BLOOD PRESSURE: 129 MMHG | HEART RATE: 63 BPM | RESPIRATION RATE: 22 BRPM | OXYGEN SATURATION: 94 %

## 2024-03-04 DIAGNOSIS — R10.9 FLANK PAIN: ICD-10-CM

## 2024-03-04 DIAGNOSIS — D84.9 IMMUNOSUPPRESSED STATUS (H): ICD-10-CM

## 2024-03-04 DIAGNOSIS — Z94.4 LIVER REPLACED BY TRANSPLANT (H): ICD-10-CM

## 2024-03-04 DIAGNOSIS — N18.30 STAGE 3 CHRONIC KIDNEY DISEASE, UNSPECIFIED WHETHER STAGE 3A OR 3B CKD (H): Primary | ICD-10-CM

## 2024-03-04 PROBLEM — M46.1 SACROILIITIS (H): Status: RESOLVED | Noted: 2022-03-30 | Resolved: 2024-03-04

## 2024-03-04 LAB
ALBUMIN UR-MCNC: >=300 MG/DL
APPEARANCE UR: ABNORMAL
BACTERIA #/AREA URNS HPF: ABNORMAL /HPF
BILIRUB UR QL STRIP: NEGATIVE
COLOR UR AUTO: YELLOW
ERYTHROCYTE [DISTWIDTH] IN BLOOD BY AUTOMATED COUNT: 13 % (ref 10–15)
GLUCOSE UR STRIP-MCNC: NEGATIVE MG/DL
HCT VFR BLD AUTO: 38.6 % (ref 35–47)
HGB BLD-MCNC: 12.3 G/DL (ref 11.7–15.7)
HGB UR QL STRIP: ABNORMAL
KETONES UR STRIP-MCNC: NEGATIVE MG/DL
LEUKOCYTE ESTERASE UR QL STRIP: ABNORMAL
MCH RBC QN AUTO: 28.9 PG (ref 26.5–33)
MCHC RBC AUTO-ENTMCNC: 31.9 G/DL (ref 31.5–36.5)
MCV RBC AUTO: 91 FL (ref 78–100)
NITRATE UR QL: NEGATIVE
PH UR STRIP: 6 [PH] (ref 5–8)
PLATELET # BLD AUTO: 206 10E3/UL (ref 150–450)
RBC # BLD AUTO: 4.25 10E6/UL (ref 3.8–5.2)
RBC #/AREA URNS AUTO: ABNORMAL /HPF
SP GR UR STRIP: 1.02 (ref 1–1.03)
SQUAMOUS #/AREA URNS AUTO: ABNORMAL /LPF
UROBILINOGEN UR STRIP-ACNC: 0.2 E.U./DL
WBC # BLD AUTO: 10.9 10E3/UL (ref 4–11)
WBC #/AREA URNS AUTO: >100 /HPF

## 2024-03-04 PROCEDURE — 36415 COLL VENOUS BLD VENIPUNCTURE: CPT | Performed by: FAMILY MEDICINE

## 2024-03-04 PROCEDURE — 80053 COMPREHEN METABOLIC PANEL: CPT | Performed by: FAMILY MEDICINE

## 2024-03-04 PROCEDURE — 85027 COMPLETE CBC AUTOMATED: CPT | Performed by: FAMILY MEDICINE

## 2024-03-04 PROCEDURE — 81001 URINALYSIS AUTO W/SCOPE: CPT | Performed by: FAMILY MEDICINE

## 2024-03-04 PROCEDURE — 99214 OFFICE O/P EST MOD 30 MIN: CPT | Performed by: FAMILY MEDICINE

## 2024-03-04 PROCEDURE — 87186 SC STD MICRODIL/AGAR DIL: CPT | Performed by: FAMILY MEDICINE

## 2024-03-04 PROCEDURE — 82043 UR ALBUMIN QUANTITATIVE: CPT | Performed by: FAMILY MEDICINE

## 2024-03-04 PROCEDURE — 82570 ASSAY OF URINE CREATININE: CPT | Performed by: FAMILY MEDICINE

## 2024-03-04 PROCEDURE — 87086 URINE CULTURE/COLONY COUNT: CPT | Performed by: FAMILY MEDICINE

## 2024-03-04 PROCEDURE — 74018 RADEX ABDOMEN 1 VIEW: CPT | Mod: FY | Performed by: RADIOLOGY

## 2024-03-04 NOTE — Clinical Note
"Heaven - I made some small changes to the note. Please put the \"assessment\" (aka my thoughts on the diagnosis) first, as narrative, then follow with bullets with the plan. The plan makes less sense without the discussion part first.  Thanks!  c"

## 2024-03-04 NOTE — PROGRESS NOTES
Assessment & Plan     CKD III  Due for labs  - Albumin Random Urine Quantitative with Creat Ratio  - CBC with platelets  - Albumin Random Urine Quantitative with Creat Ratio      Flank pain- Severe, causing limitations in ADLs. She reports it was as bad as labor pain, distribution consistent with ureteral stones. She does have a history of bilateral hydroureter without hydronephrosis that was evaluated in 2022 after incidental discovery. Has history of right-side kidney cyst, exophytic and stable on last imaging evaluation in 2022, which I do not expect is contributing.   - Labs today to rule out infection and stone, top 2 in the differential dx.  - Nitrite negative so suspect stone, not seen on KUB, so proceed to CT scan immediately especially given decreased UOP.   - Cr still pending.   - If very elevated, may require ED.  - Family aware final culture results could take a couple days. Given warning symptoms for going to ED.   - UA Macroscopic with reflex to Microscopic and Culture  - Comprehensive metabolic panel  - X-RAY ABDOMEN 1 VW - UPRIGHT  - UA Macroscopic with reflex to Microscopic and Culture      Immunosuppressed status (H24); Liver replaced by transplant (H)  - Reports she has future appointment with Dr. Anne and recently had labs that showed mild change in creatine. His office will be adjusting cyclosporine dose but she hasn't heard from him yet, so we communicated about this today and his office will reach out.       39 minutes spent by me on the date of the encounter doing chart review, history and exam, documentation and further activities per the note        No follow-ups on file.    [place] Ray is a 58 year old, presenting for the following health issues:  RECHECK and Urinary Pain (Pain in right side travelling to front)        3/4/2024     1:44 PM   Additional Questions   Roomed by Bayron         3/4/2024    Information    services provided? No     HPI   Flor is  accompanied by her lanaCourtney.     Kidney Pain  Reports sharp pain in the right side; starts from the back, radiates towards the front. Burning pain while urinating. Blood in the urine today. Difficulty urinating at home. Per her son, today was her first day urinating a lot. Denies vomiting, diarrhea.    Travel  Flor and her son report she will be going to [place] for three months and want to make sure her health insurance won't be cut off. Was reassured that shouldn't happen.       This document serves as a record of the services and decisions personally performed and made by Karely Sierra MD. It was created on his/her behalf by Heaven Garcaí, trainee medical scribe. The creation of this document is based the provider's statements to the medical scribe.  Scribscott García 2:05 PM, March 4, 2024        Objective    /77   Pulse 63   Resp 22   LMP  (LMP Unknown)   SpO2 94%   There is no height or weight on file to calculate BMI.  Physical Exam   GENERAL: alert and no distress. Mildly pale.   ABDOMEN: No CVA tenderness. Soft, suprapubic pressure causes pain to right flank  PSYCH: mentation appears normal, affect normal/bright          Signed Electronically by: Karely Sierra MD

## 2024-03-04 NOTE — PATIENT INSTRUCTIONS
X-ray for the right kidney ordered today.   I messaged Dr. Larry to inform him Flor hasn't heard back from him about the change to her medication.   Remember to let your pharmacy know about your plans to travel to [place].   If you can't make urine for 16-18 hours, you start vomiting, or the blood in the urine gets brighter, go straight to the ER.  In the meantime, try to keep hydrated to flush out whatever is in your kidney.

## 2024-03-04 NOTE — Clinical Note
Hi Dr. Anne I saw Flor today - stated she hadn't gotten info about changing her immunosuppressant dosing yet and her home RN that comes Friday didn't make any changes  Thought I would let you know  c

## 2024-03-05 ENCOUNTER — ANCILLARY PROCEDURE (OUTPATIENT)
Dept: CT IMAGING | Facility: CLINIC | Age: 59
End: 2024-03-05
Attending: FAMILY MEDICINE
Payer: MEDICARE

## 2024-03-05 ENCOUNTER — HOSPITAL ENCOUNTER (OUTPATIENT)
Facility: CLINIC | Age: 59
Setting detail: OBSERVATION
Discharge: HOME | End: 2024-03-06
Attending: EMERGENCY MEDICINE | Admitting: FAMILY MEDICINE
Payer: MEDICARE

## 2024-03-05 ENCOUNTER — TELEPHONE (OUTPATIENT)
Dept: FAMILY MEDICINE | Facility: CLINIC | Age: 59
End: 2024-03-05

## 2024-03-05 DIAGNOSIS — N10 PYELONEPHRITIS, ACUTE: Primary | ICD-10-CM

## 2024-03-05 DIAGNOSIS — R10.9 FLANK PAIN: ICD-10-CM

## 2024-03-05 DIAGNOSIS — M81.8 OTHER OSTEOPOROSIS WITHOUT CURRENT PATHOLOGICAL FRACTURE: ICD-10-CM

## 2024-03-05 DIAGNOSIS — Z94.4 STATUS POST LIVER TRANSPLANTATION (H): ICD-10-CM

## 2024-03-05 DIAGNOSIS — N10 ACUTE PYELONEPHRITIS: ICD-10-CM

## 2024-03-05 DIAGNOSIS — Z79.60 LONG-TERM USE OF IMMUNOSUPPRESSANT MEDICATION: ICD-10-CM

## 2024-03-05 LAB
ALBUMIN SERPL BCG-MCNC: 4.6 G/DL (ref 3.5–5.2)
ALBUMIN UR-MCNC: 20 MG/DL
ALP SERPL-CCNC: 93 U/L (ref 40–150)
ALT SERPL W P-5'-P-CCNC: 20 U/L (ref 0–50)
ANION GAP SERPL CALCULATED.3IONS-SCNC: 13 MMOL/L (ref 7–15)
ANION GAP SERPL CALCULATED.3IONS-SCNC: 14 MMOL/L (ref 7–15)
APPEARANCE UR: ABNORMAL
AST SERPL W P-5'-P-CCNC: 18 U/L (ref 0–45)
BACTERIA #/AREA URNS HPF: ABNORMAL /HPF
BASOPHILS # BLD AUTO: 0 10E3/UL (ref 0–0.2)
BASOPHILS NFR BLD AUTO: 0 %
BILIRUB SERPL-MCNC: 0.7 MG/DL
BILIRUB UR QL STRIP: NEGATIVE
BUN SERPL-MCNC: 42.8 MG/DL (ref 8–23)
BUN SERPL-MCNC: 49.3 MG/DL (ref 8–23)
CALCIUM SERPL-MCNC: 10.1 MG/DL (ref 8.6–10)
CALCIUM SERPL-MCNC: 9.3 MG/DL (ref 8.6–10)
CHLORIDE SERPL-SCNC: 100 MMOL/L (ref 98–107)
CHLORIDE SERPL-SCNC: 103 MMOL/L (ref 98–107)
COLOR UR AUTO: ABNORMAL
CREAT SERPL-MCNC: 1.63 MG/DL (ref 0.51–0.95)
CREAT SERPL-MCNC: 1.97 MG/DL (ref 0.51–0.95)
CREAT UR-MCNC: 35.8 MG/DL
DEPRECATED HCO3 PLAS-SCNC: 23 MMOL/L (ref 22–29)
DEPRECATED HCO3 PLAS-SCNC: 23 MMOL/L (ref 22–29)
EGFRCR SERPLBLD CKD-EPI 2021: 29 ML/MIN/1.73M2
EGFRCR SERPLBLD CKD-EPI 2021: 36 ML/MIN/1.73M2
EOSINOPHIL # BLD AUTO: 0.1 10E3/UL (ref 0–0.7)
EOSINOPHIL NFR BLD AUTO: 1 %
ERYTHROCYTE [DISTWIDTH] IN BLOOD BY AUTOMATED COUNT: 13 % (ref 10–15)
GLUCOSE SERPL-MCNC: 109 MG/DL (ref 70–99)
GLUCOSE SERPL-MCNC: 129 MG/DL (ref 70–99)
GLUCOSE UR STRIP-MCNC: NEGATIVE MG/DL
HCT VFR BLD AUTO: 36.6 % (ref 35–47)
HGB BLD-MCNC: 11.7 G/DL (ref 11.7–15.7)
HGB UR QL STRIP: ABNORMAL
IMM GRANULOCYTES # BLD: 0 10E3/UL
IMM GRANULOCYTES NFR BLD: 0 %
KETONES UR STRIP-MCNC: NEGATIVE MG/DL
LEUKOCYTE ESTERASE UR QL STRIP: ABNORMAL
LYMPHOCYTES # BLD AUTO: 1.9 10E3/UL (ref 0.8–5.3)
LYMPHOCYTES NFR BLD AUTO: 24 %
MCH RBC QN AUTO: 28.7 PG (ref 26.5–33)
MCHC RBC AUTO-ENTMCNC: 32 G/DL (ref 31.5–36.5)
MCV RBC AUTO: 90 FL (ref 78–100)
MICROALBUMIN UR-MCNC: 2255 MG/L
MICROALBUMIN/CREAT UR: 6298.88 MG/G CR (ref 0–25)
MONOCYTES # BLD AUTO: 0.7 10E3/UL (ref 0–1.3)
MONOCYTES NFR BLD AUTO: 9 %
MUCOUS THREADS #/AREA URNS LPF: PRESENT /LPF
NEUTROPHILS # BLD AUTO: 5.1 10E3/UL (ref 1.6–8.3)
NEUTROPHILS NFR BLD AUTO: 66 %
NITRATE UR QL: NEGATIVE
NRBC # BLD AUTO: 0 10E3/UL
NRBC BLD AUTO-RTO: 0 /100
PH UR STRIP: 5 [PH] (ref 5–7)
PLATELET # BLD AUTO: 190 10E3/UL (ref 150–450)
POTASSIUM SERPL-SCNC: 4.1 MMOL/L (ref 3.4–5.3)
POTASSIUM SERPL-SCNC: 5 MMOL/L (ref 3.4–5.3)
PROT SERPL-MCNC: 8 G/DL (ref 6.4–8.3)
RADIOLOGIST FLAGS: ABNORMAL
RBC # BLD AUTO: 4.08 10E6/UL (ref 3.8–5.2)
RBC URINE: 18 /HPF
SODIUM SERPL-SCNC: 137 MMOL/L (ref 135–145)
SODIUM SERPL-SCNC: 139 MMOL/L (ref 135–145)
SP GR UR STRIP: 1.01 (ref 1–1.03)
SQUAMOUS EPITHELIAL: 2 /HPF
TRANSITIONAL EPI: 1 /HPF
UROBILINOGEN UR STRIP-MCNC: NORMAL MG/DL
WBC # BLD AUTO: 7.8 10E3/UL (ref 4–11)
WBC CLUMPS #/AREA URNS HPF: PRESENT /HPF
WBC URINE: >182 /HPF

## 2024-03-05 PROCEDURE — 80048 BASIC METABOLIC PNL TOTAL CA: CPT | Performed by: EMERGENCY MEDICINE

## 2024-03-05 PROCEDURE — 258N000003 HC RX IP 258 OP 636

## 2024-03-05 PROCEDURE — 81001 URINALYSIS AUTO W/SCOPE: CPT | Performed by: EMERGENCY MEDICINE

## 2024-03-05 PROCEDURE — 82310 ASSAY OF CALCIUM: CPT | Performed by: EMERGENCY MEDICINE

## 2024-03-05 PROCEDURE — 74176 CT ABD & PELVIS W/O CONTRAST: CPT | Mod: MG | Performed by: RADIOLOGY

## 2024-03-05 PROCEDURE — G1010 CDSM STANSON: HCPCS | Mod: GC | Performed by: RADIOLOGY

## 2024-03-05 PROCEDURE — 87040 BLOOD CULTURE FOR BACTERIA: CPT | Performed by: EMERGENCY MEDICINE

## 2024-03-05 PROCEDURE — 96365 THER/PROPH/DIAG IV INF INIT: CPT

## 2024-03-05 PROCEDURE — 87086 URINE CULTURE/COLONY COUNT: CPT | Performed by: EMERGENCY MEDICINE

## 2024-03-05 PROCEDURE — 250N000012 HC RX MED GY IP 250 OP 636 PS 637

## 2024-03-05 PROCEDURE — 85025 COMPLETE CBC W/AUTO DIFF WBC: CPT | Performed by: EMERGENCY MEDICINE

## 2024-03-05 PROCEDURE — 99285 EMERGENCY DEPT VISIT HI MDM: CPT | Performed by: EMERGENCY MEDICINE

## 2024-03-05 PROCEDURE — 250N000013 HC RX MED GY IP 250 OP 250 PS 637

## 2024-03-05 PROCEDURE — 120N000002 HC R&B MED SURG/OB UMMC

## 2024-03-05 PROCEDURE — 250N000011 HC RX IP 250 OP 636: Mod: JZ | Performed by: EMERGENCY MEDICINE

## 2024-03-05 PROCEDURE — 36415 COLL VENOUS BLD VENIPUNCTURE: CPT | Performed by: EMERGENCY MEDICINE

## 2024-03-05 PROCEDURE — 99222 1ST HOSP IP/OBS MODERATE 55: CPT | Mod: GC

## 2024-03-05 RX ORDER — PROCHLORPERAZINE 25 MG
25 SUPPOSITORY, RECTAL RECTAL EVERY 12 HOURS PRN
Status: DISCONTINUED | OUTPATIENT
Start: 2024-03-05 | End: 2024-03-06 | Stop reason: HOSPADM

## 2024-03-05 RX ORDER — CALCIUM CARBONATE 500 MG/1
1000 TABLET, CHEWABLE ORAL 4 TIMES DAILY PRN
Status: DISCONTINUED | OUTPATIENT
Start: 2024-03-05 | End: 2024-03-05

## 2024-03-05 RX ORDER — CALCIUM CARBONATE/VITAMIN D3 600 MG-10
1 TABLET ORAL 2 TIMES DAILY
Status: DISCONTINUED | OUTPATIENT
Start: 2024-03-05 | End: 2024-03-05

## 2024-03-05 RX ORDER — KETOTIFEN FUMARATE 0.35 MG/ML
1 SOLUTION/ DROPS OPHTHALMIC 2 TIMES DAILY PRN
Status: DISCONTINUED | OUTPATIENT
Start: 2024-03-05 | End: 2024-03-06

## 2024-03-05 RX ORDER — FEBUXOSTAT 40 MG/1
40 TABLET, FILM COATED ORAL DAILY
Status: DISCONTINUED | OUTPATIENT
Start: 2024-03-06 | End: 2024-03-06 | Stop reason: HOSPADM

## 2024-03-05 RX ORDER — VITAMIN B COMPLEX
25 TABLET ORAL DAILY
Status: DISCONTINUED | OUTPATIENT
Start: 2024-03-06 | End: 2024-03-06 | Stop reason: HOSPADM

## 2024-03-05 RX ORDER — PROCHLORPERAZINE MALEATE 5 MG
10 TABLET ORAL EVERY 6 HOURS PRN
Status: DISCONTINUED | OUTPATIENT
Start: 2024-03-05 | End: 2024-03-06 | Stop reason: HOSPADM

## 2024-03-05 RX ORDER — HYDROMORPHONE HCL IN WATER/PF 6 MG/30 ML
0.2 PATIENT CONTROLLED ANALGESIA SYRINGE INTRAVENOUS 3 TIMES DAILY PRN
Status: DISCONTINUED | OUTPATIENT
Start: 2024-03-05 | End: 2024-03-06

## 2024-03-05 RX ORDER — FUROSEMIDE 20 MG
20 TABLET ORAL 2 TIMES DAILY
Status: DISCONTINUED | OUTPATIENT
Start: 2024-03-05 | End: 2024-03-05

## 2024-03-05 RX ORDER — NALOXONE HYDROCHLORIDE 0.4 MG/ML
0.4 INJECTION, SOLUTION INTRAMUSCULAR; INTRAVENOUS; SUBCUTANEOUS
Status: DISCONTINUED | OUTPATIENT
Start: 2024-03-05 | End: 2024-03-06 | Stop reason: HOSPADM

## 2024-03-05 RX ORDER — MYCOPHENOLATE MOFETIL 250 MG/1
500 CAPSULE ORAL 2 TIMES DAILY
Status: DISCONTINUED | OUTPATIENT
Start: 2024-03-05 | End: 2024-03-06 | Stop reason: HOSPADM

## 2024-03-05 RX ORDER — MULTIVITAMIN WITH FOLIC ACID 400 MCG
1 TABLET ORAL EVERY MORNING
Status: DISCONTINUED | OUTPATIENT
Start: 2024-03-06 | End: 2024-03-05

## 2024-03-05 RX ORDER — AMOXICILLIN 250 MG
2 CAPSULE ORAL 2 TIMES DAILY
Status: DISCONTINUED | OUTPATIENT
Start: 2024-03-05 | End: 2024-03-06 | Stop reason: HOSPADM

## 2024-03-05 RX ORDER — MEROPENEM 1 G/1
1 INJECTION, POWDER, FOR SOLUTION INTRAVENOUS ONCE
Status: DISCONTINUED | OUTPATIENT
Start: 2024-03-05 | End: 2024-03-05 | Stop reason: DRUGHIGH

## 2024-03-05 RX ORDER — MEROPENEM 1 G/1
1 INJECTION, POWDER, FOR SOLUTION INTRAVENOUS EVERY 12 HOURS
Status: DISCONTINUED | OUTPATIENT
Start: 2024-03-06 | End: 2024-03-06

## 2024-03-05 RX ORDER — MULTIPLE VITAMINS W/ MINERALS TAB 9MG-400MCG
1 TAB ORAL DAILY
Status: DISCONTINUED | OUTPATIENT
Start: 2024-03-06 | End: 2024-03-06 | Stop reason: HOSPADM

## 2024-03-05 RX ORDER — ONDANSETRON 4 MG/1
4 TABLET, ORALLY DISINTEGRATING ORAL EVERY 6 HOURS PRN
Status: DISCONTINUED | OUTPATIENT
Start: 2024-03-05 | End: 2024-03-06 | Stop reason: HOSPADM

## 2024-03-05 RX ORDER — MEROPENEM 500 MG/1
500 INJECTION, POWDER, FOR SOLUTION INTRAVENOUS ONCE
Status: COMPLETED | OUTPATIENT
Start: 2024-03-05 | End: 2024-03-05

## 2024-03-05 RX ORDER — NALOXONE HYDROCHLORIDE 0.4 MG/ML
0.2 INJECTION, SOLUTION INTRAMUSCULAR; INTRAVENOUS; SUBCUTANEOUS
Status: DISCONTINUED | OUTPATIENT
Start: 2024-03-05 | End: 2024-03-06 | Stop reason: HOSPADM

## 2024-03-05 RX ORDER — ACETAMINOPHEN 650 MG/1
650 SUPPOSITORY RECTAL EVERY 4 HOURS PRN
Status: DISCONTINUED | OUTPATIENT
Start: 2024-03-05 | End: 2024-03-06 | Stop reason: HOSPADM

## 2024-03-05 RX ORDER — ATORVASTATIN CALCIUM 40 MG/1
40 TABLET, FILM COATED ORAL DAILY
Status: DISCONTINUED | OUTPATIENT
Start: 2024-03-05 | End: 2024-03-06 | Stop reason: HOSPADM

## 2024-03-05 RX ORDER — SODIUM CHLORIDE, SODIUM LACTATE, POTASSIUM CHLORIDE, CALCIUM CHLORIDE 600; 310; 30; 20 MG/100ML; MG/100ML; MG/100ML; MG/100ML
INJECTION, SOLUTION INTRAVENOUS
Status: COMPLETED
Start: 2024-03-05 | End: 2024-03-06

## 2024-03-05 RX ORDER — AMLODIPINE BESYLATE 10 MG/1
10 TABLET ORAL DAILY
Status: DISCONTINUED | OUTPATIENT
Start: 2024-03-05 | End: 2024-03-06 | Stop reason: HOSPADM

## 2024-03-05 RX ORDER — ONDANSETRON 2 MG/ML
4 INJECTION INTRAMUSCULAR; INTRAVENOUS EVERY 6 HOURS PRN
Status: DISCONTINUED | OUTPATIENT
Start: 2024-03-05 | End: 2024-03-06 | Stop reason: HOSPADM

## 2024-03-05 RX ORDER — CHLORTHALIDONE 25 MG/1
25 TABLET ORAL DAILY
Status: DISCONTINUED | OUTPATIENT
Start: 2024-03-06 | End: 2024-03-06 | Stop reason: HOSPADM

## 2024-03-05 RX ORDER — ACETAMINOPHEN 325 MG/1
650 TABLET ORAL EVERY 4 HOURS PRN
Status: DISCONTINUED | OUTPATIENT
Start: 2024-03-05 | End: 2024-03-06 | Stop reason: HOSPADM

## 2024-03-05 RX ORDER — GABAPENTIN 300 MG/1
300 CAPSULE ORAL AT BEDTIME
Status: DISCONTINUED | OUTPATIENT
Start: 2024-03-05 | End: 2024-03-06 | Stop reason: HOSPADM

## 2024-03-05 RX ORDER — FUROSEMIDE 20 MG
20 TABLET ORAL 2 TIMES DAILY
Status: DISCONTINUED | OUTPATIENT
Start: 2024-03-05 | End: 2024-03-06 | Stop reason: HOSPADM

## 2024-03-05 RX ORDER — CALCITRIOL 0.25 UG/1
0.25 CAPSULE, LIQUID FILLED ORAL DAILY
Status: DISCONTINUED | OUTPATIENT
Start: 2024-03-06 | End: 2024-03-06 | Stop reason: HOSPADM

## 2024-03-05 RX ORDER — ALBUTEROL SULFATE 90 UG/1
2 AEROSOL, METERED RESPIRATORY (INHALATION) 4 TIMES DAILY
Status: DISCONTINUED | OUTPATIENT
Start: 2024-03-05 | End: 2024-03-06 | Stop reason: HOSPADM

## 2024-03-05 RX ORDER — LIDOCAINE 40 MG/G
CREAM TOPICAL
Status: DISCONTINUED | OUTPATIENT
Start: 2024-03-05 | End: 2024-03-06 | Stop reason: HOSPADM

## 2024-03-05 RX ORDER — CAPSAICIN 0.025 %
CREAM (GRAM) TOPICAL 3 TIMES DAILY PRN
Status: DISCONTINUED | OUTPATIENT
Start: 2024-03-05 | End: 2024-03-06 | Stop reason: HOSPADM

## 2024-03-05 RX ORDER — CARVEDILOL 12.5 MG/1
12.5 TABLET ORAL 2 TIMES DAILY WITH MEALS
Status: DISCONTINUED | OUTPATIENT
Start: 2024-03-05 | End: 2024-03-06 | Stop reason: HOSPADM

## 2024-03-05 RX ADMIN — MEROPENEM 500 MG: 500 INJECTION, POWDER, FOR SOLUTION INTRAVENOUS at 19:17

## 2024-03-05 RX ADMIN — DOCUSATE SODIUM 50MG AND SENNOSIDES 8.6MG 2 TABLET: 8.6; 5 TABLET, FILM COATED ORAL at 22:33

## 2024-03-05 RX ADMIN — MYCOPHENOLATE MOFETIL 500 MG: 250 CAPSULE ORAL at 23:18

## 2024-03-05 RX ADMIN — OMEPRAZOLE 20 MG: 20 CAPSULE, DELAYED RELEASE ORAL at 23:39

## 2024-03-05 RX ADMIN — SODIUM CHLORIDE, POTASSIUM CHLORIDE, SODIUM LACTATE AND CALCIUM CHLORIDE 1000 ML: 600; 310; 30; 20 INJECTION, SOLUTION INTRAVENOUS at 23:31

## 2024-03-05 RX ADMIN — ATORVASTATIN CALCIUM 40 MG: 40 TABLET, FILM COATED ORAL at 22:31

## 2024-03-05 RX ADMIN — ALBUTEROL SULFATE 2 PUFF: 90 AEROSOL, METERED RESPIRATORY (INHALATION) at 23:29

## 2024-03-05 RX ADMIN — CARVEDILOL 12.5 MG: 12.5 TABLET, FILM COATED ORAL at 22:32

## 2024-03-05 ASSESSMENT — ACTIVITIES OF DAILY LIVING (ADL)
FALL_HISTORY_WITHIN_LAST_SIX_MONTHS: YES
DRESSING/BATHING: BATHING DIFFICULTY, ASSISTANCE 1 PERSON
NUMBER_OF_TIMES_PATIENT_HAS_FALLEN_WITHIN_LAST_SIX_MONTHS: 1
TOILETING: 1-->ASSISTANCE (EQUIPMENT/PERSON) NEEDED
TOILETING_ASSISTANCE: TOILETING DIFFICULTY, ASSISTANCE 1 PERSON
TOILETING: 1-->ASSISTANCE (EQUIPMENT/PERSON) NEEDED (NOT DEVELOPMENTALLY APPROPRIATE)
CONCENTRATING,_REMEMBERING_OR_MAKING_DECISIONS_DIFFICULTY: YES
ADLS_ACUITY_SCORE: 36
CHANGE_IN_FUNCTIONAL_STATUS_SINCE_ONSET_OF_CURRENT_ILLNESS/INJURY: NO
ADLS_ACUITY_SCORE: 38
EQUIPMENT_CURRENTLY_USED_AT_HOME: SHOWER CHAIR;WALKER, ROLLING
DOING_ERRANDS_INDEPENDENTLY_DIFFICULTY: YES
WALKING_OR_CLIMBING_STAIRS_DIFFICULTY: NO
ADLS_ACUITY_SCORE: 38
DIFFICULTY_COMMUNICATING: OTHER (SEE COMMENTS)
DRESSING/BATHING_DIFFICULTY: YES
TOILETING_ISSUES: YES
WEAR_GLASSES_OR_BLIND: YES
DIFFICULTY_EATING/SWALLOWING: NO
ADLS_ACUITY_SCORE: 36
ADLS_ACUITY_SCORE: 38
ADLS_ACUITY_SCORE: 38
HEARING_DIFFICULTY_OR_DEAF: NO

## 2024-03-05 ASSESSMENT — COLUMBIA-SUICIDE SEVERITY RATING SCALE - C-SSRS
2. HAVE YOU ACTUALLY HAD ANY THOUGHTS OF KILLING YOURSELF IN THE PAST MONTH?: NO
1. IN THE PAST MONTH, HAVE YOU WISHED YOU WERE DEAD OR WISHED YOU COULD GO TO SLEEP AND NOT WAKE UP?: NO
6. HAVE YOU EVER DONE ANYTHING, STARTED TO DO ANYTHING, OR PREPARED TO DO ANYTHING TO END YOUR LIFE?: NO

## 2024-03-05 NOTE — PROGRESS NOTES
Contacted by radiology, see results for findings. Given ascending infection, transplant status, history of UTI ecoli resistance to Cipro, HX of ecoli intermediate resistance to levo, levo interfering with cyclosporine dosing, and remaining antibiotics include erta/ meropenum, favor admission of the hospital for IV antibiotics (has a history of sensitivity to nitrofurantoin though this medication is insufficient in the case of pyelo and  there is evidence of ascending infection).  Called patient patient And spoke with son Jorge who was present at the visit yesterday. Says that she's continuing to feel poorly, have decreased urine output, and have severe pain.  asked her to present to [place] And will update [facility] team for likely admission for IV antibiotics.

## 2024-03-06 ENCOUNTER — TELEPHONE (OUTPATIENT)
Dept: FAMILY MEDICINE | Facility: CLINIC | Age: 59
End: 2024-03-06
Payer: MEDICARE

## 2024-03-06 VITALS
OXYGEN SATURATION: 92 % | SYSTOLIC BLOOD PRESSURE: 112 MMHG | HEART RATE: 77 BPM | DIASTOLIC BLOOD PRESSURE: 78 MMHG | RESPIRATION RATE: 17 BRPM | TEMPERATURE: 98.9 F

## 2024-03-06 PROBLEM — Z79.60 LONG-TERM USE OF IMMUNOSUPPRESSANT MEDICATION: Status: ACTIVE | Noted: 2024-03-06

## 2024-03-06 PROBLEM — N10 PYELONEPHRITIS, ACUTE: Status: ACTIVE | Noted: 2024-03-06

## 2024-03-06 PROBLEM — M81.8 OTHER OSTEOPOROSIS WITHOUT CURRENT PATHOLOGICAL FRACTURE: Status: ACTIVE | Noted: 2024-03-06

## 2024-03-06 LAB
ANION GAP SERPL CALCULATED.3IONS-SCNC: 13 MMOL/L (ref 7–15)
ATRIAL RATE - MUSE: 66 BPM
BACTERIA UR CULT: ABNORMAL
BUN SERPL-MCNC: 50.3 MG/DL (ref 8–23)
CALCIUM SERPL-MCNC: 9 MG/DL (ref 8.6–10)
CHLORIDE SERPL-SCNC: 104 MMOL/L (ref 98–107)
CREAT SERPL-MCNC: 1.82 MG/DL (ref 0.51–0.95)
CYCLOSPORINE BLD LC/MS/MS-MCNC: 35 UG/L (ref 50–400)
DEPRECATED HCO3 PLAS-SCNC: 22 MMOL/L (ref 22–29)
DIASTOLIC BLOOD PRESSURE - MUSE: NORMAL MMHG
EGFRCR SERPLBLD CKD-EPI 2021: 31 ML/MIN/1.73M2
ERYTHROCYTE [DISTWIDTH] IN BLOOD BY AUTOMATED COUNT: 13 % (ref 10–15)
GLUCOSE SERPL-MCNC: 113 MG/DL (ref 70–99)
HCT VFR BLD AUTO: 34 % (ref 35–47)
HGB BLD-MCNC: 10.8 G/DL (ref 11.7–15.7)
INTERPRETATION ECG - MUSE: NORMAL
MCH RBC QN AUTO: 28.7 PG (ref 26.5–33)
MCHC RBC AUTO-ENTMCNC: 31.8 G/DL (ref 31.5–36.5)
MCV RBC AUTO: 90 FL (ref 78–100)
P AXIS - MUSE: 35 DEGREES
PLATELET # BLD AUTO: 180 10E3/UL (ref 150–450)
POTASSIUM SERPL-SCNC: 4.2 MMOL/L (ref 3.4–5.3)
PR INTERVAL - MUSE: 158 MS
QRS DURATION - MUSE: 108 MS
QT - MUSE: 400 MS
QTC - MUSE: 419 MS
R AXIS - MUSE: 0 DEGREES
RBC # BLD AUTO: 3.76 10E6/UL (ref 3.8–5.2)
SODIUM SERPL-SCNC: 139 MMOL/L (ref 135–145)
SYSTOLIC BLOOD PRESSURE - MUSE: NORMAL MMHG
T AXIS - MUSE: 17 DEGREES
TME LAST DOSE: ABNORMAL H
TME LAST DOSE: ABNORMAL H
VENTRICULAR RATE- MUSE: 66 BPM
WBC # BLD AUTO: 6.8 10E3/UL (ref 4–11)

## 2024-03-06 PROCEDURE — 96366 THER/PROPH/DIAG IV INF ADDON: CPT

## 2024-03-06 PROCEDURE — 99239 HOSP IP/OBS DSCHRG MGMT >30: CPT | Mod: GC

## 2024-03-06 PROCEDURE — 250N000012 HC RX MED GY IP 250 OP 636 PS 637

## 2024-03-06 PROCEDURE — 93005 ELECTROCARDIOGRAM TRACING: CPT

## 2024-03-06 PROCEDURE — G0378 HOSPITAL OBSERVATION PER HR: HCPCS

## 2024-03-06 PROCEDURE — 250N000013 HC RX MED GY IP 250 OP 250 PS 637: Performed by: FAMILY MEDICINE

## 2024-03-06 PROCEDURE — 250N000011 HC RX IP 250 OP 636: Mod: JZ

## 2024-03-06 PROCEDURE — 80158 DRUG ASSAY CYCLOSPORINE: CPT | Performed by: NURSE PRACTITIONER

## 2024-03-06 PROCEDURE — 96361 HYDRATE IV INFUSION ADD-ON: CPT

## 2024-03-06 PROCEDURE — 250N000013 HC RX MED GY IP 250 OP 250 PS 637

## 2024-03-06 PROCEDURE — 85027 COMPLETE CBC AUTOMATED: CPT

## 2024-03-06 PROCEDURE — 258N000003 HC RX IP 258 OP 636

## 2024-03-06 PROCEDURE — 80048 BASIC METABOLIC PNL TOTAL CA: CPT

## 2024-03-06 PROCEDURE — 36415 COLL VENOUS BLD VENIPUNCTURE: CPT

## 2024-03-06 PROCEDURE — 93010 ELECTROCARDIOGRAM REPORT: CPT | Performed by: INTERNAL MEDICINE

## 2024-03-06 RX ORDER — SODIUM CHLORIDE 9 MG/ML
INJECTION, SOLUTION INTRAVENOUS
Status: COMPLETED
Start: 2024-03-06 | End: 2024-03-06

## 2024-03-06 RX ORDER — KETOTIFEN FUMARATE 0.35 MG/ML
1 SOLUTION/ DROPS OPHTHALMIC 2 TIMES DAILY
Status: DISCONTINUED | OUTPATIENT
Start: 2024-03-06 | End: 2024-03-06 | Stop reason: HOSPADM

## 2024-03-06 RX ORDER — LEVOFLOXACIN 250 MG/1
250 TABLET, FILM COATED ORAL DAILY
Qty: 9 TABLET | Refills: 0 | Status: SHIPPED | OUTPATIENT
Start: 2024-03-06 | End: 2024-09-09

## 2024-03-06 RX ORDER — LEVOFLOXACIN 250 MG/1
250 TABLET, FILM COATED ORAL EVERY 24 HOURS
Qty: 9 TABLET | Refills: 0 | Status: DISCONTINUED | OUTPATIENT
Start: 2024-03-06 | End: 2024-03-06 | Stop reason: HOSPADM

## 2024-03-06 RX ADMIN — Medication 25 MCG: at 08:34

## 2024-03-06 RX ADMIN — FEBUXOSTAT 40 MG: 40 TABLET, FILM COATED ORAL at 10:06

## 2024-03-06 RX ADMIN — Medication 1 TABLET: at 08:34

## 2024-03-06 RX ADMIN — SODIUM CHLORIDE 500 ML: 9 INJECTION, SOLUTION INTRAVENOUS at 06:59

## 2024-03-06 RX ADMIN — OMEPRAZOLE 20 MG: 20 CAPSULE, DELAYED RELEASE ORAL at 08:34

## 2024-03-06 RX ADMIN — MEROPENEM 1 G: 1 INJECTION, POWDER, FOR SOLUTION INTRAVENOUS at 07:00

## 2024-03-06 RX ADMIN — MYCOPHENOLATE MOFETIL 500 MG: 250 CAPSULE ORAL at 10:06

## 2024-03-06 RX ADMIN — CARVEDILOL 12.5 MG: 12.5 TABLET, FILM COATED ORAL at 08:34

## 2024-03-06 RX ADMIN — CALCITRIOL 0.25 MCG: 0.25 CAPSULE ORAL at 08:34

## 2024-03-06 RX ADMIN — ATORVASTATIN CALCIUM 40 MG: 40 TABLET, FILM COATED ORAL at 08:34

## 2024-03-06 RX ADMIN — UMECLIDINIUM 1 PUFF: 62.5 AEROSOL, POWDER ORAL at 08:33

## 2024-03-06 RX ADMIN — DOCUSATE SODIUM 50MG AND SENNOSIDES 8.6MG 2 TABLET: 8.6; 5 TABLET, FILM COATED ORAL at 08:37

## 2024-03-06 RX ADMIN — ALBUTEROL SULFATE 2 PUFF: 90 AEROSOL, METERED RESPIRATORY (INHALATION) at 11:29

## 2024-03-06 RX ADMIN — ALBUTEROL SULFATE 2 PUFF: 90 AEROSOL, METERED RESPIRATORY (INHALATION) at 08:33

## 2024-03-06 RX ADMIN — AMLODIPINE BESYLATE 10 MG: 10 TABLET ORAL at 08:34

## 2024-03-06 ASSESSMENT — ACTIVITIES OF DAILY LIVING (ADL)
ADLS_ACUITY_SCORE: 38

## 2024-03-06 NOTE — ED PROVIDER NOTES
ED Provider Note  [facility]      History     Chief Complaint   Patient presents with    Flank Pain     HPI  Flor Navarro is a 58 year old female who has a history of CKD III, HepC s/p Liver transplant (2010), and UTI with ESBL E. coli who presents on recommendation from PCP with 2 days of right flank pain and burning with urination.  She endorses suprapubic pain that radiates to the right lower back. She denies any fevers, chills, rigors, nausea, vomiting, or diarrhea.    Urine culture obtained 3/4 by PCP revealed infection with E. coli and a CBC showed WBC 10.9 vs 5.4 weeks prior.  Noncontrast CT abdomen pelvis obtained showed fat stranding of the right ureter without nephrolithiasis or hydronephrosis.  Patient has a history of ESBL UTI.    She does have a remote history of penicillin allergy, which she confirmed this was a rash around age 25-30.  She has tolerated meropenem in the past without issues.  She is compliant with all immunosuppressive meds s/p liver transplant.    Physical Exam   BP: (!) 131/91  Pulse: 68  Temp: 97.8  F (36.6  C)  Resp: 16  SpO2: 95 %  Physical Exam  Constitutional:       Comments: Diaphoretic   HENT:      Head: Normocephalic and atraumatic.   Eyes:      General: No scleral icterus.     Extraocular Movements: Extraocular movements intact.   Cardiovascular:      Rate and Rhythm: Normal rate and regular rhythm.      Pulses: Normal pulses.      Heart sounds: No murmur heard.     No friction rub. No gallop.   Pulmonary:      Effort: Pulmonary effort is normal.      Breath sounds: Normal breath sounds.   Abdominal:      General: Abdomen is flat. Bowel sounds are normal.      Palpations: Abdomen is soft.      Tenderness: There is abdominal tenderness in the suprapubic area. There is right CVA tenderness. There is no left CVA tenderness.   Musculoskeletal:      Right lower leg: No edema.      Left lower leg: No edema.   Skin:     General: Skin is warm and dry.    Neurological:      Mental Status: She is alert.         ED Course, Procedures, & Data      Procedures               Results for orders placed or performed during the hospital encounter of 03/05/24   UA with Microscopic reflex to Culture     Status: Abnormal    Specimen: Urine, Midstream   Result Value Ref Range    Color Urine Light Yellow Colorless, Straw, Light Yellow, Yellow    Appearance Urine Slightly Cloudy (A) Clear    Glucose Urine Negative Negative mg/dL    Bilirubin Urine Negative Negative    Ketones Urine Negative Negative mg/dL    Specific Gravity Urine 1.007 1.003 - 1.035    Blood Urine Moderate (A) Negative    pH Urine 5.0 5.0 - 7.0    Protein Albumin Urine 20 (A) Negative mg/dL    Urobilinogen Urine Normal Normal, 2.0 mg/dL    Nitrite Urine Negative Negative    Leukocyte Esterase Urine Large (A) Negative    Bacteria Urine Many (A) None Seen /HPF    WBC Clumps Urine Present (A) None Seen /HPF    Mucus Urine Present (A) None Seen /LPF    RBC Urine 18 (H) <=2 /HPF    WBC Urine >182 (H) <=5 /HPF    Squamous Epithelials Urine 2 (H) <=1 /HPF    Transitional Epithelials Urine 1 <=1 /HPF    Narrative    Urine Culture ordered based on laboratory criteria   Basic metabolic panel     Status: Abnormal   Result Value Ref Range    Sodium 137 135 - 145 mmol/L    Potassium 4.1 3.4 - 5.3 mmol/L    Chloride 100 98 - 107 mmol/L    Carbon Dioxide (CO2) 23 22 - 29 mmol/L    Anion Gap 14 7 - 15 mmol/L    Urea Nitrogen 49.3 (H) 8.0 - 23.0 mg/dL    Creatinine 1.97 (H) 0.51 - 0.95 mg/dL    GFR Estimate 29 (L) >60 mL/min/1.73m2    Calcium 9.3 8.6 - 10.0 mg/dL    Glucose 109 (H) 70 - 99 mg/dL   CBC with platelets and differential     Status: None   Result Value Ref Range    WBC Count 7.8 4.0 - 11.0 10e3/uL    RBC Count 4.08 3.80 - 5.20 10e6/uL    Hemoglobin 11.7 11.7 - 15.7 g/dL    Hematocrit 36.6 35.0 - 47.0 %    MCV 90 78 - 100 fL    MCH 28.7 26.5 - 33.0 pg    MCHC 32.0 31.5 - 36.5 g/dL    RDW 13.0 10.0 - 15.0 %    Platelet  Count 190 150 - 450 10e3/uL    % Neutrophils 66 %    % Lymphocytes 24 %    % Monocytes 9 %    % Eosinophils 1 %    % Basophils 0 %    % Immature Granulocytes 0 %    NRBCs per 100 WBC 0 <1 /100    Absolute Neutrophils 5.1 1.6 - 8.3 10e3/uL    Absolute Lymphocytes 1.9 0.8 - 5.3 10e3/uL    Absolute Monocytes 0.7 0.0 - 1.3 10e3/uL    Absolute Eosinophils 0.1 0.0 - 0.7 10e3/uL    Absolute Basophils 0.0 0.0 - 0.2 10e3/uL    Absolute Immature Granulocytes 0.0 <=0.4 10e3/uL    Absolute NRBCs 0.0 10e3/uL   CBC with platelets differential     Status: None    Narrative    The following orders were created for panel order CBC with platelets differential.  Procedure                               Abnormality         Status                     ---------                               -----------         ------                     CBC with platelets and d...[[id number]]                      Final result                 Please view results for these tests on the individual orders.     Medications   albuterol (PROVENTIL HFA/VENTOLIN HFA) inhaler (2 puffs Inhalation $Given 3/5/24 2329)   amLODIPine (NORVASC) tablet 10 mg (10 mg Oral Not Given 3/5/24 2232)   atorvastatin (LIPITOR) tablet 40 mg (40 mg Oral $Given 3/5/24 2231)   calcitRIOL (ROCALTROL) capsule 0.25 mcg (has no administration in time range)   capsaicin (ZOSTRIX) 0.025 % cream (has no administration in time range)   carvedilol (COREG) tablet 12.5 mg (12.5 mg Oral $Given 3/5/24 2232)   chlorthalidone (HYGROTON) tablet 25 mg ( Oral Automatically Held 3/9/24 0800)   febuxostat (ULORIC) tablet 40 mg (has no administration in time range)   gabapentin (NEURONTIN) capsule 300 mg ( Oral Automatically Held 3/11/24 2200)   ketotifen fumarate 0.035% ophthalmic solution 1 drop (has no administration in time range)   mycophenolate (GENERIC EQUIVALENT) capsule 500 mg (500 mg Oral $Given 3/5/24 2318)   omeprazole (PriLOSEC) CR capsule 20 mg (20 mg Oral $Given 3/5/24 2339)   senna-docusate  (SENOKOT-S/PERICOLACE) 8.6-50 MG per tablet 2 tablet (2 tablets Oral $Given 3/5/24 2233)   umeclidinium (INCRUSE ELLIPTA) 62.5 MCG/ACT inhaler 1 puff (has no administration in time range)   Vitamin D3 (CHOLECALCIFEROL) tablet 25 mcg (has no administration in time range)   lidocaine 1 % 0.1-1 mL (has no administration in time range)   lidocaine (LMX4) cream (has no administration in time range)   sodium chloride (PF) 0.9% PF flush 3 mL (3 mLs Intracatheter $Given 3/5/24 2235)   sodium chloride (PF) 0.9% PF flush 3 mL (has no administration in time range)   acetaminophen (TYLENOL) tablet 650 mg (has no administration in time range)     Or   acetaminophen (TYLENOL) Suppository 650 mg (has no administration in time range)   ondansetron (ZOFRAN ODT) ODT tab 4 mg (has no administration in time range)     Or   ondansetron (ZOFRAN) injection 4 mg (has no administration in time range)   prochlorperazine (COMPAZINE) injection 10 mg (has no administration in time range)     Or   prochlorperazine (COMPAZINE) tablet 10 mg (has no administration in time range)     Or   prochlorperazine (COMPAZINE) suppository 25 mg (has no administration in time range)   multivitamin w/minerals (THERA-VIT-M) tablet 1 tablet (has no administration in time range)   furosemide (LASIX) tablet 20 mg ( Oral Automatically Held 3/11/24 2000)   meropenem (MERREM) 1 g vial to attach to  mL bag (has no administration in time range)   lactated ringers BOLUS 1,000 mL (1,000 mLs Intravenous $New Bag 3/5/24 2331)   HYDROmorphone (DILAUDID) half-tab 1 mg (has no administration in time range)   HYDROmorphone (DILAUDID) injection 0.2 mg (has no administration in time range)   naloxone (NARCAN) injection 0.2 mg (has no administration in time range)     Or   naloxone (NARCAN) injection 0.4 mg (has no administration in time range)     Or   naloxone (NARCAN) injection 0.2 mg (has no administration in time range)     Or   naloxone (NARCAN) injection 0.4 mg (has  no administration in time range)   meropenem (MERREM) 500 mg vial to attach to  mL bag for ADULTS or 25 mL bag for PEDS (0 mg Intravenous Stopped 3/5/24 1959)     Labs Ordered and Resulted from Time of ED Arrival to Time of ED Departure   ROUTINE UA WITH MICROSCOPIC REFLEX TO CULTURE - Abnormal       Result Value    Color Urine Light Yellow      Appearance Urine Slightly Cloudy (*)     Glucose Urine Negative      Bilirubin Urine Negative      Ketones Urine Negative      Specific Gravity Urine 1.007      Blood Urine Moderate (*)     pH Urine 5.0      Protein Albumin Urine 20 (*)     Urobilinogen Urine Normal      Nitrite Urine Negative      Leukocyte Esterase Urine Large (*)     Bacteria Urine Many (*)     WBC Clumps Urine Present (*)     Mucus Urine Present (*)     RBC Urine 18 (*)     WBC Urine >182 (*)     Squamous Epithelials Urine 2 (*)     Transitional Epithelials Urine 1     BASIC METABOLIC PANEL - Abnormal    Sodium 137      Potassium 4.1      Chloride 100      Carbon Dioxide (CO2) 23      Anion Gap 14      Urea Nitrogen 49.3 (*)     Creatinine 1.97 (*)     GFR Estimate 29 (*)     Calcium 9.3      Glucose 109 (*)    CBC WITH PLATELETS AND DIFFERENTIAL    WBC Count 7.8      RBC Count 4.08      Hemoglobin 11.7      Hematocrit 36.6      MCV 90      MCH 28.7      MCHC 32.0      RDW 13.0      Platelet Count 190      % Neutrophils 66      % Lymphocytes 24      % Monocytes 9      % Eosinophils 1      % Basophils 0      % Immature Granulocytes 0      NRBCs per 100 WBC 0      Absolute Neutrophils 5.1      Absolute Lymphocytes 1.9      Absolute Monocytes 0.7      Absolute Eosinophils 0.1      Absolute Basophils 0.0      Absolute Immature Granulocytes 0.0      Absolute NRBCs 0.0     BLOOD CULTURE   URINE CULTURE     No orders to display      Reviewed primary care visit from yesterday as well as primary care physician referral note from today.  Reviewed labs including CBC, comprehensive metabolic panel, and  urinalysis from earlier today.  Reviewed previous urine cultures which is unremarkable for Klebsiella, E. coli, and ESBL.  Patient's liver enzymes were normal yesterday.  Reviewed outpatient CT scan performed earlier today    Critical care was not performed.     Medical Decision Making  The patient's presentation was of high complexity (an acute health issue posing potential threat to life or bodily function).    The patient's evaluation involved:  review of external note(s) from 1 sources (see separate area of note for details)  review of 3+ test result(s) ordered prior to this encounter (see separate area of note for details)  ordering and/or review of 3+ test(s) in this encounter (see separate area of note for details)  discussion of management or test interpretation with another health professional (admitting family medicine service)    The patient's management necessitated high risk (a decision regarding hospitalization).    Assessment & Plan    Patient is a 58-year-old female past medical history of CKD III, HepC s/p Liver transplant (2010), and UTI with ESBL E. coli presented from PCP with 2 days of right flank pain and dysuria, suggestive of acute cystitis.  Her CBC was within normal limits. Cr was elevated at 1.97 (1.63 on 3/4), BMP otherwise within normal limits. Acute elevation of creatinine could be concerning for struct of nephropathy, however the CT abd pelvis obtained today did not indicate this.    Given her history of ESBL E. coli UTI, transplant status, and abdominal CT suggestive of ascending urinary tract infection/developing pyelonephritis, recommended admission for IV meropenem and further workup of cystitis.  Blood cultures were obtained, which are pending.    --    ED Attending Physician Attestation    I ZOYA ESPINOZA MD, MD, cared for this patient with the Medical Student. I performed, or re-performed, the physical exam and medical decision-making. I have verified the accuracy of all the  medical student findings and documentation above, and have edited as necessary.    Summary of HPI, PE, ED Course   Patient is a 58 year old female evaluated in the emergency department for right-sided flank pain and UTI symptoms.  She has a history of ESBL UTI.  Urinalysis yesterday appears infected.  Urine culture today remarkable for otherwise unspeciated E. coli.  CT abdomen/pelvis today concerning for ureteral inflammatory change and developing pyelonephritis.  She has a history of immunosuppression due to liver transplant and is at high risk for complicated UTI.. Exam and ED course notable for normal vital signs.  Right flank tenderness.  Soft and nontender abdomen. After the completion of care in the emergency department, the patient was admitted to inpatient.      MAL ESPINOZA MD, MD  Emergency Medicine      I have reviewed the nursing notes. I have reviewed the findings, diagnosis, plan and need for follow up with the patient.    Current Discharge Medication List          Final diagnoses:   Acute pyelonephritis   Status post liver transplantation (H)     Chart documentation was completed with Dragon voice-recognition software. Even though reviewed, this chart may still contain some grammatical, spelling, and word errors.     Mal Espinoza Md    [facility] EMERGENCY DEPARTMENT  3/5/2024        Mal Espinoza MD  03/06/24 0038

## 2024-03-06 NOTE — PLAN OF CARE
Goal Outcome Evaluation:      Plan of Care Reviewed With: patient    Overall Patient Progress: no change      VS: VSS AOX4  Macedonian speaking    O2: 2LPM NC, pt had o2 off most of the day, sat over %90s   Output: Continent x2   Last BM: 3/5   Activity: indp   Skin: none   Pain: none   CMS: Denies n/t   Dressing: none   Diet: Takes pills whole   LDA: none   Equipment: none   Plan: DISCHARGE SUMMARY    Pt discharging to: HOME  Transportation: FAMILY  AVS given and discussed: Pt was given AVS and pt states understanding of content. Pt has no further questions.   Medications given: Yes, discussed. No further questions.   Belongings returned: Yes, ensured all belongings packed and sent with pt. No items in security.   Comments: Escorted safely to elevators. Pt left at 1630         Additional Info: No acute changes during this shift, call light within reach, continue with plan of care.

## 2024-03-06 NOTE — UTILIZATION REVIEW
"Admission Status; Secondary Review Determination     Admission Date: 3/5/2024  5:35 PM       Under the authority of the Utilization Management Committee, the utilization review process indicated a secondary review on the above patient.  The review outcome is based on review of the medical records, discussions with staff, and applying clinical experience noted on the date of the review.          (x) Observation Status Appropriate - This patient does not meet hospital inpatient criteria and is placed in observation status. If this patient's primary payer is Medicare and was admitted as an inpatient, Condition Code 44 should be used and patient status changed to \"observation\".       RATIONALE FOR DETERMINATION      Brief clinical presentation, information copied from the chart, abbreviated and edited for relevant content:     Paged team to change to OBS      Folr Navarro is a 58 year old female admitted for likely complicated UTI, with history of ESBL and med interactions so Meropenem is the best first choice until speciation results available. Also with CASTILLO so agree with holding of diuretics with low threshold to reinstate loop diuretic with her recent history of admission for diuresis. Patient able to discharge after one night.       The severity of illness, intensity of cares provided, risk for adverse outcome, and expected LOS make the care appropriate for observation.       The information on this document is developed by the utilization review team in order for the business office to ensure compliance.  This only denotes the appropriateness of proper admission status and does not reflect the quality of care rendered.         The definitions of Inpatient Status and Observation Status used in making the determination above are those provided in the CMS Coverage Manual, Chapter 1 and Chapter 6, section 70.4.      Sincerely,     Tamy Walsh MD   Utilization Review/ Case Management  [facility] " Services.

## 2024-03-06 NOTE — TELEPHONE ENCOUNTER
----- Message from Karely Sierra MD sent at 3/4/2024  5:52 PM CST -----  3/4/2024  Please call Flor to inform of results. Abnormal UA, doesn't look like infection, xray did not show the stone, so we have to proceed to CT scan to rule out kidney stone. Ct is ordered, please give Courtney (her son who speaks English) the phone # to call to schedule. I have ordered it as urgent.     Karely Sierra MD  [facility]

## 2024-03-06 NOTE — DISCHARGE SUMMARY
[facility]  Discharge Summary - Medicine & Pediatrics       Date of Admission:  3/5/2024  Date of Discharge:  3/6/2024  Discharging Provider: Dr. Sepulveda  Discharge Service: [facility]    Discharge Diagnoses   # Acute complicated UTI, concern for pyelonephritis   # History of resistant UTI  # Chronic immunosuppression, on Cyclosporine and Mycophenolate  # CASTILLO-on-CKD3, multifactorial   # HTN  # Hepatitis C s/p liver transplant (2010)   # Neuropathy  # COPD  # SURI  # Hyperuricemia   # Osteoporosis  # Secondary hyperparathyroidism   # HLD  # GERD  # Allergies     Clinically Significant Risk Factors          Follow-ups Needed After Discharge   Follow up with Dr. Sierra.     Unresulted Labs Ordered in the Past 30 Days of this Admission       Date and Time Order Name Status Description    3/6/2024 12:20 PM Cyclosporine by [employer] Mass Spectrometry In process     3/5/2024  8:03 PM Urine Culture Preliminary     3/5/2024  6:01 PM Blood Culture Peripheral Blood Preliminary         These results will be followed up by your PCP.    Discharge Disposition   Discharged to home  Condition at discharge: Stable    Hospital Course   Flor Navarro is a 58-year-old female [facility] liver transplant (2010) 2/2 Hepatitis C on chronic immunosuppression, CKDIIIb, history of ESBL UTI, HTN, COPD, SURI, previous CVA with right hemiplegia, secondary hyperparathyroidism admitted on 3/5/2024 for right pyelonephritis. Found to have CASTILLO-on-CKD.      # Acute complicated UTI, concern for pyelonephritis   # History of resistant UTI  # Chronic immunosuppression, on Cyclosporine and Mycophenolate  Admitted with 1-day history of right flank pain, difficulty voiding, and urinary symptoms. Flank pain improving. Difficulty voiding resolved. Afebrile with reassuring vitals. Exam with positive CVA tenderness on right- no abdominal pain. Labs without leukocytosis. UA with WBC clumps, LE and hematuria-  no nitrites. Culture from clinic growing >100k colonies E. Coli. CT A/P demonstrates evidence of ascending infection versus recently passed stone. Overall, history and exam most consistent with right-sided pyelonephritis. Does not meet sepsis criteria. Possible that patient had recent nephrolithiasis that has passed given improvement in symptoms- may have serve as nidus for infection. Antibiotic choice complicated by history of resistant infection and antibiotic interactions with Cyclosporine. Spoke with hepatology, who acquired a Cyclosporine level and stated that she could be discharged regardless of the Cyclosporine result. Hepatology reported that they will reach out to Flor's outpatient hepatology team for follow up. Received one day of Meropenem, then switched to Levofloxacin, neither of which interact with Cyclosporine. No QTc prolongation on EKG to contraindicate use of Levofloxacin.  - Levofloxacin 250 mg daily from 3/6 to 3/14   - Hold all medications that contain calcium in addition to Maalox while taking Levofloxacin  - Meropenem 1g q8hr (3/5-3/6)  - Urine culture (3/4): E coli, susceptible to levofloxacin  - Blood cultures (3/5): NGTD     # CASTILLO-on-CKD3, multifactorial   Baseline creatinine previously 1.1-1.3, per Nephrology 11/21/23. Has been rising over past two weeks- suspected related to Cyclosporine per outpatient provider. Admitted with Cr 1.97. Meets criteria for CASTILLO on CKD. Likely multifactorial related to current pyelonephritis and poor PO intake. Cyclosporine may be contributing as well; however, both PharmD and hepatology stated that a dose change during this admission would not be necessary prior to discharge. UA without casts or other indication for intrarenal etiology. No recent suspect medications. CT without current obstruction. CASTILLO improved prior to discharge.   - PTA Cyclosporine 75 mg AM and 50 mg PM   - PTA Gabapentin 300 mg daily  - PTA Chlorthalidone 25 mg daily  - PTA Lasix 20 mg  BID     # HTN  Likely renal contribution to resistant HTN. Last ECHO 2/2023 with EF 55-60%. PTA diuretics briefly held, but restarted on 3/6 in setting of improving creatinine and increased dyspnea.  - PTA Amlodipine 10 mg daily   - PTA Carvedilol 12.5 mg BID   - PTA Chlorthalidone 25 mg daily   - PTA Lasix 20 mg BID     # Hepatitis C s/p liver transplant (2010)   # Chronic immunosuppression, on Cyclosporine and Mycophenolate  Follows with GI Dr. Anne as outpatient. See above for discussion of possible Cyclosporine contribution to CASTILLO. Hepatology will set up close follow up to determine if any changes to cyclosporine are necessary. A cyclosporine level was collected prior to discharge.   - PTA Cyclosporine 75 mg AM and 50 mg PM   - PTA Mycophenolate 500 mg BID      # Neuropathy  - PTA Gabapentin 300 mg at bedtime     Chronic/stable/resolved:     # COPD  # SURI  Last PFT 11/2023 with normal FEV1, FVC, and FEV1/FVC ratio. Previous obstruction resolved with inhalers per Pulmonology note 11/2023. No current tobacco use. Normal oxygen on room air. Lungs CTAB.   - PTA Albuterol QID   - PTA Incruse Ellipta daily   - PTA CPAP at night      # Hyperuricemia   - PTA Febuxostat 40 mg daily      # Osteoporosis  # Secondary hyperparathyroidism   Last DEXA 6/2019 with T-score -2.6.   - PTA Calcitriol 0.25 mg daily   - PTA Vitamin D   - Fosamax 70 mg weekly not given during stay     # HLD  - PTA Atorvastatin 40 mg daily      # GERD  - PTA Omeprazole 20 mg BID   - HOLD PTA Maalox q4hr PRN      # Allergies   - PTA Zyrtec 10 mg daily        Consultations This Hospital Stay   None    Code Status   Full Code       The patient was discussed with Dr. Jeremy Sepulveda.    Kimberly Santiago DO, MPH  [facility]  [facility] [address]  [address] [place] [id number]  Phone: [phone]  Fax: [phone]  ______________________________________________________________________    Physical Exam   Vital Signs: Temp: 98.9  F  (37.2  C) (pt had just eaten hot food) Temp src: Oral BP: 112/78 Pulse: 77   Resp: 17 SpO2: 92 % O2 Device: None (Room air) (pt had cannula off to eat breakfast) Oxygen Delivery: 2 LPM  Weight: 0 lbs 0 oz    Constitutional: Awake, alert, cooperative, no apparent distress, and appears stated age  Eyes: Lids and lashes normal, pupils equal, round and reactive to light, extra ocular muscles intact, sclera clear, conjunctiva normal  ENT: Normocephalic, without obvious abnormality, atraumatic, external ears without lesions, oral pharynx with moist mucous membranes, tonsils without erythema or exudates, gums normal and good dentition.  Respiratory: No increased work of breathing, good air exchange, clear to auscultation bilaterally, no crackles or wheezing  Cardiovascular: Normal apical impulse, regular rate and rhythm, normal S1 and S2, no S3 or S4, and no murmur noted  GI: No scars, normal bowel sounds, soft, non-distended, non-tender, no masses palpated, no hepatosplenomegally. Positive CVA tenderness on right.   Skin: No bruising or bleeding  Musculoskeletal: There is no redness, warmth, or swelling of the joints.   Neurologic: Awake, alert, oriented to name, place and time.  Cranial nerves II-XII are grossly intact.  Right-sided weakness; baseline.       Primary Care Physician   Karely Sierra    Discharge Orders      Reason for your hospital stay    You were admitted to the hospital for a right kidney infection that was causing stress to your kidneys. Your kidneys are improving, and you are ready to go home with antibiotics. Take all of your medicines as prescribed and be sure to drink a lot of water to flush out your kidneys.     Activity    Your activity upon discharge: activity as tolerated     Adult [facility] Follow-up and recommended labs and tests    Follow up with primary care provider, Karely Sierra, within 7 days to evaluate treatment.  The following labs/tests are recommended: UA, CBC, BMP.      Appointments on  [place] and/or [facility] (with [facility] or [facility] provider or service). Call [phone] if you haven't heard regarding these appointments within 7 days of discharge.     Diet    Follow this diet upon discharge: Regular       Significant Results and Procedures   Most Recent 3 CBC's:  Recent Labs   Lab Test 03/06/24  0559 03/05/24  1826 03/04/24  1428   WBC 6.8 7.8 10.9   HGB 10.8* 11.7 12.3   MCV 90 90 91    190 206     Most Recent 3 BMP's:  Recent Labs   Lab Test 03/06/24  0559 03/05/24  1826 03/04/24  1428    137 139   POTASSIUM 4.2 4.1 5.0   CHLORIDE 104 100 103   CO2 22 23 23   BUN 50.3* 49.3* 42.8*   CR 1.82* 1.97* 1.63*   ANIONGAP 13 14 13   JUAN 9.0 9.3 10.1*   * 109* 129*   ,   Results for orders placed or performed in visit on 03/05/24   CT Abdomen Pelvis w/o Contrast     Value    Radiologist flags (Urgent)     Concern for cystitis with possible ascending infection    Narrative    EXAMINATION: CT ABDOMEN PELVIS W/O CONTRAST 3/5/2024 2:02 PM    TECHNIQUE: Helical CT images from the lung bases through the pubic  symphysis were obtained without IV contrast.     COMPARISON: 3/4/2024, 5/29/2022    HISTORY: Evaluate for kidney stones, urine cloudy and blood tinged, UA  w/ blood but no nitrites, transplant patient, creatinine pending;  Flank pain    FINDINGS:    Abdomen and pelvis: Wall thickening of the urinary bladder with faint  surrounding fat stranding. There is thickening of the mid to distal  right ureter with substantial surrounding fat stranding. No  nephrolithiasis is visualized. No hydronephrosis. Atrophic kidneys,  right greater than left. Simple renal cysts of the left kidney.  Postsurgical changes of liver transplant. Nodular appearance of the  transplant liver without distinct lesions in this noncontrast scan.  Multiple splenic artery aneurysms with the largest measuring 17 mm  (series 3 image 115), similar to prior. Unremarkable noncontrast  evaluation of the spleen. No  distinct adrenal nodules. Mildly atrophic  pancreas without distinct pancreatic lesions. No distended loops of  bowel. Normal appendix. No mesenteric masses. No free air or fluid. No  suspicious lymphadenopathy. Retroaortic left renal vein, otherwise  unremarkable noncontrast evaluation of the intra-abdominal vessels.    Lung bases: Bibasilar areas of subsegmental atelectasis. Unchanged  prominent distal periesophageal lymph node.    Bones and soft tissues: Subcutaneous calcifications, likely injection  granulomas. Small fat-containing abdominal ventral hernias in the  upper abdomen. Bilateral chronic pars interarticularis defects at L5  with associated grade 1 anterolisthesis of L5 on S1. Otherwise  unremarkable.        Impression    IMPRESSION:   1. Findings suggestive of cystitis. Fat stranding about the right  ureter may represent ascending infection versus sequelae of recently  passed stone.  2. Postsurgical changes of liver transplant with unchanged nodular  contour of the transplant liver.  3. Splenic artery aneurysms are similar to prior 5/29/2022.      [Urgent Result: Concern for cystitis with possible ascending infection  along the right ureter]    Finding was identified on 3/5/2024 2:12 PM.     Dr. Sierra was contacted by Dr. Bennie Quan at 3/5/2024 2:16 PM and  verbalized understanding of the urgent finding.     I have personally reviewed the examination and initial interpretation  and I agree with the findings.    JASWINDER VILLAVICENCIO MD         SYSTEM ID:  [id number]     *Note: Due to a large number of results and/or encounters for the requested time period, some results have not been displayed. A complete set of results can be found in Results Review.       Discharge Medications   Current Discharge Medication List        START taking these medications    Details   levofloxacin (LEVAQUIN) 250 MG tablet Take 1 tablet (250 mg) by mouth daily  Qty: 9 tablet, Refills: 0    Associated Diagnoses: Pyelonephritis,  acute           CONTINUE these medications which have CHANGED    Details   Calcium Carbonate-Vitamin D 600-10 MG-MCG TABS Take 1 tablet by mouth 2 times daily Do not take this while you take the levofloxacin.  Qty: 180 tablet, Refills: 3    Associated Diagnoses: Other osteoporosis without current pathological fracture           CONTINUE these medications which have NOT CHANGED    Details   ACE/ARB/ARNI NOT PRESCRIBED (INTENTIONAL) Please choose reason not prescribed from choices below.    Associated Diagnoses: Stage 3a chronic kidney disease (H)      albuterol (ACCUNEB) 0.63 MG/3ML neb solution Take 3 mLs (0.63 mg) by nebulization every 4 hours as needed for shortness of breath, wheezing or cough  Qty: 360 mL, Refills: 4    Comments: Pt reports she is OUT and needs refill ASAP  Associated Diagnoses: Chronic obstructive pulmonary disease, unspecified COPD type (H)      albuterol (PROAIR HFA/PROVENTIL HFA/VENTOLIN HFA) 108 (90 Base) MCG/ACT inhaler Inhale 2 puffs into the lungs 4 times daily  Qty: 18 g, Refills: 1    Comments: Pharmacy may dispense brand covered by insurance (Proair, or proventil or ventolin or generic albuterol inhaler)  Associated Diagnoses: Chronic obstructive pulmonary disease, unspecified COPD type (H)      alendronate (FOSAMAX) 70 MG tablet Take 1 tablet (70 mg) by mouth every 7 days  Qty: 13 tablet, Refills: 3    Associated Diagnoses: Other osteoporosis without current pathological fracture      alum & mag hydroxide-simethicone (MAALOX ADVANCED MAX ST) 400-400-40 MG/5ML SUSP suspension Take 30 mLs by mouth every 4 hours as needed for indigestion or heartburn  Qty: 355 mL, Refills: 0      amLODIPine (NORVASC) 10 MG tablet Take 1 tablet (10 mg) by mouth daily  Qty: 90 tablet, Refills: 3    Associated Diagnoses: Hypertension, renal      atorvastatin (LIPITOR) 40 MG tablet Take 1 tablet (40 mg) by mouth daily  Qty: 90 tablet, Refills: 1    Associated Diagnoses: Hyperlipidemia LDL goal <100       calcitRIOL (ROCALTROL) 0.25 MCG capsule Take 1 capsule (0.25 mcg) by mouth daily  Qty: 90 capsule, Refills: 3    Associated Diagnoses: Stage 3 chronic kidney disease, unspecified whether stage 3a or 3b CKD (H)      calcium carbonate-vitamin D (CALTRATE) 600-10 MG-MCG per tablet TAKE ONE TABLET BY MOUTH TWICE A DAY  Qty: 60 tablet, Refills: 11    Associated Diagnoses: Other osteoporosis without current pathological fracture      capsaicin-menthol-methyl sal 0.025-1-12 % external cream Apply to low back three times daily for pain  Qty: 170 g, Refills: 3    Associated Diagnoses: Chronic left shoulder pain      carvedilol (COREG) 12.5 MG tablet Take 1 tablet (12.5 mg) by mouth 2 times daily (with meals)  Qty: 180 tablet, Refills: 1    Associated Diagnoses: Hypertension, renal      cetirizine (ZYRTEC) 10 MG tablet Take 1 tablet (10 mg) by mouth daily  Qty: 90 tablet, Refills: 3    Associated Diagnoses: Tearing eyes      chlorthalidone (HYGROTON) 25 MG tablet Take 1 tablet (25 mg) by mouth daily  Qty: 90 tablet, Refills: 1    Associated Diagnoses: Hypertension, renal      cycloSPORINE modified (GENERIC EQUIVALENT) 25 MG capsule TAKE THREE CAPSULES BY MOUTH EVERY MORNING & TAKE TWO CAPSULES BY MOUTH EVERY NIGHT AT BEDTIME  Qty: 150 capsule, Refills: 11    Associated Diagnoses: Liver replaced by transplant (H)      Denture Care Products (EFFERDENT DENTURE CLEANSER) TBEF Use nightly to clean oral appliance  Qty: 30 tablet, Refills: 11    Associated Diagnoses: Ill-fitting dentures      febuxostat (ULORIC) 40 MG TABS tablet Take 1 tablet (40 mg) by mouth daily  Qty: 90 tablet, Refills: 3    Associated Diagnoses: Chronic gout due to renal impairment involving toe of right foot without tophus      furosemide (LASIX) 20 MG tablet Take 1 tablet (20 mg) by mouth 2 times daily  Qty: 180 tablet, Refills: 3    Associated Diagnoses: Hyperkalemia      gabapentin (NEURONTIN) 300 MG capsule Take 1 capsule (300 mg) by mouth At  Bedtime  Qty: 180 capsule, Refills: 3    Associated Diagnoses: 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24)      !! ketotifen (ZADITOR) 0.025 % ophthalmic solution Place 1 drop into both eyes 2 times daily as needed for itching  Qty: 5 mL, Refills: 4    Associated Diagnoses: Dry eyes, bilateral      !! ketotifen fumarate 0.035%, ketotifen 0.025%, (ZADITOR) 0.025 % ophthalmic solution       lidocaine (XYLOCAINE) 5 % external ointment Apply topically as needed for moderate pain  Qty: 240 g, Refills: 3    Associated Diagnoses: 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24)      mineral oil-white petrolatum (EUCERIN/MINERIN) cream Apply topically 2 times daily  Qty: 454 g, Refills: 4    Associated Diagnoses: Xerosis cutis      Multiple Vitamin (DAILY-SUMA MULTIVITAMIN) TABS Take 1 tablet by mouth every morning  Qty: 100 tablet, Refills: 4    Associated Diagnoses: Immunosuppressed status (H24)      mycophenolate (GENERIC EQUIVALENT) 250 MG capsule TAKE TWO CAPSULES BY MOUTH TWICE A DAY  Qty: 120 capsule, Refills: 11    Associated Diagnoses: Liver replaced by transplant (H)      omeprazole (PRILOSEC) 20 MG DR capsule Take 1 capsule (20 mg) by mouth 2 times daily el  Qty: 180 capsule, Refills: 3    Associated Diagnoses: Liver transplanted (H)      !! order for DME Equipment being ordered: compression stockings bilateral  Please measure and fit patient for stockings   Strength 15-30mmHg  Disp 2 pairs ( 4 socks)  1 refill over the time of 1 year  Qty: 4 Units, Refills: 1    Associated Diagnoses: Localized edema      !! order for DME Equipment being ordered:   1) cane  2) compression stockings 15mmHg, 3 pairs  Dx: gait stability, falls, edema  Qty: 1 each, Refills: 0    Associated Diagnoses: Gait instability; Falls frequently; Localized edema      !! order for DME Equipment being ordered: Nebulizer with adult mask and tubing  Qty: 1 Units, Refills: 0    Associated Diagnoses: Acute bronchitis, unspecified organism      senna-docusate  (SENEXON-S) 8.6-50 MG tablet Take 2 tablets by mouth 2 times daily  Qty: 180 tablet, Refills: 3    Associated Diagnoses: Partial intestinal obstruction, unspecified cause (H)      SM FIBER LAXATIVE 500 MG TABS tablet Take 2 tablets (1,000 mg) by mouth daily  Qty: 90 tablet, Refills: 3    Associated Diagnoses: Chronic idiopathic constipation      STATIN NOT PRESCRIBED, INTENTIONAL, Not prescribed  Qty: 0 each, Refills: 0    Associated Diagnoses: High risk medication use      umeclidinium (INCRUSE ELLIPTA) 62.5 MCG/ACT inhaler Inhale 1 puff into the lungs daily  Qty: 90 each, Refills: 3    Associated Diagnoses: Chronic obstructive pulmonary disease, unspecified COPD type (H)      VITAMIN D3 25 MCG (1000 UT) tablet TAKE ONE TABLET BY MOUTH ONCE DAILY  Qty: 100 tablet, Refills: 3    Associated Diagnoses: Other osteoporosis without current pathological fracture       !! - Potential duplicate medications found. Please discuss with provider.        Allergies   Allergies   Allergen Reactions    Aspirin      3/31/16 Per pt, tolerates 81 mg daily dose without ADR.     325 mg dose caused itchiness and hives.    Clarithromycin      Allergic reaction        Contrast Dye     Iodine     Pcn [Penicillins]

## 2024-03-06 NOTE — H&P
[facility]    History and Physical - [facility]       Date of Admission:  3/5/2024    Assessment & Plan      Flor Navarro is a 58-year-old female PMH liver transplant (2010) 2/2 Hepatitis C on chronic immunosuppression, CKDIIIb, history of ESBL UTI, HTN, COPD, SURI, previous CVA with right hemiplegia, secondary hyperparathyroidism admitted on 3/5/2024 for right pyelonephritis. Found to have CASTILLO-on-CKD.     # Acute complicated UTI, concern for pyelonephritis   # History of resistant UTI  # Chronic immunosuppression, on Cyclosporine and Methotrexate   Admitted with 1-day history of right flank pain, difficulty voiding, and urinary symptoms. Flank pain improving. Difficulty voiding resolved. Afebrile with reassuring vitals. Exam with positive CVA tenderness on right- no abdominal pain. Labs without leukocytosis. UA with WBC clumps, LE and hematuria- no nitrites. Culture from clinic growing >100k colonies E. Coli. CT A/P demonstrates evidence of ascending infection versus recently passed stone. Overall, history and exam most consistent with right-sided pyelonephritis. Does not meet sepsis criteria. Possible that patient had recent nephrolithiasis that has passed given improvement in symptoms- may have serve as nidus for infection. Antibiotic choice complicated by history of resistant infection and antibiotic interactions with Cyclosporine. Will treat based on previous culture and narrow with susceptibilities as able. Less likely uncomplicated cystitis with systemic symptoms; yeast infection/STI/pregnancy given not consistent history.    Consults:  - Consider ID if not improving given history of resistant infections   Antibiotics/Microbiology:  - Meropenem 1g q8hr (3/5-*); consider 7-10 day course   - Narrow with susceptibilities   - Urine culture (3/4): E coli, susceptibilities pending   - Blood cultures (3/5): In process   Fluids:  - LR 1L  overnight   Pain:  - Tylenol 675 mg q6hr PRN   - Avoid NSAID's given CKD   - Avoid oxycodone given reduced clearance in CKD  - PO Dilaudid 1 mg q6hr PRN for moderate pain  - IV Dilaudid 0.2 mg TID PRN for severe pain     # CASTILLO-on-CKD3, multifactorial   Baseline creatinine previously 1.1-1.3 per Nephrology 11/21/23. Has been rising over past two weeks- suspected related to Cyclosporine per outpatient provider. Admitted with Cr 1.97. Meets criteria for CASTILLO on CKD. Likely multifactorial related to current pyelonephritis and poor PO intake. Cyclosporine may be contributing as well- would benefit from discussing possible dose reduction with PharmD. UA without casts or other indication for intrarenal etiology. No recent suspect medications. CT without current obstruction.   Fluids:  - LR 1L overnight   Monitoring:  - Daily BMP  - Bladder scan protocol    - Strict intake/output   - Daily weights   - Avoid nephrotoxic medications   - PharmD to renally dose medication   - Continue PTA Cyclosporine 75 mg AM and 50 mg PM   - HOLD PTA Gabapentin 300 mg daily in setting of CASTILLO  - HOLD PTA Chlorthalidone 25 mg daily in setting of CASTILLO  - HOLD PTA Lasix 20 mg BID in setting of CASTILLO    # HTN  Likely renal contribution to resistant HTN. Last ECHO 2/2023 with EF 55-60%. Currently holding PTA diuretics due to CASTILLO- but would have low threshold to restart given patient at greater risk of volume overload.   - PTA Amlodipine 10 mg daily   - PTA Carvedilol 12.5 mg BID   - HOLD PTA Chlorthalidone 25 mg daily in setting of CASTILLO  - HOLD PTA Lasix 20 mg BID in setting of CASTILLO    # Hepatitis C s/p liver transplant (2010)   # Chronic immunosuppression, on Cyclosporine and Methotrexate   Follows with GI Dr. Anne as outpatient. See above for discussion of possible Cyclosporine contribution to CASTILLO. Dr. Sierra sent message regarding possible dose reduction of Cyclosporine 3/4. Consider discussing with PharmD.   - PTA Cyclosporine 75 mg AM and 50 mg PM   -  PTA Mycophenolate 500 mg BID     # Neuropathy  - HOLD PTA Gabapentin 300 mg at bedtime given CASTILLO    Chronic/stable/resolved:    # COPD  # SURI  Last PFT 11/2023 with normal FEV1, FVC, and FEV1/FVC ratio. Previous obstruction resolved with inhalers per Pulmonology note 11/2023. No current tobacco use. Normal oxygen on room air. Lungs CTAB.   - PTA Albuterol QID   - PTA Incruse Ellipta daily   - PTA CPAP at night     # Hyperuricemia   - PTA Febuxostat 40 mg daily     # Osteoporosis  # Secondary hyperparathyroidism   Last DEXA 6/2019 with T-score -2.6.   - PTA Calcitriol 0.25 mg daily   - PTA Vitamin D   - HOLD PTA Fosamax 70 mg weekly; PharmD to confirm last dose     # HLD  - PTA Atorvastatin 40 mg daily     # GERD  - PTA Omeprazole 20 mg BID   - HOLD PTA Maalox q4hr PRN     # Allergies   - HOLD PTA Zyrtec 10 mg daily             Diet: Combination Diet Regular Diet Adult    DVT Prophylaxis: PADUA 2 (BMI, infection) --> Pneumatic Compression Devices  Li Catheter: Not present  Fluids: LR 1L   Lines: None     Cardiac Monitoring: None  Code Status: Full Code      Clinically Significant Risk Factors Present on Admission                  # Hypertension: Noted on problem list          # COPD: noted on problem list        Disposition Plan      Expected Discharge Date: 03/07/2024      Destination: home with family          The patient's care was discussed with the Attending Physician, Dr. Oconnor .      Tiara Mendez MD  [facility]  [facility]  Securely message with [employer] (more info)  Text page via [employer] Paging/Directory   See signed in provider for up to date coverage information  ______________________________________________________________________    Chief Complaint   Urinary symptoms and flank pain    History is obtained from the patient and chart review     History of Present Illness   Flor Navarro is a 58-year-old female PMH liver transplant 2/2  Hepatitis C on chronic immunosuppression, CKDIIIb, HTN, COPD, SURI, previous CVA with right hemiplegia, secondary hyperparathyroidism admitted on 3/5/2024 for urinary symptoms and right flank pain. The patient developed urinary symptoms including dysuria, hematuria, urinary urgency, and urinary frequency. She was also having difficulty with urination and was only able to void small amounts at a time. Additionally, the patient noticed severe right-sided flank pain described as constant burning. She was evaluated in clinic and completed workup including UA and CT A/P. Due to concern for ascending infection, she was referred to the ED. On my evaluation, the patient reports overall improvement in symptoms. Her flank pain is better- currently rated at 3/10 in intensity. She felt her pain gradually improved. She has been able to void normally since arriving to the hospital. She endorses subjective fever and chills. She denies headache, CP, SOB, abdominal pain, N/V, diarrhea, bloody stools. Last BM was today (3/5). She denies tobacco, alcohol, and recreational drug use. No other acute concerns.     ED COURSE:    Vitals:  - Afebrile, normal vitals on RA.     Labs:  - CBC: WBC 7.8, HGB 11.7,   - BMP: Cr 1.97 (baseline 1.3), BUN 49  - Hepatic panel (from 3/4): Unremarkable     - UA: Cloudy, large blood with RBC, WBC clumps, small LE, no nitrites   - Urine culture (from 3/4): >100k E coli     Imaging:  CT A/P:  1. Findings suggestive of cystitis. Fat stranding about the right  ureter may represent ascending infection versus sequelae of recently  passed stone.  2. Postsurgical changes of liver transplant with unchanged nodular  contour of the transplant liver.  3. Splenic artery aneurysms are similar to prior 5/29/2022.    EKG:  None     Interventions:   - Meropenem     Past Medical History    Past Medical History:   Diagnosis Date    Anemia in CKD (chronic kidney disease)     Cataract     CKD (chronic kidney disease)  stage 3, GFR 30-59 ml/min (H)     Hepatitis C     cleared virus spontaneously     High risk medication use     Hypertension, renal     Immunosuppressed status (H24)     Liver replaced by transplant (H) 2010    Osteoporosis     Recurrent pregnancy loss without current pregnancy     Recurrent UTI 2021    Stroke, hemorrhagic (H) 2008    Left cerebral intraparenchymal hemorrhage    Syncope     Unspecified viral hepatitis C without hepatic coma        Past Surgical History   Past Surgical History:   Procedure Laterality Date    CATARACT IOL, RT/LT Right 2/18/15     SECTION      Incisional Hernia Repair  2004    INSERT SHUNT PORTAL TRANSJUGULAR INTRAHEPTIC      shunt placement for liver failure    LAPAROSCOPIC SALPINGO-OOPHORECTOMY      left    NECK SURGERY  2010    fracture, in halo x 7months    TRANSPLANT LIVER RECIPIENT  DONOR  10/26/10    Upper GI Endoscopy with Band Ligation of Esoph/Gastric Varic. .         Prior to Admission Medications   Prior to Admission Medications   Prescriptions Last Dose Informant Patient Reported? Taking?   ACE/ARB/ARNI NOT PRESCRIBED (INTENTIONAL)   No No   Sig: Please choose reason not prescribed from choices below.   Calcium Carbonate-Vitamin D 600-10 MG-MCG TABS   No No   Sig: Take 1 tablet by mouth 2 times daily   Denture Care Products (EFFERDENT DENTURE CLEANSER) TBEF   No No   Sig: Use nightly to clean oral appliance   Multiple Vitamin (DAILY-SUMA MULTIVITAMIN) TABS   No No   Sig: Take 1 tablet by mouth every morning   SM FIBER LAXATIVE 500 MG TABS tablet   No No   Sig: Take 2 tablets (1,000 mg) by mouth daily   STATIN NOT PRESCRIBED, INTENTIONAL,   No No   Sig: Not prescribed   VITAMIN D3 25 MCG (1000 UT) tablet   No No   Sig: TAKE ONE TABLET BY MOUTH ONCE DAILY   albuterol (ACCUNEB) 0.63 MG/3ML neb solution   No No   Sig: Take 3 mLs (0.63 mg) by nebulization every 4 hours as needed for shortness of breath, wheezing or cough   albuterol  (PROAIR HFA/PROVENTIL HFA/VENTOLIN HFA) 108 (90 Base) MCG/ACT inhaler   No No   Sig: Inhale 2 puffs into the lungs 4 times daily   alendronate (FOSAMAX) 70 MG tablet Not Taking  No No   Sig: Take 1 tablet (70 mg) by mouth every 7 days   Patient not taking: Reported on 3/5/2024   alum & mag hydroxide-simethicone (MAALOX ADVANCED MAX ST) 400-400-40 MG/5ML SUSP suspension Not Taking  No No   Sig: Take 30 mLs by mouth every 4 hours as needed for indigestion or heartburn   Patient not taking: Reported on 3/5/2024   amLODIPine (NORVASC) 10 MG tablet   No No   Sig: Take 1 tablet (10 mg) by mouth daily   atorvastatin (LIPITOR) 40 MG tablet   No No   Sig: Take 1 tablet (40 mg) by mouth daily   calcitRIOL (ROCALTROL) 0.25 MCG capsule   No No   Sig: Take 1 capsule (0.25 mcg) by mouth daily   calcium carbonate-vitamin D (CALTRATE) 600-10 MG-MCG per tablet Not Taking  No No   Sig: TAKE ONE TABLET BY MOUTH TWICE A DAY   Patient not taking: Reported on 3/5/2024   capsaicin-menthol-methyl sal 0.025-1-12 % external cream   No No   Sig: Apply to low back three times daily for pain   carvedilol (COREG) 12.5 MG tablet   No No   Sig: Take 1 tablet (12.5 mg) by mouth 2 times daily (with meals)   cetirizine (ZYRTEC) 10 MG tablet   No No   Sig: Take 1 tablet (10 mg) by mouth daily   chlorthalidone (HYGROTON) 25 MG tablet   No No   Sig: Take 1 tablet (25 mg) by mouth daily   cycloSPORINE modified (GENERIC EQUIVALENT) 25 MG capsule   No No   Sig: TAKE THREE CAPSULES BY MOUTH EVERY MORNING & TAKE TWO CAPSULES BY MOUTH EVERY NIGHT AT BEDTIME   febuxostat (ULORIC) 40 MG TABS tablet   No No   Sig: Take 1 tablet (40 mg) by mouth daily   furosemide (LASIX) 20 MG tablet   No No   Sig: Take 1 tablet (20 mg) by mouth 2 times daily   gabapentin (NEURONTIN) 300 MG capsule   No No   Sig: Take 1 capsule (300 mg) by mouth At Bedtime   ketotifen (ZADITOR) 0.025 % ophthalmic solution   No No   Sig: Place 1 drop into both eyes 2 times daily as needed for  itching   ketotifen fumarate 0.035%, ketotifen 0.025%, (ZADITOR) 0.025 % ophthalmic solution   Yes No   lidocaine (XYLOCAINE) 5 % external ointment   No No   Sig: Apply topically as needed for moderate pain   mineral oil-white petrolatum (EUCERIN/MINERIN) cream   No No   Sig: Apply topically 2 times daily   mycophenolate (GENERIC EQUIVALENT) 250 MG capsule   No No   Sig: TAKE TWO CAPSULES BY MOUTH TWICE A DAY   omeprazole (PRILOSEC) 20 MG DR capsule   No No   Sig: Take 1 capsule (20 mg) by mouth 2 times daily el   order for DME   No No   Sig: Equipment being ordered: Nebulizer with adult mask and tubing   order for DME   No No   Sig: Equipment being ordered:   1) cane  2) compression stockings 15mmHg, 3 pairs  Dx: gait stability, falls, edema   order for DME   No No   Sig: Equipment being ordered: compression stockings bilateral  Please measure and fit patient for stockings   Strength 15-30mmHg  Disp 2 pairs ( 4 socks)  1 refill over the time of 1 year   senna-docusate (SENEXON-S) 8.6-50 MG tablet   No No   Sig: Take 2 tablets by mouth 2 times daily   umeclidinium (INCRUSE ELLIPTA) 62.5 MCG/ACT inhaler   No No   Sig: Inhale 1 puff into the lungs daily      Facility-Administered Medications: None           Physical Exam   Vital Signs: Temp: 98.6  F (37  C) Temp src: Oral BP: 114/75 Pulse: 65   Resp: 18 SpO2: 92 % O2 Device: Nasal cannula    Weight: 0 lbs 0 oz    Constitutional: Awake, alert, cooperative, no apparent distress, and appears stated age  Eyes: Lids and lashes normal, pupils equal, round and reactive to light, extra ocular muscles intact, sclera clear, conjunctiva normal  ENT: Normocephalic, without obvious abnormality, atraumatic, external ears without lesions, oral pharynx with moist mucous membranes, tonsils without erythema or exudates, gums normal and good dentition.  Respiratory: No increased work of breathing, good air exchange, clear to auscultation bilaterally, no crackles or  wheezing  Cardiovascular: Normal apical impulse, regular rate and rhythm, normal S1 and S2, no S3 or S4, and no murmur noted  GI: No scars, normal bowel sounds, soft, non-distended, non-tender, no masses palpated, no hepatosplenomegally. Positive CVA tenderness on right.   Skin: No bruising or bleeding  Musculoskeletal: There is no redness, warmth, or swelling of the joints.   Neurologic: Awake, alert, oriented to name, place and time.  Cranial nerves II-XII are grossly intact.  Right-sided weakness; baseline.     Medical Decision Making   Please see A&P for additional details of medical decision making.      Data   ------------------------- PAST 24 HR DATA REVIEWED -----------------------------------------------    I have personally reviewed the following data over the past 24 hrs:    7.8  \   11.7   / 190     137 100 49.3 (H) /  109 (H)   4.1 23 1.97 (H) \     ALT: N/A AST: N/A AP: N/A TBILI: N/A   ALB: N/A TOT PROTEIN: N/A LIPASE: N/A       Imaging results reviewed over the past 24 hrs:   Recent Results (from the past 24 hour(s))   CT Abdomen Pelvis w/o Contrast   Result Value    Radiologist flags (Urgent)     Concern for cystitis with possible ascending infection    Narrative    EXAMINATION: CT ABDOMEN PELVIS W/O CONTRAST 3/5/2024 2:02 PM    TECHNIQUE: Helical CT images from the lung bases through the pubic  symphysis were obtained without IV contrast.     COMPARISON: 3/4/2024, 5/29/2022    HISTORY: Evaluate for kidney stones, urine cloudy and blood tinged, UA  w/ blood but no nitrites, transplant patient, creatinine pending;  Flank pain    FINDINGS:    Abdomen and pelvis: Wall thickening of the urinary bladder with faint  surrounding fat stranding. There is thickening of the mid to distal  right ureter with substantial surrounding fat stranding. No  nephrolithiasis is visualized. No hydronephrosis. Atrophic kidneys,  right greater than left. Simple renal cysts of the left kidney.  Postsurgical changes of liver  transplant. Nodular appearance of the  transplant liver without distinct lesions in this noncontrast scan.  Multiple splenic artery aneurysms with the largest measuring 17 mm  (series 3 image 115), similar to prior. Unremarkable noncontrast  evaluation of the spleen. No distinct adrenal nodules. Mildly atrophic  pancreas without distinct pancreatic lesions. No distended loops of  bowel. Normal appendix. No mesenteric masses. No free air or fluid. No  suspicious lymphadenopathy. Retroaortic left renal vein, otherwise  unremarkable noncontrast evaluation of the intra-abdominal vessels.    Lung bases: Bibasilar areas of subsegmental atelectasis. Unchanged  prominent distal periesophageal lymph node.    Bones and soft tissues: Subcutaneous calcifications, likely injection  granulomas. Small fat-containing abdominal ventral hernias in the  upper abdomen. Bilateral chronic pars interarticularis defects at L5  with associated grade 1 anterolisthesis of L5 on S1. Otherwise  unremarkable.        Impression    IMPRESSION:   1. Findings suggestive of cystitis. Fat stranding about the right  ureter may represent ascending infection versus sequelae of recently  passed stone.  2. Postsurgical changes of liver transplant with unchanged nodular  contour of the transplant liver.  3. Splenic artery aneurysms are similar to prior 5/29/2022.      [Urgent Result: Concern for cystitis with possible ascending infection  along the right ureter]    Finding was identified on 3/5/2024 2:12 PM.     Dr. Sierra was contacted by Dr. Bennie Quan at 3/5/2024 2:16 PM and  verbalized understanding of the urgent finding.     I have personally reviewed the examination and initial interpretation  and I agree with the findings.    JASWINDER VILLAVICENCIO MD         SYSTEM ID:  [id number]

## 2024-03-06 NOTE — PROGRESS NOTES
Shift 1749-2221:[facility] Liver Transplant 2/2 Hepatitis C on Chronic Immunosuppression. History of CVA with Right Hemiplegia. Admitted for Right Pyelonephritis     Summary:Pt has had 1 day history of Right Flank Pain, difficulty voiding. Pt has Right sided pyelonephritis.Pt admitted for IV antibiotics.  VS:       Pt A/O X 4. Afebrile. VSS. Lungs- Clear bilaterally Pt on 2L 02 per NC  Denies nausea, shortness of breath, and chest pain.  Uses C-Pap at home but the hospital did not have any   Output:       Bowels- + in all four quadrants.Last BM pm 3/5  Voids spontaneously without   difficulty in the toilet.      Activity:       Pt up independently Uses the walker with stand-by assist.     Skin:   No skin issues .     Pain:       Right sided Flank pain that has improved      CMS:       CMS and Neuro's are intact. Denies numbness and tingling in all extremities.      Dressing:     No dressings.      Diet:       Pt is on a regular diet and appetite was good this shift.       LDA:       PIV is patent in the Left forearm .      Equipment:        PCD's on BLE's. Bilateral heels are elevated off the bed.     Plan:       Pt is able to make needs known and the call light is within the pt's reach. Continue to monitor.       Additional Info:        .Only speaks Frisian, understands a little English

## 2024-03-06 NOTE — DISCHARGE INSTRUCTIONS
While you are taking the levofloxacin, DO NOT take any medications that have calcium in it.   This includes:  - Calcium carbonate-Vitamin D  - Maalox  - Multivitamin

## 2024-03-07 DIAGNOSIS — Z94.4 LIVER REPLACED BY TRANSPLANT (H): Primary | ICD-10-CM

## 2024-03-07 LAB — BACTERIA UR CULT: ABNORMAL

## 2024-03-07 RX ORDER — CYCLOSPORINE 25 MG/1
75 CAPSULE, LIQUID FILLED ORAL EVERY 12 HOURS
Qty: 180 CAPSULE | Refills: 11 | Status: SHIPPED | OUTPATIENT
Start: 2024-03-07 | End: 2024-09-24

## 2024-03-07 NOTE — TELEPHONE ENCOUNTER
ISSUE:   Cyclosporine level 35 on 3.6, goal , dose 75 mg AM and 50 mg PM.    PLAN:   Call Patientand confirm this was an accurate 12-hour trough.   Verify Cyclosporine dose 75 mg AM and 50 mg PM.   Confirm no new medications or illness.   Confirm no missed doses.   If accurate trough and accurate dose, increase Cyclosporine dose to 75 mg BID     Is this more than a 50% increase or decrease in current IS dose: Yes  If YES, justification: BELOW GOAL    Repeat labs in 1 weeks.  *If > 50% change in immunosuppression dose, repeat labs in 1 week.   Mary Crawford RN        OUTCOME:   Spoke with Courtney, they confirm accurate trough level and current dose 75 mg am, 50 mg pm  .   Courtney confirmed dose change to 75 mg BID.  Courtney agreed to repeat labs in 1 weeks.   Orders sent to [facility] pharmacy for dose change and lab for repeat labs.   Courtney voiced understanding of plan.     Karmen Medina LPN

## 2024-03-10 LAB — BACTERIA BLD CULT: NO GROWTH

## 2024-03-11 ENCOUNTER — DOCUMENTATION ONLY (OUTPATIENT)
Dept: FAMILY MEDICINE | Facility: CLINIC | Age: 59
End: 2024-03-11
Payer: MEDICARE

## 2024-03-11 NOTE — PROGRESS NOTES
"When opening a documentation only encounter, be sure to enter in \"Chief Complaint\" Forms and in \" Comments\" Title of form, description if needed.    Flor is a 58 year old  female  Form received via: Fax  Form now resides in: Provider Ready    Tony Stewart MA             Form has been completed by provider.     Form sent out via: Fax to [facility] at Fax Number: [phone]  Patient informed: N/A  Output date: March 11, 2024    Tony Stewart MA      **Please close the encounter**   "

## 2024-03-14 ENCOUNTER — LAB (OUTPATIENT)
Dept: LAB | Facility: CLINIC | Age: 59
End: 2024-03-14
Payer: MEDICARE

## 2024-03-14 DIAGNOSIS — Z94.4 LIVER REPLACED BY TRANSPLANT (H): ICD-10-CM

## 2024-03-14 LAB
ALBUMIN SERPL BCG-MCNC: 4 G/DL (ref 3.5–5.2)
ALP SERPL-CCNC: 91 U/L (ref 40–150)
ALT SERPL W P-5'-P-CCNC: 13 U/L (ref 0–50)
ANION GAP SERPL CALCULATED.3IONS-SCNC: 12 MMOL/L (ref 7–15)
AST SERPL W P-5'-P-CCNC: 17 U/L (ref 0–45)
BILIRUB DIRECT SERPL-MCNC: <0.2 MG/DL (ref 0–0.3)
BILIRUB SERPL-MCNC: 0.4 MG/DL
BUN SERPL-MCNC: 33.9 MG/DL (ref 8–23)
CALCIUM SERPL-MCNC: 9.3 MG/DL (ref 8.6–10)
CHLORIDE SERPL-SCNC: 107 MMOL/L (ref 98–107)
CREAT SERPL-MCNC: 1.44 MG/DL (ref 0.51–0.95)
CYCLOSPORINE BLD LC/MS/MS-MCNC: 80 UG/L (ref 50–400)
DEPRECATED HCO3 PLAS-SCNC: 20 MMOL/L (ref 22–29)
EGFRCR SERPLBLD CKD-EPI 2021: 42 ML/MIN/1.73M2
ERYTHROCYTE [DISTWIDTH] IN BLOOD BY AUTOMATED COUNT: 13.1 % (ref 10–15)
GLUCOSE SERPL-MCNC: 130 MG/DL (ref 70–99)
HCT VFR BLD AUTO: 35.9 % (ref 35–47)
HGB BLD-MCNC: 11.8 G/DL (ref 11.7–15.7)
MCH RBC QN AUTO: 29.3 PG (ref 26.5–33)
MCHC RBC AUTO-ENTMCNC: 32.9 G/DL (ref 31.5–36.5)
MCV RBC AUTO: 89 FL (ref 78–100)
PLATELET # BLD AUTO: 219 10E3/UL (ref 150–450)
POTASSIUM SERPL-SCNC: 4.5 MMOL/L (ref 3.4–5.3)
PROT SERPL-MCNC: 7.4 G/DL (ref 6.4–8.3)
RBC # BLD AUTO: 4.03 10E6/UL (ref 3.8–5.2)
SODIUM SERPL-SCNC: 139 MMOL/L (ref 135–145)
TME LAST DOSE: NORMAL H
TME LAST DOSE: NORMAL H
WBC # BLD AUTO: 5.9 10E3/UL (ref 4–11)

## 2024-03-14 PROCEDURE — 80158 DRUG ASSAY CYCLOSPORINE: CPT | Performed by: INTERNAL MEDICINE

## 2024-03-14 PROCEDURE — 80053 COMPREHEN METABOLIC PANEL: CPT | Performed by: PATHOLOGY

## 2024-03-14 PROCEDURE — 99000 SPECIMEN HANDLING OFFICE-LAB: CPT | Performed by: PATHOLOGY

## 2024-03-14 PROCEDURE — 85027 COMPLETE CBC AUTOMATED: CPT | Performed by: PATHOLOGY

## 2024-03-14 PROCEDURE — 82248 BILIRUBIN DIRECT: CPT | Performed by: PATHOLOGY

## 2024-03-14 PROCEDURE — 36415 COLL VENOUS BLD VENIPUNCTURE: CPT | Performed by: PATHOLOGY

## 2024-03-14 NOTE — TELEPHONE ENCOUNTER
Request for medication refill:    Date of last visit at clinic: 11/07/2017    Please complete refill if appropriate and CLOSE ENCOUNTER.    Closing the encounter signifies the refill is complete.    If refill has been denied, please complete the smart phrase .smirefuse and route it to the [facility] RN TRIAGE pool to inform the patient and the pharmacy.    Pau Hines, CMA         results were abnormal, you may need to get another test within a few weeks or months. If the results show changes that could be a sign of cancer, you may need a test called a colposcopy, which provides a more complete view of the cervix.  Sometimes the lab cannot use the sample because it does not contain enough cells or was not preserved well. If so, you may need to have the test again. This is not common, but it does happen from time to time.  When should you call for help?  Watch closely for changes in your health, and be sure to contact your doctor if:    You have vaginal bleeding or pain for more than 2 days after the test. It is normal to have a small amount of bleeding for a day or two after the test.   Where can you learn more?  Go to [email or web address] and sign in to your [employer] account. Enter U972 in the Search Health Information box to learn more about \"Pap Test: Care Instructions.\"     If you do not have an account, please click on the \"Sign Up Now\" link.  Current as of: December 19, 2018  Content Version: 12.0  © 0533-3581 [employer]. Care instructions adapted under license by [employer]. If you have questions about a medical condition or this instruction, always ask your healthcare professional. [employer] disclaims any warranty or liability for your use of this information.

## 2024-03-15 DIAGNOSIS — I12.9 HYPERTENSION, RENAL: ICD-10-CM

## 2024-03-15 DIAGNOSIS — J44.9 CHRONIC OBSTRUCTIVE PULMONARY DISEASE, UNSPECIFIED COPD TYPE (H): ICD-10-CM

## 2024-03-15 RX ORDER — CHLORTHALIDONE 25 MG/1
25 TABLET ORAL DAILY
Qty: 90 TABLET | Refills: 1 | Status: SHIPPED | OUTPATIENT
Start: 2024-03-15 | End: 2024-09-26

## 2024-03-15 RX ORDER — ALBUTEROL SULFATE 90 UG/1
AEROSOL, METERED RESPIRATORY (INHALATION)
Qty: 18 G | Refills: 1 | Status: SHIPPED | OUTPATIENT
Start: 2024-03-15 | End: 2024-05-22

## 2024-03-15 NOTE — TELEPHONE ENCOUNTER
"Request for medication refill:  albuterol (PROAIR HFA/PROVENTIL HFA/VENTOLIN HFA) 108 (90 Base) MCG/ACT inhaler     chlorthalidone (HYGROTON) 25 MG tablet     Providers if patient needs an appointment and you are willing to give a one month supply please refill for one month and  send a letter/MyChart using \".SMILLIMITEDREFILL\" .smillimited and route chart to \"P SMI \" (Giving one month refill in non controlled medications is strongly recommended before denial)    If refill has been denied, meaning absolutely no refills without visit, please complete the smart phrase \".smirxrefuse\" and route it to the \"P SMI MED REFILLS\"  pool to inform the patient and the pharmacy.    Reggie Moore, CMA      "

## 2024-03-18 ENCOUNTER — DOCUMENTATION ONLY (OUTPATIENT)
Dept: FAMILY MEDICINE | Facility: CLINIC | Age: 59
End: 2024-03-18
Payer: MEDICARE

## 2024-03-18 NOTE — PROGRESS NOTES
"When opening a documentation only encounter, be sure to enter in \"Chief Complaint\" Forms and in \" Comments\" Title of form, description if needed.    Flor is a 58 year old  female  Form received via: Fax  Form now resides in: Provider Ready    Tony Stewart MA        Form has been completed by provider.     Form sent out via: Fax to [facility] at Fax Number: [phone]  Patient informed: N/A  Output date: March 20, 2024    Tony Stewart MA      **Please close the encounter**        "

## 2024-03-25 ENCOUNTER — ANCILLARY PROCEDURE (OUTPATIENT)
Dept: ULTRASOUND IMAGING | Facility: CLINIC | Age: 59
End: 2024-03-25
Attending: INTERNAL MEDICINE
Payer: MEDICARE

## 2024-03-25 ENCOUNTER — OFFICE VISIT (OUTPATIENT)
Dept: GASTROENTEROLOGY | Facility: CLINIC | Age: 59
End: 2024-03-25
Attending: INTERNAL MEDICINE
Payer: MEDICARE

## 2024-03-25 VITALS
SYSTOLIC BLOOD PRESSURE: 131 MMHG | WEIGHT: 228 LBS | HEIGHT: 60 IN | HEART RATE: 68 BPM | DIASTOLIC BLOOD PRESSURE: 79 MMHG | BODY MASS INDEX: 44.76 KG/M2

## 2024-03-25 DIAGNOSIS — Z94.4 LIVER REPLACED BY TRANSPLANT (H): ICD-10-CM

## 2024-03-25 DIAGNOSIS — M79.662 PAIN OF LEFT LOWER LEG: ICD-10-CM

## 2024-03-25 DIAGNOSIS — M79.662 PAIN OF LEFT LOWER LEG: Primary | ICD-10-CM

## 2024-03-25 PROCEDURE — 99215 OFFICE O/P EST HI 40 MIN: CPT | Performed by: INTERNAL MEDICINE

## 2024-03-25 PROCEDURE — G0463 HOSPITAL OUTPT CLINIC VISIT: HCPCS | Performed by: INTERNAL MEDICINE

## 2024-03-25 PROCEDURE — 93971 EXTREMITY STUDY: CPT | Mod: LT | Performed by: RADIOLOGY

## 2024-03-25 ASSESSMENT — PAIN SCALES - GENERAL: PAINLEVEL: NO PAIN (0)

## 2024-03-25 NOTE — LETTER
3/25/2024         RE: Flor Navarro  [address]  [facility] [address]        Dear Colleague,    Thank you for referring your patient, Flor Navarro, to the [facility] [place]. Please see a copy of my visit note below.    Solid Organ Transplant Hepatology Follow-Up Visit:     HISTORY OF PRESENT ILLNESS:   I had the pleasure of seeing Flor Navarro for followup in the Liver Clinic at the [facility] on March 25, 2024. Ms. Navarro returns for followup now 13 and 1/2 years status post liver transplantation for cirrhosis caused by chronic hepatitis C.     For the most part, she is doing well.  She was recently hospitalized here with an episode of pyelonephritis.  She did have a CT scan which showed no evidence of stone disease.  She now reports no urinary symptoms, fevers or chills and she notes her urine is clear.      She denies any abdominal pain, itching or skin rash.  She does have fatigue.  She denies any increased abdominal girth or lower extremity edema.  She has not had any gastrointestinal bleeding.     She denies any fevers or chills, cough or shortness of breath.  She denies any nausea or vomiting.  She is having some constipation, for which she is taking a liquid prescribed by her primary physician.  Her appetite is good, and her weight is stable and too high.  Her current BMI is 44.  She reports being on a diet but unable to lose weight     She is complaining of some pain in her left leg, which I had noted 2-1/2 years ago.  She insists that this is new over the past 4 weeks.     Medications:   Current Outpatient Medications   Medication     ACE/ARB/ARNI NOT PRESCRIBED (INTENTIONAL)     albuterol (ACCUNEB) 0.63 MG/3ML neb solution     alendronate (FOSAMAX) 70 MG tablet     alum & mag hydroxide-simethicone (MAALOX ADVANCED MAX ST) 400-400-40 MG/5ML SUSP suspension     amLODIPine (NORVASC) 10 MG tablet     atorvastatin (LIPITOR) 40 MG tablet      calcitRIOL (ROCALTROL) 0.25 MCG capsule     calcium carbonate-vitamin D (CALTRATE) 600-10 MG-MCG per tablet     Calcium Carbonate-Vitamin D 600-10 MG-MCG TABS     capsaicin-menthol-methyl sal 0.025-1-12 % external cream     carvedilol (COREG) 12.5 MG tablet     cetirizine (ZYRTEC) 10 MG tablet     chlorthalidone (HYGROTON) 25 MG tablet     cycloSPORINE modified (GENERIC EQUIVALENT) 25 MG capsule     Denture Care Products (EFFERDENT DENTURE CLEANSER) TBEF     febuxostat (ULORIC) 40 MG TABS tablet     furosemide (LASIX) 20 MG tablet     gabapentin (NEURONTIN) 300 MG capsule     ketotifen (ZADITOR) 0.025 % ophthalmic solution     ketotifen fumarate 0.035%, ketotifen 0.025%, (ZADITOR) 0.025 % ophthalmic solution     levofloxacin (LEVAQUIN) 250 MG tablet     lidocaine (XYLOCAINE) 5 % external ointment     mineral oil-white petrolatum (EUCERIN/MINERIN) cream     Multiple Vitamin (DAILY-SUMA MULTIVITAMIN) TABS     mycophenolate (GENERIC EQUIVALENT) 250 MG capsule     omeprazole (PRILOSEC) 20 MG DR capsule     order for DME     order for DME     order for DME     senna-docusate (SENEXON-S) 8.6-50 MG tablet     SM FIBER LAXATIVE 500 MG TABS tablet     STATIN NOT PRESCRIBED, INTENTIONAL,     umeclidinium (INCRUSE ELLIPTA) 62.5 MCG/ACT inhaler     VENTOLIN  (90 Base) MCG/ACT inhaler     VITAMIN D3 25 MCG (1000 UT) tablet     No current facility-administered medications for this visit.      Vitals:   LMP  (LMP Unknown)     Physical Exam:   In general she looks quite well. HEENT exam shows no scleral icterus or temporal muscle wasting. Chest is clear. Abdominal exam shows no increase in girth. No masses or tenderness to palpation are present. Liver is 10 cm in span without left lobe enlargement. No spleen tip is palpable.  Her incision is intact.  Extremity exam shows no edema. Skin exam shows no suspicious lesions and neurologic exam is nonfocal.     Labs:   Lab Results   Component Value Date     03/14/2024     POTASSIUM 4.5 03/14/2024    CHLORIDE 107 03/14/2024    ANIONGAP 12 03/14/2024    CO2 20 (L) 03/14/2024    BUN 33.9 (H) 03/14/2024    CR 1.44 (H) 03/14/2024    GFRESTIMATED 42 (L) 03/14/2024    JUAN 9.3 03/14/2024      Lab Results   Component Value Date    WBC 5.9 03/14/2024    HGB 11.8 03/14/2024    HCT 35.9 03/14/2024    MCV 89 03/14/2024    MCH 29.3 03/14/2024    MCHC 32.9 03/14/2024    RDW 13.1 03/14/2024     03/14/2024     Lab Results   Component Value Date    ALBUMIN 4.0 03/14/2024    ALKPHOS 91 03/14/2024    AST 17 03/14/2024    BILICONJ 0.0 07/07/2014     Lab Results   Component Value Date    INR 0.89 02/25/2023     Recent Labs   Lab Test 03/14/24  1104 03/04/24  1428 02/19/24  1502 11/21/23  1440 10/16/23  1548 02/25/23  0815 02/23/23  2235 02/23/23  1242 12/16/22  1322 08/14/22  1029   ALKPHOS 91 93 94 96 92 76 83 85 78 73   ALT 13 20 19 16 21 9* 20 21 16 17   AST 17 18 18 18 26 18 21 24 22 27      Imaging:   No images are attached to the encounter.     Assessment/Plan:   IMPRESSION:   My impression is that Ms. Navarro is more than 13 and 1/2 years status post liver transplantation for cirrhosis caused by chronic hepatitis C.   Her liver tests are completely normal.  Her kidney function is a bit off but her creatinine is not high she also does have some hyperlipidemia, which is a mixed pattern, almost certainly related to her weight.  I strongly encouraged her to work on losing her weight.     She is up-to-date with regard to all vaccines.  I will not be making any other change to her medical regimen.  In terms of working up her left leg pain, I will get an ultrasound to rule out a DVT.    I did spend a total of 40 minutes (on the date of the encounter), including 30 minutes of face-to-face clinic time including counseling. The rest of the time was spent in documentation and review of records.     Thank you very much for allowing me to participate in the care of this patient.  If you have any questions  regarding my recommendations, please do not hesitate to contact me.         Laron Anne MD      Professor of Medicine  [employer] [facility] [employer] [facility] [employer]      Executive Medical Director, Solid Organ Transplant Program  [facility]

## 2024-03-25 NOTE — NURSING NOTE
Chief Complaint   Patient presents with    Follow Up     Post Txp-Liver       /79   Pulse 68   Ht 1.524 m (5')   Wt 103.4 kg (228 lb)   LMP  (LMP Unknown)   BMI 44.53 kg/m    Maddie Matthew MA on 3/25/2024 at 2:55 PM

## 2024-03-25 NOTE — PROGRESS NOTES
Solid Organ Transplant Hepatology Follow-Up Visit:     HISTORY OF PRESENT ILLNESS:   I had the pleasure of seeing Flor Navarro for followup in the Liver Clinic at the [facility] on March 25, 2024. Ms. Navarro returns for followup now 13 and 1/2 years status post liver transplantation for cirrhosis caused by chronic hepatitis C.     For the most part, she is doing well.  She was recently hospitalized here with an episode of pyelonephritis.  She did have a CT scan which showed no evidence of stone disease.  She now reports no urinary symptoms, fevers or chills and she notes her urine is clear.      She denies any abdominal pain, itching or skin rash.  She does have fatigue.  She denies any increased abdominal girth or lower extremity edema.  She has not had any gastrointestinal bleeding.     She denies any fevers or chills, cough or shortness of breath.  She denies any nausea or vomiting.  She is having some constipation, for which she is taking a liquid prescribed by her primary physician.  Her appetite is good, and her weight is stable and too high.  Her current BMI is 44.  She reports being on a diet but unable to lose weight     She is complaining of some pain in her left leg, which I had noted 2-1/2 years ago.  She insists that this is new over the past 4 weeks.     Medications:   Current Outpatient Medications   Medication    ACE/ARB/ARNI NOT PRESCRIBED (INTENTIONAL)    albuterol (ACCUNEB) 0.63 MG/3ML neb solution    alendronate (FOSAMAX) 70 MG tablet    alum & mag hydroxide-simethicone (MAALOX ADVANCED MAX ST) 400-400-40 MG/5ML SUSP suspension    amLODIPine (NORVASC) 10 MG tablet    atorvastatin (LIPITOR) 40 MG tablet    calcitRIOL (ROCALTROL) 0.25 MCG capsule    calcium carbonate-vitamin D (CALTRATE) 600-10 MG-MCG per tablet    Calcium Carbonate-Vitamin D 600-10 MG-MCG TABS    capsaicin-menthol-methyl sal 0.025-1-12 % external cream    carvedilol (COREG) 12.5 MG tablet    cetirizine  (ZYRTEC) 10 MG tablet    chlorthalidone (HYGROTON) 25 MG tablet    cycloSPORINE modified (GENERIC EQUIVALENT) 25 MG capsule    Denture Care Products (EFFERDENT DENTURE CLEANSER) TBEF    febuxostat (ULORIC) 40 MG TABS tablet    furosemide (LASIX) 20 MG tablet    gabapentin (NEURONTIN) 300 MG capsule    ketotifen (ZADITOR) 0.025 % ophthalmic solution    ketotifen fumarate 0.035%, ketotifen 0.025%, (ZADITOR) 0.025 % ophthalmic solution    levofloxacin (LEVAQUIN) 250 MG tablet    lidocaine (XYLOCAINE) 5 % external ointment    mineral oil-white petrolatum (EUCERIN/MINERIN) cream    Multiple Vitamin (DAILY-SUMA MULTIVITAMIN) TABS    mycophenolate (GENERIC EQUIVALENT) 250 MG capsule    omeprazole (PRILOSEC) 20 MG DR capsule    order for DME    order for DME    order for DME    senna-docusate (SENEXON-S) 8.6-50 MG tablet    SM FIBER LAXATIVE 500 MG TABS tablet    STATIN NOT PRESCRIBED, INTENTIONAL,    umeclidinium (INCRUSE ELLIPTA) 62.5 MCG/ACT inhaler    VENTOLIN  (90 Base) MCG/ACT inhaler    VITAMIN D3 25 MCG (1000 UT) tablet     No current facility-administered medications for this visit.      Vitals:   LMP  (LMP Unknown)     Physical Exam:   In general she looks quite well. HEENT exam shows no scleral icterus or temporal muscle wasting. Chest is clear. Abdominal exam shows no increase in girth. No masses or tenderness to palpation are present. Liver is 10 cm in span without left lobe enlargement. No spleen tip is palpable.  Her incision is intact.  Extremity exam shows no edema. Skin exam shows no suspicious lesions and neurologic exam is nonfocal.     Labs:   Lab Results   Component Value Date     03/14/2024    POTASSIUM 4.5 03/14/2024    CHLORIDE 107 03/14/2024    ANIONGAP 12 03/14/2024    CO2 20 (L) 03/14/2024    BUN 33.9 (H) 03/14/2024    CR 1.44 (H) 03/14/2024    GFRESTIMATED 42 (L) 03/14/2024    JUAN 9.3 03/14/2024      Lab Results   Component Value Date    WBC 5.9 03/14/2024    HGB 11.8 03/14/2024     HCT 35.9 03/14/2024    MCV 89 03/14/2024    MCH 29.3 03/14/2024    MCHC 32.9 03/14/2024    RDW 13.1 03/14/2024     03/14/2024     Lab Results   Component Value Date    ALBUMIN 4.0 03/14/2024    ALKPHOS 91 03/14/2024    AST 17 03/14/2024    BILICONJ 0.0 07/07/2014     Lab Results   Component Value Date    INR 0.89 02/25/2023     Recent Labs   Lab Test 03/14/24  1104 03/04/24  1428 02/19/24  1502 11/21/23  1440 10/16/23  1548 02/25/23  0815 02/23/23  2235 02/23/23  1242 12/16/22  1322 08/14/22  1029   ALKPHOS 91 93 94 96 92 76 83 85 78 73   ALT 13 20 19 16 21 9* 20 21 16 17   AST 17 18 18 18 26 18 21 24 22 27      Imaging:   No images are attached to the encounter.     Assessment/Plan:   IMPRESSION:   My impression is that Ms. Navarro is more than 13 and 1/2 years status post liver transplantation for cirrhosis caused by chronic hepatitis C.   Her liver tests are completely normal.  Her kidney function is a bit off but her creatinine is not high she also does have some hyperlipidemia, which is a mixed pattern, almost certainly related to her weight.  I strongly encouraged her to work on losing her weight.     She is up-to-date with regard to all vaccines.  I will not be making any other change to her medical regimen.  In terms of working up her left leg pain, I will get an ultrasound to rule out a DVT.    I did spend a total of 40 minutes (on the date of the encounter), including 30 minutes of face-to-face clinic time including counseling. The rest of the time was spent in documentation and review of records.     Thank you very much for allowing me to participate in the care of this patient.  If you have any questions regarding my recommendations, please do not hesitate to contact me.         Laron Anne MD      Professor of Medicine  [employer]      Executive Medical Director, Solid Organ Transplant Program  [facility]

## 2024-03-26 NOTE — LETTER
November 7, 2017      Flor Navarro  [address] [address]    Re: travel difficulties for patient    To whom it may concern:    It has been increasingly difficult for patient to travel long distances and since she is immunocompromised due to liver transplant, her family would need to travel to the [place] to visit her.     Sincerely,    Karen Downs MD      
November 7, 2017      Flor Navarro  [address] [address]        To whom it may concern:    It is imperative for patient to travel with all her medications. She has had a liver transplant and if she is without her medications this could result in dire results, including death. Please allow her to travel with all her medications she has brought with her today.       Sincerely,    Karen Downs MD      
Detail Level: Detailed

## 2024-03-27 ENCOUNTER — OFFICE VISIT (OUTPATIENT)
Dept: FAMILY MEDICINE | Facility: CLINIC | Age: 59
End: 2024-03-27
Payer: MEDICARE

## 2024-03-27 VITALS
BODY MASS INDEX: 45.37 KG/M2 | DIASTOLIC BLOOD PRESSURE: 80 MMHG | TEMPERATURE: 97.6 F | HEART RATE: 66 BPM | RESPIRATION RATE: 22 BRPM | OXYGEN SATURATION: 95 % | HEIGHT: 60 IN | SYSTOLIC BLOOD PRESSURE: 134 MMHG | WEIGHT: 231.1 LBS

## 2024-03-27 DIAGNOSIS — K12.0 APHTHOUS ULCER: ICD-10-CM

## 2024-03-27 DIAGNOSIS — G63 NEUROPATHY DUE TO MEDICAL CONDITION (H): Primary | ICD-10-CM

## 2024-03-27 DIAGNOSIS — D32.9 MENINGIOMA (H): ICD-10-CM

## 2024-03-27 DIAGNOSIS — Z12.4 CERVICAL CANCER SCREENING: ICD-10-CM

## 2024-03-27 DIAGNOSIS — M81.8 OTHER OSTEOPOROSIS WITHOUT CURRENT PATHOLOGICAL FRACTURE: ICD-10-CM

## 2024-03-27 DIAGNOSIS — H65.191 ACUTE MEE (MIDDLE EAR EFFUSION), RIGHT: ICD-10-CM

## 2024-03-27 PROCEDURE — 99215 OFFICE O/P EST HI 40 MIN: CPT | Performed by: FAMILY MEDICINE

## 2024-03-27 RX ORDER — ESTRADIOL 0.1 MG/G
2 CREAM VAGINAL DAILY
Qty: 42.5 G | Refills: 0 | Status: SHIPPED | OUTPATIENT
Start: 2024-03-27 | End: 2024-04-10

## 2024-03-27 RX ORDER — ALENDRONATE SODIUM 70 MG/1
70 TABLET ORAL
Qty: 13 TABLET | Refills: 3 | Status: SHIPPED | OUTPATIENT
Start: 2024-03-27

## 2024-03-27 RX ORDER — GABAPENTIN 300 MG/1
300 CAPSULE ORAL AT BEDTIME
Qty: 90 CAPSULE | Refills: 3 | Status: SHIPPED | OUTPATIENT
Start: 2024-03-27 | End: 2025-03-27

## 2024-03-27 RX ORDER — GABAPENTIN 100 MG/1
CAPSULE ORAL
Qty: 180 CAPSULE | Refills: 3 | Status: SHIPPED | OUTPATIENT
Start: 2024-03-27

## 2024-03-27 NOTE — PATIENT INSTRUCTIONS
Gabapentin prescribed. Take one 100 mg tablet in the morning, one 100 mg tablet in the afternoon, and one 300 mg tablet in the evening.   Restart Fosamax and sit up for 30 minutes after taking it.   Continue to monitor the right ear. If you notice a change with your hearing, or the pain gets worse, please come back to see me.   Dr. Anne abel on [facility]  [facility] of the tongue lesion as often as possible or when you bite on your tongue. This way, we can compare the bite with the tongue lesion.   Cervical pap smear next visit. I'll give you an Estrogen cream that you'll need to apply topically in the genital area for two weeks before the next visit in four weeks.

## 2024-03-27 NOTE — PROGRESS NOTES
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 Bothered during the day. Requests medication for AM and Noon. Will add 100 mg gabapentin AM and noon, in addition to existing 300 mg at night. Warned of potential side effect of sedation.   - gabapentin (NEURONTIN) 300 MG capsule  Dispense: 90 capsule; Refill: 3  - gabapentin (NEURONTIN) 100 MG capsule  Dispense: 180 capsule; Refill: 3    Meningioma (H)  Stable per Mireille Pastor of Neurosurgery. Last MRI 6/2023.  Follow up with nsg in 3-5 years with repeat MRI prior to appointment,  Due 0223-2677.         Other osteoporosis without current pathological fracture  Has run out of Fosamax so she hasn't been taking it. Refilled with reminded to sit upright for 30 minutes after taking. Is adherent to calcium and vitamin D which she is getting OTC. Last DEXA was in 2019. Would recommend that be repeated in the next year.   - alendronate (FOSAMAX) 70 MG tablet  Dispense: 13 tablet; Refill: 3    Acute BARRON (middle ear effusion), right  Unclear etiology, given lack of other symptoms. Discussed natural history, will reassess for resolution in four weeks.     Aphthous ulcer  She reports she frequently bites her tongue and suspects this is a result of frequent ulceration. It would be helpful to have pictures at different stages and different times.     Cervical cancer screening  Due for pap and HPV testing. She's been postmenopausal nearly 20 years, so we discussed pre-treatment with topical estradiol for two weeks. Last pap NIL, HPV neg.   - estradiol (ESTRACE) 0.1 MG/GM vaginal cream  Dispense: 42.5 g; Refill: 0        43 minutes spent by me on the date of the encounter doing chart review, history and exam, documentation and further activities per the note      No follow-ups on file.    Subjective   Flor is a 58 year old, presenting for the following health issues:  Flor is accompanied by her son, Courtney.    Pain in  the right jaw and ear  She reports she bites her tongue about 3-4 times a day so her right jaw hurts a lot. Her right ear also hurts inside. She denies it itches. She is looking for an ear drop or a shot to help.     Legs  Flor wears compression stockings for edema. She also has had severe left leg pain for several years.   She reports she had severe pain in left lower extremity that she thought was like a stroke (record review shows r/o blood clot with her transplant visit). She also reports of a burning pain in the left foot, she reports it's been the same type of pain since initial treatment for neuropathy    Urine  Flor reports her urine is really yellow and would like an antibiotic for it. She reports of pain in her back, but denies burning pain when urinating.           Follow Up and Pain (Right ear pain )        3/27/2024     2:00 PM   Additional Questions   Roomed by Hugh   Accompanied by Son         3/27/2024    Information    services provided? No     HPI       Review of Systems  Constitutional, HEENT, cardiovascular, pulmonary, gi and gu systems are negative, except as otherwise noted.      Objective    /80   Pulse 66   Temp 97.6  F (36.4  C) (Oral)   Resp 22   Ht 1.524 m (5')   Wt 104.8 kg (231 lb 1.6 oz)   LMP  (LMP Unknown)   SpO2 95%   BMI 45.13 kg/m    Body mass index is 45.13 kg/m .  Physical Exam   GENERAL: alert and no distress  HENT: No pain with movement of right ear. Fluid behind the ear drum. Right nostril with narrowed airway and mucosal edema. Right lateral tongue with circular ulcer. No other lesions. Clear, posterior oropharynx. No exudate.  NECK: no lympanopathy. Bilateral trapezius spasm, worse on right.   PSYCH: mentation appears normal, affect normal/bright          This document serves as a record of the services and decisions personally performed and made by Karely Sierra MD. It was created on his/her behalf by Heaven García, a trained medical scribe.  The creation of this document is based the provider's statements to the medical scribe.  Ameliaibscott García 2:05 PM, March 27, 2024    Signed Electronically by: Karely Sierra MD

## 2024-03-27 NOTE — PROGRESS NOTES
Stable, We discussed the stability of this right frontal meningioma over at least the past 10 years. She can follow up with us in 3-5 years with repeat MRI prior to appointment, and was encouraged to reach out with new questions or concerns in the meantime.       Due 2026  Mireille Pastor PA-C  Department of Neurosurgery  [facility]  [facility]

## 2024-03-29 ENCOUNTER — TELEPHONE (OUTPATIENT)
Dept: FAMILY MEDICINE | Facility: CLINIC | Age: 59
End: 2024-03-29
Payer: MEDICARE

## 2024-03-29 NOTE — TELEPHONE ENCOUNTER
[facility]  Fax:[phone]  Spec: [phone]  MO: [phone]

## 2024-03-30 ENCOUNTER — HEALTH MAINTENANCE LETTER (OUTPATIENT)
Age: 59
End: 2024-03-30

## 2024-04-11 ENCOUNTER — TELEPHONE (OUTPATIENT)
Dept: FAMILY MEDICINE | Facility: CLINIC | Age: 59
End: 2024-04-11
Payer: MEDICARE

## 2024-04-11 NOTE — TELEPHONE ENCOUNTER
[facility]  Fax:[phone]  Spec: [phone]  MO: [phone]

## 2024-04-11 NOTE — TELEPHONE ENCOUNTER
Prior Authorization Not Needed per Insurance    Medication: ALENDRONATE SODIUM 70 MG PO TABS  Insurance Company: [employer] Part D - Phone [phone] Fax [phone]  Expected CoPay: $    Pharmacy Filling the Rx: [facility] - [place], [place] - [address]  Pharmacy Notified: y  Patient Notified: y - pharmacy to notify    Plan limits to 12/84 days. Pharmacy received paid claim. PA not needed at this time.

## 2024-04-12 ENCOUNTER — DOCUMENTATION ONLY (OUTPATIENT)
Dept: FAMILY MEDICINE | Facility: CLINIC | Age: 59
End: 2024-04-12
Payer: MEDICARE

## 2024-04-12 NOTE — CONFIDENTIAL NOTE
"When opening a documentation only encounter, be sure to enter in \"Chief Complaint\" Forms and in \" Comments\" Title of form, description if needed.    Flor is a 58 year old  female  Form received via: Fax  Form now resides in: Provider Edward Langley CMA        Form has been completed by provider.     Form sent out via: Fax to [facility] at Fax Number: [phone]  Patient informed: N/A  Output date: April 22, 2024    Tony Stewart MA      **Please close the encounter**               "

## 2024-04-25 NOTE — TELEPHONE ENCOUNTER
Prior Authorization Approval    Medication: LIDOCAINE 5 % EX OINT  Authorization Effective Date: 1/1/2024  Authorization Expiration Date: 7/24/2024  Approved Dose/Quantity: 35.44/30  Reference #: [id number]   Insurance Company: [employer] Part D - Phone [phone] Fax [phone]  Expected CoPay: $    CoPay Card Available:      Financial Assistance Needed:   Which Pharmacy is filling the prescription: [facility] - [place] [address]  Pharmacy Notified: Yes  Patient Notified: Yes

## 2024-04-25 NOTE — TELEPHONE ENCOUNTER
PA Initiation    Medication: LIDOCAINE 5 % EX OINT  Insurance Company: [employer] Part D - Phone [phone] Fax [phone]  Pharmacy Filling the Rx: [facility] - [place], [place] - [facility] [address]  Filling Pharmacy Phone:    Filling Pharmacy Fax:    Start Date: 4/25/2024

## 2024-05-16 ENCOUNTER — DOCUMENTATION ONLY (OUTPATIENT)
Dept: FAMILY MEDICINE | Facility: CLINIC | Age: 59
End: 2024-05-16
Payer: MEDICARE

## 2024-05-16 NOTE — PROGRESS NOTES
"When opening a documentation only encounter, be sure to enter in \"Chief Complaint\" Forms and in \" Comments\" Title of form, description if needed.    Flor is a 58 year old  female  Form received via: Fax  Form now resides in: Provider Ready    Tony Stewart MA             Form has been completed by provider.     Form sent out via: Fax to [facility] at Fax Number: [phone]  Patient informed: N/A  Output date: Ekrri 3, 2024    Tony Stewart MA      **Please close the encounter**   "

## 2024-05-20 DIAGNOSIS — J44.9 CHRONIC OBSTRUCTIVE PULMONARY DISEASE, UNSPECIFIED COPD TYPE (H): ICD-10-CM

## 2024-05-20 NOTE — TELEPHONE ENCOUNTER
"Request for medication refill: VENTOLIN  (90 Base) MCG/ACT inhaler     Providers if patient needs an appointment and you are willing to give a one month supply please refill for one month and  send a letter/MyChart using \".SMILLIMITEDREFILL\" .smillimited and route chart to \"P SMI \" (Giving one month refill in non controlled medications is strongly recommended before denial)    If refill has been denied, meaning absolutely no refills without visit, please complete the smart phrase \".smirxrefuse\" and route it to the \"P SMI MED REFILLS\"  pool to inform the patient and the pharmacy.    Linda Langley, CMA    "

## 2024-05-21 ENCOUNTER — TELEPHONE (OUTPATIENT)
Dept: NEPHROLOGY | Facility: CLINIC | Age: 59
End: 2024-05-21
Payer: MEDICARE

## 2024-05-21 NOTE — TELEPHONE ENCOUNTER
Sent [employer] (1st Attempt) for the patient to schedule:    Appointment type: Return Nephrology  Provider: Any general nephrology provider  Return date: Approx. 11/21/24  Phone number: [phone]  Add'l appt(s) needed: Lab 1-hour prior to clinic visit (or 1-week if video visit)  Notes: Former patient of Dr. Arambula; unable to leave message on phone.

## 2024-05-22 RX ORDER — ALBUTEROL SULFATE 90 UG/1
AEROSOL, METERED RESPIRATORY (INHALATION)
Qty: 18 G | Refills: 1 | Status: SHIPPED | OUTPATIENT
Start: 2024-05-22 | End: 2024-10-07

## 2024-05-23 NOTE — PLAN OF CARE
"  Neuro/Musculoskeletal:  A&Ox4. Macedonian speaking.  Cardiac:  SR.  VSS.   Respiratory:  Sating in the 90s on O2 at 3-4 LPM. On BiPaP at HS  GI/:  Adequate urine output. LBM: 3/1  Diet/Appetite:  Tolerating regular diet. Good appetite.  Activity:  SB assist x1 up to toilet.  Pain:  Denies.   Skin:  No new deficits noted. Multiple scattered bruises.  LDAs + Drips/IVF:  PIV L on saline lock  Protocols/Labs:     Pertinent Shift Updates:  Initially refusing to wear Bipap, with few dips to low 80s when sleeping, patient agreed to wear BiPaP    Plan: Continue POC, Notify provider for changes.        Problem: Plan of Care - These are the overarching goals to be used throughout the patient stay.    Goal: Plan of Care Review  Description: The Plan of Care Review/Shift note should be completed every shift.  The Outcome Evaluation is a brief statement about your assessment that the patient is improving, declining, or no change.  This information will be displayed automatically on your shift note.  Outcome: Progressing  Flowsheets (Taken 3/2/2023 0127)  Overall Patient Progress: improving  Goal: Patient-Specific Goal (Individualized)  Description: You can add care plan individualizations to a care plan. Examples of Individualization might be:  \"Parent requests to be called daily at 9am for status\", \"I have a hard time hearing out of my right ear\", or \"Do not touch me to wake me up as it startles me\".  Outcome: Progressing  Goal: Absence of Hospital-Acquired Illness or Injury  Outcome: Progressing  Intervention: Identify and Manage Fall Risk  Recent Flowsheet Documentation  Taken 3/2/2023 0000 by Lord Rodolfo Blanton, RN  Safety Promotion/Fall Prevention: activity supervised  Taken 3/1/2023 2000 by Lord Rodolfo Blanton, RN  Safety Promotion/Fall Prevention: activity supervised  Intervention: Prevent Skin Injury  Recent Flowsheet Documentation  Taken 3/2/2023 0000 by Lord Rodolfo Blanton, RN  Body Position: position changed " PRE-OPERATIVE NOTE   Chief Complaint Pre-Op Exam.  Surgery Date: 06/13/2024  Planned Surgery: LEFT SMALL FINGER FLEXOR TENOLYSIS  Type of Anesthesia: General  Pre-op Diagnosis: Finger laceration involving tendon, subsequent encounter [S61.219D]; Stiffness of finger joint of left hand [M25.642]  Requesting Surgeon: Hussein Sr MD    Subjective   BRIEF HISTORY LEADING to SURGERY: Kings Vincent is a 35 year old male here for pre-operative anesthesia consult for surgery as requested by the surgeon.     He has had continued restrictions of his range of motion of his finger since his injury last year, so surgery is now planned.    He is active, running 2 miles regularly.         Review of Systems  A complete review of systems was performed and negative except as documented above.    Objective   PHYSICAL EXAM  Vitals:    05/23/24 0935 05/23/24 0944   BP: (!) 118/90 118/70   Pulse: 64    SpO2: 97%    Weight: 81.6 kg (180 lb)      Physical Exam  General: Alert  HEENT:  Sclerae noninjected.  Moist mucous membranes, oropharynx is clear.  Nasal mucosa is within normal. Tympanic membranes and canals within normal.  Neck: No  cervical or supraclavicular lymphadenopathy. No thyromegaly.  Cardiovascular:  Regular rate and rhythm, normal S1, S2,  no S3, S4, or murmur.  No jugular venous distension.  Respiratory:  Clear to auscultation bilaterally, no wheeze.  Abdomen: Normoactive bowel sounds in all 4 quadrants. Soft, nontender, nondistended. No hepatosplenomegaly.  Extremities:  No peripheral edema.  2 plus pedal pulses bilaterally.  Psych:  Appropriate affect.  Neurologic:  cranial nerves 2-12 intact.  Sensation is intact to light touch throughout.  Strength is 5/5 throughout. Reflexes are 2 plus. and symmetric throughout          RESULTS REVIEW        Most Recent  Na+  141 (7/26/2023)  K+  4.1 (7/26/2023)   Glucose  91 (7/26/2023)  GFR  >90 (7/26/2023)             Medications, allergies, past medical, surgical, social and  family histories were reviewed and updated as appropriate, see encounter summary. Pertinent surgical risks and history are below.  SURGICAL RISK AND SCREENINGS     Family History Risks  Adverse Reaction to Anesthesia: No  Bleeding or Blood Disorder: No  Malignant Hyperthermia: No    Personal Surgical History  Anesthetic Complications: No  Bleeding Problems: No  Problems with Surgery: No      Malnutrition Screening Tool Current weight 180 pounds  Have you recently lost weight without trying? No  Have you been eating poorly because of a decreased appetite? No  Score = 0, Not at Risk       Functional Capacity  Are you able to do light housework, dusting, wash dishes, climb a flight of stairs or walk up a hill without chest pain or problems breathing (>4 METS)? Yes    No, Revised Cardiac Risk Calculator is not Indicated given age <45 and lack of clinical risk factors    Acute Kidney Injury Prevention:  Patient has no known SVETLANA risk factors.    Advance care planning documents on file - no     ASSESSMENT AND PLAN        1. Preoperative clearance  2. Finger laceration involving tendon, subsequent encounter  3. Stiffness of finger joint of left hand    Preop Optimization  - OPTIMIZED for SURGERY: YES patient is medically optimized for surgery.  - Workup reviewed and/or ordered: no additional workup needed  - Pre-Op management of pacemaker, dialysis or steroids: not needed.  - Post-Op DVT prophylaxis: per surgical team  - Smoking Status: Never Smoker  .     Pertinent Conditions  None    Medications to Hold   Medications and Supplements:  - Morning of surgery hold any Vitamins AND for 2 weeks prior hold any Vitamin E or Herbals     Anticoagulation:none on med list    Antidiabetic:none on med list    SVETLANA Risk (Diuretics, ACE-I/ARB, NSAIDs):none on med list      Continue all other medications unless an alternative plan was advised with the surgeon and/or specialist.      Follow Up  Return in about 1 year (around 5/23/2025) for  independently  Taken 3/1/2023 2000 by Lord Rodolfo Blanton, RN  Body Position: position changed independently  Intervention: Prevent and Manage VTE (Venous Thromboembolism) Risk  Recent Flowsheet Documentation  Taken 3/2/2023 0000 by Lord Rodolfo Blanton, RN  VTE Prevention/Management: SCDs (sequential compression devices) off  Taken 3/1/2023 2000 by Lord Rodolfo Blanton, RN  VTE Prevention/Management: SCDs (sequential compression devices) off  Goal: Optimal Comfort and Wellbeing  Outcome: Progressing  Intervention: Monitor Pain and Promote Comfort  Recent Flowsheet Documentation  Taken 3/2/2023 0000 by Lord Rodolfo Blanton, RN  Pain Management Interventions:    rest    care clustered  Taken 3/1/2023 2000 by Lord Rodolfo Blanton, RN  Pain Management Interventions:    rest    care clustered  Intervention: Provide Person-Centered Care  Recent Flowsheet Documentation  Taken 3/2/2023 0000 by Lord Rodolfo Blanton, RN  Trust Relationship/Rapport:    care explained    choices provided  Taken 3/1/2023 2000 by Lord Rodolfo Blanton RN  Trust Relationship/Rapport:    care explained    choices provided  Goal: Readiness for Transition of Care  Outcome: Progressing       Goal Outcome Evaluation:           Overall Patient Progress: improving            physical.    Hussein Sr MD  Copy sent to: Hussein Sr MD

## 2024-06-03 NOTE — TELEPHONE ENCOUNTER
Patient's son confirmed scheduled appointment:     Date:   Time: 2:30 PM  Visit type: Return nephrology  Provider: Dr. Encarnacion  Location: [facility]  Testing/imagin:30 PM @ [facility]

## 2024-06-28 ENCOUNTER — DOCUMENTATION ONLY (OUTPATIENT)
Dept: FAMILY MEDICINE | Facility: CLINIC | Age: 59
End: 2024-06-28
Payer: MEDICARE

## 2024-06-28 NOTE — PROGRESS NOTES
"When opening a documentation only encounter, be sure to enter in \"Chief Complaint\" Forms and in \" Comments\" Title of form, description if needed.    Flor is a 58 year old  female  Form received via: Fax  Form now resides in: Provider Ready    Tony Stewart MA             Form has been completed by provider.     Form sent out via: Fax to [facility] at Fax Number: [phone]  Patient informed: Yes  Output date: July 10, 2024    Tony Stewart MA      **Please close the encounter**   "

## 2024-07-03 ENCOUNTER — MEDICAL CORRESPONDENCE (OUTPATIENT)
Dept: HEALTH INFORMATION MANAGEMENT | Facility: CLINIC | Age: 59
End: 2024-07-03
Payer: MEDICARE

## 2024-07-10 ENCOUNTER — TELEPHONE (OUTPATIENT)
Dept: PULMONOLOGY | Facility: CLINIC | Age: 59
End: 2024-07-10
Payer: MEDICARE

## 2024-07-10 NOTE — TELEPHONE ENCOUNTER
Left Voicemail (1st Attempt) for the patient to call back and schedule the following:    Appointment type: MEGAN  Provider: PERLMAN  Return date: 11/2024  Specialty phone number: [phone]  Additional appointment(s) needed: SPIROMETRY, FLOW LOOP   Additonal Notes: N/A

## 2024-07-15 ENCOUNTER — DOCUMENTATION ONLY (OUTPATIENT)
Dept: FAMILY MEDICINE | Facility: CLINIC | Age: 59
End: 2024-07-15
Payer: MEDICARE

## 2024-07-15 DIAGNOSIS — J96.11 CHRONIC RESPIRATORY FAILURE WITH HYPOXIA (H): Primary | ICD-10-CM

## 2024-07-15 DIAGNOSIS — E78.5 HYPERLIPIDEMIA LDL GOAL <100: ICD-10-CM

## 2024-07-15 DIAGNOSIS — Z94.4 LIVER REPLACED BY TRANSPLANT (H): Primary | ICD-10-CM

## 2024-07-15 DIAGNOSIS — I12.9 HYPERTENSION, RENAL: ICD-10-CM

## 2024-07-15 RX ORDER — FOLIC ACID/MV,IRON,MIN/LUTEIN 0.4-18-25
1 TABLET ORAL DAILY
Qty: 100 TABLET | Refills: 3 | Status: SHIPPED | OUTPATIENT
Start: 2024-07-15

## 2024-07-15 RX ORDER — CARVEDILOL 12.5 MG/1
12.5 TABLET ORAL 2 TIMES DAILY WITH MEALS
Qty: 180 TABLET | Refills: 1 | Status: SHIPPED | OUTPATIENT
Start: 2024-07-15

## 2024-07-15 RX ORDER — ATORVASTATIN CALCIUM 40 MG/1
40 TABLET, FILM COATED ORAL DAILY
Qty: 90 TABLET | Refills: 1 | Status: SHIPPED | OUTPATIENT
Start: 2024-07-15

## 2024-07-15 NOTE — TELEPHONE ENCOUNTER
"Request for medication refill: atorvastatin (LIPITOR) 40 MG tablet   carvedilol (COREG) 12.5 MG tablet     Providers if patient needs an appointment and you are willing to give a one month supply please refill for one month and  send a letter/MyChart using \".SMILLIMITEDREFILL\" .smillimited and route chart to \"P [facility] \" (Giving one month refill in non controlled medications is strongly recommended before denial)    If refill has been denied, meaning absolutely no refills without visit, please complete the smart phrase \".smirxrefuse\" and route it to the \"P SMI MED REFILLS\"  pool to inform the patient and the pharmacy.    North Ruvalcaba MA      "

## 2024-07-15 NOTE — TELEPHONE ENCOUNTER
"I queued med  Pt is requesting CERTAVITE/ANTIOXIDANTS tablet     Request for medication refill:  CERTAVITE/ANTIOXIDANTS tablet     Providers if patient needs an appointment and you are willing to give a one month supply please refill for one month and  send a letter/MyChart using \".SMILLIMITEDREFILL\" .smillimited and route chart to \"P SMI \" (Giving one month refill in non controlled medications is strongly recommended before denial)    If refill has been denied, meaning absolutely no refills without visit, please complete the smart phrase \".smirxrefuse\" and route it to the \"P SMI MED REFILLS\"  pool to inform the patient and the pharmacy.    Reggie Moore, CMA      "

## 2024-07-22 ENCOUNTER — TELEPHONE (OUTPATIENT)
Dept: FAMILY MEDICINE | Facility: CLINIC | Age: 59
End: 2024-07-22
Payer: MEDICARE

## 2024-07-22 NOTE — TELEPHONE ENCOUNTER
[facility] Prior Authorization Team   Phone: [phone]  Fax: [phone]    Prior Authorization Approval    Medication: FEBUXOSTAT 40 MG PO TABS  Authorization Effective Date: 1/1/2024  Authorization Expiration Date: 7/22/2025  Approved Dose/Quantity: 90 per 90 days  Reference #:     Insurance Company: [employer] Part D - Phone [phone] Fax [phone]  Expected CoPay: $    CoPay Card Available: No    Financial Assistance Needed: No  Which Pharmacy is filling the prescription: [facility] - [place], [place] - [address]  Pharmacy Notified: Yes  Patient Notified: Yes

## 2024-08-26 DIAGNOSIS — Z94.4 LIVER REPLACED BY TRANSPLANT (H): Primary | ICD-10-CM

## 2024-09-09 ENCOUNTER — OFFICE VISIT (OUTPATIENT)
Dept: FAMILY MEDICINE | Facility: CLINIC | Age: 59
End: 2024-09-09
Payer: MEDICARE

## 2024-09-09 VITALS
BODY MASS INDEX: 44.59 KG/M2 | OXYGEN SATURATION: 100 % | SYSTOLIC BLOOD PRESSURE: 105 MMHG | TEMPERATURE: 98.1 F | WEIGHT: 227.1 LBS | HEART RATE: 60 BPM | HEIGHT: 60 IN | DIASTOLIC BLOOD PRESSURE: 70 MMHG | RESPIRATION RATE: 16 BRPM

## 2024-09-09 DIAGNOSIS — H43.812 POSTERIOR VITREOUS DETACHMENT, LEFT: ICD-10-CM

## 2024-09-09 DIAGNOSIS — G81.91 HEMIPLEGIA OF RIGHT DOMINANT SIDE DUE TO INFARCTION OF BRAIN (H): ICD-10-CM

## 2024-09-09 DIAGNOSIS — R60.0 BILATERAL LEG EDEMA: ICD-10-CM

## 2024-09-09 DIAGNOSIS — N18.32 STAGE 3B CHRONIC KIDNEY DISEASE (H): ICD-10-CM

## 2024-09-09 DIAGNOSIS — M54.2 CERVICALGIA: Primary | ICD-10-CM

## 2024-09-09 DIAGNOSIS — G63 NEUROPATHY DUE TO MEDICAL CONDITION (H): ICD-10-CM

## 2024-09-09 DIAGNOSIS — I63.9 HEMIPLEGIA OF RIGHT DOMINANT SIDE DUE TO INFARCTION OF BRAIN (H): ICD-10-CM

## 2024-09-09 PROBLEM — N10 PYELONEPHRITIS, ACUTE: Status: RESOLVED | Noted: 2024-03-06 | Resolved: 2024-09-09

## 2024-09-09 PROBLEM — N17.9 AKI (ACUTE KIDNEY INJURY) (H): Status: RESOLVED | Noted: 2023-02-23 | Resolved: 2024-09-09

## 2024-09-09 PROBLEM — N10 ACUTE PYELONEPHRITIS: Status: RESOLVED | Noted: 2024-03-05 | Resolved: 2024-09-09

## 2024-09-09 LAB
ANION GAP SERPL CALCULATED.3IONS-SCNC: 13 MMOL/L (ref 7–15)
BUN SERPL-MCNC: 52.1 MG/DL (ref 8–23)
CALCIUM SERPL-MCNC: 9.2 MG/DL (ref 8.8–10.4)
CHLORIDE SERPL-SCNC: 106 MMOL/L (ref 98–107)
CREAT SERPL-MCNC: 1.87 MG/DL (ref 0.51–0.95)
CREAT UR-MCNC: 189 MG/DL
EGFRCR SERPLBLD CKD-EPI 2021: 30 ML/MIN/1.73M2
GLUCOSE SERPL-MCNC: 100 MG/DL (ref 70–99)
HCO3 SERPL-SCNC: 20 MMOL/L (ref 22–29)
MICROALBUMIN UR-MCNC: <12 MG/L
MICROALBUMIN/CREAT UR: NORMAL MG/G{CREAT}
POTASSIUM SERPL-SCNC: 4.2 MMOL/L (ref 3.4–5.3)
SODIUM SERPL-SCNC: 139 MMOL/L (ref 135–145)

## 2024-09-09 PROCEDURE — 36415 COLL VENOUS BLD VENIPUNCTURE: CPT | Performed by: FAMILY MEDICINE

## 2024-09-09 PROCEDURE — 99215 OFFICE O/P EST HI 40 MIN: CPT | Performed by: FAMILY MEDICINE

## 2024-09-09 PROCEDURE — 82043 UR ALBUMIN QUANTITATIVE: CPT | Performed by: FAMILY MEDICINE

## 2024-09-09 PROCEDURE — 82570 ASSAY OF URINE CREATININE: CPT | Performed by: FAMILY MEDICINE

## 2024-09-09 PROCEDURE — 80048 BASIC METABOLIC PNL TOTAL CA: CPT | Performed by: FAMILY MEDICINE

## 2024-09-09 ASSESSMENT — PATIENT HEALTH QUESTIONNAIRE - PHQ9: SUM OF ALL RESPONSES TO PHQ QUESTIONS 1-9: 4

## 2024-09-09 NOTE — Clinical Note
Pt in need of stockings and lift chair - would follow up on it - hasn't gotten her DME supplies before frequently :(

## 2024-09-09 NOTE — PATIENT INSTRUCTIONS
Compression socks ordered.  Lift chair ordered.  Try capsaicin cream, ice packs, hot packs, massage, and range of motion to help with head pain.  Pharmacy will contact you about an appointment to talk about weight loss medications.  Eye doctors will call you about scheduling an appointment.  It is okay to continue to hold the incruse until you see Dr. Perlman in November.  Follow-up in 1 month.

## 2024-09-09 NOTE — PROGRESS NOTES
Assessment & Plan     Cervicalgia  She is not interested in physical therapy at this time. She's already on gabapentin for other disorders. Will try topical capsaicin which she's used successfully in other areas in addition to heat. Continue range of motion exercises and massage.     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 She still has intermittent symptoms but does not want to increased her medications.    Body mass index (BMI) 40.0-44.9, adult (H)  She has had 8 pounds of weight loss which she's very happy about but this has been a long term problem and multiple of her specialists have recommended weight loss. Given her other chronic disease and exercise limitations, increased body movement is going to be extremely difficult. She would be a candidate for semaglutide or tirzepatide. However given her medical complexity with CKD and a liver transplant, I have asked pharmacy to see her to weigh in to see which medication is preferable and if either need to be dose adjusted. She is willing to do a weekly injection. Agree with her doing meal reduction and not skipping meals.   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 Her home nurse has suggested possibility of a lift chair as she is having a great deal of trouble standing up from the couch and other seated areas in her home. She moves her head an upper body so far forward she's at risk of falling forward and causing injury. She often has the assistance of someone else to get out of a chair. Order lift chair through DME in order to promote independence and prevent injury.    The patient still has the ability to walk, and she cannot stand from a regular chair, or from a couch without assistance. She has  a cane, has tried PT and is on medications for neuropathy, but this condition is longstanding and chronic w/o other available treatments.   - Home Care Referral    Bilateral leg edema  She needs more stockings. She wears knee high 15-20 mm of Hg.  - Compression Sleeve/Stocking Order    Stage 3b chronic kidney disease (H)  Due for labs. Would consider SGLT-2 but was not addressed today. See her in 1 month and review labs and address this. Blood pressure is at goal. Pneumococcal vaccination is complete.  ADDENDUM: Cr elevated >0.3 compared to previous, though has been at this level before. Reported normal po intake yesterday so am concerned. Doesn't have follow up renal appointment for months. K normal, but urine protein suddenly quite high. Will place econsult for recommendations for intervention. Not on ACE/ARB..  - Basic Metabolic Panel (annual)  - Albumin Random Urine Quantitative with Creat Ratio  - Albumin Random Urine Quantitative with Creat Ratio    Posterior vitreous detachment, left  Due for follow-up eye exam as it has been 1 year.  - Adult Eye  Referral      48 minutes spent by me on the date of the encounter doing chart review, history and exam, documentation and further activities per the note    No follow-ups on file.    West Ray is a 58 year old, presenting for the following health issues:  Follow Up and Orders (Lift Chair & Nurse visits )        9/9/2024     3:13 PM   Additional Questions   Roomed by Hugh   Accompanied by Son         9/9/2024    Information    services provided? No        HPI     Back pain  She has been using a special chair to elevate her legs as well as tip forward to help her transition from sitting to standing. She endorses struggling to stand up due to back pain. She is unable to stand up if she is sitting on the ground. She struggles to sit up from couches.     Head pain  She reports that she had a fracture in her neck years ago that she  was put in a halo for. She is now having head pain. Pain is helped with gentle self massage. She feels that this pain is related to her back pain as they exacerbate each other.      Home care  She has had a nurse in her house which has been helpful, though she has not come out recently as services ran out. She reports that she has not had any home therapies in around 1 year. She does not feel she needs any home therapy at this time.     Medical device  She reports needing more compression socks that go to her knees. She denies any open wounds on her legs.    She reports she is getting a lift chair that the [facility] is paying for. Family is not sure the company that is installing it.    Preventive health  She has appointments with pulmonology, gastro, and nephrology scheduled. She does not have eye follow-up scheduled at this time.    She has recently lost 10 pounds by reducing the amount she is eating. She denies any problems from this. She feels good about her weight loss. She feels this has improved her shortness of breath.     Kidney infection  She denies any problems with her kidney infection at this point. She is urinating without difficulties.     Breathing  She has not been using incruse one time a day. She does not like the taste of the medication. She has not used this medication in 2 months. In this time, her breathing has been good. She is on a C-pap machine and oxygen at home.     Legs  She reports that numbness and tingling in her bilateral lower extremities are improved but she does get some electrical shock sensation on the dorsal aspect of her feet. She is still taking Gabapentin.     Review of Systems  Positive for:   Negative for:     This document serves as a record of the services and decisions personally performed and made by Karely Sierra MD. It was created on his/her behalf by Alcides Galeana, a trained medical scribe. The creation of this document is based the provider's statements to the medical  scribe.  Scribscott Galeana 3:24 PM, September 9, 2024       Objective    /70   Pulse 60   Temp 98.1  F (36.7  C) (Temporal)   Resp 16   Ht 1.524 m (5')   Wt 103 kg (227 lb 1.6 oz)   LMP  (LMP Unknown)   SpO2 100%   BMI 44.35 kg/m    Body mass index is 44.35 kg/m .  Physical Exam   GENERAL: alert and no distress  RESP: lungs clear to auscultation - no rales, rhonchi or wheezes  CV: regular rate and rhythm, normal S1 S2, no S3 or S4, no murmur, click or rub, no peripheral edema  MS: Full ROM in her neck. Tender over bilateral occipital condyles into the levators and trap. Extensive trap and levator spasms.  PSYCH: mentation appears normal, affect normal/bright      Signed Electronically by: Karely Sierra MD

## 2024-09-09 NOTE — PROGRESS NOTES
DME (Durable Medical Equipment) Orders and Documentation  Orders Placed This Encounter   Procedures    Compression Sleeve/Stocking Order    Lift Chair W Seat Lift Mechanism Order      The patient was assessed and it was determined the patient is in need of the following listed DME Supplies/Equipment. Please complete supporting documentation below to demonstrate medical necessity.      Compression Sleeve/Stocking(s) Supplies Documentation  The patient needs compression stockings for Other reason: management of edema, prevention of wound development.    Lift chair is needed due to lack of ability to stand from chair w/o assistance due hemiplegia & stroke history

## 2024-09-10 ENCOUNTER — TELEPHONE (OUTPATIENT)
Dept: FAMILY MEDICINE | Facility: CLINIC | Age: 59
End: 2024-09-10
Payer: MEDICARE

## 2024-09-10 NOTE — TELEPHONE ENCOUNTER
CC successfully faxed home care update to [facility] at [phone]. Direct number left in case unable to accept patient.     Bayron Cardoso  Care Coordinator- [facility]  [phone]

## 2024-09-11 ENCOUNTER — E-CONSULT (OUTPATIENT)
Dept: NEPHROLOGY | Facility: CLINIC | Age: 59
End: 2024-09-11
Payer: MEDICARE

## 2024-09-11 ENCOUNTER — DOCUMENTATION ONLY (OUTPATIENT)
Dept: FAMILY MEDICINE | Facility: CLINIC | Age: 59
End: 2024-09-11
Payer: MEDICARE

## 2024-09-11 NOTE — PROGRESS NOTES
"When opening a documentation only encounter, be sure to enter in \"Chief Complaint\" Forms and in \" Comments\" Title of form, description if needed.    Flor is a 58 year old  female  Form received via: Fax  Form now resides in: Provider Edward Stewart MA               "

## 2024-09-11 NOTE — PROGRESS NOTES
9/11/2024     E-Consult has been denied due to: Doesn't meet criteria for E-Consult - Patient previously established care with specialty, or scheduled within the next 3 months.  The patient has an established nephrologist (last saw in 2023) and is scheduled to be seen in nephrology clinic in <3 months (both criteria that don't allow for an eConsult in that specialty). Please direct your questions to the patient's established nephrologist and/or the nephrologist that the patient is scheduled to see in early December.         Interprofessional consultation requested by:  Karely Sierra MD      Clinical Question/Purpose: MY CLINICAL QUESTION IS: new acute bump in Cr, no med or po intake changes. New albuminuria. Complex pt w/ hx liver transplant. Real age likely 69    Patient assessment and information reviewed: n/a    Recommendations: n/a      The recommendations provided in this E-Consult are based on a review of clinical data pertinent to the clinical question presented, without a review of the patient's complete medical record or, the benefit of a comprehensive in-person or virtual patient evaluation. This consultation should not replace the clinical judgement and evaluation of the provider ordering this E-Consult. Any new clinical issues, or changes in patient status since the filing of this E-Consult will need to be taken into account when assessing these recommendations. Please contact me if you have further questions.    My total time spent reviewing clinical information and formulating assessment was 5 minutes.        Mehul Araya MD

## 2024-09-13 ENCOUNTER — TELEPHONE (OUTPATIENT)
Dept: TRANSPLANT | Facility: CLINIC | Age: 59
End: 2024-09-13
Payer: MEDICARE

## 2024-09-13 NOTE — TELEPHONE ENCOUNTER
Spoke to son and requested that pt get tx labs drawn and pt is due for yearly labs as well. Scheduled pt for 9/23 @ 10:45.

## 2024-09-18 DIAGNOSIS — N18.32 STAGE 3B CHRONIC KIDNEY DISEASE (H): Primary | ICD-10-CM

## 2024-09-18 DIAGNOSIS — Z94.4 LIVER REPLACED BY TRANSPLANT (H): ICD-10-CM

## 2024-09-18 NOTE — PROGRESS NOTES
Has lab appointment 9/23  Adding UA &  UPCR to Dr. Anne's orders     RN to clarify dosing of BP meds   Is she currently taking chlorthalidone AND furosemide?     She was taking lasix for hyperkalemia previously, unclear if she still is    If so, we will stop the lasix due to acute kidney injury

## 2024-09-19 ENCOUNTER — OFFICE VISIT (OUTPATIENT)
Dept: PHARMACY | Facility: CLINIC | Age: 59
End: 2024-09-19
Attending: FAMILY MEDICINE
Payer: COMMERCIAL

## 2024-09-19 ENCOUNTER — TELEPHONE (OUTPATIENT)
Dept: TRANSPLANT | Facility: CLINIC | Age: 59
End: 2024-09-19
Payer: MEDICARE

## 2024-09-19 DIAGNOSIS — R52 GENERALIZED PAIN: ICD-10-CM

## 2024-09-19 DIAGNOSIS — K21.9 GASTROESOPHAGEAL REFLUX DISEASE WITHOUT ESOPHAGITIS: ICD-10-CM

## 2024-09-19 DIAGNOSIS — M81.8 OTHER OSTEOPOROSIS WITHOUT CURRENT PATHOLOGICAL FRACTURE: ICD-10-CM

## 2024-09-19 DIAGNOSIS — I12.9 HYPERTENSION, RENAL: Primary | ICD-10-CM

## 2024-09-19 DIAGNOSIS — N18.30 STAGE 3 CHRONIC KIDNEY DISEASE, UNSPECIFIED WHETHER STAGE 3A OR 3B CKD (H): ICD-10-CM

## 2024-09-19 DIAGNOSIS — G63 NEUROPATHY DUE TO MEDICAL CONDITION (H): ICD-10-CM

## 2024-09-19 PROCEDURE — 99207 PR NO CHARGE LOS: CPT | Performed by: PHARMACIST

## 2024-09-19 NOTE — PROGRESS NOTES
Medication Therapy Management (MTM) Encounter    ASSESSMENT:                            Medication Adherence/Access: See below for considerations    Weight Management:  No concerns with starting semaglutide or tirzepatide in regard to liver transplant and CKD history. However, GLP-1s are not covered by patient's insurance without diagnosis of diabetes and monthly out-of-pocket cost is ~$1300. Informed patient, patient chose to not pursue weight loss pharmacotherapy further.    HTN:  Controlled but concerned for excessive BP control given symptoms of hypotension. Directed patient to measure home Bps for assessment at follow up appointment.  After visit, found note in chart that PCP was unsure if patient is taking furosemide and, if so, to discontinue due to CASTILLO. Per patient interview and dispense history, patient is very likely still taking furosemide at home.   Will confirm plan of action with provider prior to making any changes.    COPD/Asthma:  Uncontrolled given frequency of shortness of breath. Would benefit from maintenance inhaler therapy  to condition (LABA or LAMA if COPD, ICS if asthma). Patient stated that she has a pulmonologist appointment in November that she is supposed to receive further direction in. No changes today.    CKD:  Renal function has decreased since last labs in 3/2024. Additionally, appears that patient's real age is older than age listed in chart and subsequently used for automated renal function calculations. Recalculated renal function per presumed real age (69 years):  eGFR: 29 mL/min/1.73 m2  CrCl: 30 mL/min  Cut-off for alendronate use is CrCl 35 mL/min. Would recommend discontinuation of alendronate for patient's safety.    Osteoporosis:  Uncontrolled per t-score < -2.5 from last DEXA scan (2019). Currently taking alendronate (restarted in March 2024 after unknown duration of not taking). Has history of active alendronate prescriptions over the last 15 years, though  exact history of use is not clear (documentation suggests on/off use but exact timeline is very unclear and no documentation of bisphosphonate holiday).   Evidence suggests bisphonates stay in bone for up to 10 years after discontinuation of therapy after at least 5 years. Continuous use longer than 5 years is primarily recommended for patients with very high risk of fracture (t-score < -3.5, which patient does not meet). However, as last DEXA scan was in 2019, patient would benefit from updated t-score assessment.  Despite unknowns in bisphosphonate history, but taking into consideration longevity of bisphosphonate deposition in bone, risk of continuing bisphosphonate given significantly impaired renal function likely exceeds benefit at this time.     GERD:  Controlled. Could consider de-escalating PPI therapy given osteoporosis, but patient is very satisfied with current therapy. No changes made.      Pain:  Uncontrolled per patient report. Defer to PCP for pain management.    PLAN:                            Contacted PCP regarding furosemide (CASTILLO and hypotension sx) and alendronate (renal function cut-off), awaiting response.    Follow-up:   Oct 09, 2024 4:00 PM  Return Visit with Karely Sierra MD  [facility] ([facility]) [phone]       SUBJECTIVE/OBJECTIVE:                          Flor Navarro is a 58 year old female seen for an initial visit. She was referred to me from Dr. Sierra. Patient was accompanied by her son today who chose to serve as the  for the visit.    Reason for visit: Medication therapy management.    Allergies/ADRs: Reviewed in chart  Past Medical History: Reviewed in chart  Tobacco: She reports that she has never smoked. She has never used smokeless tobacco.  Alcohol: none    Medication Adherence/Access: Patient's son or visiting nurse fills weekly pill box. Denies missing doses.    Weight Management   Interested in starting  semaglutide or tirzepatide weekly injections. PCP asked pharmacy to assess appropriateness given CKD and liver transplant.    Hypertension   Amlodipine 10 mg once daily  Carvedilol 12.5 mg twice daily  Chlorthalidone 25 mg once daily  Furosemide 20 mg twice daily  Patient reports  symptoms of orthostatic hypotension  as well as occasional dizziness while laying down. No falls reported.  Patient  does not typically self-monitor blood pressure but son did this AM (reported 112/61 mmHg)  BP Readings from Last 3 Encounters:   09/09/24 105/70   03/27/24 134/80   03/25/24 131/79       COPD / Asthma  Albuterol 0.63 mg/3 mL nebulizer 3 mL every 4 hours as needed  Albuterol 90 mcg/act inhaler 2 puffs 4 times day  Unclear lung condition diagnosis. Patient has both asthma and COPD documented in her medical history; when asked what her lung condition is, patient answered asthma.  Currently uses nebulizer 1-2 times per week and inhaler twice daily.  Patient reports the following symptoms: shortness of breath. Triggers include: smoke and exercise or sports.   Today in clinic, patient appears stable and not short of breath while speaking.     CKD:  Calcitriol 0.25 mcg once daily  eGFR/CrCl calculations based on patient's age listed in chart (58 years):  GFR Estimate   Date Value Ref Range Status   09/09/2024 30 (L) >60 mL/min/1.73m2 Final     Comment:     eGFR calculated using 2021 CKD-EPI equation.   Estimated Creatinine Clearance: 35 mL/min (A) (based on SCr of 1.87 mg/dL (H)).     However, per nephrology e-consult, appears that patient's real age is likely 69 years.    Osteoporosis:  Alendronate 70 mg every 7 days  T-score (2019) = -2.6  Son stated that patient restarted alendronate in March 2024 (per PCP note from that time, appears that patient had run out- duration unclear- and needed to restart).  Documentation of alendronate use in chart dates back to 2009, though there is a gap in care documentation between 6824-0951.  "2016 notes mention starting alendronate; unclear if patient was on bisphosphonate holiday or if she had run out and just needed to restart, similar to recent situation.   History of bisphosphonate use is very unclear in general, with multiple notes in the last 8 years recording \"no\" to \"patient taking?\" for alendronate despite continuous active prescription; no explicit documentation of bisphosphonate holiday either.    GERD:   Omeprazole 20 mg twice daily   Patient reports no current symptoms.   Patient feels that current regimen is effective.  The patient does notice symptoms (acid reflux) if they miss a dose.     Pain:   Gabapentin 100 mg every AM and noon & 300 mg every PM  Capsaicin-menthol--methyl sal 0.025-1-15% cream 3 times daily to back  Patient rates pain as 7 to 8 out of 10. Describes pain as \"whole body\" with an emphasis on neck and back pain.    Today's Vitals: LMP  (LMP Unknown)   ----------------    I spent 30 minutes with this patient today. I offer these suggestions for consideration by Dr. Sierra. A copy of the visit note was provided to the patient's provider(s).    A summary of these recommendations was sent via [employer].    Karo Mantilla, PharmD     Medication Therapy Recommendations  No medication therapy recommendations to display   "

## 2024-09-19 NOTE — TELEPHONE ENCOUNTER
Spoke to son and asked that pt have her BP checked on a daily basis and recorded. Writer will call on Monday to get the Bp's.       Can you please call and get her last blood pressures for few days and if not doing daily please remind

## 2024-09-19 NOTE — Clinical Note
Hi Dr. Sierra, I met with patient per your referral to discuss safety of semaglutide or tirzepatide for weight management. While there are no medical contraindications, pt's insurance unfortunately does not cover either of those drugs or any other similar options. Pt chose to not move forward with weight loss pharmacotherapy due to cost barrier.  Additionally, I reviewed the rest of patient's medications and have identified the following concerns: 1) Confirmed that pt is still taking furosemide. In addition to her CASTILLO, I am also concerned about her frequent symptoms of hypotension. Prior to making any changes, I want to confirm that the plan is still to discontinue furosemide. 2) After re-calculating pt's CrCl with estimated real age, her renal function is below the cut-off for alendronate use. Would recommend discontinuing alendronate for patient's safety. If we want her to still be on osteoporosis treatment, denosumab is an option that does not require renal dose adjustment and can be used in CrCl<30.

## 2024-09-20 ENCOUNTER — TELEPHONE (OUTPATIENT)
Dept: FAMILY MEDICINE | Facility: CLINIC | Age: 59
End: 2024-09-20
Payer: MEDICARE

## 2024-09-20 RX ORDER — CALCITRIOL 0.25 UG/1
0.25 CAPSULE, LIQUID FILLED ORAL DAILY
Qty: 90 CAPSULE | Refills: 3 | Status: SHIPPED | OUTPATIENT
Start: 2024-09-20

## 2024-09-20 NOTE — PROGRESS NOTES
I have verified the content of the note, which accurately reflects my assessment of the patient and the plan of care.   Bert Benites, [facility], PharmD

## 2024-09-20 NOTE — TELEPHONE ENCOUNTER
Attempted to call patient with . No answer, left a message to call back.     Jaja Read RN       RN to clarify dosing of BP meds   Is she currently taking chlorthalidone AND furosemide?      She was taking lasix for hyperkalemia previously, unclear if she still is     If so, we will stop the lasix due to acute kidney injury

## 2024-09-23 ENCOUNTER — TELEPHONE (OUTPATIENT)
Dept: PHARMACY | Facility: CLINIC | Age: 59
End: 2024-09-23

## 2024-09-23 ENCOUNTER — LAB (OUTPATIENT)
Dept: LAB | Facility: CLINIC | Age: 59
End: 2024-09-23
Payer: MEDICARE

## 2024-09-23 ENCOUNTER — DOCUMENTATION ONLY (OUTPATIENT)
Dept: FAMILY MEDICINE | Facility: CLINIC | Age: 59
End: 2024-09-23

## 2024-09-23 DIAGNOSIS — M81.8 OTHER OSTEOPOROSIS WITHOUT CURRENT PATHOLOGICAL FRACTURE: ICD-10-CM

## 2024-09-23 DIAGNOSIS — Z94.4 LIVER REPLACED BY TRANSPLANT (H): ICD-10-CM

## 2024-09-23 DIAGNOSIS — I12.9 HYPERTENSION, RENAL: Primary | ICD-10-CM

## 2024-09-23 DIAGNOSIS — N18.32 STAGE 3B CHRONIC KIDNEY DISEASE (H): ICD-10-CM

## 2024-09-23 LAB
ALBUMIN MFR UR ELPH: 8.2 MG/DL
ALBUMIN SERPL BCG-MCNC: 4.1 G/DL (ref 3.5–5.2)
ALBUMIN UR-MCNC: NEGATIVE MG/DL
ALP SERPL-CCNC: 96 U/L (ref 40–150)
ALT SERPL W P-5'-P-CCNC: 20 U/L (ref 0–50)
ANION GAP SERPL CALCULATED.3IONS-SCNC: 12 MMOL/L (ref 7–15)
APPEARANCE UR: CLEAR
AST SERPL W P-5'-P-CCNC: 22 U/L (ref 0–45)
BILIRUB DIRECT SERPL-MCNC: <0.2 MG/DL (ref 0–0.3)
BILIRUB SERPL-MCNC: 0.6 MG/DL
BILIRUB UR QL STRIP: NEGATIVE
BUN SERPL-MCNC: 43.5 MG/DL (ref 8–23)
CALCIUM SERPL-MCNC: 9.2 MG/DL (ref 8.8–10.4)
CHLORIDE SERPL-SCNC: 107 MMOL/L (ref 98–107)
CHOLEST SERPL-MCNC: 154 MG/DL
COLOR UR AUTO: NORMAL
CREAT SERPL-MCNC: 1.81 MG/DL (ref 0.51–0.95)
CREAT UR-MCNC: 75.4 MG/DL
CREAT UR-MCNC: 75.6 MG/DL
CYCLOSPORINE BLD LC/MS/MS-MCNC: 148 UG/L (ref 50–400)
EGFRCR SERPLBLD CKD-EPI 2021: 32 ML/MIN/1.73M2
ERYTHROCYTE [DISTWIDTH] IN BLOOD BY AUTOMATED COUNT: 13.4 % (ref 10–15)
FASTING STATUS PATIENT QL REPORTED: YES
FASTING STATUS PATIENT QL REPORTED: YES
GLUCOSE SERPL-MCNC: 100 MG/DL (ref 70–99)
GLUCOSE UR STRIP-MCNC: NEGATIVE MG/DL
HCO3 SERPL-SCNC: 22 MMOL/L (ref 22–29)
HCT VFR BLD AUTO: 38.5 % (ref 35–47)
HDLC SERPL-MCNC: 38 MG/DL
HGB BLD-MCNC: 12.5 G/DL (ref 11.7–15.7)
HGB UR QL STRIP: NEGATIVE
KETONES UR STRIP-MCNC: NEGATIVE MG/DL
LDLC SERPL CALC-MCNC: 66 MG/DL
LEUKOCYTE ESTERASE UR QL STRIP: NEGATIVE
MAGNESIUM SERPL-MCNC: 1.3 MG/DL (ref 1.7–2.3)
MCH RBC QN AUTO: 29.2 PG (ref 26.5–33)
MCHC RBC AUTO-ENTMCNC: 32.5 G/DL (ref 31.5–36.5)
MCV RBC AUTO: 90 FL (ref 78–100)
MICROALBUMIN UR-MCNC: <12 MG/L
MICROALBUMIN/CREAT UR: NORMAL MG/G{CREAT}
NITRATE UR QL: NEGATIVE
NONHDLC SERPL-MCNC: 116 MG/DL
PH UR STRIP: 5 [PH] (ref 5–7)
PHOSPHATE SERPL-MCNC: 2.9 MG/DL (ref 2.5–4.5)
PLATELET # BLD AUTO: 181 10E3/UL (ref 150–450)
POTASSIUM SERPL-SCNC: 4.1 MMOL/L (ref 3.4–5.3)
PROT SERPL-MCNC: 7.6 G/DL (ref 6.4–8.3)
PROT/CREAT 24H UR: 0.11 MG/MG CR (ref 0–0.2)
RBC # BLD AUTO: 4.28 10E6/UL (ref 3.8–5.2)
SODIUM SERPL-SCNC: 141 MMOL/L (ref 135–145)
SP GR UR STRIP: 1.01 (ref 1–1.03)
TME LAST DOSE: NORMAL H
TME LAST DOSE: NORMAL H
TRIGL SERPL-MCNC: 251 MG/DL
UROBILINOGEN UR STRIP-MCNC: NORMAL MG/DL
WBC # BLD AUTO: 4.7 10E3/UL (ref 4–11)

## 2024-09-23 PROCEDURE — 80061 LIPID PANEL: CPT | Performed by: PATHOLOGY

## 2024-09-23 PROCEDURE — 99000 SPECIMEN HANDLING OFFICE-LAB: CPT | Performed by: PATHOLOGY

## 2024-09-23 PROCEDURE — 82043 UR ALBUMIN QUANTITATIVE: CPT | Performed by: FAMILY MEDICINE

## 2024-09-23 PROCEDURE — 80053 COMPREHEN METABOLIC PANEL: CPT | Performed by: PATHOLOGY

## 2024-09-23 PROCEDURE — 36415 COLL VENOUS BLD VENIPUNCTURE: CPT | Performed by: PATHOLOGY

## 2024-09-23 PROCEDURE — 80158 DRUG ASSAY CYCLOSPORINE: CPT | Performed by: INTERNAL MEDICINE

## 2024-09-23 PROCEDURE — 81003 URINALYSIS AUTO W/O SCOPE: CPT | Performed by: PATHOLOGY

## 2024-09-23 PROCEDURE — 85027 COMPLETE CBC AUTOMATED: CPT | Performed by: PATHOLOGY

## 2024-09-23 PROCEDURE — 83735 ASSAY OF MAGNESIUM: CPT | Performed by: PATHOLOGY

## 2024-09-23 PROCEDURE — 82248 BILIRUBIN DIRECT: CPT | Performed by: PATHOLOGY

## 2024-09-23 PROCEDURE — 84156 ASSAY OF PROTEIN URINE: CPT | Performed by: PATHOLOGY

## 2024-09-23 PROCEDURE — 84100 ASSAY OF PHOSPHORUS: CPT | Performed by: PATHOLOGY

## 2024-09-23 NOTE — TELEPHONE ENCOUNTER
Second attempt to reach patient with  and no answer. lmtcb. Patient did have an appointment with PharmD on 9/19/24 and appears to be taking furosemide at this time.   Jaja Read RN

## 2024-09-23 NOTE — TELEPHONE ENCOUNTER
Given pts very low BP and desire to keep furosemide for swelling, would discontinue chlorthalidone. Please communicate this to patient . Keep checking BP's daily   Thank you

## 2024-09-23 NOTE — PROGRESS NOTES
"When opening a documentation only encounter, be sure to enter in \"Chief Complaint\" Forms and in \" Comments\" Title of form, description if needed.    Flor is a 58 year old  female  Form received via: Fax  Form now resides in: Provider Ready    Tony Stewart MA             Form has been completed by provider.     Form sent out via: Fax to [facility] at Fax Number:   [phone]   Patient informed: N/A  Output date: September 30, 2024    Tony Stewart MA      **Please close the encounter**   "

## 2024-09-23 NOTE — TELEPHONE ENCOUNTER
Discussed the following medication changes with PCP and called patient's son to implement:    1) Discontinue furosemide due to hypotension and concern for worsening renal function.  Patient reportedly has fluid retention in her legs that she wanted to take furosemide for. Continue with discontinuation given symptomatic hypotension (patient-reported BP yesterday was 96/47 mmHg), but plan to discuss other options for fluid retention at upcoming PCP appt 10/9.    2) Discontinue alendronate due to contraindication with CrCl <35 mL/min. Start denosumab 60 mg every 6 months.   Educated on importance of adherence to calcium and vitamin D supplements once this is initiated due to increased risk of hypocalcemia in severe renal impairment (CrCl <30 mL/min).  Patient's most recent CrCl based on updated SCr lab, presumed real age, and adjusted body weight is 31 mL/min.  Patient prefers to have this administered in clinic.    Patient's son expressed agreement with and understanding of the medication changes.    Karo Mantilla, PharmD

## 2024-09-24 DIAGNOSIS — Z94.4 LIVER REPLACED BY TRANSPLANT (H): Primary | ICD-10-CM

## 2024-09-24 DIAGNOSIS — M81.8 OTHER OSTEOPOROSIS WITHOUT CURRENT PATHOLOGICAL FRACTURE: ICD-10-CM

## 2024-09-24 RX ORDER — CYCLOSPORINE 25 MG/1
50 CAPSULE, LIQUID FILLED ORAL EVERY 12 HOURS
Qty: 120 CAPSULE | Refills: 11 | Status: SHIPPED | OUTPATIENT
Start: 2024-09-24

## 2024-09-24 RX ORDER — VITAMIN B COMPLEX
1 TABLET ORAL DAILY
Qty: 100 TABLET | Refills: 3 | Status: SHIPPED | OUTPATIENT
Start: 2024-09-24

## 2024-09-24 NOTE — TELEPHONE ENCOUNTER
ISSUE:   Cyclosporine level 148 on 9/23, goal , dose 75 mg BID.    PLAN:   Call Patient and confirm this was an accurate 12-hour trough.   Verify Cyclosporine dose k75 mg BID.   Confirm no new medications or or missed doses.   Confirm no new illness / infection / diarrhea.   If accurate trough and accurate dose, decrease Cyclosporine dose to 50 mg BID and ask for BP log for last few days.    Is this more than a 50% increase or decrease in current IS dose: No  If YES, justification:     Repeat labs in 2weeks.  *If > 50% change in immunosuppression dose, repeat labs in 1 week.     OUTCOME:   Spoke with [facility], they confirm accurate trough level and current dose 75 mg BID.   Kami confirmed dose change to 50 mg BID.  [facility] agreed to repeat labs in 2 weeks.   Orders sent to preferred pharmacy for dose change and lab for repeat labs.   [facility] voiced understanding of plan.     Bp's as follows: 9//61, 9/20- 122/65, 9//67, 9/22-96/45. PCP stopped furosemide due to low blood pressure and symptomatic with dizziness. PCP will continue to monitor.     Karmen Medina LPN

## 2024-09-25 NOTE — TELEPHONE ENCOUNTER
Spoke with son and relayed message. Son will talk with patient and see what she would like to do as she was fine waiting until appointment with Dr. Sierra. Son verbalized stopping chlorthalidone if patient would like to continue taking lasix. Will continue to check BP.   Jaja Read RN

## 2024-09-26 ENCOUNTER — TELEPHONE (OUTPATIENT)
Dept: TRANSPLANT | Facility: CLINIC | Age: 59
End: 2024-09-26
Payer: MEDICARE

## 2024-09-26 DIAGNOSIS — Z53.9 DIAGNOSIS NOT YET DEFINED: Primary | ICD-10-CM

## 2024-09-26 PROCEDURE — G0179 MD RECERTIFICATION HHA PT: HCPCS | Performed by: FAMILY MEDICINE

## 2024-09-26 RX ORDER — FUROSEMIDE 20 MG
20 TABLET ORAL 2 TIMES DAILY
COMMUNITY

## 2024-09-26 NOTE — TELEPHONE ENCOUNTER
Son spoke with PCP's nurse yesterday who stopped the chlorthalidone and had pt restart lasix BID. Pt will see PCP on 10/9 as that was the soonest pt could get in. Son will record Bp's and weights and report to PCP.     From: Ce Arambula MD   Sent: 9/25/2024   5:11 PM CDT   To: Mary Crawford, ASAEL     Hold chlorthalidone and repeat labs in a week or 10 days   Also meanwhile, ask her to continue to check BP and also get daily weights if possible   See if nephrology or PCP can see her sooner to address her BP or any weight changes that can happen with chlorthalidone discontinuation     Thanks   Ce

## 2024-09-27 ENCOUNTER — TELEPHONE (OUTPATIENT)
Dept: FAMILY MEDICINE | Facility: CLINIC | Age: 59
End: 2024-09-27
Payer: MEDICARE

## 2024-10-01 ENCOUNTER — DOCUMENTATION ONLY (OUTPATIENT)
Dept: FAMILY MEDICINE | Facility: CLINIC | Age: 59
End: 2024-10-01
Payer: MEDICARE

## 2024-10-01 NOTE — PROGRESS NOTES
"When opening a documentation only encounter, be sure to enter in \"Chief Complaint\" Forms and in \" Comments\" Title of form, description if needed.    Flor is a 58 year old  female  Form received via: Fax  Form now resides in: Provider Ready    Mark Morales               "

## 2024-10-04 DIAGNOSIS — Z94.4 LIVER REPLACED BY TRANSPLANT (H): ICD-10-CM

## 2024-10-04 DIAGNOSIS — J44.9 CHRONIC OBSTRUCTIVE PULMONARY DISEASE, UNSPECIFIED COPD TYPE (H): ICD-10-CM

## 2024-10-04 NOTE — TELEPHONE ENCOUNTER
"Request for medication refill:  VENTOLIN  (90 Base) MCG/ACT inhaler     Providers if patient needs an appointment and you are willing to give a one month supply please refill for one month and  send a letter/MyChart using \".SMILLIMITEDREFILL\" .smillimited and route chart to \"P [facility] \" (Giving one month refill in non controlled medications is strongly recommended before denial)    If refill has been denied, meaning absolutely no refills without visit, please complete the smart phrase \".smirxrefuse\" and route it to the \"P SMI MED REFILLS\"  pool to inform the patient and the pharmacy.    Reggie Moore, [facility]      "

## 2024-10-07 RX ORDER — ALBUTEROL SULFATE 90 UG/1
AEROSOL, METERED RESPIRATORY (INHALATION)
Qty: 18 G | Refills: 1 | Status: SHIPPED | OUTPATIENT
Start: 2024-10-07 | End: 2024-10-31

## 2024-10-07 RX ORDER — MYCOPHENOLATE MOFETIL 250 MG/1
500 CAPSULE ORAL 2 TIMES DAILY
Qty: 120 CAPSULE | Refills: 11 | Status: SHIPPED | OUTPATIENT
Start: 2024-10-07

## 2024-10-08 ENCOUNTER — DOCUMENTATION ONLY (OUTPATIENT)
Dept: FAMILY MEDICINE | Facility: CLINIC | Age: 59
End: 2024-10-08
Payer: MEDICARE

## 2024-10-08 DIAGNOSIS — H47.20 OPTIC ATROPHY: Primary | ICD-10-CM

## 2024-10-08 DIAGNOSIS — H43.393 VITREOUS SYNERESIS OF BOTH EYES: ICD-10-CM

## 2024-10-08 NOTE — PROGRESS NOTES
"When opening a documentation only encounter, be sure to enter in \"Chief Complaint\" Forms and in \" Comments\" Title of form, description if needed.    Flor is a 59 year old  female  Form received via: Fax  Form now resides in: Provider Ready    [facility] - Lift ChairFace to Face    Mark Morales"

## 2024-10-09 ENCOUNTER — OFFICE VISIT (OUTPATIENT)
Dept: FAMILY MEDICINE | Facility: CLINIC | Age: 59
End: 2024-10-09
Payer: MEDICARE

## 2024-10-09 VITALS
HEIGHT: 60 IN | HEART RATE: 52 BPM | OXYGEN SATURATION: 94 % | RESPIRATION RATE: 14 BRPM | SYSTOLIC BLOOD PRESSURE: 120 MMHG | BODY MASS INDEX: 44.61 KG/M2 | WEIGHT: 227.2 LBS | DIASTOLIC BLOOD PRESSURE: 74 MMHG | TEMPERATURE: 97.7 F

## 2024-10-09 DIAGNOSIS — M1A.30X0 CHRONIC GOUT DUE TO RENAL IMPAIRMENT WITHOUT TOPHUS, UNSPECIFIED SITE: ICD-10-CM

## 2024-10-09 DIAGNOSIS — M81.8 OTHER OSTEOPOROSIS WITHOUT CURRENT PATHOLOGICAL FRACTURE: ICD-10-CM

## 2024-10-09 DIAGNOSIS — Z23 HIGH PRIORITY FOR 2019-NCOV VACCINE: ICD-10-CM

## 2024-10-09 DIAGNOSIS — M1A.3710 CHRONIC GOUT DUE TO RENAL IMPAIRMENT INVOLVING TOE OF RIGHT FOOT WITHOUT TOPHUS: ICD-10-CM

## 2024-10-09 DIAGNOSIS — I63.9 HEMIPLEGIA OF RIGHT DOMINANT SIDE DUE TO INFARCTION OF BRAIN (H): Primary | ICD-10-CM

## 2024-10-09 DIAGNOSIS — N17.9 ACUTE KIDNEY INJURY SUPERIMPOSED ON CKD (H): ICD-10-CM

## 2024-10-09 DIAGNOSIS — Z02.89 ENCOUNTER FOR COMPLETION OF FORM WITH PATIENT: ICD-10-CM

## 2024-10-09 DIAGNOSIS — N18.9 ACUTE KIDNEY INJURY SUPERIMPOSED ON CKD (H): ICD-10-CM

## 2024-10-09 DIAGNOSIS — R60.0 BILATERAL LEG EDEMA: Primary | ICD-10-CM

## 2024-10-09 DIAGNOSIS — N18.32 STAGE 3B CHRONIC KIDNEY DISEASE (H): ICD-10-CM

## 2024-10-09 DIAGNOSIS — I12.9 HYPERTENSION, RENAL: ICD-10-CM

## 2024-10-09 DIAGNOSIS — G81.91 HEMIPLEGIA OF RIGHT DOMINANT SIDE DUE TO INFARCTION OF BRAIN (H): Primary | ICD-10-CM

## 2024-10-09 DIAGNOSIS — Z23 NEED FOR PROPHYLACTIC VACCINATION AND INOCULATION AGAINST INFLUENZA: ICD-10-CM

## 2024-10-09 PROCEDURE — 99214 OFFICE O/P EST MOD 30 MIN: CPT | Mod: 25 | Performed by: FAMILY MEDICINE

## 2024-10-09 PROCEDURE — 90480 ADMN SARSCOV2 VAC 1/ONLY CMP: CPT | Performed by: FAMILY MEDICINE

## 2024-10-09 PROCEDURE — 90656 IIV3 VACC NO PRSV 0.5 ML IM: CPT | Performed by: FAMILY MEDICINE

## 2024-10-09 PROCEDURE — 91320 SARSCV2 VAC 30MCG TRS-SUC IM: CPT | Performed by: FAMILY MEDICINE

## 2024-10-09 PROCEDURE — G0008 ADMIN INFLUENZA VIRUS VAC: HCPCS | Performed by: FAMILY MEDICINE

## 2024-10-09 ASSESSMENT — PATIENT HEALTH QUESTIONNAIRE - PHQ9
SUM OF ALL RESPONSES TO PHQ QUESTIONS 1-9: 4
10. IF YOU CHECKED OFF ANY PROBLEMS, HOW DIFFICULT HAVE THESE PROBLEMS MADE IT FOR YOU TO DO YOUR WORK, TAKE CARE OF THINGS AT HOME, OR GET ALONG WITH OTHER PEOPLE: NOT DIFFICULT AT ALL
SUM OF ALL RESPONSES TO PHQ QUESTIONS 1-9: 4

## 2024-10-09 NOTE — PATIENT INSTRUCTIONS
Stop Alendronate (Fosamax) and Chlorthalidone.  Call [phone] to schedule DEXA bone density scan.  I will add on gout labs to your labs on Monday 10/14. I may make a change to your febuxostat dosage after these labs.  Mention to eye doctor that you are having watery eyes.   Flu and COVID shot today.   Follow-up with me in 1 month.

## 2024-10-09 NOTE — Clinical Note
Got lift chair paperwork again - refilled out, PCS to put in my folder to SIGN (that might have been missing last time ) and then hopeing to have you follow up  c

## 2024-10-09 NOTE — PROGRESS NOTES
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 Filled this out again. Will ask care coordination to follow-up with them to ensure that she gets her chair.     Acute kidney injury superimposed on CKD stage 3b, possibly stage 4  Complicated by episodes of hypotension especially as she's trying to lose weight and change her PO intake. In consultation with renal, we have stopped chlorthalidone and home bps have been better with son reporting nearly all blood pressures are greater than 110 and all below 140 systolic (2 exceptions). She remains on furosemide and amlodipine and BP's Better than home RN reported one month ago. Cyclosporin levels suprtherapeutic and addressed by Dr. Anne and her dose has been changed. She's been working on increasing fresh fruits and vegetable, and repeat UA, renal panel, and UPCR on Monday. If creatinine continues to worsen, renal will consider further testing. Her creatinine clearance is low enough to require other dose adjustments possibly to febuxostat and definitely eliminating Alendronate and dose adjusting cyclosporin. This will require ongoing monitoring.     The longitudinal plan of care for the diagnosis(es)/condition(s) as documented were addressed during this visit. Due to the added complexity in care, I will continue to support Flor in the subsequent management and with ongoing continuity of care.    Other osteoporosis without current pathological fracture  Will start on Q6 month Prolia injections managed by pharmacy here at [facility] as creatinine clearance no longer allows Alendronate. Needs updated DEXA and encouraged to schedule this.   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   - Uric acid    Need for prophylactic vaccination and inoculation against influenza  - INFLUENZA  VACCINE TRIVALENT(FLUBLOK)    High priority for 2019-nCoV vaccine  - COVID-19 12+ (PFIZER)    Encounter for completion of form with patient  Sent forms to [facility], and completed Adult  forms, see scanned.     33 minutes spent by me on the date of the encounter doing chart review, history and exam, documentation and further activities per the note    No follow-ups on file.    West Ray is a 59 year old, presenting for the following health issues:  Follow Up, Imm/Inj (Flu Shot), and Imm/Inj (COVID-19 VACCINE)        10/9/2024     4:19 PM   Additional Questions   Roomed by Hugh   Accompanied by Son         10/9/2024    Information    services provided? Yes   Language Spanish   Type of interpretation provided Telephone    name Victoria BANUELOS   Presents with her son Carleen. Paperwork requested from [facility] requesting medication changes as well as from [facility] regarding a chair lift mechanism. Instructed them to stop both alendronate and chlorthalidone.     CASTILLO on CKD  She has had recurrent episodes of CASTILLO, though it has at times been unclear if she has a wondering baseline. She has had creatinines as low as 1.2 in the last year. So suspect her bumps into 1.8 or 1.9 represent true CASTILLO in particular because these episodes are associated with hypotension. Most up to date repeated creatinine 1.8 on 9/23/2024 with a GFR of 22. As a result she saw Dr. Anne, her transplant physician, and had her cyclosporin dose adjusted. She also saw pharmacy, Dr. Mantilla, for switching her osteoporosis medications. It was recommended that she stop her alendronate for low creatinine clearance especially calculating likely true age of 69.     Next labs are scheduled for Monday 10/14.     Blood pressures  She is taking home blood pressures. Friday was 109/68. Saturday and Sunday were not recorded but son reports they were around the same numbers. She has  stopped chorthalidone.     Watery eyes  She reports that her eyes are watering a lot. This is making it difficult for her to see at times. Eye drops prescribed last year are helping with itchiness of eyes but not with watering. She does have an eye doctor appointment scheduled. She feels that there is something covering her eye.       Review of Systems  Positive for:   Negative for:     This document serves as a record of the services and decisions personally performed and made by Karely Sierra MD. It was created on his/her behalf by Alcides Galeana, a trained medical scribe. The creation of this document is based the provider's statements to the medical scribe.  Scribe Alcides Galeana 4:37 PM, October 9, 2024       Objective    /74   Pulse 52   Temp 97.7  F (36.5  C) (Temporal)   Resp 14   Ht 1.524 m (5')   Wt 103.1 kg (227 lb 3.2 oz)   LMP  (LMP Unknown)   SpO2 94%   BMI 44.37 kg/m    Body mass index is 44.37 kg/m .  Wt Readings from Last 10 Encounters:   10/09/24 103.1 kg (227 lb 3.2 oz)   09/09/24 103 kg (227 lb 1.6 oz)   03/27/24 104.8 kg (231 lb 1.6 oz)   03/25/24 103.4 kg (228 lb)   01/15/24 104.7 kg (230 lb 12.8 oz)   12/26/23 106.5 kg (234 lb 12.8 oz)   12/14/23 105.7 kg (233 lb)   11/21/23 106.5 kg (234 lb 12.8 oz)   11/08/23 106.6 kg (235 lb)   09/15/23 107.6 kg (237 lb 3.2 oz)   Physical Exam   GENERAL: alert and no distress  PSYCH: mentation appears normal, affect normal/bright      Signed Electronically by: Karely Sierra MD

## 2024-10-10 NOTE — TELEPHONE ENCOUNTER

## 2024-10-11 ENCOUNTER — DOCUMENTATION ONLY (OUTPATIENT)
Dept: FAMILY MEDICINE | Facility: CLINIC | Age: 59
End: 2024-10-11
Payer: MEDICARE

## 2024-10-11 RX ORDER — AMLODIPINE BESYLATE 10 MG/1
10 TABLET ORAL DAILY
Qty: 90 TABLET | Refills: 3 | Status: SHIPPED | OUTPATIENT
Start: 2024-10-11

## 2024-10-11 RX ORDER — FEBUXOSTAT 40 MG/1
40 TABLET, FILM COATED ORAL DAILY
Qty: 90 TABLET | Refills: 3 | Status: SHIPPED | OUTPATIENT
Start: 2024-10-11 | End: 2024-10-20

## 2024-10-11 RX ORDER — FUROSEMIDE 20 MG/1
20 TABLET ORAL 2 TIMES DAILY
Qty: 180 TABLET | Refills: 1 | Status: SHIPPED | OUTPATIENT
Start: 2024-10-11 | End: 2025-04-09

## 2024-10-11 NOTE — PROGRESS NOTES
"When opening a documentation only encounter, be sure to enter in \"Chief Complaint\" Forms and in \" Comments\" Title of form, description if needed.    Flor is a 59 year old  female  Form received via: Fax  Form now resides in: Provider Ready    [facility] Prescriber Response Form - Case #[id number]    Mark Morales               "

## 2024-10-11 NOTE — PROGRESS NOTES
Form has been completed by provider.     Form sent out via: Fax to [facility] at Fax Number: [phone]  Patient informed: No, Reason:no  Output date: October 11, 2024    Mark Morales      **Please close the encounter**

## 2024-10-14 ENCOUNTER — LAB (OUTPATIENT)
Dept: LAB | Facility: CLINIC | Age: 59
End: 2024-10-14
Payer: MEDICARE

## 2024-10-14 DIAGNOSIS — M1A.30X0 CHRONIC GOUT DUE TO RENAL IMPAIRMENT WITHOUT TOPHUS, UNSPECIFIED SITE: ICD-10-CM

## 2024-10-14 DIAGNOSIS — Z94.4 LIVER REPLACED BY TRANSPLANT (H): ICD-10-CM

## 2024-10-14 LAB
ALBUMIN SERPL BCG-MCNC: 4.1 G/DL (ref 3.5–5.2)
ALP SERPL-CCNC: 89 U/L (ref 40–150)
ALT SERPL W P-5'-P-CCNC: 16 U/L (ref 0–50)
ANION GAP SERPL CALCULATED.3IONS-SCNC: 11 MMOL/L (ref 7–15)
AST SERPL W P-5'-P-CCNC: 23 U/L (ref 0–45)
BILIRUB DIRECT SERPL-MCNC: <0.2 MG/DL (ref 0–0.3)
BILIRUB SERPL-MCNC: 0.7 MG/DL
BUN SERPL-MCNC: 34.1 MG/DL (ref 8–23)
CALCIUM SERPL-MCNC: 9.5 MG/DL (ref 8.8–10.4)
CHLORIDE SERPL-SCNC: 107 MMOL/L (ref 98–107)
CREAT SERPL-MCNC: 1.69 MG/DL (ref 0.51–0.95)
CYCLOSPORINE BLD LC/MS/MS-MCNC: 73 UG/L (ref 50–400)
EGFRCR SERPLBLD CKD-EPI 2021: 34 ML/MIN/1.73M2
ERYTHROCYTE [DISTWIDTH] IN BLOOD BY AUTOMATED COUNT: 13.2 % (ref 10–15)
GLUCOSE SERPL-MCNC: 102 MG/DL (ref 70–99)
HCO3 SERPL-SCNC: 23 MMOL/L (ref 22–29)
HCT VFR BLD AUTO: 37.5 % (ref 35–47)
HGB BLD-MCNC: 12.1 G/DL (ref 11.7–15.7)
MCH RBC QN AUTO: 29.2 PG (ref 26.5–33)
MCHC RBC AUTO-ENTMCNC: 32.3 G/DL (ref 31.5–36.5)
MCV RBC AUTO: 91 FL (ref 78–100)
PLATELET # BLD AUTO: 174 10E3/UL (ref 150–450)
POTASSIUM SERPL-SCNC: 4.6 MMOL/L (ref 3.4–5.3)
PROT SERPL-MCNC: 7.5 G/DL (ref 6.4–8.3)
RBC # BLD AUTO: 4.14 10E6/UL (ref 3.8–5.2)
SODIUM SERPL-SCNC: 141 MMOL/L (ref 135–145)
TME LAST DOSE: NORMAL H
TME LAST DOSE: NORMAL H
URATE SERPL-MCNC: 6.4 MG/DL (ref 2.4–5.7)
WBC # BLD AUTO: 4.4 10E3/UL (ref 4–11)

## 2024-10-14 PROCEDURE — 99000 SPECIMEN HANDLING OFFICE-LAB: CPT | Performed by: PATHOLOGY

## 2024-10-14 PROCEDURE — 80053 COMPREHEN METABOLIC PANEL: CPT | Performed by: PATHOLOGY

## 2024-10-14 PROCEDURE — 85027 COMPLETE CBC AUTOMATED: CPT | Performed by: PATHOLOGY

## 2024-10-14 PROCEDURE — 84550 ASSAY OF BLOOD/URIC ACID: CPT | Performed by: PATHOLOGY

## 2024-10-14 PROCEDURE — 80158 DRUG ASSAY CYCLOSPORINE: CPT | Performed by: INTERNAL MEDICINE

## 2024-10-14 PROCEDURE — 36415 COLL VENOUS BLD VENIPUNCTURE: CPT | Performed by: PATHOLOGY

## 2024-10-14 PROCEDURE — 82248 BILIRUBIN DIRECT: CPT | Performed by: PATHOLOGY

## 2024-10-16 ENCOUNTER — MEDICAL CORRESPONDENCE (OUTPATIENT)
Dept: HEALTH INFORMATION MANAGEMENT | Facility: CLINIC | Age: 59
End: 2024-10-16
Payer: MEDICARE

## 2024-10-18 ENCOUNTER — TELEPHONE (OUTPATIENT)
Dept: FAMILY MEDICINE | Facility: CLINIC | Age: 59
End: 2024-10-18
Payer: MEDICARE

## 2024-10-18 NOTE — TELEPHONE ENCOUNTER
"Pt nurse called because pt has had a CPAP machine for the last two years and machine is now \"not blowing at full force\" and \"I know what my machine is supposed to feel like, I've had it for 2 years\". Pt nurse wanted to reach out to Dr. Sierra / team to see if that's something she would be eligible to replace. Pt wants this issue resolved asap (in regards to me reminding her of her 11/15 appt)    Asked if we would call her son (emergency contact) in regards to the machine  [phone]     Corwin Okeefe  "

## 2024-10-20 DIAGNOSIS — M1A.3710 CHRONIC GOUT DUE TO RENAL IMPAIRMENT INVOLVING TOE OF RIGHT FOOT WITHOUT TOPHUS: ICD-10-CM

## 2024-10-20 RX ORDER — FEBUXOSTAT 40 MG/1
40 TABLET, FILM COATED ORAL DAILY
Qty: 90 TABLET | Refills: 3 | Status: SHIPPED | OUTPATIENT
Start: 2024-10-20

## 2024-10-21 NOTE — TELEPHONE ENCOUNTER
Per Dr. Sierra:    We do not manage CPAP issues so she has to go through sleep medicine. She may have a DME company (usually listed on the actual machine) that she can call to help troubleshoot, but she should be seeing sleep at least yearly to manage her CPAP     Attempted to reach CC, lmtcb    Mignon Schultz RN

## 2024-10-22 ENCOUNTER — OFFICE VISIT (OUTPATIENT)
Dept: OPHTHALMOLOGY | Facility: CLINIC | Age: 59
End: 2024-10-22
Attending: FAMILY MEDICINE
Payer: MEDICARE

## 2024-10-22 DIAGNOSIS — H43.393 VITREOUS SYNERESIS OF BOTH EYES: ICD-10-CM

## 2024-10-22 DIAGNOSIS — H47.20 OPTIC ATROPHY: ICD-10-CM

## 2024-10-22 DIAGNOSIS — H43.812 POSTERIOR VITREOUS DETACHMENT, LEFT: ICD-10-CM

## 2024-10-22 PROCEDURE — 92250 FUNDUS PHOTOGRAPHY W/I&R: CPT | Performed by: OPHTHALMOLOGY

## 2024-10-22 PROCEDURE — 92134 CPTRZ OPH DX IMG PST SGM RTA: CPT | Performed by: OPHTHALMOLOGY

## 2024-10-22 PROCEDURE — 99214 OFFICE O/P EST MOD 30 MIN: CPT | Mod: GC | Performed by: OPHTHALMOLOGY

## 2024-10-22 PROCEDURE — G0463 HOSPITAL OUTPT CLINIC VISIT: HCPCS | Performed by: OPHTHALMOLOGY

## 2024-10-22 PROCEDURE — 99207 FUNDUS AUTOFLUORESCENCE IMAGE (FAF) OU (BOTH EYES): CPT | Mod: 26 | Performed by: OPHTHALMOLOGY

## 2024-10-22 PROCEDURE — 92015 DETERMINE REFRACTIVE STATE: CPT | Mod: GY

## 2024-10-22 ASSESSMENT — REFRACTION_MANIFEST
OD_ADD: +2.75
OD_CYLINDER: +0.75
OS_CYLINDER: +0.50
OS_ADD: +2.75
OS_SPHERE: -1.00
OS_AXIS: 167
OD_AXIS: 073
OD_SPHERE: -1.00

## 2024-10-22 ASSESSMENT — CONF VISUAL FIELD
OD_SUPERIOR_NASAL_RESTRICTION: 0
OD_INFERIOR_NASAL_RESTRICTION: 0
OS_SUPERIOR_TEMPORAL_RESTRICTION: 0
OD_SUPERIOR_TEMPORAL_RESTRICTION: 0
OS_INFERIOR_NASAL_RESTRICTION: 0
METHOD: COUNTING FINGERS
OS_NORMAL: 1
OD_NORMAL: 1
OS_INFERIOR_TEMPORAL_RESTRICTION: 0
OS_SUPERIOR_NASAL_RESTRICTION: 0
OD_INFERIOR_TEMPORAL_RESTRICTION: 0

## 2024-10-22 ASSESSMENT — VISUAL ACUITY
OD_PH_CC: 20/50
CORRECTION_TYPE: GLASSES
OD_CC: 20/60
OS_CC: 20/50

## 2024-10-22 ASSESSMENT — CUP TO DISC RATIO
OD_RATIO: 0.1
OS_RATIO: 0.05

## 2024-10-22 ASSESSMENT — TONOMETRY
OD_IOP_MMHG: 08
OS_IOP_MMHG: 06
IOP_METHOD: TONOPEN

## 2024-10-22 ASSESSMENT — REFRACTION_WEARINGRX
OS_SPHERE: -1.00
OD_SPHERE: -1.50
OD_ADD: +3.00
OD_CYLINDER: +0.75
OS_AXIS: 167
OS_CYLINDER: +0.50
OS_ADD: +3.00
OD_AXIS: 066

## 2024-10-22 ASSESSMENT — EXTERNAL EXAM - LEFT EYE: OS_EXAM: NORMAL

## 2024-10-22 ASSESSMENT — EXTERNAL EXAM - RIGHT EYE: OD_EXAM: NORMAL

## 2024-10-22 NOTE — PROGRESS NOTES
"CC -   PVD OS    INTERVAL HISTORY - Initial visit. She is here mainly to talk about \"smoke\" in front of her eyes which has been going on for 6 months. She was referred by her PCP for an annual exam. She uses ketotifen eye drops.     [facility] -   Flor Navarro is a  59 year old year-old patient with history of optic atrophy left eye, meningioma, CKD, HTN, hepatic transplant, SURI, and CVA.       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 Barreto 2021.    - ?d/t NAION per Estevan   - Unlikely to be the cause of bilateral subjective complaints.   - Most recent MRI 6/2023 stable     # Dry eye and lower lid laxity   - Saw Dr. 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   No new retinal pathology or ON identified to explain symptoms.   Will trial artificial tears and follow up with general ophthalmology in 1 months   - if not improved may need to see Estevan again      No follow-ups on file.  General ophthalmology 1-2 months    Inderjit Sims MD  Resident Physician, PGY-3  Department of Ophthalmology     ATTESTATION     Attending Attestation:     Complete documentation of historical and exam elements from today's encounter can be found in the full encounter summary report (not reduplicated in this progress note).  I personally obtained the chief " complaint(s) and history of present illness.  I confirmed and edited as necessary the review of systems, past medical/surgical history, family history, social history, and examination findings as documented by others; and I examined the patient myself.  I personally reviewed the relevant tests, images, and reports as documented above.  I personally reviewed the ophthalmic test(s) associated with this encounter, agree with the interpretation(s) as documented by the resident/fellow, and have edited the corresponding report(s) as necessary.   I formulated and edited as necessary the assessment and plan and discussed the findings and management plan with the patient and family    Radha Cook MD, PhD  , Vitreoretinal Surgery  Department of Ophthalmology  [facility]

## 2024-10-24 NOTE — PROGRESS NOTES
Form has been completed by provider.     Form sent out via: Fax to elmo martin at Fax Number: [phone]  Patient informed: No, Reason:no  Output date: October 24, 2024    Mark Morales      **Please close the encounter**

## 2024-10-25 ENCOUNTER — TELEPHONE (OUTPATIENT)
Dept: FAMILY MEDICINE | Facility: CLINIC | Age: 59
End: 2024-10-25
Payer: MEDICARE

## 2024-10-25 DIAGNOSIS — G63 NEUROPATHY DUE TO MEDICAL CONDITION (H): Primary | ICD-10-CM

## 2024-10-25 RX ORDER — LIDOCAINE 50 MG/G
1 PATCH TOPICAL EVERY 24 HOURS
Qty: 90 PATCH | Refills: 3 | Status: SHIPPED | OUTPATIENT
Start: 2024-10-25

## 2024-10-25 NOTE — TELEPHONE ENCOUNTER
Pt is requesting new rx for    Lidocaine 5% ptch    Did not see on active med list please verify and send new rx. Thank you!     [facility]  [phone]

## 2024-10-25 NOTE — TELEPHONE ENCOUNTER
"Called and spoke with the son, he said that \"she used to receive the patches\", this is for patient's back pain.    I explained that most insurance is not covering this a it is available over the counter, he said they only want it if it is covered.    Sending to provider to review request.    Mignon Schultz RN    "

## 2024-10-25 NOTE — TELEPHONE ENCOUNTER
Called [facility] and relayed provider message below, gave the name of the sleep provider and phone number to call. Patient last seen 12/2023 so should schedule.    Mignon Schultz RN

## 2024-10-28 ENCOUNTER — ANCILLARY PROCEDURE (OUTPATIENT)
Dept: BONE DENSITY | Facility: CLINIC | Age: 59
End: 2024-10-28
Attending: FAMILY MEDICINE
Payer: MEDICARE

## 2024-10-28 ENCOUNTER — TELEPHONE (OUTPATIENT)
Dept: FAMILY MEDICINE | Facility: CLINIC | Age: 59
End: 2024-10-28

## 2024-10-28 DIAGNOSIS — M81.8 OTHER OSTEOPOROSIS WITHOUT CURRENT PATHOLOGICAL FRACTURE: ICD-10-CM

## 2024-10-28 PROCEDURE — 77080 DXA BONE DENSITY AXIAL: CPT | Performed by: INTERNAL MEDICINE

## 2024-10-28 NOTE — TELEPHONE ENCOUNTER
CC called [facility] to check in on status of Lift Chair as forms were sent out last Thursday. Customer service reported that they had all the forms they needed, but that the chair had been denied through insurance. They are currently attempting to authorize through her waiver.     Bayron Cardoso  Care Coordinator- [facility] Medicine  [phone]

## 2024-10-29 ENCOUNTER — TELEPHONE (OUTPATIENT)
Dept: FAMILY MEDICINE | Facility: CLINIC | Age: 59
End: 2024-10-29
Payer: MEDICARE

## 2024-10-31 ENCOUNTER — OFFICE VISIT (OUTPATIENT)
Dept: PULMONOLOGY | Facility: CLINIC | Age: 59
End: 2024-10-31
Attending: INTERNAL MEDICINE
Payer: MEDICARE

## 2024-10-31 VITALS
BODY MASS INDEX: 43.94 KG/M2 | HEART RATE: 54 BPM | TEMPERATURE: 97.8 F | OXYGEN SATURATION: 94 % | WEIGHT: 225 LBS | DIASTOLIC BLOOD PRESSURE: 85 MMHG | SYSTOLIC BLOOD PRESSURE: 135 MMHG

## 2024-10-31 DIAGNOSIS — J44.9 CHRONIC OBSTRUCTIVE PULMONARY DISEASE, UNSPECIFIED COPD TYPE (H): Primary | ICD-10-CM

## 2024-10-31 DIAGNOSIS — J44.9 CHRONIC OBSTRUCTIVE PULMONARY DISEASE, UNSPECIFIED COPD TYPE (H): ICD-10-CM

## 2024-10-31 LAB
DLCOUNC-%PRED-PRE: 80 %
DLCOUNC-PRE: 14.68 ML/MIN/MMHG
DLCOUNC-PRED: 18.28 ML/MIN/MMHG
ERV-%PRED-PRE: 25 %
ERV-PRE: 0.25 L
ERV-PRED: 0.97 L
EXPTIME-PRE: 7.15 SEC
FEF2575-%PRED-PRE: 67 %
FEF2575-PRE: 1.36 L/SEC
FEF2575-PRED: 2.01 L/SEC
FEFMAX-%PRED-PRE: 85 %
FEFMAX-PRE: 5.01 L/SEC
FEFMAX-PRED: 5.84 L/SEC
FEV1-%PRED-PRE: 77 %
FEV1-PRE: 1.63 L
FEV1FEV6-PRE: 78 %
FEV1FEV6-PRED: 81 %
FEV1FVC-PRE: 79 %
FEV1FVC-PRED: 81 %
FEV1SVC-PRE: 83 %
FEV1SVC-PRED: 72 %
FIFMAX-PRE: 3.56 L/SEC
FVC-%PRED-PRE: 79 %
FVC-PRE: 2.08 L
FVC-PRED: 2.61 L
IC-%PRED-PRE: 88 %
IC-PRE: 1.72 L
IC-PRED: 1.94 L
VA-%PRED-PRE: 77 %
VA-PRE: 3.27 L
VC-%PRED-PRE: 67 %
VC-PRE: 1.97 L
VC-PRED: 2.92 L

## 2024-10-31 PROCEDURE — 94375 RESPIRATORY FLOW VOLUME LOOP: CPT | Performed by: INTERNAL MEDICINE

## 2024-10-31 PROCEDURE — G0463 HOSPITAL OUTPT CLINIC VISIT: HCPCS | Performed by: INTERNAL MEDICINE

## 2024-10-31 PROCEDURE — 99213 OFFICE O/P EST LOW 20 MIN: CPT | Performed by: INTERNAL MEDICINE

## 2024-10-31 PROCEDURE — 94729 DIFFUSING CAPACITY: CPT | Performed by: INTERNAL MEDICINE

## 2024-10-31 RX ORDER — ALBUTEROL SULFATE 90 UG/1
2 AEROSOL, METERED RESPIRATORY (INHALATION) EVERY 4 HOURS PRN
Qty: 54 G | Refills: 3 | Status: SHIPPED | OUTPATIENT
Start: 2024-10-31

## 2024-10-31 ASSESSMENT — PAIN SCALES - GENERAL: PAINLEVEL_OUTOF10: SEVERE PAIN (7)

## 2024-10-31 NOTE — NURSING NOTE
Chief Complaint   Patient presents with    RECHECK     Follow up.      Vitals:    10/31/24 1600   BP: 135/85   BP Location: Right arm   Patient Position: Sitting   Cuff Size: Adult Small   Pulse: 54   Temp: 97.8  F (36.6  C)   TempSrc: Oral   SpO2: 94%   Weight: 102.1 kg (225 lb)       BP Readings from Last 3 Encounters:   10/31/24 135/85   10/09/24 120/74   09/09/24 105/70       /85 (BP Location: Right arm, Patient Position: Sitting, Cuff Size: Adult Small)   Pulse 54   Temp 97.8  F (36.6  C) (Oral)   Wt 102.1 kg (225 lb)   LMP  (LMP Unknown)   SpO2 94%   BMI 43.94 kg/m       Mila Connor

## 2024-10-31 NOTE — LETTER
10/31/2024      Flor Navarro  [address]  [place] [facility] [address]      Dear Colleague,    Thank you for referring your patient, Flor Navarro, to the [facility] FOR LUNG SCIENCE AND [facility] [place]. Please see a copy of my visit note below.    Reason for Visit  Flor Navarro is a 59 year old year old female who is being seen for RECHECK (Follow up. )    ILD HPI    Flor Navarro is a 59-year-old female who follows up today for COPD.  She has a mild COPD treated with inhalers.  The patient is interviewed through a telephone  today.  Overall she states that her breathing is stable.  She notes that she did stop using her Incruse Ellipta inhaler and as apparently she was told that she did not necessarily need to take it however she has felt that her breathing was better when she was on it.  She does use her albuterol inhaler and occasional nebulizer as well.  Otherwise overall doing well with no other new complaints today.      Current Outpatient Medications   Medication Sig Dispense Refill     ACE/ARB/ARNI NOT PRESCRIBED (INTENTIONAL) Please choose reason not prescribed from choices below.       albuterol (ACCUNEB) 0.63 MG/3ML neb solution Take 3 mLs (0.63 mg) by nebulization every 4 hours as needed for shortness of breath, wheezing or cough 360 mL 4     albuterol (VENTOLIN HFA) 108 (90 Base) MCG/ACT inhaler Inhale 2 puffs into the lungs every 4 hours as needed for shortness of breath, wheezing or cough. 54 g 3     amLODIPine (NORVASC) 10 MG tablet Take 1 tablet (10 mg) by mouth daily. 90 tablet 3     atorvastatin (LIPITOR) 40 MG tablet Take 1 tablet (40 mg) by mouth daily 90 tablet 1     calcitRIOL (ROCALTROL) 0.25 MCG capsule Take 1 capsule (0.25 mcg) by mouth daily. 90 capsule 3     capsaicin-menthol-methyl sal 0.025-1-12 % external cream Apply to low back three times daily for pain 170 g 3     carvedilol (COREG) 12.5 MG tablet Take 1 tablet (12.5 mg) by mouth 2 times  daily (with meals) 180 tablet 1     cetirizine (ZYRTEC) 10 MG tablet Take 1 tablet (10 mg) by mouth daily 90 tablet 3     cycloSPORINE modified (GENERIC EQUIVALENT) 25 MG capsule Take 2 capsules (50 mg) by mouth every 12 hours. 120 capsule 11     Denture Care Products (EFFERDENT DENTURE CLEANSER) TBEF Use nightly to clean oral appliance 30 tablet 11     febuxostat (ULORIC) 40 MG TABS tablet Take 1 tablet (40 mg) by mouth daily. 90 tablet 3     furosemide (LASIX) 20 MG tablet Take 1 tablet (20 mg) by mouth 2 times daily. 180 tablet 1     gabapentin (NEURONTIN) 100 MG capsule Take 100mg capsule morning and noon 180 capsule 3     gabapentin (NEURONTIN) 300 MG capsule Take 1 capsule (300 mg) by mouth at bedtime 90 capsule 3     ketotifen (ZADITOR) 0.025 % ophthalmic solution Place 1 drop into both eyes 2 times daily as needed for itching 5 mL 4     lidocaine (LIDODERM) 5 % patch Place 1 patch over 12 hours onto the skin every 24 hours. To prevent lidocaine toxicity, patient should be patch free for 12 hrs daily. 90 patch 3     multivitamin w/minerals (CERTAVITE/ANTIOXIDANTS) tablet TAKE ONE TABLET BY MOUTH ONCE DAILY 100 tablet 3     mycophenolate (GENERIC EQUIVALENT) 250 MG capsule Take 2 capsules (500 mg) by mouth 2 times daily. 120 capsule 11     omeprazole (PRILOSEC) 20 MG DR capsule Take 1 capsule (20 mg) by mouth 2 times daily el 180 capsule 3     order for DME Equipment being ordered: compression stockings bilateral  Please measure and fit patient for stockings   Strength 15-30mmHg  Disp 2 pairs ( 4 socks)  1 refill over the time of 1 year 4 Units 1     order for DME Equipment being ordered: Nebulizer with adult mask and tubing 1 Units 0     senna-docusate (SENEXON-S) 8.6-50 MG tablet Take 2 tablets by mouth 2 times daily 180 tablet 3     SM FIBER LAXATIVE 500 MG TABS tablet Take 2 tablets (1,000 mg) by mouth daily 90 tablet 3     umeclidinium (INCRUSE ELLIPTA) 62.5 MCG/ACT inhaler Inhale 1 puff into the lungs  daily. 30 each 11     vitamin D3 25 mcg (1000 units) tablet Take 1 tablet (25 mcg) by mouth daily. 100 tablet 3     calcium carbonate-vitamin D (CALTRATE) 600-10 MG-MCG per tablet TAKE ONE TABLET BY MOUTH TWICE A DAY (Patient not taking: Reported on 10/31/2024) 60 tablet 11     Calcium Carbonate-Vitamin D 600-10 MG-MCG TABS Take 1 tablet by mouth 2 times daily Do not take this while you take the levofloxacin. (Patient not taking: Reported on 10/31/2024) 180 tablet 3     Current Facility-Administered Medications   Medication Dose Route Frequency Provider Last Rate Last Admin     denosumab (PROLIA) injection 60 mg  60 mg Subcutaneous Q6 Months          Allergies   Allergen Reactions     Aspirin      3/31/16 Per pt, tolerates 81 mg daily dose without ADR.     325 mg dose caused itchiness and hives.     Clarithromycin      Allergic reaction         Contrast Dye      Iodine      Pcn [Penicillins]      Past Medical History:   Diagnosis Date     Anemia in CKD (chronic kidney disease)      Cataract      CKD (chronic kidney disease) stage 3, GFR 30-59 ml/min (H)      Hepatitis C     cleared virus spontaneously      High risk medication use      Hypertension, renal      Immunosuppressed status (H)      Liver replaced by transplant (H) 2010     Osteoporosis      Recurrent pregnancy loss without current pregnancy      Recurrent UTI 2021     Stroke, hemorrhagic (H) 2008    Left cerebral intraparenchymal hemorrhage     Syncope      Unspecified viral hepatitis C without hepatic coma        Past Surgical History:   Procedure Laterality Date     CATARACT IOL, RT/LT Right 2/18/15      SECTION       Incisional Hernia Repair  2004     INSERT SHUNT PORTAL TRANSJUGULAR INTRAHEPTIC  2005    shunt placement for liver failure     LAPAROSCOPIC SALPINGO-OOPHORECTOMY      left     NECK SURGERY  2010    fracture, in halo x 7months     TRANSPLANT LIVER RECIPIENT  DONOR  10/26/10     Upper GI Endoscopy  with Band Ligation of Esoph/Gastric Varic. .         Social History     Socioeconomic History     Marital status: Single     Spouse name: Not on file     Number of children: Not on file     Years of education: Not on file     Highest education level: Not on file   Occupational History     Not on file   Tobacco Use     Smoking status: Never     Smokeless tobacco: Never   Vaping Use     Vaping status: Never Used   Substance and Sexual Activity     Alcohol use: No     Drug use: No     Sexual activity: Not Currently   Other Topics Concern     Parent/sibling w/ CABG, MI or angioplasty before 65F 55M? Not Asked      Service No     Blood Transfusions Yes     Caffeine Concern No     Occupational Exposure No     Hobby Hazards No     Sleep Concern No     Stress Concern Yes     Comment: needs help at home for cares     Weight Concern Yes     Comment: wants to lose weight not gain weight     Special Diet No     Back Care Yes     Comment: uses a patch to relieve pain     Exercise Not Asked     Bike Helmet Not Asked     Seat Belt Yes     Self-Exams Not Asked   Social History Narrative    One son Carleen    Grandson Jose Francisco born Dec 2023        GynHx:             Gets transportation via insurance - needs assistance due to unsteady gait from CVA     Social Drivers of Health     Financial Resource Strain: High Risk (2020)    Overall Financial Resource Strain (CARDIA)      Difficulty of Paying Living Expenses: Very hard   Food Insecurity: Food Insecurity Present (2020)    Hunger Vital Sign      Worried About Running Out of Food in the Last Year: Sometimes true      Ran Out of Food in the Last Year: Sometimes true   Transportation Needs: Unmet Transportation Needs (2022)    PRAPARE - Transportation      Lack of Transportation (Medical): Yes      Lack of Transportation (Non-Medical): Yes   Physical Activity: Not on file   Stress: Stress Concern Present (1/3/2022)    [place] [employer] -  Occupational Stress Questionnaire      Feeling of Stress : Rather much   Social Connections: Socially Isolated (1/3/2022)    Social Connection and Isolation Panel [NHANES]      Frequency of Communication with Friends and Family: Once a week      Frequency of Social Gatherings with Friends and Family: Once a week      Attends Jain Services: Never      Active Member of Clubs or Organizations: No      Attends Club or Organization Meetings: Never      Marital Status:    Interpersonal Safety: Low Risk  (9/9/2024)    Interpersonal Safety      Do you feel physically and emotionally safe where you currently live?: Yes      Within the past 12 months, have you been hit, slapped, kicked or otherwise physically hurt by someone?: No      Within the past 12 months, have you been humiliated or emotionally abused in other ways by your partner or ex-partner?: No   Housing Stability: Not on file       Family History   Problem Relation Age of Onset     Hepatitis Other         Hep C, still in [place]     Cerebrovascular Disease Other      Cancer No family hx of      Diabetes No family hx of      Hypertension No family hx of      Thyroid Disease No family hx of      Glaucoma No family hx of      Macular Degeneration No family hx of        ROS Pulmonary    A complete ROS was otherwise negative except as noted in the HPI.  Vitals:    10/31/24 1600   BP: 135/85   BP Location: Right arm   Patient Position: Sitting   Cuff Size: Adult Small   Pulse: 54   Temp: 97.8  F (36.6  C)   TempSrc: Oral   SpO2: 94%   Weight: 102.1 kg (225 lb)     Exam:   GENERAL APPEARANCE: Well developed, well nourished, alert, and in no apparent distress.  NECK: supple, no masses, no thyromegaly.  LYMPHATICS: No significant axillary, cervical, or supraclavicular nodes.  RESP: Decreased bilateral breath sounds no crackles.  CV: Normal S1, S2, regular rhythm, normal rate, no rub, no murmur,  no gallop, no LE edema.   ABDOMEN:  Bowel sounds normal, soft,  nontender, no HSM or masses.   MS: extremities normal- no clubbing, no cyanosis.  PSYCH: mentation appears normal. and affect normal/bright  Results: I have reviewed all imaging, PFTs and other relavent tests, please see below for details, PFT and imaging results were reviewed with the patient.  PFTs: Normal spirometry and diffusion.  Stable from previous.    Assessment and plan:    59-year-old female with mild COPD that is stable.  I do think it is reasonable to restart her Incruse Ellipta inhaler as she did feel that she got some benefit from this.  So I will do this.  Otherwise she can continue to use her albuterol as needed.  She will follow-up in 1 year in the general pulmonary clinic I am instructed to call sooner with any changes in her breathing.        CBC   Recent Labs   Lab Test 10/14/24  1115 09/23/24  1047   RBC 4.14 4.28   HGB 12.1 12.5   HCT 37.5 38.5    181       Basic Metabolic Panel  Recent Labs   Lab Test 10/14/24  1115 09/23/24  1047    141   POTASSIUM 4.6 4.1   CHLORIDE 107 107   CO2 23 22   BUN 34.1* 43.5*   * 100*   JUAN 9.5 9.2       INR  Recent Labs   Lab Test 02/25/23  0815 02/24/23  0728   INR 0.89 0.98       PFT      Latest Ref Rng & Units 10/31/2024     3:00 PM   PFT   FVC L 2.08    FEV1 L 1.63    FVC% % 79    FEV1% % 77            CC:        Again, thank you for allowing me to participate in the care of your patient.        Sincerely,        David Morris Perlman, MD

## 2024-10-31 NOTE — TELEPHONE ENCOUNTER
[facility]  Fax:[phone]  Spec: [phone]  MO: [phone]    
PA Initiation    Medication: LIDOCAINE 5 % EX PTCH  Insurance Company: [employer] Part D - Phone [phone] Fax [phone]  Pharmacy Filling the Rx: [facility] - [place], [place] - [facility] [address]  Filling Pharmacy Phone:    Filling Pharmacy Fax:    Start Date: 10/30/2024    
Prior Authorization Approval    Medication: LIDOCAINE 5 % EX PTCH  Authorization Effective Date: 1/1/2024  Authorization Expiration Date: 10/30/2025  Approved Dose/Quantity: 30/30  Reference #: [id number]   Insurance Company: [employer] Part D - Phone [phone] Fax [phone]  Expected CoPay: $    CoPay Card Available:      Financial Assistance Needed:   Which Pharmacy is filling the prescription: [facility] - [place], [place] - [facility] [address]  Pharmacy Notified: Yes  Patient Notified: Yes     
- - -

## 2024-11-01 NOTE — PROGRESS NOTES
Reason for Visit  Flor Navarro is a 59 year old year old female who is being seen for RECHECK (Follow up. )    ILD HPI    Flor aNvarro is a 59-year-old female who follows up today for COPD.  She has a mild COPD treated with inhalers.  The patient is interviewed through a telephone  today.  Overall she states that her breathing is stable.  She notes that she did stop using her Incruse Ellipta inhaler and as apparently she was told that she did not necessarily need to take it however she has felt that her breathing was better when she was on it.  She does use her albuterol inhaler and occasional nebulizer as well.  Otherwise overall doing well with no other new complaints today.      Current Outpatient Medications   Medication Sig Dispense Refill    ACE/ARB/ARNI NOT PRESCRIBED (INTENTIONAL) Please choose reason not prescribed from choices below.      albuterol (ACCUNEB) 0.63 MG/3ML neb solution Take 3 mLs (0.63 mg) by nebulization every 4 hours as needed for shortness of breath, wheezing or cough 360 mL 4    albuterol (VENTOLIN HFA) 108 (90 Base) MCG/ACT inhaler Inhale 2 puffs into the lungs every 4 hours as needed for shortness of breath, wheezing or cough. 54 g 3    amLODIPine (NORVASC) 10 MG tablet Take 1 tablet (10 mg) by mouth daily. 90 tablet 3    atorvastatin (LIPITOR) 40 MG tablet Take 1 tablet (40 mg) by mouth daily 90 tablet 1    calcitRIOL (ROCALTROL) 0.25 MCG capsule Take 1 capsule (0.25 mcg) by mouth daily. 90 capsule 3    capsaicin-menthol-methyl sal 0.025-1-12 % external cream Apply to low back three times daily for pain 170 g 3    carvedilol (COREG) 12.5 MG tablet Take 1 tablet (12.5 mg) by mouth 2 times daily (with meals) 180 tablet 1    cetirizine (ZYRTEC) 10 MG tablet Take 1 tablet (10 mg) by mouth daily 90 tablet 3    cycloSPORINE modified (GENERIC EQUIVALENT) 25 MG capsule Take 2 capsules (50 mg) by mouth every 12 hours. 120 capsule 11    Denture Care Products (EFFERDENT DENTURE  CLEANSER) TBEF Use nightly to clean oral appliance 30 tablet 11    febuxostat (ULORIC) 40 MG TABS tablet Take 1 tablet (40 mg) by mouth daily. 90 tablet 3    furosemide (LASIX) 20 MG tablet Take 1 tablet (20 mg) by mouth 2 times daily. 180 tablet 1    gabapentin (NEURONTIN) 100 MG capsule Take 100mg capsule morning and noon 180 capsule 3    gabapentin (NEURONTIN) 300 MG capsule Take 1 capsule (300 mg) by mouth at bedtime 90 capsule 3    ketotifen (ZADITOR) 0.025 % ophthalmic solution Place 1 drop into both eyes 2 times daily as needed for itching 5 mL 4    lidocaine (LIDODERM) 5 % patch Place 1 patch over 12 hours onto the skin every 24 hours. To prevent lidocaine toxicity, patient should be patch free for 12 hrs daily. 90 patch 3    multivitamin w/minerals (CERTAVITE/ANTIOXIDANTS) tablet TAKE ONE TABLET BY MOUTH ONCE DAILY 100 tablet 3    mycophenolate (GENERIC EQUIVALENT) 250 MG capsule Take 2 capsules (500 mg) by mouth 2 times daily. 120 capsule 11    omeprazole (PRILOSEC) 20 MG DR capsule Take 1 capsule (20 mg) by mouth 2 times daily el 180 capsule 3    order for DME Equipment being ordered: compression stockings bilateral  Please measure and fit patient for stockings   Strength 15-30mmHg  Disp 2 pairs ( 4 socks)  1 refill over the time of 1 year 4 Units 1    order for DME Equipment being ordered: Nebulizer with adult mask and tubing 1 Units 0    senna-docusate (SENEXON-S) 8.6-50 MG tablet Take 2 tablets by mouth 2 times daily 180 tablet 3    SM FIBER LAXATIVE 500 MG TABS tablet Take 2 tablets (1,000 mg) by mouth daily 90 tablet 3    umeclidinium (INCRUSE ELLIPTA) 62.5 MCG/ACT inhaler Inhale 1 puff into the lungs daily. 30 each 11    vitamin D3 25 mcg (1000 units) tablet Take 1 tablet (25 mcg) by mouth daily. 100 tablet 3    calcium carbonate-vitamin D (CALTRATE) 600-10 MG-MCG per tablet TAKE ONE TABLET BY MOUTH TWICE A DAY (Patient not taking: Reported on 10/31/2024) 60 tablet 11    Calcium Carbonate-Vitamin D  600-10 MG-MCG TABS Take 1 tablet by mouth 2 times daily Do not take this while you take the levofloxacin. (Patient not taking: Reported on 10/31/2024) 180 tablet 3     Current Facility-Administered Medications   Medication Dose Route Frequency Provider Last Rate Last Admin    denosumab (PROLIA) injection 60 mg  60 mg Subcutaneous Q6 Months          Allergies   Allergen Reactions    Aspirin      3/31/16 Per pt, tolerates 81 mg daily dose without ADR.     325 mg dose caused itchiness and hives.    Clarithromycin      Allergic reaction        Contrast Dye     Iodine     Pcn [Penicillins]      Past Medical History:   Diagnosis Date    Anemia in CKD (chronic kidney disease)     Cataract     CKD (chronic kidney disease) stage 3, GFR 30-59 ml/min (H)     Hepatitis C     cleared virus spontaneously     High risk medication use     Hypertension, renal     Immunosuppressed status (H)     Liver replaced by transplant (H) 2010    Osteoporosis     Recurrent pregnancy loss without current pregnancy     Recurrent UTI 2021    Stroke, hemorrhagic (H) 2008    Left cerebral intraparenchymal hemorrhage    Syncope     Unspecified viral hepatitis C without hepatic coma        Past Surgical History:   Procedure Laterality Date    CATARACT IOL, RT/LT Right 2/18/15     SECTION      Incisional Hernia Repair  2004    INSERT SHUNT PORTAL TRANSJUGULAR INTRAHEPTIC      shunt placement for liver failure    LAPAROSCOPIC SALPINGO-OOPHORECTOMY      left    NECK SURGERY  2010    fracture, in halo x 7months    TRANSPLANT LIVER RECIPIENT  DONOR  10/26/10    Upper GI Endoscopy with Band Ligation of Esoph/Gastric Varic. .         Social History     Socioeconomic History    Marital status: Single     Spouse name: Not on file    Number of children: Not on file    Years of education: Not on file    Highest education level: Not on file   Occupational History    Not on file   Tobacco Use    Smoking status: Never     Smokeless tobacco: Never   Vaping Use    Vaping status: Never Used   Substance and Sexual Activity    Alcohol use: No    Drug use: No    Sexual activity: Not Currently   Other Topics Concern    Parent/sibling w/ CABG, MI or angioplasty before 65F 55M? Not Asked     Service No    Blood Transfusions Yes    Caffeine Concern No    Occupational Exposure No    Hobby Hazards No    Sleep Concern No    Stress Concern Yes     Comment: needs help at home for cares    Weight Concern Yes     Comment: wants to lose weight not gain weight    Special Diet No    Back Care Yes     Comment: uses a patch to relieve pain    Exercise Not Asked    Bike Helmet Not Asked    Seat Belt Yes    Self-Exams Not Asked   Social History Narrative    One son Carleen    Grandson Jose Francisco born Dec 2023        GynHx:             Gets transportation via insurance - needs assistance due to unsteady gait from CVA     Social Drivers of Health     Financial Resource Strain: High Risk (2020)    Overall Financial Resource Strain (CARDIA)     Difficulty of Paying Living Expenses: Very hard   Food Insecurity: Food Insecurity Present (2020)    Hunger Vital Sign     Worried About Running Out of Food in the Last Year: Sometimes true     Ran Out of Food in the Last Year: Sometimes true   Transportation Needs: Unmet Transportation Needs (2022)    PRAPARE - Transportation     Lack of Transportation (Medical): Yes     Lack of Transportation (Non-Medical): Yes   Physical Activity: Not on file   Stress: Stress Concern Present (1/3/2022)    [facility] - Occupational Stress Questionnaire     Feeling of Stress : Rather much   Social Connections: Socially Isolated (1/3/2022)    Social Connection and Isolation Panel [NHANES]     Frequency of Communication with Friends and Family: Once a week     Frequency of Social Gatherings with Friends and Family: Once a week     Attends Confucianism Services: Never     Active Member  of Clubs or Organizations: No     Attends Club or Organization Meetings: Never     Marital Status:    Interpersonal Safety: Low Risk  (9/9/2024)    Interpersonal Safety     Do you feel physically and emotionally safe where you currently live?: Yes     Within the past 12 months, have you been hit, slapped, kicked or otherwise physically hurt by someone?: No     Within the past 12 months, have you been humiliated or emotionally abused in other ways by your partner or ex-partner?: No   Housing Stability: Not on file       Family History   Problem Relation Age of Onset    Hepatitis Other         Hep C, still in [place]    Cerebrovascular Disease Other     Cancer No family hx of     Diabetes No family hx of     Hypertension No family hx of     Thyroid Disease No family hx of     Glaucoma No family hx of     Macular Degeneration No family hx of        ROS Pulmonary    A complete ROS was otherwise negative except as noted in the HPI.  Vitals:    10/31/24 1600   BP: 135/85   BP Location: Right arm   Patient Position: Sitting   Cuff Size: Adult Small   Pulse: 54   Temp: 97.8  F (36.6  C)   TempSrc: Oral   SpO2: 94%   Weight: 102.1 kg (225 lb)     Exam:   GENERAL APPEARANCE: Well developed, well nourished, alert, and in no apparent distress.  NECK: supple, no masses, no thyromegaly.  LYMPHATICS: No significant axillary, cervical, or supraclavicular nodes.  RESP: Decreased bilateral breath sounds no crackles.  CV: Normal S1, S2, regular rhythm, normal rate, no rub, no murmur,  no gallop, no LE edema.   ABDOMEN:  Bowel sounds normal, soft, nontender, no HSM or masses.   MS: extremities normal- no clubbing, no cyanosis.  PSYCH: mentation appears normal. and affect normal/bright  Results: I have reviewed all imaging, PFTs and other relavent tests, please see below for details, PFT and imaging results were reviewed with the patient.  PFTs: Normal spirometry and diffusion.  Stable from previous.    Assessment and  plan:    59-year-old female with mild COPD that is stable.  I do think it is reasonable to restart her Incruse Ellipta inhaler as she did feel that she got some benefit from this.  So I will do this.  Otherwise she can continue to use her albuterol as needed.  She will follow-up in 1 year in the general pulmonary clinic I am instructed to call sooner with any changes in her breathing.        CBC   Recent Labs   Lab Test 10/14/24  1115 09/23/24  1047   RBC 4.14 4.28   HGB 12.1 12.5   HCT 37.5 38.5    181       Basic Metabolic Panel  Recent Labs   Lab Test 10/14/24  1115 09/23/24  1047    141   POTASSIUM 4.6 4.1   CHLORIDE 107 107   CO2 23 22   BUN 34.1* 43.5*   * 100*   JUAN 9.5 9.2       INR  Recent Labs   Lab Test 02/25/23  0815 02/24/23  0728   INR 0.89 0.98       PFT      Latest Ref Rng & Units 10/31/2024     3:00 PM   PFT   FVC L 2.08    FEV1 L 1.63    FVC% % 79    FEV1% % 77            CC:

## 2024-11-11 ENCOUNTER — OFFICE VISIT (OUTPATIENT)
Dept: GASTROENTEROLOGY | Facility: CLINIC | Age: 59
End: 2024-11-11
Attending: INTERNAL MEDICINE
Payer: MEDICARE

## 2024-11-11 VITALS
BODY MASS INDEX: 44.05 KG/M2 | HEIGHT: 60 IN | DIASTOLIC BLOOD PRESSURE: 71 MMHG | HEART RATE: 61 BPM | OXYGEN SATURATION: 98 % | WEIGHT: 224.38 LBS | SYSTOLIC BLOOD PRESSURE: 119 MMHG

## 2024-11-11 DIAGNOSIS — Z94.4 LIVER REPLACED BY TRANSPLANT (H): Primary | ICD-10-CM

## 2024-11-11 PROCEDURE — G0463 HOSPITAL OUTPT CLINIC VISIT: HCPCS | Performed by: INTERNAL MEDICINE

## 2024-11-11 PROCEDURE — 99215 OFFICE O/P EST HI 40 MIN: CPT | Performed by: INTERNAL MEDICINE

## 2024-11-11 ASSESSMENT — PAIN SCALES - GENERAL: PAINLEVEL_OUTOF10: NO PAIN (0)

## 2024-11-11 NOTE — NURSING NOTE
Chief Complaint   Patient presents with    RECHECK     Follow up      /71 (BP Location: Left arm, Cuff Size: Adult Regular)   Pulse 61   Ht 1.524 m (5')   Wt 101.8 kg (224 lb 6 oz)   LMP  (LMP Unknown)   SpO2 98%   BMI 43.82 kg/m    Shelby Fay CMA on 11/11/2024 at 3:23 PM

## 2024-11-11 NOTE — PROGRESS NOTES
Solid Organ Transplant Hepatology Follow-Up Visit:     HISTORY OF PRESENT ILLNESS:   I had the pleasure of seeing Flor Navarro for followup in the Liver Clinic at the [facility] on November 11, 2024. Ms. Navarro returns for followup now 13 and 1/2 years status post liver transplantation for cirrhosis caused by chronic hepatitis C.     For the most part, she is doing well.  She denies any abdominal pain, itching or skin rash.  She does have fatigue.  She denies any increased abdominal girth or lower extremity edema.  She has not had any gastrointestinal bleeding.  She does complain of back pain, which she is seeing her primary physician about     She denies any fevers or chills, cough or shortness of breath.  She denies any nausea or vomiting.  She is having some constipation, for which she is taking a liquid prescribed by her primary physician.  Her appetite is good, and her weight is down 4 pounds but is still too high.  Her current BMI is 44.      Medications:   Current Outpatient Medications   Medication Sig Dispense Refill    ACE/ARB/ARNI NOT PRESCRIBED (INTENTIONAL) Please choose reason not prescribed from choices below.      albuterol (ACCUNEB) 0.63 MG/3ML neb solution Take 3 mLs (0.63 mg) by nebulization every 4 hours as needed for shortness of breath, wheezing or cough 360 mL 4    albuterol (VENTOLIN HFA) 108 (90 Base) MCG/ACT inhaler Inhale 2 puffs into the lungs every 4 hours as needed for shortness of breath, wheezing or cough. 54 g 3    amLODIPine (NORVASC) 10 MG tablet Take 1 tablet (10 mg) by mouth daily. 90 tablet 3    atorvastatin (LIPITOR) 40 MG tablet Take 1 tablet (40 mg) by mouth daily 90 tablet 1    calcitRIOL (ROCALTROL) 0.25 MCG capsule Take 1 capsule (0.25 mcg) by mouth daily. 90 capsule 3    calcium carbonate-vitamin D (CALTRATE) 600-10 MG-MCG per tablet TAKE ONE TABLET BY MOUTH TWICE A DAY (Patient not taking: Reported on 10/31/2024) 60 tablet 11    Calcium  Carbonate-Vitamin D 600-10 MG-MCG TABS Take 1 tablet by mouth 2 times daily Do not take this while you take the levofloxacin. (Patient not taking: Reported on 10/31/2024) 180 tablet 3    capsaicin-menthol-methyl sal 0.025-1-12 % external cream Apply to low back three times daily for pain 170 g 3    carvedilol (COREG) 12.5 MG tablet Take 1 tablet (12.5 mg) by mouth 2 times daily (with meals) 180 tablet 1    cetirizine (ZYRTEC) 10 MG tablet Take 1 tablet (10 mg) by mouth daily 90 tablet 3    cycloSPORINE modified (GENERIC EQUIVALENT) 25 MG capsule Take 2 capsules (50 mg) by mouth every 12 hours. 120 capsule 11    Denture Care Products (EFFERDENT DENTURE CLEANSER) TBEF Use nightly to clean oral appliance 30 tablet 11    febuxostat (ULORIC) 40 MG TABS tablet Take 1 tablet (40 mg) by mouth daily. 90 tablet 3    furosemide (LASIX) 20 MG tablet Take 1 tablet (20 mg) by mouth 2 times daily. 180 tablet 1    gabapentin (NEURONTIN) 100 MG capsule Take 100mg capsule morning and noon 180 capsule 3    gabapentin (NEURONTIN) 300 MG capsule Take 1 capsule (300 mg) by mouth at bedtime 90 capsule 3    ketotifen (ZADITOR) 0.025 % ophthalmic solution Place 1 drop into both eyes 2 times daily as needed for itching 5 mL 4    lidocaine (LIDODERM) 5 % patch Place 1 patch over 12 hours onto the skin every 24 hours. To prevent lidocaine toxicity, patient should be patch free for 12 hrs daily. 90 patch 3    multivitamin w/minerals (CERTAVITE/ANTIOXIDANTS) tablet TAKE ONE TABLET BY MOUTH ONCE DAILY 100 tablet 3    mycophenolate (GENERIC EQUIVALENT) 250 MG capsule Take 2 capsules (500 mg) by mouth 2 times daily. 120 capsule 11    omeprazole (PRILOSEC) 20 MG DR capsule Take 1 capsule (20 mg) by mouth 2 times daily el 180 capsule 3    order for DME Equipment being ordered: compression stockings bilateral  Please measure and fit patient for stockings   Strength 15-30mmHg  Disp 2 pairs ( 4 socks)  1 refill over the time of 1 year 4 Units 1    order  for DME Equipment being ordered: Nebulizer with adult mask and tubing 1 Units 0    senna-docusate (SENEXON-S) 8.6-50 MG tablet Take 2 tablets by mouth 2 times daily 180 tablet 3    SM FIBER LAXATIVE 500 MG TABS tablet Take 2 tablets (1,000 mg) by mouth daily 90 tablet 3    umeclidinium (INCRUSE ELLIPTA) 62.5 MCG/ACT inhaler Inhale 1 puff into the lungs daily. 30 each 11    vitamin D3 25 mcg (1000 units) tablet Take 1 tablet (25 mcg) by mouth daily. 100 tablet 3     Current Facility-Administered Medications   Medication Dose Route Frequency Provider Last Rate Last Admin    denosumab (PROLIA) injection 60 mg  60 mg Subcutaneous Q6 Months          Vitals:   /71 (BP Location: Left arm, Cuff Size: Adult Regular)   Pulse 61   Ht 1.524 m (5')   Wt 101.8 kg (224 lb 6 oz)   LMP  (LMP Unknown)   SpO2 98%   BMI 43.82 kg/m      Physical Exam:   In general shew looks well. HEENT exam shows no scleral icterus or temporal muscle wasting. Chest is clear. Abdominal exam shows no increase in girth. No masses or tenderness to palpation are present. Liver is 10 cm in span without left lobe enlargement. No spleen tip is palpable.  Her incision is intact.  Extremity exam shows no edema.  Neurologic exam is nonfocal.     Labs:   Lab Results   Component Value Date     10/14/2024    POTASSIUM 4.6 10/14/2024    CHLORIDE 107 10/14/2024    ANIONGAP 11 10/14/2024    CO2 23 10/14/2024    BUN 34.1 (H) 10/14/2024    CR 1.69 (H) 10/14/2024    GFRESTIMATED 34 (L) 10/14/2024    JUAN 9.5 10/14/2024      Lab Results   Component Value Date    WBC 4.4 10/14/2024    HGB 12.1 10/14/2024    HCT 37.5 10/14/2024    MCV 91 10/14/2024    MCH 29.2 10/14/2024    MCHC 32.3 10/14/2024    RDW 13.2 10/14/2024     10/14/2024     Lab Results   Component Value Date    ALBUMIN 4.1 10/14/2024    ALKPHOS 89 10/14/2024    AST 23 10/14/2024    BILICONJ 0.0 07/07/2014     Lab Results   Component Value Date    INR 0.89 02/25/2023     Recent Labs   Lab  Test 10/14/24  1115 09/23/24  1047 03/14/24  1104 03/04/24  1428 02/19/24  1502 11/21/23  1440 10/16/23  1548 02/25/23  0815 02/23/23  2235 02/23/23  1242   ALKPHOS 89 96 91 93 94 96 92 76 83 85   ALT 16 20 13 20 19 16 21 9* 20 21   AST 23 22 17 18 18 18 26 18 21 24      Imaging:   No images are attached to the encounter.     Assessment/Plan:   IMPRESSION:   My impression is that Ms. Navarro is 14 years status post liver transplantation for cirrhosis caused by chronic hepatitis C.   Her liver tests are completely normal.  Her kidney function is a bit off. She also does have some hyperlipidemia, which is a mixed pattern, almost certainly related to her weight.  I strongly encouraged her to work on losing her weight.     She is up-to-date with regard to all vaccines.  I will not be making any other change to her medical regimen.       I did spend a total of 40 minutes (on the date of the encounter), including 30 minutes of face-to-face clinic time including counseling. The rest of the time was spent in documentation and review of records.     Thank you very much for allowing me to participate in the care of this patient.  If you have any questions regarding my recommendations, please do not hesitate to contact me.         Laron Anne MD      Professor of Medicine  [employer]      Executive Medical Director, Solid Organ Transplant Program  [facility]

## 2024-11-11 NOTE — LETTER
11/11/2024      Flor Navarro  [address]  [facility] [address]      Dear Colleague,    Thank you for referring your patient, Flor Navarro, to the [facility] [place]. Please see a copy of my visit note below.    Solid Organ Transplant Hepatology Follow-Up Visit:     HISTORY OF PRESENT ILLNESS:   I had the pleasure of seeing Flor Navarro for followup in the Liver Clinic at the [facility] on November 11, 2024. Ms. Navarro returns for followup now 13 and 1/2 years status post liver transplantation for cirrhosis caused by chronic hepatitis C.     For the most part, she is doing well.  She denies any abdominal pain, itching or skin rash.  She does have fatigue.  She denies any increased abdominal girth or lower extremity edema.  She has not had any gastrointestinal bleeding.  She does complain of back pain, which she is seeing her primary physician about     She denies any fevers or chills, cough or shortness of breath.  She denies any nausea or vomiting.  She is having some constipation, for which she is taking a liquid prescribed by her primary physician.  Her appetite is good, and her weight is down 4 pounds but is still too high.  Her current BMI is 44.      Medications:   Current Outpatient Medications   Medication Sig Dispense Refill     ACE/ARB/ARNI NOT PRESCRIBED (INTENTIONAL) Please choose reason not prescribed from choices below.       albuterol (ACCUNEB) 0.63 MG/3ML neb solution Take 3 mLs (0.63 mg) by nebulization every 4 hours as needed for shortness of breath, wheezing or cough 360 mL 4     albuterol (VENTOLIN HFA) 108 (90 Base) MCG/ACT inhaler Inhale 2 puffs into the lungs every 4 hours as needed for shortness of breath, wheezing or cough. 54 g 3     amLODIPine (NORVASC) 10 MG tablet Take 1 tablet (10 mg) by mouth daily. 90 tablet 3     atorvastatin (LIPITOR) 40 MG tablet Take 1 tablet (40 mg) by mouth daily 90 tablet 1     calcitRIOL  (ROCALTROL) 0.25 MCG capsule Take 1 capsule (0.25 mcg) by mouth daily. 90 capsule 3     calcium carbonate-vitamin D (CALTRATE) 600-10 MG-MCG per tablet TAKE ONE TABLET BY MOUTH TWICE A DAY (Patient not taking: Reported on 10/31/2024) 60 tablet 11     Calcium Carbonate-Vitamin D 600-10 MG-MCG TABS Take 1 tablet by mouth 2 times daily Do not take this while you take the levofloxacin. (Patient not taking: Reported on 10/31/2024) 180 tablet 3     capsaicin-menthol-methyl sal 0.025-1-12 % external cream Apply to low back three times daily for pain 170 g 3     carvedilol (COREG) 12.5 MG tablet Take 1 tablet (12.5 mg) by mouth 2 times daily (with meals) 180 tablet 1     cetirizine (ZYRTEC) 10 MG tablet Take 1 tablet (10 mg) by mouth daily 90 tablet 3     cycloSPORINE modified (GENERIC EQUIVALENT) 25 MG capsule Take 2 capsules (50 mg) by mouth every 12 hours. 120 capsule 11     Denture Care Products (EFFERDENT DENTURE CLEANSER) TBEF Use nightly to clean oral appliance 30 tablet 11     febuxostat (ULORIC) 40 MG TABS tablet Take 1 tablet (40 mg) by mouth daily. 90 tablet 3     furosemide (LASIX) 20 MG tablet Take 1 tablet (20 mg) by mouth 2 times daily. 180 tablet 1     gabapentin (NEURONTIN) 100 MG capsule Take 100mg capsule morning and noon 180 capsule 3     gabapentin (NEURONTIN) 300 MG capsule Take 1 capsule (300 mg) by mouth at bedtime 90 capsule 3     ketotifen (ZADITOR) 0.025 % ophthalmic solution Place 1 drop into both eyes 2 times daily as needed for itching 5 mL 4     lidocaine (LIDODERM) 5 % patch Place 1 patch over 12 hours onto the skin every 24 hours. To prevent lidocaine toxicity, patient should be patch free for 12 hrs daily. 90 patch 3     multivitamin w/minerals (CERTAVITE/ANTIOXIDANTS) tablet TAKE ONE TABLET BY MOUTH ONCE DAILY 100 tablet 3     mycophenolate (GENERIC EQUIVALENT) 250 MG capsule Take 2 capsules (500 mg) by mouth 2 times daily. 120 capsule 11     omeprazole (PRILOSEC) 20 MG DR capsule Take 1  capsule (20 mg) by mouth 2 times daily el 180 capsule 3     order for DME Equipment being ordered: compression stockings bilateral  Please measure and fit patient for stockings   Strength 15-30mmHg  Disp 2 pairs ( 4 socks)  1 refill over the time of 1 year 4 Units 1     order for DME Equipment being ordered: Nebulizer with adult mask and tubing 1 Units 0     senna-docusate (SENEXON-S) 8.6-50 MG tablet Take 2 tablets by mouth 2 times daily 180 tablet 3     SM FIBER LAXATIVE 500 MG TABS tablet Take 2 tablets (1,000 mg) by mouth daily 90 tablet 3     umeclidinium (INCRUSE ELLIPTA) 62.5 MCG/ACT inhaler Inhale 1 puff into the lungs daily. 30 each 11     vitamin D3 25 mcg (1000 units) tablet Take 1 tablet (25 mcg) by mouth daily. 100 tablet 3     Current Facility-Administered Medications   Medication Dose Route Frequency Provider Last Rate Last Admin     denosumab (PROLIA) injection 60 mg  60 mg Subcutaneous Q6 Months          Vitals:   /71 (BP Location: Left arm, Cuff Size: Adult Regular)   Pulse 61   Ht 1.524 m (5')   Wt 101.8 kg (224 lb 6 oz)   LMP  (LMP Unknown)   SpO2 98%   BMI 43.82 kg/m      Physical Exam:   In general shew looks well. HEENT exam shows no scleral icterus or temporal muscle wasting. Chest is clear. Abdominal exam shows no increase in girth. No masses or tenderness to palpation are present. Liver is 10 cm in span without left lobe enlargement. No spleen tip is palpable.  Her incision is intact.  Extremity exam shows no edema.  Neurologic exam is nonfocal.     Labs:   Lab Results   Component Value Date     10/14/2024    POTASSIUM 4.6 10/14/2024    CHLORIDE 107 10/14/2024    ANIONGAP 11 10/14/2024    CO2 23 10/14/2024    BUN 34.1 (H) 10/14/2024    CR 1.69 (H) 10/14/2024    GFRESTIMATED 34 (L) 10/14/2024    JUAN 9.5 10/14/2024      Lab Results   Component Value Date    WBC 4.4 10/14/2024    HGB 12.1 10/14/2024    HCT 37.5 10/14/2024    MCV 91 10/14/2024    MCH 29.2 10/14/2024    MCHC  32.3 10/14/2024    RDW 13.2 10/14/2024     10/14/2024     Lab Results   Component Value Date    ALBUMIN 4.1 10/14/2024    ALKPHOS 89 10/14/2024    AST 23 10/14/2024    BILICONJ 0.0 07/07/2014     Lab Results   Component Value Date    INR 0.89 02/25/2023     Recent Labs   Lab Test 10/14/24  1115 09/23/24  1047 03/14/24  1104 03/04/24  1428 02/19/24  1502 11/21/23  1440 10/16/23  1548 02/25/23  0815 02/23/23  2235 02/23/23  1242   ALKPHOS 89 96 91 93 94 96 92 76 83 85   ALT 16 20 13 20 19 16 21 9* 20 21   AST 23 22 17 18 18 18 26 18 21 24      Imaging:   No images are attached to the encounter.     Assessment/Plan:   IMPRESSION:   My impression is that Ms. Navarro is 14 years status post liver transplantation for cirrhosis caused by chronic hepatitis C.   Her liver tests are completely normal.  Her kidney function is a bit off. She also does have some hyperlipidemia, which is a mixed pattern, almost certainly related to her weight.  I strongly encouraged her to work on losing her weight.     She is up-to-date with regard to all vaccines.  I will not be making any other change to her medical regimen.       I did spend a total of 40 minutes (on the date of the encounter), including 30 minutes of face-to-face clinic time including counseling. The rest of the time was spent in documentation and review of records.     Thank you very much for allowing me to participate in the care of this patient.  If you have any questions regarding my recommendations, please do not hesitate to contact me.         Laron Anne MD      Professor of Medicine  [employer]      Executive Medical Director, Solid Organ Transplant Program  [facility]      Again, thank you for allowing me to participate in the care of your patient.        Sincerely,        Laron Anne MD

## 2024-11-18 ENCOUNTER — DOCUMENTATION ONLY (OUTPATIENT)
Dept: FAMILY MEDICINE | Facility: CLINIC | Age: 59
End: 2024-11-18
Payer: MEDICARE

## 2024-11-18 NOTE — PROGRESS NOTES
"When opening a documentation only encounter, be sure to enter in \"Chief Complaint\" Forms and in \" Comments\" Title of form, description if needed.    Flor is a 59 year old  female  Form received via: Fax  Form now resides in: Provider Ready    Home Health POC & Cert -Period 11/12/24-01/10/25    Mark Morales               "

## 2024-11-20 DIAGNOSIS — Z53.9 DIAGNOSIS NOT YET DEFINED: Primary | ICD-10-CM

## 2024-11-21 NOTE — PROGRESS NOTES
Form has been completed by provider.     Form sent out via: Fax to [facility] at Fax Number: [phone]  Patient informed: No, Reason:no  Output date: November 21, 2024    Mark Morales      **Please close the encounter**

## 2024-11-25 ENCOUNTER — OFFICE VISIT (OUTPATIENT)
Dept: URGENT CARE | Facility: URGENT CARE | Age: 59
End: 2024-11-25
Payer: MEDICARE

## 2024-11-25 VITALS
DIASTOLIC BLOOD PRESSURE: 82 MMHG | OXYGEN SATURATION: 94 % | TEMPERATURE: 98 F | HEART RATE: 64 BPM | SYSTOLIC BLOOD PRESSURE: 133 MMHG | WEIGHT: 224.6 LBS | BODY MASS INDEX: 43.86 KG/M2 | RESPIRATION RATE: 28 BRPM

## 2024-11-25 DIAGNOSIS — R30.0 DYSURIA: ICD-10-CM

## 2024-11-25 DIAGNOSIS — N30.00 ACUTE CYSTITIS WITHOUT HEMATURIA: Primary | ICD-10-CM

## 2024-11-25 LAB
ALBUMIN UR-MCNC: >=300 MG/DL
APPEARANCE UR: ABNORMAL
BACTERIA #/AREA URNS HPF: ABNORMAL /HPF
BILIRUB UR QL STRIP: NEGATIVE
COLOR UR AUTO: YELLOW
GLUCOSE UR STRIP-MCNC: NEGATIVE MG/DL
HGB UR QL STRIP: ABNORMAL
KETONES UR STRIP-MCNC: NEGATIVE MG/DL
LEUKOCYTE ESTERASE UR QL STRIP: ABNORMAL
NITRATE UR QL: POSITIVE
PH UR STRIP: 5.5 [PH] (ref 5–7)
RBC #/AREA URNS AUTO: ABNORMAL /HPF
SP GR UR STRIP: 1.01 (ref 1–1.03)
SQUAMOUS #/AREA URNS AUTO: ABNORMAL /LPF
UROBILINOGEN UR STRIP-ACNC: 0.2 E.U./DL
WBC #/AREA URNS AUTO: >100 /HPF

## 2024-11-25 PROCEDURE — 87086 URINE CULTURE/COLONY COUNT: CPT | Performed by: STUDENT IN AN ORGANIZED HEALTH CARE EDUCATION/TRAINING PROGRAM

## 2024-11-25 PROCEDURE — 87088 URINE BACTERIA CULTURE: CPT | Performed by: STUDENT IN AN ORGANIZED HEALTH CARE EDUCATION/TRAINING PROGRAM

## 2024-11-25 PROCEDURE — 81001 URINALYSIS AUTO W/SCOPE: CPT | Performed by: STUDENT IN AN ORGANIZED HEALTH CARE EDUCATION/TRAINING PROGRAM

## 2024-11-25 PROCEDURE — 99214 OFFICE O/P EST MOD 30 MIN: CPT | Performed by: STUDENT IN AN ORGANIZED HEALTH CARE EDUCATION/TRAINING PROGRAM

## 2024-11-25 PROCEDURE — 87186 SC STD MICRODIL/AGAR DIL: CPT | Performed by: STUDENT IN AN ORGANIZED HEALTH CARE EDUCATION/TRAINING PROGRAM

## 2024-11-25 RX ORDER — SULFAMETHOXAZOLE AND TRIMETHOPRIM 800; 160 MG/1; MG/1
1 TABLET ORAL 2 TIMES DAILY
Qty: 10 TABLET | Refills: 0 | Status: SHIPPED | OUTPATIENT
Start: 2024-11-25 | End: 2024-11-30

## 2024-11-26 NOTE — PROGRESS NOTES
ASSESSMENT & PLAN:   Diagnoses and all orders for this visit:  Acute cystitis without hematuria  -     sulfamethoxazole-trimethoprim (BACTRIM DS) 800-160 MG tablet; Take 1 tablet by mouth 2 times daily for 5 days.  Dysuria  -     UA Macroscopic with reflex to Microscopic and Culture - Lab Collect; Future  -     Urine Microscopic Exam  -     Urine Culture    UTI symptoms x 1 day. UA positive with large pyuria, leukocytes, nitrites. Bactrim x 5 days. Calculated CrCl 57 mL/min, no dose adjustment needed. Urine culture pending- call son Courtney (phone number in chart) if need to change abx.    At the end of the encounter, I discussed results, diagnosis, medications. Discussed red flags for immediate return to clinic/ER, as well as indications for follow up if no improvement. Patient and/or caregiver understood and agreed to plan. Patient was stable for discharge.    There are no Patient Instructions on file for this visit.    No follow-ups on file.    ------------------------------------------------------------------------  SUBJECTIVE  History was obtained from patient.  Patient's son served as  throughout visit per patient preference.    Patient presents with:  Urgent Care  UTI: Sx since last night with only little drops when use the bathroom and pain    HPI  Flor Navarro is a(n) 59 year old female presenting to urgent care for UTI symptoms that began last night. Has dysuria, frequency, urgency, low-volume output. No fever, chills, nausea, vomiting.    Review of Systems    Current Outpatient Medications   Medication Sig Dispense Refill    sulfamethoxazole-trimethoprim (BACTRIM DS) 800-160 MG tablet Take 1 tablet by mouth 2 times daily for 5 days. 10 tablet 0    ACE/ARB/ARNI NOT PRESCRIBED (INTENTIONAL) Please choose reason not prescribed from choices below.      albuterol (ACCUNEB) 0.63 MG/3ML neb solution Take 3 mLs (0.63 mg) by nebulization every 4 hours as needed for shortness of breath, wheezing or  cough 360 mL 4    albuterol (VENTOLIN HFA) 108 (90 Base) MCG/ACT inhaler Inhale 2 puffs into the lungs every 4 hours as needed for shortness of breath, wheezing or cough. 54 g 3    amLODIPine (NORVASC) 10 MG tablet Take 1 tablet (10 mg) by mouth daily. 90 tablet 3    atorvastatin (LIPITOR) 40 MG tablet Take 1 tablet (40 mg) by mouth daily 90 tablet 1    calcitRIOL (ROCALTROL) 0.25 MCG capsule Take 1 capsule (0.25 mcg) by mouth daily. 90 capsule 3    calcium carbonate-vitamin D (CALTRATE) 600-10 MG-MCG per tablet TAKE ONE TABLET BY MOUTH TWICE A DAY (Patient not taking: Reported on 10/31/2024) 60 tablet 11    Calcium Carbonate-Vitamin D 600-10 MG-MCG TABS Take 1 tablet by mouth 2 times daily Do not take this while you take the levofloxacin. (Patient not taking: Reported on 10/31/2024) 180 tablet 3    capsaicin-menthol-methyl sal 0.025-1-12 % external cream Apply to low back three times daily for pain 170 g 3    carvedilol (COREG) 12.5 MG tablet Take 1 tablet (12.5 mg) by mouth 2 times daily (with meals) 180 tablet 1    cetirizine (ZYRTEC) 10 MG tablet Take 1 tablet (10 mg) by mouth daily 90 tablet 3    cycloSPORINE modified (GENERIC EQUIVALENT) 25 MG capsule Take 2 capsules (50 mg) by mouth every 12 hours. 120 capsule 11    Denture Care Products (EFFERDENT DENTURE CLEANSER) TBEF Use nightly to clean oral appliance 30 tablet 11    febuxostat (ULORIC) 40 MG TABS tablet Take 1 tablet (40 mg) by mouth daily. 90 tablet 3    furosemide (LASIX) 20 MG tablet Take 1 tablet (20 mg) by mouth 2 times daily. 180 tablet 1    gabapentin (NEURONTIN) 100 MG capsule Take 100mg capsule morning and noon 180 capsule 3    gabapentin (NEURONTIN) 300 MG capsule Take 1 capsule (300 mg) by mouth at bedtime 90 capsule 3    ketotifen (ZADITOR) 0.025 % ophthalmic solution Place 1 drop into both eyes 2 times daily as needed for itching 5 mL 4    lidocaine (LIDODERM) 5 % patch Place 1 patch over 12 hours onto the skin every 24 hours. To prevent  lidocaine toxicity, patient should be patch free for 12 hrs daily. 90 patch 3    multivitamin w/minerals (CERTAVITE/ANTIOXIDANTS) tablet TAKE ONE TABLET BY MOUTH ONCE DAILY 100 tablet 3    mycophenolate (GENERIC EQUIVALENT) 250 MG capsule Take 2 capsules (500 mg) by mouth 2 times daily. 120 capsule 11    omeprazole (PRILOSEC) 20 MG DR capsule Take 1 capsule (20 mg) by mouth 2 times daily el 180 capsule 3    order for DME Equipment being ordered: compression stockings bilateral  Please measure and fit patient for stockings   Strength 15-30mmHg  Disp 2 pairs ( 4 socks)  1 refill over the time of 1 year 4 Units 1    order for DME Equipment being ordered: Nebulizer with adult mask and tubing 1 Units 0    senna-docusate (SENEXON-S) 8.6-50 MG tablet Take 2 tablets by mouth 2 times daily 180 tablet 3    SM FIBER LAXATIVE 500 MG TABS tablet Take 2 tablets (1,000 mg) by mouth daily 90 tablet 3    umeclidinium (INCRUSE ELLIPTA) 62.5 MCG/ACT inhaler Inhale 1 puff into the lungs daily. 30 each 11    vitamin D3 25 mcg (1000 units) tablet Take 1 tablet (25 mcg) by mouth daily. 100 tablet 3     Problem List:  2024-03: Pyelonephritis, acute  2024-03: Long-term use of immunosuppressant medication  2024-03: Other osteoporosis without current pathological fracture  2024-03: Acute pyelonephritis  2024-03: Status post liver transplantation (H)  2023-07: Chronic obstructive pulmonary disease, unspecified COPD type   (H)  2023-03: Hypoxemia associated with sleep  2023-02: Dehydration  2023-02: Hyperkalemia  2023-02: Acute pulmonary edema (H)  2023-02: Acute confusion  2023-02: Hypoxia  2023-02: CASTILLO (acute kidney injury) (H)  2023-02: Bilateral leg edema  2023-01: SURI (obstructive sleep apnea)  2023-01: Hypoventilation  2022-09: Chronic respiratory failure with hypoxia and hypercapnia (H)  2022-08: Dietary counseling and surveillance- LOW SALT  2022-08: Congestive heart failure (CHF) (H)  2022-06: Splenic artery aneurysm (H)  2022-03:  Enlargement of splenic artery  2022-03: Sacroiliitis (H)  2022-03: SBO (small bowel obstruction) (H)  2022-01: Meningioma (H)  2022-01: Complex care coordination  2021-08: Dysuria  2021-08: Primary osteoarthritis of left ankle  2021-07: Recurrent UTI  2021-02: Chronic gout due to renal impairment involving toe of left   foot without tophus  2020-11: Thrombocytopenia (H)  2019-05: Gait abnormality  2019-01: Moderate episode of recurrent major depressive disorder (H)  2018-08: Anxiety  2018-07: Fall  2017-10: Pain in joint, ankle and foot, left  2017-09: 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   (H)  2017-09: Body mass index (BMI) 40.0-44.9, adult (H)  2017-09: Mild episode of recurrent major depressive disorder (H)  2017-09: Mild intermittent asthma with acute exacerbation  2017-08: Peroneal tendonitis, left  2017-04: 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)  2017-03: Mixed hyperlipidemia  2016-08: Right elbow pain  2016-07: Secondary hyperparathyroidism (H)  2016-04: Abdominal pain  2016-04: Constipation, chronic  2015-12: Urinary tract infection due to extended-spectrum beta   lactamase (ESBL)-producing Klebsiella  2015-02: Pseudophakia - Left Eye  2014-11: Shoulder joint pain  2014-09: Vitamin D deficiency  2014-05: Insomnia due to substance  2014-03: Mood change  2013-11: Headache  2013-10: Advanced directives, counseling/discussion  2013-10: Ganglion cyst  2013-07: Stress incontinence  2013-06: Seborrheic keratosis  2013-05: Urgency of urination  2013-04: Overweight  2013-02: Syncope and collapse  2011-08: Muscle weakness (generalized)  2011-04: Hyperlipidemia LDL goal <160  2011-02: Posterior vitreous detachment, left  2011-02: Cataract  2006-10: Encephalopathy  Hepatitis C virus infection without hepatic coma  Stage 3b chronic kidney disease (H)  Hypertension, renal  Liver replaced by transplant  High risk medication use  Anemia in CKD (chronic kidney disease)  Stroke, hemorrhagic  (H)  Osteoporosis  Immunosuppressed status (H)    Allergies   Allergen Reactions    Aspirin      3/31/16 Per pt, tolerates 81 mg daily dose without ADR.     325 mg dose caused itchiness and hives.    Clarithromycin      Allergic reaction        Contrast Dye     Iodine     Pcn [Penicillins]          OBJECTIVE  Vitals:    11/25/24 1816   BP: 133/82   BP Location: Left arm   Patient Position: Sitting   Cuff Size: Adult Regular   Pulse: 64   Resp: 28   Temp: 98  F (36.7  C)   TempSrc: Oral   SpO2: 94%   Weight: 101.9 kg (224 lb 9.6 oz)     Physical Exam   GENERAL: healthy, alert, no acute distress.   PSYCH: mentation appears normal. Normal affect    Results for orders placed or performed in visit on 11/25/24   UA Macroscopic with reflex to Microscopic and Culture - Lab Collect     Status: Abnormal    Specimen: Urine, Midstream   Result Value Ref Range    Color Urine Yellow Colorless, Straw, Light Yellow, Yellow    Appearance Urine Cloudy (A) Clear    Glucose Urine Negative Negative mg/dL    Bilirubin Urine Negative Negative    Ketones Urine Negative Negative mg/dL    Specific Gravity Urine 1.015 1.003 - 1.035    Blood Urine Moderate (A) Negative    pH Urine 5.5 5.0 - 7.0    Protein Albumin Urine >=300 (A) Negative mg/dL    Urobilinogen Urine 0.2 0.2, 1.0 E.U./dL    Nitrite Urine Positive (A) Negative    Leukocyte Esterase Urine Large (A) Negative   Urine Microscopic Exam     Status: Abnormal   Result Value Ref Range    Bacteria Urine Many (A) None Seen /HPF    RBC Urine 5-10 (A) 0-2 /HPF /HPF    WBC Urine >100 (A) 0-5 /HPF /HPF    Squamous Epithelials Urine Few (A) None Seen /LPF

## 2024-11-27 LAB — BACTERIA UR CULT: ABNORMAL

## 2024-12-02 ENCOUNTER — LAB (OUTPATIENT)
Dept: LAB | Facility: CLINIC | Age: 59
End: 2024-12-02
Payer: MEDICARE

## 2024-12-02 ENCOUNTER — OFFICE VISIT (OUTPATIENT)
Dept: NEPHROLOGY | Facility: CLINIC | Age: 59
End: 2024-12-02
Attending: INTERNAL MEDICINE
Payer: MEDICARE

## 2024-12-02 VITALS
OXYGEN SATURATION: 96 % | DIASTOLIC BLOOD PRESSURE: 83 MMHG | SYSTOLIC BLOOD PRESSURE: 128 MMHG | HEART RATE: 75 BPM | TEMPERATURE: 98 F

## 2024-12-02 DIAGNOSIS — E78.5 HYPERLIPIDEMIA LDL GOAL <160: ICD-10-CM

## 2024-12-02 DIAGNOSIS — R73.09 ELEVATED GLUCOSE LEVEL: Primary | ICD-10-CM

## 2024-12-02 DIAGNOSIS — E55.9 VITAMIN D DEFICIENCY: ICD-10-CM

## 2024-12-02 DIAGNOSIS — N18.32 STAGE 3B CHRONIC KIDNEY DISEASE (H): ICD-10-CM

## 2024-12-02 DIAGNOSIS — R73.09 ELEVATED GLUCOSE LEVEL: ICD-10-CM

## 2024-12-02 DIAGNOSIS — N18.31 STAGE 3A CHRONIC KIDNEY DISEASE (H): ICD-10-CM

## 2024-12-02 DIAGNOSIS — E79.0 HYPERURICEMIA: ICD-10-CM

## 2024-12-02 DIAGNOSIS — Z94.4 LIVER REPLACED BY TRANSPLANT (H): ICD-10-CM

## 2024-12-02 LAB
ALBUMIN MFR UR ELPH: 12.2 MG/DL
ALBUMIN SERPL BCG-MCNC: 4.2 G/DL (ref 3.5–5.2)
ALBUMIN UR-MCNC: NEGATIVE MG/DL
ALP SERPL-CCNC: 89 U/L (ref 40–150)
ALT SERPL W P-5'-P-CCNC: 14 U/L (ref 0–50)
ANION GAP SERPL CALCULATED.3IONS-SCNC: 11 MMOL/L (ref 7–15)
APPEARANCE UR: CLEAR
AST SERPL W P-5'-P-CCNC: 19 U/L (ref 0–45)
BILIRUB DIRECT SERPL-MCNC: <0.2 MG/DL (ref 0–0.3)
BILIRUB SERPL-MCNC: 0.5 MG/DL
BILIRUB UR QL STRIP: NEGATIVE
BUN SERPL-MCNC: 31.4 MG/DL (ref 8–23)
CALCIUM SERPL-MCNC: 10.3 MG/DL (ref 8.8–10.4)
CHLORIDE SERPL-SCNC: 106 MMOL/L (ref 98–107)
COLOR UR AUTO: ABNORMAL
CREAT SERPL-MCNC: 1.84 MG/DL (ref 0.51–0.95)
CREAT UR-MCNC: 55.1 MG/DL
CYCLOSPORINE BLD LC/MS/MS-MCNC: 61 UG/L (ref 50–400)
EGFRCR SERPLBLD CKD-EPI 2021: 31 ML/MIN/1.73M2
ERYTHROCYTE [DISTWIDTH] IN BLOOD BY AUTOMATED COUNT: 12.8 % (ref 10–15)
EST. AVERAGE GLUCOSE BLD GHB EST-MCNC: 120 MG/DL
GLUCOSE SERPL-MCNC: 97 MG/DL (ref 70–99)
GLUCOSE UR STRIP-MCNC: NEGATIVE MG/DL
HBA1C MFR BLD: 5.8 %
HCO3 SERPL-SCNC: 23 MMOL/L (ref 22–29)
HCT VFR BLD AUTO: 35.1 % (ref 35–47)
HGB BLD-MCNC: 11.5 G/DL (ref 11.7–15.7)
HGB UR QL STRIP: NEGATIVE
KETONES UR STRIP-MCNC: NEGATIVE MG/DL
LEUKOCYTE ESTERASE UR QL STRIP: NEGATIVE
MCH RBC QN AUTO: 29.5 PG (ref 26.5–33)
MCHC RBC AUTO-ENTMCNC: 32.8 G/DL (ref 31.5–36.5)
MCV RBC AUTO: 90 FL (ref 78–100)
MUCOUS THREADS #/AREA URNS LPF: PRESENT /LPF
NITRATE UR QL: NEGATIVE
PH UR STRIP: 5.5 [PH] (ref 5–7)
PHOSPHATE SERPL-MCNC: 3.9 MG/DL (ref 2.5–4.5)
PLATELET # BLD AUTO: 204 10E3/UL (ref 150–450)
POTASSIUM SERPL-SCNC: 4.7 MMOL/L (ref 3.4–5.3)
PROT SERPL-MCNC: 7.8 G/DL (ref 6.4–8.3)
PROT/CREAT 24H UR: 0.22 MG/MG CR (ref 0–0.2)
RBC # BLD AUTO: 3.9 10E6/UL (ref 3.8–5.2)
RBC URINE: 0 /HPF
SODIUM SERPL-SCNC: 140 MMOL/L (ref 135–145)
SP GR UR STRIP: 1.01 (ref 1–1.03)
SQUAMOUS EPITHELIAL: 2 /HPF
TME LAST DOSE: NORMAL H
TME LAST DOSE: NORMAL H
URATE SERPL-MCNC: 6.3 MG/DL (ref 2.4–5.7)
UROBILINOGEN UR STRIP-MCNC: NORMAL MG/DL
VIT D+METAB SERPL-MCNC: 53 NG/ML (ref 20–50)
WBC # BLD AUTO: 4.6 10E3/UL (ref 4–11)
WBC URINE: 0 /HPF

## 2024-12-02 PROCEDURE — 83036 HEMOGLOBIN GLYCOSYLATED A1C: CPT | Performed by: INTERNAL MEDICINE

## 2024-12-02 PROCEDURE — 99214 OFFICE O/P EST MOD 30 MIN: CPT | Performed by: INTERNAL MEDICINE

## 2024-12-02 PROCEDURE — 84550 ASSAY OF BLOOD/URIC ACID: CPT | Performed by: PATHOLOGY

## 2024-12-02 PROCEDURE — 99000 SPECIMEN HANDLING OFFICE-LAB: CPT | Performed by: PATHOLOGY

## 2024-12-02 PROCEDURE — G0463 HOSPITAL OUTPT CLINIC VISIT: HCPCS | Performed by: INTERNAL MEDICINE

## 2024-12-02 PROCEDURE — 82248 BILIRUBIN DIRECT: CPT | Performed by: PATHOLOGY

## 2024-12-02 PROCEDURE — 36415 COLL VENOUS BLD VENIPUNCTURE: CPT | Performed by: PATHOLOGY

## 2024-12-02 PROCEDURE — 82306 VITAMIN D 25 HYDROXY: CPT | Performed by: INTERNAL MEDICINE

## 2024-12-02 PROCEDURE — 81001 URINALYSIS AUTO W/SCOPE: CPT | Performed by: PATHOLOGY

## 2024-12-02 PROCEDURE — 84156 ASSAY OF PROTEIN URINE: CPT | Performed by: PATHOLOGY

## 2024-12-02 PROCEDURE — 84100 ASSAY OF PHOSPHORUS: CPT | Performed by: PATHOLOGY

## 2024-12-02 PROCEDURE — 80053 COMPREHEN METABOLIC PANEL: CPT | Performed by: PATHOLOGY

## 2024-12-02 PROCEDURE — 85027 COMPLETE CBC AUTOMATED: CPT | Performed by: PATHOLOGY

## 2024-12-02 PROCEDURE — 80158 DRUG ASSAY CYCLOSPORINE: CPT | Performed by: INTERNAL MEDICINE

## 2024-12-02 ASSESSMENT — PAIN SCALES - GENERAL: PAINLEVEL_OUTOF10: EXTREME PAIN (8)

## 2024-12-02 NOTE — LETTER
12/2/2024       RE: Flor Navarro  [address]  [facility] [address]     Dear Colleague,    Thank you for referring your patient, Flor Navarro, to the [facility] [place] [facility]. Please see a copy of my visit note below.    .    Nephrology Clinic Note      I was asked to see this patient by Dr. Sierra     CC: CKD     HPI: Flor Navarro is a 59 year old female with PMH significant for HTN, liver transplant, severe obesity who returns for evaluation and management of CKD. She was previously followed by Dr Arambula.     Pt speaks Kinyar[place] and is from [place].      11/21/2023  Pt endorsed feeling fairly well since her discharge from hospital in feb. She did have intermittent breathing difficulties for which she was evaluated by pulmonology. Otherwise denied other systemic symptoms today including recurrent fevers, chills, nausea, vomiting, abdominal pain, chest pain.      Endorsed compliance with her medications, but she does not know her meds. A nurse will come home and set up pill box for 2 weeks each time.    11/27/2023  Pt presented to nephrology clinic for follow up. C/o right sided thoracic back pain started few days ago. Denied any trauma to that place. Endorsed taking tylenol for her knees but not helping with her back pain that much. Asked if she can take any other meds for pain. Denied other systemic symptoms including recurrent fevers/chills, nausea/vomiting, diarrhea, abdominal pain, chest pain. Have had chronic SOB which improves with nebulizer which she uses daily. She endorsed that she had PT scheduled and able to be active during that time. Endorses compliant with her medications, but she is not sure about her meds as nurse comes every Friday to fill her pill box. Requested for new BP monitor and pulse oximeter.     - History of urinary symptoms: Has urinary continence, wears diapers  - History of Hematuria: no  -  Swelling: yes  - Hx of UTIs: yes, ESBL  - Hx of stones: no  - Rashes/Joint pain: no  - Family hx of kidney disease: no  - NSAID use: no    12/2/2024  She is currently 13 and 1/2 years status post liver transplantation for cirrhosis caused by chronic hepatitis C. An attempt was made to start her in September 2024 on GLP 1-agonists for weight loss but she was denied insurance coverage. She is unable to cite her medications but reports that her son prepares them for her and she also receives a nurse visit once a week. Her kidney function has been labile over the past year with values between 1.4 and 1.9 mg/dL in the setting of furosemide use and also episodes of recurrent UTIs last one week ago for which she was prescribed TMP-SMX. The latest level is 1.84 mg/dL on 12/2/2024. There is no evidence of any significant albuminuria     EXAMINATION: CT ABDOMEN PELVIS W/O CONTRAST 3/5/2024 2:02 PM     TECHNIQUE: Helical CT images from the lung bases through the pubic  symphysis were obtained without IV contrast.      COMPARISON: 3/4/2024, 5/29/2022     HISTORY: Evaluate for kidney stones, urine cloudy and blood tinged, UA  w/ blood but no nitrites, transplant patient, creatinine pending;  Flank pain     FINDINGS:     Abdomen and pelvis: Wall thickening of the urinary bladder with faint  surrounding fat stranding. There is thickening of the mid to distal  right ureter with substantial surrounding fat stranding. No  nephrolithiasis is visualized. No hydronephrosis. Atrophic kidneys,  right greater than left. Simple renal cysts of the left kidney.  Postsurgical changes of liver transplant. Nodular appearance of the  transplant liver without distinct lesions in this noncontrast scan.  Multiple splenic artery aneurysms with the largest measuring 17 mm  (series 3 image 115), similar to prior. Unremarkable noncontrast  evaluation of the spleen. No distinct adrenal nodules. Mildly atrophic  pancreas without distinct pancreatic  lesions. No distended loops of  bowel. Normal appendix. No mesenteric masses. No free air or fluid. No  suspicious lymphadenopathy. Retroaortic left renal vein, otherwise  unremarkable noncontrast evaluation of the intra-abdominal vessels.     Lung bases: Bibasilar areas of subsegmental atelectasis. Unchanged  prominent distal periesophageal lymph node.     Bones and soft tissues: Subcutaneous calcifications, likely injection  granulomas. Small fat-containing abdominal ventral hernias in the  upper abdomen. Bilateral chronic pars interarticularis defects at L5  with associated grade 1 anterolisthesis of L5 on S1. Otherwise  unremarkable.                                                                         IMPRESSION:   1. Findings suggestive of cystitis. Fat stranding about the right  ureter may represent ascending infection versus sequelae of recently  passed stone.  2. Postsurgical changes of liver transplant with unchanged nodular  contour of the transplant liver.  3. Splenic artery aneurysms are similar to prior 5/29/2022.        Allergies   Allergen Reactions     Aspirin      3/31/16 Per pt, tolerates 81 mg daily dose without ADR.     325 mg dose caused itchiness and hives.     Clarithromycin      Allergic reaction         Contrast Dye      Iodine      Pcn [Penicillins]        Current Outpatient Medications   Medication Sig Dispense Refill     ACE/ARB/ARNI NOT PRESCRIBED (INTENTIONAL) Please choose reason not prescribed from choices below.       albuterol (ACCUNEB) 0.63 MG/3ML neb solution Take 3 mLs (0.63 mg) by nebulization every 4 hours as needed for shortness of breath, wheezing or cough 360 mL 4     albuterol (VENTOLIN HFA) 108 (90 Base) MCG/ACT inhaler Inhale 2 puffs into the lungs every 4 hours as needed for shortness of breath, wheezing or cough. 54 g 3     amLODIPine (NORVASC) 10 MG tablet Take 1 tablet (10 mg) by mouth daily. 90 tablet 3     atorvastatin (LIPITOR) 40 MG tablet Take 1 tablet (40 mg)  by mouth daily 90 tablet 1     calcitRIOL (ROCALTROL) 0.25 MCG capsule Take 1 capsule (0.25 mcg) by mouth daily. 90 capsule 3     calcium carbonate-vitamin D (CALTRATE) 600-10 MG-MCG per tablet TAKE ONE TABLET BY MOUTH TWICE A DAY (Patient not taking: Reported on 10/31/2024) 60 tablet 11     Calcium Carbonate-Vitamin D 600-10 MG-MCG TABS Take 1 tablet by mouth 2 times daily Do not take this while you take the levofloxacin. (Patient not taking: Reported on 10/31/2024) 180 tablet 3     capsaicin-menthol-methyl sal 0.025-1-12 % external cream Apply to low back three times daily for pain 170 g 3     carvedilol (COREG) 12.5 MG tablet Take 1 tablet (12.5 mg) by mouth 2 times daily (with meals) 180 tablet 1     cetirizine (ZYRTEC) 10 MG tablet Take 1 tablet (10 mg) by mouth daily 90 tablet 3     cycloSPORINE modified (GENERIC EQUIVALENT) 25 MG capsule Take 2 capsules (50 mg) by mouth every 12 hours. 120 capsule 11     Denture Care Products (EFFERDENT DENTURE CLEANSER) TBEF Use nightly to clean oral appliance 30 tablet 11     febuxostat (ULORIC) 40 MG TABS tablet Take 1 tablet (40 mg) by mouth daily. 90 tablet 3     furosemide (LASIX) 20 MG tablet Take 1 tablet (20 mg) by mouth 2 times daily. 180 tablet 1     gabapentin (NEURONTIN) 100 MG capsule Take 100mg capsule morning and noon 180 capsule 3     gabapentin (NEURONTIN) 300 MG capsule Take 1 capsule (300 mg) by mouth at bedtime 90 capsule 3     ketotifen (ZADITOR) 0.025 % ophthalmic solution Place 1 drop into both eyes 2 times daily as needed for itching 5 mL 4     lidocaine (LIDODERM) 5 % patch Place 1 patch over 12 hours onto the skin every 24 hours. To prevent lidocaine toxicity, patient should be patch free for 12 hrs daily. 90 patch 3     multivitamin w/minerals (CERTAVITE/ANTIOXIDANTS) tablet TAKE ONE TABLET BY MOUTH ONCE DAILY 100 tablet 3     mycophenolate (GENERIC EQUIVALENT) 250 MG capsule Take 2 capsules (500 mg) by mouth 2 times daily. 120 capsule 11      omeprazole (PRILOSEC) 20 MG DR capsule Take 1 capsule (20 mg) by mouth 2 times daily el 180 capsule 3     order for DME Equipment being ordered: compression stockings bilateral  Please measure and fit patient for stockings   Strength 15-30mmHg  Disp 2 pairs ( 4 socks)  1 refill over the time of 1 year 4 Units 1     order for DME Equipment being ordered: Nebulizer with adult mask and tubing 1 Units 0     senna-docusate (SENEXON-S) 8.6-50 MG tablet Take 2 tablets by mouth 2 times daily 180 tablet 3     SM FIBER LAXATIVE 500 MG TABS tablet Take 2 tablets (1,000 mg) by mouth daily 90 tablet 3     umeclidinium (INCRUSE ELLIPTA) 62.5 MCG/ACT inhaler Inhale 1 puff into the lungs daily. 30 each 11     vitamin D3 25 mcg (1000 units) tablet Take 1 tablet (25 mcg) by mouth daily. 100 tablet 3     Current Facility-Administered Medications   Medication Dose Route Frequency Provider Last Rate Last Admin     denosumab (PROLIA) injection 60 mg  60 mg Subcutaneous Q6 Months            Past Medical History:   Diagnosis Date     Anemia in CKD (chronic kidney disease)      Cataract      CKD (chronic kidney disease) stage 3, GFR 30-59 ml/min (H)      Hepatitis C     cleared virus spontaneously      High risk medication use      Hypertension, renal      Immunosuppressed status (H)      Liver replaced by transplant (H) 2010     Osteoporosis      Recurrent pregnancy loss without current pregnancy      Recurrent UTI 2021     Stroke, hemorrhagic (H) 2008    Left cerebral intraparenchymal hemorrhage     Syncope      Unspecified viral hepatitis C without hepatic coma        Past Surgical History:   Procedure Laterality Date     CATARACT IOL, RT/LT Right 2/18/15      SECTION       Incisional Hernia Repair  2004     INSERT SHUNT PORTAL TRANSJUGULAR INTRAHEPTIC      shunt placement for liver failure     LAPAROSCOPIC SALPINGO-OOPHORECTOMY      left     NECK SURGERY  2010    fracture, in halo x 7months      TRANSPLANT LIVER RECIPIENT  DONOR  10/26/10     Upper GI Endoscopy with Band Ligation of Esoph/Gastric Varic. .         Social History     Tobacco Use     Smoking status: Never     Smokeless tobacco: Never   Vaping Use     Vaping status: Never Used   Substance Use Topics     Alcohol use: No     Drug use: No       Family History   Problem Relation Age of Onset     Hepatitis Other         Hep C, still in [place]     Cerebrovascular Disease Other      Cancer No family hx of      Diabetes No family hx of      Hypertension No family hx of      Thyroid Disease No family hx of      Glaucoma No family hx of      Macular Degeneration No family hx of        ROS: A 12 system review of systems was negative other than noted here or above.     Exam:  LMP  (LMP Unknown)     GENERAL APPEARANCE: alert and no distress  EYES:  no scleral icterus  HENT: No gross abnormalities noted  NECK: supple, no adenopathy  RESP: lungs clear to auscultation   CV: regular rhythm, normal rate, no rub  Extremities: no significant lower extremity edema  SKIN: no rash  NEURO: mentation intact and speech normal  PSYCH: affect normal/bright    Results:    Lab on 2023   Component Date Value Ref Range Status     Sodium 2023 141  136 - 145 mmol/L Final     Potassium 2023 4.4  3.4 - 5.3 mmol/L Final     Chloride 2023 105  98 - 107 mmol/L Final     Carbon Dioxide (CO2) 2023 26  22 - 29 mmol/L Final     Anion Gap 2023 10  7 - 15 mmol/L Final     Glucose 2023 107 (H)  70 - 99 mg/dL Final     Urea Nitrogen 2023 22.5 (H)  6.0 - 20.0 mg/dL Final     Creatinine 2023 1.35 (H)  0.51 - 0.95 mg/dL Final     GFR Estimate 2023 45 (L)  >60 mL/min/1.73m2 Final    eGFR calculated using  CKD-EPI equation.     Calcium 2023 10.1 (H)  8.6 - 10.0 mg/dL Final     Albumin 2023 4.0  3.5 - 5.2 g/dL Final     Phosphorus 2023 4.4  2.5 - 4.5 mg/dL Final     WBC Count 2023 3.8 (L)  4.0 - 11.0  10e3/uL Final     RBC Count 05/12/2023 4.12  3.80 - 5.20 10e6/uL Final     Hemoglobin 05/12/2023 11.7  11.7 - 15.7 g/dL Final     Hematocrit 05/12/2023 36.3  35.0 - 47.0 % Final     MCV 05/12/2023 88  78 - 100 fL Final     MCH 05/12/2023 28.4  26.5 - 33.0 pg Final     MCHC 05/12/2023 32.2  31.5 - 36.5 g/dL Final     RDW 05/12/2023 14.4  10.0 - 15.0 % Final     Platelet Count 05/12/2023 207  150 - 450 10e3/uL Final       Assessment/Plan:     CKD stage III  Secondary to recurrent CASTILLO episodes in the setting of furosemide use and recurrent UTIs. Other risk factors include severe obesity and CNI use. The uACR is negative in September 2024    - strict BP control  - maintain cyclosporine levels at goal  - encouraged to lose weight  - low salt and plant based diet.     Exertional dyspnea   Morbid obesity and or physical deconditioning are likely causes as there is no associated chest tightness or chest pain with those symptoms.  - follow up with primary care/pulmonology     Hypertension, not at goal of < 140 systolic :  - reasonable control on amlodipine 10 mg daily and furosemide 20 mg twice a day.   - not starting on ACE/ARB in setting of hyperkalemia.     Hyperkalemia, resolved     Liver transplant in 2010  Liver failure 2/2 hep C infection s/p liver transplant with stable transplant function. currently on cyclosporine and MMF.  - continue immunosuppression per GI     Hyperuricemia :   Uric acid level is 6.3 on febuxostat 40 mg daily     BMD :  Husam, phos, vit D and PTH are WNL  continue on calcitriol     Metabolic acidosis :  Bicarb is 23, no need for sodium bicarbonate supplementation     Anemia :  Hb is 11.5 acceptable     Other   Morbid obesity, encouraged to lose weight   Sleep apnea : follows with sleep clinic.    Antonia Encarnacion MD  Division of Nephrology and Hypertension  [facility]      Again, thank you for allowing me to participate in the care of your patient.      Sincerely,    Antonia Encarnacion MD

## 2024-12-02 NOTE — PROGRESS NOTES
.    Nephrology Clinic Note      I was asked to see this patient by Dr. Sierra     CC: CKD     HPI: Flor Navarro is a 59 year old female with PMH significant for HTN, liver transplant, severe obesity who returns for evaluation and management of CKD. She was previously followed by Dr Arambula.     Pt speaks U[place] and is from [place].      11/21/2023  Pt endorsed feeling fairly well since her discharge from hospital in feb. She did have intermittent breathing difficulties for which she was evaluated by pulmonology. Otherwise denied other systemic symptoms today including recurrent fevers, chills, nausea, vomiting, abdominal pain, chest pain.      Endorsed compliance with her medications, but she does not know her meds. A nurse will come home and set up pill box for 2 weeks each time.    11/27/2023  Pt presented to nephrology clinic for follow up. C/o right sided thoracic back pain started few days ago. Denied any trauma to that place. Endorsed taking tylenol for her knees but not helping with her back pain that much. Asked if she can take any other meds for pain. Denied other systemic symptoms including recurrent fevers/chills, nausea/vomiting, diarrhea, abdominal pain, chest pain. Have had chronic SOB which improves with nebulizer which she uses daily. She endorsed that she had PT scheduled and able to be active during that time. Endorses compliant with her medications, but she is not sure about her meds as nurse comes every Friday to fill her pill box. Requested for new BP monitor and pulse oximeter.     - History of urinary symptoms: Has urinary continence, wears diapers  - History of Hematuria: no  - Swelling: yes  - Hx of UTIs: yes, ESBL  - Hx of stones: no  - Rashes/Joint pain: no  - Family hx of kidney disease: no  - NSAID use: no    12/2/2024  She is currently 13 and 1/2 years status post liver transplantation for cirrhosis caused by chronic hepatitis C. An attempt was made to start her in September 2024 on GLP  1-agonists for weight loss but she was denied insurance coverage. She is unable to cite her medications but reports that her son prepares them for her and she also receives a nurse visit once a week. Her kidney function has been labile over the past year with values between 1.4 and 1.9 mg/dL in the setting of furosemide use and also episodes of recurrent UTIs last one week ago for which she was prescribed TMP-SMX. The latest level is 1.84 mg/dL on 12/2/2024. There is no evidence of any significant albuminuria     EXAMINATION: CT ABDOMEN PELVIS W/O CONTRAST 3/5/2024 2:02 PM     TECHNIQUE: Helical CT images from the lung bases through the pubic  symphysis were obtained without IV contrast.      COMPARISON: 3/4/2024, 5/29/2022     HISTORY: Evaluate for kidney stones, urine cloudy and blood tinged, UA  w/ blood but no nitrites, transplant patient, creatinine pending;  Flank pain     FINDINGS:     Abdomen and pelvis: Wall thickening of the urinary bladder with faint  surrounding fat stranding. There is thickening of the mid to distal  right ureter with substantial surrounding fat stranding. No  nephrolithiasis is visualized. No hydronephrosis. Atrophic kidneys,  right greater than left. Simple renal cysts of the left kidney.  Postsurgical changes of liver transplant. Nodular appearance of the  transplant liver without distinct lesions in this noncontrast scan.  Multiple splenic artery aneurysms with the largest measuring 17 mm  (series 3 image 115), similar to prior. Unremarkable noncontrast  evaluation of the spleen. No distinct adrenal nodules. Mildly atrophic  pancreas without distinct pancreatic lesions. No distended loops of  bowel. Normal appendix. No mesenteric masses. No free air or fluid. No  suspicious lymphadenopathy. Retroaortic left renal vein, otherwise  unremarkable noncontrast evaluation of the intra-abdominal vessels.     Lung bases: Bibasilar areas of subsegmental atelectasis. Unchanged  prominent  distal periesophageal lymph node.     Bones and soft tissues: Subcutaneous calcifications, likely injection  granulomas. Small fat-containing abdominal ventral hernias in the  upper abdomen. Bilateral chronic pars interarticularis defects at L5  with associated grade 1 anterolisthesis of L5 on S1. Otherwise  unremarkable.                                                                         IMPRESSION:   1. Findings suggestive of cystitis. Fat stranding about the right  ureter may represent ascending infection versus sequelae of recently  passed stone.  2. Postsurgical changes of liver transplant with unchanged nodular  contour of the transplant liver.  3. Splenic artery aneurysms are similar to prior 5/29/2022.        Allergies   Allergen Reactions    Aspirin      3/31/16 Per pt, tolerates 81 mg daily dose without ADR.     325 mg dose caused itchiness and hives.    Clarithromycin      Allergic reaction        Contrast Dye     Iodine     Pcn [Penicillins]        Current Outpatient Medications   Medication Sig Dispense Refill    ACE/ARB/ARNI NOT PRESCRIBED (INTENTIONAL) Please choose reason not prescribed from choices below.      albuterol (ACCUNEB) 0.63 MG/3ML neb solution Take 3 mLs (0.63 mg) by nebulization every 4 hours as needed for shortness of breath, wheezing or cough 360 mL 4    albuterol (VENTOLIN HFA) 108 (90 Base) MCG/ACT inhaler Inhale 2 puffs into the lungs every 4 hours as needed for shortness of breath, wheezing or cough. 54 g 3    amLODIPine (NORVASC) 10 MG tablet Take 1 tablet (10 mg) by mouth daily. 90 tablet 3    atorvastatin (LIPITOR) 40 MG tablet Take 1 tablet (40 mg) by mouth daily 90 tablet 1    calcitRIOL (ROCALTROL) 0.25 MCG capsule Take 1 capsule (0.25 mcg) by mouth daily. 90 capsule 3    calcium carbonate-vitamin D (CALTRATE) 600-10 MG-MCG per tablet TAKE ONE TABLET BY MOUTH TWICE A DAY (Patient not taking: Reported on 10/31/2024) 60 tablet 11    Calcium Carbonate-Vitamin D 600-10 MG-MCG  TABS Take 1 tablet by mouth 2 times daily Do not take this while you take the levofloxacin. (Patient not taking: Reported on 10/31/2024) 180 tablet 3    capsaicin-menthol-methyl sal 0.025-1-12 % external cream Apply to low back three times daily for pain 170 g 3    carvedilol (COREG) 12.5 MG tablet Take 1 tablet (12.5 mg) by mouth 2 times daily (with meals) 180 tablet 1    cetirizine (ZYRTEC) 10 MG tablet Take 1 tablet (10 mg) by mouth daily 90 tablet 3    cycloSPORINE modified (GENERIC EQUIVALENT) 25 MG capsule Take 2 capsules (50 mg) by mouth every 12 hours. 120 capsule 11    Denture Care Products (EFFERDENT DENTURE CLEANSER) TBEF Use nightly to clean oral appliance 30 tablet 11    febuxostat (ULORIC) 40 MG TABS tablet Take 1 tablet (40 mg) by mouth daily. 90 tablet 3    furosemide (LASIX) 20 MG tablet Take 1 tablet (20 mg) by mouth 2 times daily. 180 tablet 1    gabapentin (NEURONTIN) 100 MG capsule Take 100mg capsule morning and noon 180 capsule 3    gabapentin (NEURONTIN) 300 MG capsule Take 1 capsule (300 mg) by mouth at bedtime 90 capsule 3    ketotifen (ZADITOR) 0.025 % ophthalmic solution Place 1 drop into both eyes 2 times daily as needed for itching 5 mL 4    lidocaine (LIDODERM) 5 % patch Place 1 patch over 12 hours onto the skin every 24 hours. To prevent lidocaine toxicity, patient should be patch free for 12 hrs daily. 90 patch 3    multivitamin w/minerals (CERTAVITE/ANTIOXIDANTS) tablet TAKE ONE TABLET BY MOUTH ONCE DAILY 100 tablet 3    mycophenolate (GENERIC EQUIVALENT) 250 MG capsule Take 2 capsules (500 mg) by mouth 2 times daily. 120 capsule 11    omeprazole (PRILOSEC) 20 MG DR capsule Take 1 capsule (20 mg) by mouth 2 times daily el 180 capsule 3    order for DME Equipment being ordered: compression stockings bilateral  Please measure and fit patient for stockings   Strength 15-30mmHg  Disp 2 pairs ( 4 socks)  1 refill over the time of 1 year 4 Units 1    order for DME Equipment being ordered:  Nebulizer with adult mask and tubing 1 Units 0    senna-docusate (SENEXON-S) 8.6-50 MG tablet Take 2 tablets by mouth 2 times daily 180 tablet 3    SM FIBER LAXATIVE 500 MG TABS tablet Take 2 tablets (1,000 mg) by mouth daily 90 tablet 3    umeclidinium (INCRUSE ELLIPTA) 62.5 MCG/ACT inhaler Inhale 1 puff into the lungs daily. 30 each 11    vitamin D3 25 mcg (1000 units) tablet Take 1 tablet (25 mcg) by mouth daily. 100 tablet 3     Current Facility-Administered Medications   Medication Dose Route Frequency Provider Last Rate Last Admin    denosumab (PROLIA) injection 60 mg  60 mg Subcutaneous Q6 Months            Past Medical History:   Diagnosis Date    Anemia in CKD (chronic kidney disease)     Cataract     CKD (chronic kidney disease) stage 3, GFR 30-59 ml/min (H)     Hepatitis C     cleared virus spontaneously     High risk medication use     Hypertension, renal     Immunosuppressed status (H)     Liver replaced by transplant (H) 2010    Osteoporosis     Recurrent pregnancy loss without current pregnancy     Recurrent UTI 2021    Stroke, hemorrhagic (H) 2008    Left cerebral intraparenchymal hemorrhage    Syncope     Unspecified viral hepatitis C without hepatic coma        Past Surgical History:   Procedure Laterality Date    CATARACT IOL, RT/LT Right 2/18/15     SECTION      Incisional Hernia Repair  2004    INSERT SHUNT PORTAL TRANSJUGULAR INTRAHEPTIC      shunt placement for liver failure    LAPAROSCOPIC SALPINGO-OOPHORECTOMY      left    NECK SURGERY  2010    fracture, in halo x 7months    TRANSPLANT LIVER RECIPIENT  DONOR  10/26/10    Upper GI Endoscopy with Band Ligation of Esoph/Gastric Varic. .         Social History     Tobacco Use    Smoking status: Never    Smokeless tobacco: Never   Vaping Use    Vaping status: Never Used   Substance Use Topics    Alcohol use: No    Drug use: No       Family History   Problem Relation Age of Onset    Hepatitis Other          Hep C, still in [place]    Cerebrovascular Disease Other     Cancer No family hx of     Diabetes No family hx of     Hypertension No family hx of     Thyroid Disease No family hx of     Glaucoma No family hx of     Macular Degeneration No family hx of        ROS: A 12 system review of systems was negative other than noted here or above.     Exam:  LMP  (LMP Unknown)     GENERAL APPEARANCE: alert and no distress  EYES:  no scleral icterus  HENT: No gross abnormalities noted  NECK: supple, no adenopathy  RESP: lungs clear to auscultation   CV: regular rhythm, normal rate, no rub  Extremities: no significant lower extremity edema  SKIN: no rash  NEURO: mentation intact and speech normal  PSYCH: affect normal/bright    Results:    Lab on 05/12/2023   Component Date Value Ref Range Status    Sodium 05/12/2023 141  136 - 145 mmol/L Final    Potassium 05/12/2023 4.4  3.4 - 5.3 mmol/L Final    Chloride 05/12/2023 105  98 - 107 mmol/L Final    Carbon Dioxide (CO2) 05/12/2023 26  22 - 29 mmol/L Final    Anion Gap 05/12/2023 10  7 - 15 mmol/L Final    Glucose 05/12/2023 107 (H)  70 - 99 mg/dL Final    Urea Nitrogen 05/12/2023 22.5 (H)  6.0 - 20.0 mg/dL Final    Creatinine 05/12/2023 1.35 (H)  0.51 - 0.95 mg/dL Final    GFR Estimate 05/12/2023 45 (L)  >60 mL/min/1.73m2 Final    eGFR calculated using 2021 CKD-EPI equation.    Calcium 05/12/2023 10.1 (H)  8.6 - 10.0 mg/dL Final    Albumin 05/12/2023 4.0  3.5 - 5.2 g/dL Final    Phosphorus 05/12/2023 4.4  2.5 - 4.5 mg/dL Final    WBC Count 05/12/2023 3.8 (L)  4.0 - 11.0 10e3/uL Final    RBC Count 05/12/2023 4.12  3.80 - 5.20 10e6/uL Final    Hemoglobin 05/12/2023 11.7  11.7 - 15.7 g/dL Final    Hematocrit 05/12/2023 36.3  35.0 - 47.0 % Final    MCV 05/12/2023 88  78 - 100 fL Final    MCH 05/12/2023 28.4  26.5 - 33.0 pg Final    MCHC 05/12/2023 32.2  31.5 - 36.5 g/dL Final    RDW 05/12/2023 14.4  10.0 - 15.0 % Final    Platelet Count 05/12/2023 207  150 - 450 10e3/uL Final        Assessment/Plan:     CKD stage III  Secondary to recurrent CASTILLO episodes in the setting of furosemide use and recurrent UTIs. Other risk factors include severe obesity and CNI use. The uACR is negative in September 2024    - strict BP control  - maintain cyclosporine levels at goal  - encouraged to lose weight  - low salt and plant based diet.     Exertional dyspnea   Morbid obesity and or physical deconditioning are likely causes as there is no associated chest tightness or chest pain with those symptoms.  - follow up with primary care/pulmonology     Hypertension, not at goal of < 140 systolic :  - reasonable control on amlodipine 10 mg daily and furosemide 20 mg twice a day.   - not starting on ACE/ARB in setting of hyperkalemia.     Hyperkalemia, resolved     Liver transplant in 2010  Liver failure 2/2 hep C infection s/p liver transplant with stable transplant function. currently on cyclosporine and MMF.  - continue immunosuppression per GI     Hyperuricemia :   Uric acid level is 6.3 on febuxostat 40 mg daily     BMD :  Husam, phos, vit D and PTH are WNL  continue on calcitriol     Metabolic acidosis :  Bicarb is 23, no need for sodium bicarbonate supplementation     Anemia :  Hb is 11.5 acceptable     Other   Morbid obesity, encouraged to lose weight   Sleep apnea : follows with sleep clinic.    Antonia Encarnacion MD  Division of Nephrology and Hypertension  [facility]

## 2024-12-02 NOTE — NURSING NOTE
Chief Complaint   Patient presents with    RECHECK       Vitals:    12/02/24 1417 12/02/24 1428 12/02/24 1429   BP: 131/83 130/84 128/83   Pulse: 75     Temp: 98  F (36.7  C)     TempSrc: Oral     SpO2: 96%         BP Readings from Last 3 Encounters:   12/02/24 128/83   11/25/24 133/82   11/11/24 119/71       /83   Pulse 75   Temp 98  F (36.7  C) (Oral)   LMP  (LMP Unknown)   SpO2 96%      Logan Dumont

## 2024-12-18 ENCOUNTER — DOCUMENTATION ONLY (OUTPATIENT)
Dept: FAMILY MEDICINE | Facility: CLINIC | Age: 59
End: 2024-12-18
Payer: MEDICARE

## 2024-12-18 NOTE — PROGRESS NOTES
Form has been completed by provider.     Form sent out via: Fax to [place] [employer] of [employer] at Fax Number: [phone]  Patient informed: No, Reason:No  Output date: December 18, 2024    Mark Morales      **Please close the encounter**

## 2024-12-18 NOTE — PROGRESS NOTES
"When opening a documentation only encounter, be sure to enter in \"Chief Complaint\" Forms and in \" Comments\" Title of form, description if needed.    Flor is a 59 year old  female  Form received via: pt brought in  Form now resides in: Provider Ready    Request for med opinion     Mark Morales                "

## 2024-12-23 PROBLEM — B96.20 E. COLI UTI: Status: ACTIVE | Noted: 2021-07-12

## 2025-01-02 ENCOUNTER — OFFICE VISIT (OUTPATIENT)
Dept: OPHTHALMOLOGY | Facility: CLINIC | Age: 60
End: 2025-01-02
Attending: OPHTHALMOLOGY
Payer: MEDICARE

## 2025-01-02 DIAGNOSIS — D63.1 ANEMIA IN CHRONIC KIDNEY DISEASE, UNSPECIFIED CKD STAGE: ICD-10-CM

## 2025-01-02 DIAGNOSIS — H04.123 BILATERAL DRY EYES: ICD-10-CM

## 2025-01-02 DIAGNOSIS — H53.8 BLURRING OF VISUAL IMAGE OF BOTH EYES: Primary | ICD-10-CM

## 2025-01-02 DIAGNOSIS — H52.7 REFRACTIVE ERROR: ICD-10-CM

## 2025-01-02 DIAGNOSIS — Z94.4 LIVER REPLACED BY TRANSPLANT (H): ICD-10-CM

## 2025-01-02 DIAGNOSIS — N18.9 ANEMIA IN CHRONIC KIDNEY DISEASE, UNSPECIFIED CKD STAGE: ICD-10-CM

## 2025-01-02 DIAGNOSIS — H47.20 OPTIC ATROPHY: ICD-10-CM

## 2025-01-02 DIAGNOSIS — G47.33 OSA (OBSTRUCTIVE SLEEP APNEA): ICD-10-CM

## 2025-01-02 PROCEDURE — 92015 DETERMINE REFRACTIVE STATE: CPT | Mod: GY

## 2025-01-02 PROCEDURE — 92025 CPTRIZED CORNEAL TOPOGRAPHY: CPT | Performed by: OPHTHALMOLOGY

## 2025-01-02 RX ORDER — CYCLOSPORINE 0.5 MG/ML
1 EMULSION OPHTHALMIC 2 TIMES DAILY
Qty: 60 EACH | Refills: 4 | Status: SHIPPED | OUTPATIENT
Start: 2025-01-02

## 2025-01-02 RX ORDER — CYCLOSPORINE 0.5 MG/ML
1 EMULSION OPHTHALMIC 2 TIMES DAILY
Qty: 60 EACH | Refills: 4 | Status: SHIPPED | OUTPATIENT
Start: 2025-01-02 | End: 2025-01-02

## 2025-01-02 ASSESSMENT — CONF VISUAL FIELD
OS_INFERIOR_NASAL_RESTRICTION: 0
METHOD: COUNTING FINGERS
OD_INFERIOR_NASAL_RESTRICTION: 3
OS_NORMAL: 1
OS_INFERIOR_TEMPORAL_RESTRICTION: 0
OS_SUPERIOR_NASAL_RESTRICTION: 0
OD_SUPERIOR_NASAL_RESTRICTION: 0
OD_SUPERIOR_TEMPORAL_RESTRICTION: 0
OS_SUPERIOR_TEMPORAL_RESTRICTION: 0
OD_INFERIOR_TEMPORAL_RESTRICTION: 0

## 2025-01-02 ASSESSMENT — TONOMETRY
IOP_METHOD: ICARE
OS_IOP_MMHG: 10
OD_IOP_MMHG: 13

## 2025-01-02 ASSESSMENT — REFRACTION_WEARINGRX
OS_CYLINDER: +0.50
OD_ADD: +2.75
OS_AXIS: 167
OD_SPHERE: -1.00
OS_ADD: +2.75
OS_SPHERE: -1.00
OD_AXIS: 073
OD_CYLINDER: +0.75

## 2025-01-02 ASSESSMENT — VISUAL ACUITY
OD_CC: 20/50
OS_CC: 20/40
CORRECTION_TYPE: GLASSES
METHOD: TUMBLING E 'S
OS_CC+: +1

## 2025-01-02 ASSESSMENT — REFRACTION_MANIFEST
OD_CYLINDER: +0.75
OS_ADD: +2.75
OS_CYLINDER: +0.75
OS_SPHERE: -1.00
OD_ADD: +2.75
OD_SPHERE: -1.75
OS_AXIS: 170
OD_AXIS: 064

## 2025-01-02 ASSESSMENT — EXTERNAL EXAM - RIGHT EYE: OD_EXAM: NORMAL

## 2025-01-02 ASSESSMENT — EXTERNAL EXAM - LEFT EYE: OS_EXAM: NORMAL

## 2025-01-02 NOTE — PROGRESS NOTES
"HPI       Dry Eye(s) Both Eyes     Additional comments: 1 year follow up both eyes             Comments    Pt here with  over the phone today.  Pt states that both eyes have a \"Hazyness\" Both eyes are itching and tear up frequently. Pt using artificial tears 2-3 times daily in each eye.  No DM.    PRAVIN Medrano January 2, 2025 12:33 PM              Last edited by Peewee Caputo COMT on 1/2/2025 12:34 PM.          Review of systems for the eyes was negative other than the pertinent positives/negatives listed in the HPI.      Assessment & Plan      Flor Navarro is a 59 year old female with the following diagnoses:   1. Blurring of visual image of both eyes    2. Optic atrophy - Both Eyes    3. Bilateral dry eyes    4. Refractive error    5. Liver replaced by transplant    6. Anemia in chronic kidney disease, unspecified CKD stage    7. SURI (obstructive sleep apnea)       Referral from Dr. Cook for blurry vision in both eyes   History obtained via interpretor   Feels vision is improving with recent artificial tears.  Still with intermittent epiphora in the left eye     Refractive to baseline best corrected visual acuity now today, but inconsistent on testing  Increase artificial tears to four times a day  both eyes   Start restasis twice a day both eyes   Return precautions reviewed     Hold glasses at this time  Low vision refraction once dry eye improves    Patient disposition:   Return in about 4 months (around 5/2/2025) for Dr. Barksdale or Rodney for Low Vision refraction .           Attending Physician Attestation:  Complete documentation of historical and exam elements from today's encounter can be found in the full encounter summary report (not reduplicated in this progress note).  I personally obtained the chief complaint(s) and history of present illness.  I confirmed and edited as necessary the review of systems, past medical/surgical history, family history, social history, " and examination findings as documented by others; and I examined the patient myself.  I personally reviewed the relevant tests, images, and reports as documented above.  I formulated and edited as necessary the assessment and plan and discussed the findings and management plan with the patient and family. . - Mike George MD

## 2025-01-02 NOTE — NURSING NOTE
"Chief Complaints and History of Present Illnesses   Patient presents with    Dry Eye(s) Both Eyes     1 year follow up both eyes     Chief Complaint(s) and History of Present Illness(es)       Dry Eye(s) Both Eyes              Comments: 1 year follow up both eyes              Comments    Pt here with  over the phone today.  Pt states that both eyes have a \"Hazyness\" Both eyes are itching and tear up frequently. Pt using artificial tears 2-3 times daily in each eye.  No DM.    PRAVIN Medrano January 2, 2025 12:33 PM                       "

## 2025-01-08 DIAGNOSIS — Z94.4 LIVER TRANSPLANTED (H): ICD-10-CM

## 2025-01-08 NOTE — TELEPHONE ENCOUNTER
"Request for medication refill:  omeprazole (PRILOSEC) 20 MG DR capsule     Providers if patient needs an appointment and you are willing to give a one month supply please refill for one month and  send a letter/MyChart using \".SMILLIMITEDREFILL\" .smillimited and route chart to \"[facility] \" (Giving one month refill in non controlled medications is strongly recommended before denial)    If refill has been denied, meaning absolutely no refills without visit, please complete the smart phrase \".smirxrefuse\" and route it to the \"[facility] MED REFILLS\"  pool to inform the patient and the pharmacy.    Reggie Moore, CMA      "

## 2025-01-13 ENCOUNTER — OFFICE VISIT (OUTPATIENT)
Dept: FAMILY MEDICINE | Facility: CLINIC | Age: 60
End: 2025-01-13
Payer: MEDICARE

## 2025-01-13 VITALS
RESPIRATION RATE: 14 BRPM | TEMPERATURE: 98.1 F | OXYGEN SATURATION: 98 % | DIASTOLIC BLOOD PRESSURE: 84 MMHG | SYSTOLIC BLOOD PRESSURE: 134 MMHG | WEIGHT: 226.9 LBS | HEIGHT: 60 IN | BODY MASS INDEX: 44.55 KG/M2 | HEART RATE: 68 BPM

## 2025-01-13 DIAGNOSIS — I12.9 HYPERTENSION, RENAL: ICD-10-CM

## 2025-01-13 DIAGNOSIS — Z23 ENCOUNTER FOR IMMUNIZATION: ICD-10-CM

## 2025-01-13 DIAGNOSIS — N18.32 STAGE 3B CHRONIC KIDNEY DISEASE (H): ICD-10-CM

## 2025-01-13 DIAGNOSIS — G89.29 CHRONIC LEFT-SIDED THORACIC BACK PAIN: ICD-10-CM

## 2025-01-13 DIAGNOSIS — I69.351 HEMIPLEGIA AND HEMIPARESIS FOLLOWING CEREBRAL INFARCTION AFFECTING RIGHT DOMINANT SIDE (H): Primary | ICD-10-CM

## 2025-01-13 DIAGNOSIS — R32 URINARY INCONTINENCE, UNSPECIFIED TYPE: ICD-10-CM

## 2025-01-13 DIAGNOSIS — M54.6 CHRONIC LEFT-SIDED THORACIC BACK PAIN: ICD-10-CM

## 2025-01-13 NOTE — Clinical Note
Ordered pool therapy - need help detemining location. May have to be out of system. Let me know if new orders are needed - spent some time digging on [facility] website but couldn't find good info

## 2025-01-13 NOTE — PATIENT INSTRUCTIONS
Can use lidocaine patches, capsaicin cream, and Voltaren gel to help with pain. Voltaren reordered.   Can use Voltaren gel up to 4 times a day.  COVID and Flu shot today  I recommend you get RSV vaccine at a pharmacy at your convenience.   Flyte device can help with urine leakage. Referral sent to discuss this.   Follow-up with me in 1 month.

## 2025-01-13 NOTE — PROGRESS NOTES
Assessment & Plan     Hemiplegia and hemiparesis following cerebral infarction affecting right dominant side (H); Chronic left-sided thoracic back pain  Back pain is presumably due to chronic muscle imbalances and would recommend pool therapy. Sent a message to Care Coordination to help coordinate this in terms of location and accessibility. Diclofenac prescription has  so that was re-ordered. Okay to use in conjunction with topical capsaicin which they purchase OTC and lidocaine patches.   - diclofenac (VOLTAREN) 1 % topical gel  Dispense: 350 g; Refill: 5  - Physical Therapy  Referral    Stage 3b chronic kidney disease (H); Renal hypertension   Saw nephrology in December. Labs stable. Blood pressure is greatly improved. Home nursing coming fairly regularly for med set up so she endorses compliance. Blood pressures at goal. She has to maintain cyclosporin levels given her multifactorial reasons for her history of labile blood pressures and last levels drawn in December, so not due for another 3-6 months. She's continuing to try to lose weight for blood pressure management as well as liver transplant status. She's steadily been losing in a slow fashion. Her weight today is with heavy layering for the cold. She is not eligible for weight loss mediations based on her co-morbidities and insurance.     Urinary incontinence, unspecified type  Long standing, saw Dr. Collins in  and surgical intervention was only option at that time which she declines. New mechanical therapy Flyte is available so re-referred to Dr. Collins.  - Adult Urology  Referral    30 minutes spent by me on the date of the encounter doing chart review, history and exam, documentation and further activities per the note    Return in about 4 weeks (around 2/10/2025) for with [facility], in-person visit.    [place] Ray is a 59 year old, presenting for the following health issues:  Follow Up        2025     4:06 PM    Additional Questions   Roomed by Hugh   Accompanied by Son         1/13/2025    Information    services provided? No     HPI   Pain  She reports right sided flank and back pain. She does try capsaicin to help with pain. She gets intermittent relief from this. She also has lidocaine patches. She tries hot packing. Pain is severe and can be sharp. She is interested in pool therapy.     Vision  She reports that she is taking eye drops prescribed by ophthalmology for vision blurriness. Blurriness is improving.    Nighttime urination  She reports that she has to wake up frequently due to urinary urgency and need to urinate. She does sleep with a diaper or small towel.     Weight  She reports that she is intermittent fasting. She feels that she is making progress with her weight loss.     General  She does have a shower chair. She did get her ordered chair to help with mobility around her house.       Review of Systems  Positive for:   Negative for:     This document serves as a record of the services and decisions personally performed and made by Karely Sierra MD. It was created on his/her behalf by Alcides Galeana, a trained medical scribe. The creation of this document is based the provider's statements to the medical scribe.  Scribscott Galeana 4:15 PM, January 13, 2025       Objective    /84   Pulse 68   Temp 98.1  F (36.7  C) (Temporal)   Resp 14   Ht 1.524 m (5')   Wt 102.9 kg (226 lb 14.4 oz)   LMP  (LMP Unknown)   SpO2 98%   BMI 44.31 kg/m    Body mass index is 44.31 kg/m .  Wt Readings from Last 10 Encounters:   01/13/25 102.9 kg (226 lb 14.4 oz)   11/25/24 101.9 kg (224 lb 9.6 oz)   11/11/24 101.8 kg (224 lb 6 oz)   10/31/24 102.1 kg (225 lb)   10/09/24 103.1 kg (227 lb 3.2 oz)   09/09/24 103 kg (227 lb 1.6 oz)   03/27/24 104.8 kg (231 lb 1.6 oz)   03/25/24 103.4 kg (228 lb)   01/15/24 104.7 kg (230 lb 12.8 oz)   12/26/23 106.5 kg (234 lb 12.8 oz)   Physical Exam   GENERAL:  alert and no distress  MS: Diffuse muscle spasm from mid scapula down to the upper buttock.   PSYCH: mentation appears normal, affect normal/bright        Signed Electronically by: Karely Sierra MD

## 2025-01-14 ENCOUNTER — TELEPHONE (OUTPATIENT)
Dept: FAMILY MEDICINE | Facility: CLINIC | Age: 60
End: 2025-01-14
Payer: MEDICARE

## 2025-01-14 NOTE — TELEPHONE ENCOUNTER
CC called patient to discuss pool therapy referral. Patient did not answer. Left message with assistance of , though the patient's voicemail system kept cutting off  so we were unable to leave my direct number.     There are no local locations for pool therapy within [facility]. The closest location is the [facility] (P: [phone]; F: [phone]) location, so CC is sending this referral there. CC will check in with patient on Friday to make sure they reached out/she has an appointment, otherwise will assist with making appointment.    Bayron Cardoso  Care Coordinator- [facility]  [phone]

## 2025-01-20 ENCOUNTER — DOCUMENTATION ONLY (OUTPATIENT)
Dept: FAMILY MEDICINE | Facility: CLINIC | Age: 60
End: 2025-01-20
Payer: MEDICARE

## 2025-01-20 NOTE — PROGRESS NOTES
HPI:    Patient was last seen on 1/2/25 by Dr. George for her 1 year follow-up.  At that time, she had noted an improvement with dry eye treatment, but was still having intermittent blurring of her vision.  She is here today for an updated refraction.    Since then, she reports her vision is stable.  She is having less tearing.    Assessment and Plan:  1) Myopia with regular astigmatism and presbyopia, both eyes  A: BCVA 20/40- each eye  P: Updated bifocal/PAL glasses prescription for full-time wear.  Monitor annually.    Follow-up in 1 year for CEE.    Complete documentation of historical and exam elements from today's encounter can be found in the full encounter summary report (not reduplicated in this progress note). I personally obtained the chief complaint(s) and history of present illness. I confirmed and edited as necessary the review of systems, past medical/surgical history, family history, social history, and examination findings as document by others; and I examined the patient myself. I personally reviewed the relevant tests, images, and reports as documented above. I formulated and edited as necessary the assessment and plan and discussed the findings and management plan with the patient and family.    Radha Stevens OD  
stand-by assist

## 2025-01-20 NOTE — PROGRESS NOTES
"When opening a documentation only encounter, be sure to enter in \"Chief Complaint\" Forms and in \" Comments\" Title of form, description if needed.    Flor is a 59 year old  female  Form received via: Fax  Form now resides in: Provider Ready    [employer] prescriber form    Mark Morales                "

## 2025-01-21 ENCOUNTER — OFFICE VISIT (OUTPATIENT)
Dept: OPHTHALMOLOGY | Facility: CLINIC | Age: 60
End: 2025-01-21
Attending: OPHTHALMOLOGY
Payer: MEDICARE

## 2025-01-21 DIAGNOSIS — H52.229 MYOPIA WITH REGULAR ASTIGMATISM AND PRESBYOPIA: Primary | ICD-10-CM

## 2025-01-21 DIAGNOSIS — H52.4 MYOPIA WITH REGULAR ASTIGMATISM AND PRESBYOPIA: Primary | ICD-10-CM

## 2025-01-21 DIAGNOSIS — H52.10 MYOPIA WITH REGULAR ASTIGMATISM AND PRESBYOPIA: Primary | ICD-10-CM

## 2025-01-21 PROCEDURE — G0463 HOSPITAL OUTPT CLINIC VISIT: HCPCS

## 2025-01-21 ASSESSMENT — TONOMETRY
OS_IOP_MMHG: 12
IOP_METHOD: ICARE
OD_IOP_MMHG: 12

## 2025-01-21 ASSESSMENT — REFRACTION_MANIFEST
OD_SPHERE: -1.25
OD_ADD: +2.75
OS_ADD: +2.75
OD_CYLINDER: +0.75
OS_AXIS: 167
OD_AXIS: 073
OS_SPHERE: -0.75
OS_CYLINDER: +0.50

## 2025-01-21 ASSESSMENT — REFRACTION_WEARINGRX
OS_SPHERE: -1.00
OS_CYLINDER: +0.50
OD_ADD: +2.75
OD_SPHERE: -1.00
OS_ADD: +2.75
OD_CYLINDER: +0.75
OD_AXIS: 073
OS_AXIS: 167

## 2025-01-21 ASSESSMENT — VISUAL ACUITY
OD_PH_CC+: -2
CORRECTION_TYPE: GLASSES
OD_CC: 20/50
OS_CC+: -2
OS_CC: 20/50
OD_PH_CC: 20/40
METHOD: NUMBERS - LINEAR

## 2025-01-21 NOTE — NURSING NOTE
Chief Complaints and History of Present Illnesses   Patient presents with    Optic Atrophy Evaluation     Optic atrophy - Left Eye     Chief Complaint(s) and History of Present Illness(es)       Optic Atrophy Evaluation              Laterality: left eye    Associated symptoms: tearing and itching.  Negative for eye pain and foreign body sensation    Pain scale: 0/10    Comments: Optic atrophy - Left Eye              Comments    Pt states vision is better since last visit, less tearing.  No pain.  Still having some itching.  AT's PRN.  Restasis BE 2x/day.    MALIK Carballo January 21, 2025 2:06 PM

## 2025-01-22 NOTE — PROGRESS NOTES
Form has been completed by provider.     Form sent out via: [employer] per provider  Patient informed: No  Output date: January 22, 2025    Mark Morales      **Please close the encounter**

## 2025-01-29 DIAGNOSIS — M25.512 CHRONIC LEFT SHOULDER PAIN: ICD-10-CM

## 2025-01-29 DIAGNOSIS — G89.29 CHRONIC LEFT SHOULDER PAIN: ICD-10-CM

## 2025-01-29 RX ORDER — ZIKS ARTHRITIS PAIN RELIEF 6.79; .566; .014 MG/G; MG/G; MG/G
CREAM TOPICAL
Qty: 169.8 G | Refills: 3 | Status: SHIPPED | OUTPATIENT
Start: 2025-01-29

## 2025-02-17 ENCOUNTER — TELEPHONE (OUTPATIENT)
Dept: NEPHROLOGY | Facility: CLINIC | Age: 60
End: 2025-02-17
Payer: MEDICARE

## 2025-02-17 NOTE — TELEPHONE ENCOUNTER
Patient confirmed scheduled appointment:  Date: December 15th, 2025  Time: 3:30p  Visit type: Return Nephrology  Provider: Dr. Encarnacion  Location: [facility] [place]  Testing/imaging: Labs 1hr prior to appt  Additional notes: NA

## 2025-02-24 ENCOUNTER — OFFICE VISIT (OUTPATIENT)
Dept: FAMILY MEDICINE | Facility: CLINIC | Age: 60
End: 2025-02-24
Payer: MEDICARE

## 2025-02-24 VITALS
TEMPERATURE: 97.7 F | WEIGHT: 229.1 LBS | DIASTOLIC BLOOD PRESSURE: 74 MMHG | OXYGEN SATURATION: 94 % | HEIGHT: 60 IN | HEART RATE: 60 BPM | SYSTOLIC BLOOD PRESSURE: 144 MMHG | BODY MASS INDEX: 44.98 KG/M2 | RESPIRATION RATE: 16 BRPM

## 2025-02-24 DIAGNOSIS — N18.32 STAGE 3B CHRONIC KIDNEY DISEASE (H): Primary | ICD-10-CM

## 2025-02-24 DIAGNOSIS — R60.0 BILATERAL LEG EDEMA: ICD-10-CM

## 2025-02-24 DIAGNOSIS — R22.33 NODULE OF FINGER OF BOTH HANDS: ICD-10-CM

## 2025-02-24 DIAGNOSIS — G89.29 CHRONIC LEFT-SIDED THORACIC BACK PAIN: ICD-10-CM

## 2025-02-24 DIAGNOSIS — M54.6 CHRONIC LEFT-SIDED THORACIC BACK PAIN: ICD-10-CM

## 2025-02-24 DIAGNOSIS — J44.9 CHRONIC OBSTRUCTIVE PULMONARY DISEASE, UNSPECIFIED COPD TYPE (H): ICD-10-CM

## 2025-02-24 DIAGNOSIS — I50.32 CHRONIC HEART FAILURE WITH PRESERVED EJECTION FRACTION (H): ICD-10-CM

## 2025-02-24 PROBLEM — B96.20 E. COLI UTI: Status: RESOLVED | Noted: 2021-07-12 | Resolved: 2025-02-24

## 2025-02-24 PROBLEM — N39.0 E. COLI UTI: Status: RESOLVED | Noted: 2021-07-12 | Resolved: 2025-02-24

## 2025-02-24 RX ORDER — FUROSEMIDE 40 MG/1
40 TABLET ORAL DAILY
Qty: 90 TABLET | Refills: 0 | Status: SHIPPED | OUTPATIENT
Start: 2025-02-24 | End: 2025-05-25

## 2025-02-24 RX ORDER — FUROSEMIDE 20 MG/1
40 TABLET ORAL DAILY
Qty: 180 TABLET | Refills: 1 | Status: SHIPPED | OUTPATIENT
Start: 2025-02-24 | End: 2025-02-24

## 2025-02-24 RX ORDER — ALBUTEROL SULFATE 90 UG/1
2 INHALANT RESPIRATORY (INHALATION) EVERY 4 HOURS PRN
Qty: 54 G | Refills: 3 | Status: SHIPPED | OUTPATIENT
Start: 2025-02-24

## 2025-02-24 NOTE — PATIENT INSTRUCTIONS
Physical therapy will help with back and neck pain.   OMT appointment may help with spasms.   Switch Lasix to 40 mg in the morning and none at night. New prescription sent.

## 2025-02-24 NOTE — PROGRESS NOTES
Assessment & Plan     Stage 3b chronic kidney disease (H); Hfpef; Bilateral leg edema  She reports increasing leg edema and actually took 40 mg of her Lasix this morning which resulted in a significant increase in urine output and improvement in symptoms. Quite short of breath with minimal activity which is difficult to determine if due to chronic deconditioning or other factors. She requests a medication that is stronger than Furosamide, though given her kidney disease, I don't think that is warranted at this time. She had updated labs in December. Her blood pressures have been more stable so it was not repeated today. Reviewed her cardiac history and echocardiograms. Suspect that she has Hfpef which has been hard to elucidate severity given that some of her Echos appear normal but have been attributed to body habitus. Next visit will update labs for kidney disease and add on a BMP. We will increase Furosamide to 40 mg AM only for now. We will see what her response is with blood pressure and weight. Follow-up 1 month with repeat weight and blood pressure. Monitor shortness of breath especially as she engages in PT. Consider increasing Furosamide to 40 mg BID and consider cardiology referral for right heart cath.  - furosemide (LASIX) 40 MG tablet  Dispense: 90 tablet; Refill: 0    Nodule of finger of both hands  Not associated with a tendon and does appear near the joint in multiple location but does not appear to be distinctly related to the joint. Could be associated with the vasculature but the digital vessels are so tiny I am uncertain what study would properly show them and doubt ultrasound would show compression of such a small diameter vein. Will order a soft tissue ultrasound as a start. She has no splint or hemorrhages or other risk factors for septic emboli and not consistent with heberden nodes.   - US Extremity Non Vascular Bilateral    Chronic obstructive pulmonary disease, unspecified COPD type  (H)  Refill  - albuterol (VENTOLIN HFA) 108 (90 Base) MCG/ACT inhaler  Dispense: 54 g; Refill: 3    Chronic left-sided thoracic back pain  Has not started pool therapy yet which I recommended she do. Also educated about OMT and that new OMT appointments are scheduling out about 5 weeks in advance, and she would like to schedule with a female provider.  will help with scheduling. Continue to use capsaicin and diclofenac topically. I don't think muscle relaxants will be helpful given side effect profile.     46 minutes spent by me on the date of the encounter doing chart review, history and exam, documentation and further activities per the note    Return in about 4 weeks (around 3/24/2025).    West Ray is a 59 year old, presenting for the following health issues:  Follow Up and Pain (Neck and back pain for a week )        2/24/2025     4:41 PM   Additional Questions   Roomed by Hugh   Accompanied by Son         2/24/2025    Information    services provided? No     HPI   Pain  Reports upper back and neck pain radiating to the left shoulder. She was not able to attend pool therapy in the past. Would like to start this. Capsaicin cream has helped with this.     Hand  Reports pain at bumps on her bilateral hands. Area surrounding bumps are not painful. Reports some discoloration of blue. Pain fluctuates day to day. Reports that when blood vessels are more visible, pain is greater. Reports that she does have one of these on her legs that is the most painful today.     Recent illness  She reports that she was ill recently. She is feeling better.     Blood pressure  She is not sure why her blood pressure is higher today.     Edema  Reports that swelling has not changed too much. Reports that she notices increased urination when taking Lasix as well as decreased edema in her lower extremities. She is currently taking 20 mg BID. She does get short of breath when completing household  activities.     She does have a history of heart failure of 2022 on Bipap showed IVC dilation though her bedside echo were questioned by consultants at the time given her body habitus. She does have a history of pulmonary hypertension though the most recent echo did not show elevated RA pressures. Stress echo with dobutamine in 2018 which was normal.      This document serves as a record of the services and decisions personally performed and made by Karely Sierra MD. It was created on his/her behalf by Alcides Galeana, a trained medical scribe. The creation of this document is based the provider's statements to the medical scribe.  Scribe Alcides Galeana 4:59 PM, February 24, 2025       Objective    BP (!) 144/74   Pulse 60   Temp 97.7  F (36.5  C) (Temporal)   Resp 16   Ht 1.524 m (5')   Wt 103.9 kg (229 lb 1.6 oz)   LMP  (LMP Unknown)   SpO2 94%   BMI 44.74 kg/m    Body mass index is 44.74 kg/m .  Wt Readings from Last 10 Encounters:   02/24/25 103.9 kg (229 lb 1.6 oz)   01/13/25 102.9 kg (226 lb 14.4 oz)   11/25/24 101.9 kg (224 lb 9.6 oz)   11/11/24 101.8 kg (224 lb 6 oz)   10/31/24 102.1 kg (225 lb)   10/09/24 103.1 kg (227 lb 3.2 oz)   09/09/24 103 kg (227 lb 1.6 oz)   03/27/24 104.8 kg (231 lb 1.6 oz)   03/25/24 103.4 kg (228 lb)   01/15/24 104.7 kg (230 lb 12.8 oz)     Physical Exam   GENERAL: alert and no distress  RESP: lungs clear to auscultation - no rales, rhonchi or wheezes  CV: regular rate and rhythm, normal S1 S2, Positive S3, no S4, no murmur, click or rub, no peripheral edema  MS: Area of muscle spasm in band like fashion over the left scapula extending up into trap spasm and into the left occipital condyle. Bilateral hands with very visible blood vessels and distinct small firm nodularity associated with those blood vessels of the right second digit at the palmar aspect of the DIP this is slightly larger than the others but remains full flexion extension, the right fourth palmar aspect of the  PIP, the right first digit in the thumb pad but this one is not distinctly associated with a blood vessel, the left second over the palmar aspect of the PIP.  Exaggerated lumbar lordosis left side of the thorax is rotated forward.   PSYCH: mentation appears normal, affect normal/bright      Signed Electronically by: Karely Sierra MD

## 2025-02-24 NOTE — Clinical Note
Please schedule follow up with me in 4-6 weeks for BP and schedule new OMT with female provider, she is aware that is about 5 weeks out for scheduling. Reacch out to son Courtney to schedule. Thanks!  c

## 2025-03-12 ENCOUNTER — ANCILLARY PROCEDURE (OUTPATIENT)
Dept: ULTRASOUND IMAGING | Facility: CLINIC | Age: 60
End: 2025-03-12
Attending: FAMILY MEDICINE
Payer: MEDICARE

## 2025-03-12 DIAGNOSIS — R22.33 NODULE OF FINGER OF BOTH HANDS: ICD-10-CM

## 2025-03-12 PROCEDURE — 76882 US LMTD JT/FCL EVL NVASC XTR: CPT | Mod: RT | Performed by: RADIOLOGY

## 2025-03-12 NOTE — NURSING NOTE
Due to patient being non-English speaking/uses sign language, an  was used for this visit. Only for face-to-face interpretation by an external agency, date and length of interpretation can be found on the scanned worksheet.     name: Courtney #[id number]  Agency:  [employer]  Language:  Wolof    Telephone number: [phone]  Type of interpretation: Telephone, spoken

## 2025-03-20 ENCOUNTER — OFFICE VISIT (OUTPATIENT)
Dept: FAMILY MEDICINE | Facility: CLINIC | Age: 60
End: 2025-03-20
Payer: MEDICARE

## 2025-03-20 VITALS
BODY MASS INDEX: 44.92 KG/M2 | SYSTOLIC BLOOD PRESSURE: 132 MMHG | HEART RATE: 71 BPM | RESPIRATION RATE: 16 BRPM | DIASTOLIC BLOOD PRESSURE: 78 MMHG | OXYGEN SATURATION: 97 % | TEMPERATURE: 98 F | WEIGHT: 230 LBS

## 2025-03-20 DIAGNOSIS — M99.02 SOMATIC DYSFUNCTION OF THORACIC REGION: ICD-10-CM

## 2025-03-20 DIAGNOSIS — M99.03 SOMATIC DYSFUNCTION OF LUMBAR REGION: ICD-10-CM

## 2025-03-20 DIAGNOSIS — M99.07 SOMATIC DYSFUNCTION OF UPPER EXTREMITY: ICD-10-CM

## 2025-03-20 DIAGNOSIS — M54.6 CHRONIC BILATERAL THORACIC BACK PAIN: Primary | ICD-10-CM

## 2025-03-20 DIAGNOSIS — G89.29 CHRONIC BILATERAL THORACIC BACK PAIN: Primary | ICD-10-CM

## 2025-03-20 DIAGNOSIS — M99.06 SOMATIC DYSFUNCTION OF LOWER EXTREMITY: ICD-10-CM

## 2025-03-20 DIAGNOSIS — M99.09 SOMATIC DYSFUNCTION OF ABDOMINAL REGION: ICD-10-CM

## 2025-03-20 PROCEDURE — 99213 OFFICE O/P EST LOW 20 MIN: CPT | Mod: 25 | Performed by: FAMILY MEDICINE

## 2025-03-20 PROCEDURE — 98927 OSTEOPATH MANJ 5-6 REGIONS: CPT | Performed by: FAMILY MEDICINE

## 2025-03-20 PROCEDURE — 3075F SYST BP GE 130 - 139MM HG: CPT | Performed by: FAMILY MEDICINE

## 2025-03-20 PROCEDURE — 3078F DIAST BP <80 MM HG: CPT | Performed by: FAMILY MEDICINE

## 2025-03-20 NOTE — PROGRESS NOTES
"  Assessment & Plan   No follow-ups on file.    1. Chronic bilateral thoracic back pain (Primary)  - OSTEOPATHIC MANIP,5-6 BODY REGN    2. Somatic dysfunction of thoracic region  - OSTEOPATHIC MANIP,5-6 BODY REGN    3. Somatic dysfunction of lumbar region  - OSTEOPATHIC MANIP,5-6 BODY REGN    4. Somatic dysfunction of upper extremity  - OSTEOPATHIC MANIP,5-6 BODY REGN    5. Somatic dysfunction of abdominal region  - OSTEOPATHIC MANIP,5-6 BODY REGN    6. Somatic dysfunction of lower extremity  - OSTEOPATHIC MANIP,5-6 BODY REGN    Given that this has been interfering with the patient's quality of life and ADLs, after discussion, informed consent, and medical assessment for safety, we have together decided to address this concern with Osteopathic Manipulative Treatment.    Please see OMT Procedure Note below for the specifics of treatment.    West Ray is a 59 year old, presenting for the following health issues:  Pain (Neck to shoulder. Left side and leg pain. \"Bone pain. Finger concerns.  Pain scale 8-10. )        3/20/2025     3:01 PM   Additional Questions   Roomed by Zaida   Accompanied by self     HPI      Referred for new OMT  Chronic back pain  LE swelling, at baseline, L>R      Review of Systems  Constitutional, HEENT, cardiovascular, pulmonary, gi and gu systems are negative, except as otherwise noted.      Objective    /78   Pulse 71   Temp 98  F (36.7  C) (Temporal)   Resp 16   Wt 104.3 kg (230 lb)   LMP  (LMP Unknown)   SpO2 97%   BMI 44.92 kg/m    Body mass index is 44.92 kg/m .  Physical Exam   See OMT documentation         OMT PROCEDURE NOTE    Body Region: T-spine and/or Ribs  Somatic Dysfunction: thoracic paraspinal msk hypertonicity  Treatment: Soft Tissue Techniques.   Outcome: Improved    Body Region: Abdomen  Somatic Dysfunction: Abdominal bloating, restricted/painful mesenteric motion  Treatment: Lymphatic Techniques.   Outcome: Improved    Body Region: Shoulder, UE, and/or " Hand  Somatic Dysfunction: bilateral protracted shoulder, R levator trigger point   Treatment: Direct Myofascial Release Techniques.  Outcome: Improved    Body Region:  L-spine, Sacrum, and Pelvis/ Innominates  Somatic Dysfunction: inreased lumbar lordosis  Treatment: Balancing Ligaments/Torque Unwinding Techniques.   Outcome: Improved    Body Region: LE and/or Feet  Somatic Dysfunction: edema LLE  Treatment: Direct Muscle Energy and Lymphatic Techniques.   Outcome: Improved    The patient actively participated in OMT and was able to communicate both positive and negative feedback throughout. OMT completed without incident. Patient tolerated treatment well. Patient reported that ROM, function, and/or pain level were improved. Advised that pain is occasionally worse during the first 24 hours after treatment and that drinking more water and taking Tylenol or Ibuprofen often help. Patient to return in 2 week/s or as needed for repeat osteopathic evaluation.       Kristina Booker, DO

## 2025-03-25 ENCOUNTER — DOCUMENTATION ONLY (OUTPATIENT)
Dept: FAMILY MEDICINE | Facility: CLINIC | Age: 60
End: 2025-03-25
Payer: MEDICARE

## 2025-03-25 NOTE — PROGRESS NOTES
"When opening a documentation only encounter, be sure to enter in \"Chief Complaint\" Forms and in \" Comments\" Title of form, description if needed.    Flor is a 59 year old  female  Form received via: Fax  Form now resides in: Provider Ready    [facility] - Home Health Plan of Care and Cert    Mark Morales MA                "

## 2025-04-04 DIAGNOSIS — Z53.9 DIAGNOSIS NOT YET DEFINED: Primary | ICD-10-CM

## 2025-04-07 ENCOUNTER — OFFICE VISIT (OUTPATIENT)
Dept: FAMILY MEDICINE | Facility: CLINIC | Age: 60
End: 2025-04-07
Payer: MEDICARE

## 2025-04-07 VITALS
HEART RATE: 63 BPM | BODY MASS INDEX: 44.21 KG/M2 | TEMPERATURE: 98 F | OXYGEN SATURATION: 94 % | SYSTOLIC BLOOD PRESSURE: 129 MMHG | RESPIRATION RATE: 16 BRPM | DIASTOLIC BLOOD PRESSURE: 75 MMHG | HEIGHT: 60 IN | WEIGHT: 225.2 LBS

## 2025-04-07 DIAGNOSIS — H04.123 DRY EYES, BILATERAL: ICD-10-CM

## 2025-04-07 DIAGNOSIS — K59.04 CHRONIC IDIOPATHIC CONSTIPATION: ICD-10-CM

## 2025-04-07 DIAGNOSIS — M81.8 OTHER OSTEOPOROSIS WITHOUT CURRENT PATHOLOGICAL FRACTURE: ICD-10-CM

## 2025-04-07 DIAGNOSIS — R22.33 NODULE OF FINGER OF BOTH HANDS: Primary | ICD-10-CM

## 2025-04-07 DIAGNOSIS — G89.29 CHRONIC LEFT SHOULDER PAIN: ICD-10-CM

## 2025-04-07 DIAGNOSIS — R29.2: ICD-10-CM

## 2025-04-07 DIAGNOSIS — M79.2 NEUROPATHIC PAIN: ICD-10-CM

## 2025-04-07 DIAGNOSIS — D32.9 MENINGIOMA (H): ICD-10-CM

## 2025-04-07 DIAGNOSIS — N18.32 STAGE 3B CHRONIC KIDNEY DISEASE (H): ICD-10-CM

## 2025-04-07 DIAGNOSIS — M25.512 CHRONIC LEFT SHOULDER PAIN: ICD-10-CM

## 2025-04-07 DIAGNOSIS — Z91.041 ALLERGIC TO IV CONTRAST: ICD-10-CM

## 2025-04-07 LAB
ALBUMIN UR-MCNC: NEGATIVE MG/DL
APPEARANCE UR: CLEAR
BACTERIA #/AREA URNS HPF: ABNORMAL /HPF
BILIRUB UR QL STRIP: NEGATIVE
COLOR UR AUTO: YELLOW
GLUCOSE UR STRIP-MCNC: NEGATIVE MG/DL
HGB UR QL STRIP: ABNORMAL
KETONES UR STRIP-MCNC: NEGATIVE MG/DL
LEUKOCYTE ESTERASE UR QL STRIP: NEGATIVE
NITRATE UR QL: NEGATIVE
PH UR STRIP: 5.5 [PH] (ref 5–8)
RBC #/AREA URNS AUTO: ABNORMAL /HPF
SP GR UR STRIP: 1.01 (ref 1–1.03)
SQUAMOUS #/AREA URNS AUTO: ABNORMAL /LPF
UROBILINOGEN UR STRIP-ACNC: 0.2 E.U./DL
WBC #/AREA URNS AUTO: ABNORMAL /HPF

## 2025-04-07 PROCEDURE — 36415 COLL VENOUS BLD VENIPUNCTURE: CPT | Performed by: FAMILY MEDICINE

## 2025-04-07 PROCEDURE — 82570 ASSAY OF URINE CREATININE: CPT | Performed by: FAMILY MEDICINE

## 2025-04-07 PROCEDURE — 81001 URINALYSIS AUTO W/SCOPE: CPT | Performed by: FAMILY MEDICINE

## 2025-04-07 PROCEDURE — 80048 BASIC METABOLIC PNL TOTAL CA: CPT | Performed by: FAMILY MEDICINE

## 2025-04-07 PROCEDURE — 3078F DIAST BP <80 MM HG: CPT | Performed by: FAMILY MEDICINE

## 2025-04-07 PROCEDURE — 3074F SYST BP LT 130 MM HG: CPT | Performed by: FAMILY MEDICINE

## 2025-04-07 PROCEDURE — 99417 PROLNG OP E/M EACH 15 MIN: CPT | Performed by: FAMILY MEDICINE

## 2025-04-07 PROCEDURE — 82043 UR ALBUMIN QUANTITATIVE: CPT | Performed by: FAMILY MEDICINE

## 2025-04-07 PROCEDURE — 99215 OFFICE O/P EST HI 40 MIN: CPT | Performed by: FAMILY MEDICINE

## 2025-04-07 RX ORDER — ZIKS ARTHRITIS PAIN RELIEF 6.79; .566; .014 MG/G; MG/G; MG/G
CREAM TOPICAL
Qty: 169.8 G | Refills: 3 | Status: SHIPPED | OUTPATIENT
Start: 2025-04-07

## 2025-04-07 RX ORDER — METHYLCELLULOSE 500 MG/1
1000 TABLET ORAL DAILY
Qty: 90 TABLET | Refills: 3 | Status: SHIPPED | OUTPATIENT
Start: 2025-04-07

## 2025-04-07 RX ORDER — GABAPENTIN 100 MG/1
CAPSULE ORAL
Status: SHIPPED
Start: 2025-04-07

## 2025-04-07 RX ORDER — AMOXICILLIN 250 MG
2 CAPSULE ORAL 2 TIMES DAILY
Qty: 180 TABLET | Refills: 3 | Status: SHIPPED | OUTPATIENT
Start: 2025-04-07

## 2025-04-07 RX ORDER — VITAMIN B COMPLEX
1 TABLET ORAL DAILY
Qty: 100 TABLET | Refills: 3 | Status: SHIPPED | OUTPATIENT
Start: 2025-04-07

## 2025-04-07 RX ORDER — CETIRIZINE HYDROCHLORIDE 10 MG/1
10 TABLET ORAL DAILY
Qty: 90 TABLET | Refills: 3 | Status: SHIPPED | OUTPATIENT
Start: 2025-04-07

## 2025-04-07 RX ORDER — CALCIUM CARBONATE/VITAMIN D3 600 MG-10
1 TABLET ORAL 2 TIMES DAILY
Qty: 60 TABLET | Refills: 11 | Status: SHIPPED | OUTPATIENT
Start: 2025-04-07

## 2025-04-07 RX ORDER — DIPHENHYDRAMINE HCL 25 MG
25 CAPSULE ORAL EVERY 6 HOURS PRN
Qty: 4 CAPSULE | Refills: 0 | Status: SHIPPED | OUTPATIENT
Start: 2025-04-07 | End: 2025-04-11

## 2025-04-07 RX ORDER — METHYLPREDNISOLONE 32 MG/1
32 TABLET ORAL DAILY
Qty: 1 TABLET | Refills: 0 | Status: SHIPPED | OUTPATIENT
Start: 2025-04-07 | End: 2025-04-08

## 2025-04-07 RX ORDER — KETOTIFEN FUMARATE 0.35 MG/ML
1 SOLUTION/ DROPS OPHTHALMIC 2 TIMES DAILY PRN
Qty: 10 ML | Refills: 4 | Status: SHIPPED | OUTPATIENT
Start: 2025-04-07

## 2025-04-07 NOTE — Clinical Note
Pt reported tried to order stockings from [facility] - some sort of problem. Not sure if needs an order or what. Can you call?

## 2025-04-07 NOTE — PROGRESS NOTES
Assessment & Plan     Nodule of finger of both hands  Has painful vascular appearing nodules based on the color that we evaluated at the last visit and had subsequent ultrasound that showed multiple well circumscribed fosi with internal vascularity. These continue to be painful and she reports significant color changes. Radiology recommended contrast enhanced MRI which we will proceed with. She does have multiple chronic medical conditions including s/p liver transplantation and having secondary hypoparathyroidism, stage 3 CKD, and immunosuppressed on liver transplant medication. She also had a stroke and meningioma but no history of cancer. Recommend she follow up with me after the MRI. See below for contrast allergy.   - MR Hand Right w Contrast  - MR Hand Left w Contrast    Neuropathic pain  She has had chronic neuropathic pain of her torso and back that I attributed to body habitus and multiple procedures, now with lower back pain that progressed in severity despite her doing home exercises, massage and OMT. She does have decreased ankle reflex today and describes neuropathic, fiery pain in her left lower extremity. Her gait is slightly abnormal than her normal. She is insistent on the possibility of x-ray but because of the lack of trauma, I did not think an x-ray would be helpful and move forward with the MRI which her son will help schedule. We will also increase her gabapentin scheduled per AVS. Warned of sedation and once she is at a dose of 700 mg daily dose,   - gabapentin (NEURONTIN) 100 MG capsule    Decreased ankle reflex  As above  - MR Lumbar Spine w/o Contrast    Allergic to IV contrast  She has used this regime before for contrast enhanced study and was reoriented towards the timing of medication.   - methylPREDNISolone (MEDROL) 32 MG tablet  Dispense: 1 tablet; Refill: 0  - diphenhydrAMINE (BENADRYL ALLERGY) 25 MG capsule  Dispense: 4 capsule; Refill: 0    Stage 3b chronic kidney disease (H)  -  Albumin Random Urine Quantitative with Creat Ratio  - Basic metabolic panel  - Urine Macroscopic with reflex to Microscopic  - Albumin Random Urine Quantitative with Creat Ratio  - Basic metabolic panel  - Urine Macroscopic with reflex to Microscopic  - Urine Microscopic Exam    Chronic left shoulder pain  Refills provided for the following conditions below  - capsaicin-menthol-methyl sal (ZIKS ARTHRITIS PAIN RELIEF) 0.025-1-12 % external cream  Dispense: 169.8 g; Refill: 3    Other osteoporosis without current pathological fracture  - calcium carbonate-vitamin D (CALTRATE) 600-10 MG-MCG per tablet  Dispense: 60 tablet; Refill: 11  - vitamin D3 25 mcg (1000 units) tablet  Dispense: 100 tablet; Refill: 3    Dry eyes, bilateral  - ketotifen fumarate 0.035% 0.035 % SOLN ophthalmic solution  Dispense: 10 mL; Refill: 4    Chronic idiopathic constipation  - senna-docusate (SENEXON-S) 8.6-50 MG tablet  Dispense: 180 tablet; Refill: 3  - SM FIBER LAXATIVE 500 MG TABS tablet  Dispense: 90 tablet; Refill: 3    Meningioma  Stable, evaluated last year by Neurosurgery who discussed the stability of this right frontal meningioma over at least the past 10 years. She can follow up with them  in 3-5 years with repeat MRI prior to appointment, and was encouraged to reach out with new questions or concerns in the meantime.  MRI due [id number]     No follow-ups on file.    Subjective   Flor is a 59 year old, presenting for the following health issues:  Follow Up (Hands ) and Pain (Left leg )        4/7/2025     4:36 PM   Additional Questions   Roomed by Hugh   Accompanied by Son         4/7/2025    Information    services provided? No     HPI   Flor is accompanied with her son (Courtney) who also provided interpretation for her.     Leg pain   Flor reports of longstanding left lower extremity pain of many years typically intermittent in nature but worsened over the past month. Flor characterizes pain has been  predominantly along the anterior aspect from her inferior knee down to her shin. There also have been some pains in the back of her knee and in her hip. Pain is a burning sensation deep to bone that may become severe enough that it causes her to cry out in pain. Pain does interrupt sleep at night. She is unsure of any recent injuries to explain the worsening pain. She recalls a neck fracture years ago, but no history of back injuries.    Flor currently takes gabapentin without any difficulties, but does update only taking 100 mg in the day time and 300 mg at bedtime. Flor is waiting for compression stockings to be sent to her home, however she was told for our clinic to call [facility] supply first. [phone]    Hands  Flor continues to have painful bumps on her bilateral hands with associated discoloration. An ultrasound was performed a few weeks ago which she and her son would like to review the results and next steps. As a whole, Flor questions whether there is a neurological component to explain her multiple pain complaints and inquired if a neurology consult would be helpful.     Medication  Her son would like refills of her medications which include her capsaicin, calcium, vitamin D, cetrizine and eyedrops. On review of medications, Flor states she would like to avoid liquid medications but tolerates oral tablets.     Back  Flor and her son updates she will be following with OMT on 4/28/25 for her back pain.         Review of Systems  Constitutional, HEENT, cardiovascular, pulmonary, gi and gu systems are negative, except as otherwise noted.        Objective    /75   Pulse 63   Temp 98  F (36.7  C) (Temporal)   Resp 16   Ht 1.524 m (5')   Wt 102.2 kg (225 lb 3.2 oz)   LMP  (LMP Unknown)   SpO2 94%   BMI 43.98 kg/m    Body mass index is 43.98 kg/m .    Physical Exam   GENERAL: Mildly distressed, anxious  MS: Plantar fascia normal. Plantar flexion normal range. 1+ pitting edema on left. Normal  skin distribution. No pedal warmth. Repeat toe standing causes severe pain flare.  NEURO:Sensation to light touch normal in the upper and lower extremities. Patellar reflex decreased but may be due to position as she struggles to get relaxed. Diffuse muscle spasms, paraspinous. Antalgic gait, favors left. Takes very short steps. Poor push off at the midfoot. Ankle jerks decreased on the left.   SKIN: Normal temperature and hair distribution      Signed Electronically by: Karely Sierra MD    This document serves as a record of the services and decisions personally performed and made by Karely Sierra MD. It was created on her behalf by Bruce Byers, trained medical scribe. The creation of this document is based on the provider's statements to the medical scribe. The documentation recorded by the scribe accurately reflects the services I personally performed and the decisions made by me.     60 minutes spent by me on the date of the encounter doing chart review, history and exam, documentation and further activities per the note

## 2025-04-07 NOTE — PATIENT INSTRUCTIONS
Keep seeing Dr. Booker for OMT  We will plan for MRIs of your hands and back; Imaging service phone number: [phone]  For the hip, I suspect it is bursitis coming from either your low back pain or the way you lay down   For the leg pain, I suspect it is a neuropathic pain.   We will gradually increase your gabapentin as follows:  For one week take 100 mg in the AM and 300 at night  THEN for one week 200 mg in the AM and 300 mg at night  THEN for one week 200 mg in the AM and 300 mg at night  THEN 200 mg in the AM and 400 mg at night.   Consider being seen by a comprehensive pain clinic

## 2025-04-08 LAB
ANION GAP SERPL CALCULATED.3IONS-SCNC: 14 MMOL/L (ref 7–15)
BUN SERPL-MCNC: 56.5 MG/DL (ref 8–23)
CALCIUM SERPL-MCNC: 10.1 MG/DL (ref 8.8–10.4)
CHLORIDE SERPL-SCNC: 103 MMOL/L (ref 98–107)
CREAT SERPL-MCNC: 1.86 MG/DL (ref 0.51–0.95)
CREAT UR-MCNC: 31.2 MG/DL
EGFRCR SERPLBLD CKD-EPI 2021: 30 ML/MIN/1.73M2
GLUCOSE SERPL-MCNC: 99 MG/DL (ref 70–99)
HCO3 SERPL-SCNC: 25 MMOL/L (ref 22–29)
MICROALBUMIN UR-MCNC: <12 MG/L
MICROALBUMIN/CREAT UR: NORMAL MG/G{CREAT}
POTASSIUM SERPL-SCNC: 4 MMOL/L (ref 3.4–5.3)
SODIUM SERPL-SCNC: 142 MMOL/L (ref 135–145)

## 2025-04-13 ENCOUNTER — HEALTH MAINTENANCE LETTER (OUTPATIENT)
Age: 60
End: 2025-04-13

## 2025-05-05 ENCOUNTER — OFFICE VISIT (OUTPATIENT)
Dept: FAMILY MEDICINE | Facility: CLINIC | Age: 60
End: 2025-05-05
Payer: MEDICARE

## 2025-05-05 VITALS
HEART RATE: 65 BPM | RESPIRATION RATE: 16 BRPM | TEMPERATURE: 98.1 F | BODY MASS INDEX: 45.35 KG/M2 | WEIGHT: 231 LBS | OXYGEN SATURATION: 94 % | HEIGHT: 60 IN | SYSTOLIC BLOOD PRESSURE: 130 MMHG | DIASTOLIC BLOOD PRESSURE: 80 MMHG

## 2025-05-05 DIAGNOSIS — R07.81 RIB PAIN ON LEFT SIDE: Primary | ICD-10-CM

## 2025-05-05 DIAGNOSIS — M99.03 SEGMENTAL AND SOMATIC DYSFUNCTION OF LUMBAR REGION: ICD-10-CM

## 2025-05-05 DIAGNOSIS — M99.02 SEGMENTAL AND SOMATIC DYSFUNCTION OF THORACIC REGION: ICD-10-CM

## 2025-05-05 DIAGNOSIS — M99.08 SEGMENTAL AND SOMATIC DYSFUNCTION OF RIB CAGE: ICD-10-CM

## 2025-05-05 NOTE — PATIENT INSTRUCTIONS
Patient Education   Here is the plan from today's visit    1. Rib pain on left side (Primary)  - OSTEOPATHIC MANIP,3-4 BODY REGN    2. Segmental and somatic dysfunction of thoracic region  - OSTEOPATHIC MANIP,3-4 BODY REGN    3. Segmental and somatic dysfunction of lumbar region  - OSTEOPATHIC MANIP,3-4 BODY REGN    4. Segmental and somatic dysfunction of rib cage  - OSTEOPATHIC MANIP,3-4 BODY REGN    Please call or return to clinic if your symptoms don't go away.    Follow up plan  Return if symptoms worsen or fail to improve.    Thank you for coming to [facility] today.  Lab Testing:  **If you had lab testing today and your results are reassuring or normal they will be mailed to you or sent through [employer] within 7 days.   **If the lab tests need quick action we will call you with the results.  **If you are having labs done on a different day, please call [phone] to schedule at [facility] or [phone] for other [facility] Outpatient Lab locations. Labs do not offer walk-in appointments.  The phone number we will call with results is # [phone] (home) . If this is not the best number please call our clinic and change the number.  Medication Refills:  If you need any refills please call your pharmacy and they will contact us.   If you need to  your refill at a new pharmacy, please contact the new pharmacy directly. The new pharmacy will help you get your medications transferred faster.   Scheduling:  If you have any concerns about today's visit or wish to schedule another appointment please call our office during normal business hours [phone] (8-5:00 M-F). If you can no longer make a scheduled visit, please cancel via [employer] or call us to cancel.   If a referral was made to an [facility] specialty provider and you do not get a call from central scheduling, please refer to directions on your visit summary or call our office during normal business hours for assistance.   If  a Mammogram was ordered for you at the [facility] call [phone] to schedule or change your appointment.  If you had an XRay/CT/Ultrasound/MRI ordered the number is [phone] to schedule or change your radiology appointment.   [facility] has limited ultrasound appointments available on Wednesdays, if you would like your ultrasound at [facility], please call [phone] to schedule.   Medical Concerns:  If you have urgent medical concerns please call [phone] at any time of the day.    Kimberly Santiago, DO

## 2025-05-05 NOTE — PROGRESS NOTES
Assessment & Plan     Rib pain on left side  Segmental and somatic dysfunction of thoracic region  Segmental and somatic dysfunction of lumbar region  Segmental and somatic dysfunction of rib cage  Given that this has been interfering with the patient's quality of life and ADLs, after discussion, informed consent, and medical assessment for safety, we have together decided to address this concern with Osteopathic Manipulative Treatment. Please see OMT Procedure Note below for the specifics of treatment. Reviewed rib stretches with her.   - OSTEOPATHIC MANIP,3-4 BODY REGN    BMI  Estimated body mass index is 45.11 kg/m  as calculated from the following:    Height as of this encounter: 1.524 m (5').    Weight as of this encounter: 104.8 kg (231 lb).       Follow-up  Return if symptoms worsen or fail to improve.       OMT PROCEDURE NOTE    Body Region: T-spine and/or Ribs  Somatic Dysfunction: Muscle spasms along left ribs 7-10 with fascial restriction, hypertonicity of parathoracic spine  Treatment: Direct Myofascial Release, Direct Muscle Energy, Counterstrain, and Soft Tissue Techniques.   Outcome: Stable    Body Region:  L-spine, Sacrum, and Pelvis/ Innominates  Somatic Dysfunction: Spasm over left T12/L1 TP, hypertonic left paralumbar muscles  Treatment: Direct Myofascial Release, Soft Tissue, and Direct Inhibitory Techniques.   Outcome: Improved    The patient actively participated in OMT and was able to communicate both positive and negative feedback throughout. OMT completed without incident. Patient tolerated treatment well. Patient reported that ROM, function, and/or pain level were improved. Advised that pain is occasionally worse during the first 24 hours after treatment and that drinking more water and taking Tylenol or Ibuprofen often help. Patient to return in 2 week/s or as needed for repeat osteopathic evaluation.       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      Objective    /80   Pulse 65   Temp 98.1  F (36.7  C) (Temporal)   Resp 16   Ht 1.524 m (5')   Wt 104.8 kg (231 lb)   LMP  (LMP Unknown)   SpO2 94%   BMI 45.11 kg/m    Body mass index is 45.11 kg/m .  Physical Exam  Vitals reviewed.   Constitutional:       Appearance: Normal appearance.   HENT:      Head: Normocephalic and atraumatic.   Eyes:      Conjunctiva/sclera: Conjunctivae normal.   Pulmonary:      Effort: Pulmonary effort is normal.   Abdominal:      General: There is distension.   Musculoskeletal:      Comments: See procedure note.   Skin:     General: Skin is warm and dry.   Neurological:      General: No focal deficit present.      Mental Status: She is alert and oriented to person, place, and time.              Signed Electronically by: Kimberly Santiago DO, MPH

## 2025-05-06 ENCOUNTER — ANCILLARY PROCEDURE (OUTPATIENT)
Dept: MRI IMAGING | Facility: CLINIC | Age: 60
End: 2025-05-06
Attending: FAMILY MEDICINE
Payer: MEDICARE

## 2025-05-06 DIAGNOSIS — R29.2: ICD-10-CM

## 2025-05-06 PROCEDURE — 72148 MRI LUMBAR SPINE W/O DYE: CPT | Performed by: RADIOLOGY

## 2025-05-07 ENCOUNTER — RESULTS FOLLOW-UP (OUTPATIENT)
Dept: FAMILY MEDICINE | Facility: CLINIC | Age: 60
End: 2025-05-07

## 2025-05-07 DIAGNOSIS — R22.32 SUBCUTANEOUS NODULE OF LEFT HAND: Primary | ICD-10-CM

## 2025-05-12 ENCOUNTER — OFFICE VISIT (OUTPATIENT)
Dept: FAMILY MEDICINE | Facility: CLINIC | Age: 60
End: 2025-05-12
Payer: MEDICARE

## 2025-05-12 VITALS
DIASTOLIC BLOOD PRESSURE: 71 MMHG | HEART RATE: 64 BPM | BODY MASS INDEX: 45.51 KG/M2 | OXYGEN SATURATION: 95 % | WEIGHT: 231.8 LBS | SYSTOLIC BLOOD PRESSURE: 112 MMHG | HEIGHT: 60 IN | RESPIRATION RATE: 20 BRPM | TEMPERATURE: 98 F

## 2025-05-12 DIAGNOSIS — R63.8 POOR FLUID INTAKE: Primary | ICD-10-CM

## 2025-05-12 DIAGNOSIS — R26.9 ABNORMAL GAIT: ICD-10-CM

## 2025-05-12 DIAGNOSIS — Z91.041 ALLERGIC TO IV CONTRAST: ICD-10-CM

## 2025-05-12 DIAGNOSIS — M54.16 LEFT LUMBAR RADICULOPATHY: ICD-10-CM

## 2025-05-12 RX ORDER — METHYLPREDNISOLONE 32 MG/1
32 TABLET ORAL DAILY
Qty: 1 TABLET | Refills: 0 | Status: SHIPPED | OUTPATIENT
Start: 2025-05-12

## 2025-05-12 ASSESSMENT — PATIENT HEALTH QUESTIONNAIRE - PHQ9
SUM OF ALL RESPONSES TO PHQ QUESTIONS 1-9: 5
SUM OF ALL RESPONSES TO PHQ QUESTIONS 1-9: 5
10. IF YOU CHECKED OFF ANY PROBLEMS, HOW DIFFICULT HAVE THESE PROBLEMS MADE IT FOR YOU TO DO YOUR WORK, TAKE CARE OF THINGS AT HOME, OR GET ALONG WITH OTHER PEOPLE: NOT DIFFICULT AT ALL

## 2025-05-12 NOTE — PATIENT INSTRUCTIONS
Medications sent  You can schedule MRI of bilateral hands  Take medication 12 hours before  XRay/CT/Ultrasound/MRI-  call [phone] to schedule spine injection  You do not need to take medicine before this appointment.  Recommend taking RSV and Shingles vaccine  COVID vaccine today

## 2025-05-12 NOTE — PROGRESS NOTES
DME (Durable Medical Equipment) Orders and Documentation  Orders Placed This Encounter   Procedures    Wheelchair Order      The patient was assessed and it was determined the patient is in need of the following listed DME Supplies/Equipment. Please complete supporting documentation below to demonstrate medical necessity.        1-Does the patient have difficulty with mobility related activities of daily living (bathing, dressing, feeding, grooming, toileting)? Yes    2- Can the pt's mobility limitation be sufficiently resolved by the use of an appropriately fitted cane or walker? No    3- Does the beneficiary's home provide adequate access between rooms, maneuvering space, and surfaces for use of the manual wheelchair that is provided? Yes    4- will the use of a manual wheelchair significantly improve the pt's ability to participate in MRADLs and the pt will use it on a regular basis in the home? Yes    5-Has the pt expressed a willingness to use the manual wheelchair in their home? Yes    6-Does the pt have sufficient upper extremity function and other physical and mental capabilities needed to safely self-propel the manual wheelchair that is ordered in the home during a typical day? Yes    7- Does the pt have a caregiver who is available, willing, and able to provide assistance with the wheelchair?Yes    8- If the standard sim-wheelchair is ordered does the pt require a lower seat heigh because of short stature? no    9- If a lightweight wheelchair is ordered is the pt unable to self-propel in a standard weight wheelchair? N/A    10- Does your pt require height adjustable arms bc they require non-standard arm height? No    11- Does the pt spend more than 2 hrs per day in the wheelchair No    12-Does pt require a safety belt because of weak upper body muscles, upper body instability or muscles spasticity? No    13- does the beneficiary self-propel and require anti-rollback devices because of ramps? No    14-  Does the pt require elevating legrests? No    15- Estimated length of need (Months, 99=lifetime)?99

## 2025-05-12 NOTE — PROGRESS NOTES
Assessment & Plan     Poor fluid intake  She's been using her ferocimide at 40 mg and improvement in leg edema. However has been trying to lose weight and been reducing eating and drinking. Discussed that she needs to eat small more frequent meals and not avoid intake    Allergic to IV  Upcoming MRI of bilateral hands. Instructed to take medication 12 hours before. Accidentally took it for her left lumbar radiculopathy, so she needs a new one.  - methylPREDNISolone (MEDROL) 32 MG tablet  Dispense: 1 tablet; Refill: 0    Left lumbar radiculopathy  Discussed oral medications, CHRISTIANO, and surgery. Offered referral to spine, she declined. She'd like to proceed with a left L5-S1 CHRISTIANO. We discussed the expected return to pain when anesthesia wears off and it can take up to 2 weeks for steroid benefit. I suspect this will be helpful for her left leg pain and decreased ankle jerk. Hopefully she can also get some benefit from the facet arthropathy.   I will update her son via [employer] message at the patient's request.   - IR Translaminar Epidural Lumbar Inj Incl Imaging    Abnormal gait  She requests a manual wheelchair that she can use at home and in the community. Son will help with propulsion when outdoors. We reviewed the checklist and submitted, see above  - Wheelchair Order      54 minutes spent by me on the date of the encounter doing chart review, history and exam, documentation and further activities per the note      Subjective   Flor is a 59 year old, presenting for the following health issues:  RECHECK        5/12/2025     2:23 PM   Additional Questions   Roomed by Ohn   Accompanied by Self         5/12/2025    Information    services provided? Yes   Language Thai   Type of interpretation provided Face-to-face    name Cary Marquez    Agency [employer]      She reports that her stomach is feeling big, and full of water. She is not urinating, and she wants a  "higher dose of medication to help her urinate.  Last time she urinated was at 12 am, today. Denies dark urine, and states that she is hydrated. Bowel movements are good, but urination is not.  Noticing increase block in nodules of her hands    Pain  Reports back pain that spreads down to her left legs. She describes it as a burning pain, and the pain in the lower back is where it is most severe.    Eating  She reports barely eating. She does have an appetite, but she doesn't want to eat because her \"belly is too big.\" Carleen, her son, encourages her to eat constantly.      Review of Systems  Positive for:  Negative for:    This document serves as a record of the services and decisions personally performed and made by Karely Sierra MD. It was created on his/her behalf by Roxann Mcnamara, a trained medical scribe. The creation of this document is based the provider's statements to the medical scribe.  Scribe Roxann Mcnamara 2:43 PM, May 12, 2025         Objective    /71   Pulse 64   Temp 98  F (36.7  C) (Temporal)   Resp 20   Ht 1.524 m (5')   Wt 105.1 kg (231 lb 12.8 oz)   LMP  (LMP Unknown)   SpO2 95%   BMI 45.27 kg/m    Body mass index is 45.27 kg/m .    BP Readings from Last 6 Encounters:   05/12/25 112/71   05/05/25 130/80   04/07/25 129/75   03/20/25 132/78   02/24/25 (!) 144/74   01/13/25 134/84     Wt Readings from Last 10 Encounters:   05/12/25 105.1 kg (231 lb 12.8 oz)   05/05/25 104.8 kg (231 lb)   04/07/25 102.2 kg (225 lb 3.2 oz)   03/20/25 104.3 kg (230 lb)   02/24/25 103.9 kg (229 lb 1.6 oz)   01/13/25 102.9 kg (226 lb 14.4 oz)   11/25/24 101.9 kg (224 lb 9.6 oz)   11/11/24 101.8 kg (224 lb 6 oz)   10/31/24 102.1 kg (225 lb)   10/09/24 103.1 kg (227 lb 3.2 oz)     Physical Exam   GENERAL: alert and no distress  CV: regular rate and rhythm, normal S1 S2, no S3 or S4, no murmur, click or rub, no peripheral edema. No pedal edema, wearing compression socks  NEURO: Plantar flexion strength rating " of 5 minus 5 on the left, great toe raise is 4/5 on the left. Decrease sensation to lateral upper thigh, on the left.  PSYCH: mentation appears normal, affect normal/bright    MR Lumbar Spine w/o Contrast  Result Date: 5/7/2025  EXAM: MR LUMBAR SPINE W/O CONTRAST LOCATION: [facility] DATE: 5/6/2025 INDICATION: 58 yo w  severe acute on chronic RLE neuropathic pain over last 1 month w  decreased reflexes on left in setting of chronic LBP COMPARISON: None. TECHNIQUE: Routine Lumbar Spine MRI without IV contrast. FINDINGS: Vertebral body heights are maintained. Grade 1 anterolisthesis of L5 on S1 with bilateral pars defects. Marrow signal unremarkable. Multilevel Schmorl's Schmorl's nodes are present. Degenerative endplate changes most notably at L2-3. T12-L1: No cord compression or foraminal narrowing. Mild facet disease. L1-2: No canal stenosis or foraminal narrowing. L2-3: Broad-based disc bulge. Mild canal stenosis. Facet disease. Moderate bilateral foraminal narrowing. L3-4: Broad-based disc bulge. Right central protrusion with mild right lateral recess narrowing. Facet disease. Mild-moderate bilateral foraminal narrowing. L4-5: Broad-based disc bulge. No canal stenosis. Facet disease. Mild right foraminal narrowing. L5-S1: Disc uncovering without canal stenosis. Facet disease. Severe right and mild-moderate left foraminal narrowing.     IMPRESSION: 1.  Trace grade 1 anterolisthesis of L5 on S1 associated with bilateral pars defects. Severe right and mild to moderate left foraminal narrowing.        Signed Electronically by: Karely Sierra MD

## 2025-05-16 ENCOUNTER — LAB (OUTPATIENT)
Dept: LAB | Facility: CLINIC | Age: 60
End: 2025-05-16
Payer: MEDICARE

## 2025-05-16 ENCOUNTER — OFFICE VISIT (OUTPATIENT)
Dept: GASTROENTEROLOGY | Facility: CLINIC | Age: 60
End: 2025-05-16
Attending: INTERNAL MEDICINE
Payer: MEDICARE

## 2025-05-16 VITALS
HEIGHT: 60 IN | OXYGEN SATURATION: 94 % | BODY MASS INDEX: 45.35 KG/M2 | SYSTOLIC BLOOD PRESSURE: 133 MMHG | TEMPERATURE: 97.8 F | WEIGHT: 231 LBS | DIASTOLIC BLOOD PRESSURE: 80 MMHG | HEART RATE: 66 BPM

## 2025-05-16 DIAGNOSIS — Z94.4 LIVER REPLACED BY TRANSPLANT (H): Primary | ICD-10-CM

## 2025-05-16 DIAGNOSIS — Z94.4 LIVER REPLACED BY TRANSPLANT (H): ICD-10-CM

## 2025-05-16 LAB
ALBUMIN SERPL BCG-MCNC: 4.2 G/DL (ref 3.5–5.2)
ALBUMIN UR-MCNC: NEGATIVE MG/DL
ALP SERPL-CCNC: 94 U/L (ref 40–150)
ALT SERPL W P-5'-P-CCNC: 22 U/L (ref 0–50)
ANION GAP SERPL CALCULATED.3IONS-SCNC: 14 MMOL/L (ref 7–15)
APPEARANCE UR: CLEAR
AST SERPL W P-5'-P-CCNC: 24 U/L (ref 0–45)
BILIRUB SERPL-MCNC: 0.6 MG/DL
BILIRUB UR QL STRIP: NEGATIVE
BILIRUBIN DIRECT (ROCHE PRO & PURE): 0.24 MG/DL (ref 0–0.45)
BUN SERPL-MCNC: 34 MG/DL (ref 8–23)
CALCIUM SERPL-MCNC: 10 MG/DL (ref 8.8–10.4)
CHLORIDE SERPL-SCNC: 108 MMOL/L (ref 98–107)
COLOR UR AUTO: ABNORMAL
CREAT SERPL-MCNC: 1.43 MG/DL (ref 0.51–0.95)
EGFRCR SERPLBLD CKD-EPI 2021: 42 ML/MIN/1.73M2
ERYTHROCYTE [DISTWIDTH] IN BLOOD BY AUTOMATED COUNT: 12.8 % (ref 10–15)
GLUCOSE SERPL-MCNC: 119 MG/DL (ref 70–99)
GLUCOSE UR STRIP-MCNC: NEGATIVE MG/DL
HCO3 SERPL-SCNC: 22 MMOL/L (ref 22–29)
HCT VFR BLD AUTO: 35.4 % (ref 35–47)
HGB BLD-MCNC: 11.7 G/DL (ref 11.7–15.7)
HGB UR QL STRIP: NEGATIVE
KETONES UR STRIP-MCNC: NEGATIVE MG/DL
LEUKOCYTE ESTERASE UR QL STRIP: NEGATIVE
MCH RBC QN AUTO: 29.5 PG (ref 26.5–33)
MCHC RBC AUTO-ENTMCNC: 33.1 G/DL (ref 31.5–36.5)
MCV RBC AUTO: 89 FL (ref 78–100)
MUCOUS THREADS #/AREA URNS LPF: PRESENT /LPF
NITRATE UR QL: NEGATIVE
PH UR STRIP: 5 [PH] (ref 5–7)
PLATELET # BLD AUTO: 178 10E3/UL (ref 150–450)
POTASSIUM SERPL-SCNC: 4.5 MMOL/L (ref 3.4–5.3)
PROT SERPL-MCNC: 7.6 G/DL (ref 6.4–8.3)
RBC # BLD AUTO: 3.97 10E6/UL (ref 3.8–5.2)
RBC URINE: <1 /HPF
SODIUM SERPL-SCNC: 144 MMOL/L (ref 135–145)
SP GR UR STRIP: 1.01 (ref 1–1.03)
SQUAMOUS EPITHELIAL: <1 /HPF
UROBILINOGEN UR STRIP-MCNC: NORMAL MG/DL
WBC # BLD AUTO: 5.1 10E3/UL (ref 4–11)
WBC URINE: 0 /HPF

## 2025-05-16 PROCEDURE — 85027 COMPLETE CBC AUTOMATED: CPT | Performed by: PATHOLOGY

## 2025-05-16 PROCEDURE — 3075F SYST BP GE 130 - 139MM HG: CPT | Performed by: INTERNAL MEDICINE

## 2025-05-16 PROCEDURE — 80053 COMPREHEN METABOLIC PANEL: CPT | Performed by: PATHOLOGY

## 2025-05-16 PROCEDURE — G0463 HOSPITAL OUTPT CLINIC VISIT: HCPCS | Performed by: INTERNAL MEDICINE

## 2025-05-16 PROCEDURE — 99215 OFFICE O/P EST HI 40 MIN: CPT | Performed by: INTERNAL MEDICINE

## 2025-05-16 PROCEDURE — 3079F DIAST BP 80-89 MM HG: CPT | Performed by: INTERNAL MEDICINE

## 2025-05-16 PROCEDURE — 80158 DRUG ASSAY CYCLOSPORINE: CPT | Performed by: INTERNAL MEDICINE

## 2025-05-16 PROCEDURE — 36415 COLL VENOUS BLD VENIPUNCTURE: CPT | Performed by: PATHOLOGY

## 2025-05-16 PROCEDURE — 99000 SPECIMEN HANDLING OFFICE-LAB: CPT | Performed by: PATHOLOGY

## 2025-05-16 PROCEDURE — 82248 BILIRUBIN DIRECT: CPT | Performed by: PATHOLOGY

## 2025-05-16 PROCEDURE — 1125F AMNT PAIN NOTED PAIN PRSNT: CPT | Performed by: INTERNAL MEDICINE

## 2025-05-16 PROCEDURE — 81001 URINALYSIS AUTO W/SCOPE: CPT | Performed by: PATHOLOGY

## 2025-05-16 ASSESSMENT — PAIN SCALES - GENERAL: PAINLEVEL_OUTOF10: MILD PAIN (2)

## 2025-05-16 NOTE — NURSING NOTE
No chief complaint on file.    /80 (BP Location: Left arm, Cuff Size: Adult Regular)   Pulse 66   Temp 97.8  F (36.6  C) (Oral)   Ht 1.524 m (5')   Wt 104.8 kg (231 lb)   LMP  (LMP Unknown)   SpO2 94%   BMI 45.11 kg/m    Shelby Fay on 5/16/2025 at 10:49 AM

## 2025-05-16 NOTE — LETTER
5/16/2025      Flor Navarro  [address]  [place] [facility] [address]      Dear Colleague,    Thank you for referring your patient, Flor Navarro, to the [facility] [place]. Please see a copy of my visit note below.    Solid Organ Transplant Hepatology Follow-Up Visit:     HISTORY OF PRESENT ILLNESS:   I had the pleasure of seeing Flor Navarro for followup in the Liver Clinic at the [facility] on May 16, 2025. Ms. Navarro returns for followup now 14 and 1/2 years status post liver transplantation for cirrhosis caused by chronic hepatitis C.     For the most part, she is doing well.  She denies any abdominal pain, itching or skin rash.  She does have fatigue.  She denies any increased abdominal girth or lower extremity edema.  She has not had any gastrointestinal bleeding.  She does complain of back pain, which she is seeing her primary physician about     She denies any fevers or chills, cough or shortness of breath.  She denies any nausea or vomiting.  She is having some constipation, for which she is taking a liquid prescribed by her primary physician.  Her appetite is good, and her weight is down 4 pounds but is still too high.  Her current BMI is 45.      Medications:   Current Outpatient Medications   Medication Sig Dispense Refill     ACE/ARB/ARNI NOT PRESCRIBED (INTENTIONAL) Please choose reason not prescribed from choices below.       albuterol (ACCUNEB) 0.63 MG/3ML neb solution Take 3 mLs (0.63 mg) by nebulization every 4 hours as needed for shortness of breath, wheezing or cough 360 mL 4     albuterol (VENTOLIN HFA) 108 (90 Base) MCG/ACT inhaler Inhale 2 puffs into the lungs every 4 hours as needed for shortness of breath, wheezing or cough. 54 g 3     amLODIPine (NORVASC) 10 MG tablet Take 1 tablet (10 mg) by mouth daily. 90 tablet 3     atorvastatin (LIPITOR) 40 MG tablet Take 1 tablet (40 mg) by mouth daily. 90 tablet 1     calcitRIOL  (ROCALTROL) 0.25 MCG capsule Take 1 capsule (0.25 mcg) by mouth daily. 90 capsule 3     calcium carbonate-vitamin D (CALTRATE) 600-10 MG-MCG per tablet Take 1 tablet by mouth 2 times daily. 60 tablet 11     carvedilol (COREG) 12.5 MG tablet Take 1 tablet (12.5 mg) by mouth 2 times daily (with meals). 180 tablet 1     cetirizine (ZYRTEC) 10 MG tablet Take 1 tablet (10 mg) by mouth daily. 90 tablet 3     cycloSPORINE (RESTASIS) 0.05 % ophthalmic emulsion Place 1 drop into both eyes 2 times daily. 60 each 4     cycloSPORINE modified (GENERIC EQUIVALENT) 25 MG capsule Take 2 capsules (50 mg) by mouth every 12 hours. 120 capsule 11     Denture Care Products (EFFERDENT DENTURE CLEANSER) TBEF Use nightly to clean oral appliance 30 tablet 11     dextran 70-hypromellose (TEARS NATURALE FREE PF) 0.1-0.3 % ophthalmic solution Place 1 drop into both eyes 4 times daily. 36 each 11     diclofenac (VOLTAREN) 1 % topical gel Apply 4 g topically 4 times daily. 350 g 5     febuxostat (ULORIC) 40 MG TABS tablet Take 1 tablet (40 mg) by mouth daily. 90 tablet 3     furosemide (LASIX) 40 MG tablet Take 1 tablet (40 mg) by mouth daily. 90 tablet 0     gabapentin (NEURONTIN) 100 MG capsule Take 200mg AM and 30mg HS for 1 wk, incr to 200 AM and 400 HS for 1 wk, then increase to 300AM and 400 HS       gabapentin (NEURONTIN) 300 MG capsule TAKE ONE CAPSULE BY MOUTH EVERY EVENING AT BEDTIME 90 capsule 3     ketotifen fumarate 0.035% 0.035 % SOLN ophthalmic solution Place 1 drop into both eyes 2 times daily as needed for itching. 10 mL 4     lidocaine (LIDODERM) 5 % patch Place 1 patch over 12 hours onto the skin every 24 hours. To prevent lidocaine toxicity, patient should be patch free for 12 hrs daily. 90 patch 3     methylPREDNISolone (MEDROL) 32 MG tablet Take 1 tablet (32 mg) by mouth daily. 12 hours prior to the procedure with IV contrast 1 tablet 0     multivitamin w/minerals (CERTAVITE/ANTIOXIDANTS) tablet TAKE ONE TABLET BY MOUTH  ONCE DAILY 100 tablet 3     mycophenolate (GENERIC EQUIVALENT) 250 MG capsule Take 2 capsules (500 mg) by mouth 2 times daily. 120 capsule 11     omeprazole (PRILOSEC) 20 MG DR capsule Take 1 capsule (20 mg) by mouth 2 times daily. el 180 capsule 3     order for DME Equipment being ordered: compression stockings bilateral  Please measure and fit patient for stockings   Strength 15-30mmHg  Disp 2 pairs ( 4 socks)  1 refill over the time of 1 year 4 Units 1     order for DME Equipment being ordered: Nebulizer with adult mask and tubing 1 Units 0     senna-docusate (SENEXON-S) 8.6-50 MG tablet Take 2 tablets by mouth 2 times daily. 180 tablet 3     SM FIBER LAXATIVE 500 MG TABS tablet Take 2 tablets (1,000 mg) by mouth daily. 90 tablet 3     umeclidinium (INCRUSE ELLIPTA) 62.5 MCG/ACT inhaler Inhale 1 puff into the lungs daily. 30 each 11     vitamin D3 25 mcg (1000 units) tablet Take 1 tablet (25 mcg) by mouth daily. 100 tablet 3     ZIKS ARTHRITIS PAIN RELIEF 0.025-1-12 % external cream APPLY TO LOW BACK THREE TIMES DAILY FOR PAIN 169.8 g 3     Current Facility-Administered Medications   Medication Dose Route Frequency Provider Last Rate Last Admin     denosumab (PROLIA) injection 60 mg  60 mg Subcutaneous Q6 Months           Vitals:   /80 (BP Location: Left arm, Cuff Size: Adult Regular)   Pulse 66   Temp 97.8  F (36.6  C) (Oral)   Ht 1.524 m (5')   Wt 104.8 kg (231 lb)   LMP  (LMP Unknown)   SpO2 94%   BMI 45.11 kg/m      Physical Exam:   In general she looks OK. HEENT exam shows no scleral icterus or temporal muscle wasting. Chest is clear. Abdominal exam shows no increase in girth. No masses or tenderness to palpation are present. Liver is 10 cm in span without left lobe enlargement. No spleen tip is palpable. His incision is intact.  Extremity exam shows no edema. Skin exam shows no suspicious lesions and neurologic exam is non-focal.    Labs:   Lab Results   Component Value Date      04/07/2025    POTASSIUM 4.0 04/07/2025    CHLORIDE 103 04/07/2025    ANIONGAP 14 04/07/2025    CO2 25 04/07/2025    BUN 56.5 (H) 04/07/2025    CR 1.86 (H) 04/07/2025    GFRESTIMATED 30 (L) 04/07/2025    JUAN 10.1 04/07/2025      Lab Results   Component Value Date    WBC 4.6 12/02/2024    HGB 11.5 (L) 12/02/2024    HCT 35.1 12/02/2024    MCV 90 12/02/2024    MCH 29.5 12/02/2024    MCHC 32.8 12/02/2024    RDW 12.8 12/02/2024     12/02/2024     Lab Results   Component Value Date    ALBUMIN 4.2 12/02/2024    ALKPHOS 89 12/02/2024    AST 19 12/02/2024    BILICONJ 0.0 07/07/2014     Lab Results   Component Value Date    INR 0.89 02/25/2023     Recent Labs   Lab Test 12/02/24  1331 10/14/24  1115 09/23/24  1047 03/14/24  1104 03/04/24  1428 02/19/24  1502 11/21/23  1440 10/16/23  1548 02/25/23  0815 02/23/23  2235   ALKPHOS 89 89 96 91 93 94 96 92 76 83   ALT 14 16 20 13 20 19 16 21 9* 20   AST 19 23 22 17 18 18 18 26 18 21      Imaging:   No images are attached to the encounter.     Assessment/Plan:   IMPRESSION:   My impression is that Ms. Navarro is 14 and 1/2 years status post liver transplantation for cirrhosis caused by chronic hepatitis C.   Her liver tests are completely normal.  Her kidney function is a bit off and she will it rechecked. She also does have some hyperlipidemia, which is a mixed pattern, almost certainly related to her weight.  I strongly encouraged her to work on losing her weight.     She is up-to-date with regard to all vaccines.  I will not be making any other change to her medical regimen.       I did spend a total of 40 minutes (on the date of the encounter), including 30 minutes of face-to-face clinic time including counseling. The rest of the time was spent in documentation and review of records.     Thank you very much for allowing me to participate in the care of this patient.  If you have any questions regarding my recommendations, please do not hesitate to contact me.         Laron BRADY  MD Omid      Professor of Medicine  [employer]      Executive Medical Director, Solid Organ Transplant Program  [facility]    Again, thank you for allowing me to participate in the care of your patient.        Sincerely,        Laron Anne MD    Electronically signed

## 2025-05-16 NOTE — PROGRESS NOTES
Solid Organ Transplant Hepatology Follow-Up Visit:     HISTORY OF PRESENT ILLNESS:   I had the pleasure of seeing Flor Navarro for followup in the Liver Clinic at the [facility] on May 16, 2025. Ms. Navarro returns for followup now 14 and 1/2 years status post liver transplantation for cirrhosis caused by chronic hepatitis C.     For the most part, she is doing well.  She denies any abdominal pain, itching or skin rash.  She does have fatigue.  She denies any increased abdominal girth or lower extremity edema.  She has not had any gastrointestinal bleeding.  She does complain of back pain, which she is seeing her primary physician about     She denies any fevers or chills, cough or shortness of breath.  She denies any nausea or vomiting.  She is having some constipation, for which she is taking a liquid prescribed by her primary physician.  Her appetite is good, and her weight is down 4 pounds but is still too high.  Her current BMI is 45.      Medications:   Current Outpatient Medications   Medication Sig Dispense Refill    ACE/ARB/ARNI NOT PRESCRIBED (INTENTIONAL) Please choose reason not prescribed from choices below.      albuterol (ACCUNEB) 0.63 MG/3ML neb solution Take 3 mLs (0.63 mg) by nebulization every 4 hours as needed for shortness of breath, wheezing or cough 360 mL 4    albuterol (VENTOLIN HFA) 108 (90 Base) MCG/ACT inhaler Inhale 2 puffs into the lungs every 4 hours as needed for shortness of breath, wheezing or cough. 54 g 3    amLODIPine (NORVASC) 10 MG tablet Take 1 tablet (10 mg) by mouth daily. 90 tablet 3    atorvastatin (LIPITOR) 40 MG tablet Take 1 tablet (40 mg) by mouth daily. 90 tablet 1    calcitRIOL (ROCALTROL) 0.25 MCG capsule Take 1 capsule (0.25 mcg) by mouth daily. 90 capsule 3    calcium carbonate-vitamin D (CALTRATE) 600-10 MG-MCG per tablet Take 1 tablet by mouth 2 times daily. 60 tablet 11    carvedilol (COREG) 12.5 MG tablet Take 1 tablet (12.5 mg) by  mouth 2 times daily (with meals). 180 tablet 1    cetirizine (ZYRTEC) 10 MG tablet Take 1 tablet (10 mg) by mouth daily. 90 tablet 3    cycloSPORINE (RESTASIS) 0.05 % ophthalmic emulsion Place 1 drop into both eyes 2 times daily. 60 each 4    cycloSPORINE modified (GENERIC EQUIVALENT) 25 MG capsule Take 2 capsules (50 mg) by mouth every 12 hours. 120 capsule 11    Denture Care Products (EFFERDENT DENTURE CLEANSER) TBEF Use nightly to clean oral appliance 30 tablet 11    dextran 70-hypromellose (TEARS NATURALE FREE PF) 0.1-0.3 % ophthalmic solution Place 1 drop into both eyes 4 times daily. 36 each 11    diclofenac (VOLTAREN) 1 % topical gel Apply 4 g topically 4 times daily. 350 g 5    febuxostat (ULORIC) 40 MG TABS tablet Take 1 tablet (40 mg) by mouth daily. 90 tablet 3    furosemide (LASIX) 40 MG tablet Take 1 tablet (40 mg) by mouth daily. 90 tablet 0    gabapentin (NEURONTIN) 100 MG capsule Take 200mg AM and 30mg HS for 1 wk, incr to 200 AM and 400 HS for 1 wk, then increase to 300AM and 400 HS      gabapentin (NEURONTIN) 300 MG capsule TAKE ONE CAPSULE BY MOUTH EVERY EVENING AT BEDTIME 90 capsule 3    ketotifen fumarate 0.035% 0.035 % SOLN ophthalmic solution Place 1 drop into both eyes 2 times daily as needed for itching. 10 mL 4    lidocaine (LIDODERM) 5 % patch Place 1 patch over 12 hours onto the skin every 24 hours. To prevent lidocaine toxicity, patient should be patch free for 12 hrs daily. 90 patch 3    methylPREDNISolone (MEDROL) 32 MG tablet Take 1 tablet (32 mg) by mouth daily. 12 hours prior to the procedure with IV contrast 1 tablet 0    multivitamin w/minerals (CERTAVITE/ANTIOXIDANTS) tablet TAKE ONE TABLET BY MOUTH ONCE DAILY 100 tablet 3    mycophenolate (GENERIC EQUIVALENT) 250 MG capsule Take 2 capsules (500 mg) by mouth 2 times daily. 120 capsule 11    omeprazole (PRILOSEC) 20 MG DR capsule Take 1 capsule (20 mg) by mouth 2 times daily. el 180 capsule 3    order for DME Equipment being  ordered: compression stockings bilateral  Please measure and fit patient for stockings   Strength 15-30mmHg  Disp 2 pairs ( 4 socks)  1 refill over the time of 1 year 4 Units 1    order for DME Equipment being ordered: Nebulizer with adult mask and tubing 1 Units 0    senna-docusate (SENEXON-S) 8.6-50 MG tablet Take 2 tablets by mouth 2 times daily. 180 tablet 3    SM FIBER LAXATIVE 500 MG TABS tablet Take 2 tablets (1,000 mg) by mouth daily. 90 tablet 3    umeclidinium (INCRUSE ELLIPTA) 62.5 MCG/ACT inhaler Inhale 1 puff into the lungs daily. 30 each 11    vitamin D3 25 mcg (1000 units) tablet Take 1 tablet (25 mcg) by mouth daily. 100 tablet 3    ZIKS ARTHRITIS PAIN RELIEF 0.025-1-12 % external cream APPLY TO LOW BACK THREE TIMES DAILY FOR PAIN 169.8 g 3     Current Facility-Administered Medications   Medication Dose Route Frequency Provider Last Rate Last Admin    denosumab (PROLIA) injection 60 mg  60 mg Subcutaneous Q6 Months           Vitals:   /80 (BP Location: Left arm, Cuff Size: Adult Regular)   Pulse 66   Temp 97.8  F (36.6  C) (Oral)   Ht 1.524 m (5')   Wt 104.8 kg (231 lb)   LMP  (LMP Unknown)   SpO2 94%   BMI 45.11 kg/m      Physical Exam:   In general she looks OK. HEENT exam shows no scleral icterus or temporal muscle wasting. Chest is clear. Abdominal exam shows no increase in girth. No masses or tenderness to palpation are present. Liver is 10 cm in span without left lobe enlargement. No spleen tip is palpable. His incision is intact.  Extremity exam shows no edema. Skin exam shows no suspicious lesions and neurologic exam is non-focal.    Labs:   Lab Results   Component Value Date     04/07/2025    POTASSIUM 4.0 04/07/2025    CHLORIDE 103 04/07/2025    ANIONGAP 14 04/07/2025    CO2 25 04/07/2025    BUN 56.5 (H) 04/07/2025    CR 1.86 (H) 04/07/2025    GFRESTIMATED 30 (L) 04/07/2025    JUAN 10.1 04/07/2025      Lab Results   Component Value Date    WBC 4.6 12/02/2024    HGB 11.5  (L) 12/02/2024    HCT 35.1 12/02/2024    MCV 90 12/02/2024    MCH 29.5 12/02/2024    MCHC 32.8 12/02/2024    RDW 12.8 12/02/2024     12/02/2024     Lab Results   Component Value Date    ALBUMIN 4.2 12/02/2024    ALKPHOS 89 12/02/2024    AST 19 12/02/2024    BILICONJ 0.0 07/07/2014     Lab Results   Component Value Date    INR 0.89 02/25/2023     Recent Labs   Lab Test 12/02/24  1331 10/14/24  1115 09/23/24  1047 03/14/24  1104 03/04/24  1428 02/19/24  1502 11/21/23  1440 10/16/23  1548 02/25/23  0815 02/23/23  2235   ALKPHOS 89 89 96 91 93 94 96 92 76 83   ALT 14 16 20 13 20 19 16 21 9* 20   AST 19 23 22 17 18 18 18 26 18 21      Imaging:   No images are attached to the encounter.     Assessment/Plan:   IMPRESSION:   My impression is that Ms. Navarro is 14 and 1/2 years status post liver transplantation for cirrhosis caused by chronic hepatitis C.   Her liver tests are completely normal.  Her kidney function is a bit off and she will it rechecked. She also does have some hyperlipidemia, which is a mixed pattern, almost certainly related to her weight.  I strongly encouraged her to work on losing her weight.     She is up-to-date with regard to all vaccines.  I will not be making any other change to her medical regimen.       I did spend a total of 40 minutes (on the date of the encounter), including 30 minutes of face-to-face clinic time including counseling. The rest of the time was spent in documentation and review of records.     Thank you very much for allowing me to participate in the care of this patient.  If you have any questions regarding my recommendations, please do not hesitate to contact me.         Laron Anne MD      Professor of Medicine  [employer]      Executive Medical Director, Solid Organ Transplant Program  [facility]

## 2025-05-17 ENCOUNTER — TELEPHONE (OUTPATIENT)
Dept: TRANSPLANT | Facility: CLINIC | Age: 60
End: 2025-05-17
Payer: MEDICARE

## 2025-05-17 ENCOUNTER — RESULTS FOLLOW-UP (OUTPATIENT)
Dept: TRANSPLANT | Facility: CLINIC | Age: 60
End: 2025-05-17

## 2025-05-17 DIAGNOSIS — Z94.4 LIVER REPLACED BY TRANSPLANT (H): Primary | ICD-10-CM

## 2025-05-17 LAB
CYCLOSPORINE BLD LC/MS/MS-MCNC: 487 UG/L (ref 50–400)
TME LAST DOSE: ABNORMAL H
TME LAST DOSE: ABNORMAL H

## 2025-05-17 NOTE — TELEPHONE ENCOUNTER
Torrie, drug analysis, lab called to report      used    Spoke with Flor with . She did take dose before the blood draw. Reinterated with  how to do 12 hour trough.    Pt will repeat labs in 1 month.  Pt takes medicine 10am and 10pm.    Pt will repeat on June 9th.

## 2025-05-19 ENCOUNTER — OFFICE VISIT (OUTPATIENT)
Dept: FAMILY MEDICINE | Facility: CLINIC | Age: 60
End: 2025-05-19
Payer: MEDICARE

## 2025-05-19 VITALS
SYSTOLIC BLOOD PRESSURE: 133 MMHG | RESPIRATION RATE: 24 BRPM | HEART RATE: 68 BPM | DIASTOLIC BLOOD PRESSURE: 78 MMHG | BODY MASS INDEX: 45.11 KG/M2 | HEIGHT: 60 IN | TEMPERATURE: 97.7 F | OXYGEN SATURATION: 96 %

## 2025-05-19 DIAGNOSIS — M99.03 SEGMENTAL AND SOMATIC DYSFUNCTION OF LUMBAR REGION: ICD-10-CM

## 2025-05-19 DIAGNOSIS — M99.04 SEGMENTAL AND SOMATIC DYSFUNCTION OF SACRAL REGION: ICD-10-CM

## 2025-05-19 DIAGNOSIS — M54.50 CHRONIC BILATERAL LOW BACK PAIN, UNSPECIFIED WHETHER SCIATICA PRESENT: ICD-10-CM

## 2025-05-19 DIAGNOSIS — M54.6 CHRONIC BILATERAL THORACIC BACK PAIN: Primary | ICD-10-CM

## 2025-05-19 DIAGNOSIS — M99.02 SEGMENTAL AND SOMATIC DYSFUNCTION OF THORACIC REGION: ICD-10-CM

## 2025-05-19 DIAGNOSIS — G89.29 CHRONIC BILATERAL THORACIC BACK PAIN: Primary | ICD-10-CM

## 2025-05-19 DIAGNOSIS — M99.05 SEGMENTAL AND SOMATIC DYSFUNCTION OF PELVIC REGION: ICD-10-CM

## 2025-05-19 DIAGNOSIS — M99.01 SEGMENTAL AND SOMATIC DYSFUNCTION OF CERVICAL REGION: ICD-10-CM

## 2025-05-19 DIAGNOSIS — M99.08 SEGMENTAL AND SOMATIC DYSFUNCTION OF RIB CAGE: ICD-10-CM

## 2025-05-19 DIAGNOSIS — R07.81 RIB PAIN ON LEFT SIDE: ICD-10-CM

## 2025-05-19 DIAGNOSIS — G89.29 CHRONIC BILATERAL LOW BACK PAIN, UNSPECIFIED WHETHER SCIATICA PRESENT: ICD-10-CM

## 2025-05-19 PROCEDURE — 3078F DIAST BP <80 MM HG: CPT | Performed by: STUDENT IN AN ORGANIZED HEALTH CARE EDUCATION/TRAINING PROGRAM

## 2025-05-19 PROCEDURE — 98927 OSTEOPATH MANJ 5-6 REGIONS: CPT | Performed by: STUDENT IN AN ORGANIZED HEALTH CARE EDUCATION/TRAINING PROGRAM

## 2025-05-19 PROCEDURE — 3075F SYST BP GE 130 - 139MM HG: CPT | Performed by: STUDENT IN AN ORGANIZED HEALTH CARE EDUCATION/TRAINING PROGRAM

## 2025-05-19 ASSESSMENT — PATIENT HEALTH QUESTIONNAIRE - PHQ9
SUM OF ALL RESPONSES TO PHQ QUESTIONS 1-9: 5
SUM OF ALL RESPONSES TO PHQ QUESTIONS 1-9: 5

## 2025-05-19 NOTE — PROGRESS NOTES
Assessment & Plan   Problem List Items Addressed This Visit    None  Visit Diagnoses         Chronic bilateral thoracic back pain    -  Primary    Relevant Orders    OSTEOPATHIC MANIP,5-6 BODY REGN (Completed)      Chronic bilateral low back pain, unspecified whether sciatica present        Relevant Orders    OSTEOPATHIC MANIP,5-6 BODY REGN (Completed)      Rib pain on left side        Relevant Orders    OSTEOPATHIC MANIP,5-6 BODY REGN (Completed)      Segmental and somatic dysfunction of thoracic region        Relevant Orders    OSTEOPATHIC MANIP,5-6 BODY REGN (Completed)      Segmental and somatic dysfunction of lumbar region        Relevant Orders    OSTEOPATHIC MANIP,5-6 BODY REGN (Completed)      Segmental and somatic dysfunction of sacral region        Relevant Orders    OSTEOPATHIC MANIP,5-6 BODY REGN (Completed)      Segmental and somatic dysfunction of pelvic region        Relevant Orders    OSTEOPATHIC MANIP,5-6 BODY REGN (Completed)      Segmental and somatic dysfunction of rib cage        Relevant Orders    OSTEOPATHIC MANIP,5-6 BODY REGN (Completed)      Segmental and somatic dysfunction of cervical region        Relevant Orders    OSTEOPATHIC MANIP,5-6 BODY REGN (Completed)             Given that this has been interfering with the patient's quality of life and ADLs, after discussion, informed consent, and medical assessment for safety, we have together decided to address this concern with Osteopathic Manipulative Treatment. Counseled on safe use of massage gun.     Please see OMT Procedure Note below for the specifics of treatment.    West Ray is a 59 year old, presenting for the following health issues:  omt      5/19/2025     4:23 PM   Additional Questions   Roomed by Ohn   Accompanied by Self         5/19/2025   Declines Weight   Did patient decline having their weight taken? Yes         5/19/2025    Information    services provided? No   Language Upper sorbian   Type of  "interpretation provided Face-to-face    name Abbi rTan    Ag[employer]     HPI      Son got a percussion gun - son applies it   Just got it yesterday   About 15 min     Felt good after visit w/ Jack   Not sure how long - pain is getting less     LBP + ribs \"burn\"  Both sides but mostly on the left          Objective    /78   Pulse 68   Temp 97.7  F (36.5  C) (Temporal)   Resp 24   Ht 1.524 m (5')   LMP  (LMP Unknown)   SpO2 96%   BMI 45.11 kg/m    Body mass index is 45.11 kg/m .  Physical Exam        OMT PROCEDURE NOTE       Body Region: C-spine / Neck  Somatic Dysfunction: paraspinous hypertonicity, OA restriction   Treatment: Direct Myofascial Release and Soft Tissue Techniques.  Outcome: Improved    Body Region: T-spine and/or Ribs  Somatic Dysfunction: decreased segmental motion, paraspinous hypertonicity, decreased inspiratory motion  Treatment: sidelying soft tissue, seated articulatory, rib raising Techniques.   Outcome: Improved     Body Region:  L-spine, Sacrum, and Pelvis/ Innominates  Somatic Dysfunction: R SI joint restriction, paralumbar hypertonicity   Treatment: seated MFR, articulatoryTechniques, sidelying soft tissue   Outcome: Improved       The patient actively participated in OMT and was able to communicate both positive and negative feedback throughout. OMT completed without incident. Patient tolerated treatment well. Patient reported that ROM, function, and/or pain level were improved. Advised that pain is occasionally worse during the first 24 hours after treatment and that drinking more water and taking Tylenol or Ibuprofen often help. Patient to return in 4 week/s or as needed for repeat osteopathic evaluation.       Ileana Celis DO      Signed Electronically by: Ileana Celis DO    "

## 2025-06-02 ENCOUNTER — DOCUMENTATION ONLY (OUTPATIENT)
Dept: FAMILY MEDICINE | Facility: CLINIC | Age: 60
End: 2025-06-02

## 2025-06-02 NOTE — PROGRESS NOTES
"When opening a documentation only encounter, be sure to enter in \"Chief Complaint\" Forms and in \" Comments\" Title of form, description if needed.    Flor is a 59 year old  female  Form received via: Fax  Form now resides in: Provider Ready    Home Health Care Plan of care and certification    Mark Morales MA              "

## 2025-06-08 ENCOUNTER — ANCILLARY PROCEDURE (OUTPATIENT)
Dept: MRI IMAGING | Facility: CLINIC | Age: 60
End: 2025-06-08
Attending: FAMILY MEDICINE
Payer: MEDICARE

## 2025-06-08 PROCEDURE — 73220 MRI UPPR EXTREMITY W/O&W/DYE: CPT | Mod: LT | Performed by: RADIOLOGY

## 2025-06-08 PROCEDURE — 73220 MRI UPPR EXTREMITY W/O&W/DYE: CPT | Mod: RT | Performed by: RADIOLOGY

## 2025-06-08 PROCEDURE — A9585 GADOBUTROL INJECTION: HCPCS | Mod: JZ | Performed by: RADIOLOGY

## 2025-06-08 RX ORDER — GADOBUTROL 604.72 MG/ML
10 INJECTION INTRAVENOUS ONCE
Status: COMPLETED | OUTPATIENT
Start: 2025-06-08 | End: 2025-06-08

## 2025-06-08 RX ADMIN — GADOBUTROL 10 ML: 604.72 INJECTION INTRAVENOUS at 14:12

## 2025-06-09 ENCOUNTER — RESULTS FOLLOW-UP (OUTPATIENT)
Dept: TRANSPLANT | Facility: CLINIC | Age: 60
End: 2025-06-09

## 2025-06-09 ENCOUNTER — LAB (OUTPATIENT)
Dept: LAB | Facility: CLINIC | Age: 60
End: 2025-06-09
Payer: MEDICARE

## 2025-06-09 DIAGNOSIS — Z94.4 LIVER REPLACED BY TRANSPLANT (H): ICD-10-CM

## 2025-06-09 LAB
ALBUMIN SERPL BCG-MCNC: 4.4 G/DL (ref 3.5–5.2)
ALP SERPL-CCNC: 84 U/L (ref 40–150)
ALT SERPL W P-5'-P-CCNC: 20 U/L (ref 0–50)
ANION GAP SERPL CALCULATED.3IONS-SCNC: 15 MMOL/L (ref 7–15)
AST SERPL W P-5'-P-CCNC: 18 U/L (ref 0–45)
BILIRUB SERPL-MCNC: 0.5 MG/DL
BILIRUBIN DIRECT (ROCHE PRO & PURE): 0.22 MG/DL (ref 0–0.45)
BUN SERPL-MCNC: 33 MG/DL (ref 8–23)
CALCIUM SERPL-MCNC: 9.6 MG/DL (ref 8.8–10.4)
CHLORIDE SERPL-SCNC: 106 MMOL/L (ref 98–107)
CREAT SERPL-MCNC: 1.34 MG/DL (ref 0.51–0.95)
CYCLOSPORINE BLD LC/MS/MS-MCNC: 45 UG/L (ref 50–400)
EGFRCR SERPLBLD CKD-EPI 2021: 45 ML/MIN/1.73M2
ERYTHROCYTE [DISTWIDTH] IN BLOOD BY AUTOMATED COUNT: 13.2 % (ref 10–15)
GLUCOSE SERPL-MCNC: 113 MG/DL (ref 70–99)
HCO3 SERPL-SCNC: 21 MMOL/L (ref 22–29)
HCT VFR BLD AUTO: 34.8 % (ref 35–47)
HGB BLD-MCNC: 11.3 G/DL (ref 11.7–15.7)
MCH RBC QN AUTO: 29.4 PG (ref 26.5–33)
MCHC RBC AUTO-ENTMCNC: 32.5 G/DL (ref 31.5–36.5)
MCV RBC AUTO: 91 FL (ref 78–100)
PLATELET # BLD AUTO: 207 10E3/UL (ref 150–450)
POTASSIUM SERPL-SCNC: 3.7 MMOL/L (ref 3.4–5.3)
PROT SERPL-MCNC: 7.7 G/DL (ref 6.4–8.3)
RBC # BLD AUTO: 3.84 10E6/UL (ref 3.8–5.2)
SODIUM SERPL-SCNC: 142 MMOL/L (ref 135–145)
TME LAST DOSE: ABNORMAL H
TME LAST DOSE: ABNORMAL H
WBC # BLD AUTO: 6.4 10E3/UL (ref 4–11)

## 2025-06-09 PROCEDURE — 85027 COMPLETE CBC AUTOMATED: CPT | Performed by: PATHOLOGY

## 2025-06-09 PROCEDURE — 80158 DRUG ASSAY CYCLOSPORINE: CPT | Performed by: INTERNAL MEDICINE

## 2025-06-09 PROCEDURE — 36415 COLL VENOUS BLD VENIPUNCTURE: CPT | Performed by: PATHOLOGY

## 2025-06-09 PROCEDURE — 99000 SPECIMEN HANDLING OFFICE-LAB: CPT | Performed by: PATHOLOGY

## 2025-06-09 PROCEDURE — 80053 COMPREHEN METABOLIC PANEL: CPT | Performed by: PATHOLOGY

## 2025-06-09 PROCEDURE — 82248 BILIRUBIN DIRECT: CPT | Performed by: PATHOLOGY

## 2025-06-10 ENCOUNTER — OFFICE VISIT (OUTPATIENT)
Dept: FAMILY MEDICINE | Facility: CLINIC | Age: 60
End: 2025-06-10
Payer: MEDICARE

## 2025-06-10 ENCOUNTER — PATIENT OUTREACH (OUTPATIENT)
Dept: CARE COORDINATION | Facility: CLINIC | Age: 60
End: 2025-06-10

## 2025-06-10 VITALS
OXYGEN SATURATION: 95 % | TEMPERATURE: 97.8 F | RESPIRATION RATE: 20 BRPM | DIASTOLIC BLOOD PRESSURE: 86 MMHG | SYSTOLIC BLOOD PRESSURE: 125 MMHG | HEART RATE: 77 BPM

## 2025-06-10 DIAGNOSIS — M54.50 CHRONIC BILATERAL LOW BACK PAIN, UNSPECIFIED WHETHER SCIATICA PRESENT: Primary | ICD-10-CM

## 2025-06-10 DIAGNOSIS — M99.07 SEGMENTAL AND SOMATIC DYSFUNCTION OF UPPER EXTREMITY: ICD-10-CM

## 2025-06-10 DIAGNOSIS — M99.03 SEGMENTAL AND SOMATIC DYSFUNCTION OF LUMBAR REGION: ICD-10-CM

## 2025-06-10 DIAGNOSIS — M99.08 SEGMENTAL AND SOMATIC DYSFUNCTION OF RIB CAGE: ICD-10-CM

## 2025-06-10 DIAGNOSIS — M99.02 SEGMENTAL AND SOMATIC DYSFUNCTION OF THORACIC REGION: ICD-10-CM

## 2025-06-10 DIAGNOSIS — G89.29 CHRONIC BILATERAL LOW BACK PAIN, UNSPECIFIED WHETHER SCIATICA PRESENT: Primary | ICD-10-CM

## 2025-06-10 PROCEDURE — 1125F AMNT PAIN NOTED PAIN PRSNT: CPT | Performed by: FAMILY MEDICINE

## 2025-06-10 PROCEDURE — 98926 OSTEOPATH MANJ 3-4 REGIONS: CPT | Performed by: FAMILY MEDICINE

## 2025-06-10 PROCEDURE — 99207 PR DROP WITH A PROCEDURE: CPT | Performed by: FAMILY MEDICINE

## 2025-06-10 PROCEDURE — 3074F SYST BP LT 130 MM HG: CPT | Performed by: FAMILY MEDICINE

## 2025-06-10 PROCEDURE — 3079F DIAST BP 80-89 MM HG: CPT | Performed by: FAMILY MEDICINE

## 2025-06-10 ASSESSMENT — PATIENT HEALTH QUESTIONNAIRE - PHQ9
SUM OF ALL RESPONSES TO PHQ QUESTIONS 1-9: 8
10. IF YOU CHECKED OFF ANY PROBLEMS, HOW DIFFICULT HAVE THESE PROBLEMS MADE IT FOR YOU TO DO YOUR WORK, TAKE CARE OF THINGS AT HOME, OR GET ALONG WITH OTHER PEOPLE: SOMEWHAT DIFFICULT
SUM OF ALL RESPONSES TO PHQ QUESTIONS 1-9: 8

## 2025-06-10 ASSESSMENT — PAIN SCALES - GENERAL: PAINLEVEL_OUTOF10: SEVERE PAIN (7)

## 2025-06-10 NOTE — PROGRESS NOTES
Assessment & Plan     Chronic bilateral low back pain, unspecified whether sciatica present  Given that this has been interfering with the patient's quality of life and ADLs, after discussion, informed consent, and medical assessment for safety, we have together decided to address this concern with Osteopathic Manipulative Treatment.    Please see OMT Procedure Note below for the specifics of treatment.    Segmental and somatic dysfunction of thoracic region  - OSTEOPATHIC MANIP,3-4 BODY REGN    Segmental and somatic dysfunction of lumbar region  - OSTEOPATHIC MANIP,3-4 BODY REGN    Segmental and somatic dysfunction of upper extremity  - OSTEOPATHIC MANIP,3-4 BODY REGN    Segmental and somatic dysfunction of rib cage  - OSTEOPATHIC MANIP,3-4 BODY REGN    BMI  Estimated body mass index is 44.84 kg/m  as calculated from the following:    Height as of 6/16/25: 1.524 m (5').    Weight as of 6/16/25: 104.1 kg (229 lb 9.6 oz).     Follow-up  Return in about 2 weeks (around 6/24/2025) for OMT.    West Ray is a 59 year old, presenting for the following health issues:  OMT (Back -  level 7 for pain)      6/10/2025     4:13 PM   Additional Questions   Roomed by Siomara   Accompanied by self         6/10/2025    Information    services provided? Yes   Language Slovenian   Type of interpretation provided Face-to-face    name Cary Jimmy    [employer]                  Patient returns today for OMT, slight improvement from last visit.   Prefers not to lay flat due to aggravating both pain and respiratory symptoms.   Pain over upper and mid back today.         Objective    /86   Pulse 77   Temp 97.8  F (36.6  C) (Oral)   Resp 20   LMP  (LMP Unknown)   SpO2 95%   There is no height or weight on file to calculate BMI.  Physical Exam  Vitals reviewed.   Constitutional:       Appearance: Normal appearance.   HENT:      Head: Normocephalic and atraumatic.    Pulmonary:      Effort: Pulmonary effort is normal.   Musculoskeletal:      Comments: See OMT note for details MSK exam   Skin:     General: Skin is warm and dry.   Neurological:      General: No focal deficit present.      Mental Status: She is alert.   Psychiatric:         Mood and Affect: Mood normal.                 OMT PROCEDURE NOTE    Body Region: T-spine and/or Ribs  Somatic Dysfunction: Thoracic hypertonicity and fascial restriction, posterior rib 6 on the right  Treatment: Direct Myofascial Release, Still's, and Soft Tissue Techniques.   Outcome: Improved    Body Region: Shoulder, UE, and/or Hand  Somatic Dysfunction: Scapular fascial restriction bilaterally   Treatment: Direct Myofascial Release and Soft Tissue Techniques.  Outcome: Improved    Body Region:  L-spine, Sacrum, and Pelvis/ Innominates  Somatic Dysfunction: Lumbar hypertonicity  Treatment: Direct Myofascial Release and Soft Tissue Techniques.   Outcome: Improved      The patient actively participated in OMT and was able to communicate both positive and negative feedback throughout. OMT completed without incident. Patient tolerated treatment well. Patient reported that ROM, function, and/or pain level were improved. Advised that pain is occasionally worse during the first 24 hours after treatment and that drinking more water and taking Tylenol or Ibuprofen often help. Patient to return in 2 week/s or as needed for repeat osteopathic evaluation.       Ayala De Leon DO                Signed Electronically by: Ayala De Leon DO

## 2025-06-12 ENCOUNTER — PATIENT OUTREACH (OUTPATIENT)
Dept: CARE COORDINATION | Facility: CLINIC | Age: 60
End: 2025-06-12
Payer: MEDICARE

## 2025-06-16 ENCOUNTER — OFFICE VISIT (OUTPATIENT)
Dept: FAMILY MEDICINE | Facility: CLINIC | Age: 60
End: 2025-06-16
Payer: MEDICARE

## 2025-06-16 VITALS
WEIGHT: 229.6 LBS | RESPIRATION RATE: 20 BRPM | OXYGEN SATURATION: 95 % | HEART RATE: 65 BPM | HEIGHT: 60 IN | TEMPERATURE: 97.8 F | SYSTOLIC BLOOD PRESSURE: 105 MMHG | BODY MASS INDEX: 45.07 KG/M2 | DIASTOLIC BLOOD PRESSURE: 49 MMHG

## 2025-06-16 DIAGNOSIS — G63 NEUROPATHY DUE TO MEDICAL CONDITION: ICD-10-CM

## 2025-06-16 DIAGNOSIS — M25.511 PAIN IN JOINT OF RIGHT SHOULDER: ICD-10-CM

## 2025-06-16 DIAGNOSIS — E66.813 CLASS 3 SEVERE OBESITY WITH SERIOUS COMORBIDITY AND BODY MASS INDEX (BMI) OF 40.0 TO 44.9 IN ADULT, UNSPECIFIED OBESITY TYPE (H): ICD-10-CM

## 2025-06-16 DIAGNOSIS — Z23 NEED FOR VACCINATION: ICD-10-CM

## 2025-06-16 DIAGNOSIS — N18.32 STAGE 3B CHRONIC KIDNEY DISEASE (H): ICD-10-CM

## 2025-06-16 DIAGNOSIS — Z12.11 SCREEN FOR COLON CANCER: ICD-10-CM

## 2025-06-16 DIAGNOSIS — R22.33 NODULE OF FINGER OF BOTH HANDS: Primary | ICD-10-CM

## 2025-06-16 RX ORDER — LIDOCAINE 50 MG/G
1 PATCH TOPICAL EVERY 24 HOURS
Qty: 90 PATCH | Refills: 3 | Status: SHIPPED | OUTPATIENT
Start: 2025-06-16

## 2025-06-16 RX ORDER — DAPAGLIFLOZIN 10 MG/1
10 TABLET, FILM COATED ORAL EVERY MORNING
Qty: 90 TABLET | Refills: 0 | Status: SHIPPED | OUTPATIENT
Start: 2025-06-16

## 2025-06-16 RX ORDER — CAPSAICIN 0.025 %
CREAM (GRAM) TOPICAL
Qty: 50 G | Refills: 2 | Status: SHIPPED | OUTPATIENT
Start: 2025-06-16

## 2025-06-16 ASSESSMENT — PATIENT HEALTH QUESTIONNAIRE - PHQ9
SUM OF ALL RESPONSES TO PHQ QUESTIONS 1-9: 8
SUM OF ALL RESPONSES TO PHQ QUESTIONS 1-9: 8
10. IF YOU CHECKED OFF ANY PROBLEMS, HOW DIFFICULT HAVE THESE PROBLEMS MADE IT FOR YOU TO DO YOUR WORK, TAKE CARE OF THINGS AT HOME, OR GET ALONG WITH OTHER PEOPLE: SOMEWHAT DIFFICULT

## 2025-06-16 NOTE — Clinical Note
Talked with Flor crouch lying down for OMT. She gets pain wondering about more leg support maybe? Told her to tell you about the positional pain. ALso, Serina  was AMAZING we should always request her! c

## 2025-06-16 NOTE — PATIENT INSTRUCTIONS
Medications refilled and sent.  New medication for kidney and fluids  Pain cream and patch refilled  Kit for the colon cancer screening. Once you get this done, you can send it through the mail.  I sent a referral to the weight management team.  You are due for the RSV vaccine and the shingles vaccine. You can get them both done at the same time, or one at a time.   You cannot get this done here at [facility] because insurance requires you to get it done at a pharmacy. So, you can get this done at [facility], or any other pharmacy.   Follow up in 1 month.

## 2025-06-16 NOTE — PATIENT INSTRUCTIONS
"What Is OMT (Osteopathic Manipulative Treatment)?    As part of their education, DOs (doctor of osteopathic medicine) receive special training in the musculoskeletal system (your nerves, bones and muscles).     OMT involves using the hands to diagnose, treat and prevent illness or injury.  Using OMT, your osteopathic physician will move your muscles and joints using techniques including stretching, gentle pressure and resistance.     OMT can help people of all ages and backgrounds. In addition to muscle or joint pain, it can be used to treat a variety of conditions such as asthma, sinus pain, migraines and carpal tunnel syndrome.     For more information, please visit: Osteopathic Manipulative Medicine Explained     How to Schedule OMT :  Please make an appointment for \"OMT\" with the .  Please inform them you are scheduling for OMT with any DO provider. A list is provided below:     Ayala De Leon, DO Kristina Booker, DO Ileana Celis, DO  Jama Dumont, DO  Kimberly Santiago, DO  Halina Traore, DO Michele Pickard, DO  Jasmin Hernandez, DO    Care after OMT:   Similar to other manual therapies, pain can sometimes be increased in the 24 hours after treatment. You can manage this at home by drinking plenty of fluids and using tylenol and/or ibuprofen as needed for pain.   Please return in 2-4 weeks or as directed by your physician today for further osteopathic evaluation.    "
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
## 2025-06-17 ENCOUNTER — TELEPHONE (OUTPATIENT)
Dept: FAMILY MEDICINE | Facility: CLINIC | Age: 60
End: 2025-06-17

## 2025-06-17 ENCOUNTER — PATIENT OUTREACH (OUTPATIENT)
Dept: CARE COORDINATION | Facility: CLINIC | Age: 60
End: 2025-06-17

## 2025-06-17 DIAGNOSIS — K59.09 CONSTIPATION, CHRONIC: Primary | ICD-10-CM

## 2025-06-17 DIAGNOSIS — Z91.041 CONTRAST MEDIA ALLERGY: Primary | ICD-10-CM

## 2025-06-17 NOTE — TELEPHONE ENCOUNTER
Called and spoke with procedural nurse Lynne regarding patient. They would like Dr. Sierra to order pre medication for the patient who is coming in on 6/23/25 for a steroid injection in the lumbar.    She said it looks like provider has done this in the past for patient with previous injections/procedures.    Mignon Schultz RN

## 2025-06-17 NOTE — TELEPHONE ENCOUNTER
[facility] phone call message - order or referral request for patient:     Order or referral being requested: Pred -medication requested for lumbar epidural, steroid injection due to contrast dye allergy.         OK to leave a message on voice mail? Yes    Primary language: Lao      needed? Yes    Call taken on June 17, 2025 at 9:13 AM by Ania Russo

## 2025-06-17 NOTE — TELEPHONE ENCOUNTER
Dear  and Team,    Please send over a new prescription (or call the pharmacy to verbally update the current Rx) for SM FIBER Laxative tablets as the 500mg tablet strength is no longer available.  Please choose either fiber laxative 400mg OR 625mg.    Thank you!    [facility]  [phone]

## 2025-06-18 RX ORDER — METHYLPREDNISOLONE 32 MG/1
32 TABLET ORAL DAILY PRN
Qty: 4 TABLET | Refills: 1 | Status: SHIPPED | OUTPATIENT
Start: 2025-06-18

## 2025-06-19 ENCOUNTER — DOCUMENTATION ONLY (OUTPATIENT)
Dept: FAMILY MEDICINE | Facility: CLINIC | Age: 60
End: 2025-06-19
Payer: MEDICARE

## 2025-06-19 ENCOUNTER — PATIENT OUTREACH (OUTPATIENT)
Dept: CARE COORDINATION | Facility: CLINIC | Age: 60
End: 2025-06-19
Payer: MEDICARE

## 2025-06-19 NOTE — PROGRESS NOTES
"When opening a documentation only encounter, be sure to enter in \"Chief Complaint\" Forms and in \" Comments\" Title of form, description if needed.    Flor is a 59 year old  female  Form received via: Fax  Form now resides in: Provider Ready    [employer] to power mobility Device    Mark Morales MA              "

## 2025-06-20 ENCOUNTER — TELEPHONE (OUTPATIENT)
Dept: MEDSURG UNIT | Facility: CLINIC | Age: 60
End: 2025-06-20
Payer: MEDICARE

## 2025-06-23 ENCOUNTER — HOSPITAL ENCOUNTER (OUTPATIENT)
Facility: CLINIC | Age: 60
Discharge: HOME | End: 2025-06-23
Admitting: PHYSICIAN ASSISTANT
Payer: MEDICARE

## 2025-06-23 ENCOUNTER — HOSPITAL ENCOUNTER (OUTPATIENT)
Dept: GENERAL RADIOLOGY | Facility: CLINIC | Age: 60
Discharge: HOME | End: 2025-06-23
Attending: FAMILY MEDICINE
Payer: MEDICARE

## 2025-06-23 VITALS
DIASTOLIC BLOOD PRESSURE: 71 MMHG | HEART RATE: 87 BPM | RESPIRATION RATE: 18 BRPM | OXYGEN SATURATION: 94 % | SYSTOLIC BLOOD PRESSURE: 129 MMHG

## 2025-06-23 VITALS — HEART RATE: 85 BPM | OXYGEN SATURATION: 92 % | SYSTOLIC BLOOD PRESSURE: 124 MMHG | DIASTOLIC BLOOD PRESSURE: 74 MMHG

## 2025-06-23 DIAGNOSIS — M54.16 LEFT LUMBAR RADICULOPATHY: ICD-10-CM

## 2025-06-23 LAB — HEMOCCULT STL QL IA: NEGATIVE

## 2025-06-23 PROCEDURE — 250N000011 HC RX IP 250 OP 636: Performed by: FAMILY MEDICINE

## 2025-06-23 PROCEDURE — 250N000009 HC RX 250: Performed by: FAMILY MEDICINE

## 2025-06-23 PROCEDURE — 96372 THER/PROPH/DIAG INJ SC/IM: CPT | Performed by: FAMILY MEDICINE

## 2025-06-23 PROCEDURE — 999N000154 HC STATISTIC RADIOLOGY XRAY, US, CT, MAR, NM

## 2025-06-23 PROCEDURE — 62323 NJX INTERLAMINAR LMBR/SAC: CPT

## 2025-06-23 PROCEDURE — 255N000002 HC RX 255 OP 636: Performed by: FAMILY MEDICINE

## 2025-06-23 RX ORDER — BETAMETHASONE SODIUM PHOSPHATE AND BETAMETHASONE ACETATE 3; 3 MG/ML; MG/ML
6 INJECTION, SUSPENSION INTRA-ARTICULAR; INTRALESIONAL; INTRAMUSCULAR; SOFT TISSUE ONCE
Status: COMPLETED | OUTPATIENT
Start: 2025-06-23 | End: 2025-06-23

## 2025-06-23 RX ORDER — DEXTROSE MONOHYDRATE 25 G/50ML
25-50 INJECTION, SOLUTION INTRAVENOUS
Status: DISCONTINUED | OUTPATIENT
Start: 2025-06-23 | End: 2025-06-24 | Stop reason: HOSPADM

## 2025-06-23 RX ORDER — NICOTINE POLACRILEX 4 MG
15-30 LOZENGE BUCCAL
Status: DISCONTINUED | OUTPATIENT
Start: 2025-06-23 | End: 2025-06-24 | Stop reason: HOSPADM

## 2025-06-23 RX ORDER — LIDOCAINE HYDROCHLORIDE 10 MG/ML
30 INJECTION, SOLUTION EPIDURAL; INFILTRATION; INTRACAUDAL; PERINEURAL ONCE
Status: COMPLETED | OUTPATIENT
Start: 2025-06-23 | End: 2025-06-23

## 2025-06-23 RX ORDER — IOPAMIDOL 408 MG/ML
10 INJECTION, SOLUTION INTRATHECAL ONCE
Status: COMPLETED | OUTPATIENT
Start: 2025-06-23 | End: 2025-06-23

## 2025-06-23 RX ORDER — LIDOCAINE HYDROCHLORIDE 10 MG/ML
2 INJECTION, SOLUTION EPIDURAL; INFILTRATION; INTRACAUDAL; PERINEURAL ONCE
Status: COMPLETED | OUTPATIENT
Start: 2025-06-23 | End: 2025-06-23

## 2025-06-23 RX ADMIN — IOPAMIDOL 2 ML: 408 INJECTION, SOLUTION INTRATHECAL at 15:40

## 2025-06-23 RX ADMIN — LIDOCAINE HYDROCHLORIDE 3 ML: 10 INJECTION, SOLUTION EPIDURAL; INFILTRATION; INTRACAUDAL; PERINEURAL at 15:38

## 2025-06-23 RX ADMIN — LIDOCAINE HYDROCHLORIDE 2 ML: 10 INJECTION, SOLUTION EPIDURAL; INFILTRATION; INTRACAUDAL; PERINEURAL at 15:46

## 2025-06-23 RX ADMIN — BETAMETHASONE SODIUM PHOSPHATE AND BETAMETHASONE ACETATE 18 MG: 3; 3 INJECTION, SUSPENSION INTRA-ARTICULAR; INTRALESIONAL; INTRAMUSCULAR at 15:38

## 2025-06-23 ASSESSMENT — ACTIVITIES OF DAILY LIVING (ADL)
ADLS_ACUITY_SCORE: 57
ADLS_ACUITY_SCORE: 57

## 2025-06-23 NOTE — DISCHARGE INSTRUCTIONS
Steroid Injection Discharge Instructions     After you go home:    You may resume your normal diet.    Care of Puncture Site:    If you have a bandaid on your puncture site, you may remove it the next morning  You may shower tomorrow  No bath tubs, whirlpools or swimming pool for at least 48 hours  Use ice packs as needed for discomfort     Activity:    Minimize your activity today. You may gradually resume your normal activity as tolerated  Avoid vigorous or strenuous activity until your symptoms improve or as directed by your doctor  Do NOT drive a vehicle for a few hours after the injection - or longer if you develop numbness in your arm or leg    Medicines:    You may resume all medications, including blood thinners  Resume your Warfarin/Coumadin at your regular dose today. Follow up with your provider to have your INR rechecked  Resume your Platelet Inhibitors and Aspirin tomorrow at your regular dose  For minor discomfort, you may take Acetaminophen (Tylenol) or Ibuprofen (Advil)    Pain:     You may experience increased or different pain over the next 24-48 hours  For the next 48 hrs - you may use ice packs for discomfort     Call your primary care doctor if:    You have severe pain that does not improve with pain medication  You have chills or a fever greater than 101 F (38 C)  The site is red, swollen, hot or tender  Increase in pain, weakness or numbness  New problems with your bowel or bladder  Any questions or concerns    What to watch for:    It can be normal to have some bruising or slight swelling at the puncture site.   After the procedure, you may have some new weakness or numbness down your arm/leg from the numbing medicine. This should resolve in a few hours.   You may feel some temporary relief from the numbing medicine, but that will wear off within a few hours.  Your symptoms may return to pre procedure level, or can even be worse for the first 1-2 days.  For many people, the steroid begins to  provide some relief within 2-3 days, but it can take up to 2 weeks to obtain the full results.  Some people will get lasting relief from a single injection. Others may require up to 3 injections to get results. If you have more than one steroid injection, they should be given 2 weeks apart.  If you have no improvement in your symptoms after two weeks, please contact the doctor who ordered this procedure to discuss the next steps.  Side effects of your steroid injection are mild and will go away in 2-3 days  Insomnia  Irritability  Flushed face  Water retention  Restlessness  Difficulty sleeping  Increased appetite  Increased blood sugar  If you are diabetic, monitor your blood sugar closely. Contact the provider who manages your diabetes to help you control your blood sugar if needed.    If you have questions or concerns call:                  [facility] Radiology Dept @ [phone]                                    between 8am-4:30pm Mon-Fri    If you have urgent questions outside of these normal business hours, please contact the [facility] on call doctor @ [phone]    Or you can contact your provider via My Chart

## 2025-06-23 NOTE — PROCEDURES
RADIOLOGY PROCEDURE NOTE  Patient name: Flor Navarro  MRN: [id number]  : 1965    Pre-procedure diagnosis: Low back pain  Post-procedure diagnosis: Same    Procedure Date/Time: 2025  3:27 PM  Procedure: Caudal CHRISTIANO for treatment of L5-S1 DDD  Estimated blood loss: None  Specimen(s) collected with description: None  The patient tolerated the procedure well with no immediate complications.  Significant findings:none    See imaging dictation for procedural details.    Provider name: Trent Mason PA-C  Assistant(s):None

## 2025-06-23 NOTE — PROGRESS NOTES
Care Suites Post Procedure Note    Patient Information  Name: Flor Navarro  Age: 59 year old    Post Procedure- Lumbar epidural   Time patient returned to Care Suites: 1535  Concerns/abnormal assessment:   Pt. States painfree but did have left leg numbness in xray   VSS    Sats can be low son states  Pt. On cpap at HS   If abnormal assessment, provider notified: N/A  Plan/Other:   Monitor for 20 minutes then discharge   AVS reviewed with patient and son     Anh Garcia RN

## 2025-06-23 NOTE — PROGRESS NOTES
Care Suites Discharge Nursing Note    Patient Information  Name: Flor Navarro  Age: 59 year old    Discharge Education:  Discharge instructions reviewed: Yes  Additional education/resources provided: no  Patient/patient representative verbalizes understanding: Yes  Patient discharging on new medications: No  Medication education completed: Yes    Discharge Plans:   Discharge location: home  Discharge ride contacted: Yes  Approximate discharge time: 1620    Discharge Criteria:  Discharge criteria met and vital signs stable: Yes    Patient Belongs:  Patient belongings returned to patient: Yes    Anh Garcia RN

## 2025-06-27 ENCOUNTER — TELEPHONE (OUTPATIENT)
Dept: FAMILY MEDICINE | Facility: CLINIC | Age: 60
End: 2025-06-27
Payer: MEDICARE

## 2025-06-27 ENCOUNTER — RESULTS FOLLOW-UP (OUTPATIENT)
Dept: FAMILY MEDICINE | Facility: CLINIC | Age: 60
End: 2025-06-27

## 2025-06-27 DIAGNOSIS — I63.9 HEMIPLEGIA OF RIGHT DOMINANT SIDE DUE TO INFARCTION OF BRAIN (H): Primary | ICD-10-CM

## 2025-06-27 DIAGNOSIS — G81.91 HEMIPLEGIA OF RIGHT DOMINANT SIDE DUE TO INFARCTION OF BRAIN (H): Primary | ICD-10-CM

## 2025-06-27 NOTE — TELEPHONE ENCOUNTER
[facility] phone call message - order or referral request for patient:     Order or referral being requested:  for electric wheel chair      Additional Comments: none    OK to leave a message on voice mail? Yes    Primary language: Slovak      needed? Yes    Call taken on June 27, 2025 at 2:28 PM by Ania Russo

## 2025-06-30 NOTE — TELEPHONE ENCOUNTER
6/30/25 Forwarding message to correct Care Coordinator.      Shahla Shepherd  Lead Care Coordinator  [facility]  [phone]

## 2025-06-30 NOTE — TELEPHONE ENCOUNTER
CC called Cory back to inform her the order was placed. CC asked if there was someone the patient was wanting it sent, and Cory wasn't sure. She has a visit with patient Thursday and stated she would call back. If no call back from Cory by next week, CC will reach out to patient.     Bayron Cardoso  Care Coordinator- [facility]  [phone]

## 2025-06-30 NOTE — TELEPHONE ENCOUNTER
"CC called Cory to gather a little more information. Cory stated the patient was working with someone on getting the  already, though wasn't sure who and was told this through an . Flor stated she just needs an order from a doctor to get the  from whoever she is working with. CC requesting order placed and will help coordinate the order going to the right location.     The wheelchair is an \"I level\" electric wheelchair.     Bayron Cardoso  Care Coordinator- [facility]  [phone]    "

## 2025-07-03 ENCOUNTER — HOSPITAL ENCOUNTER (EMERGENCY)
Facility: CLINIC | Age: 60
Discharge: LEFT BEFORE DISCHARGE | End: 2025-07-03
Attending: INTERNAL MEDICINE
Payer: COMMERCIAL

## 2025-07-03 ENCOUNTER — APPOINTMENT (OUTPATIENT)
Dept: GENERAL RADIOLOGY | Facility: CLINIC | Age: 60
End: 2025-07-03
Attending: STUDENT IN AN ORGANIZED HEALTH CARE EDUCATION/TRAINING PROGRAM
Payer: COMMERCIAL

## 2025-07-03 ENCOUNTER — DOCUMENTATION ONLY (OUTPATIENT)
Dept: FAMILY MEDICINE | Facility: CLINIC | Age: 60
End: 2025-07-03

## 2025-07-03 VITALS
TEMPERATURE: 98.1 F | DIASTOLIC BLOOD PRESSURE: 88 MMHG | SYSTOLIC BLOOD PRESSURE: 137 MMHG | HEART RATE: 59 BPM | RESPIRATION RATE: 20 BRPM | OXYGEN SATURATION: 93 %

## 2025-07-03 DIAGNOSIS — J96.11 CHRONIC RESPIRATORY FAILURE WITH HYPOXIA (H): Primary | ICD-10-CM

## 2025-07-03 DIAGNOSIS — T18.2XXA FOREIGN BODY IN STOMACH, INITIAL ENCOUNTER: ICD-10-CM

## 2025-07-03 PROCEDURE — 71045 X-RAY EXAM CHEST 1 VIEW: CPT | Mod: 26 | Performed by: RADIOLOGY

## 2025-07-03 PROCEDURE — 74019 RADEX ABDOMEN 2 VIEWS: CPT

## 2025-07-03 PROCEDURE — 74019 RADEX ABDOMEN 2 VIEWS: CPT | Mod: 26 | Performed by: RADIOLOGY

## 2025-07-03 PROCEDURE — 99283 EMERGENCY DEPT VISIT LOW MDM: CPT | Mod: 25 | Performed by: INTERNAL MEDICINE

## 2025-07-03 PROCEDURE — 71045 X-RAY EXAM CHEST 1 VIEW: CPT

## 2025-07-03 PROCEDURE — 99283 EMERGENCY DEPT VISIT LOW MDM: CPT | Mod: GC | Performed by: INTERNAL MEDICINE

## 2025-07-03 ASSESSMENT — COLUMBIA-SUICIDE SEVERITY RATING SCALE - C-SSRS
1. IN THE PAST MONTH, HAVE YOU WISHED YOU WERE DEAD OR WISHED YOU COULD GO TO SLEEP AND NOT WAKE UP?: NO
6. HAVE YOU EVER DONE ANYTHING, STARTED TO DO ANYTHING, OR PREPARED TO DO ANYTHING TO END YOUR LIFE?: NO
2. HAVE YOU ACTUALLY HAD ANY THOUGHTS OF KILLING YOURSELF IN THE PAST MONTH?: NO

## 2025-07-03 ASSESSMENT — ACTIVITIES OF DAILY LIVING (ADL)
ADLS_ACUITY_SCORE: 57

## 2025-07-04 NOTE — PROGRESS NOTES
Brief GI Note    Paged by ED service regarding patient who swallowed dental implants. Patient is otherwise asymptomatic at this time. Primary ED team talked with patient who states that implants are all connected (thus ~3 cm in length). Patient is otherwise clinically and hemodynamically stable.     At this time, no plan for emergent endoscopy overnight; however, given the size (> 2.5 cm) unlikely to spontaneously pass through pylorus thus we will likely need EGD for removal.     Plan  - Recommend admission to medicine (observation)  - Strict NPO  - Plan for likely EGD pending GI evaluation in AM 7/4/25   - Pain/nausea management per primary   - Monitor abdominal pain/exam  - Please place routine GI Luminal Consult     Please do not hesitate to reach out to on call GI service if any questions/concerns.     Plan discussed with attending, Dr Jefe Iverson MD  Gastroenterology Fellow

## 2025-07-04 NOTE — ED PROVIDER NOTES
ED Provider Note  [facility]      History     Chief Complaint   Patient presents with    Swallowed Foreign Body     HPI  Flor Navarro is a 59 year old female medical history significant s/p liver transplant () and SBO   who presents with foreign object ingestion.  Earlier today she swallowed 3 dental implants. She had no symptoms at the time of ingestion and did not even know she had swallowed them until she noticed that they were missing.  She was seen at urgent care who told her to come to the ED.  Chest x-ray of the abdomen and chest prior to arrival confirmed three 1 cm objects in the distal stomach.  Per patient the implants are connected so total size roughly 3 cm.  She denies any drooling, dysphagia, retrosternal pain, coughing, shortness of breath or abdominal pain.    Past Medical History  Past Medical History:   Diagnosis Date    Anemia in CKD (chronic kidney disease)     Cataract     CKD (chronic kidney disease) stage 3, GFR 30-59 ml/min (H)     Hepatitis C     cleared virus spontaneously     High risk medication use     Hypertension, renal     Immunosuppressed status     Liver replaced by transplant (H) 2010    Osteoporosis     Recurrent pregnancy loss without current pregnancy     Recurrent UTI 2021    Stroke, hemorrhagic (H) 2008    Left cerebral intraparenchymal hemorrhage    Syncope     Unspecified viral hepatitis C without hepatic coma      Past Surgical History:   Procedure Laterality Date    CATARACT IOL, RT/LT Right 2/18/15     SECTION      Incisional Hernia Repair  2004    INSERT SHUNT PORTAL TRANSJUGULAR INTRAHEPTIC      shunt placement for liver failure    LAPAROSCOPIC SALPINGO-OOPHORECTOMY      left    NECK SURGERY  2010    fracture, in halo x 7months    TRANSPLANT LIVER RECIPIENT  DONOR  10/26/10    Upper GI Endoscopy with Band Ligation of Esoph/Gastric Varic. .       ACE/ARB/ARNI NOT PRESCRIBED  (INTENTIONAL)  albuterol (ACCUNEB) 0.63 MG/3ML neb solution  albuterol (VENTOLIN HFA) 108 (90 Base) MCG/ACT inhaler  amLODIPine (NORVASC) 10 MG tablet  atorvastatin (LIPITOR) 40 MG tablet  calcitRIOL (ROCALTROL) 0.25 MCG capsule  calcium carbonate-vitamin D (CALTRATE) 600-10 MG-MCG per tablet  calcium polycarbophil (FIBERCON) 625 MG tablet  capsaicin (ZOSTRIX) 0.025 % external cream  carvedilol (COREG) 12.5 MG tablet  cetirizine (ZYRTEC) 10 MG tablet  cycloSPORINE (RESTASIS) 0.05 % ophthalmic emulsion  cycloSPORINE modified (GENERIC EQUIVALENT) 25 MG capsule  dapagliflozin (FARXIGA) 10 MG TABS tablet  Denture Care Products (EFFERDENT DENTURE CLEANSER) TBEF  dextran 70-hypromellose (TEARS NATURALE FREE PF) 0.1-0.3 % ophthalmic solution  febuxostat (ULORIC) 40 MG TABS tablet  furosemide (LASIX) 40 MG tablet  gabapentin (NEURONTIN) 100 MG capsule  gabapentin (NEURONTIN) 300 MG capsule  ketotifen fumarate 0.035% 0.035 % SOLN ophthalmic solution  lidocaine (LIDODERM) 5 % patch  methylPREDNISolone (MEDROL) 32 MG tablet  methylPREDNISolone (MEDROL) 32 MG tablet  multivitamin w/minerals (CERTAVITE/ANTIOXIDANTS) tablet  mycophenolate (GENERIC EQUIVALENT) 250 MG capsule  omeprazole (PRILOSEC) 20 MG DR capsule  order for DME  order for DME  senna-docusate (SENEXON-S) 8.6-50 MG tablet  SM FIBER LAXATIVE 500 MG TABS tablet  umeclidinium (INCRUSE ELLIPTA) 62.5 MCG/ACT inhaler  vitamin D3 25 mcg (1000 units) tablet  ZIKS ARTHRITIS PAIN RELIEF 0.025-1-12 % external cream      Allergies   Allergen Reactions    Aspirin      3/31/16 Per pt, tolerates 81 mg daily dose without ADR.     325 mg dose caused itchiness and hives.    Clarithromycin      Allergic reaction        Contrast Dye     Iodine     Pcn [Penicillins]      Family History  Family History   Problem Relation Age of Onset    Hepatitis Other         Hep C, still in [place]    Cerebrovascular Disease Other     Cancer No family hx of     Diabetes No family hx of     Hypertension  No family hx of     Thyroid Disease No family hx of     Glaucoma No family hx of     Macular Degeneration No family hx of      Social History   Social History     Tobacco Use    Smoking status: Never    Smokeless tobacco: Never   Vaping Use    Vaping status: Never Used   Substance Use Topics    Alcohol use: No    Drug use: No      A medically appropriate review of systems was performed with pertinent positives and negatives noted in the HPI, and all other systems negative.    Physical Exam   BP: 137/88  Pulse: 59  Temp: 98.1  F (36.7  C)  Resp: 20  SpO2: 93 %  Physical Exam  HENT:      Head: Normocephalic.   Eyes:      Conjunctiva/sclera: Conjunctivae normal.   Cardiovascular:      Rate and Rhythm: Normal rate and regular rhythm.   Pulmonary:      Effort: Pulmonary effort is normal.      Breath sounds: Normal breath sounds.   Abdominal:      Tenderness: There is no abdominal tenderness.   Neurological:      Mental Status: She is alert.   Psychiatric:         Mood and Affect: Mood normal.           ED Course, Procedures, & Data      Procedures       A consult was attained from the GI service. The case was discussed with Dr. Irvin Iverson from that service. The consulting service's recommendations were provided at 9 PM.           Results for orders placed or performed during the hospital encounter of 07/03/25   XR Abdomen 2 Views    Impression    IMPRESSION: 3 round dense foci overlying the midline upper abdomen each measuring 9 mm, which may be compatible with provided history of swallowed dental implants. These are most likely within the distal stomach. No pneumatosis or free intraperitoneal   gas. Bowel gas pattern is nonobstructed. Multiple surgical clips overlie the upper abdomen. Surgical clip also overlies the lower paramidline chest. Similar increased interstitial markings of the lungs, which may be seen with mild edema or underlying   fibrotic changes. No definite acute superimposed focal pulmonary  consolidation, or significant pleural effusion. Heart size is borderline enlarged and stable. No pneumothorax. Degenerative changes of the shoulders as well as the thoracic and lumbosacral   spine. No acute osseous abnormality.   XR Chest 1 View    Impression    IMPRESSION: 3 round dense foci overlying the midline upper abdomen each measuring 9 mm, which may be compatible with provided history of swallowed dental implants. These are most likely within the distal stomach. No pneumatosis or free intraperitoneal   gas. Bowel gas pattern is nonobstructed. Multiple surgical clips overlie the upper abdomen. Surgical clip also overlies the lower paramidline chest. Similar increased interstitial markings of the lungs, which may be seen with mild edema or underlying   fibrotic changes. No definite acute superimposed focal pulmonary consolidation, or significant pleural effusion. Heart size is borderline enlarged and stable. No pneumothorax. Degenerative changes of the shoulders as well as the thoracic and lumbosacral   spine. No acute osseous abnormality.     Medications - No data to display       {Critical Care Performed?:436092}    Assessment & Plan    Flor Navarro is a 59 year old female medical history significant s/p liver transplant (2010) and SBO   who presents with foreign object ingestion. Earlier today she swallowed a 3 cm dental implant confirmed on x-ray to be in the distal stomach.  She arrives to the ED asymptomatic and vital signs within normal limits.  Unremarkable physical exam. Consulted GI who planned for an endoscopy in the morning d/t the size of the object. After a lengthy conversation with  who explained the benefits of endoscopy and risks of not undergoing the procedure which include but are not limited to GI bleeding, bowel obstruction, and infection the patient decided to leave AGAINST MEDICAL ADVICE.    The patient was instructed to follow-up with PCP as soon as possible for  reevaluation and repeat chest and abdominal x-rays in a week to confirm passage of the foreign object.  Counseled to return to the ED with any new or worsening abdominal pain, blood in stool or vomit, fever, or signs of obstruction such as the inability to pass stool or gas.      I have reviewed the nursing notes. I have reviewed the findings, diagnosis, plan and need for follow up with the patient.    New Prescriptions    No medications on file       Final diagnoses:   None     Ricki Infante PGY-1  Yvette Medeiros***  [facility] EMERGENCY DEPARTMENT  7/3/2025

## 2025-07-04 NOTE — ED TRIAGE NOTES
Pt swallowed dental implants, 3 teeth all in a row, around 130 pm today. Was told to come to ED by . Pt is Frisian speaking, son translating for triage. Denies any symptoms.     Triage Assessment (Adult)       Row Name 07/03/25 9061          Triage Assessment    Airway WDL WDL        Respiratory WDL    Respiratory WDL WDL        Skin Circulation/Temperature WDL    Skin Circulation/Temperature WDL WDL        Cardiac WDL    Cardiac WDL WDL        Peripheral/Neurovascular WDL    Peripheral Neurovascular WDL WDL        Cognitive/Neuro/Behavioral WDL    Cognitive/Neuro/Behavioral WDL WDL

## 2025-07-04 NOTE — DISCHARGE INSTRUCTIONS
You were seen in the ED for foreign object in stomach.  You are leaving against medical advice. After extensive conversation with Dr. Medeiros who explained the benefits of endoscopy tomorrow and risks of not undergoing the procedure which include but are not limited to GI bleeding, bowel obstruction, and infection.    Please follow-up with your PCP as soon as possible for follow-up evaluation and get a repeat chest and abdominal x-ray in a week to confirm passage of the foreign object.  Please watch for the object in your stool in the next couple of days.  Return to the ED if you experience any new or worsening abdominal pain, blood in stool or vomit, fever, or signs of obstruction such as the inability to pass stool or gas.

## 2025-07-06 ENCOUNTER — HEALTH MAINTENANCE LETTER (OUTPATIENT)
Age: 60
End: 2025-07-06

## 2025-07-07 ENCOUNTER — MYC MEDICAL ADVICE (OUTPATIENT)
Dept: FAMILY MEDICINE | Facility: CLINIC | Age: 60
End: 2025-07-07
Payer: COMMERCIAL

## 2025-07-07 DIAGNOSIS — M79.2 NEUROPATHIC PAIN: ICD-10-CM

## 2025-07-07 DIAGNOSIS — E78.5 HYPERLIPIDEMIA LDL GOAL <100: ICD-10-CM

## 2025-07-07 DIAGNOSIS — Z94.4 LIVER REPLACED BY TRANSPLANT (H): ICD-10-CM

## 2025-07-07 DIAGNOSIS — I12.9 HYPERTENSION, RENAL: ICD-10-CM

## 2025-07-07 DIAGNOSIS — R60.0 BILATERAL LEG EDEMA: ICD-10-CM

## 2025-07-07 RX ORDER — GABAPENTIN 100 MG/1
CAPSULE ORAL
Qty: 200 CAPSULE | Refills: 0 | Status: SHIPPED | OUTPATIENT
Start: 2025-07-07 | End: 2025-07-09

## 2025-07-07 RX ORDER — MULTIVITAMIN/IRON/FOLIC ACID 18MG-0.4MG
1 TABLET ORAL DAILY
Qty: 100 TABLET | Refills: 3 | Status: SHIPPED | OUTPATIENT
Start: 2025-07-07

## 2025-07-07 RX ORDER — CARVEDILOL 12.5 MG/1
12.5 TABLET ORAL 2 TIMES DAILY WITH MEALS
Qty: 180 TABLET | Refills: 1 | Status: SHIPPED | OUTPATIENT
Start: 2025-07-07

## 2025-07-07 RX ORDER — FUROSEMIDE 40 MG/1
40 TABLET ORAL DAILY
Qty: 90 TABLET | Refills: 0 | Status: SHIPPED | OUTPATIENT
Start: 2025-07-07

## 2025-07-07 RX ORDER — ATORVASTATIN CALCIUM 40 MG/1
40 TABLET, FILM COATED ORAL DAILY
Qty: 90 TABLET | Refills: 1 | Status: SHIPPED | OUTPATIENT
Start: 2025-07-07

## 2025-07-07 NOTE — TELEPHONE ENCOUNTER
"Request for medication refill:    Medication Name: furosemide (LASIX) 40 MG tablet ()   multivitamin w/minerals (CERTAVITE/ANTIOXIDANTS) tablet    Providers if patient needs an appointment and you are willing to give a one month supply please refill for one month and  send a MyChart using \".SMILLIMITEDREFILL\" .Or route chart to \"[facility] \" . And use the phrase \" SMIRXFOLLOWUP\"To call patient and inform of limited refill and providers request to make an appointment. (Giving one month refill in non controlled medications is strongly recommended before denial)    If refill has been denied, meaning absolutely no refills without visit, please complete the smart phrase \".SMIRXREFUSE\" and route it to the \"[facility] MED REFILLS\"  pool to inform the patient and the pharmacy.    Siomara Cobos, [facility]     "

## 2025-07-08 ENCOUNTER — TELEPHONE (OUTPATIENT)
Dept: FAMILY MEDICINE | Facility: CLINIC | Age: 60
End: 2025-07-08
Payer: COMMERCIAL

## 2025-07-08 DIAGNOSIS — M79.2 NEUROPATHIC PAIN: ICD-10-CM

## 2025-07-08 NOTE — TELEPHONE ENCOUNTER
"Dear  / Care Team,    Please confirm the dose / directions on gabapentin 100mg capsule prescription order sent over 07/07/25.  The initial bedtime dose listed on the Rx states \"30mg HS\" - should this be 300mg?    Please either re-issue a new electronic prescription or call to speak with a pharmacist to verbally annotate the order [phone].    Thank you.    Tarun WATTS PharmD  [facility]  "

## 2025-07-09 RX ORDER — GABAPENTIN 100 MG/1
CAPSULE ORAL
Qty: 200 CAPSULE | Refills: 0 | Status: SHIPPED | OUTPATIENT
Start: 2025-07-09

## 2025-07-14 ENCOUNTER — OFFICE VISIT (OUTPATIENT)
Dept: FAMILY MEDICINE | Facility: CLINIC | Age: 60
End: 2025-07-14
Payer: COMMERCIAL

## 2025-07-14 VITALS
HEIGHT: 60 IN | SYSTOLIC BLOOD PRESSURE: 129 MMHG | TEMPERATURE: 98 F | BODY MASS INDEX: 45.51 KG/M2 | WEIGHT: 231.8 LBS | RESPIRATION RATE: 14 BRPM | OXYGEN SATURATION: 95 % | HEART RATE: 69 BPM | DIASTOLIC BLOOD PRESSURE: 73 MMHG

## 2025-07-14 DIAGNOSIS — K43.2 INCISIONAL HERNIA, WITHOUT OBSTRUCTION OR GANGRENE: Primary | ICD-10-CM

## 2025-07-14 DIAGNOSIS — E66.813 CLASS 3 SEVERE OBESITY WITH SERIOUS COMORBIDITY AND BODY MASS INDEX (BMI) OF 45.0 TO 49.9 IN ADULT, UNSPECIFIED OBESITY TYPE (H): ICD-10-CM

## 2025-07-14 RX ORDER — PHENTERMINE HYDROCHLORIDE 15 MG/1
15 CAPSULE ORAL EVERY MORNING
Qty: 60 CAPSULE | Refills: 0 | Status: SHIPPED | OUTPATIENT
Start: 2025-07-14

## 2025-07-14 NOTE — PROGRESS NOTES
Assessment & Plan     Incisional hernia, without obstruction or gangrene  Worse with recent mistake in her medications that resulted in missing some of her laxatives. Decreased number of bowel movements have made the protrusion more prominent. Unclear if the contents of the hernia are limited to the abdominal wall or to deeper bowel. Given size, will need CT scan to fully assess. Can be done without contrast in the context of having a contrast allergy.   - CT Abdomen wo Contrast    Class 3 severe obesity with serious comorbidity and body mass index (BMI) of 45.0 to 49.9 in adult, unspecified obesity type (H)  Has been trying to lose weight for some time without success. Insurance does not cover GLP1 agents. She's interested in other medication approaches. Her ability to exercise is quite limited. Phentermine could cause hypertensive side effects, so we will monitor. Could combine with Topamax but we will address one medication change at a time. Discussed effect on decreased appetite. Follow-up 1 month.   - phentermine 15 MG capsule  Dispense: 60 capsule; Refill: 0      Diagnosis or treatment significantly limited by social determinants of health - english language learner  Prescription drug management      [place] Ray is a 59 year old, presenting for the following health issues:  Follow Up        7/14/2025     4:00 PM   Additional Questions   Roomed by Hugh   Accompanied by Son         7/14/2025    Information    services provided? No     HPI      Her breathing has not been good recently due to the current poor air quality. She tries to lift her arms up or be somewhat active, but she is unable to as it uses up all of her energy and exhausts her.   Her at-home nurse had an error filling her medications this time, and her son had to go through to check.     It's been 2 weeks since she's had her injection, and lower back pain has improved, but upper back is still in pain.     Declines  COVID shot today.     Abdominal   She is concerned if she has a hernia. She is having some pinching pain in her stomach and the pain is still present when she stands.   Her bowel movements in the past week were normal, but this week she is having some trouble pushing. Has not been using senna, so she is constipated.   She has not been eating well. She eats a small amount in the morning, paired with her medication, and a second time with her son around 4-5 pm. She usually does not snack in between time.   Was in the ER for swallowing dental implants, declined procedure and within a few days they passed through her stool.       This document serves as a record of the services and decisions personally performed and made by Karely Sierra MD. It was created on his/her behalf by Roxann Mcnamara, a trained medical scribe. The creation of this document is based the provider's statements to the medical scribe.  Scribe Roxann Mcnamara 4:23 PM, July 14, 2025         Objective    /73   Pulse 69   Temp 98  F (36.7  C) (Temporal)   Resp 14   Ht 1.524 m (5')   Wt 105.1 kg (231 lb 12.8 oz)   LMP  (LMP Unknown)   SpO2 95%   BMI 45.27 kg/m    Body mass index is 45.27 kg/m .  Physical Exam   GENERAL: alert and no distress  ABDOMEN: soft, nontender, no hepatosplenomegaly, no masses and bowel sounds normal. Large incision of liver transplant. Immediately inferior to the incision, there's a 15 cm bulge that protrudes further when she stands and is reducible and reduction causes decrease in symptoms.    MS: no gross musculoskeletal defects noted, no edema  PSYCH: appears uncomfortable, intermittently tearful.        Signed Electronically by: Karely Sierra MD

## 2025-07-21 ENCOUNTER — MYC REFILL (OUTPATIENT)
Dept: FAMILY MEDICINE | Facility: CLINIC | Age: 60
End: 2025-07-21
Payer: COMMERCIAL

## 2025-07-21 DIAGNOSIS — R60.0 BILATERAL LEG EDEMA: ICD-10-CM

## 2025-07-22 RX ORDER — FUROSEMIDE 40 MG/1
40 TABLET ORAL DAILY
Qty: 90 TABLET | Refills: 0 | OUTPATIENT
Start: 2025-07-22

## 2025-07-22 NOTE — TELEPHONE ENCOUNTER
furosemide (LASIX) 40 MG tablet 90 tablet 0 7/7/2025     Refill requested too early    Mignon Schultz RN

## 2025-07-26 ENCOUNTER — ANCILLARY PROCEDURE (OUTPATIENT)
Dept: CT IMAGING | Facility: CLINIC | Age: 60
End: 2025-07-26
Attending: FAMILY MEDICINE
Payer: MEDICARE

## 2025-07-26 DIAGNOSIS — K43.2 INCISIONAL HERNIA, WITHOUT OBSTRUCTION OR GANGRENE: ICD-10-CM

## 2025-07-26 PROCEDURE — 74176 CT ABD & PELVIS W/O CONTRAST: CPT | Performed by: RADIOLOGY

## 2025-07-28 DIAGNOSIS — Z53.9 DIAGNOSIS NOT YET DEFINED: Primary | ICD-10-CM

## 2025-07-28 PROBLEM — N32.9 LESION OF BLADDER: Status: ACTIVE | Noted: 2025-07-28

## 2025-07-30 ENCOUNTER — TELEPHONE (OUTPATIENT)
Dept: GASTROENTEROLOGY | Facility: CLINIC | Age: 60
End: 2025-07-30
Payer: MEDICARE

## 2025-07-30 NOTE — TELEPHONE ENCOUNTER
Patient confirmed scheduled appointment:     Date: 11/17/25  Time: 1:00 pm  Appointment Type: Liver Post-Return TXP HEPT  Provider: Dr. Laron Anne  Location: [place]  Testing/imaging: lab  Additional Notes:

## 2025-08-25 ENCOUNTER — DOCUMENTATION ONLY (OUTPATIENT)
Dept: FAMILY MEDICINE | Facility: CLINIC | Age: 60
End: 2025-08-25
Payer: MEDICARE

## (undated) RX ORDER — METOPROLOL TARTRATE 1 MG/ML
INJECTION, SOLUTION INTRAVENOUS
Status: DISPENSED
Start: 2018-09-17

## (undated) RX ORDER — LIDOCAINE HYDROCHLORIDE 10 MG/ML
INJECTION, SOLUTION EPIDURAL; INFILTRATION; INTRACAUDAL; PERINEURAL
Status: DISPENSED
Start: 2025-06-23

## (undated) RX ORDER — DOBUTAMINE HYDROCHLORIDE 200 MG/100ML
INJECTION INTRAVENOUS
Status: DISPENSED
Start: 2018-09-17

## (undated) RX ORDER — BETAMETHASONE SODIUM PHOSPHATE AND BETAMETHASONE ACETATE 3; 3 MG/ML; MG/ML
INJECTION, SUSPENSION INTRA-ARTICULAR; INTRALESIONAL; INTRAMUSCULAR; SOFT TISSUE
Status: DISPENSED
Start: 2025-06-23